# Patient Record
Sex: FEMALE | Race: WHITE | NOT HISPANIC OR LATINO | Employment: OTHER | URBAN - METROPOLITAN AREA
[De-identification: names, ages, dates, MRNs, and addresses within clinical notes are randomized per-mention and may not be internally consistent; named-entity substitution may affect disease eponyms.]

---

## 2017-01-11 DIAGNOSIS — I10 HYPERTENSION GOAL BP (BLOOD PRESSURE) < 150/90: Primary | ICD-10-CM

## 2017-01-11 NOTE — TELEPHONE ENCOUNTER
METOPROLOL 50MG      Last Written Prescription Date: 1/15/2016  Last Fill Quantity: 90, # refills: 3    Last Office Visit with G, P or [facility] prescribing provider:  8/9/2016   Future Office Visit:        BP Readings from Last 3 Encounters:   10/26/16 130/80   08/09/16 128/60   07/31/16 122/62

## 2017-01-12 RX ORDER — METOPROLOL SUCCINATE 50 MG/1
50 TABLET, EXTENDED RELEASE ORAL DAILY
Qty: 90 TABLET | Refills: 1 | Status: SHIPPED | OUTPATIENT
Start: 2017-01-12 | End: 2017-06-14

## 2017-01-12 NOTE — TELEPHONE ENCOUNTER
Prescription approved per [facility] Refill Protocol.    Sunitha Enrique, RN, BSN, PHN

## 2017-01-18 ENCOUNTER — ANTICOAGULATION THERAPY VISIT (OUTPATIENT)
Dept: NURSING | Facility: CLINIC | Age: 82
End: 2017-01-18
Payer: COMMERCIAL

## 2017-01-18 DIAGNOSIS — I48.0 PAROXYSMAL ATRIAL FIBRILLATION (H): Primary | ICD-10-CM

## 2017-01-18 DIAGNOSIS — Z79.01 LONG-TERM (CURRENT) USE OF ANTICOAGULANTS: ICD-10-CM

## 2017-01-18 LAB — INR POINT OF CARE: 3.4 (ref 0.86–1.14)

## 2017-01-18 PROCEDURE — 99207 ZZC NO CHARGE NURSE ONLY: CPT

## 2017-01-18 PROCEDURE — 85610 PROTHROMBIN TIME: CPT | Mod: QW

## 2017-01-18 PROCEDURE — 36416 COLLJ CAPILLARY BLOOD SPEC: CPT

## 2017-01-18 RX ORDER — WARFARIN SODIUM 2.5 MG/1
TABLET ORAL
Qty: 100 TABLET | Refills: 3 | COMMUNITY
Start: 2017-01-18 | End: 2017-04-04

## 2017-01-18 NOTE — PROGRESS NOTES
ANTICOAGULATION FOLLOW-UP CLINIC VISIT    Patient Name:  Maia Miranda  Date:  1/18/2017  Contact Type:  Face to Face    SUBJECTIVE:     Patient Findings     Positives No Problem Findings           OBJECTIVE    INR PROTIME   Date Value Ref Range Status   01/18/2017 3.4* 0.86 - 1.14 Final       ASSESSMENT / PLAN  INR assessment SUPRA    Recheck INR In: 3 WEEKS    INR Location Clinic      Anticoagulation Summary as of 1/18/2017     INR goal 2.0-3.0   Selected INR 3.4! 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    Meme Riggs, RN

## 2017-01-18 NOTE — MR AVS SNAPSHOT
Maia Miranda   1/18/2017 11:30 AM   Anticoagulation Therapy Visit    Description:  84 year old female   Provider:   ANTICOAGULATION CLINIC   Department:   Nurse           INR as of 1/18/2017     Selected INR 3.4! (1/18/2017)      Anticoagulation Summary as of 1/18/2017     INR goal 2.0-3.0   Selected INR 3.4! (1/18/2017)   Full instructions 2.5 mg every day   Next INR check 2/8/2017    Indications   Long-term (current) use of anticoagulants [Z79.01]  Atrial fibrillation (H) [I48.91]         Your next Anticoagulation Clinic appointment(s)     Feb 08, 2017 11:00 AM   Anticoagulation Visit with  ANTICOAGULATION CLINIC   [facility] ([facility]    [address] [address] [phone]              Contact Numbers     [facility]  Please call to cancel and/or reschedule your appointment, or with any problems or questions regarding your therapy.  Anticoagulation Nurse: [phone]  Main Clinic: [phone]             January 2017 Details    Sun Mon Tue Wed Thu Fri Sat     1               2               3               4               5               6               7                 8               9               10               11               12               13               14                 15               16               17               18      2.5 mg   See details      19      2.5 mg         20      2.5 mg         21      2.5 mg           22      2.5 mg         23      2.5 mg         24      2.5 mg         25      2.5 mg         26      2.5 mg         27      2.5 mg         28      2.5 mg           29      2.5 mg         30      2.5 mg         31      2.5 mg              Date Details   01/18 This INR check               How to take your warfarin dose     To take:  2.5 mg Take 1 of the 2.5 mg tablets.           February 2017 Details    Sun Mon Tue Wed Thu Fri Sat        1      2.5 mg         2      2.5 mg         3      2.5 mg          4      2.5 mg           5      2.5 mg         6      2.5 mg         7      2.5 mg         8            9               10               11                 12               13               14               15               16               17               18                 19               20               21               22               23               24               25                 26               27               28                    Date Details   No additional details    Date of next INR:  2/8/2017         How to take your warfarin dose     To take:  2.5 mg Take 1 of the 2.5 mg tablets.

## 2017-02-03 ENCOUNTER — OFFICE VISIT (OUTPATIENT)
Dept: URGENT CARE | Facility: URGENT CARE | Age: 82
End: 2017-02-03
Payer: COMMERCIAL

## 2017-02-03 ENCOUNTER — TRANSFERRED RECORDS (OUTPATIENT)
Dept: HEALTH INFORMATION MANAGEMENT | Facility: CLINIC | Age: 82
End: 2017-02-03

## 2017-02-03 VITALS
HEART RATE: 58 BPM | OXYGEN SATURATION: 99 % | DIASTOLIC BLOOD PRESSURE: 58 MMHG | WEIGHT: 159 LBS | TEMPERATURE: 95.6 F | RESPIRATION RATE: 16 BRPM | BODY MASS INDEX: 27.28 KG/M2 | SYSTOLIC BLOOD PRESSURE: 130 MMHG

## 2017-02-03 DIAGNOSIS — R09.81 CONGESTION OF PARANASAL SINUS: Primary | ICD-10-CM

## 2017-02-03 DIAGNOSIS — R05.9 COUGH: ICD-10-CM

## 2017-02-03 DIAGNOSIS — R09.82 POST-NASAL DRIP: ICD-10-CM

## 2017-02-03 PROCEDURE — 99214 OFFICE O/P EST MOD 30 MIN: CPT | Performed by: FAMILY MEDICINE

## 2017-02-03 NOTE — NURSING NOTE
"Chief Complaint   Patient presents with     Urgent Care     URI     x 3-4 weeks congestion     Cough     x 3-4 weeks, wheezing, non productive     Sinus Problem     Sinus pain and pressure.        Initial /78 mmHg  Pulse 58  Temp(Src) 95.6  F (35.3  C) (Tympanic)  Resp 16  Wt 159 lb (72.122 kg)  SpO2 99% Estimated body mass index is 27.28 kg/(m^2) as calculated from the following:    Height as of 8/9/16: 5' 4\" (1.626 m).    Weight as of this encounter: 159 lb (72.122 kg).  BP completed using cuff size: VARGHESE Uribe    "

## 2017-02-08 ENCOUNTER — ANTICOAGULATION THERAPY VISIT (OUTPATIENT)
Dept: NURSING | Facility: CLINIC | Age: 82
End: 2017-02-08
Payer: COMMERCIAL

## 2017-02-08 DIAGNOSIS — I48.0 PAROXYSMAL ATRIAL FIBRILLATION (H): Primary | ICD-10-CM

## 2017-02-08 DIAGNOSIS — Z79.01 LONG-TERM (CURRENT) USE OF ANTICOAGULANTS: ICD-10-CM

## 2017-02-08 LAB — INR POINT OF CARE: 2.5 (ref 0.86–1.14)

## 2017-02-08 PROCEDURE — 36416 COLLJ CAPILLARY BLOOD SPEC: CPT

## 2017-02-08 PROCEDURE — 85610 PROTHROMBIN TIME: CPT | Mod: QW

## 2017-02-08 PROCEDURE — 99207 ZZC NO CHARGE NURSE ONLY: CPT

## 2017-02-08 NOTE — PROGRESS NOTES
ANTICOAGULATION FOLLOW-UP CLINIC VISIT    Patient Name:  Maai Miranda  Date:  2/8/2017  Contact Type:  Face to Face    SUBJECTIVE:     Patient Findings     Positives Diet Changes (eating more broccoli), Meds (started Claritin for allergies)           OBJECTIVE    INR PROTIME   Date Value Ref Range Status   02/08/2017 2.5* 0.86 - 1.14 Final       ASSESSMENT / PLAN  INR assessment THER    Recheck INR In: 4 WEEKS    INR Location Clinic      Anticoagulation Summary as of 2/8/2017     INR goal 2.0-3.0   Selected INR 2.5 (2/8/2017)   Maintenance plan 2.5 mg (2.5 mg x 1) every day   Full instructions 2.5 mg every day   Weekly total 17.5 mg   No change documented Meme Riggs RN   Plan last modified Meme Riggs RN (1/18/2017)   Next INR check 3/8/2017   Target end date Indefinite    Indications   Long-term (current) use of anticoagulants [Z79.01]  Atrial fibrillation (H) [I48.91]         Anticoagulation Episode Summary     INR check location [facility]    Preferred lab     Send INR reminders to  ANTICOAG CLINIC    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

## 2017-02-08 NOTE — MR AVS SNAPSHOT
Maia Miranda   2/8/2017 11:00 AM   Anticoagulation Therapy Visit    Description:  84 year old female   Provider:   ANTICOAGULATION CLINIC   Department:   Nurse           INR as of 2/8/2017     Selected INR 2.5 (2/8/2017)      Anticoagulation Summary as of 2/8/2017     INR goal 2.0-3.0   Selected INR 2.5 (2/8/2017)   Full instructions 2.5 mg every day   Next INR check 3/8/2017    Indications   Long-term (current) use of anticoagulants [Z79.01]  Atrial fibrillation (H) [I48.91]         Your next Anticoagulation Clinic appointment(s)     Mar 08, 2017 11:00 AM   Anticoagulation Visit with  ANTICOAGULATION CLINIC   [facility]    [address] [address] [phone]              Contact Numbers     [facility]  Please call to cancel and/or reschedule your appointment, or with any problems or questions regarding your therapy.  Anticoagulation Nurse: [phone]  Main Clinic: [phone]             February 2017 Details    Sun Mon Tue Wed Thu Fri Sat        1               2               3               4                 5               6               7               8      2.5 mg   See details      9      2.5 mg         10      2.5 mg         11      2.5 mg           12      2.5 mg         13      2.5 mg         14      2.5 mg         15      2.5 mg         16      2.5 mg         17      2.5 mg         18      2.5 mg           19      2.5 mg         20      2.5 mg         21      2.5 mg         22      2.5 mg         23      2.5 mg         24      2.5 mg         25      2.5 mg           26      2.5 mg         27      2.5 mg         28      2.5 mg              Date Details   02/08 This INR check               How to take your warfarin dose     To take:  2.5 mg Take 1 of the 2.5 mg tablets.           March 2017 Details    Sun Mon Tue Wed Thu Fri Sat        1      2.5 mg         2      2.5 mg         3      2.5 mg         4      2.5  mg           5      2.5 mg         6      2.5 mg         7      2.5 mg         8            9               10               11                 12               13               14               15               16               17               18                 19               20               21               22               23               24               25                 26               27               28               29               30               31                 Date Details   No additional details    Date of next INR:  3/8/2017         How to take your warfarin dose     To take:  2.5 mg Take 1 of the 2.5 mg tablets.

## 2017-02-10 ENCOUNTER — PRE VISIT (OUTPATIENT)
Dept: UROLOGY | Facility: CLINIC | Age: 82
End: 2017-02-10

## 2017-02-10 NOTE — TELEPHONE ENCOUNTER
Patient coming in for follow up, Dr. Maria patient. Patient chart reviewed, no need for call, all records available and ready for appointment.

## 2017-02-22 ENCOUNTER — OFFICE VISIT (OUTPATIENT)
Dept: UROLOGY | Facility: CLINIC | Age: 82
End: 2017-02-22

## 2017-02-22 VITALS
HEIGHT: 65 IN | WEIGHT: 158.8 LBS | BODY MASS INDEX: 26.46 KG/M2 | DIASTOLIC BLOOD PRESSURE: 58 MMHG | HEART RATE: 56 BPM | SYSTOLIC BLOOD PRESSURE: 134 MMHG

## 2017-02-22 DIAGNOSIS — R33.9 URINARY RETENTION: Primary | ICD-10-CM

## 2017-02-22 RX ORDER — CEFAZOLIN SODIUM 1 G/3ML
1 INJECTION, POWDER, FOR SOLUTION INTRAMUSCULAR; INTRAVENOUS SEE ADMIN INSTRUCTIONS
Status: CANCELLED | OUTPATIENT
Start: 2017-02-22

## 2017-02-22 ASSESSMENT — PAIN SCALES - GENERAL: PAINLEVEL: NO PAIN (0)

## 2017-02-22 NOTE — NURSING NOTE
"Chief Complaint   Patient presents with     Consult     Would like to try Interstim       Blood pressure 134/58, pulse 56, height 1.638 m (5' 4.5\"), weight 72 kg (158 lb 12.8 oz), not currently breastfeeding. Body mass index is 26.84 kg/(m^2).    Patient Active Problem List   Diagnosis     Hyperlipidemia LDL goal <130     Osteopenia     Primary osteoarthritis involving multiple joints     Essential and other specified forms of tremor     Esophageal reflux     Varicose veins of lower extremities with complications     Allergic rhinitis     Degeneration of lumbar or lumbosacral intervertebral disc     Internal bleeding hemorrhoids     Osteoarthritis of thumb     Diverticulosis     CKD (chronic kidney disease) stage 3, GFR 30-59 ml/min     Chronic constipation     Urinary retention     Health Care Home     Advanced directives, counseling/discussion     Long-term (current) use of anticoagulants     Atrial fibrillation (H)     Hypertension goal BP (blood pressure) < 150/90     Post-menopausal bleeding     History of recurrent UTIs     Mild cognitive impairment     Fibroid uterus       Allergies   Allergen Reactions     Codeine      vomiting     Codeine Nausea and Vomiting     Epinephrine      tachycardia     Meperidine      demerol, vomiting     Meperidine Nausea and Vomiting     Morphine Nausea and Vomiting     vomiting     Penicillins Hives     hives     Sulfa Drugs      Vomiting       Epinephrine Palpitations       Current Outpatient Prescriptions   Medication Sig Dispense Refill     warfarin (COUMADIN) 2.5 MG tablet Take 2.5mg (1 tablet)  MTWTFSS or as directed by INR Clinic. 100 tablet 3     metoprolol (TOPROL-XL) 50 MG 24 hr tablet Take 1 tablet (50 mg) by mouth daily 90 tablet 1     methenamine hippurate (HIPREX) 1 G TABS Take 1 tablet (1 g) by mouth daily 90 tablet 3     lisinopril (PRINIVIL,ZESTRIL) 10 MG tablet Take 1 tablet (10 mg) by mouth daily (Patient taking differently: Take 10 mg by mouth daily AM) 90 " tablet 3     polyethylene glycol (MIRALAX/GLYCOLAX) packet Take 1 packet by mouth daily       ketoprofen 10% in PLO 10% Place onto the skin every 4 hours as needed for moderate pain       conjugated estrogens (PREMARIN) vaginal cream Place vaginally twice a week       fluocinonide (LIDEX) 0.05 % cream Apply topically 2 times daily as needed 30 g 2     acetaminophen (TYLENOL) 500 MG tablet Take 500-1,000 mg by mouth every 6 hours as needed       Menthol, Topical Analgesic, 10 % GEL Use topical cream on hands twice a day, prn for pain 60 g 0     Catheters (GENTLECATH URINARY CATHETER) MISC 1 catheter 4 times daily 120 each 12     Lubricants (SM LUBRICATING JELLY) GEL Externally apply 1 packet topically 4 times daily 144 Tube 12     ascorbic acid (VITAMIN C) 1000 MG TABS Take 1 tablet (1,000 mg) by mouth 2 times daily (Patient taking differently: Take 1,000 mg by mouth daily With HYprex) 180 tablet 3     Omega-3 Fatty Acids (OMEGA-3 FISH OIL PO) Take 1 g by mouth 2 times daily (with meals)       sodium chloride (PETER 128) 5 % ophthalmic ointment Place 1 Application into both eyes At Bedtime       carboxymethylcellulose (CELLUVISC/REFRESH LIQUIGEL) 1 % ophthalmic solution Place 1 drop into both eyes every morning        MAGNESIUM 250 MG OR TABS Take  by mouth. 1 tablet daily at HS  0       Social History   Substance Use Topics     Smoking status: Never Smoker     Smokeless tobacco: Never Used     Alcohol use No       Lorri Griffin LPN  2/22/2017  4:11 PM

## 2017-02-22 NOTE — LETTER
2/22/2017       RE: Maia Miranda  [address] [address]     Dear Colleague,    Thank you for referring your patient, Maia Miranda, to the [facility] UROLOGY AND INST FOR PROSTATE AND UROLOGIC CANCERS at [facility]. Please see a copy of my visit note below.    Reason for Visit:  Discuss interstim    Clinical Data:  Ms. Miranda is a pleasant 85 y/o female with idiopathic urinary retention who has been performing SIC X 3 years.  Her volumes are around 40 to 50 oz when she caths and she does this about 4 x per day.  She is interested in trying an interstim implant.  She denies any neurological problems.  She denies DM or hx of smoking.  She has Afib but it is well controlled.    A/P:  85 y/o female with idiopathic urinary retention who performs SIC. She is interested in interstim implant.  We discussed options of observation and continued SIC as well as interstim both a PNE and a staged procedure.  She is interested in interstim and would like to try a staged procedure.  We discussed risks of infection, bleeding, breakage of device or malfunction as well as risk of migration and need for further surgery.  Pt. Verbalized understanding with her son and wishes to proceed.  -schedule stage I interstim    Thank you for allowing me to participate in the care of  Ms. Maia Miranda and I will keep you updated on her progress.    Kb Tracy MD  25 min spent with patient,  >50% in discussion and coordination of care.

## 2017-02-22 NOTE — MR AVS SNAPSHOT
After Visit Summary   2/22/2017    Maia Miranda    MRN: [id number]           Patient Information     Date Of Birth          10/26/1932        Visit Information        Provider Department      2/22/2017 3:45 PM Kb Tracy MD [facility] Urology and [facility] for Prostate and Urologic Cancers        Today's Diagnoses     Urinary retention    -  1       Follow-ups after your visit        Additional Services     PAC Visit Referral (For [facility] Only)       Does this visit require an Anesthesia consult?  Yes - Evaluate for medical necessity related to one of the following conditions:  Hx of controlled Afib    H&P done by:  N/A and Other (Specify): PAC      Please be aware that coverage of these services is subject to the terms and limitations of your health insurance plan.  Call member services at your health plan with any benefit or coverage questions.      Please bring the following to your appointment:  >>   Any x-rays, CTs or MRIs which have been performed.  Contact the facility where they were done to arrange for  prior to your scheduled appointment.  Any new CT, MRI or other procedures ordered by your specialist must be performed at a [facility] facility or coordinated by your clinic's referral office.    >>   List of current medications  >>   This referral request   >>   Any documents/labs given to you for this referral                  Follow-up notes from your care team     Return for stage I interstim.      Your next 10 appointments already scheduled     Mar 08, 2017 11:00 AM CST   Anticoagulation Visit with  ANTICOAGULATION CLINIC   [facility]    [address] [address] [phone]              Who to contact     Please call your clinic at [phone] to:    Ask questions about your health    Make or cancel appointments    Discuss your medicines    Learn about your test results    Speak to your doctor   If you have  "compliments or concerns about an experience at your clinic, or if you wish to file a complaint, please contact [facility] Patient Relations at [phone] or email us at [email or web address]         Additional Information About Your Visit        MyChart Information     [employer] gives you secure access to your electronic health record. If you see a primary care provider, you can also send messages to your care team and make appointments. If you have questions, please call your primary care clinic.  If you do not have a primary care provider, please call [phone] and they will assist you.      [employer] is an electronic gateway that provides easy, online access to your medical records. With [employer], you can request a clinic appointment, read your test results, renew a prescription or communicate with your care team.     To access your existing account, please contact your [facility] or call [phone] for assistance.        Care EveryWhere ID     This is your Care EveryWhere ID. This could be used by other organizations to access your [facility] medical records  [id number]        Your Vitals Were     Pulse Height BMI (Body Mass Index)             56 1.638 m (5' 4.5\") 26.84 kg/m2          Blood Pressure from Last 3 Encounters:   02/22/17 134/58   02/03/17 130/58   10/26/16 130/80    Weight from Last 3 Encounters:   02/22/17 72 kg (158 lb 12.8 oz)   02/03/17 72.1 kg (159 lb)   10/26/16 68 kg (150 lb)              We Performed the Following     PAC Visit Referral (For [facility] Only)     Franci-Operative Worksheet  (Urology General)          Today's Medication Changes          These changes are accurate as of: 2/22/17  4:46 PM.  If you have any questions, ask your nurse or doctor.               These medicines have changed or have updated prescriptions.        Dose/Directions    ascorbic acid 1000 MG Tabs   Commonly known as:  vitamin C   This may have " changed:    - when to take this  - additional instructions   Used for:  Recurrent UTI, Neurogenic bladder        Dose:  1000 mg   Take 1 tablet (1,000 mg) by mouth 2 times daily   Quantity:  180 tablet   Refills:  3       lisinopril 10 MG tablet   Commonly known as:  PRINIVIL/ZESTRIL   This may have changed:  additional instructions   Used for:  Hypertension goal BP (blood pressure) < 150/90        Dose:  10 mg   Take 1 tablet (10 mg) by mouth daily   Quantity:  90 tablet   Refills:  3                Primary Care Provider Office Phone # Fax #    Dian Frias [phone]       [facility] [address] [place] [address]        Thank you!     Thank you for choosing [facility] UROLOGY AND [facility] FOR PROSTATE AND UROLOGIC CANCERS  for your care. Our goal is always to provide you with excellent care. Hearing back from our patients is one way we can continue to improve our services. Please take a few minutes to complete the written survey that you may receive in the mail after your visit with us. Thank you!             Your Updated Medication List - Protect others around you: Learn how to safely use, store and throw away your medicines at www.disposemymeds.org.          This list is accurate as of: 2/22/17  4:46 PM.  Always use your most recent med list.                   Brand Name Dispense Instructions for use    ascorbic acid 1000 MG Tabs    vitamin C    180 tablet    Take 1 tablet (1,000 mg) by mouth 2 times daily       carboxymethylcellulose 1 % ophthalmic solution    CELLUVISC/REFRESH LIQUIGEL     Place 1 drop into both eyes every morning       conjugated estrogens cream    PREMARIN     Place vaginally twice a week       fluocinonide 0.05 % cream    LIDEX    30 g    Apply topically 2 times daily as needed       GENTLECATH URINARY CATHETER Misc     120 each    1 catheter 4 times daily       ketoprofen 10% in PLO 10% topical gel      Place onto the skin every 4 hours as  needed for moderate pain       lisinopril 10 MG tablet    PRINIVIL/ZESTRIL    90 tablet    Take 1 tablet (10 mg) by mouth daily       magnesium 250 MG tablet      Take  by mouth. 1 tablet daily at HS       Menthol (Topical Analgesic) 10 % Gel     60 g    Use topical cream on hands twice a day, prn for pain       methenamine hippurate 1 G Tabs tablet    HIPREX    90 tablet    Take 1 tablet (1 g) by mouth daily       metoprolol 50 MG 24 hr tablet    TOPROL-XL    90 tablet    Take 1 tablet (50 mg) by mouth daily       OMEGA-3 FISH OIL PO      Take 1 g by mouth 2 times daily (with meals)       polyethylene glycol Packet    MIRALAX/GLYCOLAX     Take 1 packet by mouth daily       SM LUBRICATING JELLY Gel     144 Tube    Externally apply 1 packet topically 4 times daily       sodium chloride 5 % ophthalmic ointment    PETER 128     Place 1 Application into both eyes At Bedtime       TYLENOL 500 MG tablet   Generic drug:  acetaminophen      Take 500-1,000 mg by mouth every 6 hours as needed       warfarin 2.5 MG tablet    COUMADIN    100 tablet    Take 2.5mg (1 tablet)  MTWTFSS or as directed by INR Clinic.

## 2017-02-22 NOTE — PROGRESS NOTES
Reason for Visit:  Discuss interstim    Clinical Data:  Ms. Miranda is a pleasant 83 y/o female with idiopathic urinary retention who has been performing SIC X 3 years.  Her volumes are around 40 to 50 oz when she caths and she does this about 4 x per day.  She is interested in trying an interstim implant.  She denies any neurological problems.  She denies DM or hx of smoking.  She has Afib but it is well controlled.    A/P:  83 y/o female with idiopathic urinary retention who performs SIC. She is interested in interstim implant.  We discussed options of observation and continued SIC as well as interstim both a PNE and a staged procedure.  She is interested in interstim and would like to try a staged procedure.  We discussed risks of infection, bleeding, breakage of device or malfunction as well as risk of migration and need for further surgery.  Pt. Verbalized understanding with her son and wishes to proceed.  -schedule stage I interstim    Thank you for allowing me to participate in the care of  Ms. Maia Miranda and I will keep you updated on her progress.    Kb Tracy MD  25 min spent with patient,  >50% in discussion and coordination of care.

## 2017-02-27 DIAGNOSIS — N95.2 ATROPHIC VAGINITIS: Primary | ICD-10-CM

## 2017-02-27 RX ORDER — ESTRADIOL 0.1 MG/G
2 CREAM VAGINAL
Qty: 42.5 G | Refills: 3 | Status: SHIPPED | OUTPATIENT
Start: 2017-02-27 | End: 2017-03-22

## 2017-03-08 ENCOUNTER — ANTICOAGULATION THERAPY VISIT (OUTPATIENT)
Dept: NURSING | Facility: CLINIC | Age: 82
End: 2017-03-08
Payer: COMMERCIAL

## 2017-03-08 DIAGNOSIS — I48.0 PAROXYSMAL ATRIAL FIBRILLATION (H): ICD-10-CM

## 2017-03-08 DIAGNOSIS — Z79.01 LONG-TERM (CURRENT) USE OF ANTICOAGULANTS: ICD-10-CM

## 2017-03-08 LAB — INR POINT OF CARE: 2.3 (ref 0.86–1.14)

## 2017-03-08 PROCEDURE — 85610 PROTHROMBIN TIME: CPT | Mod: QW

## 2017-03-08 PROCEDURE — 36416 COLLJ CAPILLARY BLOOD SPEC: CPT

## 2017-03-08 PROCEDURE — 99207 ZZC NO CHARGE NURSE ONLY: CPT

## 2017-03-08 NOTE — PROGRESS NOTES
ANTICOAGULATION FOLLOW-UP CLINIC VISIT    Patient Name:  Maia Miranda  Date:  3/8/2017  Contact Type:  Face to Face    SUBJECTIVE:        OBJECTIVE    INR Protime   Date Value Ref Range Status   03/08/2017 2.3 (A) 0.86 - 1.14 Final       ASSESSMENT / PLAN  INR assessment THER    Recheck INR In: 3 WEEKS    INR Location Clinic      Anticoagulation Summary as of 3/8/2017     INR goal 2.0-3.0   Today's INR 2.3   Maintenance plan 2.5 mg (2.5 mg x 1) every day   Full instructions 2.5 mg every day   Weekly total 17.5 mg   No change documented Meme Riggs, RN   Plan last modified Meme Riggs RN (1/18/2017)   Next INR check 3/29/2017   Target end date Indefinite    Indications   Long-term (current) use of anticoagulants [Z79.01]  Atrial fibrillation (H) [I48.91]         Anticoagulation Episode Summary     INR check location [facility]    Preferred lab     Send INR reminders to  ANTICOAG CLINIC    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

## 2017-03-08 NOTE — MR AVS SNAPSHOT
Maia Miranda   3/8/2017 11:00 AM   Anticoagulation Therapy Visit    Description:  84 year old female   Provider:   ANTICOAGULATION CLINIC   Department:   Nurse           INR as of 3/8/2017     Today's INR 2.3      Anticoagulation Summary as of 3/8/2017     INR goal 2.0-3.0   Today's INR 2.3   Full instructions 2.5 mg every day   Next INR check 3/29/2017    Indications   Long-term (current) use of anticoagulants [Z79.01]  Atrial fibrillation (H) [I48.91]         Your next Anticoagulation Clinic appointment(s)     Mar 29, 2017 11:00 AM CDT   Anticoagulation Visit with  ANTICOAGULATION CLINIC   [facility] ([facility])    [address] [facility] [address] [address] [phone]              Contact Numbers     [facility]  Please call to cancel and/or reschedule your appointment, or with any problems or questions regarding your therapy.  Anticoagulation Nurse: [phone]  Main Clinic: [phone]             March 2017 Details    Sun Mon Tue Wed Thu Fri Sat        1               2               3               4                 5               6               7               8      2.5 mg   See details      9      2.5 mg         10      2.5 mg         11      2.5 mg           12      2.5 mg         13      2.5 mg         14      2.5 mg         15      2.5 mg         16      2.5 mg         17      2.5 mg         18      2.5 mg           19      2.5 mg         20      2.5 mg         21      2.5 mg         22      2.5 mg         23      2.5 mg         24      2.5 mg         25      2.5 mg           26      2.5 mg         27      2.5 mg         28      2.5 mg         29            30               31                 Date Details   03/08 This INR check       Date of next INR:  3/29/2017         How to take your warfarin dose     To take:  2.5 mg Take 1 of the 2.5 mg tablets.

## 2017-03-20 ENCOUNTER — CARE COORDINATION (OUTPATIENT)
Dept: CASE MANAGEMENT | Facility: CLINIC | Age: 82
End: 2017-03-20

## 2017-03-20 DIAGNOSIS — Z76.89 HEALTH CARE HOME: ICD-10-CM

## 2017-03-20 NOTE — PROGRESS NOTES
Clinic Care Coordination Contact  Initial    Clinic SW sent message to this SW FREDDIE stating patient left her a voice message asking about transportation to an upcoming appointment.    CC spoke with patient. She reports she is supposed to go to [facility] on Wednesday (2 days) for a pre-op appointment and now her ride is unavailable to drive her. She does generally drive but is frightened to drive in [place]. She is waiting to hear back from another child to see if they may be able to take the day off work to drive her. CC gave her the phone number for [employer]. CC explained that she may not qualify but should call to find out. CC pulled up the distance between patient's house and the transportation [employer] address. One route is 19.9 miles and another route is 22.6. She has never applied for [employer] so that is not an option. She is aware that her insurance does not cover the cost of transportation to medical appointments.    Plan: Patient will call [employer] to find out if she is eligible for service. CC gave patient CC's phone number and advised to call if concerns arise. Patient denies other needs.    TRISTIAN Cody  A   [phone]  March 20, 2017

## 2017-03-21 ENCOUNTER — ANESTHESIA EVENT (OUTPATIENT)
Dept: SURGERY | Facility: AMBULATORY SURGERY CENTER | Age: 82
End: 2017-03-21

## 2017-03-22 ENCOUNTER — ALLIED HEALTH/NURSE VISIT (OUTPATIENT)
Dept: SURGERY | Facility: CLINIC | Age: 82
End: 2017-03-22

## 2017-03-22 ENCOUNTER — OFFICE VISIT (OUTPATIENT)
Dept: SURGERY | Facility: CLINIC | Age: 82
End: 2017-03-22

## 2017-03-22 VITALS
BODY MASS INDEX: 26.71 KG/M2 | OXYGEN SATURATION: 97 % | TEMPERATURE: 97.8 F | WEIGHT: 160.3 LBS | HEIGHT: 65 IN | HEART RATE: 72 BPM | DIASTOLIC BLOOD PRESSURE: 76 MMHG | SYSTOLIC BLOOD PRESSURE: 152 MMHG | RESPIRATION RATE: 16 BRPM

## 2017-03-22 DIAGNOSIS — Z01.818 PREOP EXAMINATION: Primary | ICD-10-CM

## 2017-03-22 DIAGNOSIS — Z01.818 PREOP EXAMINATION: ICD-10-CM

## 2017-03-22 LAB
ANION GAP SERPL CALCULATED.3IONS-SCNC: 10 MMOL/L (ref 3–14)
BUN SERPL-MCNC: 24 MG/DL (ref 7–30)
CALCIUM SERPL-MCNC: 9 MG/DL (ref 8.5–10.1)
CHLORIDE SERPL-SCNC: 107 MMOL/L (ref 94–109)
CO2 SERPL-SCNC: 25 MMOL/L (ref 20–32)
CREAT SERPL-MCNC: 1.13 MG/DL (ref 0.52–1.04)
ERYTHROCYTE [DISTWIDTH] IN BLOOD BY AUTOMATED COUNT: 12.8 % (ref 10–15)
GFR SERPL CREATININE-BSD FRML MDRD: 46 ML/MIN/1.7M2
GLUCOSE SERPL-MCNC: 94 MG/DL (ref 70–99)
HCT VFR BLD AUTO: 39.4 % (ref 35–47)
HGB BLD-MCNC: 12.7 G/DL (ref 11.7–15.7)
INR PPP: 1.8 (ref 0.86–1.14)
MCH RBC QN AUTO: 31.8 PG (ref 26.5–33)
MCHC RBC AUTO-ENTMCNC: 32.2 G/DL (ref 31.5–36.5)
MCV RBC AUTO: 99 FL (ref 78–100)
PLATELET # BLD AUTO: 148 10E9/L (ref 150–450)
POTASSIUM SERPL-SCNC: 4.3 MMOL/L (ref 3.4–5.3)
RBC # BLD AUTO: 3.99 10E12/L (ref 3.8–5.2)
SODIUM SERPL-SCNC: 142 MMOL/L (ref 133–144)
WBC # BLD AUTO: 5.7 10E9/L (ref 4–11)

## 2017-03-22 ASSESSMENT — ENCOUNTER SYMPTOMS: DYSRHYTHMIAS: 1

## 2017-03-22 NOTE — PATIENT INSTRUCTIONS
Preparing for Your Surgery      Name:  Maia Miranda   MRN:  [id number]   :  10/26/1932   Today's Date:  3/22/2017     Arriving for surgery:  Surgery date:  17  Surgery time:  11:20 am  Arrival time:  9:50 am    Please come to:   [facility]  [address] [place] [id number]    -  Proceed to the 5th floor to check into the Ambulatory Surgery Center.              >> There will be patient concierges on the 1st and 5th floor, for assistance or an escort, if you would like.              >> Please call [phone] with any questions.  -  Bring your ID and insurance card.    What can I eat or drink?  -  You may have solid food or milk products until 8 hours prior to your surgery. (3:20 am)  -  You may have water, apple juice, BLACK coffee (NO creamer or nondairy creamer), or 7up/Sprite until 2 hours prior to your surgery. (9:20 am)     Which medicines can I take?  -Do not bring your own medications to the hospital, except for eye drops         -  Follow Primary Care Providers recommendation for Warfarin.        -  Stop Omega 3 and vitamins 1 week prior to surgery.    -  Do NOT take these medications in the morning, the day of surgery:  Lisinopril,  No creams or lotions    -  Please take these medications the morning of surgery:  Carboxymethylcellulose eye drops  Acetaminophen (Tylenol) if needed    How do I prepare myself?  -  Take two showers: one the night before surgery; and one the morning of surgery.         Use Scrubcare or Hibiclens to wash from neck down.  You may use your own shampoo and conditioner. No other hair products.   -  Do NOT use lotion, powder, deodorant, or antiperspirant the day of your surgery.  -  Do NOT wear any makeup, fingernail polish or jewelry.    Questions or Concerns:  If you have questions or concerns, please call the  Preoperative Assessment Center, Monday-Friday 7AM-7PM:  [phone]      AFTER YOUR SURGERY  Breathing exercises   Breathing  exercises help you recover faster. Take deep breaths and let the air out slowly. This will:     Help you wake up after surgery.    Help prevent complications like pneumonia.  Preventing complications will help you go home sooner.   Nausea and vomiting   You may feel sick to your stomach after surgery; if so, let your nurse know.    Pain control:  After surgery, you may have pain. Our goal is to help you manage your pain. Pain medicine will help you feel comfortable enough to do activities that will help you heal.  These activities may include breathing exercises, walking and physical therapy.   To help your health care team treat your pain we will ask: 1) If you have pain  2) where it is located 3) describe your pain in your words  Methods of pain control include medications given by mouth, vein or by nerve block for some surgeries.  Sequential Compression Device (SCD) or Pneumo Boots:  You may need to wear SCD S on your legs or feet. These are wraps connected to a machine that pumps in air and releases it. The repeated pumping helps prevent blood clots from forming.

## 2017-03-22 NOTE — MR AVS SNAPSHOT
After Visit Summary   3/22/2017    Maia Miranda    MRN: [id number]           Patient Information     Date Of Birth          10/26/1932        Visit Information        Provider Department      3/22/2017 12:30 PM Pharmacist, Miri Caballero [facility]        Today's Diagnoses     Preop examination    -  1       Follow-ups after your visit        Your next 10 appointments already scheduled     Mar 22, 2017  2:30 PM CDT   (Arrive by 2:15 PM)   PAC Anesthesia Consult with Miri Pac Anesthesiologist   [facility] ([facility])    [address] [place] [address]  [address] [facility] [id number] [phone]            Mar 29, 2017 11:00 AM CDT   Anticoagulation Visit with  ANTICOAGULATION CLINIC   [facility] ([facility])    [address] [place] [id number] [phone]            Apr 04, 2017   Procedure with Kb Tracy MD   [facility] ([facility])    [address] [place] [address]  [address] [facility] [id number] [phone]           Located in the Clinics and Surgery Center at [address] [place] [address].   parking is very convenient and highly recommended.  is a $6 flat rate fee.  Both  and self parkers should enter the main arrival plaza from [facility]; parking attendants will direct you based on your parking preference.              Future tests that were ordered for you today     Open Future Orders        Priority Expected Expires Ordered    CBC with platelets Routine 3/22/2017 4/21/2017 3/22/2017    Basic metabolic panel Routine 3/22/2017 4/21/2017 3/22/2017            Who to contact     Please call your clinic at [phone] to:    Ask questions about your health    Make or cancel appointments    Discuss your medicines    Learn about your test results    Speak to your doctor   If you have  compliments or concerns about an experience at your clinic, or if you wish to file a complaint, please contact [facility] Patient Relations at [phone] or email us at [email or web address]         Additional Information About Your Visit        MyChart Information     [employer] gives you secure access to your electronic health record. If you see a primary care provider, you can also send messages to your care team and make appointments. If you have questions, please call your primary care clinic.  If you do not have a primary care provider, please call [phone] and they will assist you.      [employer] is an electronic gateway that provides easy, online access to your medical records. With [employer], you can request a clinic appointment, read your test results, renew a prescription or communicate with your care team.     To access your existing account, please contact your [facility] Clinic or call [phone] for assistance.        Care EveryWhere ID     This is your Care EveryWhere ID. This could be used by other organizations to access your [facility] medical records  [id number]         Blood Pressure from Last 3 Encounters:   03/22/17 152/76   02/22/17 134/58   02/03/17 130/58    Weight from Last 3 Encounters:   03/22/17 72.7 kg (160 lb 4.8 oz)   02/22/17 72 kg (158 lb 12.8 oz)   02/03/17 72.1 kg (159 lb)              Today, you had the following     No orders found for display         Today's Medication Changes          These changes are accurate as of: 3/22/17  2:18 PM.  If you have any questions, ask your nurse or doctor.               These medicines have changed or have updated prescriptions.        Dose/Directions    ascorbic acid 1000 MG Tabs   Commonly known as:  vitamin C   This may have changed:    - when to take this  - additional instructions   Used for:  Recurrent UTI, Neurogenic bladder        Dose:  1000 mg   Take 1 tablet (1,000 mg) by  mouth 2 times daily   Quantity:  180 tablet   Refills:  3       lisinopril 10 MG tablet   Commonly known as:  PRINIVIL/ZESTRIL   This may have changed:  when to take this   Used for:  Hypertension goal BP (blood pressure) < 150/90        Dose:  10 mg   Take 1 tablet (10 mg) by mouth daily   Quantity:  90 tablet   Refills:  3       metoprolol 50 MG 24 hr tablet   Commonly known as:  TOPROL-XL   This may have changed:  when to take this   Used for:  Hypertension goal BP (blood pressure) < 150/90        Dose:  50 mg   Take 1 tablet (50 mg) by mouth daily   Quantity:  90 tablet   Refills:  1         Stop taking these medicines if you haven't already. Please contact your care team if you have questions.     estradiol 0.1 MG/GM cream   Commonly known as:  ESTRACE VAGINAL   Stopped by:  Pharmacist,  Pac           Menthol (Topical Analgesic) 10 % Gel   Stopped by:  Pharmacist, Miri Pac                    Primary Care Provider Office Phone # Fax #    Dian Frias [phone]       [facility] [address] [place] [address]        Thank you!     Thank you for choosing [facility] [place]  for your care. Our goal is always to provide you with excellent care. Hearing back from our patients is one way we can continue to improve our services. Please take a few minutes to complete the written survey that you may receive in the mail after your visit with us. Thank you!             Your Updated Medication List - Protect others around you: Learn how to safely use, store and throw away your medicines at www.disposemymeds.org.          This list is accurate as of: 3/22/17  2:18 PM.  Always use your most recent med list.                   Brand Name Dispense Instructions for use    ascorbic acid 1000 MG Tabs    vitamin C    180 tablet    Take 1 tablet (1,000 mg) by mouth 2 times daily       carboxymethylcellulose 1 % ophthalmic solution    CELLUVISC/REFRESH LIQUIGEL      Place 1 drop into both eyes every morning       conjugated estrogens cream    PREMARIN     Place vaginally twice a week On Tuesday and Friday. Uses a pea sized amount       fluocinonide 0.05 % cream    LIDEX    30 g    Apply topically 2 times daily as needed       GENTLECATH URINARY CATHETER Misc     120 each    1 catheter 4 times daily       ketoprofen 10% in PLO 10% topical gel      Place onto the skin every 4 hours as needed for moderate pain       lisinopril 10 MG tablet    PRINIVIL/ZESTRIL    90 tablet    Take 1 tablet (10 mg) by mouth daily       magnesium 250 MG tablet      Take 1 tablet by mouth at bedtime       methenamine hippurate 1 G Tabs tablet    HIPREX    90 tablet    Take 1 tablet (1 g) by mouth daily       metoprolol 50 MG 24 hr tablet    TOPROL-XL    90 tablet    Take 1 tablet (50 mg) by mouth daily       OMEGA-3 FISH OIL PO      Take 1 g by mouth 2 times daily (with meals)       polyethylene glycol Packet    MIRALAX/GLYCOLAX     Take 1 packet by mouth daily as needed       SM LUBRICATING JELLY Gel     144 Tube    Externally apply 1 packet topically 4 times daily       sodium chloride 5 % ophthalmic ointment    PETER 128     Place 1 Application into both eyes At Bedtime       TYLENOL 500 MG tablet   Generic drug:  acetaminophen      Take 500-1,000 mg by mouth every 6 hours as needed       warfarin 2.5 MG tablet    COUMADIN    100 tablet    Take 2.5mg (1 tablet)  MTWTFSS or as directed by INR Clinic.

## 2017-03-22 NOTE — H&P
Pre-Operative H & P     Date of Encounter: 3/22/2017  Primary Care Physician:  Dian Frias    CC: Idiopathic urinary retention.    HPI:  Maia Miranda is a 84 year old female who presents for pre-operative H & P in preparation for Stage One Interstim Implant (Implant Permanent Lead for Interstim Device on 4/4/17 with Dr. Kb Tracy at [facility].     The patient was evaluated by Dr. Tracy on 2/22/17 in regards to idiopathic urinary retention.  The patient has been performing straight intermittent catheterization for approximately 3 years and she is interested in trying an Interstim implant.      History is obtained from the patient and the medical record.    Past Medical History:  Past Medical History:   Diagnosis Date     Amnestic MCI (mild cognitive impairment with memory loss) 2014     Chronic duodenal ulcer without mention of hemorrhage, perforation, or obstruction 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 Warner, [facility] yearly     Esophageal reflux 2001    Abstracted 06/10/02     Essential and other specified forms of tremor 2004    resting tremor     Generalized osteoarthrosis, unspecified site     Hands     Hiatal hernia     diagnosed late 1990s [place], [place]     History of pulmonary embolism 1971    no source found     LACTOSE INTOLERANCE      Lumbago     sees Kodi Guidry     Mitral valve regurgitation      Occlusion and stenosis of carotid artery without mention of cerebral infarction 2003    CAROTID US NORMAL, bruit in neck     Osteoporosis, unspecified 2003    T = -2.66     Paroxysmal atrial fibrillation (H) 11/15/2013     Unspecified essential hypertension      Unspecified tinnitus 2005    mild hearing loss, saw ENT     Past Surgical History:  Past Surgical History:   Procedure Laterality Date     C NONSPECIFIC PROCEDURE      D&C x 2 in 1957 & 1962 -- Abstracted 06/10/02     C  NONSPECIFIC PROCEDURE      Left breast biopsy x 2 in 1988 & 1989 -- Abstracted 06/10/02     C NONSPECIFIC PROCEDURE  1958    Influenza resulting in hospitalization -- Abstracted 06/10/02     C NONSPECIFIC PROCEDURE  1971    10 day hospitalization from bilateral pulmonary enboli -- Abstracted 06/10/02     C NONSPECIFIC PROCEDURE  1/03    colonoscopy  negative     CATARACT IOL, RT/LT       corneal scraping       CYSTOSCOPY, BIOPSY BLADDER, COMBINED N/A 7/25/2016    Procedure: COMBINED CYSTOSCOPY, BIOPSY BLADDER;  Surgeon: Bernadine Maria MD;  Location: [facility] OR     ESOPHAGOSCOPY, GASTROSCOPY, DUODENOSCOPY (EGD), COMBINED  7/8/2013    Procedure: COMBINED ESOPHAGOSCOPY, GASTROSCOPY, DUODENOSCOPY (EGD);   ESOPHAGOSCOPY, GASTROSCOPY, DUODENOSCOPY (EGD)   ;  Surgeon: Shawn Soto MD;  Location: [facility] GI     Hx of Blood transfusions/reactions: Denies.     Hx of abnormal bleeding or anti-platelet use: Patient will be instructed to hold Coumadin by her Primary Care Provider, Dr. Dian Frias.    Menstrual history: No LMP recorded. Patient is postmenopausal.    Steroid use in the last year: Denies.    Personal or FH of difficulty with anesthesia: Denies.    Prior to admission medications  Current Outpatient Prescriptions   Medication Sig Dispense Refill     warfarin (COUMADIN) 2.5 MG tablet Take 2.5mg (1 tablet)  MTWTFSS or as directed by INR Clinic. 100 tablet 3     metoprolol (TOPROL-XL) 50 MG 24 hr tablet Take 1 tablet (50 mg) by mouth daily (Patient taking differently: Take 50 mg by mouth every evening ) 90 tablet 1     methenamine hippurate (HIPREX) 1 G TABS Take 1 tablet (1 g) by mouth daily 90 tablet 3     lisinopril (PRINIVIL,ZESTRIL) 10 MG tablet Take 1 tablet (10 mg) by mouth daily (Patient taking differently: Take 10 mg by mouth every morning ) 90 tablet 3     polyethylene glycol (MIRALAX/GLYCOLAX) packet Take 1 packet by mouth daily as needed        ketoprofen 10% in PLO 10% Place onto the skin every 4  hours as needed for moderate pain       conjugated estrogens (PREMARIN) vaginal cream Place vaginally twice a week On Tuesday and Friday. Uses a pea sized amount       fluocinonide (LIDEX) 0.05 % cream Apply topically 2 times daily as needed 30 g 2     acetaminophen (TYLENOL) 500 MG tablet Take 500-1,000 mg by mouth every 6 hours as needed       Catheters (GENTLECATH URINARY CATHETER) MISC 1 catheter 4 times daily 120 each 12     Lubricants (SM LUBRICATING JELLY) GEL Externally apply 1 packet topically 4 times daily (Patient not taking: Reported on 3/22/2017) 144 Tube 12     ascorbic acid (VITAMIN C) 1000 MG TABS Take 1 tablet (1,000 mg) by mouth 2 times daily (Patient taking differently: Take 1,000 mg by mouth daily With HYprex) 180 tablet 3     Omega-3 Fatty Acids (OMEGA-3 FISH OIL PO) Take 1 g by mouth 2 times daily (with meals)       sodium chloride (PETER 128) 5 % ophthalmic ointment Place 1 Application into both eyes At Bedtime       carboxymethylcellulose (CELLUVISC/REFRESH LIQUIGEL) 1 % ophthalmic solution Place 1 drop into both eyes every morning        MAGNESIUM 250 MG OR TABS Take 1 tablet by mouth at bedtime  0     Allergies  Allergies   Allergen Reactions     Codeine Nausea and Vomiting     Meperidine Nausea and Vomiting     Morphine Nausea and Vomiting     vomiting     Penicillins Hives     hives     Sulfa Drugs      Vomiting       Epinephrine Palpitations     Social History  Social History     Social History     Marital status:      Spouse name: N/A     Number of children: 4     Years of education: N/A     Occupational History      Retired      at [employer]     Social History Main Topics     Smoking status: Never Smoker     Smokeless tobacco: Never Used     Alcohol use No     Drug use: No     Sexual activity: No     Other Topics Concern     Not on file     Social History Narrative    .Living situation (location, living with others?): Lives alone in a condo. Lives 2 miles from daughter  and son.     Activities of daily living (e.g., dressing, eating, walking): Independent        Instrumental activities of daily living (e.g., finance management, housekeeping, meal planning/ prep): Independent        Advance Care plan: has a living will and POLST        Driving: Yes    Medication: manages by self    Meaningful Activities (e.g., hobbies, work): Reads, has coffee out with girlfriends every week, has a humHuayue Digitalbird feeder, likes to watch baseball        Support: Help with cleaning every few weeks    Community Resources Used/ Interested in: None at this time     Family History  Family History   Problem Relation Age of Onset     CEREBROVASCULAR DISEASE Father       at 92     Psychotic Disorder Mother      Suicide 62, depression     Alzheimer Disease Maternal Grandmother      Cardiovascular Sister      pacemaker     Glaucoma No family hx of      Review of Systems  Functional status: Independent in ADL's.  4 METS.     The complete review of systems is negative other than noted in the HPI or here.   Constitutional: Denies recent changes in sleeping patterns, or fevers/chills.  Reports an approximately 15 pound weight gain over the winter.  Eyes: Glasses for vision correction.  No recent vision changes.  EENT: Denies recent changes in hearing, mouth pain, or difficulty swallowing.  Cardiovascular: Denies chest pain, SEGOVIA or orthopnea, or palpitations.  States that she is able to tell when she is in atrial fibrillation.    Respiratory: Denies shortness of breath or significant cough.    GI: Denies nausea/vomiting or diarrhea.  Reports chronic issues with constipation.    : Denies dysuria.    Musculoskeletal: Denies joint pain or swelling.    Skin: Denies rashes or wounds.    Hematologic: Denies easy bruising or bleeding.    Neurologic: Denies migraines, seizures, dizziness, numbness/tingling.  Psychiatric: Denies changes in mood or affect.      /76  Pulse 72  Temp 97.8  F (36.6  C) (Oral)  Resp 16  " Ht 1.638 m (5' 4.5\")  Wt 72.7 kg (160 lb 4.8 oz)  SpO2 97%  BMI 27.09 kg/m2    160 lbs 4.8 oz  5' 4.5\"   Body mass index is 27.09 kg/(m^2).    Physical Exam  Constitutional: Patient awake, seated upright in a chair, in no apparent distress.  Appears stated age.  Eyes: Pupils equal, round and reactive to light.  Extra ocular muscles intact. Sclera clear.  Conjunctiva normal.  HENT: Head normocephalic.  Oral pharynx intact with moist mucous membranes.  Dentition intact.  No thyromegaly appreciated.   Respiratory: Lung sounds clear to auscultation bilaterally.  No rales, rhonchi, or wheezing noted.    Cardiovascular: S1, S2, regular rate and rhythm.  No murmurs, rubs, or gallops noted. Carotid bruit auscultated on the right.  Radial and pedal pulses palpable, bilaterally.  Trace pedal edema noted, bilaterally.  GI: Bowel sounds present.  Abdomen rounded, soft, non-tender to light palpation.  No hepatosplenomegaly or masses palpated.   Genitourinary: Exam deferred.  Lymph/Hematologic: No cervical or supraclavicular lymphadenopathy noted.  No excessive bruising noted.    Skin: Color appropriate for race, warm, dry.    Musculoskeletal: Full extension of the neck.  No redness, warmth, or swelling of the joints noted. Gross motor strength is normal.    Neurologic: Alert, oriented to name, place and time. Cranial nerves II-XII are grossly intact. Gait is normal.      Neuropsychiatric: Calm, cooperative. Normal affect.     Labs:  3/22/17: WBC 5.7; Hgb 12.7; Hct 39.4; Plt 148; INR 1.8  3/22/17: Na 142; K 4.3; Cl 107; Glu 94; BUN 24; Cr 1.13; Ca 9    Imagin14 Lexiscan NM Stress:  Impression  1. Myocardial perfusion imaging using single isotope technique demonstrated normal myocardial perfusion.   2. Gated images demonstrated normal wall motion. The left ventricular systolic function is normal with a calculated ejection fraction of 69%.  3. Compared to the prior study from there are no previous studies for " comparison.    7/19/16 EKG: Sinus rhythm with 1st degree block and right bundle branch block    Lab results, EKG were personally reviewed by this provider.      Assessment and Plan  Maia Miranda is a 84 year old female scheduled to undergo Stage One Interstim Implant (Implant Permanent Lead for Interstim Device on 4/4/17 with Dr. Kb Tracy.    She has the following specific operative considerations:   1.  GERD with hiatal hernia: Consider use of RSI techniques with advanced airway maneuvers.  2.  Paroxysmal atrial fibrillation, history of PE, chronically anticoagulated with Coumadin: Pharmacy staff contacted the patient's primary care provider, Dr. Dian Frias, requesting that they manage holding the patient's Coumadin prior to surgery.    3.  HTN: Patient instructed to take Toprol, but hold Lisinopril for renal protection.    4.  CKD: Patient instructed to hold Lisinopril for renal protection.  Recommend close monitoring of fluid balance and electrolytes throughout the perioperative period.    5.  CBC, INR, BMP today.    Revised Cardiac Risk Index: 0.4% risk of major adverse cardiac event.  Anesthesia considerations: Refer to PAC assessment in the anesthesia records.  VTE risk: 0.5%  JAC risk: Low  PONV risk score= 2.  (If > 2, anti-emetic intervention is recommended.)    Patient was discussed with Dr. Gonzales.    Alyssa Patino NP  Preoperative Assessment Center  [facility]  Phone: [phone]  Fax: [phone]

## 2017-03-22 NOTE — MR AVS SNAPSHOT
After Visit Summary   3/22/2017    Maia Miranda    MRN: [id number]           Patient Information     Date Of Birth          10/26/1932        Visit Information        Provider Department      3/22/2017 1:00 PM Rn, [facility] Preoperative Assessment Center        Care Instructions    Preparing for Your Surgery      Name:  Maia Miranda   MRN:  [id number]   :  10/26/1932   Today's Date:  3/22/2017     Arriving for surgery:  Surgery date:  17  Surgery time:  11:20 am  Arrival time:  9:50 am    Please come to:   [facility]  [address] [place] [id number]    -  Proceed to the 5th floor to check into the Ambulatory Surgery Center.              >> There will be patient concierges on the 1st and 5th floor, for assistance or an escort, if you would like.              >> Please call [phone] with any questions.  -  Bring your ID and insurance card.    What can I eat or drink?  -  You may have solid food or milk products until 8 hours prior to your surgery. (3:20 am)  -  You may have water, apple juice, BLACK coffee (NO creamer or nondairy creamer), or 7up/Sprite until 2 hours prior to your surgery. (9:20 am)     Which medicines can I take?  -Do not bring your own medications to the hospital, except for eye drops         -  Follow Primary Care Providers recommendation for Warfarin.        -  Stop Omega 3 and vitamins 1 week prior to surgery.    -  Do NOT take these medications in the morning, the day of surgery:  Lisinopril,  No creams or lotions    -  Please take these medications the morning of surgery:  Carboxymethylcellulose eye drops  Acetaminophen (Tylenol) if needed    How do I prepare myself?  -  Take two showers: one the night before surgery; and one the morning of surgery.         Use Scrubcare or Hibiclens to wash from neck down.  You may use your own shampoo and conditioner. No other hair products.   -  Do NOT use lotion, powder, deodorant,  or antiperspirant the day of your surgery.  -  Do NOT wear any makeup, fingernail polish or jewelry.    Questions or Concerns:  If you have questions or concerns, please call the  Preoperative Assessment Center, Monday-Friday 7AM-7PM:  [phone]      AFTER YOUR SURGERY  Breathing exercises   Breathing exercises help you recover faster. Take deep breaths and let the air out slowly. This will:     Help you wake up after surgery.    Help prevent complications like pneumonia.  Preventing complications will help you go home sooner.   Nausea and vomiting   You may feel sick to your stomach after surgery; if so, let your nurse know.    Pain control:  After surgery, you may have pain. Our goal is to help you manage your pain. Pain medicine will help you feel comfortable enough to do activities that will help you heal.  These activities may include breathing exercises, walking and physical therapy.   To help your health care team treat your pain we will ask: 1) If you have pain  2) where it is located 3) describe your pain in your words  Methods of pain control include medications given by mouth, vein or by nerve block for some surgeries.  Sequential Compression Device (SCD) or Pneumo Boots:  You may need to wear SCD S on your legs or feet. These are wraps connected to a machine that pumps in air and releases it. The repeated pumping helps prevent blood clots from forming.                 Follow-ups after your visit        Your next 10 appointments already scheduled     Mar 29, 2017 11:00 AM CDT   Anticoagulation [facility]  [facility]    [address] [facility] [address] [phone]            Apr 04, 2017   Procedure with Kb Tracy MD   [facility] ([facility])    [address]  5th Floor  [facility] [address] [phone]           Located in the Clinics and  Surgery Center at [address] [place] [address].   parking is very convenient and highly recommended.  is a $6 flat rate fee.  Both  and self parkers should enter the main arrival plaza from [facility]; parking attendants will direct you based on your parking preference.              Future tests that were ordered for you today     Open Future Orders        Priority Expected Expires Ordered    INR Routine 3/22/2017 4/21/2017 3/22/2017    UA reflex to Microscopic and Culture Routine 3/22/2017 4/21/2017 3/22/2017    CBC with platelets Routine 3/22/2017 4/21/2017 3/22/2017    Basic metabolic panel Routine 3/22/2017 4/21/2017 3/22/2017            Who to contact     Please call your clinic at [phone] to:    Ask questions about your health    Make or cancel appointments    Discuss your medicines    Learn about your test results    Speak to your doctor   If you have compliments or concerns about an experience at your clinic, or if you wish to file a complaint, please contact [facility] Patient Relations at [phone] or email us at [email or web address]         Additional Information About Your Visit        [employer] Information     [employer] gives you secure access to your electronic health record. If you see a primary care provider, you can also send messages to your care team and make appointments. If you have questions, please call your primary care clinic.  If you do not have a primary care provider, please call [phone] and they will assist you.      [employer] is an electronic gateway that provides easy, online access to your medical records. With [employer], you can request a clinic appointment, read your test results, renew a prescription or communicate with your care team.     To access your existing account, please contact your [facility] Clinic or call [phone] for assistance.        Care EveryWhere ID     This is your  Care EveryWhere ID. This could be used by other organizations to access your [place] medical records  [id number]         Blood Pressure from Last 3 Encounters:   03/22/17 152/76   02/22/17 134/58   02/03/17 130/58    Weight from Last 3 Encounters:   03/22/17 72.7 kg (160 lb 4.8 oz)   02/22/17 72 kg (158 lb 12.8 oz)   02/03/17 72.1 kg (159 lb)              Today, you had the following     No orders found for display         Today's Medication Changes          These changes are accurate as of: 3/22/17  2:59 PM.  If you have any questions, ask your nurse or doctor.               These medicines have changed or have updated prescriptions.        Dose/Directions    ascorbic acid 1000 MG Tabs   Commonly known as:  vitamin C   This may have changed:    - when to take this  - additional instructions   Used for:  Recurrent UTI, Neurogenic bladder        Dose:  1000 mg   Take 1 tablet (1,000 mg) by mouth 2 times daily   Quantity:  180 tablet   Refills:  3       lisinopril 10 MG tablet   Commonly known as:  PRINIVIL/ZESTRIL   This may have changed:  when to take this   Used for:  Hypertension goal BP (blood pressure) < 150/90        Dose:  10 mg   Take 1 tablet (10 mg) by mouth daily   Quantity:  90 tablet   Refills:  3       metoprolol 50 MG 24 hr tablet   Commonly known as:  TOPROL-XL   This may have changed:  when to take this   Used for:  Hypertension goal BP (blood pressure) < 150/90        Dose:  50 mg   Take 1 tablet (50 mg) by mouth daily   Quantity:  90 tablet   Refills:  1         Stop taking these medicines if you haven't already. Please contact your care team if you have questions.     estradiol 0.1 MG/GM cream   Commonly known as:  ESTRACE VAGINAL   Stopped by:  Pharmacist,  Pac           Menthol (Topical Analgesic) 10 % Gel   Stopped by:  Pharmacist,  Pac                    Primary Care Provider Office Phone # Fax #    Dian Frias [phone]       [facility] [address] [place] [address]        Thank you!     Thank you for choosing [facility] PREOPERATIVE ASSESSMENT CENTER  for your care. Our goal is always to provide you with excellent care. Hearing back from our patients is one way we can continue to improve our services. Please take a few minutes to complete the written survey that you may receive in the mail after your visit with us. Thank you!             Your Updated Medication List - Protect others around you: Learn how to safely use, store and throw away your medicines at www.disposemymeds.org.          This list is accurate as of: 3/22/17  2:59 PM.  Always use your most recent med list.                   Brand Name Dispense Instructions for use    ascorbic acid 1000 MG Tabs    vitamin C    180 tablet    Take 1 tablet (1,000 mg) by mouth 2 times daily       carboxymethylcellulose 1 % ophthalmic solution    CELLUVISC/REFRESH LIQUIGEL     Place 1 drop into both eyes every morning       conjugated estrogens cream    PREMARIN     Place vaginally twice a week On Tuesday and Friday. Uses a pea sized amount       fluocinonide 0.05 % cream    LIDEX    30 g    Apply topically 2 times daily as needed       GENTLECATH URINARY CATHETER Misc     120 each    1 catheter 4 times daily       ketoprofen 10% in PLO 10% topical gel      Place onto the skin every 4 hours as needed for moderate pain       lisinopril 10 MG tablet    PRINIVIL/ZESTRIL    90 tablet    Take 1 tablet (10 mg) by mouth daily       magnesium 250 MG tablet      Take 1 tablet by mouth at bedtime       methenamine hippurate 1 G Tabs tablet    HIPREX    90 tablet    Take 1 tablet (1 g) by mouth daily       metoprolol 50 MG 24 hr tablet    TOPROL-XL    90 tablet    Take 1 tablet (50 mg) by mouth daily       OMEGA-3 FISH OIL PO      Take 1 g by mouth 2 times daily (with meals)       polyethylene glycol Packet    MIRALAX/GLYCOLAX     Take 1 packet by mouth daily as needed       SM LUBRICATING JELLY Gel      144 Tube    Externally apply 1 packet topically 4 times daily       sodium chloride 5 % ophthalmic ointment    PETER 128     Place 1 Application into both eyes At Bedtime       TYLENOL 500 MG tablet   Generic drug:  acetaminophen      Take 500-1,000 mg by mouth every 6 hours as needed       warfarin 2.5 MG tablet    COUMADIN    100 tablet    Take 2.5mg (1 tablet)  MTWTFSS or as directed by INR Clinic.

## 2017-03-22 NOTE — ANESTHESIA PREPROCEDURE EVALUATION
Anesthesia Evaluation     . Pt has had prior anesthetic. Type: General and MAC    No history of anesthetic complications          ROS/MED HX    ENT/Pulmonary:     (+)allergic rhinitis, , . .    Neurologic:  - neg neurologic ROS     Cardiovascular:     (+) Dyslipidemia, hypertension-Peripheral Vascular Disease-- Carotid Stenosis, --. Taking blood thinners Pt has received instructions: Instructions Given to patient: PCP to instruct patient regarding holding Coumadin prior to surgery. . . :. dysrhythmias a-fib, valvular problems/murmurs Mild mitral valve regurgitation:.       METS/Exercise Tolerance:  4 - Raking leaves, gardening   Hematologic:     (+) History of blood clots pt is anticoagulated, -      Musculoskeletal:   (+) , , other musculoskeletal- Osteoporosis      GI/Hepatic: Comment: Occasional GERD symptoms, takes TUMS as needed    (+) GERD Symptomatic, hiatal hernia,       Renal/Genitourinary:     (+) chronic renal disease, type: CRI, Pt does not require dialysis, Pt has no history of transplant, Other Renal/ Genitourinary, Chronic idiopathic urinary retention requiring straight intermittent catheterization, recurrent UTIs      Endo:  - neg endo ROS       Psychiatric:     (+) psychiatric history depression      Infectious Disease:  - neg infectious disease ROS       Malignancy:      - no malignancy   Other:    - neg other ROS                 Physical Exam  Normal systems: cardiovascular, pulmonary and dental    Airway   Mallampati: II  TM distance: >3 FB  Neck ROM: full    Dental   (+) caps    Cardiovascular   Rhythm and rate: regular and normal  (+) carotid bruit is present     PE comment: Carotid bruit noted on the right, patient states that this has been present for some time    Pulmonary    breath sounds clear to auscultation    Other findings: 3/22/17: WBC 5.7; Hgb 12.7; Hct 39.4; Plt 148; INR 1.8  3/22/17: Na 142; K 4.3; Cl 107; Glu 94; BUN 24; Cr 1.13; Ca 9    [facility]  Stress:  Impression  1. Myocardial perfusion imaging using single isotope technique demonstrated normal myocardial perfusion.   2. Gated images demonstrated normal wall motion. The left ventricular systolic function is normal with a calculated ejection fraction of 69%.  3. Compared to the prior study from there are no previous studies for comparison.    7/19/16 EKG: Sinus rhythm with 1st degree block and right bundle branch block           PAC Discussion and Assessment    ASA Classification: 3  Case is suitable for:   Anesthetic techniques and relevant risks discussed: MAC with GA as backup  Invasive monitoring and risk discussed: No  Types:   Possibility and Risk of blood transfusion discussed: No  NPO instructions given:   Additional anesthetic preparation and risks discussed:   Needs early admission to pre-op area:   Other:     PAC Resident/NP Anesthesia Assessment:  Maia Miranda is an 85 yo female who will undergo Stage One Interstim Implant (Implant Permanent Lead for Interstim Device) with Dr. Tracy.  Discussed MAC vs GA with patient and likely would be a good candidate for MAC. She has had anesthesia in the past with no difficulties. Her most recent was 6 month prior. Suitable for ASC.    Cardiac: Paraxymal atrial fibrillation. Last few EKGs has been SR. Last EKG 7/19/2016 SR with 1st degree AVB. Denies edema, PND or orthopnea.  Pulmonary: Never smoked. No current pulmonary diagnosis or symptoms.  GI: GERD, symptoms controlled with diet and occasional TUMS  : self cath about 4 times daily. Bladder lesion.  Able to function independently, own teeth in good repair. Good mouth opening and neck extension.    Reviewed and Signed by PAC Mid-Level Provider/Resident  Mid-Level Provider/Resident: Alyssa Patino CNP  Date: 3/22/16  Time: 1600    Attending Anesthesiologist Anesthesia Assessment:  I have discussed this patient with the OLIVER and concur with her assessment and plan.    Reviewed and Signed by PAC  Anesthesiologist  Anesthesiologist: Neftali Gonzales  Date: 03/22/2017  Time: 1706  Pass/Fail:   Disposition:     PAC Pharmacist Assessment:        Pharmacist:   Date:   Time:      Anesthesia Plan      History & Physical Review  History and physical reviewed and following examination; no interval change.    ASA Status:  3 .    NPO Status:  > 8 hours    Plan for MAC with Intravenous induction. Maintenance will be TIVA.    PONV prophylaxis:  Ondansetron (or other 5HT-3) and Dexamethasone or Solumedrol       Postoperative Care      Consents  Anesthetic plan, risks, benefits and alternatives discussed with:  Patient..        I have personally seen and examined this patient. The above assessment and plan is the result of our shared decision making.    Shawn Henderson MD    4/4/2017 11:11 AM

## 2017-03-22 NOTE — PHARMACY - PREOPERATIVE ASSESSMENT CENTER
ANTICOAGULATION DOCUMENTATION - Preoperative Assessment Center (PAC) Pharmacist   Patient seen and interviewed during time of PAC Clinic appointment March 22, 2017.     Based on profile review and patient interview Maia Miranda has been on warfarin for treatment of A fib for the past 2 years. Current dose is 2.5 mg.      It is prescribed by Dr Frias.  The expected duration of therapy is undetermined.  There has not been a recent change in oral intake/nutrition.      Maia Miranda is scheduled for surgery on 4/4/17 with Dr. Tracy for interstim device implantation and the perioperative anticoagulation plan outlined by Dr. Frias is hold warfarin 5 days before surgery (last dose on 3/29).  This plan was communicated via phone call to Maia on 3/23/17 @13:50 and she demonstrated understanding of the plan to hold warfarin.      Dr. Frias recommended resuming warfarin as soon as deemed safe by the surgeon, presumably the same day of surgery. Final decision on resumption per Dr. Tracy.     This plan may require re-assessment and modification by her primary team in the perioperative setting depending on patients clinical situation.        Pk Murillo RP  March 22, 2017  2:18 PM

## 2017-03-22 NOTE — PROGRESS NOTES
Preoperative Assessment Center medication history for March 22, 2017 is complete.  See Epic admission navigator for allergy information, pharmacy, prior to admission medications and immunization status.    Operating room staff will still need to confirm medications and last dose information on day of surgery.     Medication history interview sources:  patient. Patient's own med list    Changes made to PTA medication list (reason)  Added: none  Deleted: menthol analgesic - not using  Duplicate estrogen cream   Changed: updated sig on metoprolol, miralax, lisinopril, magnesium, premarin    Additional medication history information (including reliability of information, actions taken by pharmacist):None    Prior to Admission medications    Medication Sig Last Dose Taking? Auth Provider   warfarin (COUMADIN) 2.5 MG tablet Take 2.5mg (1 tablet)  MTWTFSS or as directed by INR Clinic. Taking Yes Dian Frias MD   metoprolol (TOPROL-XL) 50 MG 24 hr tablet Take 1 tablet (50 mg) by mouth daily  Patient taking differently: Take 50 mg by mouth every evening  Taking Yes Dian Frias MD   methenamine hippurate (HIPREX) 1 G TABS Take 1 tablet (1 g) by mouth daily Taking Yes Bernadine Maria MD   lisinopril (PRINIVIL,ZESTRIL) 10 MG tablet Take 1 tablet (10 mg) by mouth daily  Patient taking differently: Take 10 mg by mouth every morning  Taking Yes Dian Frias MD   polyethylene glycol (MIRALAX/GLYCOLAX) packet Take 1 packet by mouth daily as needed  Taking Yes Reported, Patient   ketoprofen 10% in PLO 10% Place onto the skin every 4 hours as needed for moderate pain Taking Yes Reported, Patient   conjugated estrogens (PREMARIN) vaginal cream Place vaginally twice a week On Tuesday and Friday. Uses a pea sized amount Taking Yes Reported, Patient   fluocinonide (LIDEX) 0.05 % cream Apply topically 2 times daily as needed Taking Yes Dian Frias MD   acetaminophen (TYLENOL) 500 MG  tablet Take 500-1,000 mg by mouth every 6 hours as needed Taking Yes Reported, Patient   Catheters (GENTLECATH URINARY CATHETER) MISC 1 catheter 4 times daily Taking Yes Linda Stearns MD   ascorbic acid (VITAMIN C) 1000 MG TABS Take 1 tablet (1,000 mg) by mouth 2 times daily  Patient taking differently: Take 1,000 mg by mouth daily With HYprex Taking Yes Katja Brunson PA-C   Omega-3 Fatty Acids (OMEGA-3 FISH OIL PO) Take 1 g by mouth 2 times daily (with meals) Taking Yes Unknown, Entered By History   sodium chloride (PETER 128) 5 % ophthalmic ointment Place 1 Application into both eyes At Bedtime Taking Yes Unknown, Entered By History   carboxymethylcellulose (CELLUVISC/REFRESH LIQUIGEL) 1 % ophthalmic solution Place 1 drop into both eyes every morning  Taking Yes Unknown, Entered By History   MAGNESIUM 250 MG OR TABS Take 1 tablet by mouth at bedtime Taking Yes Linda Stearns MD   Lubricants (SM LUBRICATING JELLY) GEL Externally apply 1 packet topically 4 times daily  Patient not taking: Reported on 3/22/2017 Not Taking  Linda Stearns MD         Medication history completed by: Pk [facility]

## 2017-03-23 DIAGNOSIS — Z01.818 PREOP EXAMINATION: ICD-10-CM

## 2017-03-23 LAB
ALBUMIN UR-MCNC: NEGATIVE MG/DL
APPEARANCE UR: CLEAR
BILIRUB UR QL STRIP: NEGATIVE
COLOR UR AUTO: YELLOW
GLUCOSE UR STRIP-MCNC: NEGATIVE MG/DL
HGB UR QL STRIP: NEGATIVE
KETONES UR STRIP-MCNC: NEGATIVE MG/DL
LEUKOCYTE ESTERASE UR QL STRIP: NEGATIVE
NITRATE UR QL: NEGATIVE
PH UR STRIP: 5.5 PH (ref 5–7)
SP GR UR STRIP: <=1.005 (ref 1–1.03)
URN SPEC COLLECT METH UR: NORMAL
UROBILINOGEN UR STRIP-ACNC: 0.2 EU/DL (ref 0.2–1)

## 2017-03-23 PROCEDURE — 81003 URINALYSIS AUTO W/O SCOPE: CPT | Performed by: INTERNAL MEDICINE

## 2017-03-24 ENCOUNTER — TELEPHONE (OUTPATIENT)
Dept: NURSING | Facility: CLINIC | Age: 82
End: 2017-03-24

## 2017-03-24 NOTE — TELEPHONE ENCOUNTER
----- Message from Pk Murillo [facility] sent at 3/23/2017  1:40 PM CDT -----  Regarding: RE: Perioperative anticoagulation plan  Contact: [phone]  Rigo Frias,   Thanks for your prompt follow up.  I am happy to pass these recommendations along to Maia by calling her this afternoon.    Given her OR date of 4/4 with Dr Tracy I will have her hold her warfarin starting on the evening of 3/30 (last dose on evening of 3/29). With resumption per her surgeon (likely same day).  I am cc'ing Rosa Cruz (Care coordinator for Dr. Tracy) on this message as well so she knows what the plan is.    Thanks,   Pk     ----- Message -----     From: Dian Frias MD     Sent: 3/22/2017   8:09 PM       [facility]: Meme Riggs RN, [facility]  Subject: RE: Perioperative anticoagulation plan           Rad Whittington,    I do not recommend bridging for Maia. She has a history of PE, but it is quite remote. I typically recommend stopping the coumadin 5 days prior to the procedure and restarting as soon afterwards as deemed safe by the surgeon. In this case, I would assume the same day would be ok.    Please let me know if you have questions.    Sincerely,  Dian Frias MD  Internal Medicine/Pediatrics    ----- Message -----     From: Pk Murillo [facility]     Sent: 3/22/2017   4:52 PM       To: Meme Riggs RN, #  Subject: Perioperative anticoagulation plan               Rigo Frias,   I am one of the Pharmacists in the Preop assessment center at [facility].  Ms Miranda is scheduled for surgery with Dr. Tracy on 4/4 for a interstim implantation.  She was seen in our PAC clinic today by myself and our NP Alyssa Patino and after discussion with her surgery team she will need to come off of anticoagulation for this procedure.  I wanted to discuss the plan for perioperative anticoagulation with you and if she would need to be bridged.  Back in 7/2016 she went off coumadin for a bladder biopsy  without any bridging, but she does have some significant risk factors for having a clot.  I spoke with her coag clinic RN Henny Riggs and she suggested I reach out to you directly.   I have cc'd Vadim on this message and will await your reply.   Thanks,    Pk Murillo, Pharm.D., [facility]   Pre-operative Assessment Center Pharmacist   Phone: [phone]  Pager: [phone]

## 2017-03-27 ENCOUNTER — TELEPHONE (OUTPATIENT)
Dept: PEDIATRICS | Facility: CLINIC | Age: 82
End: 2017-03-27

## 2017-03-27 ENCOUNTER — OFFICE VISIT (OUTPATIENT)
Dept: PEDIATRICS | Facility: CLINIC | Age: 82
End: 2017-03-27
Payer: COMMERCIAL

## 2017-03-27 VITALS
TEMPERATURE: 97.4 F | BODY MASS INDEX: 26.42 KG/M2 | HEART RATE: 66 BPM | WEIGHT: 158.6 LBS | OXYGEN SATURATION: 98 % | HEIGHT: 65 IN | DIASTOLIC BLOOD PRESSURE: 62 MMHG | SYSTOLIC BLOOD PRESSURE: 126 MMHG

## 2017-03-27 DIAGNOSIS — I48.0 PAROXYSMAL ATRIAL FIBRILLATION (H): ICD-10-CM

## 2017-03-27 DIAGNOSIS — R42 DIZZINESS: Primary | ICD-10-CM

## 2017-03-27 DIAGNOSIS — L60.8 TOENAIL DEFORMITY: ICD-10-CM

## 2017-03-27 DIAGNOSIS — I10 HYPERTENSION GOAL BP (BLOOD PRESSURE) < 150/90: ICD-10-CM

## 2017-03-27 DIAGNOSIS — B35.1 ONYCHOMYCOSIS: ICD-10-CM

## 2017-03-27 PROCEDURE — 99214 OFFICE O/P EST MOD 30 MIN: CPT | Performed by: INTERNAL MEDICINE

## 2017-03-27 NOTE — TELEPHONE ENCOUNTER
"Patient fell 1.5 weeks ago, fell forward in the grass and hit her nose. She did not lose consciousness. She notes feeling dizzy & nauseous when she bends over since then. Also has had intermittent mild headaches that go away with Tylenol, \"like normal headaches.     We scheduled an appointment today at 3:40 pm.   "

## 2017-03-27 NOTE — PROGRESS NOTES
SUBJECTIVE:                                                    Maia Miranda is a 84 year old female who presents to clinic today for the following health issues:    Dizziness      Duration: 10 days    Description   Feeling faint:  no   Feeling like the surroundings are moving: no   Loss of consciousness or falls: YES- fall    Intensity:  moderate    Accompanying signs and symptoms:   Nausea/vomitting: no   Palpitations: YES  Weakness in arms or legs: no   Vision or speech changes: no   Ringing in ears (Tinnitus): YES  Hearing loss related to dizziness: YES  Other (fevers/chills/sweating/dyspnea): no     History (similar episodes/head trauma/previous evaluation/recent bleeding): slipped on ice and fell face foreward    Precipitating or alleviating factors (new meds/chemicals): ice  Worse with activity/head movement: YES- when she bends over    Therapies tried and outcome: None    83 y/o F with h/o a-fib, HTN, HLP, CKD and recurrent UTI's who presents for evaluation after fall 10 days ago and subsequent dizziness. Fell on a piece of ice on the grass - fell forwards and hit nose and lip. Was swollen. Had some numbness and soreness in left arm for a few days. Did not have dizziness before the fall. Now when bends over, gets dizziness. No dizziness when lying to standing. Has been bending over more frequently because has black ants in her kitchen and bends over to kill them.  Also getting headaches - on left side that comes and goes. Feels hot when has headache. Has had this for a long time and not any different. Has numb feeling in center of chest since fall - comes and goes intermittently. Had palpitations last week after went to the [facility] for pre-op exam. Had difficulty getting ride downtown and had to weight for an extended period of time. Took 3-4 days for stress to resolve. Has been monitoring blood pressure at home - 115 this am up to 130. Overall, symptoms are gradually improving. Scheduled for bladder  "stimulator on 4/4.    Toenail - left great toenail with fungus and now base of nail broken and lifting up.     Reviewed and updated as needed this visit by clinical staff  Tobacco  Allergies  Meds  Problems  Med Hx  Surg Hx  Fam Hx  Soc Hx        Reviewed and updated as needed this visit by Provider  Allergies  Meds  Problems       -------------------------------------    Problem list and histories reviewed & adjusted, as indicated.  Additional history: as documented    ROS:  Constitutional, HEENT, cardiovascular, pulmonary, gi and gu systems are negative, except as otherwise noted.    Problem list, Medication list, Allergies, and Medical/Social/Surgical histories reviewed in EPIC and updated as appropriate.    OBJECTIVE:                                                    /62 (BP Location: Right arm, Patient Position: Chair, Cuff Size: Adult Regular)  Pulse 66  Temp 97.4  F (36.3  C) (Tympanic)  Ht 5' 4.5\" (1.638 m)  Wt 158 lb 9.6 oz (71.9 kg)  SpO2 98%  BMI 26.8 kg/m2   Body mass index is 26.8 kg/(m^2).  General Appearance: elderly female2, alert and no distress  Eyes:   no discharge, erythema.  Normal pupils.  Both Ears: normal: no effusions, no erythema, normal landmarks  Neck: Supple.  No adenopathy, no asymmetry, right carotid bruit  Respiratory: lungs clear to auscultation - no rales, rhonchi or wheezes.  Cardiovascular: regular rate and rhythm, normal S1 S2, no S3 or S4 and no murmur, click or rub.  No peripheral edema.  NEURO: alert and oriented x3, CN II-XII intact. Normal tone and strength in all four extremities. Normal rapid alternating movements for age (slow). Normal gait. Romberg negative.   Skin: left great toenail with white discoloration. Base of nail lifting up with part of nail missing. no rashes or lesions.  Well perfused and normal turgor.    Diagnostic Test Results:  none      ASSESSMENT/PLAN:                                                      (R42) Dizziness  (primary " encounter diagnosis)  Comment: sounds more like a post-concussive dizziness after the fall exacerbated by frequent bending over from the ants. Overall, is gradually improving  Plan:   - encouraged to avoid bending over or any other movement that exacerbates  - rest with gradual increase in activity  - f/u if worsens or does not resolve  - ok for surgery next week given mild symptoms    (L60.8) Toenail deformity  (B35.1) Onychomycosis  Comments: looks like nail coming up from onychomycosis. Pt very concerned that toenail might need to be removed so does not catch on things  Plan: PODIATRY/FOOT & ANKLE SURGERY REFERRAL  - will have see podiatry to discuss options    (I10) Hypertension goal BP (blood pressure) < 150/90  Comment: controlled  Plan:   - controlled - continue metoprolol and lisinopril    (I48.0) Paroxysmal atrial fibrillation (H)  Comment: currently sounds to be in NSR  Plan:   - continue metoprolol and warfarin  - will stop warfarin prior to surgery without bridging    Follow up with Provider - 6 months and as needed     [facility]

## 2017-03-27 NOTE — PATIENT INSTRUCTIONS
1. Schedule appointment with podiatry for toenail  2. Dizziness most likely due to concussion  - try to limit activity, avoid bending over if possible  - drink plenty of water  3. Follow-up if not continuing to improve over next few weeks

## 2017-03-27 NOTE — MR AVS SNAPSHOT
After Visit Summary   3/27/2017    Maia Miranda    MRN: [id number]           Patient Information     Date Of Birth          10/26/1932        Visit Information        Provider Department      3/27/2017 3:40 PM Dian Frias MD [facility]        Today's Diagnoses     Dizziness    -  1    Toenail deformity          Care Instructions    1. Schedule appointment with podiatry for toenail  2. Dizziness most likely due to concussion  - try to limit activity, avoid bending over if possible  - drink plenty of water  3. Follow-up if not continuing to improve over next few weeks        Follow-ups after your visit        Additional Services     PODIATRY/FOOT & ANKLE SURGERY REFERRAL       Your provider [place] referred you to: [facility] [facility] [place] [phone]   [email or web address]  [facility]: [place] [facility] [place] [phone]   [email or web address]    Please be aware that coverage of these services is subject to the terms and limitations of your health insurance plan.  Call member services at your health plan with any benefit or coverage questions.      Please bring the following to your appointment:  >>   Any x-rays, CTs or MRIs which have been performed.  Contact the facility where they were done to arrange for  prior to your scheduled appointment.    >>   List of current medications   >>   This referral request   >>   Any documents/labs given to you for this referral                  Your next 10 appointments already scheduled     Mar 29, 2017 11:00 AM CDT   Anticoagulation Visit with  ANTICOAGULATION CLINIC   [facility]    [address] [facility] [address] [phone]            Apr 04, 2017   Procedure with Kb Tracy MD   [facility] ([facility])    [address] [place] [address]  "Floor  [facility] [address] [phone]           Located in the Clinics and Surgery Center at [address] [id number].   parking is very convenient and highly recommended.  is a $6 flat rate fee.  Both  and self parkers should enter the main arrival plaza from [facility]; parking attendants will direct you based on your parking preference.              Who to contact     If you have questions or need follow up information about today's clinic visit or your schedule please contact [facility] directly at [phone].  Normal or non-critical lab and imaging results will be communicated to you by linkedÃ¼hart, letter or phone within 4 business days after the clinic has received the results. If you do not hear from us within 7 days, please contact the clinic through [facility] or phone. If you have a critical or abnormal lab result, we will notify you by phone as soon as possible.  Submit refill requests through "[employer]" or call your pharmacy and they will forward the refill request to us. Please allow 3 business days for your refill to be completed.          Additional Information About Your Visit        linkedÃ¼harNatanael Ulien Information     "[employer]" gives you secure access to your electronic health record. If you see a primary care provider, you can also send messages to your care team and make appointments. If you have questions, please call your primary care clinic.  If you do not have a primary care provider, please call [phone] and they will assist you.        Care EveryWhere ID     This is your Care EveryWhere ID. This could be used by other organizations to access your [place] medical records  [id number]        Your Vitals Were     Pulse Temperature Height Pulse Oximetry BMI (Body Mass Index)       66 97.4  F (36.3  C) (Tympanic) 5' 4.5\" (1.638 m) 98% 26.8 kg/m2        Blood Pressure from Last 3 Encounters:   03/27/17 126/62   03/22/17 152/76   02/22/17 134/58    Weight " from Last 3 Encounters:   03/27/17 158 lb 9.6 oz (71.9 kg)   03/22/17 160 lb 4.8 oz (72.7 kg)   02/22/17 158 lb 12.8 oz (72 kg)              We Performed the Following     PODIATRY/FOOT & ANKLE SURGERY REFERRAL          Today's Medication Changes          These changes are accurate as of: 3/27/17  4:25 PM.  If you have any questions, ask your nurse or doctor.               These medicines have changed or have updated prescriptions.        Dose/Directions    ascorbic acid 1000 MG Tabs   Commonly known as:  vitamin C   This may have changed:    - when to take this  - additional instructions   Used for:  Recurrent UTI, Neurogenic bladder        Dose:  1000 mg   Take 1 tablet (1,000 mg) by mouth 2 times daily   Quantity:  180 tablet   Refills:  3       lisinopril 10 MG tablet   Commonly known as:  PRINIVIL/ZESTRIL   This may have changed:  when to take this   Used for:  Hypertension goal BP (blood pressure) < 150/90        Dose:  10 mg   Take 1 tablet (10 mg) by mouth daily   Quantity:  90 tablet   Refills:  3       metoprolol 50 MG 24 hr tablet   Commonly known as:  TOPROL-XL   This may have changed:  when to take this   Used for:  Hypertension goal BP (blood pressure) < 150/90        Dose:  50 mg   Take 1 tablet (50 mg) by mouth daily   Quantity:  90 tablet   Refills:  1                Primary Care Provider Office Phone # Fax #    Dian Frias [phone]       [address] [place] [address]        Thank you!     Thank you for choosing [facility]  for your care. Our goal is always to provide you with excellent care. Hearing back from our patients is one way we can continue to improve our services. Please take a few minutes to complete the written survey that you may receive in the mail after your visit with us. Thank you!             Your Updated Medication List - Protect others around you: Learn how to safely use, store and throw away your  medicines at www.disposemymeds.org.          This list is accurate as of: 3/27/17  4:25 PM.  Always use your most recent med list.                   Brand Name Dispense Instructions for use    ascorbic acid 1000 MG Tabs    vitamin C    180 tablet    Take 1 tablet (1,000 mg) by mouth 2 times daily       carboxymethylcellulose 1 % ophthalmic solution    CELLUVISC/REFRESH LIQUIGEL     Place 1 drop into both eyes every morning       conjugated estrogens cream    PREMARIN     Place vaginally twice a week On Tuesday and Friday. Uses a pea sized amount       fluocinonide 0.05 % cream    LIDEX    30 g    Apply topically 2 times daily as needed       GENTLECATH URINARY CATHETER Misc     120 each    1 catheter 4 times daily       ketoprofen 10% in PLO 10% topical gel      Place onto the skin every 4 hours as needed for moderate pain       lisinopril 10 MG tablet    PRINIVIL/ZESTRIL    90 tablet    Take 1 tablet (10 mg) by mouth daily       magnesium 250 MG tablet      Take 1 tablet by mouth at bedtime       methenamine hippurate 1 G Tabs tablet    HIPREX    90 tablet    Take 1 tablet (1 g) by mouth daily       metoprolol 50 MG 24 hr tablet    TOPROL-XL    90 tablet    Take 1 tablet (50 mg) by mouth daily       OMEGA-3 FISH OIL PO      Take 1 g by mouth 2 times daily (with meals)       polyethylene glycol Packet    MIRALAX/GLYCOLAX     Take 1 packet by mouth daily as needed       SM LUBRICATING JELLY Gel     144 Tube    Externally apply 1 packet topically 4 times daily       sodium chloride 5 % ophthalmic ointment    PETER 128     Place 1 Application into both eyes At Bedtime       TYLENOL 500 MG tablet   Generic drug:  acetaminophen      Take 500-1,000 mg by mouth every 6 hours as needed       warfarin 2.5 MG tablet    COUMADIN    100 tablet    Take 2.5mg (1 tablet)  MTWTFSS or as directed by INR Clinic.

## 2017-03-27 NOTE — NURSING NOTE
"Chief Complaint   Patient presents with     Fall       Initial /62 (BP Location: Right arm, Patient Position: Chair, Cuff Size: Adult Regular)  Pulse 66  Temp 97.4  F (36.3  C) (Tympanic)  Ht 5' 4.5\" (1.638 m)  Wt 158 lb 9.6 oz (71.9 kg)  SpO2 98%  BMI 26.8 kg/m2 Estimated body mass index is 26.8 kg/(m^2) as calculated from the following:    Height as of this encounter: 5' 4.5\" (1.638 m).    Weight as of this encounter: 158 lb 9.6 oz (71.9 kg).  Medication Reconciliation: complete   Fatmata Kendrick LPN      "

## 2017-03-29 ENCOUNTER — ANTICOAGULATION THERAPY VISIT (OUTPATIENT)
Dept: NURSING | Facility: CLINIC | Age: 82
End: 2017-03-29
Payer: COMMERCIAL

## 2017-03-29 ENCOUNTER — OFFICE VISIT (OUTPATIENT)
Dept: PODIATRY | Facility: CLINIC | Age: 82
End: 2017-03-29
Payer: COMMERCIAL

## 2017-03-29 VITALS
SYSTOLIC BLOOD PRESSURE: 124 MMHG | DIASTOLIC BLOOD PRESSURE: 78 MMHG | BODY MASS INDEX: 26.33 KG/M2 | HEIGHT: 65 IN | WEIGHT: 158 LBS

## 2017-03-29 DIAGNOSIS — L60.1 ONYCHOLYSIS OF TOENAIL: ICD-10-CM

## 2017-03-29 DIAGNOSIS — Z79.01 LONG-TERM (CURRENT) USE OF ANTICOAGULANTS: ICD-10-CM

## 2017-03-29 DIAGNOSIS — I48.0 PAROXYSMAL ATRIAL FIBRILLATION (H): ICD-10-CM

## 2017-03-29 DIAGNOSIS — B35.1 ONYCHOMYCOSIS OF LEFT GREAT TOE: Primary | ICD-10-CM

## 2017-03-29 LAB — INR POINT OF CARE: 2.3 (ref 0.86–1.14)

## 2017-03-29 PROCEDURE — 85610 PROTHROMBIN TIME: CPT | Mod: QW

## 2017-03-29 PROCEDURE — 99213 OFFICE O/P EST LOW 20 MIN: CPT | Performed by: PODIATRIST

## 2017-03-29 PROCEDURE — 36416 COLLJ CAPILLARY BLOOD SPEC: CPT

## 2017-03-29 PROCEDURE — 99207 ZZC NO CHARGE NURSE ONLY: CPT

## 2017-03-29 NOTE — MR AVS SNAPSHOT
Maia Miranda   3/29/2017 11:00 AM   Anticoagulation Therapy Visit    Description:  84 year old female   Provider:   ANTICOAGULATION CLINIC   Department:   Nurse           INR as of 3/29/2017     Today's INR 2.3      Anticoagulation Summary as of 3/29/2017     INR goal 2.0-3.0   Today's INR 2.3   Full instructions 3/30: Hold; 3/31: Hold; 4/1: Hold; 4/2: Hold; 4/3: Hold; Otherwise 2.5 mg every day   Next INR check 4/12/2017    Indications   Long-term (current) use of anticoagulants [Z79.01]  Atrial fibrillation (H) [I48.91]         Your next Anticoagulation Clinic appointment(s)     Apr 12, 2017 11:15 AM CDT   Anticoagulation Visit with  [facility]ULATION CLINIC   [facility]    [id number] [place] [address] [phone]              Contact Numbers     [facility]  Please call to cancel and/or reschedule your appointment, or with any problems or questions regarding your therapy.  Anticoagulation Nurse: [phone]  Main Clinic: [phone]             March 2017 Details    Sun Mon Tue Wed Thu Fri Sat        1               2               3               4                 5               6               7               8               9               10               11                 12               13               14               15               16               17               18                 19               20               21               22               23               24               25                 26               27               28               29      2.5 mg   See details      30      Hold         31      Hold           Date Details   03/29 This INR check               How to take your warfarin dose     To take:  2.5 mg Take 1 of the 2.5 mg tablets.    Hold Do not take your warfarin dose. See the Details table to the right for additional instructions.                April 2017 Details    Sun Mon Tue Wed  Thu Fri Sat           1      Hold           2      Hold         3      Hold         4      2.5 mg         5      2.5 mg         6      2.5 mg         7      2.5 mg         8      2.5 mg           9      2.5 mg         10      2.5 mg         11      2.5 mg         12            13               14               15                 16               17               18               19               20               21               22                 23               24               25               26               27               28               29                 30                      Date Details   No additional details    Date of next INR:  4/12/2017         How to take your warfarin dose     To take:  2.5 mg Take 1 of the 2.5 mg tablets.    Hold Do not take your warfarin dose. See the Details table to the right for additional instructions.

## 2017-03-29 NOTE — PATIENT INSTRUCTIONS
[facility] SCHEDULE     [facility]  [address] [place] [address]  P: [phone]  F: [phone] [facility]  [address] [place] [address]  P: [phone]  F: [phone] [facility]  [address] [place] [address]  P: [phone]  F: [phone]   FRIDAY AM FRIDAY PM SURGERY   [facility]     [facility]  [address]  [place] [address]  P: [phone]   [address]  [place] [address]  P: [phone]  F: [phone] Surgery [phone]   Appointment [phone] General After Hours:  [phone] Patient [phone]             Body Mass Index (BMI)  Many things can cause foot and ankle problems. Foot structure, activity level, foot mechanics and injuries are common causes of pain.    One very important issue that often goes unmentioned, is body weight.  Extra weight can cause increased stress on muscles, ligaments, bones and tendons.  Sometimes just a few extra pounds is all it takes to put one over her/his threshold.   Without reducing that stress, it can be difficult to alleviate pain.      Some people are uncomfortable addressing this issue, but we feel it is important for you to think about it.  As Foot &  Ankle specialists, our job is addressing the lower extremity problem and possible causes.     Regarding extra body weight, we encourage patients to discuss diet and weight management plans with their primary care doctors.  It is this team approach that gives you the best opportunity for pain relief and getting you back on your feet.        NAIL FUNGUS / ONYCHOMYCOSIS   Nail fungus is not a hygiene problem and will not likely lead to significant medical   problems. The nails may get thick causing pain and possibly local skin infection.   Treatments include debridement (trimming), oral  antifungals, topical antifungals and complete removal of the nail. Most fungal nails are not treated.   Topicals such as tea tree oil can be helpful for surface fungus and may, at best, limit   progression. Over the counter creams (such as Lamisil) can also be used however, their effectiveness is also quite low.  Topical treatment with Pen lac is expensive and often not covered by insurance. Pen lac has an approximate 8% success rate. Topical therapy recommendations is to apply twice a day for at least 3-4 months as it takes 9 months for new nail to grow out.    Experts suggest soaking your feet for 15 to 20 minutes in a mixture of 1 cup vinegar to 4 cups warm water. Be sure to rinse well and pat your feet dry when you're done. You can soak your feet like this daily. But if your skin becomes irritated, try soaking only two to three times a week. Vicks VapoRub, as with vinegar, there have been no controlled clinical trials to assess the effectiveness of Vicks VapoRub on nail fungus, but there have been numerous anecdotal reports that it works. There's no consensus on how often to apply this product, so check with your doctor before using it on your nails.     Oral therapies include Sporanox and Lamisil. Oral therapies are also expensive and not very effective. Side effects such as liver disease are the main concern. Return of fungus is common even if the treatment worked.     Other Tips:  - Penlac nail medication apply daily x 4 months; remove old polish first day of each week  - Antifungal cream/powder (Zeasorb)   apply daily to feet and shoes x 2 months  - Clean shoes with Lysol or in washing machine every few weeks  - Rotate shoe gear; give them 24 hours to dry out between days wearing them  - Clean pair of socks in morning, clean pair in afternoon if your feet sweat  - Shower shoes used in public showers/pools

## 2017-03-29 NOTE — PROGRESS NOTES
ANTICOAGULATION FOLLOW-UP CLINIC VISIT    Patient Name:  Maia Miranda  Date:  3/29/2017  Contact Type:  Face to Face    SUBJECTIVE:     Patient Findings     Positives Other complaints (fell on 3/17/17 - concussion - symptoms improved; nail problem on great left toe )    Comments 4/4/17 Interstim bladder therapy - external implant.   No Lovenox bridging needed per PCP.            OBJECTIVE    INR Protime   Date Value Ref Range Status   03/29/2017 2.3 (A) 0.86 - 1.14 Final       ASSESSMENT / PLAN  INR assessment THER    Recheck INR In: 2 WEEKS    INR Location Clinic      Anticoagulation Summary as of 3/29/2017     INR goal 2.0-3.0   Today's INR 2.3   Maintenance plan 2.5 mg (2.5 mg x 1) every day   Full instructions 3/30: Hold; 3/31: Hold; 4/1: Hold; 4/2: Hold; 4/3: Hold; Otherwise 2.5 mg every day   Weekly total 17.5 mg   Plan last modified Meme Riggs RN (1/18/2017)   Next INR check 4/12/2017   Target end date Indefinite    Indications   Long-term (current) use of anticoagulants [Z79.01]  Atrial fibrillation (H) [I48.91]         Anticoagulation Episode Summary     INR check location [facility]    Preferred lab     Send INR reminders to  ANTICOAG CLINIC    Comments 2.5mg tablets // 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    Meme Riggs, RN

## 2017-03-29 NOTE — NURSING NOTE
"Chief Complaint   Patient presents with     Toenail     bed of nail is loose x2weeks nail fungus        Initial /78  Ht 5' 4.5\" (1.638 m)  Wt 158 lb (71.7 kg)  BMI 26.7 kg/m2 Estimated body mass index is 26.7 kg/(m^2) as calculated from the following:    Height as of this encounter: 5' 4.5\" (1.638 m).    Weight as of this encounter: 158 lb (71.7 kg).  Medication Reconciliation: complete   Kraig Clemente MA      "

## 2017-03-29 NOTE — MR AVS SNAPSHOT
After Visit Summary   3/29/2017    Maia Miranda    MRN: [id number]           Patient Information     Date Of Birth          10/26/1932        Visit Information        Provider Department      3/29/2017 1:30 PM Emeli Bowling DPM, Podiatry/Foot and Ankle Surgery [facility]        Care Instructions    DR. BOWLING'S CLINIC SCHEDULE     [facility]  [address] [place] [address]  P: [phone]  F: [phone] [facility]  [address] [place] [address]  P: [phone]  F: [phone] [facility]  [address] [place] [address]  P: [phone]  F: [phone]   FRIDAY AM FRIDAY PM SURGERY   [facility]     [facility]  [address]  [place] [address]  P: [phone] [facility]  [address]  [place] [address]  P: [phone]  F: [phone] Surgery [phone]   Appointment [phone] General After Hours:  [phone] Patient [phone]             Body Mass Index (BMI)  Many things can cause foot and ankle problems. Foot structure, activity level, foot mechanics and injuries are common causes of pain.    One very important issue that often goes unmentioned, is body weight.  Extra weight can cause increased stress on muscles, ligaments, bones and tendons.  Sometimes just a few extra pounds is all it takes to put one over her/his threshold.   Without reducing that stress, it can be difficult to alleviate pain.      Some people are uncomfortable addressing this issue, but we feel it is important for you to think about it.  As Foot &  Ankle specialists, our job is addressing the lower extremity problem and possible causes.     Regarding extra body weight, we encourage patients to discuss diet and weight management plans with their primary care doctors.  It is this team approach that gives  you the best opportunity for pain relief and getting you back on your feet.        NAIL FUNGUS / ONYCHOMYCOSIS   Nail fungus is not a hygiene problem and will not likely lead to significant medical   problems. The nails may get thick causing pain and possibly local skin infection.   Treatments include debridement (trimming), oral antifungals, topical antifungals and complete removal of the nail. Most fungal nails are not treated.   Topicals such as tea tree oil can be helpful for surface fungus and may, at best, limit   progression. Over the counter creams (such as Lamisil) can also be used however, their effectiveness is also quite low.  Topical treatment with Pen lac is expensive and often not covered by insurance. Pen lac has an approximate 8% success rate. Topical therapy recommendations is to apply twice a day for at least 3-4 months as it takes 9 months for new nail to grow out.    Experts suggest soaking your feet for 15 to 20 minutes in a mixture of 1 cup vinegar to 4 cups warm water. Be sure to rinse well and pat your feet dry when you're done. You can soak your feet like this daily. But if your skin becomes irritated, try soaking only two to three times a week. Vicks VapoRub, as with vinegar, there have been no controlled clinical trials to assess the effectiveness of Vicks VapoRub on nail fungus, but there have been numerous anecdotal reports that it works. There's no consensus on how often to apply this product, so check with your doctor before using it on your nails.     Oral therapies include Sporanox and Lamisil. Oral therapies are also expensive and not very effective. Side effects such as liver disease are the main concern. Return of fungus is common even if the treatment worked.     Other Tips:  - Penlac nail medication apply daily x 4 months; remove old polish first day of each week  - Antifungal cream/powder (Zeasorb) - apply daily to feet and shoes x 2 months  - Clean shoes with Lysol or in  washing machine every few weeks  - Rotate shoe gear; give them 24 hours to dry out between days wearing them  - Clean pair of socks in morning, clean pair in afternoon if your feet sweat  - Shower shoes used in public showers/pools          Follow-ups after your visit        Your next 10 appointments already scheduled     Apr 04, 2017   Procedure with Kb Tracy MD   [facility] ([facility])    [address] [place] [address]  [address] [facility] [address] [phone]           Located in the Clinics and Surgery Center at [address] [place] [address].   parking is very convenient and highly recommended.  is a $6 flat rate fee.  Both  and self parkers should enter the main arrival plaza from [facility]; parking attendants will direct you based on your parking preference.            Apr 12, 2017 11:15 AM CDT   Anticoagulation Visit with  ANTICOAGULATION CLINIC   [facility] ([facility])    [address] [facility] [address] [phone]              Who to contact     If you have questions or need follow up information about today's clinic visit or your schedule please contact [facility] directly at [phone].  Normal or non-critical lab and imaging results will be communicated to you by Localohart, letter or phone within 4 business days after the clinic has received the results. If you do not hear from us within 7 days, please contact the clinic through Localohart or phone. If you have a critical or abnormal lab result, we will notify you by phone as soon as possible.  Submit refill requests through [employer] in[employer] or call your pharmacy and they will forward the refill request to us. Please allow 3 business days for your refill to be completed.          Additional Information About Your Visit        [employer] in[employer] Information     [employer] gives you secure access to your electronic health  "record. If you see a primary care provider, you can also send messages to your care team and make appointments. If you have questions, please call your primary care clinic.  If you do not have a primary care provider, please call [phone] and they will assist you.        Care EveryWhere ID     This is your Care EveryWhere ID. This could be used by other organizations to access your Margie medical records  [id number]        Your Vitals Were     Height BMI (Body Mass Index)                5' 4.5\" (1.638 m) 26.7 kg/m2           Blood Pressure from Last 3 Encounters:   03/29/17 124/78   03/27/17 126/62   03/22/17 152/76    Weight from Last 3 Encounters:   03/29/17 158 lb (71.7 kg)   03/27/17 158 lb 9.6 oz (71.9 kg)   03/22/17 160 lb 4.8 oz (72.7 kg)              Today, you had the following     No orders found for display         Today's Medication Changes          These changes are accurate as of: 3/29/17  1:42 PM.  If you have any questions, ask your nurse or doctor.               These medicines have changed or have updated prescriptions.        Dose/Directions    ascorbic acid 1000 MG Tabs   Commonly known as:  vitamin C   This may have changed:    - when to take this  - additional instructions   Used for:  Recurrent UTI, Neurogenic bladder        Dose:  1000 mg   Take 1 tablet (1,000 mg) by mouth 2 times daily   Quantity:  180 tablet   Refills:  3       lisinopril 10 MG tablet   Commonly known as:  PRINIVIL/ZESTRIL   This may have changed:  when to take this   Used for:  Hypertension goal BP (blood pressure) < 150/90        Dose:  10 mg   Take 1 tablet (10 mg) by mouth daily   Quantity:  90 tablet   Refills:  3       metoprolol 50 MG 24 hr tablet   Commonly known as:  TOPROL-XL   This may have changed:  when to take this   Used for:  Hypertension goal BP (blood pressure) < 150/90        Dose:  50 mg   Take 1 tablet (50 mg) by mouth daily   Quantity:  90 tablet   Refills:  1                Primary Care " Provider Office Phone # Fax #    Dian Frias [phone]       [facility] [address] [place] [address]        Thank you!     Thank you for choosing [facility]  for your care. Our goal is always to provide you with excellent care. Hearing back from our patients is one way we can continue to improve our services. Please take a few minutes to complete the written survey that you may receive in the mail after your visit with us. Thank you!             Your Updated Medication List - Protect others around you: Learn how to safely use, store and throw away your medicines at www.disposemymeds.org.          This list is accurate as of: 3/29/17  1:42 PM.  Always use your most recent med list.                   Brand Name Dispense Instructions for use    ascorbic acid 1000 MG Tabs    vitamin C    180 tablet    Take 1 tablet (1,000 mg) by mouth 2 times daily       carboxymethylcellulose 1 % ophthalmic solution    CELLUVISC/REFRESH LIQUIGEL     Place 1 drop into both eyes every morning       conjugated estrogens cream    PREMARIN     Place vaginally twice a week On Tuesday and Friday. Uses a pea sized amount       fluocinonide 0.05 % cream    LIDEX    30 g    Apply topically 2 times daily as needed       GENTLECATH URINARY CATHETER Misc     120 each    1 catheter 4 times daily       ketoprofen 10% in PLO 10% topical gel      Place onto the skin every 4 hours as needed for moderate pain       lisinopril 10 MG tablet    PRINIVIL/ZESTRIL    90 tablet    Take 1 tablet (10 mg) by mouth daily       magnesium 250 MG tablet      Take 1 tablet by mouth at bedtime       methenamine hippurate 1 G Tabs tablet    HIPREX    90 tablet    Take 1 tablet (1 g) by mouth daily       metoprolol 50 MG 24 hr tablet    TOPROL-XL    90 tablet    Take 1 tablet (50 mg) by mouth daily       OMEGA-3 FISH OIL PO      Take 1 g by mouth 2 times daily (with meals)       polyethylene glycol  Packet    MIRALAX/GLYCOLAX     Take 1 packet by mouth daily as needed       SM LUBRICATING JELLY Gel     144 Tube    Externally apply 1 packet topically 4 times daily       sodium chloride 5 % ophthalmic ointment    PETER 128     Place 1 Application into both eyes At Bedtime       TYLENOL 500 MG tablet   Generic drug:  acetaminophen      Take 500-1,000 mg by mouth every 6 hours as needed       warfarin 2.5 MG tablet    COUMADIN    100 tablet    Take 2.5mg (1 tablet)  MTWTFSS or as directed by INR Clinic.

## 2017-03-29 NOTE — LETTER
"  3/29/2017       RE: Maia Miranda  [address] [facility] [address]           Dear Colleague,    Thank you for referring your patient, Maia Miranda, to the [facility]. Please see a copy of my visit note below.    Podiatry / Foot and Ankle Surgery Progress Note    March 29, 2017    Subject: Patient was seen for follow up on for left great toenail \"coming off\".     Objective:  Vitals: Ht 1.638 m (5' 4.5\")  Wt 71.7 kg (158 lb)  BMI 26.7 kg/m2  BMI= Body mass index is 26.7 kg/(m^2).    General:  Patient is alert and orientated.  NAD  Vascular: DP and PT pulses are palpable. No varicosities noted CFT's < 3secs. Skin temp is normal  Neuro: Light and gross touch sensation intact to digits, dorsum, and plantar aspects of the feet.  Derm: left great toenail is 50% onycholysed. Subungual debri noted under nail. No redness or open lesions.   Musculoskeletal: No foot deformity noted.      Assessment:    Onychomycosis of left great toe  Onycholysis of toenail     Plan: Discussed causes and treatments of nail fungus.  Explained that even if a culture comes back negative, a patient could still have nail fungus.  Discussed treatment options with patient and explained that there isn't one treatment that is 100% effective.  Discussed oral lamisil which is the most effective at about 70% but which can have liver effects.  Explained that if she wanted to try this that she would need serial blood draws to test her liver function.  Discussed over the counter antifungal creams.  Explained that these are about 50% effective and need to be applied twice a day for about 3-4 months.  Also talked about prescription penlac which is a nail laquer.  Again this is also only 50% effective.  Also discussed that if there was damage to the nail and the nail is now dystrophic that non of the above is going to change the nail.  If there was damage, there is note anything that can be done for the nail to correct it. "  Discussed that if it becomes painful, we can remove the nail in clinic.        Nail was clipped back. She declined anti fungal cream.    Emeli Bowling DPM, Podiatry/Foot and Ankle Surgery    Weight management plan: Patient was referred to their PCP to discuss a diet and exercise plan.        Again, thank you for allowing me to participate in the care of your patient.        Sincerely,              Emeli Bowling DPM, Podiatry/Foot and Ankle Surgery

## 2017-03-29 NOTE — PROGRESS NOTES
"Podiatry / Foot and Ankle Surgery Progress Note    March 29, 2017    Subject: Patient was seen for follow up on for left great toenail \"coming off\".     Objective:  Vitals: Ht 1.638 m (5' 4.5\")  Wt 71.7 kg (158 lb)  BMI 26.7 kg/m2  BMI= Body mass index is 26.7 kg/(m^2).    General:  Patient is alert and orientated.  NAD  Vascular: DP and PT pulses are palpable. No varicosities noted CFT's < 3secs. Skin temp is normal  Neuro: Light and gross touch sensation intact to digits, dorsum, and plantar aspects of the feet.  Derm: left great toenail is 50% onycholysed. Subungual debri noted under nail. No redness or open lesions.   Musculoskeletal: No foot deformity noted.      Assessment:    Onychomycosis of left great toe  Onycholysis of toenail     Plan: Discussed causes and treatments of nail fungus.  Explained that even if a culture comes back negative, a patient could still have nail fungus.  Discussed treatment options with patient and explained that there isn't one treatment that is 100% effective.  Discussed oral lamisil which is the most effective at about 70% but which can have liver effects.  Explained that if she wanted to try this that she would need serial blood draws to test her liver function.  Discussed over the counter antifungal creams.  Explained that these are about 50% effective and need to be applied twice a day for about 3-4 months.  Also talked about prescription penlac which is a nail laquer.  Again this is also only 50% effective.  Also discussed that if there was damage to the nail and the nail is now dystrophic that non of the above is going to change the nail.  If there was damage, there is note anything that can be done for the nail to correct it.  Discussed that if it becomes painful, we can remove the nail in clinic.        Nail was clipped back. She declined anti fungal cream.    Emeli Bowling DPM, Podiatry/Foot and Ankle Surgery    Weight management plan: Patient was referred to their " PCP to discuss a diet and exercise plan.

## 2017-04-04 ENCOUNTER — HOSPITAL ENCOUNTER (OUTPATIENT)
Facility: AMBULATORY SURGERY CENTER | Age: 82
End: 2017-04-04
Attending: UROLOGY

## 2017-04-04 ENCOUNTER — ANESTHESIA (OUTPATIENT)
Dept: SURGERY | Facility: AMBULATORY SURGERY CENTER | Age: 82
End: 2017-04-04

## 2017-04-04 ENCOUNTER — SURGERY (OUTPATIENT)
Age: 82
End: 2017-04-04

## 2017-04-04 VITALS
RESPIRATION RATE: 16 BRPM | OXYGEN SATURATION: 98 % | BODY MASS INDEX: 26.33 KG/M2 | WEIGHT: 158 LBS | DIASTOLIC BLOOD PRESSURE: 72 MMHG | TEMPERATURE: 98.2 F | HEIGHT: 65 IN | SYSTOLIC BLOOD PRESSURE: 148 MMHG

## 2017-04-04 DIAGNOSIS — Z79.01 LONG-TERM (CURRENT) USE OF ANTICOAGULANTS: ICD-10-CM

## 2017-04-04 DIAGNOSIS — R33.9 URINARY RETENTION: Primary | ICD-10-CM

## 2017-04-04 LAB — INR PPP: 1.17 (ref 0.86–1.14)

## 2017-04-04 DEVICE — IMPLANTABLE DEVICE: Type: IMPLANTABLE DEVICE | Site: SACRUM | Status: FUNCTIONAL

## 2017-04-04 RX ORDER — ONDANSETRON 2 MG/ML
4 INJECTION INTRAMUSCULAR; INTRAVENOUS EVERY 30 MIN PRN
Status: DISCONTINUED | OUTPATIENT
Start: 2017-04-04 | End: 2017-04-05 | Stop reason: HOSPADM

## 2017-04-04 RX ORDER — FENTANYL CITRATE 50 UG/ML
INJECTION, SOLUTION INTRAMUSCULAR; INTRAVENOUS PRN
Status: DISCONTINUED | OUTPATIENT
Start: 2017-04-04 | End: 2017-04-04

## 2017-04-04 RX ORDER — CEFAZOLIN SODIUM 1 G/3ML
1 INJECTION, POWDER, FOR SOLUTION INTRAMUSCULAR; INTRAVENOUS SEE ADMIN INSTRUCTIONS
Status: DISCONTINUED | OUTPATIENT
Start: 2017-04-04 | End: 2017-04-04 | Stop reason: HOSPADM

## 2017-04-04 RX ORDER — AMOXICILLIN 250 MG
1-2 CAPSULE ORAL 2 TIMES DAILY
Qty: 30 TABLET | Refills: 0 | Status: SHIPPED | OUTPATIENT
Start: 2017-04-04 | End: 2017-10-05

## 2017-04-04 RX ORDER — PROPOFOL 10 MG/ML
INJECTION, EMULSION INTRAVENOUS PRN
Status: DISCONTINUED | OUTPATIENT
Start: 2017-04-04 | End: 2017-04-04

## 2017-04-04 RX ORDER — ACETAMINOPHEN 325 MG/1
975 TABLET ORAL ONCE
Status: COMPLETED | OUTPATIENT
Start: 2017-04-04 | End: 2017-04-04

## 2017-04-04 RX ORDER — SODIUM CHLORIDE, SODIUM LACTATE, POTASSIUM CHLORIDE, CALCIUM CHLORIDE 600; 310; 30; 20 MG/100ML; MG/100ML; MG/100ML; MG/100ML
INJECTION, SOLUTION INTRAVENOUS CONTINUOUS
Status: DISCONTINUED | OUTPATIENT
Start: 2017-04-04 | End: 2017-04-04 | Stop reason: HOSPADM

## 2017-04-04 RX ORDER — BUPIVACAINE HYDROCHLORIDE 2.5 MG/ML
INJECTION, SOLUTION INFILTRATION; PERINEURAL PRN
Status: DISCONTINUED | OUTPATIENT
Start: 2017-04-04 | End: 2017-04-04 | Stop reason: HOSPADM

## 2017-04-04 RX ORDER — LIDOCAINE 40 MG/G
CREAM TOPICAL
Status: DISCONTINUED | OUTPATIENT
Start: 2017-04-04 | End: 2017-04-04 | Stop reason: HOSPADM

## 2017-04-04 RX ORDER — ONDANSETRON 4 MG/1
4 TABLET, ORALLY DISINTEGRATING ORAL EVERY 30 MIN PRN
Status: DISCONTINUED | OUTPATIENT
Start: 2017-04-04 | End: 2017-04-05 | Stop reason: HOSPADM

## 2017-04-04 RX ORDER — ONDANSETRON 2 MG/ML
INJECTION INTRAMUSCULAR; INTRAVENOUS PRN
Status: DISCONTINUED | OUTPATIENT
Start: 2017-04-04 | End: 2017-04-04

## 2017-04-04 RX ORDER — SODIUM CHLORIDE, SODIUM LACTATE, POTASSIUM CHLORIDE, CALCIUM CHLORIDE 600; 310; 30; 20 MG/100ML; MG/100ML; MG/100ML; MG/100ML
INJECTION, SOLUTION INTRAVENOUS CONTINUOUS
Status: DISCONTINUED | OUTPATIENT
Start: 2017-04-04 | End: 2017-04-05 | Stop reason: HOSPADM

## 2017-04-04 RX ORDER — NALOXONE HYDROCHLORIDE 0.4 MG/ML
.1-.4 INJECTION, SOLUTION INTRAMUSCULAR; INTRAVENOUS; SUBCUTANEOUS
Status: DISCONTINUED | OUTPATIENT
Start: 2017-04-04 | End: 2017-04-05 | Stop reason: HOSPADM

## 2017-04-04 RX ORDER — OXYCODONE HYDROCHLORIDE 5 MG/1
5 TABLET ORAL EVERY 4 HOURS PRN
Qty: 20 TABLET | Refills: 0 | Status: SHIPPED | OUTPATIENT
Start: 2017-04-04 | End: 2017-04-14

## 2017-04-04 RX ORDER — OXYCODONE HYDROCHLORIDE 5 MG/1
5-10 TABLET ORAL
Status: DISCONTINUED | OUTPATIENT
Start: 2017-04-04 | End: 2017-04-05 | Stop reason: HOSPADM

## 2017-04-04 RX ORDER — CEPHALEXIN 500 MG/1
500 CAPSULE ORAL 3 TIMES DAILY
Qty: 15 CAPSULE | Refills: 0 | Status: SHIPPED | OUTPATIENT
Start: 2017-04-04 | End: 2017-04-09

## 2017-04-04 RX ORDER — GABAPENTIN 300 MG/1
300 CAPSULE ORAL ONCE
Status: DISCONTINUED | OUTPATIENT
Start: 2017-04-04 | End: 2017-04-04

## 2017-04-04 RX ORDER — MEPERIDINE HYDROCHLORIDE 25 MG/ML
12.5 INJECTION INTRAMUSCULAR; INTRAVENOUS; SUBCUTANEOUS
Status: DISCONTINUED | OUTPATIENT
Start: 2017-04-04 | End: 2017-04-05 | Stop reason: HOSPADM

## 2017-04-04 RX ADMIN — PROPOFOL 20 MG: 10 INJECTION, EMULSION INTRAVENOUS at 12:24

## 2017-04-04 RX ADMIN — PROPOFOL 50 MG: 10 INJECTION, EMULSION INTRAVENOUS at 11:43

## 2017-04-04 RX ADMIN — PROPOFOL 20 MG: 10 INJECTION, EMULSION INTRAVENOUS at 11:49

## 2017-04-04 RX ADMIN — FENTANYL CITRATE 25 MCG: 50 INJECTION, SOLUTION INTRAMUSCULAR; INTRAVENOUS at 11:49

## 2017-04-04 RX ADMIN — BUPIVACAINE HYDROCHLORIDE 5 ML: 2.5 INJECTION, SOLUTION INFILTRATION; PERINEURAL at 12:40

## 2017-04-04 RX ADMIN — PROPOFOL 20 MG: 10 INJECTION, EMULSION INTRAVENOUS at 12:16

## 2017-04-04 RX ADMIN — ACETAMINOPHEN 975 MG: 325 TABLET ORAL at 10:19

## 2017-04-04 RX ADMIN — PROPOFOL 20 MG: 10 INJECTION, EMULSION INTRAVENOUS at 12:10

## 2017-04-04 RX ADMIN — SODIUM CHLORIDE, SODIUM LACTATE, POTASSIUM CHLORIDE, CALCIUM CHLORIDE: 600; 310; 30; 20 INJECTION, SOLUTION INTRAVENOUS at 11:05

## 2017-04-04 RX ADMIN — FENTANYL CITRATE 25 MCG: 50 INJECTION, SOLUTION INTRAMUSCULAR; INTRAVENOUS at 11:58

## 2017-04-04 RX ADMIN — BUPIVACAINE HYDROCHLORIDE 6 ML: 2.5 INJECTION, SOLUTION INFILTRATION; PERINEURAL at 11:56

## 2017-04-04 RX ADMIN — ONDANSETRON 4 MG: 2 INJECTION INTRAMUSCULAR; INTRAVENOUS at 11:57

## 2017-04-04 RX ADMIN — SODIUM CHLORIDE, SODIUM LACTATE, POTASSIUM CHLORIDE, CALCIUM CHLORIDE: 600; 310; 30; 20 INJECTION, SOLUTION INTRAVENOUS at 10:20

## 2017-04-04 NOTE — IP AVS SNAPSHOT
MRN:[id number]                      After Visit Summary   4/4/2017    Maia Miranda    MRN: [id number]           Thank you!     Thank you for choosing [facility] for your care. Our goal is always to provide you with excellent care. Hearing back from our patients is one way we can continue to improve our services. Please take a few minutes to complete the written survey that you may receive in the mail after you visit with us. Thank you!        Patient Information     Date Of Birth          10/26/1932        About your hospital stay     You were admitted on:  April 4, 2017 [facility]    You were discharged on:  April 4, 2017       Who to Call     For medical emergencies, please call 911.  For non-urgent questions about your medical care, please call your primary care provider or clinic, [phone]  For questions related to your surgery, please call your surgery clinic        Attending Provider     Provider Specialty    Kb Tracy MD Urology       Primary Care Provider Office Phone # Fax #    Dian Frias [phone]       [facility] [address] [place] [address]        After Care Instructions     Discharge Instructions       What to expect:   - There will likely be pain over the incision site. Use narcotic pain medications as needed for moderate to severe pain. Wean yourself from narcotics as able once pain is more manageable. If pain is mild simply take Tylenol. Take stool softeners (Senna) to prevent constipation associated with narcotic medications. Your incision will be typically be painful for the first 2 weeks. After your procedure you will be given instructions on how to use your [employer] .Your bowels may be sluggish after surgery and pain medication can increase constipation. Continue taking Senna to keep stools loose and prevent straining.     Diet:  You  may return to your normal diet that you were taking before surgery.      Activity Restrictions: No lifting, pushing, or pulling more than 10 pounds for 3 to 4 weeks while your incision is healing. No strenuous exercising for 3 to 4 weeks. You are encouraged to walk as much as possible to prevent DVT. No sexual intercourse for 1 to 2 weeks. Do not drive while taking narcotics.  Incision Care: You have an incision over the buttock. This is closed with stitches that will dissolve on their own and do not need to be removed. You can remove the bandage 48 hours from surgery date.  You will not need any specific bandage on this area, but you may find wearing a small pad over the site can help protect from blood staining your underwear.  You can shower and let the water run over the incision.  You should not scrub it with soap. No soaking incision in water, such as bathing or swimming for 4 weeks. Leave the bandage over the generator site intact.    Medications:  1.  Oxycodone  this is a narcotic pain medication which you should only need for two to three days after surgery.    2.  Senna-S   this is a stool softener.  The surgery and pain medicationscan lead to constipation.  You can stop this or reduce it if you are having frequent loose stools.  Other over the counter solutions such as prune juice, miralax, fiber products, senna, and dulcolax can also be used.  3. Keflex- this is an antibiotic to prevent infection which should be taken for 5 days  4. Resume all other home medications    Monitor: Call sooner or go the emergency department if you develop fevers, chills, uncontrolled pain, nausea or vomiting, or increased redness or drainage from the incision site.     Follow-up:  Call Urology clinic to set up an appointment with Dr. Tracy in 2 weeks for wound check.     If you have any questions, please call.  1.  Nursing phone helpline at the Urology clinic (8A-5P M-F): [phone]  2.  If after hours, call [phone]  and ask to speak with the Urology resident on call.                  Your next 10 appointments already scheduled     Apr 12, 2017 11:15 AM CDT   Anticoagulation Visit with  ANTICOAGULATION CLINIC   [facility]    [address] [address] [phone]            Apr 18, 2017   Procedure with Kb Tracy MD   [facility] ([facility])    [address] [place] [address]  5th Floor  [facility] [address] [phone]           Located in the Clinics and Surgery Center at [address] [place] [address].   parking is very convenient and highly recommended.  is a $6 flat rate fee.  Both  and self parkers should enter the main arrival plaza from [facility]; parking attendants will direct you based on your parking preference.              Further instructions from your care team       [facility]  Home Care Following Anesthesia  For 24 hours after surgery:  1. Get plenty of rest.  A responsible adult must stay with you for at least 24 hours after you leave the surgery center.  2. Do not drive or use heavy equipment.  If you have weakness or tingling, don't drive or use heavy equipment until this feeling goes away.   3. Do not drink alcohol.   4. Avoid strenuous or risky activities.  Ask for help when climbing stairs.  5. You may feel lightheaded.  IF so, sit for a few minutes before standing.  Have someone help you get up.   6. If you have nausea (feel sick to your stomach): Drink only clear liquids such as apple juice, ginger ale, broth or 7-Up.  Rest may also help.  Be sure to drink enough fluids.  Move to a regular diet as you feel able.   7. You may have a slight fever.  Call the doctor if your fever is over 100 F (37.7 C) (taken under the tongue) or lasts longer than 24 hours.  8. You may have a dry mouth, a sore throat, muscle aches or  "trouble sleeping. These should go away after 24 hours.  9. Do not make important or legal decisions.               Tips for taking pain medications  To get the best pain relief possible, remember these points:    Take pain medications as directed, before pain becomes severe.    Pain medication can upset your stomach: taking it with food may help.    Constipation is a common side effect of pain medication. Drink plenty of  fluids.    Eat foods high in fiber. Take a stool softener if recommended by your doctor or pharmacist.    Do not drink alcohol, drive or operate machinery while taking pain medications.    Ask about other ways to control pain, such as with heat, ice or relaxation.    Call a doctor for any of the followin. Signs of infection (fever, growing tenderness at the surgery site, a large amount of drainage or bleeding, severe pain, foul-smelling drainage, redness, swelling).  2. It has been over 8 to 10 hours since surgery and you are still not able to urinate (pass water).  3. Headache for over 24 hours.  4. Numbness, tingling or weakness the day after surgery (if you had spinal anesthesia).  Your doctor is:    Dr. Kb Tracy,  Urology: [phone]               Or dial [phone] and ask for the resident on call for:  Prostate Urology  For emergency care, call the:  [facility]:  [phone] (TTY for hearing impaired: [phone])                                Pending Results     No orders found from 2017 to 2017.            Admission Information     Date & Time Provider Department Dept. Phone    2017 Kb Tracy MD [facility] [phone]      Your Vitals Were     Blood Pressure Temperature Respirations Height Weight Pulse Oximetry    135/73 98.2  F (36.8  C) (Temporal) 16 1.638 m (5' 4.5\") 71.7 kg (158 lb) 100%    BMI (Body Mass Index)                   26.7 kg/m2           MyChart Information     [employer] gives you secure " access to your electronic health record. If you see a primary care provider, you can also send messages to your care team and make appointments. If you have questions, please call your primary care clinic.  If you do not have a primary care provider, please call [phone] and they will assist you.      [employer] is an electronic gateway that provides easy, online access to your medical records. With [employer], you can request a clinic appointment, read your test results, renew a prescription or communicate with your care team.     To access your existing account, please contact your [facility] or call [phone] for assistance.        Care EveryWhere ID     This is your Care EveryWhere ID. This could be used by other organizations to access your [place] medical records  [id number]           Review of your medicines      START taking        Dose / Directions    cephALEXin 500 MG capsule   Commonly known as:  KEFLEX   Used for:  Urinary retention        Dose:  500 mg   Take 1 capsule (500 mg) by mouth 3 times daily for 5 days   Quantity:  15 capsule   Refills:  0       oxyCODONE 5 MG IR tablet   Commonly known as:  ROXICODONE   Used for:  Urinary retention        Dose:  5 mg   Take 1 tablet (5 mg) by mouth every 4 hours as needed for moderate to severe pain   Quantity:  20 tablet   Refills:  0       senna-docusate 8.6-50 MG per tablet   Commonly known as:  SENOKOT-S;PERICOLACE   Used for:  Urinary retention        Dose:  1-2 tablet   Take 1-2 tablets by mouth 2 times daily Take while on oral narcotics to prevent or treat constipation.   Quantity:  30 tablet   Refills:  0         CONTINUE these medicines which may have CHANGED, or have new prescriptions. If we are uncertain of the size of tablets/capsules you have at home, strength may be listed as something that might have changed.        Dose / Directions    ascorbic acid 1000 MG Tabs   Commonly known as:  vitamin C   This may have  changed:    - when to take this  - additional instructions   Used for:  Recurrent UTI, Neurogenic bladder        Dose:  1000 mg   Take 1 tablet (1,000 mg) by mouth 2 times daily   Quantity:  180 tablet   Refills:  3       lisinopril 10 MG tablet   Commonly known as:  PRINIVIL/ZESTRIL   This may have changed:  when to take this   Used for:  Hypertension goal BP (blood pressure) < 150/90        Dose:  10 mg   Take 1 tablet (10 mg) by mouth daily   Quantity:  90 tablet   Refills:  3       metoprolol 50 MG 24 hr tablet   Commonly known as:  TOPROL-XL   This may have changed:  when to take this   Used for:  Hypertension goal BP (blood pressure) < 150/90        Dose:  50 mg   Take 1 tablet (50 mg) by mouth daily   Quantity:  90 tablet   Refills:  1         CONTINUE these medicines which have NOT CHANGED        Dose / Directions    carboxymethylcellulose 1 % ophthalmic solution   Commonly known as:  CELLUVISC/REFRESH LIQUIGEL        Dose:  1 drop   Place 1 drop into both eyes every morning   Refills:  0       conjugated estrogens cream   Commonly known as:  PREMARIN        Place vaginally twice a week On Tuesday and Friday. Uses a pea sized amount   Refills:  0       fluocinonide 0.05 % cream   Commonly known as:  LIDEX   Used for:  Dermatitis        Apply topically 2 times daily as needed   Quantity:  30 g   Refills:  2       GENTLECATH URINARY CATHETER Misc   Used for:  Urinary retention        Dose:  1 catheter   1 catheter 4 times daily   Quantity:  120 each   Refills:  12       ketoprofen 10% in PLO 10% topical gel        Place onto the skin every 4 hours as needed for moderate pain   Refills:  0       magnesium 250 MG tablet        Take 1 tablet by mouth at bedtime   Refills:  0       methenamine hippurate 1 G Tabs tablet   Commonly known as:  HIPREX   Used for:  Recurrent UTI        Dose:  1 g   Take 1 tablet (1 g) by mouth daily   Quantity:  90 tablet   Refills:  3       OMEGA-3 FISH OIL PO        Dose:  1 g   Take  1 g by mouth 2 times daily (with meals)   Refills:  0       polyethylene glycol Packet   Commonly known as:  MIRALAX/GLYCOLAX        Dose:  1 packet   Take 1 packet by mouth daily as needed   Refills:  0       SM LUBRICATING JELLY Gel   Used for:  Urinary retention        Dose:  1 packet   Externally apply 1 packet topically 4 times daily   Quantity:  144 Tube   Refills:  12       sodium chloride 5 % ophthalmic ointment   Commonly known as:  PETER 128        Dose:  1 Application   Place 1 Application into both eyes At Bedtime   Refills:  0       TYLENOL 500 MG tablet   Generic drug:  acetaminophen        Dose:  500-1000 mg   Take 500-1,000 mg by mouth every 6 hours as needed   Refills:  0       warfarin 2.5 MG tablet   Commonly known as:  COUMADIN   Used for:  Long-term (current) use of anticoagulants        Take 2.5mg (1 tablet)  MTWTFSS or as directed by INR Clinic.   Quantity:  100 tablet   Refills:  3            Where to get your medicines      These medications were sent to [address] [address]    Hours:  TRANSPLANT PHONE NUMBER [phone] Phone:  [phone]     cephALEXin 500 MG capsule    senna-docusate 8.6-50 MG per tablet         Some of these will need a paper prescription and others can be bought over the counter. Ask your nurse if you have questions.     Bring a paper prescription for each of these medications     oxyCODONE 5 MG IR tablet                Protect others around you: Learn how to safely use, store and throw away your medicines at www.disposemymeds.org.             Medication List: This is a list of all your medications and when to take them. Check marks below indicate your daily home schedule. Keep this list as a reference.      Medications           Morning Afternoon Evening Bedtime As Needed    ascorbic acid 1000 MG Tabs   Commonly known as:  vitamin C   Take 1 tablet (1,000  mg) by mouth 2 times daily                                carboxymethylcellulose 1 % ophthalmic solution   Commonly known as:  CELLUVISC/REFRESH LIQUIGEL   Place 1 drop into both eyes every morning                                cephALEXin 500 MG capsule   Commonly known as:  KEFLEX   Take 1 capsule (500 mg) by mouth 3 times daily for 5 days                                conjugated estrogens cream   Commonly known as:  PREMARIN   Place vaginally twice a week On Tuesday and Friday. Uses a pea sized amount                                fluocinonide 0.05 % cream   Commonly known as:  LIDEX   Apply topically 2 times daily as needed                                GENTLECATH URINARY CATHETER Misc   1 catheter 4 times daily                                ketoprofen 10% in PLO 10% topical gel   Place onto the skin every 4 hours as needed for moderate pain                                lisinopril 10 MG tablet   Commonly known as:  PRINIVIL/ZESTRIL   Take 1 tablet (10 mg) by mouth daily                                magnesium 250 MG tablet   Take 1 tablet by mouth at bedtime                                methenamine hippurate 1 G Tabs tablet   Commonly known as:  HIPREX   Take 1 tablet (1 g) by mouth daily                                metoprolol 50 MG 24 hr tablet   Commonly known as:  TOPROL-XL   Take 1 tablet (50 mg) by mouth daily                                OMEGA-3 FISH OIL PO   Take 1 g by mouth 2 times daily (with meals)                                oxyCODONE 5 MG IR tablet   Commonly known as:  ROXICODONE   Take 1 tablet (5 mg) by mouth every 4 hours as needed for moderate to severe pain                                polyethylene glycol Packet   Commonly known as:  MIRALAX/GLYCOLAX   Take 1 packet by mouth daily as needed                                senna-docusate 8.6-50 MG per tablet   Commonly known as:  SENOKOT-S;PERICOLACE   Take 1-2 tablets by mouth 2 times daily Take while on oral narcotics to  prevent or treat constipation.                                SM LUBRICATING JELLY Gel   Externally apply 1 packet topically 4 times daily                                sodium chloride 5 % ophthalmic ointment   Commonly known as:  PETER 128   Place 1 Application into both eyes At Bedtime                                TYLENOL 500 MG tablet   Take 500-1,000 mg by mouth every 6 hours as needed   Last time this was given:  975 mg on 4/4/2017 10:19 AM   Generic drug:  acetaminophen                                warfarin 2.5 MG tablet   Commonly known as:  COUMADIN   Take 2.5mg (1 tablet)  MTWTFSS or as directed by INR Clinic.

## 2017-04-04 NOTE — IP AVS SNAPSHOT
[facility]    [address] [place] [address]    Phone:  [phone]    Fax:  [phone]                                       After Visit Summary   4/4/2017    Maia Miranda    MRN: [id number]           After Visit Summary Signature Page     I have received my discharge instructions, and my questions have been answered. I have discussed any challenges I see with this plan with the nurse or doctor.    ..........................................................................................................................................  Patient/Patient Representative Signature      ..........................................................................................................................................  Patient Representative Print Name and Relationship to Patient    ..................................................               ................................................  Date                                            Time    ..........................................................................................................................................  Reviewed by Signature/Title    ...................................................              ..............................................  Date                                                            Time

## 2017-04-04 NOTE — ANESTHESIA POSTPROCEDURE EVALUATION
Patient: Maia Miranda    Procedure(s):  Stage One Interstim Implant (Implant Permanent Lead for Interstim Device) - Wound Class: I-Clean    Diagnosis:Idiopathic Urinary Retention  Diagnosis Additional Information: No value filed.    Anesthesia Type:  MAC    Note:  Anesthesia Post Evaluation    Patient location during evaluation: PACU  Patient participation: Able to fully participate in evaluation  Level of consciousness: awake and alert  Pain management: adequate  Airway patency: patent  Cardiovascular status: hemodynamically stable  Respiratory status: nonlabored ventilation, spontaneous ventilation and room air  Hydration status: euvolemic  PONV: none     Anesthetic complications: None          Last vitals:  Vitals:    04/04/17 0929 04/04/17 1300   BP: 135/73    Resp: 16 16   Temp: 35.9  C (96.7  F) 36.8  C (98.2  F)   SpO2: 99% 100%         Electronically Signed By: Shawn Henderson MD  April 4, 2017  1:07 PM

## 2017-04-04 NOTE — TELEPHONE ENCOUNTER
Patient calling that she only has 3 tabs left and has to go back on tomorrow. Patient states she was last told 2.5 every day.  Daphne Aguilar RN

## 2017-04-04 NOTE — ANESTHESIA CARE TRANSFER NOTE
Patient: Maia Miranda    Procedure(s):  Stage One Interstim Implant (Implant Permanent Lead for Interstim Device) - Wound Class: I-Clean    Diagnosis: Idiopathic Urinary Retention  Diagnosis Additional Information: No value filed.    Anesthesia Type:   MAC     Note:  Airway :Room Air  Patient transferred to:Phase II  Comments: VSS/WNL. Responds well.      Vitals: (Last set prior to Anesthesia Care Transfer)    CRNA VITALS  4/4/2017 1226 - 4/4/2017 1302      4/4/2017             Resp Rate (set): 10                Electronically Signed By: RAY Ruvalcaba CRNA  April 4, 2017  1:02 PM

## 2017-04-04 NOTE — DISCHARGE INSTRUCTIONS
[facility]  Home Care Following Anesthesia  For 24 hours after surgery:  1. Get plenty of rest.  A responsible adult must stay with you for at least 24 hours after you leave the surgery center.  2. Do not drive or use heavy equipment.  If you have weakness or tingling, don't drive or use heavy equipment until this feeling goes away.   3. Do not drink alcohol.   4. Avoid strenuous or risky activities.  Ask for help when climbing stairs.  5. You may feel lightheaded.  IF so, sit for a few minutes before standing.  Have someone help you get up.   6. If you have nausea (feel sick to your stomach): Drink only clear liquids such as apple juice, ginger ale, broth or 7-Up.  Rest may also help.  Be sure to drink enough fluids.  Move to a regular diet as you feel able.   7. You may have a slight fever.  Call the doctor if your fever is over 100 F (37.7 C) (taken under the tongue) or lasts longer than 24 hours.  8. You may have a dry mouth, a sore throat, muscle aches or trouble sleeping. These should go away after 24 hours.  9. Do not make important or legal decisions.               Tips for taking pain medications  To get the best pain relief possible, remember these points:    Take pain medications as directed, before pain becomes severe.    Pain medication can upset your stomach: taking it with food may help.    Constipation is a common side effect of pain medication. Drink plenty of  fluids.    Eat foods high in fiber. Take a stool softener if recommended by your doctor or pharmacist.    Do not drink alcohol, drive or operate machinery while taking pain medications.    Ask about other ways to control pain, such as with heat, ice or relaxation.    Call a doctor for any of the followin. Signs of infection (fever, growing tenderness at the surgery site, a large amount of drainage or bleeding, severe pain, foul-smelling drainage, redness, swelling).  2. It has been over 8 to 10 hours  since surgery and you are still not able to urinate (pass water).  3. Headache for over 24 hours.  4. Numbness, tingling or weakness the day after surgery (if you had spinal anesthesia).  Your doctor is:    Dr. Kb Tracy,  Urology: [phone]               Or dial [phone] and ask for the resident on call for:  Prostate Urology  For emergency care, call the:  [facility]:  [phone] (TTY for hearing impaired: [phone])

## 2017-04-04 NOTE — BRIEF OP NOTE
[facility]    Brief Operative Note    Pre-operative diagnosis: Idiopathic Urinary Retention  Post-operative diagnosis Idiopathic Urinary Retention  Procedure: Procedure(s):  Stage One Interstim Implant (Implant Permanent Lead for Interstim Device) - Wound Class: I-Clean  Surgeon: Surgeon(s) and Role:     * Kb Tracy MD - Primary  Anesthesia: Monitor Anesthesia Care   Estimated blood loss: Minimal  Drains: None  Specimens: * No specimens in log *  Findings:   Stage I interstim placed.  Complications: None.

## 2017-04-05 RX ORDER — WARFARIN SODIUM 2.5 MG/1
TABLET ORAL
Qty: 100 TABLET | Refills: 3 | Status: ON HOLD | OUTPATIENT
Start: 2017-04-05 | End: 2018-03-26

## 2017-04-05 NOTE — TELEPHONE ENCOUNTER
Script approved per [facility] Refill Protocol.     Last INR 2.3 on 3/29/17; takes 2.5mg daily.      Meme Riggs RN  [facility]

## 2017-04-06 ENCOUNTER — CARE COORDINATION (OUTPATIENT)
Dept: UROLOGY | Facility: CLINIC | Age: 82
End: 2017-04-06

## 2017-04-06 NOTE — PROGRESS NOTES
Urology Postop Phone Note:    Ms. Maia Miranda is a 84 year old female who underwent interstim stage one  on 4/4/17 with Dr. Tracy for a history of urinary .  Her postoperative course was unremarkable and the patient was d/c to home on 4/4/17.    Fevers/chills: Patient denies any fever or chills.  Eating/drinking: Patient is able to eat and drink without any complaints.  Bowel habits: Patient reports having a normal bowel movement.  Urine output voiding a few times a day-    Incisions: Patient denies any signs and symptoms of infection.  Pain: Patient reports pain as a 0 out of 10.  Denies pain  Narcotics: The patient has been taking tylenol for pain medication and is able to control the pain.    Follow up appointment scheduled on 4/18/17 at 1040 with Dr. Tracy.  Surgery scheduled at this time  Informed the patient of the clinic triage nursing # ([phone] option 2) and urology resident on call # for after hours concerns.    Thanks,   Rosa Cruz   Department of Urologic Surgery

## 2017-04-12 ENCOUNTER — TELEPHONE (OUTPATIENT)
Dept: PEDIATRICS | Facility: CLINIC | Age: 82
End: 2017-04-12

## 2017-04-12 ENCOUNTER — ANTICOAGULATION THERAPY VISIT (OUTPATIENT)
Dept: NURSING | Facility: CLINIC | Age: 82
End: 2017-04-12
Payer: COMMERCIAL

## 2017-04-12 ENCOUNTER — TELEPHONE (OUTPATIENT)
Dept: UROLOGY | Facility: CLINIC | Age: 82
End: 2017-04-12

## 2017-04-12 DIAGNOSIS — Z79.01 LONG-TERM (CURRENT) USE OF ANTICOAGULANTS: ICD-10-CM

## 2017-04-12 DIAGNOSIS — I48.0 PAROXYSMAL ATRIAL FIBRILLATION (H): ICD-10-CM

## 2017-04-12 LAB — INR POINT OF CARE: 1.8 (ref 0.86–1.14)

## 2017-04-12 PROCEDURE — 99207 ZZC NO CHARGE NURSE ONLY: CPT

## 2017-04-12 PROCEDURE — 36416 COLLJ CAPILLARY BLOOD SPEC: CPT

## 2017-04-12 PROCEDURE — 85610 PROTHROMBIN TIME: CPT | Mod: QW

## 2017-04-12 NOTE — TELEPHONE ENCOUNTER
Called to inform patient of surgery time change to 11:05 am with a check in of 9:35 am.  Patient is aware.

## 2017-04-12 NOTE — TELEPHONE ENCOUNTER
As per note on 3/24. I recommend stopping the warfarin 5 days prior (last dose tonight). Does not need bridging. Should resume usual dose as soon as surgeon feels safe to resume. Please let know.    Dian Frias MD  Internal Medicine/Pediatrics

## 2017-04-12 NOTE — TELEPHONE ENCOUNTER
Patient notified and voices understanding of instructions.  No further questions.  Will call back if any other questions or concerns.  GAIL Carlisle RN

## 2017-04-12 NOTE — TELEPHONE ENCOUNTER
Patient is having surgery on 04/18/17  to insert the implant stimulator, and Dr. Tracy is recommending she stop the Coumadin for this surgery.  Please make recommendations.     Please call Maia at [phone].  OK TO ANA Carlisle RN

## 2017-04-12 NOTE — MR AVS SNAPSHOT
Maia Miranda   4/12/2017 11:15 AM   Anticoagulation Therapy Visit    Description:  84 year old female   Provider:   ANTICOAGULATION CLINIC   Department:   Nurse           INR as of 4/12/2017     Today's INR 1.8!      Anticoagulation Summary as of 4/12/2017     INR goal 2.0-3.0   Today's INR 1.8!   Full instructions 4/13: Hold; 4/14: Hold; 4/15: Hold; 4/16: Hold; 4/17: Hold; Otherwise 2.5 mg every day   Next INR check 4/28/2017    Indications   Long-term (current) use of anticoagulants [Z79.01]  Atrial fibrillation (H) [I48.91]         Your next Anticoagulation Clinic appointment(s)     Apr 28, 2017 11:15 AM CDT   Anticoagulation Visit with  ANTICOAGULATION CLINIC   [facility] ([facility]    [address] [address] [phone]              Contact Numbers     [facility]  Please call to cancel and/or reschedule your appointment, or with any problems or questions regarding your therapy.  Anticoagulation Nurse: [phone]  Main Clinic: [phone]             April 2017 Details    Sun Mon Tue Wed Thu Fri Sat           1                 2               3               4               5               6               7               8                 9               10               11               12      2.5 mg   See details      13      Hold         14      Hold         15      Hold           16      Hold         17      Hold         18      2.5 mg         19      2.5 mg         20      2.5 mg         21      2.5 mg         22      2.5 mg           23      2.5 mg         24      2.5 mg         25      2.5 mg         26      2.5 mg         27      2.5 mg         28            29                 30                      Date Details   04/12 This INR check       Date of next INR:  4/28/2017         How to take your warfarin dose     To take:  2.5 mg Take 1 of the 2.5 mg tablets.    Hold Do not take your warfarin dose. See the Details table  to the right for additional instructions.

## 2017-04-12 NOTE — PROGRESS NOTES
ANTICOAGULATION FOLLOW-UP CLINIC VISIT    Patient Name:  Maia Miranda  Date:  4/12/2017  Contact Type:  Face to Face    SUBJECTIVE:     Patient Findings     Positives Intentional hold of therapy (5 day hold prior to procedure on 4/4/17)    Comments Interstim bladder therapy - external implant placed 4/4/17; 2nd stage of Interstim will be done 4/18/17 with internal implant.     No Lovenox bridging needed per PCP.            OBJECTIVE    INR Protime   Date Value Ref Range Status   04/12/2017 1.8 (A) 0.86 - 1.14 Final       ASSESSMENT / PLAN  INR assessment SUB    Recheck INR In: 2 WEEKS    INR Location Clinic      Anticoagulation Summary as of 4/12/2017     INR goal 2.0-3.0   Today's INR 1.8!   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    Meme Riggs, RN

## 2017-04-17 ENCOUNTER — ANESTHESIA EVENT (OUTPATIENT)
Dept: SURGERY | Facility: AMBULATORY SURGERY CENTER | Age: 82
End: 2017-04-17

## 2017-04-18 ENCOUNTER — ANESTHESIA (OUTPATIENT)
Dept: SURGERY | Facility: AMBULATORY SURGERY CENTER | Age: 82
End: 2017-04-18

## 2017-04-18 ENCOUNTER — HOSPITAL ENCOUNTER (OUTPATIENT)
Facility: AMBULATORY SURGERY CENTER | Age: 82
End: 2017-04-18
Attending: UROLOGY

## 2017-04-18 ENCOUNTER — SURGERY (OUTPATIENT)
Age: 82
End: 2017-04-18

## 2017-04-18 VITALS
DIASTOLIC BLOOD PRESSURE: 78 MMHG | TEMPERATURE: 98 F | RESPIRATION RATE: 16 BRPM | SYSTOLIC BLOOD PRESSURE: 155 MMHG | WEIGHT: 158.07 LBS | HEIGHT: 65 IN | BODY MASS INDEX: 26.34 KG/M2 | OXYGEN SATURATION: 98 %

## 2017-04-18 DIAGNOSIS — R33.9 URINARY RETENTION: Primary | ICD-10-CM

## 2017-04-18 LAB — INR PPP: 1.11 (ref 0.86–1.14)

## 2017-04-18 DEVICE — STIMULATOR NEURO INTER-STIM II 3058: Type: IMPLANTABLE DEVICE | Site: BUTTOCKS | Status: FUNCTIONAL

## 2017-04-18 RX ORDER — ACETAMINOPHEN 325 MG/1
975 TABLET ORAL ONCE
Status: COMPLETED | OUTPATIENT
Start: 2017-04-18 | End: 2017-04-18

## 2017-04-18 RX ORDER — ONDANSETRON 2 MG/ML
INJECTION INTRAMUSCULAR; INTRAVENOUS PRN
Status: DISCONTINUED | OUTPATIENT
Start: 2017-04-18 | End: 2017-04-18

## 2017-04-18 RX ORDER — OXYCODONE HYDROCHLORIDE 5 MG/1
5-10 TABLET ORAL
Qty: 40 TABLET | Refills: 0 | Status: SHIPPED | OUTPATIENT
Start: 2017-04-18 | End: 2017-05-03

## 2017-04-18 RX ORDER — SODIUM CHLORIDE, SODIUM LACTATE, POTASSIUM CHLORIDE, CALCIUM CHLORIDE 600; 310; 30; 20 MG/100ML; MG/100ML; MG/100ML; MG/100ML
INJECTION, SOLUTION INTRAVENOUS CONTINUOUS
Status: DISCONTINUED | OUTPATIENT
Start: 2017-04-18 | End: 2017-04-18 | Stop reason: HOSPADM

## 2017-04-18 RX ORDER — BUPIVACAINE HYDROCHLORIDE 2.5 MG/ML
INJECTION, SOLUTION INFILTRATION; PERINEURAL PRN
Status: DISCONTINUED | OUTPATIENT
Start: 2017-04-18 | End: 2017-04-18 | Stop reason: HOSPADM

## 2017-04-18 RX ORDER — LIDOCAINE HYDROCHLORIDE 20 MG/ML
INJECTION, SOLUTION INFILTRATION; PERINEURAL PRN
Status: DISCONTINUED | OUTPATIENT
Start: 2017-04-18 | End: 2017-04-18

## 2017-04-18 RX ORDER — LIDOCAINE 40 MG/G
CREAM TOPICAL
Status: DISCONTINUED | OUTPATIENT
Start: 2017-04-18 | End: 2017-04-18 | Stop reason: HOSPADM

## 2017-04-18 RX ORDER — FENTANYL CITRATE 50 UG/ML
25-50 INJECTION, SOLUTION INTRAMUSCULAR; INTRAVENOUS
Status: DISCONTINUED | OUTPATIENT
Start: 2017-04-18 | End: 2017-04-18 | Stop reason: HOSPADM

## 2017-04-18 RX ORDER — OXYCODONE HYDROCHLORIDE 5 MG/1
5-10 TABLET ORAL
Status: DISCONTINUED | OUTPATIENT
Start: 2017-04-18 | End: 2017-04-19 | Stop reason: HOSPADM

## 2017-04-18 RX ORDER — SODIUM CHLORIDE, SODIUM LACTATE, POTASSIUM CHLORIDE, CALCIUM CHLORIDE 600; 310; 30; 20 MG/100ML; MG/100ML; MG/100ML; MG/100ML
INJECTION, SOLUTION INTRAVENOUS CONTINUOUS
Status: DISCONTINUED | OUTPATIENT
Start: 2017-04-18 | End: 2017-04-19 | Stop reason: HOSPADM

## 2017-04-18 RX ORDER — CEPHALEXIN 500 MG/1
500 CAPSULE ORAL 2 TIMES DAILY
Qty: 10 CAPSULE | Refills: 0 | Status: SHIPPED | OUTPATIENT
Start: 2017-04-18 | End: 2017-04-23

## 2017-04-18 RX ORDER — ONDANSETRON 4 MG/1
4 TABLET, ORALLY DISINTEGRATING ORAL EVERY 30 MIN PRN
Status: DISCONTINUED | OUTPATIENT
Start: 2017-04-18 | End: 2017-04-19 | Stop reason: HOSPADM

## 2017-04-18 RX ORDER — PROPOFOL 10 MG/ML
INJECTION, EMULSION INTRAVENOUS CONTINUOUS PRN
Status: DISCONTINUED | OUTPATIENT
Start: 2017-04-18 | End: 2017-04-18

## 2017-04-18 RX ORDER — CEFAZOLIN SODIUM 1 G/3ML
1 INJECTION, POWDER, FOR SOLUTION INTRAMUSCULAR; INTRAVENOUS SEE ADMIN INSTRUCTIONS
Status: CANCELLED | OUTPATIENT
Start: 2017-04-18

## 2017-04-18 RX ORDER — MEPERIDINE HYDROCHLORIDE 25 MG/ML
12.5 INJECTION INTRAMUSCULAR; INTRAVENOUS; SUBCUTANEOUS
Status: DISCONTINUED | OUTPATIENT
Start: 2017-04-18 | End: 2017-04-19 | Stop reason: HOSPADM

## 2017-04-18 RX ORDER — ONDANSETRON 2 MG/ML
4 INJECTION INTRAMUSCULAR; INTRAVENOUS EVERY 30 MIN PRN
Status: DISCONTINUED | OUTPATIENT
Start: 2017-04-18 | End: 2017-04-19 | Stop reason: HOSPADM

## 2017-04-18 RX ORDER — CEFAZOLIN SODIUM 1 G/3ML
1 INJECTION, POWDER, FOR SOLUTION INTRAMUSCULAR; INTRAVENOUS SEE ADMIN INSTRUCTIONS
Status: DISCONTINUED | OUTPATIENT
Start: 2017-04-18 | End: 2017-04-18 | Stop reason: HOSPADM

## 2017-04-18 RX ORDER — NALOXONE HYDROCHLORIDE 0.4 MG/ML
.1-.4 INJECTION, SOLUTION INTRAMUSCULAR; INTRAVENOUS; SUBCUTANEOUS
Status: DISCONTINUED | OUTPATIENT
Start: 2017-04-18 | End: 2017-04-19 | Stop reason: HOSPADM

## 2017-04-18 RX ADMIN — PROPOFOL 100 MCG/KG/MIN: 10 INJECTION, EMULSION INTRAVENOUS at 10:22

## 2017-04-18 RX ADMIN — SODIUM CHLORIDE, SODIUM LACTATE, POTASSIUM CHLORIDE, CALCIUM CHLORIDE: 600; 310; 30; 20 INJECTION, SOLUTION INTRAVENOUS at 09:51

## 2017-04-18 RX ADMIN — BUPIVACAINE HYDROCHLORIDE 12 ML: 2.5 INJECTION, SOLUTION INFILTRATION; PERINEURAL at 10:49

## 2017-04-18 RX ADMIN — LIDOCAINE HYDROCHLORIDE 40 MG: 20 INJECTION, SOLUTION INFILTRATION; PERINEURAL at 10:20

## 2017-04-18 RX ADMIN — ONDANSETRON 4 MG: 2 INJECTION INTRAMUSCULAR; INTRAVENOUS at 10:25

## 2017-04-18 RX ADMIN — ACETAMINOPHEN 975 MG: 325 TABLET ORAL at 09:47

## 2017-04-18 ASSESSMENT — ENCOUNTER SYMPTOMS: DYSRHYTHMIAS: 1

## 2017-04-18 NOTE — DISCHARGE INSTRUCTIONS
"[facility]  Home Care Following Anesthesia  For 24 hours after surgery:  1. Get plenty of rest.  A responsible adult must stay with you for at least 24 hours after you leave the surgery center.  2. Do not drive or use heavy equipment.  If you have weakness or tingling, don't drive or use heavy equipment until this feeling goes away.   3. Do not drink alcohol.   4. Avoid strenuous or risky activities.  Ask for help when climbing stairs.  5. You may feel lightheaded.  IF so, sit for a few minutes before standing.  Have someone help you get up.   6. If you have nausea (feel sick to your stomach): Drink only clear liquids such as apple juice, ginger ale, broth or 7-Up.  Rest may also help.  Be sure to drink enough fluids.  Move to a regular diet as you feel able.   7. You may have a slight fever.  Call the doctor if your fever is over 100 F (37.7 C) (taken under the tongue) or lasts longer than 24 hours.  8. You may have a dry mouth, a sore throat, muscle aches or trouble sleeping. These should go away after 24 hours.  9. Do not make important or legal decisions.        Today you received a Marcaine or bupivacaine block to numb the nerves near your surgery site.  This is a block using local anesthetic or \"numbing\" medication injected around the nerves to anesthetize or \"numb\" the area supplied by those nerves.  This block is injected into the muscle layer near your surgical site.  The medication may numb the location where you had surgery for 6-18 hours, but may last up to 24 hours.  If your surgical site is an arm or leg you should be careful with your affected limb, since it is possible to injure your limb without being aware of it due to the numbing.  Until full feeling returns, you should guard against bumping or hitting your limb, and avoid extreme hot or cold temperatures on the skin.  As the block wears off, the feeling will return as a tingling or prickly sensation near your " surgical site.  You will experience more discomfort from your incision as the feeling returns.  You may want to take a pain pill (a narcotic or Tylenol if this was prescribed by your surgeon) when you start to experience mild pain before the pain beccomes more severe.  If your pain medications do not control your pain you should notifiy your surgeon.    Tips for taking pain medications  To get the best pain relief possible, remember these points:    Take pain medications as directed, before pain becomes severe.    Pain medication can upset your stomach: taking it with food may help.    Constipation is a common side effect of pain medication. Drink plenty of  fluids.    Eat foods high in fiber. Take a stool softener if recommended by your doctor or pharmacist.    Do not drink alcohol, drive or operate machinery while taking pain medications.    Ask about other ways to control pain, such as with heat, ice or relaxation.    Call a doctor for any of the followin. Signs of infection (fever, growing tenderness at the surgery site, a large amount of drainage or bleeding, severe pain, foul-smelling drainage, redness, swelling).  2. It has been over 8 to 10 hours since surgery and you are still not able to urinate (pass water).  3. Headache for over 24 hours.  Your doctor is: Dr. Kb Tracy, Prostate and Urology: [phone]               Or dial [phone] and ask for the resident on call for:  Prostate Urology  For emergency care, call the:  [facility]:  [phone] (TTY for hearing impaired: [phone])

## 2017-04-18 NOTE — BRIEF OP NOTE
[facility]    Brief Operative Note    Pre-operative diagnosis: Idiopathic Urinary Retention  Post-operative diagnosis Idiopathic Urinary Retention  Procedure: Procedure(s):  Stage Two Interstim   - Wound Class: I-Clean  Surgeon: Surgeon(s) and Role:     * Kb Tracy MD - Primary  Anesthesia: Monitor Anesthesia Care   Estimated blood loss: Minimal  Drains: None  Specimens: * No specimens in log *  Findings:   None.  Complications: None.  Implants: Interstim II generator placed on right side in standard fashion, temporary lead removed

## 2017-04-18 NOTE — ANESTHESIA PREPROCEDURE EVALUATION
Anesthesia Evaluation     . Pt has had prior anesthetic. Type: General and MAC    No history of anesthetic complications          ROS/MED HX    ENT/Pulmonary:     (+)allergic rhinitis, , . .    Neurologic:  - neg neurologic ROS     Cardiovascular:     (+) Dyslipidemia, hypertension-Peripheral Vascular Disease-- Carotid Stenosis, --. Taking blood thinners Pt has received instructions: Instructions Given to patient: PCP to instruct patient regarding holding Coumadin prior to surgery. . . :. dysrhythmias a-fib, valvular problems/murmurs Mild mitral valve regurgitation:.       METS/Exercise Tolerance:  4 - Raking leaves, gardening   Hematologic:     (+) History of blood clots pt is anticoagulated, -      Musculoskeletal:   (+) , , other musculoskeletal- Osteoporosis      GI/Hepatic: Comment: Occasional GERD symptoms with over eating, takes TUMS as needed    (+) GERD hiatal hernia,       Renal/Genitourinary:     (+) chronic renal disease, type: CRI, Pt does not require dialysis, Pt has no history of transplant, Other Renal/ Genitourinary, Chronic idiopathic urinary retention requiring straight intermittent catheterization, recurrent UTIs      Endo:  - neg endo ROS       Psychiatric:     (+) psychiatric history depression      Infectious Disease:  - neg infectious disease ROS       Malignancy:      - no malignancy   Other:    - neg other ROS                 Physical Exam  Normal systems: cardiovascular, pulmonary and dental    Airway   Mallampati: II  TM distance: >3 FB  Neck ROM: full    Dental   (+) caps    Cardiovascular   Rhythm and rate: regular and normal  (+) carotid bruit is present     PE comment: Carotid bruit noted on the right, patient states that this has been present for some time    Pulmonary    breath sounds clear to auscultation    Other findings: 3/22/17: WBC 5.7; Hgb 12.7; Hct 39.4; Plt 148; INR 1.8  3/22/17: Na 142; K 4.3; Cl 107; Glu 94; BUN 24; Cr 1.13; Ca 9    [facility]  Stress:  Impression  1. Myocardial perfusion imaging using single isotope technique demonstrated normal myocardial perfusion.   2. Gated images demonstrated normal wall motion. The left ventricular systolic function is normal with a calculated ejection fraction of 69%.  3. Compared to the prior study from there are no previous studies for comparison.    7/19/16 EKG: Sinus rhythm with 1st degree block and right bundle branch block             PAC Discussion and Assessment    ASA Classification: 3  Case is suitable for:   Anesthetic techniques and relevant risks discussed: MAC with GA as backup  Invasive monitoring and risk discussed: No  Types:   Possibility and Risk of blood transfusion discussed: No  NPO instructions given:   Additional anesthetic preparation and risks discussed:   Needs early admission to pre-op area:   Other:     PAC Resident/NP Anesthesia Assessment:  Maia Miranda is an 83 yo female who will undergo Stage One Interstim Implant (Implant Permanent Lead for Interstim Device) with Dr. Tracy.  Discussed MAC vs GA with patient and likely would be a good candidate for MAC. She has had anesthesia in the past with no difficulties. Her most recent was 6 month prior. Suitable for ASC.    Cardiac: Paraxymal atrial fibrillation. Last few EKGs has been SR. Last EKG 7/19/2016 SR with 1st degree AVB. Denies edema, PND or orthopnea.  Pulmonary: Never smoked. No current pulmonary diagnosis or symptoms.  GI: GERD, symptoms controlled with diet and occasional TUMS  : self cath about 4 times daily. Bladder lesion.  Able to function independently, own teeth in good repair. Good mouth opening and neck extension.    Reviewed and Signed by PAC Mid-Level Provider/Resident  Mid-Level Provider/Resident: Alyssa Patino CNP  Date: 3/22/16  Time: 1600    Attending Anesthesiologist Anesthesia Assessment:  I have discussed this patient with the OLIVER and concur with her assessment and plan.    Reviewed and Signed by PAC  Anesthesiologist  Anesthesiologist: Neftali Gonzales  Date: 03/22/2017  Time: 1706  Pass/Fail:   Disposition:     PAC Pharmacist Assessment:        Pharmacist:   Date:   Time:      Anesthesia Plan      History & Physical Review  History and physical reviewed and following examination; no interval change.    ASA Status:  3 .    NPO Status:  > 8 hours    Plan for MAC with Intravenous induction. Maintenance will be TIVA.    PONV prophylaxis:  Ondansetron (or other 5HT-3) and Dexamethasone or Solumedrol       Postoperative Care  Postoperative pain management:  IV analgesics and Oral pain medications.      Consents  Anesthetic plan, risks, benefits and alternatives discussed with:  Patient..        History and physical reviewed; no interval change.     Patient seen, examined, reviewed. See above for details from PAC. Note updated.      Cristino Camarillo  Staff Anesthesiologist  9:34 AM April 18, 2017

## 2017-04-18 NOTE — ANESTHESIA CARE TRANSFER NOTE
Patient: Maia Miranda    Procedure(s):  Stage Two Interstim   - Wound Class: I-Clean    Diagnosis: Idiopathic Urinary Retention  Diagnosis Additional Information: No value filed.    Anesthesia Type:   MAC     Note:  Airway :Room Air  Patient transferred to:Phase II  Comments: Awake, spont resp, vss, iv patent. Temp 97.1  136/55  62  93%  Report to RN      Vitals: (Last set prior to Anesthesia Care Transfer)    CRNA VITALS  4/18/2017 1031 - 4/18/2017 1107      4/18/2017             Resp Rate (set): 10                Electronically Signed By: RAY Perez CRNA  April 18, 2017  11:07 AM

## 2017-04-18 NOTE — ANESTHESIA POSTPROCEDURE EVALUATION
Patient: Maia Miranda    Procedure(s):  Stage Two Interstim   - Wound Class: I-Clean    Diagnosis:Idiopathic Urinary Retention  Diagnosis Additional Information: No value filed.    Anesthesia Type:  MAC    Note:  Anesthesia Post Evaluation    Patient location during evaluation: Phase 2  Patient participation: Able to fully participate in evaluation  Level of consciousness: awake and alert  Pain management: adequate  Airway patency: patent  Cardiovascular status: acceptable  Respiratory status: acceptable  Hydration status: acceptable  PONV: none     Anesthetic complications: None          Last vitals:  Vitals:    04/18/17 1102 04/18/17 1117 04/18/17 1123   BP: 136/55 142/68 152/79   Resp: 16 16 16   Temp: 36.2  C (97.2  F)  36.4  C (97.5  F)   SpO2: 98% 96% 97%         Electronically Signed By: Cristino Camarillo MD  April 18, 2017  11:36 AM

## 2017-04-18 NOTE — IP AVS SNAPSHOT
MRN:[id number]                      After Visit Summary   4/18/2017    Maia Miranda    MRN: [id number]           Thank you!     Thank you for choosing [facility] for your care. Our goal is always to provide you with excellent care. Hearing back from our patients is one way we can continue to improve our services. Please take a few minutes to complete the written survey that you may receive in the mail after you visit with us. Thank you!        Patient Information     Date Of Birth          10/26/1932        About your hospital stay     You were admitted on:  April 18, 2017 [facility]    You were discharged on:  April 18, 2017       Who to Call     For medical emergencies, please call 911.  For non-urgent questions about your medical care, please call your primary care provider or clinic, [phone]  For questions related to your surgery, please call your surgery clinic        Attending Provider     Provider Specialty    Kb Tracy MD Urology       Primary Care Provider Office Phone # Fax #    Dian Frias [phone]       [facility] [address] [place] [address]        After Care Instructions     Discharge Instructions       Activity  - No strenuous exercise for 6 weeks.  - No lifting, pushing, pulling more than 10 pounds for 6 weeks.   - Do not strain with bowel movements.  - Do not drive until you can press the brake pedal quickly and fully without pain.   - Do not operate a motor vehicle while taking narcotic pain medications.     Incisions  - You may shower and get incisions wet starting 48 hrs after surgery.  - Do not scrub incisions or submerge wounds for 2 weeks or until seen in follow-up.   - Remove wound dressing 48 hours after surgery.   - If purple dermabond glue was used, avoid applying any lotions or ointments.   - Leave incision open to air. Cover with gauze  "only if needed for comfort or to protect clothing from drainage.   - The stitches do not need to be removed, they will dissolve on their own.    Medications  - Transition from narcotic pain medications to tylenol (acetaminophen) as you are able.  Wean yourself off all pain medications as you are able.  - Some pain medications contain both tylenol (acetaminophen) and a narcotic (Norco, vicodin, percocet), do not take more than 4,000mg of Tylenol (acetaminophen) from all sources in any 24 hour period.  - Narcotics can make you constipated.  Take over the counter fiber (metamucil or benefiber) and stool softeners (miralax, docusate or senna) while taking narcotic pain medications, but stop if you develop diarrhea.  - No driving or operating machinery while taking narcotic pain medications     Follow-Up:  - With Dr. Tracy in 2 weeks for a wound check  - Call your primary care provider to touch base regarding your recent admission.    - Call or return sooner than your regularly scheduled visit if you develop any of the following: fever (greater than 101.5), uncontrolled pain, uncontrolled nausea or vomiting, as well as increased redness, swelling, or drainage from your wound.     Phone numbers:   - Monday through Friday 8am to 4:30pm: Call [phone] with questions or to schedule or confirm appointment.    - Nights or weekends: call the after hours emergency pager - [phone] and tell the  \"I would like to page the Urology Resident on call.\"  - For emergencies, call [phone]                  Your next 10 appointments already scheduled     Apr 28, 2017 11:15 AM CDT   Anticoagulation [facility]  [facility] CLINIC   [facility]    [address] [address] [phone]              Further instructions from your care team       [facility]  Home Care Following Anesthesia  For 24 hours after surgery:  1. Get " "plenty of rest.  A responsible adult must stay with you for at least 24 hours after you leave the surgery center.  2. Do not drive or use heavy equipment.  If you have weakness or tingling, don't drive or use heavy equipment until this feeling goes away.   3. Do not drink alcohol.   4. Avoid strenuous or risky activities.  Ask for help when climbing stairs.  5. You may feel lightheaded.  IF so, sit for a few minutes before standing.  Have someone help you get up.   6. If you have nausea (feel sick to your stomach): Drink only clear liquids such as apple juice, ginger ale, broth or 7-Up.  Rest may also help.  Be sure to drink enough fluids.  Move to a regular diet as you feel able.   7. You may have a slight fever.  Call the doctor if your fever is over 100 F (37.7 C) (taken under the tongue) or lasts longer than 24 hours.  8. You may have a dry mouth, a sore throat, muscle aches or trouble sleeping. These should go away after 24 hours.  9. Do not make important or legal decisions.        Today you received a Marcaine or bupivacaine block to numb the nerves near your surgery site.  This is a block using local anesthetic or \"numbing\" medication injected around the nerves to anesthetize or \"numb\" the area supplied by those nerves.  This block is injected into the muscle layer near your surgical site.  The medication may numb the location where you had surgery for 6-18 hours, but may last up to 24 hours.  If your surgical site is an arm or leg you should be careful with your affected limb, since it is possible to injure your limb without being aware of it due to the numbing.  Until full feeling returns, you should guard against bumping or hitting your limb, and avoid extreme hot or cold temperatures on the skin.  As the block wears off, the feeling will return as a tingling or prickly sensation near your surgical site.  You will experience more discomfort from your incision as the feeling returns.  You may want to take " "a pain pill (a narcotic or Tylenol if this was prescribed by your surgeon) when you start to experience mild pain before the pain beccomes more severe.  If your pain medications do not control your pain you should notifiy your surgeon.    Tips for taking pain medications  To get the best pain relief possible, remember these points:    Take pain medications as directed, before pain becomes severe.    Pain medication can upset your stomach: taking it with food may help.    Constipation is a common side effect of pain medication. Drink plenty of  fluids.    Eat foods high in fiber. Take a stool softener if recommended by your doctor or pharmacist.    Do not drink alcohol, drive or operate machinery while taking pain medications.    Ask about other ways to control pain, such as with heat, ice or relaxation.    Call a doctor for any of the followin. Signs of infection (fever, growing tenderness at the surgery site, a large amount of drainage or bleeding, severe pain, foul-smelling drainage, redness, swelling).  2. It has been over 8 to 10 hours since surgery and you are still not able to urinate (pass water).  3. Headache for over 24 hours.  Your doctor is: Dr. Kb Tracy, Prostate and Urology: [phone]               Or dial [phone] and ask for the resident on call for:  Prostate Urology  For emergency care, call the:  [facility]:  [phone] (TTY for hearing impaired: [phone])                  Pending Results     No orders found from 2017 to 2017.            Admission Information     Date & Time Provider Department Dept. Phone    2017 Kb Tracy MD [facility] [phone]      Your Vitals Were     Blood Pressure Temperature Respirations Height Weight Pulse Oximetry    142/68 97.2  F (36.2  C) (Temporal) 16 1.638 m (5' 4.5\") 71.7 kg (158 lb 1.1 oz) 96%    BMI (Body Mass Index)                   26.71 kg/m2           MyChart " Information     [employer] gives you secure access to your electronic health record. If you see a primary care provider, you can also send messages to your care team and make appointments. If you have questions, please call your primary care clinic.  If you do not have a primary care provider, please call [phone] and they will assist you.      [employer] is an electronic gateway that provides easy, online access to your medical records. With [employer], you can request a clinic appointment, read your test results, renew a prescription or communicate with your care team.     To access your existing account, please contact your [facility] or call [phone] for assistance.        Care EveryWhere ID     This is your Care EveryWhere ID. This could be used by other organizations to access your [facility] medical records  [id number]           Review of your medicines      START taking        Dose / Directions    cephALEXin 500 MG capsule   Commonly known as:  KEFLEX   Used for:  Urinary retention        Dose:  500 mg   Take 1 capsule (500 mg) by mouth 2 times daily for 5 days   Quantity:  10 capsule   Refills:  0       oxyCODONE 5 MG IR tablet   Commonly known as:  ROXICODONE   Used for:  Urinary retention        Dose:  5-10 mg   Take 1-2 tablets (5-10 mg) by mouth every 3 hours as needed for pain or other (Moderate to Severe)   Quantity:  40 tablet   Refills:  0         CONTINUE these medicines which may have CHANGED, or have new prescriptions. If we are uncertain of the size of tablets/capsules you have at home, strength may be listed as something that might have changed.        Dose / Directions    ascorbic acid 1000 MG Tabs   Commonly known as:  vitamin C   This may have changed:    - when to take this  - additional instructions   Used for:  Recurrent UTI, Neurogenic bladder        Dose:  1000 mg   Take 1 tablet (1,000 mg) by mouth 2 times daily   Quantity:  180 tablet   Refills:  3        lisinopril 10 MG tablet   Commonly known as:  PRINIVIL/ZESTRIL   This may have changed:  when to take this   Used for:  Hypertension goal BP (blood pressure) < 150/90        Dose:  10 mg   Take 1 tablet (10 mg) by mouth daily   Quantity:  90 tablet   Refills:  3       metoprolol 50 MG 24 hr tablet   Commonly known as:  TOPROL-XL   This may have changed:  when to take this   Used for:  Hypertension goal BP (blood pressure) < 150/90        Dose:  50 mg   Take 1 tablet (50 mg) by mouth daily   Quantity:  90 tablet   Refills:  1         CONTINUE these medicines which have NOT CHANGED        Dose / Directions    carboxymethylcellulose 1 % ophthalmic solution   Commonly known as:  CELLUVISC/REFRESH LIQUIGEL        Dose:  1 drop   Place 1 drop into both eyes every morning   Refills:  0       conjugated estrogens cream   Commonly known as:  PREMARIN        Place vaginally twice a week On Tuesday and Friday. Uses a pea sized amount   Refills:  0       fluocinonide 0.05 % cream   Commonly known as:  LIDEX   Used for:  Dermatitis        Apply topically 2 times daily as needed   Quantity:  30 g   Refills:  2       GENTLECATH URINARY CATHETER Misc   Used for:  Urinary retention        Dose:  1 catheter   1 catheter 4 times daily   Quantity:  120 each   Refills:  12       ketoprofen 10% in PLO 10% topical gel        Place onto the skin every 4 hours as needed for moderate pain   Refills:  0       magnesium 250 MG tablet        Take 1 tablet by mouth at bedtime   Refills:  0       methenamine hippurate 1 G Tabs tablet   Commonly known as:  HIPREX   Used for:  Recurrent UTI        Dose:  1 g   Take 1 tablet (1 g) by mouth daily   Quantity:  90 tablet   Refills:  3       OMEGA-3 FISH OIL PO        Dose:  1 g   Take 1 g by mouth 2 times daily (with meals)   Refills:  0       polyethylene glycol Packet   Commonly known as:  MIRALAX/GLYCOLAX        Dose:  1 packet   Take 1 packet by mouth daily as needed   Refills:  0        senna-docusate 8.6-50 MG per tablet   Commonly known as:  SENOKOT-S;PERICOLACE   Used for:  Urinary retention        Dose:  1-2 tablet   Take 1-2 tablets by mouth 2 times daily Take while on oral narcotics to prevent or treat constipation.   Quantity:  30 tablet   Refills:  0       SM LUBRICATING JELLY Gel   Used for:  Urinary retention        Dose:  1 packet   Externally apply 1 packet topically 4 times daily   Quantity:  144 Tube   Refills:  12       sodium chloride 5 % ophthalmic ointment   Commonly known as:  PETER 128        Dose:  1 Application   Place 1 Application into both eyes At Bedtime   Refills:  0       TYLENOL 500 MG tablet   Generic drug:  acetaminophen        Dose:  500-1000 mg   Take 500-1,000 mg by mouth every 6 hours as needed   Refills:  0       warfarin 2.5 MG tablet   Commonly known as:  COUMADIN   Used for:  Long-term (current) use of anticoagulants        Take 2.5mg (1 tablet)  MTWTFSS or as directed by INR Clinic.   Quantity:  100 tablet   Refills:  3            Where to get your medicines      These medications were sent to [address] [address]    Hours:  TRANSPLANT PHONE NUMBER [phone] Phone:  [phone]     cephALEXin 500 MG capsule         Some of these will need a paper prescription and others can be bought over the counter. Ask your nurse if you have questions.     Bring a paper prescription for each of these medications     oxyCODONE 5 MG IR tablet                Protect others around you: Learn how to safely use, store and throw away your medicines at www.disposemymeds.org.             Medication List: This is a list of all your medications and when to take them. Check marks below indicate your daily home schedule. Keep this list as a reference.      Medications           Morning Afternoon Evening Bedtime As Needed    ascorbic acid 1000 MG Tabs   Commonly known as:   vitamin C   Take 1 tablet (1,000 mg) by mouth 2 times daily                                carboxymethylcellulose 1 % ophthalmic solution   Commonly known as:  CELLUVISC/REFRESH LIQUIGEL   Place 1 drop into both eyes every morning                                cephALEXin 500 MG capsule   Commonly known as:  KEFLEX   Take 1 capsule (500 mg) by mouth 2 times daily for 5 days                                conjugated estrogens cream   Commonly known as:  PREMARIN   Place vaginally twice a week On Tuesday and Friday. Uses a pea sized amount                                fluocinonide 0.05 % cream   Commonly known as:  LIDEX   Apply topically 2 times daily as needed                                GENTLECATH URINARY CATHETER Misc   1 catheter 4 times daily                                ketoprofen 10% in PLO 10% topical gel   Place onto the skin every 4 hours as needed for moderate pain                                lisinopril 10 MG tablet   Commonly known as:  PRINIVIL/ZESTRIL   Take 1 tablet (10 mg) by mouth daily                                magnesium 250 MG tablet   Take 1 tablet by mouth at bedtime                                methenamine hippurate 1 G Tabs tablet   Commonly known as:  HIPREX   Take 1 tablet (1 g) by mouth daily                                metoprolol 50 MG 24 hr tablet   Commonly known as:  TOPROL-XL   Take 1 tablet (50 mg) by mouth daily                                OMEGA-3 FISH OIL PO   Take 1 g by mouth 2 times daily (with meals)                                oxyCODONE 5 MG IR tablet   Commonly known as:  ROXICODONE   Take 1-2 tablets (5-10 mg) by mouth every 3 hours as needed for pain or other (Moderate to Severe)                                polyethylene glycol Packet   Commonly known as:  MIRALAX/GLYCOLAX   Take 1 packet by mouth daily as needed                                senna-docusate 8.6-50 MG per tablet   Commonly known as:  SENOKOT-S;PERICOLACE   Take 1-2 tablets by mouth  2 times daily Take while on oral narcotics to prevent or treat constipation.                                SM LUBRICATING JELLY Gel   Externally apply 1 packet topically 4 times daily                                sodium chloride 5 % ophthalmic ointment   Commonly known as:  PETER 128   Place 1 Application into both eyes At Bedtime                                TYLENOL 500 MG tablet   Take 500-1,000 mg by mouth every 6 hours as needed   Last time this was given:  975 mg on 4/18/2017  9:47 AM   Generic drug:  acetaminophen                                warfarin 2.5 MG tablet   Commonly known as:  COUMADIN   Take 2.5mg (1 tablet)  MTWTFSS or as directed by INR Clinic.

## 2017-04-18 NOTE — IP AVS SNAPSHOT
[facility]    [address] [place] [address]    Phone:  [phone]    Fax:  [phone]                                       After Visit Summary   4/18/2017    Maia Miranda    MRN: [id number]           After Visit Summary Signature Page     I have received my discharge instructions, and my questions have been answered. I have discussed any challenges I see with this plan with the nurse or doctor.    ..........................................................................................................................................  Patient/Patient Representative Signature      ..........................................................................................................................................  Patient Representative Print Name and Relationship to Patient    ..................................................               ................................................  Date                                            Time    ..........................................................................................................................................  Reviewed by Signature/Title    ...................................................              ..............................................  Date                                                            Time

## 2017-04-19 ENCOUNTER — TELEPHONE (OUTPATIENT)
Dept: PEDIATRICS | Facility: CLINIC | Age: 82
End: 2017-04-19

## 2017-04-19 NOTE — OP NOTE
Urology Operative Summary    Pre-operative diagnosis: Urinary retention   Post-operative diagnosis: Urinary retention   Procedure: Stage II Interstim placement     Surgeon: Kb Tracy MD   Assistant(s): Gunner Reyes MD      Anesthesia: Local anesthesia with MAC sedation     Estimated blood loss: Less than 10 ml   Complications: None   Condition: Patient taken to recovery in stable condition.     Findings: The Interstim generator was placed in the right buttocks.     Specimens:                              None    Indications: Maia Miranda is a 84 year old lady with urinary retention. She underwent a Stage I Interstim trial with the understanding of the risks for infection, pain, bleeding, and the need for future procedures and risks of anesthesia. She noted that she has been able to urinate some since placement. She elected to proceed with Stage II generator placement. She received IV antibiotics prior to the procedure initiation.  Procedure: The patient was identified correctly, consented and placed in the prone position. The patient's sacral area was prepped and draped in the usual sterile fashion. The temporary electrode was disconnected from the external device and prepped out of the field. Marcaine was injected at the insertion site over the right buttocks to anesthetize the skin. An incision approximately 4 cm in length was created. Electrocautery was used to dissect down to the gluteal fascia. The previously placed temporary electrode was removed. We developed a small pouch that would accommodate our Interstim generator. We then tunneled and connected the permanent electrode to the generator. We then closed Tiago's with interrupted vicryl stitches, closed the deep dermis with a running vicryl stitch and then reapproximated the skin with Dermabond. The temporary lead on the contralateral side was removed and passed off the field. A small island dressing was then placed over these sites.   The  patient was awakened from anesthesia and returned to the supine position. All instrument and counts were correct at the end of the procedure.    Gunner Reyes MD  April 19, 2017  Patient was seen, evaluated and plan was formulated in conjunction with me and I agree with the above.   I was present for the entire procedure.  Kb Tracy MD

## 2017-04-24 ENCOUNTER — PRE VISIT (OUTPATIENT)
Dept: UROLOGY | Facility: CLINIC | Age: 82
End: 2017-04-24

## 2017-04-24 NOTE — TELEPHONE ENCOUNTER
Patient coming in for post operative check up S/P stage II Interstim placement. Patient chart reviewed, no need for call, all records available and ready for appointment.

## 2017-04-28 ENCOUNTER — ANTICOAGULATION THERAPY VISIT (OUTPATIENT)
Dept: NURSING | Facility: CLINIC | Age: 82
End: 2017-04-28
Payer: COMMERCIAL

## 2017-04-28 DIAGNOSIS — I48.91 ATRIAL FIBRILLATION (H): ICD-10-CM

## 2017-04-28 DIAGNOSIS — Z79.01 LONG-TERM (CURRENT) USE OF ANTICOAGULANTS: ICD-10-CM

## 2017-04-28 LAB — INR POINT OF CARE: 2 (ref 0.9–1.1)

## 2017-04-28 PROCEDURE — 36416 COLLJ CAPILLARY BLOOD SPEC: CPT

## 2017-04-28 PROCEDURE — 85610 PROTHROMBIN TIME: CPT | Mod: QW

## 2017-04-28 PROCEDURE — 99207 ZZC NO CHARGE NURSE ONLY: CPT

## 2017-04-28 NOTE — MR AVS SNAPSHOT
Maia Miranda   4/28/2017 11:15 AM   Anticoagulation Therapy Visit    Description:  84 year old female   Provider:   ANTICOAGULATION CLINIC   Department:   Nurse           INR as of 4/28/2017     Today's INR 2.0      Anticoagulation Summary as of 4/28/2017     INR goal 2.0-3.0   Today's INR 2.0   Full instructions 2.5 mg every day   Next INR check 5/26/2017    Indications   Long-term (current) use of anticoagulants [Z79.01]  Atrial fibrillation (H) [I48.91]         Your next Anticoagulation Clinic appointment(s)     May 26, 2017 11:15 AM CDT   Anticoagulation Visit with  [facility]AGULATION CLINIC   [facility]    [address] [address] [phone]              Contact Numbers     [facility]  Please call to cancel and/or reschedule your appointment, or with any problems or questions regarding your therapy.  Anticoagulation Nurse: [phone]  Main Clinic: [phone]             April 2017 Details    Sun Mon Tue Wed Thu Fri Sat           1                 2               3               4               5               6               7               8                 9               10               11               12               13               14               15                 16               17               18               19               20               21               22                 23               24               25               26               27               28      2.5 mg   See details      29      2.5 mg           30      2.5 mg                Date Details   04/28 This INR check               How to take your warfarin dose     To take:  2.5 mg Take 1 of the 2.5 mg tablets.           May 2017 Details    Sun Mon Tue Wed Thu Fri Sat      1      2.5 mg         2      2.5 mg         3      2.5 mg         4      2.5 mg         5      2.5 mg         6      2.5 mg           7      2.5 mg         8      2.5  mg         9      2.5 mg         10      2.5 mg         11      2.5 mg         12      2.5 mg         13      2.5 mg           14      2.5 mg         15      2.5 mg         16      2.5 mg         17      2.5 mg         18      2.5 mg         19      2.5 mg         20      2.5 mg           21      2.5 mg         22      2.5 mg         23      2.5 mg         24      2.5 mg         25      2.5 mg         26            27                 28               29               30               31                   Date Details   No additional details    Date of next INR:  5/26/2017         How to take your warfarin dose     To take:  2.5 mg Take 1 of the 2.5 mg tablets.

## 2017-04-28 NOTE — PROGRESS NOTES
ANTICOAGULATION FOLLOW-UP CLINIC VISIT    Patient Name:  Maia Miranda  Date:  4/28/2017  Contact Type:  Face to Face    SUBJECTIVE:     Patient Findings     Positives No Problem Findings           OBJECTIVE    INR Protime   Date Value Ref Range Status   04/28/2017 2.0  Final       ASSESSMENT / PLAN  INR assessment THER    Recheck INR In: 4 WEEKS    INR Location Clinic      Anticoagulation Summary as of 4/28/2017     INR goal 2.0-3.0   Today's INR 2.0   Maintenance plan 2.5 mg (2.5 mg x 1) every day   Full instructions 2.5 mg every day   Weekly total 17.5 mg   No change documented Valentina Quiles   Plan last modified Meme Riggs RN (1/18/2017)   Next INR check 5/26/2017   Target end date Indefinite    Indications   Long-term (current) use of anticoagulants [Z79.01]  Atrial fibrillation (H) [I48.91]         Anticoagulation Episode Summary     INR check location [facility]    Preferred lab     Send INR reminders to  ANTICOAG CLINIC    Comments 2.5mg tablets // 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

## 2017-05-03 ENCOUNTER — OFFICE VISIT (OUTPATIENT)
Dept: UROLOGY | Facility: CLINIC | Age: 82
End: 2017-05-03

## 2017-05-03 VITALS
WEIGHT: 157 LBS | BODY MASS INDEX: 26.16 KG/M2 | SYSTOLIC BLOOD PRESSURE: 143 MMHG | HEIGHT: 65 IN | HEART RATE: 53 BPM | DIASTOLIC BLOOD PRESSURE: 71 MMHG

## 2017-05-03 DIAGNOSIS — R33.9 URINARY RETENTION: Primary | ICD-10-CM

## 2017-05-03 ASSESSMENT — PAIN SCALES - GENERAL: PAINLEVEL: NO PAIN (0)

## 2017-05-03 NOTE — PROGRESS NOTES
Reason for Visit:  F/u on interstim implant 4/18/17    Clinical Data:  Ms. Miranda is a 83 y/o female with hx of urinary retention who performed CIC 4 x per day.  She notes that the interstim has given her the feeling of needing to void.  She is able to void volitionally at times but continues to need to perform CIC.      Her settings are on program 1 and at level 1.  I increased to 1.3 and she is able to feel the stimulation and the feeling of needing to void.    A/P:  83 y/o female with urinary retention on CIC with improved feeling of voiding.  She will try this setting and was taught on how to change or increase it.  -f/u in a month with medtronic rep.    Thank you for allowing me to participate in the care of  Ms. Maia Miranda and I will keep you updated on her progress.    Kb Tracy MD

## 2017-05-03 NOTE — LETTER
5/3/2017       RE: Maia Miranda  [address] [address] [address]     Dear Colleague,    Thank you for referring your patient, Maia Miranda, to the [facility] UROLOGY AND INST FOR PROSTATE AND UROLOGIC CANCERS at [facility]. Please see a copy of my visit note below.    Reason for Visit:  F/u on interstim implant 4/18/17    Clinical Data:  Ms. Miranda is a 85 y/o female with hx of urinary retention who performed CIC 4 x per day.  She notes that the interstim has given her the feeling of needing to void.  She is able to void volitionally at times but continues to need to perform CIC.      Her settings are on program 1 and at level 1.  I increased to 1.3 and she is able to feel the stimulation and the feeling of needing to void.    A/P:  85 y/o female with urinary retention on CIC with improved feeling of voiding.  She will try this setting and was taught on how to change or increase it.  -f/u in a month with medtronic rep.    Thank you for allowing me to participate in the care of  Ms. Maia Miranda and I will keep you updated on her progress.    Kb Tracy MD

## 2017-05-03 NOTE — MR AVS SNAPSHOT
After Visit Summary   5/3/2017    Maia Miranda    MRN: [id number]           Patient Information     Date Of Birth          10/26/1932        Visit Information        Provider Department      5/3/2017 9:30 AM Kb Tracy MD [facility] Urology and [facility] for Prostate and Urologic Cancers        Today's Diagnoses     Urinary retention    -  1       Follow-ups after your visit        Follow-up notes from your care team     Return in about 4 weeks (around 5/31/2017).      Your next 10 appointments already scheduled     May 17, 2017  2:00 PM CDT   (Arrive by 1:45 PM)   Return Visit with Kb Tracy MD   [facility] Urology and [facility] for Prostate and Urologic Cancers ([facility] [place])    [address] [facility] [address] [phone]            May 26, 2017 11:15 AM CDT   Anticoagulation Visit with  ANTICOAGULATION CLINIC   [facility]    [address] [address] [phone]              Who to contact     Please call your clinic at [phone] to:    Ask questions about your health    Make or cancel appointments    Discuss your medicines    Learn about your test results    Speak to your doctor   If you have compliments or concerns about an experience at your clinic, or if you wish to file a complaint, please contact [facility] Patient Relations at [phone] or email us at [email or web address]         Additional Information About Your Visit        Aristotle Circle[employer] Information     [employer] gives you secure access to your electronic health record. If you see a primary care provider, you can also send messages to your care team and make appointments. If you have questions, please call your primary care clinic.  If you do not have a primary care provider, please call [phone] and they will assist you.      [employer] is an electronic gateway that provides  "easy, online access to your medical records. With [employer], you can request a clinic appointment, read your test results, renew a prescription or communicate with your care team.     To access your existing account, please contact your [facility] or call [phone] for assistance.        Care EveryWhere ID     This is your Care EveryWhere ID. This could be used by other organizations to access your [facility] medical records  [id number]        Your Vitals Were     Pulse Height BMI (Body Mass Index)             53 1.651 m (5' 5\") 26.13 kg/m2          Blood Pressure from Last 3 Encounters:   05/03/17 143/71   04/18/17 155/78   04/04/17 148/72    Weight from Last 3 Encounters:   05/03/17 71.2 kg (157 lb)   04/18/17 71.7 kg (158 lb 1.1 oz)   04/04/17 71.7 kg (158 lb)              Today, you had the following     No orders found for display         Today's Medication Changes          These changes are accurate as of: 5/3/17 10:09 AM.  If you have any questions, ask your nurse or doctor.               These medicines have changed or have updated prescriptions.        Dose/Directions    ascorbic acid 1000 MG Tabs   Commonly known as:  vitamin C   This may have changed:    - when to take this  - additional instructions   Used for:  Recurrent UTI, Neurogenic bladder        Dose:  1000 mg   Take 1 tablet (1,000 mg) by mouth 2 times daily   Quantity:  180 tablet   Refills:  3       lisinopril 10 MG tablet   Commonly known as:  PRINIVIL/ZESTRIL   This may have changed:  when to take this   Used for:  Hypertension goal BP (blood pressure) < 150/90        Dose:  10 mg   Take 1 tablet (10 mg) by mouth daily   Quantity:  90 tablet   Refills:  3       metoprolol 50 MG 24 hr tablet   Commonly known as:  TOPROL-XL   This may have changed:  when to take this   Used for:  Hypertension goal BP (blood pressure) < 150/90        Dose:  50 mg   Take 1 tablet (50 mg) by mouth daily   Quantity:  90 tablet "   Refills:  1                Primary Care Provider Office Phone # Fax #    Dian Frias [phone]       [facility] [address] [place] [address]        Thank you!     Thank you for choosing [facility] UROLOGY AND [facility] FOR PROSTATE AND UROLOGIC CANCERS  for your care. Our goal is always to provide you with excellent care. Hearing back from our patients is one way we can continue to improve our services. Please take a few minutes to complete the written survey that you may receive in the mail after your visit with us. Thank you!             Your Updated Medication List - Protect others around you: Learn how to safely use, store and throw away your medicines at www.disposemymeds.org.          This list is accurate as of: 5/3/17 10:09 AM.  Always use your most recent med list.                   Brand Name Dispense Instructions for use    ascorbic acid 1000 MG Tabs    vitamin C    180 tablet    Take 1 tablet (1,000 mg) by mouth 2 times daily       carboxymethylcellulose 1 % ophthalmic solution    CELLUVISC/REFRESH LIQUIGEL     Place 1 drop into both eyes every morning       conjugated estrogens cream    PREMARIN     Place vaginally twice a week On Tuesday and Friday. Uses a pea sized amount       fluocinonide 0.05 % cream    LIDEX    30 g    Apply topically 2 times daily as needed       GENTLECATH URINARY CATHETER Misc     120 each    1 catheter 4 times daily       ketoprofen 10% in PLO 10% topical gel      Place onto the skin every 4 hours as needed for moderate pain       lisinopril 10 MG tablet    PRINIVIL/ZESTRIL    90 tablet    Take 1 tablet (10 mg) by mouth daily       magnesium 250 MG tablet      Take 1 tablet by mouth at bedtime       methenamine hippurate 1 G Tabs tablet    HIPREX    90 tablet    Take 1 tablet (1 g) by mouth daily       metoprolol 50 MG 24 hr tablet    TOPROL-XL    90 tablet    Take 1 tablet (50 mg) by mouth daily       OMEGA-3 FISH OIL PO       Take 1 g by mouth 2 times daily (with meals)       polyethylene glycol Packet    MIRALAX/GLYCOLAX     Take 1 packet by mouth daily as needed       senna-docusate 8.6-50 MG per tablet    SENOKOT-S;PERICOLACE    30 tablet    Take 1-2 tablets by mouth 2 times daily Take while on oral narcotics to prevent or treat constipation.       SM LUBRICATING JELLY Gel     144 Tube    Externally apply 1 packet topically 4 times daily       sodium chloride 5 % ophthalmic ointment    PETER 128     Place 1 Application into both eyes At Bedtime       TYLENOL 500 MG tablet   Generic drug:  acetaminophen      Take 500-1,000 mg by mouth every 6 hours as needed       warfarin 2.5 MG tablet    COUMADIN    100 tablet    Take 2.5mg (1 tablet)  MTWTFSS or as directed by INR Clinic.

## 2017-05-03 NOTE — NURSING NOTE
Chief Complaint   Patient presents with     Surgical Followup     post op 4/18/17     Magali Power RN

## 2017-05-04 ENCOUNTER — PRE VISIT (OUTPATIENT)
Dept: UROLOGY | Facility: CLINIC | Age: 82
End: 2017-05-04

## 2017-05-04 NOTE — TELEPHONE ENCOUNTER
Patient coming in for [email or web address] rep appt. Patient chart reviewed, no need for call, all records available and ready for appointment.

## 2017-05-17 ENCOUNTER — OFFICE VISIT (OUTPATIENT)
Dept: UROLOGY | Facility: CLINIC | Age: 82
End: 2017-05-17

## 2017-05-17 VITALS
SYSTOLIC BLOOD PRESSURE: 122 MMHG | DIASTOLIC BLOOD PRESSURE: 64 MMHG | BODY MASS INDEX: 26.16 KG/M2 | HEART RATE: 54 BPM | HEIGHT: 65 IN | WEIGHT: 157 LBS

## 2017-05-17 DIAGNOSIS — R33.9 URINARY RETENTION: Primary | ICD-10-CM

## 2017-05-17 ASSESSMENT — PAIN SCALES - GENERAL: PAINLEVEL: NO PAIN (0)

## 2017-05-17 NOTE — NURSING NOTE
Chief Complaint   Patient presents with     RECHECK     medtronic rep appt       Tracey Hoffman MA

## 2017-05-17 NOTE — MR AVS SNAPSHOT
After Visit Summary   5/17/2017    Maia Miranda    MRN: [id number]           Patient Information     Date Of Birth          10/26/1932        Visit Information        Provider Department      5/17/2017 2:00 PM Kb Tracy MD [facility] Urology and [facility] for Prostate and Urologic Cancers        Today's Diagnoses     Urinary retention    -  1      Care Instructions    Follow up with Dr Tracy on 7/19/17 at 215 pm and to see Med[employer] rep     It was a pleasure meeting with you today.  Thank you for allowing me and my team the privilege of caring for you today.  YOU are the reason we are here, and I truly hope we provided you with the excellent service you deserve.  Please let us know if there is anything else we can do for you so that we can be sure you are leaving completely satisfied with your care experience.                Follow-ups after your visit        Follow-up notes from your care team     Return in about 4 weeks (around 6/14/2017).      Your next 10 appointments already scheduled     May 26, 2017 11:15 AM CDT   Anticoagulation Visit with  ANTICOAGULATION CLINIC   [facility]    [address] [place] [address] [phone]            Jul 19, 2017  2:15 PM CDT   (Arrive by 2:00 PM)   Return Visit with Kb Tracy MD   [facility] Urology and [facility] for Prostate and Urologic Cancers ([facility])    [address] [place] [address] [phone]              Who to contact     Please call your clinic at [phone] to:    Ask questions about your health    Make or cancel appointments    Discuss your medicines    Learn about your test results    Speak to your doctor   If you have compliments or concerns about an experience at your clinic, or if you wish to file a complaint, please contact [facility] Patient Relations at [phone] or email us at  "[email or web address]         Additional Information About Your Visit        [employer] Information     [employer] gives you secure access to your electronic health record. If you see a primary care provider, you can also send messages to your care team and make appointments. If you have questions, please call your primary care clinic.  If you do not have a primary care provider, please call [phone] and they will assist you.      [employer] is an electronic gateway that provides easy, online access to your medical records. With [employer], you can request a clinic appointment, read your test results, renew a prescription or communicate with your care team.     To access your existing account, please contact your [facility] Clinic or call [phone] for assistance.        Care EveryWhere ID     This is your Care EveryWhere ID. This could be used by other organizations to access your [place] medical records  [id number]        Your Vitals Were     Pulse Height BMI (Body Mass Index)             54 1.651 m (5' 5\") 26.13 kg/m2          Blood Pressure from Last 3 Encounters:   05/17/17 122/64   05/03/17 143/71   04/18/17 155/78    Weight from Last 3 Encounters:   05/17/17 71.2 kg (157 lb)   05/03/17 71.2 kg (157 lb)   04/18/17 71.7 kg (158 lb 1.1 oz)              Today, you had the following     No orders found for display         Today's Medication Changes          These changes are accurate as of: 5/17/17  4:03 PM.  If you have any questions, ask your nurse or doctor.               These medicines have changed or have updated prescriptions.        Dose/Directions    ascorbic acid 1000 MG Tabs   Commonly known as:  vitamin C   This may have changed:    - when to take this  - additional instructions   Used for:  Recurrent UTI, Neurogenic bladder        Dose:  1000 mg   Take 1 tablet (1,000 mg) by mouth 2 times daily   Quantity:  180 tablet   Refills:  3       lisinopril 10 MG tablet "   Commonly known as:  PRINIVIL/ZESTRIL   This may have changed:  when to take this   Used for:  Hypertension goal BP (blood pressure) < 150/90        Dose:  10 mg   Take 1 tablet (10 mg) by mouth daily   Quantity:  90 tablet   Refills:  3       metoprolol 50 MG 24 hr tablet   Commonly known as:  TOPROL-XL   This may have changed:  when to take this   Used for:  Hypertension goal BP (blood pressure) < 150/90        Dose:  50 mg   Take 1 tablet (50 mg) by mouth daily   Quantity:  90 tablet   Refills:  1                Primary Care Provider Office Phone # Fax #    Dian Frias [phone]       [facility] [place] [facility] [address] [place] [address]        Thank you!     Thank you for choosing [facility] UROLOGY AND [facility] FOR PROSTATE AND UROLOGIC CANCERS  for your care. Our goal is always to provide you with excellent care. Hearing back from our patients is one way we can continue to improve our services. Please take a few minutes to complete the written survey that you may receive in the mail after your visit with us. Thank you!             Your Updated Medication List - Protect others around you: Learn how to safely use, store and throw away your medicines at www.disposemymeds.org.          This list is accurate as of: 5/17/17  4:03 PM.  Always use your most recent med list.                   Brand Name Dispense Instructions for use    ascorbic acid 1000 MG Tabs    vitamin C    180 tablet    Take 1 tablet (1,000 mg) by mouth 2 times daily       carboxymethylcellulose 1 % ophthalmic solution    CELLUVISC/REFRESH LIQUIGEL     Place 1 drop into both eyes every morning       conjugated estrogens cream    PREMARIN     Place vaginally twice a week On Tuesday and Friday. Uses a pea sized amount       fluocinonide 0.05 % cream    LIDEX    30 g    Apply topically 2 times daily as needed       GENTLECATH URINARY CATHETER Misc     120 each    1 catheter 4 times daily       ketoprofen 10% in  PLO 10% topical gel      Place onto the skin every 4 hours as needed for moderate pain       lisinopril 10 MG tablet    PRINIVIL/ZESTRIL    90 tablet    Take 1 tablet (10 mg) by mouth daily       magnesium 250 MG tablet      Take 1 tablet by mouth at bedtime       methenamine hippurate 1 G Tabs tablet    HIPREX    90 tablet    Take 1 tablet (1 g) by mouth daily       metoprolol 50 MG 24 hr tablet    TOPROL-XL    90 tablet    Take 1 tablet (50 mg) by mouth daily       OMEGA-3 FISH OIL PO      Take 1 g by mouth 2 times daily (with meals)       polyethylene glycol Packet    MIRALAX/GLYCOLAX     Take 1 packet by mouth daily as needed       senna-docusate 8.6-50 MG per tablet    SENOKOT-S;PERICOLACE    30 tablet    Take 1-2 tablets by mouth 2 times daily Take while on oral narcotics to prevent or treat constipation.       SM LUBRICATING JELLY Gel     144 Tube    Externally apply 1 packet topically 4 times daily       sodium chloride 5 % ophthalmic ointment    PETER 128     Place 1 Application into both eyes At Bedtime       TYLENOL 500 MG tablet   Generic drug:  acetaminophen      Take 500-1,000 mg by mouth every 6 hours as needed       warfarin 2.5 MG tablet    COUMADIN    100 tablet    Take 2.5mg (1 tablet)  MTWTFSS or as directed by INR Clinic.

## 2017-05-17 NOTE — LETTER
5/17/2017       RE: Maia Miranda  [address] [address]     Dear Colleague,    Thank you for referring your patient, Maia Miranda, to the [facility] UROLOGY AND INST FOR PROSTATE AND UROLOGIC CANCERS at [facility]. Please see a copy of my visit note below.    Reason for Visit:  F/u on interstim implant 4/18/17    Clinical Data:  Ms. Miranda is a 83 y/o female with hx of urinary retention who performed CIC 4 x per day.  She notes that the interstim has given her the feeling of needing to void.  She is able to void volitionally at times but continues to need to perform CIC.      Her settings are on program 1 and at level 1.  I increased to 1.3 and she is able to feel the stimulation and the feeling of needing to void.  This works better for her but the medtronic rep made some changes so she will see how this works for her.    A/P:  83 y/o female with urinary retention on CIC with improved feeling of voiding.  She will try the new settings    -f/u in a month to see if it improved things    Thank you for allowing me to participate in the care of  Ms. Maia Miranda and I will keep you updated on her progress.    Kb Tracy MD

## 2017-05-17 NOTE — PATIENT INSTRUCTIONS
Follow up with Dr Tracy on 7/19/17 at 215 pm and to see [employer] rep     It was a pleasure meeting with you today.  Thank you for allowing me and my team the privilege of caring for you today.  YOU are the reason we are here, and I truly hope we provided you with the excellent service you deserve.  Please let us know if there is anything else we can do for you so that we can be sure you are leaving completely satisfied with your care experience.

## 2017-05-17 NOTE — PROGRESS NOTES
Reason for Visit:  F/u on interstim implant 4/18/17    Clinical Data:  Ms. Miranda is a 83 y/o female with hx of urinary retention who performed CIC 4 x per day.  She notes that the interstim has given her the feeling of needing to void.  She is able to void volitionally at times but continues to need to perform CIC.      Her settings are on program 1 and at level 1.  I increased to 1.3 and she is able to feel the stimulation and the feeling of needing to void.  This works better for her but the medtronic rep made some changes so she will see how this works for her.    A/P:  83 y/o female with urinary retention on CIC with improved feeling of voiding.  She will try the new settings    -f/u in a month to see if it improved things    Thank you for allowing me to participate in the care of  Ms. Maia Miranda and I will keep you updated on her progress.    Kb Tracy MD

## 2017-05-26 ENCOUNTER — ANTICOAGULATION THERAPY VISIT (OUTPATIENT)
Dept: NURSING | Facility: CLINIC | Age: 82
End: 2017-05-26
Payer: COMMERCIAL

## 2017-05-26 DIAGNOSIS — I48.91 ATRIAL FIBRILLATION (H): ICD-10-CM

## 2017-05-26 DIAGNOSIS — Z79.01 LONG-TERM (CURRENT) USE OF ANTICOAGULANTS: ICD-10-CM

## 2017-05-26 LAB — INR POINT OF CARE: 2.5 (ref 0.86–1.14)

## 2017-05-26 PROCEDURE — 85610 PROTHROMBIN TIME: CPT | Mod: QW

## 2017-05-26 PROCEDURE — 36416 COLLJ CAPILLARY BLOOD SPEC: CPT

## 2017-05-26 PROCEDURE — 99207 ZZC NO CHARGE NURSE ONLY: CPT

## 2017-05-26 NOTE — MR AVS SNAPSHOT
Maia Miranda   5/26/2017 11:15 AM   Anticoagulation Therapy Visit    Description:  84 year old female   Provider:   ANTICOAGULATION CLINIC   Department:   Nurse           INR as of 5/26/2017     Today's INR 2.5      Anticoagulation Summary as of 5/26/2017     INR goal 2.0-3.0   Today's INR 2.5   Full instructions 2.5 mg every day   Next INR check 6/23/2017    Indications   Long-term (current) use of anticoagulants [Z79.01]  Atrial fibrillation (H) [I48.91]         Your next Anticoagulation Clinic appointment(s)     Jun 23, 2017 11:00 AM CDT   Anticoagulation Visit with  ANTICOAGULATION CLINIC   [facility] ([facility]    [address] [address] [phone]              Contact Numbers     [facility]  Please call to cancel and/or reschedule your appointment, or with any problems or questions regarding your therapy.  Anticoagulation Nurse: [phone]  Main Clinic: [phone]             May 2017 Details    Sun Mon Tue Wed Thu Fri Sat      1               2               3               4               5               6                 7               8               9               10               11               12               13                 14               15               16               17               18               19               20                 21               22               23               24               25               26      2.5 mg   See details      27      2.5 mg           28      2.5 mg         29      2.5 mg         30      2.5 mg         31      2.5 mg             Date Details   05/26 This INR check               How to take your warfarin dose     To take:  2.5 mg Take 1 of the 2.5 mg tablets.           June 2017 Details    Sun Mon Tue Wed Thu Fri Sat         1      2.5 mg         2      2.5 mg         3      2.5 mg           4      2.5 mg         5      2.5 mg         6      2.5 mg         7       2.5 mg         8      2.5 mg         9      2.5 mg         10      2.5 mg           11      2.5 mg         12      2.5 mg         13      2.5 mg         14      2.5 mg         15      2.5 mg         16      2.5 mg         17      2.5 mg           18      2.5 mg         19      2.5 mg         20      2.5 mg         21      2.5 mg         22      2.5 mg         23            24                 25               26               27               28               29               30                 Date Details   No additional details    Date of next INR:  6/23/2017         How to take your warfarin dose     To take:  2.5 mg Take 1 of the 2.5 mg tablets.

## 2017-05-26 NOTE — PROGRESS NOTES
ANTICOAGULATION FOLLOW-UP CLINIC VISIT    Patient Name:  Maia Miranda  Date:  5/26/2017  Contact Type:  Face to Face    SUBJECTIVE:     Patient Findings     Positives No Problem Findings           OBJECTIVE    INR Protime   Date Value Ref Range Status   05/26/2017 2.5 (A) 0.86 - 1.14 Final       ASSESSMENT / PLAN  INR assessment THER    Recheck INR In: 4 WEEKS    INR Location Clinic      Anticoagulation Summary as of 5/26/2017     INR goal 2.0-3.0   Today's INR 2.5   Maintenance plan 2.5 mg (2.5 mg x 1) every day   Full instructions 2.5 mg every day   Weekly total 17.5 mg   No change documented Meme Riggs RN   Plan last modified Meme Riggs RN (1/18/2017)   Next INR check 6/23/2017   Target end date Indefinite    Indications   Long-term (current) use of anticoagulants [Z79.01]  Atrial fibrillation (H) [I48.91]         Anticoagulation Episode Summary     INR check location [facility]    Preferred lab     Send INR reminders to  ANTICOAG CLINIC    Comments 2.5mg tablets // salads qod // retired med tech // self cath - Hiprex urinary antiseptic // Interstim bladder therapy      Anticoagulation Care Providers     Provider Role Specialty Phone number    Dian Frias MD Referring Internal Medicine [phone]            See the Encounter Report to view Anticoagulation Flowsheet and Dosing Calendar (Go to Encounters tab in chart review, and find the Anticoagulation Therapy Visit)        Meme Riggs, RN

## 2017-06-14 ENCOUNTER — OFFICE VISIT (OUTPATIENT)
Dept: PEDIATRICS | Facility: CLINIC | Age: 82
End: 2017-06-14
Payer: COMMERCIAL

## 2017-06-14 VITALS
HEART RATE: 52 BPM | DIASTOLIC BLOOD PRESSURE: 62 MMHG | BODY MASS INDEX: 25.83 KG/M2 | HEIGHT: 65 IN | OXYGEN SATURATION: 99 % | SYSTOLIC BLOOD PRESSURE: 128 MMHG | TEMPERATURE: 97 F | WEIGHT: 155 LBS

## 2017-06-14 DIAGNOSIS — Z12.31 VISIT FOR SCREENING MAMMOGRAM: ICD-10-CM

## 2017-06-14 DIAGNOSIS — J30.1 SEASONAL ALLERGIC RHINITIS DUE TO POLLEN: ICD-10-CM

## 2017-06-14 DIAGNOSIS — Z00.00 MEDICARE ANNUAL WELLNESS VISIT, SUBSEQUENT: Primary | ICD-10-CM

## 2017-06-14 DIAGNOSIS — I48.91 ATRIAL FIBRILLATION, UNSPECIFIED TYPE (H): ICD-10-CM

## 2017-06-14 DIAGNOSIS — I10 HYPERTENSION GOAL BP (BLOOD PRESSURE) < 150/90: ICD-10-CM

## 2017-06-14 DIAGNOSIS — Z78.0 ASYMPTOMATIC POSTMENOPAUSAL STATUS: ICD-10-CM

## 2017-06-14 DIAGNOSIS — M85.80 OSTEOPENIA: ICD-10-CM

## 2017-06-14 DIAGNOSIS — G44.329 CHRONIC POST-TRAUMATIC HEADACHE, NOT INTRACTABLE: ICD-10-CM

## 2017-06-14 DIAGNOSIS — E78.5 HYPERLIPIDEMIA LDL GOAL <130: ICD-10-CM

## 2017-06-14 DIAGNOSIS — N18.30 CKD (CHRONIC KIDNEY DISEASE) STAGE 3, GFR 30-59 ML/MIN (H): ICD-10-CM

## 2017-06-14 DIAGNOSIS — N64.4 PAIN OF RIGHT BREAST: ICD-10-CM

## 2017-06-14 DIAGNOSIS — G31.84 MILD COGNITIVE IMPAIRMENT: ICD-10-CM

## 2017-06-14 PROCEDURE — 80061 LIPID PANEL: CPT | Performed by: INTERNAL MEDICINE

## 2017-06-14 PROCEDURE — 99213 OFFICE O/P EST LOW 20 MIN: CPT | Mod: 25 | Performed by: INTERNAL MEDICINE

## 2017-06-14 PROCEDURE — 99397 PER PM REEVAL EST PAT 65+ YR: CPT | Performed by: INTERNAL MEDICINE

## 2017-06-14 PROCEDURE — 80053 COMPREHEN METABOLIC PANEL: CPT | Performed by: INTERNAL MEDICINE

## 2017-06-14 PROCEDURE — 36415 COLL VENOUS BLD VENIPUNCTURE: CPT | Performed by: INTERNAL MEDICINE

## 2017-06-14 RX ORDER — LISINOPRIL 10 MG/1
10 TABLET ORAL EVERY MORNING
Qty: 90 TABLET | Refills: 3 | Status: SHIPPED | OUTPATIENT
Start: 2017-06-14 | End: 2018-07-06

## 2017-06-14 RX ORDER — AZELASTINE 1 MG/ML
1-2 SPRAY, METERED NASAL 2 TIMES DAILY
Qty: 1 BOTTLE | Refills: 1 | Status: SHIPPED | OUTPATIENT
Start: 2017-06-14 | End: 2017-07-25

## 2017-06-14 RX ORDER — METOPROLOL SUCCINATE 50 MG/1
50 TABLET, EXTENDED RELEASE ORAL EVERY EVENING
Qty: 90 TABLET | Refills: 3 | Status: SHIPPED | OUTPATIENT
Start: 2017-06-14 | End: 2018-02-26

## 2017-06-14 NOTE — MR AVS SNAPSHOT
After Visit Summary   6/14/2017    Maia Miranda    MRN: [id number]           Patient Information     Date Of Birth          10/26/1932        Visit Information        Provider Department      6/14/2017 8:20 AM Dian Frias MD [facility]        Today's Diagnoses     Medicare annual wellness visit, subsequent    -  1    Hypertension goal BP (blood pressure) < 150/90        Hyperlipidemia LDL goal <130        CKD (chronic kidney disease) stage 3, GFR 30-59 ml/min        Mild cognitive impairment        Atrial fibrillation, unspecified type (H)        Osteopenia        Asymptomatic postmenopausal status        Visit for screening mammogram        Pain of right breast        Seasonal allergic rhinitis due to pollen        Chronic post-traumatic headache, not intractable          Care Instructions      Preventive Health Recommendations    Female Ages 65 +    Yearly exam:     See your health care provider every year in order to  o Review health changes.   o Discuss preventive care.    o Review your medicines if your doctor has prescribed any.      You no longer need a yearly Pap test unless you've had an abnormal Pap test in the past 10 years. If you have vaginal symptoms, such as bleeding or discharge, be sure to talk with your provider about a Pap test.      Every 1 to 2 years, have a mammogram.  If you are over 69, talk with your health care provider about whether or not you want to continue having screening mammograms.      Every 10 years, have a colonoscopy. Or, have a yearly FIT test (stool test). These exams will check for colon cancer.       Have a cholesterol test every 5 years, or more often if your doctor advises it.       Have a diabetes test (fasting glucose) every three years. If you are at risk for diabetes, you should have this test more often.       At age 65, have a bone density scan (DEXA) to check for osteoporosis (brittle bone disease).    Shots:    Get a flu  shot each year.    Get a tetanus shot every 10 years.    Talk to your doctor about your pneumonia vaccines. There are now two you should receive - Pneumovax (PPSV 23) and Prevnar (PCV 13).    Talk to your doctor about the shingles vaccine.    Talk to your doctor about the hepatitis B vaccine.    Nutrition:     Eat at least 5 servings of fruits and vegetables each day.      Eat whole-grain bread, whole-wheat pasta and brown rice instead of white grains and rice.      Talk to your provider about Calcium and Vitamin D.     Lifestyle    Exercise at least 150 minutes a week (30 minutes a day, 5 days a week). This will help you control your weight and prevent disease.      Limit alcohol to one drink per day.      No smoking.       Wear sunscreen to prevent skin cancer.       See your dentist twice a year for an exam and cleaning.      See your eye doctor every 1 to 2 years to screen for conditions such as glaucoma, macular degeneration and cataracts.  -------------------  1. Labs today: cholesterol, diabetes screen, liver function, kidney function  2. You will be contacted to set up the mammogram in [place] - can schedule bone density scan the same day if you like  3. Start azelastine nasal spray twice a day for allergies  4. Try to rest as much as possible for headaches - exercise and eye strain and make worse  5. Refilled metoprolol and lisinopril          Follow-ups after your visit        Your next 10 appointments already scheduled     Jun 23, 2017 11:00 AM CDT   Anticoagulation Visit with  ANTICOAGULATION CLINIC   [facility]    [address] [facility] [address] [phone]            Jul 19, 2017  2:15 PM CDT   (Arrive by 2:00 PM)   Return Visit with Kb Tracy MD   [facility] ([facility])    [address] [address] [phone]             Oct 05, 2017 10:30 AM CDT   Return Visit with RAY Elmore CNP   [facility] ([facility])    [address]  [address]  [facility] [id number] [phone]            Oct 05, 2017 10:30 AM CDT   Return Visit with JEAN Stubbs   [facility] ([facility])    [address]  [address]  [facility] [id number] [phone]              Future tests that were ordered for you today     Open Future Orders        Priority Expected Expires Ordered    MA Diagnostic Digital Bilateral Routine  6/14/2018 6/14/2017    DX Hip/Pelvis/Spine Routine  6/14/2018 6/14/2017            Who to contact     If you have questions or need follow up information about today's clinic visit or your schedule please contact [facility] directly at [phone].  Normal or non-critical lab and imaging results will be communicated to you by BuildOuthart, letter or phone within 4 business days after the clinic has received the results. If you do not hear from us within 7 days, please contact the clinic through [employer] or phone. If you have a critical or abnormal lab result, we will notify you by phone as soon as possible.  Submit refill requests through [employer] Reporter or call your pharmacy and they will forward the refill request to us. Please allow 3 business days for your refill to be completed.          Additional Information About Your Visit        BuildOutharDocstoc Information     [employer] Reporter gives you secure access to your electronic health record. If you see a primary care provider, you can also send messages to your care team and make appointments. If you have questions, please call your primary care clinic.  If you do not have a primary care provider, please call [phone] and they will assist you.        Care EveryWhere ID     This is your Care EveryWhere ID. This could be used by other  "organizations to access your [facility] medical records  [id number]        Your Vitals Were     Pulse Temperature Height Pulse Oximetry BMI (Body Mass Index)       52 97  F (36.1  C) (Tympanic) 5' 5\" (1.651 m) 99% 25.79 kg/m2        Blood Pressure from Last 3 Encounters:   06/14/17 128/62   05/17/17 122/64   05/03/17 143/71    Weight from Last 3 Encounters:   06/14/17 155 lb (70.3 kg)   05/17/17 157 lb (71.2 kg)   05/03/17 157 lb (71.2 kg)              We Performed the Following     Comprehensive metabolic panel     Lipid panel reflex to direct LDL          Today's Medication Changes          These changes are accurate as of: 6/14/17  9:23 AM.  If you have any questions, ask your nurse or doctor.               Start taking these medicines.        Dose/Directions    azelastine 0.1 % spray   Commonly known as:  ASTELIN   Used for:  Seasonal allergic rhinitis due to pollen   Started by:  Dian Frias MD        Dose:  1-2 spray   Spray 1-2 sprays into both nostrils 2 times daily   Quantity:  1 Bottle   Refills:  1         These medicines have changed or have updated prescriptions.        Dose/Directions    ascorbic acid 1000 MG Tabs   Commonly known as:  vitamin C   This may have changed:    - when to take this  - additional instructions   Used for:  Recurrent UTI, Neurogenic bladder        Dose:  1000 mg   Take 1 tablet (1,000 mg) by mouth 2 times daily   Quantity:  180 tablet   Refills:  3            Where to get your medicines      These medications were sent to [facility] [address] [place] [id number]     Phone:  [phone]     azelastine 0.1 % spray    lisinopril 10 MG tablet    metoprolol 50 MG 24 hr tablet                Primary Care Provider Office Phone # Fax #    Dian Frias [phone]       [facility] [address] [place] [address] DR [facility] PETERSEN [id number]        Thank you!     Thank you for choosing " [facility]  for your care. Our goal is always to provide you with excellent care. Hearing back from our patients is one way we can continue to improve our services. Please take a few minutes to complete the written survey that you may receive in the mail after your visit with us. Thank you!             Your Updated Medication List - Protect others around you: Learn how to safely use, store and throw away your medicines at www.disposemymeds.org.          This list is accurate as of: 6/14/17  9:23 AM.  Always use your most recent med list.                   Brand Name Dispense Instructions for use    ascorbic acid 1000 MG Tabs    vitamin C    180 tablet    Take 1 tablet (1,000 mg) by mouth 2 times daily       azelastine 0.1 % spray    ASTELIN    1 Bottle    Spray 1-2 sprays into both nostrils 2 times daily       carboxymethylcellulose 1 % ophthalmic solution    CELLUVISC/REFRESH LIQUIGEL     Place 1 drop into both eyes every morning       conjugated estrogens cream    PREMARIN     Place vaginally twice a week On Tuesday and Friday. Uses a pea sized amount       fluocinonide 0.05 % cream    LIDEX    30 g    Apply topically 2 times daily as needed       GENTLECATH URINARY CATHETER Misc     120 each    1 catheter 4 times daily       ketoprofen 10% in PLO 10% topical gel      Place onto the skin every 4 hours as needed for moderate pain       lisinopril 10 MG tablet    PRINIVIL/ZESTRIL    90 tablet    Take 1 tablet (10 mg) by mouth every morning       magnesium 250 MG tablet      Take 1 tablet by mouth at bedtime       methenamine hippurate 1 G Tabs tablet    HIPREX    90 tablet    Take 1 tablet (1 g) by mouth daily       metoprolol 50 MG 24 hr tablet    TOPROL-XL    90 tablet    Take 1 tablet (50 mg) by mouth every evening       OMEGA-3 FISH OIL PO      Take 1 g by mouth 2 times daily (with meals)       polyethylene glycol Packet    MIRALAX/GLYCOLAX     Take 1 packet by mouth daily as needed        senna-docusate 8.6-50 MG per tablet    SENOKOT-S;PERICOLACE    30 tablet    Take 1-2 tablets by mouth 2 times daily Take while on oral narcotics to prevent or treat constipation.       SM LUBRICATING JELLY Gel     144 Tube    Externally apply 1 packet topically 4 times daily       sodium chloride 5 % ophthalmic ointment    PETER 128     Place 1 Application into both eyes At Bedtime       TYLENOL 500 MG tablet   Generic drug:  acetaminophen      Take 500-1,000 mg by mouth every 6 hours as needed       warfarin 2.5 MG tablet    COUMADIN    100 tablet    Take 2.5mg (1 tablet)  MTWTFSS or as directed by INR Clinic.

## 2017-06-14 NOTE — PROGRESS NOTES
"  SUBJECTIVE:                                                            Maia Miranda is a 84 year old female who presents for Preventive Visit.  {PVP to remind patient that this is not necessarily a physical exam; physical exam may or may not be done:936080::\"click delete button to remove this line now\"}  {PVP to inform patient that additional E&M charge may apply, if additional problems addressed:335596::\"click delete button to remove this line now\"}  Are you in the first 12 months of your Medicare Part B coverage?  {No Yes:008371::\"No\"}    Healthy Habits:    Do you get at least three servings of calcium containing foods daily (dairy, green leafy vegetables, etc.)? {YES/NO, DAIRY INTAKE:421264::\"yes\"}    Amount of exercise or daily activities, outside of work: {AMOUNT EXERCISE:922681}    Problems taking medications regularly {Yes /No default:931567::\"No\"}    Medication side effects: {Yes /No default.:535903::\"No\"}    Have you had an eye exam in the past two years? {YESNOBLANK:425893}    Do you see a dentist twice per year? {YESNOBLANK:316773}    Do you have sleep apnea, excessive snoring or daytime drowsiness?{YESNOBLANK:910503}    {AWV Cognitive Screenin}    {Outside tests to abstract? :972471}    {additional problems to add:126666}    Reviewed and updated as needed this visit by clinical staff         Reviewed and updated as needed this visit by Provider        Social History   Substance Use Topics     Smoking status: Never Smoker     Smokeless tobacco: Never Used     Alcohol use No       {ETOH AUDIT:543298}    Today's PHQ-2 Score:   PHQ-2 (  [employer]) 2016   Q1: Little interest or pleasure in doing things 0 -   Q2: Feeling down, depressed or hopeless 0 -   PHQ-2 Score 0 -   Q1: Little interest or pleasure in doing things - Not at all   Q2: Feeling down, depressed or hopeless - Not at all   PHQ-2 Score - 0     {PHQ-2 LOOK IN ASSESSMENTS :413795}  Do you feel safe in your environment " "- {YES/NO/NA:975792}    Do you have a Health Care Directive?: {HEALTHCARE DIRECTIVE STATUS:078345}    Current providers sharing in care for this patient include:   Patient Care Team:  Dian Frias MD as PCP - General (Internal Medicine)  Bernadine Maria MD as MD (Urology)  Rosa Cruz, RN as Registered Nurse (Urology)  Dian Frias MD as Referring Physician (Internal Medicine)  Kb Tracy MD as MD (Urology)      Hearing impairment: {NO/YES:035542}    Ability to successfully perform activities of daily living: {YES/NO (MEDICARE):299909::\"Yes, no assistance needed\"}     Fall risk:  {Document Fall Risk in the Assessments Section of the Navigator:129549}    Home safety:  {IPPE SAFETY CONCERNS:866631::\"none identified\"}  {If any of the above assessments are answered yes, consider ordering appropriate referrals:291182::\"click delete button to remove this line now\"}    The following health maintenance items are reviewed in Epic and correct as of today:  Health Maintenance   Topic Date Due     DEXA Q2 YR  04/30/2017     LIPID MONITORING Q1 YEAR  05/16/2017     MICROALBUMIN Q1 YEAR  05/16/2017     MAMMO Q1 YR  05/06/2017     FALL RISK ASSESSMENT  05/16/2017     INFLUENZA VACCINE (SYSTEM ASSIGNED)  09/01/2017     OP ANNUAL INR REFERRAL  09/21/2017     ADVANCE DIRECTIVE PLANNING Q5 YRS  01/15/2018     BMP Q1 YR  03/22/2018     HEMOGLOBIN Q1 YR  03/22/2018     COLONOSCOPY Q10 YR  06/14/2021     TETANUS Q10 YR  11/05/2023     PNEUMOCOCCAL  Completed         {Decision Support:406376}     ROS:  {ROS COMP:715967}    {CHRONICPROBDATA:040902}  OBJECTIVE:                                                            There were no vitals taken for this visit. Estimated body mass index is 26.13 kg/(m^2) as calculated from the following:    Height as of 5/17/17: 5' 5\" (1.651 m).    Weight as of 5/17/17: 157 lb (71.2 kg).  EXAM:   {Exam :928905}    ASSESSMENT / PLAN:                                    " "                        {Diag Picklist:768314}    End of Life Planning:  Patient currently has an advanced directive: { :169835}    COUNSELING:  {Medicare Counselin}    {Blood Pressure - Adult Preventive:911556}    Estimated body mass index is 26.13 kg/(m^2) as calculated from the following:    Height as of 17: 5' 5\" (1.651 m).    Weight as of 17: 157 lb (71.2 kg).  {Weight Management Plan -- Delete if patient has a normal BMI:400877}   reports that she has never smoked. She has never used smokeless tobacco.  {Tobacco Cessation -- Delete if patient is a non-smoker:307548}    Appropriate preventive services were discussed with this patient, including applicable screening as appropriate for cardiovascular disease, diabetes, osteopenia/osteoporosis, and glaucoma.  As appropriate for age/gender, discussed screening for colorectal cancer, prostate cancer, breast cancer, and cervical cancer. Checklist reviewing preventive services available has been given to the patient.    Reviewed patients plan of care and provided an AVS. The {CarePlan:184832} for Maia meets the Care Plan requirement. This Care Plan has been established and reviewed with the {PATIENT, FAMILY MEMBER, CAREGIVER:886779}.    Counseling Resources:  ATP IV Guidelines  Pooled Cohorts Equation Calculator  Breast Cancer Risk Calculator  FRAX Risk Assessment  ICSI Preventive Guidelines  Dietary Guidelines for Americans,   USDA's MyPlate  ASA Prophylaxis  Lung CA Screening    Dian Frias MD  [facility] [place] [facility]  "

## 2017-06-14 NOTE — NURSING NOTE
"Chief Complaint   Patient presents with     Medicare Visit       Initial /62 (BP Location: Right arm, Patient Position: Chair, Cuff Size: Adult Regular)  Pulse 52  Temp 97  F (36.1  C) (Tympanic)  Ht 5' 5\" (1.651 m)  Wt 155 lb (70.3 kg)  SpO2 99%  BMI 25.79 kg/m2 Estimated body mass index is 25.79 kg/(m^2) as calculated from the following:    Height as of this encounter: 5' 5\" (1.651 m).    Weight as of this encounter: 155 lb (70.3 kg).  Medication Reconciliation: complete   Fatmata Kendrick LPN      "

## 2017-06-14 NOTE — PROGRESS NOTES
SUBJECTIVE:                                                            Maia Miranda is a 84 year old female who presents for Preventive Visit.  Are you in the first 12 months of your Medicare coverage?  No    Physical   Annual:     Getting at least 3 servings of Calcium per day::  Yes    Bi-annual eye exam::  Yes    Dental care twice a year::  Yes    Sleep apnea or symptoms of sleep apnea::  None    Diet::  Low salt    Frequency of exercise::  4-5 days/week    Duration of exercise::  15-30 minutes    Taking medications regularly::  No    Barriers to taking medications::  None    Medication side effects::  None    Additional concerns today::  No    Right breast - sore for last 2-3 weeks. Can't find any lumps. Hurts most of the time. Nothing makes better or worse. No redness. No discharge. Due for mammogram.    Headaches: left side of head and across forehead. Has had daily since fall in March. Hit left side of head when fell. Pain comes and goes. Has had rhinorrhea. Occasional dizziness. Takes nap in afternoon, which helps. Uses tylenol for headaches, which helps.     Allergies/rhinorrhea: continues to have issues with this. Steroid nasal spray not effective. Feels gets sinus pressure on left > right and concerned this might be contributing to her headaches some.    COGNITIVE SCREEN  1) Repeat 3 items (Banana, Sunrise, Chair)    2) Clock draw: ABNORMAL [place] clock but not able to put in hands  3) 3 item recall: Recalls 2 objects   Results: ABNORMAL clock, 1-2 items recalled: PROBABLE COGNITIVE IMPAIRMENT, **INFORM     Mini-CogTM Copyright ARLEEN Fierro. Licensed by the author for use in [facility]; reprinted with permission ([email or web address]). All rights reserved.      Reviewed and updated as needed this visit by clinical staff  Tobacco  Allergies  Meds  Problems  Med Hx  Surg Hx  Fam Hx  Soc Hx        Reviewed and updated as needed this visit by Provider  Allergies  Meds  Problems        Social  History   Substance Use Topics     Smoking status: Never Smoker     Smokeless tobacco: Never Used     Alcohol use No     Hyperlipidemia Follow-Up      Rate your low fat/cholesterol diet?: good    Taking statin?  No    Other lipid medications/supplements?:  Fish oil/Omega 3, dose 1000mg without side effects     Hypertension Follow-up      Outpatient blood pressures are being checked at store.  Results are 120/60.    Low Salt Diet: no added salt     Today's PHQ-2 Score:   PHQ-2 ( 1999 Pfizer) 6/14/2017   Q1: Little interest or pleasure in doing things 0   Q2: Feeling down, depressed or hopeless 0   PHQ-2 Score 0   Q1: Little interest or pleasure in doing things -   Q2: Feeling down, depressed or hopeless -   PHQ-2 Score Incomplete     Do you feel safe in your environment - Yes    Do you have a Health Care Directive?: Yes: Advance Directive has been received and scanned.    Current providers sharing in care for this patient include:   Patient Care Team:  Dian Frias MD as PCP - General (Internal Medicine)  Bernadine Maria MD as MD (Urology)  Rosa Cruz RN as Registered Nurse (Urology)  Dian Frias MD as Referring Physician (Internal Medicine)  Kb Tracy MD as MD (Urology)      Hearing impairment: Yes, wears hearing aids    Ability to successfully perform activities of daily living: Yes, no assistance needed     Fall risk:  Fallen 2 or more times in the past year?: No  Any fall with injury in the past year?: Yes    Home safety:  none identified    The following health maintenance items are reviewed in Epic and correct as of today:  Health Maintenance   Topic Date Due     DEXA Q2 YR  04/30/2017     MICROALBUMIN Q1 YEAR  05/16/2017     INFLUENZA VACCINE (SYSTEM ASSIGNED)  09/01/2017     OP ANNUAL INR REFERRAL  09/21/2017     ADVANCE DIRECTIVE PLANNING Q5 YRS  01/15/2018     HEMOGLOBIN Q1 YR  03/22/2018     BMP Q1 YR  06/14/2018     FALL RISK ASSESSMENT  06/14/2018     LIPID  "MONITORING Q1 YEAR  06/14/2018     MAMMO Q1 YR  06/16/2018     COLONOSCOPY Q10 YR  06/14/2021     TETANUS Q10 YR  11/05/2023     PNEUMOCOCCAL  Completed     ROS:  Constitutional, HEENT, cardiovascular, pulmonary, GI, , musculoskeletal, neuro, skin, endocrine and psych systems are negative, except as otherwise noted.    Problem list, Medication list, Allergies, and Medical/Social/Surgical histories reviewed in [facility] and updated as appropriate.  OBJECTIVE:                                                            /62 (BP Location: Right arm, Patient Position: Chair, Cuff Size: Adult Regular)  Pulse 52  Temp 97  F (36.1  C) (Tympanic)  Ht 5' 5\" (1.651 m)  Wt 155 lb (70.3 kg)  SpO2 99%  BMI 25.79 kg/m2 Estimated body mass index is 25.79 kg/(m^2) as calculated from the following:    Height as of this encounter: 5' 5\" (1.651 m).    Weight as of this encounter: 155 lb (70.3 kg).  EXAM:   GENERAL APPEARANCE: elderly female, alert and no distress  EYES: Eyes grossly normal to inspection, PERRL and conjunctivae and sclerae normal, does have some mild horizontal nystagmus with checking extraocular motion. All directions intact.  HENT: ear canals and TM's normal, nose and mouth without ulcers or lesions, oropharynx clear and oral mucous membranes moist  NECK: no adenopathy, no asymmetry, masses, or scars and thyroid normal to palpation  RESP: lungs clear to auscultation - no rales, rhonchi or wheezes  BREAST: normal without masses,or nipple discharge and no palpable axillary masses or adenopathy, mild tenderness to palpation in right breast just above and lateral to nipple  CV: regular rate and rhythm, normal S1 S2, no S3 or S4, no murmur, click or rub, no peripheral edema and peripheral pulses strong  ABDOMEN: soft, nontender, no hepatosplenomegaly, no masses and bowel sounds normal  MS: no musculoskeletal defects are noted and gait is age appropriate without ataxia  SKIN: no suspicious lesions or rashes  NEURO: " Normal strength and tone, sensory exam grossly normal, mentation intact and speech normal  PSYCH: mentation appears normal and affect normal/bright    Results for orders placed or performed in visit on 06/14/17   Lipid panel reflex to direct LDL   Result Value Ref Range    Cholesterol 228 (H) <200 mg/dL    Triglycerides 146 <150 mg/dL    HDL Cholesterol 52 >49 mg/dL    LDL Cholesterol Calculated 147 (H) <100 mg/dL    Non HDL Cholesterol 176 (H) <130 mg/dL   Comprehensive metabolic panel   Result Value Ref Range    Sodium 143 133 - 144 mmol/L    Potassium 4.9 3.4 - 5.3 mmol/L    Chloride 110 (H) 94 - 109 mmol/L    Carbon Dioxide 28 20 - 32 mmol/L    Anion Gap 5 3 - 14 mmol/L    Glucose 103 (H) 70 - 99 mg/dL    Urea Nitrogen 29 7 - 30 mg/dL    Creatinine 1.29 (H) 0.52 - 1.04 mg/dL    GFR Estimate 39 (L) >60 mL/min/1.7m2    GFR Estimate If Black 48 (L) >60 mL/min/1.7m2    Calcium 8.8 8.5 - 10.1 mg/dL    Bilirubin Total 0.4 0.2 - 1.3 mg/dL    Albumin 3.9 3.4 - 5.0 g/dL    Protein Total 7.4 6.8 - 8.8 g/dL    Alkaline Phosphatase 98 40 - 150 U/L    ALT 24 0 - 50 U/L    AST 21 0 - 45 U/L         ASSESSMENT / PLAN:                                                            1. Medicare annual wellness visit, subsequent  Diagnostic mammo due to pain  Colonoscopy, tdap, pneumovax, prevnar UTD  Will schedule bone density scan    2. Pain of right breast  Will obtain diagnostic mammo. No palpable findings on exam.  - OFFICE/OUTPT VISIT,RACIELLEVYASMINE III    3. Chronic post-traumatic headache, not intractable  Suspect this is due to her fall earlier this year as having exertional headaches and also worse with eye strain. Discussed resting eyes and limiting exercise if causing increased symptoms. Suggested stopping walking prior to timing that headaches typically start to try to prevent and then gradually increase time. Discussed concussion clinic and possibly PT/OT. Pt not interested in this right now.  - OFFICE/OUTPT VISIT,ESTLEVL  "III    4. Atrial fibrillation, unspecified type (H)  Sounds in NSR today. Continue metoprolol and warfarin    5. Hypertension goal BP (blood pressure) < 150/90  Controlled. Continue metoprolol and lisinopril  - Comprehensive metabolic panel  - metoprolol (TOPROL-XL) 50 MG 24 hr tablet; Take 1 tablet (50 mg) by mouth every evening  Dispense: 90 tablet; Refill: 3  - lisinopril (PRINIVIL/ZESTRIL) 10 MG tablet; Take 1 tablet (10 mg) by mouth every morning  Dispense: 90 tablet; Refill: 3    6. Hyperlipidemia LDL goal <130  A little elevated; however, given age statin not likely to add much benefit at this point.   - Lipid panel reflex to direct LDL    7. CKD (chronic kidney disease) stage 3, GFR 30-59 ml/min  Slightly worse. Encouraged increased hydration. Recheck in 6 months. Unable to leave microalbumin today because did not bring catheter.  - Comprehensive metabolic panel    8. Mild cognitive impairment  Continue to follow with memory clinic.    9. Seasonal allergic rhinitis due to pollen  Will try astelin to see if able to improve symptoms  - azelastine (ASTELIN) 0.1 % spray; Spray 1-2 sprays into both nostrils 2 times daily  Dispense: 1 Bottle; Refill: 1    10. Osteopenia  Getting adequate calcium/D. Will recheck DEXA    11. Asymptomatic postmenopausal status  - DX Hip/Pelvis/Spine; Future      End of Life Planning:  Patient currently has an advanced directive: Yes.  Practitioner is supportive of decision.    COUNSELING:  Reviewed preventive health counseling, as reflected in patient instructions  Special attention given to:       Regular exercise       Healthy diet/nutrition       Osteoporosis Prevention/Bone Health       Advanced Planning     Estimated body mass index is 25.79 kg/(m^2) as calculated from the following:    Height as of this encounter: 5' 5\" (1.651 m).    Weight as of this encounter: 155 lb (70.3 kg).     reports that she has never smoked. She has never used smokeless tobacco.      Appropriate " preventive services were discussed with this patient, including applicable screening as appropriate for cardiovascular disease, diabetes, osteopenia/osteoporosis, and glaucoma.  As appropriate for age/gender, discussed screening for colorectal cancer, prostate cancer, breast cancer, and cervical cancer. Checklist reviewing preventive services available has been given to the patient.    Reviewed patients plan of care and provided an AVS. The Basic Care Plan (routine screening as documented in Health Maintenance) for Maia meets the Care Plan requirement. This Care Plan has been established and reviewed with the Patient.    Counseling Resources:  ATP IV Guidelines  Pooled Cohorts Equation Calculator  Breast Cancer Risk Calculator  FRAX Risk Assessment  ICSI Preventive Guidelines  Dietary Guidelines for Americans, 2010  USDA's MyPlate  ASA Prophylaxis  Lung CA Screening    Dian Frias MD  [facility]

## 2017-06-14 NOTE — PATIENT INSTRUCTIONS
Preventive Health Recommendations    Female Ages 65 +    Yearly exam:     See your health care provider every year in order to  o Review health changes.   o Discuss preventive care.    o Review your medicines if your doctor has prescribed any.      You no longer need a yearly Pap test unless you've had an abnormal Pap test in the past 10 years. If you have vaginal symptoms, such as bleeding or discharge, be sure to talk with your provider about a Pap test.      Every 1 to 2 years, have a mammogram.  If you are over 69, talk with your health care provider about whether or not you want to continue having screening mammograms.      Every 10 years, have a colonoscopy. Or, have a yearly FIT test (stool test). These exams will check for colon cancer.       Have a cholesterol test every 5 years, or more often if your doctor advises it.       Have a diabetes test (fasting glucose) every three years. If you are at risk for diabetes, you should have this test more often.       At age 65, have a bone density scan (DEXA) to check for osteoporosis (brittle bone disease).    Shots:    Get a flu shot each year.    Get a tetanus shot every 10 years.    Talk to your doctor about your pneumonia vaccines. There are now two you should receive - Pneumovax (PPSV 23) and Prevnar (PCV 13).    Talk to your doctor about the shingles vaccine.    Talk to your doctor about the hepatitis B vaccine.    Nutrition:     Eat at least 5 servings of fruits and vegetables each day.      Eat whole-grain bread, whole-wheat pasta and brown rice instead of white grains and rice.      Talk to your provider about Calcium and Vitamin D.     Lifestyle    Exercise at least 150 minutes a week (30 minutes a day, 5 days a week). This will help you control your weight and prevent disease.      Limit alcohol to one drink per day.      No smoking.       Wear sunscreen to prevent skin cancer.       See your dentist twice a year for an exam and cleaning.      See your  eye doctor every 1 to 2 years to screen for conditions such as glaucoma, macular degeneration and cataracts.  -------------------  1. Labs today: cholesterol, diabetes screen, liver function, kidney function  2. You will be contacted to set up the mammogram in [place] - can schedule bone density scan the same day if you like  3. Start azelastine nasal spray twice a day for allergies  4. Try to rest as much as possible for headaches - exercise and eye strain and make worse  5. Refilled metoprolol and lisinopril

## 2017-06-15 LAB
ALBUMIN SERPL-MCNC: 3.9 G/DL (ref 3.4–5)
ALP SERPL-CCNC: 98 U/L (ref 40–150)
ALT SERPL W P-5'-P-CCNC: 24 U/L (ref 0–50)
ANION GAP SERPL CALCULATED.3IONS-SCNC: 5 MMOL/L (ref 3–14)
AST SERPL W P-5'-P-CCNC: 21 U/L (ref 0–45)
BILIRUB SERPL-MCNC: 0.4 MG/DL (ref 0.2–1.3)
BUN SERPL-MCNC: 29 MG/DL (ref 7–30)
CALCIUM SERPL-MCNC: 8.8 MG/DL (ref 8.5–10.1)
CHLORIDE SERPL-SCNC: 110 MMOL/L (ref 94–109)
CHOLEST SERPL-MCNC: 228 MG/DL
CO2 SERPL-SCNC: 28 MMOL/L (ref 20–32)
CREAT SERPL-MCNC: 1.29 MG/DL (ref 0.52–1.04)
GFR SERPL CREATININE-BSD FRML MDRD: 39 ML/MIN/1.7M2
GLUCOSE SERPL-MCNC: 103 MG/DL (ref 70–99)
HDLC SERPL-MCNC: 52 MG/DL
LDLC SERPL CALC-MCNC: 147 MG/DL
NONHDLC SERPL-MCNC: 176 MG/DL
POTASSIUM SERPL-SCNC: 4.9 MMOL/L (ref 3.4–5.3)
PROT SERPL-MCNC: 7.4 G/DL (ref 6.8–8.8)
SODIUM SERPL-SCNC: 143 MMOL/L (ref 133–144)
TRIGL SERPL-MCNC: 146 MG/DL

## 2017-06-16 ENCOUNTER — HOSPITAL ENCOUNTER (OUTPATIENT)
Dept: MAMMOGRAPHY | Facility: CLINIC | Age: 82
Discharge: HOME | End: 2017-06-16
Attending: INTERNAL MEDICINE | Admitting: INTERNAL MEDICINE
Payer: COMMERCIAL

## 2017-06-16 ENCOUNTER — HOSPITAL ENCOUNTER (OUTPATIENT)
Dept: ULTRASOUND IMAGING | Facility: CLINIC | Age: 82
End: 2017-06-16
Attending: INTERNAL MEDICINE
Payer: COMMERCIAL

## 2017-06-16 DIAGNOSIS — N64.4 PAIN OF RIGHT BREAST: ICD-10-CM

## 2017-06-16 PROCEDURE — G0204 DX MAMMO INCL CAD BI: HCPCS

## 2017-06-16 PROCEDURE — 76642 ULTRASOUND BREAST LIMITED: CPT | Mod: RT

## 2017-06-23 ENCOUNTER — ANTICOAGULATION THERAPY VISIT (OUTPATIENT)
Dept: NURSING | Facility: CLINIC | Age: 82
End: 2017-06-23
Payer: COMMERCIAL

## 2017-06-23 DIAGNOSIS — Z79.01 LONG-TERM (CURRENT) USE OF ANTICOAGULANTS: ICD-10-CM

## 2017-06-23 DIAGNOSIS — I48.91 ATRIAL FIBRILLATION, UNSPECIFIED TYPE (H): ICD-10-CM

## 2017-06-23 LAB — INR POINT OF CARE: 1.9 (ref 0.86–1.14)

## 2017-06-23 PROCEDURE — 99207 ZZC NO CHARGE NURSE ONLY: CPT

## 2017-06-23 PROCEDURE — 36416 COLLJ CAPILLARY BLOOD SPEC: CPT

## 2017-06-23 PROCEDURE — 85610 PROTHROMBIN TIME: CPT | Mod: QW

## 2017-06-23 NOTE — MR AVS SNAPSHOT
Maia Miranda   6/23/2017 11:00 AM   Anticoagulation Therapy Visit    Description:  84 year old female   Provider:   ANTICOAGULATION CLINIC   Department:   Nurse           INR as of 6/23/2017     Today's INR 1.9!      Anticoagulation Summary as of 6/23/2017     INR goal 2.0-3.0   Today's INR 1.9!   Full instructions 2.5 mg every day   Next INR check 7/21/2017    Indications   Long-term (current) use of anticoagulants [Z79.01]  Atrial fibrillation (H) [I48.91]         Your next Anticoagulation Clinic appointment(s)     Jul 21, 2017 11:00 AM CDT   Anticoagulation Visit [facility]  [facility] CLIN[facility]    [address] [address] [phone]              Contact Numbers     [facility]  Please call to cancel and/or reschedule your appointment, or with any problems or questions regarding your therapy.  Anticoagulation Nurse: [phone]  Main Clinic: [phone]             June 2017 Details    Sun Mon Tue Wed Thu Fri Sat         1               2               3                 4               5               6               7               8               9               10                 11               12               13               14               15               16               17                 18               19               20               21               22               23      2.5 mg   See details      24      2.5 mg           25      2.5 mg         26      2.5 mg         27      2.5 mg         28      2.5 mg         29      2.5 mg         30      2.5 mg           Date Details   06/23 This INR check               How to take your warfarin dose     To take:  2.5 mg Take 1 of the 2.5 mg tablets.           July 2017 Details    Sun Mon Tue Wed Thu Fri Sat           1      2.5 mg           2      2.5 mg         3      2.5 mg         4      2.5 mg         5      2.5 mg         6      2.5 mg         7       2.5 mg         8      2.5 mg           9      2.5 mg         10      2.5 mg         11      2.5 mg         12      2.5 mg         13      2.5 mg         14      2.5 mg         15      2.5 mg           16      2.5 mg         17      2.5 mg         18      2.5 mg         19      2.5 mg         20      2.5 mg         21            22                 23               24               25               26               27               28               29                 30               31                     Date Details   No additional details    Date of next INR:  7/21/2017         How to take your warfarin dose     To take:  2.5 mg Take 1 of the 2.5 mg tablets.

## 2017-06-30 DIAGNOSIS — N39.0 URINARY TRACT INFECTION: ICD-10-CM

## 2017-06-30 DIAGNOSIS — N39.0 URINARY TRACT INFECTION: Primary | ICD-10-CM

## 2017-06-30 LAB
ALBUMIN UR-MCNC: 100 MG/DL
APPEARANCE UR: ABNORMAL
BACTERIA #/AREA URNS HPF: ABNORMAL /HPF
BILIRUB UR QL STRIP: NEGATIVE
COLOR UR AUTO: YELLOW
GLUCOSE UR STRIP-MCNC: NEGATIVE MG/DL
HGB UR QL STRIP: ABNORMAL
KETONES UR STRIP-MCNC: NEGATIVE MG/DL
LEUKOCYTE ESTERASE UR QL STRIP: ABNORMAL
NITRATE UR QL: POSITIVE
NON-SQ EPI CELLS #/AREA URNS LPF: ABNORMAL /LPF
PH UR STRIP: 5.5 PH (ref 5–7)
RBC #/AREA URNS AUTO: ABNORMAL /HPF (ref 0–2)
SP GR UR STRIP: 1.02 (ref 1–1.03)
URN SPEC COLLECT METH UR: ABNORMAL
UROBILINOGEN UR STRIP-ACNC: 0.2 EU/DL (ref 0.2–1)
WBC #/AREA URNS AUTO: >100 /HPF (ref 0–2)

## 2017-06-30 PROCEDURE — 87088 URINE BACTERIA CULTURE: CPT | Performed by: INTERNAL MEDICINE

## 2017-06-30 PROCEDURE — 87186 SC STD MICRODIL/AGAR DIL: CPT | Performed by: INTERNAL MEDICINE

## 2017-06-30 PROCEDURE — 81001 URINALYSIS AUTO W/SCOPE: CPT | Performed by: INTERNAL MEDICINE

## 2017-06-30 PROCEDURE — 87086 URINE CULTURE/COLONY COUNT: CPT | Performed by: INTERNAL MEDICINE

## 2017-07-01 ENCOUNTER — TELEPHONE (OUTPATIENT)
Dept: UROLOGY | Facility: CLINIC | Age: 82
End: 2017-07-01

## 2017-07-01 NOTE — TELEPHONE ENCOUNTER
Called by patient to follow up on UA. UA results reviewed with patient on Mychart. Patient reports dark cloudy urine, dysuria and chills. She denies fever, abdominal pain, nausea or vomiting. Will plan for empiric treatment with Keflex 500 mg BID x 5 days based on previous urine cultures called to her pharmacy in [place].  Patient is to call to follow up with clinic for finalized urine culture.     Abeba Gay MD    Patient aware of message below, he verbalized understanding

## 2017-07-02 LAB
BACTERIA SPEC CULT: ABNORMAL
MICRO REPORT STATUS: ABNORMAL
MICROORGANISM SPEC CULT: ABNORMAL
SPECIMEN SOURCE: ABNORMAL

## 2017-07-03 DIAGNOSIS — N39.0 URINARY TRACT INFECTION: Primary | ICD-10-CM

## 2017-07-03 RX ORDER — CIPROFLOXACIN 500 MG/1
500 TABLET, FILM COATED ORAL 2 TIMES DAILY
Qty: 10 TABLET | Refills: 0 | Status: SHIPPED | OUTPATIENT
Start: 2017-07-03 | End: 2017-07-25

## 2017-07-05 ENCOUNTER — RADIANT APPOINTMENT (OUTPATIENT)
Dept: BONE DENSITY | Facility: CLINIC | Age: 82
End: 2017-07-05
Attending: INTERNAL MEDICINE
Payer: COMMERCIAL

## 2017-07-05 DIAGNOSIS — Z78.0 ASYMPTOMATIC POSTMENOPAUSAL STATUS: ICD-10-CM

## 2017-07-05 PROCEDURE — 77080 DXA BONE DENSITY AXIAL: CPT | Performed by: INTERNAL MEDICINE

## 2017-07-11 ENCOUNTER — PRE VISIT (OUTPATIENT)
Dept: UROLOGY | Facility: CLINIC | Age: 82
End: 2017-07-11

## 2017-07-11 NOTE — TELEPHONE ENCOUNTER
Patient with history of urinary retention coming in for medtronic check. Patient chart reviewed, no need for call, all records available and ready for appointment.

## 2017-07-19 ENCOUNTER — OFFICE VISIT (OUTPATIENT)
Dept: UROLOGY | Facility: CLINIC | Age: 82
End: 2017-07-19

## 2017-07-19 VITALS
HEART RATE: 52 BPM | WEIGHT: 155 LBS | BODY MASS INDEX: 26.46 KG/M2 | DIASTOLIC BLOOD PRESSURE: 44 MMHG | SYSTOLIC BLOOD PRESSURE: 114 MMHG | HEIGHT: 64 IN

## 2017-07-19 DIAGNOSIS — R33.9 URINARY RETENTION WITH INCOMPLETE BLADDER EMPTYING: Primary | ICD-10-CM

## 2017-07-19 ASSESSMENT — PAIN SCALES - GENERAL: PAINLEVEL: NO PAIN (0)

## 2017-07-19 NOTE — NURSING NOTE
Chief Complaint   Patient presents with     RECHECK     urinary retention coming in for medtronic check       Tracey Hoffman MA

## 2017-07-19 NOTE — MR AVS SNAPSHOT
After Visit Summary   7/19/2017    Maia Miranda    MRN: [id number]           Patient Information     Date Of Birth          10/26/1932        Visit Information        Provider Department      7/19/2017 2:15 PM Kb Tracy MD [facility]        Today's Diagnoses     Urinary retention with incomplete bladder emptying    -  1       Follow-ups after your visit        Follow-up notes from your care team     Return if symptoms worsen or fail to improve.      Your next 10 appointments already scheduled     Jul 21, 2017 11:00 AM CDT   Anticoagulation Visit with  ANTICOAGULATION CLINIC   [facility]    [address] [address] [address] [phone]            Oct 05, 2017 10:30 AM CDT   [facility]    [address]  [address]  [facility] [address] [phone]            Oct 05, 2017 10:30 AM CDT   [facility] with JEAN Stubbs   [facility]    [address]  [address]  [facility] [address] [phone]              Who to contact     Please call your clinic at [phone] to:    Ask questions about your health    Make or cancel appointments    Discuss your medicines    Learn about your test results    Speak to your doctor   If you have compliments or concerns about an experience at your clinic, or if you wish to file a complaint, please contact [facility] Patient Relations at [phone] or email us at [email or web address]         Additional Information About Your Visit        MyChart Information     [employer] gives you secure access to your electronic health record. If you see a primary care provider, you can also send  "messages to your care team and make appointments. If you have questions, please call your primary care clinic.  If you do not have a primary care provider, please call [phone] and they will assist you.      [employer] is an electronic gateway that provides easy, online access to your medical records. With [employer], you can request a clinic appointment, read your test results, renew a prescription or communicate with your care team.     To access your existing account, please contact your [facility] or call [phone] for assistance.        Care EveryWhere ID     This is your Care EveryWhere ID. This could be used by other organizations to access your [place] medical records  [id number]        Your Vitals Were     Pulse Height BMI (Body Mass Index)             52 1.626 m (5' 4\") 26.61 kg/m2          Blood Pressure from Last 3 Encounters:   07/19/17 114/44   06/14/17 128/62   05/17/17 122/64    Weight from Last 3 Encounters:   07/19/17 70.3 kg (155 lb)   06/14/17 70.3 kg (155 lb)   05/17/17 71.2 kg (157 lb)              Today, you had the following     No orders found for display         Today's Medication Changes          These changes are accurate as of: 7/19/17  2:49 PM.  If you have any questions, ask your nurse or doctor.               These medicines have changed or have updated prescriptions.        Dose/Directions    ascorbic acid 1000 MG Tabs   Commonly known as:  vitamin C   This may have changed:    - when to take this  - additional instructions   Used for:  Recurrent UTI, Neurogenic bladder        Dose:  1000 mg   Take 1 tablet (1,000 mg) by mouth 2 times daily   Quantity:  180 tablet   Refills:  3                Primary Care Provider Office Phone # Fax #    Dian Frias [phone]       [facility] [address] [place] [address]        Equal Access to Services     KAYLA ALEX AH: ren Antoine " deepaayden coley waxay idiin hayaan adeeg kharash la'aan ah. So [facility] [phone].    ATENCIÓN: Si kandace guerra, tiene a conn disposición servicios gratuitos de asistencia lingüística. Ivet al [phone].    We comply with applicable federal civil rights laws and [place] laws. We do not discriminate on the basis of race, color, national origin, age, disability sex, sexual orientation or gender identity.            Thank you!     Thank you for choosing [facility] UROLOGY AND [facility] FOR PROSTATE AND UROLOGIC CANCERS  for your care. Our goal is always to provide you with excellent care. Hearing back from our patients is one way we can continue to improve our services. Please take a few minutes to complete the written survey that you may receive in the mail after your visit with us. Thank you!             Your Updated Medication List - Protect others around you: Learn how to safely use, store and throw away your medicines at www.disposemymeds.org.          This list is accurate as of: 7/19/17  2:49 PM.  Always use your most recent med list.                   Brand Name Dispense Instructions for use Diagnosis    ascorbic acid 1000 MG Tabs    vitamin C    180 tablet    Take 1 tablet (1,000 mg) by mouth 2 times daily    Recurrent UTI, Neurogenic bladder       azelastine 0.1 % spray    ASTELIN    1 Bottle    Spray 1-2 sprays into both nostrils 2 times daily    Seasonal allergic rhinitis due to pollen       carboxymethylcellulose 1 % ophthalmic solution    CELLUVISC/REFRESH LIQUIGEL     Place 1 drop into both eyes every morning        ciprofloxacin 500 MG tablet    CIPRO    10 tablet    Take 1 tablet (500 mg) by mouth 2 times daily    Urinary tract infection       conjugated estrogens cream    PREMARIN     Place vaginally twice a week On Tuesday and Friday. Uses a pea sized amount        fluocinonide 0.05 % cream    LIDEX    30 g    Apply topically 2 times daily as needed    Dermatitis       JENNIFERH  URINARY CATHETER Misc     120 each    1 catheter 4 times daily    Urinary retention       ketoprofen 10% in PLO 10% topical gel      Place onto the skin every 4 hours as needed for moderate pain        lisinopril 10 MG tablet    PRINIVIL/ZESTRIL    90 tablet    Take 1 tablet (10 mg) by mouth every morning    Hypertension goal BP (blood pressure) < 150/90       magnesium 250 MG tablet      Take 1 tablet by mouth at bedtime        methenamine hippurate 1 G Tabs tablet    HIPREX    90 tablet    Take 1 tablet (1 g) by mouth daily    Recurrent UTI       metoprolol 50 MG 24 hr tablet    TOPROL-XL    90 tablet    Take 1 tablet (50 mg) by mouth every evening    Hypertension goal BP (blood pressure) < 150/90       OMEGA-3 FISH OIL PO      Take 1 g by mouth 2 times daily (with meals)        polyethylene glycol Packet    MIRALAX/GLYCOLAX     Take 1 packet by mouth daily as needed        senna-docusate 8.6-50 MG per tablet    SENOKOT-S;PERICOLACE    30 tablet    Take 1-2 tablets by mouth 2 times daily Take while on oral narcotics to prevent or treat constipation.    Urinary retention       SM LUBRICATING JELLY Gel     144 Tube    Externally apply 1 packet topically 4 times daily    Urinary retention       sodium chloride 5 % ophthalmic ointment    PETER 128     Place 1 Application into both eyes At Bedtime        TYLENOL 500 MG tablet   Generic drug:  acetaminophen      Take 500-1,000 mg by mouth every 6 hours as needed        warfarin 2.5 MG tablet    COUMADIN    100 tablet    Take 2.5mg (1 tablet)  MTWTFSS or as directed by INR Clinic.    Long-term (current) use of anticoagulants

## 2017-07-19 NOTE — PROGRESS NOTES
"Reason for Visit:  F/u on interstim implant 4/18/17    Clinical Data:  Ms. Miranda is a 83 y/o female with hx of chronic urinary retention of unknown etiology who performed CIC 4-5 x per day.  Last seen in May 2017, at that time reporting she had some increased sensation of needing to void with the interstim but she hasn't been able to initiate much of a void. Occasionally in the AM she will void ~20-50 ml but then cath for up to 800 ml.  Then the rest of the day she is cathing 4-6 times per day usually volumes of ~200 ml. She says she has occasional leakage of urine if bladder is very full.     Every other week has tried changing the program and also changing the intensity in each program.  Even with intensities up to 2.75. Higher than that has caused discomfort. She has not noticed much difference in voiding with the different programs or higher intensities.     /44  Pulse 52  Ht 1.626 m (5' 4\")  Wt 70.3 kg (155 lb)  BMI 26.61 kg/m2    A/P:  83 y/o female with chronic urinary retention of unknown etiology s/p interstim placement in April 2017. Interstim has helped a small amount with her sensation of needing to void but she still is unable to empty bladder volitionally.  Continues to cath 4-6 x/day.  Discussed with patient that the interstim probably won't be able to provide her with the ability to void but at best could help with her sensation of needing to void and when to catheterize and avoid leakage.     - Agree with patient continuing to adjust program and level of device to find best settings for sensation.   - Encourage fluid intake  - Return to clinic as needed. If she desires device interrogation, will arrange next visit with Kamcordmalaika Walker  Urology Resident    Thank you for allowing me to participate in the care of  Ms. Maia Miranda and I will keep you updated on her progress.    Patient was seen, evaluated and plan was formulated in conjunction with me and I agree with " the above.  Kb Tracy MD

## 2017-07-19 NOTE — LETTER
"7/19/2017       RE: Maia Miranda  [address] [address] [address]     Dear Colleague,    Thank you for referring your patient, Maia Miranda, to the [facility] UROLOGY AND INST FOR PROSTATE AND UROLOGIC CANCERS at [facility]. Please see a copy of my visit note below.    Reason for Visit:  F/u on interstim implant 4/18/17    Clinical Data:  Ms. Miranda is a 83 y/o female with hx of chronic urinary retention of unknown etiology who performed CIC 4-5 x per day.  Last seen in May 2017, at that time reporting she had some increased sensation of needing to void with the interstim but she hasn't been able to initiate much of a void. Occasionally in the AM she will void ~20-50 ml but then cath for up to 800 ml.  Then the rest of the day she is cathing 4-6 times per day usually volumes of ~200 ml. She says she has occasional leakage of urine if bladder is very full.     Every other week has tried changing the program and also changing the intensity in each program.  Even with intensities up to 2.75. Higher than that has caused discomfort. She has not noticed much difference in voiding with the different programs or higher intensities.     /44  Pulse 52  Ht 1.626 m (5' 4\")  Wt 70.3 kg (155 lb)  BMI 26.61 kg/m2    A/P:  83 y/o female with chronic urinary retention of unknown etiology s/p interstim placement in April 2017. Interstim has helped a small amount with her sensation of needing to void but she still is unable to empty bladder volitionally.  Continues to cath 4-6 x/day.  Discussed with patient that the interstim probably won't be able to provide her with the ability to void but at best could help with her sensation of needing to void and when to catheterize and avoid leakage.     - Agree with patient continuing to adjust program and level of device to find best settings for sensation.   - Encourage fluid intake  - Return to clinic as needed. If she " desires device interrogation, will arrange next visit with Medmalaika Walker  Urology Resident    Thank you for allowing me to participate in the care of  Ms. Maia Miranda and I will keep you updated on her progress.    Patient was seen, evaluated and plan was formulated in conjunction with me and I agree with the above.  Kb Tracy MD

## 2017-07-21 ENCOUNTER — ANTICOAGULATION THERAPY VISIT (OUTPATIENT)
Dept: NURSING | Facility: CLINIC | Age: 82
End: 2017-07-21
Payer: COMMERCIAL

## 2017-07-21 DIAGNOSIS — I48.91 ATRIAL FIBRILLATION, UNSPECIFIED TYPE (H): ICD-10-CM

## 2017-07-21 DIAGNOSIS — Z79.01 LONG-TERM (CURRENT) USE OF ANTICOAGULANTS: ICD-10-CM

## 2017-07-21 LAB — INR POINT OF CARE: 3 (ref 0.86–1.14)

## 2017-07-21 PROCEDURE — 85610 PROTHROMBIN TIME: CPT | Mod: QW

## 2017-07-21 PROCEDURE — 99207 ZZC NO CHARGE NURSE ONLY: CPT

## 2017-07-21 PROCEDURE — 36416 COLLJ CAPILLARY BLOOD SPEC: CPT

## 2017-07-21 NOTE — MR AVS SNAPSHOT
Maia Miranda   7/21/2017 11:00 AM   Anticoagulation Therapy Visit    Description:  84 year old female   Provider:   ANTICOAGULATION CLINIC   Department:   Nurse           INR as of 7/21/2017     Today's INR 3.0      Anticoagulation Summary as of 7/21/2017     INR goal 2.0-3.0   Today's INR 3.0   Full instructions 2.5 mg every day   Next INR check 8/18/2017    Indications   Long-term (current) use of anticoagulants [Z79.01]  Atrial fibrillation (H) [I48.91]         Your next Anticoagulation Clinic appointment(s)     Aug 18, 2017 11:00 AM CDT   Anticoagulation Visit with  ANTICOAGULATION CLINIC   [facility]    [address] [address] [phone]              Contact Numbers     [facility]  Please call to cancel and/or reschedule your appointment, or with any problems or questions regarding your therapy.  Anticoagulation Nurse: [phone]  Main Clinic: [phone]             July 2017 Details    Sun Mon Tue Wed Thu Fri Sat           1                 2               3               4               5               6               7               8                 9               10               11               12               13               14               15                 16               17               18               19               20               21      2.5 mg   See details      22      2.5 mg           23      2.5 mg         24      2.5 mg         25      2.5 mg         26      2.5 mg         27      2.5 mg         28      2.5 mg         29      2.5 mg           30      2.5 mg         31      2.5 mg               Date Details   07/21 This INR check               How to take your warfarin dose     To take:  2.5 mg Take 1 of the 2.5 mg tablets.           August 2017 Details    Sun Mon Tue Wed Thu Fri Sat       1      2.5 mg         2      2.5 mg         3      2.5 mg         4      2.5 mg         5      2.5  mg           6      2.5 mg         7      2.5 mg         8      2.5 mg         9      2.5 mg         10      2.5 mg         11      2.5 mg         12      2.5 mg           13      2.5 mg         14      2.5 mg         15      2.5 mg         16      2.5 mg         17      2.5 mg         18            19                 20               21               22               23               24               25               26                 27               28               29               30               31                  Date Details   No additional details    Date of next INR:  8/18/2017         How to take your warfarin dose     To take:  2.5 mg Take 1 of the 2.5 mg tablets.

## 2017-07-21 NOTE — PROGRESS NOTES
ANTICOAGULATION FOLLOW-UP CLINIC VISIT    Patient Name:  Maia Miranda  Date:  7/21/2017  Contact Type:  Face to Face    SUBJECTIVE:     Patient Findings     Positives No Problem Findings           OBJECTIVE    INR Protime   Date Value Ref Range Status   07/21/2017 3.0 (A) 0.86 - 1.14 Final       ASSESSMENT / PLAN  INR assessment THER    Recheck INR In: 4 WEEKS    INR Location Clinic      Anticoagulation Summary as of 7/21/2017     INR goal 2.0-3.0   Today's INR 3.0   Maintenance plan 2.5 mg (2.5 mg x 1) every day   Full instructions 2.5 mg every day   Weekly total 17.5 mg   No change documented Meme Riggs RN   Plan last modified Meme Riggs RN (1/18/2017)   Next INR check 8/18/2017   Target end date Indefinite    Indications   Long-term (current) use of anticoagulants [Z79.01]  Atrial fibrillation (H) [I48.91]         Anticoagulation Episode Summary     INR check location [facility]    Preferred lab     Send INR reminders to  ANTICOAG CLINIC    Comments 2.5mg tablets // salads 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

## 2017-07-25 ENCOUNTER — OFFICE VISIT (OUTPATIENT)
Dept: URGENT CARE | Facility: URGENT CARE | Age: 82
End: 2017-07-25
Payer: COMMERCIAL

## 2017-07-25 VITALS
SYSTOLIC BLOOD PRESSURE: 120 MMHG | OXYGEN SATURATION: 97 % | DIASTOLIC BLOOD PRESSURE: 56 MMHG | HEART RATE: 54 BPM | BODY MASS INDEX: 26.71 KG/M2 | TEMPERATURE: 98 F | WEIGHT: 155.6 LBS

## 2017-07-25 DIAGNOSIS — M79.672 PAIN OF BOTH HEELS: Primary | ICD-10-CM

## 2017-07-25 DIAGNOSIS — M79.671 PAIN OF BOTH HEELS: Primary | ICD-10-CM

## 2017-07-25 PROCEDURE — 99213 OFFICE O/P EST LOW 20 MIN: CPT | Performed by: PHYSICIAN ASSISTANT

## 2017-07-25 NOTE — MR AVS SNAPSHOT
After Visit Summary   7/25/2017    Maia Miranda    MRN: [id number]           Patient Information     Date Of Birth          10/26/1932        Visit Information        Provider Department      7/25/2017 11:35 AM LUDA  PROVIDER [facility]        Care Instructions    Trial Superfeet--then follow up with Primary if not improving        Treating Plantar Fasciitis  First, your healthcare provider tries to determine the cause of your problem in order to suggest ways to relieve pain. If your pain is due to poor foot mechanics, custom-made shoe inserts (orthoses) may help.    Reduce symptoms    To relieve mild symptoms, try aspirin, ibuprofen, or other medicines as directed. Rubbing ice on the affected area may also help.    To reduce severe pain and swelling, your healthcare provider may prescribe pills or injections or a walking cast in some instances. Physical therapy, such as ultrasound or a daily stretching program, may also be recommended. Surgery is rarely required.    To reduce symptoms caused by poor foot mechanics, your foot may be taped. This supports the arch and temporarily controls movement. Night splints may also help by stretching the fascia.  Control movement  If taping helps, your healthcare provider may prescribe orthoses. Built from plaster casts of your feet, these inserts control the way your foot moves. As a result, your symptoms should go away.  Reduce overuse  Every time your foot strikes the ground, the plantar fascia is stretched. You can reduce the strain on the plantar fascia and the possibility of overuse by following these suggestions:    Lose any excess weight.    Avoid running on hard or uneven ground.    Use orthoses at all times in your shoes and house slippers.  If surgery is needed  Your healthcare provider may consider surgery if other types of treatment don't control your pain. During surgery, the plantar fascia is partially cut to release tension.  As you heal, fibrous tissue fills the space between the heel bone and the plantar fascia.   Date Last Reviewed: 10/14/2015    1031-7276 [employer]. [address] [place] [address], [place] [address]. All rights reserved. This information is not intended as a substitute for professional medical care. Always follow your healthcare professional's instructions.                Follow-ups after your visit        Your next 10 appointments already scheduled     Aug 18, 2017 11:00 AM CDT   Anticoagulation Visit with  ANT[facility]UL[facility] CLINIC   [facility]    [address] [facility] [address]   [phone]            Oct 05, 2017 10:30 AM CDT   [facility] with RAY Elmore CNP   [facility]    [address]  [address] [place] [id number] [phone]            Oct 05, 2017 10:30 AM CDT   Return Visit with EMERSON Concepcion   [facility]    [address]  [address] [place] [id number] [phone]              Who to contact     If you have questions or need follow up information about today's clinic visit or your schedule please contact [facility] directly at [phone].  Normal or non-critical lab and imaging results will be communicated to you by MyChart, letter or phone within 4 business days after the clinic has received the results. If you do not hear from us within 7 days, please contact the clinic through MyChart or phone. If you have a critical or abnormal lab result, we will notify you by phone as soon as possible.  Submit refill requests through [employer] or call your pharmacy and they will forward the refill request to us. Please allow 3 business days for your refill to be completed.          Additional Information About Your  Visit        [facility] Information     [employer] gives you secure access to your electronic health record. If you see a primary care provider, you can also send messages to your care team and make appointments. If you have questions, please call your primary care clinic.  If you do not have a primary care provider, please call [phone] and they will assist you.        Care EveryWhere ID     This is your Care EveryWhere ID. This could be used by other organizations to access your [facility] medical records  [id number]        Your Vitals Were     Pulse Temperature Pulse Oximetry BMI (Body Mass Index)          54 98  F (36.7  C) (Oral) 97% 26.71 kg/m2         Blood Pressure from Last 3 Encounters:   07/25/17 120/56   07/19/17 114/44   06/14/17 128/62    Weight from Last 3 Encounters:   07/25/17 155 lb 9.6 oz (70.6 kg)   07/19/17 155 lb (70.3 kg)   06/14/17 155 lb (70.3 kg)              Today, you had the following     No orders found for display         Today's Medication Changes          These changes are accurate as of: 7/25/17 12:04 PM.  If you have any questions, ask your nurse or doctor.               These medicines have changed or have updated prescriptions.        Dose/Directions    ascorbic acid 1000 MG Tabs   Commonly known as:  vitamin C   This may have changed:    - when to take this  - additional instructions   Used for:  Recurrent UTI, Neurogenic bladder        Dose:  1000 mg   Take 1 tablet (1,000 mg) by mouth 2 times daily   Quantity:  180 tablet   Refills:  3                Primary Care Provider Office Phone # Fax #    Dian Frias [phone]       [place] [facility] [address] [place] [address]        Equal Access to Services     [facility]: ren Antoine, simin wayne. So [facility] [phone].    ATENCIÓN: Si habla español, tiene a conn disposición servicios gratuitos  de asistencia lingüística. Ivet preston [phone].    We comply with applicable federal civil rights laws and [place] laws. We do not discriminate on the basis of race, color, national origin, age, disability sex, sexual orientation or gender identity.            Thank you!     Thank you for choosing [facility]  for your care. Our goal is always to provide you with excellent care. Hearing back from our patients is one way we can continue to improve our services. Please take a few minutes to complete the written survey that you may receive in the mail after your visit with us. Thank you!             Your Updated Medication List - Protect others around you: Learn how to safely use, store and throw away your medicines at www.disposemymeds.org.          This list is accurate as of: 7/25/17 12:04 PM.  Always use your most recent med list.                   Brand Name Dispense Instructions for use Diagnosis    ascorbic acid 1000 MG Tabs    vitamin C    180 tablet    Take 1 tablet (1,000 mg) by mouth 2 times daily    Recurrent UTI, Neurogenic bladder       carboxymethylcellulose 1 % ophthalmic solution    CELLUVISC/REFRESH LIQUIGEL     Place 1 drop into both eyes every morning        conjugated estrogens cream    PREMARIN     Place vaginally twice a week On Tuesday and Friday. Uses a pea sized amount        fluocinonide 0.05 % cream    LIDEX    30 g    Apply topically 2 times daily as needed    Dermatitis       GENTLECATH URINARY CATHETER Misc     120 each    1 catheter 4 times daily    Urinary retention       ketoprofen 10% in PLO 10% topical gel      Place onto the skin every 4 hours as needed for moderate pain        lisinopril 10 MG tablet    PRINIVIL/ZESTRIL    90 tablet    Take 1 tablet (10 mg) by mouth every morning    Hypertension goal BP (blood pressure) < 150/90       magnesium 250 MG tablet      Take 1 tablet by mouth at bedtime        methenamine hippurate 1 G Tabs tablet    HIPREX    90 tablet     Take 1 tablet (1 g) by mouth daily    Recurrent UTI       metoprolol 50 MG 24 hr tablet    TOPROL-XL    90 tablet    Take 1 tablet (50 mg) by mouth every evening    Hypertension goal BP (blood pressure) < 150/90       OMEGA-3 FISH OIL PO      Take 1 g by mouth 2 times daily (with meals)        polyethylene glycol Packet    MIRALAX/GLYCOLAX     Take 1 packet by mouth daily as needed        senna-docusate 8.6-50 MG per tablet    SENOKOT-S;PERICOLACE    30 tablet    Take 1-2 tablets by mouth 2 times daily Take while on oral narcotics to prevent or treat constipation.    Urinary retention       SM LUBRICATING JELLY Gel     144 Tube    Externally apply 1 packet topically 4 times daily    Urinary retention       sodium chloride 5 % ophthalmic ointment    PETER 128     Place 1 Application into both eyes At Bedtime        TYLENOL 500 MG tablet   Generic drug:  acetaminophen      Take 500-1,000 mg by mouth every 6 hours as needed        warfarin 2.5 MG tablet    COUMADIN    100 tablet    Take 2.5mg (1 tablet)  MTWTFSS or as directed by INR Clinic.    Long-term (current) use of anticoagulants

## 2017-07-25 NOTE — NURSING NOTE
"Chief Complaint   Patient presents with     Urgent Care     left heel pain       Initial /56  Pulse 54  Temp 98  F (36.7  C) (Oral)  Wt 155 lb 9.6 oz (70.6 kg)  SpO2 97%  BMI 26.71 kg/m2 Estimated body mass index is 26.71 kg/(m^2) as calculated from the following:    Height as of 7/19/17: 5' 4\" (1.626 m).    Weight as of this encounter: 155 lb 9.6 oz (70.6 kg).  Medication Reconciliation: complete   Abilio Gallagher CMA        "

## 2017-07-25 NOTE — PROGRESS NOTES
SUBJECTIVE:  Chief Complaint   Patient presents with     Urgent Care     left heel pain     Maia Miranda is a 84 year old female who presents with a chief complaint of bilateral heel pain.  Symptoms began 1 week(s) ago, are moderate and worsening  Context:  Injury:No.  Has history of plantar fascitis  Pain exacerbated by walking and weight-bearing Relieved by rest, and activity.  She treated it initially with no therapy. This is not the first time this type of injury has occurred to this patient.     Past Medical History:   Diagnosis Date     Amnestic MCI (mild cognitive impairment with memory loss) 2014     Chronic duodenal ulcer without mention of hemorrhage, perforation, or obstruction 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 Warner, [facility] yearly     Esophageal reflux 2001    Abstracted 06/10/02     Essential and other specified forms of tremor 2004    resting tremor     Generalized osteoarthrosis, unspecified site     Hands     Hiatal hernia     diagnosed late 1990s [place], [place]     History of pulmonary embolism 1971    no source found     LACTOSE INTOLERANCE      Lumbago     sees Kodi Guidry     Mitral valve regurgitation      Occlusion and stenosis of carotid artery without mention of cerebral infarction 2003    CAROTID US NORMAL, bruit in neck     Osteoporosis, unspecified 2003    T = -2.66     Paroxysmal atrial fibrillation (H) 11/15/2013     Unspecified essential hypertension      Unspecified tinnitus 2005    mild hearing loss, saw ENT     Current Outpatient Prescriptions   Medication Sig Dispense Refill     metoprolol (TOPROL-XL) 50 MG 24 hr tablet Take 1 tablet (50 mg) by mouth every evening 90 tablet 3     lisinopril (PRINIVIL/ZESTRIL) 10 MG tablet Take 1 tablet (10 mg) by mouth every morning 90 tablet 3     warfarin (COUMADIN) 2.5 MG tablet Take 2.5mg (1 tablet)  MTWTFSS or as directed by INR Clinic. 100 tablet 3      senna-docusate (SENOKOT-S;PERICOLACE) 8.6-50 MG per tablet Take 1-2 tablets by mouth 2 times daily Take while on oral narcotics to prevent or treat constipation. 30 tablet 0     methenamine hippurate (HIPREX) 1 G TABS Take 1 tablet (1 g) by mouth daily 90 tablet 3     polyethylene glycol (MIRALAX/GLYCOLAX) packet Take 1 packet by mouth daily as needed        ketoprofen 10% in PLO 10% Place onto the skin every 4 hours as needed for moderate pain       conjugated estrogens (PREMARIN) vaginal cream Place vaginally twice a week On Tuesday and Friday. Uses a pea sized amount       fluocinonide (LIDEX) 0.05 % cream Apply topically 2 times daily as needed 30 g 2     acetaminophen (TYLENOL) 500 MG tablet Take 500-1,000 mg by mouth every 6 hours as needed       Catheters (GENTLECATH URINARY CATHETER) MISC 1 catheter 4 times daily 120 each 12     Lubricants (SM LUBRICATING JELLY) GEL Externally apply 1 packet topically 4 times daily 144 Tube 12     ascorbic acid (VITAMIN C) 1000 MG TABS Take 1 tablet (1,000 mg) by mouth 2 times daily (Patient taking differently: Take 1,000 mg by mouth daily With HYprex) 180 tablet 3     Omega-3 Fatty Acids (OMEGA-3 FISH OIL PO) Take 1 g by mouth 2 times daily (with meals)       sodium chloride (PETER 128) 5 % ophthalmic ointment Place 1 Application into both eyes At Bedtime       carboxymethylcellulose (CELLUVISC/REFRESH LIQUIGEL) 1 % ophthalmic solution Place 1 drop into both eyes every morning        MAGNESIUM 250 MG OR TABS Take 1 tablet by mouth at bedtime  0     Social History   Substance Use Topics     Smoking status: Never Smoker     Smokeless tobacco: Never Used     Alcohol use No       ROS:  CONSTITUTIONAL:NEGATIVE for fever, chills, change in weight  INTEGUMENTARY/SKIN: NEGATIVE for worrisome rashes, moles or lesions  RESP:NEGATIVE for significant cough or SOB  CV: NEGATIVE for chest pain, palpitations or peripheral edema  NEURO: NEGATIVE for weakness, dizziness or  paresthesias    EXAM:   /56  Pulse 54  Temp 98  F (36.7  C) (Oral)  Wt 155 lb 9.6 oz (70.6 kg)  SpO2 97%  BMI 26.71 kg/m2  M/S Exam:  Tender plantar fascia, and insertion at heel, no discoloration or lesions.  ROM intact, sensation intact, cap refill is brisk   GENERAL APPEARANCE: healthy, alert and no distress  CHEST: clear to auscultation  CV: regular rate and rhythm  SKIN: no suspicious lesions or rashes  NEURO: Normal strength and tone, sensory exam grossly normal, mentation intact and speech normal    X-RAY was not done.    ASSESSMENT:     (M79.671,  M79.672) Pain of both heels  (primary encounter diagnosis)      Patient Instructions     Trial Superfeet--then follow up with Primary if not improving        Treating Plantar Fasciitis  First, your healthcare provider tries to determine the cause of your problem in order to suggest ways to relieve pain. If your pain is due to poor foot mechanics, custom-made shoe inserts (orthoses) may help.    Reduce symptoms    To relieve mild symptoms, try aspirin, ibuprofen, or other medicines as directed. Rubbing ice on the affected area may also help.    To reduce severe pain and swelling, your healthcare provider may prescribe pills or injections or a walking cast in some instances. Physical therapy, such as ultrasound or a daily stretching program, may also be recommended. Surgery is rarely required.    To reduce symptoms caused by poor foot mechanics, your foot may be taped. This supports the arch and temporarily controls movement. Night splints may also help by stretching the fascia.  Control movement  If taping helps, your healthcare provider may prescribe orthoses. Built from plaster casts of your feet, these inserts control the way your foot moves. As a result, your symptoms should go away.  Reduce overuse  Every time your foot strikes the ground, the plantar fascia is stretched. You can reduce the strain on the plantar fascia and the possibility of overuse  by following these suggestions:    Lose any excess weight.    Avoid running on hard or uneven ground.    Use orthoses at all times in your shoes and house slippers.  If surgery is needed  Your healthcare provider may consider surgery if other types of treatment don't control your pain. During surgery, the plantar fascia is partially cut to release tension. As you heal, fibrous tissue fills the space between the heel bone and the plantar fascia.   Date Last Reviewed: 10/14/2015    3888-2730 [employer]. [address] [place] [address]. All rights reserved. This information is not intended as a substitute for professional medical care. Always follow your healthcare professional's instructions.

## 2017-07-25 NOTE — PATIENT INSTRUCTIONS
Trial Superfeet--then follow up with Primary if not improving        Treating Plantar Fasciitis  First, your healthcare provider tries to determine the cause of your problem in order to suggest ways to relieve pain. If your pain is due to poor foot mechanics, custom-made shoe inserts (orthoses) may help.    Reduce symptoms    To relieve mild symptoms, try aspirin, ibuprofen, or other medicines as directed. Rubbing ice on the affected area may also help.    To reduce severe pain and swelling, your healthcare provider may prescribe pills or injections or a walking cast in some instances. Physical therapy, such as ultrasound or a daily stretching program, may also be recommended. Surgery is rarely required.    To reduce symptoms caused by poor foot mechanics, your foot may be taped. This supports the arch and temporarily controls movement. Night splints may also help by stretching the fascia.  Control movement  If taping helps, your healthcare provider may prescribe orthoses. Built from plaster casts of your feet, these inserts control the way your foot moves. As a result, your symptoms should go away.  Reduce overuse  Every time your foot strikes the ground, the plantar fascia is stretched. You can reduce the strain on the plantar fascia and the possibility of overuse by following these suggestions:    Lose any excess weight.    Avoid running on hard or uneven ground.    Use orthoses at all times in your shoes and house slippers.  If surgery is needed  Your healthcare provider may consider surgery if other types of treatment don't control your pain. During surgery, the plantar fascia is partially cut to release tension. As you heal, fibrous tissue fills the space between the heel bone and the plantar fascia.   Date Last Reviewed: 10/14/2015    2011-7471 [employer]. [address] [place] [address], [place] [address]. All rights reserved. This information is not intended as a substitute for professional medical  care. Always follow your healthcare professional's instructions.

## 2017-08-23 ENCOUNTER — OFFICE VISIT (OUTPATIENT)
Dept: PODIATRY | Facility: CLINIC | Age: 82
End: 2017-08-23
Payer: COMMERCIAL

## 2017-08-23 VITALS
BODY MASS INDEX: 26.46 KG/M2 | DIASTOLIC BLOOD PRESSURE: 62 MMHG | HEIGHT: 64 IN | WEIGHT: 155 LBS | SYSTOLIC BLOOD PRESSURE: 116 MMHG

## 2017-08-23 DIAGNOSIS — B35.1 ONYCHOMYCOSIS: ICD-10-CM

## 2017-08-23 DIAGNOSIS — L60.8 NAIL DEFORMITY: Primary | ICD-10-CM

## 2017-08-23 DIAGNOSIS — M79.674 PAIN OF TOE OF RIGHT FOOT: ICD-10-CM

## 2017-08-23 PROCEDURE — 99203 OFFICE O/P NEW LOW 30 MIN: CPT | Performed by: PODIATRIST

## 2017-08-23 NOTE — MR AVS SNAPSHOT
After Visit Summary   8/23/2017    Maia Miranda    MRN: [id number]           Patient Information     Date Of Birth          10/26/1932        Visit Information        Provider Department      8/23/2017 10:45 AM Tim Baldwin DPM [facility]        Today's Diagnoses     Nail deformity    -  1    Onychomycosis        Pain of toe of right foot           Follow-ups after your visit        Your next 10 appointments already scheduled     Aug 25, 2017 11:00 AM CDT   Anticoagulation Visit with  ANTICOAGULATION CLINIC   [facility]    [address] [facility] [address] [address]   [phone]            Oct 05, 2017 10:30 AM CDT   Return Visit with RAY Emlore CNP   [facility]    [address]  [address]  [facility] [id number] [phone]            Oct 05, 2017 10:30 AM CDT   Return Visit with EMERSON Concepcion   [facility]    [address]  [address]  [facility] [address] [phone]              Who to contact     If you have questions or need follow up information about today's clinic visit or your schedule please contact [facility] directly at [phone].  Normal or non-critical lab and imaging results will be communicated to you by MyChart, letter or phone within 4 business days after the clinic has received the results. If you do not hear from us within 7 days, please contact the clinic through MyChart or phone. If you have a critical or abnormal lab result, we will notify you by phone as soon as possible.  Submit refill requests through [employer] or call your pharmacy and they will forward the refill request to us. Please allow 3 business days for your refill to be completed.          Additional Information About Your  "Visit        MyChart Information     [employer] gives you secure access to your electronic health record. If you see a primary care provider, you can also send messages to your care team and make appointments. If you have questions, please call your primary care clinic.  If you do not have a primary care provider, please call [phone] and they will assist you.        Care EveryWhere ID     This is your Care EveryWhere ID. This could be used by other organizations to access your [place] medical records  [id number]        Your Vitals Were     Height BMI (Body Mass Index)                5' 4\" (1.626 m) 26.61 kg/m2           Blood Pressure from Last 3 Encounters:   08/23/17 116/62   07/25/17 120/56   07/19/17 114/44    Weight from Last 3 Encounters:   08/23/17 155 lb (70.3 kg)   07/25/17 155 lb 9.6 oz (70.6 kg)   07/19/17 155 lb (70.3 kg)              Today, you had the following     No orders found for display         Today's Medication Changes          These changes are accurate as of: 8/23/17 11:19 AM.  If you have any questions, ask your nurse or doctor.               These medicines have changed or have updated prescriptions.        Dose/Directions    ascorbic acid 1000 MG Tabs   Commonly known as:  vitamin C   This may have changed:    - when to take this  - additional instructions   Used for:  Recurrent UTI, Neurogenic bladder        Dose:  1000 mg   Take 1 tablet (1,000 mg) by mouth 2 times daily   Quantity:  180 tablet   Refills:  3                Primary Care Provider Office Phone # Fax #    Dian Frias [phone]       [facility] [place] [address]        Equal Access to Services     [facility] AH: Hadii luiz bueno Sokeily, waaxda luqadaha, qaybta kaalmasimin gore. So [facility] [phone].    ATENCIÓN: Si habla español, tiene a conn disposición servicios gratuitos de asistencia lingüística. Llame al " [phone].    We comply with applicable federal civil rights laws and [place] laws. We do not discriminate on the basis of race, color, national origin, age, disability sex, sexual orientation or gender identity.            Thank you!     Thank you for choosing [facility]  for your care. Our goal is always to provide you with excellent care. Hearing back from our patients is one way we can continue to improve our services. Please take a few minutes to complete the written survey that you may receive in the mail after your visit with us. Thank you!             Your Updated Medication List - Protect others around you: Learn how to safely use, store and throw away your medicines at www.disposemymeds.org.          This list is accurate as of: 8/23/17 11:19 AM.  Always use your most recent med list.                   Brand Name Dispense Instructions for use Diagnosis    ascorbic acid 1000 MG Tabs    vitamin C    180 tablet    Take 1 tablet (1,000 mg) by mouth 2 times daily    Recurrent UTI, Neurogenic bladder       carboxymethylcellulose 1 % ophthalmic solution    CELLUVISC/REFRESH LIQUIGEL     Place 1 drop into both eyes every morning        conjugated estrogens cream    PREMARIN     Place vaginally twice a week On Tuesday and Friday. Uses a pea sized amount        fluocinonide 0.05 % cream    LIDEX    30 g    Apply topically 2 times daily as needed    Dermatitis       GENTLECATH URINARY CATHETER Misc     120 each    1 catheter 4 times daily    Urinary retention       ketoprofen 10% in PLO 10% topical gel      Place onto the skin every 4 hours as needed for moderate pain        lisinopril 10 MG tablet    PRINIVIL/ZESTRIL    90 tablet    Take 1 tablet (10 mg) by mouth every morning    Hypertension goal BP (blood pressure) < 150/90       magnesium 250 MG tablet      Take 1 tablet by mouth at bedtime        methenamine hippurate 1 G Tabs tablet    HIPREX    90 tablet    Take 1 tablet (1 g) by mouth daily     Recurrent UTI       metoprolol 50 MG 24 hr tablet    TOPROL-XL    90 tablet    Take 1 tablet (50 mg) by mouth every evening    Hypertension goal BP (blood pressure) < 150/90       OMEGA-3 FISH OIL PO      Take 1 g by mouth 2 times daily (with meals)        polyethylene glycol Packet    MIRALAX/GLYCOLAX     Take 1 packet by mouth daily as needed        senna-docusate 8.6-50 MG per tablet    SENOKOT-S;PERICOLACE    30 tablet    Take 1-2 tablets by mouth 2 times daily Take while on oral narcotics to prevent or treat constipation.    Urinary retention       SM LUBRICATING JELLY Gel     144 Tube    Externally apply 1 packet topically 4 times daily    Urinary retention       sodium chloride 5 % ophthalmic ointment    PETER 128     Place 1 Application into both eyes At Bedtime        TYLENOL 500 MG tablet   Generic drug:  acetaminophen      Take 500-1,000 mg by mouth every 6 hours as needed        warfarin 2.5 MG tablet    COUMADIN    100 tablet    Take 2.5mg (1 tablet)  MTWTFSS or as directed by INR Clinic.    Long-term (current) use of anticoagulants

## 2017-08-23 NOTE — NURSING NOTE
"Chief Complaint   Patient presents with     Foot Problems     BL 2nd toenails causing pain, has Twinkle Toes come out once in awhile but hasn't lately        Initial /62  Ht 5' 4\" (1.626 m)  Wt 155 lb (70.3 kg)  BMI 26.61 kg/m2 Estimated body mass index is 26.61 kg/(m^2) as calculated from the following:    Height as of this encounter: 5' 4\" (1.626 m).    Weight as of this encounter: 155 lb (70.3 kg).  Medication Reconciliation: complete  "

## 2017-08-23 NOTE — LETTER
"    2017         RE: Maia Miranda  [address] [address] [address]        Dear Colleague,    Thank you for referring your patient, Maia Miranda, to the [facility]. Please see a copy of my visit note below.      ASSESSMENT/PLAN:    Encounter Diagnoses   Name Primary?     Nail deformity Yes     Onychomycosis      Pain of toe of right foot      I think her toe pain is from the nail edges being \"ingrown,\" yet not infected.  She told me that the pain is typically relieved after her foot care nurse trims the nail.  I discussed the nail avulsion procedure in detail with her.  This is certainly an option.  I also explained how a foam toe spacer might help take pressure off of the skin folds.      She is applying Vicks Vapor Rub to treat the nail fungus.  There are reports of nail improvement and it should not be harmful      For now she will continue with her nail care company and consider a future nail procedure.    Body mass index is 26.61 kg/(m^2).        Tim Baldwin DPM, FACFAS, MS    [place] Department of Podiatry/Foot & Ankle Surgery      ____________________________________________________________________    HPI:         Chief Complaint: bilateral 2nd toe nail concerns; pain on the right  Onset of problem: 1 month  Pain/ discomfort is described as:  tingling  Ratin/10   Frequency:  Intermittent, \"comes and goes\"    The pain is made worse with pressure  Previous treatment: tylenol  *  Past Medical History:   Diagnosis Date     Amnestic MCI (mild cognitive impairment with memory loss) 2014     Chronic duodenal ulcer without mention of hemorrhage, perforation, or obstruction 1974    Ulcer, Duod     Depressive disorder, not elsewhere classified 2003     EROSIVE EYE DISORDER     Dr. Warner, [facility] yearly     Esophageal reflux 2001    Abstracted 06/10/02     Essential and other specified forms of tremor 2004    resting tremor     Generalized osteoarthrosis, " unspecified site     Hands     Hiatal hernia     diagnosed late 1990s [place], [place]     History of pulmonary embolism 1971    no source found     LACTOSE INTOLERANCE      Lumbago     sees Kodi Guidry     Mitral valve regurgitation      Occlusion and stenosis of carotid artery without mention of cerebral infarction 2003    CAROTID US NORMAL, bruit in neck     Osteoporosis, unspecified 2003    T = -2.66     Paroxysmal atrial fibrillation (H) 11/15/2013     Unspecified essential hypertension      Unspecified tinnitus 2005    mild hearing loss, saw ENT   *  *  Past Surgical History:   Procedure Laterality Date     C NONSPECIFIC PROCEDURE      D&C x 2 in 1957 & 1962 -- Abstracted 06/10/02     C NONSPECIFIC PROCEDURE      Left breast biopsy x 2 in 1988 & 1989 -- Abstracted 06/10/02     C NONSPECIFIC PROCEDURE  1958    Influenza resulting in hospitalization -- Abstracted 06/10/02     C NONSPECIFIC PROCEDURE  1971    10 day hospitalization from bilateral pulmonary enboli -- Abstracted 06/10/02     C NONSPECIFIC PROCEDURE  1/03    colonoscopy  negative     CATARACT IOL, RT/LT       corneal scraping       CYSTOSCOPY, BIOPSY BLADDER, COMBINED N/A 7/25/2016    Procedure: COMBINED CYSTOSCOPY, BIOPSY BLADDER;  Surgeon: Bernadine Maria MD;  Location: [facility] OR     ESOPHAGOSCOPY, GASTROSCOPY, DUODENOSCOPY (EGD), COMBINED  7/8/2013    Procedure: COMBINED ESOPHAGOSCOPY, GASTROSCOPY, DUODENOSCOPY (EGD);   ESOPHAGOSCOPY, GASTROSCOPY, DUODENOSCOPY (EGD)   ;  Surgeon: Shawn Soto MD;  Location: [facility] GI     IMPLANT STIMULATOR SACRAL NERVE STAGE ONE N/A 4/4/2017    Procedure: IMPLANT STIMULATOR SACRAL NERVE STAGE ONE;  Surgeon: Kb Tracy MD;  Location: [facility] OR     IMPLANT STIMULATOR SACRAL NERVE STAGE TWO N/A 4/18/2017    Procedure: IMPLANT STIMULATOR SACRAL NERVE STAGE TWO;  Stage Two Interstim  ;  Surgeon: Kb Tracy MD;  Location: [facility] OR     interstim placement     *  *  Current Outpatient Prescriptions    Medication Sig Dispense Refill     metoprolol (TOPROL-XL) 50 MG 24 hr tablet Take 1 tablet (50 mg) by mouth every evening 90 tablet 3     lisinopril (PRINIVIL/ZESTRIL) 10 MG tablet Take 1 tablet (10 mg) by mouth every morning 90 tablet 3     warfarin (COUMADIN) 2.5 MG tablet Take 2.5mg (1 tablet)  MTWTFSS or as directed by INR Clinic. 100 tablet 3     senna-docusate (SENOKOT-S;PERICOLACE) 8.6-50 MG per tablet Take 1-2 tablets by mouth 2 times daily Take while on oral narcotics to prevent or treat constipation. 30 tablet 0     methenamine hippurate (HIPREX) 1 G TABS Take 1 tablet (1 g) by mouth daily 90 tablet 3     polyethylene glycol (MIRALAX/GLYCOLAX) packet Take 1 packet by mouth daily as needed        ketoprofen 10% in PLO 10% Place onto the skin every 4 hours as needed for moderate pain       conjugated estrogens (PREMARIN) vaginal cream Place vaginally twice a week On Tuesday and Friday. Uses a pea sized amount       fluocinonide (LIDEX) 0.05 % cream Apply topically 2 times daily as needed 30 g 2     acetaminophen (TYLENOL) 500 MG tablet Take 500-1,000 mg by mouth every 6 hours as needed       Catheters (GENTLECATH URINARY CATHETER) MISC 1 catheter 4 times daily 120 each 12     Lubricants (SM LUBRICATING JELLY) GEL Externally apply 1 packet topically 4 times daily 144 Tube 12     ascorbic acid (VITAMIN C) 1000 MG TABS Take 1 tablet (1,000 mg) by mouth 2 times daily (Patient taking differently: Take 1,000 mg by mouth daily With HYprex) 180 tablet 3     Omega-3 Fatty Acids (OMEGA-3 FISH OIL PO) Take 1 g by mouth 2 times daily (with meals)       sodium chloride (PETER 128) 5 % ophthalmic ointment Place 1 Application into both eyes At Bedtime       carboxymethylcellulose (CELLUVISC/REFRESH LIQUIGEL) 1 % ophthalmic solution Place 1 drop into both eyes every morning        MAGNESIUM 250 MG OR TABS Take 1 tablet by mouth at bedtime  0       ROS:     A 10-point review of systems was performed and is positive for that  "noted in the HPI and as seen below.  All other areas are negative.     Numbness in feet?  no   Calf pain with walking? no  Recent foot/ankle injury? no  Weight change over past 12 months? no  Self perception as overweight? no  Recent flu-like symptoms? no  Joint pain other than feet ? no    Social History: Employment:  no;  Exercise/Physical activity:  no;  Tobacco use:  no  Social History     Social History     Marital status:      Spouse name: N/A     Number of children: 4     Years of education: N/A     Occupational History      Retired      at [employer]     Social History Main Topics     Smoking status: Never Smoker     Smokeless tobacco: Never Used     Alcohol use No     Drug use: No     Sexual activity: No     Other Topics Concern     Not on file     Social History Narrative    .Living situation (location, living with others?): Lives alone in a condo. Lives 2 miles from daughter and son.     Activities of daily living (e.g., dressing, eating, walking): Independent        Instrumental activities of daily living (e.g., finance management, housekeeping, meal planning/ prep): Independent        Advance Care plan: has a living will and POLST        Driving: Yes    Medication: manages by self    Meaningful Activities (e.g., hobbies, work): Reads, has coffee out with girlfriends every week, has a [employer] feeder, likes to watch baseball        Support: Help with cleaning every few weeks    Community Resources Used/ Interested in: None at this time       Family history:  Family History   Problem Relation Age of Onset     CEREBROVASCULAR DISEASE Father       at 92     Psychotic Disorder Mother      Suicide 62, depression     Alzheimer Disease Maternal Grandmother      Cardiovascular Sister      pacemaker     Glaucoma No family hx of        Rheumatoid arthritis:  no  Foot Problems: no  Diabetes: no      EXAM:    Vitals: /62  Ht 5' 4\" (1.626 m)  Wt 155 lb (70.3 kg)  BMI 26.61 kg/m2  BMI: " "Body mass index is 26.61 kg/(m^2).  Height: 5' 4\"    Constitutional/ general:  Pt is in no apparent distress, appears well-nourished.  Cooperative with history and physical exam.     Vascular:  Pedal pulses are palpable bilaterally for both the DP and PT arteries.  CFT < 3 sec.  No edema.  Pedal hair growth noted.     Neuro:  Alert and oriented x 3. Coordinated gait.  Light touch sensation is intact to the L4, L5, S1 distributions. No obvious deficits.  No evidence of neurological-based weakness, spasticity, or contracture in the lower extremities.     Derm: Normal texture and turgor.  No erythema, ecchymosis, or cyanosis.  No open lesions.   Nails are mildly discolored. Some are thickened and incurvated.  There is some pain along the skin folds of the right 2nd toenail and redness.  The skin is cool to the touch. No drainage.      Musculoskeletal:    Lower extremity muscle strength is normal.  Patient is ambulatory without an assistive device or brace .  No gross deformities.        Tim Baldwin DPM, FACFAS, MS    [place] Department of Podiatry/Foot & Ankle Surgery                Again, thank you for allowing me to participate in the care of your patient.        Sincerely,        Tim Baldwin DPM    "

## 2017-08-23 NOTE — PROGRESS NOTES
"  ASSESSMENT/PLAN:    Encounter Diagnoses   Name Primary?     Nail deformity Yes     Onychomycosis      Pain of toe of right foot      I think her toe pain is from the nail edges being \"ingrown,\" yet not infected.  She told me that the pain is typically relieved after her foot care nurse trims the nail.  I discussed the nail avulsion procedure in detail with her.  This is certainly an option.  I also explained how a foam toe spacer might help take pressure off of the skin folds.      She is applying Vicks Vapor Rub to treat the nail fungus.  There are reports of nail improvement and it should not be harmful      For now she will continue with her nail care company and consider a future nail procedure.    Body mass index is 26.61 kg/(m^2).        Tim Baldwin DPM, FACFAS, MS    [place] [facility] Ankle [facility]      ____________________________________________________________________    HPI:         Chief Complaint: bilateral 2nd toe nail concerns; pain on the right  Onset of problem: 1 month  Pain/ discomfort is described as:  tingling  Ratin/10   Frequency:  Intermittent, \"comes and goes\"    The pain is made worse with pressure  Previous treatment: tylenol  *  Past Medical History:   Diagnosis Date     Amnestic MCI (mild cognitive impairment with memory loss)      Chronic duodenal ulcer without mention of hemorrhage, perforation, or obstruction 1974    Ulcer, Duod     Depressive disorder, not elsewhere classified      EROSIVE EYE DISORDER     Dr. Warner, [facility] yearly     Esophageal reflux 2001    Abstracted 06/10/02     Essential and other specified forms of tremor 2004    resting tremor     Generalized osteoarthrosis, unspecified site     Hands     Hiatal hernia     diagnosed late  [place]     History of pulmonary embolism 1971    no source found     LACTOSE INTOLERANCE      Lumbago     sees Kodi Guidry     Mitral valve regurgitation      Occlusion and " stenosis of carotid artery without mention of cerebral infarction 2003    CAROTID US NORMAL, bruit in neck     Osteoporosis, unspecified 2003    T = -2.66     Paroxysmal atrial fibrillation (H) 11/15/2013     Unspecified essential hypertension      Unspecified tinnitus 2005    mild hearing loss, saw ENT   *  *  Past Surgical History:   Procedure Laterality Date     C NONSPECIFIC PROCEDURE      D&C x 2 in 1957 & 1962 -- Abstracted 06/10/02     C NONSPECIFIC PROCEDURE      Left breast biopsy x 2 in 1988 & 1989 -- Abstracted 06/10/02     C NONSPECIFIC PROCEDURE  1958    Influenza resulting in hospitalization -- Abstracted 06/10/02     C NONSPECIFIC PROCEDURE  1971    10 day hospitalization from bilateral pulmonary enboli -- Abstracted 06/10/02     C NONSPECIFIC PROCEDURE  1/03    colonoscopy  negative     CATARACT IOL, RT/LT       corneal scraping       CYSTOSCOPY, BIOPSY BLADDER, COMBINED N/A 7/25/2016    Procedure: COMBINED CYSTOSCOPY, BIOPSY BLADDER;  Surgeon: Bernadine Maria MD;  Location: [facility]     ESOPHAGOSCOPY, GASTROSCOPY, DUODENOSCOPY (EGD), COMBINED  7/8/2013    Procedure: COMBINED ESOPHAGOSCOPY, GASTROSCOPY, DUODENOSCOPY (EGD);   ESOPHAGOSCOPY, GASTROSCOPY, DUODENOSCOPY (EGD)   ;  Surgeon: Shawn Soto MD;  Location: [facility]     IMPLANT STIMULATOR SACRAL NERVE STAGE ONE N/A 4/4/2017    Procedure: IMPLANT STIMULATOR SACRAL NERVE STAGE ONE;  Surgeon: Kb Tracy MD;  Location: [facility]     IMPLANT STIMULATOR SACRAL NERVE STAGE TWO N/A 4/18/2017    Procedure: IMPLANT STIMULATOR SACRAL NERVE STAGE TWO;  Stage Two Interstim  ;  Surgeon: Kb Tracy MD;  Location: [facility] OR     interstim placement     *  *  Current Outpatient Prescriptions   Medication Sig Dispense Refill     metoprolol (TOPROL-XL) 50 MG 24 hr tablet Take 1 tablet (50 mg) by mouth every evening 90 tablet 3     lisinopril (PRINIVIL/ZESTRIL) 10 MG tablet Take 1 tablet (10 mg) by mouth every morning 90 tablet 3     warfarin  (COUMADIN) 2.5 MG tablet Take 2.5mg (1 tablet)  MTWTFSS or as directed by INR Clinic. 100 tablet 3     senna-docusate (SENOKOT-S;PERICOLACE) 8.6-50 MG per tablet Take 1-2 tablets by mouth 2 times daily Take while on oral narcotics to prevent or treat constipation. 30 tablet 0     methenamine hippurate (HIPREX) 1 G TABS Take 1 tablet (1 g) by mouth daily 90 tablet 3     polyethylene glycol (MIRALAX/GLYCOLAX) packet Take 1 packet by mouth daily as needed        ketoprofen 10% in PLO 10% Place onto the skin every 4 hours as needed for moderate pain       conjugated estrogens (PREMARIN) vaginal cream Place vaginally twice a week On Tuesday and Friday. Uses a pea sized amount       fluocinonide (LIDEX) 0.05 % cream Apply topically 2 times daily as needed 30 g 2     acetaminophen (TYLENOL) 500 MG tablet Take 500-1,000 mg by mouth every 6 hours as needed       Catheters (GENTLECATH URINARY CATHETER) MISC 1 catheter 4 times daily 120 each 12     Lubricants (SM LUBRICATING JELLY) GEL Externally apply 1 packet topically 4 times daily 144 Tube 12     ascorbic acid (VITAMIN C) 1000 MG TABS Take 1 tablet (1,000 mg) by mouth 2 times daily (Patient taking differently: Take 1,000 mg by mouth daily With HYprex) 180 tablet 3     Omega-3 Fatty Acids (OMEGA-3 FISH OIL PO) Take 1 g by mouth 2 times daily (with meals)       sodium chloride (PETER 128) 5 % ophthalmic ointment Place 1 Application into both eyes At Bedtime       carboxymethylcellulose (CELLUVISC/REFRESH LIQUIGEL) 1 % ophthalmic solution Place 1 drop into both eyes every morning        MAGNESIUM 250 MG OR TABS Take 1 tablet by mouth at bedtime  0       ROS:     A 10-point review of systems was performed and is positive for that noted in the HPI and as seen below.  All other areas are negative.     Numbness in feet?  no   Calf pain with walking? no  Recent foot/ankle injury? no  Weight change over past 12 months? no  Self perception as overweight? no  Recent flu-like symptoms?  "no  Joint pain other than feet ? no    Social History: Employment:  no;  Exercise/Physical activity:  no;  Tobacco use:  no  Social History     Social History     Marital status:      Spouse name: N/A     Number of children: 4     Years of education: N/A     Occupational History      Retired      at [employer]     Social History Main Topics     Smoking status: Never Smoker     Smokeless tobacco: Never Used     Alcohol use No     Drug use: No     Sexual activity: No     Other Topics Concern     Not on file     Social History Narrative    .Living situation (location, living with others?): Lives alone in a condo. Lives 2 miles from daughter and son.     Activities of daily living (e.g., dressing, eating, walking): Independent        Instrumental activities of daily living (e.g., finance management, housekeeping, meal planning/ prep): Independent        Advance Care plan: has a living will and POLST        Driving: Yes    Medication: manages by self    Meaningful Activities (e.g., hobbies, work): Reads, has coffee out with girlfriends every week, has a Chatterousd feeder, likes to watch baseball        Support: Help with cleaning every few weeks    Community Resources Used/ Interested in: None at this time       Family history:  Family History   Problem Relation Age of Onset     CEREBROVASCULAR DISEASE Father       at 92     Psychotic Disorder Mother      Suicide 62, depression     Alzheimer Disease Maternal Grandmother      Cardiovascular Sister      pacemaker     Glaucoma No family hx of        Rheumatoid arthritis:  no  Foot Problems: no  Diabetes: no      EXAM:    Vitals: /62  Ht 5' 4\" (1.626 m)  Wt 155 lb (70.3 kg)  BMI 26.61 kg/m2  BMI: Body mass index is 26.61 kg/(m^2).  Height: 5' 4\"    Constitutional/ general:  Pt is in no apparent distress, appears well-nourished.  Cooperative with history and physical exam.     Vascular:  Pedal pulses are palpable bilaterally for both the DP and PT " arteries.  CFT < 3 sec.  No edema.  Pedal hair growth noted.     Neuro:  Alert and oriented x 3. Coordinated gait.  Light touch sensation is intact to the L4, L5, S1 distributions. No obvious deficits.  No evidence of neurological-based weakness, spasticity, or contracture in the lower extremities.     Derm: Normal texture and turgor.  No erythema, ecchymosis, or cyanosis.  No open lesions.   Nails are mildly discolored. Some are thickened and incurvated.  There is some pain along the skin folds of the right 2nd toenail and redness.  The skin is cool to the touch. No drainage.      Musculoskeletal:    Lower extremity muscle strength is normal.  Patient is ambulatory without an assistive device or brace .  No gross deformities.        Tim Baldwin DPM, FACFAS, MS    [facility]

## 2017-08-25 ENCOUNTER — ANTICOAGULATION THERAPY VISIT (OUTPATIENT)
Dept: NURSING | Facility: CLINIC | Age: 82
End: 2017-08-25
Payer: COMMERCIAL

## 2017-08-25 DIAGNOSIS — I48.91 ATRIAL FIBRILLATION, UNSPECIFIED TYPE (H): ICD-10-CM

## 2017-08-25 DIAGNOSIS — Z79.01 LONG-TERM (CURRENT) USE OF ANTICOAGULANTS: ICD-10-CM

## 2017-08-25 LAB — INR POINT OF CARE: 1.7 (ref 0.86–1.14)

## 2017-08-25 PROCEDURE — 99207 ZZC NO CHARGE NURSE ONLY: CPT

## 2017-08-25 PROCEDURE — 85610 PROTHROMBIN TIME: CPT | Mod: QW

## 2017-08-25 PROCEDURE — 36416 COLLJ CAPILLARY BLOOD SPEC: CPT

## 2017-08-25 NOTE — MR AVS SNAPSHOT
Maia Miranda   8/25/2017 11:00 AM   Anticoagulation Therapy Visit    Description:  84 year old female   Provider:  [facility]   Department:   Nurse           INR as of 8/25/2017     Today's INR 1.7!      Anticoagulation Summary as of 8/25/2017     INR goal 2.0-3.0   Today's INR 1.7!   Full instructions 8/25: 5 mg; Otherwise 2.5 mg every day   Next INR check 9/8/2017    Indications   Long-term (current) use of anticoagulants [Z79.01]  Atrial fibrillation (H) [I48.91]         Contact Numbers     [facility]  Please call to cancel and/or reschedule your appointment, or with any problems or questions regarding your therapy.  Anticoagulation Nurse: [phone]  Main Clinic: [phone]             August 2017 Details    Sun Mon Tue Wed Thu Fri Sat       1               2               3               4               5                 6               7               8               9               10               11               12                 13               14               15               16               17               18               19                 20               21               22               23               24               25      5 mg   See details      26      2.5 mg           27      2.5 mg         28      2.5 mg         29      2.5 mg         30      2.5 mg         31      2.5 mg            Date Details   08/25 This INR check               How to take your warfarin dose     To take:  2.5 mg Take 1 of the 2.5 mg tablets.    To take:  5 mg Take 2 of the 2.5 mg tablets.           September 2017 Details    Sun Mon Tue Wed Thu Fri Sat          1      2.5 mg         2      2.5 mg           3      2.5 mg         4      2.5 mg         5      2.5 mg         6      2.5 mg         7      2.5 mg         8            9                 10               11               12               13               14               15               16                 17                18               19               20               21               22               23                 24               25               26               27               28               29               30                Date Details   No additional details    Date of next INR:  9/8/2017         How to take your warfarin dose     To take:  2.5 mg Take 1 of the 2.5 mg tablets.

## 2017-08-25 NOTE — PROGRESS NOTES
ANTICOAGULATION FOLLOW-UP CLINIC VISIT    Patient Name:  Maia Miranda  Date:  8/25/2017  Contact Type:  Face to Face    SUBJECTIVE:     Patient Findings     Positives Missed doses (does not think she missed a dose), Unexplained INR or factor level change           OBJECTIVE    INR Protime   Date Value Ref Range Status   08/25/2017 1.7 (A) 0.86 - 1.14 Final       ASSESSMENT / PLAN  INR assessment SUB    Recheck INR In: 2 WEEKS    INR Location Clinic      Anticoagulation Summary as of 8/25/2017     INR goal 2.0-3.0   Today's INR 1.7!   Maintenance plan 2.5 mg (2.5 mg x 1) every day   Full instructions 8/25: 5 mg; Otherwise 2.5 mg every day   Weekly total 17.5 mg   Plan last modified Meme Riggs RN (1/18/2017)   Next INR check 9/8/2017   Target end date Indefinite    Indications   Long-term (current) use of anticoagulants [Z79.01]  Atrial fibrillation (H) [I48.91]         Anticoagulation Episode Summary     INR check location [facility]    Preferred lab     Send INR reminders to  ANTICOAG CLINIC    Comments 2.5mg tablets // salads qod // retired med tech // self cath - Hiprex urinary antiseptic // Interstim bladder therapy      Anticoagulation Care Providers     Provider Role Specialty Phone number    Dian Frias MD Referring Internal Medicine [phone]            See the Encounter Report to view Anticoagulation Flowsheet and Dosing Calendar (Go to Encounters tab in chart review, and find the Anticoagulation Therapy Visit)    Dosage adjustment made based on physician directed care plan.    Meme Riggs, RN

## 2017-09-06 ENCOUNTER — OFFICE VISIT (OUTPATIENT)
Dept: URGENT CARE | Facility: URGENT CARE | Age: 82
End: 2017-09-06
Payer: COMMERCIAL

## 2017-09-06 ENCOUNTER — RADIANT APPOINTMENT (OUTPATIENT)
Dept: GENERAL RADIOLOGY | Facility: CLINIC | Age: 82
End: 2017-09-06
Attending: FAMILY MEDICINE
Payer: COMMERCIAL

## 2017-09-06 VITALS
WEIGHT: 155.6 LBS | DIASTOLIC BLOOD PRESSURE: 62 MMHG | OXYGEN SATURATION: 97 % | TEMPERATURE: 97.5 F | SYSTOLIC BLOOD PRESSURE: 130 MMHG | HEART RATE: 58 BPM | BODY MASS INDEX: 26.71 KG/M2

## 2017-09-06 DIAGNOSIS — R06.02 SOB (SHORTNESS OF BREATH): ICD-10-CM

## 2017-09-06 DIAGNOSIS — R06.02 SOB (SHORTNESS OF BREATH): Primary | ICD-10-CM

## 2017-09-06 DIAGNOSIS — J20.9 ACUTE BRONCHITIS WITH COEXISTING CONDITION REQUIRING PROPHYLACTIC TREATMENT: ICD-10-CM

## 2017-09-06 PROCEDURE — 99214 OFFICE O/P EST MOD 30 MIN: CPT | Mod: 25 | Performed by: FAMILY MEDICINE

## 2017-09-06 PROCEDURE — 94640 AIRWAY INHALATION TREATMENT: CPT | Performed by: FAMILY MEDICINE

## 2017-09-06 PROCEDURE — 71020 XR CHEST 2 VW: CPT

## 2017-09-06 RX ORDER — CEFDINIR 300 MG/1
600 CAPSULE ORAL DAILY
Qty: 20 CAPSULE | Refills: 0 | Status: SHIPPED | OUTPATIENT
Start: 2017-09-06 | End: 2017-09-16

## 2017-09-06 RX ORDER — ALBUTEROL SULFATE 0.83 MG/ML
1 SOLUTION RESPIRATORY (INHALATION) ONCE
Qty: 3 ML | Refills: 0
Start: 2017-09-06 | End: 2017-09-06

## 2017-09-06 RX ORDER — ALBUTEROL SULFATE 90 UG/1
2 AEROSOL, METERED RESPIRATORY (INHALATION) EVERY 6 HOURS PRN
Qty: 1 INHALER | Refills: 0 | Status: SHIPPED | OUTPATIENT
Start: 2017-09-06 | End: 2018-01-15

## 2017-09-06 NOTE — MR AVS SNAPSHOT
After Visit Summary   9/6/2017    Maia Miranda    MRN: [id number]           Patient Information     Date Of Birth          10/26/1932        Visit Information        Provider Department      9/6/2017 11:35 AM Alvin Gupta MD [facility]        Today's Diagnoses     SOB (shortness of breath)    -  1    Acute bronchitis with coexisting condition requiring prophylactic treatment          Care Instructions    Take full course of antibiotic for bronchitis.  Use albuterol inhaler to help with cough and shortness of breath.    Follow up with INR nurse regarding antibiotic use and coumadin dosing.      Bronchitis, Antibiotic Treatment (Adult)    Bronchitis is an infection of the air passages (bronchial tubes) in your lungs. It often occurs when you have a cold. This illness is contagious during the first few days and is spread through the air by coughing and sneezing, or by direct contact (touching the sick person and then touching your own eyes, nose, or mouth).  Symptoms of bronchitis include cough with mucus (phlegm) and low-grade fever. Bronchitis usually lasts 7 to 14 days. Mild cases can be treated with simple home remedies. More severe infection is treated with an antibiotic.  Home care  Follow these guidelines when caring for yourself at home:    If your symptoms are severe, rest at home for the first 2 to 3 days. When you go back to your usual activities, don't let yourself get too tired.    Do not smoke. Also avoid being exposed to secondhand smoke.    You may use over-the-counter medicines to control fever or pain, unless another medicine was prescribed. (Note: If you have chronic liver or kidney disease or have ever had a stomach ulcer or gastrointestinal bleeding, talk with your healthcare provider before using these medicines. Also talk to your provider if you are taking medicine to prevent blood clots.) Aspirin should never be given to anyone younger than 18 years of age who  is ill with a viral infection or fever. It may cause severe liver or brain damage.    Your appetite may be poor, so a light diet is fine. Avoid dehydration by drinking 6 to 8 glasses of fluids per day (such as water, soft drinks, sports drinks, juices, tea, or soup). Extra fluids will help loosen secretions in the nose and lungs.    Over-the-counter cough, cold, and sore-throat medicines will not shorten the length of the illness, but they may be helpful to reduce symptoms. (Note: Do not use decongestants if you have high blood pressure.)    Finish all antibiotic medicine. Do this even if you are feeling better after only a few days.  Follow-up care  Follow up with your healthcare provider, or as advised. If you had an X-ray or ECG (electrocardiogram), a specialist will review it. You will be notified of any new findings that may affect your care.  Note: If you are age 65 or older, or if you have a chronic lung disease or condition that affects your immune system, or you smoke, talk to your healthcare provider about having pneumococcal vaccinations and a yearly influenza vaccination (flu shot).  When to seek medical advice  Call your healthcare provider right away if any of these occur:    Fever of 100.4 F (38 C) or higher    Coughing up increased amounts of colored sputum    Weakness, drowsiness, headache, facial pain, ear pain, or a stiff neck  Call 911, or get immediate medical care  Contact emergency services right away if any of these occur.    Coughing up blood    Worsening weakness, drowsiness, headache, or stiff neck    Trouble breathing, wheezing, or pain with breathing  Date Last Reviewed: 9/13/2015 2000-2017 [employer]. [address] [place] [address]. All rights reserved. This information is not intended as a substitute for professional medical care. Always follow your healthcare professional's instructions.        Shortness of Breath (Dyspnea)  Shortness of breath is the  feeling that you can't catch your breath or get enough air. It is also known as dyspnea.  Dyspnea can be caused by many different conditions. They include:    Acute asthma attack.    Worsening of chronic lung diseases such as chronic bronchitis and emphysema.    Heart failure. This is when weak heart muscle allows extra fluid to collect in the lungs.    Panic attacks or anxiety. Fear can cause rapid breathing (hyperventilation).    Pneumonia, or an infection in the lung tissue.    Exposure to toxic substances, fumes, smoke, or certain medicines.    Blood clot in the lung (pulmonary embolism). This is often from a piece of blood clot in a deep vein of the leg (deep vein thrombosis) that breaks off and travels to the lungs.    Heart attack or heart-related chest pain (angina).    Anemia.    Collapsed lung (pneumothorax).    Dehydration.    Pregnancy.  Based on your visit today, the exact cause of your shortness of breath is not certain. Your tests don t show any of the serious causes of dyspnea. You may need other tests to find out if you have a serious problem. It s important to watch for any new symptoms or symptoms that get worse. Follow up with your healthcare provider as directed.  Home care  Follow these tips to take care of yourself at home:    When your symptoms are better, go back to your usual activities.    If you smoke, you should stop. Join a quit-smoking program or ask your healthcare provider for help.    Eat a healthy diet and get plenty of sleep.    Get regular exercise. Talk with your healthcare provider before starting to exercise, especially if you have other medical problems.    Cut down on the amount of caffeine and stimulants you consume.  Follow-up care  Follow up with your healthcare provider, or as advised.  If tests were done, you will be told if your treatment needs to be changed. You can call as directed for the results.  (Note: If an X-ray was taken, a specialist will review it. You will  be notified of any new findings that may affect your care.)  Call 911 or get immediate medical care  Shortness of breath may be a sign of a serious medical problem. For example, it may be a problem with your heart or lungs. Call 911 if you have worsening shortness of breath or trouble breathing, especially with any of the symptoms below:    You are confused or it s difficult to wake you.    You faint or lose consciousness.    You have a fast heartbeat, or your heartbeat is irregular.    You are coughing up blood.    You have pain in your chest, arm, shoulder, neck, or upper back.    You break out in a sweat.  When to seek medical advice  Call your healthcare provider right away if any of these occur:    Slight shortness of breath or wheezing    Redness, pain or swelling in your leg, arm, or other body area    Swelling in both legs or ankles    Fast weight gain    Dizziness or weakness    Fever of 100.4 F (38 C) or higher, or as directed by your healthcare provider  Date Last Reviewed: 9/13/2015 2000-2017 [employer]. [address] [place] [address]. All rights reserved. This information is not intended as a substitute for professional medical care. Always follow your healthcare professional's instructions.                Follow-ups after your visit        Your next 10 appointments already scheduled     Sep 08, 2017 10:15 AM CDT   Anticoagulation Visit with  [facility] CLINIC   [facility]    [address] [facility] [address]   [phone]            Oct 05, 2017 10:30 AM CDT   [facility]    [address]  [address]  [facility] [address]   [phone]            Oct 05, 2017 10:30 AM CDT   Return Visit with EMERSON Concepcion   [facility] ([facility]    [address]  [address]  [facility] [address] [phone]              Who to contact     If you have questions or need follow up information about today's clinic visit or your schedule please contact [facility] directly at [phone].  Normal or non-critical lab and imaging results will be communicated to you by MyChart, letter or phone within 4 business days after the clinic has received the results. If you do not hear from us within 7 days, please contact the clinic through [employer] or phone. If you have a critical or abnormal lab result, we will notify you by phone as soon as possible.  Submit refill requests through shenzhoufu or call your pharmacy and they will forward the refill request to us. Please allow 3 business days for your refill to be completed.          Additional Information About Your Visit        [employer] Information     shenzhoufu gives you secure access to your electronic health record. If you see a primary care provider, you can also send messages to your care team and make appointments. If you have questions, please call your primary care clinic.  If you do not have a primary care provider, please call [phone] and they will assist you.        Care EveryWhere ID     This is your Care EveryWhere ID. This could be used by other organizations to access your [facility] medical records  [id number]        Your Vitals Were     Pulse Temperature Pulse Oximetry BMI (Body Mass Index)          58 97.5  F (36.4  C) (Tympanic) 97% 26.71 kg/m2         Blood Pressure from Last 3 Encounters:   09/06/17 130/62   08/23/17 116/62   07/25/17 120/56    Weight from Last 3 Encounters:   09/06/17 155 lb 9.6 oz (70.6 kg)   08/23/17 155 lb (70.3 kg)   07/25/17 155 lb 9.6 oz (70.6 kg)              We Performed the Following     INHALATION/NEBULIZER TREATMENT, INITIAL          Today's Medication Changes          These changes are accurate as of: 9/6/17   1:09 PM.  If you have any questions, ask your nurse or doctor.               Start taking these medicines.        Dose/Directions    * albuterol (2.5 MG/3ML) 0.083% neb solution   Used for:  SOB (shortness of breath)   Started by:  Alvin Gupta MD        Dose:  1 vial   Take 1 vial (2.5 mg) by nebulization once for 1 dose   Quantity:  3 mL   Refills:  0       * albuterol 108 (90 BASE) MCG/ACT Inhaler   Commonly known as:  PROAIR HFA/PROVENTIL HFA/VENTOLIN HFA   Used for:  SOB (shortness of breath)   Started by:  Alvin Gupta MD        Dose:  2 puff   Inhale 2 puffs into the lungs every 6 hours as needed for shortness of breath / dyspnea or wheezing   Quantity:  1 Inhaler   Refills:  0       cefdinir 300 MG capsule   Commonly known as:  OMNICEF   Used for:  Acute bronchitis with coexisting condition requiring prophylactic treatment   Started by:  Alvin Gupta MD        Dose:  600 mg   Take 2 capsules (600 mg) by mouth daily for 10 days   Quantity:  20 capsule   Refills:  0       * Notice:  This list has 2 medication(s) that are the same as other medications prescribed for you. Read the directions carefully, and ask your doctor or other care provider to review them with you.      These medicines have changed or have updated prescriptions.        Dose/Directions    ascorbic acid 1000 MG Tabs   Commonly known as:  vitamin C   This may have changed:    - when to take this  - additional instructions   Used for:  Recurrent UTI, Neurogenic bladder        Dose:  1000 mg   Take 1 tablet (1,000 mg) by mouth 2 times daily   Quantity:  180 tablet   Refills:  3            Where to get your medicines      These medications were sent to [facility] [address] [place] [address]     Phone:  [phone]     albuterol 108 (90 BASE) MCG/ACT Inhaler    cefdinir 300 MG capsule         Some of these will need a paper prescription and others can be bought over the counter.   Ask your nurse if you have questions.     You don't need a prescription for these medications     albuterol (2.5 MG/3ML) 0.083% neb solution                Primary Care Provider Office Phone # Fax #    Dian Frias [phone] [address] [place] [address]        Equal Access to Services     [facility]: Hadii aad ku hadasho Soomaali, waaxda luqadaha, qaybta kaalmada adeegyada, waxay bushrain hayaan adealexandria lockhartyanethkathy leahy . So [facility] [phone].    ATENCIÓN: Si habla español, tiene a conn disposición servicios gratuitos de asistencia lingüística. [facility] [phone].    We comply with applicable federal civil rights laws and [place] laws. We do not discriminate on the basis of race, color, national origin, age, disability sex, sexual orientation or gender identity.            Thank you!     Thank you for choosing [facility]  for your care. Our goal is always to provide you with excellent care. Hearing back from our patients is one way we can continue to improve our services. Please take a few minutes to complete the written survey that you may receive in the mail after your visit with us. Thank you!             Your Updated Medication List - Protect others around you: Learn how to safely use, store and throw away your medicines at www.disposemymeds.org.          This list is accurate as of: 9/6/17  1:09 PM.  Always use your most recent med list.                   Brand Name Dispense Instructions for use Diagnosis    * albuterol (2.5 MG/3ML) 0.083% neb solution     3 mL    Take 1 vial (2.5 mg) by nebulization once for 1 dose    SOB (shortness of breath)       * albuterol 108 (90 BASE) MCG/ACT Inhaler    PROAIR HFA/PROVENTIL HFA/VENTOLIN HFA    1 Inhaler    Inhale 2 puffs into the lungs every 6 hours as needed for shortness of breath / dyspnea or wheezing    SOB (shortness of breath)       ascorbic acid 1000 MG Tabs    vitamin C    180 tablet    Take 1  tablet (1,000 mg) by mouth 2 times daily    Recurrent UTI, Neurogenic bladder       carboxymethylcellulose 1 % ophthalmic solution    CELLUVISC/REFRESH LIQUIGEL     Place 1 drop into both eyes every morning        cefdinir 300 MG capsule    OMNICEF    20 capsule    Take 2 capsules (600 mg) by mouth daily for 10 days    Acute bronchitis with coexisting condition requiring prophylactic treatment       conjugated estrogens cream    PREMARIN     Place vaginally twice a week On Tuesday and Friday. Uses a pea sized amount        fluocinonide 0.05 % cream    LIDEX    30 g    Apply topically 2 times daily as needed    Dermatitis       GENTLECATH URINARY CATHETER Misc     120 each    1 catheter 4 times daily    Urinary retention       ketoprofen 10% in PLO 10% topical gel      Place onto the skin every 4 hours as needed for moderate pain        lisinopril 10 MG tablet    PRINIVIL/ZESTRIL    90 tablet    Take 1 tablet (10 mg) by mouth every morning    Hypertension goal BP (blood pressure) < 150/90       magnesium 250 MG tablet      Take 1 tablet by mouth at bedtime        methenamine hippurate 1 G Tabs tablet    HIPREX    90 tablet    Take 1 tablet (1 g) by mouth daily    Recurrent UTI       metoprolol 50 MG 24 hr tablet    TOPROL-XL    90 tablet    Take 1 tablet (50 mg) by mouth every evening    Hypertension goal BP (blood pressure) < 150/90       OMEGA-3 FISH OIL PO      Take 1 g by mouth 2 times daily (with meals)        polyethylene glycol Packet    MIRALAX/GLYCOLAX     Take 1 packet by mouth daily as needed        senna-docusate 8.6-50 MG per tablet    SENOKOT-S;PERICOLACE    30 tablet    Take 1-2 tablets by mouth 2 times daily Take while on oral narcotics to prevent or treat constipation.    Urinary retention       SM LUBRICATING JELLY Gel     144 Tube    Externally apply 1 packet topically 4 times daily    Urinary retention       sodium chloride 5 % ophthalmic ointment    PETER 128     Place 1 Application into both eyes  At Bedtime        TYLENOL 500 MG tablet   Generic drug:  acetaminophen      Take 500-1,000 mg by mouth every 6 hours as needed        warfarin 2.5 MG tablet    COUMADIN    100 tablet    Take 2.5mg (1 tablet)  MTWTFSS or as directed by INR Clinic.    Long-term (current) use of anticoagulants       * Notice:  This list has 2 medication(s) that are the same as other medications prescribed for you. Read the directions carefully, and ask your doctor or other care provider to review them with you.

## 2017-09-06 NOTE — NURSING NOTE
The following nebulizer treatment was given:     MEDICATION: Albuterol Sulfate 2.5 mg  : [employer]  LOT #: [id number]  EXPIRATION DATE:  02/2019  NDC # 4617-1310-87  Aisha Baum CMA

## 2017-09-06 NOTE — NURSING NOTE
"Chief Complaint   Patient presents with     Urgent Care     Cough     PT states has had dry cough x 1 week. Pt has not taken any otc medications.        Initial /62 (BP Location: Right arm, Patient Position: Chair, Cuff Size: Adult Regular)  Pulse 58  Temp 97.5  F (36.4  C) (Tympanic)  Wt 155 lb 9.6 oz (70.6 kg)  SpO2 97%  BMI 26.71 kg/m2 Estimated body mass index is 26.71 kg/(m^2) as calculated from the following:    Height as of 8/23/17: 5' 4\" (1.626 m).    Weight as of this encounter: 155 lb 9.6 oz (70.6 kg).  Medication Reconciliation: unable or not appropriate to perform   Jacki Taylor CMA (AAMA) 9/6/2017 12:12 PM    "

## 2017-09-06 NOTE — PROGRESS NOTES
SUBJECTIVE:   Maia Miranda is a 84 year old female presenting with a chief complaint of cough .  Has more post nasal drainage and feels hard to breath today.  Endorsed feeling cold.  Denies any fever.  No close ill contacts.    Onset of symptoms was 1 week(s) ago.  Course of illness is worsening.    Severity moderate  Current and Associated symptoms: cough, hard to breath today  Treatment measures tried include Fluids, OTC meds and Rest.  Predisposing factors include: allergic rhinitis.    Past Medical History:   Diagnosis Date     Amnestic MCI (mild cognitive impairment with memory loss) 2014     Chronic duodenal ulcer without mention of hemorrhage, perforation, or obstruction 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 Warner, [facility] yearly     Esophageal reflux 2001    Abstracted 06/10/02     Essential and other specified forms of tremor 2004    resting tremor     Generalized osteoarthrosis, unspecified site     Hands     Hiatal hernia     diagnosed late 1990s [place], [place]     History of pulmonary embolism 1971    no source found     LACTOSE INTOLERANCE      Lumbago     sees Kodi Guidry     Mitral valve regurgitation      Occlusion and stenosis of carotid artery without mention of cerebral infarction 2003    CAROTID US NORMAL, bruit in neck     Osteoporosis, unspecified 2003    T = -2.66     Paroxysmal atrial fibrillation (H) 11/15/2013     Unspecified essential hypertension      Unspecified tinnitus 2005    mild hearing loss, saw ENT     Current Outpatient Prescriptions   Medication Sig Dispense Refill     metoprolol (TOPROL-XL) 50 MG 24 hr tablet Take 1 tablet (50 mg) by mouth every evening 90 tablet 3     lisinopril (PRINIVIL/ZESTRIL) 10 MG tablet Take 1 tablet (10 mg) by mouth every morning 90 tablet 3     warfarin (COUMADIN) 2.5 MG tablet Take 2.5mg (1 tablet)  MTWTFSS or as directed by INR Clinic. 100 tablet 3      senna-docusate (SENOKOT-S;PERICOLACE) 8.6-50 MG per tablet Take 1-2 tablets by mouth 2 times daily Take while on oral narcotics to prevent or treat constipation. 30 tablet 0     methenamine hippurate (HIPREX) 1 G TABS Take 1 tablet (1 g) by mouth daily 90 tablet 3     polyethylene glycol (MIRALAX/GLYCOLAX) packet Take 1 packet by mouth daily as needed        ketoprofen 10% in PLO 10% Place onto the skin every 4 hours as needed for moderate pain       conjugated estrogens (PREMARIN) vaginal cream Place vaginally twice a week On Tuesday and Friday. Uses a pea sized amount       fluocinonide (LIDEX) 0.05 % cream Apply topically 2 times daily as needed 30 g 2     acetaminophen (TYLENOL) 500 MG tablet Take 500-1,000 mg by mouth every 6 hours as needed       Catheters (GENTLECATH URINARY CATHETER) MISC 1 catheter 4 times daily 120 each 12     Lubricants (SM LUBRICATING JELLY) GEL Externally apply 1 packet topically 4 times daily 144 Tube 12     ascorbic acid (VITAMIN C) 1000 MG TABS Take 1 tablet (1,000 mg) by mouth 2 times daily (Patient taking differently: Take 1,000 mg by mouth daily With HYprex) 180 tablet 3     Omega-3 Fatty Acids (OMEGA-3 FISH OIL PO) Take 1 g by mouth 2 times daily (with meals)       sodium chloride (PETER 128) 5 % ophthalmic ointment Place 1 Application into both eyes At Bedtime       carboxymethylcellulose (CELLUVISC/REFRESH LIQUIGEL) 1 % ophthalmic solution Place 1 drop into both eyes every morning        MAGNESIUM 250 MG OR TABS Take 1 tablet by mouth at bedtime  0     Social History   Substance Use Topics     Smoking status: Never Smoker     Smokeless tobacco: Never Used     Alcohol use No       ROS:  CONSTITUTIONAL:POSITIVE  for chills, fatigue and malaise  INTEGUMENTARY/SKIN: NEGATIVE for worrisome rashes, moles or lesions  ENT/MOUTH: POSITIVE for nasal congestion and postnasal drainage  RESP:POSITIVE for cough-non productive, cough-productive and SOB/dyspnea  CV: NEGATIVE for chest pain,  palpitations or peripheral edema and POSITIVE for a.fib  GI: NEGATIVE for nausea, abdominal pain, heartburn, or change in bowel habits    OBJECTIVE  :/62 (BP Location: Right arm, Patient Position: Chair, Cuff Size: Adult Regular)  Pulse 58  Temp 97.5  F (36.4  C) (Tympanic)  Wt 155 lb 9.6 oz (70.6 kg)  SpO2 97%  BMI 26.71 kg/m2  GENERAL APPEARANCE: healthy, alert and no distress  EYES: EOMI,  PERRL, conjunctiva clear  HENT: ear canals and TM's normal.  Nose and mouth without ulcers, erythema or lesions  NECK: supple, nontender, no lymphadenopathy  RESP: lungs with coarse rhonchi, no crackles or wheezes  CV: regular rates and rhythm, normal S1 S2, no murmur noted  NEURO: Normal strength and tone, sensory exam grossly normal,  normal speech and mentation  SKIN: no suspicious lesions or rashes    CXR - no acute infiltrate, no pleural effusion, no pneumothorax    ASSESSMENT/PLAN:  (R06.02) SOB (shortness of breath)  (primary encounter diagnosis)  Plan: albuterol (2.5 MG/3ML) 0.083% neb solution,         INHALATION/NEBULIZER TREATMENT, INITIAL, XR         Chest 2 Views, albuterol (PROAIR HFA/PROVENTIL         HFA/VENTOLIN HFA) 108 (90 BASE) MCG/ACT Inhaler            (J20.9) Acute bronchitis with coexisting condition requiring prophylactic treatment  Plan: cefdinir (OMNICEF) 300 MG capsule            Patient reported improvement in symptoms after albuterol nebulizer treatment.  Will follow up on formal Xray report and notify if any abnormalities. Reviewed symptomatic treatment, plenty of fluids and rest.  RX for albuterol inhaler given to use for symptomatic treatment.  RX omnicef given for bronchitis, encourage to take full course.  Patient has follow up with INR clinic on Friday and encourage to review with INR nurse if any warfarin adjustment is needed due to antibiotic use.    Follow up with primary provider if no resolution of symptoms, to ER if develops any acute worsening of symptoms.    Alvin Gupta,  MD  September 6, 2017 1:18 PM

## 2017-09-06 NOTE — PATIENT INSTRUCTIONS
Take full course of antibiotic for bronchitis.  Use albuterol inhaler to help with cough and shortness of breath.    Follow up with INR nurse regarding antibiotic use and coumadin dosing.      Bronchitis, Antibiotic Treatment (Adult)    Bronchitis is an infection of the air passages (bronchial tubes) in your lungs. It often occurs when you have a cold. This illness is contagious during the first few days and is spread through the air by coughing and sneezing, or by direct contact (touching the sick person and then touching your own eyes, nose, or mouth).  Symptoms of bronchitis include cough with mucus (phlegm) and low-grade fever. Bronchitis usually lasts 7 to 14 days. Mild cases can be treated with simple home remedies. More severe infection is treated with an antibiotic.  Home care  Follow these guidelines when caring for yourself at home:    If your symptoms are severe, rest at home for the first 2 to 3 days. When you go back to your usual activities, don't let yourself get too tired.    Do not smoke. Also avoid being exposed to secondhand smoke.    You may use over-the-counter medicines to control fever or pain, unless another medicine was prescribed. (Note: If you have chronic liver or kidney disease or have ever had a stomach ulcer or gastrointestinal bleeding, talk with your healthcare provider before using these medicines. Also talk to your provider if you are taking medicine to prevent blood clots.) Aspirin should never be given to anyone younger than 18 years of age who is ill with a viral infection or fever. It may cause severe liver or brain damage.    Your appetite may be poor, so a light diet is fine. Avoid dehydration by drinking 6 to 8 glasses of fluids per day (such as water, soft drinks, sports drinks, juices, tea, or soup). Extra fluids will help loosen secretions in the nose and lungs.    Over-the-counter cough, cold, and sore-throat medicines will not shorten the length of the illness, but they  may be helpful to reduce symptoms. (Note: Do not use decongestants if you have high blood pressure.)    Finish all antibiotic medicine. Do this even if you are feeling better after only a few days.  Follow-up care  Follow up with your healthcare provider, or as advised. If you had an X-ray or ECG (electrocardiogram), a specialist will review it. You will be notified of any new findings that may affect your care.  Note: If you are age 65 or older, or if you have a chronic lung disease or condition that affects your immune system, or you smoke, talk to your healthcare provider about having pneumococcal vaccinations and a yearly influenza vaccination (flu shot).  When to seek medical advice  Call your healthcare provider right away if any of these occur:    Fever of 100.4 F (38 C) or higher    Coughing up increased amounts of colored sputum    Weakness, drowsiness, headache, facial pain, ear pain, or a stiff neck  Call 911, or get immediate medical care  Contact emergency services right away if any of these occur.    Coughing up blood    Worsening weakness, drowsiness, headache, or stiff neck    Trouble breathing, wheezing, or pain with breathing  Date Last Reviewed: 9/13/2015 2000-2017 [employer]. [address] [place] [address]. All rights reserved. This information is not intended as a substitute for professional medical care. Always follow your healthcare professional's instructions.        Shortness of Breath (Dyspnea)  Shortness of breath is the feeling that you can't catch your breath or get enough air. It is also known as dyspnea.  Dyspnea can be caused by many different conditions. They include:    Acute asthma attack.    Worsening of chronic lung diseases such as chronic bronchitis and emphysema.    Heart failure. This is when weak heart muscle allows extra fluid to collect in the lungs.    Panic attacks or anxiety. Fear can cause rapid breathing (hyperventilation).    Pneumonia,  or an infection in the lung tissue.    Exposure to toxic substances, fumes, smoke, or certain medicines.    Blood clot in the lung (pulmonary embolism). This is often from a piece of blood clot in a deep vein of the leg (deep vein thrombosis) that breaks off and travels to the lungs.    Heart attack or heart-related chest pain (angina).    Anemia.    Collapsed lung (pneumothorax).    Dehydration.    Pregnancy.  Based on your visit today, the exact cause of your shortness of breath is not certain. Your tests don t show any of the serious causes of dyspnea. You may need other tests to find out if you have a serious problem. It s important to watch for any new symptoms or symptoms that get worse. Follow up with your healthcare provider as directed.  Home care  Follow these tips to take care of yourself at home:    When your symptoms are better, go back to your usual activities.    If you smoke, you should stop. Join a quit-smoking program or ask your healthcare provider for help.    Eat a healthy diet and get plenty of sleep.    Get regular exercise. Talk with your healthcare provider before starting to exercise, especially if you have other medical problems.    Cut down on the amount of caffeine and stimulants you consume.  Follow-up care  Follow up with your healthcare provider, or as advised.  If tests were done, you will be told if your treatment needs to be changed. You can call as directed for the results.  (Note: If an X-ray was taken, a specialist will review it. You will be notified of any new findings that may affect your care.)  Call 911 or get immediate medical care  Shortness of breath may be a sign of a serious medical problem. For example, it may be a problem with your heart or lungs. Call 911 if you have worsening shortness of breath or trouble breathing, especially with any of the symptoms below:    You are confused or it s difficult to wake you.    You faint or lose consciousness.    You have a fast  heartbeat, or your heartbeat is irregular.    You are coughing up blood.    You have pain in your chest, arm, shoulder, neck, or upper back.    You break out in a sweat.  When to seek medical advice  Call your healthcare provider right away if any of these occur:    Slight shortness of breath or wheezing    Redness, pain or swelling in your leg, arm, or other body area    Swelling in both legs or ankles    Fast weight gain    Dizziness or weakness    Fever of 100.4 F (38 C) or higher, or as directed by your healthcare provider  Date Last Reviewed: 9/13/2015 2000-2017 [employer]. [address] [place] [address], [place] [address]. All rights reserved. This information is not intended as a substitute for professional medical care. Always follow your healthcare professional's instructions.

## 2017-09-07 ENCOUNTER — OFFICE VISIT (OUTPATIENT)
Dept: PEDIATRICS | Facility: CLINIC | Age: 82
End: 2017-09-07
Payer: COMMERCIAL

## 2017-09-07 ENCOUNTER — TELEPHONE (OUTPATIENT)
Dept: URGENT CARE | Facility: URGENT CARE | Age: 82
End: 2017-09-07

## 2017-09-07 VITALS
OXYGEN SATURATION: 99 % | TEMPERATURE: 96.2 F | BODY MASS INDEX: 26.12 KG/M2 | HEIGHT: 64 IN | SYSTOLIC BLOOD PRESSURE: 122 MMHG | DIASTOLIC BLOOD PRESSURE: 64 MMHG | WEIGHT: 153 LBS | HEART RATE: 63 BPM

## 2017-09-07 DIAGNOSIS — R91.1 PULMONARY NODULE: Primary | ICD-10-CM

## 2017-09-07 PROCEDURE — 99213 OFFICE O/P EST LOW 20 MIN: CPT | Performed by: INTERNAL MEDICINE

## 2017-09-07 NOTE — PROGRESS NOTES
"  SUBJECTIVE:   Maia Miranda is a 84 year old female who presents to clinic today for the following health issues:      ED/UC Followup:    Facility:  [facility]  Date of visit: 09/06/2017  Reason for visit: SOB  Current Status: Somewhat better, but still bothering her    Patient seen in UC yesterday for SOB and cough. Had CXR that did not show infiltrate. Was starte don cefdinir for bronchitis. Had nebulizer done in clinic that was very helpful. Also given an inhaler. Is somewhat better since yesterday. No fevers. Was called today to say that CXR was ready by radiology as having a small pumonary nodule. Recommended follow-up with PCP to discuss. Previous CXR in 2014 without nodule. Exposed to 2nd hand smoke for about 10 years from  who sounds to have been a chain smoker and had multiple cigarettes lit at one time. Patient is a lifelong non-smoker. No night sweats or weight loss. No history of malignancy.     Reviewed and updated as needed this visit by clinical staff  Tobacco  Allergies  Meds  Problems  Med Hx  Surg Hx  Fam Hx  Soc Hx        Reviewed and updated as needed this visit by Provider  Allergies  Meds  Problems         -------------------------------------    Problem list and histories reviewed & adjusted, as indicated.  Additional history: as documented    ROS:  Constitutional, HEENT, cardiovascular, pulmonary, gi and gu systems are negative, except as otherwise noted.      Problem list, Medication list, Allergies, and Medical/Social/Surgical histories reviewed in [facility] and updated as appropriate.    OBJECTIVE:                                                    /64 (BP Location: Right arm, Patient Position: Sitting, Cuff Size: Adult Regular)  Pulse 63  Temp 96.2  F (35.7  C) (Tympanic)  Ht 5' 4\" (1.626 m)  Wt 153 lb (69.4 kg)  SpO2 99%  BMI 26.26 kg/m2   Body mass index is 26.26 kg/(m^2).  General Appearance: elderly female, tired appearing, alert and no distress  Eyes:   " "no discharge, erythema.  Normal pupils.  Respiratory: lungs clear to auscultation - no rales, rhonchi or wheezes.  Cardiovascular: regular rate and rhythm, normal S1 S2, no S3 or S4 and no murmur, click or rub.  No peripheral edema.  Skin: no rashes or lesions.  Well perfused and normal turgor.    Diagnostic Test Results:  CXR 9/6/17:   \"COMPARISON: 6/6/2014.     FINDINGS: Heart is mildly enlarged without pulmonary edema. The  thoracic aorta is calcified and slightly tortuous. There is a possible  0.8 cm nodule in the right midlung laterally, not seen on the previous  exam. The lungs are otherwise clear. No pneumothorax or pleural  effusion. Degenerative disease in the thoracic spine.         IMPRESSION: Possible new right lung nodule. Consider CT in further  Evaluation\"     ASSESSMENT/PLAN:                                                      (R91.1) Pulmonary nodule  (primary encounter diagnosis)  Comment: reviewed images with patient - small 8 mm nodule. Had exposure to 2nd hand smoke many years ago. No other risk factors  Plan: CT Chest w/o Contrast  - recommend CT for better characterization. Will wait a month until feeling better to get done  - further f/u will depend on CT results      Follow up with Provider - annual visit and as needed     [facility] [place]          "

## 2017-09-07 NOTE — NURSING NOTE
"Chief Complaint   Patient presents with     ER F/U       Initial /64 (BP Location: Right arm, Patient Position: Sitting, Cuff Size: Adult Regular)  Pulse 63  Temp 96.2  F (35.7  C) (Tympanic)  Ht 5' 4\" (1.626 m)  Wt 153 lb (69.4 kg)  SpO2 99%  BMI 26.26 kg/m2 Estimated body mass index is 26.26 kg/(m^2) as calculated from the following:    Height as of this encounter: 5' 4\" (1.626 m).    Weight as of this encounter: 153 lb (69.4 kg).  Medication Reconciliation: complete     Mary Munoz      "

## 2017-09-07 NOTE — TELEPHONE ENCOUNTER
Called patient and notified her on x-ray test results of possible right lung nodule. Advised patient to make an appointment with PCP.   Cheng Carlson, Medical Assistant

## 2017-09-07 NOTE — MR AVS SNAPSHOT
After Visit Summary   9/7/2017    Maia Miranda    MRN: [id number]           Patient Information     Date Of Birth          10/26/1932        Visit Information        Provider Department      9/7/2017 2:00 PM Dian Frias MD [facility]        Today's Diagnoses     Pulmonary nodule    -  1      Care Instructions    You will be contacted to set up the chest CT - schedule in October            Follow-ups after your visit        Your next 10 appointments already scheduled     Sep 08, 2017 10:15 AM CDT   Anticoagulation Visit with  ANT[facility]ULATION CLINIC   [facility]    [address] [address] [address]   [phone]            Oct 05, 2017 10:30 AM CDT   Return Visit with RAY Elmore CNP   [facility] ([facility]    [address]  [address]  [facility] [id number] [phone]            Oct 05, 2017 10:30 AM CDT   Return Visit with EMERSON Concepcion   [facility] ([facility])    [address]  [address]  [facility] [address]   [phone]              Future tests that were ordered for you today     Open Future Orders        Priority Expected Expires Ordered    CT Chest w/o Contrast Routine 10/6/2017 9/7/2018 9/7/2017            Who to contact     If you have questions or need follow up information about today's clinic visit or your schedule please contact [facility] directly at [phone].  Normal or non-critical lab and imaging results will be communicated to you by MyChart, letter or phone within 4 business days after the clinic has received the results. If you do not hear from us within 7 days, please contact the clinic through MyChart or phone. If you have a critical or abnormal lab result, we will notify you by phone as soon as  "possible.  Submit refill requests through [employer] or call your pharmacy and they will forward the refill request to us. Please allow 3 business days for your refill to be completed.          Additional Information About Your Visit        [employer] Information     [employer] gives you secure access to your electronic health record. If you see a primary care provider, you can also send messages to your care team and make appointments. If you have questions, please call your primary care clinic.  If you do not have a primary care provider, please call [phone] and they will assist you.        Care EveryWhere ID     This is your Care EveryWhere ID. This could be used by other organizations to access your [place] medical records  [id number]        Your Vitals Were     Pulse Temperature Height Pulse Oximetry BMI (Body Mass Index)       63 96.2  F (35.7  C) (Tympanic) 5' 4\" (1.626 m) 99% 26.26 kg/m2        Blood Pressure from Last 3 Encounters:   09/07/17 122/64   09/06/17 130/62   08/23/17 116/62    Weight from Last 3 Encounters:   09/07/17 153 lb (69.4 kg)   09/06/17 155 lb 9.6 oz (70.6 kg)   08/23/17 155 lb (70.3 kg)                 Today's Medication Changes          These changes are accurate as of: 9/7/17  2:34 PM.  If you have any questions, ask your nurse or doctor.               These medicines have changed or have updated prescriptions.        Dose/Directions    ascorbic acid 1000 MG Tabs   Commonly known as:  vitamin C   This may have changed:    - when to take this  - additional instructions   Used for:  Recurrent UTI, Neurogenic bladder        Dose:  1000 mg   Take 1 tablet (1,000 mg) by mouth 2 times daily   Quantity:  180 tablet   Refills:  3                Primary Care Provider Office Phone # Fax #    Dian Frias [phone]       [facility] [place] [address]        Equal Access to Services     [facility] ABI AH: ren Antoine, " ayden zhou vanessasimin osorio ah. So [facility] [phone].    ATENCIÓN: Si kandace guerra, tiene a conn disposición servicios gratuitos de asistencia lingüística. Ivet al [phone].    We comply with applicable federal civil rights laws and [place] laws. We do not discriminate on the basis of race, color, national origin, age, disability sex, sexual orientation or gender identity.            Thank you!     Thank you for choosing [facility] [place]  for your care. Our goal is always to provide you with excellent care. Hearing back from our patients is one way we can continue to improve our services. Please take a few minutes to complete the written survey that you may receive in the mail after your visit with us. Thank you!             Your Updated Medication List - Protect others around you: Learn how to safely use, store and throw away your medicines at www.disposemymeds.org.          This list is accurate as of: 9/7/17  2:34 PM.  Always use your most recent med list.                   Brand Name Dispense Instructions for use Diagnosis    * albuterol (2.5 MG/3ML) 0.083% neb solution     3 mL    Take 1 vial (2.5 mg) by nebulization once for 1 dose    SOB (shortness of breath)       * albuterol 108 (90 BASE) MCG/ACT Inhaler    PROAIR HFA/PROVENTIL HFA/VENTOLIN HFA    1 Inhaler    Inhale 2 puffs into the lungs every 6 hours as needed for shortness of breath / dyspnea or wheezing    SOB (shortness of breath)       ascorbic acid 1000 MG Tabs    vitamin C    180 tablet    Take 1 tablet (1,000 mg) by mouth 2 times daily    Recurrent UTI, Neurogenic bladder       carboxymethylcellulose 1 % ophthalmic solution    CELLUVISC/REFRESH LIQUIGEL     Place 1 drop into both eyes every morning        cefdinir 300 MG capsule    OMNICEF    20 capsule    Take 2 capsules (600 mg) by mouth daily for 10 days    Acute bronchitis with coexisting condition requiring prophylactic treatment        conjugated estrogens cream    PREMARIN     Place vaginally twice a week On Tuesday and Friday. Uses a pea sized amount        fluocinonide 0.05 % cream    LIDEX    30 g    Apply topically 2 times daily as needed    Dermatitis       GENTLECATH URINARY CATHETER Misc     120 each    1 catheter 4 times daily    Urinary retention       ketoprofen 10% in PLO 10% topical gel      Place onto the skin every 4 hours as needed for moderate pain        lisinopril 10 MG tablet    PRINIVIL/ZESTRIL    90 tablet    Take 1 tablet (10 mg) by mouth every morning    Hypertension goal BP (blood pressure) < 150/90       magnesium 250 MG tablet      Take 1 tablet by mouth at bedtime        methenamine hippurate 1 G Tabs tablet    HIPREX    90 tablet    Take 1 tablet (1 g) by mouth daily    Recurrent UTI       metoprolol 50 MG 24 hr tablet    TOPROL-XL    90 tablet    Take 1 tablet (50 mg) by mouth every evening    Hypertension goal BP (blood pressure) < 150/90       OMEGA-3 FISH OIL PO      Take 1 g by mouth 2 times daily (with meals)        polyethylene glycol Packet    MIRALAX/GLYCOLAX     Take 1 packet by mouth daily as needed        senna-docusate 8.6-50 MG per tablet    SENOKOT-S;PERICOLACE    30 tablet    Take 1-2 tablets by mouth 2 times daily Take while on oral narcotics to prevent or treat constipation.    Urinary retention       SM LUBRICATING JELLY Gel     144 Tube    Externally apply 1 packet topically 4 times daily    Urinary retention       sodium chloride 5 % ophthalmic ointment    PETER 128     Place 1 Application into both eyes At Bedtime        TYLENOL 500 MG tablet   Generic drug:  acetaminophen      Take 500-1,000 mg by mouth every 6 hours as needed        warfarin 2.5 MG tablet    COUMADIN    100 tablet    Take 2.5mg (1 tablet)  MTWTFSS or as directed by INR Clinic.    Long-term (current) use of anticoagulants       * Notice:  This list has 2 medication(s) that are the same as other medications prescribed for you. Read  the directions carefully, and ask your doctor or other care provider to review them with you.

## 2017-09-08 ENCOUNTER — ANTICOAGULATION THERAPY VISIT (OUTPATIENT)
Dept: NURSING | Facility: CLINIC | Age: 82
End: 2017-09-08
Payer: COMMERCIAL

## 2017-09-08 DIAGNOSIS — Z79.01 LONG-TERM (CURRENT) USE OF ANTICOAGULANTS: ICD-10-CM

## 2017-09-08 DIAGNOSIS — I48.91 ATRIAL FIBRILLATION, UNSPECIFIED TYPE (H): ICD-10-CM

## 2017-09-08 LAB — INR POINT OF CARE: 2.5 (ref 0.86–1.14)

## 2017-09-08 PROCEDURE — 85610 PROTHROMBIN TIME: CPT | Mod: QW

## 2017-09-08 PROCEDURE — 36416 COLLJ CAPILLARY BLOOD SPEC: CPT

## 2017-09-08 PROCEDURE — 99207 ZZC NO CHARGE NURSE ONLY: CPT

## 2017-09-08 NOTE — PROGRESS NOTES
ANTICOAGULATION FOLLOW-UP CLINIC VISIT    Patient Name:  Maia Miranda  Date:  9/8/2017  Contact Type:  Face to Face    SUBJECTIVE:     Patient Findings     Positives Change in medications (Proair started - respiratory problems), Hospital admission ([facility] 9/6/17 for SOB/wheezing), Antibiotic use or infection (Cefdiner x 10 days for bronchitis)           OBJECTIVE    INR Protime   Date Value Ref Range Status   09/08/2017 2.5 (A) 0.86 - 1.14 Final       ASSESSMENT / PLAN  INR assessment THER    Recheck INR In: 4 WEEKS    INR Location Clinic      Anticoagulation Summary as of 9/8/2017     INR goal 2.0-3.0   Today's INR 2.5   Maintenance plan 2.5 mg (2.5 mg x 1) every day   Full instructions 2.5 mg every day   Weekly total 17.5 mg   No change documented Meme Riggs RN   Plan last modified Meme Riggs RN (1/18/2017)   Next INR check 10/11/2017   Target end date Indefinite    Indications   Long-term (current) use of anticoagulants [Z79.01]  Atrial fibrillation (H) [I48.91]         Anticoagulation Episode Summary     INR check location [facility]    Preferred lab     Send INR reminders to  ANTICOAG CLINIC    Comments 2.5mg tablets // salads qod // retired med tech // self cath - Hiprex urinary antiseptic // Interstim bladder therapy      Anticoagulation Care Providers     Provider Role Specialty Phone number    Dian Frias MD Referring Internal Medicine [phone]            See the Encounter Report to view Anticoagulation Flowsheet and Dosing Calendar (Go to Encounters tab in chart review, and find the Anticoagulation Therapy Visit)        Meme Riggs RN

## 2017-09-08 NOTE — MR AVS SNAPSHOT
Maia Miranda   9/8/2017 10:15 AM   Anticoagulation Therapy Visit    Description:  84 year old female   Provider:   ANTICOAGULATION CLINIC   Department:   Nurse           INR as of 9/8/2017     Today's INR 2.5      Anticoagulation Summary as of 9/8/2017     INR goal 2.0-3.0   Today's INR 2.5   Full instructions 2.5 mg every day   Next INR check 10/11/2017    Indications   Long-term (current) use of anticoagulants [Z79.01]  Atrial fibrillation (H) [I48.91]         Your next Anticoagulation Clinic appointment(s)     Oct 11, 2017 11:00 AM CDT   An[facility]  [facility]IC   [facility]    [address] [facility] [address] [phone]              Contact Numbers     [facility]  Please call to cancel and/or reschedule your appointment, or with any problems or questions regarding your therapy.  Anticoagulation Nurse: [phone]  Main Clinic: [phone]             September 2017 Details    Sun Mon Tue Wed Thu Fri Sat          1               2                 3               4               5               6               7               8      2.5 mg   See details      9      2.5 mg           10      2.5 mg         11      2.5 mg         12      2.5 mg         13      2.5 mg         14      2.5 mg         15      2.5 mg         16      2.5 mg           17      2.5 mg         18      2.5 mg         19      2.5 mg         20      2.5 mg         21      2.5 mg         22      2.5 mg         23      2.5 mg           24      2.5 mg         25      2.5 mg         26      2.5 mg         27      2.5 mg         28      2.5 mg         29      2.5 mg         30      2.5 mg          Date Details   09/08 This INR check               How to take your warfarin dose     To take:  2.5 mg Take 1 of the 2.5 mg tablets.           October 2017 Details    Sun Mon Tue Wed Thu Fri Sat     1      2.5 mg         2      2.5 mg         3       2.5 mg         4      2.5 mg         5      2.5 mg         6      2.5 mg         7      2.5 mg           8      2.5 mg         9      2.5 mg         10      2.5 mg         11            12               13               14                 15               16               17               18               19               20               21                 22               23               24               25               26               27               28                 29               30               31                    Date Details   No additional details    Date of next INR:  10/11/2017         How to take your warfarin dose     To take:  2.5 mg Take 1 of the 2.5 mg tablets.

## 2017-10-03 ENCOUNTER — HOSPITAL ENCOUNTER (EMERGENCY)
Facility: CLINIC | Age: 82
Discharge: HOME | End: 2017-10-03
Attending: EMERGENCY MEDICINE | Admitting: EMERGENCY MEDICINE
Payer: COMMERCIAL

## 2017-10-03 ENCOUNTER — APPOINTMENT (OUTPATIENT)
Dept: CT IMAGING | Facility: CLINIC | Age: 82
End: 2017-10-03
Attending: EMERGENCY MEDICINE
Payer: COMMERCIAL

## 2017-10-03 ENCOUNTER — APPOINTMENT (OUTPATIENT)
Dept: ULTRASOUND IMAGING | Facility: CLINIC | Age: 82
End: 2017-10-03
Attending: EMERGENCY MEDICINE
Payer: COMMERCIAL

## 2017-10-03 ENCOUNTER — OFFICE VISIT (OUTPATIENT)
Dept: PEDIATRICS | Facility: CLINIC | Age: 82
End: 2017-10-03
Payer: COMMERCIAL

## 2017-10-03 VITALS
HEIGHT: 64 IN | RESPIRATION RATE: 14 BRPM | TEMPERATURE: 98.1 F | WEIGHT: 154.2 LBS | OXYGEN SATURATION: 98 % | BODY MASS INDEX: 26.32 KG/M2 | SYSTOLIC BLOOD PRESSURE: 126 MMHG | DIASTOLIC BLOOD PRESSURE: 64 MMHG | HEART RATE: 55 BPM

## 2017-10-03 VITALS
TEMPERATURE: 98.1 F | RESPIRATION RATE: 18 BRPM | HEART RATE: 67 BPM | DIASTOLIC BLOOD PRESSURE: 71 MMHG | OXYGEN SATURATION: 96 % | SYSTOLIC BLOOD PRESSURE: 166 MMHG

## 2017-10-03 DIAGNOSIS — R10.31 ABDOMINAL PAIN, RIGHT LOWER QUADRANT: ICD-10-CM

## 2017-10-03 DIAGNOSIS — R10.31 RLQ ABDOMINAL PAIN: Primary | ICD-10-CM

## 2017-10-03 LAB
ALBUMIN SERPL-MCNC: 4.1 G/DL (ref 3.4–5)
ALBUMIN UR-MCNC: NEGATIVE MG/DL
ALP SERPL-CCNC: 77 U/L (ref 40–150)
ALT SERPL W P-5'-P-CCNC: 21 U/L (ref 0–50)
ANION GAP SERPL CALCULATED.3IONS-SCNC: 6 MMOL/L (ref 3–14)
APPEARANCE UR: CLEAR
AST SERPL W P-5'-P-CCNC: 25 U/L (ref 0–45)
BASOPHILS # BLD AUTO: 0 10E9/L (ref 0–0.2)
BASOPHILS NFR BLD AUTO: 0.6 %
BILIRUB DIRECT SERPL-MCNC: 0.1 MG/DL (ref 0–0.2)
BILIRUB SERPL-MCNC: 0.4 MG/DL (ref 0.2–1.3)
BILIRUB UR QL STRIP: NEGATIVE
BUN SERPL-MCNC: 27 MG/DL (ref 7–30)
CALCIUM SERPL-MCNC: 9.7 MG/DL (ref 8.5–10.1)
CHLORIDE SERPL-SCNC: 104 MMOL/L (ref 94–109)
CO2 SERPL-SCNC: 29 MMOL/L (ref 20–32)
COLOR UR AUTO: ABNORMAL
CREAT SERPL-MCNC: 1.29 MG/DL (ref 0.52–1.04)
DIFFERENTIAL METHOD BLD: ABNORMAL
EOSINOPHIL # BLD AUTO: 0 10E9/L (ref 0–0.7)
EOSINOPHIL NFR BLD AUTO: 0.8 %
ERYTHROCYTE [DISTWIDTH] IN BLOOD BY AUTOMATED COUNT: 13.1 % (ref 10–15)
GFR SERPL CREATININE-BSD FRML MDRD: 39 ML/MIN/1.7M2
GLUCOSE SERPL-MCNC: 90 MG/DL (ref 70–99)
GLUCOSE UR STRIP-MCNC: NEGATIVE MG/DL
HCT VFR BLD AUTO: 36.1 % (ref 35–47)
HGB BLD-MCNC: 11.8 G/DL (ref 11.7–15.7)
HGB UR QL STRIP: NEGATIVE
IMM GRANULOCYTES # BLD: 0 10E9/L (ref 0–0.4)
IMM GRANULOCYTES NFR BLD: 0.2 %
INR PPP: 2.11 (ref 0.86–1.14)
KETONES UR STRIP-MCNC: NEGATIVE MG/DL
LACTATE BLD-SCNC: 0.8 MMOL/L (ref 0.7–2)
LEUKOCYTE ESTERASE UR QL STRIP: NEGATIVE
LIPASE SERPL-CCNC: 238 U/L (ref 73–393)
LYMPHOCYTES # BLD AUTO: 1.1 10E9/L (ref 0.8–5.3)
LYMPHOCYTES NFR BLD AUTO: 21.6 %
MCH RBC QN AUTO: 32 PG (ref 26.5–33)
MCHC RBC AUTO-ENTMCNC: 32.7 G/DL (ref 31.5–36.5)
MCV RBC AUTO: 98 FL (ref 78–100)
MONOCYTES # BLD AUTO: 0.5 10E9/L (ref 0–1.3)
MONOCYTES NFR BLD AUTO: 9.1 %
MUCOUS THREADS #/AREA URNS LPF: PRESENT /LPF
NEUTROPHILS # BLD AUTO: 3.5 10E9/L (ref 1.6–8.3)
NEUTROPHILS NFR BLD AUTO: 67.7 %
NITRATE UR QL: NEGATIVE
NRBC # BLD AUTO: 0 10*3/UL
NRBC BLD AUTO-RTO: 0 /100
PH UR STRIP: 5 PH (ref 5–7)
PLATELET # BLD AUTO: 155 10E9/L (ref 150–450)
POTASSIUM SERPL-SCNC: 4.1 MMOL/L (ref 3.4–5.3)
PROT SERPL-MCNC: 7.8 G/DL (ref 6.8–8.8)
RBC # BLD AUTO: 3.69 10E12/L (ref 3.8–5.2)
RBC #/AREA URNS AUTO: <1 /HPF (ref 0–2)
SODIUM SERPL-SCNC: 139 MMOL/L (ref 133–144)
SOURCE: ABNORMAL
SP GR UR STRIP: 1.01 (ref 1–1.03)
SQUAMOUS #/AREA URNS AUTO: <1 /HPF (ref 0–1)
UROBILINOGEN UR STRIP-MCNC: 0 MG/DL (ref 0–2)
WBC # BLD AUTO: 5.2 10E9/L (ref 4–11)
WBC #/AREA URNS AUTO: 1 /HPF (ref 0–2)

## 2017-10-03 PROCEDURE — 25000132 ZZH RX MED GY IP 250 OP 250 PS 637: Performed by: EMERGENCY MEDICINE

## 2017-10-03 PROCEDURE — 99214 OFFICE O/P EST MOD 30 MIN: CPT | Performed by: PHYSICIAN ASSISTANT

## 2017-10-03 PROCEDURE — 74176 CT ABD & PELVIS W/O CONTRAST: CPT

## 2017-10-03 PROCEDURE — 83690 ASSAY OF LIPASE: CPT | Performed by: EMERGENCY MEDICINE

## 2017-10-03 PROCEDURE — 99285 EMERGENCY DEPT VISIT HI MDM: CPT | Mod: 25

## 2017-10-03 PROCEDURE — 81001 URINALYSIS AUTO W/SCOPE: CPT | Performed by: EMERGENCY MEDICINE

## 2017-10-03 PROCEDURE — 87086 URINE CULTURE/COLONY COUNT: CPT | Performed by: EMERGENCY MEDICINE

## 2017-10-03 PROCEDURE — 76705 ECHO EXAM OF ABDOMEN: CPT | Mod: XS

## 2017-10-03 PROCEDURE — 80076 HEPATIC FUNCTION PANEL: CPT | Performed by: EMERGENCY MEDICINE

## 2017-10-03 PROCEDURE — 93976 VASCULAR STUDY: CPT | Mod: XS

## 2017-10-03 PROCEDURE — 85610 PROTHROMBIN TIME: CPT | Performed by: EMERGENCY MEDICINE

## 2017-10-03 PROCEDURE — 80048 BASIC METABOLIC PNL TOTAL CA: CPT | Performed by: EMERGENCY MEDICINE

## 2017-10-03 PROCEDURE — 85025 COMPLETE CBC W/AUTO DIFF WBC: CPT | Performed by: EMERGENCY MEDICINE

## 2017-10-03 PROCEDURE — 83605 ASSAY OF LACTIC ACID: CPT | Performed by: EMERGENCY MEDICINE

## 2017-10-03 RX ORDER — ACETAMINOPHEN 325 MG/1
975 TABLET ORAL ONCE
Status: COMPLETED | OUTPATIENT
Start: 2017-10-03 | End: 2017-10-03

## 2017-10-03 RX ADMIN — ACETAMINOPHEN 975 MG: 325 TABLET, FILM COATED ORAL at 16:02

## 2017-10-03 NOTE — PROGRESS NOTES
"  SUBJECTIVE:   Maia Miranda is a 84 year old female who presents to clinic today for the following health issues:    ABDOMINAL and FLANK PAIN     Onset: x 4days    Description:   Character: Sharp  Location: right lower quadrant; right flank  Radiation: Back and Pelvic region    Intensity: moderate; 8/10; constant    Progression of Symptoms:  same    Accompanying Signs & Symptoms:  Fever/Chills?: no   Gas/Bloating: YES- Gas  Nausea: YES- little  Vomitting: no   Diarrhea?: no   Constipation:YES- she always has constipation, this feels different  Dysuria or Hematuria: no    History:   Trauma: no   Previous similar pain: no    Previous tests done: none    Precipitating factors:   Does the pain change with:     Food: no      BM: no     Urination: no     Alleviating factors:  none    Therapies Tried and outcome: 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 No history of appendicitis, diverticulitis, bowel obstructions, gallstones, ulcers.   No history of ovarian cysts, urinary symptoms.   No history of abdominal surgeries.     ROS:  C: NEGATIVE for fever, chills, recent illnesses  E/M: NEGATIVE for ear, mouth and throat problems  R: NEGATIVE for significant cough or SOB  CV: NEGATIVE for chest pain, palpitations or peripheral edema  GI: POSITIVE abd pain--RLQ and right LBP, chronic constipation; NEGATIVE for nausea, heartburn, or change in bowel habits  MUSCULOSKELETAL: NEGATIVE for significant arthralgias or myalgia  NEURO: NEGATIVE for weakness, dizziness or paresthesias    OBJECTIVE:                                                    /64 (BP Location: Right arm, Patient Position: Chair, Cuff Size: Adult Regular)  Pulse 55  Temp 98.1  F (36.7  C) (Oral)  Resp 14  Ht 5' 4\" (1.626 m)  Wt 154 lb 3.2 oz (69.9 kg)  SpO2 98%  BMI 26.47 kg/m2  Body mass index is 26.47 kg/(m^2).   GENERAL: alert, no distress  HENT: Mouth- no ulcers, no " lesions  NECK: no tenderness, no adenopathy  RESP: lungs clear to auscultation - no rales, no rhonchi, no wheezes  CV: regular rates and rhythm, normal S1 S2, no S3 or S4 and no murmur, no click or rub  ABDOMEN: soft, RLQ tenderness, normal bowel sounds  Back: mild tenderness -right paralumbar    Diagnostic test results:  No results found for this or any previous visit (from the past 24 hour(s)).       ASSESSMENT/PLAN:                                                    (R10.31) RLQ abdominal pain  (primary encounter diagnosis)  Comment: discussed differential with patient. I would like to begin with abdominal xray and UA. Patient refuses these studies.   Patient sent to ED for further evaluation.   Plan:     See Patient Instructions    Juan Haney PA-C  [facility] ([facility]3]AN

## 2017-10-03 NOTE — ED AVS SNAPSHOT
[facility] Emergency Department    [address]    [facility] [address]    Phone:  [phone]    Fax:  [phone]                                       Maia Miranda   MRN: [id number]    Department:  [facility] Emergency Department   Date of Visit:  10/3/2017           After Visit Summary Signature Page     I have received my discharge instructions, and my questions have been answered. I have discussed any challenges I see with this plan with the nurse or doctor.    ..........................................................................................................................................  Patient/Patient Representative Signature      ..........................................................................................................................................  Patient Representative Print Name and Relationship to Patient    ..................................................               ................................................  Date                                            Time    ..........................................................................................................................................  Reviewed by Signature/Title    ...................................................              ..............................................  Date                                                            Time

## 2017-10-03 NOTE — ED NOTES
Pt and pt dtr requesting d/c and inquiring if ultrasound can be out patient tomorrow with PCP. MD Bobo advised, MD reports pt can leave at any time, but need to sign AMA form. Pt educated on AMA and MD recommendations. Pt agreed to stay for ultrasound and understands MD reasoning and AMA form.

## 2017-10-03 NOTE — ED NOTES
Right sided abdominal pain radiating to the flank for the past 2-3 days.  Seen at clinic and was sent here for a CT.  Patient alert and oriented x3.  Airway, breathing and circulation intact.

## 2017-10-03 NOTE — ED AVS SNAPSHOT
[facility] Emergency Department    [address] [place] [address]    Phone:  [phone]    Fax:  [phone]                                       Maia Miranda   MRN: [id number]    Department:  [facility] Emergency Department   Date of Visit:  10/3/2017           Patient Information     Date Of Birth          10/26/1932        Your diagnoses for this visit were:     Abdominal pain, right lower quadrant        You were seen by Laura Bobo MD.      Follow-up Information     Follow up with Dian Frias MD. Schedule an appointment as soon as possible for a visit in 1 day.    Specialties:  Internal Medicine, Pediatrics    Contact information:    [facility] [place] [id number] [phone]          Discharge Instructions       Return to the ED if you are unable to tolerate fluids, intractable nausea or vomiting, severe abdominal pain, fevers >101 or other acute changes.  Please follow up with your PCP in 2-3 days.      Please have your BP rechecked tomorrow as well.      Discharge Instructions  Abdominal Pain    Abdominal pain (belly pain) can be caused by many things. Your evaluation today does not show the exact cause for your pain. Your provider today has decided that it is unlikely your pain is due to a life threatening problem, or a problem requiring surgery or hospital admission. Sometimes those problems cannot be found right away, so it is very important that you follow up as directed.  Sometimes only the changes which occur over time allow the cause of your pain to be found.    Generally, every Emergency Department visit should have a follow-up clinic visit with either a primary or a specialty clinic/provider. Please follow-up as instructed by your emergency provider today. With abdominal pain, we often recommend very close follow-up, such as the following day.    ADULTS:  Return to the Emergency Department right away  if:      You get an oral temperature above 102oF or as directed by your provider.    You have blood in your stools. This may be bright red or appear as black, tarry stools.      You keep vomiting (throwing up) or cannot drink liquids.    You see blood when you vomit.     You cannot have a bowel movement or you cannot pass gas.    Your stomach gets bloated or bigger.    Your skin or the whites of your eyes look yellow.    You faint.    You have bloody, frequent or painful urination (peeing).    You have new symptoms or anything that worries you.    CHILDREN:  Return to the Emergency Department right away if your child has any of the above-listed symptoms or the following:      Pushes your hand away or screams/cries when his/her belly is touched.    You notice your child is very fussy or weak.    Your child is very tired and is too tired to eat or drink.    Your child is dehydrated.  Signs of dehydration can be:  o Significant change in the amount of wet diapers/urine.  o Your infant or child starts to have dry mouth and lips, or no saliva (spit) or tears.    PREGNANT WOMEN:  Return to the Emergency Department right away if you have any of the above-listed symptoms or the following:      You have bleeding, leaking fluid or passing tissue from the vagina.    You have worse pain or cramping, or pain in your shoulder or back.    You have vomiting that will not stop.    You have a temperature of 100oF or more.    Your baby is not moving as much as usual.    You faint.    You get a bad headache with or without eye problems and abdominal pain.    You have a seizure.    You have unusual discharge from your vagina and abdominal pain.    Abdominal pain is pretty common during pregnancy.  Your pain may or may not be related to your pregnancy. You should follow-up closely with your OB provider so they can evaluate you and your baby.  Until you follow-up with your regular provider, do the following:       Avoid sex and do not put  "anything in your vagina.    Drink clear fluids.    Only take medications approved by your provider.    MORE INFORMATION:    Appendicitis:  A possible cause of abdominal pain in any person who still has their appendix is acute appendicitis. Appendicitis is often hard to diagnose.  Testing does not always rule out early appendicitis or other causes of abdominal pain. Close follow-up with your provider and re-evaluations may be needed to figure out the reason for your abdominal pain.    Follow-up:  It is very important that you make an appointment with your clinic and go to the appointment.  If you do not follow-up with your primary provider, it may result in missing an important development which could result in permanent injury or disability and/or lasting pain.  If there is any problem keeping your appointment, call your provider or return to the Emergency Department.    Medications:  Take your medications as directed by your provider today.  Before using over-the-counter medications, ask your provider and make sure to take the medications as directed.  If you have any questions about medications, ask your provider.    Diet:  Resume your normal diet as much as possible, but do not eat fried, fatty or spicy foods while you have pain.  Do not drink alcohol or have caffeine.  Do not smoke tobacco.    Probiotics: If you have been given an antibiotic, you may want to also take a probiotic pill or eat yogurt with live cultures. Probiotics have \"good bacteria\" to help your intestines stay healthy. Studies have shown that probiotics help prevent diarrhea (loose stools) and other intestine problems (including C. diff infection) when you take antibiotics. You can buy these without a prescription in the pharmacy section of the store.     If you were given a prescription for medicine here today, be sure to read all of the information (including the package insert) that comes with your prescription.  This will include important " information about the medicine, its side effects, and any warnings that you need to know about.  The pharmacist who fills the prescription can provide more information and answer questions you may have about the medicine.  If you have questions or concerns that the pharmacist cannot address, please call or return to the Emergency Department.       Remember that you can always come back to the Emergency Department if you are not able to see your regular provider in the amount of time listed above, if you get any new symptoms, or if there is anything that worries you.      Future Appointments        Provider Department Dept Phone Center    10/4/2017 8:45 AM Tim Baldwin DPM [facility] [phone] [facility]    10/5/2017 10:30 AM RAY Casas CNP [facility] [phone] [facility]    10/5/2017 10:30 AM EMERSON Valenzuela [facility] [phone] [facility]    10/11/2017 11:00 AM [facility] [phone] [facility]      24 Hour Appointment Hotline       To make an appointment at any [facility], call [phone] ([phone]). If you don't have a family doctor or clinic, we will help you find one. [facility] are conveniently located to serve the needs of you and your family.             Review of your medicines      Our records show that you are taking the medicines listed below. If these are incorrect, please call your family doctor or clinic.        Dose / Directions Last dose taken    albuterol 108 (90 BASE) MCG/ACT Inhaler   Commonly known as:  PROAIR HFA/PROVENTIL HFA/VENTOLIN HFA   Dose:  2 puff   Quantity:  1 Inhaler        Inhale 2 puffs into the lungs every 6 hours as needed for shortness of breath / dyspnea or wheezing   Refills:  0        ascorbic acid 1000 MG Tabs   Commonly known as:  vitamin C   Dose:  1000 mg   Quantity:  180 tablet        Take 1 tablet (1,000 mg) by  mouth 2 times daily   Refills:  3        carboxymethylcellulose 1 % ophthalmic solution   Commonly known as:  CELLUVISC/REFRESH LIQUIGEL   Dose:  1 drop        Place 1 drop into both eyes every morning   Refills:  0        conjugated estrogens cream   Commonly known as:  PREMARIN        Place vaginally twice a week On Tuesday and Friday. Uses a pea sized amount   Refills:  0        fluocinonide 0.05 % cream   Commonly known as:  LIDEX   Quantity:  30 g        Apply topically 2 times daily as needed   Refills:  2        GENTLECATH URINARY CATHETER Misc   Dose:  1 catheter   Quantity:  120 each        1 catheter 4 times daily   Refills:  12        ketoprofen 10% in PLO 10% topical gel        Place onto the skin every 4 hours as needed for moderate pain   Refills:  0        lisinopril 10 MG tablet   Commonly known as:  PRINIVIL/ZESTRIL   Dose:  10 mg   Quantity:  90 tablet        Take 1 tablet (10 mg) by mouth every morning   Refills:  3        magnesium 250 MG tablet        Take 1 tablet by mouth at bedtime   Refills:  0        methenamine hippurate 1 G Tabs tablet   Commonly known as:  HIPREX   Dose:  1 g   Quantity:  90 tablet        Take 1 tablet (1 g) by mouth daily   Refills:  3        metoprolol 50 MG 24 hr tablet   Commonly known as:  TOPROL-XL   Dose:  50 mg   Quantity:  90 tablet        Take 1 tablet (50 mg) by mouth every evening   Refills:  3        OMEGA-3 FISH OIL PO   Dose:  1 g        Take 1 g by mouth 2 times daily (with meals)   Refills:  0        polyethylene glycol Packet   Commonly known as:  MIRALAX/GLYCOLAX   Dose:  1 packet        Take 1 packet by mouth daily as needed   Refills:  0        senna-docusate 8.6-50 MG per tablet   Commonly known as:  SENOKOT-S;PERICOLACE   Dose:  1-2 tablet   Quantity:  30 tablet        Take 1-2 tablets by mouth 2 times daily Take while on oral narcotics to prevent or treat constipation.   Refills:  0        SM LUBRICATING JELLY Gel   Dose:  1 packet   Quantity:   144 Tube        Externally apply 1 packet topically 4 times daily   Refills:  12        sodium chloride 5 % ophthalmic ointment   Commonly known as:  PETER 128   Dose:  1 Application        Place 1 Application into both eyes At Bedtime   Refills:  0        TYLENOL 500 MG tablet   Dose:  500-1000 mg   Generic drug:  acetaminophen        Take 500-1,000 mg by mouth every 6 hours as needed   Refills:  0        warfarin 2.5 MG tablet   Commonly known as:  COUMADIN   Quantity:  100 tablet        Take 2.5mg (1 tablet)  MTWTFSS or as directed by INR Clinic.   Refills:  3                Procedures and tests performed during your visit     Basic metabolic panel    CBC with platelets differential    CT Abdomen Pelvis w/o Contrast    Hepatic panel    INR    Lactic acid whole blood    Lipase    UA with Microscopic    US Abdomen Limited    US Pelvic Complete w Transvaginal & Abd/Pel Duplex Limited    Urine Culture      Orders Needing Specimen Collection     None      Pending Results     Date and Time Order Name Status Description    10/3/2017 1752 US Pelvic Complete w Transvaginal & Abd/Pel Duplex Limited Preliminary     10/3/2017 1531 Urine Culture In process             Pending Culture Results     Date and Time Order Name Status Description    10/3/2017 1531 Urine Culture In process             Pending Results Instructions     If you had any lab results that were not finalized at the time of your Discharge, you can call the ED Lab Result RN at [phone]. You will be contacted by this team for any positive Lab results or changes in treatment. The nurses are available 7 days a week from 10A to 6:30P.  You can leave a message 24 hours per day and they will return your call.        Test Results From Your Hospital Stay        10/3/2017  3:37 PM      Component Results     Component Value Ref Range & Units Status    Color Urine Straw  Final    Appearance Urine Clear  Final    Glucose Urine Negative NEG^Negative mg/dL Final     Bilirubin Urine Negative NEG^Negative Final    Ketones Urine Negative NEG^Negative mg/dL Final    Specific Gravity Urine 1.006 1.003 - 1.035 Final    Blood Urine Negative NEG^Negative Final    pH Urine 5.0 5.0 - 7.0 pH Final    Protein Albumin Urine Negative NEG^Negative mg/dL Final    Urobilinogen mg/dL 0.0 0.0 - 2.0 mg/dL Final    Nitrite Urine Negative NEG^Negative Final    Leukocyte Esterase Urine Negative NEG^Negative Final    Source Catheterized Urine  Final    WBC Urine 1 0 - 2 /HPF Final    RBC Urine <1 0 - 2 /HPF Final    Squamous Epithelial /HPF Urine <1 0 - 1 /HPF Final    Mucous Urine Present (A) NEG^Negative /LPF Final         10/3/2017  3:55 PM      Component Results     Component Value Ref Range & Units Status    WBC 5.2 4.0 - 11.0 10e9/L Final    RBC Count 3.69 (L) 3.8 - 5.2 10e12/L Final    Hemoglobin 11.8 11.7 - 15.7 g/dL Final    Hematocrit 36.1 35.0 - 47.0 % Final    MCV 98 78 - 100 fl Final    MCH 32.0 26.5 - 33.0 pg Final    MCHC 32.7 31.5 - 36.5 g/dL Final    RDW 13.1 10.0 - 15.0 % Final    Platelet Count 155 150 - 450 10e9/L Final    Diff Method Automated Method  Final    % Neutrophils 67.7 % Final    % Lymphocytes 21.6 % Final    % Monocytes 9.1 % Final    % Eosinophils 0.8 % Final    % Basophils 0.6 % Final    % Immature Granulocytes 0.2 % Final    Nucleated RBCs 0 0 /100 Final    Absolute Neutrophil 3.5 1.6 - 8.3 10e9/L Final    Absolute Lymphocytes 1.1 0.8 - 5.3 10e9/L Final    Absolute Monocytes 0.5 0.0 - 1.3 10e9/L Final    Absolute Eosinophils 0.0 0.0 - 0.7 10e9/L Final    Absolute Basophils 0.0 0.0 - 0.2 10e9/L Final    Abs Immature Granulocytes 0.0 0 - 0.4 10e9/L Final    Absolute Nucleated RBC 0.0  Final         10/3/2017  4:05 PM      Component Results     Component Value Ref Range & Units Status    INR 2.11 (H) 0.86 - 1.14 Final         10/3/2017  4:08 PM      Component Results     Component Value Ref Range & Units Status    Sodium 139 133 - 144 mmol/L Final    Potassium 4.1 3.4 -  5.3 mmol/L Final    Chloride 104 94 - 109 mmol/L Final    Carbon Dioxide 29 20 - 32 mmol/L Final    Anion Gap 6 3 - 14 mmol/L Final    Glucose 90 70 - 99 mg/dL Final    Urea Nitrogen 27 7 - 30 mg/dL Final    Creatinine 1.29 (H) 0.52 - 1.04 mg/dL Final    GFR Estimate 39 (L) >60 mL/min/1.7m2 Final    Non  GFR Calc    GFR Estimate If Black 48 (L) >60 mL/min/1.7m2 Final    African American GFR Calc    Calcium 9.7 8.5 - 10.1 mg/dL Final         10/3/2017  3:45 PM      Component Results     Component Value Ref Range & Units Status    Lactic Acid 0.8 0.7 - 2.0 mmol/L Final         10/3/2017  3:39 PM         10/3/2017  6:41 PM      Narrative     CT ABDOMEN AND PELVIS WITHOUT CONTRAST  10/3/2017 4:52 PM     HISTORY: Right lower quadrant pain, poor renal function so will do  oral prep instead of IV contrast.    TECHNIQUE: Axial images with reconstructions. No IV contrast.  Radiation dose for this scan was reduced using automated exposure  control, adjustment of the mA and/or kV according to patient size, or  iterative reconstruction technique.    COMPARISON: None    FINDINGS: Absent contrast compromises evaluation.  No urinary tract  stone or hydronephrosis. Right gluteal region subcutaneous stimulation  device with leads extending to the lower pelvis. No evidence of  diverticulitis or small-bowel obstruction. Unremarkable appendix.  There are a few subcentimeter liver lesions, which are too small to  characterize.        Impression     IMPRESSION: No acute pathology appreciated, allowing for noncontrast.    ROSENDO DUFF MD         10/3/2017  7:33 PM      Narrative     US PELVIS COMPLETE WITH TRANSVAGINAL AND DOPPLER LIMITED  10/3/2017  7:25 PM     HISTORY: Pelvic pain.    COMPARISON: None.    TECHNIQUE: Transabdominal and endovaginal scan for better evaluation  of uterus, ovaries, and endometrium.    FINDINGS: The endometrium was not visualized due to the presence of a  large fibroid in the mid uterus. The  fibroid measures 4.6 x 2.9 x 3.3  cm and obscures the endometrial canal. The overall uterine size is 6.5  x 5.0 x 5.2 cm.    Neither ovary was identified. No adnexal mass identified. No free  fluid in the cul-de-sac.        Impression     IMPRESSION:   1. Prominent uterine fibroid which obscures the endometrial cavity.  2. Ovaries are not visualized but no adnexal mass identified.         10/3/2017  7:33 PM      Narrative     US ABDOMEN LIMITED  10/3/2017 7:25 PM     HISTORY: Right upper quadrant pain.     COMPARISON: None.    FINDINGS: Gallbladder is borderline enlarged but there are no  gallstones and no wall thickening. There is no sonographic Patel  sign. No bile duct dilatation.    Liver is normal in size and echotexture.    Pancreas not well seen due to bowel gas.    The right kidney contains two cysts, the largest of which is 1.6 x 1.6  x 1.8 cm. No mass or obstruction.    The proximal abdominal aorta and IVC appear normal.        Impression     IMPRESSION:   1. Gallbladder is somewhat prominent in size but otherwise normal and  there are no dilated bile ducts or sonographic Patel sign. No  definite pathology.  2. Right renal benign simple cysts.    CARMEN TOMPKINS MD         10/3/2017  6:15 PM      Component Results     Component Value Ref Range & Units Status    Bilirubin Direct 0.1 0.0 - 0.2 mg/dL Final    Bilirubin Total 0.4 0.2 - 1.3 mg/dL Final    Albumin 4.1 3.4 - 5.0 g/dL Final    Protein Total 7.8 6.8 - 8.8 g/dL Final    Alkaline Phosphatase 77 40 - 150 U/L Final    ALT 21 0 - 50 U/L Final    AST 25 0 - 45 U/L Final         10/3/2017  6:12 PM      Component Results     Component Value Ref Range & Units Status    Lipase 238 73 - 393 U/L Final                Clinical Quality Measure: Blood Pressure Screening     Your blood pressure was checked while you were in the emergency department today. The last reading we obtained was  BP: 166/71 . Please read the guidelines below about what these numbers  mean and what you should do about them.  If your systolic blood pressure (the top number) is less than 120 and your diastolic blood pressure (the bottom number) is less than 80, then your blood pressure is normal. There is nothing more that you need to do about it.  If your systolic blood pressure (the top number) is 120-139 or your diastolic blood pressure (the bottom number) is 80-89, your blood pressure may be higher than it should be. You should have your blood pressure rechecked within a year by a primary care provider.  If your systolic blood pressure (the top number) is 140 or greater or your diastolic blood pressure (the bottom number) is 90 or greater, you may have high blood pressure. High blood pressure is treatable, but if left untreated over time it can put you at risk for heart attack, stroke, or kidney failure. You should have your blood pressure rechecked by a primary care provider within the next 4 weeks.  If your provider in the emergency department today gave you specific instructions to follow-up with your doctor or provider even sooner than that, you should follow that instruction and not wait for up to 4 weeks for your follow-up visit.        Thank you for choosing [facility]       Thank you for choosing [facility] for your care. Our goal is always to provide you with excellent care. Hearing back from our patients is one way we can continue to improve our services. Please take a few minutes to complete the written survey that you may receive in the mail after you visit with us. Thank you!        [employer] Information     [employer] gives you secure access to your electronic health record. If you see a primary care provider, you can also send messages to your care team and make appointments. If you have questions, please call your primary care clinic.  If you do not have a primary care provider, please call [phone] and they will assist you.        Care EveryWhere ID     This is your Care EveryWhere  ID. This could be used by other organizations to access your [place] medical records  [id number]        Equal Access to Services     KAYLA ALEX : Faith Prather, ren barahona, simin wayne. So [facility] [phone].    ATENCIÓN: Si habla español, tiene a conn disposición servicios gratuitos de asistencia lingüística. Llame al [phone].    We comply with applicable federal civil rights laws and [place] laws. We do not discriminate on the basis of race, color, national origin, age, disability, sex, sexual orientation, or gender identity.            After Visit Summary       This is your record. Keep this with you and show to your community pharmacist(s) and doctor(s) at your next visit.

## 2017-10-03 NOTE — MR AVS SNAPSHOT
After Visit Summary   10/3/2017    Maia Miranda    MRN: [id number]           Patient Information     Date Of Birth          10/26/1932        Visit Information        Provider Department      10/3/2017 1:10 PM Juan Haney PA-C [facility]        Today's Diagnoses     RLQ abdominal pain    -  1      Care Instructions    Proceed to [facility] ED          Follow-ups after your visit        Your next 10 appointments already scheduled     Oct 04, 2017  8:45 AM CDT   New Visit with Tim Baldwin DPM   [facility]    [address]  [address]  [facility] [address]   [phone]            Oct 05, 2017 10:30 AM CDT   [facility] RAY Elmore CNP   [facility]    [address]  [address]  [facility] [id number] [phone]            Oct 05, 2017 10:30 AM CDT   [facility] Visit with EMERSON Concepcion   [facility]    [address]  [address]  [facility] [id number] [phone]            Oct 11, 2017 11:00 AM CDT   Anticoagulation Visit with  ANTICOAGULATION CLINIC   [facility]    [address] [address] [phone]              Who to contact     If you have questions or need follow up information about today's clinic visit or your schedule please contact [facility] directly at [phone].  Normal or non-critical lab and imaging results will be communicated to you by MyChart, letter or phone within 4 business days after the clinic has received the results. If you do not hear from us within 7 days, please contact the clinic through MyChart or phone. If you have a critical or abnormal lab result, we will notify you by phone as soon as  "possible.  Submit refill requests through [employer] or call your pharmacy and they will forward the refill request to us. Please allow 3 business days for your refill to be completed.          Additional Information About Your Visit        [employer] Information     [employer] gives you secure access to your electronic health record. If you see a primary care provider, you can also send messages to your care team and make appointments. If you have questions, please call your primary care clinic.  If you do not have a primary care provider, please call [phone] and they will assist you.        Care EveryWhere ID     This is your Care EveryWhere ID. This could be used by other organizations to access your [facility] medical records  [id number]        Your Vitals Were     Pulse Temperature Respirations Height Pulse Oximetry BMI (Body Mass Index)    55 98.1  F (36.7  C) (Oral) 14 5' 4\" (1.626 m) 98% 26.47 kg/m2       Blood Pressure from Last 3 Encounters:   10/03/17 126/64   09/07/17 122/64   09/06/17 130/62    Weight from Last 3 Encounters:   10/03/17 154 lb 3.2 oz (69.9 kg)   09/07/17 153 lb (69.4 kg)   09/06/17 155 lb 9.6 oz (70.6 kg)              Today, you had the following     No orders found for display         Today's Medication Changes          These changes are accurate as of: 10/3/17  1:57 PM.  If you have any questions, ask your nurse or doctor.               These medicines have changed or have updated prescriptions.        Dose/Directions    ascorbic acid 1000 MG Tabs   Commonly known as:  vitamin C   This may have changed:    - when to take this  - additional instructions   Used for:  Recurrent UTI, Neurogenic bladder        Dose:  1000 mg   Take 1 tablet (1,000 mg) by mouth 2 times daily   Quantity:  180 tablet   Refills:  3                Primary Care Provider Office Phone # Fax #    Dian Frias [phone]       [facility] [place] [address]        Equal Access to " Services     [facility]: Hadii aad ku hadbryannoah Robbali, waaxda luqadaha, qaybta kaalmada mana, simin leahy . So [facility] [phone].    ATENCIÓN: Si dileepla mari, tiene a conn disposición servicios gratuitos de asistencia lingüística. Llame al [phone].    We comply with applicable federal civil rights laws and [place] laws. We do not discriminate on the basis of race, color, national origin, age, disability, sex, sexual orientation, or gender identity.            Thank you!     Thank you for choosing [facility] [place]  for your care. Our goal is always to provide you with excellent care. Hearing back from our patients is one way we can continue to improve our services. Please take a few minutes to complete the written survey that you may receive in the mail after your visit with us. Thank you!             Your Updated Medication List - Protect others around you: Learn how to safely use, store and throw away your medicines at www.disposemymeds.org.          This list is accurate as of: 10/3/17  1:57 PM.  Always use your most recent med list.                   Brand Name Dispense Instructions for use Diagnosis    albuterol 108 (90 BASE) MCG/ACT Inhaler    PROAIR HFA/PROVENTIL HFA/VENTOLIN HFA    1 Inhaler    Inhale 2 puffs into the lungs every 6 hours as needed for shortness of breath / dyspnea or wheezing    SOB (shortness of breath)       ascorbic acid 1000 MG Tabs    vitamin C    180 tablet    Take 1 tablet (1,000 mg) by mouth 2 times daily    Recurrent UTI, Neurogenic bladder       carboxymethylcellulose 1 % ophthalmic solution    CELLUVISC/REFRESH LIQUIGEL     Place 1 drop into both eyes every morning        conjugated estrogens cream    PREMARIN     Place vaginally twice a week On Tuesday and Friday. Uses a pea sized amount        fluocinonide 0.05 % cream    LIDEX    30 g    Apply topically 2 times daily as needed    Dermatitis       GENTLECATH URINARY CATHETER Misc      120 each    1 catheter 4 times daily    Urinary retention       ketoprofen 10% in PLO 10% topical gel      Place onto the skin every 4 hours as needed for moderate pain        lisinopril 10 MG tablet    PRINIVIL/ZESTRIL    90 tablet    Take 1 tablet (10 mg) by mouth every morning    Hypertension goal BP (blood pressure) < 150/90       magnesium 250 MG tablet      Take 1 tablet by mouth at bedtime        methenamine hippurate 1 G Tabs tablet    HIPREX    90 tablet    Take 1 tablet (1 g) by mouth daily    Recurrent UTI       metoprolol 50 MG 24 hr tablet    TOPROL-XL    90 tablet    Take 1 tablet (50 mg) by mouth every evening    Hypertension goal BP (blood pressure) < 150/90       OMEGA-3 FISH OIL PO      Take 1 g by mouth 2 times daily (with meals)        polyethylene glycol Packet    MIRALAX/GLYCOLAX     Take 1 packet by mouth daily as needed        senna-docusate 8.6-50 MG per tablet    SENOKOT-S;PERICOLACE    30 tablet    Take 1-2 tablets by mouth 2 times daily Take while on oral narcotics to prevent or treat constipation.    Urinary retention       SM LUBRICATING JELLY Gel     144 Tube    Externally apply 1 packet topically 4 times daily    Urinary retention       sodium chloride 5 % ophthalmic ointment    PETER 128     Place 1 Application into both eyes At Bedtime        TYLENOL 500 MG tablet   Generic drug:  acetaminophen      Take 500-1,000 mg by mouth every 6 hours as needed        warfarin 2.5 MG tablet    COUMADIN    100 tablet    Take 2.5mg (1 tablet)  MTWTFSS or as directed by INR Clinic.    Long-term (current) use of anticoagulants

## 2017-10-03 NOTE — NURSING NOTE
"Chief Complaint   Patient presents with     Musculoskeletal Problem       Initial /64 (BP Location: Right arm, Patient Position: Chair, Cuff Size: Adult Regular)  Pulse 55  Temp 98.1  F (36.7  C) (Oral)  Resp 14  Ht 5' 4\" (1.626 m)  Wt 154 lb 3.2 oz (69.9 kg)  SpO2 98%  BMI 26.47 kg/m2 Estimated body mass index is 26.47 kg/(m^2) as calculated from the following:    Height as of this encounter: 5' 4\" (1.626 m).    Weight as of this encounter: 154 lb 3.2 oz (69.9 kg).  Medication Reconciliation: complete   Katja Ledesma CMA (AAMA)    "

## 2017-10-04 ENCOUNTER — OFFICE VISIT (OUTPATIENT)
Dept: PEDIATRICS | Facility: CLINIC | Age: 82
End: 2017-10-04
Payer: COMMERCIAL

## 2017-10-04 VITALS
DIASTOLIC BLOOD PRESSURE: 64 MMHG | OXYGEN SATURATION: 98 % | SYSTOLIC BLOOD PRESSURE: 130 MMHG | HEART RATE: 62 BPM | BODY MASS INDEX: 26.32 KG/M2 | HEIGHT: 64 IN | WEIGHT: 154.2 LBS | TEMPERATURE: 97.6 F

## 2017-10-04 DIAGNOSIS — R10.31 ABDOMINAL PAIN, RIGHT LOWER QUADRANT: Primary | ICD-10-CM

## 2017-10-04 DIAGNOSIS — R91.1 PULMONARY NODULE SEEN ON IMAGING STUDY: ICD-10-CM

## 2017-10-04 DIAGNOSIS — Z09 FOLLOW-UP EXAM: ICD-10-CM

## 2017-10-04 LAB
BACTERIA SPEC CULT: NO GROWTH
Lab: NORMAL
SPECIMEN SOURCE: NORMAL

## 2017-10-04 PROCEDURE — 99214 OFFICE O/P EST MOD 30 MIN: CPT | Mod: GC | Performed by: INTERNAL MEDICINE

## 2017-10-04 NOTE — ED PROVIDER NOTES
CHIEF COMPLAINT:  Abdominal pain.       HISTORY OF PRESENT ILLNESS:  Maia Mondragon is an 84-year-old female with a history of a neurostimulator for neurogenic bladder and atrial fibrillation on Coumadin, presenting with right lower quadrant pain.  The patient reports onset of pain was 5 days ago and has continued to be present.  It has not worsened.  She is taking intermittent Tylenol for pain.  She has had no nausea or vomiting, no fevers, no dysuria or change in urination.  She does self cath, but has not noticed any changes in urination.  She continues to pass gas.  She has had no prior abdominal surgeries.  She has had no vaginal bleeding or discharge.  She has been able to eat and drink normally.        MEDICATIONS:  Reviewed in Epic.       ALLERGIES:  Extensive drug allergies noted in Epic.        PAST MEDICAL HISTORY, FAMILY HISTORY, SOCIAL HISTORY:  Patient has a history of neurogenic bladder requiring a neurostimulator.  She has atrial fibrillation on warfarin.  She denies any alcohol, tobacco or street drug use.  Remainder of history is not pertinent.       REVIEW OF SYSTEMS:  Denies any vomiting or diarrhea.  Denies any fevers.  Denies any change in appetite.  Denies any change in urination.  Does endorse abdominal pain.  All other systems negative.        PHYSICAL EXAMINATION:     VITAL SIGNS:  Blood pressure 193/98, pulse 67, respirations 16, satting 98% on room air, temperature 98.1.    EYES:  Pupils equal, round and reactive to light.     EARS, NOSE, THROAT:  Moist mucous membranes.    CARDIOVASCULAR:  Regular rate and rhythm.  No murmurs.    RESPIRATORY:  Clear to auscultation bilaterally.  No crackles or wheezing.    GASTROINTESTINAL:  Right lower quadrant tenderness to palpation.  No rebound or guarding.  Nondistended.     MUSCULOSKELETAL:  Right flank discomfort to palpation.  No CVA tenderness.    SKIN:  Warm, well perfused.    NEUROLOGIC:  Alert and oriented.        LABORATORY AND DIAGNOSTICS:      CBC:  White count 5.2, hemoglobin 11.8, remainder within normal limits.  INR 2.11.     BMP:  Creatinine 1.29, GFR 39, BUN 27.    Lactate 0.8.     LFT:  Within normal limits.     Lipase:  Within normal limits.    Urinalysis:  Negative.     CT abdomen without contrast:  No acute pathology appreciated.    Ultrasound of pelvis complete with transvaginal:  Prominent uterine fibroid obscures endometrial cavity.  Unable to visualize ovaries.    Ultrasound of abdomen:  Limited.  Gallbladder somewhat prominent, but otherwise normal.        INTERVENTIONS:  IV fluids, Tylenol.       MEDICAL DECISION MAKING:  An 84-year-old female on warfarin for atrial fibrillation, neurogenic bladder with neurostimulator, presenting with right lower quadrant pain.  Vital signs reviewed, hypertensive, remainder unremarkable.  Hypertension did improve the course of her ER stay.  Broad differential was pursued, including but not limited to ischemic colitis or mesentery ischemia, diverticulitis, colitis, nephrolithiasis, pyelonephritis, urinary tract infection, ovarian cyst, uterine fibroid, cholecystitis, cholangitis, etc.  Overall, the patient has a relatively benign examination.  She does have some mild right lower quadrant tenderness and mild right flank tenderness to palpation.  She is eating and drinking normally and passing gas.  There is no evidence of obstruction.        CT is reassuring without any acute pathology appreciated.  They were able to visualized the appendix, and it appeared unremarkable.  No evidence of diverticulitis or small bowel obstruction.  No evidence of nephrolithiasis or hydronephrosis.  Urinalysis bland, not concerning for urinary tract infection.  CBC without any significant leukocytosis or anemia.  Lactate reassuring.  Does not appear concerning for ischemic colitis or mesenteric ischemia.  There is no black or bloody stools as well.  Her INR is therapeutic, which is reassuring with her atrial fibrillation.   LFTs unremarkable, lipase unremarkable, in addition to reassuring right upper quadrant ultrasound, does not concern for obstructive biliary process, cholecystitis, cholangitis or pancreatitis.  Ultrasound of the pelvis did show a uterine fibroid, and she did have some tenderness with manipulation of the endocavitary ultrasound probe.  Her uterine fibroid may be causing her some discomfort.  She denies any bleeding.  She denies any bleeding.  She has known about this fibroid in the past.       She does not appear to be consistent with ovarian torsion despite ovaries not being seen.  There is no free fluid in the cul-de-sac to suggest a ruptured cyst.  Her pain is not uncontrollable to suggest torsion at this time.  She is much improved after Tylenol alone.        Overall, discussed that, given that we are unable to clearly identify her source of pain, she should follow up with her primary care doctor tomorrow.  Unfortunately, we were unable to get an IV contrast CT given her creatinine; her creatinine is at baseline.  Given she has chronic kidney disease, we did not want to further worsen her kidney function.  This was discussed with the patient, who agrees with plan.  We did offer observation for pain control versus close follow up.  She would prefer to follow up with her primary care doctor.  She is feeling much improved after Tylenol and ambulating without difficulty.  She is able to eat and drink normally.  At this time, it appears that she is safe for discharge home with close followup.  Discussed warning signs and symptoms at length, and she voiced understanding.        DIAGNOSIS:  Right lower quadrant abdominal pain.        PLAN AND DISPOSITION:  Close followup tomorrow of her repeat abdominal examination.  Return precautions discussed at length.  Home.         PRAVEENA MARCUS MD             D: 10/03/2017 20:11   T: 10/03/2017 23:00   MT: [id number]      Name:     JOSE DANIEL RAWLS   MRN:      [id number]         Account:      [id number]   :      10/26/1932           Visit Date:   10/03/2017      Document: [id number]

## 2017-10-04 NOTE — DISCHARGE INSTRUCTIONS
Return to the ED if you are unable to tolerate fluids, intractable nausea or vomiting, severe abdominal pain, fevers >101 or other acute changes.  Please follow up with your PCP in 2-3 days.      Please have your BP rechecked tomorrow as well.      Discharge Instructions  Abdominal Pain    Abdominal pain (belly pain) can be caused by many things. Your evaluation today does not show the exact cause for your pain. Your provider today has decided that it is unlikely your pain is due to a life threatening problem, or a problem requiring surgery or hospital admission. Sometimes those problems cannot be found right away, so it is very important that you follow up as directed.  Sometimes only the changes which occur over time allow the cause of your pain to be found.    Generally, every Emergency Department visit should have a follow-up clinic visit with either a primary or a specialty clinic/provider. Please follow-up as instructed by your emergency provider today. With abdominal pain, we often recommend very close follow-up, such as the following day.    ADULTS:  Return to the Emergency Department right away if:      You get an oral temperature above 102oF or as directed by your provider.    You have blood in your stools. This may be bright red or appear as black, tarry stools.      You keep vomiting (throwing up) or cannot drink liquids.    You see blood when you vomit.     You cannot have a bowel movement or you cannot pass gas.    Your stomach gets bloated or bigger.    Your skin or the whites of your eyes look yellow.    You faint.    You have bloody, frequent or painful urination (peeing).    You have new symptoms or anything that worries you.    CHILDREN:  Return to the Emergency Department right away if your child has any of the above-listed symptoms or the following:      Pushes your hand away or screams/cries when his/her belly is touched.    You notice your child is very fussy or weak.    Your child is very  tired and is too tired to eat or drink.    Your child is dehydrated.  Signs of dehydration can be:  o Significant change in the amount of wet diapers/urine.  o Your infant or child starts to have dry mouth and lips, or no saliva (spit) or tears.    PREGNANT WOMEN:  Return to the Emergency Department right away if you have any of the above-listed symptoms or the following:      You have bleeding, leaking fluid or passing tissue from the vagina.    You have worse pain or cramping, or pain in your shoulder or back.    You have vomiting that will not stop.    You have a temperature of 100oF or more.    Your baby is not moving as much as usual.    You faint.    You get a bad headache with or without eye problems and abdominal pain.    You have a seizure.    You have unusual discharge from your vagina and abdominal pain.    Abdominal pain is pretty common during pregnancy.  Your pain may or may not be related to your pregnancy. You should follow-up closely with your OB provider so they can evaluate you and your baby.  Until you follow-up with your regular provider, do the following:       Avoid sex and do not put anything in your vagina.    Drink clear fluids.    Only take medications approved by your provider.    MORE INFORMATION:    Appendicitis:  A possible cause of abdominal pain in any person who still has their appendix is acute appendicitis. Appendicitis is often hard to diagnose.  Testing does not always rule out early appendicitis or other causes of abdominal pain. Close follow-up with your provider and re-evaluations may be needed to figure out the reason for your abdominal pain.    Follow-up:  It is very important that you make an appointment with your clinic and go to the appointment.  If you do not follow-up with your primary provider, it may result in missing an important development which could result in permanent injury or disability and/or lasting pain.  If there is any problem keeping your appointment,  "call your provider or return to the Emergency Department.    Medications:  Take your medications as directed by your provider today.  Before using over-the-counter medications, ask your provider and make sure to take the medications as directed.  If you have any questions about medications, ask your provider.    Diet:  Resume your normal diet as much as possible, but do not eat fried, fatty or spicy foods while you have pain.  Do not drink alcohol or have caffeine.  Do not smoke tobacco.    Probiotics: If you have been given an antibiotic, you may want to also take a probiotic pill or eat yogurt with live cultures. Probiotics have \"good bacteria\" to help your intestines stay healthy. Studies have shown that probiotics help prevent diarrhea (loose stools) and other intestine problems (including C. diff infection) when you take antibiotics. You can buy these without a prescription in the pharmacy section of the store.     If you were given a prescription for medicine here today, be sure to read all of the information (including the package insert) that comes with your prescription.  This will include important information about the medicine, its side effects, and any warnings that you need to know about.  The pharmacist who fills the prescription can provide more information and answer questions you may have about the medicine.  If you have questions or concerns that the pharmacist cannot address, please call or return to the Emergency Department.       Remember that you can always come back to the Emergency Department if you are not able to see your regular provider in the amount of time listed above, if you get any new symptoms, or if there is anything that worries you.    "

## 2017-10-04 NOTE — PATIENT INSTRUCTIONS
- recommend tylenol as needed for pain (no more than 3,000 mg per day)  - recommend applying ice and heat to the affected area as needed  - return to clinic if your symptoms persist, come back sooner if your symptoms worsen  - see below for reasons to call your doctor or 911    *Abdominal Pain, Unknown Cause (Female)    The exact cause of your abdominal (stomach) pain is not certain. This does not mean that this is something to worry about, or the right tests were not done. Everyone likes to know the exact cause of the problem, but sometimes with abdominal pain, there is no clear-cut cause, and this could be a good thing. The good news is that your symptoms can be treated, and you will feel better.   Your condition does not seem serious now; however, sometimes the signs of a serious problem may take more time to appear. For this reason, it is important for you to watch for any new symptoms, problems, or worsening of your condition.  Over the next few days, the abdominal pain may come and go, or be continuous. Other common symptoms can include nausea and vomiting. Sometimes it can be difficult to tell if you feel nauseous, you may just feel bad and not associate that feeling with nausea. Constipation, diarrhea, and a fever may go along with the pain.  The pain may continue even if treated correctly over the following days. Depending on how things go, sometimes the cause can become clear and may require further or different treatment. Additional evaluations, medications, or tests may be needed.  Home care  Your health care provider may prescribe medications for pain, symptoms, or an infection.  Follow the health care provider's instructions for taking these medications.  General care    Rest until your next exam. No strenuous activities.    Try to find positions that ease discomfort. A small pillow placed on the abdomen may help relieve pain.    Something warm on your abdomen (such as a heating pad) may help, but be  careful not to burn yourself.  Diet    Do not force yourself to eat, especially if having cramps, vomiting, or diarrhea.    Water is important so you do not get dehydrated. Soup may also be good. Sports drinks may also help, especially if they are not too acidic. Make sure you don't drink sugary drinks as this can make things worse. Take liquids in small amounts. Do not guzzle them.    Caffeine sometimes makes the pain and cramping worse.    Avoid dairy products if you have vomiting or diarrhea.    Don't eat large amounts at a time. Wait a few minutes between bites.    Eat a diet low in fiber (called a low-residue diet). Foods allowed include refined breads, white rice, fruit and vegetable juices without pulp, tender meats. These foods will pass more easily through the intestine.    Avoid fried or fatty foods, dairy, alcohol and spicy foods until your symptoms go away.  Follow-up care  Follow up with your health care provider as instructed, or if your pain does not begin to improve in the next 24 hours.  When to seek medical care  Seek prompt medical care if any of the following occur:    Pain gets worse or moves to the right lower abdomen    New or worsening vomiting or diarrhea    Swelling of the abdomen    Unable to pass stool for more than three days    New fever over 101  F (38.3 C), or rising fever    Blood in vomit or bowel movements (dark red or black color)    Jaundice (yellow color of eyes and skin)    Weakness, dizziness    Chest, arm, back, neck or jaw pain    Unexpected vaginal bleeding or missed period  Call 911  Call emergency services if any of the following occur:    Trouble breathing    Confusion    Fainting or loss of consciousness    Rapid heart rate    Seizure    [id number] [facility], [address] [place] [address], [place] [address]. All rights reserved. This information is not intended as a substitute for professional medical care. Always follow your healthcare professional's  instructions.

## 2017-10-04 NOTE — PROGRESS NOTES
"  SUBJECTIVE:   Maia Miranda is a 84 year old female who presents to clinic today for the following health issues:    ED/UC Followup:    Facility:  [facility]  Date of visit: 10/3/17  Reason for visit: Right lower quadrant abdominal pain, no pathologic findings. Given Tylenol and pn improved in ER. Follow up with pcp recommended.    Current Status: still having abdominal pain but not as bad     Patient continues to have vague RLQ pain, but \"it's better than yesterday.\" Pain is difficult to describe and consistent. Denies anything that makes it worse. Tylenol makes it better. She is taking up to 1,000 mg as needed. She cannot take NSAIDs 2/2 to chronic coumadin and CKD. She slept well last night and her pain did not wake her from sleep. She denies recent heavy lifting, falling or trauma to the right torso. She denies rashes to the affected area. She denies fevers, chills, dyspnea, other abd pain, N/V/D, dysuria, vaginal bleeding, hematuria, hematochezia. She urinated less than usual this AM, but self cathed for 600 cc of dark urine. She presents today for recommended close PCP follow up after her ER visit as per above.    Patient is wondering about nodule seen on previous CXR. When should she get her \"cat scan?\"    Problem list and histories reviewed & adjusted, as indicated.  Additional history: as documented    Patient Active Problem List   Diagnosis     Hyperlipidemia LDL goal <130     Osteopenia     Primary osteoarthritis involving multiple joints     Essential and other specified forms of tremor     Esophageal reflux     Varicose veins of lower extremities with complications     Allergic rhinitis     Degeneration of lumbar or lumbosacral intervertebral disc     Internal bleeding hemorrhoids     Osteoarthritis of thumb     Diverticulosis     CKD (chronic kidney disease) stage 3, GFR 30-59 ml/min     Chronic constipation     Urinary retention     Health Care Home     Advanced directives, " counseling/discussion     Long-term (current) use of anticoagulants     Atrial fibrillation (H)     Hypertension goal BP (blood pressure) < 150/90     Post-menopausal bleeding     History of recurrent UTIs     Mild cognitive impairment     Fibroid uterus     Past Surgical History:   Procedure Laterality Date     C NONSPECIFIC PROCEDURE      D&C x 2 in 7 &  -- Abstracted 06/10/02     C NONSPECIFIC PROCEDURE      Left breast biopsy x 2 in  &  -- Abstracted 06/10/02     C NONSPECIFIC PROCEDURE      Influenza resulting in hospitalization -- Abstracted 06/10/02     C NONSPECIFIC PROCEDURE  1971    10 day hospitalization from bilateral pulmonary enboli -- Abstracted 06/10/02     C NONSPECIFIC PROCEDURE      colonoscopy  negative     CATARACT IOL, RT/LT       corneal scraping       CYSTOSCOPY, BIOPSY BLADDER, COMBINED N/A 2016    Procedure: COMBINED CYSTOSCOPY, BIOPSY BLADDER;  Surgeon: Bernadine Maria MD;  Location: [facility]     ESOPHAGOSCOPY, GASTROSCOPY, DUODENOSCOPY (EGD), COMBINED  2013    Procedure: COMBINED ESOPHAGOSCOPY, GASTROSCOPY, DUODENOSCOPY (EGD);   ESOPHAGOSCOPY, GASTROSCOPY, DUODENOSCOPY (EGD)   ;  Surgeon: Shawn Soto MD;  Location: [facility]     IMPLANT STIMULATOR SACRAL NERVE STAGE ONE N/A 2017    Procedure: IMPLANT STIMULATOR SACRAL NERVE STAGE ONE;  Surgeon: Kb Tracy MD;  [facility]     IMPLANT STIMULATOR SACRAL NERVE STAGE TWO N/A 2017    Procedure: IMPLANT STIMULATOR SACRAL NERVE STAGE TWO;  Stage Two Interstim  ;  Surgeon: Kb Tracy MD;  Location: [facility]     interstim placement         Social History   Substance Use Topics     Smoking status: Never Smoker     Smokeless tobacco: Never Used     Alcohol use No     Family History   Problem Relation Age of Onset     CEREBROVASCULAR DISEASE Father       at 92     Psychotic Disorder Mother      Suicide 62, depression     Alzheimer Disease Maternal Grandmother      Cardiovascular  Sister      pacemaker     Glaucoma No family hx of          Current Outpatient Prescriptions   Medication Sig Dispense Refill     albuterol (PROAIR HFA/PROVENTIL HFA/VENTOLIN HFA) 108 (90 BASE) MCG/ACT Inhaler Inhale 2 puffs into the lungs every 6 hours as needed for shortness of breath / dyspnea or wheezing 1 Inhaler 0     metoprolol (TOPROL-XL) 50 MG 24 hr tablet Take 1 tablet (50 mg) by mouth every evening 90 tablet 3     lisinopril (PRINIVIL/ZESTRIL) 10 MG tablet Take 1 tablet (10 mg) by mouth every morning 90 tablet 3     warfarin (COUMADIN) 2.5 MG tablet Take 2.5mg (1 tablet)  MTWTFSS or as directed by INR Clinic. 100 tablet 3     senna-docusate (SENOKOT-S;PERICOLACE) 8.6-50 MG per tablet Take 1-2 tablets by mouth 2 times daily Take while on oral narcotics to prevent or treat constipation. 30 tablet 0     methenamine hippurate (HIPREX) 1 G TABS Take 1 tablet (1 g) by mouth daily 90 tablet 3     polyethylene glycol (MIRALAX/GLYCOLAX) packet Take 1 packet by mouth daily as needed        ketoprofen 10% in PLO 10% Place onto the skin every 4 hours as needed for moderate pain       conjugated estrogens (PREMARIN) vaginal cream Place vaginally twice a week On Tuesday and Friday. Uses a pea sized amount       fluocinonide (LIDEX) 0.05 % cream Apply topically 2 times daily as needed 30 g 2     acetaminophen (TYLENOL) 500 MG tablet Take 500-1,000 mg by mouth every 6 hours as needed       Catheters (GENTLECATH URINARY CATHETER) MISC 1 catheter 4 times daily 120 each 12     Lubricants (SM LUBRICATING JELLY) GEL Externally apply 1 packet topically 4 times daily 144 Tube 12     ascorbic acid (VITAMIN C) 1000 MG TABS Take 1 tablet (1,000 mg) by mouth 2 times daily (Patient taking differently: Take 1,000 mg by mouth daily With HYprex) 180 tablet 3     Omega-3 Fatty Acids (OMEGA-3 FISH OIL PO) Take 1 g by mouth 2 times daily (with meals)       sodium chloride (PETER 128) 5 % ophthalmic ointment Place 1 Application into both  "eyes At Bedtime       carboxymethylcellulose (CELLUVISC/REFRESH LIQUIGEL) 1 % ophthalmic solution Place 1 drop into both eyes every morning        MAGNESIUM 250 MG OR TABS Take 1 tablet by mouth at bedtime  0     Allergies   Allergen Reactions     Codeine Nausea and Vomiting     Meperidine Nausea and Vomiting     Morphine Nausea and Vomiting     vomiting     Penicillins Hives     hives     Sulfa Drugs      Vomiting       Epinephrine Palpitations     Recent Labs   Lab Test  10/03/17   1520  06/14/17   0927   05/16/16   0945  07/03/15   0959   06/06/14   1415  04/03/14   1347   LDL   --   147*   --   133*  134*   < >   --    --    HDL   --   52   --   51  55   < >   --    --    TRIG   --   146   --   139  96   < >   --    --    ALT  21  24   --   27  30   --   32  30   CR  1.29*  1.29*   < >  1.11*  1.12*   < >  1.07*  1.04   GFRESTIMATED  39*  39*   < >  47*  47*   < >  49*  51*   GFRESTBLACK  48*  48*   < >  57*  56*   < >  60*  62   POTASSIUM  4.1  4.9   < >  4.7  4.5   < >  4.3  4.2   TSH   --    --    --    --    --    --   2.44  1.92    < > = values in this interval not displayed.      BP Readings from Last 3 Encounters:   10/04/17 130/64   10/03/17 166/71   10/03/17 126/64    Wt Readings from Last 3 Encounters:   10/04/17 154 lb 3.2 oz (69.9 kg)   10/03/17 154 lb 3.2 oz (69.9 kg)   09/07/17 153 lb (69.4 kg)         Labs reviewed in EPIC    ROS:  7 point ROS completed and were negative aside from the pertinent findings noted in the HPI.    OBJECTIVE:   /64 (BP Location: Right arm, Patient Position: Chair, Cuff Size: Adult Regular)  Pulse 62  Temp 97.6  F (36.4  C) (Tympanic)  Ht 5' 4\" (1.626 m)  Wt 154 lb 3.2 oz (69.9 kg)  SpO2 98%  BMI 26.47 kg/m2  Body mass index is 26.47 kg/(m^2).  GENERAL: healthy, alert and no distress  EYES: Eyes grossly normal to inspection  RESP: lungs clear to auscultation - no rales, rhonchi or wheezes  CV: regular rate and rhythm, normal S1S2  ABDOMEN: soft, mildly tender " to deep palpation of the RLQ mostly lateral to the umbilicus and near the suprapubic region, no rigidity or guarding, bowel sounds active, + right CVA tenderness  SKIN: small area of ecchymosis on the right lower back, no obvious rashes anywhere on the right lower back or RLQ  NEURO: alert with no focal deficits    ASSESSMENT/PLAN:     (R10.31) Abdominal pain, right lower quadrant  (primary encounter diagnosis)  (Z09) Follow-up exam  ER follow up for RLQ pain, unspecified. Though etiology is unclear reassured by essentially normal labs and imaging. Fibroid was found on US, but this is known and not significantly changed in size. CT scan negative for appendicitis, pancreatitis, nephrolithiasis. UA/UC not c/w infection. Remaining differential would include MSK related vs fibroid vs suprapubic pain 2/2 to incomplete bladder emptying vs infectious/shingles prodrome? Non toxic appearing today, afebrile with stable vitals and pain is improving. Continue to monitor.   Plan:   - tylenol as needed  - avoid NSAIDs 2/2 chronic OAC and CKD  - ice and heat to the affected area as needed    (R91.1) Pulmonary nodule seen on imaging study  0.8 cm nodule noted incidentally on previous CXR. CT recommended. This is ordered.   Plan:   - pt counseled to get chest CT at her convenience and follow up with PCP    Symptoms to seek immediate medical care reviewed with patient  For additional instructions, see AVS   RTC if symptoms persist longer than 1 week, sooner if symptoms acutely worsen    Pt seen and d/w attending physician, Dr. Frias, who agrees with plan     Ap Chen MD  PGY3 [facility]    I have seen this patient and examined him in the presence of Dr. Chen.  I was present during the key components of the presenting complaints, physical exam, diagnosis, and plan, and fully concur with the plan as listed below above in the resident's note.    Dian Frias MD  Internal  Medicine/Pediatrics

## 2017-10-04 NOTE — NURSING NOTE
"Chief Complaint   Patient presents with     ER F/U     abdominal pain       Initial /64 (BP Location: Right arm, Patient Position: Chair, Cuff Size: Adult Regular)  Pulse 62  Temp 97.6  F (36.4  C) (Tympanic)  Ht 5' 4\" (1.626 m)  Wt 154 lb 3.2 oz (69.9 kg)  SpO2 98%  BMI 26.47 kg/m2 Estimated body mass index is 26.47 kg/(m^2) as calculated from the following:    Height as of this encounter: 5' 4\" (1.626 m).    Weight as of this encounter: 154 lb 3.2 oz (69.9 kg).  Medication Reconciliation: complete   Fatmata Kendrick LPN      "

## 2017-10-04 NOTE — MR AVS SNAPSHOT
After Visit Summary   10/4/2017    Maia Miranda    MRN: [id number]           Patient Information     Date Of Birth          10/26/1932        Visit Information        Provider Department      10/4/2017 11:15 AM Braulio Chen MD [facility]        Today's Diagnoses     Abdominal pain, right lower quadrant    -  1    Follow-up exam          Care Instructions    - recommend tylenol as needed for pain (no more than 3,000 mg per day)  - recommend applying ice and heat to the affected area as needed  - return to clinic if your symptoms persist, come back sooner if your symptoms worsen  - see below for reasons to call your doctor or 911    *Abdominal Pain, Unknown Cause (Female)    The exact cause of your abdominal (stomach) pain is not certain. This does not mean that this is something to worry about, or the right tests were not done. Everyone likes to know the exact cause of the problem, but sometimes with abdominal pain, there is no clear-cut cause, and this could be a good thing. The good news is that your symptoms can be treated, and you will feel better.   Your condition does not seem serious now; however, sometimes the signs of a serious problem may take more time to appear. For this reason, it is important for you to watch for any new symptoms, problems, or worsening of your condition.  Over the next few days, the abdominal pain may come and go, or be continuous. Other common symptoms can include nausea and vomiting. Sometimes it can be difficult to tell if you feel nauseous, you may just feel bad and not associate that feeling with nausea. Constipation, diarrhea, and a fever may go along with the pain.  The pain may continue even if treated correctly over the following days. Depending on how things go, sometimes the cause can become clear and may require further or different treatment. Additional evaluations, medications, or tests may be needed.  Home care  Your health care  provider may prescribe medications for pain, symptoms, or an infection.  Follow the health care provider's instructions for taking these medications.  General care    Rest until your next exam. No strenuous activities.    Try to find positions that ease discomfort. A small pillow placed on the abdomen may help relieve pain.    Something warm on your abdomen (such as a heating pad) may help, but be careful not to burn yourself.  Diet    Do not force yourself to eat, especially if having cramps, vomiting, or diarrhea.    Water is important so you do not get dehydrated. Soup may also be good. Sports drinks may also help, especially if they are not too acidic. Make sure you don't drink sugary drinks as this can make things worse. Take liquids in small amounts. Do not guzzle them.    Caffeine sometimes makes the pain and cramping worse.    Avoid dairy products if you have vomiting or diarrhea.    Don't eat large amounts at a time. Wait a few minutes between bites.    Eat a diet low in fiber (called a low-residue diet). Foods allowed include refined breads, white rice, fruit and vegetable juices without pulp, tender meats. These foods will pass more easily through the intestine.    Avoid fried or fatty foods, dairy, alcohol and spicy foods until your symptoms go away.  Follow-up care  Follow up with your health care provider as instructed, or if your pain does not begin to improve in the next 24 hours.  When to seek medical care  Seek prompt medical care if any of the following occur:    Pain gets worse or moves to the right lower abdomen    New or worsening vomiting or diarrhea    Swelling of the abdomen    Unable to pass stool for more than three days    New fever over 101  F (38.3 C), or rising fever    Blood in vomit or bowel movements (dark red or black color)    Jaundice (yellow color of eyes and skin)    Weakness, dizziness    Chest, arm, back, neck or jaw pain    Unexpected vaginal bleeding or missed period  Call  [phone]  Call emergency services if any of the following occur:    Trouble breathing    Confusion    Fainting or loss of consciousness    Rapid heart rate    Seizure    [id number] [employer], [address] [place] [address], [place] [address]. All rights reserved. This information is not intended as a substitute for professional medical care. Always follow your healthcare professional's instructions.                Follow-ups after your visit        Your next 10 appointments already scheduled     Oct 05, 2017 10:30 AM CDT   [facility]    [address]  [address]  [facility] [address]   [phone]            Oct 05, 2017 10:30 AM CDT   [facility] EMERSON Concepcion   [facility]    [address] [place]  [address]  [facility] [address]   [phone]            Oct 11, 2017 11:00 AM CDT   Anticoagulation Visit with  ANT[facility]ULATION CLINIC   [facility]    [address] [facility] [address] [phone]              Who to contact     If you have questions or need follow up information about today's clinic visit or your schedule please contact [facility] directly at [phone].  Normal or non-critical lab and imaging results will be communicated to you by MyChart, letter or phone within 4 business days after the clinic has received the results. If you do not hear from us within 7 days, please contact the clinic through MyChart or phone. If you have a critical or abnormal lab result, we will notify you by phone as soon as possible.  Submit refill requests through [employer] or call your pharmacy and they will forward the refill request to us. Please allow 3 business days for your refill to be completed.          Additional  "Information About Your Visit        MyChart Information     [employer] gives you secure access to your electronic health record. If you see a primary care provider, you can also send messages to your care team and make appointments. If you have questions, please call your primary care clinic.  If you do not have a primary care provider, please call [phone] and they will assist you.        Care EveryWhere ID     This is your Care EveryWhere ID. This could be used by other organizations to access your [place] medical records  [id number]        Your Vitals Were     Pulse Temperature Height Pulse Oximetry BMI (Body Mass Index)       62 97.6  F (36.4  C) (Tympanic) 5' 4\" (1.626 m) 98% 26.47 kg/m2        Blood Pressure from Last 3 Encounters:   10/04/17 130/64   10/03/17 166/71   10/03/17 126/64    Weight from Last 3 Encounters:   10/04/17 154 lb 3.2 oz (69.9 kg)   10/03/17 154 lb 3.2 oz (69.9 kg)   09/07/17 153 lb (69.4 kg)              Today, you had the following     No orders found for display         Today's Medication Changes          These changes are accurate as of: 10/4/17 11:46 AM.  If you have any questions, ask your nurse or doctor.               These medicines have changed or have updated prescriptions.        Dose/Directions    ascorbic acid 1000 MG Tabs   Commonly known as:  vitamin C   This may have changed:    - when to take this  - additional instructions   Used for:  Recurrent UTI, Neurogenic bladder        Dose:  1000 mg   Take 1 tablet (1,000 mg) by mouth 2 times daily   Quantity:  180 tablet   Refills:  3                Primary Care Provider Office Phone # Fax #    Dian Frias [phone] [address] [place] [address]        Equal Access to Services     [facility]: Faith Prather, ren barahona, simin wayne. So [facility] [phone].    ATENCIÓN: Si kandace espisabella poole a conn " disposición servicios gratuitos de asistencia lingüística. Ivet preston [phone].    We comply with applicable federal civil rights laws and [place] laws. We do not discriminate on the basis of race, color, national origin, age, disability, sex, sexual orientation, or gender identity.            Thank you!     Thank you for choosing [facility]  for your care. Our goal is always to provide you with excellent care. Hearing back from our patients is one way we can continue to improve our services. Please take a few minutes to complete the written survey that you may receive in the mail after your visit with us. Thank you!             Your Updated Medication List - Protect others around you: Learn how to safely use, store and throw away your medicines at www.disposemymeds.org.          This list is accurate as of: 10/4/17 11:46 AM.  Always use your most recent med list.                   Brand Name Dispense Instructions for use Diagnosis    albuterol 108 (90 BASE) MCG/ACT Inhaler    PROAIR HFA/PROVENTIL HFA/VENTOLIN HFA    1 Inhaler    Inhale 2 puffs into the lungs every 6 hours as needed for shortness of breath / dyspnea or wheezing    SOB (shortness of breath)       ascorbic acid 1000 MG Tabs    vitamin C    180 tablet    Take 1 tablet (1,000 mg) by mouth 2 times daily    Recurrent UTI, Neurogenic bladder       carboxymethylcellulose 1 % ophthalmic solution    CELLUVISC/REFRESH LIQUIGEL     Place 1 drop into both eyes every morning        conjugated estrogens cream    PREMARIN     Place vaginally twice a week On Tuesday and Friday. Uses a pea sized amount        fluocinonide 0.05 % cream    LIDEX    30 g    Apply topically 2 times daily as needed    Dermatitis       GENTLECATH URINARY CATHETER Misc     120 each    1 catheter 4 times daily    Urinary retention       ketoprofen 10% in PLO 10% topical gel      Place onto the skin every 4 hours as needed for moderate pain        lisinopril 10 MG tablet     PRINIVIL/ZESTRIL    90 tablet    Take 1 tablet (10 mg) by mouth every morning    Hypertension goal BP (blood pressure) < 150/90       magnesium 250 MG tablet      Take 1 tablet by mouth at bedtime        methenamine hippurate 1 G Tabs tablet    HIPREX    90 tablet    Take 1 tablet (1 g) by mouth daily    Recurrent UTI       metoprolol 50 MG 24 hr tablet    TOPROL-XL    90 tablet    Take 1 tablet (50 mg) by mouth every evening    Hypertension goal BP (blood pressure) < 150/90       OMEGA-3 FISH OIL PO      Take 1 g by mouth 2 times daily (with meals)        polyethylene glycol Packet    MIRALAX/GLYCOLAX     Take 1 packet by mouth daily as needed        senna-docusate 8.6-50 MG per tablet    SENOKOT-S;PERICOLACE    30 tablet    Take 1-2 tablets by mouth 2 times daily Take while on oral narcotics to prevent or treat constipation.    Urinary retention       SM LUBRICATING JELLY Gel     144 Tube    Externally apply 1 packet topically 4 times daily    Urinary retention       sodium chloride 5 % ophthalmic ointment    PETER 128     Place 1 Application into both eyes At Bedtime        TYLENOL 500 MG tablet   Generic drug:  acetaminophen      Take 500-1,000 mg by mouth every 6 hours as needed        warfarin 2.5 MG tablet    COUMADIN    100 tablet    Take 2.5mg (1 tablet)  MTWTFSS or as directed by INR Clinic.    Long-term (current) use of anticoagulants

## 2017-10-05 ENCOUNTER — OFFICE VISIT (OUTPATIENT)
Dept: GERIATRIC MEDICINE | Facility: CLINIC | Age: 82
End: 2017-10-05
Payer: COMMERCIAL

## 2017-10-05 ENCOUNTER — OFFICE VISIT (OUTPATIENT)
Dept: PHARMACY | Facility: CLINIC | Age: 82
End: 2017-10-05
Payer: COMMERCIAL

## 2017-10-05 VITALS
WEIGHT: 152 LBS | BODY MASS INDEX: 26.09 KG/M2 | TEMPERATURE: 97.6 F | HEART RATE: 61 BPM | OXYGEN SATURATION: 99 % | SYSTOLIC BLOOD PRESSURE: 162 MMHG | DIASTOLIC BLOOD PRESSURE: 60 MMHG

## 2017-10-05 VITALS
OXYGEN SATURATION: 99 % | SYSTOLIC BLOOD PRESSURE: 162 MMHG | WEIGHT: 152 LBS | DIASTOLIC BLOOD PRESSURE: 60 MMHG | BODY MASS INDEX: 26.09 KG/M2 | TEMPERATURE: 97.6 F | HEART RATE: 61 BPM

## 2017-10-05 DIAGNOSIS — K59.09 CHRONIC CONSTIPATION: ICD-10-CM

## 2017-10-05 DIAGNOSIS — I10 HYPERTENSION GOAL BP (BLOOD PRESSURE) < 150/90: ICD-10-CM

## 2017-10-05 DIAGNOSIS — I48.91 ATRIAL FIBRILLATION, UNSPECIFIED TYPE (H): ICD-10-CM

## 2017-10-05 DIAGNOSIS — G31.84 MILD COGNITIVE IMPAIRMENT: ICD-10-CM

## 2017-10-05 DIAGNOSIS — E78.5 HYPERLIPIDEMIA LDL GOAL <130: ICD-10-CM

## 2017-10-05 DIAGNOSIS — Z87.440 HISTORY OF RECURRENT UTIS: ICD-10-CM

## 2017-10-05 DIAGNOSIS — G31.84 MILD COGNITIVE IMPAIRMENT: Primary | ICD-10-CM

## 2017-10-05 DIAGNOSIS — F43.22 ADJUSTMENT DISORDER WITH ANXIOUS MOOD: ICD-10-CM

## 2017-10-05 DIAGNOSIS — M15.0 PRIMARY OSTEOARTHRITIS INVOLVING MULTIPLE JOINTS: ICD-10-CM

## 2017-10-05 DIAGNOSIS — E63.9 NUTRITIONAL DEFICIENCY: ICD-10-CM

## 2017-10-05 LAB — VIT B12 SERPL-MCNC: 482 PG/ML (ref 193–986)

## 2017-10-05 PROCEDURE — 90834 PSYTX W PT 45 MINUTES: CPT | Performed by: SOCIAL WORKER

## 2017-10-05 PROCEDURE — 36415 COLL VENOUS BLD VENIPUNCTURE: CPT | Performed by: NURSE PRACTITIONER

## 2017-10-05 PROCEDURE — 82607 VITAMIN B-12: CPT | Performed by: NURSE PRACTITIONER

## 2017-10-05 PROCEDURE — 84443 ASSAY THYROID STIM HORMONE: CPT | Performed by: NURSE PRACTITIONER

## 2017-10-05 PROCEDURE — 83735 ASSAY OF MAGNESIUM: CPT | Performed by: NURSE PRACTITIONER

## 2017-10-05 PROCEDURE — 99215 OFFICE O/P EST HI 40 MIN: CPT | Performed by: NURSE PRACTITIONER

## 2017-10-05 PROCEDURE — 99605 MTMS BY PHARM NP 15 MIN: CPT | Performed by: PHARMACIST

## 2017-10-05 PROCEDURE — 99607 MTMS BY PHARM ADDL 15 MIN: CPT | Performed by: PHARMACIST

## 2017-10-05 ASSESSMENT — MONTREAL COGNITIVE ASSESSMENT (MOCA)
8. SERIAL SUBTRACTION OF 7S: 1
11. FOR EACH PAIR OF WORDS, WHAT CATEGORY DO THEY BELONG TO (OUT OF 2): 2
7. [VIGILENCE] TAP WHEN HEARING DESIGNATED LETTER: 1
9. REPEAT EACH SENTENCE: 2
4. NAME EACH OF THE THREE ANIMALS SHOWN: 3
6. READ LIST OF DIGITS [FORWARD/BACKWARD]: 2
10. [FLUENCY] NAME WORDS STARTING WITH DESIGNATED LETTER: 0
WHAT IS THE TOTAL SCORE (OUT OF 30): 23
12. MEMORY INDEX SCORE: 3
VISUOSPATIAL/EXECUTIVE SUBSCORE: 3
13. ORIENTATION SUBSCORE: 6
WHAT LEVEL OF EDUCATION WAS ATTAINED: 0

## 2017-10-05 NOTE — NURSING NOTE
"Chief Complaint   Patient presents with     RECHECK     pt here with daughter       Initial /60 (BP Location: Left arm, Patient Position: Chair, Cuff Size: Adult Regular)  Pulse 61  Temp 97.6  F (36.4  C) (Oral)  Wt 152 lb (68.9 kg)  SpO2 99%  BMI 26.09 kg/m2 Estimated body mass index is 26.09 kg/(m^2) as calculated from the following:    Height as of 10/4/17: 5' 4\" (1.626 m).    Weight as of this encounter: 152 lb (68.9 kg).  Medication Reconciliation: complete    "

## 2017-10-05 NOTE — PROGRESS NOTES
[facility] [place]  October 5, 2017      Behavioral Health Clinician Progress Note    Patient Name: Maia Miranda           Service Type:  Individual      Service Location:   Face to Face in Clinic     Session Start Time: 9:15 a.m.  Session End Time: 10:05 a.m.      Session Length: 38 - 52      Attendees: Patient and daughter, ABIGAIL Sears CNP, Ariela Mireles, Alfie    Visit Activities (Refresh list every visit): [facility] and [facility]    Diagnostic Assessment Date: to be completed at next visit  Treatment Plan Review Date: to be completed at next visit  See Flowsheets for today's PHQ-9 and AMPARO-7 results  Previous PHQ-9:   PHQ-9 SCORE 12/31/2007 1/8/2009 10/5/2009   Total Score 0 2 4     Previous AMPARO-7: No flowsheet data found.    ABDIEL LEVEL:  ABDIEL Score (Last Two) 7/14/2009   ABDIEL Raw Score 35   Activation Score 45.2   ABDIEL Level 1       DATA  Extended Session (60+ minutes): No  Interactive Complexity: No  Crisis: No  [facility] Patient: No    Treatment Objective(s) Addressed in This Session:  Target Behavior(s): disease management/lifestyle changes , memory changes    Adjustment Difficulties: will develop coping/problem-solving skills to facilitate more adaptive adjustment    Current Stressors / Issues:  [facility] co-visit with patient, her daughter, ABIGAIL Sears CNP, and Ariela Mireles PharmD. Patient presents to the clinic for a follow up visit to assess memory changes. Patient does not believe much has changed since the last visit, and states her primary concern is whether she may have to move into assisted living. Met with patient's daughter separately, who reports no significant concerns and that her mother is generally content. She wonders if recent anxiety about having to move was triggered by her approaching 85th birthday, which is considered a milestone. Patient's daughter states she and her three siblings will support and care for their mother as long as needed to help her stay in her  apartment.   Discussed this worry with patient and provided validation and reassurance that her family will continue to care for her.   Patient enjoys walking for exercise and caring for her cat. Patient's  passed away and the cat has become a close .  Patient reports her mood is stable and that she has no history of mental health treatment or diagnosis.    Progress on Treatment Objective(s) / Homework:  New Objective established this session - CONTEMPLATION (Considering change and yet undecided); Intervened by assessing the negative and positive thinking (ambivalence) about behavior change    Motivational Interviewing    MI Intervention: Expressed Empathy/Understanding, Supported Autonomy, Collaboration, Evocation, Permission to raise concern or advise, Open-ended questions and Reflections: simple and complex     Change Talk Expressed by the Patient: Reasons to change Need to change    Provider Response to Change Talk: E - Evoked more info from patient about behavior change, A - Affirmed patient's thoughts, decisions, or attempts at behavior change, R - Reflected patient's change talk and S - Summarized patient's change talk statements        Care Plan review completed: No    Medication Review:  No current psychiatric medications prescribed    Medication Compliance:  NA    Changes in Health Issues:  Please see medical and MTM notes by ABIGAIL Sears, DONNELL, Ariela Mireles, Alfie    Chemical Use Review:   Substance Use: Chemical use reviewed, no active concerns identified      Tobacco Use: No current tobacco use.      Assessment: Current Emotional / Mental Status (status of significant symptoms):  Risk status (Self / Other harm or suicidal ideation)  Patient denies a history of suicidal ideation, suicide attempts, self-injurious behavior, homicidal ideation, homicidal behavior and and other safety concerns  Patient denies current fears or concerns for personal safety.  Patient denies current or  recent suicidal ideation or behaviors.  Patient denies current or recent homicidal ideation or behaviors.  Patient denies current or recent self injurious behavior or ideation.  Patient denies other safety concerns.  A safety and risk management plan has not been developed at this time, however patient was encouraged to call [facility] should there be a change in any of these risk factors.    Appearance:   Appropriate   Eye Contact:   Fair   Psychomotor Behavior: Agitated   Attitude:   Cooperative   Orientation:   All  Speech   Rate / Production: Normal    Volume:  Normal   Mood:    Anxious   Affect:    Appropriate   Thought Content:  Clear   Thought Form:  Coherent   Insight:    Fair     Provisional Diagnoses:  1. Mild cognitive impairment    2. Adjustment disorder with anxious mood        Collateral Reports Completed:  Not Applicable    Plan: (Homework, other):  Patient was given information about behavioral services and encouraged to schedule a follow up appointment with the clinic [facility] [place] as needed.  She was also given information about mental health symptoms and treatment options .  CD Recommendations: No indications of CD issues.     Cedrick Patterson, [facility], [facility] [place]

## 2017-10-05 NOTE — PROGRESS NOTES
SUBJECTIVE/OBJECTIVE:                           Maia Miranda is an 84 year old female coming in for an initial visit for Medication Therapy Management.  She was referred to me from [facility]. Her daughter accompanies her to the visit today as well.      Chief Complaint: initial med review with memory clinic.    Allergies/ADRs: Reviewed in Epic  Tobacco: No tobacco use  Alcohol: not currently using  Caffeine: no caffeine  Activity: ambulates independently  PMH: Reviewed in Epic    Medication Adherence: no issues reported    Memory Loss: Current medications include none.  MOCA on 7/7/16 = 19, indicating moderate cognitive impairment.  MOCA today in clinic improved at 23, indicating mild cognitive impairment.  She notes today that she is frustrated when can't think of what she wants to say.  Word finding can be difficult.  Please see Emiliana Joiner's note for more detail from memory clinic today.  Sleeping ok and appetite ok.    OA: Current medication includes tylenol prn, often will take at hs which is effective.  See in ER recently due to abdominal pain, and notes this has improved.    Hypertension/Afib: Current medications include Metoprolol ER 50mg nightly, Lisinopriil 10mg qam, Warfarin per INR clinic.  Patient does self-monitor BP. Home BP monitoring in range of 120's systolic over 50-60's diastolic per patient report.  Patient reports no current medication side effects.  Had a fall on the ice, otherwise no falls.  Denies palpitations, chest pain, dizziness/lightheadedness.  Bruises easily.  Warfarin adjusted per INR clinic.    Constipation: Current medication includes prn Miralax.  Denies constipation currently.    UTI prevention: Current medication includes Premarin vaginal cream biw, Vitamin C 1000mg bid, Hiprex 1 gm daily.  States this regimen is effective.  Tried lower dose of Hiprex in past (tiw), but UTIs recurred.  No side effects noted with current regimen.  Pt does have a  catheter.    Supplements: Current medications include Calcium citrate/vit D daily (she is unsure of strength), Fish oil 1gm bid, Magnesium 250mg daily.  She denies side effects.  Isn't sure why she is taking the magnesium; no noted history of deficiency.    Hyperlipidemia: Current therapy includes Fish Oil 1000mg twice daily.  Pt reports no significant myalgias or other side effects. Was on statins in past, but did not tolerate due to myalgias, so continue with Fish Oil alone.      Current labs include:  BP Readings from Last 3 Encounters:   10/05/17 162/60   10/05/17 162/60   10/04/17 130/64     Today's Vitals: There were no vitals taken for this visit.  No results found for: A1C.  Lab Results   Component Value Date    CHOL 228 06/14/2017     Lab Results   Component Value Date    TRIG 146 06/14/2017     Lab Results   Component Value Date    HDL 52 06/14/2017     Lab Results   Component Value Date     06/14/2017       Liver Function Studies -   Recent Labs   Lab Test  10/03/17   1520   PROTTOTAL  7.8   ALBUMIN  4.1   BILITOTAL  0.4   ALKPHOS  77   AST  25   ALT  21       Lab Results   Component Value Date    UCRR 38 05/16/2016    MICROL <5 05/16/2016    UMALCR Unable to calculate due to low value 05/16/2016       Last Basic Metabolic Panel:  Lab Results   Component Value Date     10/03/2017      Lab Results   Component Value Date    POTASSIUM 4.1 10/03/2017     Lab Results   Component Value Date    CHLORIDE 104 10/03/2017     Lab Results   Component Value Date    BUN 27 10/03/2017     Lab Results   Component Value Date    CR 1.29 10/03/2017     GFR Estimate   Date Value Ref Range Status   10/03/2017 39 (L) >60 mL/min/1.7m2 Final     Comment:     Non  GFR Calc   06/14/2017 39 (L) >60 mL/min/1.7m2 Final     Comment:     Non  GFR Calc   03/22/2017 46 (L) >60 mL/min/1.7m2 Final     Comment:     Non  GFR Calc     GFR Estimate If Black   Date Value Ref Range  Status   10/03/2017 48 (L) >60 mL/min/1.7m2 Final     Comment:      GFR Calc   06/14/2017 48 (L) >60 mL/min/1.7m2 Final     Comment:      GFR Calc   03/22/2017 55 (L) >60 mL/min/1.7m2 Final     Comment:      GFR Calc     TSH   Date Value Ref Range Status   06/06/2014 2.44 0.4 - 5.0 mU/L Final   ]    Most Recent Immunizations   Administered Date(s) Administered     Influenza (H1N1) 02/05/2010     Influenza (High Dose) 3 valent vaccine 10/14/2015     Influenza (IIV3) 11/01/2012     Influenza Vaccine IM 3yrs+ 4 Valent IIV4 10/27/2014     Pneumococcal (PCV 13) 05/15/2015     Pneumococcal 23 valent 02/11/1999     TD (ADULT, 7+) 01/14/2008     TDAP Vaccine (Adacel) 11/05/2013     Tetanus 01/01/1992     Zoster vaccine, live 02/07/2012       ASSESSMENT:                             Current medications were reviewed today.       Medication Adherence: no issues identified    Memory Loss: MOCA improved a few points from >1yr ago.  Pt is not currently on any meds thought to contribute to confusion/memory loss.  May benefit from B12 and TSH check as these have not been checked since 2014.    OA: Stable.    Hypertension/Afib: BP goal <140-150/90mmHg.  Most recent BPs elevated, related to ER visit and pain, and today was anxious about memory clinic visit.  Noted they are below goal when checked at home.  Continue to monitor.     Constipation: Stable.    UTI prevention: Stable.  Hiprex should be avoided in renal dysfunction, however, pt has tried previous dose reduction with recurrence of UTIs & is not experiencing hematuria, albuminuria, or other potential side effects.  Continue to monitor; pt prefers to continue as benefit likely exceeds risk.    Supplements: Discussed recommended amount of vitamin D3 is 800-1000u daily.  Likely not getting through current ca/vit D supplement, and may benefit from addition of plain vitamin D.  May also be of benefit to check magnesium level; no  noted history of deficiency and magnesium can accumulate in renal dysfunction.    Hyperlipidemia: Stable.  Did not tolerate statins previously.    PLAN:                            1. See lab following visit for thyroid labs, magnesium, and vitamin B12 check.    2. Take vitamin D3 1000u daily.  May need to supplement your calcium/vit D combo with plain vitamin D.    I spent 45 minutes with this patient today. All changes were made via verbal approval with Emiliana Joiner NP. A copy of the visit note was provided to the patient's primary care provider.    Will follow up in one year with memory clinic visit, sooner if necessary.    The patient was given a summary of these recommendations as an after visit summary.     Ariela Mireles, Pharm.D.,[facility]  Board Certified Geriatric Pharmacist  Medication Therapy Management Pharmacist  [phone]      Pt's Mg is only slightly high, but she can't remember a reason for starting it, and likely does not require supplementation.  Will d'c and should follow-up Mg level in 3-4 months.

## 2017-10-05 NOTE — MR AVS SNAPSHOT
"              After Visit Summary   10/5/2017    Maia Miranda    MRN: [id number]           Patient Information     Date Of Birth          10/26/1932        Visit Information        Provider Department      10/5/2017 10:30 AM Emiliana Joiner APRN [facility] [place]        Today's Diagnoses     Mild cognitive impairment          Care Instructions    .Dear Maia Miranda:    Thank you for your visit to the memory clinic today. Following is a summary of our assessment and the \"possibilities\" we discussed:     Assessment    You described memory changes, forgetting some events that have happened recently and forgetting words.  You talked about planning ahead should you need or want to move to a location with more assistance        One of the assessments we conducted today was called a Ammon Cognitive Assessment, a snapshot of your thinking today. Your score on this assessment was 22 out of 30. The last time you did this in 2016 your score was 19/26.     Following are average scores and associated neurocognitive function (per [place]/ [place] neuropsychologist summary of existing evidence);    Normal > 26    Early/ Mild neurocognitive impairment: 21-25     Moderate neurocognitive impairment 15-20    Severe neurocognitive impairment 0-14    Given the current situation we talked we suggested the following plan:  We support your idea of planning ahead--getting xtra help if/ when you need it. As this happens, please talk to your family. They can be sure to make changes, and step in to help!.       Stop at the lab today. We would like to check your magnesium, B12 and thyroid to be sure these are in normal ranges. When they are not in normal ranges they can contribute to memory changes.       Stay active. In general, we always advise people to stay   active because it helps their brain health.     Keep your body active. Your walking is good for your body and your memory. Keep " up the good work!!    Exercise your brain. You can play cards, do a puzzle or a word game. The brain exercises can help keep your memory sharp.    Learn something new. Try art or writing or a new hobby.    Keep doing your hobbies. Do activities that have meaning to you.    Socialize. Visit with your friends and family.    Eat healthy. Let us or your doctor know if you have any more weight loss!!      Stay away from over-the-counter medicines that may   get in the way of your memory and thinking. Stay away from: Tylenol PM, cold tablets and cough medicines.    Here are some general tips for   Learning new things    Take your time when you learning new things. Have someone show you how to do something that is new to you. Practice one step at a time after the person shows you. Ask the other person to watch you. Have them tell you if you are doing it right.     Manage your medicines   You have a good system for your medications. If this becomes difficult /tricky for you, let your family know. You can always try the pill box again. Or, write a note or use a white board to remind you to take your medicine. Find what works for you to have a daily routine.     Safety   Make a safety plan. What will you do in case of an emergency?     Organize  Keep up the calendar. Write scheduled events in your calendar. Add a few notes to the event. Keep the calendar in the same, central location. Use it daily. Invite others to write in it as well.     Short term illnesses   Visit your doctor if you have a sudden change in your memory or having difficulty doing things you are usually able to do. New confusion may be a sign of a bladder infection or another short term illness.       Your health care directive:   Review it on a regular basis to make sure it says what you want.     Driving safety   Have someone check your driving skills often. Make a plan for how you will get around if you cannot drive. Look into other options to get around  ahead of time.           We talked about a lot of topics today. Please do not hesitate to call us with questions. Please plan to visit us again in a year or sooner if needed.     Best regards,    Emiliana Joiner RN, CNP  Ariela Mireles, Pharm D    Phone: [phone]              Follow-ups after your visit        Your next 10 appointments already scheduled     Oct 11, 2017 11:00 AM CDT   Anticoagulation Visit with  ANT[facility]AGUL[facility] CLINIC   [facility]    [address] [address] [phone]            Oct 12, 2017 10:45 AM CDT   CT CHEST W/O CONTRAST with [address] ([facility])    [address] [address] [address] [phone]           Please bring any scans or X-rays taken at other hospitals, if similar tests were done. Also bring a list of your medicines, including vitamins, minerals and over-the-counter drugs. It is safest to leave personal items at home.  Be sure to tell your doctor:   If you have any allergies.   If there s any chance you are pregnant.   If you are breastfeeding.   If you have any special needs.  You do not need to do anything special to prepare.  Please wear loose clothing, such as a sweat suit or jogging clothes. Avoid snaps, zippers and other metal. We may ask you to undress and put on a hospital gown.              Who to contact     If you have questions or need follow up information about today's clinic visit or your schedule please contact [facility] directly at [phone].  Normal or non-critical lab and imaging results will be communicated to you by MyChart, letter or phone within 4 business days after the clinic has received the results. If you do not hear from us within 7 days, please contact the clinic through MyChart or phone. If you have a critical or abnormal lab result, we will notify you by phone  as soon as possible.  Submit refill requests through [employer] or call your pharmacy and they will forward the refill request to us. Please allow 3 business days for your refill to be completed.          Additional Information About Your Visit        [employer] Information Information     [employer] gives you secure access to your electronic health record. If you see a primary care provider, you can also send messages to your care team and make appointments. If you have questions, please call your primary care clinic.  If you do not have a primary care provider, please call [phone] and they will assist you.        Care EveryWhere ID     This is your Care EveryWhere ID. This could be used by other organizations to access your [place] medical records  [id number]        Your Vitals Were     Pulse Temperature Pulse Oximetry BMI (Body Mass Index)          61 97.6  F (36.4  C) (Oral) 99% 26.09 kg/m2         Blood Pressure from Last 3 Encounters:   10/05/17 162/60   10/05/17 162/60   10/04/17 130/64    Weight from Last 3 Encounters:   10/05/17 152 lb (68.9 kg)   10/05/17 152 lb (68.9 kg)   10/04/17 154 lb 3.2 oz (69.9 kg)              Today, you had the following     No orders found for display         Today's Medication Changes          These changes are accurate as of: 10/5/17  4:16 PM.  If you have any questions, ask your nurse or doctor.               These medicines have changed or have updated prescriptions.        Dose/Directions    ascorbic acid 1000 MG Tabs   Commonly known as:  vitamin C   This may have changed:    - when to take this  - additional instructions   Used for:  Recurrent UTI, Neurogenic bladder        Dose:  1000 mg   Take 1 tablet (1,000 mg) by mouth 2 times daily   Quantity:  180 tablet   Refills:  3                Primary Care Provider Office Phone # Fax #    Dian Frias [phone] [address] [place] [address]        Equal Access to Services     JENNIFER ALEX  AH: Hadii luiz amezquitabryannoah Robbali, waaxda luqadaha, qaybta kabeth adair, simin brennen hellenmontrell lockhartyanethkathy santana. So [facility] [phone].    ATENCIÓN: Si habla mari, tiene a conn disposición servicios gratuitos de asistencia lingüística. Llame al [phone].    We comply with applicable federal civil rights laws and [place] laws. We do not discriminate on the basis of race, color, national origin, age, disability, sex, sexual orientation, or gender identity.            Thank you!     Thank you for choosing [facility]  for your care. Our goal is always to provide you with excellent care. Hearing back from our patients is one way we can continue to improve our services. Please take a few minutes to complete the written survey that you may receive in the mail after your visit with us. Thank you!             Your Updated Medication List - Protect others around you: Learn how to safely use, store and throw away your medicines at www.disposemymeds.org.          This list is accurate as of: 10/5/17  4:16 PM.  Always use your most recent med list.                   Brand Name Dispense Instructions for use Diagnosis    albuterol 108 (90 BASE) MCG/ACT Inhaler    PROAIR HFA/PROVENTIL HFA/VENTOLIN HFA    1 Inhaler    Inhale 2 puffs into the lungs every 6 hours as needed for shortness of breath / dyspnea or wheezing    SOB (shortness of breath)       ascorbic acid 1000 MG Tabs    vitamin C    180 tablet    Take 1 tablet (1,000 mg) by mouth 2 times daily    Recurrent UTI, Neurogenic bladder       carboxymethylcellulose 1 % ophthalmic solution    CELLUVISC/REFRESH LIQUIGEL     Place 1 drop into both eyes every morning        conjugated estrogens cream    PREMARIN     Place vaginally twice a week On Tuesday and Friday. Uses a pea sized amount        fluocinonide 0.05 % cream    LIDEX    30 g    Apply topically 2 times daily as needed    Dermatitis       GENTLECATH URINARY CATHETER Misc      120 each    1 catheter 4 times daily    Urinary retention       lisinopril 10 MG tablet    PRINIVIL/ZESTRIL    90 tablet    Take 1 tablet (10 mg) by mouth every morning    Hypertension goal BP (blood pressure) < 150/90       magnesium 250 MG tablet      Take 1 tablet by mouth at bedtime        methenamine hippurate 1 G Tabs tablet    HIPREX    90 tablet    Take 1 tablet (1 g) by mouth daily    Recurrent UTI       metoprolol 50 MG 24 hr tablet    TOPROL-XL    90 tablet    Take 1 tablet (50 mg) by mouth every evening    Hypertension goal BP (blood pressure) < 150/90       OMEGA-3 FISH OIL PO      Take 1 g by mouth 2 times daily (with meals)        polyethylene glycol Packet    MIRALAX/GLYCOLAX     Take 1 packet by mouth daily as needed        sodium chloride 5 % ophthalmic ointment    PETER 128     Place 1 Application into both eyes At Bedtime        TYLENOL 500 MG tablet   Generic drug:  acetaminophen      Take 500-1,000 mg by mouth every 6 hours as needed        warfarin 2.5 MG tablet    COUMADIN    100 tablet    Take 2.5mg (1 tablet)  MTWTFSS or as directed by INR Clinic.    Long-term (current) use of anticoagulants

## 2017-10-05 NOTE — MR AVS SNAPSHOT
After Visit Summary   10/5/2017    Maia Miranda    MRN: [id number]           Patient Information     Date Of Birth          10/26/1932        Visit Information        Provider Department      10/5/2017 10:30 AM Cedrick Patterson LICSW [facility]        Today's Diagnoses     Mild cognitive impairment    -  1    Adjustment disorder with anxious mood           Follow-ups after your visit        Your next 10 appointments already scheduled     Oct 11, 2017 11:00 AM CDT   Anticoagulation Visit with  ANTICOAGULATION CLINIC   [facility]    [address] [address] [phone]            Oct 12, 2017 10:45 AM CDT   CT CHEST W/O CONTRAST with [id number] ([facility])    [address]  [facility] [address] [phone]           Please bring any scans or X-rays taken at other hospitals, if similar tests were done. Also bring a list of your medicines, including vitamins, minerals and over-the-counter drugs. It is safest to leave personal items at home.  Be sure to tell your doctor:   If you have any allergies.   If there s any chance you are pregnant.   If you are breastfeeding.   If you have any special needs.  You do not need to do anything special to prepare.  Please wear loose clothing, such as a sweat suit or jogging clothes. Avoid snaps, zippers and other metal. We may ask you to undress and put on a hospital gown.              Who to contact     If you have questions or need follow up information about today's clinic visit or your schedule please contact [facility] directly at [phone].  Normal or non-critical lab and imaging results will be communicated to you by MyChart, letter or phone within 4 business days after the clinic has received the results. If you do not hear from us within 7  days, please contact the clinic through Chicfy or phone. If you have a critical or abnormal lab result, we will notify you by phone as soon as possible.  Submit refill requests through Chicfy or call your pharmacy and they will forward the refill request to us. Please allow 3 business days for your refill to be completed.          Additional Information About Your Visit        [employer]ential Viewing Information     Chicfy gives you secure access to your electronic health record. If you see a primary care provider, you can also send messages to your care team and make appointments. If you have questions, please call your primary care clinic.  If you do not have a primary care provider, please call [phone] and they will assist you.        Care EveryWhere ID     This is your Care EveryWhere ID. This could be used by other organizations to access your [place] medical records  [id number]        Your Vitals Were     Pulse Temperature Pulse Oximetry BMI (Body Mass Index)          61 97.6  F (36.4  C) (Oral) 99% 26.09 kg/m2         Blood Pressure from Last 3 Encounters:   10/05/17 162/60   10/05/17 162/60   10/04/17 130/64    Weight from Last 3 Encounters:   10/05/17 152 lb (68.9 kg)   10/05/17 152 lb (68.9 kg)   10/04/17 154 lb 3.2 oz (69.9 kg)              Today, you had the following     No orders found for display         Today's Medication Changes          These changes are accurate as of: 10/5/17  4:19 PM.  If you have any questions, ask your nurse or doctor.               These medicines have changed or have updated prescriptions.        Dose/Directions    ascorbic acid 1000 MG Tabs   Commonly known as:  vitamin C   This may have changed:    - when to take this  - additional instructions   Used for:  Recurrent UTI, Neurogenic bladder        Dose:  1000 mg   Take 1 tablet (1,000 mg) by mouth 2 times daily   Quantity:  180 tablet   Refills:  3                Primary Care Provider Office Phone # Fax #    Dian Johnson  MD Al[place] [phone] [address] [place] [address]        Equal Access to Services     JENNIFER ALEX : Hadii luiz cooper ximena Prather, wavinodda arvindtila, larryta kabeth adair, simin bushrain hayaan vanessaalexandria jarquin latrentmontrell esther. So [facility] [phone].    ATENCIÓN: Si habla español, tiene a conn disposición servicios gratuitos de asistencia lingüística. Llame al [phone].    We comply with applicable federal civil rights laws and [place] laws. We do not discriminate on the basis of race, color, national origin, age, disability, sex, sexual orientation, or gender identity.            Thank you!     Thank you for choosing [facility]  for your care. Our goal is always to provide you with excellent care. Hearing back from our patients is one way we can continue to improve our services. Please take a few minutes to complete the written survey that you may receive in the mail after your visit with us. Thank you!             Your Updated Medication List - Protect others around you: Learn how to safely use, store and throw away your medicines at www.disposemymeds.org.          This list is accurate as of: 10/5/17  4:19 PM.  Always use your most recent med list.                   Brand Name Dispense Instructions for use Diagnosis    albuterol 108 (90 BASE) MCG/ACT Inhaler    PROAIR HFA/PROVENTIL HFA/VENTOLIN HFA    1 Inhaler    Inhale 2 puffs into the lungs every 6 hours as needed for shortness of breath / dyspnea or wheezing    SOB (shortness of breath)       ascorbic acid 1000 MG Tabs    vitamin C    180 tablet    Take 1 tablet (1,000 mg) by mouth 2 times daily    Recurrent UTI, Neurogenic bladder       carboxymethylcellulose 1 % ophthalmic solution    CELLUVISC/REFRESH LIQUIGEL     Place 1 drop into both eyes every morning        conjugated estrogens cream    PREMARIN     Place vaginally twice a week On Tuesday and Friday. Uses a pea sized amount         fluocinonide 0.05 % cream    LIDEX    30 g    Apply topically 2 times daily as needed    Dermatitis       GENTLECATH URINARY CATHETER Misc     120 each    1 catheter 4 times daily    Urinary retention       lisinopril 10 MG tablet    PRINIVIL/ZESTRIL    90 tablet    Take 1 tablet (10 mg) by mouth every morning    Hypertension goal BP (blood pressure) < 150/90       magnesium 250 MG tablet      Take 1 tablet by mouth at bedtime        methenamine hippurate 1 G Tabs tablet    HIPREX    90 tablet    Take 1 tablet (1 g) by mouth daily    Recurrent UTI       metoprolol 50 MG 24 hr tablet    TOPROL-XL    90 tablet    Take 1 tablet (50 mg) by mouth every evening    Hypertension goal BP (blood pressure) < 150/90       OMEGA-3 FISH OIL PO      Take 1 g by mouth 2 times daily (with meals)        polyethylene glycol Packet    MIRALAX/GLYCOLAX     Take 1 packet by mouth daily as needed        sodium chloride 5 % ophthalmic ointment    PETER 128     Place 1 Application into both eyes At Bedtime        TYLENOL 500 MG tablet   Generic drug:  acetaminophen      Take 500-1,000 mg by mouth every 6 hours as needed        warfarin 2.5 MG tablet    COUMADIN    100 tablet    Take 2.5mg (1 tablet)  MTWTFSS or as directed by INR Clinic.    Long-term (current) use of anticoagulants

## 2017-10-05 NOTE — PATIENT INSTRUCTIONS
".Dear Maia Miranda:    Thank you for your visit to the memory clinic today. Following is a summary of our assessment and the \"possibilities\" we discussed:     Assessment    You described memory changes, forgetting some events that have happened recently and forgetting words.  You talked about planning ahead should you need or want to move to a location with more assistance        One of the assessments we conducted today was called a Ammon Cognitive Assessment, a snapshot of your thinking today. Your score on this assessment was 22 out of 30. The last time you did this in 2016 your score was 19/26.     Following are average scores and associated neurocognitive function (per [place]/ [place] neuropsychologist summary of existing evidence);    Normal > 26    Early/ Mild neurocognitive impairment: 21-25     Moderate neurocognitive impairment 15-20    Severe neurocognitive impairment 0-14    Given the current situation we talked we suggested the following plan:  We support your idea of planning ahead--getting xtra help if/ when you need it. As this happens, please talk to your family. They can be sure to make changes, and step in to help!.       Stop at the lab today. We would like to check your magnesium, B12 and thyroid to be sure these are in normal ranges. When they are not in normal ranges they can contribute to memory changes.       Stay active. In general, we always advise people to stay   active because it helps their brain health.     Keep your body active. Your walking is good for your body and your memory. Keep up the good work!!    Exercise your brain. You can play cards, do a puzzle or a word game. The brain exercises can help keep your memory sharp.    Learn something new. Try art or writing or a new hobby.    Keep doing your hobbies. Do activities that have meaning to you.    Socialize. Visit with your friends and family.    Eat healthy. Let us or your doctor know if you have any more weight " loss!!      Stay away from over-the-counter medicines that may   get in the way of your memory and thinking. Stay away from: Tylenol PM, cold tablets and cough medicines.    Here are some general tips for   Learning new things    Take your time when you learning new things. Have someone show you how to do something that is new to you. Practice one step at a time after the person shows you. Ask the other person to watch you. Have them tell you if you are doing it right.     Manage your medicines   You have a good system for your medications. If this becomes difficult /tricky for you, let your family know. You can always try the pill box again. Or, write a note or use a white board to remind you to take your medicine. Find what works for you to have a daily routine.     Safety   Make a safety plan. What will you do in case of an emergency?     Organize  Keep up the calendar. Write scheduled events in your calendar. Add a few notes to the event. Keep the calendar in the same, central location. Use it daily. Invite others to write in it as well.     Short term illnesses   Visit your doctor if you have a sudden change in your memory or having difficulty doing things you are usually able to do. New confusion may be a sign of a bladder infection or another short term illness.       Your health care directive:   Review it on a regular basis to make sure it says what you want.     Driving safety   Have someone check your driving skills often. Make a plan for how you will get around if you cannot drive. Look into other options to get around ahead of time.           We talked about a lot of topics today. Please do not hesitate to call us with questions. Please plan to visit us again in a year or sooner if needed.     Best regards,    Emiliana Joiner RN, CNP  Ariela Mireles, Pharm D    Phone: [phone]

## 2017-10-05 NOTE — MR AVS SNAPSHOT
After Visit Summary   10/5/2017    Maia Miranda    MRN: [id number]           Patient Information     Date Of Birth          10/26/1932        Visit Information        Provider Department      10/5/2017 10:30 AM Ariela Mireles, [facility]        Today's Diagnoses     Atrial fibrillation, unspecified type (H)    -  1    Mild cognitive impairment          Care Instructions    Recommendations from today's MTM visit:                                                        1. See lab following visit for thyroid labs, magnesium, and vitamin B12 check.        Next MTM visit: one year, sooner if necessary    To schedule another MTM appointment, please call me directly or you may call the MTM scheduling line at [phone] or toll-free at [phone].     My Clinical Pharmacist's contact information:                                                      It was a pleasure seeing you today!  Please feel free to contact me with any questions or concerns you have.      Ariela Mireles, Pharm.D.,[facility]  Board Certified Geriatric Pharmacist  Medication Therapy Management Pharmacist  [phone]      You may receive a survey about the MTM services you received.  I would appreciate your feedback to help me serve you better in the future. Please fill it out and return it when you can. Your comments will be anonymous.                      Follow-ups after your visit        Your next 10 appointments already scheduled     Oct 11, 2017 11:00 AM CDT   Anticoagulation Visit with  ANTICOAGULATION CLINIC   [facility]    [address] [address] [phone]              Future tests that were ordered for you today     Open Future Orders        Priority Expected Expires Ordered    Vitamin B12 Routine 10/5/2017 10/5/2018 10/5/2017    TSH with free T4 reflex Routine 10/5/2017 10/5/2018 10/5/2017    Magnesium Routine 10/5/2017 10/5/2018 10/5/2017             Who to contact     If you have questions or need follow up information about today's clinic visit or your schedule please contact [facility] directly at [phone].  Normal or non-critical lab and imaging results will be communicated to you by Cytoguidehart, letter or phone within 4 business days after the clinic has received the results. If you do not hear from us within 7 days, please contact the clinic through Cytoguidehart or phone. If you have a critical or abnormal lab result, we will notify you by phone as soon as possible.  Submit refill requests through StackBlaze or call your pharmacy and they will forward the refill request to us. Please allow 3 business days for your refill to be completed.          Additional Information About Your Visit        StackBlaze Information     StackBlaze gives you secure access to your electronic health record. If you see a primary care provider, you can also send messages to your care team and make appointments. If you have questions, please call your primary care clinic.  If you do not have a primary care provider, please call [phone] and they will assist you.        Care EveryWhere ID     This is your Care EveryWhere ID. This could be used by other organizations to access your [place] medical records  [id number]         Blood Pressure from Last 3 Encounters:   10/05/17 162/60   10/05/17 162/60   10/04/17 130/64    Weight from Last 3 Encounters:   10/05/17 152 lb (68.9 kg)   10/05/17 152 lb (68.9 kg)   10/04/17 154 lb 3.2 oz (69.9 kg)                 Today's Medication Changes          These changes are accurate as of: 10/5/17 11:10 AM.  If you have any questions, ask your nurse or doctor.               These medicines have changed or have updated prescriptions.        Dose/Directions    ascorbic acid 1000 MG Tabs   Commonly known as:  vitamin C   This may have changed:    - when to take this  - additional instructions   Used for:  Recurrent UTI, Neurogenic bladder         Dose:  1000 mg   Take 1 tablet (1,000 mg) by mouth 2 times daily   Quantity:  180 tablet   Refills:  3                Primary Care Provider Office Phone # Fax #    Dian Frias [phone] [address] [place] [address]        Equal Access to Services     [facility]: Hadii luiz ku hadasho Soomaali, waaxda luqadaha, qaybta kaalmada adeegyada, waxay bushrain hellenmontrell moore yvettekathy santana. So [facility] [phone].    ATENCIÓN: Si habla español, tiene a conn disposición servicios gratuitos de asistencia lingüística. Llame al [phone].    We comply with applicable federal civil rights laws and [place] laws. We do not discriminate on the basis of race, color, national origin, age, disability, sex, sexual orientation, or gender identity.            Thank you!     Thank you for choosing [facility]  for your care. Our goal is always to provide you with excellent care. Hearing back from our patients is one way we can continue to improve our services. Please take a few minutes to complete the written survey that you may receive in the mail after your visit with us. Thank you!             Your Updated Medication List - Protect others around you: Learn how to safely use, store and throw away your medicines at www.disposemymeds.org.          This list is accurate as of: 10/5/17 11:10 AM.  Always use your most recent med list.                   Brand Name Dispense Instructions for use Diagnosis    albuterol 108 (90 BASE) MCG/ACT Inhaler    PROAIR HFA/PROVENTIL HFA/VENTOLIN HFA    1 Inhaler    Inhale 2 puffs into the lungs every 6 hours as needed for shortness of breath / dyspnea or wheezing    SOB (shortness of breath)       ascorbic acid 1000 MG Tabs    vitamin C    180 tablet    Take 1 tablet (1,000 mg) by mouth 2 times daily    Recurrent UTI, Neurogenic bladder       carboxymethylcellulose 1 % ophthalmic solution    CELLUVISC/REFRESH LIQUIGEL     Place 1 drop into both  eyes every morning        conjugated estrogens cream    PREMARIN     Place vaginally twice a week On Tuesday and Friday. Uses a pea sized amount        fluocinonide 0.05 % cream    LIDEX    30 g    Apply topically 2 times daily as needed    Dermatitis       GENTLECATH URINARY CATHETER Misc     120 each    1 catheter 4 times daily    Urinary retention       lisinopril 10 MG tablet    PRINIVIL/ZESTRIL    90 tablet    Take 1 tablet (10 mg) by mouth every morning    Hypertension goal BP (blood pressure) < 150/90       magnesium 250 MG tablet      Take 1 tablet by mouth at bedtime        methenamine hippurate 1 G Tabs tablet    HIPREX    90 tablet    Take 1 tablet (1 g) by mouth daily    Recurrent UTI       metoprolol 50 MG 24 hr tablet    TOPROL-XL    90 tablet    Take 1 tablet (50 mg) by mouth every evening    Hypertension goal BP (blood pressure) < 150/90       OMEGA-3 FISH OIL PO      Take 1 g by mouth 2 times daily (with meals)        polyethylene glycol Packet    MIRALAX/GLYCOLAX     Take 1 packet by mouth daily as needed        sodium chloride 5 % ophthalmic ointment    PETER 128     Place 1 Application into both eyes At Bedtime        TYLENOL 500 MG tablet   Generic drug:  acetaminophen      Take 500-1,000 mg by mouth every 6 hours as needed        warfarin 2.5 MG tablet    COUMADIN    100 tablet    Take 2.5mg (1 tablet)  MTWTFSS or as directed by INR Clinic.    Long-term (current) use of anticoagulants

## 2017-10-05 NOTE — NURSING NOTE
"Chief Complaint   Patient presents with     RECHECK       Initial /60 (BP Location: Left arm, Patient Position: Chair, Cuff Size: Adult Regular)  Pulse 61  Temp 97.6  F (36.4  C) (Oral)  Wt 152 lb (68.9 kg)  SpO2 99%  BMI 26.09 kg/m2 Estimated body mass index is 26.09 kg/(m^2) as calculated from the following:    Height as of 10/4/17: 5' 4\" (1.626 m).    Weight as of this encounter: 152 lb (68.9 kg).  Medication Reconciliation: complete    "

## 2017-10-05 NOTE — PROGRESS NOTES
".PCP: Dian Frias      CC: memory loss    HPI: Patient has memory changes characterized as difficulty finding the right word and when she does find it she then has difficulty saying it. She says it is helpful when someone \"fills in the blank\" for her.     Duration and characteristics of memory changes 3 years--no changes      Previous Imaging: None at [facility]    Social History  Social History Narrative    Social History     Social History     Marital status:      Spouse name: N/A     Number of children: 4     Years of education: N/A     Occupational History      Retired      at [employer]     Social History Main Topics     Smoking status: Never Smoker     Smokeless tobacco: Never Used     Alcohol use No     Drug use: No     Sexual activity: No     Other Topics Concern     Not on file     Social History Narrative    .Living situation (location, living with others?): Lives alone in a condo. Lives 2 miles from daughter and son.     Activities of daily living (e.g., dressing, eating, walking): Independent        Instrumental activities of daily living (e.g., finance management, housekeeping, meal planning/ prep): Independent        Advance Care plan: has a living will and POLST        Driving: Yes    Medication: manages by self    Meaningful Activities (e.g., hobbies, work): Reads, has coffee out with girlfriends every week, has a [employer] feeder, likes to watch baseball        Support: Help with cleaning every few weeks    Community Resources Used/ Interested in: None at this time         Current Outpatient Prescriptions:      albuterol (PROAIR HFA/PROVENTIL HFA/VENTOLIN HFA) 108 (90 BASE) MCG/ACT Inhaler, Inhale 2 puffs into the lungs every 6 hours as needed for shortness of breath / dyspnea or wheezing, Disp: 1 Inhaler, Rfl: 0     metoprolol (TOPROL-XL) 50 MG 24 hr tablet, Take 1 tablet (50 mg) by mouth every evening, Disp: 90 tablet, Rfl: 3     lisinopril (PRINIVIL/ZESTRIL) 10 MG " tablet, Take 1 tablet (10 mg) by mouth every morning, Disp: 90 tablet, Rfl: 3     warfarin (COUMADIN) 2.5 MG tablet, Take 2.5mg (1 tablet)  MTWTFSS or as directed by INR Clinic., Disp: 100 tablet, Rfl: 3     methenamine hippurate (HIPREX) 1 G TABS, Take 1 tablet (1 g) by mouth daily, Disp: 90 tablet, Rfl: 3     polyethylene glycol (MIRALAX/GLYCOLAX) packet, Take 1 packet by mouth daily as needed , Disp: , Rfl:      conjugated estrogens (PREMARIN) vaginal cream, Place vaginally twice a week On Tuesday and Friday. Uses a pea sized amount, Disp: , Rfl:      fluocinonide (LIDEX) 0.05 % cream, Apply topically 2 times daily as needed, Disp: 30 g, Rfl: 2     acetaminophen (TYLENOL) 500 MG tablet, Take 500-1,000 mg by mouth every 6 hours as needed, Disp: , Rfl:      Catheters (GENTLECATH URINARY CATHETER) MISC, 1 catheter 4 times daily, Disp: 120 each, Rfl: 12     ascorbic acid (VITAMIN C) 1000 MG TABS, Take 1 tablet (1,000 mg) by mouth 2 times daily (Patient taking differently: Take 1,000 mg by mouth daily With HYprex), Disp: 180 tablet, Rfl: 3     Omega-3 Fatty Acids (OMEGA-3 FISH OIL PO), Take 1 g by mouth 2 times daily (with meals), Disp: , Rfl:      sodium chloride (PETER 128) 5 % ophthalmic ointment, Place 1 Application into both eyes At Bedtime, Disp: , Rfl:      carboxymethylcellulose (CELLUVISC/REFRESH LIQUIGEL) 1 % ophthalmic solution, Place 1 drop into both eyes every morning , Disp: , Rfl:      MAGNESIUM 250 MG OR TABS, Take 1 tablet by mouth at bedtime, Disp: , Rfl: 0  No current facility-administered medications for this visit.     Facility-Administered Medications Ordered in Other Visits:      sodium chloride (PF) 0.9% PF flush 10 mL, 10 mL, Intravenous, Q10 Min PRN, Ip, David Roman MD, 10 mL at 06/30/14 4962     sodium chloride bacteriostatic 0.9 % flush 0-1 mL, 0-1 mL, Intradermal, Once PRN, Ip, David Roman MD     sodium chloride (PF) 0.9% PF flush 5-10 mL, 5-10 mL, Intravenous, q1 min prn, Ip, David  Sarika Rmoan MD     albuterol (PROAIR HFA, PROVENTIL HFA, VENTOLIN HFA) inhaler 2 puff, 2 puff, Inhalation, Q5 Min PRN, Ip, David Roman MD     aminophylline injection  mg,  mg, Intravenous, Once PRN, Ip, David Roman MD    Patient Active Problem List   Diagnosis     Hyperlipidemia LDL goal <130     Osteopenia     Primary osteoarthritis involving multiple joints     Essential and other specified forms of tremor     Esophageal reflux     Varicose veins of lower extremities with complications     Allergic rhinitis     Degeneration of lumbar or lumbosacral intervertebral disc     Internal bleeding hemorrhoids     Osteoarthritis of thumb     Diverticulosis     CKD (chronic kidney disease) stage 3, GFR 30-59 ml/min     Chronic constipation     Urinary retention     Health Care Home     Advanced directives, counseling/discussion     Long-term (current) use of anticoagulants     Atrial fibrillation (H)     Hypertension goal BP (blood pressure) < 150/90     Post-menopausal bleeding     History of recurrent UTIs     Mild cognitive impairment     Fibroid uterus         Cognitive screening.    .[place] Cognitive Assessment:    Visuospatial/Executive : 3  Naming: 3  Attention - Digits: 2  Attention - Letters: 1  Attention - Subtraction: 1  Language - Repeat: 2  Language - Fluency : 0  Abstraction: 2  Delayed Recall: 3  Orientation: 6  Education: 0  MOCA Score: 23  Administered by: : anitra     [place] Cognitive Assessment Score:  23     [place] Cognitive Assessment Norms:     Normal: 26 - 30  Mild Cognitive Impairment 21-25  Moderate 15 - 20  Severe 0 -14   (Score in 2016 was 19/30)--but did feel fatigued that day    ROS:  Feels okay  Plantars fasciitis pain that has been improving--it does interfere with her walking.   Denies change in appetite, but doesn't like to eat: prefers eating out (e.g. [employer])   Sleeps well: goes to sleep at 1030 and gets up at 0700 feeling rested   Denies having nightmares,  sleep walking or acting out dreams.   No falls in last year.  Denies feeling imbalanced   Occasional postural hypotension  Denies chest pain   Denies palpitations  Denies shortness of breath Denies cough  Had ER visit 3 days ago for right sided abdominal pain--she was concerned about appendicitis, but that has been ruled out. Her pain is better she says  Denies dysuria or incontinence  Denies changes in mood: no sadness, anger, frustration  Denies hallucinations    Screening for Geriatric Syndromes:   Delirium: No. Attention span okay. LOC not fluctuating. Cognitive function not acutely different  Polypharmacy: No. Patient on less than 9 medications. No medications in regimen are considered avoidable in the older adult population.   Depression: No per history and Geriatric Depression Scale 2/15 (5 or greater suggestive of probable depression)  Falls: Minimal risk. 0-1 fall in last year without injury, taking less than 4 medications (none centrally acting or known to cause fall risk), balance is okay (see exam), gait steady.   Sensory Deficits: no noted difficulty or changes with vision or hearing  Malnutrition: no changes intake or weight noted, no swallowing difficulty noted--did lose 3 pounds recently and she agrees to let us/ Dr. Jackson know  Functional Decline: no changes in function over the past year    Physical exam:  ./60 (BP Location: Left arm, Patient Position: Chair, Cuff Size: Adult Regular)  Pulse 61  Temp 97.6  F (36.4  C) (Oral)  Wt 152 lb (68.9 kg)  SpO2 99%  BMI 26.09 kg/m2  APPEARANCE:  Adult female  no acute distress   HEENT:  wearing glasses;  reads magazine print ok., EOMs intact. PERRL. Mucous membranes pink and moist.  Conjunctiva clear and white sclera. Lids without redness or swelling.   NECK: Supple. No bruit.   SKIN; pale  warm dry and intact; no lesions or rashes.   CV: Regular rhythm,No leg edema   RESP: non labored. no wheezes.   MUSCULOSKELETAL: functional range of motion. No  "joint /spine redness, tenderness, or swelling. Balance is ok unsteady after 3 seconds in the semi tandem positions   MENTAL HEALTH: Mood is calm. Memory mildly impaired. Insight and judgment are ok. No hallucinations or delusions. No perseverations. Attention span is good. Maintains eye contact.   NEURO: Awake, Alert, CNII-XII grossly intact. Muscle strength 5/5 x 4 extremities, no rigidity, no obvious bradykinesia, intentional tremor, no dyskinesia. Speech is fluent  without paraphrasia, neologisms--trouble word finding at times and slow processing. No apraxia or agnosia.      Partner / Family goals: One year fu. Patient concerned about needing to move    Assessment and Plan:   No diagnosis found.  .Mild cognitive impairment  Assessment:   Patient diagnosed with amnesic MCI three years ago. Symptoms include forgetfulness, difficulty finding the right word, and difficulty expressing the word once she knows it. No behavioral or psychological symptoms. Vascular changes may also contribute (A fib). Her MOCA is 23/30 today (was 19 in 2016, 2015). Lives close to family, who help her and are willing to help her more as needed   Plan:   1) We reinforced her strategies for compensating for cognitive changes: using a central calendar, asking others to \"fill in the blank\" for her. She also keeps up with her calendar, which helps  2) We reinforced her active lifestyle. She stays physically socially and mentally active: all contribute to her brain health. She agrees this works well for her and plans to continue these habits.   3) We discussed factors that can aggravate her thinking (e.g.,certain medications). She agrees to continue monitoring for UTI symptoms, which she knows well now--and notifies her physician with symptoms. She also maintains a view that \"less is more\" with medications and will inquire about potential side effects before starting supplements or other medications.  5) Avoid anticholinergics.   6) Will check " TSH, B12, Magnesium today. We also discussed vitamin D--getting 1000 IU for that  4) Return to clinic in one year and prn     We also discussed her goal of determining if she needs to move to an AL type living situation. Her daughter said the family lives next to her and are willing to help her if/ when she needs additional help: they have that capacity. We discussed this and patient agrees that would be ideal. Thus we agreed--and reframed--that letting her family know if/ when she needs additional help --will be helpful to them--it will let them know how to adjust what they are doing and the environment in ways that are useful to her.   Start of visit:1100  End of visit:1200  Total time of visit: 60 minutes: 50%  of which were spent discussing brain health and compensating for memory changes, and anticipatory planning

## 2017-10-05 NOTE — ASSESSMENT & PLAN NOTE
"Assessment:   Patient diagnosed with amnesic MCI three years ago. Symptoms include forgetfulness, difficulty finding the right word, and difficulty expressing the word once she knows it. No behavioral or psychological symptoms. Vascular changes may also contribute (A fib). Her MOCA is 23/30 today (was 19 in 2016, 2015). Lives close to family, who help her and are willing to help her more as needed   Plan:   1) We reinforced her strategies for compensating for cognitive changes: using a central calendar, asking others to \"fill in the blank\" for her. She also keeps up with her calendar, which helps  2) We reinforced her active lifestyle. She stays physically socially and mentally active: all contribute to her brain health. She agrees this works well for her and plans to continue these habits.   3) We discussed factors that can aggravate her thinking (e.g.,certain medications). She agrees to continue monitoring for UTI symptoms, which she knows well now--and notifies her physician with symptoms. She also maintains a view that \"less is more\" with medications and will inquire about potential side effects before starting supplements or other medications.  5) Avoid anticholinergics.   6) Will check TSH, B12, Magnesium today. We also discussed vitamin D--getting 1000 IU for that  4) Return to clinic in one year and prn  "

## 2017-10-06 LAB
MAGNESIUM SERPL-MCNC: 2.4 MG/DL (ref 1.6–2.3)
TSH SERPL DL<=0.005 MIU/L-ACNC: 2.44 MU/L (ref 0.4–4)

## 2017-10-06 RX ORDER — CHOLECALCIFEROL (VITAMIN D3) 25 MCG
1 CAPSULE ORAL DAILY
Status: ON HOLD | COMMUNITY
End: 2021-12-29

## 2017-10-11 ENCOUNTER — ANTICOAGULATION THERAPY VISIT (OUTPATIENT)
Dept: NURSING | Facility: CLINIC | Age: 82
End: 2017-10-11
Payer: COMMERCIAL

## 2017-10-11 DIAGNOSIS — Z79.01 LONG-TERM (CURRENT) USE OF ANTICOAGULANTS: ICD-10-CM

## 2017-10-11 DIAGNOSIS — I48.91 ATRIAL FIBRILLATION, UNSPECIFIED TYPE (H): ICD-10-CM

## 2017-10-11 LAB — INR POINT OF CARE: 2.6 (ref 0.86–1.14)

## 2017-10-11 PROCEDURE — 36416 COLLJ CAPILLARY BLOOD SPEC: CPT

## 2017-10-11 PROCEDURE — 85610 PROTHROMBIN TIME: CPT | Mod: QW

## 2017-10-11 PROCEDURE — 99207 ZZC NO CHARGE NURSE ONLY: CPT

## 2017-10-11 NOTE — MR AVS SNAPSHOT
Maia Miranda   10/11/2017 11:00 AM   Anticoagulation Therapy Visit    Description:  84 year old female   Provider:   ANTICOAGULATION CLINIC   Department:   Nurse           INR as of 10/11/2017     Today's INR 2.6      Anticoagulation Summary as of 10/11/2017     INR goal 2.0-3.0   Today's INR 2.6   Full instructions 2.5 mg every day   Next INR check 11/8/2017    Indications   Long-term (current) use of anticoagulants [Z79.01]  Atrial fibrillation (H) [I48.91]         Your next Anticoagulation Clinic appointment(s)     Nov 08, 2017 11:00 AM CST   Anticoagulation [facility]  [facility]    [address] [address] [phone]              Contact Numbers     [facility]  Please call to cancel and/or reschedule your appointment, or with any problems or questions regarding your therapy.  Anticoagulation Nurse: [phone]  Main Clinic: [phone]             October 2017 Details    Sun Mon Tue Wed Thu Fri Sat     1               2               3               4               5               6               7                 8               9               10               11      2.5 mg   See details      12      2.5 mg         13      2.5 mg         14      2.5 mg           15      2.5 mg         16      2.5 mg         17      2.5 mg         18      2.5 mg         19      2.5 mg         20      2.5 mg         21      2.5 mg           22      2.5 mg         23      2.5 mg         24      2.5 mg         25      2.5 mg         26      2.5 mg         27      2.5 mg         28      2.5 mg           29      2.5 mg         30      2.5 mg         31      2.5 mg              Date Details   10/11 This INR check               How to take your warfarin dose     To take:  2.5 mg Take 1 of the 2.5 mg tablets.           November 2017 Details    Sun Mon Tue Wed Thu Fri Sat        1      2.5 mg         2      2.5 mg          3      2.5 mg         4      2.5 mg           5      2.5 mg         6      2.5 mg         7      2.5 mg         8            9               10               11                 12               13               14               15               16               17               18                 19               20               21               22               23               24               25                 26               27               28               29               30                  Date Details   No additional details    Date of next INR:  11/8/2017         How to take your warfarin dose     To take:  2.5 mg Take 1 of the 2.5 mg tablets.

## 2017-10-11 NOTE — PROGRESS NOTES
ANTICOAGULATION FOLLOW-UP CLINIC VISIT    Patient Name:  Maia Miranda  Date:  10/11/2017  Contact Type:  Face to Face    SUBJECTIVE:     Patient Findings     Positives Change in medications (d/c-ed Magnesium), Dental/Other procedures (CT scan of lung - 10/12/17)           OBJECTIVE    INR Protime   Date Value Ref Range Status   10/11/2017 2.6 (A) 0.86 - 1.14 Final       ASSESSMENT / PLAN  INR assessment THER    Recheck INR In: 4 WEEKS    INR Location Clinic      Anticoagulation Summary as of 10/11/2017     INR goal 2.0-3.0   Today's INR 2.6   Maintenance plan 2.5 mg (2.5 mg x 1) every day   Full instructions 2.5 mg every day   Weekly total 17.5 mg   No change documented Meme Riggs RN   Plan last modified Meme Riggs RN (1/18/2017)   Next INR check 11/8/2017   Target end date Indefinite    Indications   Long-term (current) use of anticoagulants [Z79.01]  Atrial fibrillation (H) [I48.91]         Anticoagulation Episode Summary     INR check location [facility]    Preferred lab     Send INR reminders to  ANTICOAG CLINIC    Comments 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

## 2017-10-12 ENCOUNTER — HOSPITAL ENCOUNTER (OUTPATIENT)
Dept: CT IMAGING | Facility: CLINIC | Age: 82
Discharge: HOME | End: 2017-10-12
Attending: INTERNAL MEDICINE | Admitting: INTERNAL MEDICINE
Payer: COMMERCIAL

## 2017-10-12 DIAGNOSIS — R91.1 PULMONARY NODULE: ICD-10-CM

## 2017-10-12 DIAGNOSIS — N32.81 OAB (OVERACTIVE BLADDER): Primary | ICD-10-CM

## 2017-10-12 PROCEDURE — 71250 CT THORAX DX C-: CPT

## 2017-10-16 ENCOUNTER — TELEPHONE (OUTPATIENT)
Dept: UROLOGY | Facility: CLINIC | Age: 82
End: 2017-10-16

## 2017-10-16 DIAGNOSIS — N39.0 URINARY TRACT INFECTION: ICD-10-CM

## 2017-10-16 DIAGNOSIS — N39.0 URINARY TRACT INFECTION: Primary | ICD-10-CM

## 2017-10-16 DIAGNOSIS — N39.0 RECURRENT UTI: ICD-10-CM

## 2017-10-16 LAB
ALBUMIN UR-MCNC: NEGATIVE MG/DL
APPEARANCE UR: CLEAR
BACTERIA #/AREA URNS HPF: ABNORMAL /HPF
BILIRUB UR QL STRIP: NEGATIVE
COLOR UR AUTO: YELLOW
GLUCOSE UR STRIP-MCNC: NEGATIVE MG/DL
HGB UR QL STRIP: ABNORMAL
KETONES UR STRIP-MCNC: NEGATIVE MG/DL
LEUKOCYTE ESTERASE UR QL STRIP: ABNORMAL
NITRATE UR QL: POSITIVE
PH UR STRIP: 6 PH (ref 5–7)
RBC #/AREA URNS AUTO: ABNORMAL /HPF
SOURCE: ABNORMAL
SP GR UR STRIP: 1.01 (ref 1–1.03)
UROBILINOGEN UR STRIP-ACNC: 0.2 EU/DL (ref 0.2–1)
WBC #/AREA URNS AUTO: ABNORMAL /HPF

## 2017-10-16 PROCEDURE — 87186 SC STD MICRODIL/AGAR DIL: CPT | Performed by: UROLOGY

## 2017-10-16 PROCEDURE — 81001 URINALYSIS AUTO W/SCOPE: CPT | Performed by: UROLOGY

## 2017-10-16 PROCEDURE — 87088 URINE BACTERIA CULTURE: CPT | Performed by: UROLOGY

## 2017-10-16 PROCEDURE — 87086 URINE CULTURE/COLONY COUNT: CPT | Performed by: UROLOGY

## 2017-10-16 RX ORDER — CIPROFLOXACIN 500 MG/1
500 TABLET, FILM COATED ORAL 2 TIMES DAILY
Qty: 10 TABLET | Refills: 0 | Status: SHIPPED | OUTPATIENT
Start: 2017-10-16 | End: 2017-12-04

## 2017-10-16 RX ORDER — METHENAMINE HIPPURATE 1000 MG/1
1 TABLET ORAL DAILY
Qty: 90 TABLET | Refills: 3 | Status: ON HOLD | OUTPATIENT
Start: 2017-10-16 | End: 2018-03-23

## 2017-10-18 ENCOUNTER — OFFICE VISIT (OUTPATIENT)
Dept: PODIATRY | Facility: CLINIC | Age: 82
End: 2017-10-18
Payer: COMMERCIAL

## 2017-10-18 ENCOUNTER — TELEPHONE (OUTPATIENT)
Dept: UROLOGY | Facility: CLINIC | Age: 82
End: 2017-10-18

## 2017-10-18 VITALS — HEIGHT: 64 IN | RESPIRATION RATE: 16 BRPM | WEIGHT: 152 LBS | BODY MASS INDEX: 25.95 KG/M2

## 2017-10-18 DIAGNOSIS — M72.2 PLANTAR FASCIAL FIBROMATOSIS: Primary | ICD-10-CM

## 2017-10-18 DIAGNOSIS — M79.672 PAIN OF LEFT HEEL: ICD-10-CM

## 2017-10-18 LAB
BACTERIA SPEC CULT: ABNORMAL
SPECIMEN SOURCE: ABNORMAL

## 2017-10-18 PROCEDURE — 99213 OFFICE O/P EST LOW 20 MIN: CPT | Performed by: PODIATRIST

## 2017-10-18 NOTE — PROGRESS NOTES
"ASSESSMENT/PLAN:    Encounter Diagnoses   Name Primary?     Pain of left heel      Plantar fasciosis, left Yes     Pt educated about the cause and nature of heel pain.  Treatment plan discussed includes icing, calf and plantar fascial stretching, avoidance of barefoot walking, wearing sturdy, supportive athletic-type shoes, and activity modification.  Educational handouts provided.    Continue the quality over the counter inserts    Trilok brace with foot strap medial    Follow up in 1 month;  PT will be our next option.        Tim Baldwin, PRADEEP, FACFAS, MS    [place] Department of Podiatry/Foot & Ankle Surgery      ____________________________________________________________________    HPI:         Chief Complaint: left heel pain, \"plantar fasciitis\"  Onset of problem: months  Pain/ discomfort is described as:  Deep ache. She pointed to the plantar medial left heel  Ratin/10   Frequency:  daily    The pain is made worse after walking  Previous treatment: ice, Super Feet inserts, Tylenol    *  Past Medical History:   Diagnosis Date     Amnestic MCI (mild cognitive impairment with memory loss) 2014     Chronic duodenal ulcer without mention of hemorrhage, perforation, or obstruction 1974    Ulcer, Duod     Depressive disorder, not elsewhere classified      EROSIVE EYE DISORDER     Dr. Warner, [facility] yearly     Esophageal reflux 2001    Abstracted 06/10/02     Essential and other specified forms of tremor 2004    resting tremor     Generalized osteoarthrosis, unspecified site     Hands     Hiatal hernia     diagnosed late  [place], [place]     History of pulmonary embolism 1971    no source found     LACTOSE INTOLERANCE      Lumbago     sees Kodi Guidry     Mitral valve regurgitation      Occlusion and stenosis of carotid artery without mention of cerebral infarction 2003    CAROTID US NORMAL, bruit in neck     Osteoporosis, unspecified     T = -2.66     Paroxysmal atrial fibrillation (H) " 11/15/2013     Unspecified essential hypertension      Unspecified tinnitus 2005    mild hearing loss, saw ENT   *  *  Past Surgical History:   Procedure Laterality Date     C NONSPECIFIC PROCEDURE      D&C x 2 in 1957 & 1962 -- Abstracted 06/10/02     C NONSPECIFIC PROCEDURE      Left breast biopsy x 2 in 1988 & 1989 -- Abstracted 06/10/02     C NONSPECIFIC PROCEDURE  1958    Influenza resulting in hospitalization -- Abstracted 06/10/02     C NONSPECIFIC PROCEDURE  1971    10 day hospitalization from bilateral pulmonary enboli -- Abstracted 06/10/02     C NONSPECIFIC PROCEDURE  1/03    colonoscopy  negative     CATARACT IOL, RT/LT       corneal scraping       CYSTOSCOPY, BIOPSY BLADDER, COMBINED N/A 7/25/2016    Procedure: COMBINED CYSTOSCOPY, BIOPSY BLADDER;  Surgeon: Bernadine Maria MD;  Location: [facility] OR     ESOPHAGOSCOPY, GASTROSCOPY, DUODENOSCOPY (EGD), COMBINED  7/8/2013    Procedure: COMBINED ESOPHAGOSCOPY, GASTROSCOPY, DUODENOSCOPY (EGD);   ESOPHAGOSCOPY, GASTROSCOPY, DUODENOSCOPY (EGD)   ;  Surgeon: Shawn Soto MD;  Location: [facility] GI     IMPLANT STIMULATOR SACRAL NERVE STAGE ONE N/A 4/4/2017    Procedure: IMPLANT STIMULATOR SACRAL NERVE STAGE ONE;  Surgeon: Kb Tracy MD;  Location: [facility] OR     IMPLANT STIMULATOR SACRAL NERVE STAGE TWO N/A 4/18/2017    Procedure: IMPLANT STIMULATOR SACRAL NERVE STAGE TWO;  Stage Two Interstim  ;  Surgeon: Kb Tracy MD;  Location: [facility] OR     interstim placement     *  *  Current Outpatient Prescriptions   Medication Sig Dispense Refill     ciprofloxacin (CIPRO) 500 MG tablet Take 1 tablet (500 mg) by mouth 2 times daily 10 tablet 0     methenamine hippurate (HIPREX) 1 G TABS tablet Take 1 tablet (1 g) by mouth daily 90 tablet 3     calcium citrate-vitamin D (CITRACAL) 315-250 MG-UNIT TABS per tablet Take 1 tablet by mouth daily       Cholecalciferol (VITAMIN D-3) 1000 UNITS CAPS Take 1 capsule by mouth daily       albuterol (PROAIR HFA/PROVENTIL  "HFA/VENTOLIN HFA) 108 (90 BASE) MCG/ACT Inhaler Inhale 2 puffs into the lungs every 6 hours as needed for shortness of breath / dyspnea or wheezing 1 Inhaler 0     metoprolol (TOPROL-XL) 50 MG 24 hr tablet Take 1 tablet (50 mg) by mouth every evening 90 tablet 3     lisinopril (PRINIVIL/ZESTRIL) 10 MG tablet Take 1 tablet (10 mg) by mouth every morning 90 tablet 3     warfarin (COUMADIN) 2.5 MG tablet Take 2.5mg (1 tablet)  MTWTFSS or as directed by INR Clinic. 100 tablet 3     polyethylene glycol (MIRALAX/GLYCOLAX) packet Take 1 packet by mouth daily as needed        conjugated estrogens (PREMARIN) vaginal cream Place vaginally twice a week On Tuesday and Friday. Uses a pea sized amount       fluocinonide (LIDEX) 0.05 % cream Apply topically 2 times daily as needed 30 g 2     acetaminophen (TYLENOL) 500 MG tablet Take 500-1,000 mg by mouth every 6 hours as needed       Catheters (GENTLECATH URINARY CATHETER) MISC 1 catheter 4 times daily 120 each 12     ascorbic acid (VITAMIN C) 1000 MG TABS Take 1 tablet (1,000 mg) by mouth 2 times daily (Patient taking differently: Take 1,000 mg by mouth daily With HYprex) 180 tablet 3     Omega-3 Fatty Acids (OMEGA-3 FISH OIL PO) Take 1 g by mouth 2 times daily (with meals)       sodium chloride (PETER 128) 5 % ophthalmic ointment Place 1 Application into both eyes At Bedtime       carboxymethylcellulose (CELLUVISC/REFRESH LIQUIGEL) 1 % ophthalmic solution Place 1 drop into both eyes every morning          EXAM:    Vitals: Resp 16  Ht 5' 4\" (1.626 m)  Wt 152 lb (68.9 kg)  BMI 26.09 kg/m2  BMI: Body mass index is 26.09 kg/(m^2).  Height: 5' 4\"    Constitutional/ general:  Pt is in no apparent distress, appears well-nourished.  Cooperative with history and physical exam.     Vascular:  Pedal pulses are palpable bilaterally for both the DP and PT arteries.  CFT < 3 sec.  No edema.  Pedal hair growth noted.     Neuro:  Alert and oriented x 3. Coordinated gait.  Light touch " sensation is intact to the L4, L5, S1 distributions. No obvious deficits.  No evidence of neurological-based weakness, spasticity, or contracture in the lower extremities.     Derm: Normal texture and turgor.  No erythema, ecchymosis, or cyanosis.  No open lesions.     Musculoskeletal:    Lower extremity muscle strength is normal.  Patient is ambulatory without an assistive device or brace .  No gross deformities.  Pain on palpation to the plantar medial aspect of the left heel.  No pain with side-to-side compression of the heel.        Tim Baldwin DPM, FACFAS, MS    [place] Department of Podiatry/Foot & Ankle Surgery

## 2017-10-18 NOTE — PATIENT INSTRUCTIONS
DR. [facility] LOCATIONS     [facility]   [address] [place] [address] [address] [address]   [phone] / [phone] / [phone]       THURSDAY - [place] SCHEDULE SURGERY: [phone]   [address] APPOINTMENTS: [phone]   [place] [address] BILLING QUESTIONS: [phone]   [phone] / [phone]         HEEL PAIN TIPS    1)  A rigid-soled, athletic shoe like a hiking shoe is recommended.    2)  Avoid barefoot walking at home. It is a good idea to wear a clean athletic shoe inside.    3)  Stretch your calf muscules and plantar fascia frequently. It is a good idea to do this before getting out of bed.    4)  Ice and ibuprofen can help if pain is related to inflammation.    5)  Some good over the counter inserts might help, see the handout in this packet on our recommendations    * If your pain persists, please return to clinic. Future options include a walking boot, physical therapy, night splints, and injections.  \  PLANTAR FASCIITIS  Plantar fasciitis is often referred to as heel spurs or heel pain. Plantar fasciitis is a very common problem that affects people of all foot shapes, age, weight and activity level. Pain may be in the arch or on the weight-bearing surface of the heel. The pain may come on without injury or identifiable cause. Pain is generally present when first getting out of bed in the morning or up from a seated break.     CAUSES  The plantar fascia is a dense fibrous band of tissue that stretches across the bottom surface of the foot. The fascia helps support the foot muscles and arch. Plantar fasciitis is thought to be caused by mechanical strain or overload. Frequent walking without shoes or wearing unsupportive shoes is thought to cause structural overload and ultimately inflammation of the plantar fascia. Some people have heel spurs that can be seen on x-ray. The heel  spur is actually a minor component of plantar fascitis and is largely ignored.       SELF TREATMENT   The easiest solution is to stop walking around your home without shoes. Plantar fasciitis is largely a shoe problem. Shoes are either not being worn often enough or your current shoes are inadequate for your weight, foot structure or activity level. The majority of shoes on the market today are not sufficient to resist development of plantar fasciitis or to promote healing. Assume that your current shoes are inadequate and will need to be replaced. Even high quality shoes wear out with 6 months to one year of frequent use. Weight loss is another option. Losing ten pounds in the next two months may be enough to resolve the problem. Ice applied to the area of pain two to three times per day for ten minutes each session can be very helpful. This should continue until the problem resolves. Achilles tendon stretching is essential. Stretch multiple times daily to promote healing and to prevent recurrence in the future.     MEDICAL TREATMENT  Medical treatments often include custom arch supports, cortisone injections, physical therapy, splints to be worn in bed, prescription medications and surgery. The home treatments listed above will be necessary regardless of these advanced medical treatments. Surgery is rarely needed but is very helpful in selected cases.     PROGNOSIS  Plantar fasciitis can last from one day to a lifetime. Some people get intermittent fascitis that is very short-lived. Others suffer daily for years. Excessive body weight, frequent bare foot walking, long hours on the feet, inadequate shoes, predisposing foot structures and excessive activity such as running are all potential issues that lead to chronic and/or recurring plantar fascitis. Having plantar fasciitis means that you are forever prone to this problem and will require modification of some of the above factors. Most people seek treatment  within one to four months. Healing usually requires a similar one to four month time frame. Healing time is relative to the amount of effort spent treating the problem.   Plantar fasciitis is highly recurrent. Risk factors often continue, including return to bare foot walking, inadequate shoes, excessive body weight, excessive activities, etc. Your life style and foot structure may predispose you to recurrent plantar fasciitis. A daily prevention regimen can be very helpful. Ongoing use of shoe inserts, careful attention to appropriate shoes, daily Achilles stretching, etc. may prevent recurrence. Prompt attention at the earliest warning signs of heel pain can resolve the problem in as short as a few days.     EXERCISES    Stair Exercise: Step on the stairs with the ball of your foot and hold your position for at least 15 seconds, then slowly step down with the heels of your foot. You can do this daily and as often as you want.   Picking the Towel: Sit comfortably and then pick the towel up with your toes. You can use any object other than a towel as long as the material can be soft and you can pick it up with your toes.  Rolling the Bottle: Use a small ball or frozen water bottle and then roll it around with your foot.   Flex the Toes: Sit comfortably and then flex your toes by pointing it towards the floor or towards your body. This will relax and flex your foot and exercise your plantar fascia, the calf, and the Achilles tendon. The inability of the foot to stretch often causes the bunching up of the plantar fascia area leading to the pain.  Calf/Achilles Stretching: Lay on you back and raise one foot, then point your toes towards the floor. See photo below:               Hold each stretch for 10 seconds. Stretch 10 times per set, three sets per day. Morning, afternoon and evening. If your heel pain is very severe in the morning, consider doing the first set of stretches before you get out of bed.    THERAPIES  COVERED:  1.  Supportive Shoes: minimizing barefoot ambulation helps to provide cushion, padding and support to the ligament that is inflamed. Socks, flip flops, flats and some slippers are not typically sufficient to provide support. Shoes should be worn even indoors  2.  Insert/Orthotics: ones with an arch support built in to them provide further stress relief for the ligament. See the information below on recommended inserts.  3. Icing: using a frozen water bottle or orange, and rolling it along the bottom of the arch/heel can help to alleviate discomfort, and can act as a tissue massage to the painful, inflamed ligament.  There is evidence that shows icing at least three times daily can be beneficial  4.  Antiinflammatory (NSAID): Ibuprofen, Aleve, as well as Tylenol can be used to help decrease symptoms and improve pain levels. If you have high blood pressure, heart disease, stomach or kidney problems, use antiinflammatories sparingly. Tylenol should not be used if you have liver problems.   5. Activity Modifications: if there are certain things that you do, whether it's going barefoot or certain shoes/activities, you should try to minimize those activities as much as possible until your symptoms are sufficiently resolved. Certainly, some activities, such as running on the treadmill, are easier to take a break from versus others, such as work or chores at home. If there are certain activities that hurt your heel, and you keep doing those activities that hurt your heel, your heel will keep hurting.  **If these initial therapies are insufficient, we have our tier 2 therapies that can more aggressively work to improve your symptoms and get you back to the activities that you enjoy!    OVER THE COUNTER INSERTS    SuperFeet   Sofsole Fit Spenco   Power Step   Walk-Fit Arch Cradles     Most of these can be found at your local [employer], sporting [employer], or online.  **A good high quality over the counter  insert should cost around $40-$50      [facility] SHOES LOCATIONS    [place]  [address] [facility]  [phone]   [address]  [phone] [place]  [address] [facility]  [phone]   [place]  [address]  [phone]   [place]  [address]  [phone] [place] [address]  [phone]   [place]  [address]  [phone]   [place]  [address] [place] [address]  [phone] [place]  [address] [facility] [phone]           Body Mass Index (BMI)  Many things can cause foot and ankle problems. Foot structure, activity level, foot mechanics and injuries are common causes of pain.  One very important issue that often goes unmentioned, is body weight.  Extra weight can cause increased stress on muscles, ligaments, bones and tendons.  Sometimes just a few extra pounds is all it takes to put one over her/his threshold. Without reducing that stress, it can be difficult to alleviate pain. Some people are uncomfortable addressing this issue, but we feel it is important for you to think about it. As Foot &  Ankle specialists, our job is addressing the lower extremity problem and possible causes. Regarding extra body weight, we encourage patients to discuss diet and weight management plans with their primary care doctors. It is this team approach that gives you the best opportunity for pain relief and getting you back on your feet.

## 2017-10-18 NOTE — LETTER
"    10/18/2017         RE: Maia Miranda  [address] [facility] [address]        Dear Colleague,    Thank you for referring your patient, Maia Miranda, to the [facility]. Please see a copy of my visit note below.    ASSESSMENT/PLAN:    Encounter Diagnoses   Name Primary?     Pain of left heel      Plantar fasciosis, left Yes     Pt educated about the cause and nature of heel pain.  Treatment plan discussed includes icing, calf and plantar fascial stretching, avoidance of barefoot walking, wearing sturdy, supportive athletic-type shoes, and activity modification.  Educational handouts provided.    Continue the quality over the counter inserts    Trilok brace with foot strap medial    Follow up in 1 month;  PT will be our next option.        Tim Baldwin DPM, FACFAS, MS    [place] Department of Podiatry/Foot & Ankle Surgery      ____________________________________________________________________    HPI:         Chief Complaint: left heel pain, \"plantar fasciitis\"  Onset of problem: months  Pain/ discomfort is described as:  Deep ache. She pointed to the plantar medial left heel  Ratin/10   Frequency:  daily    The pain is made worse after walking  Previous treatment: ice, Super Feet inserts, Tylenol    *  Past Medical History:   Diagnosis Date     Amnestic MCI (mild cognitive impairment with memory loss) 2014     Chronic duodenal ulcer without mention of hemorrhage, perforation, or obstruction 1974    Ulcer, Duod     Depressive disorder, not elsewhere classified      EROSIVE EYE DISORDER     Dr. Warner, [facility] yearly     Esophageal reflux 2001    Abstracted 06/10/02     Essential and other specified forms of tremor 2004    resting tremor     Generalized osteoarthrosis, unspecified site     Hands     Hiatal hernia     diagnosed late  [place], [place]     History of pulmonary embolism     no source found     LACTOSE INTOLERANCE      Lumbago     sees Kodi " Chao     Mitral valve regurgitation      Occlusion and stenosis of carotid artery without mention of cerebral infarction 2003    CAROTID US NORMAL, bruit in neck     Osteoporosis, unspecified 2003    T = -2.66     Paroxysmal atrial fibrillation (H) 11/15/2013     Unspecified essential hypertension      Unspecified tinnitus 2005    mild hearing loss, saw ENT   *  *  Past Surgical History:   Procedure Laterality Date     C NONSPECIFIC PROCEDURE      D&C x 2 in 1957 & 1962 -- Abstracted 06/10/02     C NONSPECIFIC PROCEDURE      Left breast biopsy x 2 in 1988 & 1989 -- Abstracted 06/10/02     C NONSPECIFIC PROCEDURE  1958    Influenza resulting in hospitalization -- Abstracted 06/10/02     C NONSPECIFIC PROCEDURE  1971    10 day hospitalization from bilateral pulmonary enboli -- Abstracted 06/10/02     C NONSPECIFIC PROCEDURE  1/03    colonoscopy  negative     CATARACT IOL, RT/LT       corneal scraping       CYSTOSCOPY, BIOPSY BLADDER, COMBINED N/A 7/25/2016    Procedure: COMBINED CYSTOSCOPY, BIOPSY BLADDER;  Surgeon: Bernadine Maria MD;  Location: [facility] OR     ESOPHAGOSCOPY, GASTROSCOPY, DUODENOSCOPY (EGD), COMBINED  7/8/2013    Procedure: COMBINED ESOPHAGOSCOPY, GASTROSCOPY, DUODENOSCOPY (EGD);   ESOPHAGOSCOPY, GASTROSCOPY, DUODENOSCOPY (EGD)   ;  Surgeon: Shawn Soto MD;  Location: [facility] GI     IMPLANT STIMULATOR SACRAL NERVE STAGE ONE N/A 4/4/2017    Procedure: IMPLANT STIMULATOR SACRAL NERVE STAGE ONE;  Surgeon: Kb Tracy MD;  Location: [facility] OR     IMPLANT STIMULATOR SACRAL NERVE STAGE TWO N/A 4/18/2017    Procedure: IMPLANT STIMULATOR SACRAL NERVE STAGE TWO;  Stage Two Interstim  ;  Surgeon: Kb Tracy MD;  Location: [facility] OR     interstim placement     *  *  Current Outpatient Prescriptions   Medication Sig Dispense Refill     ciprofloxacin (CIPRO) 500 MG tablet Take 1 tablet (500 mg) by mouth 2 times daily 10 tablet 0     methenamine hippurate (HIPREX) 1 G TABS tablet Take 1 tablet (1 g)  "by mouth daily 90 tablet 3     calcium citrate-vitamin D (CITRACAL) 315-250 MG-UNIT TABS per tablet Take 1 tablet by mouth daily       Cholecalciferol (VITAMIN D-3) 1000 UNITS CAPS Take 1 capsule by mouth daily       albuterol (PROAIR HFA/PROVENTIL HFA/VENTOLIN HFA) 108 (90 BASE) MCG/ACT Inhaler Inhale 2 puffs into the lungs every 6 hours as needed for shortness of breath / dyspnea or wheezing 1 Inhaler 0     metoprolol (TOPROL-XL) 50 MG 24 hr tablet Take 1 tablet (50 mg) by mouth every evening 90 tablet 3     lisinopril (PRINIVIL/ZESTRIL) 10 MG tablet Take 1 tablet (10 mg) by mouth every morning 90 tablet 3     warfarin (COUMADIN) 2.5 MG tablet Take 2.5mg (1 tablet)  MTWTFSS or as directed by INR Clinic. 100 tablet 3     polyethylene glycol (MIRALAX/GLYCOLAX) packet Take 1 packet by mouth daily as needed        conjugated estrogens (PREMARIN) vaginal cream Place vaginally twice a week On Tuesday and Friday. Uses a pea sized amount       fluocinonide (LIDEX) 0.05 % cream Apply topically 2 times daily as needed 30 g 2     acetaminophen (TYLENOL) 500 MG tablet Take 500-1,000 mg by mouth every 6 hours as needed       Catheters (GENTLECATH URINARY CATHETER) MISC 1 catheter 4 times daily 120 each 12     ascorbic acid (VITAMIN C) 1000 MG TABS Take 1 tablet (1,000 mg) by mouth 2 times daily (Patient taking differently: Take 1,000 mg by mouth daily With HYprex) 180 tablet 3     Omega-3 Fatty Acids (OMEGA-3 FISH OIL PO) Take 1 g by mouth 2 times daily (with meals)       sodium chloride (PETER 128) 5 % ophthalmic ointment Place 1 Application into both eyes At Bedtime       carboxymethylcellulose (CELLUVISC/REFRESH LIQUIGEL) 1 % ophthalmic solution Place 1 drop into both eyes every morning          EXAM:    Vitals: Resp 16  Ht 5' 4\" (1.626 m)  Wt 152 lb (68.9 kg)  BMI 26.09 kg/m2  BMI: Body mass index is 26.09 kg/(m^2).  Height: 5' 4\"    Constitutional/ general:  Pt is in no apparent distress, appears well-nourished.  " Cooperative with history and physical exam.     Vascular:  Pedal pulses are palpable bilaterally for both the DP and PT arteries.  CFT < 3 sec.  No edema.  Pedal hair growth noted.     Neuro:  Alert and oriented x 3. Coordinated gait.  Light touch sensation is intact to the L4, L5, S1 distributions. No obvious deficits.  No evidence of neurological-based weakness, spasticity, or contracture in the lower extremities.     Derm: Normal texture and turgor.  No erythema, ecchymosis, or cyanosis.  No open lesions.     Musculoskeletal:    Lower extremity muscle strength is normal.  Patient is ambulatory without an assistive device or brace .  No gross deformities.  Pain on palpation to the plantar medial aspect of the left heel.  No pain with side-to-side compression of the heel.        Tim Baldwin DPM, FACFAS, MS    [place] Department of Podiatry/Foot & Ankle Surgery              Again, thank you for allowing me to participate in the care of your patient.        Sincerely,        Tim Baldwin DPM

## 2017-10-18 NOTE — MR AVS SNAPSHOT
After Visit Summary   10/18/2017    Maia Miranda    MRN: [id number]           Patient Information     Date Of Birth          10/26/1932        Visit Information        Provider Department      10/18/2017 11:15 AM Tim Hensley DPM [facility]        Care Instructions    DR. HENSLEY'S CLINIC LOCATIONS     MONDAY [facility] W[facility] - [facility]   [address] [place] [address] [place] [address]   [phone] / [phone] / [phone]       THURSDAY - [place] SCHEDULE SURGERY: [phone]   [address] APPOINTMENTS: [phone]   [place] [address] BILLING QUESTIONS: [phone] / [phone]         HEEL PAIN TIPS    1)  A rigid-soled, athletic shoe like a hiking shoe is recommended.    2)  Avoid barefoot walking at home. It is a good idea to wear a clean athletic shoe inside.    3)  Stretch your calf muscules and plantar fascia frequently. It is a good idea to do this before getting out of bed.    4)  Ice and ibuprofen can help if pain is related to inflammation.    5)  Some good over the counter inserts might help, see the handout in this packet on our recommendations    * If your pain persists, please return to clinic. Future options include a walking boot, physical therapy, night splints, and injections.  \  PLANTAR FASCIITIS  Plantar fasciitis is often referred to as heel spurs or heel pain. Plantar fasciitis is a very common problem that affects people of all foot shapes, age, weight and activity level. Pain may be in the arch or on the weight-bearing surface of the heel. The pain may come on without injury or identifiable cause. Pain is generally present when first getting out of bed in the morning or up from a seated break.     CAUSES  The plantar fascia is a dense fibrous band of tissue that stretches across the bottom surface of the foot. The fascia helps support the foot muscles  and arch. Plantar fasciitis is thought to be caused by mechanical strain or overload. Frequent walking without shoes or wearing unsupportive shoes is thought to cause structural overload and ultimately inflammation of the plantar fascia. Some people have heel spurs that can be seen on x-ray. The heel spur is actually a minor component of plantar fascitis and is largely ignored.       SELF TREATMENT   The easiest solution is to stop walking around your home without shoes. Plantar fasciitis is largely a shoe problem. Shoes are either not being worn often enough or your current shoes are inadequate for your weight, foot structure or activity level. The majority of shoes on the market today are not sufficient to resist development of plantar fasciitis or to promote healing. Assume that your current shoes are inadequate and will need to be replaced. Even high quality shoes wear out with 6 months to one year of frequent use. Weight loss is another option. Losing ten pounds in the next two months may be enough to resolve the problem. Ice applied to the area of pain two to three times per day for ten minutes each session can be very helpful. This should continue until the problem resolves. Achilles tendon stretching is essential. Stretch multiple times daily to promote healing and to prevent recurrence in the future.     MEDICAL TREATMENT  Medical treatments often include custom arch supports, cortisone injections, physical therapy, splints to be worn in bed, prescription medications and surgery. The home treatments listed above will be necessary regardless of these advanced medical treatments. Surgery is rarely needed but is very helpful in selected cases.     PROGNOSIS  Plantar fasciitis can last from one day to a lifetime. Some people get intermittent fascitis that is very short-lived. Others suffer daily for years. Excessive body weight, frequent bare foot walking, long hours on the feet, inadequate shoes, predisposing  foot structures and excessive activity such as running are all potential issues that lead to chronic and/or recurring plantar fascitis. Having plantar fasciitis means that you are forever prone to this problem and will require modification of some of the above factors. Most people seek treatment within one to four months. Healing usually requires a similar one to four month time frame. Healing time is relative to the amount of effort spent treating the problem.   Plantar fasciitis is highly recurrent. Risk factors often continue, including return to bare foot walking, inadequate shoes, excessive body weight, excessive activities, etc. Your life style and foot structure may predispose you to recurrent plantar fasciitis. A daily prevention regimen can be very helpful. Ongoing use of shoe inserts, careful attention to appropriate shoes, daily Achilles stretching, etc. may prevent recurrence. Prompt attention at the earliest warning signs of heel pain can resolve the problem in as short as a few days.     EXERCISES    Stair Exercise: Step on the stairs with the ball of your foot and hold your position for at least 15 seconds, then slowly step down with the heels of your foot. You can do this daily and as often as you want.   Picking the Towel: Sit comfortably and then pick the towel up with your toes. You can use any object other than a towel as long as the material can be soft and you can pick it up with your toes.  Rolling the Bottle: Use a small ball or frozen water bottle and then roll it around with your foot.   Flex the Toes: Sit comfortably and then flex your toes by pointing it towards the floor or towards your body. This will relax and flex your foot and exercise your plantar fascia, the calf, and the Achilles tendon. The inability of the foot to stretch often causes the bunching up of the plantar fascia area leading to the pain.  Calf/Achilles Stretching: Lay on you back and raise one foot, then point your  toes towards the floor. See photo below:               Hold each stretch for 10 seconds. Stretch 10 times per set, three sets per day. Morning, afternoon and evening. If your heel pain is very severe in the morning, consider doing the first set of stretches before you get out of bed.    THERAPIES COVERED:  1.  Supportive Shoes: minimizing barefoot ambulation helps to provide cushion, padding and support to the ligament that is inflamed. Socks, flip flops, flats and some slippers are not typically sufficient to provide support. Shoes should be worn even indoors  2.  Insert/Orthotics: ones with an arch support built in to them provide further stress relief for the ligament. See the information below on recommended inserts.  3. Icing: using a frozen water bottle or orange, and rolling it along the bottom of the arch/heel can help to alleviate discomfort, and can act as a tissue massage to the painful, inflamed ligament.  There is evidence that shows icing at least three times daily can be beneficial  4.  Antiinflammatory (NSAID): Ibuprofen, Aleve, as well as Tylenol can be used to help decrease symptoms and improve pain levels. If you have high blood pressure, heart disease, stomach or kidney problems, use antiinflammatories sparingly. Tylenol should not be used if you have liver problems.   5. Activity Modifications: if there are certain things that you do, whether it's going barefoot or certain shoes/activities, you should try to minimize those activities as much as possible until your symptoms are sufficiently resolved. Certainly, some activities, such as running on the treadmill, are easier to take a break from versus others, such as work or chores at home. If there are certain activities that hurt your heel, and you keep doing those activities that hurt your heel, your heel will keep hurting.  **If these initial therapies are insufficient, we have our tier 2 therapies that can more aggressively work to improve  your symptoms and get you back to the activities that you enjoy!    OVER THE COUNTER INSERTS    SuperFeet   Sofsole Fit Spenco   Power Step   Walk-Fit Arch Cradles     Most of these can be found at your local [employer], sporting [employer] stores, or online.  **A good high quality over the counter insert should cost around $40-$50      [employer] LOCATIONS    [place]  [address] [facility]  [phone]   [place]  [address] [place] [address] [address]  [phone] [place]  [address] [facility]  [phone]   [place]  [address]  [phone]   [place]  [address]  [phone] [place]  [address] [place] [address] [place].  [phone]   [place]  [address]  [phone]   [place]  [address] [place] [facility]  [phone] [place]  [address] [facility] [phone]           Body Mass Index (BMI)  Many things can cause foot and ankle problems. Foot structure, activity level, foot mechanics and injuries are common causes of pain.  One very important issue that often goes unmentioned, is body weight.  Extra weight can cause increased stress on muscles, ligaments, bones and tendons.  Sometimes just a few extra pounds is all it takes to put one over her/his threshold. Without reducing that stress, it can be difficult to alleviate pain. Some people are uncomfortable addressing this issue, but we feel it is important for you to think about it. As Foot &  Ankle specialists, our job is addressing the lower extremity problem and possible causes. Regarding extra body weight, we encourage patients to discuss diet and weight management plans with their primary care doctors. It is this team approach that gives you the best opportunity for pain relief and getting you back on your feet.                Follow-ups after your visit        Your next 10 appointments already scheduled     Nov 08, 2017 11:00 AM CST   Anticoagulation Visit with  ANTICOAGULATION CLINIC   [facility]  "[facility]    [address] [facility] [address] [phone]              Who to contact     If you have questions or need follow up information about today's clinic visit or your schedule please contact [facility] directly at [phone].  Normal or non-critical lab and imaging results will be communicated to you by MyChart, letter or phone within 4 business days after the clinic has received the results. If you do not hear from us within 7 days, please contact the clinic through [employer] or phone. If you have a critical or abnormal lab result, we will notify you by phone as soon as possible.  Submit refill requests through [employer] or call your pharmacy and they will forward the refill request to us. Please allow 3 business days for your refill to be completed.          Additional Information About Your Visit        MyChart Information     [employer] gives you secure access to your electronic health record. If you see a primary care provider, you can also send messages to your care team and make appointments. If you have questions, please call your primary care clinic.  If you do not have a primary care provider, please call [phone] and they will assist you.        Care EveryWhere ID     This is your Care EveryWhere ID. This could be used by other organizations to access your [place] medical records  [id number]        Your Vitals Were     Respirations Height BMI (Body Mass Index)             16 5' 4\" (1.626 m) 26.09 kg/m2          Blood Pressure from Last 3 Encounters:   10/05/17 162/60   10/05/17 162/60   10/04/17 130/64    Weight from Last 3 Encounters:   10/18/17 152 lb (68.9 kg)   10/05/17 152 lb (68.9 kg)   10/05/17 152 lb (68.9 kg)              Today, you had the following     No orders found for display         Today's Medication Changes          These changes are accurate as of: 10/18/17 11:22 AM.  If you have any questions, ask your nurse or doctor.             "   These medicines have changed or have updated prescriptions.        Dose/Directions    ascorbic acid 1000 MG Tabs   Commonly known as:  vitamin C   This may have changed:    - when to take this  - additional instructions   Used for:  Recurrent UTI, Neurogenic bladder        Dose:  1000 mg   Take 1 tablet (1,000 mg) by mouth 2 times daily   Quantity:  180 tablet   Refills:  3                Primary Care Provider Office Phone # Fax #    Dian Frias [phone] [address] [place] [address]        Equal Access to Services     [facility]: Hadii luiz ku hadasho Soomaali, waaxda luqadaha, qaybta kaalmada adeegyada, waxay bushrain haycortney leahy . So [facility] [phone].    ATENCIÓN: Si habla español, tiene a conn disposición servicios gratuitos de asistencia lingüística. [facility] [phone].    We comply with applicable federal civil rights laws and [place] laws. We do not discriminate on the basis of race, color, national origin, age, disability, sex, sexual orientation, or gender identity.            Thank you!     Thank you for choosing [facility]  for your care. Our goal is always to provide you with excellent care. Hearing back from our patients is one way we can continue to improve our services. Please take a few minutes to complete the written survey that you may receive in the mail after your visit with us. Thank you!             Your Updated Medication List - Protect others around you: Learn how to safely use, store and throw away your medicines at www.disposemymeds.org.          This list is accurate as of: 10/18/17 11:22 AM.  Always use your most recent med list.                   Brand Name Dispense Instructions for use Diagnosis    albuterol 108 (90 BASE) MCG/ACT Inhaler    PROAIR HFA/PROVENTIL HFA/VENTOLIN HFA    1 Inhaler    Inhale 2 puffs into the lungs every 6 hours as needed for shortness of breath / dyspnea or wheezing    SOB  (shortness of breath)       ascorbic acid 1000 MG Tabs    vitamin C    180 tablet    Take 1 tablet (1,000 mg) by mouth 2 times daily    Recurrent UTI, Neurogenic bladder       calcium citrate-vitamin D 315-250 MG-UNIT Tabs per tablet    CITRACAL     Take 1 tablet by mouth daily        carboxymethylcellulose 1 % ophthalmic solution    CELLUVISC/REFRESH LIQUIGEL     Place 1 drop into both eyes every morning        ciprofloxacin 500 MG tablet    CIPRO    10 tablet    Take 1 tablet (500 mg) by mouth 2 times daily    Urinary tract infection       conjugated estrogens cream    PREMARIN     Place vaginally twice a week On Tuesday and Friday. Uses a pea sized amount        fluocinonide 0.05 % cream    LIDEX    30 g    Apply topically 2 times daily as needed    Dermatitis       GENTLECATH URINARY CATHETER Misc     120 each    1 catheter 4 times daily    Urinary retention       lisinopril 10 MG tablet    PRINIVIL/ZESTRIL    90 tablet    Take 1 tablet (10 mg) by mouth every morning    Hypertension goal BP (blood pressure) < 150/90       methenamine hippurate 1 G Tabs tablet    HIPREX    90 tablet    Take 1 tablet (1 g) by mouth daily    Recurrent UTI       metoprolol 50 MG 24 hr tablet    TOPROL-XL    90 tablet    Take 1 tablet (50 mg) by mouth every evening    Hypertension goal BP (blood pressure) < 150/90       OMEGA-3 FISH OIL PO      Take 1 g by mouth 2 times daily (with meals)        polyethylene glycol Packet    MIRALAX/GLYCOLAX     Take 1 packet by mouth daily as needed        sodium chloride 5 % ophthalmic ointment    PETER 128     Place 1 Application into both eyes At Bedtime        TYLENOL 500 MG tablet   Generic drug:  acetaminophen      Take 500-1,000 mg by mouth every 6 hours as needed        Vitamin D-3 1000 UNITS Caps      Take 1 capsule by mouth daily        warfarin 2.5 MG tablet    COUMADIN    100 tablet    Take 2.5mg (1 tablet)  MTWTFSS or as directed by INR Clinic.    Long-term (current) use of  anticoagulants

## 2017-10-18 NOTE — NURSING NOTE
"Chief Complaint   Patient presents with     RECHECK     Plantar fasciitis x months, seen by PCP for it, been icing, superfeet, tylenol       Initial Resp 16  Ht 5' 4\" (1.626 m)  Wt 152 lb (68.9 kg)  BMI 26.09 kg/m2 Estimated body mass index is 26.09 kg/(m^2) as calculated from the following:    Height as of this encounter: 5' 4\" (1.626 m).    Weight as of this encounter: 152 lb (68.9 kg).  Medication Reconciliation: complete  "

## 2017-10-19 ENCOUNTER — TELEPHONE (OUTPATIENT)
Dept: NURSING | Facility: CLINIC | Age: 82
End: 2017-10-19

## 2017-10-19 NOTE — TELEPHONE ENCOUNTER
Patient called to report that she started on Cipro on Monday 10/16/17.  She wanted to know if she should come in sooner for an INR check.    Reviewed chart. She was on Cipro most recently in July 2017 and Oct 2016 and INR did not become elevated either time.  Instructed her to keep her already scheduled INR appointment and as always, what for signs of bleeding.     Claire Gaitan RN

## 2017-11-06 ENCOUNTER — MYC MEDICAL ADVICE (OUTPATIENT)
Dept: PEDIATRICS | Facility: CLINIC | Age: 82
End: 2017-11-06

## 2017-11-08 ENCOUNTER — ANTICOAGULATION THERAPY VISIT (OUTPATIENT)
Dept: NURSING | Facility: CLINIC | Age: 82
End: 2017-11-08
Payer: COMMERCIAL

## 2017-11-08 DIAGNOSIS — I48.91 ATRIAL FIBRILLATION, UNSPECIFIED TYPE (H): ICD-10-CM

## 2017-11-08 DIAGNOSIS — Z79.01 LONG-TERM (CURRENT) USE OF ANTICOAGULANTS: ICD-10-CM

## 2017-11-08 LAB — INR POINT OF CARE: 2.5 (ref 0.86–1.14)

## 2017-11-08 PROCEDURE — 36416 COLLJ CAPILLARY BLOOD SPEC: CPT

## 2017-11-08 PROCEDURE — 99207 ZZC NO CHARGE NURSE ONLY: CPT

## 2017-11-08 PROCEDURE — 85610 PROTHROMBIN TIME: CPT | Mod: QW

## 2017-11-08 NOTE — PROGRESS NOTES
ANTICOAGULATION FOLLOW-UP CLINIC VISIT    Patient Name:  Maia Miranda  Date:  11/8/2017  Contact Type:  Face to Face    SUBJECTIVE:     Patient Findings     Positives No Problem Findings           OBJECTIVE    INR Protime   Date Value Ref Range Status   11/08/2017 2.5 (A) 0.86 - 1.14 Final       ASSESSMENT / PLAN  INR assessment THER    Recheck INR In: 5 WEEKS    INR Location Clinic      Anticoagulation Summary as of 11/8/2017     INR goal 2.0-3.0   Today's INR 2.5   Maintenance plan 2.5 mg (2.5 mg x 1) every day   Full instructions 2.5 mg every day   Weekly total 17.5 mg   No change documented Meme Riggs RN   Plan last modified Meme Riggs RN (1/18/2017)   Next INR check 12/13/2017   Target end date Indefinite    Indications   Long-term (current) use of anticoagulants [Z79.01]  Atrial fibrillation (H) [I48.91]         Anticoagulation Episode Summary     INR check location [facility]    Preferred lab     Send INR reminders to  ANTICOAG CLINIC    Comments 2.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

## 2017-11-08 NOTE — MR AVS SNAPSHOT
Maia Miranda   11/8/2017 11:00 AM   Anticoagulation Therapy Visit    Description:  85 year old female   Provider:   ANTICOAGULATION CLINIC   Department:   Nurse           INR as of 11/8/2017     Today's INR 2.5      Anticoagulation Summary as of 11/8/2017     INR goal 2.0-3.0   Today's INR 2.5   Full instructions 2.5 mg every day   Next INR check 12/13/2017    Indications   Long-term (current) use of anticoagulants [Z79.01]  Atrial fibrillation (H) [I48.91]         Your next Anticoagulation Clinic appointment(s)     Dec 13, 2017 11:00 AM CST   Anticoagulation Visit with  ANTICOAGULATION CLINIC   [facility]    [address] [facility] [address] [phone]              Contact Numbers     [facility]  Please call to cancel and/or reschedule your appointment, or with any problems or questions regarding your therapy.  Anticoagulation Nurse: [phone]  Main Clinic: [phone]             November 2017 Details    Sun Mon Tue Wed Thu Fri Sat        1               2               3               4                 5               6               7               8      2.5 mg   See details      9      2.5 mg         10      2.5 mg         11      2.5 mg           12      2.5 mg         13      2.5 mg         14      2.5 mg         15      2.5 mg         16      2.5 mg         17      2.5 mg         18      2.5 mg           19      2.5 mg         20      2.5 mg         21      2.5 mg         22      2.5 mg         23      2.5 mg         24      2.5 mg         25      2.5 mg           26      2.5 mg         27      2.5 mg         28      2.5 mg         29      2.5 mg         30      2.5 mg            Date Details   11/08 This INR check               How to take your warfarin dose     To take:  2.5 mg Take 1 of the 2.5 mg tablets.           December 2017 Details    Sun Mon Tue Wed Thu Fri Sat          1      2.5 mg         2      2.5 mg            3      2.5 mg         4      2.5 mg         5      2.5 mg         6      2.5 mg         7      2.5 mg         8      2.5 mg         9      2.5 mg           10      2.5 mg         11      2.5 mg         12      2.5 mg         13            14               15               16                 17               18               19               20               21               22               23                 24               25               26               27               28               29               30                 31                      Date Details   No additional details    Date of next INR:  12/13/2017         How to take your warfarin dose     To take:  2.5 mg Take 1 of the 2.5 mg tablets.

## 2017-12-04 ENCOUNTER — OFFICE VISIT (OUTPATIENT)
Dept: PEDIATRICS | Facility: CLINIC | Age: 82
End: 2017-12-04
Payer: COMMERCIAL

## 2017-12-04 VITALS
DIASTOLIC BLOOD PRESSURE: 70 MMHG | SYSTOLIC BLOOD PRESSURE: 110 MMHG | HEART RATE: 55 BPM | TEMPERATURE: 97.2 F | BODY MASS INDEX: 26.29 KG/M2 | OXYGEN SATURATION: 99 % | HEIGHT: 64 IN | WEIGHT: 154 LBS

## 2017-12-04 DIAGNOSIS — J06.9 VIRAL URI: ICD-10-CM

## 2017-12-04 DIAGNOSIS — H61.23 BILATERAL IMPACTED CERUMEN: Primary | ICD-10-CM

## 2017-12-04 PROCEDURE — 99213 OFFICE O/P EST LOW 20 MIN: CPT | Performed by: PHYSICIAN ASSISTANT

## 2017-12-04 NOTE — PROGRESS NOTES
"  SUBJECTIVE:   Maia Miranda is a 85 year old female who presents to clinic today for the following health issues:      Acute Illness   Acute illness concerns: Ear Pain   Onset: started about 3-4 weeks ago     Fever: no    Chills/Sweats: no    Headache (location?): YES- frontal lobe - left side     Sinus Pressure:no    Conjunctivitis:  no    Ear Pain: YES- Left ear , sharp pain     Rhinorrhea: YES    Congestion: YES    Sore Throat: no     Cough: YES - productive cough     Wheeze: no    Decreased Appetite: no    Nausea: no    Vomiting: no    Diarrhea:  YES- some     Dysuria/Freq.: no    Fatigue/Achiness: YES    Sick/Strep Exposure: no     Therapies Tried and outcome: none     ROS:  ROS otherwise negative    OBJECTIVE:                                                    /70 (BP Location: Right arm, Patient Position: Right side, Cuff Size: Adult Regular)  Pulse 55  Temp 97.2  F (36.2  C) (Tympanic)  Ht 5' 4\" (1.626 m)  Wt 154 lb (69.9 kg)  SpO2 99%  BMI 26.43 kg/m2  Body mass index is 26.43 kg/(m^2).   GENERAL: alert, no distress  HENT: ear canals- cerumen impactions bilaterally; TMs- normal; Nose- rhinorrhea; Mouth- no ulcers, no lesions  NECK: no tenderness, no adenopathy  RESP: lungs clear to auscultation - no rales, no rhonchi, no wheezes  CV: regular rates and rhythm, normal S1 S2, no S3 or S4 and no murmur, no click or rub    Diagnostic test results:  No results found for this or any previous visit (from the past 24 hour(s)).       ASSESSMENT/PLAN:                                                    (H61.23) Bilateral impacted cerumen  (primary encounter diagnosis)  Comment: resolved with ear irrigation  Plan:     (J06.9,  B97.89) Viral URI  Comment: continue with symptomatic treatment.   Plan:     See Patient Instructions    Juan Haney PA-C  [facility]  "

## 2017-12-04 NOTE — MR AVS SNAPSHOT
After Visit Summary   12/4/2017    Maia Miranda    MRN: [id number]           Patient Information     Date Of Birth          10/26/1932        Visit Information        Provider Department      12/4/2017 10:50 AM Juan Haney PA-C [facility]        Today's Diagnoses     Bilateral impacted cerumen    -  1    Viral URI           Follow-ups after your visit        Your next 10 appointments already scheduled     Dec 13, 2017 11:00 AM CST   Anticoagulation Visit with  ANTICOAGULATION CLINIC   [facility]    [address] [address] [phone]              Who to contact     If you have questions or need follow up information about today's clinic visit or your schedule please contact [facility] directly at [phone].  Normal or non-critical lab and imaging results will be communicated to you by MyChart, letter or phone within 4 business days after the clinic has received the results. If you do not hear from us within 7 days, please contact the clinic through MyChart or phone. If you have a critical or abnormal lab result, we will notify you by phone as soon as possible.  Submit refill requests through [facility] or call your pharmacy and they will forward the refill request to us. Please allow 3 business days for your refill to be completed.          Additional Information About Your Visit        MyChart Information     [facility] [facility] gives you secure access to your electronic health record. If you see a primary care provider, you can also send messages to your care team and make appointments. If you have questions, please call your primary care clinic.  If you do not have a primary care provider, please call [phone] and they will assist you.        Care EveryWhere ID     This is your Care EveryWhere ID. This could be used by other organizations to access your [facility]  "records  [id number]        Your Vitals Were     Pulse Temperature Height Pulse Oximetry BMI (Body Mass Index)       55 97.2  F (36.2  C) (Tympanic) 5' 4\" (1.626 m) 99% 26.43 kg/m2        Blood Pressure from Last 3 Encounters:   12/04/17 110/70   10/05/17 162/60   10/05/17 162/60    Weight from Last 3 Encounters:   12/04/17 154 lb (69.9 kg)   10/18/17 152 lb (68.9 kg)   10/05/17 152 lb (68.9 kg)              Today, you had the following     No orders found for display         Today's Medication Changes          These changes are accurate as of: 12/4/17 11:45 AM.  If you have any questions, ask your nurse or doctor.               These medicines have changed or have updated prescriptions.        Dose/Directions    ascorbic acid 1000 MG Tabs   Commonly known as:  vitamin C   This may have changed:    - when to take this  - additional instructions   Used for:  Recurrent UTI, Neurogenic bladder        Dose:  1000 mg   Take 1 tablet (1,000 mg) by mouth 2 times daily   Quantity:  180 tablet   Refills:  3                Primary Care Provider Office Phone # Fax #    Dian Frias [phone]       [address] [place] [address]        Equal Access to Services     [facility]: Hadii luiz fernándezo Soomaali, waaxda luqadaha, qaybta kaalmada adeegyada, simin santana. So [facility] [phone].    ATENCIÓN: Si habla español, tiene a conn disposición servicios gratuitos de asistencia lingüística. Llame al [phone].    We comply with applicable federal civil rights laws and [place] laws. We do not discriminate on the basis of race, color, national origin, age, disability, sex, sexual orientation, or gender identity.            Thank you!     Thank you for choosing [facility]  for your care. Our goal is always to provide you with excellent care. Hearing back from our patients is one way we can continue to improve our services. Please take a few " minutes to complete the written survey that you may receive in the mail after your visit with us. Thank you!             Your Updated Medication List - Protect others around you: Learn how to safely use, store and throw away your medicines at www.disposemymeds.org.          This list is accurate as of: 12/4/17 11:45 AM.  Always use your most recent med list.                   Brand Name Dispense Instructions for use Diagnosis    albuterol 108 (90 BASE) MCG/ACT Inhaler    PROAIR HFA/PROVENTIL HFA/VENTOLIN HFA    1 Inhaler    Inhale 2 puffs into the lungs every 6 hours as needed for shortness of breath / dyspnea or wheezing    SOB (shortness of breath)       ascorbic acid 1000 MG Tabs    vitamin C    180 tablet    Take 1 tablet (1,000 mg) by mouth 2 times daily    Recurrent UTI, Neurogenic bladder       calcium citrate-vitamin D 315-250 MG-UNIT Tabs per tablet    CITRACAL     Take 1 tablet by mouth daily        carboxymethylcellulose 1 % ophthalmic solution    CELLUVISC/REFRESH LIQUIGEL     Place 1 drop into both eyes every morning        conjugated estrogens cream    PREMARIN     Place vaginally twice a week On Tuesday and Friday. Uses a pea sized amount        fluocinonide 0.05 % cream    LIDEX    30 g    Apply topically 2 times daily as needed    Dermatitis       GENTLECATH URINARY CATHETER Misc     120 each    1 catheter 4 times daily    Urinary retention       lisinopril 10 MG tablet    PRINIVIL/ZESTRIL    90 tablet    Take 1 tablet (10 mg) by mouth every morning    Hypertension goal BP (blood pressure) < 150/90       methenamine hippurate 1 G Tabs tablet    HIPREX    90 tablet    Take 1 tablet (1 g) by mouth daily    Recurrent UTI       metoprolol 50 MG 24 hr tablet    TOPROL-XL    90 tablet    Take 1 tablet (50 mg) by mouth every evening    Hypertension goal BP (blood pressure) < 150/90       OMEGA-3 FISH OIL PO      Take 1 g by mouth 2 times daily (with meals)        order for DME     1 Device    Trilok ankle  brace    Plantar fascial fibromatosis, Pain of left heel       polyethylene glycol Packet    MIRALAX/GLYCOLAX     Take 1 packet by mouth daily as needed        sodium chloride 5 % ophthalmic ointment    PETER 128     Place 1 Application into both eyes At Bedtime        TYLENOL 500 MG tablet   Generic drug:  acetaminophen      Take 500-1,000 mg by mouth every 6 hours as needed        Vitamin D-3 1000 UNITS Caps      Take 1 capsule by mouth daily        warfarin 2.5 MG tablet    COUMADIN    100 tablet    Take 2.5mg (1 tablet)  MTWTFSS or as directed by INR Clinic.    Long-term (current) use of anticoagulants

## 2017-12-04 NOTE — NURSING NOTE
"Chief Complaint   Patient presents with     Ear Problem       Initial /70 (BP Location: Right arm, Patient Position: Right side, Cuff Size: Adult Regular)  Pulse 55  Temp 97.2  F (36.2  C) (Tympanic)  Ht 5' 4\" (1.626 m)  Wt 154 lb (69.9 kg)  SpO2 99%  BMI 26.43 kg/m2 Estimated body mass index is 26.43 kg/(m^2) as calculated from the following:    Height as of this encounter: 5' 4\" (1.626 m).    Weight as of this encounter: 154 lb (69.9 kg).  Medication Reconciliation: complete  "

## 2017-12-13 ENCOUNTER — ANTICOAGULATION THERAPY VISIT (OUTPATIENT)
Dept: NURSING | Facility: CLINIC | Age: 82
End: 2017-12-13
Payer: COMMERCIAL

## 2017-12-13 DIAGNOSIS — Z79.01 LONG-TERM (CURRENT) USE OF ANTICOAGULANTS: ICD-10-CM

## 2017-12-13 DIAGNOSIS — I48.91 ATRIAL FIBRILLATION, UNSPECIFIED TYPE (H): ICD-10-CM

## 2017-12-13 LAB — INR POINT OF CARE: 3.1 (ref 0.86–1.14)

## 2017-12-13 PROCEDURE — 85610 PROTHROMBIN TIME: CPT | Mod: QW

## 2017-12-13 PROCEDURE — 36416 COLLJ CAPILLARY BLOOD SPEC: CPT

## 2017-12-13 PROCEDURE — 99207 ZZC NO CHARGE NURSE ONLY: CPT

## 2017-12-13 NOTE — PROGRESS NOTES
ANTICOAGULATION FOLLOW-UP CLINIC VISIT    Patient Name:  Maia Miranda  Date:  12/13/2017  Contact Type:  Face to Face    SUBJECTIVE:     Patient Findings     Positives No Problem Findings           OBJECTIVE    INR Protime   Date Value Ref Range Status   12/13/2017 3.1 (A) 0.86 - 1.14 Final       ASSESSMENT / PLAN  INR assessment THER    Recheck INR In: 5 WEEKS    INR Location Clinic      Anticoagulation Summary as of 12/13/2017     INR goal 2.0-3.0   Today's INR 3.1!   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

## 2017-12-13 NOTE — MR AVS SNAPSHOT
Maia Miranda   12/13/2017 11:00 AM   Anticoagulation Therapy Visit    Description:  85 year old female   Provider:   ANTICOAGULATION CLINIC   Department:   Nurse           INR as of 12/13/2017     Today's INR 3.1!      Anticoagulation Summary as of 12/13/2017     INR goal 2.0-3.0   Today's INR 3.1!   Full instructions 2.5 mg every day   Next INR check 1/17/2018    Indications   Long-term (current) use of anticoagulants [Z79.01]  Atrial fibrillation (H) [I48.91]         Your next Anticoagulation Clinic appointment(s)     Jan 17, 2018 11:00 AM CST   Anticoagulation Visit with  [facility]AGULATION CLINIC   [facility]    [address] [address] [phone]              Contact Numbers     [facility]  Please call to cancel and/or reschedule your appointment, or with any problems or questions regarding your therapy.  Anticoagulation Nurse: [phone]  Main Clinic: [phone]             December 2017 Details    Sun Mon Tue Wed Thu Fri Sat          1               2                 3               4               5               6               7               8               9                 10               11               12               13      2.5 mg   See details      14      2.5 mg         15      2.5 mg         16      2.5 mg           17      2.5 mg         18      2.5 mg         19      2.5 mg         20      2.5 mg         21      2.5 mg         22      2.5 mg         23      2.5 mg           24      2.5 mg         25      2.5 mg         26      2.5 mg         27      2.5 mg         28      2.5 mg         29      2.5 mg         30      2.5 mg           31      2.5 mg                Date Details   12/13 This INR check               How to take your warfarin dose     To take:  2.5 mg Take 1 of the 2.5 mg tablets.           January 2018 Details    Sun Mon Tue Wed Thu Fri Sat      1      2.5 mg         2      2.5 mg          3      2.5 mg         4      2.5 mg         5      2.5 mg         6      2.5 mg           7      2.5 mg         8      2.5 mg         9      2.5 mg         10      2.5 mg         11      2.5 mg         12      2.5 mg         13      2.5 mg           14      2.5 mg         15      2.5 mg         16      2.5 mg         17            18               19               20                 21               22               23               24               25               26               27                 28               29               30               31                   Date Details   No additional details    Date of next INR:  1/17/2018         How to take your warfarin dose     To take:  2.5 mg Take 1 of the 2.5 mg tablets.

## 2017-12-20 DIAGNOSIS — L30.9 DERMATITIS: ICD-10-CM

## 2017-12-20 RX ORDER — FLUOCINONIDE 0.5 MG/G
CREAM TOPICAL 2 TIMES DAILY PRN
Qty: 30 G | Refills: 2 | Status: SHIPPED | OUTPATIENT
Start: 2017-12-20 | End: 2019-08-19

## 2017-12-20 NOTE — TELEPHONE ENCOUNTER
Ok to fill. rx sent. Please let patient know.    Thanks,  Dian Frias MD  Internal Medicine/Pediatrics

## 2017-12-20 NOTE — TELEPHONE ENCOUNTER
Lidex       Last Written Prescription Date: 07/31/15  Last Fill Quantity: 30g,  # refills: 2   Last Office Visit with G, P or [facility] prescribing provider: 09/07/17    Patient uses this for rash that occurs periodically on her arms.  States she gets small raised bumps that have the appearance of a heat rash.  Routing refill request to provider for review/approval because:  A break in medication  GAIL Carlisle RN

## 2018-01-15 ENCOUNTER — NURSE TRIAGE (OUTPATIENT)
Dept: NURSING | Facility: CLINIC | Age: 83
End: 2018-01-15

## 2018-01-15 ENCOUNTER — OFFICE VISIT (OUTPATIENT)
Dept: PEDIATRICS | Facility: CLINIC | Age: 83
End: 2018-01-15
Payer: COMMERCIAL

## 2018-01-15 ENCOUNTER — TELEPHONE (OUTPATIENT)
Dept: PEDIATRICS | Facility: CLINIC | Age: 83
End: 2018-01-15

## 2018-01-15 VITALS
HEIGHT: 65 IN | OXYGEN SATURATION: 99 % | TEMPERATURE: 97.5 F | HEART RATE: 68 BPM | SYSTOLIC BLOOD PRESSURE: 108 MMHG | DIASTOLIC BLOOD PRESSURE: 72 MMHG | WEIGHT: 158.5 LBS | BODY MASS INDEX: 26.41 KG/M2

## 2018-01-15 DIAGNOSIS — R42 DIZZINESS: ICD-10-CM

## 2018-01-15 DIAGNOSIS — Z79.01 LONG-TERM (CURRENT) USE OF ANTICOAGULANTS: ICD-10-CM
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
## 2018-01-15 DIAGNOSIS — J20.9 ACUTE BRONCHITIS WITH SYMPTOMS > 10 DAYS: Primary | ICD-10-CM

## 2018-01-15 PROCEDURE — 99214 OFFICE O/P EST MOD 30 MIN: CPT | Performed by: INTERNAL MEDICINE

## 2018-01-15 RX ORDER — AZITHROMYCIN 250 MG/1
TABLET, FILM COATED ORAL
Qty: 6 TABLET | Refills: 0 | Status: SHIPPED | OUTPATIENT
Start: 2018-01-15 | End: 2018-02-26

## 2018-01-15 NOTE — PROGRESS NOTES
SUBJECTIVE:   Maia Miranda is a 85 year old female who presents to clinic today for the following health issues:    Dizziness      Duration: 2 weeks    Description   Feeling faint:  no   Feeling like the surroundings are moving: no   Loss of consciousness or falls: YES- fall    Intensity:  mild    Accompanying signs and symptoms:   Nausea/vomitting: no   Palpitations: no   Weakness in arms or legs: YES- left arm  Vision or speech changes: no   Ringing in ears (Tinnitus): YES- both ears  Hearing loss related to dizziness: no   Other (fevers/chills/sweating/dyspnea): YES chills, and shortness of breath    History (similar episodes/head trauma/previous evaluation/recent bleeding): Spring, fell    Precipitating or alleviating factors (new meds/chemicals): None  Worse with activity/head movement: no     Therapies tried and outcome: None    84 y/o F with h/o mild cognitive impairement, HTN, HLP and atrial fibrillation on long term anticoagulation who presents with dizziness, cough and a fall. Fall was yesterday - slipped and fell on the floor. Hit back of head on the floor. No LOC. Hit left hand on table. Was sore last night, but better today. No headaches after hitting head. No vision changes. Does not feel like thinking is clouded. Wanted to come in and get checked out because family was upset with her when she did not come right in after previous fall.     Has been having more dizziness over past couple of weeks. Doesn't recall she was dizzy before her fall. Not worse with standing up. Feels wobbly and a little weak. Has not felt well and feels dizziness related to this. Has not had fevers, but having chills. Some cough over last couple of weeks. Has had some wheezing.  Occasional shortness of breath.    Reviewed and updated as needed this visit by clinical staffTobacco  Allergies  Meds  Problems  Med Hx  Surg Hx  Fam Hx  Soc Hx        Reviewed and updated as needed this visit by Provider  Allergies  Meds  " Problems         -------------------------------------    ROS:  Constitutional, HEENT, cardiovascular, pulmonary, gi and gu systems are negative, except as otherwise noted.      OBJECTIVE:                                                    /72  Pulse 68  Temp 97.5  F (36.4  C) (Tympanic)  Ht 5' 5\" (1.651 m)  Wt 158 lb 8 oz (71.9 kg)  SpO2 99%  BMI 26.38 kg/m2   Body mass index is 26.38 kg/(m^2).  General Appearance: elderly female, tired appearing, alert and no distress  Eyes:   no discharge, erythema.  Normal pupils. EOMI.  Both Ears: normal: no effusions, no erythema, normal landmarks  Nose: no discharge and normal mucosa  Sinuses:  not tender  Oropharynx: mild erythema with clear PND  Neck: Supple.  No adenopathy, no asymmetry, masses  Respiratory: good air movement, but with scattered end expiratory wheezes  Cardiovascular: regular rate and rhythm, normal S1 S2, no S3 or S4 and no murmur, click or rub.  No peripheral edema.  Skin: no rashes or lesions.  Well perfused and normal turgor. No bruising appreciated over posterior scalp. Does have bruise over left lateral hip.   NEURO: alert and oriented x3, CN II-XII intact. Normal tone and strength in all four extremities. Normal rapid alternating movements. Normal gait.    Diagnostic Test Results:  none      ASSESSMENT/PLAN:                                                      (J20.9) Acute bronchitis with symptoms > 10 days  (primary encounter diagnosis)  (R42) Dizziness  Comment: 2 weeks of dizziness, cough, chills and generalized weakness. Wheezing on exam, but good air movement. Given duration of symptoms with limited improvement and underlying medical issues, will treat for bacterial bronchitis. Likely started out viral  Plan: azithromycin (ZITHROMAX) 250 MG tablet  - azithro for 5 days - should have little impact on INR. Has f/u scheduled    (W19.XXXA) Fall, initial encounter  (Z79.01) Long-term (current) use of anticoagulants  Comment: fall " likely due to weakness with not feeling well. Exam normal other than bruising to lateral left leg.   Plan:   - continue to monitor for further symptoms - discussed symptoms of delayed head bleed  - discussed falls prevention    Follow up with Provider - 6 months - sooner if needed     [facility]

## 2018-01-15 NOTE — MR AVS SNAPSHOT
After Visit Summary   1/15/2018    Maia Miranda    MRN: [id number]           Patient Information     Date Of Birth          10/26/1932        Visit Information        Provider Department      1/15/2018 6:20 PM [facility]        Today's Diagnoses     Acute bronchitis with symptoms > 10 days    -  1    Fall, initial encounter          Care Instructions    1. Azithromycin 2 pills on the first day, then 1 pill once a day for 4 days  2. If dizziness does not improve as lungs are feeling better, should recheck blood pressure either as nurse visit or can walk in to pharmacy  3. Follow-up in 6 months if improves          Follow-ups after your visit        Follow-up notes from your care team     Return in about 6 months (around 7/15/2018).      Your next 10 appointments already scheduled     Jan 17, 2018 11:00 AM CST   Anticoagulation Visit with  ANTICOAGULATION CLINIC   [facility]    [address] [address] [phone]              Who to contact     If you have questions or need follow up information about today's clinic visit or your schedule please contact [facility] directly at [phone].  Normal or non-critical lab and imaging results will be communicated to you by MyChart, letter or phone within 4 business days after the clinic has received the results. If you do not hear from us within 7 days, please contact the clinic through MyChart or phone. If you have a critical or abnormal lab result, we will notify you by phone as soon as possible.  Submit refill requests through [employer] or call your pharmacy and they will forward the refill request to us. Please allow 3 business days for your refill to be completed.          Additional Information About Your Visit        MyChart Information     [employer] gives you secure access to your electronic health record. If you see a primary  "care provider, you can also send messages to your care team and make appointments. If you have questions, please call your primary care clinic.  If you do not have a primary care provider, please call [phone] and they will assist you.        Care EveryWhere ID     This is your Care EveryWhere ID. This could be used by other organizations to access your [facility] records  [id number]        Your Vitals Were     Pulse Temperature Height Pulse Oximetry BMI (Body Mass Index)       68 97.5  F (36.4  C) (Tympanic) 5' 5\" (1.651 m) 99% 26.38 kg/m2        Blood Pressure from Last 3 Encounters:   01/15/18 108/72   12/04/17 110/70   10/05/17 162/60    Weight from Last 3 Encounters:   01/15/18 158 lb 8 oz (71.9 kg)   12/04/17 154 lb (69.9 kg)   10/18/17 152 lb (68.9 kg)              Today, you had the following     No orders found for display         Today's Medication Changes          These changes are accurate as of: 1/15/18  6:55 PM.  If you have any questions, ask your nurse or doctor.               Start taking these medicines.        Dose/Directions    azithromycin 250 MG tablet   Commonly known as:  ZITHROMAX   Used for:  Acute bronchitis with symptoms > 10 days   Started by:  Dian Frias MD        Two tablets first day, then one tablet daily for four days.   Quantity:  6 tablet   Refills:  0         These medicines have changed or have updated prescriptions.        Dose/Directions    ascorbic acid 1000 MG Tabs   Commonly known as:  vitamin C   This may have changed:    - when to take this  - additional instructions   Used for:  Recurrent UTI, Neurogenic bladder        Dose:  1000 mg   Take 1 tablet (1,000 mg) by mouth 2 times daily   Quantity:  180 tablet   Refills:  3            Where to get your medicines      These medications were sent to [facility] [address] [place] [address]     Phone:  [phone]     azithromycin 250 MG " tablet                Primary Care Provider Office Phone # Fax #    Dian Frias [phone] [address] [place] [address]        Equal Access to Services     JENNIFER CRAMERMUNIRA : Faith luiz cooper ximena Prather, waaxda luqadaha, qaybta kaalmada mana, simin jarquin latrentmontrell esther. So [facility] [phone].    ATENCIÓN: Si habla español, tiene a conn disposición servicios gratuitos de asistencia lingüística. Llame al [phone].    We comply with applicable federal civil rights laws and [place] laws. We do not discriminate on the basis of race, color, national origin, age, disability, sex, sexual orientation, or gender identity.            Thank you!     [facility]. Our goal is always to provide you with excellent care. Hearing back from our patients is one way we can continue to improve our services. Please take a few minutes to complete the written survey that you may receive in the mail after your visit with us. Thank you!             Your Updated Medication List - Protect others around you: Learn how to safely use, store and throw away your medicines at www.disposemymeds.org.          This list is accurate as of: 1/15/18  6:55 PM.  Always use your most recent med list.                   Brand Name Dispense Instructions for use Diagnosis    ascorbic acid 1000 MG Tabs    vitamin C    180 tablet    Take 1 tablet (1,000 mg) by mouth 2 times daily    Recurrent UTI, Neurogenic bladder       azithromycin 250 MG tablet    ZITHROMAX    6 tablet    Two tablets first day, then one tablet daily for four days.    Acute bronchitis with symptoms > 10 days       calcium citrate-vitamin D 315-250 MG-UNIT Tabs per tablet    CITRACAL     Take 1 tablet by mouth daily        carboxymethylcellulose 1 % ophthalmic solution    CELLUVISC/REFRESH LIQUIGEL     Place 1 drop into both eyes every morning        conjugated estrogens cream     PREMARIN     Place vaginally twice a week On Tuesday and Friday. Uses a pea sized amount        fluocinonide 0.05 % cream    LIDEX    30 g    Apply topically 2 times daily as needed    Dermatitis       GENTLECATH URINARY CATHETER Misc     120 each    1 catheter 4 times daily    Urinary retention       lisinopril 10 MG tablet    PRINIVIL/ZESTRIL    90 tablet    Take 1 tablet (10 mg) by mouth every morning    Hypertension goal BP (blood pressure) < 150/90       methenamine hippurate 1 G Tabs tablet    HIPREX    90 tablet    Take 1 tablet (1 g) by mouth daily    Recurrent UTI       metoprolol succinate 50 MG 24 hr tablet    TOPROL-XL    90 tablet    Take 1 tablet (50 mg) by mouth every evening    Hypertension goal BP (blood pressure) < 150/90       OMEGA-3 FISH OIL PO      Take 1 g by mouth 2 times daily (with meals)        order for DME     1 Device    Trilok ankle brace    Plantar fascial fibromatosis, Pain of left heel       polyethylene glycol Packet    MIRALAX/GLYCOLAX     Take 1 packet by mouth daily as needed        sodium chloride 5 % ophthalmic ointment    PETER 128     Place 1 Application into both eyes At Bedtime        TYLENOL 500 MG tablet   Generic drug:  acetaminophen      Take 500-1,000 mg by mouth every 6 hours as needed        Vitamin D-3 1000 UNITS Caps      Take 1 capsule by mouth daily        warfarin 2.5 MG tablet    COUMADIN    100 tablet    Take 2.5mg (1 tablet)  MTWTFSS or as directed by INR Clinic.    Long-term (current) use of anticoagulants

## 2018-01-15 NOTE — TELEPHONE ENCOUNTER
Patient fell and hit her head yesterday. No LOC. No vomiting. Has a bruise on her hip. No headache. Has been loosing her balance lately. Not dizzy when she lays flat. Takes coumadin. Wants to be checked out. Appointment scheduled.   Daphne Aguilar RN

## 2018-01-16 NOTE — PATIENT INSTRUCTIONS
1. Azithromycin 2 pills on the first day, then 1 pill once a day for 4 days  2. If dizziness does not improve as lungs are feeling better, should recheck blood pressure either as nurse visit or can walk in to pharmacy  3. Follow-up in 6 months if improves

## 2018-01-16 NOTE — TELEPHONE ENCOUNTER
"Pharmacist questioning interaction of Z thi and warfarin; Review of EMR reveals;  \"Warnings Override History for azithromycin (ZITHROMAX) 250 MG tablet [[id number]]   Overridden by Dian Frias MD on 01/15/18 7489   Drug-Drug   1. MACROLIDE AND KETOLIDES / ANTICOAGULANTS [Level: Major]   Other Orders: warfarin (COUMADIN) 2.5 MG tablet        Information given danny pharmacist  Additional Information    Pharmacy calling with prescription question and triager answers question    Protocols used: MEDICATION QUESTION CALL-ADULT-  Marcela Tovar RN  FNA    "

## 2018-01-16 NOTE — NURSING NOTE
"Chief Complaint   Patient presents with     Dizziness     fall       Initial /72  Pulse 68  Temp 97.5  F (36.4  C) (Tympanic)  Ht 5' 5\" (1.651 m)  Wt 158 lb 8 oz (71.9 kg)  SpO2 99%  BMI 26.38 kg/m2 Estimated body mass index is 26.38 kg/(m^2) as calculated from the following:    Height as of this encounter: 5' 5\" (1.651 m).    Weight as of this encounter: 158 lb 8 oz (71.9 kg).  Medication Reconciliation: complete   Laura ROSENBERG      "

## 2018-01-17 ENCOUNTER — ANTICOAGULATION THERAPY VISIT (OUTPATIENT)
Dept: NURSING | Facility: CLINIC | Age: 83
End: 2018-01-17
Payer: COMMERCIAL

## 2018-01-17 DIAGNOSIS — Z79.01 LONG-TERM (CURRENT) USE OF ANTICOAGULANTS: ICD-10-CM

## 2018-01-17 DIAGNOSIS — I48.91 ATRIAL FIBRILLATION, UNSPECIFIED TYPE (H): ICD-10-CM

## 2018-01-17 LAB — INR POINT OF CARE: 2.2 (ref 0.86–1.14)

## 2018-01-17 PROCEDURE — 85610 PROTHROMBIN TIME: CPT | Mod: QW

## 2018-01-17 PROCEDURE — 36416 COLLJ CAPILLARY BLOOD SPEC: CPT

## 2018-01-17 PROCEDURE — 99207 ZZC NO CHARGE NURSE ONLY: CPT

## 2018-01-17 NOTE — MR AVS SNAPSHOT
Maia Miranda   1/17/2018 11:00 AM   Anticoagulation Therapy Visit    Description:  85 year old female   Provider:   ANTICOAGULATION CLINIC   Department:   Nurse           INR as of 1/17/2018     Today's INR 2.2      Anticoagulation Summary as of 1/17/2018     INR goal 2.0-3.0   Today's INR 2.2   Full instructions 2.5 mg every day   Next INR check 2/21/2018    Indications   Long-term (current) use of anticoagulants [Z79.01]  Atrial fibrillation (H) [I48.91]         Your next Anticoagulation Clinic appointment(s)     Feb 21, 2018 11:00 AM CST   Anticoagulation Visit with  ANTICOAGULATION CLINIC   [facility]    [address] [address] [phone]              Contact Numbers     [facility]  Please call to cancel and/or reschedule your appointment, or with any problems or questions regarding your therapy.  Anticoagulation Nurse: [phone]  Main Clinic: [phone]             January 2018 Details    Sun Mon Tue Wed Thu Fri Sat      1               2               3               4               5               6                 7               8               9               10               11               12               13                 14               15               16               17      2.5 mg   See details      18      2.5 mg         19      2.5 mg         20      2.5 mg           21      2.5 mg         22      2.5 mg         23      2.5 mg         24      2.5 mg         25      2.5 mg         26      2.5 mg         27      2.5 mg           28      2.5 mg         29      2.5 mg         30      2.5 mg         31      2.5 mg             Date Details   01/17 This INR check               How to take your warfarin dose     To take:  2.5 mg Take 1 of the 2.5 mg tablets.           February 2018 Details    Sun Mon Tue Wed Thu Fri Sat         1      2.5 mg         2      2.5 mg         3      2.5 mg           4       2.5 mg         5      2.5 mg         6      2.5 mg         7      2.5 mg         8      2.5 mg         9      2.5 mg         10      2.5 mg           11      2.5 mg         12      2.5 mg         13      2.5 mg         14      2.5 mg         15      2.5 mg         16      2.5 mg         17      2.5 mg           18      2.5 mg         19      2.5 mg         20      2.5 mg         21            22               23               24                 25               26               27               28                   Date Details   No additional details    Date of next INR:  2/21/2018         How to take your warfarin dose     To take:  2.5 mg Take 1 of the 2.5 mg tablets.

## 2018-01-17 NOTE — PROGRESS NOTES
ANTICOAGULATION FOLLOW-UP CLINIC VISIT    Patient Name:  Maia Miranda  Date:  1/17/2018  Contact Type:  Face to Face    SUBJECTIVE:     Patient Findings     Positives Other complaints (fell on 1/14/18 - appt with PCP 1/15/18), Antibiotic use or infection (Zithromax started 1/15/18 for bronchitis)           OBJECTIVE    INR Protime   Date Value Ref Range Status   01/17/2018 2.2 (A) 0.86 - 1.14 Final       ASSESSMENT / PLAN  INR assessment THER    Recheck INR In: 5 WEEKS    INR Location Clinic      Anticoagulation Summary as of 1/17/2018     INR goal 2.0-3.0   Today's INR 2.2   Maintenance plan 2.5 mg (2.5 mg x 1) every day   Full instructions 2.5 mg every day   Weekly total 17.5 mg   No change documented Meme Riggs RN   Plan last modified Meme Riggs RN (1/18/2017)   Next INR check 2/21/2018   Target end date Indefinite    Indications   Long-term (current) use of anticoagulants [Z79.01]  Atrial fibrillation (H) [I48.91]         Anticoagulation Episode Summary     INR check location [facility]    Preferred lab     Send INR reminders to  ANTICOAG CLINIC    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

## 2018-02-21 ENCOUNTER — ALLIED HEALTH/NURSE VISIT (OUTPATIENT)
Dept: PEDIATRICS | Facility: CLINIC | Age: 83
End: 2018-02-21

## 2018-02-21 ENCOUNTER — ANTICOAGULATION THERAPY VISIT (OUTPATIENT)
Dept: NURSING | Facility: CLINIC | Age: 83
End: 2018-02-21
Payer: COMMERCIAL

## 2018-02-21 VITALS — SYSTOLIC BLOOD PRESSURE: 132 MMHG | DIASTOLIC BLOOD PRESSURE: 62 MMHG

## 2018-02-21 DIAGNOSIS — Z01.30 BP CHECK: Primary | ICD-10-CM

## 2018-02-21 LAB — INR POINT OF CARE: 2.4 (ref 0.86–1.14)

## 2018-02-21 PROCEDURE — 36416 COLLJ CAPILLARY BLOOD SPEC: CPT

## 2018-02-21 PROCEDURE — 85610 PROTHROMBIN TIME: CPT | Mod: QW

## 2018-02-21 PROCEDURE — 99207 ZZC NO CHARGE NURSE ONLY: CPT

## 2018-02-21 PROCEDURE — 99207 ZZC NO CHARGE NURSE ONLY: CPT | Performed by: INTERNAL MEDICINE

## 2018-02-21 NOTE — PROGRESS NOTES
Maia Miranda is enrolled/participating in the retail pharmacy Blood Pressure Goals Achievement Program (BPGAP).  Maia Miranda was evaluated at [facility] on February 21, 2018 at which time her blood pressure was:    BP Readings from Last 3 Encounters:   02/21/18 132/62   01/15/18 108/72   12/04/17 110/70     Reviewed lifestyle modifications for blood pressure control and reduction: including making healthy food choices, managing weight, getting regular exercise, smoking cessation, reducing alcohol consumption, monitoring blood pressure regularly.     Maia Miranda is not experiencing symptoms.    Follow-Up: BP is at goal of < 150/90 mmHg (patient 60+ years of age without diabetes).  Recommended follow-up at pharmacy in 6 months.     Recommendation to Provider: none    Maia Miranda was evaluated for enrollment into the PGEN study today.    Patient eligible for enrollment:  No  Patient interested in enrollment:  No    Completed by: Daniele Coles    Thank You   Daniele Coles RPh [facility]

## 2018-02-21 NOTE — MR AVS SNAPSHOT
Maia Miranda   2/21/2018 11:00 AM   Anticoagulation Therapy Visit    Description:  85 year old female   Provider:   ANTICOAGULATION CLINIC   Department:   Nurse           INR as of 2/21/2018     Today's INR 2.4      Anticoagulation Summary as of 2/21/2018     INR goal 2.0-3.0   Today's INR 2.4   Full instructions 2.5 mg every day   Next INR check 3/28/2018    Indications   Long-term (current) use of anticoagulants [Z79.01]  Atrial fibrillation (H) [I48.91]         Your next Anticoagulation Clinic appointment(s)     Mar 28, 2018 11:00 AM CDT   Anticoagulation Visit with  [facility]ATION CLINIC   [facility]    [address] [facility] [address] [address] [phone]              Contact Numbers     [facility]  Please call to cancel and/or reschedule your appointment, or with any problems or questions regarding your therapy.  Anticoagulation Nurse: [phone]  Main Clinic: [phone]             February 2018 Details    Sun Mon Tue Wed Thu Fri Sat         1               2               3                 4               5               6               7               8               9               10                 11               12               13               14               15               16               17                 18               19               20               21      2.5 mg   See details      22      2.5 mg         23      2.5 mg         24      2.5 mg           25      2.5 mg         26      2.5 mg         27      2.5 mg         28      2.5 mg             Date Details   02/21 This INR check               How to take your warfarin dose     To take:  2.5 mg Take 1 of the 2.5 mg tablets.           March 2018 Details    Sun Mon Tue Wed Thu Fri Sat         1      2.5 mg         2      2.5 mg         3      2.5 mg           4      2.5 mg         5      2.5 mg         6      2.5 mg         7      2.5 mg         8      2.5 mg          9      2.5 mg         10      2.5 mg           11      2.5 mg         12      2.5 mg         13      2.5 mg         14      2.5 mg         15      2.5 mg         16      2.5 mg         17      2.5 mg           18      2.5 mg         19      2.5 mg         20      2.5 mg         21      2.5 mg         22      2.5 mg         23      2.5 mg         24      2.5 mg           25      2.5 mg         26      2.5 mg         27      2.5 mg         28            29               30               31                Date Details   No additional details    Date of next INR:  3/28/2018         How to take your warfarin dose     To take:  2.5 mg Take 1 of the 2.5 mg tablets.

## 2018-02-21 NOTE — PROGRESS NOTES
ANTICOAGULATION FOLLOW-UP CLINIC VISIT    Patient Name:  Maia Miranda  Date:  2/21/2018  Contact Type:  Face to Face    SUBJECTIVE:     Patient Findings     Positives No Problem Findings           OBJECTIVE    INR Protime   Date Value Ref Range Status   02/21/2018 2.4 (A) 0.86 - 1.14 Final       ASSESSMENT / PLAN  INR assessment THER    Recheck INR In: 5 WEEKS    INR Location Clinic      Anticoagulation Summary as of 2/21/2018     INR goal 2.0-3.0   Today's INR 2.4   Maintenance plan 2.5 mg (2.5 mg x 1) every day   Full instructions 2.5 mg every day   Weekly total 17.5 mg   No change documented Meme Riggs RN   Plan last modified Meme Riggs RN (1/18/2017)   Next INR check 3/28/2018   Target end date Indefinite    Indications   Long-term (current) use of anticoagulants [Z79.01]  Atrial fibrillation (H) [I48.91]         Anticoagulation Episode Summary     INR check location [facility]    Preferred lab     Send INR reminders to  ANTICOAG CLINIC    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

## 2018-02-26 ENCOUNTER — OFFICE VISIT (OUTPATIENT)
Dept: PEDIATRICS | Facility: CLINIC | Age: 83
End: 2018-02-26
Payer: COMMERCIAL

## 2018-02-26 VITALS
WEIGHT: 160.8 LBS | HEART RATE: 66 BPM | TEMPERATURE: 97.7 F | HEIGHT: 65 IN | BODY MASS INDEX: 26.79 KG/M2 | OXYGEN SATURATION: 98 % | SYSTOLIC BLOOD PRESSURE: 152 MMHG | DIASTOLIC BLOOD PRESSURE: 60 MMHG

## 2018-02-26 DIAGNOSIS — R00.2 PALPITATIONS: Primary | ICD-10-CM

## 2018-02-26 DIAGNOSIS — I48.91 ATRIAL FIBRILLATION, UNSPECIFIED TYPE (H): ICD-10-CM

## 2018-02-26 DIAGNOSIS — I10 HYPERTENSION GOAL BP (BLOOD PRESSURE) < 150/90: ICD-10-CM

## 2018-02-26 PROCEDURE — 80048 BASIC METABOLIC PNL TOTAL CA: CPT | Performed by: INTERNAL MEDICINE

## 2018-02-26 PROCEDURE — 83735 ASSAY OF MAGNESIUM: CPT | Performed by: INTERNAL MEDICINE

## 2018-02-26 PROCEDURE — 84443 ASSAY THYROID STIM HORMONE: CPT | Performed by: INTERNAL MEDICINE

## 2018-02-26 PROCEDURE — 93000 ELECTROCARDIOGRAM COMPLETE: CPT | Performed by: INTERNAL MEDICINE

## 2018-02-26 PROCEDURE — 99214 OFFICE O/P EST MOD 30 MIN: CPT | Performed by: INTERNAL MEDICINE

## 2018-02-26 PROCEDURE — 36415 COLL VENOUS BLD VENIPUNCTURE: CPT | Performed by: INTERNAL MEDICINE

## 2018-02-26 RX ORDER — METOPROLOL SUCCINATE 100 MG/1
100 TABLET, EXTENDED RELEASE ORAL DAILY
Qty: 90 TABLET | Refills: 3 | Status: SHIPPED | OUTPATIENT
Start: 2018-02-26 | End: 2019-03-25

## 2018-02-26 NOTE — PATIENT INSTRUCTIONS
1. Increase metoprolol to 100 mg once a day (can take 2 of what you have until you run out)  2. If palpitations not improving over never few weeks, let me know and I will order the event monitor  3. Get blood pressure checked with next INR visit  4. Labs today: electrolytes, kidney function and thyroid  5. Follow-up in the end of May with fasting labs if symptoms improve for annual visit

## 2018-02-26 NOTE — PROGRESS NOTES
"  SUBJECTIVE:   Maia Miranda is a 85 year old female who presents to clinic today for the following health issues:    Heart palpitations      Duration: 2 weeks    Description (location/character/radiation): skipped heart beats    Intensity:  moderate    Accompanying signs and symptoms: none    History (similar episodes/previous evaluation): h/o Afib    Precipitating or alleviating factors: None    Therapies tried and outcome: None    84 y/o F with known a-fib and hypertension with 2 weeks of increased palpitations. Feels like skipping beats. Not any faster than usual. Lasted for a long time the other night - about 30 minutes or so. No chest pain or SOB. Has had some pain over back of left head recently but not necessarily with the palpitations. Blood pressures have been running higher than usual. Had holter monitor in 2013 that showed multiple premature atrial beats and some premature ventricular beats as well.     Reviewed and updated as needed this visit by clinical staff  Tobacco  Allergies  Meds  Problems  Med Hx  Surg Hx  Fam Hx  Soc Hx        Reviewed and updated as needed this visit by Provider  Allergies  Meds  Problems         -------------------------------------    ROS:  Constitutional, HEENT, cardiovascular, pulmonary, gi and gu systems are negative, except as otherwise noted.    OBJECTIVE:                                                    /60  Pulse 66  Temp 97.7  F (36.5  C) (Tympanic)  Ht 5' 5\" (1.651 m)  Wt 160 lb 12.8 oz (72.9 kg)  SpO2 98%  BMI 26.76 kg/m2   Body mass index is 26.76 kg/(m^2).  General Appearance: elderly female, alert and no distress  Eyes:   no discharge, erythema.  Normal pupils.  Respiratory: lungs clear to auscultation - no rales, rhonchi or wheezes.  Cardiovascular: regular rate and rhythm, normal S1 S2, no S3 or S4 and no murmur, click or rub.  No peripheral edema.  Skin: no rashes or lesions.  Well perfused and normal turgor.    Diagnostic Test " Results:  Results for orders placed or performed in visit on 02/26/18   Basic metabolic panel   Result Value Ref Range    Sodium 140 133 - 144 mmol/L    Potassium 4.0 3.4 - 5.3 mmol/L    Chloride 106 94 - 109 mmol/L    Carbon Dioxide 30 20 - 32 mmol/L    Anion Gap 4 3 - 14 mmol/L    Glucose 94 70 - 99 mg/dL    Urea Nitrogen 27 7 - 30 mg/dL    Creatinine 1.09 (H) 0.52 - 1.04 mg/dL    GFR Estimate 48 (L) >60 mL/min/1.7m2    GFR Estimate If Black 58 (L) >60 mL/min/1.7m2    Calcium 9.0 8.5 - 10.1 mg/dL   Magnesium   Result Value Ref Range    Magnesium 2.0 1.6 - 2.3 mg/dL   TSH with free T4 reflex   Result Value Ref Range    TSH 1.82 0.40 - 4.00 mU/L     EKG: right bundle branch block - unchanged from previous.     ASSESSMENT/PLAN:                                                      (R00.2) Palpitations  (primary encounter diagnosis)  (I48.91) Atrial fibrillation, unspecified type (H)  Comment: patient with known history of a-fib and on anticoagulation. Irregular HR could be a-fib vs PVCs or pacs. Sounds rate controlled and not having other symptoms.  Plan: EKG 12-lead complete w/read - Clinics,         metoprolol succinate (TOPROL-XL) 100 MG 24 hr         tablet, Basic metabolic panel, Magnesium, TSH         with free T4 reflex  - will increase metoprolol to 100 mg to try to suppress irregular rhythm  - if not improving, will recheck event monitor to see what the rhythm is - she will let me know over the next few weeks  - will get BP checked in pharmacy when gets INR checked in next few weeks.    (I10) Hypertension goal BP (blood pressure) < 150/90  Comment: not controlled  Plan:   - increased metoprolol to 100 mg daily  - continue lisinopril    Follow up with Provider - the end of May for annual visit, sooner if needed     [facility]

## 2018-02-26 NOTE — MR AVS SNAPSHOT
After Visit Summary   2/26/2018    Maia Miranda    MRN: [id number]           Patient Information     Date Of Birth          10/26/1932        Visit Information        Provider Department      2/26/2018 2:20 PM [facility]as [facility]        Today's Diagnoses     Palpitations    -  1    Atrial fibrillation, unspecified type (H)          Care Instructions    1. Increase metoprolol to 100 mg once a day (can take 2 of what you have until you run out)  2. If palpitations not improving over never few weeks, let me know and I will order the event monitor  3. Get blood pressure checked with next INR visit  4. Labs today: electrolytes, kidney function and thyroid  5. Follow-up in the end of May with fasting labs if symptoms improve for annual visit          Follow-ups after your visit        Your next 10 appointments already scheduled     Mar 28, 2018 11:00 AM CDT   Anticoagulation Visit with  ANTICOAGULATION CLINIC   [facility]    [address] [facility] [address] [phone]              Who to contact     If you have questions or need follow up information about today's clinic visit or your schedule please contact [facility] directly at [phone].  Normal or non-critical lab and imaging results will be communicated to you by MyChart, letter or phone within 4 business days after the clinic has received the results. If you do not hear from us within 7 days, please contact the clinic through [employer] or phone. If you have a critical or abnormal lab result, we will notify you by phone as soon as possible.  Submit refill requests through [employer] or call your pharmacy and they will forward the refill request to us. Please allow 3 business days for your refill to be completed.          Additional Information About Your Visit        [employer] Information     [employer] gives you secure access to your electronic  "health record. If you see a primary care provider, you can also send messages to your care team and make appointments. If you have questions, please call your primary care clinic.  If you do not have a primary care provider, please call [phone] and they will assist you.        Care EveryWhere ID     This is your Care EveryWhere ID. This could be used by other organizations to access your [place] medical records  [id number]        Your Vitals Were     Pulse Temperature Height Pulse Oximetry BMI (Body Mass Index)       66 97.7  F (36.5  C) (Tympanic) 5' 5\" (1.651 m) 98% 26.76 kg/m2        Blood Pressure from Last 3 Encounters:   02/26/18 152/60   02/21/18 132/62   01/15/18 108/72    Weight from Last 3 Encounters:   02/26/18 160 lb 12.8 oz (72.9 kg)   01/15/18 158 lb 8 oz (71.9 kg)   12/04/17 154 lb (69.9 kg)              We Performed the Following     Basic metabolic panel     EKG 12-lead complete w/read - Clinics     Magnesium     TSH with free T4 reflex          Today's Medication Changes          These changes are accurate as of 2/26/18  3:00 PM.  If you have any questions, ask your nurse or doctor.               These medicines have changed or have updated prescriptions.        Dose/Directions    ascorbic acid 1000 MG Tabs   Commonly known as:  vitamin C   This may have changed:    - when to take this  - additional instructions   Used for:  Recurrent UTI, Neurogenic bladder        Dose:  1000 mg   Take 1 tablet (1,000 mg) by mouth 2 times daily   Quantity:  180 tablet   Refills:  3       metoprolol succinate 100 MG 24 hr tablet   Commonly known as:  TOPROL-XL   This may have changed:    - medication strength  - how much to take  - when to take this   Used for:  Palpitations, Atrial fibrillation, unspecified type (H)   Changed by:  Dian Frias MD        Dose:  100 mg   Take 1 tablet (100 mg) by mouth daily   Quantity:  90 tablet   Refills:  3            Where to get your medicines      These " medications were sent to [facility] [address] [facility]  [address] [facility] [address]     Phone:  [phone]     metoprolol succinate 100 MG 24 hr tablet                Primary Care Provider Office Phone # Fax #    Dian Frias [phone]       [address] [place] [address]        Equal Access to Services     [facility] Center[facility]UN[facility] : Hadii aad ku hadasho Soomaali, waaxda luqadaha, qaybta kaalmada adeegyada, waxay idiin hayaan adeeg khmartin lareyna . So [facility] [phone].    ATENCIÓN: Si kandace espvirgilio, tiene a conn disposición servicios gratuitos de asistencia lingüística. [facility] [phone].    We comply with applicable federal civil rights laws and [place] laws. We do not discriminate on the basis of race, color, national origin, age, disability, sex, sexual orientation, or gender identity.            Thank you!     Thank you for choosing [facility]  for your care. Our goal is always to provide you with excellent care. Hearing back from our patients is one way we can continue to improve our services. Please take a few minutes to complete the written survey that you may receive in the mail after your visit with us. Thank you!             Your Updated Medication List - Protect others around you: Learn how to safely use, store and throw away your medicines at www.disposemymeds.org.          This list is accurate as of 2/26/18  3:00 PM.  Always use your most recent med list.                   Brand Name Dispense Instructions for use Diagnosis    ascorbic acid 1000 MG Tabs    vitamin C    180 tablet    Take 1 tablet (1,000 mg) by mouth 2 times daily    Recurrent UTI, Neurogenic bladder       calcium citrate-vitamin D 315-250 MG-UNIT Tabs per tablet    CITRACAL     Take 1 tablet by mouth daily        carboxymethylcellulose 1 % ophthalmic solution    CELLUVISC/REFRESH LIQUIGEL     Place 1 drop into both eyes every morning         conjugated estrogens cream    PREMARIN     Place vaginally twice a week On Tuesday and Friday. Uses a pea sized amount        fluocinonide 0.05 % cream    LIDEX    30 g    Apply topically 2 times daily as needed    Dermatitis       GENTLECATH URINARY CATHETER Misc     120 each    1 catheter 4 times daily    Urinary retention       lisinopril 10 MG tablet    PRINIVIL/ZESTRIL    90 tablet    Take 1 tablet (10 mg) by mouth every morning    Hypertension goal BP (blood pressure) < 150/90       methenamine hippurate 1 G Tabs tablet    HIPREX    90 tablet    Take 1 tablet (1 g) by mouth daily    Recurrent UTI       metoprolol succinate 100 MG 24 hr tablet    TOPROL-XL    90 tablet    Take 1 tablet (100 mg) by mouth daily    Palpitations, Atrial fibrillation, unspecified type (H)       OMEGA-3 FISH OIL PO      Take 1 g by mouth 2 times daily (with meals)        order for DME     1 Device    Trilok ankle brace    Plantar fascial fibromatosis, Pain of left heel       polyethylene glycol Packet    MIRALAX/GLYCOLAX     Take 1 packet by mouth daily as needed        sodium chloride 5 % ophthalmic ointment    PETER 128     Place 1 Application into both eyes At Bedtime        TYLENOL 500 MG tablet   Generic drug:  acetaminophen      Take 500-1,000 mg by mouth every 6 hours as needed        Vitamin D-3 1000 UNITS Caps      Take 1 capsule by mouth daily        warfarin 2.5 MG tablet    COUMADIN    100 tablet    Take 2.5mg (1 tablet)  MTWTFSS or as directed by INR Clinic.    Long-term (current) use of anticoagulants

## 2018-02-27 LAB
ANION GAP SERPL CALCULATED.3IONS-SCNC: 4 MMOL/L (ref 3–14)
BUN SERPL-MCNC: 27 MG/DL (ref 7–30)
CALCIUM SERPL-MCNC: 9 MG/DL (ref 8.5–10.1)
CHLORIDE SERPL-SCNC: 106 MMOL/L (ref 94–109)
CO2 SERPL-SCNC: 30 MMOL/L (ref 20–32)
CREAT SERPL-MCNC: 1.09 MG/DL (ref 0.52–1.04)
GFR SERPL CREATININE-BSD FRML MDRD: 48 ML/MIN/1.7M2
GLUCOSE SERPL-MCNC: 94 MG/DL (ref 70–99)
MAGNESIUM SERPL-MCNC: 2 MG/DL (ref 1.6–2.3)
POTASSIUM SERPL-SCNC: 4 MMOL/L (ref 3.4–5.3)
SODIUM SERPL-SCNC: 140 MMOL/L (ref 133–144)
TSH SERPL DL<=0.005 MIU/L-ACNC: 1.82 MU/L (ref 0.4–4)

## 2018-03-23 ENCOUNTER — APPOINTMENT (OUTPATIENT)
Dept: CT IMAGING | Facility: CLINIC | Age: 83
End: 2018-03-23
Attending: EMERGENCY MEDICINE
Payer: COMMERCIAL

## 2018-03-23 ENCOUNTER — TELEPHONE (OUTPATIENT)
Dept: PEDIATRICS | Facility: CLINIC | Age: 83
End: 2018-03-23

## 2018-03-23 ENCOUNTER — HOSPITAL ENCOUNTER (OUTPATIENT)
Facility: CLINIC | Age: 83
Setting detail: OBSERVATION
Discharge: HOME | End: 2018-03-26
Attending: EMERGENCY MEDICINE | Admitting: INTERNAL MEDICINE
Payer: COMMERCIAL

## 2018-03-23 DIAGNOSIS — K52.9 COLITIS: ICD-10-CM

## 2018-03-23 DIAGNOSIS — K62.5 RECTAL BLEEDING: ICD-10-CM

## 2018-03-23 DIAGNOSIS — Z79.01 LONG-TERM (CURRENT) USE OF ANTICOAGULANTS: ICD-10-CM

## 2018-03-23 LAB
ABO + RH BLD: NORMAL
ABO + RH BLD: NORMAL
ALBUMIN UR-MCNC: NEGATIVE MG/DL
ANION GAP SERPL CALCULATED.3IONS-SCNC: 7 MMOL/L (ref 3–14)
APPEARANCE UR: CLEAR
BASOPHILS # BLD AUTO: 0 10E9/L (ref 0–0.2)
BASOPHILS NFR BLD AUTO: 0.3 %
BILIRUB UR QL STRIP: NEGATIVE
BLD GP AB SCN SERPL QL: NORMAL
BLOOD BANK CMNT PATIENT-IMP: NORMAL
BUN SERPL-MCNC: 34 MG/DL (ref 7–30)
CALCIUM SERPL-MCNC: 9.2 MG/DL (ref 8.5–10.1)
CHLORIDE SERPL-SCNC: 108 MMOL/L (ref 94–109)
CO2 SERPL-SCNC: 24 MMOL/L (ref 20–32)
COLOR UR AUTO: ABNORMAL
CREAT SERPL-MCNC: 1.33 MG/DL (ref 0.52–1.04)
DIFFERENTIAL METHOD BLD: ABNORMAL
EOSINOPHIL # BLD AUTO: 0 10E9/L (ref 0–0.7)
EOSINOPHIL NFR BLD AUTO: 0.3 %
ERYTHROCYTE [DISTWIDTH] IN BLOOD BY AUTOMATED COUNT: 13 % (ref 10–15)
GFR SERPL CREATININE-BSD FRML MDRD: 38 ML/MIN/1.7M2
GLUCOSE SERPL-MCNC: 117 MG/DL (ref 70–99)
GLUCOSE UR STRIP-MCNC: NEGATIVE MG/DL
HCT VFR BLD AUTO: 38.3 % (ref 35–47)
HGB BLD-MCNC: 11.1 G/DL (ref 11.7–15.7)
HGB BLD-MCNC: 11.2 G/DL (ref 11.7–15.7)
HGB BLD-MCNC: 12.5 G/DL (ref 11.7–15.7)
HGB UR QL STRIP: NEGATIVE
IMM GRANULOCYTES # BLD: 0 10E9/L (ref 0–0.4)
IMM GRANULOCYTES NFR BLD: 0.4 %
INR PPP: 2.44 (ref 0.86–1.14)
INTERPRETATION ECG - MUSE: NORMAL
KETONES UR STRIP-MCNC: NEGATIVE MG/DL
LACTATE SERPL-SCNC: 0.9 MMOL/L (ref 0.4–2)
LEUKOCYTE ESTERASE UR QL STRIP: NEGATIVE
LYMPHOCYTES # BLD AUTO: 0.4 10E9/L (ref 0.8–5.3)
LYMPHOCYTES NFR BLD AUTO: 3.8 %
MCH RBC QN AUTO: 32.1 PG (ref 26.5–33)
MCHC RBC AUTO-ENTMCNC: 32.6 G/DL (ref 31.5–36.5)
MCV RBC AUTO: 99 FL (ref 78–100)
MONOCYTES # BLD AUTO: 0.7 10E9/L (ref 0–1.3)
MONOCYTES NFR BLD AUTO: 6.2 %
MUCOUS THREADS #/AREA URNS LPF: PRESENT /LPF
NEUTROPHILS # BLD AUTO: 10.1 10E9/L (ref 1.6–8.3)
NEUTROPHILS NFR BLD AUTO: 89 %
NITRATE UR QL: NEGATIVE
NRBC # BLD AUTO: 0 10*3/UL
NRBC BLD AUTO-RTO: 0 /100
PH UR STRIP: 5 PH (ref 5–7)
PLATELET # BLD AUTO: 163 10E9/L (ref 150–450)
POTASSIUM SERPL-SCNC: 4 MMOL/L (ref 3.4–5.3)
RBC # BLD AUTO: 3.89 10E12/L (ref 3.8–5.2)
RBC #/AREA URNS AUTO: 1 /HPF (ref 0–2)
SODIUM SERPL-SCNC: 139 MMOL/L (ref 133–144)
SOURCE: ABNORMAL
SP GR UR STRIP: 1.01 (ref 1–1.03)
SPECIMEN EXP DATE BLD: NORMAL
UROBILINOGEN UR STRIP-MCNC: 0 MG/DL (ref 0–2)
WBC # BLD AUTO: 11.4 10E9/L (ref 4–11)
WBC #/AREA URNS AUTO: 0 /HPF (ref 0–5)

## 2018-03-23 PROCEDURE — 80048 BASIC METABOLIC PNL TOTAL CA: CPT | Performed by: EMERGENCY MEDICINE

## 2018-03-23 PROCEDURE — 25000128 H RX IP 250 OP 636: Performed by: PHYSICIAN ASSISTANT

## 2018-03-23 PROCEDURE — 83605 ASSAY OF LACTIC ACID: CPT | Performed by: EMERGENCY MEDICINE

## 2018-03-23 PROCEDURE — 74176 CT ABD & PELVIS W/O CONTRAST: CPT

## 2018-03-23 PROCEDURE — 36415 COLL VENOUS BLD VENIPUNCTURE: CPT | Performed by: PHYSICIAN ASSISTANT

## 2018-03-23 PROCEDURE — 85610 PROTHROMBIN TIME: CPT | Performed by: EMERGENCY MEDICINE

## 2018-03-23 PROCEDURE — 25000128 H RX IP 250 OP 636: Performed by: EMERGENCY MEDICINE

## 2018-03-23 PROCEDURE — 87086 URINE CULTURE/COLONY COUNT: CPT | Performed by: EMERGENCY MEDICINE

## 2018-03-23 PROCEDURE — G0378 HOSPITAL OBSERVATION PER HR: HCPCS

## 2018-03-23 PROCEDURE — 86901 BLOOD TYPING SEROLOGIC RH(D): CPT | Performed by: EMERGENCY MEDICINE

## 2018-03-23 PROCEDURE — 81001 URINALYSIS AUTO W/SCOPE: CPT | Performed by: EMERGENCY MEDICINE

## 2018-03-23 PROCEDURE — 93005 ELECTROCARDIOGRAM TRACING: CPT

## 2018-03-23 PROCEDURE — 99220 ZZC INITIAL OBSERVATION CARE,LEVL III: CPT | Performed by: PHYSICIAN ASSISTANT

## 2018-03-23 PROCEDURE — 85025 COMPLETE CBC W/AUTO DIFF WBC: CPT | Performed by: EMERGENCY MEDICINE

## 2018-03-23 PROCEDURE — 86900 BLOOD TYPING SEROLOGIC ABO: CPT | Performed by: EMERGENCY MEDICINE

## 2018-03-23 PROCEDURE — 25000132 ZZH RX MED GY IP 250 OP 250 PS 637: Performed by: PHYSICIAN ASSISTANT

## 2018-03-23 PROCEDURE — 96360 HYDRATION IV INFUSION INIT: CPT | Mod: 59

## 2018-03-23 PROCEDURE — 96361 HYDRATE IV INFUSION ADD-ON: CPT

## 2018-03-23 PROCEDURE — 86850 RBC ANTIBODY SCREEN: CPT | Performed by: EMERGENCY MEDICINE

## 2018-03-23 PROCEDURE — 99285 EMERGENCY DEPT VISIT HI MDM: CPT | Mod: 25

## 2018-03-23 PROCEDURE — 85018 HEMOGLOBIN: CPT | Mod: 91 | Performed by: PHYSICIAN ASSISTANT

## 2018-03-23 PROCEDURE — 25000132 ZZH RX MED GY IP 250 OP 250 PS 637: Performed by: EMERGENCY MEDICINE

## 2018-03-23 RX ORDER — ACETAMINOPHEN 325 MG/1
650 TABLET ORAL EVERY 4 HOURS PRN
Status: DISCONTINUED | OUTPATIENT
Start: 2018-03-23 | End: 2018-03-26 | Stop reason: HOSPADM

## 2018-03-23 RX ORDER — LISINOPRIL 10 MG/1
10 TABLET ORAL EVERY MORNING
Status: DISCONTINUED | OUTPATIENT
Start: 2018-03-24 | End: 2018-03-26 | Stop reason: HOSPADM

## 2018-03-23 RX ORDER — METOPROLOL SUCCINATE 100 MG/1
100 TABLET, EXTENDED RELEASE ORAL DAILY
Status: DISCONTINUED | OUTPATIENT
Start: 2018-03-23 | End: 2018-03-26 | Stop reason: HOSPADM

## 2018-03-23 RX ORDER — METHENAMINE HIPPURATE 1000 MG/1
1 TABLET ORAL DAILY
Status: DISCONTINUED | OUTPATIENT
Start: 2018-03-23 | End: 2018-03-26 | Stop reason: HOSPADM

## 2018-03-23 RX ORDER — POLYETHYLENE GLYCOL 3350 17 G/17G
17 POWDER, FOR SOLUTION ORAL DAILY PRN
Status: DISCONTINUED | OUTPATIENT
Start: 2018-03-23 | End: 2018-03-23

## 2018-03-23 RX ORDER — PROCHLORPERAZINE MALEATE 5 MG
5 TABLET ORAL EVERY 6 HOURS PRN
Status: DISCONTINUED | OUTPATIENT
Start: 2018-03-23 | End: 2018-03-26 | Stop reason: HOSPADM

## 2018-03-23 RX ORDER — AMOXICILLIN 250 MG
2 CAPSULE ORAL 2 TIMES DAILY PRN
Status: DISCONTINUED | OUTPATIENT
Start: 2018-03-23 | End: 2018-03-26 | Stop reason: HOSPADM

## 2018-03-23 RX ORDER — ACETAMINOPHEN 500 MG
500 TABLET ORAL EVERY 4 HOURS PRN
Status: DISCONTINUED | OUTPATIENT
Start: 2018-03-23 | End: 2018-03-23

## 2018-03-23 RX ORDER — ONDANSETRON 4 MG/1
4 TABLET, ORALLY DISINTEGRATING ORAL EVERY 6 HOURS PRN
Status: DISCONTINUED | OUTPATIENT
Start: 2018-03-23 | End: 2018-03-26 | Stop reason: HOSPADM

## 2018-03-23 RX ORDER — PROCHLORPERAZINE 25 MG
12.5 SUPPOSITORY, RECTAL RECTAL EVERY 12 HOURS PRN
Status: DISCONTINUED | OUTPATIENT
Start: 2018-03-23 | End: 2018-03-26 | Stop reason: HOSPADM

## 2018-03-23 RX ORDER — SODIUM CHLORIDE 9 MG/ML
INJECTION, SOLUTION INTRAVENOUS CONTINUOUS
Status: DISCONTINUED | OUTPATIENT
Start: 2018-03-23 | End: 2018-03-23

## 2018-03-23 RX ORDER — AMOXICILLIN 250 MG
1 CAPSULE ORAL 2 TIMES DAILY PRN
Status: DISCONTINUED | OUTPATIENT
Start: 2018-03-23 | End: 2018-03-26 | Stop reason: HOSPADM

## 2018-03-23 RX ORDER — METHENAMINE HIPPURATE 1000 MG/1
1 TABLET ORAL 2 TIMES DAILY
COMMUNITY
End: 2019-05-17 | Stop reason: ALTCHOICE

## 2018-03-23 RX ORDER — ONDANSETRON 2 MG/ML
4 INJECTION INTRAMUSCULAR; INTRAVENOUS EVERY 6 HOURS PRN
Status: DISCONTINUED | OUTPATIENT
Start: 2018-03-23 | End: 2018-03-26 | Stop reason: HOSPADM

## 2018-03-23 RX ORDER — SODIUM CHLORIDE 5 %
1 OINTMENT (GRAM) OPHTHALMIC (EYE) AT BEDTIME
Status: DISCONTINUED | OUTPATIENT
Start: 2018-03-23 | End: 2018-03-26 | Stop reason: HOSPADM

## 2018-03-23 RX ORDER — NALOXONE HYDROCHLORIDE 0.4 MG/ML
.1-.4 INJECTION, SOLUTION INTRAMUSCULAR; INTRAVENOUS; SUBCUTANEOUS
Status: DISCONTINUED | OUTPATIENT
Start: 2018-03-23 | End: 2018-03-26 | Stop reason: HOSPADM

## 2018-03-23 RX ORDER — ACETAMINOPHEN 650 MG/1
650 SUPPOSITORY RECTAL EVERY 4 HOURS PRN
Status: DISCONTINUED | OUTPATIENT
Start: 2018-03-23 | End: 2018-03-26 | Stop reason: HOSPADM

## 2018-03-23 RX ORDER — SODIUM CHLORIDE 9 MG/ML
INJECTION, SOLUTION INTRAVENOUS CONTINUOUS
Status: ACTIVE | OUTPATIENT
Start: 2018-03-23 | End: 2018-03-24

## 2018-03-23 RX ADMIN — SODIUM CHLORIDE: 9 INJECTION, SOLUTION INTRAVENOUS at 13:02

## 2018-03-23 RX ADMIN — ACETAMINOPHEN 500 MG: 500 TABLET, FILM COATED ORAL at 11:41

## 2018-03-23 RX ADMIN — Medication 1 G: at 22:16

## 2018-03-23 RX ADMIN — METOPROLOL SUCCINATE 100 MG: 100 TABLET, EXTENDED RELEASE ORAL at 18:03

## 2018-03-23 RX ADMIN — SODIUM CHLORIDE: 9 INJECTION, SOLUTION INTRAVENOUS at 22:14

## 2018-03-23 RX ADMIN — SODIUM CHLORIDE 1000 ML: 9 INJECTION, SOLUTION INTRAVENOUS at 09:23

## 2018-03-23 RX ADMIN — METHENAMINE HIPPURATE 1 G: 1 TABLET ORAL at 18:03

## 2018-03-23 ASSESSMENT — ENCOUNTER SYMPTOMS
LIGHT-HEADEDNESS: 1
ABDOMINAL PAIN: 1
NAUSEA: 0
DYSURIA: 0
FREQUENCY: 0
VOMITING: 0

## 2018-03-23 NOTE — TELEPHONE ENCOUNTER
Patient's daughter calls.  States that patient called her stating that she had a stream of rectal bleeding last night and still bleeding this am.  She is with patient now.  Advised to take patient to the ER and daughter agreed.  Brionna Pretty RN  Message handled by Nurse Triage.

## 2018-03-23 NOTE — ED NOTES
[facility]  ED Nurse Handoff Report    Maia Miranda is a 85 year old female   ED Chief complaint: Rectal Bleeding  . ED Diagnosis:   Final diagnoses:   Colitis   Rectal bleeding     Allergies:   Allergies   Allergen Reactions     Codeine Nausea and Vomiting     Meperidine Nausea and Vomiting     Morphine Nausea and Vomiting     vomiting     Penicillins Hives     hives     Sulfa Drugs      Vomiting       Epinephrine Palpitations       Code Status: Full Code  Activity level - Baseline/Home:  Independent. Activity Level - Current:   Stand with Assist. Lift room needed: No. Bariatric: No   Needed: No   Isolation: No. Infection: Not Applicable.     Vital Signs:   Vitals:    03/23/18 0857 03/23/18 0900   BP: 137/58    Resp: 16    Temp: 97.8  F (36.6  C)    TempSrc: Oral    SpO2:  98%       Cardiac Rhythm:  ,      Pain level: 0-10 Pain Scale: 10  Patient confused: No. Patient Falls Risk: Yes.   Elimination Status: Has voided - pt self straight caths at home. Straight cath done in ED.   Patient Report - Initial Complaint: rectal bleeding. Focused Assessment:  Gastrointestinal - GI WDL: GI symptoms GI Signs/Symptoms: abdominal pain (Pt reports lower abdominal cramping that comes and goes since last night. She states that when she experiences pain, she also experiences rectal bleeding. ) Gastrointestinal Comment: Pt reports seeing bright red blood in toilet last night and into today. Denies diarrhea.   Moderate amount of bright red blood and mucous on pad when pt was changed in ED.   Tests Performed: labs, CT . Abnormal Results:   Labs Ordered and Resulted from Time of ED Arrival Up to the Time of Departure from the ED   CBC WITH PLATELETS DIFFERENTIAL - Abnormal; Notable for the following:        Result Value    WBC 11.4 (*)     Absolute Neutrophil 10.1 (*)     Absolute Lymphocytes 0.4 (*)     All other components within normal limits   INR - Abnormal; Notable for the following:     INR 2.44 (*)      All other components within normal limits   BASIC METABOLIC PANEL - Abnormal; Notable for the following:     Glucose 117 (*)     Urea Nitrogen 34 (*)     Creatinine 1.33 (*)     GFR Estimate 38 (*)     GFR Estimate If Black 46 (*)     All other components within normal limits   LACTIC ACID   ROUTINE UA WITH MICROSCOPIC   IV ACCESS   ABO/RH TYPE AND SCREEN   URINE CULTURE AEROBIC BACTERIAL     CT Abdomen Pelvis w/o Contrast   Preliminary Result   IMPRESSION: Although not well seen due to lack of IV and enteric   contrast, there is probable diffuse left colonic wall thickening   suggesting nonspecific colitis.      .   Treatments provided: fluids  Family Comments:daughter at bedside  OBS brochure/video discussed/provided to patient:  Yes  ED Medications:   Medications   0.9% sodium chloride BOLUS (0 mLs Intravenous Stopped 3/23/18 1052)     Drips infusing:  No  For the majority of the shift, the patient's behavior Green. Interventions performed were frequent rounding.     Severe Sepsis OR Septic Shock Diagnosis Present: No      ED Nurse Name/Phone Number: Fawn Pelayo,   11:04 AM    RECEIVING UNIT ED HANDOFF REVIEW    Above ED Nurse Handoff Report was reviewed: Yes  Reviewed by: Laura Rader on March 23, 2018 at 11:51 AM

## 2018-03-23 NOTE — ED PROVIDER NOTES
History     Chief Complaint:    Rectal Bleeding      HPI   Maia Miranda is a 85 year old female who takes Coumadin due to her history of atrial fibrillation who presents to the ED today with rectal bleeding. The patient states that last night while she was on the toilet, she noticed some blood in the toilet. She notes that she kept bleeding throughout the night, but had slightly less blood this morning. The patient also states that she has had some abdominal pain and discomfort that started last night as well. She reports that she has had some lightheadedness, but denies any nausea or vomiting. The patient denies taking any recent antibiotics, having similar episodes in the past, or any history of diverticulitis, colon problems, or bowel problems. The patient also denies any black stools, chest pain, or any urinary symptoms, however, she states that she does self-cath.     Allergies:  Codeine - nausea or vomiting   Meperidine - nausea or vomiting   Morphine - vomiting  Penicillins - hives   Sulfa drugs - vomiting  Epinephrine - palpitations      Medications:    Metoprolol succinate   Lidex   Hiprex   Citracal   Lisinopril   Coumadin   Premarin   Miralax   Catheters      Past Medical History:    Amnestic MCI   Chronic duodenal ulcer   Depressive disorder   Erosive eye disorder   Esophageal reflux   Essential tremor   Generalized osteoarthrosis   Hiatal hernia   Pulmonary embolism   Lactose intolerance   Lumbago   Mitral valve regurgitation   Occlusion and stenosis of carotid artery   Osteoporosis   Atrial fibrillation  Hypertension   Tinnitus     Past Surgical History:    D&C  Left breast biopsy   Colonoscopy   Cataract removal   Implant stimulator sacral nerve   Interstim placement     Family History:    Cerebrovascular disease - father   Psychotic disorder - mother   Cardiovascular - sister     Social History:  Marital Status:    Presents to the ED with daughter   Tobacco Use: never smoker   Alcohol  Use: no   PCP: Dian Johnson Serum     Review of Systems   Cardiovascular: Negative for chest pain.   Gastrointestinal: Positive for abdominal pain. Negative for nausea and vomiting.        Positive for rectal bleeding.   Negative for black stools.    Genitourinary: Negative for dysuria and frequency.   Neurological: Positive for light-headedness.   All other systems reviewed and are negative.      Physical Exam   First Vitals:  BP: 137/58  Heart Rate: 61  Temp: 97.8  F (36.6  C)  Resp: 16  SpO2: 98 %      Physical Exam  Vital signs and nursing notes reviewed.     Constitutional: laying on gurney appears uncomfortable  HENT: Oropharynx is clear and moist  Eyes: Conjunctivae are normal bilaterally. Pupils equal  Neck: normal range of motion  Cardiovascular: Normal rate, regular rhythm, normal heart sounds.   Pulmonary/Chest: Effort normal and breath sounds normal. No respiratory distress.   Abdominal: Soft. Bowel sounds are normal. Discomfort in the abdomen, more pronounced in the left mid abdomen. No rebound or guarding. No distension or peritoneal signs.   Rectal: No signs of external bleeding in perianal area. No masses noted. Serosanguinous colored stool. No bright red blood or tarry stool noted.  Musculoskeletal: No joint swelling or edema.   Neurological: Alert and oriented. No focal weakness  Skin: Skin is warm and dry. No rash noted.   Psych: normal affect   Emergency Department Course   ECG:  @ 0906  Indication: rectal bleeding   Vent. Rate 61 bpm. ID interval 270 ms. QRS duration 130 ms. QT/QTc 478/481 ms. P-R-T axis 66 3 -2.   Sinus rhythm with 1st degree AV block. Right bundle branch block. Abnormal ECG.   Read @ 0910 by Dr. Irwin.     Imaging:  CT abdomen/pelvis w/o contrast   Although not well seen due to lack of IV and enteric  contrast, there is probable diffuse left colonic wall thickening  suggesting nonspecific colitis.  Preliminary radiology read.     Radiographic findings were communicated  with the patient and family who voiced understanding of the findings.    Laboratory:  CBC:  WBC 11.4 (H), HGB 12.5,   ABO/Rh Type and Screen: O, RH positive, antibody screen negative   INR: 2.44 (H)  BMP: glucose 117 (H), BUN 34 (H), creatinine 1.33 (H), GFR estimate 38 (L), otherwise WNL  Lactic event: 0.9  UA: Clear straw colored urine, mucous present otherwise WNL   Urine culture: pending     Interventions:  (0923) Normal Saline, 1 liter, IV bolus       Emergency Department Course:  Nursing notes and vitals reviewed.  (0900) I performed an exam of the patient as documented above.    EKG was done, interpretation as above.   A peripheral IV was established. Blood was drawn from the patient. This was sent for laboratory testing, findings above.    The patient was sent for an abdominal/pelvis CT while in the emergency department, findings above.    (1017) I rechecked on the patient and updated them on results.    Findings and plan explained to the patient who consents to admission.   (1041) I discussed the patient with Lorri Weaver for Dr. Lazaro of the hospitalist service, who will admit the patient to an observation bed for further monitoring, evaluation, and treatment.   Impression & Plan    Medical Decision Making:  Maia Miranda is a 85 year old female who presents with abdominal discomfort and noted blood in the stools. On exam, she had vague discomfort in the left, mid abdomen. Rectal exam did not reveal any bright red or large amounts of GI bleeding. There was a serosanguinous type color to the stool. Her labs show no specific abnormality and CT scan shows a non -specific colitis, which is the area of her pain. There is no indication of obvious diverticulitis or ischemic/infectious colitis at this time. Based on her comorbidities, as well as being on Coumadin, I felt that she should be admitted for further treatment and evaluation. I discussed the case with the hospitalist who agrees to admit  the patient. I dicussed with the patient the findings and reasons to admission and shew understands and agrees with this plan.     Diagnosis:    ICD-10-CM    1. Colitis K52.9    2. Rectal bleeding K62.5        Disposition:  Admitted to hospitalist.     I, Rita Hodge, am serving as a scribe on 3/23/2018 at 9:00 AM to personally document services performed by Dr. Irwin based on my observations and the provider's statements to me.    3/23/2018   [facility] EMERGENCY DEPARTMENT       Amor Irwin MD  03/23/18 1152

## 2018-03-23 NOTE — PROGRESS NOTES
ROOM # 213-2    Living Situation (if not independent, order SW consult): Own home/residence  Facility name:NA  :    [place] K[place] Daughter [phone] none [phone]         Activity level at baseline: Independent  Activity level on admit: SBA      Patient registered to observation; given Patient Bill of Rights; given the opportunity to ask questions about observation status and their plan of care.  Patient has been oriented to the observation room, bathroom and call light is in place.    Discussed discharge goals and expectations with patient/family.

## 2018-03-23 NOTE — IP AVS SNAPSHOT
MRN:[id number]                      After Visit Summary   3/23/2018    Maia Miranda    MRN: [id number]           Thank you!     Thank you for choosing [facility] for your care. Our goal is always to provide you with excellent care. Hearing back from our patients is one way we can continue to improve our services. Please take a few minutes to complete the written survey that you may receive in the mail after you visit. If you would like to speak to someone directly about your visit please contact Patient Relations at [phone]. Thank you!          Patient Information     Date Of Birth          10/26/1932        About your hospital stay     You were admitted on:  March 23, 2018 You last received care in the:  [facility] Observation Department    You were discharged on:  March 26, 2018        Reason for your hospital stay       You were admitted for concerns of stomach pain and blood in your stool related to having ischemic colitis which was likely caused by decreased blood flow to the left side of your colon. This has self resolved with no permanent damage. We held your warfarin and your bleeding slowly stopped. Your hemoglobin did not drop much so you did not need a blood transfusion and we do not believe you need to be on an iron supplement. You were eating and drinking well so we were able to discharge you home.    You should restart your Warfarin tomorrow and have an INR check on Thursday or Friday.                  Who to Call     For medical emergencies, please call 911.  For non-urgent questions about your medical care, please call your primary care provider or clinic, [phone]          Attending Provider     Provider Specialty    Amor Irwin MD Emergency Medicine    Karla Lazaro DO Internal Medicine       Primary Care Provider Office Phone # Fax #    Dian Frias [phone]      After Care Instructions      Activity       Your activity upon discharge: activity as tolerated            Diet       Follow this diet upon discharge: Regular                  Follow-up Appointments     Follow-up and recommended labs and tests        Follow up with primary care provider, Dian Frias, within 7 days for hospital follow- up.  No follow up labs or test are needed.    INR check on Thursday or Friday                  Your next 10 appointments already scheduled     Mar 28, 2018 11:00 AM CDT   Anticoagulation Visit with  [facility]UL[facility] CLINIC   [facility]    [address] [address] [phone]                         Warfarin Instruction     You have started taking a medicine called warfarin. This is a blood-thinning medicine (anticoagulant). It helps prevent and treat blood clots.      Before leaving the hospital, make sure you know how much to take and how long to take it.      You will need regular blood tests to make sure your blood is clotting safely. It is very important to see your doctor for regular blood tests.    Talk to your doctor before taking any new medicine (this includes over-the-counter drugs and herbal products). Many medicines can interact with warfarin. This may cause more bleeding or too much clotting.     Eating a lot of vitamin K--found in green, leafy vegetables--can change the way warfarin works in your body. Do NOT avoid these foods. Instead, try to eat the same amount each day.     Bleeding is the most common side-effect of warfarin. You may notice bleeding gums, a bloody nose, bruises and bleeding longer when you cut yourself. See a doctor at once if:   o You cough up blood  o You find blood in your stool (poop)  o You have a deep cut, or a cut that bleeds longer than 10 minutes   o You have a bad cut, hard fall, accident or hit your head (go to urgent care or the emergency room).    For women who can get pregnant: This  "medicine can harm an unborn baby. Be very careful not to get pregnant while taking this medicine. If you think you might be pregnant, call your doctor right away.    For more information, read \"Guide to Warfarin Therapy,  the booklet you received in the hospital.        Pending Results     No orders found from 3/21/2018 to 3/24/2018.            Statement of Approval     Ordered          03/26/18 1057  I have reviewed and agree with all the recommendations and orders detailed in this document.  EFFECTIVE NOW     Approved and electronically signed by:  Sherron Johnson PA-C             Admission Information     Date & Time Provider Department Dept. Phone    3/23/2018 Karla Lazaro, DO [facility] Observation Department [phone]      Your Vitals Were     Blood Pressure Pulse Temperature Respirations Height Weight    158/56 (BP Location: Right arm) 74 97.5  F (36.4  C) (Oral) 20 1.651 m (5' 5\") 70.8 kg (156 lb)    Pulse Oximetry BMI (Body Mass Index)                98% 25.96 kg/m2          [facility] Information     [employer] gives you secure access to your electronic health record. If you see a primary care provider, you can also send messages to your care team and make appointments. If you have questions, please call your primary care clinic.  If you do not have a primary care provider, please call [phone] and they will assist you.        Care EveryWhere ID     This is your Care EveryWhere ID. This could be used by other organizations to access your [facility] medical records  [id number]        Equal Access to Services     KAYLA ALEX : Hadii luiz fernándezo Sokeily, waaxda luqadaha, qaybta kaalmada adeegyasimin tan . So [facility] [phone].    ATENCIÓN: Si habla español, tiene a conn disposición servicios gratuitos de asistencia lingüística. Llame al [phone].    We comply with applicable federal civil rights laws and [place] laws. We do not " discriminate on the basis of race, color, national origin, age, disability, sex, sexual orientation, or gender identity.               Review of your medicines      CONTINUE these medicines which have NOT CHANGED        Dose / Directions    ascorbic acid 1000 MG Tabs   Commonly known as:  vitamin C   Used for:  Recurrent UTI, Neurogenic bladder        Dose:  1000 mg   Take 1 tablet (1,000 mg) by mouth 2 times daily   Quantity:  180 tablet   Refills:  3       calcium citrate-vitamin D 315-250 MG-UNIT Tabs per tablet   Commonly known as:  CITRACAL        Dose:  1 tablet   Take 1 tablet by mouth daily   Refills:  0       carboxymethylcellulose 1 % ophthalmic solution   Commonly known as:  CELLUVISC/REFRESH LIQUIGEL        Dose:  1 drop   Place 1 drop into both eyes every morning As needed   Refills:  0       conjugated estrogens cream   Commonly known as:  PREMARIN        Place vaginally twice a week On Tuesday and Friday. Uses a pea sized amount   Refills:  0       fluocinonide 0.05 % cream   Commonly known as:  LIDEX   Used for:  Dermatitis        Apply topically 2 times daily as needed   Quantity:  30 g   Refills:  2       GENTLECATH URINARY CATHETER Misc   Used for:  Urinary retention        Dose:  1 catheter   1 catheter 4 times daily   Quantity:  120 each   Refills:  12       lisinopril 10 MG tablet   Commonly known as:  PRINIVIL/ZESTRIL   Used for:  Hypertension goal BP (blood pressure) < 150/90        Dose:  10 mg   Take 1 tablet (10 mg) by mouth every morning   Quantity:  90 tablet   Refills:  3       methenamine hippurate 1 G Tabs tablet   Commonly known as:  HIPREX        Dose:  1 g   Take 1 g by mouth 2 times daily   Refills:  0       metoprolol succinate 100 MG 24 hr tablet   Commonly known as:  TOPROL-XL   Used for:  Palpitations, Atrial fibrillation, unspecified type (H)        Dose:  100 mg   Take 1 tablet (100 mg) by mouth daily   Quantity:  90 tablet   Refills:  3       OMEGA-3 FISH OIL PO         Dose:  1 g   Take 1 g by mouth 2 times daily (with meals)   Refills:  0       order for DME   Used for:  Plantar fascial fibromatosis, Pain of left heel        Trilok ankle brace   Quantity:  1 Device   Refills:  0       polyethylene glycol Packet   Commonly known as:  MIRALAX/GLYCOLAX        Dose:  1 packet   Take 1 packet by mouth daily as needed   Refills:  0       sodium chloride 5 % ophthalmic ointment   Commonly known as:  PETER 128        Dose:  1 Application   Place 1 Application into both eyes At Bedtime   Refills:  0       TYLENOL 500 MG tablet   Generic drug:  acetaminophen        Dose:  500-1000 mg   Take 500-1,000 mg by mouth every 6 hours as needed   Refills:  0       Vitamin D-3 1000 UNITS Caps        Dose:  1 capsule   Take 1 capsule by mouth daily   Refills:  0       warfarin 2.5 MG tablet   Commonly known as:  COUMADIN   Used for:  Long-term (current) use of anticoagulants        Start taking on:  3/27/2018   Take 2.5mg (1 tablet)  MTWTFSS or as directed by INR Clinic.   Quantity:  100 tablet   Refills:  3            Where to get your medicines      Some of these will need a paper prescription and others can be bought over the counter. Ask your nurse if you have questions.     You don't need a prescription for these medications     warfarin 2.5 MG tablet                Protect others around you: Learn how to safely use, store and throw away your medicines at www.disposemymeds.org.             Medication List: This is a list of all your medications and when to take them. Check marks below indicate your daily home schedule. Keep this list as a reference.      Medications           Morning Afternoon Evening Bedtime As Needed    ascorbic acid 1000 MG Tabs   Commonly known as:  vitamin C   Take 1 tablet (1,000 mg) by mouth 2 times daily                                      calcium citrate-vitamin D 315-250 MG-UNIT Tabs per tablet   Commonly known as:  CITRACAL   Take 1 tablet by mouth daily                                    carboxymethylcellulose 1 % ophthalmic solution   Commonly known as:  CELLUVISC/REFRESH LIQUIGEL   Place 1 drop into both eyes every morning As needed   Last time this was given:  1 drop on 3/26/2018  8:50 AM                                   conjugated estrogens cream   Commonly known as:  PREMARIN   Place vaginally twice a week On Tuesday and Friday. Uses a pea sized amount            Tuesday and friday                       fluocinonide 0.05 % cream   Commonly known as:  LIDEX   Apply topically 2 times daily as needed                                   GENTLECATH URINARY CATHETER Misc   1 catheter 4 times daily                                            lisinopril 10 MG tablet   Commonly known as:  PRINIVIL/ZESTRIL   Take 1 tablet (10 mg) by mouth every morning   Last time this was given:  10 mg on 3/26/2018  8:49 AM                                   methenamine hippurate 1 G Tabs tablet   Commonly known as:  HIPREX   Take 1 g by mouth 2 times daily   Last time this was given:  1 g on 3/26/2018  8:49 AM                                      metoprolol succinate 100 MG 24 hr tablet   Commonly known as:  TOPROL-XL   Take 1 tablet (100 mg) by mouth daily   Last time this was given:  100 mg on 3/25/2018  9:33 PM                                   OMEGA-3 FISH OIL PO   Take 1 g by mouth 2 times daily (with meals)                                order for DME   Trilok ankle brace                                   polyethylene glycol Packet   Commonly known as:  MIRALAX/GLYCOLAX   Take 1 packet by mouth daily as needed                                   sodium chloride 5 % ophthalmic ointment   Commonly known as:  PETER 128   Place 1 Application into both eyes At Bedtime   Last time this was given:  1 g on 3/25/2018  9:34 PM                                   TYLENOL 500 MG tablet   Take 500-1,000 mg by mouth every 6 hours as needed   Last time this was given:  500 mg on 3/23/2018 11:41 AM   Generic drug:   acetaminophen                                Vitamin D-3 1000 UNITS Caps   Take 1 capsule by mouth daily                                warfarin 2.5 MG tablet   Commonly known as:  COUMADIN   Take 2.5mg (1 tablet)  MTWTFSS or as directed by INR Clinic.   Start taking on:  3/27/2018

## 2018-03-23 NOTE — H&P
[facility]    Hospitalist History and Physical    Name: Maia Miranda    MRN: [id number]  YOB: 1932    Age: 85 year old  Date of Admission:  3/23/2018  Date of Service (when I saw the patient): 03/23/18    Assessment & Plan   Maia Miranda is a 85 year old female with a PMH significant for cognitive impairment, hypertension, hyperlipidemia, atrial fibrillation anticoagulated on Coumadin, chronic urinary retention of unknown etiology on medical UTI prevention managed medically who presents for evaluation of bright red blood per rectum.     Workup in the ED reveals: Temp 97.8, heart rate 61, blood pressure 137/58, respiratory rate 16, SPO2 98% on room air.  BMP notable for BUN of 34 and creatinine 1.33 (creatinine baseline is 1.1-1.2), GFR 38, otherwise unremarkable.  CBC with differential demonstrates leukocytosis of 11.4 with absolute neutrophilia of 10.1, hemoglobin 12.5, platelet count 163, otherwise unremarkable.  UA unremarkable for infection.  Lactic acid 0.9. CT of the abdomen and pelvis without contrast showed probable diffuse left colonic wall thickening suggestive of nonspecific colitis, though it was noted to be a difficult imaging read due to lack of IV and enteric contrast.     1. Bright red blood per rectum: At this point, suspect diverticular bleed with intermittent episodes of localized left lower quadrant cramping followed by passage of bright red blood in the setting of an elderly lady on anticoagulation.  BUN and creatinine mildly elevated, but suspect is related to mild dehydration.  She has a history of sigmoid diverticulosis noted on colonoscopy in 2013.  CT imaging of the abdomen and pelvis without contrast demonstrates prominent fecal debris in the cecum, raising concern that this may be the precipitant tearing in the sigmoid diverticulum.  Constipation and lower GI bleeding likely causing nonspecific colitis suggested on CT imaging.  It was suggested by GI  following her most recent colonoscopy that she take Metamucil once daily indefinitely, but it does not appear that she is on this medication anymore.  -Hold PTA Coumadin  -Obtain orthostatics  -Supportive IV fluids at 100 cc/hr overnight  -Follow serial hemoglobins every 6 hours ×2 and then tomorrow morning with CBC  -Strict I's and O's  -Clear liquids tonight, n.p.o. at midnight in the event that she would have ongoing bleeding requiring GI consult  -Consider GI consult in a.m. if bleeding does not stop or significantly slow  -Consider restarting daily Metamucil +/- additional stool softener on discharge    2.  Acute renal insufficiency with azotemia: Creatinine 1.33 with BUN 34, suspect related to dehydration and GI volume loss overnight.  The patient is rather adamant that she drinks a large amount of water every day, but she appears mildly dry on clinical exam.  -Supportive IV fluids overnight at 100 cc/h  -Recheck BMP tomorrow morning    3. Paroxysmal atrial fibrillation: CHADSVASC score of 7 (age, gender, h/o hypertension, h/o PE, h/o carotid artery disease).  Currently has INR of 2.44 and has been taking Coumadin 2.5 mg daily with frequent INR checks.  On rate controlling metoprolol.  Had been experiencing palpitations last month so metoprolol dose was increased, and since that time she has not been having palpitations.  -Hold coumadin per #1 and allow INR to drift down.  -Monitor on telemetry  -Continue metoprolol succinate 100 mg daily with parameters    4. History of hiatal hernia, GERD, reported history of chronic duodenal ulcer, nonbleeding in 1974: Not on PPI therapy currently. Denies heartburn, epigastric pain, nausea, vomiting, hemoptysis, or melena. Most recent EGD in 2007. demonstrated mildly erythematous mucosa with no bleeding in the prepyloric stomach, o/w normal esophagus and examined duodenum.    5. Hypertension: Continue lisinopril 10 mg daily and metoprolol succinate 100 mg daily with  parameters.    6. Mild cognitive impairment with memory loss: Documented in family practice notes, and the patient is aware of her deficits as well.  She has trouble with word finding and mild memory impairment.  She still lives independently in a condo and can ambulate without an assistive device.  She reports she has had 1-2 falls in the past year and follows closely with her primary care clinician, has good support from her adult children that live nearby.      7. Chronic urinary retention of unknown etiology: Status post InterStim placement in 4/18/2017.  She straight caths 4 times daily.  She is on a medical regimen per her urologist for UTI prevention.  -Continue PTA regimen of calcium supplementation and methenamine hippurate  -Can resume Premarin cream twice weekly when she goes home  -Continue straight cathing 4 times daily    Remote history of pulmonary embolism in 1971: Therapeutic on warfarin on presentation. She denies any chest pain, shortness of breath, or pleuritic discomfort. She is hemodynamically stable currently.  -Holding PTA coumadin per #1    Pain Assessment:  Current Pain Score 3/23/2018   Patient currently in pain? -   Pain score (0-10) 10   Pain location -   - Maia is experiencing pain due to LLQ cramping from likely lower GI bleed. Pain management was discussed and the plan was created in a collaborative fashion.  Maia's response to the current recommendations: engaged  compliant  - Plan: Acetaminophen available PRN, will avoid narcotics to prevent further constipation, avoid NSAIDs due to renal insufficiency    Social/Living Situation: Lives independently in a condo with her cat.  Ambulates independently at baseline.  Has multiple adult children that live nearby and are supportive.  DVT Prophylaxis: Mechanical w/ PCDs while in bed  Code Status: DNR / DNI  Disposition: Expected discharge in 1-2 days once bleeding has stopped or significantly slowed, hemoglobin stabilized.    Primary  Care Physician   Dian Frias    Chief Complaint   Rectal bleeding    History is obtained from the patient.  She is a fairly accurate historian, though she does have some mild cognitive impairment with word finding and mild memory deficits.    History of Present Illness   Maia Miranda is a 85 year old female with a PMH significant for mild cognitive impairment, hypertension, hyperlipidemia, atrial fibrillation anticoagulated on Coumadin, history of urinary retention who presents for evaluation of right red blood per rectum.    Patient reports that last evening around 8 or 9 PM she developed acute onset of left lower quadrant cramping-like pain.  She felt like she needed to defecate, so she went to the bathroom and proceeded to pass bright red blood with no stool.  The pain was relieved after she passed the bright red blood.  She notes that the blood filled the toilet bowl.  Similar episodes to this this continued to intermittently occur throughout the evening, though with less blood.  He denies any stool mixed with the blood.  Denies hemoptysis, hematemesis, epigastric abdominal pain, or black or tarry stools.  Her last noted stool was yesterday morning it was formed and brown.  She denied any recent nausea, vomiting, diarrhea.  Denies fever or chills.  She denies recent wheezing or shortness of breath.  She reports having a cold in the past couple of days with sore throat, nasal congestion, and a nonproductive cough. Her beta-blocker was recently increased for increased palpitations, and since that medication change she denies palpitations in the past couple of days.  She denies any recent chest pain.  She took all of her morning medications today.  Her last dose of Coumadin was last evening.  The patient reports she notified her daughter, who lives close by, of her symptoms, who accompanied her to come to the emergency department today for further evaluation.    Of note, the patient had an EGD in  7/2013 demonstrating a normal esophagus, patchy mildly erythematous mucosa with no bleeding in the prepyloric stomach, and normal visualized duodenum.  Most recent colonoscopy in 6/2011 demonstrated diverticulosis of the sigmoid colon and nonbleeding internal hemorrhoids.  She was instructed to use 1 tablet of daily Metamucil indefinitely, but it is no longer on her PTA medication list.  Her listed medical history also states that she had a chronic nonbleeding duodenal ulcer in the 1970s, though there is no recent report of this in notes on chart review.    Workup in the ED reveals: Temp 97.8, heart rate 61, blood pressure 137/58, respiratory rate 16, SPO2 98% on room air.  B MP notable for BUN of 34 and creatinine 1.33 (creatinine baseline is 1.1-1.2), GFR 38, otherwise unremarkable.  CBC with differential demonstrates leukocytosis of 11.4 with absolute neutrophilia of 10.1, hemoglobin 12.5, platelet count 163, otherwise unremarkable.  Lactic acid 0.9.  UA unremarkable for infection.  CT of the abdomen and pelvis without contrast showed probable diffuse left colonic wall thickening suggestive of nonspecific colitis, though it was noted to be a difficult imaging read due to lack of IV and enteric contrast.  Patient received 1 L IV normal saline bolus in the ED and observation stay was requested by Dr. Irwin.    Past Medical History    Past Medical History:   Diagnosis Date     Amnestic MCI (mild cognitive impairment with memory loss) 2014     Chronic duodenal ulcer without mention of hemorrhage, perforation, or obstruction 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 Warner, [facility] yearly     Esophageal reflux 2001    Abstracted 06/10/02     Essential and other specified forms of tremor 2004    resting tremor     Generalized osteoarthrosis, unspecified site     Hands     Hiatal hernia     diagnosed late 1990s [place], [place]     History of  pulmonary embolism 1971    no source found     LACTOSE INTOLERANCE      Lumbago     sees Kodi Guidry     Mitral valve regurgitation      Occlusion and stenosis of carotid artery without mention of cerebral infarction 2003    CAROTID US NORMAL, bruit in neck     Osteoporosis, unspecified 2003    T = -2.66     Paroxysmal atrial fibrillation (H) 11/15/2013     Unspecified essential hypertension      Unspecified tinnitus 2005    mild hearing loss, saw ENT     Past Surgical History   Past Surgical History:   Procedure Laterality Date     C NONSPECIFIC PROCEDURE      D&C x 2 in 1957 & 1962 -- Abstracted 06/10/02     C NONSPECIFIC PROCEDURE      Left breast biopsy x 2 in 1988 & 1989 -- Abstracted 06/10/02     C NONSPECIFIC PROCEDURE  1958    Influenza resulting in hospitalization -- Abstracted 06/10/02     C NONSPECIFIC PROCEDURE  1971    10 day hospitalization from bilateral pulmonary enboli -- Abstracted 06/10/02     C NONSPECIFIC PROCEDURE  1/03    colonoscopy  negative     CATARACT IOL, RT/LT       corneal scraping       CYSTOSCOPY, BIOPSY BLADDER, COMBINED N/A 7/25/2016    Procedure: COMBINED CYSTOSCOPY, BIOPSY BLADDER;  Surgeon: Bernadine Maria MD;  Location: [facility] OR     ESOPHAGOSCOPY, GASTROSCOPY, DUODENOSCOPY (EGD), COMBINED  7/8/2013    Procedure: COMBINED ESOPHAGOSCOPY, GASTROSCOPY, DUODENOSCOPY (EGD);   ESOPHAGOSCOPY, GASTROSCOPY, DUODENOSCOPY (EGD)   ;  Surgeon: Shawn Soto MD;  Location: [facility] GI     IMPLANT STIMULATOR SACRAL NERVE STAGE ONE N/A 4/4/2017    Procedure: IMPLANT STIMULATOR SACRAL NERVE STAGE ONE;  Surgeon: Kb Tracy MD;  Location: [facility] OR     IMPLANT STIMULATOR SACRAL NERVE STAGE TWO N/A 4/18/2017    Procedure: IMPLANT STIMULATOR SACRAL NERVE STAGE TWO;  Stage Two Interstim  ;  Surgeon: Kb Tracy MD;  Location: [facility] OR     interstim placement         Prior to Admission Medications   Prior to Admission Medications   Prescriptions Last Dose Informant Patient Reported?  Taking?   Catheters (GENTLECATH URINARY CATHETER) MISC   No No   Si catheter 4 times daily   Cholecalciferol (VITAMIN D-3) 1000 UNITS CAPS 3/22/2018  Yes Yes   Sig: Take 1 capsule by mouth daily   Omega-3 Fatty Acids (OMEGA-3 FISH OIL PO) 3/22/2018  Yes Yes   Sig: Take 1 g by mouth 2 times daily (with meals)   acetaminophen (TYLENOL) 500 MG tablet   Yes Yes   Sig: Take 500-1,000 mg by mouth every 6 hours as needed   ascorbic acid (VITAMIN C) 1000 MG TABS 3/22/2018  No Yes   Sig: Take 1 tablet (1,000 mg) by mouth 2 times daily   calcium citrate-vitamin D (CITRACAL) 315-250 MG-UNIT TABS per tablet 3/22/2018  Yes Yes   Sig: Take 1 tablet by mouth daily   carboxymethylcellulose (CELLUVISC/REFRESH LIQUIGEL) 1 % ophthalmic solution 3/22/2018  Yes Yes   Sig: Place 1 drop into both eyes every morning As needed   conjugated estrogens (PREMARIN) vaginal cream 3/20/2018  Yes Yes   Sig: Place vaginally twice a week On Tuesday and Friday. Uses a pea sized amount   fluocinonide (LIDEX) 0.05 % cream   No Yes   Sig: Apply topically 2 times daily as needed   lisinopril (PRINIVIL/ZESTRIL) 10 MG tablet 3/22/2018  No Yes   Sig: Take 1 tablet (10 mg) by mouth every morning   methenamine hippurate (HIPREX) 1 G TABS tablet 3/22/2018  Yes Yes   Sig: Take 1 g by mouth 2 times daily   metoprolol succinate (TOPROL-XL) 100 MG 24 hr tablet 3/22/2018  No Yes   Sig: Take 1 tablet (100 mg) by mouth daily   order for DME   No No   Sig: Trilok ankle brace   polyethylene glycol (MIRALAX/GLYCOLAX) packet   Yes Yes   Sig: Take 1 packet by mouth daily as needed    sodium chloride (PETER 128) 5 % ophthalmic ointment 3/22/2018  Yes Yes   Sig: Place 1 Application into both eyes At Bedtime   warfarin (COUMADIN) 2.5 MG tablet 3/22/2018  No Yes   Sig: Take 2.5mg (1 tablet)  MTWTFSS or as directed by INR Clinic.      Facility-Administered Medications: None     Allergies   Allergies   Allergen Reactions     Codeine Nausea and Vomiting     Meperidine Nausea  and Vomiting     Morphine Nausea and Vomiting     vomiting     Penicillins Hives     hives     Sulfa Drugs      Vomiting       Epinephrine Palpitations       Social History   Social History   Substance Use Topics     Smoking status: Never Smoker     Smokeless tobacco: Never Used     Alcohol use No     Social History     Social History Narrative    .Living situation (location, living with others?): Lives alone in a condo. Lives 2 miles from daughter and son.     Activities of daily living (e.g., dressing, eating, walking): Independent        Instrumental activities of daily living (e.g., finance management, housekeeping, meal planning/ prep): Independent        Advance Care plan: has a living will and POLST        Driving: Yes    Medication: manages by self    Meaningful Activities (e.g., hobbies, work): Reads, has coffee out with girlfriends every week, has a humInviBoxbird feeder, likes to watch baseball        Support: Help with cleaning every few weeks    Community Resources Used/ Interested in: None at this time   She is a retired medical lab technician.  She lives alone in a condo with her cat.  She has family that lives nearby and helps her when she needs it.  She is independent with mobility without assistive device at baseline.  She is a lifelong non-smoker and does not drink alcohol.    Family History   Family history reviewed with patient and is noncontributory.    Review of Systems   A Comprehensive greater than 10 system review of systems was carried out.  Pertinent positives and negatives are noted above.  Otherwise negative for contributory information.    Physical Exam   Temp: 96.9  F (36.1  C) Temp src: Oral BP: 135/50   Heart Rate: 68 Resp: 18 SpO2: 98 % O2 Device: None (Room air)    Vital Signs with Ranges  Temp:  [96.9  F (36.1  C)-97.8  F (36.6  C)] 96.9  F (36.1  C)  Heart Rate:  [60-74] 68  Resp:  [12-18] 18  BP: (106-137)/() 135/50  SpO2:  [96 %-98 %] 98 %  156 lbs 0 oz    GEN:  Alert,  oriented x 3, appears comfortable, no overt distress  HEENT:  Normocephalic/atraumatic, no scleral icterus, no nasal discharge, mouth moist.  CV:  Regular rate and rhythm, no murmur or JVD.  S1 + S2 noted, no S3 or S4.  LUNGS:  Clear to auscultation bilaterally without rales/rhonchi/wheezing/retractions.  Symmetric chest rise on inhalation noted.  ABD:  Active bowel sounds, soft, mild to moderately tender over LLQ, o/w non-tender and nondistended.  No rebound/guarding/rigidity.  EXT:  No edema.  No cyanosis.  No acute joint synovitis noted.  SKIN:  Dry to touch, no exanthems noted in the visualized areas.  NEURO:  Symmetric muscle strength, sensation to touch grossly intact.  Coordination symmetric on general exam.  No new focal deficits appreciated.    Data   Data reviewed today:  I personally reviewed the abdominal CT image(s) showing suggestion of colitis involving left colon from splenic flexture to sigmoid, prominent amount of stool at cecum.    Results for orders placed or performed during the hospital encounter of 03/23/18 (from the past 48 hour(s))   CBC + differential   Result Value Ref Range    WBC 11.4 (H) 4.0 - 11.0 10e9/L    RBC Count 3.89 3.8 - 5.2 10e12/L    Hemoglobin 12.5 11.7 - 15.7 g/dL    Hematocrit 38.3 35.0 - 47.0 %    MCV 99 78 - 100 fl    MCH 32.1 26.5 - 33.0 pg    MCHC 32.6 31.5 - 36.5 g/dL    RDW 13.0 10.0 - 15.0 %    Platelet Count 163 150 - 450 10e9/L    Diff Method Automated Method     % Neutrophils 89.0 %    % Lymphocytes 3.8 %    % Monocytes 6.2 %    % Eosinophils 0.3 %    % Basophils 0.3 %    % Immature Granulocytes 0.4 %    Nucleated RBCs 0 0 /100    Absolute Neutrophil 10.1 (H) 1.6 - 8.3 10e9/L    Absolute Lymphocytes 0.4 (L) 0.8 - 5.3 10e9/L    Absolute Monocytes 0.7 0.0 - 1.3 10e9/L    Absolute Eosinophils 0.0 0.0 - 0.7 10e9/L    Absolute Basophils 0.0 0.0 - 0.2 10e9/L    Abs Immature Granulocytes 0.0 0 - 0.4 10e9/L    Absolute Nucleated RBC 0.0    ABO/Rh type and screen   Result  Value Ref Range    ABO O     RH(D) Pos     Antibody Screen Neg     Test Valid Only At [facility]        Specimen Expires 03/26/2018    INR   Result Value Ref Range    INR 2.44 (H) 0.86 - 1.14   Basic metabolic panel   Result Value Ref Range    Sodium 139 133 - 144 mmol/L    Potassium 4.0 3.4 - 5.3 mmol/L    Chloride 108 94 - 109 mmol/L    Carbon Dioxide 24 20 - 32 mmol/L    Anion Gap 7 3 - 14 mmol/L    Glucose 117 (H) 70 - 99 mg/dL    Urea Nitrogen 34 (H) 7 - 30 mg/dL    Creatinine 1.33 (H) 0.52 - 1.04 mg/dL    GFR Estimate 38 (L) >60 mL/min/1.7m2    GFR Estimate If Black 46 (L) >60 mL/min/1.7m2    Calcium 9.2 8.5 - 10.1 mg/dL   Lactic acid   Result Value Ref Range    Lactic Acid 0.9 0.4 - 2.0 mmol/L   EKG 12 lead   Result Value Ref Range    Interpretation ECG Click View Image link to view waveform and result    CT Abdomen Pelvis w/o Contrast    Narrative    CT ABDOMEN AND PELVIS WITHOUT CONTRAST  3/23/2018 9:54 AM     HISTORY: Lower abdomen pain, rectal bleeding.     Radiation dose for this scan was reduced using automated exposure  control, adjustment of the mA and/or kV according to patient size, or  iterative reconstruction technique.    FINDINGS:  There are small right renal probable cysts, the largest  measuring 1.5 cm. No renal stone or hydronephrosis is demonstrated. No  ureteral stone is demonstrated. Scattered aortic and iliac artery  calcifications are present. Although there is no contrast within the  bowel, diffuse left colonic wall thickening is suggested from the  splenic flexure to the sigmoid colon. Prominent fecal debris is  present within the cecum. There is a normal gas-filled appendix. No  free peritoneal fluid or air is demonstrated. The liver and spleen are  grossly unremarkable for the unenhanced technique.      Impression    IMPRESSION: Although not well seen due to lack of IV and enteric  contrast, there is probable diffuse left colonic wall thickening  suggesting nonspecific  colitis.   UA with Microscopic   Result Value Ref Range    Color Urine Straw     Appearance Urine Clear     Glucose Urine Negative NEG^Negative mg/dL    Bilirubin Urine Negative NEG^Negative    Ketones Urine Negative NEG^Negative mg/dL    Specific Gravity Urine 1.009 1.003 - 1.035    Blood Urine Negative NEG^Negative    pH Urine 5.0 5.0 - 7.0 pH    Protein Albumin Urine Negative NEG^Negative mg/dL    Urobilinogen mg/dL 0.0 0.0 - 2.0 mg/dL    Nitrite Urine Negative NEG^Negative    Leukocyte Esterase Urine Negative NEG^Negative    Source Catheterized Urine     WBC Urine 0 0 - 5 /HPF    RBC Urine 1 0 - 2 /HPF    Mucous Urine Present (A) NEG^Negative /LPF   Urine Culture Aerobic Bacterial   Result Value Ref Range    Specimen Description Catheterized Urine     Special Requests Specimen received in preservative     Culture Micro PENDING        Lorri Weaver PA-C

## 2018-03-23 NOTE — PHARMACY-ADMISSION MEDICATION HISTORY
Admission medication history interview status for this patient is complete. See [facility] admission navigator for allergy information, prior to admission medications and immunization status.     Medication history interview source(s):Patient  Medication history resources (including written lists, pill bottles, clinic record): med list    Changes made to PTA medication list:  Added: none  Deleted: none  Changed: vitamin C to bid, methenamine from daily to bid  For patients on insulin therapy: n/a     Actions taken by pharmacist (provider contacted, etc):None     Additional medication history information:None    Medication reconciliation/reorder completed by provider prior to medication history? No    Prior to Admission medications    Medication Sig Last Dose Taking? Auth Provider   methenamine hippurate (HIPREX) 1 G TABS tablet Take 1 g by mouth 2 times daily 3/22/2018 Yes Reported, Patient   metoprolol succinate (TOPROL-XL) 100 MG 24 hr tablet Take 1 tablet (100 mg) by mouth daily 3/22/2018 Yes Dian Frias MD   fluocinonide (LIDEX) 0.05 % cream Apply topically 2 times daily as needed  Yes Dian Frias MD   calcium citrate-vitamin D (CITRACAL) 315-250 MG-UNIT TABS per tablet Take 1 tablet by mouth daily 3/22/2018 Yes Reported, Patient   Cholecalciferol (VITAMIN D-3) 1000 UNITS CAPS Take 1 capsule by mouth daily 3/22/2018 Yes Reported, Patient   lisinopril (PRINIVIL/ZESTRIL) 10 MG tablet Take 1 tablet (10 mg) by mouth every morning 3/22/2018 Yes Dian Frias MD   warfarin (COUMADIN) 2.5 MG tablet Take 2.5mg (1 tablet)  MTWTFSS or as directed by INR Clinic. 3/22/2018 Yes Dian Frias MD   polyethylene glycol (MIRALAX/GLYCOLAX) packet Take 1 packet by mouth daily as needed   Yes Reported, Patient   conjugated estrogens (PREMARIN) vaginal cream Place vaginally twice a week On Tuesday and Friday. Uses a pea sized amount 3/20/2018 Yes Reported, Patient   acetaminophen (TYLENOL) 500  MG tablet Take 500-1,000 mg by mouth every 6 hours as needed  Yes Reported, Patient   ascorbic acid (VITAMIN C) 1000 MG TABS Take 1 tablet (1,000 mg) by mouth 2 times daily 3/22/2018 Yes Katja Brunson PA-C   Omega-3 Fatty Acids (OMEGA-3 FISH OIL PO) Take 1 g by mouth 2 times daily (with meals) 3/22/2018 Yes Unknown, Entered By History   sodium chloride (PETER 128) 5 % ophthalmic ointment Place 1 Application into both eyes At Bedtime 3/22/2018 Yes Unknown, Entered By History   carboxymethylcellulose (CELLUVISC/REFRESH LIQUIGEL) 1 % ophthalmic solution Place 1 drop into both eyes every morning as needed 3/22/2018 Yes Unknown, Entered By History   order for DME Krissy Baldwin, Tim Caballero DPM   Catheters (GENTLECATH URINARY CATHETER) MISC 1 catheter 4 times daily   Linda Stearns MD

## 2018-03-23 NOTE — ED NOTES
Pt reports that she has been experiencing rectal bleeding since last night. Pt also c/o lower abdominal, pelvic pain. Pt states that she thinks she is bleeding less today than yesterday. Pt on coumadin for afib. Pt does not think this has happened before. ABC intact. A/O x4.

## 2018-03-23 NOTE — PLAN OF CARE
Problem: Patient Care Overview  Goal: Plan of Care/Patient Progress Review  Outcome: No Change  PRIMARY DIAGNOSIS: GI BLEED    OUTPATIENT/OBSERVATION GOALS TO BE MET BEFORE DISCHARGE  Orthostatic performed: Yes:          Lying Orthostatic BP: 115/45         Sitting Orthostatic BP: 119/41   1.       Standing Orthostatic BP: 104/42     2. Stable Hgb Yes. Next Hgb is at 1500.  Recent Labs   Lab Test  03/23/18   0859  10/03/17   1520  03/22/17   1547   HGB  12.5  11.8  12.7       3. Resolved or declined bleeding episodes: No,  with a small amount of bleeding Last episode: 1313    4. Appropriate testing complete: No    5. Cleared for discharge by consultants (if involved): N/A    6. Safe discharge environment identified: Yes    Pt was up with steady gait, SBA to bathroom.  She had a small amount of blood per rectum, there was no stool present.  Only the appearance of bright red blood and a thin clear/serous output, about 75cc.  Pt states that the pain to LUQ is intermittent that comes and goes.  Tolerates beef broth so far.    Discharge Planner Nurse   Safe discharge environment identified: Yes  Barriers to discharge: No       Entered by: Laura Rader 03/23/2018 2:17 PM     Please review provider order for any additional goals.   Nurse to notify provider when observation goals have been met and patient is ready for discharge.

## 2018-03-23 NOTE — IP AVS SNAPSHOT
[facility] Observation Department    [address]    [facility] [address]    Phone:  [phone]                                       After Visit Summary   3/23/2018    Maia Miranda    MRN: [id number]           After Visit Summary Signature Page     I have received my discharge instructions, and my questions have been answered. I have discussed any challenges I see with this plan with the nurse or doctor.    ..........................................................................................................................................  Patient/Patient Representative Signature      ..........................................................................................................................................  Patient Representative Print Name and Relationship to Patient    ..................................................               ................................................  Date                                            Time    ..........................................................................................................................................  Reviewed by Signature/Title    ...................................................              ..............................................  Date                                                            Time

## 2018-03-24 LAB
ANION GAP SERPL CALCULATED.3IONS-SCNC: 7 MMOL/L (ref 3–14)
BACTERIA SPEC CULT: NO GROWTH
BASOPHILS # BLD AUTO: 0 10E9/L (ref 0–0.2)
BASOPHILS NFR BLD AUTO: 0.5 %
BUN SERPL-MCNC: 18 MG/DL (ref 7–30)
C COLI+JEJUNI+LARI FUSA STL QL NAA+PROBE: NOT DETECTED
C DIFF TOX B STL QL: NEGATIVE
CALCIUM SERPL-MCNC: 7.8 MG/DL (ref 8.5–10.1)
CHLORIDE SERPL-SCNC: 114 MMOL/L (ref 94–109)
CO2 SERPL-SCNC: 21 MMOL/L (ref 20–32)
CREAT SERPL-MCNC: 1.03 MG/DL (ref 0.52–1.04)
DIFFERENTIAL METHOD BLD: ABNORMAL
EC STX1 GENE STL QL NAA+PROBE: NOT DETECTED
EC STX2 GENE STL QL NAA+PROBE: NOT DETECTED
ENTERIC PATHOGEN COMMENT: NORMAL
EOSINOPHIL # BLD AUTO: 0.1 10E9/L (ref 0–0.7)
EOSINOPHIL NFR BLD AUTO: 0.8 %
ERYTHROCYTE [DISTWIDTH] IN BLOOD BY AUTOMATED COUNT: 13.2 % (ref 10–15)
GFR SERPL CREATININE-BSD FRML MDRD: 51 ML/MIN/1.7M2
GLUCOSE SERPL-MCNC: 103 MG/DL (ref 70–99)
HCT VFR BLD AUTO: 32.9 % (ref 35–47)
HGB BLD-MCNC: 10.7 G/DL (ref 11.7–15.7)
HGB BLD-MCNC: 11.6 G/DL (ref 11.7–15.7)
HGB BLD-MCNC: 11.7 G/DL (ref 11.7–15.7)
IMM GRANULOCYTES # BLD: 0 10E9/L (ref 0–0.4)
IMM GRANULOCYTES NFR BLD: 0.3 %
INR PPP: 2.77 (ref 0.86–1.14)
LYMPHOCYTES # BLD AUTO: 0.9 10E9/L (ref 0.8–5.3)
LYMPHOCYTES NFR BLD AUTO: 9.9 %
Lab: NORMAL
MCH RBC QN AUTO: 32.3 PG (ref 26.5–33)
MCHC RBC AUTO-ENTMCNC: 32.5 G/DL (ref 31.5–36.5)
MCV RBC AUTO: 99 FL (ref 78–100)
MONOCYTES # BLD AUTO: 0.8 10E9/L (ref 0–1.3)
MONOCYTES NFR BLD AUTO: 9.4 %
NEUTROPHILS # BLD AUTO: 6.9 10E9/L (ref 1.6–8.3)
NEUTROPHILS NFR BLD AUTO: 79.1 %
NOROV GI+II ORF1-ORF2 JNC STL QL NAA+PR: NOT DETECTED
NRBC # BLD AUTO: 0 10*3/UL
NRBC BLD AUTO-RTO: 0 /100
PLATELET # BLD AUTO: 107 10E9/L (ref 150–450)
POTASSIUM SERPL-SCNC: 3.7 MMOL/L (ref 3.4–5.3)
RBC # BLD AUTO: 3.31 10E12/L (ref 3.8–5.2)
RVA NSP5 STL QL NAA+PROBE: NOT DETECTED
SALMONELLA SP RPOD STL QL NAA+PROBE: NOT DETECTED
SHIGELLA SP+EIEC IPAH STL QL NAA+PROBE: NOT DETECTED
SODIUM SERPL-SCNC: 142 MMOL/L (ref 133–144)
SPECIMEN SOURCE: NORMAL
SPECIMEN SOURCE: NORMAL
V CHOL+PARA RFBL+TRKH+TNAA STL QL NAA+PR: NOT DETECTED
WBC # BLD AUTO: 8.7 10E9/L (ref 4–11)
Y ENTERO RECN STL QL NAA+PROBE: NOT DETECTED

## 2018-03-24 PROCEDURE — 36415 COLL VENOUS BLD VENIPUNCTURE: CPT | Performed by: PHYSICIAN ASSISTANT

## 2018-03-24 PROCEDURE — 96361 HYDRATE IV INFUSION ADD-ON: CPT

## 2018-03-24 PROCEDURE — 85018 HEMOGLOBIN: CPT | Performed by: INTERNAL MEDICINE

## 2018-03-24 PROCEDURE — 25000132 ZZH RX MED GY IP 250 OP 250 PS 637: Performed by: PHYSICIAN ASSISTANT

## 2018-03-24 PROCEDURE — 99225 ZZC SUBSEQUENT OBSERVATION CARE,LEVEL II: CPT | Performed by: PHYSICIAN ASSISTANT

## 2018-03-24 PROCEDURE — 80048 BASIC METABOLIC PNL TOTAL CA: CPT | Performed by: PHYSICIAN ASSISTANT

## 2018-03-24 PROCEDURE — 36415 COLL VENOUS BLD VENIPUNCTURE: CPT | Performed by: INTERNAL MEDICINE

## 2018-03-24 PROCEDURE — 99207 ZZC CDG-MDM COMPONENT: MEETS LOW - DOWN CODED: CPT | Performed by: PHYSICIAN ASSISTANT

## 2018-03-24 PROCEDURE — 85018 HEMOGLOBIN: CPT | Mod: 91 | Performed by: PHYSICIAN ASSISTANT

## 2018-03-24 PROCEDURE — 25800025 ZZH RX 258: Performed by: PHYSICIAN ASSISTANT

## 2018-03-24 PROCEDURE — 85025 COMPLETE CBC W/AUTO DIFF WBC: CPT | Performed by: PHYSICIAN ASSISTANT

## 2018-03-24 PROCEDURE — G0378 HOSPITAL OBSERVATION PER HR: HCPCS

## 2018-03-24 PROCEDURE — 87493 C DIFF AMPLIFIED PROBE: CPT | Performed by: PHYSICIAN ASSISTANT

## 2018-03-24 PROCEDURE — 85610 PROTHROMBIN TIME: CPT | Performed by: PHYSICIAN ASSISTANT

## 2018-03-24 PROCEDURE — 27210995 ZZH RX 272: Performed by: PHYSICIAN ASSISTANT

## 2018-03-24 PROCEDURE — 25000132 ZZH RX MED GY IP 250 OP 250 PS 637: Performed by: INTERNAL MEDICINE

## 2018-03-24 PROCEDURE — 87506 IADNA-DNA/RNA PROBE TQ 6-11: CPT | Performed by: PHYSICIAN ASSISTANT

## 2018-03-24 RX ORDER — SODIUM CHLORIDE 450 MG/100ML
INJECTION, SOLUTION INTRAVENOUS CONTINUOUS
Status: DISCONTINUED | OUTPATIENT
Start: 2018-03-24 | End: 2018-03-25

## 2018-03-24 RX ADMIN — METHENAMINE HIPPURATE 1 G: 1 TABLET ORAL at 10:02

## 2018-03-24 RX ADMIN — OYSTER SHELL CALCIUM WITH VITAMIN D 1 TABLET: 500; 200 TABLET, FILM COATED ORAL at 08:58

## 2018-03-24 RX ADMIN — SODIUM CHLORIDE: 4.5 INJECTION, SOLUTION INTRAVENOUS at 20:48

## 2018-03-24 RX ADMIN — Medication 1 G: at 22:48

## 2018-03-24 RX ADMIN — SODIUM CHLORIDE: 4.5 INJECTION, SOLUTION INTRAVENOUS at 11:14

## 2018-03-24 RX ADMIN — SODIUM CHLORIDE 1000 ML: 4.5 INJECTION, SOLUTION INTRAVENOUS at 10:03

## 2018-03-24 RX ADMIN — METOPROLOL SUCCINATE 100 MG: 100 TABLET, EXTENDED RELEASE ORAL at 22:54

## 2018-03-24 NOTE — PLAN OF CARE
Problem: Patient Care Overview  Goal: Plan of Care/Patient Progress Review  PRIMARY DIAGNOSIS: GI BLEED     OUTPATIENT/OBSERVATION GOALS TO BE MET BEFORE DISCHARGE  1. Orthostatic performed: Yes:    2.       Lying Orthostatic BP: 115/45   3.       Sitting Orthostatic BP: 119/41   1.       Standing Orthostatic BP: 104/42      2. Stable Hgb Yes.         Recent Labs   Lab Test  03/23/18   2205  03/23/18   1513  03/23/18   0859   HGB  11.2*  11.1*  12.5         3. Resolved or declined bleeding episodes: Yes Last episode: Smear 2247 3/24     4. Appropriate testing complete: Yes     5. Cleared for discharge by consultants (if involved): N/A     6. Safe discharge environment identified: Yes     Discharge Planner Nurse   Safe discharge environment identified: Yes  Barriers to discharge: No       Entered by: Aneta Madden 03/24/2018 4:22 AM  Please review provider order for any additional goals.   Nurse to notify provider when observation goals have been met and patient is ready for discharge.     Patient last smear at 2245- was a smear. Hgb stable 12.5 ->11.1->11.2. WBC 11.4. Creat 1.33. NS running at 100. NPO except ice chips. Moving SBA. Holding coumadin. Strict I and O. Plan to recheck hgb with 0600 labs, tele, IVF. On tele- SB/SR with 1st degree block with long QC. Patient straight cath's at home and we have been assisting her. Patient does not normally cath at night-398 check overnight for bladder scan. Orthos done on day shift. Diastolic low at 38-39.

## 2018-03-24 NOTE — PLAN OF CARE
Problem: Patient Care Overview  Goal: Plan of Care/Patient Progress Review  Outcome: No Change  Problem: Patient Care Overview  Goal: Plan of Care/Patient Progress Review  Outcome: No Change  PRIMARY DIAGNOSIS: GI BLEED     OUTPATIENT/OBSERVATION GOALS TO BE MET BEFORE DISCHARGE  1. Orthostatic performed: Yes:    2.       Lying Orthostatic BP: 115/45   3.       Sitting Orthostatic BP: 119/41   1.       Standing Orthostatic BP: 104/42      2. Stable Hgb Yes. Next Hgb is at 1500.        Recent Labs   Lab Test  03/23/18   0859  10/03/17   1520  03/22/17   1547   HGB  12.5  11.8  12.7         3. Resolved or declined bleeding episodes: No,  with a small amount of bleeding Last episode: 1313     4. Appropriate testing complete: No     5. Cleared for discharge by consultants (if involved): N/A     6. Safe discharge environment identified: Yes     Pt was up with steady gait, SBA to bathroom.  She had a small amount of blood per rectum, there was no stool present.  Only the appearance of bright red blood and a thin clear/serous output, about 75cc.  Pt states that the pain to LUQ is intermittent that comes and goes.  Tolerates beef broth so far.  Feels hungry.  NPO at [facility] for possible procedure.  Pt st caths at baseline, cath done at 1830 for 900cc, clear yellow urine.     Discharge Planner Nurse   Safe discharge environment identified: Yes  Barriers to discharge: No       Entered by: Laura Rader 03/23/2018 2:17 PM  Please review provider order for any additional goals.   Nurse to notify provider when observation goals have been met and patient is ready for discharge.

## 2018-03-24 NOTE — PLAN OF CARE
Problem: Patient Care Overview  Goal: Plan of Care/Patient Progress Review  Outcome: Improving  PRIMARY DIAGNOSIS: GI BLEED    OUTPATIENT/OBSERVATION GOALS TO BE MET BEFORE DISCHARGE  Orthostatic performed: Yes:          Lying Orthostatic BP: 154/53         Sitting Orthostatic BP: 150/50   1.       Standing Orthostatic BP: 124/50     2. Stable Hgb Yes.   Recent Labs   Lab Test  03/24/18   1706  03/24/18   0937  03/24/18   0600   HGB  11.6*  11.7  10.7*       3. Resolved or declined bleeding episodes: Yes Last episode: small amount of bloody mucous this morning    4. Appropriate testing complete: Yes    5. Cleared for discharge by consultants (if involved): N/A - no GI consult    6. Safe discharge environment identified: Yes    Discharge Planner Nurse   Safe discharge environment identified: Yes  Barriers to discharge: Not once medically cleared          Please review provider order for any additional goals.   Nurse to notify provider when observation goals have been met and patient is ready for discharge.    Pt up w/ sba.  Vitals stable.  Complains of intermittent abdominal pain but declines intervention.  Hgb stable.  Last bm was small bloody mucous and it was earlier this morning, no bm's since.  Pt bladder scanned for >999ml, will straight cath now then again at 2200 per pt home routine.  Likely dc tomorrow if hgb stays stable and stools subside.

## 2018-03-24 NOTE — PLAN OF CARE
Problem: Patient Care Overview  Goal: Discharge Needs Assessment  Outcome: No Change  PRIMARY DIAGNOSIS: GI BLEED    OUTPATIENT/OBSERVATION GOALS TO BE MET BEFORE DISCHARGE  Orthostatic performed: Yes:          Lying Orthostatic BP: 115/45         Sitting Orthostatic BP: 119/41   1.       Standing Orthostatic BP: 104/42     2. Stable Hgb Yes.   Recent Labs   Lab Test  03/23/18   2205  03/23/18   1513  03/23/18   0859   HGB  11.2*  11.1*  12.5       3. Resolved or declined bleeding episodes: No,  with a small amount of bleeding Last episode: 2245    4. Appropriate testing complete: N/A    5. Cleared for discharge by consultants (if involved): N/A, GI consult not yet placed    6. Safe discharge environment identified: Yes    Discharge Planner Nurse   Safe discharge environment identified: Yes  Barriers to discharge: Yes, awaiting lab collection       Entered by: Vani Osborne 03/23/2018 11:02 PM     Please review provider order for any additional goals.   Nurse to notify provider when observation goals have been met and patient is ready for discharge.    VSS on RA. Pt resting in bed between cares, up SBA to bathroom. Denies pain, CP, SOB/SEGOVIA, n/v endorses intermittent abdominal cramping. PIV infusing IVF at 100 ml/hr, tolerating clear liquids and NPO at midnight. Continues to have loose, mucous bloody stools. 2200 hgb 11.2 with 0600 recheck. Uses straight cath at home independently, requires staff assist on unit. Straight cath performed at HS per pt request, 500 ml UO. Tele in place, SR w/1sst AV and BBB. Bed alarms in use, pt not attempting to exit bed. Alert and oriented, able to make needs known. Continue to monitor.

## 2018-03-24 NOTE — PLAN OF CARE
Problem: Patient Care Overview  Goal: Plan of Care/Patient Progress Review  PRIMARY DIAGNOSIS: GI BLEED    OUTPATIENT/OBSERVATION GOALS TO BE MET BEFORE DISCHARGE  Orthostatic performed: Yes:          Lying Orthostatic BP: 115/45         Sitting Orthostatic BP: 119/41   1.       Standing Orthostatic BP: 104/42     2. Stable Hgb Yes.   Recent Labs   Lab Test  03/23/18   2205  03/23/18   1513  03/23/18   0859   HGB  11.2*  11.1*  12.5       3. Resolved or declined bleeding episodes: Yes Last episode: Smear 2247 3/24    4. Appropriate testing complete: Yes    5. Cleared for discharge by consultants (if involved): N/A    6. Safe discharge environment identified: Yes    Discharge Planner Nurse   Safe discharge environment identified: Yes  Barriers to discharge: No       Entered by: Aneta Madden 03/24/2018 4:22 AM     Please review provider order for any additional goals.   Nurse to notify provider when observation goals have been met and patient is ready for discharge.    Patient last smear at 2245- was a smear. Hgb stable 12.5 ->11.1->11.2. WBC 11.4. Creat 1.33. NS running at 100. NPO except ice chips. Moving SBA. Holding coumadin. Strict I and O. Plan to recheck hgb with 0600 labs, tele, IVF. On tele- SB/SR with 1st degree block with long QC. Patient straight cath's at home and we have been assisting her. Orthos done on day shift.

## 2018-03-24 NOTE — PROGRESS NOTES
Problem: Patient Care Overview  Goal: Plan of Care/Patient Progress Review  PRIMARY DIAGNOSIS: GI BLEED      OUTPATIENT/OBSERVATION GOALS TO BE MET BEFORE DISCHARGE  1. Orthostatic performed: Yes:    2.       Lying Orthostatic BP: 127/45  3.       Sitting Orthostatic BP: 119/45                    Standing Orthostatic BP: 97/36       2. Stable Hgb Yes. Morning result Hgb 11.7          3. Resolved or declined bleeding episodes: No, Last episode: Medium amount with morning BM; bright red blood      4. Appropriate testing complete: No      5. Cleared for discharge by consultants (if involved): N/A      6. Safe discharge environment identified: Yes      Discharge Planner Nurse   Safe discharge environment identified: Yes  Barriers to discharge: Yes      Please review provider order for any additional goals.   Nurse to notify provider when observation goals have been met and patient is ready for discharge.      Patient A&Ox4. Bradycardic, positive orthos. Ambulated in [facility], complained of dizziness upon standing. Metoprolol and Lisinopril held due to low BP and HR. Switched to 1/2 NS, bolus of 1,000 mL. Straight cath result 700 mL clear yellow urine. On enteric precautions due to C. Diff pending. Diet changed to clear liquid, tolerating well.  Tele: bradycardia, first degree AV block, tall T waves, HR 50's.

## 2018-03-24 NOTE — PROGRESS NOTES
Patient ambulated in hallway with SBA - c/o dizziness when she first stood up from bed then denied dizziness throughout the walk but did state she felt unsteady.

## 2018-03-24 NOTE — PROGRESS NOTES
Problem: Patient Care Overview  Goal: Plan of Care/Patient Progress Review  PRIMARY DIAGNOSIS: GI BLEED      OUTPATIENT/OBSERVATION GOALS TO BE MET BEFORE DISCHARGE     1. Orthostatic performed: Yes:    Lying Orthostatic BP: 154/53  Sitting Orthostatic BP: 150/50  Standing Orthostatic BP: 124/50    2.   Stable Hgb Yes. Morning result Hgb 11.7  3. Resolved or declined bleeding episodes: No Last episode: Medium amount with morning BM; bright red blood; small amount this afternoon      4. Appropriate testing complete: No      5. Cleared for discharge by consultants (if involved): N/A      6. Safe discharge environment identified: Yes      Discharge Planner Nurse   Safe discharge environment identified: Yes  Barriers to discharge: Yes    VSS, A&Ox4. Patient stable. Stool sample contains small amount of mucus and bright red blood, sent sample to lab. Hgb 11.7. Patient on 1/2 NS, tolerating clear liquid diet well. Educated patient on isolation need. Resting. Tele: bradycardia, first degree AV block, tall T waves, HR 50's.

## 2018-03-25 LAB
ANION GAP SERPL CALCULATED.3IONS-SCNC: 8 MMOL/L (ref 3–14)
BASOPHILS # BLD AUTO: 0 10E9/L (ref 0–0.2)
BASOPHILS NFR BLD AUTO: 0.2 %
BUN SERPL-MCNC: 14 MG/DL (ref 7–30)
CALCIUM SERPL-MCNC: 8.4 MG/DL (ref 8.5–10.1)
CHLORIDE SERPL-SCNC: 112 MMOL/L (ref 94–109)
CO2 SERPL-SCNC: 22 MMOL/L (ref 20–32)
CREAT SERPL-MCNC: 1.06 MG/DL (ref 0.52–1.04)
DIFFERENTIAL METHOD BLD: ABNORMAL
EOSINOPHIL # BLD AUTO: 0.1 10E9/L (ref 0–0.7)
EOSINOPHIL NFR BLD AUTO: 1.3 %
ERYTHROCYTE [DISTWIDTH] IN BLOOD BY AUTOMATED COUNT: 13 % (ref 10–15)
GFR SERPL CREATININE-BSD FRML MDRD: 49 ML/MIN/1.7M2
GLUCOSE SERPL-MCNC: 77 MG/DL (ref 70–99)
HCT VFR BLD AUTO: 34.1 % (ref 35–47)
HGB BLD-MCNC: 10.8 G/DL (ref 11.7–15.7)
IMM GRANULOCYTES # BLD: 0 10E9/L (ref 0–0.4)
IMM GRANULOCYTES NFR BLD: 0.5 %
INR PPP: 2.42 (ref 0.86–1.14)
LYMPHOCYTES # BLD AUTO: 1 10E9/L (ref 0.8–5.3)
LYMPHOCYTES NFR BLD AUTO: 11.5 %
MCH RBC QN AUTO: 31.7 PG (ref 26.5–33)
MCHC RBC AUTO-ENTMCNC: 31.7 G/DL (ref 31.5–36.5)
MCV RBC AUTO: 100 FL (ref 78–100)
MONOCYTES # BLD AUTO: 0.7 10E9/L (ref 0–1.3)
MONOCYTES NFR BLD AUTO: 8.2 %
NEUTROPHILS # BLD AUTO: 6.6 10E9/L (ref 1.6–8.3)
NEUTROPHILS NFR BLD AUTO: 78.3 %
NRBC # BLD AUTO: 0 10*3/UL
NRBC BLD AUTO-RTO: 0 /100
PLATELET # BLD AUTO: 111 10E9/L (ref 150–450)
POTASSIUM SERPL-SCNC: 3.8 MMOL/L (ref 3.4–5.3)
RBC # BLD AUTO: 3.41 10E12/L (ref 3.8–5.2)
SODIUM SERPL-SCNC: 142 MMOL/L (ref 133–144)
WBC # BLD AUTO: 8.4 10E9/L (ref 4–11)

## 2018-03-25 PROCEDURE — 25000132 ZZH RX MED GY IP 250 OP 250 PS 637: Performed by: PHYSICIAN ASSISTANT

## 2018-03-25 PROCEDURE — 96361 HYDRATE IV INFUSION ADD-ON: CPT

## 2018-03-25 PROCEDURE — G0378 HOSPITAL OBSERVATION PER HR: HCPCS

## 2018-03-25 PROCEDURE — 85025 COMPLETE CBC W/AUTO DIFF WBC: CPT | Performed by: PHYSICIAN ASSISTANT

## 2018-03-25 PROCEDURE — 80048 BASIC METABOLIC PNL TOTAL CA: CPT | Performed by: PHYSICIAN ASSISTANT

## 2018-03-25 PROCEDURE — 85610 PROTHROMBIN TIME: CPT | Performed by: PHYSICIAN ASSISTANT

## 2018-03-25 PROCEDURE — 25000132 ZZH RX MED GY IP 250 OP 250 PS 637: Performed by: INTERNAL MEDICINE

## 2018-03-25 PROCEDURE — 99207 ZZC CDG-MDM COMPONENT: MEETS LOW - DOWN CODED: CPT | Performed by: PHYSICIAN ASSISTANT

## 2018-03-25 PROCEDURE — 99225 ZZC SUBSEQUENT OBSERVATION CARE,LEVEL II: CPT | Performed by: PHYSICIAN ASSISTANT

## 2018-03-25 PROCEDURE — 36415 COLL VENOUS BLD VENIPUNCTURE: CPT | Performed by: PHYSICIAN ASSISTANT

## 2018-03-25 RX ADMIN — OYSTER SHELL CALCIUM WITH VITAMIN D 1 TABLET: 500; 200 TABLET, FILM COATED ORAL at 08:06

## 2018-03-25 RX ADMIN — LISINOPRIL 10 MG: 10 TABLET ORAL at 08:08

## 2018-03-25 RX ADMIN — METHENAMINE HIPPURATE 1 G: 1 TABLET ORAL at 08:06

## 2018-03-25 RX ADMIN — Medication 1 G: at 21:34

## 2018-03-25 RX ADMIN — METOPROLOL SUCCINATE 100 MG: 100 TABLET, EXTENDED RELEASE ORAL at 21:33

## 2018-03-25 ASSESSMENT — PAIN DESCRIPTION - DESCRIPTORS: DESCRIPTORS: CRAMPING

## 2018-03-25 NOTE — PLAN OF CARE
Problem: Patient Care Overview  Goal: Plan of Care/Patient Progress Review  Outcome: No Change  PRIMARY DIAGNOSIS: GI BLEED      OUTPATIENT/OBSERVATION GOALS TO BE MET BEFORE DISCHARGE  1. Orthostatic performed: Yes:    2.       Lying Orthostatic BP: 156/61   3.       Sitting Orthostatic BP: 156/61  1.       Standing Orthostatic BP: 146/65       2. Stable Hgb Yes 10.8      3. Resolved or declined bleeding episodes: No,  with a small amount of bleeding Last episode: this AM      4. Appropriate testing complete: Yes      5. Cleared for discharge by consultants (if involved): N/A      6. Safe discharge environment identified: Yes      Discharge Planner Nurse   Safe discharge environment identified: Yes  Barriers to discharge: No     VSS. Orthostatics negative. Reports abdominal cramping relieved with heating pad. Small bloody mucous stool this AM, incontinent. Bladder scan for 655 mL, able to void 125 mL, straight cath for 800 mL clear yellow urine at 1030. Tolerating full liquid diet, trying regular diet now. May d/c later if tolerates diet and no more bleeding.     Patient's son let this writer know patient has [employer] and insurance only covers observation status for 72 hours. This info passed along to provider.      Please review provider order for any additional goals.   Nurse to notify provider when observation goals have been met and patient is ready for discharge.

## 2018-03-25 NOTE — PROGRESS NOTES
Bladder scan 445. Pt was unable to void. Straight cath using sterile technique. 600 cc clear, light yellow urine. Pt tolerated well.

## 2018-03-25 NOTE — PLAN OF CARE
Problem: Patient Care Overview  Goal: Plan of Care/Patient Progress Review  Outcome: No Change  PRIMARY DIAGNOSIS: Colitis w/ GIB    OUTPATIENT/OBSERVATION GOALS TO BE MET BEFORE DISCHARGE  Orthostatic performed: Yes:          Lying Orthostatic BP: 137/51         Sitting Orthostatic BP: 155/55   1.       Standing Orthostatic BP: 136/47     2. Stable Hgb Yes.   Recent Labs   Lab Test  03/24/18   1706  03/24/18   0937  03/24/18   0600   HGB  11.6*  11.7  10.7*       3. Resolved or declined bleeding episodes: No,  small stools w/ mucous and bright red blood Last episode: tonight - 5 small stools in 8 hours, 2 incontinent    4. Appropriate testing complete: N/A    5. Cleared for discharge by consultants (if involved): N/A - no consults ordered    6. Safe discharge environment identified: Yes    Discharge Planner Nurse   Safe discharge environment identified: Yes  Barriers to discharge: Not once medically cleared          Please review provider order for any additional goals.   Nurse to notify provider when observation goals have been met and patient is ready for discharge.    Pt continues w/ abdominal cramping and bloody, mucous stools.  She was incontinent twice.  Vitals stable.  Bowel sounds hyperactive.  Heating pad to abdomen for comfort.  Patient declines pain meds.  She c/o intermittent nausea and very poor appetite but declines antiemetic.  Her straight caths have been very large tonight, iv fluid rate decreased.

## 2018-03-25 NOTE — PLAN OF CARE
Problem: Patient Care Overview  Goal: Plan of Care/Patient Progress Review  Outcome: No Change  PRIMARY DIAGNOSIS: GI BLEED     OUTPATIENT/OBSERVATION GOALS TO BE MET BEFORE DISCHARGE  1. Orthostatic performed: Yes:    2.       Lying Orthostatic BP: 156/61   3.       Sitting Orthostatic BP: 156/61  1.       Standing Orthostatic BP: 146/65      2. Stable Hgb Yes 10.8     3. Resolved or declined bleeding episodes: No,  with a small amount of bleeding Last episode: this AM      4. Appropriate testing complete: Yes      5. Cleared for discharge by consultants (if involved): N/A      6. Safe discharge environment identified: Yes     Discharge Planner Nurse   Safe discharge environment identified: Yes  Barriers to discharge: Yes, continued bleeding      VSS. Orthostatics negative. Reports abdominal cramping relieved with heating pad. Small bloody mucous stool this AM, incontinent. Bladder scan for 655 mL, able to void 125 mL, straight cath for 800 mL clear yellow urine at 1030. Tolerating full liquid diet, will advance as appropriate.      Please review provider order for any additional goals.   Nurse to notify provider when observation goals have been met and patient is ready for discharge.

## 2018-03-25 NOTE — PLAN OF CARE
Problem: Patient Care Overview  Goal: Plan of Care/Patient Progress Review  Outcome: No Change  PRIMARY DIAGNOSIS: Colitis w/ rectal bleeding    OUTPATIENT/OBSERVATION GOALS TO BE MET BEFORE DISCHARGE  Orthostatic performed: Yes:          Lying Orthostatic BP: 156/61         Sitting Orthostatic BP: 156/61   1.       Standing Orthostatic BP: 146/65     2. Stable Hgb Yes.   Recent Labs   Lab Test  03/25/18   0558  03/24/18   1706  03/24/18   0937   HGB  10.8*  11.6*  11.7       3. Resolved or declined bleeding episodes: No,  with a small amount of bleeding Last episode: earlier today, day shift - small amount of mucous/blood    4. Appropriate testing complete: Yes    5. Cleared for discharge by consultants (if involved): N/A    6. Safe discharge environment identified: Yes    Discharge Planner Nurse   Safe discharge environment identified: Yes  Barriers to discharge: Not once medically cleared         Please review provider order for any additional goals.   Nurse to notify provider when observation goals have been met and patient is ready for discharge.    Pt up w/ sba, iv fluids dc'd, straight cath q4-5 hours, tolerating regular diet today, heating pad to abdomen for cramping.

## 2018-03-25 NOTE — PROGRESS NOTES
Patient having large amounts of urine with each straight cath.  She was orthostatic positive this morning.  IV fluid rate will be decreased from 100 to 75cc/hr per PA order.  She will need to be straight cathed in the middle of the night, which is not her normal routine.

## 2018-03-25 NOTE — PLAN OF CARE
Problem: Patient Care Overview  Goal: Plan of Care/Patient Progress Review  Outcome: No Change  PRIMARY DIAGNOSIS: GI BLEED    OUTPATIENT/OBSERVATION GOALS TO BE MET BEFORE DISCHARGE  Orthostatic performed: Yes:          Lying Orthostatic BP: 137/51         Sitting Orthostatic BP: 155/55   1.       Standing Orthostatic BP: 136/47     2. Stable Hgb Yes.   Recent Labs   Lab Test  03/24/18   1706  03/24/18   0937  03/24/18   0600   HGB  11.6*  11.7  10.7*       3. Resolved or declined bleeding episodes: No,  with a moderate amount of bleeding Last episode: during evening shift    4. Appropriate testing complete: No    5. Cleared for discharge by consultants (if involved): N/A, none ordered    6. Safe discharge environment identified: Yes    Discharge Planner Nurse   Safe discharge environment identified: Yes  Barriers to discharge: Yes, continued diarrhea       Entered by: Paulina Goldberg 03/25/2018 2:29 AM      Vitals: B/P: 117/35, T: 98.3, P: 74, R: 18  Neuro: A&Ox4  Cardiac: Denies chest pain, tele SB/SR with 1st degree AVB/BBB (50-71) per tele tech  Lungs: Denies SOB, lungs clear  GI: Small stool last evening, denies nausea and abd pain  : hx of urinary retention - self caths at home, will bladder scan and straight cath if needed this morning  Pain: denies pain  IV: NS at 75 infusing  Meds: holding coumadin  Labs/tests: stool culture pending, urine culture negative, hgb 11.6, INR 2.77  Diet: tolerating clear liquids  Activity: SBA  Plan: Strict I&O, PCDs, t-pump, enteric isolation pending stool culture    Please review provider order for any additional goals.   Nurse to notify provider when observation goals have been met and patient is ready for discharge.

## 2018-03-25 NOTE — PROGRESS NOTES
[facility]  Hospitalist Progress Note  Sherron Johnson PA-C 03/25/2018    Reason for Stay (Diagnosis): Hematochezia         Assessment and Plan:        Summary of Stay: Maia Miranda is a 85 year old female admitted on 3/23/2018 with abdominal cramping and hematochezia. Initial work up in the ED revealed she was vitally stable with a hemoglobin of 12.5 and INR of 2.44. CT of her abdomen revealed left sided colonic wall thickening suggestive of colitis. Her lactic acid was normal. She was brought to the OBS unit for fluid support, monitoring of her stool output and Hgb and holding of her Warfarin which she takes for A fib. Overnight she continued to have some mild abdominal cramping with no fever or diarrhea. She had 2 bowel movements with a little bit of bright red blood and had some bright red tinged mucous in her depends. Her hemoglobin had dropped to 10.7. She continues to have cramping in the AM along with some blood in her most recent bowel movement so we are keeping her for continued monitoring.      Problem List:   1. Likely ischemic colitis- She has had hematochezia with left sided abdominal cramping which is consistent with evidence of colitis on her CT scan. She has not had diarrhea or vomiting. Her lactic acid is normal. Her WBC was slightly elevated on admission and has since normalized. She was orthostatic on 3/24 but not symptomatic with lightheadedness or dizziness. This has since resolved. Her Hgb has not dropped further but she continues to have some bloody mucous output. Conferred with hospitalist who agreed we should allow her INR to naturally drift down and not reverse her.  -Recheck Hgb in AM  -monitor stool output  -holding warfarin  -Transfurse for Hgb <8  -Advanced to regular diet  2. HTN- Blood pressure medications are being help unless parameters are met.  3. Hx of Atrial fibrillation on warfarin- Continue Metoprolol on parameters (she has been bradycardic) and  "holding Warfarin given #1.  4. Acute renal insufficiency- Admission creatinine of 1.33 and improved to 1 with normal BUN   5. Hx of Hiatal hernia, GERD, reported hx of duodenal ulcer (non bleeding) in 1974: Not on PPI. Denies any epigastric symptoms and no black stool. Most recent EGD in 2007 showed mildly erythematous mucosa with no bleeding in the prepyloric stomach, o/w normal esophagus and duodenum.  6. Mild cognitive impairment with memory loss- Documented in family care notes.  7.   Chronic urinary retention-Status post InterStim placement in 4/18/2017.  She straight caths 4 times daily.  She is on a medical regimen per her urologist for UTI prevention.  -Continue PTA regimen of calcium supplementation and methenamine hippurate  -Can resume Premarin cream twice weekly when she goes home  -Continue straight cathing 4 times daily  8. Remote history of PE in 1971- Was not on Coumadin for this.     DVT Prophylaxis: Ambulate every shift  Code Status: DNR / DNI  Discharge Dispo: Sent case for UR review as she is staying the night due to continued bloody stool        DVT Prophylaxis: Warfarin  Code Status: DNR / DNI  Discharge Dispo: Will discharge once bleeding stops          Interval History (Subjective):      Cramping has improved, no dizziness when walking, continues to have some bloody mucous in stool but much improved. Advanced to full diet today with no problems.                   Physical Exam:      Last Vital Signs:  /44 (BP Location: Right arm)  Pulse 74  Temp 96.7  F (35.9  C) (Oral)  Resp 18  Ht 1.651 m (5' 5\")  Wt 70.8 kg (156 lb)  SpO2 98%  BMI 25.96 kg/m2      Intake/Output Summary (Last 24 hours) at 03/25/18 1426  Last data filed at 03/25/18 1300   Gross per 24 hour   Intake             3456 ml   Output             3875 ml   Net             -419 ml       Constitutional: Awake, alert, cooperative, no apparent distress   Respiratory: Clear to auscultation bilaterally, no crackles or " wheezing   Cardiovascular: Regular rate and rhythm, normal S1 and S2, and no murmur noted   Abdomen: Normal bowel sounds, soft, non-distended, non-tender   Skin: No rashes, no cyanosis, dry to touch   Neuro: Alert and oriented x3, no weakness, numbness, some memory issues   Extremities: No edema, normal range of motion   Other(s):        All other systems: Negative          Medications:      All current medications were reviewed with changes reflected in problem list.         Data:      All new lab and imaging data was reviewed.   Labs:       Lab Results   Component Value Date     03/25/2018     03/24/2018     03/23/2018    Lab Results   Component Value Date    CHLORIDE 112 03/25/2018    CHLORIDE 114 03/24/2018    CHLORIDE 108 03/23/2018    Lab Results   Component Value Date    BUN 14 03/25/2018    BUN 18 03/24/2018    BUN 34 03/23/2018      Lab Results   Component Value Date    POTASSIUM 3.8 03/25/2018    POTASSIUM 3.7 03/24/2018    POTASSIUM 4.0 03/23/2018    Lab Results   Component Value Date    CO2 22 03/25/2018    CO2 21 03/24/2018    CO2 24 03/23/2018    Lab Results   Component Value Date    CR 1.06 03/25/2018    CR 1.03 03/24/2018    CR 1.33 03/23/2018          Recent Labs  Lab 03/25/18  0558   WBC 8.4   HGB 10.8*   HCT 34.1*      *      Imaging:   No results found for this or any previous visit (from the past 24 hour(s)).

## 2018-03-26 VITALS
HEIGHT: 65 IN | TEMPERATURE: 97.5 F | SYSTOLIC BLOOD PRESSURE: 158 MMHG | BODY MASS INDEX: 25.99 KG/M2 | OXYGEN SATURATION: 98 % | RESPIRATION RATE: 20 BRPM | HEART RATE: 74 BPM | WEIGHT: 156 LBS | DIASTOLIC BLOOD PRESSURE: 56 MMHG

## 2018-03-26 LAB
ANION GAP SERPL CALCULATED.3IONS-SCNC: 6 MMOL/L (ref 3–14)
BASOPHILS # BLD AUTO: 0 10E9/L (ref 0–0.2)
BASOPHILS NFR BLD AUTO: 0.3 %
BUN SERPL-MCNC: 13 MG/DL (ref 7–30)
CALCIUM SERPL-MCNC: 8.6 MG/DL (ref 8.5–10.1)
CHLORIDE SERPL-SCNC: 112 MMOL/L (ref 94–109)
CO2 SERPL-SCNC: 25 MMOL/L (ref 20–32)
CREAT SERPL-MCNC: 1.04 MG/DL (ref 0.52–1.04)
DIFFERENTIAL METHOD BLD: ABNORMAL
EOSINOPHIL # BLD AUTO: 0.1 10E9/L (ref 0–0.7)
EOSINOPHIL NFR BLD AUTO: 2.3 %
ERYTHROCYTE [DISTWIDTH] IN BLOOD BY AUTOMATED COUNT: 12.7 % (ref 10–15)
GFR SERPL CREATININE-BSD FRML MDRD: 50 ML/MIN/1.7M2
GLUCOSE SERPL-MCNC: 110 MG/DL (ref 70–99)
HCT VFR BLD AUTO: 34.9 % (ref 35–47)
HGB BLD-MCNC: 11.4 G/DL (ref 11.7–15.7)
IMM GRANULOCYTES # BLD: 0 10E9/L (ref 0–0.4)
IMM GRANULOCYTES NFR BLD: 0.3 %
INR PPP: 1.73 (ref 0.86–1.14)
LYMPHOCYTES # BLD AUTO: 0.8 10E9/L (ref 0.8–5.3)
LYMPHOCYTES NFR BLD AUTO: 14.6 %
MCH RBC QN AUTO: 31.8 PG (ref 26.5–33)
MCHC RBC AUTO-ENTMCNC: 32.7 G/DL (ref 31.5–36.5)
MCV RBC AUTO: 98 FL (ref 78–100)
MONOCYTES # BLD AUTO: 0.5 10E9/L (ref 0–1.3)
MONOCYTES NFR BLD AUTO: 8.9 %
NEUTROPHILS # BLD AUTO: 4.2 10E9/L (ref 1.6–8.3)
NEUTROPHILS NFR BLD AUTO: 73.6 %
NRBC # BLD AUTO: 0 10*3/UL
NRBC BLD AUTO-RTO: 0 /100
PLATELET # BLD AUTO: 133 10E9/L (ref 150–450)
POTASSIUM SERPL-SCNC: 3.9 MMOL/L (ref 3.4–5.3)
RBC # BLD AUTO: 3.58 10E12/L (ref 3.8–5.2)
SODIUM SERPL-SCNC: 143 MMOL/L (ref 133–144)
WBC # BLD AUTO: 5.8 10E9/L (ref 4–11)

## 2018-03-26 PROCEDURE — 80048 BASIC METABOLIC PNL TOTAL CA: CPT | Performed by: PHYSICIAN ASSISTANT

## 2018-03-26 PROCEDURE — 99217 ZZC OBSERVATION CARE DISCHARGE: CPT | Performed by: PHYSICIAN ASSISTANT

## 2018-03-26 PROCEDURE — 25000132 ZZH RX MED GY IP 250 OP 250 PS 637: Performed by: INTERNAL MEDICINE

## 2018-03-26 PROCEDURE — 85025 COMPLETE CBC W/AUTO DIFF WBC: CPT | Performed by: PHYSICIAN ASSISTANT

## 2018-03-26 PROCEDURE — G0378 HOSPITAL OBSERVATION PER HR: HCPCS

## 2018-03-26 PROCEDURE — 85610 PROTHROMBIN TIME: CPT | Performed by: PHYSICIAN ASSISTANT

## 2018-03-26 PROCEDURE — 25000132 ZZH RX MED GY IP 250 OP 250 PS 637: Performed by: PHYSICIAN ASSISTANT

## 2018-03-26 PROCEDURE — 36415 COLL VENOUS BLD VENIPUNCTURE: CPT | Performed by: PHYSICIAN ASSISTANT

## 2018-03-26 RX ORDER — WARFARIN SODIUM 2.5 MG/1
TABLET ORAL
Qty: 100 TABLET | Refills: 3
Start: 2018-03-27 | End: 2018-04-18

## 2018-03-26 RX ADMIN — METHENAMINE HIPPURATE 1 G: 1 TABLET ORAL at 08:49

## 2018-03-26 RX ADMIN — OYSTER SHELL CALCIUM WITH VITAMIN D 1 TABLET: 500; 200 TABLET, FILM COATED ORAL at 08:49

## 2018-03-26 RX ADMIN — LISINOPRIL 10 MG: 10 TABLET ORAL at 08:49

## 2018-03-26 NOTE — PLAN OF CARE
Problem: Patient Care Overview  Goal: Plan of Care/Patient Progress Review  PRIMARY DIAGNOSIS: Colitis      OUTPATIENT/OBSERVATION GOALS TO BE MET BEFORE DISCHARGE  1. Orthostatic performed: Yes:    2.       Lying Orthostatic BP: 156/61   3.       Sitting Orthostatic BP: 156/61   1.       Standing Orthostatic BP: 146/65       2. Stable Hgb Yes.             Recent Labs   Lab Test  03/25/18   0558  03/24/18   1706  03/24/18   0937   HGB  10.8*  11.6*  11.7           Resolved or declined bleeding episodes: Yes Last episode: day shift today, 3/25.  Nothing this evening.  No BM night shift    4. Appropriate testing complete: N/A      5. Cleared for discharge by consultants (if involved): No consults ordered.      6. Safe discharge environment identified: Yes      Discharge Planner Nurse   Safe discharge environment identified: Yes  Barriers to discharge: Not once medically cleared.       Entered by: Ariela Multani 03/25/2018 11:00 PM  Please review provider order for any additional goals.   Nurse to notify provider when observation goals have been met and patient is ready for discharge.

## 2018-03-26 NOTE — PROGRESS NOTES
Discharge instructions reviewed with patient. Patient verbalizes understanding, all questions were answered. Patient discharged with all personal belongings and discharge medications. IV removed.      OBSERVATION patient END time: 12:15

## 2018-03-26 NOTE — PLAN OF CARE
Problem: Patient Care Overview  Goal: Plan of Care/Patient Progress Review  PRIMARY DIAGNOSIS: Colitis     OUTPATIENT/OBSERVATION GOALS TO BE MET BEFORE DISCHARGE  1. Orthostatic performed: Yes:    2.       Lying Orthostatic BP: 156/61   3.       Sitting Orthostatic BP: 156/61   1.       Standing Orthostatic BP: 146/65      2. Stable Hgb Yes.         Recent Labs   Lab Test  03/25/18   0558  03/24/18   1706  03/24/18   0937   HGB  10.8*  11.6*  11.7         3. Resolved or declined bleeding episodes: Yes Last episode: day shift today, 3/25.  Nothing this evening.     4. Appropriate testing complete: N/A     5. Cleared for discharge by consultants (if involved): No consults ordered.     6. Safe discharge environment identified: Yes     Discharge Planner Nurse   Safe discharge environment identified: Yes  Barriers to discharge: Not once medically cleared.       Entered by: Ariela Multani 03/25/2018 11:00 PM  Please review provider order for any additional goals.   Nurse to notify provider when observation goals have been met and patient is ready for discharge.

## 2018-03-26 NOTE — PLAN OF CARE
"Problem: Patient Care Overview  Goal: Plan of Care/Patient Progress Review  PRIMARY DIAGNOSIS: Rectal BLEED    OUTPATIENT/OBSERVATION GOALS TO BE MET BEFORE DISCHARGE  Orthostatic performed: Yes:          Lying Orthostatic BP: 156/61         Sitting Orthostatic BP: 156/61   1.       Standing Orthostatic BP: 146/65     2. Stable Hgb Yes.   Recent Labs   Lab Test  03/26/18   0643  03/25/18   0558  03/24/18   1706   HGB  11.4*  10.8*  11.6*       3. Resolved or declined bleeding episodes: Yes Last episode: 3/25/18    4. Appropriate testing complete: Yes    5. Cleared for discharge by consultants (if involved): N/A    6. Safe discharge environment identified: Yes    Discharge Planner Nurse   Safe discharge environment identified: Yes  Barriers to discharge: No       Entered by: Magali Byrne 03/26/2018 10:35 AM     Please review provider order for any additional goals.   Nurse to notify provider when observation goals have been met and patient is ready for discharge.    /54 (BP Location: Right arm)  Pulse 74  Temp 97.3  F (36.3  C) (Oral)  Resp 20  Ht 1.651 m (5' 5\")  Wt 70.8 kg (156 lb)  SpO2 96%  BMI 25.96 kg/m2  A&O, up SBA. VSS, occ. Fam. Denies pain, dizziness, or SOb. No stools overnight or this morning. Hgb 11.4, INR 1.73. Tolerating reg diet. Will continue to monitor.      "

## 2018-03-26 NOTE — PLAN OF CARE
Problem: Patient Care Overview  Goal: Plan of Care/Patient Progress Review  Outcome: Improving  PRIMARY DIAGNOSIS: Colitis    OUTPATIENT/OBSERVATION GOALS TO BE MET BEFORE DISCHARGE  Orthostatic performed: Yes:          Lying Orthostatic BP: 156/61         Sitting Orthostatic BP: 156/61   1.       Standing Orthostatic BP: 146/65     2. Stable Hgb Yes.   Recent Labs   Lab Test  03/25/18   0558  03/24/18   1706  03/24/18   0937   HGB  10.8*  11.6*  11.7       3. Resolved or declined bleeding episodes: Yes Last episode: day shift today, 3/25.  Nothing this evening.    4. Appropriate testing complete: N/A    5. Cleared for discharge by consultants (if involved): No consults ordered.    6. Safe discharge environment identified: Yes    Discharge Planner Nurse   Safe discharge environment identified: Yes  Barriers to discharge: Not once medically cleared.       Entered by: Ariela Multani 03/25/2018 11:00 PM     Please review provider order for any additional goals.   Nurse to notify provider when observation goals have been met and patient is ready for discharge.

## 2018-03-26 NOTE — DISCHARGE SUMMARY
Discharge Summary  Hospitalist Service    Maia Miranda MRN# [id number]   YOB: 1932 Age: 85 year old     Date of Admission:  3/23/2018  Date of Discharge:  3/26/2018  Admitting Physician: Karla Lazaro DO  Discharge Physician: Sherron oJhnson PA-C  Discharging Service: Hospitalist Service     Primary Provider: Dain Frias  Primary Care Physician Phone Number: [phone]         Discharge Diagnoses/Problem Oriented Hospital Course (Providers):    Maia Miranda was admitted on 3/23/2018 by Karla Lazaro DO and I would refer you to their history and physical. Briefly, patient was admitted with complaints of hematochezia and lower left abdominal cramping. CT of abdomen was significant for a likely left sided colitis. Lactic acid was normal and WBC was only slightly elevated at 11.4. Stool studies were negative. Likely ischemic colitis with increased hematochezia due to warfarin use for A fib. Warfarin was held, hemoglobins were trended with no significant drop and fluids were given. Bleeding stopped without Vitamin K and patient discharged home.    1. Hematochezia likely due to ischemic colitis: CT scan confirmed left sided bowel wall thickening which is where her abdominal cramping was. Stool studies were negative for infection. Lactic acid was normal and WBC was only mildly high on admission. No antibiotics were given. Hematochezia was complicated by her being on Warfarin that was held. No vitamin K was given and she stopped bleeding on her own and was able to tolerate a regular diet. Hgbs remained stable so no blood products were given.     2. A fib on warfarin: Rate controlled and Warfarin was held due to # 1. Warfarin resumed the day after discharge.          Code Status:      Full Code        Brief Hospital Stay Summary Sent Home With Patient in AVS:        Reason for your hospital stay       You were admitted for concerns of stomach pain and blood  in your stool   related to having ischemic colitis which was likely caused by decreased   blood flow to the left side of your colon. This has self resolved with no   permanent damage. We held your warfarin and your bleeding slowly stopped.   Your hemoglobin did not drop much so you did not need a blood transfusion   and we do not believe you need to be on an iron supplement. You were   eating and drinking well so we were able to discharge you home.    You should restart your Warfarin tomorrow and have an INR check on   Thursday or Friday.                                    Pending Results:        Unresulted Labs Ordered in the Past 30 Days of this Admission     No orders found from 1/22/2018 to 3/24/2018.            Discharge Instructions and Follow-Up:      Follow-up Appointments     Follow-up and recommended labs and tests        Follow up with primary care provider, Dian Frias, within 7   days for hospital follow- up.  No follow up labs or test are needed.    INR check on Thursday or Friday                      Discharge Disposition:      Discharged to home         Discharge Medications:        Current Discharge Medication List      CONTINUE these medications which have CHANGED    Details   warfarin (COUMADIN) 2.5 MG tablet Take 2.5mg (1 tablet)  MTWTFSS or as directed by INR Clinic.  Qty: 100 tablet, Refills: 3    Associated Diagnoses: Long-term (current) use of anticoagulants         CONTINUE these medications which have NOT CHANGED    Details   methenamine hippurate (HIPREX) 1 G TABS tablet Take 1 g by mouth 2 times daily      metoprolol succinate (TOPROL-XL) 100 MG 24 hr tablet Take 1 tablet (100 mg) by mouth daily  Qty: 90 tablet, Refills: 3    Comments: Please place on file, do not fill  Associated Diagnoses: Palpitations; Atrial fibrillation, unspecified type (H)      fluocinonide (LIDEX) 0.05 % cream Apply topically 2 times daily as needed  Qty: 30 g, Refills: 2    Associated Diagnoses:  Dermatitis      calcium citrate-vitamin D (CITRACAL) 315-250 MG-UNIT TABS per tablet Take 1 tablet by mouth daily      Cholecalciferol (VITAMIN D-3) 1000 UNITS CAPS Take 1 capsule by mouth daily      lisinopril (PRINIVIL/ZESTRIL) 10 MG tablet Take 1 tablet (10 mg) by mouth every morning  Qty: 90 tablet, Refills: 3    Comments: Please place on file, do not fill  Associated Diagnoses: Hypertension goal BP (blood pressure) < 150/90      polyethylene glycol (MIRALAX/GLYCOLAX) packet Take 1 packet by mouth daily as needed       conjugated estrogens (PREMARIN) vaginal cream Place vaginally twice a week On Tuesday and Friday. Uses a pea sized amount      acetaminophen (TYLENOL) 500 MG tablet Take 500-1,000 mg by mouth every 6 hours as needed      ascorbic acid (VITAMIN C) 1000 MG TABS Take 1 tablet (1,000 mg) by mouth 2 times daily  Qty: 180 tablet, Refills: 3    Associated Diagnoses: Recurrent UTI; Neurogenic bladder      Omega-3 Fatty Acids (OMEGA-3 FISH OIL PO) Take 1 g by mouth 2 times daily (with meals)      sodium chloride (PETER 128) 5 % ophthalmic ointment Place 1 Application into both eyes At Bedtime      carboxymethylcellulose (CELLUVISC/REFRESH LIQUIGEL) 1 % ophthalmic solution Place 1 drop into both eyes every morning As needed      order for DME [employer] ankle brace  Qty: 1 Device, Refills: 0    Associated Diagnoses: Plantar fascial fibromatosis; Pain of left heel      Catheters (GENTLECATH URINARY CATHETER) MISC 1 catheter 4 times daily  Qty: 120 each, Refills: 12    Associated Diagnoses: Urinary retention               Allergies:         Allergies   Allergen Reactions     Codeine Nausea and Vomiting     Meperidine Nausea and Vomiting     Morphine Nausea and Vomiting     vomiting     Penicillins Hives     hives     Sulfa Drugs      Vomiting       Epinephrine Palpitations           Consultations This Hospital Stay:      No consultations were requested during this admission         Condition and Physical on  "Discharge:      Discharge condition: Stable   Vitals: Blood pressure 156/54, pulse 74, temperature 97.3  F (36.3  C), temperature source Oral, resp. rate 20, height 1.651 m (5' 5\"), weight 70.8 kg (156 lb), SpO2 96 %, not currently breastfeeding.     Constitutional: Alert and orientated.   Lungs: CTAB   Cardiovascular: RRR with no murmur   Abdomen: Bowel sounds are present with no tenderness   Skin: No rash or open sores   Other:          Discharge Time:      Less than 30 minutes.        Image Results From This Hospital Stay (For Non-EPIC Providers):        Results for orders placed or performed during the hospital encounter of 03/23/18   CT Abdomen Pelvis w/o Contrast    Narrative    CT ABDOMEN AND PELVIS WITHOUT CONTRAST  3/23/2018 9:54 AM     HISTORY: Lower abdomen pain, rectal bleeding.     Radiation dose for this scan was reduced using automated exposure  control, adjustment of the mA and/or kV according to patient size, or  iterative reconstruction technique.    FINDINGS:  There are small right renal probable cysts, the largest  measuring 1.5 cm. No renal stone or hydronephrosis is demonstrated. No  ureteral stone is demonstrated. Scattered aortic and iliac artery  calcifications are present. Although there is no contrast within the  bowel, diffuse left colonic wall thickening is suggested from the  splenic flexure to the sigmoid colon. Prominent fecal debris is  present within the cecum. There is a normal gas-filled appendix. No  free peritoneal fluid or air is demonstrated. The liver and spleen are  grossly unremarkable for the unenhanced technique.      Impression    IMPRESSION: Although not well seen due to lack of IV and enteric  contrast, there is probable diffuse left colonic wall thickening  suggesting nonspecific colitis.    ARI TAMAYO MD           Most Recent Lab Results In EPIC (For Non-EPIC Providers):    Most Recent 3 CBC's:  Recent Labs   Lab Test  03/26/18   0643  03/25/18   0558  03/24/18   " 1706   03/24/18   0600   WBC  5.8  8.4   --    --   8.7   HGB  11.4*  10.8*  11.6*   < >  10.7*   MCV  98  100   --    --   99   PLT  133*  111*   --    --   107*    < > = values in this interval not displayed.      Most Recent 3 BMP's:  Recent Labs   Lab Test  03/26/18   0643  03/25/18   0558  03/24/18   0600   NA  143  142  142   POTASSIUM  3.9  3.8  3.7   CHLORIDE  112*  112*  114*   CO2  25  22  21   BUN  13  14  18   CR  1.04  1.06*  1.03   ANIONGAP  6  8  7   CLARISSE  8.6  8.4*  7.8*   GLC  110*  77  103*     Most Recent 3 Troponin's:No lab results found.  Most Recent 3 INR's:  Recent Labs   Lab Test  03/26/18   0643  03/25/18   0558  03/24/18   0928   INR  1.73*  2.42*  2.77*     Most Recent 2 LFT's:  Recent Labs   Lab Test  10/03/17   1520  06/14/17   0927   AST  25  21   ALT  21  24   ALKPHOS  77  98   BILITOTAL  0.4  0.4     Most Recent Cholesterol Panel:  Recent Labs   Lab Test  06/14/17   0927   CHOL  228*   LDL  147*   HDL  52   TRIG  146     Most Recent 6 Bacteria Isolates From Any Culture (See EPIC Reports for Culture Details):  Recent Labs   Lab Test  03/23/18   1049  10/16/17   0915  10/03/17   1400  06/30/17   1220  10/27/16   1635  09/09/16   0941   CULT  No growth  >100,000 colonies/mL  Escherichia coli  *  No growth  >100,000 colonies/mL Escherichia coli*  50,000 to 100,000 colonies/mL Escherichia coli*  >100,000 colonies/mL Escherichia coli*     Most Recent TSH, T4 and HgbA1c:   Recent Labs   Lab Test  02/26/18   1504   TSH  1.82

## 2018-03-26 NOTE — PHARMACY-ANTICOAGULATION SERVICE
Clinical Pharmacy- Warfarin Discharge Note  This patient is currently on warfarin for the treatment of Atrial fibrillation.  INR Goal= 2-3  Expected length of therapy lifetime.           Anticoagulation Dose History     Recent Dosing and Labs Latest Ref Rng & Units 12/13/2017 1/17/2018 2/21/2018 3/23/2018 3/24/2018 3/25/2018 3/26/2018    INR 0.86 - 1.14 - - - 2.44(H) 2.77(H) 2.42(H) 1.73(H)    INR 0.86 - 1.14 3.1(A) 2.2(A) 2.4(A) - - - -          Vitamin K doses administered during the last 7 days: 0 mg  FFP administered during the last 7 days: none  Agree with provider note of resuming home dose of coumadin on 3/27/18     The patient should have an INR checked March / 29 / 2018.    Thank you    Grace Mosher, PharmD

## 2018-03-27 ENCOUNTER — TELEPHONE (OUTPATIENT)
Dept: PEDIATRICS | Facility: CLINIC | Age: 83
End: 2018-03-27

## 2018-03-27 NOTE — TELEPHONE ENCOUNTER
Please contact patient for In-patient follow up.  [phone] (home) NONE (work)    Visit date: 3/26/18  Diagnosis listed:Colitis  Number of visits in past 12 months:0

## 2018-03-27 NOTE — TELEPHONE ENCOUNTER
"You were admitted for concerns of stomach pain and blood in your stool related to having ischemic colitis which was likely caused by decreased blood flow to the left side of your colon. This has self resolved with no permanent damage. We held your warfarin and your bleeding slowly stopped. Your hemoglobin did not drop much so you did not need a blood transfusion and we do not believe you need to be on an iron supplement. You were eating and drinking well so we were able to discharge you home.     You should restart your Warfarin tomorrow and have an INR check on Thursday or Friday.      Hospital/TCU/ED for chronic condition Discharge Protocol    \"Hi, my name is Leora Carlisle, a registered nurse, and I am calling from [facility].  I am calling to follow up and see how things are going for you after your recent emergency visit/hospital/TCU stay.\"    Tell me how you are doing now that you are home?\" Patient states she had one episode of diarrhea this am.  No blood in the stool.  Had two mucousy stools yesterday.  Has resumed a normal diet.  No abdominal pain today.  Did have abdominal pain yesterday.  No dizziness or lightheadedness.  Urine seemed a little cloudy yesterday.  No hematuria.  No fever.  Is drinking water and cranberry juice.  States she has to catheterize herself.  Will monitor her urine and if any concerns, or fever, will contact the clinic.        Discharge Instructions    \"Let's review your discharge instructions.  What is/are the follow-up recommendations?  Pt. Response: INR check this week    \"Has an appointment with your primary care provider been scheduled?\"   No (schedule appointment)    \"When you see the provider, I would recommend that you bring your medications with you.\"    Medications    \"Tell me what changed about your medicines when you discharged?\"    Changes to chronic meds?    0-1    \"What questions do you have about your medications?\"    None     New diagnoses of heart failure, " "COPD, diabetes, or MI?    No          On warfarin: \"Were you given any recommendations for follow-up with the anticoagulation clinic?\" Yes - Anticoagulation clinic appointment is already scheduled at appropriate interval    Medication reconciliation completed? Yes  Was MTM referral placed (*Make sure to put transitions as reason for referral)?   No    Call Summary    \"What questions or concerns do you have about your recent visit and your follow-up care?\"     none    \"If you have questions or things don't continue to improve, we encourage you contact us through the main clinic number (give number).  Even if the clinic is not open, triage nurses are available 24/7 to help you.     We would like you to know that our clinic has extended hours (provide information).  We also have urgent care (provide details on closest location and hours/contact info)\"      \"Thank you for your time and take care!\"    GAIL Carlisle RN       "

## 2018-03-28 ENCOUNTER — TELEPHONE (OUTPATIENT)
Dept: PEDIATRICS | Facility: CLINIC | Age: 83
End: 2018-03-28

## 2018-03-28 ENCOUNTER — OFFICE VISIT (OUTPATIENT)
Dept: URGENT CARE | Facility: URGENT CARE | Age: 83
End: 2018-03-28
Payer: COMMERCIAL

## 2018-03-28 ENCOUNTER — ALLIED HEALTH/NURSE VISIT (OUTPATIENT)
Dept: PEDIATRICS | Facility: CLINIC | Age: 83
End: 2018-03-28

## 2018-03-28 ENCOUNTER — ANTICOAGULATION THERAPY VISIT (OUTPATIENT)
Dept: NURSING | Facility: CLINIC | Age: 83
End: 2018-03-28
Payer: COMMERCIAL

## 2018-03-28 VITALS
TEMPERATURE: 97 F | DIASTOLIC BLOOD PRESSURE: 83 MMHG | OXYGEN SATURATION: 100 % | HEART RATE: 58 BPM | BODY MASS INDEX: 25.29 KG/M2 | SYSTOLIC BLOOD PRESSURE: 159 MMHG | WEIGHT: 152 LBS

## 2018-03-28 VITALS — SYSTOLIC BLOOD PRESSURE: 156 MMHG | DIASTOLIC BLOOD PRESSURE: 66 MMHG

## 2018-03-28 DIAGNOSIS — N39.0 URINARY TRACT INFECTION WITHOUT HEMATURIA, SITE UNSPECIFIED: ICD-10-CM

## 2018-03-28 DIAGNOSIS — I48.91 ATRIAL FIBRILLATION, UNSPECIFIED TYPE (H): ICD-10-CM

## 2018-03-28 DIAGNOSIS — I10 HYPERTENSION GOAL BP (BLOOD PRESSURE) < 150/90: Primary | ICD-10-CM

## 2018-03-28 DIAGNOSIS — R30.0 DYSURIA: Primary | ICD-10-CM

## 2018-03-28 DIAGNOSIS — R82.90 NONSPECIFIC FINDING ON EXAMINATION OF URINE: ICD-10-CM

## 2018-03-28 DIAGNOSIS — Z79.01 LONG-TERM (CURRENT) USE OF ANTICOAGULANTS: ICD-10-CM

## 2018-03-28 LAB
ALBUMIN UR-MCNC: 30 MG/DL
APPEARANCE UR: ABNORMAL
BACTERIA #/AREA URNS HPF: ABNORMAL /HPF
BILIRUB UR QL STRIP: NEGATIVE
COLOR UR AUTO: YELLOW
GLUCOSE UR STRIP-MCNC: NEGATIVE MG/DL
HGB UR QL STRIP: ABNORMAL
INR POINT OF CARE: 1.3 (ref 0.86–1.14)
KETONES UR STRIP-MCNC: NEGATIVE MG/DL
LEUKOCYTE ESTERASE UR QL STRIP: ABNORMAL
MUCOUS THREADS #/AREA URNS LPF: PRESENT /LPF
NITRATE UR QL: NEGATIVE
NON-SQ EPI CELLS #/AREA URNS LPF: ABNORMAL /LPF
PH UR STRIP: 5.5 PH (ref 5–7)
RBC #/AREA URNS AUTO: ABNORMAL /HPF
SOURCE: ABNORMAL
SP GR UR STRIP: <=1.005 (ref 1–1.03)
UROBILINOGEN UR STRIP-ACNC: 0.2 EU/DL (ref 0.2–1)
WBC #/AREA URNS AUTO: >100 /HPF

## 2018-03-28 PROCEDURE — 99207 ZZC NO CHARGE NURSE ONLY: CPT | Performed by: INTERNAL MEDICINE

## 2018-03-28 PROCEDURE — 99207 ZZC NO CHARGE NURSE ONLY: CPT

## 2018-03-28 PROCEDURE — 36416 COLLJ CAPILLARY BLOOD SPEC: CPT

## 2018-03-28 PROCEDURE — 87086 URINE CULTURE/COLONY COUNT: CPT | Performed by: FAMILY MEDICINE

## 2018-03-28 PROCEDURE — 85610 PROTHROMBIN TIME: CPT | Mod: QW

## 2018-03-28 PROCEDURE — 87088 URINE BACTERIA CULTURE: CPT | Performed by: FAMILY MEDICINE

## 2018-03-28 PROCEDURE — 81001 URINALYSIS AUTO W/SCOPE: CPT | Performed by: FAMILY MEDICINE

## 2018-03-28 PROCEDURE — 99213 OFFICE O/P EST LOW 20 MIN: CPT | Performed by: FAMILY MEDICINE

## 2018-03-28 PROCEDURE — 87186 SC STD MICRODIL/AGAR DIL: CPT | Performed by: FAMILY MEDICINE

## 2018-03-28 RX ORDER — CEFDINIR 300 MG/1
600 CAPSULE ORAL DAILY
Qty: 14 CAPSULE | Refills: 0 | Status: SHIPPED | OUTPATIENT
Start: 2018-03-28 | End: 2019-01-04

## 2018-03-28 NOTE — PATIENT INSTRUCTIONS
Take full course of antibiotic for probable bladder infection.  We will contact you if any medication changes are needed after the urine culture is finalize.      Urinary Tract Infections in Women    Urinary tract infections (UTIs) are most often caused by bacteria (germs). These bacteria enter the urinary tract. The bacteria may come from outside the body. Or they may travel from the skin outside the rectum or vagina into the urethra. Female anatomy makes it easy for bacteria from the bowel to enter a woman s urinary tract, which is the most common source of UTI. This means women develop UTIs more often than men. Pain in or around the urinary tract is a common UTI symptom. But the only way to know for sure if you have a UTI for the healthcare provider to test your urine. The two tests that may be done are the urinalysis and urine culture.  Types of UTIs    Cystitis: A bladder infection (cystitis) is the most common UTI in women. You may have urgent or frequent urination. You may also have pain, burning when you urinate, and bloody urine.    Urethritis: This is an inflamed urethra, which is the tube that carries urine from the bladder to outside the body. You may have lower stomach or back pain. You may also have urgent or frequent urination.    Pyelonephritis: This is a kidney infection. If not treated, it can be serious and damage your kidneys. In severe cases, you may be hospitalized. You may have a fever and lower back pain.  Medicines to treat a UTI  Most UTIs are treated with antibiotics. These kill the bacteria. The length of time you need to take them depends on the type of infection. It may be as short as 3 days. If you have repeated UTIs, a low-dose antibiotic may be needed for several months. Take antibiotics exactly as directed. Don t stop taking them until all of the medicine is gone. If you stop taking the antibiotic too soon, the infection may not go away, and you may develop a resistance to the  antibiotic. This can make it much harder to treat.  Lifestyle changes to treat and prevent UTIs  The lifestyle changes below will help get rid of your UTI. They may also help prevent future UTIs.    Drink plenty of fluids. This includes water, juice, or other caffeine-free drinks. Fluids help flush bacteria out of your body.    Empty your bladder. Always empty your bladder when you feel the urge to urinate. And always urinate before going to sleep. Urine that stays in your bladder can lead to infection. Try to urinate before and after sex as well.    Practice good personal hygiene. Wipe yourself from front to back after using the toilet. This helps keep bacteria from getting into the urethra.    Use condoms during sex. These help prevent UTIs caused by sexually transmitted bacteria. Also, avoid using spermicides during sex. These can increase the risk of UTIs. Choose other forms of birth control instead. For women who tend to get UTIs after sex, a low-dose of a preventive antibiotic may be used. Be sure to discuss this option with your healthcare provider.    Follow up with your healthcare provider as directed. He or she may test to make sure the infection has cleared. If needed, more treatment may be started.  Date Last Reviewed: 1/1/2017 2000-2017 [employer]. [address] [place] [address], [place] [address]. All rights reserved. This information is not intended as a substitute for professional medical care. Always follow your healthcare professional's instructions.

## 2018-03-28 NOTE — PROGRESS NOTES
Maia Miranda is enrolled/participating in the retail pharmacy Blood Pressure Goals Achievement Program (BPGAP).  Maia Miranda was evaluated at [facility] on March 28, 2018 at which time her blood pressure was:    BP Readings from Last 3 Encounters:   03/28/18 156/66   03/26/18 158/56   02/26/18 152/60     Reviewed lifestyle modifications for blood pressure control and reduction: including making healthy food choices, managing weight, getting regular exercise, smoking cessation, reducing alcohol consumption, monitoring blood pressure regularly.     Maia Miranda is not experiencing symptoms.    Follow-Up: BP is not at goal of < 150/90 mmHg (patient 60+ years of age without diabetes), Recommended follow-up in the next few days at the pharmacy. Routing to PCP as an FYI.      Completed by:     [facility] Pharmacist on behalf of: [facility]

## 2018-03-28 NOTE — TELEPHONE ENCOUNTER
Patient calling that she believes she is having another UTI and would just like to drop off a cath specimen. Advised that her provider was no longer in the clinic this afternoon and she would need to come in to the clinic to  to have an order. She will bring the sample with her.   Daphne Aguilar RN

## 2018-03-28 NOTE — Clinical Note
Routing message to PCP for review -BP checked at pharmacy and noted to be above goal. Recommended patient follow-up with repeat bp check at pharmacy in the next few days.

## 2018-03-28 NOTE — MR AVS SNAPSHOT
After Visit Summary   3/28/2018    Maia Miranda    MRN: [id number]           Patient Information     Date Of Birth          10/26/1932        Visit Information        Provider Department      3/28/2018 5:50 PM Alvin Gupta MD [facility]        Today's Diagnoses     Dysuria    -  1    Nonspecific finding on examination of urine        Urinary tract infection without hematuria, site unspecified          Care Instructions    Take full course of antibiotic for probable bladder infection.  We will contact you if any medication changes are needed after the urine culture is finalize.      Urinary Tract Infections in Women    Urinary tract infections (UTIs) are most often caused by bacteria (germs). These bacteria enter the urinary tract. The bacteria may come from outside the body. Or they may travel from the skin outside the rectum or vagina into the urethra. Female anatomy makes it easy for bacteria from the bowel to enter a woman s urinary tract, which is the most common source of UTI. This means women develop UTIs more often than men. Pain in or around the urinary tract is a common UTI symptom. But the only way to know for sure if you have a UTI for the healthcare provider to test your urine. The two tests that may be done are the urinalysis and urine culture.  Types of UTIs    Cystitis: A bladder infection (cystitis) is the most common UTI in women. You may have urgent or frequent urination. You may also have pain, burning when you urinate, and bloody urine.    Urethritis: This is an inflamed urethra, which is the tube that carries urine from the bladder to outside the body. You may have lower stomach or back pain. You may also have urgent or frequent urination.    Pyelonephritis: This is a kidney infection. If not treated, it can be serious and damage your kidneys. In severe cases, you may be hospitalized. You may have a fever and lower back pain.  Medicines to treat a UTI  Most UTIs  are treated with antibiotics. These kill the bacteria. The length of time you need to take them depends on the type of infection. It may be as short as 3 days. If you have repeated UTIs, a low-dose antibiotic may be needed for several months. Take antibiotics exactly as directed. Don t stop taking them until all of the medicine is gone. If you stop taking the antibiotic too soon, the infection may not go away, and you may develop a resistance to the antibiotic. This can make it much harder to treat.  Lifestyle changes to treat and prevent UTIs  The lifestyle changes below will help get rid of your UTI. They may also help prevent future UTIs.    Drink plenty of fluids. This includes water, juice, or other caffeine-free drinks. Fluids help flush bacteria out of your body.    Empty your bladder. Always empty your bladder when you feel the urge to urinate. And always urinate before going to sleep. Urine that stays in your bladder can lead to infection. Try to urinate before and after sex as well.    Practice good personal hygiene. Wipe yourself from front to back after using the toilet. This helps keep bacteria from getting into the urethra.    Use condoms during sex. These help prevent UTIs caused by sexually transmitted bacteria. Also, avoid using spermicides during sex. These can increase the risk of UTIs. Choose other forms of birth control instead. For women who tend to get UTIs after sex, a low-dose of a preventive antibiotic may be used. Be sure to discuss this option with your healthcare provider.    Follow up with your healthcare provider as directed. He or she may test to make sure the infection has cleared. If needed, more treatment may be started.  Date Last Reviewed: 1/1/2017 2000-2017 [employer]. [address] [place] [address], [place] [address]. All rights reserved. This information is not intended as a substitute for professional medical care. Always follow your healthcare professional's  instructions.                Follow-ups after your visit        Your next 10 appointments already scheduled     Apr 02, 2018 11:20 AM CDT   SHORT with Dian Frias MD   [facility] ([facility])    [address]  [address]  [place] [address] [phone]            Apr 04, 2018 11:00 AM CDT   Anticoagulation Visit with  ANTICOAGULATION CLINIC   [facility] ([facility])    [address] [address] [phone]              Who to contact     If you have questions or need follow up information about today's clinic visit or your schedule please contact [facility] directly at [phone].  Normal or non-critical lab and imaging results will be communicated to you by [employer], letter or phone within 4 business days after the clinic has received the results. If you do not hear from us within 7 days, please contact the clinic through [employer] or phone. If you have a critical or abnormal lab result, we will notify you by phone as soon as possible.  Submit refill requests through [employer] or call your pharmacy and they will forward the refill request to us. Please allow 3 business days for your refill to be completed.          Additional Information About Your Visit        [employer] Information     [employer] gives you secure access to your electronic health record. If you see a primary care provider, you can also send messages to your care team and make appointments. If you have questions, please call your primary care clinic.  If you do not have a primary care provider, please call [phone] and they will assist you.        Care EveryWhere ID     This is your Care EveryWhere ID. This could be used by other organizations to access your [facility] medical records  [id number]        Your Vitals Were     Pulse Temperature Pulse Oximetry BMI (Body Mass Index)          58 97  F (36.1  C) (Tympanic) 100%  25.29 kg/m2         Blood Pressure from Last 3 Encounters:   03/28/18 159/83   03/28/18 156/66   03/26/18 158/56    Weight from Last 3 Encounters:   03/28/18 152 lb (68.9 kg)   03/23/18 156 lb (70.8 kg)   02/26/18 160 lb 12.8 oz (72.9 kg)              We Performed the Following     UA reflex to Microscopic and Culture     Urine Culture Aerobic Bacterial     Urine Microscopic          Today's Medication Changes          These changes are accurate as of 3/28/18  6:32 PM.  If you have any questions, ask your nurse or doctor.               Start taking these medicines.        Dose/Directions    cefdinir 300 MG capsule   Commonly known as:  OMNICEF   Used for:  Urinary tract infection without hematuria, site unspecified   Started by:  Alvin Gupta MD        Dose:  600 mg   Take 2 capsules (600 mg) by mouth daily for 7 days   Quantity:  14 capsule   Refills:  0            Where to get your medicines      These medications were sent to [facility] [address] [place] [address]     Phone:  [phone]     cefdinir 300 MG capsule                Primary Care Provider Office Phone # Fax #    Dian Frias [phone]       [facility] [address] [place] [address]        Equal Access to Services     JENNIFER ALEX AH: Hadii luiz cooper hadasho Soomaali, waaxda luqadaha, qaybta kaalmada adeegyada, simin santana. So [facility] [phone].    ATENCIÓN: Si habla español, tiene a conn disposición servicios gratuitos de asistencia lingüística. Llame al [phone].    We comply with applicable federal civil rights laws and [place] laws. We do not discriminate on the basis of race, color, national origin, age, disability, sex, sexual orientation, or gender identity.            Thank you!     Thank you for choosing [facility]  for your care. Our goal is always to provide you with excellent care. Hearing back  from our patients is one way we can continue to improve our services. Please take a few minutes to complete the written survey that you may receive in the mail after your visit with us. Thank you!             Your Updated Medication List - Protect others around you: Learn how to safely use, store and throw away your medicines at www.disposemymeds.org.          This list is accurate as of 3/28/18  6:32 PM.  Always use your most recent med list.                   Brand Name Dispense Instructions for use Diagnosis    ascorbic acid 1000 MG Tabs    vitamin C    180 tablet    Take 1 tablet (1,000 mg) by mouth 2 times daily    Recurrent UTI, Neurogenic bladder       calcium citrate-vitamin D 315-250 MG-UNIT Tabs per tablet    CITRACAL     Take 1 tablet by mouth daily        carboxymethylcellulose 1 % ophthalmic solution    CELLUVISC/REFRESH LIQUIGEL     Place 1 drop into both eyes every morning As needed        cefdinir 300 MG capsule    OMNICEF    14 capsule    Take 2 capsules (600 mg) by mouth daily for 7 days    Urinary tract infection without hematuria, site unspecified       conjugated estrogens cream    PREMARIN     Place vaginally twice a week On Tuesday and Friday. Uses a pea sized amount        fluocinonide 0.05 % cream    LIDEX    30 g    Apply topically 2 times daily as needed    Dermatitis       GENTLECATH URINARY CATHETER Misc     120 each    1 catheter 4 times daily    Urinary retention       lisinopril 10 MG tablet    PRINIVIL/ZESTRIL    90 tablet    Take 1 tablet (10 mg) by mouth every morning    Hypertension goal BP (blood pressure) < 150/90       methenamine hippurate 1 G Tabs tablet    HIPREX     Take 1 g by mouth 2 times daily        metoprolol succinate 100 MG 24 hr tablet    TOPROL-XL    90 tablet    Take 1 tablet (100 mg) by mouth daily    Palpitations, Atrial fibrillation, unspecified type (H)       OMEGA-3 FISH OIL PO      Take 1 g by mouth 2 times daily (with meals)        order for DME     1  Device    Trilok ankle brace    Plantar fascial fibromatosis, Pain of left heel       polyethylene glycol Packet    MIRALAX/GLYCOLAX     Take 1 packet by mouth daily as needed        sodium chloride 5 % ophthalmic ointment    PETER 128     Place 1 Application into both eyes At Bedtime        TYLENOL 500 MG tablet   Generic drug:  acetaminophen      Take 500-1,000 mg by mouth every 6 hours as needed        Vitamin D-3 1000 UNITS Caps      Take 1 capsule by mouth daily        warfarin 2.5 MG tablet    COUMADIN    100 tablet    Take 2.5mg (1 tablet)  MTWTFSS or as directed by INR Clinic.    Long-term (current) use of anticoagulants

## 2018-03-28 NOTE — PROGRESS NOTES
"  ANTICOAGULATION FOLLOW-UP CLINIC VISIT    Patient Name:  Maia Miranda   Date:  3/28/2018  Contact Type:  Face to Face    SUBJECTIVE:     Patient Findings     Positives Hospital admission (Admit hospital 5/23/18 - likely ischemic colitis and abd cramping; d/c 5/26/18)    Comments Discharge instructions from [facility] state:  \"Agree with provider note of resuming home dose of coumadin on 3/27/18   The patient should have an INR checked March / 29 / 2018.\"     \"Thank you   Grace Mosher, PharmD\"            OBJECTIVE    INR Protime   Date Value Ref Range Status   03/28/2018 1.3 (A) 0.86 - 1.14 Final       ASSESSMENT / PLAN  INR assessment SUB    Recheck INR In: 1 WEEK    INR Location Clinic      Anticoagulation Summary as of 3/28/2018     INR goal 2.0-3.0   Today's INR 1.3!   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

## 2018-03-28 NOTE — PROGRESS NOTES
SUBJECTIVE:   Maia Miranda is a 85 year old female who  presents today for a possible UTI. Symptoms of urgency and cloudy urine have been going since this morning.  Patient is able to urinate but in general has to self cath 4 times a day as unable to empty bladder completely.   Hematuria no.  sudden onsetand mild and moderate.  There is no history of fever, chills, nausea or vomiting.  This patient does have a history of urinary tract infections.  Patient recently in hospital due to rectal bleeding.  Patient is on coumadin.  Just got home on Monday, had INR check earlier today and not up to goal yet.  Patient had BM and was okay.     Past Medical History:   Diagnosis Date     Amnestic MCI (mild cognitive impairment with memory loss) 2014     Chronic duodenal ulcer without mention of hemorrhage, perforation, or obstruction 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 [facility], [facility] yearly     Esophageal reflux 2001    Abstracted 06/10/02     Essential and other specified forms of tremor 2004    resting tremor     Generalized osteoarthrosis, unspecified site     Hands     Hiatal hernia     diagnosed late 1990s [place], [place]     History of pulmonary embolism 1971    no source found     LACTOSE INTOLERANCE      Lumbago     sees Kodi Guidry     Mitral valve regurgitation      Occlusion and stenosis of carotid artery without mention of cerebral infarction 2003    CAROTID US NORMAL, bruit in neck     Osteoporosis, unspecified 2003    T = -2.66     Paroxysmal atrial fibrillation (H) 11/15/2013     Unspecified essential hypertension      Unspecified tinnitus 2005    mild hearing loss, saw ENT     Current Outpatient Prescriptions   Medication Sig Dispense Refill     warfarin (COUMADIN) 2.5 MG tablet Take 2.5mg (1 tablet)  MTWTFSS or as directed by INR Clinic. 100 tablet 3     methenamine hippurate (HIPREX) 1 G TABS tablet Take 1 g by mouth 2 times  daily       metoprolol succinate (TOPROL-XL) 100 MG 24 hr tablet Take 1 tablet (100 mg) by mouth daily 90 tablet 3     fluocinonide (LIDEX) 0.05 % cream Apply topically 2 times daily as needed 30 g 2     order for DME Trilok ankle brace 1 Device 0     calcium citrate-vitamin D (CITRACAL) 315-250 MG-UNIT TABS per tablet Take 1 tablet by mouth daily       Cholecalciferol (VITAMIN D-3) 1000 UNITS CAPS Take 1 capsule by mouth daily       lisinopril (PRINIVIL/ZESTRIL) 10 MG tablet Take 1 tablet (10 mg) by mouth every morning 90 tablet 3     polyethylene glycol (MIRALAX/GLYCOLAX) packet Take 1 packet by mouth daily as needed        conjugated estrogens (PREMARIN) vaginal cream Place vaginally twice a week On Tuesday and Friday. Uses a pea sized amount       acetaminophen (TYLENOL) 500 MG tablet Take 500-1,000 mg by mouth every 6 hours as needed       ascorbic acid (VITAMIN C) 1000 MG TABS Take 1 tablet (1,000 mg) by mouth 2 times daily 180 tablet 3     Omega-3 Fatty Acids (OMEGA-3 FISH OIL PO) Take 1 g by mouth 2 times daily (with meals)       sodium chloride (PETER 128) 5 % ophthalmic ointment Place 1 Application into both eyes At Bedtime       carboxymethylcellulose (CELLUVISC/REFRESH LIQUIGEL) 1 % ophthalmic solution Place 1 drop into both eyes every morning As needed       Catheters (GENTLECATH URINARY CATHETER) MISC 1 catheter 4 times daily 120 each 12     Social History   Substance Use Topics     Smoking status: Never Smoker     Smokeless tobacco: Never Used     Alcohol use No       ROS:   Review of systems negative except as stated above.    OBJECTIVE:  /83 (BP Location: Right arm, Patient Position: Chair, Cuff Size: Adult Regular)  Pulse 58  Temp 97  F (36.1  C) (Tympanic)  Wt 152 lb (68.9 kg)  SpO2 100%  BMI 25.29 kg/m2  GENERAL APPEARANCE: alert and no distress, tired  RESP: lungs clear to auscultation - no rales, rhonchi or wheezes  CV: regular rates and rhythm, normal S1 S2, no murmur noted  BACK: No  CVA tenderness  PSYCH: mentation appears normal and affect normal/bright    Results for orders placed or performed in visit on 03/28/18   UA reflex to Microscopic and Culture   Result Value Ref Range    Color Urine Yellow     Appearance Urine Slightly Cloudy     Glucose Urine Negative NEG^Negative mg/dL    Bilirubin Urine Negative NEG^Negative    Ketones Urine Negative NEG^Negative mg/dL    Specific Gravity Urine <=1.005 1.003 - 1.035    Blood Urine Large (A) NEG^Negative    pH Urine 5.5 5.0 - 7.0 pH    Protein Albumin Urine 30 (A) NEG^Negative mg/dL    Urobilinogen Urine 0.2 0.2 - 1.0 EU/dL    Nitrite Urine Negative NEG^Negative    Leukocyte Esterase Urine Large (A) NEG^Negative    Source Midstream Urine    Urine Microscopic   Result Value Ref Range    WBC Urine >100 (A) OTO5^0 - 5 /HPF    RBC Urine O - 2 OTO2^O - 2 /HPF    Squamous Epithelial /LPF Urine Few FEW^Few /LPF    Bacteria Urine Many (A) NEG^Negative /HPF    Mucous Urine Present (A) NEG^Negative /LPF       ASSESSMENT/PLAN:   (R30.0) Dysuria  (primary encounter diagnosis)  Comment: UTI  Plan: UA reflex to Microscopic and Culture, Urine         Microscopic            (R82.90) Nonspecific finding on examination of urine  Comment: UTI  Plan: Urine Culture Aerobic Bacterial            (N39.0) Urinary tract infection without hematuria, site unspecified  Comment: UTI  Plan: cefdinir (OMNICEF) 300 MG capsule            Empiric treatment for presumptive UTI with Omnicef, patient with risk factors due to poor emptying and self-cath.  Will follow up on urine culture and adjust medication if needed.  Drink plenty of fluids.  Prevention and treatment of UTI's discussed.Signs and symptoms of pyelonephritis mentioned.    Follow up with primary for urine recheck in 1-2 weeks.    Alvin Gupta MD  March 28, 2018 8:54 PM

## 2018-03-28 NOTE — MR AVS SNAPSHOT
Maia Miranda   3/28/2018 11:00 AM   Anticoagulation Therapy Visit    Description:  85 year old female   Provider:   ANTICOAGULATION CLINIC   Department:   Nurse           INR as of 3/28/2018     Today's INR 1.3!      Anticoagulation Summary as of 3/28/2018     INR goal 2.0-3.0   Today's INR 1.3!   Full instructions 2.5 mg every day   Next INR check 4/4/2018    Indications   Long-term (current) use of anticoagulants [Z79.01]  Atrial fibrillation (H) [I48.91]         Your next Anticoagulation Clinic appointment(s)     Apr 04, 2018 11:00 AM CDT   Anticoagulation Visit with  ANTICOAGULATION CLINIC   [facility] ([facility]    [address] [address] [phone]              Contact Numbers     [facility]  Please call to cancel and/or reschedule your appointment, or with any problems or questions regarding your therapy.  Anticoagulation Nurse: [phone]  Main Clinic: [phone]             March 2018 Details    Sun Mon Tue Wed Thu Fri Sat         1               2               3                 4               5               6               7               8               9               10                 11               12               13               14               15               16               17                 18               19               20               21               22               23               24                 25               26               27               28      2.5 mg   See details      29      2.5 mg         30      2.5 mg         31      2.5 mg          Date Details   03/28 This INR check               How to take your warfarin dose     To take:  2.5 mg Take 1 of the 2.5 mg tablets.           April 2018 Details    Sun Mon Tue Wed Thu Fri Sat     1      2.5 mg         2      2.5 mg         3      2.5 mg         4            5               6               7                 8               9                10               11               12               13               14                 15               16               17               18               19               20               21                 22               23               24               25               26               27               28                 29               30                     Date Details   No additional details    Date of next INR:  4/4/2018         How to take your warfarin dose     To take:  2.5 mg Take 1 of the 2.5 mg tablets.

## 2018-03-30 LAB
BACTERIA SPEC CULT: ABNORMAL
BACTERIA SPEC CULT: ABNORMAL
SPECIMEN SOURCE: ABNORMAL

## 2018-04-02 ENCOUNTER — OFFICE VISIT (OUTPATIENT)
Dept: PEDIATRICS | Facility: CLINIC | Age: 83
End: 2018-04-02
Payer: COMMERCIAL

## 2018-04-02 VITALS
HEIGHT: 65 IN | SYSTOLIC BLOOD PRESSURE: 150 MMHG | DIASTOLIC BLOOD PRESSURE: 70 MMHG | WEIGHT: 155 LBS | TEMPERATURE: 97.3 F | OXYGEN SATURATION: 98 % | BODY MASS INDEX: 25.83 KG/M2 | HEART RATE: 54 BPM

## 2018-04-02 DIAGNOSIS — H61.22 IMPACTED CERUMEN OF LEFT EAR: ICD-10-CM

## 2018-04-02 DIAGNOSIS — I48.91 ATRIAL FIBRILLATION, UNSPECIFIED TYPE (H): ICD-10-CM

## 2018-04-02 DIAGNOSIS — N30.01 ACUTE CYSTITIS WITH HEMATURIA: ICD-10-CM

## 2018-04-02 DIAGNOSIS — K55.9 ISCHEMIC COLITIS (H): Primary | ICD-10-CM

## 2018-04-02 PROCEDURE — 69209 REMOVE IMPACTED EAR WAX UNI: CPT | Performed by: INTERNAL MEDICINE

## 2018-04-02 PROCEDURE — 99495 TRANSJ CARE MGMT MOD F2F 14D: CPT | Mod: 25 | Performed by: INTERNAL MEDICINE

## 2018-04-02 NOTE — PATIENT INSTRUCTIONS
1. Wash out ear today in clinic  2. Follow-up for INR as scheduled  3. Follow-up the end of May or early June for annual visit

## 2018-04-02 NOTE — MR AVS SNAPSHOT
After Visit Summary   4/2/2018    Maia Miranda    MRN: [id number]           Patient Information     Date Of Birth          10/26/1932        Visit Information        Provider Department      4/2/2018 11:20 AM Dian Frias MD [facility]        Care Instructions    1. Wash out ear today in clinic  2. Follow-up for INR as scheduled  3. Follow-up the end of May or early June for annual visit          Follow-ups after your visit        Follow-up notes from your care team     Return in about 2 months (around 6/2/2018).      Your next 10 appointments already scheduled     Apr 04, 2018 11:00 AM CDT   Anticoagulation Visit with  ANTICOAGULATION CLINIC   [facility]    [address] [address] [phone]              Who to contact     If you have questions or need follow up information about today's clinic visit or your schedule please [facility] directly at [phone].  Normal or non-critical lab and imaging results will be communicated to you by Variablehart, letter or phone within 4 business days after the clinic has received the results. If you do not hear from us within 7 days, please contact the clinic through Angelfish or phone. If you have a critical or abnormal lab result, we will notify you by phone as soon as possible.  Submit refill requests through Angelfish or call your pharmacy and they will forward the refill request to us. Please allow 3 business days for your refill to be completed.          Additional Information About Your Visit        [email or web address] Information     Angelfish gives you secure access to your electronic health record. If you see a primary care provider, you can also send messages to your care team and make appointments. If you have questions, please call your primary care clinic.  If you do not have a primary care provider, please call [phone] and they will assist you.    "     Care EveryWhere ID     This is your Care EveryWhere ID. This could be used by other organizations to access your [facility] medical records  [id number]        Your Vitals Were     Pulse Temperature Height Pulse Oximetry BMI (Body Mass Index)       54 97.3  F (36.3  C) (Tympanic) 5' 5\" (1.651 m) 98% 25.79 kg/m2        Blood Pressure from Last 3 Encounters:   04/02/18 150/70   03/28/18 159/83   03/28/18 156/66    Weight from Last 3 Encounters:   04/02/18 155 lb (70.3 kg)   03/28/18 152 lb (68.9 kg)   03/23/18 156 lb (70.8 kg)              Today, you had the following     No orders found for display       Primary Care Provider Office Phone # Fax #    Dian Frias [phone] [address] [place] [address]        Equal Access to Services     [facility]: Hadii luiz ku hadasho Sokeily, waaxda luqadaha, qaybta kaalmada adeegyada, simin tomlinsonin armando leahy . So [facility] [phone].    ATENCIÓN: Si habla español, tiene a conn disposición servicios gratuitos de asistencia lingüística. Llame al [phone].    We comply with applicable federal civil rights laws and [place] laws. We do not discriminate on the basis of race, color, national origin, age, disability, sex, sexual orientation, or gender identity.            Thank you!     [facility] your [facility]. Our goal is always to provide you with excellent care. Hearing back from our patients is one way we can continue to improve our services. Please take a few minutes to complete the written survey that you may receive in the mail after your visit with us. Thank you!             Your Updated Medication List - Protect others around you: Learn how to safely use, store and throw away your medicines at www.disposemymeds.org.          This list is accurate as of 4/2/18 12:16 PM.  Always use your most recent med list.                   Brand Name Dispense Instructions for use " Diagnosis    ascorbic acid 1000 MG Tabs    vitamin C    180 tablet    Take 1 tablet (1,000 mg) by mouth 2 times daily    Recurrent UTI, Neurogenic bladder       calcium citrate-vitamin D 315-250 MG-UNIT Tabs per tablet    CITRACAL     Take 1 tablet by mouth daily        carboxymethylcellulose 1 % ophthalmic solution    CELLUVISC/REFRESH LIQUIGEL     Place 1 drop into both eyes every morning As needed        cefdinir 300 MG capsule    OMNICEF    14 capsule    Take 2 capsules (600 mg) by mouth daily for 7 days    Urinary tract infection without hematuria, site unspecified       conjugated estrogens cream    PREMARIN     Place vaginally twice a week On Tuesday and Friday. Uses a pea sized amount        fluocinonide 0.05 % cream    LIDEX    30 g    Apply topically 2 times daily as needed    Dermatitis       GENTLECATH URINARY CATHETER Misc     120 each    1 catheter 4 times daily    Urinary retention       lisinopril 10 MG tablet    PRINIVIL/ZESTRIL    90 tablet    Take 1 tablet (10 mg) by mouth every morning    Hypertension goal BP (blood pressure) < 150/90       methenamine hippurate 1 G Tabs tablet    HIPREX     Take 1 g by mouth 2 times daily        metoprolol succinate 100 MG 24 hr tablet    TOPROL-XL    90 tablet    Take 1 tablet (100 mg) by mouth daily    Palpitations, Atrial fibrillation, unspecified type (H)       OMEGA-3 FISH OIL PO      Take 1 g by mouth 2 times daily (with meals)        order for DME     1 Device    Trilok ankle brace    Plantar fascial fibromatosis, Pain of left heel       polyethylene glycol Packet    MIRALAX/GLYCOLAX     Take 1 packet by mouth daily as needed        sodium chloride 5 % ophthalmic ointment    PETER 128     Place 1 Application into both eyes At Bedtime        TYLENOL 500 MG tablet   Generic drug:  acetaminophen      Take 500-1,000 mg by mouth every 6 hours as needed        Vitamin D-3 1000 UNITS Caps      Take 1 capsule by mouth daily        warfarin 2.5 MG tablet     COUMADIN    100 tablet    Take 2.5mg (1 tablet)  MTWTFSS or as directed by INR Clinic.    Long-term (current) use of anticoagulants

## 2018-04-02 NOTE — PROGRESS NOTES
SUBJECTIVE:   Maia Miranda is a 85 year old female who presents to clinic today for the following health issues:    Hospital Follow-up Visit:    Hospital/Nursing Home/IP Rehab Facility: [facility]  Date of Admission: 3/23/18  Date of Discharge: 3/26/18  Reason(s) for Admission: colitis, rectal bleed            Problems taking medications regularly:  none       Medication changes since discharge: None       Problems adhering to non-medication therapy:  None    Summary of hospitalization:  [facility] discharge summary reviewed  Diagnostic Tests/Treatments reviewed.  Follow up needed: INR  Other Healthcare Providers Involved in Patient s Care:         INR Clinic  Update since discharge: improved.     Post Discharge Medication Reconciliation: discharge medications reconciled, continue medications without change.  Plan of care communicated with patient    84 y/o F with history of a-fib on warfarin admitted to the hospital with hematochezia and lower abdominal cramping. CT showed left sided colitis thought to be due to ischemic colitis. Stool studies were negative. Hemoglobin has been stable. Warfarin was held and bleeding stopped. She was resumed on her warfarin. Saw INR clinic on 3/28 and has follow-up visit scheduled on Wednesday. Has not had any further bleeding or abdominal pain since discharge.    Also seen in UC on 3/28 for UTI symptoms. Urine was cloudy and having difficulty emptying bladder. UA positive and she was started on cefdinir. Symptoms have improved. UCx positive for Klebsiella that is sensitive to the cefdinir.    Left ear pain - has been bothering for awhile. Pain comes and goes, but there most of the time is there. Hearing is decreased in that ear.         Reviewed and updated as needed this visit by clinical staff  Tobacco  Allergies  Meds  Problems  Med Hx  Surg Hx  Fam Hx  Soc Hx        Reviewed and updated as needed this visit by Provider  Allergies  Meds   "Problems         -------------------------------------    ROS:  Constitutional, HEENT, cardiovascular, pulmonary, GI, , musculoskeletal, neuro, skin, endocrine and psych systems are negative, except as otherwise noted.    OBJECTIVE:                                                    /70 (BP Location: Right arm, Patient Position: Sitting, Cuff Size: Adult Regular)  Pulse 54  Temp 97.3  F (36.3  C) (Tympanic)  Ht 5' 5\" (1.651 m)  Wt 155 lb (70.3 kg)  SpO2 98%  BMI 25.79 kg/m2   Body mass index is 25.79 kg/(m^2).  General Appearance: healthy, alert and no distress  Eyes:   no discharge, erythema.  Normal pupils.  Right Ear: normal: no effusions, no erythema, normal landmarks  Left Ear: mostly occluded with wax, portion of visualized TM is normal.  Neck: Supple.  No adenopathy, no asymmetry, masses  Respiratory: lungs clear to auscultation - no rales, rhonchi or wheezes.  Cardiovascular: regular rate and rhythm, normal S1 S2, no S3 or S4 and no murmur, click or rub.  No peripheral edema.  Abd: soft, NTD/ND  Skin: no rashes or lesions.  Well perfused and normal turgor.    Diagnostic Test Results:  Reviewed labs from hospital and UC:  UCx: klebsiella sensitive to cefdinir     ASSESSMENT/PLAN:                                                      (K55.9) Ischemic colitis (H)  (primary encounter diagnosis)  Comment: symptoms have resolved  Plan:   - restarted warfarin. Currently subtherapeutic  - continue to monitor for symptoms    (H61.22) Impacted cerumen of left ear  Comment: partially occluding canal  Plan: washed out in clinic today    (N30.01) Acute cystitis with hematuria  Comment: symptoms improving, on adequate treatment  Plan: continue cefdinir    (I48.91) Atrial fibrillation, unspecified type (H)  Comment: restarted warfarin  Plan: f/u with INR Clinic as scheduled    Follow up with Provider - May for annual visit     [facility]          "

## 2018-04-04 ENCOUNTER — ANTICOAGULATION THERAPY VISIT (OUTPATIENT)
Dept: NURSING | Facility: CLINIC | Age: 83
End: 2018-04-04
Payer: COMMERCIAL

## 2018-04-04 DIAGNOSIS — Z79.01 LONG-TERM (CURRENT) USE OF ANTICOAGULANTS: ICD-10-CM

## 2018-04-04 DIAGNOSIS — I48.91 ATRIAL FIBRILLATION, UNSPECIFIED TYPE (H): ICD-10-CM

## 2018-04-04 LAB — INR POINT OF CARE: 2.2 (ref 0.86–1.14)

## 2018-04-04 PROCEDURE — 36416 COLLJ CAPILLARY BLOOD SPEC: CPT

## 2018-04-04 PROCEDURE — 85610 PROTHROMBIN TIME: CPT | Mod: QW

## 2018-04-04 PROCEDURE — 99207 ZZC NO CHARGE NURSE ONLY: CPT

## 2018-04-04 NOTE — PROGRESS NOTES
ANTICOAGULATION FOLLOW-UP CLINIC VISIT    Patient Name:  Maia Miranda  Date:  4/4/2018  Contact Type:  Face to Face    SUBJECTIVE:     Patient Findings     Positives Change in diet/appetite (eating the same amt of veggies daily), Antibiotic use or infection (Cefdinir - has a few more doses)           OBJECTIVE    INR Protime   Date Value Ref Range Status   04/04/2018 2.2 (A) 0.86 - 1.14 Final       ASSESSMENT / PLAN  INR assessment THER    Recheck INR In: 3 WEEKS    INR Location Clinic      Anticoagulation Summary as of 4/4/2018     INR goal 2.0-3.0   Today's INR 2.2   Maintenance plan 2.5 mg (2.5 mg x 1) every day   Full instructions 2.5 mg every day   Weekly total 17.5 mg   No change documented Meme Riggs RN   Plan last modified Meme Riggs RN (1/18/2017)   Next INR check 4/25/2018   Target end date Indefinite    Indications   Long-term (current) use of anticoagulants [Z79.01]  Atrial fibrillation (H) [I48.91]         Anticoagulation Episode Summary     INR check location [facility]    Preferred lab     Send INR reminders to  ANTICOAG CLINIC    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    Meme Riggs, RN

## 2018-04-04 NOTE — MR AVS SNAPSHOT
Maia Miranda   4/4/2018 11:00 AM   Anticoagulation Therapy Visit    Description:  85 year old female   Provider:   ANTICOAGULATION CLINIC   Department:   Nurse           INR as of 4/4/2018     Today's INR 2.2      Anticoagulation Summary as of 4/4/2018     INR goal 2.0-3.0   Today's INR 2.2   Full instructions 2.5 mg every day   Next INR check 4/25/2018    Indications   Long-term (current) use of anticoagulants [Z79.01]  Atrial fibrillation (H) [I48.91]         Your next Anticoagulation Clinic appointment(s)     Apr 25, 2018 11:00 AM CDT   Anticoagulation Visit with  ANTICOAGULATION CLINIC   [facility]    [address] [address] [phone]              Contact Numbers     [facility]  Please call to cancel and/or reschedule your appointment, or with any problems or questions regarding your therapy.  Anticoagulation Nurse: [phone]  Main Clinic: [phone]             April 2018 Details    Sun Mon Tue Wed Thu Fri Sat     1               2               3               4      2.5 mg   See details      5      2.5 mg         6      2.5 mg         7      2.5 mg           8      2.5 mg         9      2.5 mg         10      2.5 mg         11      2.5 mg         12      2.5 mg         13      2.5 mg         14      2.5 mg           15      2.5 mg         16      2.5 mg         17      2.5 mg         18      2.5 mg         19      2.5 mg         20      2.5 mg         21      2.5 mg           22      2.5 mg         23      2.5 mg         24      2.5 mg         25            26               27               28                 29               30                     Date Details   04/04 This INR check       Date of next INR:  4/25/2018         How to take your warfarin dose     To take:  2.5 mg Take 1 of the 2.5 mg tablets.

## 2018-04-05 NOTE — NURSING NOTE
Maia Miranda is a 85 year old female who presents today for Ear Wash. with the complaint of fullness, hearing loss, blockage and wax.    Ear exam showing wax occlusion in the left ear.  no drops were placed in left ear(s) and let soak for 0 minutes.  The patients ear(s) were irrigated using a syringe with mild amount of wax extracted.  Patient tolerated procedure well.    Patient instructed to irrigate ears with warm water daily.    Outcome:hearing much better, blockage cleared.  Fatmata Kendrick LPN

## 2018-04-14 DIAGNOSIS — Z79.01 LONG-TERM (CURRENT) USE OF ANTICOAGULANTS: ICD-10-CM

## 2018-04-14 NOTE — TELEPHONE ENCOUNTER
"Requested Prescriptions   Pending Prescriptions Disp Refills     warfarin (COUMADIN) 2.5 MG tablet [Pharmacy Med Name: WARFARIN 2.5MG      TAB]  Last Written Prescription Date:  3/27/18  Last Fill Quantity: 100 TABLET,  # refills: 3   Last office visit: 4/2/2018 with prescribing provider:  SERUM   Future Office Visit:     40 tablet 9     Sig: TAKE 1 TABLET (2.5MG) ON MON, TUES, WED ,THUR, FRI ,SAT AND SUN OR AS DIRECTED BY INR CLINIC    Vitamin K Antagonists Failed    4/14/2018 10:52 AM       Failed - INR is within goal in the past 6 weeks    Confirm INR is within goal in the past 6 weeks.     Recent Labs   Lab Test 04/04/18   INR  2.2*                      Passed - Recent (12 mo) or future (30 days) visit within the authorizing provider's specialty    Patient had office visit in the last 12 months or has a visit in the next 30 days with authorizing provider or within the authorizing provider's specialty.  See \"Patient Info\" tab in inbasket, or \"Choose Columns\" in Meds & Orders section of the refill encounter.           Passed - Patient is 18 years of age or older       Passed - Patient is not pregnant       Passed - No positive pregnancy on file in past 12 months          "

## 2018-04-18 RX ORDER — WARFARIN SODIUM 2.5 MG/1
TABLET ORAL
Qty: 92 TABLET | Refills: 3 | Status: SHIPPED | OUTPATIENT
Start: 2018-04-18 | End: 2019-04-28

## 2018-04-18 NOTE — TELEPHONE ENCOUNTER
Warfarin refill request from pharmacy.  Last office visit with PCP: 4/2/18  Last pertinent lab:  INR 2.2 on 4/4/18    Refilled per nurse protocol standing orders.    Meme Riggs RN  [facility]

## 2018-04-25 ENCOUNTER — ANTICOAGULATION THERAPY VISIT (OUTPATIENT)
Dept: NURSING | Facility: CLINIC | Age: 83
End: 2018-04-25
Payer: COMMERCIAL

## 2018-04-25 DIAGNOSIS — Z79.01 LONG-TERM (CURRENT) USE OF ANTICOAGULANTS: ICD-10-CM

## 2018-04-25 DIAGNOSIS — I48.91 ATRIAL FIBRILLATION, UNSPECIFIED TYPE (H): ICD-10-CM

## 2018-04-25 LAB
INR POINT OF CARE: 2.4 (ref 0.86–1.14)
INR PPP: NORMAL

## 2018-04-25 PROCEDURE — 85610 PROTHROMBIN TIME: CPT | Mod: QW

## 2018-04-25 PROCEDURE — 36416 COLLJ CAPILLARY BLOOD SPEC: CPT

## 2018-04-25 PROCEDURE — 99207 ZZC NO CHARGE NURSE ONLY: CPT

## 2018-04-25 NOTE — PROGRESS NOTES
ANTICOAGULATION FOLLOW-UP CLINIC VISIT    Patient Name:  Maia Miranda  Date:  4/25/2018  Contact Type:  Face to Face    SUBJECTIVE:     Patient Findings     Positives Other complaints (seasonal allergies - unchanged)           OBJECTIVE    INR   Date Value Ref Range Status   04/25/2018 2.4  Final       ASSESSMENT / PLAN  INR assessment THER    Recheck INR In: 4 WEEKS    INR Location Clinic      Anticoagulation Summary as of 4/25/2018     INR goal 2.0-3.0   Today's INR 2.4   Maintenance plan 2.5 mg (2.5 mg x 1) every day   Full instructions 2.5 mg every day   Weekly total 17.5 mg   No change documented Meme Riggs, RN   Plan last modified Meme Riggs RN (1/18/2017)   Next INR check 5/23/2018   Target end date Indefinite    Indications   Long-term (current) use of anticoagulants [Z79.01]  Atrial fibrillation (H) [I48.91]         Anticoagulation Episode Summary     INR check location [facility]    Preferred lab     Send INR reminders to  ANTICOAG CLINIC    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

## 2018-04-25 NOTE — MR AVS SNAPSHOT
Maia Miranda   4/25/2018 11:00 AM   Anticoagulation Therapy Visit    Description:  85 year old female   Provider:   ANTICOAGULATION CLINIC   Department:   Nurse           INR as of 4/25/2018     Today's INR 2.4      Anticoagulation Summary as of 4/25/2018     INR goal 2.0-3.0   Today's INR 2.4   Full instructions 2.5 mg every day   Next INR check 5/23/2018    Indications   Long-term (current) use of anticoagulants [Z79.01]  Atrial fibrillation (H) [I48.91]         Your next Anticoagulation Clinic appointment(s)     May 23, 2018 11:00 AM CDT   Anticoagulation Visit with  ANTICOAGULATION CLINIC   [facility]    [address] [address] [phone]              Contact Numbers     [facility]  Please call to cancel and/or reschedule your appointment, or with any problems or questions regarding your therapy.  Anticoagulation Nurse: [phone]  Main Clinic: [phone]             April 2018 Details    Sun Mon Tue Wed Thu Fri Sat     1               2               3               4               5               6               7                 8               9               10               11               12               13               14                 15               16               17               18               19               20               21                 22               23               24               25      2.5 mg   See details      26      2.5 mg         27      2.5 mg         28      2.5 mg           29      2.5 mg         30      2.5 mg               Date Details   04/25 This INR check               How to take your warfarin dose     To take:  2.5 mg Take 1 of the 2.5 mg tablets.           May 2018 Details    Sun Mon Tue Wed Thu Fri Sat       1      2.5 mg         2      2.5 mg         3      2.5 mg         4      2.5 mg         5      2.5 mg           6      2.5 mg         7      2.5 mg          8      2.5 mg         9      2.5 mg         10      2.5 mg         11      2.5 mg         12      2.5 mg           13      2.5 mg         14      2.5 mg         15      2.5 mg         16      2.5 mg         17      2.5 mg         18      2.5 mg         19      2.5 mg           20      2.5 mg         21      2.5 mg         22      2.5 mg         23            24               25               26                 27               28               29               30               31                  Date Details   No additional details    Date of next INR:  5/23/2018         How to take your warfarin dose     To take:  2.5 mg Take 1 of the 2.5 mg tablets.

## 2018-05-23 ENCOUNTER — OFFICE VISIT (OUTPATIENT)
Dept: UROLOGY | Facility: CLINIC | Age: 83
End: 2018-05-23
Payer: COMMERCIAL

## 2018-05-23 VITALS
WEIGHT: 155 LBS | SYSTOLIC BLOOD PRESSURE: 132 MMHG | DIASTOLIC BLOOD PRESSURE: 62 MMHG | HEIGHT: 65 IN | BODY MASS INDEX: 25.83 KG/M2

## 2018-05-23 DIAGNOSIS — R33.9 URINARY RETENTION: Primary | ICD-10-CM

## 2018-05-23 PROCEDURE — 99213 OFFICE O/P EST LOW 20 MIN: CPT | Performed by: UROLOGY

## 2018-05-23 ASSESSMENT — PAIN SCALES - GENERAL: PAINLEVEL: NO PAIN (0)

## 2018-05-23 NOTE — LETTER
"5/23/2018     RE: Maia Miranda  [address] [address] [address] [facility] [address]     Dear Colleague,    Thank you for referring your patient, Maia Miranda, to the [facility] UROLOGY CLINIC [place] at [facility]. Please see a copy of my visit note below.    May 23, 2018    Return visit    Patient returns today for follow up.  She is here today with her daughter.  They want to have the interstim interrogated.  Notes that she has been able to urinate with the device not as much as she would like as she still has to catheterize some.  She is disappointed that she still has no urge to go but she is able to urinate some spontaneously which she was not able to do before.  She also notes some zapping down her leg.  Also notes that she can beel the battery when she sits.  She denies any changes in her health since last visit.    Ht 1.651 m (5' 5\")  She is comfortable, in no distress, non-labored breathing. Her interstim is palpable as patient does not have much subcutaneous tissue but there is no pain with palpation, edema, erythema or any other concerns for infection    A/P: 85 year old F with urinary retention managed with interstim    Needs to return to have the rep interrogate the device    Have contacted the rep and will arrange for next week    15 minutes were spent with the patient today, > 50% in counseling and coordination of care    Bernadine Maria MD MPH   of Urology    CC  Patient Care Team:  Dian Frias MD as PCP - General (Internal Medicine)  Bernadine Maria MD as MD (Urology)  Rosa Cruz RN as Registered Nurse (Urology)  Dian Firas MD as Referring Physician (Internal Medicine)  Kb Tracy MD as MD (Urology)    "

## 2018-05-23 NOTE — MR AVS SNAPSHOT
After Visit Summary   5/23/2018    Maia Miranda    MRN: [id number]           Patient Information     Date Of Birth          10/26/1932        Visit Information        Provider Department      5/23/2018 12:45 PM Bernadine Maria MD [facility] Urology Clinic [place]        Today's Diagnoses     Urinary retention    -  1       Follow-ups after your visit        Your next 10 appointments already scheduled     Jun 01, 2018  9:45 AM CDT   Anticoagulation Visit with  ANTICOAGULATION CLINIC   [facility] ([facility]    [address] [facility] [address] [phone]              Who to contact     If you have questions or need follow up information about today's clinic visit or your schedule please contact [facility] UROLOGY CLINIC [place] directly at [phone].  Normal or non-critical lab and imaging results will be communicated to you by MyChart, letter or phone within 4 business days after the clinic has received the results. If you do not hear from us within 7 days, please contact the clinic through MyChart or phone. If you have a critical or abnormal lab result, we will notify you by phone as soon as possible.  Submit refill requests through [employer] or call your pharmacy and they will forward the refill request to us. Please allow 3 business days for your refill to be completed.          Additional Information About Your Visit        MyChart Information     [employer] gives you secure access to your electronic health record. If you see a primary care provider, you can also send messages to your care team and make appointments. If you have questions, please call your primary care clinic.  If you do not have a primary care provider, please call [phone] and they will assist you.        Care EveryWhere ID     This is your Care EveryWhere ID. This could be used by other organizations to access your [place]  "medical records  [id number]        Your Vitals Were     Height BMI (Body Mass Index)                1.651 m (5' 5\") 25.79 kg/m2           Blood Pressure from Last 3 Encounters:   05/23/18 132/62   04/02/18 150/70   03/28/18 159/83    Weight from Last 3 Encounters:   05/23/18 70.3 kg (155 lb)   04/02/18 70.3 kg (155 lb)   03/28/18 68.9 kg (152 lb)              Today, you had the following     No orders found for display       Primary Care Provider Office Phone # Fax #    Dian Frias [phone] [address] [place] [address]        Equal Access to Services     [facility] : Hadii luiz cooper hadasho Soadelitaali, waaxda luqadaha, qaybta kaalmada adeegyada, simin leahy . So [facility] [phone].    ATENCIÓN: Si habla español, tiene a conn disposición servicios gratuitos de asistencia lingüística. [facility] [phone].    We comply with applicable federal civil rights laws and [place] laws. We do not discriminate on the basis of race, color, national origin, age, disability, sex, sexual orientation, or gender identity.            Thank you!     Thank you for choosing [facility] UROLOGY CLINIC [place]  for your care. Our goal is always to provide you with excellent care. Hearing back from our patients is one way we can continue to improve our services. Please take a few minutes to complete the written survey that you may receive in the mail after your visit with us. Thank you!             Your Updated Medication List - Protect others around you: Learn how to safely use, store and throw away your medicines at www.disposemymeds.org.          This list is accurate as of 5/23/18 11:59 PM.  Always use your most recent med list.                   Brand Name Dispense Instructions for use Diagnosis    ascorbic acid 1000 MG Tabs    vitamin C    180 tablet    Take 1 tablet (1,000 mg) by mouth 2 times daily    Recurrent UTI, Neurogenic bladder "       calcium citrate-vitamin D 315-250 MG-UNIT Tabs per tablet    CITRACAL     Take 1 tablet by mouth daily        carboxymethylcellulose 1 % ophthalmic solution    CELLUVISC/REFRESH LIQUIGEL     Place 1 drop into both eyes every morning As needed        conjugated estrogens cream    PREMARIN     Place vaginally twice a week On Tuesday and Friday. Uses a pea sized amount        fluocinonide 0.05 % cream    LIDEX    30 g    Apply topically 2 times daily as needed    Dermatitis       GENTLECATH URINARY CATHETER Misc     120 each    1 catheter 4 times daily    Urinary retention       lisinopril 10 MG tablet    PRINIVIL/ZESTRIL    90 tablet    Take 1 tablet (10 mg) by mouth every morning    Hypertension goal BP (blood pressure) < 150/90       methenamine hippurate 1 g Tabs tablet    HIPREX     Take 1 g by mouth 2 times daily        metoprolol succinate 100 MG 24 hr tablet    TOPROL-XL    90 tablet    Take 1 tablet (100 mg) by mouth daily    Palpitations, Atrial fibrillation, unspecified type (H)       OMEGA-3 FISH OIL PO      Take 1 g by mouth 2 times daily (with meals)        order for DME     1 Device    Trilok ankle brace    Plantar fascial fibromatosis, Pain of left heel       polyethylene glycol Packet    MIRALAX/GLYCOLAX     Take 1 packet by mouth daily as needed        sodium chloride 5 % ophthalmic ointment    PETER 128     Place 1 Application into both eyes At Bedtime        TYLENOL 500 MG tablet   Generic drug:  acetaminophen      Take 500-1,000 mg by mouth every 6 hours as needed        Vitamin D-3 1000 units Caps      Take 1 capsule by mouth daily        warfarin 2.5 MG tablet    COUMADIN    92 tablet    Take 2.5mg (1 tablet) daily or as instructed by INR Clinic.    Long-term (current) use of anticoagulants

## 2018-05-23 NOTE — PROGRESS NOTES
"May 23, 2018    Return visit    Patient returns today for follow up.  She is here today with her daughter.  They want to have the interstim interrogated.  Notes that she has been able to urinate with the device not as much as she would like as she still has to catheterize some.  She is disappointed that she still has no urge to go but she is able to urinate some spontaneously which she was not able to do before.  She also notes some zapping down her leg.  Also notes that she can beel the battery when she sits.  She denies any changes in her health since last visit.    Ht 1.651 m (5' 5\")  She is comfortable, in no distress, non-labored breathing. Her interstim is palpable as patient does not have much subcutaneous tissue but there is no pain with palpation, edema, erythema or any other concerns for infection    A/P: 85 year old F with urinary retention managed with interstim    Needs to return to have the rep interrogate the device    Have contacted the rep and will arrange for next week    15 minutes were spent with the patient today, > 50% in counseling and coordination of care    Bernadine Maria MD MPH   of Urology    CC  Patient Care Team:  Dian Frias MD as PCP - General (Internal Medicine)  Bernadine Maria MD as MD (Urology)  Rosa Cruz, RN as Registered Nurse (Urology)  Dian Frias MD as Referring Physician (Internal Medicine)  Kb Tracy MD as MD (Urology)                "

## 2018-05-30 ENCOUNTER — OFFICE VISIT (OUTPATIENT)
Dept: UROLOGY | Facility: CLINIC | Age: 83
End: 2018-05-30
Payer: COMMERCIAL

## 2018-05-30 DIAGNOSIS — R33.9 URINARY RETENTION: Primary | ICD-10-CM

## 2018-05-30 PROCEDURE — 99212 OFFICE O/P EST SF 10 MIN: CPT | Performed by: UROLOGY

## 2018-05-30 NOTE — PROGRESS NOTES
May 30, 2018    Return visit    Patient returns today for follow up from last week for Interstim interrogation.  She denies any changes in her health since last visit.    Patient is here today to work with our [employer] rep     A/P: 85 year old F with urinary retention improved after Interstim placement    At this time patient is given some new settings to try and instructed to not turn up the intensity of her device too high as this may cause pain    RTC 4 months, sooner if needed    5 minutes were spent with the patient today, > 50% in counseling and coordination of care    Bernadine Maria MD MPH   of Urology    CC  Patient Care Team:  Dian Frias MD as PCP - General (Internal Medicine)  Brenadine Maria MD as MD (Urology)  Rosa Cruz, RN as Registered Nurse (Urology)  Dian Frias MD as Referring Physician (Internal Medicine)  Kb Tracy MD as MD (Urology)

## 2018-05-30 NOTE — LETTER
RE: Maia Miranda  [address] [address] [address]     Dear Colleague,    Thank you for referring your patient, Maia Miranda, to the [facility] UROLOGY CLINIC [place] at [facility]. Please see a copy of my visit note below.    May 30, 2018    Return visit    Patient returns today for follow up from last week for Interstim interrogation.  She denies any changes in her health since last visit.    Patient is here today to work with our [employer] rep     A/P: 85 year old F with urinary retention improved after Interstim placement    At this time patient is given some new settings to try and instructed to not turn up the intensity of her device too high as this may cause pain    RTC 4 months, sooner if needed    5 minutes were spent with the patient today, > 50% in counseling and coordination of care    Bernadine Maria MD MPH   of Urology  CC  Patient Care Team:  Dian Frias MD as PCP - General (Internal Medicine)  Bernadine Maria MD as MD (Urology)  Rosa Cruz, RN as Registered Nurse (Urology)  Dian Frias MD as Referring Physician (Internal Medicine)  Kb Tracy MD as MD (Urology)

## 2018-05-30 NOTE — MR AVS SNAPSHOT
After Visit Summary   5/30/2018    Maia Miranda    MRN: [id number]           Patient Information     Date Of Birth          10/26/1932        Visit Information        Provider Department      5/30/2018 9:30 AM Bernadine Maria MD [facility] Urology Clinic [place]        Today's Diagnoses     Urinary retention    -  1      Care Instructions    Do not turn up the intensity too high as this may cause pain    Try the different settings each for one month    Let us know how you are doing    Return to see us in 4 months, sooner if needed    It was a pleasure meeting with you today.  Thank you for allowing me and my team the privilege of caring for you today.  YOU are the reason we are here, and I truly hope we provided you with the excellent service you deserve.  Please let us know if there is anything else we can do for you so that we can be sure you are leaving completely satisfied with your care experience.            Follow-ups after your visit        Follow-up notes from your care team     Return in about 4 months (around 9/30/2018).      Your next 10 appointments already scheduled     Jun 01, 2018  9:45 AM CDT   Anticoagulation Visit with  ANTICOAGULATION CLINIC   [facility]    [address] [address]   [phone]            Sep 19, 2018 12:15 PM CDT   Return Visit with Bernadine Maria MD   [facility] [place] ([facility] [place])    [address]  [address]  [place] [address]   [phone]              Who to contact     If you have questions or need follow up information about today's clinic visit or your schedule please contact [facility] UROLOGY CLINIC [place] directly at [phone].  Normal or non-critical lab and imaging results will be communicated to you by MyChart, letter or phone within 4 business days after the clinic has  received the results. If you do not hear from us within 7 days, please contact the clinic through [employer] or phone. If you have a critical or abnormal lab result, we will notify you by phone as soon as possible.  Submit refill requests through [employer] or call your pharmacy and they will forward the refill request to us. Please allow 3 business days for your refill to be completed.          Additional Information About Your Visit        [email or web address] Information     [employer] gives you secure access to your electronic health record. If you see a primary care provider, you can also send messages to your care team and make appointments. If you have questions, please call your primary care clinic.  If you do not have a primary care provider, please call [phone] and they will assist you.        Care EveryWhere ID     This is your Care EveryWhere ID. This could be used by other organizations to access your [place] medical records  [id number]         Blood Pressure from Last 3 Encounters:   05/23/18 132/62   04/02/18 150/70   03/28/18 159/83    Weight from Last 3 Encounters:   05/23/18 70.3 kg (155 lb)   04/02/18 70.3 kg (155 lb)   03/28/18 68.9 kg (152 lb)              Today, you had the following     No orders found for display       Primary Care Provider Office Phone # Fax #    Dian Frias [phone] [address] [place] [address]        Equal Access to Services     [facility]: Hadii luiz cooper hadasho Soomaali, waaxda luqadaha, qaybta kaalmada adechongda, simin leahy . So [facility] [place] [phone].    ATENCIÓN: Si habla español, tiene a conn disposición servicios gratuitos de asistencia lingüística. Llame al [phone].    We comply with applicable federal civil rights laws and [place] laws. We do not discriminate on the basis of race, color, national origin, age, disability, sex, sexual orientation, or gender identity.            Thank you!      Thank you for choosing [facility] UROLOGY CLINIC [place]  for your care. Our goal is always to provide you with excellent care. Hearing back from our patients is one way we can continue to improve our services. Please take a few minutes to complete the written survey that you may receive in the mail after your visit with us. Thank you!             Your Updated Medication List - Protect others around you: Learn how to safely use, store and throw away your medicines at www.disposemymeds.org.          This list is accurate as of 5/30/18 10:22 AM.  Always use your most recent med list.                   Brand Name Dispense Instructions for use Diagnosis    ascorbic acid 1000 MG Tabs    vitamin C    180 tablet    Take 1 tablet (1,000 mg) by mouth 2 times daily    Recurrent UTI, Neurogenic bladder       calcium citrate-vitamin D 315-250 MG-UNIT Tabs per tablet    CITRACAL     Take 1 tablet by mouth daily        carboxymethylcellulose 1 % ophthalmic solution    CELLUVISC/REFRESH LIQUIGEL     Place 1 drop into both eyes every morning As needed        conjugated estrogens cream    PREMARIN     Place vaginally twice a week On Tuesday and Friday. Uses a pea sized amount        fluocinonide 0.05 % cream    LIDEX    30 g    Apply topically 2 times daily as needed    Dermatitis       GENTLECATH URINARY CATHETER Misc     120 each    1 catheter 4 times daily    Urinary retention       lisinopril 10 MG tablet    PRINIVIL/ZESTRIL    90 tablet    Take 1 tablet (10 mg) by mouth every morning    Hypertension goal BP (blood pressure) < 150/90       methenamine hippurate 1 g Tabs tablet    HIPREX     Take 1 g by mouth 2 times daily        metoprolol succinate 100 MG 24 hr tablet    TOPROL-XL    90 tablet    Take 1 tablet (100 mg) by mouth daily    Palpitations, Atrial fibrillation, unspecified type (H)       OMEGA-3 FISH OIL PO      Take 1 g by mouth 2 times daily (with meals)        order for DME     1 Device    Trilok  ankle brace    Plantar fascial fibromatosis, Pain of left heel       polyethylene glycol Packet    MIRALAX/GLYCOLAX     Take 1 packet by mouth daily as needed        sodium chloride 5 % ophthalmic ointment    PETER 128     Place 1 Application into both eyes At Bedtime        TYLENOL 500 MG tablet   Generic drug:  acetaminophen      Take 500-1,000 mg by mouth every 6 hours as needed        Vitamin D-3 1000 units Caps      Take 1 capsule by mouth daily        warfarin 2.5 MG tablet    COUMADIN    92 tablet    Take 2.5mg (1 tablet) daily or as instructed by INR Clinic.    Long-term (current) use of anticoagulants

## 2018-05-30 NOTE — PATIENT INSTRUCTIONS
Do not turn up the intensity too high as this may cause pain    Try the different settings each for one month    Let us know how you are doing    Return to see us in 4 months, sooner if needed    It was a pleasure meeting with you today.  Thank you for allowing me and my team the privilege of caring for you today.  YOU are the reason we are here, and I truly hope we provided you with the excellent service you deserve.  Please let us know if there is anything else we can do for you so that we can be sure you are leaving completely satisfied with your care experience.

## 2018-06-01 ENCOUNTER — TELEPHONE (OUTPATIENT)
Dept: PEDIATRICS | Facility: CLINIC | Age: 83
End: 2018-06-01

## 2018-06-01 ENCOUNTER — ALLIED HEALTH/NURSE VISIT (OUTPATIENT)
Dept: PEDIATRICS | Facility: CLINIC | Age: 83
End: 2018-06-01

## 2018-06-01 ENCOUNTER — ANTICOAGULATION THERAPY VISIT (OUTPATIENT)
Dept: NURSING | Facility: CLINIC | Age: 83
End: 2018-06-01
Payer: COMMERCIAL

## 2018-06-01 VITALS — SYSTOLIC BLOOD PRESSURE: 128 MMHG | DIASTOLIC BLOOD PRESSURE: 60 MMHG

## 2018-06-01 DIAGNOSIS — Z01.30 BLOOD PRESSURE CHECK: Primary | ICD-10-CM

## 2018-06-01 DIAGNOSIS — I48.91 ATRIAL FIBRILLATION, UNSPECIFIED TYPE (H): ICD-10-CM

## 2018-06-01 DIAGNOSIS — Z79.01 LONG-TERM (CURRENT) USE OF ANTICOAGULANTS: ICD-10-CM

## 2018-06-01 LAB — INR POINT OF CARE: 2 (ref 0.86–1.14)

## 2018-06-01 PROCEDURE — 99207 ZZC NO CHARGE NURSE ONLY: CPT

## 2018-06-01 PROCEDURE — 36416 COLLJ CAPILLARY BLOOD SPEC: CPT

## 2018-06-01 PROCEDURE — 85610 PROTHROMBIN TIME: CPT | Mod: QW

## 2018-06-01 PROCEDURE — 99207 ZZC NO CHARGE NURSE ONLY: CPT | Performed by: INTERNAL MEDICINE

## 2018-06-01 NOTE — PROGRESS NOTES
ANTICOAGULATION FOLLOW-UP CLINIC VISIT    Patient Name:  Maia Miranda  Date:  6/1/2018  Contact Type:  Face to Face    SUBJECTIVE:     Patient Findings     Positives No Problem Findings           OBJECTIVE    INR Protime   Date Value Ref Range Status   06/01/2018 2.0 (A) 0.86 - 1.14 Final       ASSESSMENT / PLAN  INR assessment THER    Recheck INR In: 6 WEEKS    INR Location Clinic      Anticoagulation Summary as of 6/1/2018     INR goal 2.0-3.0   Today's INR 2.0   Warfarin maintenance plan 2.5 mg (2.5 mg x 1) every day   Full warfarin instructions 2.5 mg every day   Weekly warfarin total 17.5 mg   No change documented Daphne Aguilar RN   Plan last modified Meme Riggs RN (1/18/2017)   Next INR check 7/11/2018   Target end date Indefinite    Indications   Long-term (current) use of anticoagulants [Z79.01]  Atrial fibrillation (H) [I48.91]         Anticoagulation Episode Summary     INR check location [facility]    Preferred lab     Send INR reminders to  ANTICO CLINIC    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

## 2018-06-01 NOTE — PROGRESS NOTES
Maia Miranda is enrolled/participating in the retail pharmacy Blood Pressure Goals Achievement Program (BPGAP).  Maia Miranda was evaluated at [facility] [place] on June 1, 2018 at which time her blood pressure was:    BP Readings from Last 3 Encounters:   06/01/18 128/60   05/23/18 132/62   04/02/18 150/70     Reviewed lifestyle modifications for blood pressure control and reduction: including making healthy food choices, managing weight, getting regular exercise, smoking cessation, reducing alcohol consumption, monitoring blood pressure regularly.     Maia Miranda is not experiencing symptoms.    Follow-Up: BP is at goal of < 140/90mmHg (patient 18+ years of age with or without diabetes).  Recommended follow-up at pharmacy in 6 months.     Recommendation to Provider: n/a    Maia Miranda was evaluated for enrollment into the PGEN study today.    Patient eligible for enrollment:  No  Patient interested in enrollment:  No    Completed by: Dara Portillo, PanD  [facility] Pharmacist

## 2018-06-01 NOTE — LETTER
[address] [address]  [phone]      June 18, 2018    Maia Miranda                                                                                                                                                       [address] [facility] [address]              Dear Maia,    According to our records you are due for an annual wellness visit and for a mammogram screening. Please contact our office at your earliest convenience to schedule these appointments.    Thank you  Sincerely,  Dian Frias MD

## 2018-06-01 NOTE — MR AVS SNAPSHOT
Maia Miranda   6/1/2018 9:45 AM   Anticoagulation Therapy Visit    Description:  85 year old female   Provider:   ANTICOAGULATION CLINIC   Department:   Nurse           INR as of 6/1/2018     Today's INR 2.0      Anticoagulation Summary as of 6/1/2018     INR goal 2.0-3.0   Today's INR 2.0   Full warfarin instructions 2.5 mg every day   Next INR check 7/11/2018    Indications   Long-term (current) use of anticoagulants [Z79.01]  Atrial fibrillation (H) [I48.91]         Your next Anticoagulation Clinic appointment(s)     Jul 11, 2018 10:00 AM CDT   Anticoagulation Visit with  ANTICOAGULATION CLINIC   [facility]    [address] [address] [phone]              Contact Numbers     [facility]  Please call to cancel and/or reschedule your appointment, or with any problems or questions regarding your therapy.  Anticoagulation Nurse: [phone]  Main Clinic: [phone]             June 2018 Details    Sun Mon Tue Wed Thu Fri Sat          1      2.5 mg   See details      2      2.5 mg           3      2.5 mg         4      2.5 mg         5      2.5 mg         6      2.5 mg         7      2.5 mg         8      2.5 mg         9      2.5 mg           10      2.5 mg         11      2.5 mg         12      2.5 mg         13      2.5 mg         14      2.5 mg         15      2.5 mg         16      2.5 mg           17      2.5 mg         18      2.5 mg         19      2.5 mg         20      2.5 mg         21      2.5 mg         22      2.5 mg         23      2.5 mg           24      2.5 mg         25      2.5 mg         26      2.5 mg         27      2.5 mg         28      2.5 mg         29      2.5 mg         30      2.5 mg          Date Details   06/01 This INR check               How to take your warfarin dose     To take:  2.5 mg Take 1 of the 2.5 mg tablets.           July 2018 Details    Sun Mon Tue Wed Thu Fri Sat     1       2.5 mg         2      2.5 mg         3      2.5 mg         4      2.5 mg         5      2.5 mg         6      2.5 mg         7      2.5 mg           8      2.5 mg         9      2.5 mg         10      2.5 mg         11            12               13               14                 15               16               17               18               19               20               21                 22               23               24               25               26               27               28                 29               30               31                    Date Details   No additional details    Date of next INR:  7/11/2018         How to take your warfarin dose     To take:  2.5 mg Take 1 of the 2.5 mg tablets.

## 2018-06-01 NOTE — TELEPHONE ENCOUNTER
Panel Management Review      Patient has the following on her problem list:     Hypertension   Last three blood pressure readings:  BP Readings from Last 3 Encounters:   06/01/18 128/60   05/23/18 132/62   04/02/18 150/70     Blood pressure: Passed    HTN Guidelines:  Age 18-59 BP range:  Less than 140/90  Age 60-85 with Diabetes:  Less than 140/90  Age 60-85 without Diabetes:  less than 150/90      Composite cancer screening  Chart review shows that this patient is due/due soon for the following Mammogram  Summary:    Patient is due/failing the following:   MAMMOGRAM and PHYSICAL    Action needed:   Patient needs office visit for AWV and mammogram.    Type of outreach:    Sent [employer] message.    Questions for provider review:    None                                                                                                                                    Fatmata Kendrick LPN       Chart routed to Care Team .

## 2018-06-08 ENCOUNTER — OFFICE VISIT (OUTPATIENT)
Dept: URGENT CARE | Facility: URGENT CARE | Age: 83
End: 2018-06-08
Payer: COMMERCIAL

## 2018-06-08 VITALS
OXYGEN SATURATION: 96 % | TEMPERATURE: 97.1 F | HEART RATE: 62 BPM | BODY MASS INDEX: 24.79 KG/M2 | DIASTOLIC BLOOD PRESSURE: 76 MMHG | SYSTOLIC BLOOD PRESSURE: 156 MMHG | WEIGHT: 149 LBS

## 2018-06-08 DIAGNOSIS — R30.0 DYSURIA: Primary | ICD-10-CM

## 2018-06-08 LAB
ALBUMIN UR-MCNC: NEGATIVE MG/DL
APPEARANCE UR: CLEAR
BACTERIA #/AREA URNS HPF: ABNORMAL /HPF
BILIRUB UR QL STRIP: NEGATIVE
COLOR UR AUTO: YELLOW
GLUCOSE UR STRIP-MCNC: NEGATIVE MG/DL
HGB UR QL STRIP: NEGATIVE
KETONES UR STRIP-MCNC: NEGATIVE MG/DL
LEUKOCYTE ESTERASE UR QL STRIP: ABNORMAL
NITRATE UR QL: NEGATIVE
NON-SQ EPI CELLS #/AREA URNS LPF: ABNORMAL /LPF
PH UR STRIP: 5.5 PH (ref 5–7)
RBC #/AREA URNS AUTO: ABNORMAL /HPF
SOURCE: ABNORMAL
SP GR UR STRIP: <=1.005 (ref 1–1.03)
UROBILINOGEN UR STRIP-ACNC: 0.2 EU/DL (ref 0.2–1)
WBC #/AREA URNS AUTO: ABNORMAL /HPF

## 2018-06-08 PROCEDURE — 87088 URINE BACTERIA CULTURE: CPT | Performed by: FAMILY MEDICINE

## 2018-06-08 PROCEDURE — 99213 OFFICE O/P EST LOW 20 MIN: CPT | Performed by: FAMILY MEDICINE

## 2018-06-08 PROCEDURE — 87186 SC STD MICRODIL/AGAR DIL: CPT | Performed by: FAMILY MEDICINE

## 2018-06-08 PROCEDURE — 87086 URINE CULTURE/COLONY COUNT: CPT | Performed by: FAMILY MEDICINE

## 2018-06-08 PROCEDURE — 81001 URINALYSIS AUTO W/SCOPE: CPT | Performed by: PHYSICIAN ASSISTANT

## 2018-06-08 RX ORDER — CIPROFLOXACIN 500 MG/1
500 TABLET, FILM COATED ORAL 2 TIMES DAILY
Qty: 6 TABLET | Refills: 0 | Status: SHIPPED | OUTPATIENT
Start: 2018-06-08 | End: 2018-06-11

## 2018-06-08 NOTE — MR AVS SNAPSHOT
After Visit Summary   6/8/2018    Maia Miranda    MRN: [id number]           Patient Information     Date Of Birth          10/26/1932        Visit Information        Provider Department      6/8/2018 10:05 AM Juan Diego Miles MD [facility]        Today's Diagnoses     Dysuria    -  1       Follow-ups after your visit        Your next 10 appointments already scheduled     Jul 11, 2018 10:00 AM CDT   Anticoagulation Visit with  ANTICOAGULATION CLINIC   [facility]    [address] [address] [phone]            Sep 19, 2018 12:15 PM CDT   Return Visit with Bernadine Maria MD   [facility] ([facility])    [address]  [address]  [facility] [address] [phone]              Who to contact     If you have questions or need follow up information about today's clinic visit or your schedule please contact [facility] directly at [phone].  Normal or non-critical lab and imaging results will be communicated to you by [employer], letter or phone within 4 business days after the clinic has received the results. If you do not hear from us within 7 days, please contact the clinic through [employer] or phone. If you have a critical or abnormal lab result, we will notify you by phone as soon as possible.  Submit refill requests through [employer] or call your pharmacy and they will forward the refill request to us. Please allow 3 business days for your refill to be completed.          Additional Information About Your Visit        MyChart Information     [employer] gives you secure access to your electronic health record. If you see a primary care provider, you can also send messages to your care team and make appointments. If you have questions, please call your primary care clinic.  If you do not have a primary care provider, please call  [phone] and they will assist you.        Care EveryWhere ID     This is your Care EveryWhere ID. This could be used by other organizations to access your [place] medical records  [id number]        Your Vitals Were     Pulse Temperature Pulse Oximetry BMI (Body Mass Index)          62 97.1  F (36.2  C) (Tympanic) 96% 24.79 kg/m2         Blood Pressure from Last 3 Encounters:   06/08/18 156/76   06/01/18 128/60   05/23/18 132/62    Weight from Last 3 Encounters:   06/08/18 149 lb (67.6 kg)   05/23/18 155 lb (70.3 kg)   04/02/18 155 lb (70.3 kg)              We Performed the Following     UA reflex to Microscopic and Culture     Urine Culture Aerobic Bacterial     Urine Microscopic          Today's Medication Changes          These changes are accurate as of 6/8/18  3:43 PM.  If you have any questions, ask your nurse or doctor.               Start taking these medicines.        Dose/Directions    ciprofloxacin 500 MG tablet   Commonly known as:  CIPRO   Used for:  Dysuria   Started by:  Juan Diego iMles MD        Dose:  500 mg   Take 1 tablet (500 mg) by mouth 2 times daily   Quantity:  6 tablet   Refills:  0            Where to get your medicines      Some of these will need a paper prescription and others can be bought over the counter.  Ask your nurse if you have questions.     Bring a paper prescription for each of these medications     ciprofloxacin 500 MG tablet                Primary Care Provider Office Phone # Fax #    Dian Frias [phone] [address] [place] [address]        Equal Access to Services     [facility] AH: Hadwhitney fernándezo Sokeily, waaxda luqadaha, qaybta kaalmada simin adair. So [facility] [phone].    ATENCIÓN: Si habla español, tiene a conn disposición servicios gratuitos de asistencia lingüística. Llame al [phone].    We comply with applicable federal civil rights laws and [place]  laws. We do not discriminate on the basis of race, color, national origin, age, disability, sex, sexual orientation, or gender identity.            Thank you!     Thank you for choosing [facility] [facility]  for your care. Our goal is always to provide you with excellent care. Hearing back from our patients is one way we can continue to improve our services. Please take a few minutes to complete the written survey that you may receive in the mail after your visit with us. Thank you!             Your Updated Medication List - Protect others around you: Learn how to safely use, store and throw away your medicines at www.disposemymeds.org.          This list is accurate as of 6/8/18  3:43 PM.  Always use your most recent med list.                   Brand Name Dispense Instructions for use Diagnosis    ascorbic acid 1000 MG Tabs    vitamin C    180 tablet    Take 1 tablet (1,000 mg) by mouth 2 times daily    Recurrent UTI, Neurogenic bladder       calcium citrate-vitamin D 315-250 MG-UNIT Tabs per tablet    CITRACAL     Take 1 tablet by mouth daily        carboxymethylcellulose 1 % ophthalmic solution    CELLUVISC/REFRESH LIQUIGEL     Place 1 drop into both eyes every morning As needed        ciprofloxacin 500 MG tablet    CIPRO    6 tablet    Take 1 tablet (500 mg) by mouth 2 times daily    Dysuria       conjugated estrogens cream    PREMARIN     Place vaginally twice a week On Tuesday and Friday. Uses a pea sized amount        fluocinonide 0.05 % cream    LIDEX    30 g    Apply topically 2 times daily as needed    Dermatitis       GENTLECATH URINARY CATHETER Misc     120 each    1 catheter 4 times daily    Urinary retention       lisinopril 10 MG tablet    PRINIVIL/ZESTRIL    90 tablet    Take 1 tablet (10 mg) by mouth every morning    Hypertension goal BP (blood pressure) < 150/90       methenamine hippurate 1 g Tabs tablet    HIPREX     Take 1 g by mouth 2 times daily        metoprolol succinate 100 MG 24 hr  tablet    TOPROL-XL    90 tablet    Take 1 tablet (100 mg) by mouth daily    Palpitations, Atrial fibrillation, unspecified type (H)       OMEGA-3 FISH OIL PO      Take 1 g by mouth 2 times daily (with meals)        order for DME     1 Device    Trilok ankle brace    Plantar fascial fibromatosis, Pain of left heel       polyethylene glycol Packet    MIRALAX/GLYCOLAX     Take 1 packet by mouth daily as needed        sodium chloride 5 % ophthalmic ointment    PETER 128     Place 1 Application into both eyes At Bedtime        TYLENOL 500 MG tablet   Generic drug:  acetaminophen      Take 500-1,000 mg by mouth every 6 hours as needed        Vitamin D-3 1000 units Caps      Take 1 capsule by mouth daily        warfarin 2.5 MG tablet    COUMADIN    92 tablet    Take 2.5mg (1 tablet) daily or as instructed by INR Clinic.    Long-term (current) use of anticoagulants

## 2018-06-08 NOTE — PROGRESS NOTES
SUBJECTIVE:   Maia Miranda is a 85 year old female who  presents today for a possible UTI. Symptoms of dysuria and suprapubic pain and pressure have been going on for 1day(s).  Hematuria no.  gradual onset and still presentand moderate.  There is no history of fever, chills, nausea or vomiting.  No history of vaginal or penile discharge. This patient does have a history of urinary tract infections. Patient denies flank pain or vaginal discharge    Past hx is significant for atonic bladder, self cath and s/p stimulator implant.  She sees Dr Maria in urology routinely.    Past Medical History:   Diagnosis Date     Amnestic MCI (mild cognitive impairment with memory loss) 2014     Chronic duodenal ulcer without mention of hemorrhage, perforation, or obstruction 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 Warner, [facility] yearly     Esophageal reflux 2001    Abstracted 06/10/02     Essential and other specified forms of tremor 2004    resting tremor     Generalized osteoarthrosis, unspecified site     Hands     Hiatal hernia     diagnosed late 1990s [place], [place]     History of pulmonary embolism 1971    no source found     LACTOSE INTOLERANCE      Lumbago     sees Kodi Guidry     Mitral valve regurgitation      Occlusion and stenosis of carotid artery without mention of cerebral infarction 2003    CAROTID US NORMAL, bruit in neck     Osteoporosis, unspecified 2003    T = -2.66     Paroxysmal atrial fibrillation (H) 11/15/2013     Unspecified essential hypertension      Unspecified tinnitus 2005    mild hearing loss, saw ENT     Current Outpatient Prescriptions   Medication Sig Dispense Refill     ascorbic acid (VITAMIN C) 1000 MG TABS Take 1 tablet (1,000 mg) by mouth 2 times daily 180 tablet 3     calcium citrate-vitamin D (CITRACAL) 315-250 MG-UNIT TABS per tablet Take 1 tablet by mouth daily       carboxymethylcellulose (CELLUVISC/REFRESH  LIQUIGEL) 1 % ophthalmic solution Place 1 drop into both eyes every morning As needed       Catheters (GENTLECATH URINARY CATHETER) MISC 1 catheter 4 times daily 120 each 12     Cholecalciferol (VITAMIN D-3) 1000 UNITS CAPS Take 1 capsule by mouth daily       conjugated estrogens (PREMARIN) vaginal cream Place vaginally twice a week On Tuesday and Friday. Uses a pea sized amount       lisinopril (PRINIVIL/ZESTRIL) 10 MG tablet Take 1 tablet (10 mg) by mouth every morning 90 tablet 3     methenamine hippurate (HIPREX) 1 G TABS tablet Take 1 g by mouth 2 times daily       metoprolol succinate (TOPROL-XL) 100 MG 24 hr tablet Take 1 tablet (100 mg) by mouth daily 90 tablet 3     Omega-3 Fatty Acids (OMEGA-3 FISH OIL PO) Take 1 g by mouth 2 times daily (with meals)       order for DME Trilok ankle brace 1 Device 0     polyethylene glycol (MIRALAX/GLYCOLAX) packet Take 1 packet by mouth daily as needed        sodium chloride (PETER 128) 5 % ophthalmic ointment Place 1 Application into both eyes At Bedtime       warfarin (COUMADIN) 2.5 MG tablet Take 2.5mg (1 tablet) daily or as instructed by INR Clinic. 92 tablet 3     acetaminophen (TYLENOL) 500 MG tablet Take 500-1,000 mg by mouth every 6 hours as needed       fluocinonide (LIDEX) 0.05 % cream Apply topically 2 times daily as needed 30 g 2     Social History   Substance Use Topics     Smoking status: Never Smoker     Smokeless tobacco: Never Used     Alcohol use No     ROS:   CONSTITUTIONAL:NEGATIVE for fever, chills, change in weight  INTEGUMENTARY/SKIN: NEGATIVE for worrisome rashes, moles or lesions    OBJECTIVE:  /76 (BP Location: Right arm, Patient Position: Chair, Cuff Size: Adult Regular)  Pulse 62  Temp 97.1  F (36.2  C) (Tympanic)  Wt 149 lb (67.6 kg)  SpO2 96%  BMI 24.79 kg/m2     GENERAL APPEARANCE: healthy, alert and no distress  RESP: lungs clear to auscultation - no rales, rhonchi or wheezes  CV: regular rates and rhythm, normal S1 S2, no murmur  noted  ABDOMEN:  soft, nontender, no HSM or masses and bowel sounds normal  BACK: No CVA tenderness  SKIN: no suspicious lesions or rashes    ASSESSMENT:   1. Dysuria  Will await culture and contact patient.   Given rx for cipro but not to take it until we confirm abx need given recurrent nature of her infections, etc   Symptomatic cares were discussed in detail.   - UA reflex to Microscopic and Culture  - Urine Culture Aerobic Bacterial  - Urine Microscopic  - ciprofloxacin (CIPRO) 500 MG tablet; Take 1 tablet (500 mg) by mouth 2 times daily  Dispense: 6 tablet; Refill: 0

## 2018-06-09 ENCOUNTER — NURSE TRIAGE (OUTPATIENT)
Dept: NURSING | Facility: CLINIC | Age: 83
End: 2018-06-09

## 2018-06-09 NOTE — TELEPHONE ENCOUNTER
Paged Dr Frias at 10:24 am  Maia Miranda, 10/26/1932, [id number]  seen yesterday in UC for dysuria/suprapubic pain and pressure. Self caths. Waiting for culture result before starting abx. Pain worse and flecks of blood.  Henny GRADY RN [phone]    Per Dr Frias, Maia should start her antibiotic.  If Maia gets worse she should call FNA.  Call Dr Frias on Monday and let her know how things are going. This call will prompt her to check the UC result.  I instructed Maia all the above. Maia stated understanding and agreement.  Henny GRADY RN [facility]

## 2018-06-10 ENCOUNTER — TELEPHONE (OUTPATIENT)
Dept: URGENT CARE | Facility: URGENT CARE | Age: 83
End: 2018-06-10

## 2018-06-10 LAB
BACTERIA SPEC CULT: ABNORMAL
SPECIMEN SOURCE: ABNORMAL

## 2018-06-10 NOTE — TELEPHONE ENCOUNTER
Called and talked to pt to inform her re-Dr. ALEJANDRO Cartagena's message. Asked pt how is she doing and she stated that she is feeling better now.    Anna Marie Ball CMA ([facility])

## 2018-06-10 NOTE — TELEPHONE ENCOUNTER
SUBJECTIVE:  The patient's June 8, 2018, Urine culture grew out Klebsiella pneumoniae.      PLAN:  Please notify patient of this result.  She should continue the Ciprofloxacin.      Balaji Cartagena MD

## 2018-06-11 ENCOUNTER — TELEPHONE (OUTPATIENT)
Dept: PEDIATRICS | Facility: CLINIC | Age: 83
End: 2018-06-11

## 2018-06-11 DIAGNOSIS — N30.01 ACUTE CYSTITIS WITH HEMATURIA: Primary | ICD-10-CM

## 2018-06-11 DIAGNOSIS — R30.0 DYSURIA: ICD-10-CM

## 2018-06-11 RX ORDER — CIPROFLOXACIN 500 MG/1
500 TABLET, FILM COATED ORAL 2 TIMES DAILY
Qty: 8 TABLET | Refills: 0 | Status: SHIPPED | OUTPATIENT
Start: 2018-06-11 | End: 2018-07-06

## 2018-06-11 NOTE — TELEPHONE ENCOUNTER
Patient notified of message below and is willing to continue the antibiotic.  Order sent to pharmacy.  Patient reports that she is feeling better-symptoms have improved.  GAIL Carlisle RN

## 2018-06-11 NOTE — TELEPHONE ENCOUNTER
Pt. left message on triage voicemail    Would like to know next steps based on UA/UC results.     She received prescription for Cipro but would like to know if she needs to extend the dose?    PCP please advise:    Results show: >100,000 colonies/mL   Klebsiella pneumoniae     Sunitha UNDERWOOD RN, BSN, PHN  [facility] RN

## 2018-06-11 NOTE — TELEPHONE ENCOUNTER
UCx growing Klebsiella that is sensitive to the Cipro. Yes, I would like to extend to a total course of 7 days. rx pended for another 4 days.    Please let know and find out what pharmacy she would like to use.    Thanks!  Dian Frias MD  Internal Medicine/Pediatrics

## 2018-06-21 ENCOUNTER — HOSPITAL ENCOUNTER (OUTPATIENT)
Dept: MAMMOGRAPHY | Facility: CLINIC | Age: 83
Discharge: HOME | End: 2018-06-21
Attending: INTERNAL MEDICINE | Admitting: INTERNAL MEDICINE
Payer: COMMERCIAL

## 2018-06-21 DIAGNOSIS — Z12.31 VISIT FOR SCREENING MAMMOGRAM: ICD-10-CM

## 2018-06-21 PROCEDURE — 77063 BREAST TOMOSYNTHESIS BI: CPT

## 2018-07-02 NOTE — PATIENT INSTRUCTIONS
Preventive Health Recommendations    Female Ages 65 +    Yearly exam:     See your health care provider every year in order to  o Review health changes.   o Discuss preventive care.    o Review your medicines if your doctor has prescribed any.      You no longer need a yearly Pap test unless you've had an abnormal Pap test in the past 10 years. If you have vaginal symptoms, such as bleeding or discharge, be sure to talk with your provider about a Pap test.      Every 1 to 2 years, have a mammogram.  If you are over 69, talk with your health care provider about whether or not you want to continue having screening mammograms.      Every 10 years, have a colonoscopy. Or, have a yearly FIT test (stool test). These exams will check for colon cancer.       Have a cholesterol test every 5 years, or more often if your doctor advises it.       Have a diabetes test (fasting glucose) every three years. If you are at risk for diabetes, you should have this test more often.       At age 65, have a bone density scan (DEXA) to check for osteoporosis (brittle bone disease).    Shots:    Get a flu shot each year.    Get a tetanus shot every 10 years.    Talk to your doctor about your pneumonia vaccines. There are now two you should receive - Pneumovax (PPSV 23) and Prevnar (PCV 13).    Talk to your pharmacist about the shingles vaccine.    Talk to your doctor about the hepatitis B vaccine.    Nutrition:     Eat at least 5 servings of fruits and vegetables each day.      Eat whole-grain bread, whole-wheat pasta and brown rice instead of white grains and rice.      Get adequate Calcium and Vitamin D.     Lifestyle    Exercise at least 150 minutes a week (30 minutes a day, 5 days a week). This will help you control your weight and prevent disease.      Limit alcohol to one drink per day.      No smoking.       Wear sunscreen to prevent skin cancer.       See your dentist twice a year for an exam and cleaning.      See your eye doctor  every 1 to 2 years to screen for conditions such as glaucoma, macular degeneration and cataracts.    -------------  1. Labs today: cholesterol, diabetes screen, liver function, kidney function, blood counts  2. Consider Shingrix shingles vaccine at pharmacy  - 2nd dose 2-6 months after the first  3. Recheck urine tests in future   4. Increase lisinopril to 20 mg - can take 2 of what you have until you run out, then 1 pill of the new prescription   5. Monitor blood pressure at home and when come in for INR

## 2018-07-06 ENCOUNTER — OFFICE VISIT (OUTPATIENT)
Dept: PEDIATRICS | Facility: CLINIC | Age: 83
End: 2018-07-06
Payer: COMMERCIAL

## 2018-07-06 VITALS
HEART RATE: 51 BPM | WEIGHT: 152.2 LBS | DIASTOLIC BLOOD PRESSURE: 70 MMHG | TEMPERATURE: 97.2 F | SYSTOLIC BLOOD PRESSURE: 162 MMHG | HEIGHT: 65 IN | OXYGEN SATURATION: 99 % | BODY MASS INDEX: 25.36 KG/M2

## 2018-07-06 DIAGNOSIS — D64.9 ANEMIA, UNSPECIFIED TYPE: ICD-10-CM

## 2018-07-06 DIAGNOSIS — R51.9 CHRONIC NONINTRACTABLE HEADACHE, UNSPECIFIED HEADACHE TYPE: ICD-10-CM

## 2018-07-06 DIAGNOSIS — I10 HYPERTENSION GOAL BP (BLOOD PRESSURE) < 150/90: ICD-10-CM

## 2018-07-06 DIAGNOSIS — K21.9 GASTROESOPHAGEAL REFLUX DISEASE WITHOUT ESOPHAGITIS: ICD-10-CM

## 2018-07-06 DIAGNOSIS — E78.5 HYPERLIPIDEMIA LDL GOAL <130: ICD-10-CM

## 2018-07-06 DIAGNOSIS — Z00.00 MEDICARE ANNUAL WELLNESS VISIT, SUBSEQUENT: Primary | ICD-10-CM

## 2018-07-06 DIAGNOSIS — N18.30 CKD (CHRONIC KIDNEY DISEASE) STAGE 3, GFR 30-59 ML/MIN (H): ICD-10-CM

## 2018-07-06 DIAGNOSIS — G89.29 CHRONIC NONINTRACTABLE HEADACHE, UNSPECIFIED HEADACHE TYPE: ICD-10-CM

## 2018-07-06 DIAGNOSIS — G31.84 MILD COGNITIVE IMPAIRMENT: ICD-10-CM

## 2018-07-06 LAB
ERYTHROCYTE [DISTWIDTH] IN BLOOD BY AUTOMATED COUNT: 12.6 % (ref 10–15)
HCT VFR BLD AUTO: 38.7 % (ref 35–47)
HGB BLD-MCNC: 12.5 G/DL (ref 11.7–15.7)
MCH RBC QN AUTO: 32.3 PG (ref 26.5–33)
MCHC RBC AUTO-ENTMCNC: 32.3 G/DL (ref 31.5–36.5)
MCV RBC AUTO: 100 FL (ref 78–100)
PLATELET # BLD AUTO: 146 10E9/L (ref 150–450)
RBC # BLD AUTO: 3.87 10E12/L (ref 3.8–5.2)
WBC # BLD AUTO: 5.5 10E9/L (ref 4–11)

## 2018-07-06 PROCEDURE — 85027 COMPLETE CBC AUTOMATED: CPT | Performed by: INTERNAL MEDICINE

## 2018-07-06 PROCEDURE — 99397 PER PM REEVAL EST PAT 65+ YR: CPT | Performed by: INTERNAL MEDICINE

## 2018-07-06 PROCEDURE — 80053 COMPREHEN METABOLIC PANEL: CPT | Performed by: INTERNAL MEDICINE

## 2018-07-06 PROCEDURE — 99207 C PAF COMPLETED  NO CHARGE: CPT | Performed by: INTERNAL MEDICINE

## 2018-07-06 PROCEDURE — 99214 OFFICE O/P EST MOD 30 MIN: CPT | Mod: 25 | Performed by: INTERNAL MEDICINE

## 2018-07-06 PROCEDURE — 36415 COLL VENOUS BLD VENIPUNCTURE: CPT | Performed by: INTERNAL MEDICINE

## 2018-07-06 PROCEDURE — 80061 LIPID PANEL: CPT | Performed by: INTERNAL MEDICINE

## 2018-07-06 RX ORDER — LISINOPRIL 20 MG/1
20 TABLET ORAL DAILY
Qty: 90 TABLET | Refills: 1 | Status: SHIPPED | OUTPATIENT
Start: 2018-07-06 | End: 2019-01-14

## 2018-07-06 ASSESSMENT — ENCOUNTER SYMPTOMS
BREAST MASS: 0
FEVER: 0
HEADACHES: 0
COUGH: 0
MYALGIAS: 0
HEMATURIA: 0
NAUSEA: 0
PALPITATIONS: 0
FREQUENCY: 0
CHILLS: 0
EYE PAIN: 0
ARTHRALGIAS: 0
DIARRHEA: 0
HEMATOCHEZIA: 0
PARESTHESIAS: 0
JOINT SWELLING: 0
SORE THROAT: 1
CONSTIPATION: 0
HEARTBURN: 1
ABDOMINAL PAIN: 0
DYSURIA: 0
DIZZINESS: 1
WEAKNESS: 0
NERVOUS/ANXIOUS: 0
SHORTNESS OF BREATH: 0

## 2018-07-06 ASSESSMENT — ACTIVITIES OF DAILY LIVING (ADL)
I_NEED_ASSISTANCE_FOR_THE_FOLLOWING_DAILY_ACTIVITIES:: NO ASSISTANCE IS NEEDED
CURRENT_FUNCTION: NO ASSISTANCE NEEDED

## 2018-07-06 NOTE — MR AVS SNAPSHOT
After Visit Summary   7/6/2018    Maia Miranda    MRN: [id number]           Patient Information     Date Of Birth          10/26/1932        Visit Information        Provider Department      7/6/2018 10:40 AM Dian Frias MD [facility]        Today's Diagnoses     Medicare annual wellness visit, subsequent    -  1    Chronic nonintractable headache, unspecified headache type        Hypertension goal BP (blood pressure) < 150/90        Hyperlipidemia LDL goal <130        Mild cognitive impairment        CKD (chronic kidney disease) stage 3, GFR 30-59 ml/min        Anemia, unspecified type        Gastroesophageal reflux disease without esophagitis          Care Instructions      Preventive Health Recommendations    Female Ages 65 +    Yearly exam:     See your health care provider every year in order to  o Review health changes.   o Discuss preventive care.    o Review your medicines if your doctor has prescribed any.      You no longer need a yearly Pap test unless you've had an abnormal Pap test in the past 10 years. If you have vaginal symptoms, such as bleeding or discharge, be sure to talk with your provider about a Pap test.      Every 1 to 2 years, have a mammogram.  If you are over 69, talk with your health care provider about whether or not you want to continue having screening mammograms.      Every 10 years, have a colonoscopy. Or, have a yearly FIT test (stool test). These exams will check for colon cancer.       Have a cholesterol test every 5 years, or more often if your doctor advises it.       Have a diabetes test (fasting glucose) every three years. If you are at risk for diabetes, you should have this test more often.       At age 65, have a bone density scan (DEXA) to check for osteoporosis (brittle bone disease).    Shots:    Get a flu shot each year.    Get a tetanus shot every 10 years.    Talk to your doctor about your pneumonia vaccines. There are now  two you should receive - Pneumovax (PPSV 23) and Prevnar (PCV 13).    Talk to your pharmacist about the shingles vaccine.    Talk to your doctor about the hepatitis B vaccine.    Nutrition:     Eat at least 5 servings of fruits and vegetables each day.      Eat whole-grain bread, whole-wheat pasta and brown rice instead of white grains and rice.      Get adequate Calcium and Vitamin D.     Lifestyle    Exercise at least 150 minutes a week (30 minutes a day, 5 days a week). This will help you control your weight and prevent disease.      Limit alcohol to one drink per day.      No smoking.       Wear sunscreen to prevent skin cancer.       See your dentist twice a year for an exam and cleaning.      See your eye doctor every 1 to 2 years to screen for conditions such as glaucoma, macular degeneration and cataracts.    -------------  1. Labs today: cholesterol, diabetes screen, liver function, kidney function, blood counts  2. Consider Shingrix shingles vaccine at pharmacy  - 2nd dose 2-6 months after the first  3. Recheck urine tests in future   4. Increase lisinopril to 20 mg - can take 2 of what you have until you run out, then 1 pill of the new prescription   5. Monitor blood pressure at home and when come in for INR          Follow-ups after your visit        Follow-up notes from your care team     Return in about 6 months (around 1/6/2019).      Your next 10 appointments already scheduled     Jul 11, 2018 10:00 AM CDT   Anticoagulation [facility] with  [facility]IN[facility]    [address] [address] [phone]            Sep 19, 2018 12:15 PM CDT   Return Visit with Bernadine Maria MD   [facility] ([facility])    [address]  [address]  [facility] [address] [phone]              Who to contact     If you have questions or need follow up information about  "today's clinic visit or your schedule please contact [facility] directly at [phone].  Normal or non-critical lab and imaging results will be communicated to you by MyChart, letter or phone within 4 business days after the clinic has received the results. If you do not hear from us within 7 days, please contact the clinic through [employer] or phone. If you have a critical or abnormal lab result, we will notify you by phone as soon as possible.  Submit refill requests through [facility] or call your pharmacy and they will forward the refill request to us. Please allow 3 business days for your refill to be completed.          Additional Information About Your Visit        [email or web address] Pets Information     [facility] gives you secure access to your electronic health record. If you see a primary care provider, you can also send messages to your care team and make appointments. If you have questions, please call your primary care clinic.  If you do not have a primary care provider, please call [phone] and they will assist you.        Care EveryWhere ID     This is your Care EveryWhere ID. This could be used by other organizations to access your [place] medical records  [id number]        Your Vitals Were     Pulse Temperature Height Pulse Oximetry BMI (Body Mass Index)       51 97.2  F (36.2  C) (Tympanic) 5' 5\" (1.651 m) 99% 25.33 kg/m2        Blood Pressure from Last 3 Encounters:   07/06/18 162/70   06/08/18 156/76   06/01/18 128/60    Weight from Last 3 Encounters:   07/06/18 152 lb 3.2 oz (69 kg)   06/08/18 149 lb (67.6 kg)   05/23/18 155 lb (70.3 kg)              We Performed the Following     CBC with platelets     Comprehensive metabolic panel     Lipid panel reflex to direct LDL Fasting          Today's Medication Changes          These changes are accurate as of 7/6/18 11:37 AM.  If you have any questions, ask your nurse or doctor.               These medicines have changed or have updated prescriptions.  "       Dose/Directions    lisinopril 20 MG tablet   Commonly known as:  PRINIVIL/ZESTRIL   This may have changed:    - medication strength  - how much to take  - when to take this   Used for:  Hypertension goal BP (blood pressure) < 150/90   Changed by:  Dian Frias MD        Dose:  20 mg   Take 1 tablet (20 mg) by mouth daily   Quantity:  90 tablet   Refills:  1            Where to get your medicines      These medications were sent to [facility] - [address]  [address] [address]     Phone:  [phone]     lisinopril 20 MG tablet                Primary Care Provider Office Phone # Fax #    Dian Frias [phone]       [address] [place] [address]        Equal Access to Services     [facility]: Hadii luiz cooper hadasho Soomaali, waaxda luqadaha, qaybta kaalmada adeegyada, waxdarrick hutton haycortney leahy . So [facility] [phone].    ATENCIÓN: Si habla español, tiene a conn disposición servicios gratuitos de asistencia lingüística. [facility] [phone].    We comply with applicable federal civil rights laws and [place] laws. We do not discriminate on the basis of race, color, national origin, age, disability, sex, sexual orientation, or gender identity.            Thank you!     Thank you for choosing [facility]  for your care. Our goal is always to provide you with excellent care. Hearing back from our patients is one way we can continue to improve our services. Please take a few minutes to complete the written survey that you may receive in the mail after your visit with us. Thank you!             Your Updated Medication List - Protect others around you: Learn how to safely use, store and throw away your medicines at www.disposemymeds.org.          This list is accurate as of 7/6/18 11:37 AM.  Always use your most recent med list.                   Brand Name Dispense  Instructions for use Diagnosis    ascorbic acid 1000 MG Tabs    vitamin C    180 tablet    Take 1 tablet (1,000 mg) by mouth 2 times daily    Recurrent UTI, Neurogenic bladder       calcium citrate-vitamin D 315-250 MG-UNIT Tabs per tablet    CITRACAL     Take 1 tablet by mouth daily        carboxymethylcellulose 1 % ophthalmic solution    CELLUVISC/REFRESH LIQUIGEL     Place 1 drop into both eyes every morning As needed        conjugated estrogens cream    PREMARIN     Place vaginally twice a week On Tuesday and Friday. Uses a pea sized amount        fluocinonide 0.05 % cream    LIDEX    30 g    Apply topically 2 times daily as needed    Dermatitis       GENTLECATH URINARY CATHETER Misc     120 each    1 catheter 4 times daily    Urinary retention       lisinopril 20 MG tablet    PRINIVIL/ZESTRIL    90 tablet    Take 1 tablet (20 mg) by mouth daily    Hypertension goal BP (blood pressure) < 150/90       methenamine hippurate 1 g Tabs tablet    HIPREX     Take 1 g by mouth 2 times daily        metoprolol succinate 100 MG 24 hr tablet    TOPROL-XL    90 tablet    Take 1 tablet (100 mg) by mouth daily    Palpitations, Atrial fibrillation, unspecified type (H)       OMEGA-3 FISH OIL PO      Take 1 g by mouth 2 times daily (with meals)        order for DME     1 Device    Trilok ankle brace    Plantar fascial fibromatosis, Pain of left heel       polyethylene glycol Packet    MIRALAX/GLYCOLAX     Take 1 packet by mouth daily as needed        sodium chloride 5 % ophthalmic ointment    PETER 128     Place 1 Application into both eyes At Bedtime        TYLENOL 500 MG tablet   Generic drug:  acetaminophen      Take 500-1,000 mg by mouth every 6 hours as needed        Vitamin D-3 1000 units Caps      Take 1 capsule by mouth daily        warfarin 2.5 MG tablet    COUMADIN    92 tablet    Take 2.5mg (1 tablet) daily or as instructed by INR Clinic.    Long-term (current) use of anticoagulants

## 2018-07-06 NOTE — PROGRESS NOTES
SUBJECTIVE:   Maia Miranda is a 85 year old female who presents for Preventive Visit.  Are you in the first 12 months of your Medicare coverage?  No    Physical   Annual:     Getting at least 3 servings of Calcium per day:  Yes    Bi-annual eye exam:  Yes    Dental care twice a year:  Yes    Sleep apnea or symptoms of sleep apnea:  None    Diet:  Low salt and Other    Frequency of exercise:  2-3 days/week    Duration of exercise:  30-45 minutes    Taking medications regularly:  Yes    Medication side effects:  None    Additional concerns today:  YES    Ability to successfully perform activities of daily living: no assistance needed    Home Safety:  No safety concerns identified    Hearing Impairment: difficulty following a conversation in a noisy restaurant or crowded room, need to ask people to speak up or repeat themselves, difficulty understanding soft or whispered speech and difficulty understanding speech on the telephone    Pain back of head: present most of the time, but sometimes not there. Not sure when it started. On left side behind ear. Some pain in the ear at times. Not sure that anything makes it worse. Nothing makes the pain better. No fevers. Has tried taking tylenol and helps. Uses 2-3 times a day for past 2-3 weeks. Just had eye exam with laser treatment. Has to go back in a few weeks. Blood pressures have been running higher lately - mostly over 150 systolic.    GERD: controlled with tums    Mild cognitive impairment: pt feels like getting worse, but family thinks she is stable. Ok living independently at this point.    Fall risk:  Fallen 2 or more times in the past year?: No  Any fall with injury in the past year?: No    COGNITIVE SCREEN  1) Repeat 3 items (Leader, Season, Table)    2) Clock draw: NORMAL  3) 3 item recall: Recalls 1 object   Results: NORMAL clock, 1-2 items recalled: COGNITIVE IMPAIRMENT LESS LIKELY    Mini-CogTM Copyright ARLEEN Fierro. Licensed by the author for use in [facility]; reprinted with permission (soob@[facility]). All rights reserved.      Reviewed and updated as needed this visit by clinical staff  Tobacco  Allergies  Meds  Problems  Med Hx  Surg Hx  Fam Hx  Soc Hx        Reviewed and updated as needed this visit by Provider  Allergies  Meds  Problems        Social History   Substance Use Topics     Smoking status: Never Smoker     Smokeless tobacco: Never Used     Alcohol use No     Alcohol Use 7/6/2018   If you drink alcohol do you typically have greater than 3 drinks per day OR greater than 7 drinks per week? Not Applicable   No flowsheet data found.    Hyperlipidemia Follow-Up      Rate your low fat/cholesterol diet?: good    Taking statin?  No    Other lipid medications/supplements?:  Fish oil/Omega 3, dose 2g without side effects    Hypertension Follow-up      Outpatient blood pressures are not being checked.    Low Salt Diet: no added salt    Blood pressure has been higher at home usually 150's or higher. Was 156/76 with June BP check at pharmacy and 162/70 today. Taking 10 mg of lisinopril in the am and 100 mg of metoprolol at night.     Today's PHQ-2 Score:   PHQ-2 ( 1999 [employer]) 7/6/2018   Q1: Little interest or pleasure in doing things 0   Q2: Feeling down, depressed or hopeless 0   PHQ-2 Score 0   Q1: Little interest or pleasure in doing things Not at all   Q2: Feeling down, depressed or hopeless Not at all   PHQ-2 Score 0     Do you feel safe in your environment - Yes    Do you have a Health Care Directive?: Yes: Advance Directive has been received and scanned.    Current providers sharing in care for this patient include:   Patient Care Team:  Dian Frias MD as PCP - General (Internal Medicine)  Bernadine Maria MD as MD (Urology)  Rosa Cruz RN as Registered Nurse (Urology)  Dian Frias MD as Referring Physician (Internal Medicine)  Kb Tracy MD as MD (Urology)    The following health maintenance  items are reviewed in Epic and correct as of today:  Health Maintenance   Topic Date Due     MICROALBUMIN Q1 YEAR  05/16/2017     ADVANCE DIRECTIVE PLANNING Q5 YRS  01/15/2018     LIPID MONITORING Q1 YEAR  06/14/2018     PHQ-2 Q1 YR  06/14/2018     FALL RISK ASSESSMENT  06/14/2018     INFLUENZA VACCINE (1) 09/01/2018     BMP Q1 YR  03/26/2019     HEMOGLOBIN Q1 YR  03/26/2019     MAMMO Q1 YR  06/21/2019     DEXA Q2 YR  07/05/2019     COLONOSCOPY Q10 YR  06/14/2021     TETANUS Q10 YR  11/05/2023     PNEUMOCOCCAL  Completed     Patient Active Problem List   Diagnosis     Hyperlipidemia LDL goal <130     Osteopenia     Primary osteoarthritis involving multiple joints     Essential and other specified forms of tremor     Esophageal reflux     Varicose veins of lower extremities with complications     Allergic rhinitis     Degeneration of lumbar or lumbosacral intervertebral disc     Internal bleeding hemorrhoids     Osteoarthritis of thumb     Diverticulosis     CKD (chronic kidney disease) stage 3, GFR 30-59 ml/min     Chronic constipation     Urinary retention     Health Care Home     Advanced directives, counseling/discussion     Long-term (current) use of anticoagulants     Atrial fibrillation (H)     Hypertension goal BP (blood pressure) < 150/90     Post-menopausal bleeding     History of recurrent UTIs     Mild cognitive impairment     Fibroid uterus     Rectal bleeding     Past Surgical History:   Procedure Laterality Date     C NONSPECIFIC PROCEDURE      D&C x 2 in 1957 & 1962 -- Abstracted 06/10/02     C NONSPECIFIC PROCEDURE      Left breast biopsy x 2 in 1988 & 1989 -- Abstracted 06/10/02     C NONSPECIFIC PROCEDURE  1958    Influenza resulting in hospitalization -- Abstracted 06/10/02     C NONSPECIFIC PROCEDURE  1971    10 day hospitalization from bilateral pulmonary enboli -- Abstracted 06/10/02     C NONSPECIFIC PROCEDURE  1/03    colonoscopy  negative     CATARACT IOL, RT/LT       corneal scraping        CYSTOSCOPY, BIOPSY BLADDER, COMBINED N/A 2016    Procedure: COMBINED CYSTOSCOPY, BIOPSY BLADDER;  Surgeon: Bernadine Maria MD;  Location: [facility] OR     ESOPHAGOSCOPY, GASTROSCOPY, DUODENOSCOPY (EGD), COMBINED  2013    Procedure: COMBINED ESOPHAGOSCOPY, GASTROSCOPY, DUODENOSCOPY (EGD);   ESOPHAGOSCOPY, GASTROSCOPY, DUODENOSCOPY (EGD)   ;  Surgeon: Shawn Soto MD;  Location: [facility] GI     IMPLANT STIMULATOR SACRAL NERVE STAGE ONE N/A 2017    Procedure: IMPLANT STIMULATOR SACRAL NERVE STAGE ONE;  Surgeon: Kb Tracy MD;  Location: [facility]     IMPLANT STIMULATOR SACRAL NERVE STAGE TWO N/A 2017    Procedure: IMPLANT STIMULATOR SACRAL NERVE STAGE TWO;  Stage Two Interstim  ;  Surgeon: Kb Tracy MD;  Location: [facility] OR     interstim placement         Social History   Substance Use Topics     Smoking status: Never Smoker     Smokeless tobacco: Never Used     Alcohol use No     Family History   Problem Relation Age of Onset     Cerebrovascular Disease Father       at 92     Psychotic Disorder Mother      Suicide 62, depression     Alzheimer Disease Maternal Grandmother      Cardiovascular Sister      pacemaker     Glaucoma No family hx of          Review of Systems   Constitutional: Negative for chills and fever.   HENT: Positive for ear pain, hearing loss and sore throat.    Eyes: Negative for pain and visual disturbance.   Respiratory: Negative for cough and shortness of breath.    Cardiovascular: Negative for palpitations and peripheral edema.   Gastrointestinal: Positive for heartburn. Negative for abdominal pain, constipation, diarrhea, hematochezia and nausea.   Breasts:  Negative for breast mass and discharge.   Genitourinary: Negative for dysuria, frequency, genital sores, hematuria, pelvic pain, urgency, vaginal bleeding and vaginal discharge.   Musculoskeletal: Negative for arthralgias, joint swelling and myalgias.   Skin: Negative for rash.   Neurological: Positive for  "dizziness. Negative for weakness, headaches and paresthesias.   Psychiatric/Behavioral: Negative for mood changes. The patient is not nervous/anxious.    All other systems reviewed and are negative.    OBJECTIVE:   /70 (BP Location: Right arm, Patient Position: Sitting, Cuff Size: Adult Regular)  Pulse 51  Temp 97.2  F (36.2  C) (Tympanic)  Ht 5' 5\" (1.651 m)  Wt 152 lb 3.2 oz (69 kg)  SpO2 99%  BMI 25.33 kg/m2 Estimated body mass index is 25.33 kg/(m^2) as calculated from the following:    Height as of this encounter: 5' 5\" (1.651 m).    Weight as of this encounter: 152 lb 3.2 oz (69 kg).  Physical Exam  GENERAL: pleasant elderly female, alert and no distress  EYES: Eyes grossly normal to inspection, PERRL and conjunctivae and sclerae normal  HENT: ear canals and TM's normal, nose and mouth without ulcers or lesions. Small amount of wax on left side.  NECK: no adenopathy, no asymmetry, masses, or scars and thyroid normal to palpation. Right carotid bruit  RESP: lungs clear to auscultation - no rales, rhonchi or wheezes  CV: regular rate and rhythm, normal S1 S2, no S3 or S4, no murmur, click or rub, no peripheral edema and peripheral pulses strong  ABDOMEN: soft, nontender, no hepatosplenomegaly, no masses and bowel sounds normal  MS: no gross musculoskeletal defects noted, no edema  SKIN: no suspicious lesions or rashes  NEURO: Normal strength and tone, mentation intact and speech normal. CN II-XI intact  PSYCH: mentation appears normal, affect normal/bright    Diagnostic Test Results:  Results for orders placed or performed in visit on 07/06/18   Lipid panel reflex to direct LDL Fasting   Result Value Ref Range    Cholesterol 209 (H) <200 mg/dL    Triglycerides 110 <150 mg/dL    HDL Cholesterol 52 >49 mg/dL    LDL Cholesterol Calculated 135 (H) <100 mg/dL    Non HDL Cholesterol 157 (H) <130 mg/dL   CBC with platelets   Result Value Ref Range    WBC 5.5 4.0 - 11.0 10e9/L    RBC Count 3.87 3.8 - 5.2 " 10e12/L    Hemoglobin 12.5 11.7 - 15.7 g/dL    Hematocrit 38.7 35.0 - 47.0 %     78 - 100 fl    MCH 32.3 26.5 - 33.0 pg    MCHC 32.3 31.5 - 36.5 g/dL    RDW 12.6 10.0 - 15.0 %    Platelet Count 146 (L) 150 - 450 10e9/L   Comprehensive metabolic panel   Result Value Ref Range    Sodium 141 133 - 144 mmol/L    Potassium 4.2 3.4 - 5.3 mmol/L    Chloride 106 94 - 109 mmol/L    Carbon Dioxide 26 20 - 32 mmol/L    Anion Gap 9 3 - 14 mmol/L    Glucose 101 (H) 70 - 99 mg/dL    Urea Nitrogen 25 7 - 30 mg/dL    Creatinine 1.18 (H) 0.52 - 1.04 mg/dL    GFR Estimate 43 (L) >60 mL/min/1.7m2    GFR Estimate If Black 53 (L) >60 mL/min/1.7m2    Calcium 9.0 8.5 - 10.1 mg/dL    Bilirubin Total 0.4 0.2 - 1.3 mg/dL    Albumin 4.0 3.4 - 5.0 g/dL    Protein Total 7.6 6.8 - 8.8 g/dL    Alkaline Phosphatase 91 40 - 150 U/L    ALT 25 0 - 50 U/L    AST 23 0 - 45 U/L       ASSESSMENT / PLAN:   1. Medicare annual wellness visit, subsequent  Mammo, colonoscopy, dexa, tdap UTD  Discussed Shingrix vaccine    2. Chronic nonintractable headache, unspecified headache type  Suspect headache are due to elevated blood pressures. Will treat blood pressures and have f/u if not improving.    3. Hypertension goal BP (blood pressure) < 150/90  Continue metoprolol. Increase lisinopril to 20 mg daily. Will monitor at home and also check in pharmacy when comes in for INR visit.  - Comprehensive metabolic panel  - lisinopril (PRINIVIL/ZESTRIL) 20 MG tablet; Take 1 tablet (20 mg) by mouth daily  Dispense: 90 tablet; Refill: 1    4. Hyperlipidemia LDL goal <130  Controlled.  - Lipid panel reflex to direct LDL Fasting    5. Mild cognitive impairment  Stable.    6. CKD (chronic kidney disease) stage 3, GFR 30-59 ml/min  Relatively stable. GFR had been consistent > 45 so PTH/phosphate levels were not checked; however, now slightly under. Consider with next labs. Also needs microalbumin - will bring cath with next time.    7. Anemia, unspecified  "type  resolved  - CBC with platelets    8. Gastroesophageal reflux disease without esophagitis  Controlled. Continue tums prn    End of Life Planning:  Patient currently has an advanced directive: DNR/DNI.  Practitioner is supportive of decision.    COUNSELING:  Reviewed preventive health counseling, as reflected in patient instructions  Special attention given to:       Hearing screening       Osteoporosis Prevention/Bone Health            Advanced Planning     BP Readings from Last 1 Encounters:   07/06/18 162/70     Estimated body mass index is 25.33 kg/(m^2) as calculated from the following:    Height as of this encounter: 5' 5\" (1.651 m).    Weight as of this encounter: 152 lb 3.2 oz (69 kg).           reports that she has never smoked. She has never used smokeless tobacco.      Appropriate preventive services were discussed with this patient, including applicable screening as appropriate for cardiovascular disease, diabetes, osteopenia/osteoporosis, and glaucoma.  As appropriate for age/gender, discussed screening for colorectal cancer, prostate cancer, breast cancer, and cervical cancer. Checklist reviewing preventive services available has been given to the patient.    Reviewed patients plan of care and provided an AVS. The Basic Care Plan (routine screening as documented in Health Maintenance) for Maia meets the Care Plan requirement. This Care Plan has been established and reviewed with the Patient.    Counseling Resources:  ATP IV Guidelines  Pooled Cohorts Equation Calculator  Breast Cancer Risk Calculator  FRAX Risk Assessment  ICSI Preventive Guidelines  Dietary Guidelines for Americans, 2010  USDA's MyPlate  ASA Prophylaxis  Lung CA Screening    An addition 25 minutes was spent in face-to-face contact with Maia in clinic today outside of the annual wellness visit, of which >50% was spent counseling or in coordination of care regarding headaches and uncontrolled hypertension.    Dian Johnson" MD [facility]      Answers for HPI/ROS submitted by the patient on 7/6/2018   PHQ-2 Score: 0

## 2018-07-07 LAB
ALBUMIN SERPL-MCNC: 4 G/DL (ref 3.4–5)
ALP SERPL-CCNC: 91 U/L (ref 40–150)
ALT SERPL W P-5'-P-CCNC: 25 U/L (ref 0–50)
ANION GAP SERPL CALCULATED.3IONS-SCNC: 9 MMOL/L (ref 3–14)
AST SERPL W P-5'-P-CCNC: 23 U/L (ref 0–45)
BILIRUB SERPL-MCNC: 0.4 MG/DL (ref 0.2–1.3)
BUN SERPL-MCNC: 25 MG/DL (ref 7–30)
CALCIUM SERPL-MCNC: 9 MG/DL (ref 8.5–10.1)
CHLORIDE SERPL-SCNC: 106 MMOL/L (ref 94–109)
CHOLEST SERPL-MCNC: 209 MG/DL
CO2 SERPL-SCNC: 26 MMOL/L (ref 20–32)
CREAT SERPL-MCNC: 1.18 MG/DL (ref 0.52–1.04)
GFR SERPL CREATININE-BSD FRML MDRD: 43 ML/MIN/1.7M2
GLUCOSE SERPL-MCNC: 101 MG/DL (ref 70–99)
HDLC SERPL-MCNC: 52 MG/DL
LDLC SERPL CALC-MCNC: 135 MG/DL
NONHDLC SERPL-MCNC: 157 MG/DL
POTASSIUM SERPL-SCNC: 4.2 MMOL/L (ref 3.4–5.3)
PROT SERPL-MCNC: 7.6 G/DL (ref 6.8–8.8)
SODIUM SERPL-SCNC: 141 MMOL/L (ref 133–144)
TRIGL SERPL-MCNC: 110 MG/DL

## 2018-07-09 ENCOUNTER — OFFICE VISIT (OUTPATIENT)
Dept: PEDIATRICS | Facility: CLINIC | Age: 83
End: 2018-07-09
Payer: COMMERCIAL

## 2018-07-09 VITALS
BODY MASS INDEX: 25.01 KG/M2 | TEMPERATURE: 98.4 F | WEIGHT: 150.13 LBS | SYSTOLIC BLOOD PRESSURE: 140 MMHG | HEIGHT: 65 IN | HEART RATE: 64 BPM | DIASTOLIC BLOOD PRESSURE: 60 MMHG

## 2018-07-09 DIAGNOSIS — N30.01 ACUTE CYSTITIS WITH HEMATURIA: Primary | ICD-10-CM

## 2018-07-09 DIAGNOSIS — R30.0 DYSURIA: ICD-10-CM

## 2018-07-09 DIAGNOSIS — R82.90 NONSPECIFIC FINDING ON EXAMINATION OF URINE: ICD-10-CM

## 2018-07-09 LAB
ALBUMIN UR-MCNC: 30 MG/DL
APPEARANCE UR: ABNORMAL
BACTERIA #/AREA URNS HPF: ABNORMAL /HPF
BILIRUB UR QL STRIP: NEGATIVE
COLOR UR AUTO: YELLOW
GLUCOSE UR STRIP-MCNC: NEGATIVE MG/DL
HGB UR QL STRIP: ABNORMAL
KETONES UR STRIP-MCNC: NEGATIVE MG/DL
LEUKOCYTE ESTERASE UR QL STRIP: ABNORMAL
NITRATE UR QL: POSITIVE
PH UR STRIP: 6 PH (ref 5–7)
RBC #/AREA URNS AUTO: ABNORMAL /HPF
SOURCE: ABNORMAL
SP GR UR STRIP: 1.01 (ref 1–1.03)
UROBILINOGEN UR STRIP-ACNC: 0.2 EU/DL (ref 0.2–1)
WBC #/AREA URNS AUTO: >100 /HPF

## 2018-07-09 PROCEDURE — 81001 URINALYSIS AUTO W/SCOPE: CPT | Performed by: INTERNAL MEDICINE

## 2018-07-09 PROCEDURE — 99213 OFFICE O/P EST LOW 20 MIN: CPT | Performed by: INTERNAL MEDICINE

## 2018-07-09 PROCEDURE — 87086 URINE CULTURE/COLONY COUNT: CPT | Performed by: INTERNAL MEDICINE

## 2018-07-09 PROCEDURE — 87088 URINE BACTERIA CULTURE: CPT | Performed by: INTERNAL MEDICINE

## 2018-07-09 PROCEDURE — 87186 SC STD MICRODIL/AGAR DIL: CPT | Performed by: INTERNAL MEDICINE

## 2018-07-09 RX ORDER — CIPROFLOXACIN 500 MG/1
500 TABLET, FILM COATED ORAL 2 TIMES DAILY
Qty: 14 TABLET | Refills: 0 | Status: SHIPPED | OUTPATIENT
Start: 2018-07-09 | End: 2018-10-15

## 2018-07-09 NOTE — PATIENT INSTRUCTIONS
It looks like you have a UTI. We will send off a culture to be sure.     In the meantime, I would like for you to start antibiotics. Start ciprofloxacin (the antibiotic you were on last time), and take twice daily for 7 days.     Please follow-up with INR clinic this week. You will need to see them a little more frequently while you are on this antibiotic.    Please call us if you have any medication side effects (muscle, tendon pains) or if symptoms do not improve in the next 2 days.

## 2018-07-09 NOTE — MR AVS SNAPSHOT
After Visit Summary   7/9/2018    Maia Miranda    MRN: [id number]           Patient Information     Date Of Birth          10/26/1932        Visit Information        Provider Department      7/9/2018 11:30 AM Mojgan Key MD [facility]        Today's Diagnoses     Acute cystitis with hematuria    -  1    Dysuria        Nonspecific finding on examination of urine          Care Instructions    It looks like you have a UTI. We will send off a culture to be sure.     In the meantime, I would like for you to start antibiotics. Start ciprofloxacin (the antibiotic you were on last time), and take twice daily for 7 days.     Please follow-up with INR clinic this week. You will need to see them a little more frequently while you are on this antibiotic.    Please call us if you have any medication side effects (muscle, tendon pains) or if symptoms do not improve in the next 2 days.           Follow-ups after your visit        Your next 10 appointments already scheduled     Jul 11, 2018 10:00 AM CDT   Anticoagulation Visit with  ANTICOAGULATION CLINIC   [facility]    [address] [facility] [address] [address] [phone]            Sep 19, 2018 12:15 PM CDT   Return Visit with Bernadine Maria MD   [facility] ([facility])    [address]  [address] [place] [address] [phone]              Who to contact     If you have questions or need follow up information about today's clinic visit or your schedule please contact [facility] directly at [phone].  Normal or non-critical lab and imaging results will be communicated to you by MyChart, letter or phone within 4 business days after the clinic has received the results. If you do not hear from us within 7 days, please contact the clinic through MyChart or phone. If you have a critical or abnormal lab  "result, we will notify you by phone as soon as possible.  Submit refill requests through [employer] or call your pharmacy and they will forward the refill request to us. Please allow 3 business days for your refill to be completed.          Additional Information About Your Visit        [employer] Information     [employer] gives you secure access to your electronic health record. If you see a primary care provider, you can also send messages to your care team and make appointments. If you have questions, please call your primary care clinic.  If you do not have a primary care provider, please call [phone] and they will assist you.        Care EveryWhere ID     This is your Care EveryWhere ID. This could be used by other organizations to access your [place] medical records  [id number]        Your Vitals Were     Pulse Temperature Height Breastfeeding? BMI (Body Mass Index)       64 98.4  F (36.9  C) (Oral) 5' 5\" (1.651 m) No 24.98 kg/m2        Blood Pressure from Last 3 Encounters:   07/09/18 140/60   07/06/18 162/70   06/08/18 156/76    Weight from Last 3 Encounters:   07/09/18 150 lb 2 oz (68.1 kg)   07/06/18 152 lb 3.2 oz (69 kg)   06/08/18 149 lb (67.6 kg)              We Performed the Following     *UA reflex to Microscopic and Culture ([facility] (except [place] and [place])     Urine Culture Aerobic Bacterial     Urine Microscopic          Today's Medication Changes          These changes are accurate as of 7/9/18 12:05 PM.  If you have any questions, ask your nurse or doctor.               Start taking these medicines.        Dose/Directions    ciprofloxacin 500 MG tablet   Commonly known as:  CIPRO   Used for:  Acute cystitis with hematuria   Started by:  Mojgan Key MD        Dose:  500 mg   Take 1 tablet (500 mg) by mouth 2 times daily   Quantity:  14 tablet   Refills:  0            Where to get your medicines      These medications were sent to [facility] " [place], [place] - [address] [place] [address]  [address] [facility] [address]     Phone:  [phone]     ciprofloxacin 500 MG tablet                Primary Care Provider Office Phone # Fax #    Dian Frias [phone]       [facility] [address] [place] [address]        Equal Access to Services     [facility]: Hadii aad ku hadasho Soomaali, waaxda luqadaha, qaybta kaalmada adeegyada, waxdarrick tomlinsonin hayaan teresa lockhartyanethkathy leahy . So [facility] [phone].    ATENCIÓN: Si habla español, tiene a conn disposición servicios gratuitos de asistencia lingüística. [facility] [phone].    We comply with applicable federal civil rights laws and [place] laws. We do not discriminate on the basis of race, color, national origin, age, disability, sex, sexual orientation, or gender identity.            Thank you!     Thank you for choosing [facility]  for your care. Our goal is always to provide you with excellent care. Hearing back from our patients is one way we can continue to improve our services. Please take a few minutes to complete the written survey that you may receive in the mail after your visit with us. Thank you!             Your Updated Medication List - Protect others around you: Learn how to safely use, store and throw away your medicines at www.disposemymeds.org.          This list is accurate as of 7/9/18 12:05 PM.  Always use your most recent med list.                   Brand Name Dispense Instructions for use Diagnosis    ascorbic acid 1000 MG Tabs    vitamin C    180 tablet    Take 1 tablet (1,000 mg) by mouth 2 times daily    Recurrent UTI, Neurogenic bladder       calcium citrate-vitamin D 315-250 MG-UNIT Tabs per tablet    CITRACAL     Take 1 tablet by mouth daily        carboxymethylcellulose 1 % ophthalmic solution    CELLUVISC/REFRESH LIQUIGEL     Place 1 drop into both eyes every morning As needed        ciprofloxacin 500 MG tablet    CIPRO    14 tablet    Take  1 tablet (500 mg) by mouth 2 times daily    Acute cystitis with hematuria       conjugated estrogens cream    PREMARIN     Place vaginally twice a week On Tuesday and Friday. Uses a pea sized amount        fluocinonide 0.05 % cream    LIDEX    30 g    Apply topically 2 times daily as needed    Dermatitis       GENTLECATH URINARY CATHETER Misc     120 each    1 catheter 4 times daily    Urinary retention       lisinopril 20 MG tablet    PRINIVIL/ZESTRIL    90 tablet    Take 1 tablet (20 mg) by mouth daily    Hypertension goal BP (blood pressure) < 150/90       methenamine hippurate 1 g Tabs tablet    HIPREX     Take 1 g by mouth 2 times daily        metoprolol succinate 100 MG 24 hr tablet    TOPROL-XL    90 tablet    Take 1 tablet (100 mg) by mouth daily    Palpitations, Atrial fibrillation, unspecified type (H)       OMEGA-3 FISH OIL PO      Take 1 g by mouth 2 times daily (with meals)        order for DME     1 Device    Trilok ankle brace    Plantar fascial fibromatosis, Pain of left heel       polyethylene glycol Packet    MIRALAX/GLYCOLAX     Take 1 packet by mouth daily as needed        sodium chloride 5 % ophthalmic ointment    PETER 128     Place 1 Application into both eyes At Bedtime        TYLENOL 500 MG tablet   Generic drug:  acetaminophen      Take 500-1,000 mg by mouth every 6 hours as needed        Vitamin D-3 1000 units Caps      Take 1 capsule by mouth daily        warfarin 2.5 MG tablet    COUMADIN    92 tablet    Take 2.5mg (1 tablet) daily or as instructed by INR Clinic.    Long-term (current) use of anticoagulants

## 2018-07-09 NOTE — PROGRESS NOTES
SUBJECTIVE:   Maia Miranda is a 85 year old female who presents to clinic today for the following health issues:      URINARY TRACT SYMPTOMS      Duration: x1 days     Description  dysuria, frequency, urgency and back pain    Intensity:  severe    Accompanying signs and symptoms:  Fever/chills: no   Flank pain YES  Nausea and vomiting: YES- nausea   Vaginal symptoms: none  Abdominal/Pelvic Pain: YES    History  History of frequent UTI's: YES  History of kidney stones: no   Sexually Active: no   Possibility of pregnancy: No    Precipitating or alleviating factors: None    Therapies tried and outcome: Tylenol       Maia comes in for evaluation of urinary symptoms. She reports increased urinary frequency and pain with urination. Has had some L lower abdominal pain, no back pain. No blood in urine. Some chills, no fevers. No vomiting, some nausea. A little more confused than usual. Symptoms started yesterday, and seemed to get worse overnight.     Patient has to self catheterize due to history of urinary retention.     Problem list and histories reviewed & adjusted, as indicated.  Additional history: as documented    Patient Active Problem List   Diagnosis     Hyperlipidemia LDL goal <130     Osteopenia     Primary osteoarthritis involving multiple joints     Essential and other specified forms of tremor     Esophageal reflux     Varicose veins of lower extremities with complications     Allergic rhinitis     Degeneration of lumbar or lumbosacral intervertebral disc     Internal bleeding hemorrhoids     Osteoarthritis of thumb     Diverticulosis     CKD (chronic kidney disease) stage 3, GFR 30-59 ml/min     Chronic constipation     Urinary retention     Health Care Home     Advanced directives, counseling/discussion     Long-term (current) use of anticoagulants     Atrial fibrillation (H)     Hypertension goal BP (blood pressure) < 150/90     Post-menopausal bleeding     History of recurrent UTIs     Mild  cognitive impairment     Fibroid uterus     Rectal bleeding     Past Surgical History:   Procedure Laterality Date     C NONSPECIFIC PROCEDURE      D&C x 2 in  &  -- Abstracted 06/10/02     C NONSPECIFIC PROCEDURE      Left breast biopsy x 2 in  &  -- Abstracted 06/10/02     C NONSPECIFIC PROCEDURE      Influenza resulting in hospitalization -- Abstracted 06/10/02     C NONSPECIFIC PROCEDURE  1971    10 day hospitalization from bilateral pulmonary enboli -- Abstracted 06/10/02     C NONSPECIFIC PROCEDURE      colonoscopy  negative     CATARACT IOL, RT/LT       corneal scraping       CYSTOSCOPY, BIOPSY BLADDER, COMBINED N/A 2016    Procedure: COMBINED CYSTOSCOPY, BIOPSY BLADDER;  Surgeon: Bernadine Maria MD;  Location: [facility]     ESOPHAGOSCOPY, GASTROSCOPY, DUODENOSCOPY (EGD), COMBINED  2013    Procedure: COMBINED ESOPHAGOSCOPY, GASTROSCOPY, DUODENOSCOPY (EGD);   ESOPHAGOSCOPY, GASTROSCOPY, DUODENOSCOPY (EGD)   ;  Surgeon: Shawn Soto MD;  Location: [facility] GI     IMPLANT STIMULATOR SACRAL NERVE STAGE ONE N/A 2017    Procedure: IMPLANT STIMULATOR SACRAL NERVE STAGE ONE;  Surgeon: Kb Tracy MD;  Location: [facility]     IMPLANT STIMULATOR SACRAL NERVE STAGE TWO N/A 2017    Procedure: IMPLANT STIMULATOR SACRAL NERVE STAGE TWO;  Stage Two Interstim  ;  Surgeon: Kb Tracy MD;  Location: [facility] OR     interstim placement         Social History   Substance Use Topics     Smoking status: Never Smoker     Smokeless tobacco: Never Used     Alcohol use No     Family History   Problem Relation Age of Onset     Cerebrovascular Disease Father       at 92     Psychotic Disorder Mother      Suicide 62, depression     Alzheimer Disease Maternal Grandmother      Cardiovascular Sister      pacemaker     Glaucoma No family hx of            Reviewed and updated as needed this visit by clinical staff  Tobacco  Allergies  Meds  Med Hx  Fam Hx  Soc Hx     "    ROS:  Constitutional, gi and gu systems are negative, except as otherwise noted.    OBJECTIVE:     /60 (BP Location: Right arm, Patient Position: Chair, Cuff Size: Adult Regular)  Pulse 64  Temp 98.4  F (36.9  C) (Oral)  Ht 5' 5\" (1.651 m)  Wt 150 lb 2 oz (68.1 kg)  Breastfeeding? No  BMI 24.98 kg/m2  Body mass index is 24.98 kg/(m^2).  GENERAL: healthy, alert and no distress  CV: regular rate and rhythm, normal S1 S2, no S3 or S4, no murmur, click or rub, no peripheral edema and peripheral pulses strong  ABDOMEN: mild LLQ tenderness, otherwise normoactive and non-tender without masses or distention  BACK: no CVA tenderness, no paralumbar tenderness    Diagnostic Test Results:  Results for orders placed or performed in visit on 07/09/18 (from the past 24 hour(s))   *UA reflex to Microscopic and Culture ([facility] (except [place] and [place])   Result Value Ref Range    Color Urine Yellow     Appearance Urine Cloudy     Glucose Urine Negative NEG^Negative mg/dL    Bilirubin Urine Negative NEG^Negative    Ketones Urine Negative NEG^Negative mg/dL    Specific Gravity Urine 1.010 1.003 - 1.035    Blood Urine Large (A) NEG^Negative    pH Urine 6.0 5.0 - 7.0 pH    Protein Albumin Urine 30 (A) NEG^Negative mg/dL    Urobilinogen Urine 0.2 0.2 - 1.0 EU/dL    Nitrite Urine Positive (A) NEG^Negative    Leukocyte Esterase Urine Large (A) NEG^Negative    Source Midstream Urine    Urine Microscopic   Result Value Ref Range    WBC Urine >100 (A) OTO5^0 - 5 /HPF    RBC Urine 5-10 (A) OTO2^O - 2 /HPF    Bacteria Urine Many (A) NEG^Negative /HPF       ASSESSMENT/PLAN:     1. Acute cystitis with hematuria  Patient's UA consistent with UTI. Based on prior culture, will prescribe ciprofloxacin. This abx has previously worked well in the past for patient without significant side effect, but did review potential side effects. Given recurrent nature and that her hx is complicated by urinary retention " requiring self catheterization, will plan to treat for 7 days as opposed to 3 days at higher dose.   - ciprofloxacin (CIPRO) 500 MG tablet; Take 1 tablet (500 mg) by mouth 2 times daily  Dispense: 14 tablet; Refill: 0    2. Dysuria  - *UA reflex to Microscopic and Culture ([place] and [facility] (except [place] and [place])  - Urine Culture Aerobic Bacterial  - Urine Microscopic    3. Nonspecific finding on examination of urine  - Urine Culture Aerobic Bacterial    Patient Instructions   It looks like you have a UTI. We will send off a culture to be sure.     In the meantime, I would like for you to start antibiotics. Start ciprofloxacin (the antibiotic you were on last time), and take twice daily for 7 days.     Please follow-up with INR clinic this week. You will need to see them a little more frequently while you are on this antibiotic.    Please call us if you have any medication side effects (muscle, tendon pains) or if symptoms do not improve in the next 2 days.       Mojgan Clemente MD  [facility]

## 2018-07-11 ENCOUNTER — ANTICOAGULATION THERAPY VISIT (OUTPATIENT)
Dept: NURSING | Facility: CLINIC | Age: 83
End: 2018-07-11
Payer: COMMERCIAL

## 2018-07-11 ENCOUNTER — ALLIED HEALTH/NURSE VISIT (OUTPATIENT)
Dept: FAMILY MEDICINE | Facility: CLINIC | Age: 83
End: 2018-07-11

## 2018-07-11 VITALS — DIASTOLIC BLOOD PRESSURE: 70 MMHG | SYSTOLIC BLOOD PRESSURE: 154 MMHG

## 2018-07-11 DIAGNOSIS — Z01.30 BP CHECK: Primary | ICD-10-CM

## 2018-07-11 DIAGNOSIS — I48.91 ATRIAL FIBRILLATION, UNSPECIFIED TYPE (H): ICD-10-CM

## 2018-07-11 DIAGNOSIS — Z79.01 LONG-TERM (CURRENT) USE OF ANTICOAGULANTS: ICD-10-CM

## 2018-07-11 LAB
BACTERIA SPEC CULT: ABNORMAL
INR POINT OF CARE: 3.3 (ref 0.86–1.14)
SPECIMEN SOURCE: ABNORMAL

## 2018-07-11 PROCEDURE — 36416 COLLJ CAPILLARY BLOOD SPEC: CPT

## 2018-07-11 PROCEDURE — 85610 PROTHROMBIN TIME: CPT | Mod: QW

## 2018-07-11 PROCEDURE — 99207 ZZC NO CHARGE NURSE ONLY: CPT

## 2018-07-11 PROCEDURE — 99207 ZZC NO CHARGE NURSE ONLY: CPT | Performed by: INTERNAL MEDICINE

## 2018-07-11 NOTE — MR AVS SNAPSHOT
Maia Miranda   7/11/2018 10:00 AM   Anticoagulation Therapy Visit    Description:  85 year old female   Provider:   ANTICOAGULATION CLINIC   Department:   Nurse           INR as of 7/11/2018     Today's INR 3.3!      Anticoagulation Summary as of 7/11/2018     INR goal 2.0-3.0   Today's INR 3.3!   Full warfarin instructions 7/11: 1.25 mg; Otherwise 2.5 mg every day   Next INR check 8/1/2018    Indications   Long-term (current) use of anticoagulants [Z79.01]  Atrial fibrillation (H) [I48.91]         Your next Anticoagulation Clinic appointment(s)     Aug 01, 2018 11:15 AM CDT   Anticoagulation Visit with  ANTICOAGULATION CLINIC   [facility]    [address] [facility] [address] [address] [phone]              Contact Numbers     [facility]  Please call to cancel and/or reschedule your appointment, or with any problems or questions regarding your therapy.  Anticoagulation Nurse: [phone]  Main Clinic: [phone]             July 2018 Details    Sun Mon Tue Wed Thu Fri Sat     1               2               3               4               5               6               7                 8               9               10               11      1.25 mg   See details      12      2.5 mg         13      2.5 mg         14      2.5 mg           15      2.5 mg         16      2.5 mg         17      2.5 mg         18      2.5 mg         19      2.5 mg         20      2.5 mg         21      2.5 mg           22      2.5 mg         23      2.5 mg         24      2.5 mg         25      2.5 mg         26      2.5 mg         27      2.5 mg         28      2.5 mg           29      2.5 mg         30      2.5 mg         31      2.5 mg              Date Details   07/11 This INR check               How to take your warfarin dose     To take:  1.25 mg Take 0.5 of a 2.5 mg tablet.    To take:  2.5 mg Take 1 of the 2.5 mg tablets.           August 2018 Details     Sun Mon Tue Wed Thu Fri Sat        1            2               3               4                 5               6               7               8               9               10               11                 12               13               14               15               16               17               18                 19               20               21               22               23               24               25                 26               27               28               29               30               31                 Date Details   No additional details    Date of next INR:  8/1/2018         How to take your warfarin dose     To take:  2.5 mg Take 1 of the 2.5 mg tablets.

## 2018-07-11 NOTE — PROGRESS NOTES
ANTICOAGULATION FOLLOW-UP CLINIC VISIT    Patient Name:  Maia Miranda  Date:  7/11/2018  Contact Type:  Face to Face    SUBJECTIVE:     Patient Findings     Positives Antibiotic use or infection (UTI - Cipro started 7/9/18)           OBJECTIVE    INR Protime   Date Value Ref Range Status   07/11/2018 3.3 (A) 0.86 - 1.14 Final       ASSESSMENT / PLAN  No question data found.  Anticoagulation Summary as of 7/11/2018     INR goal 2.0-3.0   Today's INR 3.3!   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    Meme Riggs, RN

## 2018-07-11 NOTE — PROGRESS NOTES
Maia Miranda is enrolled/participating in the retail pharmacy Blood Pressure Goals Achievement Program (BPGAP).  Maia Miranda was evaluated at [facility] on July 11, 2018 at which time her blood pressure was:    BP Readings from Last 3 Encounters:   07/11/18 154/70   07/09/18 140/60   07/06/18 162/70     Reviewed lifestyle modifications for blood pressure control and reduction: including making healthy food choices, managing weight, getting regular exercise, smoking cessation, reducing alcohol consumption, monitoring blood pressure regularly.     Maia Miranda is not experiencing symptoms.    Follow-Up: BP is not at goal of < 150/90 mmHg (patient 60+ years of age without diabetes), Recommended follow-up with PCP.  Routing to PCP for further review.    Recommendation to Provider: Follow-up blood pressure check within 30 days    Maia Miranda was evaluated for enrollment into the PGEN study today.    Patient eligible for enrollment:  No  Patient interested in enrollment:  No    Completed by: Daniele Coles    Thank You   Sloan Emanuel  [facility]

## 2018-07-11 NOTE — MR AVS SNAPSHOT
After Visit Summary   7/11/2018    Maia Miranda    MRN: [id number]           Patient Information     Date Of Birth          10/26/1932        Visit Information        Provider Department      7/11/2018 10:27 AM Dian Frias MD [facility]        Today's Diagnoses     BP check    -  1       Follow-ups after your visit        Your next 10 appointments already scheduled     Aug 01, 2018 11:15 AM CDT   Anticoagulation Visit with  ANTICOAGULATION CLINIC   [facility] ([facility]    [address] [address] [phone]            Sep 19, 2018 12:15 PM CDT   Return Visit with Bernadine Maria MD   [facility] ([facility])    [address]  [address]  [place] [address] [phone]              Who to contact     If you have questions or need follow up information about today's clinic visit or your schedule please contact [facility] directly at [phone].  Normal or non-critical lab and imaging results will be communicated to you by Mingglhart, letter or phone within 4 business days after the clinic has received the results. If you do not hear from us within 7 days, please contact the clinic through Mingglhart or phone. If you have a critical or abnormal lab result, we will notify you by phone as soon as possible.  Submit refill requests through DreamsCloud or call your pharmacy and they will forward the refill request to us. Please allow 3 business days for your refill to be completed.          Additional Information About Your Visit        MyChart Information     DreamsCloud gives you secure access to your electronic health record. If you see a primary care provider, you can also send messages to your care team and make appointments. If you have questions, please call your primary care clinic.  If you do not have a primary care provider, please call  [phone] and they will assist you.        Care EveryWhere ID     This is your Care EveryWhere ID. This could be used by other organizations to access your [facility] medical records  [id number]         Blood Pressure from Last 3 Encounters:   07/11/18 154/70   07/09/18 140/60   07/06/18 162/70    Weight from Last 3 Encounters:   07/09/18 150 lb 2 oz (68.1 kg)   07/06/18 152 lb 3.2 oz (69 kg)   06/08/18 149 lb (67.6 kg)              Today, you had the following     No orders found for display       Primary Care Provider Office Phone # Fax #    Dian Frias [phone] [address] [place] [address]        Equal Access to Services     KAYLA ALEX : Hadii luiz cooper hadasho Soomaali, waaxda luqadaha, qaybta kaalmada adeegyada, waxdarrick hutton haycortney leahy . So [facility] [phone].    ATENCIÓN: Si habla español, tiene a ocnn disposición servicios gratuitos de asistencia lingüística. Llame al [phone].    We comply with applicable federal civil rights laws and [place] laws. We do not discriminate on the basis of race, color, national origin, age, disability, sex, sexual orientation, or gender identity.            Thank you!     Thank you for choosing [facility]  for your care. Our goal is always to provide you with excellent care. Hearing back from our patients is one way we can continue to improve our services. Please take a few minutes to complete the written survey that you may receive in the mail after your visit with us. Thank you!             Your Updated Medication List - Protect others around you: Learn how to safely use, store and throw away your medicines at www.disposemymeds.org.          This list is accurate as of 7/11/18 12:31 PM.  Always use your most recent med list.                   Brand Name Dispense Instructions for use Diagnosis    ascorbic acid 1000 MG Tabs    vitamin C    180 tablet    Take 1 tablet (1,000 mg) by mouth 2  times daily    Recurrent UTI, Neurogenic bladder       calcium citrate-vitamin D 315-250 MG-UNIT Tabs per tablet    CITRACAL     Take 1 tablet by mouth daily        carboxymethylcellulose 1 % ophthalmic solution    CELLUVISC/REFRESH LIQUIGEL     Place 1 drop into both eyes every morning As needed        ciprofloxacin 500 MG tablet    CIPRO    14 tablet    Take 1 tablet (500 mg) by mouth 2 times daily    Acute cystitis with hematuria       conjugated estrogens cream    PREMARIN     Place vaginally twice a week On Tuesday and Friday. Uses a pea sized amount        fluocinonide 0.05 % cream    LIDEX    30 g    Apply topically 2 times daily as needed    Dermatitis       GENTLECATH URINARY CATHETER Misc     120 each    1 catheter 4 times daily    Urinary retention       lisinopril 20 MG tablet    PRINIVIL/ZESTRIL    90 tablet    Take 1 tablet (20 mg) by mouth daily    Hypertension goal BP (blood pressure) < 150/90       methenamine hippurate 1 g Tabs tablet    HIPREX     Take 1 g by mouth 2 times daily        metoprolol succinate 100 MG 24 hr tablet    TOPROL-XL    90 tablet    Take 1 tablet (100 mg) by mouth daily    Palpitations, Atrial fibrillation, unspecified type (H)       OMEGA-3 FISH OIL PO      Take 1 g by mouth 2 times daily (with meals)        order for DME     1 Device    Trilok ankle brace    Plantar fascial fibromatosis, Pain of left heel       polyethylene glycol Packet    MIRALAX/GLYCOLAX     Take 1 packet by mouth daily as needed        sodium chloride 5 % ophthalmic ointment    PETER 128     Place 1 Application into both eyes At Bedtime        TYLENOL 500 MG tablet   Generic drug:  acetaminophen      Take 500-1,000 mg by mouth every 6 hours as needed        Vitamin D-3 1000 units Caps      Take 1 capsule by mouth daily        warfarin 2.5 MG tablet    COUMADIN    92 tablet    Take 2.5mg (1 tablet) daily or as instructed by INR Clinic.    Long-term (current) use of anticoagulants

## 2018-08-01 ENCOUNTER — ANTICOAGULATION THERAPY VISIT (OUTPATIENT)
Dept: NURSING | Facility: CLINIC | Age: 83
End: 2018-08-01
Payer: COMMERCIAL

## 2018-08-01 DIAGNOSIS — Z79.01 LONG-TERM (CURRENT) USE OF ANTICOAGULANTS: ICD-10-CM

## 2018-08-01 DIAGNOSIS — I48.91 ATRIAL FIBRILLATION, UNSPECIFIED TYPE (H): ICD-10-CM

## 2018-08-01 LAB — INR POINT OF CARE: 1.9 (ref 0.86–1.14)

## 2018-08-01 PROCEDURE — 99207 ZZC NO CHARGE NURSE ONLY: CPT

## 2018-08-01 PROCEDURE — 85610 PROTHROMBIN TIME: CPT | Mod: QW

## 2018-08-01 PROCEDURE — 36416 COLLJ CAPILLARY BLOOD SPEC: CPT

## 2018-08-01 NOTE — PROGRESS NOTES
ANTICOAGULATION FOLLOW-UP CLINIC VISIT    Patient Name:  Maia Miranda  Date:  8/1/2018  Contact Type:  Face to Face    SUBJECTIVE:     Patient Findings     Positives Change in diet/appetite (eating more veggies - some green), Other complaints (BP elevated - no med changes)           OBJECTIVE    INR Protime   Date Value Ref Range Status   08/01/2018 1.9 (A) 0.86 - 1.14 Final       ASSESSMENT / PLAN  INR assessment THER    Recheck INR In: 3 WEEKS    INR Location Clinic      Anticoagulation Summary as of 8/1/2018     INR goal 2.0-3.0   Today's INR 1.9!   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

## 2018-08-01 NOTE — MR AVS SNAPSHOT
Maia Miranda   8/1/2018 11:15 AM   Anticoagulation Therapy Visit    Description:  85 year old female   Provider:   ANTICOAGULATION CLINIC   Department:   Nurse           INR as of 8/1/2018     Today's INR 1.9!      Anticoagulation Summary as of 8/1/2018     INR goal 2.0-3.0   Today's INR 1.9!   Full warfarin instructions 2.5 mg every day   Next INR check 8/22/2018    Indications   Long-term (current) use of anticoagulants [Z79.01]  Atrial fibrillation (H) [I48.91]         Your next Anticoagulation Clinic appointment(s)     Aug 22, 2018 11:30 AM CDT   Anticoagulation Visit with  ANTICOAGULATION CLINIC   [facility]    [address] [address] [phone]              Contact Numbers     [facility]  Please call to cancel and/or reschedule your appointment, or with any problems or questions regarding your therapy.  Anticoagulation Nurse: [phone]  Main Clinic: [phone]             August 2018 Details    Sun Mon Tue Wed Thu Fri Sat        1      2.5 mg   See details      2      2.5 mg         3      2.5 mg         4      2.5 mg           5      2.5 mg         6      2.5 mg         7      2.5 mg         8      2.5 mg         9      2.5 mg         10      2.5 mg         11      2.5 mg           12      2.5 mg         13      2.5 mg         14      2.5 mg         15      2.5 mg         16      2.5 mg         17      2.5 mg         18      2.5 mg           19      2.5 mg         20      2.5 mg         21      2.5 mg         22            23               24               25                 26               27               28               29               30               31                 Date Details   08/01 This INR check       Date of next INR:  8/22/2018         How to take your warfarin dose     To take:  2.5 mg Take 1 of the 2.5 mg tablets.

## 2018-08-22 ENCOUNTER — ANTICOAGULATION THERAPY VISIT (OUTPATIENT)
Dept: NURSING | Facility: CLINIC | Age: 83
End: 2018-08-22
Payer: COMMERCIAL

## 2018-08-22 ENCOUNTER — ALLIED HEALTH/NURSE VISIT (OUTPATIENT)
Dept: PEDIATRICS | Facility: CLINIC | Age: 83
End: 2018-08-22

## 2018-08-22 VITALS — SYSTOLIC BLOOD PRESSURE: 148 MMHG | DIASTOLIC BLOOD PRESSURE: 60 MMHG

## 2018-08-22 DIAGNOSIS — I48.91 ATRIAL FIBRILLATION, UNSPECIFIED TYPE (H): ICD-10-CM

## 2018-08-22 DIAGNOSIS — Z79.01 LONG-TERM (CURRENT) USE OF ANTICOAGULANTS: ICD-10-CM

## 2018-08-22 DIAGNOSIS — Z01.30 BP CHECK: Primary | ICD-10-CM

## 2018-08-22 LAB — INR POINT OF CARE: 2.5 (ref 0.86–1.14)

## 2018-08-22 PROCEDURE — 99207 ZZC NO CHARGE NURSE ONLY: CPT | Performed by: INTERNAL MEDICINE

## 2018-08-22 PROCEDURE — 99207 ZZC NO CHARGE NURSE ONLY: CPT

## 2018-08-22 PROCEDURE — 36416 COLLJ CAPILLARY BLOOD SPEC: CPT

## 2018-08-22 PROCEDURE — 85610 PROTHROMBIN TIME: CPT | Mod: QW

## 2018-08-22 NOTE — MR AVS SNAPSHOT
Maia Miranda   8/22/2018 11:30 AM   Anticoagulation Therapy Visit    Description:  85 year old female   Provider:   ANTICOAGULATION CLINIC   Department:   Nurse           INR as of 8/22/2018     Today's INR 2.5      Anticoagulation Summary as of 8/22/2018     INR goal 2.0-3.0   Today's INR 2.5   Full warfarin instructions 2.5 mg every day   Next INR check 9/19/2018    Indications   Long-term (current) use of anticoagulants [Z79.01]  Atrial fibrillation (H) [I48.91]         Your next Anticoagulation Clinic appointment(s)     Sep 19, 2018  9:30 AM CDT   Anticoagulation Visit with  ANTICOAGULATION CLINIC   [facility] ([facility])    [address] [address] [phone]              Contact Numbers     [facility]  Please call to cancel and/or reschedule your appointment, or with any problems or questions regarding your therapy.  Anticoagulation Nurse: [phone]  Main Clinic: [phone]             August 2018 Details    Sun Mon Tue Wed Thu Fri Sat        1               2               3               4                 5               6               7               8               9               10               11                 12               13               14               15               16               17               18                 19               20               21               22      2.5 mg   See details      23      2.5 mg         24      2.5 mg         25      2.5 mg           26      2.5 mg         27      2.5 mg         28      2.5 mg         29      2.5 mg         30      2.5 mg         31      2.5 mg           Date Details   08/22 This INR check               How to take your warfarin dose     To take:  2.5 mg Take 1 of the 2.5 mg tablets.           September 2018 Details    Sun Mon Tue Wed Thu Fri Sat           1      2.5 mg           2      2.5 mg         3      2.5 mg         4      2.5 mg         5       2.5 mg         6      2.5 mg         7      2.5 mg         8      2.5 mg           9      2.5 mg         10      2.5 mg         11      2.5 mg         12      2.5 mg         13      2.5 mg         14      2.5 mg         15      2.5 mg           16      2.5 mg         17      2.5 mg         18      2.5 mg         19            20               21               22                 23               24               25               26               27               28               29                 30                      Date Details   No additional details    Date of next INR:  9/19/2018         How to take your warfarin dose     To take:  2.5 mg Take 1 of the 2.5 mg tablets.

## 2018-08-22 NOTE — PROGRESS NOTES
Maia Miranda is enrolled/participating in the retail pharmacy Blood Pressure Goals Achievement Program (BPGAP).  Maia Miranda was evaluated at [facility] on August 22, 2018 at which time her blood pressure was:    BP Readings from Last 3 Encounters:   08/22/18 148/60   07/11/18 154/70   07/09/18 140/60     Reviewed lifestyle modifications for blood pressure control and reduction: including making healthy food choices, managing weight, getting regular exercise, smoking cessation, reducing alcohol consumption, monitoring blood pressure regularly.     Maia Mirnada is not experiencing symptoms.    Follow-Up: BP is at goal of < 150/90 mmHg (patient 60+ years of age without diabetes).  Recommended follow-up at pharmacy in 6 months.     Recommendation to Provider: none    Maia Miranda was evaluated for enrollment into the PGEN study today.    Patient eligible for enrollment:  No  Patient interested in enrollment:  No    Completed by: Daniele Coles    Thank You   Daniele Coles RPh [facility]

## 2018-08-22 NOTE — PROGRESS NOTES
ANTICOAGULATION FOLLOW-UP CLINIC VISIT    Patient Name:  Maia Miranda  Date:  8/22/2018  Contact Type:  Face to Face    SUBJECTIVE:     Patient Findings     Positives No Problem Findings           OBJECTIVE    INR Protime   Date Value Ref Range Status   08/22/2018 2.5 (A) 0.86 - 1.14 Final       ASSESSMENT / PLAN  INR assessment THER    Recheck INR In: 4 WEEKS    INR Location Clinic      Anticoagulation Summary as of 8/22/2018     INR goal 2.0-3.0   Today's INR 2.5   Warfarin maintenance plan 2.5 mg (2.5 mg x 1) every day   Full warfarin instructions 2.5 mg every day   Weekly warfarin total 17.5 mg   No change documented Dian Astudillo RN   Plan last modified Meme Riggs RN (1/18/2017)   Next INR check 9/19/2018   Target end date Indefinite    Indications   Long-term (current) use of anticoagulants [Z79.01]  Atrial fibrillation (H) [I48.91]         Anticoagulation Episode Summary     INR check location [facility]    Preferred lab     Send INR reminders to  ANTICO CLINIC    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

## 2018-08-26 ENCOUNTER — OFFICE VISIT (OUTPATIENT)
Dept: URGENT CARE | Facility: URGENT CARE | Age: 83
End: 2018-08-26
Payer: COMMERCIAL

## 2018-08-26 VITALS
BODY MASS INDEX: 24.84 KG/M2 | DIASTOLIC BLOOD PRESSURE: 78 MMHG | TEMPERATURE: 98.2 F | SYSTOLIC BLOOD PRESSURE: 148 MMHG | WEIGHT: 149.3 LBS

## 2018-08-26 DIAGNOSIS — Z79.01 LONG-TERM (CURRENT) USE OF ANTICOAGULANTS: ICD-10-CM

## 2018-08-26 DIAGNOSIS — R33.9 URINARY RETENTION: ICD-10-CM

## 2018-08-26 DIAGNOSIS — N39.0 URINARY TRACT INFECTION WITHOUT HEMATURIA, SITE UNSPECIFIED: Primary | ICD-10-CM

## 2018-08-26 DIAGNOSIS — R30.0 DYSURIA: ICD-10-CM

## 2018-08-26 LAB
ALBUMIN UR-MCNC: 100 MG/DL
APPEARANCE UR: ABNORMAL
BACTERIA #/AREA URNS HPF: ABNORMAL /HPF
BILIRUB UR QL STRIP: NEGATIVE
COLOR UR AUTO: YELLOW
GLUCOSE UR STRIP-MCNC: NEGATIVE MG/DL
HGB UR QL STRIP: ABNORMAL
KETONES UR STRIP-MCNC: NEGATIVE MG/DL
LEUKOCYTE ESTERASE UR QL STRIP: ABNORMAL
NITRATE UR QL: NEGATIVE
NON-SQ EPI CELLS #/AREA URNS LPF: ABNORMAL /LPF
PH UR STRIP: 5.5 PH (ref 5–7)
RBC #/AREA URNS AUTO: ABNORMAL /HPF
SOURCE: ABNORMAL
SP GR UR STRIP: <=1.005 (ref 1–1.03)
UROBILINOGEN UR STRIP-ACNC: 0.2 EU/DL (ref 0.2–1)
WBC #/AREA URNS AUTO: ABNORMAL /HPF
WBC CLUMPS #/AREA URNS HPF: PRESENT /HPF

## 2018-08-26 PROCEDURE — 99214 OFFICE O/P EST MOD 30 MIN: CPT | Performed by: PHYSICIAN ASSISTANT

## 2018-08-26 PROCEDURE — 87186 SC STD MICRODIL/AGAR DIL: CPT | Performed by: FAMILY MEDICINE

## 2018-08-26 PROCEDURE — 87088 URINE BACTERIA CULTURE: CPT | Performed by: FAMILY MEDICINE

## 2018-08-26 PROCEDURE — 87086 URINE CULTURE/COLONY COUNT: CPT | Performed by: FAMILY MEDICINE

## 2018-08-26 PROCEDURE — 81001 URINALYSIS AUTO W/SCOPE: CPT | Performed by: PHYSICIAN ASSISTANT

## 2018-08-26 RX ORDER — CIPROFLOXACIN 250 MG/1
250 TABLET, FILM COATED ORAL 2 TIMES DAILY
Qty: 14 TABLET | Refills: 0 | Status: SHIPPED | OUTPATIENT
Start: 2018-08-26 | End: 2018-09-02

## 2018-08-26 NOTE — MR AVS SNAPSHOT
After Visit Summary   8/26/2018    Maia Miranda    MRN: [id number]           Patient Information     Date Of Birth          10/26/1932        Visit Information        Provider Department      8/26/2018 5:25 PM Laura Jackson PA-C [facility]        Today's Diagnoses     Urinary tract infection without hematuria, site unspecified    -  1    Dysuria        Long-term (current) use of anticoagulants        Urinary retention           Follow-ups after your visit        Your next 10 appointments already scheduled     Sep 19, 2018  9:30 AM CDT   Anticoagulation Visit with  ANTICOAGULATION CLINIC   [facility]    [address] [address] [phone]            Sep 19, 2018 12:15 PM CDT   Return Visit with Bernadine Maria MD   [facility] ([facility])    [address]  [address]  [place] [address] [phone]              Who to contact     If you have questions or need follow up information about today's clinic visit or your schedule please contact [facility] directly at [phone].  Normal or non-critical lab and imaging results will be communicated to you by MyChart, letter or phone within 4 business days after the clinic has received the results. If you do not hear from us within 7 days, please contact the clinic through MyChart or phone. If you have a critical or abnormal lab result, we will notify you by phone as soon as possible.  Submit refill requests through Berggi or call your pharmacy and they will forward the refill request to us. Please allow 3 business days for your refill to be completed.          Additional Information About Your Visit        MyChart Information     Berggi gives you secure access to your electronic health record. If you see a primary care provider, you can also send messages to your care team  and make appointments. If you have questions, please call your primary care clinic.  If you do not have a primary care provider, please call [phone] and they will assist you.        Care EveryWhere ID     This is your Care EveryWhere ID. This could be used by other organizations to access your [facility] medical records  [id number]        Your Vitals Were     Temperature BMI (Body Mass Index)                98.2  F (36.8  C) (Oral) 24.84 kg/m2           Blood Pressure from Last 3 Encounters:   08/26/18 148/78   08/22/18 148/60   07/11/18 154/70    Weight from Last 3 Encounters:   08/26/18 149 lb 4.8 oz (67.7 kg)   07/09/18 150 lb 2 oz (68.1 kg)   07/06/18 152 lb 3.2 oz (69 kg)              We Performed the Following     UA with Microscopic reflex to Culture     Urine Culture Aerobic Bacterial          Today's Medication Changes          These changes are accurate as of 8/26/18  8:21 PM.  If you have any questions, ask your nurse or doctor.               These medicines have changed or have updated prescriptions.        Dose/Directions    * ciprofloxacin 500 MG tablet   Commonly known as:  CIPRO   This may have changed:  Another medication with the same name was added. Make sure you understand how and when to take each.   Used for:  Acute cystitis with hematuria        Dose:  500 mg   Take 1 tablet (500 mg) by mouth 2 times daily   Quantity:  14 tablet   Refills:  0       * ciprofloxacin 250 MG tablet   Commonly known as:  CIPRO   This may have changed:  You were already taking a medication with the same name, and this prescription was added. Make sure you understand how and when to take each.   Used for:  Urinary tract infection without hematuria, site unspecified        Dose:  250 mg   Take 1 tablet (250 mg) by mouth 2 times daily for 7 days   Quantity:  14 tablet   Refills:  0       * Notice:  This list has 2 medication(s) that are the same as other medications prescribed for you. Read the directions  carefully, and ask your doctor or other care provider to review them with you.         Where to get your medicines      Some of these will need a paper prescription and others can be bought over the counter.  Ask your nurse if you have questions.     Bring a paper prescription for each of these medications     ciprofloxacin 250 MG tablet                Primary Care Provider Office Phone # Fax #    Dian Frias [phone] [address] [place] [address]        Equal Access to Services     [facility]: Hadii aad ku hadasho Soomaali, waaxda luqadaha, qaybta kaalmada adeegyada, waxay idiin hayaan adeeg britton leahy . So [facility] [phone].    ATENCIÓN: Si kandace guerra, tiene a conn disposición servicios gratuitos de asistencia lingüística. [facility] [phone].    We comply with applicable federal civil rights laws and [place] laws. We do not discriminate on the basis of race, color, national origin, age, disability, sex, sexual orientation, or gender identity.            Thank you!     Thank you for choosing [facility] [facility]  for your care. Our goal is always to provide you with excellent care. Hearing back from our patients is one way we can continue to improve our services. Please take a few minutes to complete the written survey that you may receive in the mail after your visit with us. Thank you!             Your Updated Medication List - Protect others around you: Learn how to safely use, store and throw away your medicines at www.disposemymeds.org.          This list is accurate as of 8/26/18  8:21 PM.  Always use your most recent med list.                   Brand Name Dispense Instructions for use Diagnosis    ascorbic acid 1000 MG Tabs    vitamin C    180 tablet    Take 1 tablet (1,000 mg) by mouth 2 times daily    Recurrent UTI, Neurogenic bladder       calcium citrate-vitamin D 315-250 MG-UNIT Tabs per tablet    CITRACAL     Take 1 tablet by mouth  daily        carboxymethylcellulose 1 % ophthalmic solution    CELLUVISC/REFRESH LIQUIGEL     Place 1 drop into both eyes every morning As needed        * ciprofloxacin 500 MG tablet    CIPRO    14 tablet    Take 1 tablet (500 mg) by mouth 2 times daily    Acute cystitis with hematuria       * ciprofloxacin 250 MG tablet    CIPRO    14 tablet    Take 1 tablet (250 mg) by mouth 2 times daily for 7 days    Urinary tract infection without hematuria, site unspecified       conjugated estrogens cream    PREMARIN     Place vaginally twice a week On Tuesday and Friday. Uses a pea sized amount        fluocinonide 0.05 % cream    LIDEX    30 g    Apply topically 2 times daily as needed    Dermatitis       GENTLECATH URINARY CATHETER Misc     120 each    1 catheter 4 times daily    Urinary retention       lisinopril 20 MG tablet    PRINIVIL/ZESTRIL    90 tablet    Take 1 tablet (20 mg) by mouth daily    Hypertension goal BP (blood pressure) < 150/90       methenamine hippurate 1 g Tabs tablet    HIPREX     Take 1 g by mouth 2 times daily        metoprolol succinate 100 MG 24 hr tablet    TOPROL-XL    90 tablet    Take 1 tablet (100 mg) by mouth daily    Palpitations, Atrial fibrillation, unspecified type (H)       OMEGA-3 FISH OIL PO      Take 1 g by mouth 2 times daily (with meals)        order for DME     1 Device    Trilok ankle brace    Plantar fascial fibromatosis, Pain of left heel       polyethylene glycol Packet    MIRALAX/GLYCOLAX     Take 1 packet by mouth daily as needed        sodium chloride 5 % ophthalmic ointment    PETER 128     Place 1 Application into both eyes At Bedtime        TYLENOL 500 MG tablet   Generic drug:  acetaminophen      Take 500-1,000 mg by mouth every 6 hours as needed        Vitamin D-3 1000 units Caps      Take 1 capsule by mouth daily        warfarin 2.5 MG tablet    COUMADIN    92 tablet    Take 2.5mg (1 tablet) daily or as instructed by INR Clinic.    Long-term (current) use of  anticoagulants       * Notice:  This list has 2 medication(s) that are the same as other medications prescribed for you. Read the directions carefully, and ask your doctor or other care provider to review them with you.

## 2018-08-26 NOTE — PROGRESS NOTES
SUBJECTIVE:   Maia Miranda is a 85 year old female who  presents today for a possible UTI. Symptoms of dysuria, burning and suprapubic pain and pressure have been going on for 1day(s).  Hematuria yes .  gradual onsetand moderate.  There is no history of fever, chills, nausea or vomiting.  No history of vaginal  discharge. This patient does have a history of urinary tract infections. Patient denies long duration, rigors, flank pain, temperature > 101 degrees F., Vomiting, significant nausea or diarrhea and GFR less than 30 within the last year or vaginal discharge, vaginal odor and vaginal itching    Patient self cath's due to urinary retention and has recurrent UTI issues. Typically takes Cipro.    She is also on coumadin and last INR 2.5   Does not ever dose adjust with antibiotic and never has issues.       Past Medical History:   Diagnosis Date     Amnestic MCI (mild cognitive impairment with memory loss) 2014     Chronic duodenal ulcer without mention of hemorrhage, perforation, or obstruction 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 Warner, [facility] yearly     Esophageal reflux 2001    Abstracted 06/10/02     Essential and other specified forms of tremor 2004    resting tremor     Generalized osteoarthrosis, unspecified site     Hands     Hiatal hernia     diagnosed late 1990s [place], [place]     History of pulmonary embolism 1971    no source found     LACTOSE INTOLERANCE      Lumbago     sees Kodi Guidry     Mitral valve regurgitation      Occlusion and stenosis of carotid artery without mention of cerebral infarction 2003    CAROTID US NORMAL, bruit in neck     Osteoporosis, unspecified 2003    T = -2.66     Paroxysmal atrial fibrillation (H) 11/15/2013     Unspecified essential hypertension      Unspecified tinnitus 2005    mild hearing loss, saw ENT     Current Outpatient Prescriptions   Medication Sig Dispense Refill      acetaminophen (TYLENOL) 500 MG tablet Take 500-1,000 mg by mouth every 6 hours as needed       ascorbic acid (VITAMIN C) 1000 MG TABS Take 1 tablet (1,000 mg) by mouth 2 times daily 180 tablet 3     calcium citrate-vitamin D (CITRACAL) 315-250 MG-UNIT TABS per tablet Take 1 tablet by mouth daily       carboxymethylcellulose (CELLUVISC/REFRESH LIQUIGEL) 1 % ophthalmic solution Place 1 drop into both eyes every morning As needed       Catheters (GENTLECATH URINARY CATHETER) MISC 1 catheter 4 times daily 120 each 12     Cholecalciferol (VITAMIN D-3) 1000 UNITS CAPS Take 1 capsule by mouth daily       fluocinonide (LIDEX) 0.05 % cream Apply topically 2 times daily as needed 30 g 2     lisinopril (PRINIVIL/ZESTRIL) 20 MG tablet Take 1 tablet (20 mg) by mouth daily 90 tablet 1     methenamine hippurate (HIPREX) 1 G TABS tablet Take 1 g by mouth 2 times daily       metoprolol succinate (TOPROL-XL) 100 MG 24 hr tablet Take 1 tablet (100 mg) by mouth daily 90 tablet 3     Omega-3 Fatty Acids (OMEGA-3 FISH OIL PO) Take 1 g by mouth 2 times daily (with meals)       order for DME Trilok ankle brace 1 Device 0     polyethylene glycol (MIRALAX/GLYCOLAX) packet Take 1 packet by mouth daily as needed        sodium chloride (PETER 128) 5 % ophthalmic ointment Place 1 Application into both eyes At Bedtime       warfarin (COUMADIN) 2.5 MG tablet Take 2.5mg (1 tablet) daily or as instructed by INR Clinic. 92 tablet 3     ciprofloxacin (CIPRO) 500 MG tablet Take 1 tablet (500 mg) by mouth 2 times daily (Patient not taking: Reported on 8/26/2018) 14 tablet 0     conjugated estrogens (PREMARIN) vaginal cream Place vaginally twice a week On Tuesday and Friday. Uses a pea sized amount       Social History   Substance Use Topics     Smoking status: Never Smoker     Smokeless tobacco: Never Used     Alcohol use No       ROS:   Review of systems negative except as stated above.    OBJECTIVE:  /78  Temp 98.2  F (36.8  C) (Oral)  Wt 149  lb 4.8 oz (67.7 kg)  BMI 24.84 kg/m2  GENERAL APPEARANCE: healthy, alert and no distress  RESP: lungs clear to auscultation - no rales, rhonchi or wheezes  CV: regular rates and rhythm, normal S1 S2, no murmur noted  ABDOMEN:  soft, nontender, no HSM or masses and bowel sounds normal  BACK: No CVA tenderness  SKIN: no suspicious lesions or rashes    Results for orders placed or performed in visit on 08/26/18   UA with Microscopic reflex to Culture   Result Value Ref Range    Color Urine Yellow     Appearance Urine Cloudy     Glucose Urine Negative NEG^Negative mg/dL    Bilirubin Urine Negative NEG^Negative    Ketones Urine Negative NEG^Negative mg/dL    Specific Gravity Urine <=1.005 1.003 - 1.035    pH Urine 5.5 5.0 - 7.0 pH    Protein Albumin Urine 100 (A) NEG^Negative mg/dL    Urobilinogen Urine 0.2 0.2 - 1.0 EU/dL    Nitrite Urine Negative NEG^Negative    Blood Urine Large (A) NEG^Negative    Leukocyte Esterase Urine Large (A) NEG^Negative    Source Catheterized Urine     WBC Urine 25-50 (A) OTO5^0 - 5 /HPF    RBC Urine 10-25 (A) OTO2^O - 2 /HPF    WBC Clumps Present (A) NEG^Negative /HPF    Squamous Epithelial /LPF Urine Many (A) FEW^Few /LPF    Bacteria Urine Many (A) NEG^Negative /HPF         ASSESSMENT:   Lower, uncomplicated urinary tract infection.    PLAN:  Culture pending.  Cipro as directed per past treatments.  Call INR nurse to verify no dose adjustment needed.  Good self cath hygiene reviewed.    Drink plenty of fluids.  Prevention and treatment of UTI's discussed.Signs and symptoms of pyelonephritis mentioned.  Follow up with primary care physician if not improving

## 2018-08-28 LAB
BACTERIA SPEC CULT: ABNORMAL
SPECIMEN SOURCE: ABNORMAL

## 2018-09-13 ENCOUNTER — MEDICAL CORRESPONDENCE (OUTPATIENT)
Dept: HEALTH INFORMATION MANAGEMENT | Facility: CLINIC | Age: 83
End: 2018-09-13

## 2018-09-19 ENCOUNTER — ANTICOAGULATION THERAPY VISIT (OUTPATIENT)
Dept: NURSING | Facility: CLINIC | Age: 83
End: 2018-09-19
Payer: COMMERCIAL

## 2018-09-19 ENCOUNTER — OFFICE VISIT (OUTPATIENT)
Dept: UROLOGY | Facility: CLINIC | Age: 83
End: 2018-09-19
Payer: COMMERCIAL

## 2018-09-19 VITALS — DIASTOLIC BLOOD PRESSURE: 78 MMHG | BODY MASS INDEX: 24.79 KG/M2 | WEIGHT: 149 LBS | SYSTOLIC BLOOD PRESSURE: 124 MMHG

## 2018-09-19 DIAGNOSIS — R33.9 URINARY RETENTION: Primary | ICD-10-CM

## 2018-09-19 DIAGNOSIS — I48.91 ATRIAL FIBRILLATION, UNSPECIFIED TYPE (H): ICD-10-CM

## 2018-09-19 DIAGNOSIS — R39.15 URINARY URGENCY: ICD-10-CM

## 2018-09-19 DIAGNOSIS — Z79.01 LONG-TERM (CURRENT) USE OF ANTICOAGULANTS: ICD-10-CM

## 2018-09-19 LAB — INR POINT OF CARE: 2.4 (ref 0.86–1.14)

## 2018-09-19 PROCEDURE — 99213 OFFICE O/P EST LOW 20 MIN: CPT | Performed by: UROLOGY

## 2018-09-19 PROCEDURE — 99207 ZZC NO CHARGE NURSE ONLY: CPT

## 2018-09-19 PROCEDURE — 36416 COLLJ CAPILLARY BLOOD SPEC: CPT

## 2018-09-19 PROCEDURE — 85610 PROTHROMBIN TIME: CPT | Mod: QW

## 2018-09-19 PROCEDURE — 88112 CYTOPATH CELL ENHANCE TECH: CPT | Performed by: UROLOGY

## 2018-09-19 ASSESSMENT — PAIN SCALES - GENERAL: PAINLEVEL: NO PAIN (0)

## 2018-09-19 NOTE — PROGRESS NOTES
ANTICOAGULATION FOLLOW-UP CLINIC VISIT    Patient Name:  Maia Miranda  Date:  9/19/2018  Contact Type:  Face to Face    SUBJECTIVE:     Patient Findings     Positives No Problem Findings           OBJECTIVE    INR Protime   Date Value Ref Range Status   09/19/2018 2.4 (A) 0.86 - 1.14 Final       ASSESSMENT / PLAN  INR assessment THER    Recheck INR In: 5 WEEKS    INR Location Clinic      Anticoagulation Summary as of 9/19/2018     INR goal 2.0-3.0   Today's INR 2.4   Warfarin maintenance plan 2.5 mg (2.5 mg x 1) every day   Full warfarin instructions 2.5 mg every day   Weekly warfarin total 17.5 mg   No change documented Meme Riggs RN   Plan last modified Meme Riggs RN (1/18/2017)   Next INR check 10/24/2018   Target end date Indefinite    Indications   Long-term (current) use of anticoagulants [Z79.01]  Atrial fibrillation (H) [I48.91]         Anticoagulation Episode Summary     INR check location [facility]    Preferred lab     Send INR reminders to  [facility]IC    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

## 2018-09-19 NOTE — LETTER
RE: Maia Miranda  [address] [facility] [address]     Dear Colleague,    Thank you for referring your patient, Maia Miranda, to the [facility] [place] at [facility]. Please see a copy of my visit note below.    September 19, 2018    Return visit    Patient returns today for follow up.  She is here today with her daughter who is an     Pain over battery site and zapping gone.     Feel the sensation to go but still needing to catheterize because unable.  She notes recently more urgency even after she catheterizes.      She denies any changes in her health since last visit.     /78  Wt 67.6 kg (149 lb)  BMI 24.79 kg/m2  She is comfortable, in no distress, non-labored breathing.      A/P: 85 year old F with urinary retention s/p interstim, increased urinary urgency    Interstim reprogrammed with rep today    Given the increased urgency will plan on cytology today and office cystoscopy in the near future.  If cytology abnormal will then consider imaging prior    15 minutes were spent with the patient today, > 50% in counseling and coordination of care    Bernadine Maria MD MPH   of Urology    CC  Patient Care Team:  Dian Frias MD as PCP - General (Internal Medicine)  Bernadine Maria MD as MD (Urology)  Rosa Cruz, RN as Registered Nurse (Urology)  Dian Frias MD as Referring Physician (Internal Medicine)  Kb Tracy MD as MD (Urology)

## 2018-09-19 NOTE — PROGRESS NOTES
September 19, 2018    Return visit    Patient returns today for follow up.  She is here today with her daughter who is an     Pain over battery site and zapping gone.     Feel the sensation to go but still needing to catheterize because unable.  She notes recently more urgency even after she catheterizes.      She denies any changes in her health since last visit.     /78  Wt 67.6 kg (149 lb)  BMI 24.79 kg/m2  She is comfortable, in no distress, non-labored breathing.      A/P: 85 year old F with urinary retention s/p interstim, increased urinary urgency    Interstim reprogrammed with rep today    Given the increased urgency will plan on cytology today and office cystoscopy in the near future.  If cytology abnormal will then consider imaging prior    15 minutes were spent with the patient today, > 50% in counseling and coordination of care    Bernadine Maria MD MPH   of Urology    CC  Patient Care Team:  Dian Frias MD as PCP - General (Internal Medicine)  Bernadine Maria MD as MD (Urology)  Rosa Cruz, RN as Registered Nurse (Urology)  Dian Frias MD as Referring Physician (Internal Medicine)  Kb Tracy MD as MD (Urology)

## 2018-09-19 NOTE — PATIENT INSTRUCTIONS
Continue to work on the interstim settings    We will let you know if more testing needs to be done based on the urine results today    Please return for a cystoscopy (procedure to look in the bladder) and pelvic exam.  You will need a catheterized urine 7-10 days prior    It was a pleasure meeting with you today.  Thank you for allowing me and my team the privilege of caring for you today.  YOU are the reason we are here, and I truly hope we provided you with the excellent service you deserve.  Please let us know if there is anything else we can do for you so that we can be sure you are leaving completely satisfied with your care experience.    Cystoscopy    Cystoscopy is a procedure that lets your doctor look directly inside your urethra and bladder. It can be used to:    Help diagnose a problem with your urethra, bladder, or kidneys.    Take a sample (biopsy) of bladder or urethral tissue.    Treat certain problems (such as removing kidney stones).    Place a stent to bypass an obstruction.    Take special X-rays of the kidneys.  Based on the findings, your doctor may recommend other tests or treatments.  What is a cystoscope?  A cystoscope is a telescope-like instrument that contains lenses and fiberoptics (small glass wires that make bright light). The cystoscope may be straight and rigid, or flexible to bend around curves in the urethra. The doctor may look directly into the cystoscope, or project the image onto a monitor.  Getting ready    Ask your doctor if you should stop taking any medicines before the procedure.    Follow any other instructions your doctor gives you.  Tell your doctor before the exam if you:    Take any medicines, such as aspirin or blood thinners    Have allergies to any medicines    Are pregnant   The procedure  Cystoscopy is done in the doctor s office, surgery center, or hospital. The doctor and a nurse are present during the procedure. It takes only a few minutes, longer if a biopsy,  X-ray, or treatment needs to be done.  During the procedure:    You lie on an exam table on your back, knees bent and legs apart. You are covered with a drape.    Your urethra and the area around it are washed. Anesthetic jelly may be applied to numb the urethra.    The cystoscope is inserted. A sterile fluid is put into the bladder to expand it. You may feel pressure from this fluid.    When the procedure is done, the cystoscope is removed.  After the procedure   Once you re home:    Drink plenty of fluids.    You may have burning or light bleeding when you urinate--this is normal.    Medicines may be prescribed to ease any discomfort or prevent infection. Take these as directed.    Call your doctor if you have heavy bleeding or blood clots, burning that lasts more than a day, a fever over 100 F  (38  C), or trouble urinating.  Date Last Reviewed: 1/1/2017 2000-2017 [employer]. [address] [place] [address], [place] [address]. All rights reserved. This information is not intended as a substitute for professional medical care. Always follow your healthcare professional's instructions.

## 2018-09-19 NOTE — MR AVS SNAPSHOT
Maia Miranda   9/19/2018 9:30 AM   Anticoagulation Therapy Visit    Description:  85 year old female   Provider:   ANTICOAGULATION CLINIC   Department:   Nurse           INR as of 9/19/2018     Today's INR 2.4      Anticoagulation Summary as of 9/19/2018     INR goal 2.0-3.0   Today's INR 2.4   Full warfarin instructions 2.5 mg every day   Next INR check 10/24/2018    Indications   Long-term (current) use of anticoagulants [Z79.01]  Atrial fibrillation (H) [I48.91]         Your next Anticoagulation Clinic appointment(s)     Oct 24, 2018  9:45 AM CDT   Anticoagulation Visit with  ANTICOAGULATION CLINIC   [facility]    [address] [facility] [address] [phone]              Contact Numbers     [facility]  Please call to cancel and/or reschedule your appointment, or with any problems or questions regarding your therapy.  Anticoagulation Nurse: [phone]  Main Clinic: [phone]             September 2018 Details    Sun Mon Tue Wed Thu Fri Sat           1                 2               3               4               5               6               7               8                 9               10               11               12               13               14               15                 16               17               18               19      2.5 mg   See details      20      2.5 mg         21      2.5 mg         22      2.5 mg           23      2.5 mg         24      2.5 mg         25      2.5 mg         26      2.5 mg         27      2.5 mg         28      2.5 mg         29      2.5 mg           30      2.5 mg                Date Details   09/19 This INR check               How to take your warfarin dose     To take:  2.5 mg Take 1 of the 2.5 mg tablets.           October 2018 Details    Sun Mon Tue Wed Thu Fri Sat      1      2.5 mg         2      2.5 mg         3      2.5 mg         4      2.5 mg         5       2.5 mg         6      2.5 mg           7      2.5 mg         8      2.5 mg         9      2.5 mg         10      2.5 mg         11      2.5 mg         12      2.5 mg         13      2.5 mg           14      2.5 mg         15      2.5 mg         16      2.5 mg         17      2.5 mg         18      2.5 mg         19      2.5 mg         20      2.5 mg           21      2.5 mg         22      2.5 mg         23      2.5 mg         24            25               26               27                 28               29               30               31                   Date Details   No additional details    Date of next INR:  10/24/2018         How to take your warfarin dose     To take:  2.5 mg Take 1 of the 2.5 mg tablets.

## 2018-09-19 NOTE — MR AVS SNAPSHOT
After Visit Summary   9/19/2018    Maia Miranda    MRN: [id number]           Patient Information     Date Of Birth          10/26/1932        Visit Information        Provider Department      9/19/2018 12:15 PM Bernadine Maria MD [facility] [place]        Today's Diagnoses     Urinary retention    -  1    Urinary urgency          Care Instructions    Continue to work on the interstim settings    We will let you know if more testing needs to be done based on the urine results today    Please return for a cystoscopy (procedure to look in the bladder) and pelvic exam.  You will need a catheterized urine 7-10 days prior    It was a pleasure meeting with you today.  Thank you for allowing me and my team the privilege of caring for you today.  YOU are the reason we are here, and I truly hope we provided you with the excellent service you deserve.  Please let us know if there is anything else we can do for you so that we can be sure you are leaving completely satisfied with your care experience.    Cystoscopy    Cystoscopy is a procedure that lets your doctor look directly inside your urethra and bladder. It can be used to:    Help diagnose a problem with your urethra, bladder, or kidneys.    Take a sample (biopsy) of bladder or urethral tissue.    Treat certain problems (such as removing kidney stones).    Place a stent to bypass an obstruction.    Take special X-rays of the kidneys.  Based on the findings, your doctor may recommend other tests or treatments.  What is a cystoscope?  A cystoscope is a telescope-like instrument that contains lenses and fiberoptics (small glass wires that make bright light). The cystoscope may be straight and rigid, or flexible to bend around curves in the urethra. The doctor may look directly into the cystoscope, or project the image onto a monitor.  Getting ready    Ask your doctor if you should stop taking any medicines before the  procedure.    Follow any other instructions your doctor gives you.  Tell your doctor before the exam if you:    Take any medicines, such as aspirin or blood thinners    Have allergies to any medicines    Are pregnant   The procedure  Cystoscopy is done in the doctor s office, surgery center, or hospital. The doctor and a nurse are present during the procedure. It takes only a few minutes, longer if a biopsy, X-ray, or treatment needs to be done.  During the procedure:    You lie on an exam table on your back, knees bent and legs apart. You are covered with a drape.    Your urethra and the area around it are washed. Anesthetic jelly may be applied to numb the urethra.    The cystoscope is inserted. A sterile fluid is put into the bladder to expand it. You may feel pressure from this fluid.    When the procedure is done, the cystoscope is removed.  After the procedure   Once you re home:    Drink plenty of fluids.    You may have burning or light bleeding when you urinate--this is normal.    Medicines may be prescribed to ease any discomfort or prevent infection. Take these as directed.    Call your doctor if you have heavy bleeding or blood clots, burning that lasts more than a day, a fever over 100 F  (38  C), or trouble urinating.  Date Last Reviewed: 1/1/2017 2000-2017 [employer]. [address] [place] [address]. All rights reserved. This information is not intended as a substitute for professional medical care. Always follow your healthcare professional's instructions.            Follow-ups after your visit        Your next 10 appointments already scheduled     Oct 22, 2018  1:30 PM CDT   Nurse Visit with  NURSE   [facility] [place] ([facility])    [address]  [address]  [facility] [address]   [phone]            Oct 24, 2018  9:45 AM CDT   Anticoagulation Visit with  ANTICOAGULATION CLINIC   [facility]  ([facility])    [address] [facility] [address]   [phone]            Oct 31, 2018 10:00 AM CDT   Cystoscopy with Bernadine Maria MD,  CYF   [facility] [place] ([facility])    [address]  [address]  [facility] [id number] [phone]              Who to contact     If you have questions or need follow up information about today's clinic visit or your schedule please contact [facility] [place] directly at [phone].  Normal or non-critical lab and imaging results will be communicated to you by [employer], letter or phone within 4 business days after the clinic has received the results. If you do not hear from us within 7 days, please contact the clinic through [employer] or phone. If you have a critical or abnormal lab result, we will notify you by phone as soon as possible.  Submit refill requests through [employer] or call your pharmacy and they will forward the refill request to us. Please allow 3 business days for your refill to be completed.          Additional Information About Your Visit        [employer] Information     [employer] gives you secure access to your electronic health record. If you see a primary care provider, you can also send messages to your care team and make appointments. If you have questions, please call your primary care clinic.  If you do not have a primary care provider, please call [phone] and they will assist you.        Care EveryWhere ID     This is your Care EveryWhere ID. This could be used by other organizations to access your [place] medical records  [id number]        Your Vitals Were     BMI (Body Mass Index)                   24.79 kg/m2            Blood Pressure from Last 3 Encounters:   09/19/18 124/78   08/26/18 148/78   08/22/18 148/60    Weight from Last 3 Encounters:   09/19/18 67.6 kg (149 lb)   08/26/18 67.7 kg (149 lb 4.8 oz)   07/09/18 68.1  kg (150 lb 2 oz)              We Performed the Following     Cytology non gyn [[id number]]        Primary Care Provider Office Phone # Fax #    Dian Frias [phone] [address] [place] [address]        Equal Access to Services     [facility] ABI : Hadii aad ku hadasho Soomaali, waaxda luqadaha, qaybta kaalmada adeegyada, waxay idiin hayaan adealexandria jarquin latrentmontrell santana. So [facility] [phone].    ATENCIÓN: Si habla español, tiene a conn disposición servicios gratuitos de asistencia lingüística. Llame al [phone].    We comply with applicable federal civil rights laws and [place] laws. We do not discriminate on the basis of race, color, national origin, age, disability, sex, sexual orientation, or gender identity.            Thank you!     Thank you for choosing [facility] [place]  for your care. Our goal is always to provide you with excellent care. Hearing back from our patients is one way we can continue to improve our services. Please take a few minutes to complete the written survey that you may receive in the mail after your visit with us. Thank you!             Your Updated Medication List - Protect others around you: Learn how to safely use, store and throw away your medicines at www.disposemymeds.org.          This list is accurate as of 9/19/18  1:25 PM.  Always use your most recent med list.                   Brand Name Dispense Instructions for use Diagnosis    ascorbic acid 1000 MG Tabs    vitamin C    180 tablet    Take 1 tablet (1,000 mg) by mouth 2 times daily    Recurrent UTI, Neurogenic bladder       calcium citrate-vitamin D 315-250 MG-UNIT Tabs per tablet    CITRACAL     Take 1 tablet by mouth daily        carboxymethylcellulose 1 % ophthalmic solution    CELLUVISC/REFRESH LIQUIGEL     Place 1 drop into both eyes every morning As needed        ciprofloxacin 500 MG tablet    CIPRO    14 tablet    Take 1 tablet (500 mg) by  mouth 2 times daily    Acute cystitis with hematuria       conjugated estrogens cream    PREMARIN     Place vaginally twice a week On Tuesday and Friday. Uses a pea sized amount        fluocinonide 0.05 % cream    LIDEX    30 g    Apply topically 2 times daily as needed    Dermatitis       GENTLECATH URINARY CATHETER Misc     120 each    1 catheter 4 times daily    Urinary retention       lisinopril 20 MG tablet    PRINIVIL/ZESTRIL    90 tablet    Take 1 tablet (20 mg) by mouth daily    Hypertension goal BP (blood pressure) < 150/90       methenamine hippurate 1 g Tabs tablet    HIPREX     Take 1 g by mouth 2 times daily        metoprolol succinate 100 MG 24 hr tablet    TOPROL-XL    90 tablet    Take 1 tablet (100 mg) by mouth daily    Palpitations, Atrial fibrillation, unspecified type (H)       OMEGA-3 FISH OIL PO      Take 1 g by mouth 2 times daily (with meals)        order for DME     1 Device    Trilok ankle brace    Plantar fascial fibromatosis, Pain of left heel       polyethylene glycol Packet    MIRALAX/GLYCOLAX     Take 1 packet by mouth daily as needed        sodium chloride 5 % ophthalmic ointment    PETER 128     Place 1 Application into both eyes At Bedtime        TYLENOL 500 MG tablet   Generic drug:  acetaminophen      Take 500-1,000 mg by mouth every 6 hours as needed        Vitamin D-3 1000 units Caps      Take 1 capsule by mouth daily        warfarin 2.5 MG tablet    COUMADIN    92 tablet    Take 2.5mg (1 tablet) daily or as instructed by INR Clinic.    Long-term (current) use of anticoagulants

## 2018-09-21 LAB — COPATH REPORT: NORMAL

## 2018-09-23 ENCOUNTER — OFFICE VISIT (OUTPATIENT)
Dept: URGENT CARE | Facility: URGENT CARE | Age: 83
End: 2018-09-23
Payer: COMMERCIAL

## 2018-09-23 VITALS
OXYGEN SATURATION: 100 % | SYSTOLIC BLOOD PRESSURE: 132 MMHG | HEART RATE: 55 BPM | DIASTOLIC BLOOD PRESSURE: 82 MMHG | TEMPERATURE: 97.6 F

## 2018-09-23 DIAGNOSIS — R30.0 DYSURIA: Primary | ICD-10-CM

## 2018-09-23 DIAGNOSIS — R82.90 NONSPECIFIC FINDING ON EXAMINATION OF URINE: ICD-10-CM

## 2018-09-23 LAB
ALBUMIN UR-MCNC: >=300 MG/DL
APPEARANCE UR: ABNORMAL
BACTERIA #/AREA URNS HPF: ABNORMAL /HPF
BILIRUB UR QL STRIP: NEGATIVE
COLOR UR AUTO: YELLOW
GLUCOSE UR STRIP-MCNC: NEGATIVE MG/DL
HGB UR QL STRIP: ABNORMAL
KETONES UR STRIP-MCNC: NEGATIVE MG/DL
LEUKOCYTE ESTERASE UR QL STRIP: ABNORMAL
NITRATE UR QL: POSITIVE
PH UR STRIP: 6 PH (ref 5–7)
RBC #/AREA URNS AUTO: ABNORMAL /HPF
SOURCE: ABNORMAL
SP GR UR STRIP: 1.02 (ref 1–1.03)
UROBILINOGEN UR STRIP-ACNC: 0.2 EU/DL (ref 0.2–1)
WBC #/AREA URNS AUTO: >100 /HPF

## 2018-09-23 PROCEDURE — 87186 SC STD MICRODIL/AGAR DIL: CPT | Performed by: FAMILY MEDICINE

## 2018-09-23 PROCEDURE — 99213 OFFICE O/P EST LOW 20 MIN: CPT | Performed by: FAMILY MEDICINE

## 2018-09-23 PROCEDURE — 87086 URINE CULTURE/COLONY COUNT: CPT | Performed by: FAMILY MEDICINE

## 2018-09-23 PROCEDURE — 87088 URINE BACTERIA CULTURE: CPT | Performed by: FAMILY MEDICINE

## 2018-09-23 PROCEDURE — 81001 URINALYSIS AUTO W/SCOPE: CPT | Performed by: FAMILY MEDICINE

## 2018-09-23 RX ORDER — CEFUROXIME AXETIL 250 MG/1
250 TABLET ORAL 2 TIMES DAILY
Qty: 20 TABLET | Refills: 0 | Status: SHIPPED | OUTPATIENT
Start: 2018-09-23 | End: 2018-10-15

## 2018-09-23 NOTE — MR AVS SNAPSHOT
After Visit Summary   9/23/2018    Maia Miranda    MRN: [id number]           Patient Information     Date Of Birth          10/26/1932        Visit Information        Provider Department      9/23/2018 9:25 AM Ricardo Wright MD [facility]        Today's Diagnoses     Dysuria    -  1       Follow-ups after your visit        Your next 10 appointments already scheduled     Oct 22, 2018  1:30 PM CDT   Nurse Visit with  NURSE   [facility] [place] ([facility])    [address]  [address]  [facility] [id number] [phone]            Oct 24, 2018  9:45 AM CDT   Anticoagulation Visit with  ANTICOAGULATION CLINIC   [facility] ([facility]    [address] [facility] [address] [phone]            Oct 31, 2018 10:00 AM CDT   Cystoscopy with Bernadine Maria MD,  CYF   [facility] [place] ([facility])    [address]  [address]  [facility] [id number] [phone]              Who to contact     If you have questions or need follow up information about today's clinic visit or your schedule please contact [facility] directly at [phone].  Normal or non-critical lab and imaging results will be communicated to you by MyChart, letter or phone within 4 business days after the clinic has received the results. If you do not hear from us within 7 days, please contact the clinic through MyChart or phone. If you have a critical or abnormal lab result, we will notify you by phone as soon as possible.  Submit refill requests through [employer] or call your pharmacy and they will forward the refill request to us. Please allow 3 business days for your refill to be completed.          Additional Information About Your Visit        [employer] Information     [employer] gives you secure access to your electronic health record.  If you see a primary care provider, you can also send messages to your care team and make appointments. If you have questions, please call your primary care clinic.  If you do not have a primary care provider, please call [phone] and they will assist you.        Care EveryWhere ID     This is your Care EveryWhere ID. This could be used by other organizations to access your [place] medical records  [id number]        Your Vitals Were     Pulse Temperature Pulse Oximetry             55 97.6  F (36.4  C) (Tympanic) 100%          Blood Pressure from Last 3 Encounters:   09/23/18 132/82   09/19/18 124/78   08/26/18 148/78    Weight from Last 3 Encounters:   09/19/18 149 lb (67.6 kg)   08/26/18 149 lb 4.8 oz (67.7 kg)   07/09/18 150 lb 2 oz (68.1 kg)              We Performed the Following     UA reflex to Microscopic and Culture     Urine Microscopic          Today's Medication Changes          These changes are accurate as of 9/23/18 10:30 AM.  If you have any questions, ask your nurse or doctor.               Start taking these medicines.        Dose/Directions    cefuroxime 250 MG tablet   Commonly known as:  CEFTIN   Used for:  Dysuria   Started by:  Ricardo Wright MD        Dose:  250 mg   Take 1 tablet (250 mg) by mouth 2 times daily   Quantity:  20 tablet   Refills:  0            Where to get your medicines      These medications were sent to [facility] [address] [place] [address]     Phone:  [phone]     cefuroxime 250 MG tablet                Primary Care Provider Office Phone # Fax #    Dian Frias [phone] [address] [place] [address]        Equal Access to Services     [facility] AH: Faith Prather, ren barahona, simin wayne. So [facility] [phone].    ATENCIÓN: Si habla español, tiene a conn disposición  servicios gratuitos de asistencia lingüística. Ivet preston [phone].    We comply with applicable federal civil rights laws and [place] laws. We do not discriminate on the basis of race, color, national origin, age, disability, sex, sexual orientation, or gender identity.            Thank you!     Thank you for choosing [facility]  for your care. Our goal is always to provide you with excellent care. Hearing back from our patients is one way we can continue to improve our services. Please take a few minutes to complete the written survey that you may receive in the mail after your visit with us. Thank you!             Your Updated Medication List - Protect others around you: Learn how to safely use, store and throw away your medicines at www.disposemymeds.org.          This list is accurate as of 9/23/18 10:30 AM.  Always use your most recent med list.                   Brand Name Dispense Instructions for use Diagnosis    ascorbic acid 1000 MG Tabs    vitamin C    180 tablet    Take 1 tablet (1,000 mg) by mouth 2 times daily    Recurrent UTI, Neurogenic bladder       calcium citrate-vitamin D 315-250 MG-UNIT Tabs per tablet    CITRACAL     Take 1 tablet by mouth daily        carboxymethylcellulose 1 % ophthalmic solution    CELLUVISC/REFRESH LIQUIGEL     Place 1 drop into both eyes every morning As needed        cefuroxime 250 MG tablet    CEFTIN    20 tablet    Take 1 tablet (250 mg) by mouth 2 times daily    Dysuria       ciprofloxacin 500 MG tablet    CIPRO    14 tablet    Take 1 tablet (500 mg) by mouth 2 times daily    Acute cystitis with hematuria       conjugated estrogens cream    PREMARIN     Place vaginally twice a week On Tuesday and Friday. Uses a pea sized amount        fluocinonide 0.05 % cream    LIDEX    30 g    Apply topically 2 times daily as needed    Dermatitis       GENTLECATH URINARY CATHETER Misc     120 each    1 catheter 4 times daily    Urinary retention        lisinopril 20 MG tablet    PRINIVIL/ZESTRIL    90 tablet    Take 1 tablet (20 mg) by mouth daily    Hypertension goal BP (blood pressure) < 150/90       methenamine hippurate 1 g Tabs tablet    HIPREX     Take 1 g by mouth 2 times daily        metoprolol succinate 100 MG 24 hr tablet    TOPROL-XL    90 tablet    Take 1 tablet (100 mg) by mouth daily    Palpitations, Atrial fibrillation, unspecified type (H)       OMEGA-3 FISH OIL PO      Take 1 g by mouth 2 times daily (with meals)        order for DME     1 Device    Trilok ankle brace    Plantar fascial fibromatosis, Pain of left heel       polyethylene glycol Packet    MIRALAX/GLYCOLAX     Take 1 packet by mouth daily as needed        sodium chloride 5 % ophthalmic ointment    PETER 128     Place 1 Application into both eyes At Bedtime        TYLENOL 500 MG tablet   Generic drug:  acetaminophen      Take 500-1,000 mg by mouth every 6 hours as needed        Vitamin D-3 1000 units Caps      Take 1 capsule by mouth daily        warfarin 2.5 MG tablet    COUMADIN    92 tablet    Take 2.5mg (1 tablet) daily or as instructed by INR Clinic.    Long-term (current) use of anticoagulants

## 2018-09-23 NOTE — PROGRESS NOTES
SUBJECTIVE: Maia Miranda is a 85 year old female who complains of urinary frequency, urgency and dysuria x several days, without flank pain, fever, chills, or abnormal vaginal discharge or bleeding.     OBJECTIVE: Appears well, in no apparent distress.  Vital signs are normal. The abdomen is soft without tenderness, guarding, mass, rebound or organomegaly. No CVA tenderness or inguinal adenopathy noted.  Results for orders placed or performed in visit on 09/23/18   UA reflex to Microscopic and Culture   Result Value Ref Range    Color Urine Yellow     Appearance Urine Turbid     Glucose Urine Negative NEG^Negative mg/dL    Bilirubin Urine Negative NEG^Negative    Ketones Urine Negative NEG^Negative mg/dL    Specific Gravity Urine 1.020 1.003 - 1.035    Blood Urine Large (A) NEG^Negative    pH Urine 6.0 5.0 - 7.0 pH    Protein Albumin Urine >=300 (A) NEG^Negative mg/dL    Urobilinogen Urine 0.2 0.2 - 1.0 EU/dL    Nitrite Urine Positive (A) NEG^Negative    Leukocyte Esterase Urine Large (A) NEG^Negative    Source Catheterized Urine    Urine Microscopic   Result Value Ref Range    WBC Urine >100 (A) OTO5^0 - 5 /HPF    RBC Urine 25-50 (A) OTO2^O - 2 /HPF    Bacteria Urine Many (A) NEG^Negative /HPF         ASSESSMENT: UTI uncomplicated without evidence of pyelonephritis.    PLAN: Treatment per orders - also push fluids, may use Pyridium OTC prn. Call or return to clinic prn if these symptoms worsen or fail to improve as anticipated.    Medication of Ceftin .  We were unable to send priority into the pharmacy because of her elevated creatinine clearance.    Due to have a cystoscopy in approximately 2-3 weeks with her urologist

## 2018-09-24 LAB
BACTERIA SPEC CULT: ABNORMAL
SPECIMEN SOURCE: ABNORMAL

## 2018-10-15 ENCOUNTER — OFFICE VISIT (OUTPATIENT)
Dept: PEDIATRICS | Facility: CLINIC | Age: 83
End: 2018-10-15
Payer: COMMERCIAL

## 2018-10-15 ENCOUNTER — TELEPHONE (OUTPATIENT)
Dept: PEDIATRICS | Facility: CLINIC | Age: 83
End: 2018-10-15

## 2018-10-15 VITALS
OXYGEN SATURATION: 99 % | TEMPERATURE: 96.5 F | DIASTOLIC BLOOD PRESSURE: 60 MMHG | WEIGHT: 149.3 LBS | BODY MASS INDEX: 24.87 KG/M2 | SYSTOLIC BLOOD PRESSURE: 140 MMHG | HEART RATE: 55 BPM | HEIGHT: 65 IN

## 2018-10-15 DIAGNOSIS — L82.1 SEBORRHEIC KERATOSIS: ICD-10-CM

## 2018-10-15 DIAGNOSIS — Z23 NEED FOR PROPHYLACTIC VACCINATION AND INOCULATION AGAINST INFLUENZA: ICD-10-CM

## 2018-10-15 DIAGNOSIS — R41.3 MEMORY LOSS: ICD-10-CM

## 2018-10-15 DIAGNOSIS — I48.91 ATRIAL FIBRILLATION, UNSPECIFIED TYPE (H): ICD-10-CM

## 2018-10-15 DIAGNOSIS — R00.2 PALPITATIONS: Primary | ICD-10-CM

## 2018-10-15 DIAGNOSIS — I10 HYPERTENSION GOAL BP (BLOOD PRESSURE) < 150/90: ICD-10-CM

## 2018-10-15 PROCEDURE — 90662 IIV NO PRSV INCREASED AG IM: CPT | Performed by: INTERNAL MEDICINE

## 2018-10-15 PROCEDURE — 93000 ELECTROCARDIOGRAM COMPLETE: CPT | Performed by: INTERNAL MEDICINE

## 2018-10-15 PROCEDURE — 99214 OFFICE O/P EST MOD 30 MIN: CPT | Mod: 25 | Performed by: INTERNAL MEDICINE

## 2018-10-15 PROCEDURE — G0008 ADMIN INFLUENZA VIRUS VAC: HCPCS | Performed by: INTERNAL MEDICINE

## 2018-10-15 NOTE — MR AVS SNAPSHOT
After Visit Summary   10/15/2018    Maia Miranda    MRN: [id number]           Patient Information     Date Of Birth          10/26/1932        Visit Information        Provider Department      10/15/2018 11:00 AM Dian Frias MD [facility]        Today's Diagnoses     Palpitations    -  1    Atrial fibrillation, unspecified type (H)        Seborrheic keratosis          Care Instructions    1. You will be contacted to set up a heart monitor  2. Runny nose related to aging  3. Blood pressure better when rechecking - will continue to monitor  4. Spot on right breast looks ok - age spot called seborrheic dermatosis  5. Flu shot today  6. Follow-up in Spring - can recheck memory then          Follow-ups after your visit        Follow-up notes from your care team     Return in about 6 months (around 4/15/2019).      Your next 10 appointments already scheduled     Oct 22, 2018  1:30 PM CDT   Nurse Visit with  NURSE   [facility] ([facility]    [address]  [address]  [facility] [address] [phone]            Oct 24, 2018  9:45 AM CDT   Anticoagulation Visit with  ANTICOAGULATION CLINIC   [facility] [facility]    [address] [facility] [address] [phone]            Oct 31, 2018 10:00 AM CDT   Cystoscopy with Bernadine Maria MD, [facility] ([facility])    [address]  [address]  [facility] [address] [phone]              Future tests that were ordered for you today     Open Future Orders        Priority Expected Expires Ordered    Holter Monitor 48 hour - Adult Routine  10/15/2019 10/15/2018            Who to contact     If you have questions or need follow up information about today's clinic visit or your schedule please contact [facility] directly at  "[phone].  Normal or non-critical lab and imaging results will be communicated to you by MyChart, letter or phone within 4 business days after the clinic has received the results. If you do not hear from us within 7 days, please contact the clinic through [employer] or phone. If you have a critical or abnormal lab result, we will notify you by phone as soon as possible.  Submit refill requests through [employer] or call your pharmacy and they will forward the refill request to us. Please allow 3 business days for your refill to be completed.          Additional Information About Your Visit        [employer] Information     [facility] [employer] gives you secure access to your electronic health record. If you see a primary care provider, you can also send messages to your care team and make appointments. If you have questions, please call your primary care clinic.  If you do not have a primary care provider, please call [phone] and they will assist you.        Care EveryWhere ID     This is your Care EveryWhere ID. This could be used by other organizations to access your [place] medical records  [id number]        Your Vitals Were     Pulse Temperature Height Pulse Oximetry BMI (Body Mass Index)       55 96.5  F (35.8  C) (Tympanic) 5' 5\" (1.651 m) 99% 24.84 kg/m2        Blood Pressure from Last 3 Encounters:   10/15/18 140/60   09/23/18 132/82   09/19/18 124/78    Weight from Last 3 Encounters:   10/15/18 149 lb 4.8 oz (67.7 kg)   09/19/18 149 lb (67.6 kg)   08/26/18 149 lb 4.8 oz (67.7 kg)              We Performed the Following     EKG 12-lead complete w/read - Clinics        Primary Care Provider Office Phone # Fax #    Dian Frias [phone] [address] [place] [address]        Equal Access to Services     [facility] ABI SANTANA: Faith bueno Sokeily, waaxda luqadaha, qaybta kaalmada adedevon, simin santana. So [facility] [phone].    ATENCIÓN: Si " kandace guerra, tiene a conn disposición servicios gratuitos de asistencia lingüística. Ivet preston [phone].    We comply with applicable federal civil rights laws and [place] laws. We do not discriminate on the basis of race, color, national origin, age, disability, sex, sexual orientation, or gender identity.            Thank you!     Thank you for choosing [facility]  for your care. Our goal is always to provide you with excellent care. Hearing back from our patients is one way we can continue to improve our services. Please take a few minutes to complete the written survey that you may receive in the mail after your visit with us. Thank you!             Your Updated Medication List - Protect others around you: Learn how to safely use, store and throw away your medicines at www.disposemymeds.org.          This list is accurate as of 10/15/18 11:50 AM.  Always use your most recent med list.                   Brand Name Dispense Instructions for use Diagnosis    ascorbic acid 1000 MG Tabs    vitamin C    180 tablet    Take 1 tablet (1,000 mg) by mouth 2 times daily    Recurrent UTI, Neurogenic bladder       calcium citrate-vitamin D 315-250 MG-UNIT Tabs per tablet    CITRACAL     Take 1 tablet by mouth daily        carboxymethylcellulose 1 % ophthalmic solution    CELLUVISC/REFRESH LIQUIGEL     Place 1 drop into both eyes every morning As needed        conjugated estrogens cream    PREMARIN     Place vaginally twice a week On Tuesday and Friday. Uses a pea sized amount        fluocinonide 0.05 % cream    LIDEX    30 g    Apply topically 2 times daily as needed    Dermatitis       GENTLECATH URINARY CATHETER Misc     120 each    1 catheter 4 times daily    Urinary retention       lisinopril 20 MG tablet    PRINIVIL/ZESTRIL    90 tablet    Take 1 tablet (20 mg) by mouth daily    Hypertension goal BP (blood pressure) < 150/90       methenamine hippurate 1 g Tabs tablet    HIPREX     Take 1 g by mouth 2  times daily        metoprolol succinate 100 MG 24 hr tablet    TOPROL-XL    90 tablet    Take 1 tablet (100 mg) by mouth daily    Palpitations, Atrial fibrillation, unspecified type (H)       OMEGA-3 FISH OIL PO      Take 1 g by mouth 2 times daily (with meals)        order for DME     1 Device    Trilok ankle brace    Plantar fascial fibromatosis, Pain of left heel       polyethylene glycol Packet    MIRALAX/GLYCOLAX     Take 1 packet by mouth daily as needed        sodium chloride 5 % ophthalmic ointment    PETER 128     Place 1 Application into both eyes At Bedtime        TYLENOL 500 MG tablet   Generic drug:  acetaminophen      Take 500-1,000 mg by mouth every 6 hours as needed        Vitamin D-3 1000 units Caps      Take 1 capsule by mouth daily        warfarin 2.5 MG tablet    COUMADIN    92 tablet    Take 2.5mg (1 tablet) daily or as instructed by INR Clinic.    Long-term (current) use of anticoagulants

## 2018-10-15 NOTE — TELEPHONE ENCOUNTER
Patient states that starting yesterday she noticed that her heart was skipping a beat for a while yesterday. Also beating fast at times. Can't say how long it lasted. It is not happening today. Denies chest pain or SOB. She is on coumadin and also metoprolol. Appointment scheduled.  Daphne Aguilar RN

## 2018-10-15 NOTE — PATIENT INSTRUCTIONS
1. You will be contacted to set up a heart monitor  2. Runny nose related to aging  3. Blood pressure better when rechecking - will continue to monitor  4. Spot on right breast looks ok - age spot called seborrheic dermatosis  5. Flu shot today  6. Follow-up in Spring - can recheck memory then

## 2018-10-15 NOTE — PROGRESS NOTES
"  SUBJECTIVE:   Maia Miranda is a 85 year old female who presents to clinic today for the following health issues:    Palpitations      Duration: long time off and on    Description (location/character/radiation): heart skipping beats    Intensity:  mild, moderate    Accompanying signs and symptoms: none    History (similar episodes/previous evaluation): None    Precipitating or alleviating factors: None    Therapies tried and outcome: None    History of atrial fibrillation. Notes missing beats off and on. More in the evenings. Feels a little short of breath when it happens. Lasted for 30-40 minutes last night. Usually it is not that long. Hasn't had for awhile. Had increased symptoms in Feb and metoprolol increased at that time. Feels a little different this time. No dizziness.     Spot on right breast - present for several months. Getting bigger.     Has ringing in ears. Both ears. Some hearing loss. Has had for a long time.    Rhinorrhea all of the time. Doesn't feel sick otherwise. Has tried flonase and astelin without any improvement.    Memory - more word finding issues.     HTN: high today. Checking at home and notes has been running high there as well. Does not recall values.     Reviewed and updated as needed this visit by clinical staff  Tobacco  Allergies  Meds  Problems  Med Hx  Surg Hx  Fam Hx  Soc Hx        Reviewed and updated as needed this visit by Provider  Allergies  Meds  Problems       -------------------------------------    ROS:  Constitutional, HEENT, cardiovascular, pulmonary, gi and gu systems are negative, except as otherwise noted.    OBJECTIVE:                                                    /60  Pulse 55  Temp 96.5  F (35.8  C) (Tympanic)  Ht 5' 5\" (1.651 m)  Wt 149 lb 4.8 oz (67.7 kg)  SpO2 99%  BMI 24.84 kg/m2   Body mass index is 24.84 kg/(m^2).  General Appearance: elderly female, alert and no distress  Eyes:   no discharge, erythema.  Normal " pupils.  Neck: Supple.  No adenopathy, no asymmetry, masses, or scars and thyroid normal to palpation  Respiratory: lungs clear to auscultation - no rales, rhonchi or wheezes.  Cardiovascular: regular rate and rhythm, normal S1 S2, no S3 or S4 and no murmur, click or rub.  No peripheral edema.  Skin: about 3 x 7 mm seborrheic keratosis over lateral right breast.  Well perfused and normal turgor.    Diagnostic Test Results:  EKG: sinus bradycardia with RBBB (old)     ASSESSMENT/PLAN:                                                      (R00.2) Palpitations  (primary encounter diagnosis)  (I48.91) Atrial fibrillation, unspecified type (H)  Comment: most likely due to atrial fibrillation. Currently in sinus rhythm  Plan: EKG 12-lead complete w/read - Clinics, Holter         Monitor 48 hour - Adult  - will obtain 48 hour holter monitor to make sure we are not missing anything  - continue metoprolol  - anti-coagulated with warfarin  - given pulse in mid-50's would hesitate to increase metoprolol further.     (L82.1) Seborrheic keratosis  Plan: reassurance given    (I10) Hypertension goal BP (blood pressure) < 150/90  Comment: improved with recheck, still a little borderline  Plan:   - will continue current meds for now and monitor BP  - I don't want to increase medications and increased risk of dizziness unless consistently elevated.    (R41.3) Memory loss  Comment: patient feels like worsening  Plan: recheck in spring    (Z23) Need for prophylactic vaccination and inoculation against influenza  Plan: FLU VACCINE, INCREASED ANTIGEN, PRESV FREE, AGE        65+ [24593], ADMIN INFLUENZA (For MEDICARE         Patients ONLY) []    FUTURE APPOINTMENTS:       - Follow-up visit in 6 months, sooner if needed    [facility]

## 2018-10-15 NOTE — PROGRESS NOTES

## 2018-10-17 ENCOUNTER — HOSPITAL ENCOUNTER (OUTPATIENT)
Dept: CARDIOLOGY | Facility: CLINIC | Age: 83
Discharge: HOME | End: 2018-10-17
Attending: INTERNAL MEDICINE | Admitting: INTERNAL MEDICINE
Payer: COMMERCIAL

## 2018-10-17 DIAGNOSIS — I48.91 ATRIAL FIBRILLATION, UNSPECIFIED TYPE (H): ICD-10-CM

## 2018-10-17 DIAGNOSIS — R00.2 PALPITATIONS: ICD-10-CM

## 2018-10-17 PROCEDURE — 93227 XTRNL ECG REC<48 HR R&I: CPT | Performed by: INTERNAL MEDICINE

## 2018-10-17 PROCEDURE — 93226 XTRNL ECG REC<48 HR SCAN A/R: CPT

## 2018-10-20 ENCOUNTER — OFFICE VISIT (OUTPATIENT)
Dept: URGENT CARE | Facility: URGENT CARE | Age: 83
End: 2018-10-20
Payer: COMMERCIAL

## 2018-10-20 VITALS
OXYGEN SATURATION: 98 % | TEMPERATURE: 97.2 F | SYSTOLIC BLOOD PRESSURE: 168 MMHG | BODY MASS INDEX: 24.79 KG/M2 | DIASTOLIC BLOOD PRESSURE: 66 MMHG | HEART RATE: 53 BPM | WEIGHT: 149 LBS

## 2018-10-20 DIAGNOSIS — R42 DIZZINESS: Primary | ICD-10-CM

## 2018-10-20 LAB
ALBUMIN SERPL-MCNC: 3.8 G/DL (ref 3.4–5)
ALP SERPL-CCNC: 97 U/L (ref 40–150)
ALT SERPL W P-5'-P-CCNC: 27 U/L (ref 0–50)
ANION GAP SERPL CALCULATED.3IONS-SCNC: 7 MMOL/L (ref 3–14)
AST SERPL W P-5'-P-CCNC: 25 U/L (ref 0–45)
BASOPHILS # BLD AUTO: 0 10E9/L (ref 0–0.2)
BASOPHILS NFR BLD AUTO: 0.2 %
BILIRUB SERPL-MCNC: 0.3 MG/DL (ref 0.2–1.3)
BUN SERPL-MCNC: 25 MG/DL (ref 7–30)
CALCIUM SERPL-MCNC: 9.5 MG/DL (ref 8.5–10.1)
CHLORIDE SERPL-SCNC: 106 MMOL/L (ref 94–109)
CO2 SERPL-SCNC: 28 MMOL/L (ref 20–32)
CREAT SERPL-MCNC: 1.13 MG/DL (ref 0.52–1.04)
DIFFERENTIAL METHOD BLD: ABNORMAL
EOSINOPHIL # BLD AUTO: 0.1 10E9/L (ref 0–0.7)
EOSINOPHIL NFR BLD AUTO: 1.1 %
ERYTHROCYTE [DISTWIDTH] IN BLOOD BY AUTOMATED COUNT: 13.2 % (ref 10–15)
GFR SERPL CREATININE-BSD FRML MDRD: 46 ML/MIN/1.7M2
GLUCOSE SERPL-MCNC: 100 MG/DL (ref 70–99)
HCT VFR BLD AUTO: 38.2 % (ref 35–47)
HGB BLD-MCNC: 12.7 G/DL (ref 11.7–15.7)
LYMPHOCYTES # BLD AUTO: 0.9 10E9/L (ref 0.8–5.3)
LYMPHOCYTES NFR BLD AUTO: 15.8 %
MCH RBC QN AUTO: 33.1 PG (ref 26.5–33)
MCHC RBC AUTO-ENTMCNC: 33.2 G/DL (ref 31.5–36.5)
MCV RBC AUTO: 100 FL (ref 78–100)
MONOCYTES # BLD AUTO: 0.4 10E9/L (ref 0–1.3)
MONOCYTES NFR BLD AUTO: 8.1 %
NEUTROPHILS # BLD AUTO: 4.1 10E9/L (ref 1.6–8.3)
NEUTROPHILS NFR BLD AUTO: 74.8 %
PLATELET # BLD AUTO: 154 10E9/L (ref 150–450)
POTASSIUM SERPL-SCNC: 4.5 MMOL/L (ref 3.4–5.3)
PROT SERPL-MCNC: 7.8 G/DL (ref 6.8–8.8)
RBC # BLD AUTO: 3.84 10E12/L (ref 3.8–5.2)
SODIUM SERPL-SCNC: 141 MMOL/L (ref 133–144)
WBC # BLD AUTO: 5.5 10E9/L (ref 4–11)

## 2018-10-20 PROCEDURE — 93000 ELECTROCARDIOGRAM COMPLETE: CPT | Performed by: FAMILY MEDICINE

## 2018-10-20 PROCEDURE — 85025 COMPLETE CBC W/AUTO DIFF WBC: CPT | Performed by: FAMILY MEDICINE

## 2018-10-20 PROCEDURE — 80053 COMPREHEN METABOLIC PANEL: CPT | Performed by: FAMILY MEDICINE

## 2018-10-20 PROCEDURE — 36415 COLL VENOUS BLD VENIPUNCTURE: CPT | Performed by: FAMILY MEDICINE

## 2018-10-20 PROCEDURE — 99214 OFFICE O/P EST MOD 30 MIN: CPT | Performed by: FAMILY MEDICINE

## 2018-10-20 RX ORDER — MECLIZINE HYDROCHLORIDE 25 MG/1
25 TABLET ORAL EVERY 6 HOURS PRN
Qty: 30 TABLET | Refills: 0 | Status: SHIPPED | OUTPATIENT
Start: 2018-10-20 | End: 2019-12-20

## 2018-10-20 NOTE — PROGRESS NOTES
SUBJECTIVE:   Maia Miranda is a 85 year old female presenting with a chief complaint of dizziness.    Patient woke up this morning and felt dizzy/off balance.  Patient denies any recent URI symptoms, fever, nausea, vomiting or diarrhea.  Denies vertigo or spinning.  Patient recently seen by primary due to palpitations, had holter monitor done and just turned it in.  Patient denies any SOB or chest pain.  Denies any fast heart rate but has noticed some skip or extra beats sometimes.  Patient has mild hearing loss/tinnitus also, denies any worsening of symptoms.    Past Medical History:   Diagnosis Date     Amnestic MCI (mild cognitive impairment with memory loss) 2014     Chronic duodenal ulcer without mention of hemorrhage, perforation, or obstruction 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 Warner, [facility] yearly     Esophageal reflux 2001    Abstracted 06/10/02     Essential and other specified forms of tremor 2004    resting tremor     Generalized osteoarthrosis, unspecified site     Hands     Hiatal hernia     diagnosed late 1990s [place], [place]     History of pulmonary embolism 1971    no source found     LACTOSE INTOLERANCE      Lumbago     sees Kodi Guidry     Mitral valve regurgitation      Occlusion and stenosis of carotid artery without mention of cerebral infarction 2003    CAROTID US NORMAL, bruit in neck     Osteoporosis, unspecified 2003    T = -2.66     Paroxysmal atrial fibrillation (H) 11/15/2013     Unspecified essential hypertension      Unspecified tinnitus 2005    mild hearing loss, saw ENT     Current Outpatient Prescriptions   Medication Sig Dispense Refill     acetaminophen (TYLENOL) 500 MG tablet Take 500-1,000 mg by mouth every 6 hours as needed       ascorbic acid (VITAMIN C) 1000 MG TABS Take 1 tablet (1,000 mg) by mouth 2 times daily 180 tablet 3     calcium citrate-vitamin D (CITRACAL) 315-250 MG-UNIT TABS  per tablet Take 1 tablet by mouth daily       carboxymethylcellulose (CELLUVISC/REFRESH LIQUIGEL) 1 % ophthalmic solution Place 1 drop into both eyes every morning As needed       Catheters (GENTLECATH URINARY CATHETER) MISC 1 catheter 4 times daily 120 each 12     Cholecalciferol (VITAMIN D-3) 1000 UNITS CAPS Take 1 capsule by mouth daily       conjugated estrogens (PREMARIN) vaginal cream Place vaginally twice a week On Tuesday and Friday. Uses a pea sized amount       fluocinonide (LIDEX) 0.05 % cream Apply topically 2 times daily as needed 30 g 2     lisinopril (PRINIVIL/ZESTRIL) 20 MG tablet Take 1 tablet (20 mg) by mouth daily 90 tablet 1     meclizine (ANTIVERT) 25 MG tablet Take 1 tablet (25 mg) by mouth every 6 hours as needed for dizziness 30 tablet 0     methenamine hippurate (HIPREX) 1 G TABS tablet Take 1 g by mouth 2 times daily       metoprolol succinate (TOPROL-XL) 100 MG 24 hr tablet Take 1 tablet (100 mg) by mouth daily 90 tablet 3     Omega-3 Fatty Acids (OMEGA-3 FISH OIL PO) Take 1 g by mouth 2 times daily (with meals)       order for DME Trilok ankle brace 1 Device 0     polyethylene glycol (MIRALAX/GLYCOLAX) packet Take 1 packet by mouth daily as needed        sodium chloride (PETER 128) 5 % ophthalmic ointment Place 1 Application into both eyes At Bedtime       warfarin (COUMADIN) 2.5 MG tablet Take 2.5mg (1 tablet) daily or as instructed by INR Clinic. 92 tablet 3     Social History   Substance Use Topics     Smoking status: Never Smoker     Smokeless tobacco: Never Used     Alcohol use No       ROS:  Review of systems negative except as stated above.    OBJECTIVE:  /66 (BP Location: Left arm, Patient Position: Sitting, Cuff Size: Adult Regular)  Pulse 53  Temp 97.2  F (36.2  C) (Tympanic)  Wt 149 lb (67.6 kg)  SpO2 98%  BMI 24.79 kg/m2  GENERAL APPEARANCE: healthy, alert and no distress  EYES: EOMI,  PERRL, conjunctiva clear  HENT: ear canals and TM's normal.  Nose and mouth  without ulcers, erythema or lesions  NECK: supple, nontender, no lymphadenopathy  RESP: lungs clear to auscultation - no rales, rhonchi or wheezes  CV: regular rates and rhythm, normal S1 S2, no murmur noted  SKIN: no suspicious lesions or rashes  PSYCH: mentation appears normal and affect normal/bright    EKG - sinus sheree with rate 59, right bundle branch block, 1st degree AV block similar to prior    Results for orders placed or performed in visit on 10/20/18   CBC with platelets and differential   Result Value Ref Range    WBC 5.5 4.0 - 11.0 10e9/L    RBC Count 3.84 3.8 - 5.2 10e12/L    Hemoglobin 12.7 11.7 - 15.7 g/dL    Hematocrit 38.2 35.0 - 47.0 %     78 - 100 fl    MCH 33.1 (H) 26.5 - 33.0 pg    MCHC 33.2 31.5 - 36.5 g/dL    RDW 13.2 10.0 - 15.0 %    Platelet Count 154 150 - 450 10e9/L    % Neutrophils 74.8 %    % Lymphocytes 15.8 %    % Monocytes 8.1 %    % Eosinophils 1.1 %    % Basophils 0.2 %    Absolute Neutrophil 4.1 1.6 - 8.3 10e9/L    Absolute Lymphocytes 0.9 0.8 - 5.3 10e9/L    Absolute Monocytes 0.4 0.0 - 1.3 10e9/L    Absolute Eosinophils 0.1 0.0 - 0.7 10e9/L    Absolute Basophils 0.0 0.0 - 0.2 10e9/L    Diff Method Automated Method    Comprehensive metabolic panel   Result Value Ref Range    Sodium 141 133 - 144 mmol/L    Potassium 4.5 3.4 - 5.3 mmol/L    Chloride 106 94 - 109 mmol/L    Carbon Dioxide 28 20 - 32 mmol/L    Anion Gap 7 3 - 14 mmol/L    Glucose 100 (H) 70 - 99 mg/dL    Urea Nitrogen 25 7 - 30 mg/dL    Creatinine 1.13 (H) 0.52 - 1.04 mg/dL    GFR Estimate 46 (L) >60 mL/min/1.7m2    GFR Estimate If Black 55 (L) >60 mL/min/1.7m2    Calcium 9.5 8.5 - 10.1 mg/dL    Bilirubin Total 0.3 0.2 - 1.3 mg/dL    Albumin 3.8 3.4 - 5.0 g/dL    Protein Total 7.8 6.8 - 8.8 g/dL    Alkaline Phosphatase 97 40 - 150 U/L    ALT 27 0 - 50 U/L    AST 25 0 - 45 U/L       ASSESSMENT/PLAN:  (R42) Dizziness  (primary encounter diagnosis)  Plan: EKG 12-lead complete w/read - Clinics, CBC with         platelets and differential, Comprehensive         metabolic panel, meclizine (ANTIVERT) 25 MG         tablet            Patient with h/o a.fib, HTN here today with concerns of dizziness.  Patient denies vertigo but with off balance feeling that may still be inner ear etiology.  Will give trial of meclizine to see if this will help.  Reassurance given in regards to labs and EKG.  Discussed that if symptoms due to bradycardic rhythm that may need to hold metoprolol.    Patient to follow up with primary for further evaluation.    Alvin Gupta MD  October 20, 2018 4:42 PM

## 2018-10-20 NOTE — MR AVS SNAPSHOT
After Visit Summary   10/20/2018    Maia Miranda    MRN: [id number]           Patient Information     Date Of Birth          10/26/1932        Visit Information        Provider Department      10/20/2018 10:35 AM Alvin Gupta MD [facility]        Today's Diagnoses     Dizziness    -  1      Care Instructions    Okay to try meclizine to help with dizziness from inner ear reason - this usually causes dysequilibrium or vertigo.    Check your pulse before taking metoprolol this evening.  If pulse is 60's - then okay to take medication.  If pulse is below 50 - DO NOT TAKE  If pulse above 55 - then okay to take medication      Understanding Dizziness, Balance Problems, and Fainting     The eyes, inner ear, joints, and muscles send signals to the brain to achieve balance.     Balance is a group effort of the eyes, inner ear, joints, and muscles. They each send signals to the brain about body position and head movement. Then the brain uses this information to achieve balance. When the brain receives conflicting signals, or when there is a problem with blood flow, dizziness or fainting can happen.  Vertigo  Vertigo is the feeling of spinning. It may happen if the brain receives conflicting balance signals. Vertigo is often caused by a problem in the inner ear. Problems include changes in inner ear structures, infection, swelling, or excess fluid. Sometimes vertigo is due to a brain problem, such as migraine.   Dysequilibrium  Dysequilibrium is the feeling of imbalance without a sense of spinning. It may happen if the signal path between the body and brain is disrupted. There are many causes of dysequilibrium, including diabetes, anemia, head injury, and aging.  Syncope  Syncope is losing consciousness or fainting. The brain needs oxygen-rich blood to function. The heart pumps that blood to the brain. If there is a problem with the heart, blood flow (such as low blood pressure), or blood  vessels, faintness may happen.  Date Last Reviewed: 10/6/2015    6480-7817 [employer]. [address] [place] [address], [place] [address]. All rights reserved. This information is not intended as a substitute for professional medical care. Always follow your healthcare professional's instructions.        Bradycardia    When your heart rate is slow, less than 60 beats per minute, it is called bradycardia. Bradycardia can be normal, caused by medicines, or a sign of a disease. The slow heart rate may not be constant; it can come and go. It is a concern when it is very low, or you have symptoms.  Signs and symptoms  The following are signs and symptoms of bradycardia:    Heart rate less than 60 per minute    Dizziness or feeling lightheaded    Weakness    Trouble breathing    Fainting    Sleepiness    More trouble exercising than usual because of fatigue    Confusion or trouble concentrating  Causes  There are many causes of bradycardia. Some can be related to your heart, but some may be related to other factors.  Non-heart-related causes:    Advanced age    Side effect of certain medicines (such as beta-blockers, calcium channel blockers, digitalis, antiarrhythmic medicines like amiodarone, clonidine, lithium)    Medical conditions such as hypoglycemia (low blood sugar), hypothyroidism (low thyroid), electrolyte disorder,  hypothermia, sleep apnea    Athletes, especially long-distance runners, may have a slow heart rate. This can be normal.    Sleep apnea    Brain injury such as stroke or bleeding inside the brain  Heart-related causes:    Coronary artery disease (angina or prior heart attack, also known as acute myocardial infarction, or AMI)    Heart valve disease    Heart muscle disease (cardiomyopathy)    Congestive heart failure    Sick sinus syndrome, which is when your heart's natural pacemaker is no longer working properly    Diseases that infiltrate the heart such as sarcoid    Heart  infections  Sometimes the cause for the arrhythmia cannot be found.  Bradycardia that causes symptoms is sometimes reversible, and can be treated with medicines. When more severe bradycardia persists, a pacemaker is generally recommended. When the bradycardia does not cause symptoms, your doctor may decide to evaluate it in his or her office.  Home care  The following will help you care for yourself at home:    Resume your usual activities when you are feeling back to normal.    If you develop any of the symptoms below during exertion, then you should not exert yourself until evaluated further by your doctor.    Work with your doctor on any needed lifestyle changes, such as changing your diet, stopping smoking if you are a smoker, and a planned exercise program.  Follow-up care  Follow up with your doctor, or as advised.  Call 911  Call 911 if any of the following occur:    Chest pain    Trouble breathing    Slow heart rate with dizziness or lightheadedness    Fainting or loss of consciousness    Chest, shoulder, arm, neck, or back pain    Slow heart rate (under 50 beats per minute) if associated with symptoms  When to seek medical advice  Call your healthcare provider right away if any of the following occur:    Occasional weakness, dizziness, or lightheadedness  Date Last Reviewed: 4/25/2016 2000-2017 [employer]. [address] [place] [address]. All rights reserved. This information is not intended as a substitute for professional medical care. Always follow your healthcare professional's instructions.                Follow-ups after your visit        Your next 10 appointments already scheduled     Oct 22, 2018  1:30 PM CDT   Nurse Visit with  NURSE   [facility] ([facility])    [address]  [address]  [facility] [address]   [phone]            Oct 24, 2018  9:45 AM CDT   Anticoagulation Visit with  ANTICOAGULATION  CLINIC   [facility]    [address] [facility] [id number] [phone]            Oct 31, 2018 10:00 AM CDT   Cystoscopy with Bernadine Maria MD,  CYF   [facility] ([facility])    [address]  [address]  [facility] [address] [phone]              Who to contact     If you have questions or need follow up information about today's clinic visit or your schedule please contact [facility] directly at [phone].  Normal or non-critical lab and imaging results will be communicated to you by MyChart, letter or phone within 4 business days after the clinic has received the results. If you do not hear from us within 7 days, please contact the clinic through [employer] or phone. If you have a critical or abnormal lab result, we will notify you by phone as soon as possible.  Submit refill requests through [employer] or call your pharmacy and they will forward the refill request to us. Please allow 3 business days for your refill to be completed.          Additional Information About Your Visit        MyChart Information     [employer] gives you secure access to your electronic health record. If you see a primary care provider, you can also send messages to your care team and make appointments. If you have questions, please call your primary care clinic.  If you do not have a primary care provider, please call [phone] and they will assist you.        Care EveryWhere ID     This is your Care EveryWhere ID. This could be used by other organizations to access your [facility] medical records  [id number]        Your Vitals Were     Pulse Temperature Pulse Oximetry BMI (Body Mass Index)          53 97.2  F (36.2  C) (Tympanic) 98% 24.79 kg/m2         Blood Pressure from Last 3 Encounters:   10/20/18 190/80   10/15/18 140/60   09/23/18 132/82    Weight from Last 3 Encounters:   10/20/18  149 lb (67.6 kg)   10/15/18 149 lb 4.8 oz (67.7 kg)   09/19/18 149 lb (67.6 kg)              We Performed the Following     CBC with platelets and differential     Comprehensive metabolic panel     EKG 12-lead complete w/read - Clinics          Today's Medication Changes          These changes are accurate as of 10/20/18 11:45 AM.  If you have any questions, ask your nurse or doctor.               Start taking these medicines.        Dose/Directions    meclizine 25 MG tablet   Commonly known as:  ANTIVERT   Used for:  Dizziness   Started by:  Alvin Gupta MD        Dose:  25 mg   Take 1 tablet (25 mg) by mouth every 6 hours as needed for dizziness   Quantity:  30 tablet   Refills:  0            Where to get your medicines      These medications were sent to [facility] - [address] [facility]   [address], [place] [address]     Phone:  [phone]     meclizine 25 MG tablet                Primary Care Provider Office Phone # Fax #    Dian Frias [phone]       [address] [place] [address] [address] [place] [address]        Equal Access to Services     [facility]: Hadii luiz ku hadasho Soomaali, waaxda luqadaha, qaybta kaalmada mana, simin leahy . So [facility] [phone].    ATENCIÓN: Si habla español, tiene a conn disposición servicios gratuitos de asistencia lingüística. Ll[facility] [phone].    We comply with applicable federal civil rights laws and [place] laws. We do not discriminate on the basis of race, color, national origin, age, disability, sex, sexual orientation, or gender identity.            Thank you!     Thank you for choosing [facility]  for your care. Our goal is always to provide you with excellent care. Hearing back from our patients is one way we can continue to improve our services. Please take a few minutes to complete the written survey that you may receive in the mail  after your visit with us. Thank you!             Your Updated Medication List - Protect others around you: Learn how to safely use, store and throw away your medicines at www.disposemymeds.org.          This list is accurate as of 10/20/18 11:45 AM.  Always use your most recent med list.                   Brand Name Dispense Instructions for use Diagnosis    ascorbic acid 1000 MG Tabs    vitamin C    180 tablet    Take 1 tablet (1,000 mg) by mouth 2 times daily    Recurrent UTI, Neurogenic bladder       calcium citrate-vitamin D 315-250 MG-UNIT Tabs per tablet    CITRACAL     Take 1 tablet by mouth daily        carboxymethylcellulose 1 % ophthalmic solution    CELLUVISC/REFRESH LIQUIGEL     Place 1 drop into both eyes every morning As needed        conjugated estrogens cream    PREMARIN     Place vaginally twice a week On Tuesday and Friday. Uses a pea sized amount        fluocinonide 0.05 % cream    LIDEX    30 g    Apply topically 2 times daily as needed    Dermatitis       GENTLECATH URINARY CATHETER Misc     120 each    1 catheter 4 times daily    Urinary retention       lisinopril 20 MG tablet    PRINIVIL/ZESTRIL    90 tablet    Take 1 tablet (20 mg) by mouth daily    Hypertension goal BP (blood pressure) < 150/90       meclizine 25 MG tablet    ANTIVERT    30 tablet    Take 1 tablet (25 mg) by mouth every 6 hours as needed for dizziness    Dizziness       methenamine hippurate 1 g Tabs tablet    HIPREX     Take 1 g by mouth 2 times daily        metoprolol succinate 100 MG 24 hr tablet    TOPROL-XL    90 tablet    Take 1 tablet (100 mg) by mouth daily    Palpitations, Atrial fibrillation, unspecified type (H)       OMEGA-3 FISH OIL PO      Take 1 g by mouth 2 times daily (with meals)        order for DME     1 Device    Trilok ankle brace    Plantar fascial fibromatosis, Pain of left heel       polyethylene glycol Packet    MIRALAX/GLYCOLAX     Take 1 packet by mouth daily as needed        sodium chloride 5 %  ophthalmic ointment    PETER 128     Place 1 Application into both eyes At Bedtime        TYLENOL 500 MG tablet   Generic drug:  acetaminophen      Take 500-1,000 mg by mouth every 6 hours as needed        Vitamin D-3 1000 units Caps      Take 1 capsule by mouth daily        warfarin 2.5 MG tablet    COUMADIN    92 tablet    Take 2.5mg (1 tablet) daily or as instructed by INR Clinic.    Long-term (current) use of anticoagulants

## 2018-10-20 NOTE — PATIENT INSTRUCTIONS
Okay to try meclizine to help with dizziness from inner ear reason - this usually causes dysequilibrium or vertigo.    Check your pulse before taking metoprolol this evening.  If pulse is 60's - then okay to take medication.  If pulse is below 50 - DO NOT TAKE  If pulse above 55 - then okay to take medication      Understanding Dizziness, Balance Problems, and Fainting     The eyes, inner ear, joints, and muscles send signals to the brain to achieve balance.     Balance is a group effort of the eyes, inner ear, joints, and muscles. They each send signals to the brain about body position and head movement. Then the brain uses this information to achieve balance. When the brain receives conflicting signals, or when there is a problem with blood flow, dizziness or fainting can happen.  Vertigo  Vertigo is the feeling of spinning. It may happen if the brain receives conflicting balance signals. Vertigo is often caused by a problem in the inner ear. Problems include changes in inner ear structures, infection, swelling, or excess fluid. Sometimes vertigo is due to a brain problem, such as migraine.   Dysequilibrium  Dysequilibrium is the feeling of imbalance without a sense of spinning. It may happen if the signal path between the body and brain is disrupted. There are many causes of dysequilibrium, including diabetes, anemia, head injury, and aging.  Syncope  Syncope is losing consciousness or fainting. The brain needs oxygen-rich blood to function. The heart pumps that blood to the brain. If there is a problem with the heart, blood flow (such as low blood pressure), or blood vessels, faintness may happen.  Date Last Reviewed: 10/6/2015    4225-2679 [employer]. [address] [place] [address], [place] [address]. All rights reserved. This information is not intended as a substitute for professional medical care. Always follow your healthcare professional's instructions.        Bradycardia    When your heart rate is  slow, less than 60 beats per minute, it is called bradycardia. Bradycardia can be normal, caused by medicines, or a sign of a disease. The slow heart rate may not be constant; it can come and go. It is a concern when it is very low, or you have symptoms.  Signs and symptoms  The following are signs and symptoms of bradycardia:    Heart rate less than 60 per minute    Dizziness or feeling lightheaded    Weakness    Trouble breathing    Fainting    Sleepiness    More trouble exercising than usual because of fatigue    Confusion or trouble concentrating  Causes  There are many causes of bradycardia. Some can be related to your heart, but some may be related to other factors.  Non-heart-related causes:    Advanced age    Side effect of certain medicines (such as beta-blockers, calcium channel blockers, digitalis, antiarrhythmic medicines like amiodarone, clonidine, lithium)    Medical conditions such as hypoglycemia (low blood sugar), hypothyroidism (low thyroid), electrolyte disorder,  hypothermia, sleep apnea    Athletes, especially long-distance runners, may have a slow heart rate. This can be normal.    Sleep apnea    Brain injury such as stroke or bleeding inside the brain  Heart-related causes:    Coronary artery disease (angina or prior heart attack, also known as acute myocardial infarction, or AMI)    Heart valve disease    Heart muscle disease (cardiomyopathy)    Congestive heart failure    Sick sinus syndrome, which is when your heart's natural pacemaker is no longer working properly    Diseases that infiltrate the heart such as sarcoid    Heart infections  Sometimes the cause for the arrhythmia cannot be found.  Bradycardia that causes symptoms is sometimes reversible, and can be treated with medicines. When more severe bradycardia persists, a pacemaker is generally recommended. When the bradycardia does not cause symptoms, your doctor may decide to evaluate it in his or her office.  Home care  The following  will help you care for yourself at home:    Resume your usual activities when you are feeling back to normal.    If you develop any of the symptoms below during exertion, then you should not exert yourself until evaluated further by your doctor.    Work with your doctor on any needed lifestyle changes, such as changing your diet, stopping smoking if you are a smoker, and a planned exercise program.  Follow-up care  Follow up with your doctor, or as advised.  Call 911  Call 911 if any of the following occur:    Chest pain    Trouble breathing    Slow heart rate with dizziness or lightheadedness    Fainting or loss of consciousness    Chest, shoulder, arm, neck, or back pain    Slow heart rate (under 50 beats per minute) if associated with symptoms  When to seek medical advice  Call your healthcare provider right away if any of the following occur:    Occasional weakness, dizziness, or lightheadedness  Date Last Reviewed: 4/25/2016 2000-2017 [employer]. [address] [place] [address], [place] [address]. All rights reserved. This information is not intended as a substitute for professional medical care. Always follow your healthcare professional's instructions.

## 2018-10-22 DIAGNOSIS — Z87.440 H/O RECURRENT URINARY TRACT INFECTION: ICD-10-CM

## 2018-10-22 DIAGNOSIS — Z87.440 H/O RECURRENT URINARY TRACT INFECTION: Primary | ICD-10-CM

## 2018-10-22 LAB
ALBUMIN UR-MCNC: NEGATIVE MG/DL
APPEARANCE UR: CLEAR
BILIRUB UR QL STRIP: NEGATIVE
COLOR UR AUTO: YELLOW
GLUCOSE UR STRIP-MCNC: NEGATIVE MG/DL
HGB UR QL STRIP: NEGATIVE
INTERPRETATION MONITOR -MUSE: NORMAL
KETONES UR STRIP-MCNC: NEGATIVE MG/DL
LEUKOCYTE ESTERASE UR QL STRIP: NEGATIVE
NITRATE UR QL: NEGATIVE
PH UR STRIP: 5.5 PH (ref 5–7)
SOURCE: NORMAL
SP GR UR STRIP: 1.01 (ref 1–1.03)
UROBILINOGEN UR STRIP-ACNC: 0.2 EU/DL (ref 0.2–1)

## 2018-10-22 PROCEDURE — 81003 URINALYSIS AUTO W/O SCOPE: CPT | Performed by: UROLOGY

## 2018-10-22 PROCEDURE — 87086 URINE CULTURE/COLONY COUNT: CPT | Performed by: UROLOGY

## 2018-10-23 LAB
BACTERIA SPEC CULT: NO GROWTH
Lab: NORMAL
SPECIMEN SOURCE: NORMAL

## 2018-10-24 ENCOUNTER — ANTICOAGULATION THERAPY VISIT (OUTPATIENT)
Dept: NURSING | Facility: CLINIC | Age: 83
End: 2018-10-24
Payer: COMMERCIAL

## 2018-10-24 ENCOUNTER — DOCUMENTATION ONLY (OUTPATIENT)
Dept: NURSING | Facility: CLINIC | Age: 83
End: 2018-10-24

## 2018-10-24 DIAGNOSIS — Z79.01 LONG TERM CURRENT USE OF ANTICOAGULANT THERAPY: ICD-10-CM

## 2018-10-24 DIAGNOSIS — I48.91 ATRIAL FIBRILLATION, UNSPECIFIED TYPE (H): Primary | ICD-10-CM

## 2018-10-24 DIAGNOSIS — I48.91 ATRIAL FIBRILLATION, UNSPECIFIED TYPE (H): ICD-10-CM

## 2018-10-24 LAB — INR POINT OF CARE: 2.5 (ref 0.86–1.14)

## 2018-10-24 PROCEDURE — 36416 COLLJ CAPILLARY BLOOD SPEC: CPT

## 2018-10-24 PROCEDURE — 85610 PROTHROMBIN TIME: CPT | Mod: QW

## 2018-10-24 NOTE — PROGRESS NOTES
Reimbursement rules require all INR Clinic patients to have a yearly renewal of an INR Clinic Referral order.  Referral must be signed by the PCP for each patient.       Referral is pended. Please complete and sign.    Meme Riggs RN  [facility]

## 2018-10-24 NOTE — PROGRESS NOTES
ANTICOAGULATION FOLLOW-UP CLINIC VISIT    Patient Name:  Maia Miranda  Date:  10/24/2018  Contact Type:  Face to Face    SUBJECTIVE:     Patient Findings     Positives Other complaints (Holter monitor 10/17/18 - routine recheck, recently very dizzy -  improved today)    Comments Cystoscopy on 10/31/18           OBJECTIVE    INR Protime   Date Value Ref Range Status   10/24/2018 2.5 (A) 0.86 - 1.14 Final       ASSESSMENT / PLAN  INR assessment THER    Recheck INR In: 5 WEEKS    INR Location Clinic      Anticoagulation Summary as of 10/24/2018     INR goal 2.0-3.0   Today's INR 2.5   Warfarin maintenance plan 2.5 mg (2.5 mg x 1) every day   Full warfarin instructions 2.5 mg every day   Weekly warfarin total 17.5 mg   No change documented Meme Riggs RN   Plan last modified Meme Riggs RN (1/18/2017)   Next INR check 11/28/2018   Target end date Indefinite    Indications   Long-term (current) use of anticoagulants [Z79.01]  Atrial fibrillation (H) [I48.91]         Anticoagulation Episode Summary     INR check location [facility]    Preferred lab     Send INR reminders to  ANTICOAG CLINIC    Comments 2.5mg tablets // 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

## 2018-10-31 ENCOUNTER — OFFICE VISIT (OUTPATIENT)
Dept: UROLOGY | Facility: CLINIC | Age: 83
End: 2018-10-31
Payer: COMMERCIAL

## 2018-10-31 VITALS
DIASTOLIC BLOOD PRESSURE: 64 MMHG | BODY MASS INDEX: 24.83 KG/M2 | SYSTOLIC BLOOD PRESSURE: 150 MMHG | HEIGHT: 65 IN | WEIGHT: 149 LBS

## 2018-10-31 DIAGNOSIS — N39.0 RECURRENT UTI: ICD-10-CM

## 2018-10-31 DIAGNOSIS — R33.9 URINARY RETENTION: Primary | ICD-10-CM

## 2018-10-31 DIAGNOSIS — R39.15 URINARY URGENCY: ICD-10-CM

## 2018-10-31 PROCEDURE — 52000 CYSTOURETHROSCOPY: CPT | Performed by: UROLOGY

## 2018-10-31 ASSESSMENT — PAIN SCALES - GENERAL: PAINLEVEL: NO PAIN (0)

## 2018-10-31 NOTE — MR AVS SNAPSHOT
"              After Visit Summary   10/31/2018    Maia Miarnda    MRN: [id number]           Patient Information     Date Of Birth          10/26/1932        Visit Information        Provider Department      10/31/2018 10:30 AM Bernadine Maria MD; UA CYF [facility] [place]        Today's Diagnoses     Urinary retention    -  1    Urinary urgency          Care Instructions    Contact the [facility] after you are done going through the settings    Return to see me in 6 months, sooner if needed    It was a pleasure meeting with you today.  Thank you for allowing me and my team the privilege of caring for you today.  YOU are the reason we are here, and I truly hope we provided you with the excellent service you deserve.  Please let us know if there is anything else we can do for you so that we can be sure you are leaving completely satisfied with your care experience.         AFTER YOUR CYSTOSCOPY         You have just completed a cystoscopy, or \"cysto\", which allowed your physician to learn more about your bladder (or to remove a stent placed after surgery). We suggest that you continue to avoid caffeine, fruit juice, and alcohol for the next 24 hours, however, you are encouraged to return to your normal activities.       A few things that are considered normal after your cystoscopy:    * small amount of bleeding (or spotting) that clears within the next 24 hours    * slight burning sensation with urination    * sensation to of needing to avoid more frequently    * the feeling of \"air\" in your urine    * mild discomfort that is relieved with Tylonol        Please contact our office promptly if you:    * develop a fever above 101 degrees    * are unable to urinate    * develop bright red blood that does not stop    * severe pain or swelling        And of course, please contact our office with any concerns or questions [phone]        AFTER YOUR CYSTOSCOPY        You have " "just completed a cystoscopy, or \"cysto\", which allowed your physician to learn more about your bladder (or to remove a stent placed after surgery). We suggest that you continue to avoid caffeine, fruit juice, and alcohol for the next 24 hours, however, you are encouraged to return to your normal activities.         A few things that are considered normal after your cystoscopy:     * Small amount of bleeding (or spotting) that clears within the next 24 hours     * Slight burning sensation with urination     * Sensation to of needing to avoid more frequently     * The feeling of \"air\" in your urine     * Mild discomfort that is relieved with Tylenol        Please contact our office promptly if you:     * Develop a fever above 101 degrees     * Are unable to urinate     * Develop bright red blood that does not stop     * Severe pain or swelling         Please contact our office with any concerns or questions @[phone]          Follow-ups after your visit        Follow-up notes from your care team     Return in 6 months (on 4/30/2019).      Your next 10 appointments already scheduled     Nov 28, 2018 10:00 AM CST   Anticoagulation Visit with  [facility]ULATION CLINIC   [facility]    [address] [address]   [phone]            Apr 24, 2019 12:00 PM CDT   Return Visit with Bernadine Maria MD   [facility] [place] ([facility])    [address]  [address]  [place] [address] [phone]              Who to contact     If you have questions or need follow up information about today's clinic visit or your schedule please contact [facility] [place] directly at [phone].  Normal or non-critical lab and imaging results will be communicated to you by MyChart, letter or phone within 4 business days after the clinic has received the results. If you do not " "hear from us within 7 days, please contact the clinic through [employer] or phone. If you have a critical or abnormal lab result, we will notify you by phone as soon as possible.  Submit refill requests through [employer] or call your pharmacy and they will forward the refill request to us. Please allow 3 business days for your refill to be completed.          Additional Information About Your Visit        [employer] Information     [employer] gives you secure access to your electronic health record. If you see a primary care provider, you can also send messages to your care team and make appointments. If you have questions, please call your primary care clinic.  If you do not have a primary care provider, please call [phone] and they will assist you.        Care EveryWhere ID     This is your Care EveryWhere ID. This could be used by other organizations to access your [place] medical records  [id number]        Your Vitals Were     Height BMI (Body Mass Index)                1.651 m (5' 5\") 24.79 kg/m2           Blood Pressure from Last 3 Encounters:   10/31/18 150/64   10/20/18 168/66   10/15/18 140/60    Weight from Last 3 Encounters:   10/31/18 67.6 kg (149 lb)   10/20/18 67.6 kg (149 lb)   10/15/18 67.7 kg (149 lb 4.8 oz)              We Performed the Following     CYSTOURETHROSCOPY        Primary Care Provider Office Phone # Fax #    Dian Frias [phone] [address] [place] [address]        Equal Access to Services     [facility]: Hadii aad ku hadasho Soomaali, waaxda luqadaha, qaybta kaalmada adeegyada, waxay brennen leahy . So [facility] [phone].    ATENCIÓN: Si habla español, tiene a conn disposición servicios gratuitos de asistencia lingüística. Llame al [phone].    We comply with applicable federal civil rights laws and [place] laws. We do not discriminate on the basis of race, color, national origin, age, disability, sex, sexual " orientation, or gender identity.            Thank you!     Thank you for choosing [facility] [place]  for your care. Our goal is always to provide you with excellent care. Hearing back from our patients is one way we can continue to improve our services. Please take a few minutes to complete the written survey that you may receive in the mail after your visit with us. Thank you!             Your Updated Medication List - Protect others around you: Learn how to safely use, store and throw away your medicines at www.disposemymeds.org.          This list is accurate as of 10/31/18 11:22 AM.  Always use your most recent med list.                   Brand Name Dispense Instructions for use Diagnosis    ascorbic acid 1000 MG Tabs    vitamin C    180 tablet    Take 1 tablet (1,000 mg) by mouth 2 times daily    Recurrent UTI, Neurogenic bladder       calcium citrate-vitamin D 315-250 MG-UNIT Tabs per tablet    CITRACAL     Take 1 tablet by mouth daily        carboxymethylcellulose 1 % ophthalmic solution    CELLUVISC/REFRESH LIQUIGEL     Place 1 drop into both eyes every morning As needed        conjugated estrogens cream    PREMARIN     Place vaginally twice a week On Tuesday and Friday. Uses a pea sized amount        fluocinonide 0.05 % cream    LIDEX    30 g    Apply topically 2 times daily as needed    Dermatitis       GENTLECATH URINARY CATHETER Misc     120 each    1 catheter 4 times daily    Urinary retention       lisinopril 20 MG tablet    PRINIVIL/ZESTRIL    90 tablet    Take 1 tablet (20 mg) by mouth daily    Hypertension goal BP (blood pressure) < 150/90       meclizine 25 MG tablet    ANTIVERT    30 tablet    Take 1 tablet (25 mg) by mouth every 6 hours as needed for dizziness    Dizziness       methenamine hippurate 1 g Tabs tablet    HIPREX     Take 1 g by mouth 2 times daily        metoprolol succinate 100 MG 24 hr tablet    TOPROL-XL    90 tablet    Take 1 tablet (100 mg) by  mouth daily    Palpitations, Atrial fibrillation, unspecified type (H)       OMEGA-3 FISH OIL PO      Take 1 g by mouth 2 times daily (with meals)        order for DME     1 Device    Trilok ankle brace    Plantar fascial fibromatosis, Pain of left heel       polyethylene glycol Packet    MIRALAX/GLYCOLAX     Take 1 packet by mouth daily as needed        sodium chloride 5 % ophthalmic ointment    PETER 128     Place 1 Application into both eyes At Bedtime        TYLENOL 500 MG tablet   Generic drug:  acetaminophen      Take 500-1,000 mg by mouth every 6 hours as needed        Vitamin D-3 1000 units Caps      Take 1 capsule by mouth daily        warfarin 2.5 MG tablet    COUMADIN    92 tablet    Take 2.5mg (1 tablet) daily or as instructed by INR Clinic.    Long-term (current) use of anticoagulants

## 2018-10-31 NOTE — LETTER
"10/31/2018       RE: Maia Miranda  [address] [address]     Dear Colleague,    Thank you for referring your patient, Maia Miranda, to the [facility] [place] [facility]. Please see a copy of my visit note below.    October 31, 2018    Return visit    Patient returns today for follow up for cystoscopy given recent changes in her urinary symptoms reported at last visit. She denies any other changes in her health since last visit.    /64 (BP Location: Right arm, Patient Position: Sitting, Cuff Size: Adult Regular)  Ht 1.651 m (5' 5\")  Wt 67.6 kg (149 lb)  BMI 24.79 kg/m2  She is comfortable, in no distress, non-labored breathing.  Abdomen is soft, non-tender, non-distended.  Normal external female genitalia.  Negative CST.      Cystoscopy Note: After informed consent was obtained patient was prepped and draped in the standard fashion.  The flexible cystoscope was inserted into a normal appearing urethral meatus.  The urothelium was carefully examined and there is 1+ trabeculation but there were no tumors, masses, stones, foreign bodies, or other urothelial abnormalities noted.  Bilateral ureteral orifices were noted in the normal orthotopic position and both effluxed clear urine.  The cystoscope was retroflexed and the bladder neck was unremarkable.  The urethra was carefully examined upon removing the cystoscope and was unremarkable.  Patient tolerated the procedure without complications noted.      Cytology negative    A/P: 86 year old F with urinary retention s/p interstim, increased urinary urgency    Thinks that the urinary urgency is now better after the more recent setting change thus we discussed that the urgency may have been related to the settings and to monitor this    At this time patient and her son state that overall she is happy with things, even if she does wish she could urinate more.  At " this time she will continue with her interstim and catheterization schedule    RTC 6 months, sooner if needed    Bernadine Maria MD MPH   of Urology    CC  Patient Care Team:  Dian Frias MD as PCP - General (Internal Medicine)  Bernadine Maria MD as MD (Urology)  Rosa Cruz, RN as Registered Nurse (Urology)  Dian Frias MD as Referring Physician (Internal Medicine)  Kb Tracy MD as MD (Urology)

## 2018-10-31 NOTE — NURSING NOTE
Chief Complaint   Patient presents with     Cystoscopy     Pt is here for cystoscopy due to urinary retention and urinary urgency.      Prior to the start of the procedure and with procedural staff participation, I verbally confirmed the patient s identity using two indicators, relevant allergies, that the procedure was appropriate and matched the consent or emergent situation, and that the correct equipment/implants were available. Immediately prior to starting the procedure I conducted the Time Out with the procedural staff and re-confirmed the patient s name, procedure, and site/side. I have wiped the patient off with the povidone-Iodine solution, draped them,  used Lidocaine hydrochloride jelly, and instilled sterile water into the bladder. (The Joint Commission universal protocol was followed.)  Yes    Sedation (Moderate or Deep): None  Elsy Teresa, MIGEL

## 2018-10-31 NOTE — PATIENT INSTRUCTIONS
"Contact the [facility] after you are done going through the settings    Return to see me in 6 months, sooner if needed    It was a pleasure meeting with you today.  Thank you for allowing me and my team the privilege of caring for you today.  YOU are the reason we are here, and I truly hope we provided you with the excellent service you deserve.  Please let us know if there is anything else we can do for you so that we can be sure you are leaving completely satisfied with your care experience.         AFTER YOUR CYSTOSCOPY         You have just completed a cystoscopy, or \"cysto\", which allowed your physician to learn more about your bladder (or to remove a stent placed after surgery). We suggest that you continue to avoid caffeine, fruit juice, and alcohol for the next 24 hours, however, you are encouraged to return to your normal activities.       A few things that are considered normal after your cystoscopy:    * small amount of bleeding (or spotting) that clears within the next 24 hours    * slight burning sensation with urination    * sensation to of needing to avoid more frequently    * the feeling of \"air\" in your urine    * mild discomfort that is relieved with Tylonol        Please contact our office promptly if you:    * develop a fever above 101 degrees    * are unable to urinate    * develop bright red blood that does not stop    * severe pain or swelling        And of course, please contact our office with any concerns or questions [phone]        AFTER YOUR CYSTOSCOPY        You have just completed a cystoscopy, or \"cysto\", which allowed your physician to learn more about your bladder (or to remove a stent placed after surgery). We suggest that you continue to avoid caffeine, fruit juice, and alcohol for the next 24 hours, however, you are encouraged to return to your normal activities.         A few things that are considered normal after your cystoscopy:     * Small amount of bleeding (or " "spotting) that clears within the next 24 hours     * Slight burning sensation with urination     * Sensation to of needing to avoid more frequently     * The feeling of \"air\" in your urine     * Mild discomfort that is relieved with Tylenol        Please contact our office promptly if you:     * Develop a fever above 101 degrees     * Are unable to urinate     * Develop bright red blood that does not stop     * Severe pain or swelling         Please contact our office with any concerns or questions @[phone]  "

## 2018-10-31 NOTE — PROGRESS NOTES
"October 31, 2018    Return visit    Patient returns today for follow up for cystoscopy given recent changes in her urinary symptoms reported at last visit. She denies any other changes in her health since last visit.    /64 (BP Location: Right arm, Patient Position: Sitting, Cuff Size: Adult Regular)  Ht 1.651 m (5' 5\")  Wt 67.6 kg (149 lb)  BMI 24.79 kg/m2  She is comfortable, in no distress, non-labored breathing.  Abdomen is soft, non-tender, non-distended.  Normal external female genitalia.  Negative CST.      Cystoscopy Note: After informed consent was obtained patient was prepped and draped in the standard fashion.  The flexible cystoscope was inserted into a normal appearing urethral meatus.  The urothelium was carefully examined and there is 1+ trabeculation but there were no tumors, masses, stones, foreign bodies, or other urothelial abnormalities noted.  Bilateral ureteral orifices were noted in the normal orthotopic position and both effluxed clear urine.  The cystoscope was retroflexed and the bladder neck was unremarkable.  The urethra was carefully examined upon removing the cystoscope and was unremarkable.  Patient tolerated the procedure without complications noted.      Cytology negative    A/P: 86 year old F with urinary retention s/p interstim, increased urinary urgency    Thinks that the urinary urgency is now better after the more recent setting change thus we discussed that the urgency may have been related to the settings and to monitor this    At this time patient and her son state that overall she is happy with things, even if she does wish she could urinate more.  At this time she will continue with her interstim and catheterization schedule    RTC 6 months, sooner if needed    Bernadine Maria MD MPH   of Urology    CC  Patient Care Team:  Dian Frias MD as PCP - General (Internal Medicine)  Bernadine Maria MD as MD (Urology)  Rosa Cruz, SHIEAL " as Registered Nurse (Urology)  Dian Frias MD as Referring Physician (Internal Medicine)  Kb Tracy MD as MD (Urology)

## 2018-11-01 NOTE — TELEPHONE ENCOUNTER
methenamine hippurate (HIPREX) 1 G   Last Written Prescription Date:  10/16/17  Last Fill Quantity: 90,   # refills: 3  Last Office Visit : 10/31/18  Future Office visit:  4/24/18     Routing refill request to provider for review/approval because:  Drug not active on patient's medication list  / HISTORICAL   DC'D @ PT DISCHARGE   * 10/31/18 UNSIGNED NOTE

## 2018-11-02 RX ORDER — METHENAMINE HIPPURATE 1000 MG/1
1 TABLET ORAL DAILY
Qty: 90 TABLET | Refills: 1 | Status: SHIPPED | OUTPATIENT
Start: 2018-11-02 | End: 2019-04-15

## 2018-11-28 ENCOUNTER — ALLIED HEALTH/NURSE VISIT (OUTPATIENT)
Dept: PEDIATRICS | Facility: CLINIC | Age: 83
End: 2018-11-28
Payer: COMMERCIAL

## 2018-11-28 ENCOUNTER — ANTICOAGULATION THERAPY VISIT (OUTPATIENT)
Dept: NURSING | Facility: CLINIC | Age: 83
End: 2018-11-28
Payer: COMMERCIAL

## 2018-11-28 VITALS — DIASTOLIC BLOOD PRESSURE: 56 MMHG | SYSTOLIC BLOOD PRESSURE: 132 MMHG

## 2018-11-28 DIAGNOSIS — Z79.01 LONG TERM CURRENT USE OF ANTICOAGULANT THERAPY: ICD-10-CM

## 2018-11-28 DIAGNOSIS — I48.91 ATRIAL FIBRILLATION, UNSPECIFIED TYPE (H): ICD-10-CM

## 2018-11-28 DIAGNOSIS — Z01.30 BP CHECK: Primary | ICD-10-CM

## 2018-11-28 LAB — INR POINT OF CARE: 2.8 (ref 0.86–1.14)

## 2018-11-28 PROCEDURE — 99207 ZZC NO CHARGE NURSE ONLY: CPT | Performed by: INTERNAL MEDICINE

## 2018-11-28 PROCEDURE — 36416 COLLJ CAPILLARY BLOOD SPEC: CPT

## 2018-11-28 PROCEDURE — 85610 PROTHROMBIN TIME: CPT | Mod: QW

## 2018-11-28 PROCEDURE — 99207 ZZC NO CHARGE NURSE ONLY: CPT

## 2018-11-28 NOTE — PROGRESS NOTES
ANTICOAGULATION FOLLOW-UP CLINIC VISIT    Patient Name:  Maia Miranda  Date:  11/28/2018  Contact Type:  Face to Face    SUBJECTIVE:     Patient Findings     Positives No Problem Findings           OBJECTIVE    INR Protime   Date Value Ref Range Status   11/28/2018 2.8 (A) 0.86 - 1.14 Final       ASSESSMENT / PLAN  INR assessment THER    Recheck INR In: 5 WEEKS    INR Location Clinic      Anticoagulation Summary as of 11/28/2018     INR goal 2.0-3.0   Today's INR 2.8   Warfarin maintenance plan 2.5 mg (2.5 mg x 1) every day   Full warfarin instructions 2.5 mg every day   Weekly warfarin total 17.5 mg   No change documented Meme iRggs, RN   Plan last modified Meme Riggs RN (1/18/2017)   Next INR check 1/2/2019   Target end date Indefinite    Indications   Long term current use of anticoagulant therapy [Z79.01]  Atrial fibrillation (H) [I48.91]         Anticoagulation Episode Summary     INR check location [facility]    Preferred lab     Send INR reminders to  ANTICO CLINIC    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

## 2018-11-28 NOTE — MR AVS SNAPSHOT
Maia Miranda   11/28/2018 10:00 AM   Anticoagulation Therapy Visit    Description:  86 year old female   Provider:   ANTICOAGULATION CLINIC   Department:   Nurse           INR as of 11/28/2018     Today's INR 2.8      Anticoagulation Summary as of 11/28/2018     INR goal 2.0-3.0   Today's INR 2.8   Full warfarin instructions 2.5 mg every day   Next INR check 1/2/2019    Indications   Long term current use of anticoagulant therapy [Z79.01]  Atrial fibrillation (H) [I48.91]         Your next Anticoagulation Clinic appointment(s)     Jan 02, 2019 11:00 AM CST   Anticoagulation Visit with  ANTICOAGULATION CLINIC   [facility]    [address] [address] [phone]              Contact Numbers     [facility]  Please call to cancel and/or reschedule your appointment, or with any problems or questions regarding your therapy.  Anticoagulation Nurse: [phone]  Main Clinic: [phone]             November 2018 Details    Sun Mon Tue Wed Thu Fri Sat         1               2               3                 4               5               6               7               8               9               10                 11               12               13               14               15               16               17                 18               19               20               21               22               23               24                 25               26               27               28      2.5 mg   See details      29      2.5 mg         30      2.5 mg           Date Details   11/28 This INR check               How to take your warfarin dose     To take:  2.5 mg Take 1 of the 2.5 mg tablets.           December 2018 Details    Sun Mon Tue Wed Thu Fri Sat           1      2.5 mg           2      2.5 mg         3      2.5 mg         4      2.5 mg         5      2.5 mg         6      2.5 mg         7      2.5  mg         8      2.5 mg           9      2.5 mg         10      2.5 mg         11      2.5 mg         12      2.5 mg         13      2.5 mg         14      2.5 mg         15      2.5 mg           16      2.5 mg         17      2.5 mg         18      2.5 mg         19      2.5 mg         20      2.5 mg         21      2.5 mg         22      2.5 mg           23      2.5 mg         24      2.5 mg         25      2.5 mg         26      2.5 mg         27      2.5 mg         28      2.5 mg         29      2.5 mg           30      2.5 mg         31      2.5 mg               Date Details   No additional details            How to take your warfarin dose     To take:  2.5 mg Take 1 of the 2.5 mg tablets.           January 2019 Details    Sun Mon Tue Wed Thu Fri Sat       1      2.5 mg         2            3               4               5                 6               7               8               9               10               11               12                 13               14               15               16               17               18               19                 20               21               22               23               24               25               26                 27               28               29               30               31                  Date Details   No additional details    Date of next INR:  1/2/2019         How to take your warfarin dose     To take:  2.5 mg Take 1 of the 2.5 mg tablets.

## 2018-11-28 NOTE — PROGRESS NOTES
Maia Miranda is enrolled/participating in the retail pharmacy Blood Pressure Goals Achievement Program (BPGAP).  Maia Miranda was evaluated at [facility] on November 28, 2018 at which time her blood pressure was:    BP Readings from Last 3 Encounters:   11/28/18 132/56   10/31/18 150/64   10/20/18 168/66     Reviewed lifestyle modifications for blood pressure control and reduction: including making healthy food choices, managing weight, getting regular exercise, smoking cessation, reducing alcohol consumption, monitoring blood pressure regularly.     Maia Miranda is not experiencing symptoms.    Follow-Up: BP is at goal of < 150/90 mmHg (patient 60+ years of age without diabetes).  Recommended follow-up at pharmacy in 6 months.     Recommendation to Provider: none    Maia Miranda was evaluated for enrollment into the PGEN study today.    PLEASE INITIATE ENROLLMENT DISCUSSION WITH HTN PTS  1) Between 30-80 years old                                                                                                               2) BMI between 19-50                                                                                                        3) BP ?140/90 AND ?170/110 patients aged 30-59         BP ?150/90 AND ?170/110 non-diabetic patients aged 60-80       BP ?140/90 AND ?170/110 diabetic patients aged 60-80  4) Additional requirements for uncontrolled HTN patients:        Pt on only 1 class of medication  5) EXCLUDE patient if confirmation of:                  ? Cardiac disease                  ? Chronic Kidney Disease                  ? Pregnancy/Breastfeeding                  ? Secondary Hypertension/Pre-eclampsia                                 ? Vascular disease    Patient eligible for enrollment:  No  Patient interested in enrollment:  No    This note completed by: Daniele Coles    Thank You   Daniele Coles RPh [facility]

## 2018-12-24 ENCOUNTER — OFFICE VISIT (OUTPATIENT)
Dept: URGENT CARE | Facility: URGENT CARE | Age: 83
End: 2018-12-24
Payer: COMMERCIAL

## 2018-12-24 VITALS
OXYGEN SATURATION: 97 % | TEMPERATURE: 96 F | DIASTOLIC BLOOD PRESSURE: 90 MMHG | SYSTOLIC BLOOD PRESSURE: 142 MMHG | HEART RATE: 55 BPM

## 2018-12-24 DIAGNOSIS — R82.90 NONSPECIFIC FINDING ON EXAMINATION OF URINE: ICD-10-CM

## 2018-12-24 DIAGNOSIS — R31.9 URINARY TRACT INFECTION WITH HEMATURIA, SITE UNSPECIFIED: Primary | ICD-10-CM

## 2018-12-24 DIAGNOSIS — R30.0 DYSURIA: ICD-10-CM

## 2018-12-24 DIAGNOSIS — N39.0 URINARY TRACT INFECTION WITH HEMATURIA, SITE UNSPECIFIED: Primary | ICD-10-CM

## 2018-12-24 LAB
ALBUMIN UR-MCNC: >=300 MG/DL
APPEARANCE UR: ABNORMAL
BACTERIA #/AREA URNS HPF: ABNORMAL /HPF
BILIRUB UR QL STRIP: NEGATIVE
COLOR UR AUTO: YELLOW
GLUCOSE UR STRIP-MCNC: NEGATIVE MG/DL
HGB UR QL STRIP: ABNORMAL
KETONES UR STRIP-MCNC: NEGATIVE MG/DL
LEUKOCYTE ESTERASE UR QL STRIP: ABNORMAL
NITRATE UR QL: POSITIVE
NON-SQ EPI CELLS #/AREA URNS LPF: ABNORMAL /LPF
PH UR STRIP: 5.5 PH (ref 5–7)
RBC #/AREA URNS AUTO: ABNORMAL /HPF
SOURCE: ABNORMAL
SP GR UR STRIP: >1.03 (ref 1–1.03)
UROBILINOGEN UR STRIP-ACNC: 0.2 EU/DL (ref 0.2–1)
WBC #/AREA URNS AUTO: >100 /HPF

## 2018-12-24 PROCEDURE — 87086 URINE CULTURE/COLONY COUNT: CPT | Performed by: FAMILY MEDICINE

## 2018-12-24 PROCEDURE — 81001 URINALYSIS AUTO W/SCOPE: CPT | Performed by: FAMILY MEDICINE

## 2018-12-24 PROCEDURE — 87088 URINE BACTERIA CULTURE: CPT | Performed by: FAMILY MEDICINE

## 2018-12-24 PROCEDURE — 87186 SC STD MICRODIL/AGAR DIL: CPT | Performed by: FAMILY MEDICINE

## 2018-12-24 PROCEDURE — 99213 OFFICE O/P EST LOW 20 MIN: CPT | Performed by: FAMILY MEDICINE

## 2018-12-24 RX ORDER — CEFDINIR 300 MG/1
300 CAPSULE ORAL 2 TIMES DAILY
Qty: 10 CAPSULE | Refills: 0 | Status: SHIPPED | OUTPATIENT
Start: 2018-12-24 | End: 2019-03-23

## 2018-12-24 NOTE — PATIENT INSTRUCTIONS
Patient Education     Urinary Tract Infections in Women    Urinary tract infections (UTIs) are most often caused by bacteria. These bacteria enter the urinary tract. The bacteria may come from outside the body. Or they may travel from the skin outside the rectum or vagina into the urethra. Female anatomy makes it easy for bacteria from the bowel to enter a woman s urinary tract, which is the most common source of UTI. This means women develop UTIs more often than men. Pain in or around the urinary tract is a common UTI symptom. But the only way to know for sure if you have a UTI for the healthcare provider to test your urine. The two tests that may be done are the urinalysis and urine culture.  Types of UTIs    Cystitis. A bladder infection (cystitis) is the most common UTI in women. You may have urgent or frequent urination. You may also have pain, burning when you urinate, and bloody urine.    Urethritis. This is an inflamed urethra, which is the tube that carries urine from the bladder to outside the body. You may have lower stomach or back pain. You may also have urgent or frequent urination.    Pyelonephritis. This is a kidney infection. If not treated, it can be serious and damage your kidneys. In severe cases, you may need to stay in the hospital. You may have a fever and lower back pain.  Medicines to treat a UTI  Most UTIs are treated with antibiotics. These kill the bacteria. The length of time you need to take them depends on the type of infection. It may be as short as 3 days. If you have repeated UTIs, you may need a low-dose antibiotic for several months. Take antibiotics exactly as directed. Don t stop taking them until all of the medicine is gone. If you stop taking the antibiotic too soon, the infection may not go away. You may also develop a resistance to the antibiotic. This can make it much harder to treat.  Lifestyle changes to treat and prevent UTIs  The lifestyle changes below will help get  rid of your UTI. They may also help prevent future UTIs.    Drink plenty of fluids. This includes water, juice, or other caffeine-free drinks. Fluids help flush bacteria out of your body.    Empty your bladder. Always empty your bladder when you feel the urge to urinate. And always urinate before going to sleep. Urine that stays in your bladder can lead to infection. Try to urinate before and after sex as well.    Practice good personal hygiene. Wipe yourself from front to back after using the toilet. This helps keep bacteria from getting into the urethra.    Use condoms during sex. These help prevent UTIs caused by sexually transmitted bacteria. Also don't use spermicides during sex. These can increase the risk for UTIs. Choose other forms of birth control instead. For women who tend to get UTIs after sex, a low-dose of a preventive antibiotic may be used. Be sure to discuss this option with your healthcare provider.    Follow up with your healthcare provider as directed. He or she may test to make sure the infection has cleared. If needed, more treatment may be started.  Date Last Reviewed: 1/1/2017 2000-2018 [employer]. [address] [place] [address], [place] [address]. All rights reserved. This information is not intended as a substitute for professional medical care. Always follow your healthcare professional's instructions.

## 2018-12-24 NOTE — PROGRESS NOTES
SUBJECTIVE: Maia Miranda is a 86 year old female who  presents today for a possible UTI.   Symptoms of dysuria and frequency have been going on forday(s).    Hematuria no.  still presentand moderate.    There is no history of fever, chills, nausea or vomiting.   This patient does have a history of urinary tract infections.   Patient denies long duration and flank pain    Past Medical History:   Diagnosis Date     Amnestic MCI (mild cognitive impairment with memory loss) 2014     Chronic duodenal ulcer without mention of hemorrhage, perforation, or obstruction 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 Warner, [facility] yearly     Esophageal reflux 2001    Abstracted 06/10/02     Essential and other specified forms of tremor 2004    resting tremor     Generalized osteoarthrosis, unspecified site     Hands     Hiatal hernia     diagnosed late 1990s [place], [place]     History of pulmonary embolism 1971    no source found     LACTOSE INTOLERANCE      Lumbago     sees Kodi Guidry     Mitral valve regurgitation      Occlusion and stenosis of carotid artery without mention of cerebral infarction 2003    CAROTID US NORMAL, bruit in neck     Osteoporosis, unspecified 2003    T = -2.66     Paroxysmal atrial fibrillation (H) 11/15/2013     Unspecified essential hypertension      Unspecified tinnitus 2005    mild hearing loss, saw ENT     Allergies   Allergen Reactions     Codeine Nausea and Vomiting     Other reaction(s): GI Intolerance     Meperidine Nausea and Vomiting     Other reaction(s): GI Intolerance     Morphine Nausea and Vomiting     Other reaction(s): GI Intolerance  vomiting     Penicillins Hives     hives     Sulfa Drugs      Other reaction(s): GI Intolerance  Vomiting       Epinephrine Palpitations     Other reaction(s): Other (See Comments)  Makes heart race     Social History     Tobacco Use     Smoking status: Never Smoker      Smokeless tobacco: Never Used   Substance Use Topics     Alcohol use: No       ROS: CONSTITUTIONAL:NEGATIVE for fever, chills, change in weight    OBJECTIVE:  /90 (BP Location: Right arm, Patient Position: Chair, Cuff Size: Adult Regular)   Pulse 55   Temp 96  F (35.6  C) (Tympanic)   SpO2 97%   HAD  No Flank/abd pain      ICD-10-CM    1. Urinary tract infection with hematuria, site unspecified N39.0 cefdinir (OMNICEF) 300 MG capsule    R31.9    2. Dysuria R30.0 UA reflex to Microscopic and Culture     Urine Microscopic   3. Nonspecific finding on examination of urine R82.90 Urine Culture Aerobic Bacterial       Drink plenty of fluids.  Prevention and treatment of UTI's discussed.Signs and symptoms of pyelonephritis mentioned.  Follow up with primary care physician if not improving

## 2018-12-26 LAB
BACTERIA SPEC CULT: ABNORMAL
SPECIMEN SOURCE: ABNORMAL

## 2019-01-02 ENCOUNTER — ANTICOAGULATION THERAPY VISIT (OUTPATIENT)
Dept: NURSING | Facility: CLINIC | Age: 84
End: 2019-01-02
Payer: COMMERCIAL

## 2019-01-02 DIAGNOSIS — Z79.01 LONG TERM CURRENT USE OF ANTICOAGULANT THERAPY: ICD-10-CM

## 2019-01-02 DIAGNOSIS — I48.91 ATRIAL FIBRILLATION, UNSPECIFIED TYPE (H): ICD-10-CM

## 2019-01-02 LAB — INR POINT OF CARE: 3.1 (ref 0.86–1.14)

## 2019-01-02 PROCEDURE — 85610 PROTHROMBIN TIME: CPT | Mod: QW

## 2019-01-02 PROCEDURE — 36416 COLLJ CAPILLARY BLOOD SPEC: CPT

## 2019-01-02 NOTE — PROGRESS NOTES
ANTICOAGULATION FOLLOW-UP CLINIC VISIT    Patient Name:  Maia Miranda  Date:  1/2/2019  Contact Type:  Face to Face    SUBJECTIVE:     Patient Findings     Positives:   Antibiotic use or infection (UTI - took Omnicef 12/24 - 12/29)           OBJECTIVE    INR Protime   Date Value Ref Range Status   01/02/2019 3.1 (A) 0.86 - 1.14 Final       ASSESSMENT / PLAN  INR assessment THER    Recheck INR In: 5 WEEKS    INR Location Clinic      Anticoagulation Summary  As of 1/2/2019    INR goal:   2.0-3.0   TTR:   80.6 % (2.9 y)   INR used for dosing:   3.1! 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

## 2019-01-04 ENCOUNTER — TELEPHONE (OUTPATIENT)
Dept: PEDIATRICS | Facility: CLINIC | Age: 84
End: 2019-01-04

## 2019-01-04 DIAGNOSIS — N39.0 URINARY TRACT INFECTION WITHOUT HEMATURIA, SITE UNSPECIFIED: ICD-10-CM

## 2019-01-04 RX ORDER — CEFDINIR 300 MG/1
600 CAPSULE ORAL DAILY
Qty: 14 CAPSULE | Refills: 0 | Status: SHIPPED | OUTPATIENT
Start: 2019-01-04 | End: 2019-03-23

## 2019-01-04 NOTE — TELEPHONE ENCOUNTER
Patient was seen in Urgent Care and treated for UTI.  Notes that she had complete the 5 day course of Cefdinir and symptoms had resolved however, after 2 days have returned again.      She reports her symptoms are the same as they were previously reporting, urgency, frequency, and having dark red urine.    Patient is wondering if another script can be sent into pharmacy,  Please advise.    Dian REYNOLDS RN - Triage  [facility]      Urgent Care notes: 12/24/2018   Urinary tract infection with hematuria, site unspecified N39.0 cefdinir (OMNICEF) 300 MG capsule     R31.9     2. Dysuria R30.0 UA reflex to Microscopic and Culture       Urine Microscopic   3. Nonspecific finding on examination of urine R82.90 Urine Culture Aerobic Bacterial         Drink plenty of fluids.  Prevention and treatment of UTI's discussed.Signs and symptoms of pyelonephritis mentioned.  Follow up with primary care physician if not improving

## 2019-01-04 NOTE — TELEPHONE ENCOUNTER
rx sent for 7 day course. If not improving after antibiotics, should be seen in clinic. Please let know    Dian Frias MD  Internal Medicine/Pediatrics

## 2019-01-04 NOTE — TELEPHONE ENCOUNTER
Patient notified with information noted below from provider and agrees with plan.  Dian REYNOLDS RN - Triage  [facility]

## 2019-01-14 DIAGNOSIS — I10 HYPERTENSION GOAL BP (BLOOD PRESSURE) < 150/90: ICD-10-CM

## 2019-01-14 NOTE — TELEPHONE ENCOUNTER
"Requested Prescriptions   Pending Prescriptions Disp Refills     lisinopril (PRINIVIL/ZESTRIL) 20 MG tablet [Pharmacy Med Name: LISINOPRIL 20MG     TAB]    Last Written Prescription Date:  7/6/2018  Last Fill Quantity: 90,  # refills: 1   Last office visit: 10/15/2018 with prescribing provider:  Dian Frias     Future Office Visit:     90 tablet 1     Sig: TAKE 1 TABLET (20 MG) BY MOUTH ONCE DAILY    ACE Inhibitors (Including Combos) Protocol Failed - 1/14/2019 10:06 AM       Failed - Blood pressure under 140/90 in past 12 months    BP Readings from Last 3 Encounters:   12/24/18 142/90   11/28/18 132/56   10/31/18 150/64                Failed - Normal serum creatinine on file in past 12 months    Recent Labs   Lab Test 10/20/18  1114   CR 1.13*            Passed - Recent (12 mo) or future (30 days) visit within the authorizing provider's specialty    Patient had office visit in the last 12 months or has a visit in the next 30 days with authorizing provider or within the authorizing provider's specialty.  See \"Patient Info\" tab in inbasket, or \"Choose Columns\" in Meds & Orders section of the refill encounter.             Passed - Medication is active on med list       Passed - Patient is age 18 or older       Passed - No active pregnancy on record       Passed - Normal serum potassium on file in past 12 months    Recent Labs   Lab Test 10/20/18  1114   POTASSIUM 4.5            Passed - No positive pregnancy test within past 12 months        \  "

## 2019-01-16 RX ORDER — LISINOPRIL 20 MG/1
TABLET ORAL
Qty: 90 TABLET | Refills: 0 | Status: SHIPPED | OUTPATIENT
Start: 2019-01-16 | End: 2019-04-17

## 2019-01-26 ENCOUNTER — OFFICE VISIT (OUTPATIENT)
Dept: URGENT CARE | Facility: URGENT CARE | Age: 84
End: 2019-01-26
Payer: COMMERCIAL

## 2019-01-26 VITALS
SYSTOLIC BLOOD PRESSURE: 164 MMHG | DIASTOLIC BLOOD PRESSURE: 68 MMHG | TEMPERATURE: 98.3 F | OXYGEN SATURATION: 98 % | BODY MASS INDEX: 24.96 KG/M2 | RESPIRATION RATE: 12 BRPM | HEART RATE: 57 BPM | WEIGHT: 150 LBS

## 2019-01-26 DIAGNOSIS — N39.0 URINARY TRACT INFECTION WITH HEMATURIA, SITE UNSPECIFIED: Primary | ICD-10-CM

## 2019-01-26 DIAGNOSIS — R30.0 DYSURIA: ICD-10-CM

## 2019-01-26 DIAGNOSIS — R31.9 URINARY TRACT INFECTION WITH HEMATURIA, SITE UNSPECIFIED: Primary | ICD-10-CM

## 2019-01-26 LAB
ALBUMIN UR-MCNC: >=300 MG/DL
APPEARANCE UR: ABNORMAL
BACTERIA #/AREA URNS HPF: ABNORMAL /HPF
BILIRUB UR QL STRIP: NEGATIVE
COLOR UR AUTO: YELLOW
GLUCOSE UR STRIP-MCNC: NEGATIVE MG/DL
HGB UR QL STRIP: ABNORMAL
KETONES UR STRIP-MCNC: ABNORMAL MG/DL
LEUKOCYTE ESTERASE UR QL STRIP: ABNORMAL
NITRATE UR QL: NEGATIVE
NON-SQ EPI CELLS #/AREA URNS LPF: ABNORMAL /LPF
PH UR STRIP: 5.5 PH (ref 5–7)
RBC #/AREA URNS AUTO: ABNORMAL /HPF
SOURCE: ABNORMAL
SP GR UR STRIP: >1.03 (ref 1–1.03)
UROBILINOGEN UR STRIP-ACNC: 0.2 EU/DL (ref 0.2–1)
WBC #/AREA URNS AUTO: >100 /HPF

## 2019-01-26 PROCEDURE — 87086 URINE CULTURE/COLONY COUNT: CPT | Performed by: PHYSICIAN ASSISTANT

## 2019-01-26 PROCEDURE — 87088 URINE BACTERIA CULTURE: CPT | Performed by: PHYSICIAN ASSISTANT

## 2019-01-26 PROCEDURE — 87186 SC STD MICRODIL/AGAR DIL: CPT | Performed by: PHYSICIAN ASSISTANT

## 2019-01-26 PROCEDURE — 99213 OFFICE O/P EST LOW 20 MIN: CPT | Performed by: PHYSICIAN ASSISTANT

## 2019-01-26 PROCEDURE — 81001 URINALYSIS AUTO W/SCOPE: CPT | Performed by: PHYSICIAN ASSISTANT

## 2019-01-26 RX ORDER — CIPROFLOXACIN 250 MG/1
250 TABLET, FILM COATED ORAL 2 TIMES DAILY
Qty: 14 TABLET | Refills: 0 | Status: SHIPPED | OUTPATIENT
Start: 2019-01-26 | End: 2019-03-23

## 2019-01-26 NOTE — PATIENT INSTRUCTIONS
"  Patient Education     Bladder Infection, Female (Adult)    Urine is normally doesn't have any bacteria in it. But bacteria can get into the urinary tract from the skin around the rectum. Or they can travel in the blood from elsewhere in the body. Once they are in your urinary tract, they can cause infection in the urethra (urethritis), the bladder (cystitis), or the kidneys (pyelonephritis).  The most common place for an infection is in the bladder. This is called a bladder infection. This is one of the most common infections in women. Most bladder infections are easily treated. They are not serious unless the infection spreads to the kidney.  The phrases \"bladder infection,\" \"UTI,\" and \"cystitis\" are often used to describe the same thing. But they are not always the same. Cystitis is an inflammation of the bladder. The most common cause of cystitis is an infection.  Symptoms  The infection causes inflammation in the urethra and bladder. This causes many of the symptoms. The most common symptoms of a bladder infection are:    Pain or burning when urinating    Having to urinate more often than usual    Urgent need to urinate    Only a small amount of urine comes out    Blood in urine    Abdominal discomfort. This is usually in the lower abdomen above the pubic bone.    Cloudy urine    Strong- or bad-smelling urine    Unable to urinate (urinary retention)    Unable to hold urine in (urinary incontinence)    Fever    Loss of appetite    Confusion (in older adults)  Causes  Bladder infections are not contagious. You can't get one from someone else, from a toilet seat, or from sharing a bath.  The most common cause of bladder infections is bacteria from the bowels. The bacteria get onto the skin around the opening of the urethra. From there, they can get into the urine and travel up to the bladder, causing inflammation and infection. This usually happens because of:    Wiping improperly after urinating. Always wipe " from front to back.    Bowel incontinence    Pregnancy    Procedures such as having a catheter inserted    Older age    Not emptying your bladder. This can allow bacteria a chance to grow in your urine.    Dehydration    Constipation    Sex    Use of a diaphragm for birth control   Treatment  Bladder infections are diagnosed by a urine test. They are treated with antibiotics and usually clear up quickly without complications. Treatment helps prevent a more serious kidney infection.  Medicines  Medicines can help in the treatment of a bladder infection:    Take antibiotics until they are used up, even if you feel better. It is important to finish them to make sure the infection has cleared.    You can use acetaminophen or ibuprofen for pain, fever, or discomfort, unless another medicine was prescribed. If you have chronic liver or kidney disease, talk with your healthcare provider before using these medicines. Also talk with your provider if you've ever had a stomach ulcer or gastrointestinal bleeding, or are taking blood-thinner medicines.    If you are given phenazopydridine to reduce burning with urination, it will cause your urine to become a bright orange color. This can stain clothing.  Care and prevention  These self-care steps can help prevent future infections:    Drink plenty of fluids to prevent dehydration and flush out your bladder. Do this unless you must restrict fluids for other health reasons, or your doctor told you not to.    Proper cleaning after going to the bathroom is important. Wipe from front to back after using the toilet to prevent the spread of bacteria.    Urinate more often. Don't try to hold urine in for a long time.    Wear loose-fitting clothes and cotton underwear. Avoid tight-fitting pants.    Improve your diet and prevent constipation. Eat more fresh fruit and vegetables, and fiber, and less junk and fatty foods.    Avoid sex until your symptoms are gone.    Avoid caffeine,  alcohol, and spicy foods. These can irritate your bladder.    Urinate right after intercourse to flush out your bladder.    If you use birth control pills and have frequent bladder infections, discuss it with your doctor.  Follow-up care  Call your healthcare provider if all symptoms are not gone after 3 days of treatment. This is especially important if you have repeat infections.  If a culture was done, you will be told if your treatment needs to be changed. If directed, you can call to find out the results.  If X-rays were done, you will be told if the results will affect your treatment.  Call 911  Call 911 if any of the following occur:    Trouble breathing    Hard to wake up or confusion    Fainting or loss of consciousness    Rapid heart rate  When to seek medical advice  Call your healthcare provider right away if any of these occur:    Fever of 100.4 F (38.0 C) or higher, or as directed by your healthcare provider    Symptoms are not better by the third day of treatment    Back or belly (abdominal) pain that gets worse    Repeated vomiting, or unable to keep medicine down    Weakness or dizziness    Vaginal discharge    Pain, redness, or swelling in the outer vaginal area (labia)  Date Last Reviewed: 10/1/2016    1666-2132 [employer]. [address] [place] [address]. All rights reserved. This information is not intended as a substitute for professional medical care. Always follow your healthcare professional's instructions.           Patient Education     Blood in the Urine    Blood in the urine (hematuria) has many possible causes. If it occurs after an injury (such as a car accident or fall), it is most often a sign of bruising to the kidney or bladder. Common causes of blood in the urine include urinary tract infections, kidney stones, inflammation, tumors, or certain other diseases of the kidney or bladder. Menstruation can cause blood to appear in the urine sample, although it  is not coming from the urinary tract.  If only a trace amount of blood is present, it will show up on the urine test, even though the urine may be yellow and not pink or red. This may occur with any of the above conditions, as well as heavy exercise or high fever. In this case, your doctor may want to repeat the urine test on another day. This will show if the blood is still present. If it is, then other tests can be done to find out the cause.  Home care  Follow these home care guidelines:    If your urine does not appear bloody (pink, brown or red) then you do not need to restrict your activity in any way.    If you can see blood in your urine, rest and avoid heavy exertion until your next exam. Do not use aspirin, blood thinners, or anti-platelet or anti-inflammatory medicines. These include ibuprofen and naproxen. These thin the blood and may increase bleeding.  Follow-up care  Follow up with your healthcare provider, or as advised. If you were injured and had blood in your urine, you should have a repeat urine test in 1 to 2 days. Contact your doctor for this test.  A radiologist will review any X-rays that were taken. You will be told of any new findings that may affect your care.  When to seek medical advice  Call your healthcare provider right away if any of these occur:    Bright red blood or blood clots in the urine (if you did not have this before)    Weakness, dizziness or fainting    New groin, abdominal, or back pain    Fever of 100.4 F (38 C) or higher, or as directed by your healthcare provider    Repeated vomiting    Bleeding from the nose or gums or easy bruising  Date Last Reviewed: 9/1/2016 2000-2018 [employer]. [address] [place] [address]. All rights reserved. This information is not intended as a substitute for professional medical care. Always follow your healthcare professional's instructions.

## 2019-01-26 NOTE — NURSING NOTE
"Chief Complaint   Patient presents with     Urgent Care     UTI     Pt c/o of cloudy urine, some lower abdominal pain.  This all just started t his morning.  She thinks her urine appeared to be bloody this morning.  Pt self caths 4 x day.  She brought in a specimen from noon.       Headache     she states that she slipped in the shower the other day and wanted to report that.       Initial /68   Pulse 57   Temp 98.3  F (36.8  C) (Oral)   Resp 12   Wt 68 kg (150 lb)   SpO2 98%   BMI 24.96 kg/m   Estimated body mass index is 24.96 kg/m  as calculated from the following:    Height as of 10/31/18: 1.651 m (5' 5\").    Weight as of this encounter: 68 kg (150 lb)..  BP completed using cuff size: margaret Tesfaye R.N.    "

## 2019-01-26 NOTE — PROGRESS NOTES
SUBJECTIVE:   Maia Mrianda is a 86 year old female who  presents today for a possible UTI. Symptoms of cloudy, once with hematuria have been going on for 1day(s).  Hematuria yes once.  sudden onset and still presentand moderate.  There is no history of fever, chills, nausea or vomiting.  No history of vaginal or penile discharge. This patient does have a history of urinary tract infections. Patient denies long duration, rigors, flank pain, temperature > 101 degrees F. and Vomiting, significant nausea or diarrhea or vaginal discharge, vaginal odor, vaginal itching and dyspareunia (pain in labia/pelvis)     Past Medical History:   Diagnosis Date     Amnestic MCI (mild cognitive impairment with memory loss) 2014     Chronic duodenal ulcer without mention of hemorrhage, perforation, or obstruction 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 Warner, [facility] yearly     Esophageal reflux 2001    Abstracted 06/10/02     Essential and other specified forms of tremor 2004    resting tremor     Generalized osteoarthrosis, unspecified site     Hands     Hiatal hernia     diagnosed late 1990s [place], [place]     History of pulmonary embolism 1971    no source found     LACTOSE INTOLERANCE      Lumbago     sees Kodi Guidry     Mitral valve regurgitation      Occlusion and stenosis of carotid artery without mention of cerebral infarction 2003    CAROTID US NORMAL, bruit in neck     Osteoporosis, unspecified 2003    T = -2.66     Paroxysmal atrial fibrillation (H) 11/15/2013     Unspecified essential hypertension      Unspecified tinnitus 2005    mild hearing loss, saw ENT     Current Outpatient Medications   Medication Sig Dispense Refill     ciprofloxacin (CIPRO) 250 MG tablet Take 1 tablet (250 mg) by mouth 2 times daily for 7 days 14 tablet 0     acetaminophen (TYLENOL) 500 MG tablet Take 500-1,000 mg by mouth every 6 hours as needed       ascorbic  acid (VITAMIN C) 1000 MG TABS Take 1 tablet (1,000 mg) by mouth 2 times daily 180 tablet 3     calcium citrate-vitamin D (CITRACAL) 315-250 MG-UNIT TABS per tablet Take 1 tablet by mouth daily       carboxymethylcellulose (CELLUVISC/REFRESH LIQUIGEL) 1 % ophthalmic solution Place 1 drop into both eyes every morning As needed       Catheters (GENTLECATH URINARY CATHETER) MISC 1 catheter 4 times daily 120 each 12     cefdinir (OMNICEF) 300 MG capsule Take 2 capsules (600 mg) by mouth daily 14 capsule 0     Cholecalciferol (VITAMIN D-3) 1000 UNITS CAPS Take 1 capsule by mouth daily       conjugated estrogens (PREMARIN) vaginal cream Place vaginally twice a week On Tuesday and Friday. Uses a pea sized amount       fluocinonide (LIDEX) 0.05 % cream Apply topically 2 times daily as needed 30 g 2     lisinopril (PRINIVIL/ZESTRIL) 20 MG tablet TAKE 1 TABLET (20 MG) BY MOUTH ONCE DAILY 90 tablet 0     meclizine (ANTIVERT) 25 MG tablet Take 1 tablet (25 mg) by mouth every 6 hours as needed for dizziness 30 tablet 0     methenamine hippurate (HIPREX) 1 g TABS tablet Take 1 tablet (1 g) by mouth daily 90 tablet 1     methenamine hippurate (HIPREX) 1 G TABS tablet Take 1 g by mouth 2 times daily       metoprolol succinate (TOPROL-XL) 100 MG 24 hr tablet Take 1 tablet (100 mg) by mouth daily 90 tablet 3     Omega-3 Fatty Acids (OMEGA-3 FISH OIL PO) Take 1 g by mouth 2 times daily (with meals)       order for DME Trilok ankle brace 1 Device 0     polyethylene glycol (MIRALAX/GLYCOLAX) packet Take 1 packet by mouth daily as needed        sodium chloride (PETER 128) 5 % ophthalmic ointment Place 1 Application into both eyes At Bedtime       warfarin (COUMADIN) 2.5 MG tablet Take 2.5mg (1 tablet) daily or as instructed by INR Clinic. 92 tablet 3     Social History     Tobacco Use     Smoking status: Never Smoker     Smokeless tobacco: Never Used   Substance Use Topics     Alcohol use: No       ROS:   Review of systems negative except  as stated above.    OBJECTIVE:  /68   Pulse 57   Temp 98.3  F (36.8  C) (Oral)   Resp 12   Wt 68 kg (150 lb)   SpO2 98%   BMI 24.96 kg/m    GENERAL APPEARANCE: healthy, alert and no distress  RESP: lungs clear to auscultation - no rales, rhonchi or wheezes  CV: regular rates and rhythm, normal S1 S2, no murmur noted  ABDOMEN:  soft, nontender, no HSM or masses and bowel sounds normal  BACK: No CVA tenderness  SKIN: no suspicious lesions or rashes    Results for orders placed or performed in visit on 01/26/19   UA reflex to Microscopic and Culture   Result Value Ref Range    Color Urine Yellow     Appearance Urine Slightly Cloudy     Glucose Urine Negative NEG^Negative mg/dL    Bilirubin Urine Negative NEG^Negative    Ketones Urine Trace (A) NEG^Negative mg/dL    Specific Gravity Urine >1.030 1.003 - 1.035    Blood Urine Large (A) NEG^Negative    pH Urine 5.5 5.0 - 7.0 pH    Protein Albumin Urine >=300 (A) NEG^Negative mg/dL    Urobilinogen Urine 0.2 0.2 - 1.0 EU/dL    Nitrite Urine Negative NEG^Negative    Leukocyte Esterase Urine Large (A) NEG^Negative    Source Midstream Urine    Urine Microscopic   Result Value Ref Range    WBC Urine >100 (A) OTO5^0 - 5 /HPF    RBC Urine 10-25 (A) OTO2^O - 2 /HPF    Squamous Epithelial /LPF Urine Few FEW^Few /LPF    Bacteria Urine Moderate (A) NEG^Negative /HPF   Urine Culture Aerobic Bacterial   Result Value Ref Range    Specimen Description Midstream Urine     Culture Micro       Referred to [facility], await final report.         ASSESSMENT:   (N39.0,  R31.9) Urinary tract infection with hematuria, site unspecified  (primary encounter diagnosis)  Plan: ciprofloxacin (CIPRO) 250 MG tablet      (R30.0) Dysuria  Plan: UA reflex to Microscopic and Culture, Urine         Culture Aerobic Bacterial, Urine Microscopic      Red flags and emergent follow up discussed, and understood by patient  Follow up with PCP if symptoms worsen or fail  "to improve      Patient Instructions     Patient Education     Bladder Infection, Female (Adult)    Urine is normally doesn't have any bacteria in it. But bacteria can get into the urinary tract from the skin around the rectum. Or they can travel in the blood from elsewhere in the body. Once they are in your urinary tract, they can cause infection in the urethra (urethritis), the bladder (cystitis), or the kidneys (pyelonephritis).  The most common place for an infection is in the bladder. This is called a bladder infection. This is one of the most common infections in women. Most bladder infections are easily treated. They are not serious unless the infection spreads to the kidney.  The phrases \"bladder infection,\" \"UTI,\" and \"cystitis\" are often used to describe the same thing. But they are not always the same. Cystitis is an inflammation of the bladder. The most common cause of cystitis is an infection.  Symptoms  The infection causes inflammation in the urethra and bladder. This causes many of the symptoms. The most common symptoms of a bladder infection are:    Pain or burning when urinating    Having to urinate more often than usual    Urgent need to urinate    Only a small amount of urine comes out    Blood in urine    Abdominal discomfort. This is usually in the lower abdomen above the pubic bone.    Cloudy urine    Strong- or bad-smelling urine    Unable to urinate (urinary retention)    Unable to hold urine in (urinary incontinence)    Fever    Loss of appetite    Confusion (in older adults)  Causes  Bladder infections are not contagious. You can't get one from someone else, from a toilet seat, or from sharing a bath.  The most common cause of bladder infections is bacteria from the bowels. The bacteria get onto the skin around the opening of the urethra. From there, they can get into the urine and travel up to the bladder, causing inflammation and infection. This usually happens because of:    Wiping " improperly after urinating. Always wipe from front to back.    Bowel incontinence    Pregnancy    Procedures such as having a catheter inserted    Older age    Not emptying your bladder. This can allow bacteria a chance to grow in your urine.    Dehydration    Constipation    Sex    Use of a diaphragm for birth control   Treatment  Bladder infections are diagnosed by a urine test. They are treated with antibiotics and usually clear up quickly without complications. Treatment helps prevent a more serious kidney infection.  Medicines  Medicines can help in the treatment of a bladder infection:    Take antibiotics until they are used up, even if you feel better. It is important to finish them to make sure the infection has cleared.    You can use acetaminophen or ibuprofen for pain, fever, or discomfort, unless another medicine was prescribed. If you have chronic liver or kidney disease, talk with your healthcare provider before using these medicines. Also talk with your provider if you've ever had a stomach ulcer or gastrointestinal bleeding, or are taking blood-thinner medicines.    If you are given phenazopydridine to reduce burning with urination, it will cause your urine to become a bright orange color. This can stain clothing.  Care and prevention  These self-care steps can help prevent future infections:    Drink plenty of fluids to prevent dehydration and flush out your bladder. Do this unless you must restrict fluids for other health reasons, or your doctor told you not to.    Proper cleaning after going to the bathroom is important. Wipe from front to back after using the toilet to prevent the spread of bacteria.    Urinate more often. Don't try to hold urine in for a long time.    Wear loose-fitting clothes and cotton underwear. Avoid tight-fitting pants.    Improve your diet and prevent constipation. Eat more fresh fruit and vegetables, and fiber, and less junk and fatty foods.    Avoid sex until your  symptoms are gone.    Avoid caffeine, alcohol, and spicy foods. These can irritate your bladder.    Urinate right after intercourse to flush out your bladder.    If you use birth control pills and have frequent bladder infections, discuss it with your doctor.  Follow-up care  Call your healthcare provider if all symptoms are not gone after 3 days of treatment. This is especially important if you have repeat infections.  If a culture was done, you will be told if your treatment needs to be changed. If directed, you can call to find out the results.  If X-rays were done, you will be told if the results will affect your treatment.  Call 911  Call 911 if any of the following occur:    Trouble breathing    Hard to wake up or confusion    Fainting or loss of consciousness    Rapid heart rate  When to seek medical advice  Call your healthcare provider right away if any of these occur:    Fever of 100.4 F (38.0 C) or higher, or as directed by your healthcare provider    Symptoms are not better by the third day of treatment    Back or belly (abdominal) pain that gets worse    Repeated vomiting, or unable to keep medicine down    Weakness or dizziness    Vaginal discharge    Pain, redness, or swelling in the outer vaginal area (labia)  Date Last Reviewed: 10/1/2016    3527-0290 [employer]. [address] [place] [address], [place] [address]. All rights reserved. This information is not intended as a substitute for professional medical care. Always follow your healthcare professional's instructions.           Patient Education     Blood in the Urine    Blood in the urine (hematuria) has many possible causes. If it occurs after an injury (such as a car accident or fall), it is most often a sign of bruising to the kidney or bladder. Common causes of blood in the urine include urinary tract infections, kidney stones, inflammation, tumors, or certain other diseases of the kidney or bladder. Menstruation can cause blood to  appear in the urine sample, although it is not coming from the urinary tract.  If only a trace amount of blood is present, it will show up on the urine test, even though the urine may be yellow and not pink or red. This may occur with any of the above conditions, as well as heavy exercise or high fever. In this case, your doctor may want to repeat the urine test on another day. This will show if the blood is still present. If it is, then other tests can be done to find out the cause.  Home care  Follow these home care guidelines:    If your urine does not appear bloody (pink, brown or red) then you do not need to restrict your activity in any way.    If you can see blood in your urine, rest and avoid heavy exertion until your next exam. Do not use aspirin, blood thinners, or anti-platelet or anti-inflammatory medicines. These include ibuprofen and naproxen. These thin the blood and may increase bleeding.  Follow-up care  Follow up with your healthcare provider, or as advised. If you were injured and had blood in your urine, you should have a repeat urine test in 1 to 2 days. Contact your doctor for this test.  A radiologist will review any X-rays that were taken. You will be told of any new findings that may affect your care.  When to seek medical advice  Call your healthcare provider right away if any of these occur:    Bright red blood or blood clots in the urine (if you did not have this before)    Weakness, dizziness or fainting    New groin, abdominal, or back pain    Fever of 100.4 F (38 C) or higher, or as directed by your healthcare provider    Repeated vomiting    Bleeding from the nose or gums or easy bruising  Date Last Reviewed: 9/1/2016 2000-2018 [employer]. [address] [place] [address], [place] [address]. All rights reserved. This information is not intended as a substitute for professional medical care. Always follow your healthcare professional's instructions.

## 2019-01-28 LAB
BACTERIA SPEC CULT: ABNORMAL
SPECIMEN SOURCE: ABNORMAL

## 2019-02-13 ENCOUNTER — ANTICOAGULATION THERAPY VISIT (OUTPATIENT)
Dept: NURSING | Facility: CLINIC | Age: 84
End: 2019-02-13
Payer: COMMERCIAL

## 2019-02-13 DIAGNOSIS — I48.91 ATRIAL FIBRILLATION, UNSPECIFIED TYPE (H): ICD-10-CM

## 2019-02-13 DIAGNOSIS — Z79.01 LONG TERM CURRENT USE OF ANTICOAGULANT THERAPY: ICD-10-CM

## 2019-02-13 LAB — INR POINT OF CARE: 3.5 (ref 0.86–1.14)

## 2019-02-13 PROCEDURE — 36416 COLLJ CAPILLARY BLOOD SPEC: CPT

## 2019-02-13 PROCEDURE — 85610 PROTHROMBIN TIME: CPT | Mod: QW

## 2019-02-13 PROCEDURE — 99207 ZZC NO CHARGE NURSE ONLY: CPT

## 2019-02-13 NOTE — PROGRESS NOTES
ANTICOAGULATION FOLLOW-UP CLINIC VISIT    Patient Name:  Maia Miranda  Date:  2/13/2019  Contact Type:  Face to Face  Unable to find the cause for the abnormal INR result. Denies change in diet, new med/supplement, illness, inflammation, bleeding/bruising or any alcohol intake.     Decreased one time dose today, continue rest of the maintenance dosing & recheck in 1 week. Pt agrees.     SUBJECTIVE:     Patient Findings     Positives:   Unexplained INR or factor level change    Comments:   Denies bleeding/bruising or missed dose.             OBJECTIVE    INR Protime   Date Value Ref Range Status   02/13/2019 3.5 (A) 0.86 - 1.14 Final       ASSESSMENT / PLAN  INR assessment SUPRA    Recheck INR In: 1 WEEK    INR Location Clinic      Anticoagulation Summary  As of 2/13/2019    INR goal:   2.0-3.0   TTR:   77.5 % (3 y)   INR used for dosing:   3.5! 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

## 2019-02-27 ENCOUNTER — ANTICOAGULATION THERAPY VISIT (OUTPATIENT)
Dept: NURSING | Facility: CLINIC | Age: 84
End: 2019-02-27
Payer: COMMERCIAL

## 2019-02-27 ENCOUNTER — ALLIED HEALTH/NURSE VISIT (OUTPATIENT)
Dept: PEDIATRICS | Facility: CLINIC | Age: 84
End: 2019-02-27

## 2019-02-27 VITALS — DIASTOLIC BLOOD PRESSURE: 64 MMHG | SYSTOLIC BLOOD PRESSURE: 146 MMHG

## 2019-02-27 DIAGNOSIS — I48.91 ATRIAL FIBRILLATION (H): ICD-10-CM

## 2019-02-27 DIAGNOSIS — Z79.01 LONG TERM CURRENT USE OF ANTICOAGULANT THERAPY: ICD-10-CM

## 2019-02-27 DIAGNOSIS — Z01.30 BP CHECK: Primary | ICD-10-CM

## 2019-02-27 LAB — INR POINT OF CARE: 3.2 (ref 0.86–1.14)

## 2019-02-27 PROCEDURE — 85610 PROTHROMBIN TIME: CPT | Mod: QW

## 2019-02-27 PROCEDURE — 36416 COLLJ CAPILLARY BLOOD SPEC: CPT

## 2019-02-27 PROCEDURE — 99207 ZZC NO CHARGE NURSE ONLY: CPT

## 2019-02-27 PROCEDURE — 99207 ZZC NO CHARGE NURSE ONLY: CPT | Performed by: INTERNAL MEDICINE

## 2019-02-27 NOTE — PROGRESS NOTES
Maia Miranda is enrolled/participating in the retail pharmacy Blood Pressure Goals Achievement Program (BPGAP).  Maia Miranda was evaluated at [facility] on February 27, 2019 at which time her blood pressure was:    BP Readings from Last 3 Encounters:   02/27/19 146/64   01/26/19 164/68   12/24/18 142/90     Reviewed lifestyle modifications for blood pressure control and reduction: including making healthy food choices, managing weight, getting regular exercise, smoking cessation, reducing alcohol consumption, monitoring blood pressure regularly.     Maia Miranda is not experiencing symptoms.    Follow-Up: BP is at goal of < 150/90 mmHg (patient 60+ years of age without diabetes).  Recommended follow-up at pharmacy in 6 months.     Recommendation to Provider: none    Maia Miranda was evaluated for enrollment into the PGEN study today.    PLEASE INITIATE ENROLLMENT DISCUSSION WITH HTN PTS  1) Between 30-80 years old                                                                                                               2) BMI between 19-50                                                                                                        3) BP ?140/90 AND ?170/110 patients aged 30-59         BP ?150/90 AND ?170/110 non-diabetic patients aged 60-80       BP ?140/90 AND ?170/110 diabetic patients aged 60-80  4) Additional requirements for uncontrolled HTN patients:        Pt on only 1 class of medication  5) EXCLUDE patient if confirmation of:                  ? Cardiac disease                  ? Chronic Kidney Disease                  ? Pregnancy/Breastfeeding                  ? Secondary Hypertension/Pre-eclampsia                                 ? Vascular disease    Patient eligible for enrollment:  No  Patient interested in enrollment:  No    This note completed by: Daniele Velásquez    Thank You   Daniele Coles RPh [facility]

## 2019-02-27 NOTE — PROGRESS NOTES
ANTICOAGULATION FOLLOW-UP CLINIC VISIT    Patient Name:  Maia Miranda  Date:  2/27/2019  Contact Type:  Face to Face    SUBJECTIVE:     Patient Findings     Positives:   Unexplained INR or factor level change    Comments:   Patient will add in an extra serving of green veggies over the next 2-3 days rather than adjust her warfarin. Will recheck INR in 2 weeks. If she remains high, will look at adjusting her maintenance dose. No new bruising or bleeding noted with high INR.   Dian ALLEN RN  Anticoagulation Clinic  [place]             OBJECTIVE    INR Protime   Date Value Ref Range Status   02/27/2019 3.2 (A) 0.86 - 1.14 Final       ASSESSMENT / PLAN  INR assessment SUPRA    Recheck INR In: 2 WEEKS    INR Location Clinic      Anticoagulation Summary  As of 2/27/2019    INR goal:   2.0-3.0   TTR:   76.6 % (3.1 y)   INR used for dosing:   3.2! 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

## 2019-03-08 ENCOUNTER — OFFICE VISIT (OUTPATIENT)
Dept: URGENT CARE | Facility: URGENT CARE | Age: 84
End: 2019-03-08
Payer: COMMERCIAL

## 2019-03-08 VITALS
HEART RATE: 55 BPM | TEMPERATURE: 99.5 F | BODY MASS INDEX: 24.96 KG/M2 | WEIGHT: 150 LBS | OXYGEN SATURATION: 100 % | SYSTOLIC BLOOD PRESSURE: 142 MMHG | DIASTOLIC BLOOD PRESSURE: 72 MMHG

## 2019-03-08 DIAGNOSIS — N30.00 ACUTE CYSTITIS WITHOUT HEMATURIA: Primary | ICD-10-CM

## 2019-03-08 DIAGNOSIS — R30.0 DYSURIA: ICD-10-CM

## 2019-03-08 DIAGNOSIS — R82.90 NONSPECIFIC FINDING ON EXAMINATION OF URINE: ICD-10-CM

## 2019-03-08 LAB
ALBUMIN UR-MCNC: >=300 MG/DL
APPEARANCE UR: ABNORMAL
BACTERIA #/AREA URNS HPF: ABNORMAL /HPF
BILIRUB UR QL STRIP: NEGATIVE
COLOR UR AUTO: YELLOW
GLUCOSE UR STRIP-MCNC: NEGATIVE MG/DL
HGB UR QL STRIP: ABNORMAL
KETONES UR STRIP-MCNC: NEGATIVE MG/DL
LEUKOCYTE ESTERASE UR QL STRIP: ABNORMAL
NITRATE UR QL: NEGATIVE
NON-SQ EPI CELLS #/AREA URNS LPF: ABNORMAL /LPF
PH UR STRIP: 5.5 PH (ref 5–7)
RBC #/AREA URNS AUTO: ABNORMAL /HPF
SOURCE: ABNORMAL
SP GR UR STRIP: 1.02 (ref 1–1.03)
URNS CMNT MICRO: ABNORMAL
UROBILINOGEN UR STRIP-ACNC: 0.2 EU/DL (ref 0.2–1)
WBC #/AREA URNS AUTO: >100 /HPF

## 2019-03-08 PROCEDURE — 99213 OFFICE O/P EST LOW 20 MIN: CPT | Performed by: FAMILY MEDICINE

## 2019-03-08 PROCEDURE — 87186 SC STD MICRODIL/AGAR DIL: CPT | Performed by: FAMILY MEDICINE

## 2019-03-08 PROCEDURE — 87088 URINE BACTERIA CULTURE: CPT | Performed by: FAMILY MEDICINE

## 2019-03-08 PROCEDURE — 87086 URINE CULTURE/COLONY COUNT: CPT | Performed by: FAMILY MEDICINE

## 2019-03-08 PROCEDURE — 81001 URINALYSIS AUTO W/SCOPE: CPT | Performed by: FAMILY MEDICINE

## 2019-03-08 RX ORDER — CEFDINIR 300 MG/1
300 CAPSULE ORAL 2 TIMES DAILY
Qty: 10 CAPSULE | Refills: 0 | Status: SHIPPED | OUTPATIENT
Start: 2019-03-08 | End: 2019-03-23

## 2019-03-08 NOTE — PROGRESS NOTES
"Subjective:   Maia Miranda is a 86 year old female who presents for   Chief Complaint   Patient presents with     Urgent Care     UTI     frequency, urgency, cloudy urine - started yesterday afternoon     Does have to self-cath herself. Had increased frequency last night.   Last UTI was within the last few months. She denies confusion.   Denies fevers. She is drinking about 6-8 glasses of water a day. She self caths herself 4 times a day using a straight disposable (Green)  cath.       Patient Active Problem List    Diagnosis Date Noted     Hyperlipidemia LDL goal <130 11/30/2003     Priority: High     Rectal bleeding 03/23/2018     Priority: Medium     Fibroid uterus 05/15/2015     Priority: Medium     Mild cognitive impairment 01/07/2015     Priority: Medium     Patient diagnosed with amnesic MCI two years ago. Symptoms include forgetfulness, difficulty finding the right word, and difficulty expressing the word once she knows it.        Post-menopausal bleeding 11/12/2014     Priority: Medium     History of recurrent UTIs 11/12/2014     Priority: Medium     Hypertension goal BP (blood pressure) < 150/90 07/11/2014     Priority: Medium     Atrial fibrillation (H) 11/15/2013     Priority: Medium     Long term current use of anticoagulant therapy 01/10/2013     Priority: Medium     Advanced directives, counseling/discussion 01/04/2013     Priority: Medium     Received outside advance directive.  Previously signed by patient and witnessed with two signatures (cannot be the HCA).    scanned into EMR as Advance Directive/Living Will document, along with validation form.  View document and details in Code Status History Report.   Please see advance directive for specifics.   I called patient to verify that she understands the ramifications of choosing No CPR if her heart and breathing stops.  Patient verbalizes that she wants to be allowed to die naturally.   She has seen many of her friends \"hooked up to " "machines\", and they they're \"vegetables\" after this, and she would not want to live like this.  Advised patient that we recommend completion of a POLST form also as she has chosen to be DNR/DNI.  Patient will ask her provider about this at her next office appointment.  No further questions at this time.  GAIL Carlisle RN       Health Care Home 10/23/2012     Priority: Medium     DX V65.8 REPLACED WITH [facility] (04/08/2013)         Urinary retention 10/02/2012     Priority: Medium     Chronic constipation 05/11/2012     Priority: Medium     CKD (chronic kidney disease) stage 3, GFR 30-59 ml/min (H) 08/31/2011     Priority: Medium     Diverticulosis 06/20/2011     Priority: Medium     cscope 6/11       Internal bleeding hemorrhoids 01/08/2009     Priority: Medium     Osteoarthritis of thumb 01/08/2009     Priority: Medium     Degeneration of lumbar or lumbosacral intervertebral disc 12/13/2006     Priority: Medium     Primary osteoarthritis involving multiple joints 11/22/2005     Priority: Medium     Hands       Essential and other specified forms of tremor 11/22/2005     Priority: Medium     resting tremor       Esophageal reflux 11/22/2005     Priority: Medium     Hiatal Hernia also present       Varicose veins of lower extremities with complications 11/22/2005     Priority: Medium     Problem list name updated by automated process. Provider to review       Allergic rhinitis 11/22/2005     Priority: Medium     [place]  Problem list name updated by automated process. Provider to review       Osteopenia 11/30/2003     Priority: Medium     Treated with Alendronate from [id number]         Current Outpatient Medications   Medication     ascorbic acid (VITAMIN C) 1000 MG TABS     calcium citrate-vitamin D (CITRACAL) 315-250 MG-UNIT TABS per tablet     carboxymethylcellulose (CELLUVISC/REFRESH LIQUIGEL) 1 % ophthalmic solution     Catheters (GENTLECATH URINARY CATHETER) MISC     Cholecalciferol (VITAMIN " D-3) 1000 UNITS CAPS     conjugated estrogens (PREMARIN) vaginal cream     fluocinonide (LIDEX) 0.05 % cream     lisinopril (PRINIVIL/ZESTRIL) 20 MG tablet     meclizine (ANTIVERT) 25 MG tablet     methenamine hippurate (HIPREX) 1 g TABS tablet     methenamine hippurate (HIPREX) 1 G TABS tablet     metoprolol succinate (TOPROL-XL) 100 MG 24 hr tablet     Omega-3 Fatty Acids (OMEGA-3 FISH OIL PO)     order for DME     polyethylene glycol (MIRALAX/GLYCOLAX) packet     sodium chloride (PETER 128) 5 % ophthalmic ointment     warfarin (COUMADIN) 2.5 MG tablet     acetaminophen (TYLENOL) 500 MG tablet     cefdinir (OMNICEF) 300 MG capsule     No current facility-administered medications for this visit.      Facility-Administered Medications Ordered in Other Visits   Medication     albuterol (PROAIR HFA, PROVENTIL HFA, VENTOLIN HFA) inhaler 2 puff     aminophylline injection  mg     sodium chloride (PF) 0.9% PF flush 10 mL     sodium chloride (PF) 0.9% PF flush 5-10 mL     sodium chloride bacteriostatic 0.9 % flush 0-1 mL       ROS:  As above per HPI    Objective:   /72   Pulse 55   Temp 99.5  F (37.5  C) (Tympanic)   Wt 68 kg (150 lb)   SpO2 100%   BMI 24.96 kg/m  , Body mass index is 24.96 kg/m .  Gen:  NAD, well-nourished, sitting in chair comfortably  HEENT: EOMI, sclera anicteric, Head normocephalic, ; nares patent; moist mucous membranes  Neck: trachea midline, no thyromegaly  CV:  Hemodynamically stable  Pulm:  no increased work of breathing   Extrem: no cyanosis, edema or clubbing  Skin: no obvious rashes or abnormalities  Psych: Euthymic, linear thoughts, normal rate of speech    Results for orders placed or performed in visit on 03/08/19   UA reflex to Microscopic and Culture   Result Value Ref Range    Color Urine Yellow     Appearance Urine Slightly Cloudy     Glucose Urine Negative NEG^Negative mg/dL    Bilirubin Urine Negative NEG^Negative    Ketones Urine Negative NEG^Negative mg/dL     Specific Gravity Urine 1.025 1.003 - 1.035    Blood Urine Large (A) NEG^Negative    pH Urine 5.5 5.0 - 7.0 pH    Protein Albumin Urine >=300 (A) NEG^Negative mg/dL    Urobilinogen Urine 0.2 0.2 - 1.0 EU/dL    Nitrite Urine Negative NEG^Negative    Leukocyte Esterase Urine Large (A) NEG^Negative    Source Midstream Urine    Urine Microscopic   Result Value Ref Range    WBC Urine >100 (A) OTO5^0 - 5 /HPF    RBC Urine 5-10 (A) OTO2^O - 2 /HPF    Squamous Epithelial /LPF Urine Few FEW^Few /LPF    Bacteria Urine Moderate (A) NEG^Negative /HPF    Comment Urine       Urine was tested unconcentrated because <10 ml was received.       Assessment & Plan:   Maia Miranda, 86 year old female who presents with:  Acute cystitis without hematuria  Previous urine cultures were reviewed no resistant organisms on recent testing we will go ahead and do cefdinir ear twice a day for 5 days as she has tolerated this medication in the past. No signs of pyelonephritis. Will f/u on results of urine culture    - UA reflex to Microscopic and Culture  - Urine Culture Aerobic Bacterial  - Urine Microscopic  Updates please call patient on [phone]    Gary Carter MD   [facility]    The use of [email or web address] dictation services may have been used to construct the content in this note; any grammatical or spelling errors are non-intentional. Please contact the author of this note directly if you are in need of any clarification.

## 2019-03-08 NOTE — PATIENT INSTRUCTIONS
Take medication Cefdinir twice a day for 5 days    Symptoms should improve within the next 2-3 days. If no improvement, call or return for recommendation    Drink at least 4-6 glasses of water a day    If you develop flank pain, fevers, nausea/vomiting call immediately for assistance

## 2019-03-10 LAB
BACTERIA SPEC CULT: ABNORMAL
SPECIMEN SOURCE: ABNORMAL

## 2019-03-12 ENCOUNTER — OFFICE VISIT (OUTPATIENT)
Dept: PEDIATRICS | Facility: CLINIC | Age: 84
End: 2019-03-12
Payer: COMMERCIAL

## 2019-03-12 VITALS
DIASTOLIC BLOOD PRESSURE: 80 MMHG | TEMPERATURE: 97.8 F | BODY MASS INDEX: 25.13 KG/M2 | OXYGEN SATURATION: 96 % | SYSTOLIC BLOOD PRESSURE: 130 MMHG | WEIGHT: 151 LBS | HEART RATE: 52 BPM

## 2019-03-12 DIAGNOSIS — J31.0 CHRONIC RHINITIS: Primary | ICD-10-CM

## 2019-03-12 PROCEDURE — 99213 OFFICE O/P EST LOW 20 MIN: CPT | Performed by: PHYSICIAN ASSISTANT

## 2019-03-12 RX ORDER — MOMETASONE FUROATE MONOHYDRATE 50 UG/1
2 SPRAY, METERED NASAL DAILY
Qty: 17 G | Refills: 3 | Status: SHIPPED | OUTPATIENT
Start: 2019-03-12 | End: 2019-05-17

## 2019-03-12 ASSESSMENT — ENCOUNTER SYMPTOMS
COUGH: 1
FOCAL WEAKNESS: 0
FEVER: 0
HEADACHES: 0
CHILLS: 0
SHORTNESS OF BREATH: 0
ABDOMINAL PAIN: 0
VOMITING: 0
NAUSEA: 0
DIARRHEA: 0

## 2019-03-12 NOTE — PROGRESS NOTES
HPI  SUBJECTIVE:   Maia Miranda is a 86 year old female who presents to clinic today for the following health issues:      Acute Illness   Acute illness concerns: runny nose   Onset: ongoing 4-5 months    Fever: no    Chills/Sweats: no    Headache (location?): no    Sinus Pressure:no    Conjunctivitis:  no    Ear Pain: pressure     Rhinorrhea: YES- clear drainage     Congestion: no    Sore Throat: no     Cough: no    Wheeze: no    Decreased Appetite: no    Nausea: no    Vomiting: no    Diarrhea:  no    Dysuria/Freq.: no    Fatigue/Achiness: yes    Sick/Strep Exposure: no     Therapies Tried and outcome: none     Also some postnasal drip which occasionally makes her cough.  Hx allergic rhinitis  Has tried Flonase & Astelin without improvement    Chart Review:  PHQ-9 SCORE 12/31/2007 1/8/2009 10/5/2009   PHQ-9 Total Score 0 2 4     No flowsheet data found.    Patient Active Problem List   Diagnosis     Hyperlipidemia LDL goal <130     Osteopenia     Primary osteoarthritis involving multiple joints     Essential and other specified forms of tremor     Esophageal reflux     Varicose veins of lower extremities with complications     Allergic rhinitis     Degeneration of lumbar or lumbosacral intervertebral disc     Internal bleeding hemorrhoids     Osteoarthritis of thumb     Diverticulosis     CKD (chronic kidney disease) stage 3, GFR 30-59 ml/min (H)     Chronic constipation     Urinary retention     Health Care Home     Advanced directives, counseling/discussion     Long term current use of anticoagulant therapy     Atrial fibrillation (H)     Hypertension goal BP (blood pressure) < 150/90     Post-menopausal bleeding     History of recurrent UTIs     Mild cognitive impairment     Fibroid uterus     Rectal bleeding     Past Surgical History:   Procedure Laterality Date     C NONSPECIFIC PROCEDURE      D&C x 2 in 1957 & 1962 -- Abstracted 06/10/02     C NONSPECIFIC PROCEDURE      Left breast biopsy x 2 in 1988 &   -- Abstracted 06/10/02     C NONSPECIFIC PROCEDURE      Influenza resulting in hospitalization -- Abstracted 06/10/02     C NONSPECIFIC PROCEDURE  1971    10 day hospitalization from bilateral pulmonary enboli -- Abstracted 06/10/02     C NONSPECIFIC PROCEDURE      colonoscopy  negative     CATARACT IOL, RT/LT       corneal scraping       CYSTOSCOPY       CYSTOSCOPY, BIOPSY BLADDER, COMBINED N/A 2016    Procedure: COMBINED CYSTOSCOPY, BIOPSY BLADDER;  Surgeon: Bernadine Maria MD;  Location: [facility]     ESOPHAGOSCOPY, GASTROSCOPY, DUODENOSCOPY (EGD), COMBINED  2013    Procedure: COMBINED ESOPHAGOSCOPY, GASTROSCOPY, DUODENOSCOPY (EGD);   ESOPHAGOSCOPY, GASTROSCOPY, DUODENOSCOPY (EGD)   ;  Surgeon: Shawn Soto MD;  Location:  GI     IMPLANT STIMULATOR SACRAL NERVE STAGE ONE N/A 2017    Procedure: IMPLANT STIMULATOR SACRAL NERVE STAGE ONE;  Surgeon: Kb Tracy MD;  Location: [facility]     IMPLANT STIMULATOR SACRAL NERVE STAGE TWO N/A 2017    Procedure: IMPLANT STIMULATOR SACRAL NERVE STAGE TWO;  Stage Two Interstim  ;  Surgeon: Kb Tracy MD;  Location: [facility] OR     interstim placement       Family History   Problem Relation Age of Onset     Cerebrovascular Disease Father          at 92     Psychotic Disorder Mother         Suicide 62, depression     Alzheimer Disease Maternal Grandmother      Cardiovascular Sister         pacemaker     Glaucoma No family hx of       Social History     Tobacco Use     Smoking status: Never Smoker     Smokeless tobacco: Never Used   Substance Use Topics     Alcohol use: No        Problem list, Medication list, Allergies, Medical/Social/Surg hx reviewed in Psychiatric, updated as appropriate.      Review of Systems   Constitutional: Negative for chills and fever.   HENT:        Rhinorrhea, postnasal drip   Respiratory: Positive for cough (occasional). Negative for shortness of breath.    Cardiovascular: Negative for chest pain.    Gastrointestinal: Negative for abdominal pain, diarrhea, nausea and vomiting.   Skin: Negative for rash.   Neurological: Negative for focal weakness and headaches.   All other systems reviewed and are negative.        Physical Exam   Constitutional: She is oriented to person, place, and time.   HENT:   Head: Normocephalic and atraumatic.   Right Ear: Tympanic membrane and external ear normal.   Left Ear: Tympanic membrane and external ear normal.   Nose: No rhinorrhea.   Mouth/Throat: Uvula is midline, oropharynx is clear and moist and mucous membranes are normal.   Postnasal drainage   Cardiovascular: Normal rate, regular rhythm and normal heart sounds.   Pulmonary/Chest: Effort normal and breath sounds normal.   Musculoskeletal: Normal range of motion.   Neurological: She is alert and oriented to person, place, and time.   Skin: Skin is warm and dry.   Nursing note and vitals reviewed.    Vital Signs  /80   Pulse 52   Temp 97.8  F (36.6  C) (Oral)   Wt 68.5 kg (151 lb)   SpO2 96%   BMI 25.13 kg/m     Body mass index is 25.13 kg/m .    Diagnostic Test Results:  none     ASSESSMENT/PLAN:                                                        ICD-10-CM    1. Chronic rhinitis J31.0 mometasone (NASONEX) 50 MCG/ACT nasal spray   Suspect allergic rhinitis- will try Nasonex.     I have discussed any lab or imaging results, the patient's diagnosis, and my plan of treatment with the patient and/or family. Patient is aware to come back in if with worsening symptoms or if no relief despite treatment plan.  Patient voiced understanding and had no further questions.       Follow Up: Data Unavailable    JOCELIN Rangel, PAAldoC  [facility]

## 2019-03-12 NOTE — PROGRESS NOTES
"  SUBJECTIVE:   Maia Miranda is a 86 year old female who presents to clinic today for the following health issues:      Acute Illness   Acute illness concerns: runny nose   Onset: on going 1-2 months     Fever: no    Chills/Sweats: no    Headache (location?): YES- occipital     Sinus Pressure:no    Conjunctivitis:  no    Ear Pain: pressure     Rhinorrhea: YES- clear drainage     Congestion: no    Sore Throat: no     Cough: no    Wheeze: no    Decreased Appetite: no    Nausea: no    Vomiting: no    Diarrhea:  no    Dysuria/Freq.: yes, tx:cefdinir     Fatigue/Achiness: yes    Sick/Strep Exposure: no     Therapies Tried and outcome: none     Has tried Flonase & Astelin without improvement      {additional problems for provider to add:831495}    Problem list and histories reviewed & adjusted, as indicated.  Additional history: {NONE - AS DOCUMENTED:752158::\"as documented\"}    {HIST REVIEW/ LINKS 2:944128}    Reviewed and updated as needed this visit by clinical staff       Reviewed and updated as needed this visit by Provider         {PROVIDER CHARTING PREFERENCE:879645}  "

## 2019-03-15 ENCOUNTER — ANTICOAGULATION THERAPY VISIT (OUTPATIENT)
Dept: NURSING | Facility: CLINIC | Age: 84
End: 2019-03-15
Payer: COMMERCIAL

## 2019-03-15 DIAGNOSIS — Z79.01 LONG TERM CURRENT USE OF ANTICOAGULANT THERAPY: ICD-10-CM

## 2019-03-15 DIAGNOSIS — I48.91 ATRIAL FIBRILLATION (H): ICD-10-CM

## 2019-03-15 LAB — INR POINT OF CARE: 2.5 (ref 0.86–1.14)

## 2019-03-15 PROCEDURE — 99207 ZZC NO CHARGE NURSE ONLY: CPT

## 2019-03-15 PROCEDURE — 36416 COLLJ CAPILLARY BLOOD SPEC: CPT

## 2019-03-15 PROCEDURE — 85610 PROTHROMBIN TIME: CPT | Mod: QW

## 2019-03-15 NOTE — PROGRESS NOTES
ANTICOAGULATION FOLLOW-UP CLINIC VISIT    Patient Name:  Maia Miranda  Date:  3/15/2019  Contact Type:  Face to Face    SUBJECTIVE:     Patient Findings     Comments:   Patient has increased her amount of green veggies (once a day) now, but she had a UTI and was given cefdinir which would normally increase the INR. Will continue with same dosing as she was therapeutic today, but have her recheck in 2 weeks to make sure she does not go too low with this diet change and once the UTI/abx are out of her system. Otherwise, patient is stable with no new bruising or bleeding noted.   Patient stated understanding and is in agreement with plan.  Dian ALLEN RN  Anticoagulation Clinic  [place]             OBJECTIVE    INR Protime   Date Value Ref Range Status   03/15/2019 2.5 (A) 0.86 - 1.14 Final       ASSESSMENT / PLAN  INR assessment THER    Recheck INR In: 2 WEEKS    INR Location Clinic      Anticoagulation Summary  As of 3/15/2019    INR goal:   2.0-3.0   TTR:   76.5 % (3.1 y)   INR used for dosin.5 (3/15/2019)   Warfarin maintenance plan:   2.5 mg (2.5 mg x 1) every day   Full warfarin instructions:   2.5 mg every day   Weekly warfarin total:   17.5 mg   No change documented:   Dian Astudillo RN   Plan last modified:   Mmee Riggs RN (2017)   Next INR check:   3/29/2019   Target end date:   Indefinite    Indications    Long term current use of anticoagulant therapy [Z79.01]  Atrial fibrillation (H) [I48.91]             Anticoagulation Episode Summary     INR check location:   [facility]    Preferred lab:       Send INR reminders to:    ANTICOAG CLINIC    Comments:   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

## 2019-03-23 ENCOUNTER — OFFICE VISIT (OUTPATIENT)
Dept: URGENT CARE | Facility: URGENT CARE | Age: 84
End: 2019-03-23
Payer: COMMERCIAL

## 2019-03-23 VITALS
TEMPERATURE: 98.2 F | WEIGHT: 152.7 LBS | SYSTOLIC BLOOD PRESSURE: 178 MMHG | OXYGEN SATURATION: 98 % | HEART RATE: 72 BPM | DIASTOLIC BLOOD PRESSURE: 74 MMHG | BODY MASS INDEX: 25.41 KG/M2

## 2019-03-23 DIAGNOSIS — I10 HYPERTENSION GOAL BP (BLOOD PRESSURE) < 150/90: ICD-10-CM

## 2019-03-23 DIAGNOSIS — N39.0 URINARY TRACT INFECTION WITHOUT HEMATURIA, SITE UNSPECIFIED: Primary | ICD-10-CM

## 2019-03-23 DIAGNOSIS — R30.0 DYSURIA: ICD-10-CM

## 2019-03-23 DIAGNOSIS — Z79.01 LONG TERM CURRENT USE OF ANTICOAGULANT THERAPY: ICD-10-CM

## 2019-03-23 LAB
ALBUMIN UR-MCNC: NEGATIVE MG/DL
APPEARANCE UR: ABNORMAL
BACTERIA #/AREA URNS HPF: ABNORMAL /HPF
BILIRUB UR QL STRIP: NEGATIVE
COLOR UR AUTO: YELLOW
GLUCOSE UR STRIP-MCNC: NEGATIVE MG/DL
HGB UR QL STRIP: ABNORMAL
KETONES UR STRIP-MCNC: NEGATIVE MG/DL
LEUKOCYTE ESTERASE UR QL STRIP: ABNORMAL
NITRATE UR QL: NEGATIVE
NON-SQ EPI CELLS #/AREA URNS LPF: ABNORMAL /LPF
PH UR STRIP: 5 PH (ref 5–7)
RBC #/AREA URNS AUTO: ABNORMAL /HPF
SOURCE: ABNORMAL
SP GR UR STRIP: <=1.005 (ref 1–1.03)
UROBILINOGEN UR STRIP-ACNC: 0.2 EU/DL (ref 0.2–1)
WBC #/AREA URNS AUTO: ABNORMAL /HPF

## 2019-03-23 PROCEDURE — 99214 OFFICE O/P EST MOD 30 MIN: CPT | Performed by: PHYSICIAN ASSISTANT

## 2019-03-23 PROCEDURE — 87088 URINE BACTERIA CULTURE: CPT | Performed by: PHYSICIAN ASSISTANT

## 2019-03-23 PROCEDURE — 87086 URINE CULTURE/COLONY COUNT: CPT | Performed by: PHYSICIAN ASSISTANT

## 2019-03-23 PROCEDURE — 81001 URINALYSIS AUTO W/SCOPE: CPT | Performed by: PHYSICIAN ASSISTANT

## 2019-03-23 PROCEDURE — 87186 SC STD MICRODIL/AGAR DIL: CPT | Performed by: PHYSICIAN ASSISTANT

## 2019-03-23 RX ORDER — CEFDINIR 300 MG/1
300 CAPSULE ORAL 2 TIMES DAILY
Qty: 14 CAPSULE | Refills: 0 | Status: SHIPPED | OUTPATIENT
Start: 2019-03-23 | End: 2019-05-17

## 2019-03-24 NOTE — PATIENT INSTRUCTIONS
Follow-up if new fevers, back pain, nausea or vomiting.     Prescribed 7-day course of Cefdinir; call INR nurse to notify antibiotic through 3/30 and when you should get next INR level (last 2.5 on 3/15/19).     Maia to follow up with Primary Care provider regarding elevated blood pressure.

## 2019-03-24 NOTE — PROGRESS NOTES
SUBJECTIVE:  Maia Miranda is a 86 year old female who  presents today for a possible UTI with her daughter. Symptoms of chills and cloudy urine have been going on since this morning. Denies any visible hematuria. Symptoms are still present, mild intensity. Patient does endorse chills, but no known fevers, nausea or vomiting. No history of vaginal discharge. This patient does have a history of urinary tract infections (most recent 3/8/19) likely related to urinary retention and self-catheterization; on prophy Hiprex. Patient denies long duration, rigors, flank pain, temperature > 101 degrees F., vomiting, significant nausea or diarrhea and no GFR less than 30 within the last year. Also denies vaginal discharge, vaginal odor and vaginal itching.     Patient does have a bladder stimulator that they recently replaced.     Patient also has hypertension and states she took her blood pressure medications (Metoprolol & Lisinopril) this morning.     She is also on Warfarin for her a-fib.   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 Warner, [facility] yearly     Esophageal reflux 2001    Abstracted 06/10/02     Essential and other specified forms of tremor 2004    resting tremor     Generalized osteoarthrosis, unspecified site     Hands     Hiatal hernia     diagnosed late 1990s [place], [place]     History of pulmonary embolism 1971    no source found     LACTOSE INTOLERANCE      Lumbago     sees Kodi Guidry     Mitral valve regurgitation      Occlusion and stenosis of carotid artery without mention of cerebral infarction 2003    CAROTID US NORMAL, bruit in neck     Osteoporosis, unspecified 2003    T = -2.66     Paroxysmal atrial fibrillation (H)  11/15/2013     Unspecified essential hypertension      Unspecified tinnitus 2005    mild hearing loss, saw ENT     Current Outpatient Medications   Medication Sig Dispense Refill     acetaminophen (TYLENOL) 500 MG tablet Take 500-1,000 mg by mouth every 6 hours as needed       ascorbic acid (VITAMIN C) 1000 MG TABS Take 1 tablet (1,000 mg) by mouth 2 times daily 180 tablet 3     calcium citrate-vitamin D (CITRACAL) 315-250 MG-UNIT TABS per tablet Take 1 tablet by mouth daily       carboxymethylcellulose (CELLUVISC/REFRESH LIQUIGEL) 1 % ophthalmic solution Place 1 drop into both eyes every morning As needed       Catheters ([employer] URINARY CATHETER) MISC 1 catheter 4 times daily 120 each 12     cefdinir (OMNICEF) 300 MG capsule Take 2 capsules (600 mg) by mouth daily 14 capsule 0     Cholecalciferol (VITAMIN D-3) 1000 UNITS CAPS Take 1 capsule by mouth daily       conjugated estrogens (PREMARIN) vaginal cream Place vaginally twice a week On Tuesday and Friday. Uses a pea sized amount       fluocinonide (LIDEX) 0.05 % cream Apply topically 2 times daily as needed 30 g 2     lisinopril (PRINIVIL/ZESTRIL) 20 MG tablet TAKE 1 TABLET (20 MG) BY MOUTH ONCE DAILY 90 tablet 0     meclizine (ANTIVERT) 25 MG tablet Take 1 tablet (25 mg) by mouth every 6 hours as needed for dizziness 30 tablet 0     methenamine hippurate (HIPREX) 1 g TABS tablet Take 1 tablet (1 g) by mouth daily 90 tablet 1     methenamine hippurate (HIPREX) 1 G TABS tablet Take 1 g by mouth 2 times daily       metoprolol succinate (TOPROL-XL) 100 MG 24 hr tablet Take 1 tablet (100 mg) by mouth daily 90 tablet 3     mometasone (NASONEX) 50 MCG/ACT nasal spray Spray 2 sprays into both nostrils daily 17 g 3     Omega-3 Fatty Acids (OMEGA-3 FISH OIL PO) Take 1 g by mouth 2 times daily (with meals)       order for [facility]il[employer] ankle brace 1 Device 0     polyethylene glycol (MIRALAX/GLYCOLAX) packet Take 1 packet by mouth daily as needed        sodium chloride  (PETER 128) 5 % ophthalmic ointment Place 1 Application into both eyes At Bedtime       warfarin (COUMADIN) 2.5 MG tablet Take 2.5mg (1 tablet) daily or as instructed by INR Clinic. 92 tablet 3     Social History     Tobacco Use     Smoking status: Never Smoker     Smokeless tobacco: Never Used   Substance Use Topics     Alcohol use: No       ROS:   Review of systems negative except as stated above.    OBJECTIVE:  /74   Pulse 72   Temp 98.2  F (36.8  C) (Oral)   Wt 69.3 kg (152 lb 11.2 oz)   SpO2 98%   BMI 25.41 kg/m    GENERAL APPEARANCE: alert and mild distress  RESP: breathing regular & unlabored  CV: regular rate & rhythm currently  ABDOMEN: soft, non-distended  BACK: No CVA tenderness  SKIN: no suspicious lesions or rashes  NEURO: Normal speech and mentation, normal gait, strength grossly intact compared to recent baseline.   PSYCH: mentation appears normal, slightly anxious anxious    Results for orders placed or performed in visit on 03/23/19   UA reflex to Microscopic and Culture   Result Value Ref Range    Color Urine Yellow     Appearance Urine Cloudy     Glucose Urine Negative NEG^Negative mg/dL    Bilirubin Urine Negative NEG^Negative    Ketones Urine Negative NEG^Negative mg/dL    Specific Gravity Urine <=1.005 1.003 - 1.035    Blood Urine Small (A) NEG^Negative    pH Urine 5.0 5.0 - 7.0 pH    Protein Albumin Urine Negative NEG^Negative mg/dL    Urobilinogen Urine 0.2 0.2 - 1.0 EU/dL    Nitrite Urine Negative NEG^Negative    Leukocyte Esterase Urine Small (A) NEG^Negative    Source Catheter    Urine Microscopic   Result Value Ref Range    WBC Urine  (A) OTO5^0 - 5 /HPF    RBC Urine 2-5 (A) OTO2^O - 2 /HPF    Squamous Epithelial /LPF Urine Few FEW^Few /LPF    Bacteria Urine Moderate (A) NEG^Negative /HPF     ASSESSMENT:   Lower, uncomplicated urinary tract infection ( WBCs, few squamous cells)    PLAN:  Will do 7-day course of Cefdinir vs. 5-day course earlier this month as last  culture showed UTI was sensitive to Cefdinir. GFR 10/20/18 was 46 ml/min/1.7 m2; no dose adjustment of Cefdinir required unless GFR <30. Encouraged to drink plenty of fluids and practice good aseptic technique when self-catheterizing. Signs and symptoms of pyelonephritis warranting immediate follow-up mentioned and provided in AVS. Culture ordered on urine from today; will notify patient if not sensitive to Cefdinir.     Educated patient that antibiotic can increase INR; requested she call her INR nurse to ask when she should get INR re-checked considering antibiotic course (last level 2.5 on 3/15/19).    BP elevated in clinic today (178/74); patient states she is compliant with blood pressure medications and that anxiety is likely contributing to elevated BP today. Advised to follow-up with PCP.     The above evaluation was completed by Laura Jackson PA-C and written by MADELAINE Mancia student.      I have reviewed the ROS and PSFH documented by the student.  I performed the pertinent history, exam and assessment and plan components as documented above.

## 2019-03-25 ENCOUNTER — TELEPHONE (OUTPATIENT)
Dept: PEDIATRICS | Facility: CLINIC | Age: 84
End: 2019-03-25

## 2019-03-25 DIAGNOSIS — I48.91 ATRIAL FIBRILLATION, UNSPECIFIED TYPE (H): ICD-10-CM

## 2019-03-25 DIAGNOSIS — R00.2 PALPITATIONS: ICD-10-CM

## 2019-03-25 LAB
BACTERIA SPEC CULT: ABNORMAL
SPECIMEN SOURCE: ABNORMAL

## 2019-03-25 NOTE — TELEPHONE ENCOUNTER
Patient called earlier today to report that she started on Omnicef 3/23/19 for 7 days.  Per micromedex: Concurrent use of CEFDINIR and WARFARIN may result in an increased risk of bleeding.    Be observant for and report signs of bleeding: nose bleed lasting 10 minutes or more, bleeding from gums, vomiting or coughing up blood, large or unexplained bruises, blood in stool, blood in urine, black stools, severe headache.    She has an INR appointment already scheduled for Friday 3/29/19.    Claire Gaitan RN  [facility]

## 2019-03-26 RX ORDER — METOPROLOL SUCCINATE 100 MG/1
TABLET, EXTENDED RELEASE ORAL
Qty: 90 TABLET | Refills: 0 | Status: SHIPPED | OUTPATIENT
Start: 2019-03-26 | End: 2019-05-17

## 2019-03-26 NOTE — TELEPHONE ENCOUNTER
Routing refill request to provider for review/approval because:  Labs out of range:  BP Elevated at  visit for UTI,   Dian REYNOLDS RN - Triage  [facility]

## 2019-03-26 NOTE — TELEPHONE ENCOUNTER
"Requested Prescriptions   Pending Prescriptions Disp Refills     metoprolol succinate ER (TOPROL-XL) 100 MG 24 hr tablet [Pharmacy Med Name: METOPROLOL ER 100MG TAB]  Last Written Prescription Date:  02/06/2018  Last Fill Quantity: 90 tablet,  # refills: 3   Last office visit: 3/12/2019 with prescribing provider:  Nat Corea PA-C    Future Office Visit:     90 tablet 3     Sig: TAKE 1 TABLET BY MOUTH ONCE DAILY    Beta-Blockers Protocol Failed - 3/25/2019 11:20 AM       Failed - Blood pressure under 140/90 in past 12 months    BP Readings from Last 3 Encounters:   03/23/19 178/74   03/12/19 130/80   03/08/19 142/72                Passed - Patient is age 6 or older       Passed - Recent (12 mo) or future (30 days) visit within the authorizing provider's specialty    Patient had office visit in the last 12 months or has a visit in the next 30 days with authorizing provider or within the authorizing provider's specialty.  See \"Patient Info\" tab in inbasket, or \"Choose Columns\" in Meds & Orders section of the refill encounter.             Passed - Medication is active on med list          "

## 2019-03-27 NOTE — TELEPHONE ENCOUNTER
rx sent. Please call patient ask her to stop in the pharmacy to recheck her BP when she comes in for her next INR check.    Thanks,  Dian Frias MD  Internal Medicine/Pediatrics

## 2019-03-28 NOTE — TELEPHONE ENCOUNTER
Called patient and she is coming in on Friday for her INR and she will stop by the pharmacy for a bp check.

## 2019-03-29 ENCOUNTER — ANTICOAGULATION THERAPY VISIT (OUTPATIENT)
Dept: NURSING | Facility: CLINIC | Age: 84
End: 2019-03-29
Payer: COMMERCIAL

## 2019-03-29 ENCOUNTER — ALLIED HEALTH/NURSE VISIT (OUTPATIENT)
Dept: PEDIATRICS | Facility: CLINIC | Age: 84
End: 2019-03-29

## 2019-03-29 VITALS — DIASTOLIC BLOOD PRESSURE: 60 MMHG | SYSTOLIC BLOOD PRESSURE: 142 MMHG

## 2019-03-29 DIAGNOSIS — I48.91 ATRIAL FIBRILLATION (H): ICD-10-CM

## 2019-03-29 DIAGNOSIS — Z79.01 LONG TERM CURRENT USE OF ANTICOAGULANT THERAPY: ICD-10-CM

## 2019-03-29 DIAGNOSIS — I10 HYPERTENSION GOAL BP (BLOOD PRESSURE) < 150/90: Primary | ICD-10-CM

## 2019-03-29 LAB — INR POINT OF CARE: 2.7 (ref 0.86–1.14)

## 2019-03-29 PROCEDURE — 36416 COLLJ CAPILLARY BLOOD SPEC: CPT

## 2019-03-29 PROCEDURE — 99207 ZZC NO CHARGE NURSE ONLY: CPT

## 2019-03-29 PROCEDURE — 99207 ZZC NO CHARGE NURSE ONLY: CPT | Performed by: INTERNAL MEDICINE

## 2019-03-29 PROCEDURE — 85610 PROTHROMBIN TIME: CPT | Mod: QW

## 2019-03-29 NOTE — PROGRESS NOTES
ANTICOAGULATION FOLLOW-UP CLINIC VISIT    Patient Name:  Maia Miranda  Date:  3/29/2019  Contact Type:  Face to Face    SUBJECTIVE:     Patient Findings     Positives:   Change in medications    Comments:   Taking Cefdinir since 3/23/2019 tomorrow is last dose.               OBJECTIVE    INR Protime   Date Value Ref Range Status   2019 2.7 (A) 0.86 - 1.14 Final       ASSESSMENT / PLAN  INR assessment THER    Recheck INR In: 3 WEEKS    INR Location Clinic      Anticoagulation Summary  As of 3/29/2019    INR goal:   2.0-3.0   TTR:   76.8 % (3.1 y)   INR used for dosin.7 (3/29/2019)   Warfarin maintenance plan:   2.5 mg (2.5 mg x 1) every day   Full warfarin instructions:   2.5 mg every day   Weekly warfarin total:   17.5 mg   No change documented:   Dian Zavala RN   Plan last modified:   Meme Riggs RN (2017)   Next INR check:   2019   Target end date:   Indefinite    Indications    Long term current use of anticoagulant therapy [Z79.01]  Atrial fibrillation (H) [I48.91]             Anticoagulation Episode Summary     INR check location:   [facility]    Preferred lab:       Send INR reminders to:    [facility]AG CLINIC    Comments:   2.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  Patient will continue to take 17.5 mg weekly dosing and follow up in 3 weeks or sooner for any problems or concerns.        Dian Mancera RN

## 2019-03-29 NOTE — PROGRESS NOTES
Maia Miranda is enrolled/participating in the retail pharmacy Blood Pressure Goals Achievement Program (BPGAP).  Maia Miranda was evaluated at [facility] on March 29, 2019 at which time her blood pressure was:    BP Readings from Last 3 Encounters:   03/29/19 142/60   03/23/19 178/74   03/12/19 130/80     Reviewed lifestyle modifications for blood pressure control and reduction: including making healthy food choices, managing weight, getting regular exercise, smoking cessation, reducing alcohol consumption, monitoring blood pressure regularly.     Maia Miranda is not experiencing symptoms.    Follow-Up: BP is at goal of < 150/90mmHg    Recommendation to Provider: continue to monitor     This note completed by: Steph Lim, PharmD  [facility]

## 2019-04-06 ENCOUNTER — OFFICE VISIT (OUTPATIENT)
Dept: URGENT CARE | Facility: URGENT CARE | Age: 84
End: 2019-04-06
Payer: COMMERCIAL

## 2019-04-06 VITALS
TEMPERATURE: 98.1 F | DIASTOLIC BLOOD PRESSURE: 72 MMHG | RESPIRATION RATE: 12 BRPM | OXYGEN SATURATION: 98 % | HEART RATE: 56 BPM | SYSTOLIC BLOOD PRESSURE: 128 MMHG | WEIGHT: 152 LBS | BODY MASS INDEX: 25.29 KG/M2

## 2019-04-06 DIAGNOSIS — R30.0 DYSURIA: ICD-10-CM

## 2019-04-06 DIAGNOSIS — T83.511D URINARY TRACT INFECTION ASSOCIATED WITH CATHETERIZATION OF URINARY TRACT, UNSPECIFIED INDWELLING URINARY CATHETER TYPE, SUBSEQUENT ENCOUNTER: Primary | ICD-10-CM

## 2019-04-06 DIAGNOSIS — Z87.440 HISTORY OF RECURRENT UTIS: ICD-10-CM

## 2019-04-06 DIAGNOSIS — R33.9 URINARY RETENTION: ICD-10-CM

## 2019-04-06 DIAGNOSIS — N18.30 CKD (CHRONIC KIDNEY DISEASE) STAGE 3, GFR 30-59 ML/MIN (H): ICD-10-CM

## 2019-04-06 DIAGNOSIS — N39.0 URINARY TRACT INFECTION ASSOCIATED WITH CATHETERIZATION OF URINARY TRACT, UNSPECIFIED INDWELLING URINARY CATHETER TYPE, SUBSEQUENT ENCOUNTER: Primary | ICD-10-CM

## 2019-04-06 DIAGNOSIS — I10 HYPERTENSION GOAL BP (BLOOD PRESSURE) < 150/90: ICD-10-CM

## 2019-04-06 DIAGNOSIS — I48.91 ATRIAL FIBRILLATION, UNSPECIFIED TYPE (H): ICD-10-CM

## 2019-04-06 LAB
ALBUMIN UR-MCNC: NEGATIVE MG/DL
APPEARANCE UR: ABNORMAL
BACTERIA #/AREA URNS HPF: ABNORMAL /HPF
BILIRUB UR QL STRIP: NEGATIVE
COLOR UR AUTO: YELLOW
GLUCOSE UR STRIP-MCNC: NEGATIVE MG/DL
HGB UR QL STRIP: ABNORMAL
KETONES UR STRIP-MCNC: NEGATIVE MG/DL
LEUKOCYTE ESTERASE UR QL STRIP: ABNORMAL
NITRATE UR QL: POSITIVE
PH UR STRIP: 5.5 PH (ref 5–7)
RBC #/AREA URNS AUTO: ABNORMAL /HPF
SOURCE: ABNORMAL
SP GR UR STRIP: 1.01 (ref 1–1.03)
UROBILINOGEN UR STRIP-ACNC: 0.2 EU/DL (ref 0.2–1)
WBC #/AREA URNS AUTO: >100 /HPF

## 2019-04-06 PROCEDURE — 87086 URINE CULTURE/COLONY COUNT: CPT | Performed by: PHYSICIAN ASSISTANT

## 2019-04-06 PROCEDURE — 87088 URINE BACTERIA CULTURE: CPT | Performed by: PHYSICIAN ASSISTANT

## 2019-04-06 PROCEDURE — 81001 URINALYSIS AUTO W/SCOPE: CPT | Performed by: PHYSICIAN ASSISTANT

## 2019-04-06 PROCEDURE — 99214 OFFICE O/P EST MOD 30 MIN: CPT | Performed by: PHYSICIAN ASSISTANT

## 2019-04-06 PROCEDURE — 87186 SC STD MICRODIL/AGAR DIL: CPT | Performed by: PHYSICIAN ASSISTANT

## 2019-04-06 RX ORDER — CEFDINIR 300 MG/1
300 CAPSULE ORAL 2 TIMES DAILY
Qty: 20 CAPSULE | Refills: 0 | Status: SHIPPED | OUTPATIENT
Start: 2019-04-06 | End: 2019-05-17

## 2019-04-06 NOTE — PROGRESS NOTES
SUBJECTIVE:  Maia Miranda is a 86 year old female who presents today for a possible UTI. Symptoms of dysuria, frequency & cloudy urine have been going on for 1 day. Denies hematuria. Symptoms are of sudden onset, worsening and mild-moderate severity. There is no history of fever, chills, nausea or vomiting.  No history of vaginal discharge. This patient does have a history of urinary tract infections as she frequently self-catheterizes for urinary retention; patient is on UTI prophylaxis (Hiprex). Patient denies long duration, rigors, flank pain, temperature > 101 degrees F., vomiting, significant nausea or diarrhea or GFR less than 30 within the last year. She also denies vaginal discharge, vaginal odor, vaginal itching and dyspareunia (pain in labia/pelvis)     Patient also has hypertension for which she takes Lisinopril, A-Fib for which she takes Metoprolol & Warfarin & stage III CKD.    Past Medical History:   Diagnosis Date     Amnestic MCI (mild cognitive impairment with memory loss) 2014     Chronic duodenal ulcer without mention of hemorrhage, perforation, or obstruction 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 Warner, [facility] yearly     Esophageal reflux 2001    Abstracted 06/10/02     Essential and other specified forms of tremor 2004    resting tremor     Generalized osteoarthrosis, unspecified site     Hands     Hiatal hernia     diagnosed late 1990s [place], [place]     History of pulmonary embolism 1971    no source found     LACTOSE INTOLERANCE      Lumbago     sees Kodi Guidry     Mitral valve regurgitation      Occlusion and stenosis of carotid artery without mention of cerebral infarction 2003    CAROTID US NORMAL, bruit in neck     Osteoporosis, unspecified 2003    T = -2.66     Paroxysmal atrial fibrillation (H) 11/15/2013     Unspecified essential hypertension      Unspecified tinnitus 2005    mild hearing loss, saw  ENT     Current Outpatient Medications   Medication Sig Dispense Refill     acetaminophen (TYLENOL) 500 MG tablet Take 500-1,000 mg by mouth every 6 hours as needed       ascorbic acid (VITAMIN C) 1000 MG TABS Take 1 tablet (1,000 mg) by mouth 2 times daily 180 tablet 3     calcium citrate-vitamin D (CITRACAL) 315-250 MG-UNIT TABS per tablet Take 1 tablet by mouth daily       carboxymethylcellulose (CELLUVISC/REFRESH LIQUIGEL) 1 % ophthalmic solution Place 1 drop into both eyes every morning As needed       Catheters (GENTLECATH URINARY CATHETER) MISC 1 catheter 4 times daily 120 each 12     Cholecalciferol (VITAMIN D-3) 1000 UNITS CAPS Take 1 capsule by mouth daily       conjugated estrogens (PREMARIN) vaginal cream Place vaginally twice a week On Tuesday and Friday. Uses a pea sized amount       fluocinonide (LIDEX) 0.05 % cream Apply topically 2 times daily as needed 30 g 2     lisinopril (PRINIVIL/ZESTRIL) 20 MG tablet TAKE 1 TABLET (20 MG) BY MOUTH ONCE DAILY 90 tablet 0     meclizine (ANTIVERT) 25 MG tablet Take 1 tablet (25 mg) by mouth every 6 hours as needed for dizziness 30 tablet 0     methenamine hippurate (HIPREX) 1 g TABS tablet Take 1 tablet (1 g) by mouth daily 90 tablet 1     methenamine hippurate (HIPREX) 1 G TABS tablet Take 1 g by mouth 2 times daily       metoprolol succinate ER (TOPROL-XL) 100 MG 24 hr tablet TAKE 1 TABLET BY MOUTH ONCE DAILY 90 tablet 0     mometasone (NASONEX) 50 MCG/ACT nasal spray Spray 2 sprays into both nostrils daily 17 g 3     Omega-3 Fatty Acids (OMEGA-3 FISH OIL PO) Take 1 g by mouth 2 times daily (with meals)       order for DME Trilok ankle brace 1 Device 0     polyethylene glycol (MIRALAX/GLYCOLAX) packet Take 1 packet by mouth daily as needed        sodium chloride (PETER 128) 5 % ophthalmic ointment Place 1 Application into both eyes At Bedtime       warfarin (COUMADIN) 2.5 MG tablet Take 2.5mg (1 tablet) daily or as instructed by INR Clinic. 92 tablet 3      Social History     Tobacco Use     Smoking status: Never Smoker     Smokeless tobacco: Never Used   Substance Use Topics     Alcohol use: No       ROS:   10-point review of systems negative except as stated above.    OBJECTIVE:  /72   Pulse 56   Temp 98.1  F (36.7  C) (Oral)   Resp 12   Wt 68.9 kg (152 lb)   SpO2 98%   BMI 25.29 kg/m    GENERAL APPEARANCE: healthy, alert and no distress  RESP: breathing regular & unlabored  CV: mildly bradycardic, regular rhythm currently  ABDOMEN: soft, non-distended  BACK: No CVA tenderness  SKIN: no suspicious lesions or rashes on exposed skin  NEURO: Normal strength and tone, sensory exam grossly normal,  normal speech and mentation  PSYCH: mentation appears normal and affect normal/bright    Results for orders placed or performed in visit on 04/06/19   UA reflex to Microscopic and Culture   Result Value Ref Range    Color Urine Yellow     Appearance Urine Cloudy     Glucose Urine Negative NEG^Negative mg/dL    Bilirubin Urine Negative NEG^Negative    Ketones Urine Negative NEG^Negative mg/dL    Specific Gravity Urine 1.010 1.003 - 1.035    Blood Urine Moderate (A) NEG^Negative    pH Urine 5.5 5.0 - 7.0 pH    Protein Albumin Urine Negative NEG^Negative mg/dL    Urobilinogen Urine 0.2 0.2 - 1.0 EU/dL    Nitrite Urine Positive (A) NEG^Negative    Leukocyte Esterase Urine Moderate (A) NEG^Negative    Source Midstream Urine    Urine Microscopic   Result Value Ref Range    WBC Urine >100 (A) OTO5^0 - 5 /HPF    RBC Urine 5-10 (A) OTO2^O - 2 /HPF    Bacteria Urine Many (A) NEG^Negative /HPF         ASSESSMENT:   Lower, uncomplicated urinary tract infection (positive nitrates, leukocytes, and >100 WBCs)    Hypertension - chronic, stable  A-fib - chronic, stable  CKD - chronic, stable    PLAN:  Antibiotic choices limited by age, CKD and drug interactions with Warfarin. Patient has tolerated Cefdinir well in the past and cultures from 3/23 show that the E. Coli has been  sensitive to the Cefdinir. Will do extended 10-day course of Cefdinir as only 2-weeks after last UTI. Provided patient education regarding preventing self-contamination during catheterizations. Advised patient to call her INR nurse on Monday to see when they want to re-check her INR while she's on Cefdinir (last level 2.7 on 3/29/19 - goal 2.0 - 3.0). Encouraged to drink plenty of fluids. Prevention and treatment of UTI's discussed. Signs and symptoms of pyelonephritis requiring immediate follow-up discussed. Follow up with primary care physician if not improving.     Follow-up with PCP for chronic hypertension (well-controlled during visit today) and CKD monitoring.     The above evaluation was completed by Laura Jackson PA-C and written by MADELAINE Mancia student.      I have reviewed the ROS and PSFH documented by the student.  I performed the pertinent history, exam and assessment and plan components as documented above.

## 2019-04-06 NOTE — NURSING NOTE
"Chief Complaint   Patient presents with     Urgent Care     UTI     Pt had a uti just over a week ago and just finished antibiotic around a week ago.  She is having cloudy urine again and chills since the middle of the night.  She brought in a clean urine specimen from 9:00 this morning.       Initial /72   Pulse 56   Temp 98.1  F (36.7  C) (Oral)   Resp 12   Wt 68.9 kg (152 lb)   SpO2 98%   BMI 25.29 kg/m   Estimated body mass index is 25.29 kg/m  as calculated from the following:    Height as of 10/31/18: 1.651 m (5' 5\").    Weight as of this encounter: 68.9 kg (152 lb)..  BP completed using cuff size: margaret Tesfaye R.N.    "

## 2019-04-06 NOTE — PATIENT INSTRUCTIONS
Patient Education     Take Omnicef twice a day for 10-days. Call INR nurse Monday to ask when to re-check INR (last 2.7 on 3/29/19; goal 2.0 - 3.0).     Watch for high-fevers, back pain on one-side, nausea or vomiting that would require sooner follow-up.     Catheter-Associated Urinary Tract Infections     A small balloon keeps the catheter in place inside the bladder.   A catheter-associated urinary tract infection (CAUTI) is an infection of the urinary system. CAUTI is caused by bacteria that enter the urinary tract when a urinary catheter is used. This is a tube that s placed into the bladder to drain urine.  The urinary system  This system includes the kidneys, ureters, bladder, and urethra. The kidneys filter blood and make urine. The ureters carry urine from the kidneys to the bladder. The bladder stores urine. The urethra carries urine from the bladder to the outside of the body.  What is a urinary catheter?  A urinary catheter is a thin, flexible tube. It is placed in the bladder to drain urine. Urine flows through the tube into a collecting bag outside of the body. There are different types of urinary catheters. The most common type is an indwelling catheter. This is also known as a urethral catheter. This is because it s placed into the bladder through the urethra. This catheter is also called a Guillen catheter.  Why is a urinary catheter needed?  A urinary catheter is needed for any of the following:    You can t get up to use the toilet because your mobility is limited. This may be due to surgery, an injury, or illness.    You have a blockage in your urinary system.    Your healthcare provider needs to measure the amount of urine you pass.    The function of your kidneys and bladder is being tested.    You re not able to control your bladder (incontinence).  In most cases, the urinary catheter is temporary. You'll need it only until the problem that requires it is resolved.   How does a CAUTI  develop?  Bacteria can enter the urinary tract as the catheter is put into the urethra. Bacteria can also get into the urinary tract while the catheter is in place. The common bacteria that cause a CAUTI are ones that live in the intestine. These bacteria don t normally cause problems in the intestine. But when they get into the urinary tract, a CAUTI can result.  Why is a CAUTI of concern?  Left untreated, a CAUTI can lead to health problems. These problems may include bladder infection, prostate infection, and kidney infection. A CAUTI can prolong your hospital stay. If the infection is not treated in time, serious health complications may occur.  What are the symptoms of a CAUTI?    A burning feeling, pressure, or pain in your lower abdomen    Fever or chills    Urine in the collecting bag is cloudy or bloody (pink or red)    Burning feeling in the urethra or genital area    Aching in the back (kidney area)    Nausea and vomiting    Person is confused, or is not alert, or has a change in behavior (mainly affects older patients)    Note that sometimes a person won t have any symptoms but may still have a CAUTI.  Tell a healthcare provider right away if you or your loved one has any of these symptoms.  How is CAUTI diagnosed?  If you have symptoms of CAUTI your healthcare provider will order tests. These include a urine test, blood tests, and other tests as needed.  How is CAUTI treated?   Treatment may involve any of the following:    Antibiotics. Your healthcare provider will likely prescribe antibiotics if you have symptoms. Be aware that if you don t have symptoms, you may not be given antibiotics. This is to prevent an increase in bacteria that resist (can t be killed by) certain antibiotics.    Removing the catheter. The catheter will be removed when your healthcare provider decides it s no longer needed. This usually helps stop the infection.    Changing the catheter. If you still need a catheter, the old  one will be removed. A new one will be put in. This may help stop the infection.  How do hospital and long-term facility staff prevent CAUTI?  To keep patients from getting a CAUTI, the staff follow certain procedures:    Prescribe a catheter only when it s needed. It is removed as soon as it s no longer needed.    Use sterile (clean) technique when placing the catheter into the urinary tract. This means before putting the catheter in, the caregiver washes his or her hands with soap and water. He or she then puts on sterile gloves. A sterile catheter kit that has cleansers is used to cleanse the patient s genital area.    Before performing catheter care, caregivers also wash their hands or use an alcohol-based hand cleanser.    Hang the bag lower than your bladder. This prevents urine from flowing back into your bladder.    Ensure that the bag is emptied regularly.  What you can do as a patient to prevent CAUTI  You can help prevent yourself from getting a CAUTI by doing the following:    Every day ask your healthcare provider how long you need to have the catheter. The longer you have a catheter, the higher your chance of getting a CAUTI.    If a caregiver doesn t clean his or her hands and put on gloves before touching your catheter, ask them to do so.    If you ve been taught how to care for your catheter, be sure to wash your hands before and after each session.    Make sure your bag is lower than your bladder. If it s not, tell your caregiver.    Don t disconnect the catheter and drain tube. Doing so allows germs to get into the catheter.    Cleansing of the genital and perineal areas is very important to help decrease bacteria in areas surrounding the catheter. Ask your doctor what you should use and how often to clean these areas.  If you are discharged with an indwelling catheter    Before you leave the hospital, make sure you understand the instructions on how to care for your catheter at home.    Ask your  healthcare provider how long you need the catheter. Also ask if you need to make a follow-up appointment to have the catheter removed.    Always use sterile (clean) technique when caring for your catheter. Wash your hands before and after doing any catheter care.    Call your healthcare provider right away if you develop symptoms of a CAUTI (see above).   Date Last Reviewed: 1/1/2017 2000-2018 [employer]. [address] [place] [address], [place] [address]. All rights reserved. This information is not intended as a substitute for professional medical care. Always follow your healthcare professional's instructions.

## 2019-04-08 LAB
BACTERIA SPEC CULT: ABNORMAL
SPECIMEN SOURCE: ABNORMAL

## 2019-04-15 DIAGNOSIS — N39.0 RECURRENT UTI: ICD-10-CM

## 2019-04-17 DIAGNOSIS — I10 HYPERTENSION GOAL BP (BLOOD PRESSURE) < 150/90: ICD-10-CM

## 2019-04-17 RX ORDER — METHENAMINE HIPPURATE 1000 MG/1
1 TABLET ORAL DAILY
Qty: 90 TABLET | Refills: 1 | Status: SHIPPED | OUTPATIENT
Start: 2019-04-17 | End: 2019-08-20

## 2019-04-17 RX ORDER — LISINOPRIL 20 MG/1
TABLET ORAL
Qty: 90 TABLET | Refills: 0 | Status: SHIPPED | OUTPATIENT
Start: 2019-04-17 | End: 2019-05-17

## 2019-04-17 NOTE — TELEPHONE ENCOUNTER
methenamine hippurate (HIPREX) 1 g TABS tablet  Last Written Prescription Date:  11/2/18  Last Fill Quantity: 90,   # refills: 1  Last Office Visit : 10/31/18  Future Office visit: 4/24/19    Routing refill request to provider for review/approval because: not on protocol

## 2019-04-17 NOTE — TELEPHONE ENCOUNTER
rx sent. Due for 6 month f/u appointment - ok to wait until annual wellness visit in July    Dian Frias MD  Internal Medicine/Pediatrics

## 2019-04-17 NOTE — TELEPHONE ENCOUNTER
"Lisinopril  LRF 1/16/19  LOV 10/15/18    Requested Prescriptions   Pending Prescriptions Disp Refills     lisinopril (PRINIVIL/ZESTRIL) 20 MG tablet [Pharmacy Med Name: LISINOPRIL 20MG     TAB] 90 tablet 0     Sig: TAKE 1 TABLET BY MOUTH ONCE DAILY       ACE Inhibitors (Including Combos) Protocol Failed - 4/17/2019  9:45 AM        Failed - Normal serum creatinine on file in past 12 months     Recent Labs   Lab Test 10/20/18  1114   CR 1.13*             Passed - Blood pressure under 140/90 in past 12 months     BP Readings from Last 3 Encounters:   04/06/19 128/72   03/29/19 142/60   03/23/19 178/74                 Passed - Recent (12 mo) or future (30 days) visit within the authorizing provider's specialty     Patient had office visit in the last 12 months or has a visit in the next 30 days with authorizing provider or within the authorizing provider's specialty.  See \"Patient Info\" tab in inbasket, or \"Choose Columns\" in Meds & Orders section of the refill encounter.              Passed - Medication is active on med list        Passed - Patient is age 18 or older        Passed - No active pregnancy on record        Passed - Normal serum potassium on file in past 12 months     Recent Labs   Lab Test 10/20/18  1114   POTASSIUM 4.5             Passed - No positive pregnancy test within past 12 months        Routing refill request to provider for review/approval because:  Labs out of range:  Creat off, bp elevated above parameters, due for an appt    Will forward to the station, please try and help her schedule an appt - bp f/u and f/u from 10/15/18 appt        "

## 2019-04-19 ENCOUNTER — ALLIED HEALTH/NURSE VISIT (OUTPATIENT)
Dept: PEDIATRICS | Facility: CLINIC | Age: 84
End: 2019-04-19
Payer: COMMERCIAL

## 2019-04-19 ENCOUNTER — ANTICOAGULATION THERAPY VISIT (OUTPATIENT)
Dept: NURSING | Facility: CLINIC | Age: 84
End: 2019-04-19
Payer: COMMERCIAL

## 2019-04-19 VITALS — DIASTOLIC BLOOD PRESSURE: 64 MMHG | SYSTOLIC BLOOD PRESSURE: 130 MMHG

## 2019-04-19 DIAGNOSIS — Z79.01 LONG TERM CURRENT USE OF ANTICOAGULANT THERAPY: ICD-10-CM

## 2019-04-19 DIAGNOSIS — I48.91 ATRIAL FIBRILLATION (H): ICD-10-CM

## 2019-04-19 DIAGNOSIS — I10 HYPERTENSION GOAL BP (BLOOD PRESSURE) < 150/90: Primary | ICD-10-CM

## 2019-04-19 LAB — INR POINT OF CARE: 2.5 (ref 0.86–1.14)

## 2019-04-19 PROCEDURE — 85610 PROTHROMBIN TIME: CPT | Mod: QW

## 2019-04-19 PROCEDURE — 36416 COLLJ CAPILLARY BLOOD SPEC: CPT

## 2019-04-19 PROCEDURE — 99207 ZZC NO CHARGE NURSE ONLY: CPT

## 2019-04-19 PROCEDURE — 99207 ZZC NO CHARGE NURSE ONLY: CPT | Performed by: INTERNAL MEDICINE

## 2019-04-19 NOTE — PROGRESS NOTES
ANTICOAGULATION FOLLOW-UP CLINIC VISIT    Patient Name:  Maia Miranda  Date:  2019  Contact Type:  Face to Face    SUBJECTIVE:     Patient Findings     Comments:   Patient finished with cefdinir  for UTI. Symptoms relieved. Therapeutic today. The patient was assessed for diet, medication, and activity level changes, missed or extra doses, bruising or bleeding, with no problem findings.  Dian ALLEN RN  Anticoagulation Clinic  [facility]             OBJECTIVE    INR Protime   Date Value Ref Range Status   2019 2.5 (A) 0.86 - 1.14 Final       ASSESSMENT / PLAN  INR assessment THER    Recheck INR In: 4 WEEKS    INR Location Clinic      Anticoagulation Summary  As of 2019    INR goal:   2.0-3.0   TTR:   77.2 % (3.2 y)   INR used for dosin.5 (2019)   Warfarin maintenance plan:   2.5 mg (2.5 mg x 1) every day   Full warfarin instructions:   2.5 mg every day   Weekly warfarin total:   17.5 mg   No change documented:   Dian Astudillo RN   Plan last modified:   Meme Riggs RN (2017)   Next INR check:   2019   Target end date:   Indefinite    Indications    Long term current use of anticoagulant therapy [Z79.01]  Atrial fibrillation (H) [I48.91]             Anticoagulation Episode Summary     INR check location:   Anticoagulation Clinic    Preferred lab:       Send INR reminders to:   [facility]    Comments:   2.5mg tablets // 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

## 2019-04-19 NOTE — PROGRESS NOTES
Maia Miranda was evaluated at [facility] on April 19, 2019 at which time her blood pressure was:    BP Readings from Last 3 Encounters:   04/19/19 130/64   04/06/19 128/72   03/29/19 142/60     Pulse Readings from Last 3 Encounters:   04/06/19 56   03/23/19 72   03/12/19 52       Reviewed lifestyle modifications for blood pressure control and reduction: including making healthy food choices, managing weight, getting regular exercise, smoking cessation, reducing alcohol consumption, monitoring blood pressure regularly.     Symptoms: None    BP Goal:< 140/90 mmHg    BP Assessment:  BP at goal    Potential Reasons for BP too high: NA - Not applicable    BP Follow-Up Plan: Recheck BP in 6 months at pharmacy    Recommendation to Provider: continue to monitor    Note completed by: Thuy Selby Pharmacist

## 2019-04-24 ENCOUNTER — OFFICE VISIT (OUTPATIENT)
Dept: UROLOGY | Facility: CLINIC | Age: 84
End: 2019-04-24
Payer: COMMERCIAL

## 2019-04-24 VITALS — BODY MASS INDEX: 25.29 KG/M2 | DIASTOLIC BLOOD PRESSURE: 64 MMHG | SYSTOLIC BLOOD PRESSURE: 122 MMHG | WEIGHT: 152 LBS

## 2019-04-24 DIAGNOSIS — R33.9 URINARY RETENTION: Primary | ICD-10-CM

## 2019-04-24 PROCEDURE — 99213 OFFICE O/P EST LOW 20 MIN: CPT | Performed by: UROLOGY

## 2019-04-24 ASSESSMENT — PAIN SCALES - GENERAL: PAINLEVEL: NO PAIN (0)
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
## 2019-04-24 NOTE — PATIENT INSTRUCTIONS
Continue to catheterize 4x a day    If you want to continue adjust your settings please contact the [employer] representative    Return to see us in 4 months, sooner if needed    It was a pleasure meeting with you today.  Thank you for allowing me and my team the privilege of caring for you today.  YOU are the reason we are here, and I truly hope we provided you with the excellent service you deserve.  Please let us know if there is anything else we can do for you so that we can be sure you are leaving completely satisfied with your care experience.

## 2019-04-24 NOTE — Clinical Note
Can we please get her set up with everything to do gentamicin irrigations please?  Just once a night before bedThanks

## 2019-04-26 ENCOUNTER — ALLIED HEALTH/NURSE VISIT (OUTPATIENT)
Dept: UROLOGY | Facility: CLINIC | Age: 84
End: 2019-04-26
Payer: COMMERCIAL

## 2019-04-26 DIAGNOSIS — R82.90 CLOUDY URINE: Primary | ICD-10-CM

## 2019-04-26 LAB
ALBUMIN UR-MCNC: 30 MG/DL
APPEARANCE UR: CLEAR
BILIRUB UR QL STRIP: NEGATIVE
COLOR UR AUTO: YELLOW
GLUCOSE UR STRIP-MCNC: NEGATIVE MG/DL
HGB UR QL STRIP: ABNORMAL
KETONES UR STRIP-MCNC: NEGATIVE MG/DL
LEUKOCYTE ESTERASE UR QL STRIP: ABNORMAL
NITRATE UR QL: POSITIVE
PH UR STRIP: 5.5 PH (ref 5–7)
SOURCE: ABNORMAL
SP GR UR STRIP: 1.01 (ref 1–1.03)
UROBILINOGEN UR STRIP-ACNC: 0.2 EU/DL (ref 0.2–1)

## 2019-04-26 PROCEDURE — 81003 URINALYSIS AUTO W/O SCOPE: CPT | Performed by: UROLOGY

## 2019-04-26 PROCEDURE — 99211 OFF/OP EST MAY X REQ PHY/QHP: CPT

## 2019-04-26 PROCEDURE — 87088 URINE BACTERIA CULTURE: CPT | Performed by: UROLOGY

## 2019-04-26 PROCEDURE — 87186 SC STD MICRODIL/AGAR DIL: CPT | Performed by: UROLOGY

## 2019-04-26 PROCEDURE — 87086 URINE CULTURE/COLONY COUNT: CPT | Performed by: UROLOGY

## 2019-04-26 NOTE — PROGRESS NOTES
Patient presents to clinic for cathed UAUC. She reports having cloudy urine and offers no other complaints at present. Patient's urine was sent for UA today which revealed:    UA RESULTS:  Recent Labs   Lab Test 04/26/19  0910 04/06/19  1041   COLOR Yellow Yellow   APPEARANCE Clear Cloudy   URINEGLC Negative Negative   URINEBILI Negative Negative   URINEKETONE Negative Negative   SG 1.010 1.010   UBLD Small* Moderate*   URINEPH 5.5 5.5   PROTEIN 30* Negative   UROBILINOGEN 0.2 0.2   NITRITE Positive* Positive*   LEUKEST Large* Moderate*   RBCU  --  5-10*   WBCU  --  >100*     Patient's urine was sent for culture. Will wait for culture to treat. Patient is aware she should call our office, if she does not hear back.     Maia Miranda comes into clinic today at the request of Dr. Bernadine Maria Ordering Provider for UAUC.     This service provided today was under the supervising provider of the day Dr. Gary Ortega, who was available if needed.    Marianela Johnson LPN

## 2019-04-27 DIAGNOSIS — N39.0 URINARY TRACT INFECTION WITHOUT HEMATURIA, SITE UNSPECIFIED: Primary | ICD-10-CM

## 2019-04-27 LAB
BACTERIA SPEC CULT: ABNORMAL
Lab: ABNORMAL
SPECIMEN SOURCE: ABNORMAL

## 2019-04-27 RX ORDER — NITROFURANTOIN 25; 75 MG/1; MG/1
100 CAPSULE ORAL 2 TIMES DAILY
Qty: 20 CAPSULE | Refills: 1 | Status: SHIPPED | OUTPATIENT
Start: 2019-04-27 | End: 2019-05-17

## 2019-04-28 DIAGNOSIS — Z79.01 LONG TERM CURRENT USE OF ANTICOAGULANT THERAPY: ICD-10-CM

## 2019-04-29 NOTE — TELEPHONE ENCOUNTER
"Requested Prescriptions   Pending Prescriptions Disp Refills     warfarin (COUMADIN) 2.5 MG tablet [Pharmacy Med Name: WARFARIN 2.5MG      TAB]  Last Written Prescription Date:  4/18/2018  Last Fill Quantity: 92 tablet,  # refills: 3   Last office visit: 3/12/2019 with prescribing provider:  Nat Corea PA-C   Future Office Visit:     92 tablet 3     Sig: TAKE 2.5MG (1 TABLET) DAILY OR AS INSTRUCTED BY INR CLINIC       Vitamin K Antagonists Failed - 4/28/2019  4:00 PM        Failed - INR is within goal in the past 6 weeks     Confirm INR is within goal in the past 6 weeks.     Recent Labs   Lab Test 04/19/19   INR 2.5*                       Passed - Recent (12 mo) or future (30 days) visit within the authorizing provider's specialty     Patient had office visit in the last 12 months or has a visit in the next 30 days with authorizing provider or within the authorizing provider's specialty.  See \"Patient Info\" tab in inbasket, or \"Choose Columns\" in Meds & Orders section of the refill encounter.              Passed - Medication is active on med list        Passed - Patient is 18 years of age or older        Passed - Patient is not pregnant        Passed - No positive pregnancy on file in past 12 months          "

## 2019-04-30 RX ORDER — WARFARIN SODIUM 2.5 MG/1
TABLET ORAL
Qty: 92 TABLET | Refills: 1 | Status: SHIPPED | OUTPATIENT
Start: 2019-04-30 | End: 2019-10-26

## 2019-04-30 NOTE — TELEPHONE ENCOUNTER
LOV with [facility] reviewed. Therapeutic INR. Sig on prescription current.   Prescription approved per [facility] Refill Protocol.    Dian ALLEN RN  Anticoagulation Clinic  [place]

## 2019-04-30 NOTE — PROGRESS NOTES
I spoke with this patient and told her the message from Dr. Maria.  She will finish the Macrobid.  Quentin PHIPPS  [facility] Urology  April 30, 2019 12:39 PM

## 2019-05-13 ENCOUNTER — TELEPHONE (OUTPATIENT)
Dept: UROLOGY | Facility: CLINIC | Age: 84
End: 2019-05-13

## 2019-05-13 ENCOUNTER — HOSPITAL ENCOUNTER (EMERGENCY)
Facility: CLINIC | Age: 84
Discharge: HOME | End: 2019-05-13
Attending: EMERGENCY MEDICINE | Admitting: EMERGENCY MEDICINE
Payer: COMMERCIAL

## 2019-05-13 ENCOUNTER — APPOINTMENT (OUTPATIENT)
Dept: CT IMAGING | Facility: CLINIC | Age: 84
End: 2019-05-13
Attending: EMERGENCY MEDICINE
Payer: COMMERCIAL

## 2019-05-13 VITALS
TEMPERATURE: 97.7 F | HEART RATE: 63 BPM | SYSTOLIC BLOOD PRESSURE: 135 MMHG | OXYGEN SATURATION: 95 % | RESPIRATION RATE: 18 BRPM | DIASTOLIC BLOOD PRESSURE: 78 MMHG

## 2019-05-13 DIAGNOSIS — T83.511A URINARY TRACT INFECTION ASSOCIATED WITH CATHETERIZATION OF URINARY TRACT, UNSPECIFIED INDWELLING URINARY CATHETER TYPE, INITIAL ENCOUNTER (H): ICD-10-CM

## 2019-05-13 DIAGNOSIS — N39.0 URINARY TRACT INFECTION ASSOCIATED WITH CATHETERIZATION OF URINARY TRACT, UNSPECIFIED INDWELLING URINARY CATHETER TYPE, INITIAL ENCOUNTER (H): ICD-10-CM

## 2019-05-13 LAB
ALBUMIN UR-MCNC: 30 MG/DL
ANION GAP SERPL CALCULATED.3IONS-SCNC: 3 MMOL/L (ref 3–14)
APPEARANCE UR: ABNORMAL
BACTERIA #/AREA URNS HPF: ABNORMAL /HPF
BASOPHILS # BLD AUTO: 0 10E9/L (ref 0–0.2)
BASOPHILS NFR BLD AUTO: 0.5 %
BILIRUB UR QL STRIP: NEGATIVE
BUN SERPL-MCNC: 27 MG/DL (ref 7–30)
CALCIUM SERPL-MCNC: 8.9 MG/DL (ref 8.5–10.1)
CHLORIDE SERPL-SCNC: 108 MMOL/L (ref 94–109)
CO2 SERPL-SCNC: 29 MMOL/L (ref 20–32)
COLOR UR AUTO: ABNORMAL
CREAT SERPL-MCNC: 1.25 MG/DL (ref 0.52–1.04)
DIFFERENTIAL METHOD BLD: ABNORMAL
EOSINOPHIL # BLD AUTO: 0 10E9/L (ref 0–0.7)
EOSINOPHIL NFR BLD AUTO: 0.6 %
ERYTHROCYTE [DISTWIDTH] IN BLOOD BY AUTOMATED COUNT: 13.2 % (ref 10–15)
GFR SERPL CREATININE-BSD FRML MDRD: 39 ML/MIN/{1.73_M2}
GLUCOSE SERPL-MCNC: 115 MG/DL (ref 70–99)
GLUCOSE UR STRIP-MCNC: NEGATIVE MG/DL
HCT VFR BLD AUTO: 36.6 % (ref 35–47)
HGB BLD-MCNC: 11.7 G/DL (ref 11.7–15.7)
HGB UR QL STRIP: ABNORMAL
IMM GRANULOCYTES # BLD: 0 10E9/L (ref 0–0.4)
IMM GRANULOCYTES NFR BLD: 0.2 %
INR PPP: 2.11 (ref 0.86–1.14)
KETONES UR STRIP-MCNC: NEGATIVE MG/DL
LACTATE BLD-SCNC: 0.6 MMOL/L (ref 0.7–2)
LEUKOCYTE ESTERASE UR QL STRIP: ABNORMAL
LYMPHOCYTES # BLD AUTO: 0.9 10E9/L (ref 0.8–5.3)
LYMPHOCYTES NFR BLD AUTO: 13.8 %
MCH RBC QN AUTO: 32.5 PG (ref 26.5–33)
MCHC RBC AUTO-ENTMCNC: 32 G/DL (ref 31.5–36.5)
MCV RBC AUTO: 102 FL (ref 78–100)
MONOCYTES # BLD AUTO: 0.6 10E9/L (ref 0–1.3)
MONOCYTES NFR BLD AUTO: 9.1 %
NEUTROPHILS # BLD AUTO: 5 10E9/L (ref 1.6–8.3)
NEUTROPHILS NFR BLD AUTO: 75.8 %
NITRATE UR QL: POSITIVE
NRBC # BLD AUTO: 0 10*3/UL
NRBC BLD AUTO-RTO: 0 /100
PH UR STRIP: 5.5 PH (ref 5–7)
PLATELET # BLD AUTO: 112 10E9/L (ref 150–450)
POTASSIUM SERPL-SCNC: 3.7 MMOL/L (ref 3.4–5.3)
RBC # BLD AUTO: 3.6 10E12/L (ref 3.8–5.2)
RBC #/AREA URNS AUTO: 24 /HPF (ref 0–2)
SODIUM SERPL-SCNC: 140 MMOL/L (ref 133–144)
SOURCE: ABNORMAL
SP GR UR STRIP: 1.01 (ref 1–1.03)
UROBILINOGEN UR STRIP-MCNC: NORMAL MG/DL (ref 0–2)
WBC # BLD AUTO: 6.6 10E9/L (ref 4–11)
WBC #/AREA URNS AUTO: >182 /HPF (ref 0–5)
WBC CLUMPS #/AREA URNS HPF: PRESENT /HPF

## 2019-05-13 PROCEDURE — 81001 URINALYSIS AUTO W/SCOPE: CPT | Performed by: EMERGENCY MEDICINE

## 2019-05-13 PROCEDURE — 87088 URINE BACTERIA CULTURE: CPT | Performed by: EMERGENCY MEDICINE

## 2019-05-13 PROCEDURE — 80048 BASIC METABOLIC PNL TOTAL CA: CPT | Performed by: EMERGENCY MEDICINE

## 2019-05-13 PROCEDURE — 85025 COMPLETE CBC W/AUTO DIFF WBC: CPT | Performed by: EMERGENCY MEDICINE

## 2019-05-13 PROCEDURE — 87086 URINE CULTURE/COLONY COUNT: CPT | Performed by: EMERGENCY MEDICINE

## 2019-05-13 PROCEDURE — 85610 PROTHROMBIN TIME: CPT | Performed by: EMERGENCY MEDICINE

## 2019-05-13 PROCEDURE — 99284 EMERGENCY DEPT VISIT MOD MDM: CPT | Mod: 25

## 2019-05-13 PROCEDURE — 87186 SC STD MICRODIL/AGAR DIL: CPT | Performed by: EMERGENCY MEDICINE

## 2019-05-13 PROCEDURE — 74176 CT ABD & PELVIS W/O CONTRAST: CPT

## 2019-05-13 PROCEDURE — 83605 ASSAY OF LACTIC ACID: CPT | Performed by: EMERGENCY MEDICINE

## 2019-05-13 RX ORDER — CEPHALEXIN 500 MG/1
500 CAPSULE ORAL 2 TIMES DAILY
Qty: 20 CAPSULE | Refills: 0 | Status: SHIPPED | OUTPATIENT
Start: 2019-05-13 | End: 2019-07-01

## 2019-05-13 ASSESSMENT — ENCOUNTER SYMPTOMS
CHILLS: 1
FLANK PAIN: 1
NAUSEA: 0
VOMITING: 0
HEMATURIA: 1
DIARRHEA: 0
FEVER: 0

## 2019-05-13 NOTE — TELEPHONE ENCOUNTER
Urology pt of Dr. Maria last seen 4/24/19. Maia calls today reporting cloudy urine with her first cath this AM. Also c/o (L) side back pain and burning with urination. Temp denied but does have some chills. Finished 10-day course of BID Macrobid on 5/7.   Will forward this update to provider. In Basket message sent.

## 2019-05-13 NOTE — ED PROVIDER NOTES
History     Chief Complaint:  Hematuria    HPI   Maia Miranda is a 86 year old female with a history of urinary retention requiring intermittent self catheterization, recurrent E. coli UTI, and atrial fibrillation anticoagulated on Coumadin who presents with hematuria. The patient reports that she self-catheterizes herself and this morning she noticed she was producing cloudy, milky urine. She states that this afternoon she also noticed hematuria, further prompting her presentation to the ED for concerns of possible UTI. She states she has a history of recurrent UTI and notes she normally follows up with Dr. Maria of urology. She recently finished a 7-day course of Macrobid BID on 5/7. Here in the ED, she also complains of having bilateral flank pain with the left worse than the right. She notes this pain to have been present for her for weeks however and was not new this morning. She endorses having chills, but states this is very common for her for the past few months. She denies having any recent fevers, nausea, and vomiting. She has no history of kidney stones.    Allergies:  Codeine  Meperidine  Morphine  Penicillins  Sulfa Drugs  Epinephrine    Medications:    Calcium citrate-vitamin D  Cholecalciferol  Lisinopril  Hiprex  Metoprolol succinate  Miralax  Coumadin     Past Medical History:    Amnestic MCI (mild cognitive impairment with memory loss)   Chronic duodenal ulcer  Chronic kidney disease, stage 3  Degeneration of lumbar or lumbosacral intervertebral disc  Depressive disorder   Diverticulosis  Erosive eye disorder   Esophageal reflux   Essential tremor  Fibroid uterus  Hemorrhoids    Hiatal hernia   Hyperlipidemia   Hypertension  Lactose intolerance  Long-term current use of anticoagulant therapy  Lumbago   Mitral valve regurgitation   Occlusion and stenosis of carotid artery   Osteoarthrosis  Osteoporosis  Paroxysmal atrial fibrillation   Pulmonary embolism   Rectal bleeding  Recurrent urinary  tract infections  Tinnitus   Urinary retention  Varicose veins    Past Surgical History:    Dilation and curettage x2  Left breast biopsy x2  Cataract IOL, RT/LT  Cystoscopy x2  Implant stimulator sacral nerve stage one  Implant stimulator sacral nerve stage two    Family History:    Cerebrovascular disease  Depression, suicide  Cardiovascular disease s/p pacemaker     Social History:  The patient presents to the ED with her granddaughter.  Marital status:   Smoking status: Never Smoker  Alcohol use: No  Drug use: No    Review of Systems   Constitutional: Positive for chills. Negative for fever.   Gastrointestinal: Negative for diarrhea, nausea and vomiting.   Genitourinary: Positive for flank pain and hematuria.        Positive for cloudy urine.   All other systems reviewed and are negative.    Physical Exam     Patient Vitals for the past 24 hrs:   BP Temp Pulse Heart Rate Resp SpO2   05/13/19 1538 135/78 97.7  F (36.5  C) 63 63 18 95 %       Physical Exam  General:              Well-nourished              Speaking in full sentences  Eyes:              Conjunctiva without injection or scleral icterus  ENT:              Moist mucous membranes              Nares patent              Pinnae normal  Neck:              Full ROM              No stiffness appreciated  Resp:              Lungs CTAB              No crackles, wheezing or audible rubs              Good air movement  CV:                    Normal rate, regular rhythm              S1 and S2 present              No murmur, gallop or rub  GI:              BS present              Abdomen soft without distention              Tenderness to palpation to L flank              No overlying skin changes              No guarding or rebound tenderness  Skin:              Warm, dry, well perfused              No rashes or open wounds on exposed skin  MSK:              Moves all extremities              No focal deformities or swelling  Neuro:              Alert               Answers questions appropriately              Moves all extremities equally              Gait stable  Psych:              Normal affect, normal mood    Emergency Department Course     Imaging:  Radiographic findings were communicated with the patient and family who voiced understanding of the findings.    CT ABDOMEN PELVIS W/O CONTRAST:  IMPRESSION: Negative for ureter stone.  As read by Radiology.    Laboratory:  CBC:  (L), o/w WNL (WBC 6.6, HGB 11.7)  BMP: Glucose 115 (H), Creatinine 1.25 (H), GFR Estimate 39 (L), o/w WNL   INR: 2.11 (H)  Lactic Acid Whole Blood: 0.6 (L)    UA: Blood--Moderate, Protein Albumin 30, Nitrite Positive, Leukocyte Esterase--Large, WBC >182 (H), RBC 24 (H), WBC Clumps Present, Bacteria--Many, o/w Negative  Urine Culture: in process    Emergency Department Course:  Past medical records, nursing notes, and vitals reviewed.  1540: I performed an exam of the patient and obtained history, as documented above.   IV inserted and blood samples were collected and sent for laboratory testing, findings above.  A urine sample was collected and sent for laboratory testing, findings above.  The patient was sent for a CT scan of the abdomen and pelvis while in the emergency department, findings above.    1744: I rechecked the patient and explained the findings.    1750: I consulted with Dr. Melchor, the chief resident working with Dr. Bobo who is the on-call urologist.     1752: I spoke to pharmacy regarding which antibiotics to start the patient on.    1800: I rechecked the patient. Findings and plan were explained. Patient discharged home with instructions regarding supportive care and reasons to return. The importance of close follow-up was reviewed.    Impression & Plan      Medical Decision Making:  Maia Miranda is a very pleasant 86-year-old female with a history of neurogenic bladder requiring self-catheterization, recurrent UTIs, who presents to the ER accompanied by family for  evaluation of hematuria.  VS on presentation reveal mildly elevated BP though otherwise are unremarkable.  Examination notable for a well-appearing elderly female resting comfortably on the gurney, with mild tenderness to palpation along left posterior flank, inferior to the CVA region.  Her current work-up included advanced imaging and laboratory studies.  Overall presentation is concerning for recurrent urinary tract infection and possible early pyelonephritis.  UA as above, reveals positive nitrites, large leukocyte esterase, greater than 182 WBC's, 24 RBC's, and WBC clumps.  Previous urine culture reviewed, notable for E. coli, pansensitive.  Patient does acknowledge symptoms of chills, which she notes to have been present for weeks.  Here she is afebrile, with a normal WBC and normal lactate.  She is not meeting criteria for severe sepsis or septic shock.  Per urology, CT scan was obtained which is negative for evidence of ureteral stone complicating current presentation.  Case discussed with urology, acknowledging patient's recent treatment.  At this time, she will be started on Keflex to be taken twice daily for 10 days.  This should have good renal penetration for possible early pyelonephritis given her report of flank tenderness.  She is to follow-up with her primary care provider in 2 to 3 days and urology in follow-up.  Strict return precautions discussed including worsening urinary symptoms, fevers, vomiting, generalized weakness, or any other new or troubling symptoms.  She is present with her daughter here in the ED who will keep a close eye on her.    Diagnosis:    ICD-10-CM   1. Urinary tract infection associated with catheterization of urinary tract, unspecified indwelling urinary catheter type, initial encounter (H) T83.511A    N39.0       Disposition:  Discharged to home    Discharge Medications:  cephALEXin 500 MG capsule  Commonly known as:  KEFLEX  500 mg, Oral, 2 TIMES DAILY            Diamond  Viktor  5/13/2019   [facility] EMERGENCY DEPARTMENT  I, Diamond Hoang, am serving as a scribe at 3:40 PM on 5/13/2019 to document services personally performed by David Fatima MD based on my observations and the provider's statements to me.        David Fatima MD  05/14/19 0034

## 2019-05-13 NOTE — TELEPHONE ENCOUNTER
"Called pt back with this direction from provider but Maia relates her symptoms have markedly worsened. She now reports streaks of dark red blood in \"milky\" looking urine. Chills continue and pain at (L) back has worsened. Advised ED. She expressed understanding and has transportation.    ----- Message from Bernadine Maria MD sent at 5/13/2019 12:35 PM CDT -----  Regarding: RE: symptoms  Contact: [phone]  Recommend that she push fluids and catheterize more frequently.  If she is having left sided pain can we please get a CT scan non contrast to look for a stone    Thanks    ----- Message -----  From: Bernadine Gale RN  Sent: 5/13/2019  12:07 PM  To: Bernadine Maria MD  Subject: symptoms                                         Hello,  Call from Maia today with the following:  She last seen 4/24/19 and calls today reporting cloudy urine with her first cath this AM. Also c/o (L) side back pain and burning with urination. Temp denied but does have some chills. Finished 10-day course of BID Macrobid on 5/7.   Thank you,  Bernadine"

## 2019-05-13 NOTE — ED TRIAGE NOTES
"Pt presents with having urinary sx does have a hx of self cathing herself to urinate. Now complaining of having increase \"milky substances in urine along with blood. ABC's intact A&Ox4   "

## 2019-05-13 NOTE — ED AVS SNAPSHOT
[facility] Emergency Department  [address]  [facility] [address]  Phone:  [phone]  Fax:  [phone]                                    Maia Miranda   MRN: [id number]    Department:  [facility] Emergency Department   Date of Visit:  5/13/2019           After Visit Summary Signature Page    I have received my discharge instructions, and my questions have been answered. I have discussed any challenges I see with this plan with the nurse or doctor.    ..........................................................................................................................................  Patient/Patient Representative Signature      ..........................................................................................................................................  Patient Representative Print Name and Relationship to Patient    ..................................................               ................................................  Date                                   Time    ..........................................................................................................................................  Reviewed by Signature/Title    ...................................................              ..............................................  Date                                               Time          22EPIC Rev 08/18

## 2019-05-13 NOTE — DISCHARGE INSTRUCTIONS
Please begin the antibiotics as directed.    Follow-up with your urology team and primary care provider in 2 to 3 days.    Return to the ER with worsening pain, fevers, vomiting, weakness or any other concerns.

## 2019-05-14 ENCOUNTER — TELEPHONE (OUTPATIENT)
Dept: PEDIATRICS | Facility: CLINIC | Age: 84
End: 2019-05-14

## 2019-05-14 LAB
BACTERIA SPEC CULT: ABNORMAL
Lab: ABNORMAL
SPECIMEN SOURCE: ABNORMAL

## 2019-05-14 NOTE — TELEPHONE ENCOUNTER
Reason for Call:  Other appointment    Detailed comments:   ER 5/13/19  Needs follow up 5/16/19 in clinic; offered appt with open provider; she only wants to see Dr Frias.    Phone Number Patient can be reached at: Home number on file [phone] (home)    Best Time: any      Can we leave a detailed message on this number? Not Applicable     Please call pt at [phone] to make appt on 5/16/19.    Thank you.    Call taken on 5/14/2019 at 9:53 AM by Sylvie Lyn

## 2019-05-15 ENCOUNTER — TELEPHONE (OUTPATIENT)
Dept: UROLOGY | Facility: CLINIC | Age: 84
End: 2019-05-15

## 2019-05-15 NOTE — TELEPHONE ENCOUNTER
Urology pt of Dr. Maria last seen 4/24/2019. Maia was seen in the ED 5/13 for hematuria, flank pain, and chills. She was prescribed keflex and culture results back today indicate this antibiotic should be successful. Called pt to see how she's doing and she reports symptoms improved and urine clear.  Will update provider.   Per Dr. Maria, Maia should start Gentamicin instillations.

## 2019-05-17 ENCOUNTER — ANTICOAGULATION THERAPY VISIT (OUTPATIENT)
Dept: NURSING | Facility: CLINIC | Age: 84
End: 2019-05-17
Payer: COMMERCIAL

## 2019-05-17 ENCOUNTER — OFFICE VISIT (OUTPATIENT)
Dept: PEDIATRICS | Facility: CLINIC | Age: 84
End: 2019-05-17
Payer: COMMERCIAL

## 2019-05-17 ENCOUNTER — TELEPHONE (OUTPATIENT)
Dept: PEDIATRICS | Facility: CLINIC | Age: 84
End: 2019-05-17

## 2019-05-17 VITALS
SYSTOLIC BLOOD PRESSURE: 144 MMHG | BODY MASS INDEX: 25.18 KG/M2 | HEART RATE: 64 BPM | HEIGHT: 65 IN | WEIGHT: 151.1 LBS | TEMPERATURE: 97.6 F | DIASTOLIC BLOOD PRESSURE: 68 MMHG | OXYGEN SATURATION: 95 %

## 2019-05-17 DIAGNOSIS — I48.91 ATRIAL FIBRILLATION (H): ICD-10-CM

## 2019-05-17 DIAGNOSIS — I10 HYPERTENSION GOAL BP (BLOOD PRESSURE) < 150/90: ICD-10-CM

## 2019-05-17 DIAGNOSIS — N39.0 RECURRENT URINARY TRACT INFECTION: Primary | ICD-10-CM

## 2019-05-17 DIAGNOSIS — Z79.01 LONG TERM CURRENT USE OF ANTICOAGULANT THERAPY: ICD-10-CM

## 2019-05-17 DIAGNOSIS — I48.91 ATRIAL FIBRILLATION, UNSPECIFIED TYPE (H): ICD-10-CM

## 2019-05-17 DIAGNOSIS — R51.9 NONINTRACTABLE EPISODIC HEADACHE, UNSPECIFIED HEADACHE TYPE: Primary | ICD-10-CM

## 2019-05-17 DIAGNOSIS — R00.2 PALPITATIONS: ICD-10-CM

## 2019-05-17 DIAGNOSIS — N30.00 ACUTE CYSTITIS WITHOUT HEMATURIA: ICD-10-CM

## 2019-05-17 LAB — INR POINT OF CARE: 2.4 (ref 0.86–1.14)

## 2019-05-17 PROCEDURE — 99214 OFFICE O/P EST MOD 30 MIN: CPT | Performed by: INTERNAL MEDICINE

## 2019-05-17 PROCEDURE — 85610 PROTHROMBIN TIME: CPT | Mod: QW

## 2019-05-17 PROCEDURE — 99207 ZZC NO CHARGE NURSE ONLY: CPT

## 2019-05-17 PROCEDURE — 36416 COLLJ CAPILLARY BLOOD SPEC: CPT

## 2019-05-17 RX ORDER — METOPROLOL SUCCINATE 100 MG/1
100 TABLET, EXTENDED RELEASE ORAL DAILY
Qty: 90 TABLET | Refills: 3 | Status: SHIPPED | OUTPATIENT
Start: 2019-05-17 | End: 2019-12-20

## 2019-05-17 RX ORDER — LISINOPRIL 20 MG/1
20 TABLET ORAL DAILY
Qty: 90 TABLET | Refills: 3 | Status: SHIPPED | OUTPATIENT
Start: 2019-05-17 | End: 2019-07-01

## 2019-05-17 ASSESSMENT — MIFFLIN-ST. JEOR: SCORE: 1126.27

## 2019-05-17 NOTE — PROGRESS NOTES
ANTICOAGULATION FOLLOW-UP CLINIC VISIT    Patient Name:  Maia Miranda  Date:  5/17/2019  Contact Type:  Face to Face    SUBJECTIVE:  Patient Findings     Positives:   Change in health (UTI, noticing irregular heartrate off and on), Emergency department visit, Change in medications (keflex)    Comments:   Patient went to ED on Monday with hematuria and sx of UTI d/t frequent self-cathing. Started on keflex x10 days. Continues to have some left-sided occasional flank pain, notes her urine is clearing up and no bleeding noted. INR therapeutic again today. Patient does note she was having an irregular heart rate when she is real tired at night off and on for the last month. Not addressed at ED visit. Will see patient back for next INR in 2 weeks. Attempting to see if patient can get in for follow up with Dr. Frias today, or with another provider next week (although patient really wants to stick with Dr. Frias). Unable to schedule while patient in clinic. Sent patient home with directions to call [facility] scheduling back if she does not hear from them by 3 pdanielle ALLEN RN  Anticoagulation Clinic  [place]          Clinical Outcomes     Comments:   Patient went to ED on Monday with hematuria and sx of UTI d/t frequent self-cathing. Started on keflex x10 days. Continues to have some left-sided occasional flank pain, notes her urine is clearing up and no bleeding noted. INR therapeutic again today. Patient does note she was having an irregular heart rate when she is real tired at night off and on for the last month. Not addressed at ED visit. Will see patient back for next INR in 2 weeks. Attempting to see if patient can get in for follow up with Dr. Frias today, or with another provider next week (although patient really wants to stick with Dr. Frias). Unable to schedule while patient in clinic. Sent patient home with directions to call [facility] scheduling back if she does not hear from them by 3 pdanielle ALLEN  RN  Anticoagulation Clinic  [place]             OBJECTIVE    INR Protime   Date Value Ref Range Status   2019 2.4 (A) 0.86 - 1.14 Final       ASSESSMENT / PLAN  INR assessment THER    Recheck INR In: 2 WEEKS    INR Location Clinic      Anticoagulation Summary  As of 2019    INR goal:   2.0-3.0   TTR:   77.7 % (3.3 y)   INR used for dosin.4 (2019)   Warfarin maintenance plan:   2.5 mg (2.5 mg x 1) every day   Full warfarin instructions:   2.5 mg every day   Weekly warfarin total:   17.5 mg   No change documented:   Dian Astudillo RN   Plan last modified:   Meme Riggs RN (2017)   Next INR check:   2019   Target end date:   Indefinite    Indications    Long term current use of anticoagulant therapy [Z79.01]  Atrial fibrillation (H) [I48.91]             Anticoagulation Episode Summary     INR check location:   Anticoagulation Clinic    Preferred lab:       Send INR reminders to:    ANTICO CLINIC    Comments:   2.5mg tablets // 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

## 2019-05-17 NOTE — TELEPHONE ENCOUNTER
Please call patient to schedule follow-up office visit in mid-late June to follow-up on headaches. Will also come fasting for labs. Please schedule in am. No preference for day of the week.    Thanks!  Dian Frias MD  Internal Medicine/Pediatrics

## 2019-05-17 NOTE — TELEPHONE ENCOUNTER
Pt needs hosp f/u for hematuria and afib. Please advise when and where to overbook this pt if possible. Thank you.   Kiah Irving on 5/17/2019 at 11:36 AM

## 2019-05-17 NOTE — PATIENT INSTRUCTIONS
1. Heart sounds good today - most likely feeling premature beats  2. Write down when headaches happening and anything going on at that time  3. Refilled metoprolol and lisinopril on file  4. Follow-up in about 1 month - we will call you to set something up  - will recheck lab work at that time - come fasting

## 2019-05-17 NOTE — PROGRESS NOTES
"  SUBJECTIVE:   Maia Miranda is a 86 year old female who presents to clinic today for the following health issues:      ED/UC Followup:    Facility:  [facility] ED  Date of visit: 5/13/19  Reason for visit: hematuria, cloudy urine, recurrent UTI, flank pain, chills  Current Status: states she is doing better but still having chills     1. UTI: patient seen in ED on 5/13 for UTI. Treated with cephalexin. symptoms improving. UCx sensitive to the cephalexin. Has talked with her Urologist and plan to start gentamycin washes through catheter when she has to self cath to try to prevent further UTIs.    2. Headaches: Head is hurting and feels hot. Pain in the back of the head. Present most of the time. Takes tylenol sometimes at night to go to sleep. Helps with pain. Pain not there when wakes up. Happens more often in the afternoon. Started back again over last couple of weeks. Had similar after concussion/fall about a year ago and then recurred last summer, but had gone away. Does have a lot of stress. Water heater broke and flooded area around. Had to replace carpet and move out for awhile. Hasn't found any pattern to headaches. No vision changes or other symptoms.     3. Palpitations: Having more \"a-fib\" again in the evening. Notices has an irregular HR when tries to check radial pulse. Feels it more and sometimes has a hard time breathing. Went to [facility] open house in [place], got out of breath when walking around the building. Palpitations last for 10-15 minutes. Doesn't notice during the day. Had holter monitor in fall that showed multiple premature ventricular beats, but no a-fib. Had increased dose of metoprolol last summer for palpitations, which helped with symptoms.    Will be going on a trip in July.    Additional history: as documented    Reviewed  and updated as needed this visit by clinical staff  Tobacco  Allergies  Meds  Problems  Med Hx  Surg Hx  Fam Hx  Soc Hx        Reviewed and " "updated as needed this visit by Provider  Tobacco  Allergies  Meds  Problems  Med Hx  Surg Hx  Fam Hx       -------------------------------------    ROS:  Constitutional, HEENT, cardiovascular, pulmonary, gi and gu systems are negative, except as otherwise noted.    OBJECTIVE:                                                    /68 (BP Location: Right arm, Cuff Size: Adult Regular)   Pulse 64   Temp 97.6  F (36.4  C) (Tympanic)   Ht 1.651 m (5' 5\")   Wt 68.5 kg (151 lb 1.6 oz)   SpO2 95%   BMI 25.14 kg/m     Body mass index is 25.14 kg/m .  General Appearance: elderly female, alert and no distress  Eyes:   no discharge, erythema.  Normal pupils.  Neck: Supple.  No adenopathy, no asymmetry, masses, or scars  Respiratory: lungs clear to auscultation - no rales, rhonchi or wheezes.  Cardiovascular: regular rate and rhythm, normal S1 S2, no S3 or S4 and no murmur, click or rub.  No peripheral edema.  NEURO: alert and oriented x3, CN II-XII intact. Normal tone and strength in all four extremities. Normal gait.   Skin: no rashes or lesions.  Well perfused and normal turgor.    Diagnostic Test Results:  UCx: E. Coli, sensitive to cephalexin     ASSESSMENT/PLAN:                                                      (R51) Nonintractable episodic headache, unspecified headache type  (primary encounter diagnosis)  Comment: similar to previous headaches. Suspect increased frequency due to stress. No red flags. Neuro exam normal other than baseline memory issues, which make timeline for headaches a little difficult to tease out.  Plan:   - will write down when getting headaches and any possible triggers  - continue tylenol as needed  - f/u in 1 month - we will call her to set this up.    (I10) Hypertension goal BP (blood pressure) < 150/90  Comment: a little higher than usual for her today, but has been ok at other recent visits  Plan: lisinopril (PRINIVIL/ZESTRIL) 20 MG tablet  - continue lisinopril 20 mg daily " and metoprolol 100 mg daily    (R00.2) Palpitations  (I48.91) Atrial fibrillation, unspecified type (H)  Comment: in NSR today. No irregular beats appreciated. Holter last fall with a lot of PVCs and some Pacs. Suspect that is what she is feeling.  Plan: metoprolol succinate ER (TOPROL-XL) 100 MG 24         hr tablet  - continue metoprolol. I hesitate to increase her metoprolol further as HR already in low 60's and I do not want to cause dizziness  - on warfarin  - if continues to be symptomatic, will have see Cardiology    (N30.00) Acute cystitis without hematuria  Comment: improving, E. Coli sensitive to cephalexin  Plan:   - complete course of cephalexin  - will start gent washes per Urology    FUTURE APPOINTMENTS:       - Follow-up visit in about a month - will call her to schedule    [facility]DA

## 2019-05-17 NOTE — TELEPHONE ENCOUNTER
Ok to schedule at 3 pm today if patient able to come in at that time. Otherwise, will need to schedule for some time next week.    Thanks,  Dian Frias MD  Internal Medicine/Pediatrics

## 2019-05-22 ENCOUNTER — TELEPHONE (OUTPATIENT)
Dept: UROLOGY | Facility: CLINIC | Age: 84
End: 2019-05-22

## 2019-05-22 NOTE — TELEPHONE ENCOUNTER
Urology pt of Dr. Maria with urinary retention and recurrent bladder infections.   Maia is being started on gentamicin bladder instillations to try to break the cycle of UTIs. The medication will be instilled daily at bedtime via syringe and catheter. She will need 15-20 syringes per month, and 30 catheters.     The gentamicin solution has been ordered from [facility].  The syringes and catheters  Have been ordered form [facility].  Having some difficulty locating the sterile graduate needed to pour the solution into.    Called Maia with update and to expect these items to be arriving. She expressed understanding.

## 2019-05-24 ENCOUNTER — ANCILLARY PROCEDURE (OUTPATIENT)
Dept: GENERAL RADIOLOGY | Facility: CLINIC | Age: 84
End: 2019-05-24
Attending: FAMILY MEDICINE
Payer: COMMERCIAL

## 2019-05-24 ENCOUNTER — OFFICE VISIT (OUTPATIENT)
Dept: URGENT CARE | Facility: URGENT CARE | Age: 84
End: 2019-05-24
Payer: COMMERCIAL

## 2019-05-24 ENCOUNTER — HOSPITAL ENCOUNTER (EMERGENCY)
Facility: CLINIC | Age: 84
Discharge: HOME | End: 2019-05-24
Attending: EMERGENCY MEDICINE | Admitting: EMERGENCY MEDICINE
Payer: COMMERCIAL

## 2019-05-24 ENCOUNTER — TELEPHONE (OUTPATIENT)
Dept: URGENT CARE | Facility: URGENT CARE | Age: 84
End: 2019-05-24

## 2019-05-24 VITALS
RESPIRATION RATE: 16 BRPM | BODY MASS INDEX: 25.13 KG/M2 | OXYGEN SATURATION: 98 % | TEMPERATURE: 98.7 F | HEART RATE: 59 BPM | WEIGHT: 151 LBS | SYSTOLIC BLOOD PRESSURE: 153 MMHG | DIASTOLIC BLOOD PRESSURE: 72 MMHG

## 2019-05-24 VITALS
OXYGEN SATURATION: 98 % | SYSTOLIC BLOOD PRESSURE: 126 MMHG | BODY MASS INDEX: 24.96 KG/M2 | HEART RATE: 59 BPM | TEMPERATURE: 98.6 F | DIASTOLIC BLOOD PRESSURE: 66 MMHG | WEIGHT: 150 LBS

## 2019-05-24 DIAGNOSIS — M23.92 LOCKING OF LEFT KNEE: ICD-10-CM

## 2019-05-24 DIAGNOSIS — M25.562 ACUTE PAIN OF LEFT KNEE: ICD-10-CM

## 2019-05-24 DIAGNOSIS — M25.562 ACUTE PAIN OF LEFT KNEE: Primary | ICD-10-CM

## 2019-05-24 DIAGNOSIS — M25.462 EFFUSION OF LEFT KNEE: ICD-10-CM

## 2019-05-24 DIAGNOSIS — M25.362 KNEE INSTABILITY, LEFT: ICD-10-CM

## 2019-05-24 LAB
APPEARANCE FLD: NORMAL
COLOR FLD: YELLOW
CRYSTALS SNV MICRO: NORMAL
GLUCOSE FLD-MCNC: 19 MG/DL
GRAM STN SPEC: NORMAL
LYMPHOCYTES NFR FLD MANUAL: 43 %
MONOS+MACROS NFR FLD MANUAL: 43 %
NEUTS BAND NFR FLD MANUAL: 14 %
PROT FLD-MCNC: 2.9 G/DL
RADIOLOGIST FLAGS: ABNORMAL
SPECIMEN SOURCE FLD: NORMAL
SPECIMEN SOURCE SNV: NORMAL
SPECIMEN SOURCE: NORMAL
WBC # FLD AUTO: 498 /UL

## 2019-05-24 PROCEDURE — 99213 OFFICE O/P EST LOW 20 MIN: CPT | Performed by: FAMILY MEDICINE

## 2019-05-24 PROCEDURE — 82945 GLUCOSE OTHER FLUID: CPT | Performed by: EMERGENCY MEDICINE

## 2019-05-24 PROCEDURE — 87015 SPECIMEN INFECT AGNT CONCNTJ: CPT | Performed by: EMERGENCY MEDICINE

## 2019-05-24 PROCEDURE — 20610 DRAIN/INJ JOINT/BURSA W/O US: CPT | Mod: LT

## 2019-05-24 PROCEDURE — 89051 BODY FLUID CELL COUNT: CPT | Performed by: EMERGENCY MEDICINE

## 2019-05-24 PROCEDURE — 73562 X-RAY EXAM OF KNEE 3: CPT | Mod: LT

## 2019-05-24 PROCEDURE — 89060 EXAM SYNOVIAL FLUID CRYSTALS: CPT | Performed by: EMERGENCY MEDICINE

## 2019-05-24 PROCEDURE — 87205 SMEAR GRAM STAIN: CPT | Performed by: EMERGENCY MEDICINE

## 2019-05-24 PROCEDURE — 87070 CULTURE OTHR SPECIMN AEROBIC: CPT | Performed by: EMERGENCY MEDICINE

## 2019-05-24 PROCEDURE — 84157 ASSAY OF PROTEIN OTHER: CPT | Performed by: EMERGENCY MEDICINE

## 2019-05-24 PROCEDURE — 99283 EMERGENCY DEPT VISIT LOW MDM: CPT | Mod: 25

## 2019-05-24 RX ORDER — PREDNISONE 10 MG/1
TABLET ORAL
Qty: 7 TABLET | Refills: 0 | Status: SHIPPED | OUTPATIENT
Start: 2019-05-24 | End: 2019-05-30

## 2019-05-24 RX ORDER — LIDOCAINE HYDROCHLORIDE AND EPINEPHRINE 10; 10 MG/ML; UG/ML
INJECTION, SOLUTION INFILTRATION; PERINEURAL
Status: DISCONTINUED
Start: 2019-05-24 | End: 2019-05-24 | Stop reason: HOSPADM

## 2019-05-24 ASSESSMENT — ENCOUNTER SYMPTOMS
FEVER: 0
CHILLS: 0
JOINT SWELLING: 1
ARTHRALGIAS: 1

## 2019-05-24 NOTE — PATIENT INSTRUCTIONS
follow up with the orthopedic specialist at [facility] in [place], [place], for further evaluation and treatment.      Tylenol for the left knee pain    Place ice over the left knee pain

## 2019-05-24 NOTE — TELEPHONE ENCOUNTER
Scheduled pt an appointment. Called and left message that if the time doesn't work to call clinic back to schedule.

## 2019-05-24 NOTE — ED PROVIDER NOTES
History     Chief Complaint:  Knee Pain    The history is provided by the patient.      Maia Miranda is an anti-coagulated 86 year old female on Warfarin with a history of atrial fibrillation, Mitral valve regurgitation, PE, HTN, and osteoporosis who presents with left knee pain. For the past three days, the patient has had left knee pain when the knee locks up on her and notes the joint sometimes feels warm to the touch. She has been using Icy hot to manage the pain. She is able to ambulate by herself without much pain but notes the knee does lock and become painful when doing stairs. The patient went to urgent care earlier today for the continued pain, where they performed an X-ray. Urgent care sent her here to the ED for suspicion of infection. The patient denies falls or trauma to the left knee prior to when the pain started. No other joint pain and she is without fever or chills.    5/24/19  XR Knee, Left, 3 views:   Probable small knee effusion. Mild degenerative change  involving the medial compartment. No other findings.  As per radiology.     Allergies:  Codeine  Meperidine  Morphine  Penicillins  Sulfa drugs  Epinephrine     Medications:    CMPD RX  Premarin   Lisinopril  Meclizine  Methenamine  Toprol  Miralax  Warfarin     Past Medical History:    Amnestic MCI  Chronic duodenal ulcer without mention of hemorrhage, perforation, or obstruction  Depressive disorder  Erosive eye disorder  Esophageal reflux  Essential and other specified forms of tremor  Generalized osteoarthrosis  Hiatal hernia  PE  Lactose intolerance  Lumbago  Mitral valve regurgitation  Occlusion and stenosis of carotid artery without mention of cerebral infarction  Osteoporosis, unspecified  Paroxysmal atrial fibrillation  Hypertension  Tinnitus  Recurrent UTI  Varicose veins of lower extremities    Past Surgical History:    Cataract IOL, RT/LT  Implant stimulator sacral nerve stages one and two    Family History:    Heart  disease  Depression    Social History:  Smoking status: No   Alcohol use: No  Drug use: No  PCP: Dian Frias  Marital Status:      Review of Systems   Constitutional: Negative for chills and fever.   Musculoskeletal: Positive for arthralgias and joint swelling.        Positive for left knee pain.   All other systems reviewed and are negative.    Physical Exam     Patient Vitals for the past 24 hrs:   BP Temp Temp src Pulse Heart Rate Resp SpO2 Weight   05/24/19 1936 153/72 -- -- 59 -- 16 98 % --   05/24/19 1707 (!) 146/118 98.7  F (37.1  C) Temporal -- 64 16 100 % 68.5 kg (151 lb)     Physical Exam  Vital signs and nursing notes reviewed.     Constitutional: laying on gurney appears comfortable  HENT: Oropharynx is clear and moist  Eyes: Conjunctivae are normal bilaterally. Pupils equal  Neck: normal range of motion  Cardiovascular: Normal rate, regular rhythm, normal heart sounds.   Pulmonary/Chest: Effort normal and breath sounds normal. No respiratory distress.   Abdominal: Soft. Bowel sounds are normal. No tenderness to palpation. No rebound or guarding.   Musculoskeletal: Small left knee effusion. No significant pain with ROM. No overlying erythema or warmth.  Able to full extend and flex without significant pain or locking.  Neurological: Alert and oriented. No focal weakness  Skin: Skin is warm and dry. No rash noted.   Psych: normal affect  Emergency Department Course   Laboratory:  Crystal ID Synovial Fluid: No clinically significant crystals seen.  Cell count with differential fluid: All normal  Glucose Fluid: 19  Protein Fluid:2.9  Gram Stain: In process  Fluid Culture Aerobic bacterial: In process    Procedures:     Arthrocentesis   PERFORMED BY: Dr. Irwin  SITE: Left Knee  INDICATION:  Pain and effusion  CONSENT: Obtained from Patient   ANESTHESIA: Lidocaine 1% with Epinephrine     NOTE:  Using sterile technique the site was prepped.  Local anesthesia used. The joint was entered and  10 cc's of straw colored fluid was withdrawn and sent for laboratory analysis, results above.     COMPLICATIONS: Patient tolerated the procedure with out immediate complications.    POST-PROCEDURE INSTRUCTIONS: The patient is asked to continue to rest the joint for a few more days before resuming regular activities.  Watch for fever, or increased swelling or persistent pain in the joint.     Emergency Department Course:  1737: Nursing notes and vitals reviewed.  I performed an exam of the patient as documented above.     1810: I performed an arthrocentesis on the patient's left knee, see procedure note above.     1818: I rechecked the patient and discussed the results of her workup thus far.     1921: Findings and plan explained to the Patient. Patient discharged home with instructions regarding supportive care, medications, and reasons to return. The importance of close follow-up was reviewed. The patient was prescribed Prednisone.    I personally reviewed the laboratory results with the Patient and answered all related questions prior to discharge.   Impression & Plan    Medical Decision Making:  Patient Ruben is an 86 year old female presenting from an outlying clinic due to intermittent pain and swelling in her left knee. The patient had an X-ray done at an outlying facility and it showed probable small knee effusion. On examination, she did not have any overlying warmth or significant swelling of the joint and did have fairly good range of motion. It seemed like her predominant complaint was locking of the knee when she extends it fully intermittently when she walks. She is able to fully extend and flex here without significant pain. I discussed that to further evaluate her effusion I could perform an arthrocentesis, though I highly doubt the patient has septic arthritis. She elects to have this done and was performed as described above. 10 cc of yellow straw colored fluid was obtained. This was sent for lab  evaluation. The cell count analysis showed findings consistent with an inflammatory process and no clear indication of infectious etiology. I did not feel like she needed further tests at this time and I advised her that we'll contact her if any abnormalities arises when the additional joint fluid testing is finished. She is to ice and elevate the knee, and I discussed with her that she should be ambulating with a cane or walker to try to prevent any potential falls if her knee locks up. She is not able to take any NSAID's because she is on coumadin, so I gave her a short course of oral prednisone. I advised her to follow up with orthopedic specialists as an outpatient as she might need MRI imaging if she has continuing intermittent locking of her left knee. The patient understands the plan and was discharged under good condition.     Diagnosis:    ICD-10-CM   1. Effusion of left knee M25.462   2. Locking of left knee M23.92       Disposition: discharged to home    Discharge Medications:     Medication List      Started    predniSONE 10 MG tablet  Commonly known as:  DELTASONE  Two tablets daily for 2 days, then one tab for two days, the 1/2 tab daily for 2 days          Scribe Disclosure:    Thania Monson  5/24/2019   [facility] EMERGENCY DEPARTMENT    IThania, am serving as a scribe at 5:37 PM on 5/24/2019 to document services personally performed by Amor Irwin MD based on my observations and the provider's statements to me.      Amor Irwin MD  05/25/19 9711

## 2019-05-24 NOTE — TELEPHONE ENCOUNTER
Spoke with pt and informed her that she needs to go to the ER to rule out septic knee.     Aracelis Thakkar, VARGHESE May 24, 2019 4:03 PM

## 2019-05-24 NOTE — ED TRIAGE NOTES
"Let knee pain and \"gives out on me/locks up\" for the past week. Chills present.  Was seen at Urgent Care and was told to come to ED because of a possible infection in the knee.    "

## 2019-05-24 NOTE — ED AVS SNAPSHOT
[facility] [place] [facility] Emergency Department  [address]  [facility] [address]  Phone:  [phone]  Fax:  [phone]                                    Maia Miranda   MRN: [id number]    Department:  [facility] Emergency Department   Date of Visit:  5/24/2019           After Visit Summary Signature Page    I have received my discharge instructions, and my questions have been answered. I have discussed any challenges I see with this plan with the nurse or doctor.    ..........................................................................................................................................  Patient/Patient Representative Signature      ..........................................................................................................................................  Patient Representative Print Name and Relationship to Patient    ..................................................               ................................................  Date                                   Time    ..........................................................................................................................................  Reviewed by Signature/Title    ...................................................              ..............................................  Date                                               Time          22EPIC Rev 08/18

## 2019-05-24 NOTE — TELEPHONE ENCOUNTER
"SUBJECTIVE:  The radiology report on the patient's May 24, 2019, left knee X-rays showed a \"probable small knee effusion\" (a small collection of fluid in the left knee.    PLAN:  Please notify patient of this result. One of the concerns of a nontraumatic knee pain with a new effusion is possible bacterial infection of the left knee.  Patient should go to the emergency room to rule out a \"septic knee\".  Other causes could include osteroarthritis (patient has a history of this.), crystals in the knee causing effusion in the knee, for example.      Balaji Cartagena MD    "

## 2019-05-24 NOTE — PROGRESS NOTES
SUBJECTIVE:  Chief Complaint   Patient presents with     Urgent Care     Knee Pain     knee pain x 1 week- pt states it is popping in and out of joint- no known injury     Maia Miranda is a 86 year old female with a history of generalized osteoarthritis.  She presents with a chief complaint of atraumatic left knee pain for the past week and a sensation of the left knee popping in and out and locking whenever she walks.    Symptoms began one week ago, are about the same.   Context:  Injury: none.   She treated it initially with Icy-Hot applications and Tylenol.     Past Medical History:   Diagnosis Date     Amnestic MCI (mild cognitive impairment with memory loss) 2014     Chronic duodenal ulcer without mention of hemorrhage, perforation, or obstruction 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 Warner, [facility] yearly     Esophageal reflux 2001    Abstracted 06/10/02     Essential and other specified forms of tremor 2004    resting tremor     Generalized osteoarthrosis, unspecified site     Hands     Hiatal hernia     diagnosed late 1990s [place], [place]     History of pulmonary embolism 1971    no source found     LACTOSE INTOLERANCE      Lumbago     sees Kodi Guidry     Mitral valve regurgitation      Occlusion and stenosis of carotid artery without mention of cerebral infarction 2003    CAROTID US NORMAL, bruit in neck     Osteoporosis, unspecified 2003    T = -2.66     Paroxysmal atrial fibrillation (H) 11/15/2013     Unspecified essential hypertension      Unspecified tinnitus 2005    mild hearing loss, saw ENT     Current Outpatient Medications   Medication Sig Dispense Refill     acetaminophen (TYLENOL) 500 MG tablet Take 500-1,000 mg by mouth every 6 hours as needed       ascorbic acid (VITAMIN C) 1000 MG TABS Take 1 tablet (1,000 mg) by mouth 2 times daily 180 tablet 3     calcium citrate-vitamin D (CITRACAL) 315-250 MG-UNIT TABS  per tablet Take 1 tablet by mouth daily       carboxymethylcellulose (CELLUVISC/REFRESH LIQUIGEL) 1 % ophthalmic solution Place 1 drop into both eyes every morning As needed       Catheters (GENTLECATH URINARY CATHETER) MISC 1 catheter 4 times daily 120 each 12     Cholecalciferol (VITAMIN D-3) 1000 UNITS CAPS Take 1 capsule by mouth daily       COMPOUNDED NON-CONTROLLED SUBSTANCE (CMPD RX) - PHARMACY TO MIX COMPOUNDED MEDICATION Gentamicin solution: 480 mg gentamicin in 1 liter 0.9 normal saline. Instill 60 ml into bladder via catheter daily at bedtime.  Dispense 2, 1000 ml bottles. May refill x 3. 2 Bottle 3     conjugated estrogens (PREMARIN) vaginal cream Place vaginally twice a week On Tuesday and Friday. Uses a pea sized amount       lisinopril (PRINIVIL/ZESTRIL) 20 MG tablet Take 1 tablet (20 mg) by mouth daily 90 tablet 3     methenamine (HIPREX) 1 g tablet Take 1 tablet (1 g) by mouth daily 90 tablet 1     metoprolol succinate ER (TOPROL-XL) 100 MG 24 hr tablet Take 1 tablet (100 mg) by mouth daily 90 tablet 3     Omega-3 Fatty Acids (OMEGA-3 FISH OIL PO) Take 1 g by mouth 2 times daily (with meals)       polyethylene glycol (MIRALAX/GLYCOLAX) packet Take 1 packet by mouth daily as needed        sodium chloride (PETER 128) 5 % ophthalmic ointment Place 1 Application into both eyes At Bedtime       warfarin (COUMADIN) 2.5 MG tablet TAKE 2.5MG (1 TABLET) DAILY OR AS INSTRUCTED BY INR CLINIC 92 tablet 1     fluocinonide (LIDEX) 0.05 % cream Apply topically 2 times daily as needed (Patient not taking: Reported on 5/24/2019) 30 g 2     meclizine (ANTIVERT) 25 MG tablet Take 1 tablet (25 mg) by mouth every 6 hours as needed for dizziness (Patient not taking: Reported on 4/24/2019) 30 tablet 0     Social History     Tobacco Use     Smoking status: Never Smoker     Smokeless tobacco: Never Used   Substance Use Topics     Alcohol use: No       ROS:  CONSTITUTIONAL:NEGATIVE for fever  INTEGUMENTARY/SKIN: NEGATIVE for  "worrisome rashes, moles or lesions  MUSCULOSKELETAL: positive for left knee pain.     EXAM:   /66   Pulse 59   Temp 98.6  F (37  C) (Tympanic)   Wt 68 kg (150 lb)   SpO2 98%   BMI 24.96 kg/m    M/S Exam: Left knee has no edema/increased warmth/erythema/ecchymosis/effusions.  Tests for ligament stability were within normal limits. Trace test was negative for a \"click.\"  No tenderness over the knee joints and over the rest of the left knee.    FROM and no tenderness with palpation. SKIN: no suspicious lesions or rashes  NEURO: Normal strength and tone, sensory exam grossly normal, mentation intact and speech normal  GAIT:  within normal limits.     X-RAY was done.  I viewed the X-ray images during today's clinic encounter. .  X-rays of the left knee showed no acute changes.  No dislocations.  No fractures.  There are chronic degenerative changes present, however.      ASSESSMENT:  Left knee pain.  Differential includes either a meniscal tear or possible sprain at the left knee    PLAN:    follow up with [facility] for further evaluation and treatment.      Tylenol for the pain    Ice over the left knee        Balaji Cartagena MD    "

## 2019-05-28 ENCOUNTER — HOSPITAL ENCOUNTER (OUTPATIENT)
Dept: LAB | Facility: CLINIC | Age: 84
Discharge: HOME | End: 2019-05-28
Attending: UROLOGY | Admitting: UROLOGY
Payer: COMMERCIAL

## 2019-05-28 DIAGNOSIS — R82.90 CLOUDY URINE: ICD-10-CM

## 2019-05-28 DIAGNOSIS — N39.0 URINARY TRACT INFECTION: ICD-10-CM

## 2019-05-28 DIAGNOSIS — R82.90 CLOUDY URINE: Primary | ICD-10-CM

## 2019-05-28 LAB
ALBUMIN UR-MCNC: NEGATIVE MG/DL
APPEARANCE UR: ABNORMAL
BACTERIA #/AREA URNS HPF: ABNORMAL /HPF
BILIRUB UR QL STRIP: NEGATIVE
COLOR UR AUTO: ABNORMAL
GLUCOSE UR STRIP-MCNC: NEGATIVE MG/DL
HGB UR QL STRIP: ABNORMAL
KETONES UR STRIP-MCNC: NEGATIVE MG/DL
LEUKOCYTE ESTERASE UR QL STRIP: ABNORMAL
MUCOUS THREADS #/AREA URNS LPF: PRESENT /LPF
NITRATE UR QL: NEGATIVE
PH UR STRIP: 5 PH (ref 5–7)
RBC #/AREA URNS AUTO: 12 /HPF (ref 0–2)
SOURCE: ABNORMAL
SP GR UR STRIP: 1.01 (ref 1–1.03)
SQUAMOUS #/AREA URNS AUTO: <1 /HPF (ref 0–1)
UROBILINOGEN UR STRIP-MCNC: NORMAL MG/DL (ref 0–2)
WBC #/AREA URNS AUTO: >182 /HPF (ref 0–5)

## 2019-05-28 PROCEDURE — 87186 SC STD MICRODIL/AGAR DIL: CPT | Performed by: UROLOGY

## 2019-05-28 PROCEDURE — 87086 URINE CULTURE/COLONY COUNT: CPT | Performed by: UROLOGY

## 2019-05-28 PROCEDURE — 81001 URINALYSIS AUTO W/SCOPE: CPT | Performed by: UROLOGY

## 2019-05-28 PROCEDURE — 87088 URINE BACTERIA CULTURE: CPT | Performed by: UROLOGY

## 2019-05-28 NOTE — PROGRESS NOTES
Urology pt of Dr. Maria calls today reporting cloudy urine with her first cath this AM. Will place orders for UA/UC.

## 2019-05-28 NOTE — PROGRESS NOTES
Urology pt of Maia Bone calls today reporting cloudy urine with her first morning cath this AM. Orders placed for UA/UC.

## 2019-05-29 ENCOUNTER — PATIENT OUTREACH (OUTPATIENT)
Dept: CARE COORDINATION | Facility: CLINIC | Age: 84
End: 2019-05-29

## 2019-05-29 ENCOUNTER — TELEPHONE (OUTPATIENT)
Dept: UROLOGY | Facility: CLINIC | Age: 84
End: 2019-05-29

## 2019-05-29 LAB
BACTERIA SPEC CULT: NO GROWTH
SPECIMEN SOURCE: NORMAL

## 2019-05-29 ASSESSMENT — ACTIVITIES OF DAILY LIVING (ADL): DEPENDENT_IADLS:: INDEPENDENT

## 2019-05-29 NOTE — PROGRESS NOTES
"Clinic Care Coordination Contact    Clinic Care Coordination Contact  OUTREACH    Referral Information:  Referral Source: ED Follow-Up  Patient meets criteria for Clinic Care Coordination outreach with recent history of 2 or more ED visits and 55% risk of readmission    Primary Diagnosis: Orthopedic    Chief Complaint   Patient presents with     Clinic Care Coordination - Post Hospital     ED visit for effusion        Universal Utilization:   Clinic Utilization  Difficulty keeping appointments:: No  Compliance Concerns: No  No-Show Concerns: No  No PCP office visit in Past Year: No  Utilization    Last refreshed: 5/28/2019  3:09 PM:  Hospital Admissions 0           Last refreshed: 5/28/2019  3:09 PM:  ED Visits 2           Last refreshed: 5/28/2019  3:09 PM:  No Show Count (past year) 2              Current as of: 5/28/2019  3:09 PM              Clinical Concerns:  Current Medical Concerns:  The patient reports she has completed her entire course of oral prednisone, but notes that the \"pain and sort of swelling aren't any better, really\".  She denies any fever, increased pain, or increased swelling, but when asked to rate her symptoms as \"better-worse-same\"; she said \"I would say it's the same, really\".        Current Behavioral Concerns: Alert and oriented; speech clear, good historian; no behavioral concerns indicated.      Education Provided to patient:  RN CC introduced role of Clinic Care Coordination; reviewed ED discharge instructions, encouraged patient to follow up with orthopedics; offered to help facilitate appointment/referral, but patient politely declined citing that she could complete that on her own.     Pain  Pain (GOAL):: Yes  Type: Acute (<3mo)  Progression: Unchanged  Chronic pain severity:: 4  Alleviating Factors: Rest  Health Maintenance Reviewed: Not assessed      Medication Management:  Patient sets up and manages her own medications; she indicates she has completed the full, recommended " course of prednisone    Functional Status:  Dependent ADLs:: Independent  Dependent IADLs:: Independent  Bed or wheelchair confined:: No  Mobility Status: Independent w/Device    Living Situation:  Current living arrangement:: I live alone  Type of residence:: Other    Diet/Exercise/Sleep:  Inadequate nutrition (GOAL):: No  Food Insecurity: No  Tube Feeding: No  Exercise:: Unable to exercise  Inadequate activity/exercise (GOAL):: No  Significant changes in sleep pattern (GOAL): No    Transportation:     Transportation means:: Regular car     Psychosocial:  Mental health DX:: No  Mental health management concern (GOAL):: No  Informal Support system:: Children     Financial/Insurance:   Financial/Insurance concerns (GOAL):: No       Resources and Interventions:  Current Resources:    ;   Community Resources: Housekeeping/[employer] (private pay)  Supplies used at home:: None  Equipment Currently Used at Home: cane, straight, other (see comments)    Advance Care Plan/Directive  Advanced Care Plans/Directives on file:: Yes  Type Advanced Care Plans/Directives: POLST    Referrals Placed: None     Patient/Caregiver understanding: Patient verbalized understanding, engaged in AIDET communication behavior during encounter.    Outreach Frequency: monthly  Future Appointments              In 2 days  ANTICOAGULATION CLINIC [facility] CRISELDA Akhtar    In 1 month 3a, Ea Rn Pal; Dian Frias MD; JULIANE EXAM ROOM [address] [facility] DAVID Butcher    In 3 months Bernadine Maria MD [facility] [place],  PHY BURNS          Plan:   At this time, the patient denies any outstanding need for resources or assistance navigating follow up care. No further care coordination outreaches will be scheduled unless patient initiates or new referral for care coordination is placed.     Sahara Dobbs RN   Clinic Care Coordinator-[place]  PH: [phone]

## 2019-05-29 NOTE — TELEPHONE ENCOUNTER
Urology pt of Dr. Maria. Maia is getting ready to start gentamicin bladder instillations. She called today to report she's received the solution from the pharmacy and has put it in the freezer. Shelf life for solution when frozen is 45 days, and 14 days in the fridge.

## 2019-05-31 ENCOUNTER — TELEPHONE (OUTPATIENT)
Dept: UROLOGY | Facility: CLINIC | Age: 84
End: 2019-05-31

## 2019-05-31 ENCOUNTER — ANTICOAGULATION THERAPY VISIT (OUTPATIENT)
Dept: NURSING | Facility: CLINIC | Age: 84
End: 2019-05-31
Payer: COMMERCIAL

## 2019-05-31 DIAGNOSIS — Z79.01 LONG TERM CURRENT USE OF ANTICOAGULANT THERAPY: ICD-10-CM

## 2019-05-31 DIAGNOSIS — I48.91 ATRIAL FIBRILLATION (H): ICD-10-CM

## 2019-05-31 DIAGNOSIS — N39.0 URINARY TRACT INFECTION: Primary | ICD-10-CM

## 2019-05-31 LAB — INR POINT OF CARE: 3.1 (ref 0.86–1.14)

## 2019-05-31 PROCEDURE — 36416 COLLJ CAPILLARY BLOOD SPEC: CPT

## 2019-05-31 PROCEDURE — 85610 PROTHROMBIN TIME: CPT | Mod: QW

## 2019-05-31 PROCEDURE — 99207 ZZC NO CHARGE NURSE ONLY: CPT

## 2019-05-31 RX ORDER — CEPHALEXIN 500 MG/1
500 CAPSULE ORAL 2 TIMES DAILY
Qty: 10 CAPSULE | Refills: 0 | Status: SHIPPED | OUTPATIENT
Start: 2019-05-31 | End: 2019-07-01

## 2019-05-31 NOTE — TELEPHONE ENCOUNTER
----- Message from Bernadine Maria MD sent at 5/31/2019  4:15 PM CDT -----  Regarding: RE: Preliminary UC results  Make sure she is pushing fluids and catheterizing frequently    Did she start intavescial gentamicin yet?    Keflex 500mg po BID x 5 days (she has taken this before), still needs to monitor her INR    C  ----- Message -----  From: Bernadine Gale RN  Sent: 5/31/2019  12:29 PM  To: Bernadine Maria MD  Subject: Preliminary UC results                           Prelim UC shows >100,000 E. Coli.   Protocol meds contraindicated due to Sulfa allergy, coumadin interaction, and elevated creatinine, respectively.   She is symptomatic. Anything else we could start before the weekend?  Thank you,  Bernadine

## 2019-05-31 NOTE — PROGRESS NOTES
"ANTICOAGULATION FOLLOW-UP CLINIC VISIT    Patient Name:  Maia Miranda  Date:  5/31/2019  Contact Type:  Face to Face    SUBJECTIVE:  Patient Findings     Positives:   Change in health, Change in medications    Comments:   Patient taking Tylenol once daily for knee pain, states her knee is doing ok and will f/u with ortho once \"this urine thing\" is taken care of. Will be starting gentamycin wash with self-cathing to break the UTI cycle. Otherwise, diet remains stable, finished course of prednisone. INR 3.1 today, per protocol, no change in dosing at this time. Patient's inflammation could be cause of slightly supratherapeutic reading today. Patient will increase green veggies by 1 serving over the next few days and return in 2 weeks to recheck INR.   Dian ALLEN RN  Anticoagulation Clinic  [place]          Clinical Outcomes     Comments:   Patient taking Tylenol once daily for knee pain, states her knee is doing ok and will f/u with ortho once \"this urine thing\" is taken care of. Will be starting gentamycin wash with self-cathing to break the UTI cycle. Otherwise, diet remains stable, finished course of prednisone. INR 3.1 today, per protocol, no change in dosing at this time. Patient's inflammation could be cause of slightly supratherapeutic reading today. Patient will increase green veggies by 1 serving over the next few days and return in 2 weeks to recheck INR.   Dian ALLEN RN  Anticoagulation Clinic  [place]             OBJECTIVE    INR Protime   Date Value Ref Range Status   05/31/2019 3.1 (A) 0.86 - 1.14 Final       ASSESSMENT / PLAN  INR assessment THER    Recheck INR In: 2 WEEKS    INR Location Clinic      Anticoagulation Summary  As of 5/31/2019    INR goal:   2.0-3.0   TTR:   77.8 % (3.3 y)   INR used for dosing:   3.1! (5/31/2019)   Warfarin maintenance plan:   2.5 mg (2.5 mg x 1) every day   Full warfarin instructions:   2.5 mg every day   Weekly warfarin total:   17.5 mg   No change documented: "   Dian Astudillo RN   Plan last modified:   Meme Riggs RN (1/18/2017)   Next INR check:   6/14/2019   Target end date:   Indefinite    Indications    Long term current use of anticoagulant therapy [Z79.01]  Atrial fibrillation (H) [I48.91]             Anticoagulation Episode Summary     INR check location:   Anticoagulation Clinic    Preferred lab:       Send INR reminders to:    ANTICOAG CLINIC    Comments:   2.5mg tablets // 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

## 2019-05-31 NOTE — PROGRESS NOTES
Urology pt of Dr. Maria had UA/UC collected 5/28/19. Final result not in on U/C but preliminary shows >100,00 E coli. Can't use meds on UTI Protocol due to sulfa allergy, coumadin interaction, and elevated creatinine. Will notify provider. In Basket message sent.    Per Dr. Maria, Rx for keflex e-scribed to pt's pharmacy. Pt informed. She expressed understanding.

## 2019-06-01 LAB
BACTERIA SPEC CULT: ABNORMAL
Lab: ABNORMAL
SPECIMEN SOURCE: ABNORMAL

## 2019-06-03 NOTE — RESULT ENCOUNTER NOTE
Called patient and informed her of infection. She will keep taking ABT and follow-up as planned.     Marianela Johnson LPN

## 2019-06-10 ENCOUNTER — MEDICAL CORRESPONDENCE (OUTPATIENT)
Dept: HEALTH INFORMATION MANAGEMENT | Facility: CLINIC | Age: 84
End: 2019-06-10

## 2019-06-10 ENCOUNTER — TELEPHONE (OUTPATIENT)
Dept: UROLOGY | Facility: CLINIC | Age: 84
End: 2019-06-10

## 2019-06-10 DIAGNOSIS — N39.0 URINARY TRACT INFECTION: ICD-10-CM

## 2019-06-10 DIAGNOSIS — N39.0 URINARY TRACT INFECTION: Primary | ICD-10-CM

## 2019-06-10 LAB
ALBUMIN UR-MCNC: NEGATIVE MG/DL
APPEARANCE UR: ABNORMAL
BACTERIA #/AREA URNS HPF: ABNORMAL /HPF
BILIRUB UR QL STRIP: NEGATIVE
COLOR UR AUTO: YELLOW
GLUCOSE UR STRIP-MCNC: NEGATIVE MG/DL
HGB UR QL STRIP: ABNORMAL
KETONES UR STRIP-MCNC: NEGATIVE MG/DL
LEUKOCYTE ESTERASE UR QL STRIP: ABNORMAL
NITRATE UR QL: NEGATIVE
NON-SQ EPI CELLS #/AREA URNS LPF: ABNORMAL /LPF
PH UR STRIP: 5.5 PH (ref 5–7)
RBC #/AREA URNS AUTO: ABNORMAL /HPF
SOURCE: ABNORMAL
SP GR UR STRIP: <=1.005 (ref 1–1.03)
UROBILINOGEN UR STRIP-ACNC: 0.2 EU/DL (ref 0.2–1)
WBC #/AREA URNS AUTO: ABNORMAL /HPF

## 2019-06-10 PROCEDURE — 87088 URINE BACTERIA CULTURE: CPT | Performed by: UROLOGY

## 2019-06-10 PROCEDURE — 87186 SC STD MICRODIL/AGAR DIL: CPT | Performed by: UROLOGY

## 2019-06-10 PROCEDURE — 81001 URINALYSIS AUTO W/SCOPE: CPT | Performed by: UROLOGY

## 2019-06-10 PROCEDURE — 87086 URINE CULTURE/COLONY COUNT: CPT | Performed by: UROLOGY

## 2019-06-10 NOTE — TELEPHONE ENCOUNTER
Urology pt of Dr. Maria recently treated for UTI calls again this morning reporting return of cloudy urine starting yesterday. Orders placed for UA/UC. Will inform provider.    Have been trying to get Maia started with Gentamicin instillations in an attempt to prevent these recurrent infections but having difficulty getting these started.   Maia received the solution and has that ready to use, but the supplies from [employer] have been the hold-up. Called [facility] again today and left message.   When I spoke with Maia re: another UTI she stated she had received a letter from [facility] that she had been approved for the irrigation supplies so anticipating she will receive these soon.

## 2019-06-12 ENCOUNTER — TELEPHONE (OUTPATIENT)
Dept: UROLOGY | Facility: CLINIC | Age: 84
End: 2019-06-12

## 2019-06-12 DIAGNOSIS — N39.0 URINARY TRACT INFECTION: Primary | ICD-10-CM

## 2019-06-12 LAB
BACTERIA SPEC CULT: ABNORMAL
SPECIMEN SOURCE: ABNORMAL

## 2019-06-12 RX ORDER — CEPHALEXIN 500 MG/1
500 CAPSULE ORAL 2 TIMES DAILY
Qty: 10 CAPSULE | Refills: 0 | Status: SHIPPED | OUTPATIENT
Start: 2019-06-12 | End: 2019-07-01

## 2019-06-12 NOTE — TELEPHONE ENCOUNTER
Rx sent to pharmacy as below. Called Maia to inform. Requested she call clinic as soon as she receives supplies from [employer] for Gentamicin instillations.    ----- Message from Bernadine Maria MD sent at 6/12/2019  9:28 AM CDT -----  Ms Miranda    Here is the culture results.  Please contact the clinic [phone] to verify your allergies and pharmacy.  Recommend cephalexin 500mg twice a day for 5 days.  Continue to push fluids and catheterize frequently.  Hopefully we can get the gentamicin irrigations started to try to break this cycle    Best    MERYL Maria MD

## 2019-06-14 ENCOUNTER — TELEPHONE (OUTPATIENT)
Dept: UROLOGY | Facility: CLINIC | Age: 84
End: 2019-06-14

## 2019-06-14 ENCOUNTER — ANTICOAGULATION THERAPY VISIT (OUTPATIENT)
Dept: NURSING | Facility: CLINIC | Age: 84
End: 2019-06-14
Payer: COMMERCIAL

## 2019-06-14 DIAGNOSIS — I48.91 ATRIAL FIBRILLATION (H): ICD-10-CM

## 2019-06-14 DIAGNOSIS — Z79.01 LONG TERM CURRENT USE OF ANTICOAGULANT THERAPY: ICD-10-CM

## 2019-06-14 LAB — INR POINT OF CARE: 2.7 (ref 0.86–1.14)

## 2019-06-14 PROCEDURE — 99207 ZZC NO CHARGE NURSE ONLY: CPT

## 2019-06-14 PROCEDURE — 36416 COLLJ CAPILLARY BLOOD SPEC: CPT

## 2019-06-14 PROCEDURE — 85610 PROTHROMBIN TIME: CPT | Mod: QW

## 2019-06-14 NOTE — PROGRESS NOTES
ANTICOAGULATION FOLLOW-UP CLINIC VISIT    Patient Name:  Maia Miranda  Date:  2019  Contact Type:  Face to Face    SUBJECTIVE:  Patient Findings     Positives:   Change in health, Change in medications    Comments:   Patient is on Keflex until  for a recurrent UTI, states the infection seems to be clearing up now. She recently received all her supplies to do the gentamycin wash when she straight caths to stop this UTI cycle. She is awaiting a call from Urology now that she has what she needs - she needs instructions. Otherwise, The patient was assessed for diet, medication, and activity level changes, missed or extra doses, bruising or bleeding, with no problem findings.  Dian ALLEN RN  Anticoagulation Clinic  [place]          Clinical Outcomes     Comments:   Patient is on Keflex until  for a recurrent UTI, states the infection seems to be clearing up now. She recently received all her supplies to do the gentamycin wash when she straight caths to stop this UTI cycle. She is awaiting a call from Urology now that she has what she needs - she needs instructions. Otherwise, The patient was assessed for diet, medication, and activity level changes, missed or extra doses, bruising or bleeding, with no problem findings.  Dian ALLEN RN  Anticoagulation Clinic  [place]             OBJECTIVE    INR Protime   Date Value Ref Range Status   2019 2.7 (A) 0.86 - 1.14 Final       ASSESSMENT / PLAN  INR assessment THER    Recheck INR In: 4 WEEKS    INR Location Clinic      Anticoagulation Summary  As of 2019    INR goal:   2.0-3.0   TTR:   77.8 % (3.3 y)   INR used for dosin.7 (2019)   Warfarin maintenance plan:   2.5 mg (2.5 mg x 1) every day   Full warfarin instructions:   2.5 mg every day   Weekly warfarin total:   17.5 mg   No change documented:   Dian Astudillo RN   Plan last modified:   Meme Riggs RN (2017)   Next INR check:   2019   Target end date:    Indefinite    Indications    Long term current use of anticoagulant therapy [Z79.01]  Atrial fibrillation (H) [I48.91]             Anticoagulation Episode Summary     INR check location:   Anticoagulation Clinic    Preferred lab:       Send INR reminders to:    ANTICOAG CLINIC    Comments:   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

## 2019-06-14 NOTE — TELEPHONE ENCOUNTER
Urology pt of Dr. Maria. Received call from Maia that she received the supply shipment from [employer]. Will arrange appt for teaching.

## 2019-06-20 ENCOUNTER — ALLIED HEALTH/NURSE VISIT (OUTPATIENT)
Dept: UROLOGY | Facility: CLINIC | Age: 84
End: 2019-06-20
Payer: COMMERCIAL

## 2019-06-20 DIAGNOSIS — N39.0 URINARY TRACT INFECTION: ICD-10-CM

## 2019-06-20 DIAGNOSIS — N39.0 URINARY TRACT INFECTION: Primary | ICD-10-CM

## 2019-06-20 LAB
ALBUMIN UR-MCNC: NEGATIVE MG/DL
APPEARANCE UR: CLEAR
BILIRUB UR QL STRIP: NEGATIVE
COLOR UR AUTO: YELLOW
GLUCOSE UR STRIP-MCNC: NEGATIVE MG/DL
HGB UR QL STRIP: ABNORMAL
KETONES UR STRIP-MCNC: NEGATIVE MG/DL
LEUKOCYTE ESTERASE UR QL STRIP: ABNORMAL
NITRATE UR QL: POSITIVE
PH UR STRIP: 5.5 PH (ref 5–7)
SOURCE: ABNORMAL
SP GR UR STRIP: <=1.005 (ref 1–1.03)
UROBILINOGEN UR STRIP-ACNC: 0.2 EU/DL (ref 0.2–1)

## 2019-06-20 PROCEDURE — 87088 URINE BACTERIA CULTURE: CPT | Performed by: UROLOGY

## 2019-06-20 PROCEDURE — 99211 OFF/OP EST MAY X REQ PHY/QHP: CPT

## 2019-06-20 PROCEDURE — 81003 URINALYSIS AUTO W/O SCOPE: CPT | Performed by: UROLOGY

## 2019-06-20 PROCEDURE — 87186 SC STD MICRODIL/AGAR DIL: CPT | Performed by: UROLOGY

## 2019-06-20 PROCEDURE — 87086 URINE CULTURE/COLONY COUNT: CPT | Performed by: UROLOGY

## 2019-06-20 NOTE — PROGRESS NOTES
Patient presents to clinic with discomfort in abdomen. Patient here to learn how to perform SIC gentamicin instillations. She was given written instructions on how to perform. Patient was able to self-perform instillation using 60cc of gentamicin. Patient will call if she has any further questions. A UA was performed in clinic today which revealed:    UA RESULTS:  Recent Labs   Lab Test 06/20/19  1611 06/10/19  1059   COLOR Yellow Yellow   APPEARANCE Clear Slightly Cloudy   URINEGLC Negative Negative   URINEBILI Negative Negative   URINEKETONE Negative Negative   SG <=1.005 <=1.005   UBLD Small* Trace*   URINEPH 5.5 5.5   PROTEIN Negative Negative   UROBILINOGEN 0.2 0.2   NITRITE Positive* Negative   LEUKEST Moderate* Large*   RBCU  --  O - 2   WBCU  --  *     Will wait for culture to treat. Patient is aware we will call in 3 days with culture results.    Maia Miranda comes into clinic today at the request of Dr. Bernadine Maria Ordering Provider for UAUC/Gentamicin instillation.     This service provided today was under the supervising provider of the day Dr. Tashi Bobo , who was available if needed.    Marianela Johnson LPN

## 2019-06-21 LAB
BACTERIA SPEC CULT: ABNORMAL
Lab: ABNORMAL
SPECIMEN SOURCE: ABNORMAL

## 2019-06-22 ENCOUNTER — TELEPHONE (OUTPATIENT)
Dept: UROLOGY | Facility: CLINIC | Age: 84
End: 2019-06-22

## 2019-06-22 DIAGNOSIS — N39.0 URINARY TRACT INFECTION WITHOUT HEMATURIA, SITE UNSPECIFIED: Primary | ICD-10-CM

## 2019-06-22 RX ORDER — NITROFURANTOIN 25; 75 MG/1; MG/1
100 CAPSULE ORAL 2 TIMES DAILY
Qty: 14 CAPSULE | Refills: 0 | Status: SHIPPED | OUTPATIENT
Start: 2019-06-22 | End: 2019-07-01

## 2019-06-22 NOTE — TELEPHONE ENCOUNTER
Returned patient's call and reviewed UCx results from 6/20. >100k pan sensitive E coli. Based on allergies and warfarin status, script for Macrobid 100mg BID sent to pharmacy.    Kip Mueller M.D.

## 2019-07-01 ENCOUNTER — TELEPHONE (OUTPATIENT)
Dept: UROLOGY | Facility: CLINIC | Age: 84
End: 2019-07-01

## 2019-07-01 ENCOUNTER — OFFICE VISIT (OUTPATIENT)
Dept: PEDIATRICS | Facility: CLINIC | Age: 84
End: 2019-07-01
Payer: COMMERCIAL

## 2019-07-01 VITALS
SYSTOLIC BLOOD PRESSURE: 166 MMHG | HEART RATE: 54 BPM | TEMPERATURE: 96.4 F | DIASTOLIC BLOOD PRESSURE: 94 MMHG | BODY MASS INDEX: 24.3 KG/M2 | WEIGHT: 146 LBS | OXYGEN SATURATION: 95 %

## 2019-07-01 DIAGNOSIS — R51.9 NONINTRACTABLE EPISODIC HEADACHE, UNSPECIFIED HEADACHE TYPE: ICD-10-CM

## 2019-07-01 DIAGNOSIS — E78.5 HYPERLIPIDEMIA LDL GOAL <130: ICD-10-CM

## 2019-07-01 DIAGNOSIS — F43.9 STRESS: ICD-10-CM

## 2019-07-01 DIAGNOSIS — I10 HYPERTENSION GOAL BP (BLOOD PRESSURE) < 150/90: Primary | ICD-10-CM

## 2019-07-01 DIAGNOSIS — N18.30 CKD (CHRONIC KIDNEY DISEASE) STAGE 3, GFR 30-59 ML/MIN (H): ICD-10-CM

## 2019-07-01 DIAGNOSIS — N39.0 RECURRENT UTI: ICD-10-CM

## 2019-07-01 DIAGNOSIS — M25.562 ACUTE PAIN OF LEFT KNEE: ICD-10-CM

## 2019-07-01 DIAGNOSIS — H61.22 IMPACTED CERUMEN OF LEFT EAR: ICD-10-CM

## 2019-07-01 LAB
ALBUMIN SERPL-MCNC: 3.7 G/DL (ref 3.4–5)
ALP SERPL-CCNC: 86 U/L (ref 40–150)
ALT SERPL W P-5'-P-CCNC: 26 U/L (ref 0–50)
ANION GAP SERPL CALCULATED.3IONS-SCNC: 8 MMOL/L (ref 3–14)
AST SERPL W P-5'-P-CCNC: 25 U/L (ref 0–45)
BASOPHILS # BLD AUTO: 0 10E9/L (ref 0–0.2)
BASOPHILS NFR BLD AUTO: 0.2 %
BILIRUB SERPL-MCNC: 0.5 MG/DL (ref 0.2–1.3)
BUN SERPL-MCNC: 26 MG/DL (ref 7–30)
CALCIUM SERPL-MCNC: 9.3 MG/DL (ref 8.5–10.1)
CHLORIDE SERPL-SCNC: 110 MMOL/L (ref 94–109)
CHOLEST SERPL-MCNC: 187 MG/DL
CO2 SERPL-SCNC: 25 MMOL/L (ref 20–32)
CREAT SERPL-MCNC: 1.22 MG/DL (ref 0.52–1.04)
DIFFERENTIAL METHOD BLD: ABNORMAL
EOSINOPHIL # BLD AUTO: 0.1 10E9/L (ref 0–0.7)
EOSINOPHIL NFR BLD AUTO: 1.6 %
ERYTHROCYTE [DISTWIDTH] IN BLOOD BY AUTOMATED COUNT: 13 % (ref 10–15)
ERYTHROCYTE [SEDIMENTATION RATE] IN BLOOD BY WESTERGREN METHOD: 24 MM/H (ref 0–30)
GFR SERPL CREATININE-BSD FRML MDRD: 40 ML/MIN/{1.73_M2}
GLUCOSE SERPL-MCNC: 103 MG/DL (ref 70–99)
HCT VFR BLD AUTO: 39 % (ref 35–47)
HDLC SERPL-MCNC: 46 MG/DL
HGB BLD-MCNC: 12.7 G/DL (ref 11.7–15.7)
LDLC SERPL CALC-MCNC: 119 MG/DL
LYMPHOCYTES # BLD AUTO: 0.8 10E9/L (ref 0.8–5.3)
LYMPHOCYTES NFR BLD AUTO: 15.7 %
MCH RBC QN AUTO: 32.5 PG (ref 26.5–33)
MCHC RBC AUTO-ENTMCNC: 32.6 G/DL (ref 31.5–36.5)
MCV RBC AUTO: 100 FL (ref 78–100)
MONOCYTES # BLD AUTO: 0.4 10E9/L (ref 0–1.3)
MONOCYTES NFR BLD AUTO: 9.1 %
NEUTROPHILS # BLD AUTO: 3.6 10E9/L (ref 1.6–8.3)
NEUTROPHILS NFR BLD AUTO: 73.4 %
NONHDLC SERPL-MCNC: 141 MG/DL
PLATELET # BLD AUTO: 138 10E9/L (ref 150–450)
POTASSIUM SERPL-SCNC: 4.6 MMOL/L (ref 3.4–5.3)
PROT SERPL-MCNC: 7.2 G/DL (ref 6.8–8.8)
RBC # BLD AUTO: 3.91 10E12/L (ref 3.8–5.2)
SODIUM SERPL-SCNC: 143 MMOL/L (ref 133–144)
TRIGL SERPL-MCNC: 111 MG/DL
WBC # BLD AUTO: 4.9 10E9/L (ref 4–11)

## 2019-07-01 PROCEDURE — 80053 COMPREHEN METABOLIC PANEL: CPT | Performed by: INTERNAL MEDICINE

## 2019-07-01 PROCEDURE — 99215 OFFICE O/P EST HI 40 MIN: CPT | Mod: 25 | Performed by: INTERNAL MEDICINE

## 2019-07-01 PROCEDURE — 80061 LIPID PANEL: CPT | Performed by: INTERNAL MEDICINE

## 2019-07-01 PROCEDURE — 85652 RBC SED RATE AUTOMATED: CPT | Performed by: INTERNAL MEDICINE

## 2019-07-01 PROCEDURE — 36415 COLL VENOUS BLD VENIPUNCTURE: CPT | Performed by: INTERNAL MEDICINE

## 2019-07-01 PROCEDURE — 85025 COMPLETE CBC W/AUTO DIFF WBC: CPT | Performed by: INTERNAL MEDICINE

## 2019-07-01 PROCEDURE — 69209 REMOVE IMPACTED EAR WAX UNI: CPT | Mod: LT | Performed by: INTERNAL MEDICINE

## 2019-07-01 RX ORDER — LISINOPRIL 40 MG/1
40 TABLET ORAL DAILY
Qty: 90 TABLET | Refills: 1 | Status: SHIPPED | OUTPATIENT
Start: 2019-07-01 | End: 2019-12-20

## 2019-07-01 NOTE — PROGRESS NOTES
Subjective     Maia Miranda is a 86 year old female who presents to clinic today for the following health issues:    HPI   1. Blood pressure: elevated today. Did not take blood pressure pills today because wasn't sure if we needed to do lab work. Has been more elevated when at other visits as well. Has not been under 140 for awhile.     2. Headaches: still having pain on left side. Feels hot in that area. Having headaches at least every day. Sometimes more than once a day. Headaches last for an hour or so. Always on the left side. Taking tylenol - doesn't take every time. Tries to wait it out first and see if it will go away on it's own. Taking at least a couple of times a week. Doesn't take every day. Usually has the headache when wakes up. Not aware of anything that makes it worse. Has chills when has it. No fevers. Not sensitive to light or sound. Very stressed out with trying to get the bladder irrigation to work. Sees little black spots that comes and goes. No blurry vision. No weakness or numbness.      3. Recurrent UTIs: started gent irrigation at night. Having a hard time completing the procedure with the longer cather. Has a hard time getting it where it is supposed to be. Plans to call Urology today to discuss further. Has had multiple urinary tract infections recently. Finished oral antibiotics.     4. Palpitations: still having palpitations, but overall improved from where it was before. Has tried walking some. No shortness of breath. Feels more stressed.     5. Knee swelling: left knee effusion. Seen in ER on 5/24. Had fluid removed. Doesn't lock as much since fluid was removed, but still having pain. Hasn't swollen back up. Pain in superior/medial portion of pain. Hasn't had a chance to follow-up with Orthopedics yet.     6. Left ear pain: has had more pain in left ear lately. Has history of impacted cerumen.     Did not sleep well last night with the storms. Had to put ibeth down because he had  cancer about 3 weeks ago. No longer going on trip in July with all the medical issues. Daughter that was living in [place] is moving to [place] in July and she plans to stay in [place].     Reviewed and updated as needed this visit by Provider  Tobacco  Allergies  Meds  Problems  Med Hx  Surg Hx  Fam Hx       Review of Systems   ROS COMP: Constitutional, HEENT, cardiovascular, pulmonary, GI, , musculoskeletal, neuro, skin, endocrine and psych systems are negative, except as otherwise noted.      Objective    BP (!) 166/94 (BP Location: Right arm, Patient Position: Sitting, Cuff Size: Adult Regular)   Pulse 54   Temp 96.4  F (35.8  C) (Tympanic)   Wt 146 lb (66.2 kg)   SpO2 95%   BMI 24.30 kg/m    Body mass index is 24.3 kg/m .  Physical Exam   GENERAL: healthy, alert and no distress, forgetful at times.  EYES: Eyes grossly normal to inspection, PERRL and conjunctivae and sclerae normal  HENT: impacted cerumen in left ear. Washed out by MA. ear canals and TM's normal, nose and mouth without ulcers or lesions  NECK: no adenopathy, no asymmetry, masses, or scars and thyroid normal to palpation  RESP: lungs clear to auscultation - no rales, rhonchi or wheezes  CV: regular rate and rhythm, normal S1 S2, no S3 or S4, no murmur, click or rub, no peripheral edema and peripheral pulses strong  ABDOMEN: soft, nontender, no hepatosplenomegaly, no masses and bowel sounds normal  NEURO: alert and oriented x3, CN II-XII intact. Normal tone and strength in all four extremities.. Normal rapid alternating movements. Normal gait. Romberg negative.  Psychiatric: appears stressed.     Diagnostic Test Results:  Labs reviewed in Epic  Results for orders placed or performed in visit on 07/01/19   Lipid panel reflex to direct LDL Fasting   Result Value Ref Range    Cholesterol 187 <200 mg/dL    Triglycerides 111 <150 mg/dL    HDL Cholesterol 46 (L) >49 mg/dL    LDL Cholesterol Calculated 119 (H) <100 mg/dL    Non HDL Cholesterol 141 (H)  <130 mg/dL   Comprehensive metabolic panel   Result Value Ref Range    Sodium 143 133 - 144 mmol/L    Potassium 4.6 3.4 - 5.3 mmol/L    Chloride 110 (H) 94 - 109 mmol/L    Carbon Dioxide 25 20 - 32 mmol/L    Anion Gap 8 3 - 14 mmol/L    Glucose 103 (H) 70 - 99 mg/dL    Urea Nitrogen 26 7 - 30 mg/dL    Creatinine 1.22 (H) 0.52 - 1.04 mg/dL    GFR Estimate 40 (L) >60 mL/min/[1.73_m2]    GFR Estimate If Black 46 (L) >60 mL/min/[1.73_m2]    Calcium 9.3 8.5 - 10.1 mg/dL    Bilirubin Total 0.5 0.2 - 1.3 mg/dL    Albumin 3.7 3.4 - 5.0 g/dL    Protein Total 7.2 6.8 - 8.8 g/dL    Alkaline Phosphatase 86 40 - 150 U/L    ALT 26 0 - 50 U/L    AST 25 0 - 45 U/L   CBC with platelets differential   Result Value Ref Range    WBC 4.9 4.0 - 11.0 10e9/L    RBC Count 3.91 3.8 - 5.2 10e12/L    Hemoglobin 12.7 11.7 - 15.7 g/dL    Hematocrit 39.0 35.0 - 47.0 %     78 - 100 fl    MCH 32.5 26.5 - 33.0 pg    MCHC 32.6 31.5 - 36.5 g/dL    RDW 13.0 10.0 - 15.0 %    Platelet Count 138 (L) 150 - 450 10e9/L    % Neutrophils 73.4 %    % Lymphocytes 15.7 %    % Monocytes 9.1 %    % Eosinophils 1.6 %    % Basophils 0.2 %    Absolute Neutrophil 3.6 1.6 - 8.3 10e9/L    Absolute Lymphocytes 0.8 0.8 - 5.3 10e9/L    Absolute Monocytes 0.4 0.0 - 1.3 10e9/L    Absolute Eosinophils 0.1 0.0 - 0.7 10e9/L    Absolute Basophils 0.0 0.0 - 0.2 10e9/L    Diff Method Automated Method    Erythrocyte sedimentation rate auto   Result Value Ref Range    Sed Rate 24 0 - 30 mm/h           Assessment & Plan     1. Hypertension goal BP (blood pressure) < 150/90  Not controlled. Could be contributing to headaches. Likely from stress. Increase lisinopril to 40 mg daily. Continue metoprolol 100 mg once a day. Discussed possible side effects.   - Comprehensive metabolic panel  - lisinopril (PRINIVIL/ZESTRIL) 40 MG tablet; Take 1 tablet (40 mg) by mouth daily  Dispense: 90 tablet; Refill: 1    2. Nonintractable episodic headache, unspecified headache type  Frequent  headaches on left side. Head not hot to the touch externally. Wonder if related to her blood pressure. Sed rate negative making vasculitic process less likely and location atypical for this. Will reduce blood pressure if symptoms do not improving, will need to consider head imaging as next step. Could consider daily preventive medication, but I am concerned about side effects at her age.  - CBC with platelets differential  - Erythrocyte sedimentation rate auto    3. Recurrent UTI  4. Stress  Recurrent UTIs and bladder irrigation with gent has been very stressful for her. She will touch base with Urology and see if there is something she needs to do differently as having difficult with longer catheters.     5. Hyperlipidemia LDL goal <130  - Lipid panel reflex to direct LDL Fasting    6. CKD (chronic kidney disease) stage 3, GFR 30-59 ml/min (H)  - Comprehensive metabolic panel    7. Acute pain of left knee  Continues to have pain although somewhat improved. She plans to follow-up with orthopedics, but has not yet had time.    8. Impacted cerumen of left ear  Removed in clinic by ear wash. Ear looks normal after removal.    A total of 45 minutes was spent in face-to-face contact with Maia in clinic today, of which >50% was spent counseling or in coordination of care regarding hypertension, headaches, recurrent UTIs, stress, hyperlipidemia, CKD, knee pain and impacted cerumen/ear fullness..    Return in about 7 weeks (around 8/19/2019) for Physical Exam, recheck headaches.    Dian Frias MD  [facility]

## 2019-07-01 NOTE — PATIENT INSTRUCTIONS
1. Increase lisinopril to 40 mg once a day  - you can take 2 of what you have. Will take 1 pill with new prescription  2. Labs today: blood counts, electrolytes, liver function, kidney function, cholesterol, blood sugar, sed rate (measure of inflammation)  3. Wash out left ear today  4. Follow-up 8/19 for annual wellness visit and to recheck headaches

## 2019-07-01 NOTE — TELEPHONE ENCOUNTER
Called Maia back. Catheters are hydrophyllic but she's still having difficulty with insertion. Ordered packets of sterile lubricant for her to use. She expressed understanding.    [facility]    Phone Message    May a detailed message be left on voicemail: yes    Reason for Call: Other: The pt states that the catheters mailed to her were NOT coated, and she needs coated to use them as she has a hard time with them. Please call the pt to discuss. Thanks.    Action Taken: Message routed to:  [facility]

## 2019-07-01 NOTE — PROGRESS NOTES
Medical Assistant Note:  Maia Miranda presents today for labs.    Patient seen by provider today: Yes: Serum.   present during visit today: Not Applicable.    Concerns: No Concerns.    Procedure:  Lab draw site: RAC, Needle type: butterfly, Gauge: 23.    Post Assessment:  Labs drawn without difficulty: Yes.    Discharge Plan:  PT tolerated procedure well. Gauze and coban applied. Walked blood to lab.     Face to Face Time: 8min.    Nat Rosenbaum MA      Patient identified using two patient identifiers.  Ear exam showing wax occlusion completed by provider.  H202/H20 was placed in the left ear(s) via irrigation tool: elephant ear wash.  Provider to recheck ear canal.   Nat Rosenbaum MA

## 2019-07-02 ENCOUNTER — TELEPHONE (OUTPATIENT)
Dept: UROLOGY | Facility: CLINIC | Age: 84
End: 2019-07-02

## 2019-07-02 NOTE — TELEPHONE ENCOUNTER
"Called both [facility] and Maia. It seems Medicare is denying the Irrigation Trays because they are considered \"routine\". The catheters and lubricant are covered. [facility] says they will call me when they get the details and I may be able to use documentation to get coverage authorized because this is being used for a limited time (for the Gentamicin instillations).      [facility] Call Center    Phone Message    May a detailed message be left on voicemail: yes    Reason for Call: Other: Patient would like to talk with Dr. Maria's nurse or someone in the clinic regarding a letter she received from [facility] about denying her general irrigation supplies.  Please reach out to patient.t     Action Taken: Message routed to:  [facility] Urology     "

## 2019-07-05 ENCOUNTER — TELEPHONE (OUTPATIENT)
Dept: UROLOGY | Facility: CLINIC | Age: 84
End: 2019-07-05

## 2019-07-05 DIAGNOSIS — N39.0 URINARY TRACT INFECTION: ICD-10-CM

## 2019-07-05 DIAGNOSIS — R82.90 CLOUDY URINE: Primary | ICD-10-CM

## 2019-07-05 DIAGNOSIS — R82.90 CLOUDY URINE: ICD-10-CM

## 2019-07-05 LAB
ALBUMIN UR-MCNC: 100 MG/DL
APPEARANCE UR: CLEAR
BILIRUB UR QL STRIP: NEGATIVE
COLOR UR AUTO: YELLOW
GLUCOSE UR STRIP-MCNC: NEGATIVE MG/DL
HGB UR QL STRIP: ABNORMAL
KETONES UR STRIP-MCNC: NEGATIVE MG/DL
LEUKOCYTE ESTERASE UR QL STRIP: ABNORMAL
NITRATE UR QL: POSITIVE
PH UR STRIP: 5.5 PH (ref 5–7)
SOURCE: ABNORMAL
SP GR UR STRIP: 1.01 (ref 1–1.03)
UROBILINOGEN UR STRIP-ACNC: 0.2 EU/DL (ref 0.2–1)

## 2019-07-05 PROCEDURE — 87088 URINE BACTERIA CULTURE: CPT | Performed by: UROLOGY

## 2019-07-05 PROCEDURE — 87186 SC STD MICRODIL/AGAR DIL: CPT | Performed by: UROLOGY

## 2019-07-05 PROCEDURE — 87086 URINE CULTURE/COLONY COUNT: CPT | Performed by: UROLOGY

## 2019-07-05 PROCEDURE — 81003 URINALYSIS AUTO W/O SCOPE: CPT | Performed by: UROLOGY

## 2019-07-05 NOTE — TELEPHONE ENCOUNTER
Called Maia and will bring her to clinic for UA/UC today. Orders placed. She expressed understanding.      [facility] Call Center    Phone Message    May a detailed message be left on voicemail: yes    Reason for Call: Symptoms or Concerns     If patient has red-flag symptoms, warm transfer to triage line    Current symptom or concern: Cloudy Urine    Symptoms have been present for: 24 hour(s)    Has patient previously been seen for this? Yes    By: Crow    Date: N/A    Are there any new or worsening symptoms? No      Action Taken: Message routed to:  [facility] urology

## 2019-07-08 ENCOUNTER — TELEPHONE (OUTPATIENT)
Dept: UROLOGY | Facility: CLINIC | Age: 84
End: 2019-07-08

## 2019-07-08 LAB
BACTERIA SPEC CULT: ABNORMAL
SPECIMEN SOURCE: ABNORMAL

## 2019-07-08 NOTE — TELEPHONE ENCOUNTER
[facility] Call Center    Phone Message    May a detailed message be left on voicemail: yes    Reason for Call: Other: Pt said she dropped off a urine sample and has not heard the results yet, she said she dropped it off at [facility], please call to give results     Action Taken: Message routed to:  [facility] [place]

## 2019-07-08 NOTE — TELEPHONE ENCOUNTER
Informed patient that culture was positive for another UTI. Waiting on orders from Dr Maria.Laura Ingram LPN

## 2019-07-09 ENCOUNTER — TELEPHONE (OUTPATIENT)
Dept: UROLOGY | Facility: CLINIC | Age: 84
End: 2019-07-09

## 2019-07-09 DIAGNOSIS — N39.0 URINARY TRACT INFECTION: Primary | ICD-10-CM

## 2019-07-09 RX ORDER — CEPHALEXIN 500 MG/1
500 CAPSULE ORAL 2 TIMES DAILY
Qty: 10 CAPSULE | Refills: 0 | Status: SHIPPED | OUTPATIENT
Start: 2019-07-09 | End: 2019-08-19

## 2019-07-09 NOTE — TELEPHONE ENCOUNTER
Called Maia and she reports she's having symptoms of frequency and cloudy urine. She is pushing fluids. She's uncomfortable and would like antibiotic. Rx e-scribed to pt's pharmacy. She will inform her coumadin clinic as requested.    ----- Message from Bernadine Maria MD sent at 7/8/2019 11:33 AM CDT -----  Ms Ruben your urine culture does have bacteria.  If you are still having symptoms then we can treat, otherwise recommend that you push fluids and catheterize frequently.  Please contact the clinic [phone] to verify your allergies and pharmacy.  If you are having symptoms recommend cephalexin 500mg twice a day for 5 days.  Please make sure your coumadin clinic knows that your are starting this medication    Umberto Maria MD

## 2019-07-12 ENCOUNTER — TELEPHONE (OUTPATIENT)
Dept: PEDIATRICS | Facility: CLINIC | Age: 84
End: 2019-07-12

## 2019-07-12 ENCOUNTER — ANTICOAGULATION THERAPY VISIT (OUTPATIENT)
Dept: NURSING | Facility: CLINIC | Age: 84
End: 2019-07-12
Payer: COMMERCIAL

## 2019-07-12 ENCOUNTER — ALLIED HEALTH/NURSE VISIT (OUTPATIENT)
Dept: PEDIATRICS | Facility: CLINIC | Age: 84
End: 2019-07-12
Payer: COMMERCIAL

## 2019-07-12 VITALS — DIASTOLIC BLOOD PRESSURE: 62 MMHG | SYSTOLIC BLOOD PRESSURE: 152 MMHG

## 2019-07-12 DIAGNOSIS — G31.84 MILD COGNITIVE IMPAIRMENT: ICD-10-CM

## 2019-07-12 DIAGNOSIS — Z87.440 HISTORY OF RECURRENT UTIS: Primary | ICD-10-CM

## 2019-07-12 DIAGNOSIS — R33.9 URINARY RETENTION: ICD-10-CM

## 2019-07-12 DIAGNOSIS — Z01.30 BP CHECK: Primary | ICD-10-CM

## 2019-07-12 DIAGNOSIS — I48.91 ATRIAL FIBRILLATION (H): ICD-10-CM

## 2019-07-12 DIAGNOSIS — Z79.01 LONG TERM CURRENT USE OF ANTICOAGULANT THERAPY: ICD-10-CM

## 2019-07-12 LAB — INR POINT OF CARE: 2.5 (ref 0.86–1.14)

## 2019-07-12 PROCEDURE — 99207 ZZC NO CHARGE NURSE ONLY: CPT

## 2019-07-12 PROCEDURE — 85610 PROTHROMBIN TIME: CPT | Mod: QW

## 2019-07-12 PROCEDURE — 99207 ZZC NO CHARGE NURSE ONLY: CPT | Performed by: INTERNAL MEDICINE

## 2019-07-12 PROCEDURE — 36416 COLLJ CAPILLARY BLOOD SPEC: CPT

## 2019-07-12 NOTE — PROGRESS NOTES
ANTICOAGULATION FOLLOW-UP CLINIC VISIT    Patient Name:  Maia Miranda  Date:  7/12/2019  Contact Type:  Face to Face    SUBJECTIVE:  Patient Findings     Positives:   Change in medications    Comments:   Patient is on cephalexin for recurrent UTI. Is not yet doing the bladder flushes with gentamycin since she is on Keflex now. Has had this before and usually does not affect her INR. Therapeutic today. The patient was assessed for diet, medication, and activity level changes, missed or extra doses, bruising or bleeding, with no problem findings. She plans to have a nurse come in at night to help with it once she start. States her daughter-in-law is checking on who can do this. Offered to bring Care Coordination in on this to help manage everything going on with her health lately and see if they can help with a homecare nurse. Patient in agreement. Referral for CC placed.    Addendum: on 7/29/19 patient contacted INR nurse stating she started on 5-day course of ciprofloxacin for recurrent UTI on 7/29/19. Per nursing note, patient was on cipro 500 mg last year 7/9/18 and INR was 3.3 on 7/11/18 after 2 days of medication.   Patient was advised to take 1.25 mg (1/2 dose) on 7/29/19 and then go back to maintenance dose of 2.5 mg daily. Recheck INR at previously scheduled date.   Dian ALLEN RN  Anticoagulation Clinic  [place]          Clinical Outcomes     Comments:   Patient is on cephalexin for recurrent UTI. Is not yet doing the bladder flushes with gentamycin since she is on Keflex now. Has had this before and usually does not affect her INR. Therapeutic today. The patient was assessed for diet, medication, and activity level changes, missed or extra doses, bruising or bleeding, with no problem findings. She plans to have a nurse come in at night to help with it once she start. States her daughter-in-law is checking on who can do this. Offered to bring Care Coordination in on this to help manage everything going  on with her health lately and see if they can help with a homecare nurse. Patient in agreement. Referral for CC placed.    Addendum: on 19 patient contacted INR nurse stating she started on 5-day course of ciprofloxacin for recurrent UTI on 19. Per nursing note, patient was on cipro 500 mg last year 18 and INR was 3.3 on 18 after 2 days of medication.   Patient was advised to take 1.25 mg (1/2 dose) on 19 and then go back to maintenance dose of 2.5 mg daily. Recheck INR at previously scheduled date.   Dian ALLEN RN  Anticoagulation Clinic  [place]             OBJECTIVE    INR Protime   Date Value Ref Range Status   2019 2.5 (A) 0.86 - 1.14 Final       ASSESSMENT / PLAN  INR assessment THER    Recheck INR In: 4 WEEKS    INR Location Clinic      Anticoagulation Summary  As of 2019    INR goal:   2.0-3.0   TTR:   78.3 % (3.4 y)   INR used for dosin.5 (2019)   Warfarin maintenance plan:   2.5 mg (2.5 mg x 1) every day   Full warfarin instructions:   : 1.25 mg; Otherwise 2.5 mg every day   Weekly warfarin total:   17.5 mg   Plan last modified:   Meme Riggs RN (2017)   Next INR check:   2019   Target end date:   Indefinite    Indications    Long term current use of anticoagulant therapy [Z79.01]  Atrial fibrillation (H) [I48.91]             Anticoagulation Episode Summary     INR check location:   Anticoagulation Clinic    Preferred lab:       Send INR reminders to:   MICHAEL ABURTO    Comments:   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

## 2019-07-12 NOTE — PROGRESS NOTES
Maia Miranda was evaluated at [facility] on July 12, 2019 at which time her blood pressure was:    BP Readings from Last 3 Encounters:   07/12/19 152/62   07/01/19 (!) 166/94   05/24/19 153/72     Pulse Readings from Last 3 Encounters:   07/01/19 54   05/24/19 59   05/24/19 59       Reviewed lifestyle modifications for blood pressure control and reduction: including making healthy food choices, managing weight, getting regular exercise, smoking cessation, reducing alcohol consumption, monitoring blood pressure regularly.     Symptoms: None    BP Goal:< 140/90 mmHg    BP Assessment:  BP too high    Potential Reasons for BP too high: NA - Not applicable    BP Follow-Up Plan: Recheck BP in 30 days at pharmacy    Recommendation to Provider: follow up blood pressure check within 30 days    Note completed by: Daniele Coles    Thank You   [facility]

## 2019-07-12 NOTE — TELEPHONE ENCOUNTER
Agree with care coordination to look into resources. She may qualify for home care, but probably not every day.    Dian Frias MD  Internal Medicine/Pediatrics

## 2019-07-12 NOTE — TELEPHONE ENCOUNTER
Called Maia back to f/u on this. She reports problem with the solution leaking back out when she's trying to instill it, and she has trouble retaining the solution in her bladder.  Suggested she try to do instillation while she's lying down, and to push solution in very slowly. Also, they will check with her insurance to see if some home care might be available. She expressed understanding.  Wilmar BARRIENTOS RN      Patient looking for nurse to help with gentamycin irrigation of bladder. Daughter-in-law is attempting to find a nurse to help with this in the evenings. At INR, patient states she feels too overwhelmed and nervous to do this herself. Offered Care Coordination to discuss possible resources, patient in agreement.    Routing to PCP and Urologist.    Dian ALLEN, Triage RN

## 2019-07-15 ENCOUNTER — PATIENT OUTREACH (OUTPATIENT)
Dept: CARE COORDINATION | Facility: CLINIC | Age: 84
End: 2019-07-15

## 2019-07-15 ASSESSMENT — ACTIVITIES OF DAILY LIVING (ADL): DEPENDENT_IADLS:: INDEPENDENT

## 2019-07-15 NOTE — LETTER
[place] CARE COORDINATION  [address] [place] [address]  July 16, 2019    Maianimisha Miranda  [address] [address]      Dear Maia,    I am a clinic care coordinator who works with Dian Frias MD at [facility]. I wanted to thank you for spending the time to talk with me.  I wanted to introduce myself and provide you with my contact information so that you can call me with questions or concerns about your health care. Below is a description of clinic care coordination and how I can further assist you.     The clinic care coordinator is a registered nurse and/or  who understand the health care system. The goal of clinic care coordination is to help you manage your health and improve access to the [facility] system in the most efficient manner. The registered nurse can assist you in meeting your health care goals by providing education, coordinating services, and strengthening the communication among your providers. The  can assist you with financial, behavioral, psychosocial, chemical dependency, counseling, and/or psychiatric resources.    Please feel free to contact me at [phone], with any questions or concerns. We at [facility] are focused on providing you with the highest-quality healthcare experience possible and that all starts with you.     Sincerely,     Sahara Dobbs RN   Clinic Care Coordinator-[facility]  PH: [phone]    Enclosed: I have enclosed a copy of the Complex Care Plan. This has helpful information and goals that we have talked about. Please keep this in an easy to access place to use as needed. I have also included a list of area home health care agencies and a brochure for a free service, the Senior Linkage Line.

## 2019-07-15 NOTE — LETTER
[facility]  Complex Care Plan  About Me:    Patient Name:  Maia Rawls    YOB: 1932  Age:         86 year old   Tim MRN:    [id number] Telephone Information:  Home Phone [phone]   Mobile [phone]       Address:  [address] [address] Email address:  [email or web address]      Emergency Contact(s)    Name Relationship Lgl Grd Work Phone Home Phone Mobile Phone   1. ZOE RAWLS Son   [phone]   2. JIMMY RAWLS Relative  NONE [phone]   3. TIMO MEJIA Daughter    [phone]   4. OSEAS HEATON Daughter  none [phone]           Primary language:  English     needed? No   [facility]:  [phone] op. 1  Other communication barriers: None  Preferred Method of Communication:  Mail  Current living arrangement: I live alone  Mobility Status/ Medical Equipment: Independent w/Device    Health Maintenance  Health Maintenance Reviewed:      My Access Plan  Medical Emergency 911   Primary Clinic Line [facility] - [phone]   24 Hour Appointment Line [phone] or  [phone]) (toll-free)   24 Hour Nurse Line [phone] (toll-free)   Preferred Urgent Care [facility] [place], [phone]   Preferred Hospital [facility]  [phone]   Preferred Pharmacy [facility] [address] [place] [address] [address] [place] [address]     Behavioral Health Crisis Line The National Suicide Prevention Lifeline at 1-589.335.3937 or 911             My Care Team Members  Patient Care Team       Relationship Specialty Notifications Start Dian Leonard MD PCP - General Internal Medicine  1/7/15     Phone: [phone] Fax: [phone] [address] [place] [address]    Bernadine Maria MD MD Urology  7/19/16     Phone: [phone] Fax: [phone]         [address] [place] [address]  [address] [facility] [address]    Rosa Cruz, RN Registered Nurse Urology  7/19/16     Phone: [phone] Pager: [phone]        Dian Frias MD Referring Physician Internal Medicine  7/19/16     Phone: [phone] Fax: [phone] [address] [place] [address]    Dian Frias MD Assigned PCP   2/13/17     Phone: [phone] Fax: [phone]         [address] [place] [address] [address] [place] [address]    Kb Tracy MD MD Urology  2/22/17     Phone: [phone] Fax: [phone] [address]  [facility] [address]    Sahara Dobbs, RN Lead Care Coordinator Primary Care - CC  7/15/19             My Care Plans  Self Management and Treatment Plan  Goals and (Comments)  Goals        General    Medical (pt-stated)     Notes - Note edited  7/16/2019  2:37 PM by Sahara Dobbs, RN    Goal Statement: I want to hire a home care agency to help with my regular catheter cares and reduce UTI's  Measure of Success: Evidenced by ability to initiate services with home care company to help with catheter cares  Supportive Steps to Achieve:   RN CC will provide list of are Home Health Care Agencies to patient and family  RN CC will work with PCP to place referral for appropriate services once agency is selected by patient/family  Barriers: Patient does not meet Medicare criteria for paid home care, so will have to privately pay for services  Strengths: Supportive and involved family  Date to Achieve By: 6 weeks: August 31st, 2019/establish with                  Advance Care Plans/Directives Type:   Type Advanced Care Plans/Directives: POLST    My Medical and Care Information  Problem List   Patient Active Problem List   Diagnosis     Hyperlipidemia LDL goal <130     Osteopenia     Primary osteoarthritis involving multiple joints     Essential and other specified forms of tremor     Esophageal reflux     Varicose veins of lower extremities with complications      Allergic rhinitis     Degeneration of lumbar or lumbosacral intervertebral disc     Internal bleeding hemorrhoids     Osteoarthritis of thumb     Diverticulosis     CKD (chronic kidney disease) stage 3, GFR 30-59 ml/min (H)     Chronic constipation     Urinary retention     Health Care Home     Advanced directives, counseling/discussion     Long term current use of anticoagulant therapy     Atrial fibrillation (H)     Hypertension goal BP (blood pressure) < 150/90     Post-menopausal bleeding     History of recurrent UTIs     Mild cognitive impairment     Fibroid uterus     Rectal bleeding          Care Coordination Start Date: 7/15/2019   Frequency of Care Coordination: monthly   Form Last Updated: 07/16/2019

## 2019-07-15 NOTE — PROGRESS NOTES
"Clinic Care Coordination Contact  [facility]/Voicemail    Referral Source: PCP  RN CC received and reviewed referral from PCP:   \"Additional pertinent details:  Patient self-catheterizes multiple times a day and has had many recurrent UTI's. Urology plans to have patient flush bladder with gentamycin infusions when self-cathing, but patient states she gets too flustered and would like to have a nurse come in and help with this at night. Daughter-in-law is searching for a nurse, but offered Care Coordination to see what resources they could help with as well. Patient in agreement.     Clinical Staff have discussed the Care Coordination Referral with the patient and/or caregiver: yes\"    Clinical Data: Care Coordinator Outreach  Outreach attempted x 1.  Left message on voicemail with call back information and requested return call.  Plan:Care Coordinator will try to reach patient again in 1-2 business days.    Sahara Dobbs RN   Clinic Care Coordinator-[facility] [place]  PH: [phone]    "

## 2019-07-16 ENCOUNTER — TELEPHONE (OUTPATIENT)
Dept: UROLOGY | Facility: CLINIC | Age: 84
End: 2019-07-16

## 2019-07-16 NOTE — PROGRESS NOTES
Clinic Care Coordination Contact    Clinic Care Coordination Contact  OUTREACH    Referral Information:  Referral Source: PCP    Primary Diagnosis:     Chief Complaint   Patient presents with     Clinic Care Coordination - Initial     resources for home health care        Universal Utilization: No Utilization concerns; 1 recent ED visit for UTI and 1 for non- symptoms  Clinic Utilization  Difficulty keeping appointments:: No  Compliance Concerns: No  No-Show Concerns: No  No PCP office visit in Past Year: No  Utilization    Last refreshed: 7/15/2019  8:31 PM:  Hospital Admissions 0           Last refreshed: 7/15/2019  8:31 PM:  ED Visits 2           Last refreshed: 7/15/2019  8:31 PM:  No Show Count (past year) 3              Current as of: 7/15/2019  8:31 PM              Clinical Concerns:  Current Medical Concerns:  Patient currently catheterizes herself and has had many recurrent UTI's; she follows with urology, who's recommended and ordered a Gentamycin flush but the task associated with this is too complex to independently manage.   RN CC outreached to patient; introduced self and role of Clinic Care Coordination; explained how home health care works (private pay versus Medicare reimbursed); the patient identifies that she is not home bound: she can and does drive for nearby errands and is able to do so independently; therefor, she would not qualify for Medicare paid home health care.   RN CC explained that several [facility] agencies offer private pay services, but each of them vary in terms of contract length and cost of service. Patient verbalized understanding of this information; it was also noted in referral that patient's daughter in law may be working on this too; RN CC requested permission to outreach to Joana Miranda (daughter in law) to discuss private pay home care referral process. Outreached to Joana and left message with request for call back.     Current Behavioral Concerns: Alert & Oriented;  pleasant, calm. No behavioral concerns      Pain  Pain (GOAL):: Yes  Type: Acute (<3mo)  Progression: Unchanged  Chronic pain severity:: 4  Alleviating Factors: Rest     Medication Management:  Patient has support from family if needed, but typically manages medications independently    Functional Status:  Dependent ADLs:: Independent  Dependent IADLs:: Independent  Bed or wheelchair confined:: No  Mobility Status: Independent w/Device    Living Situation:  Current living arrangement:: I live alone  Type of residence:: Other    Diet/Exercise/Sleep:  Inadequate nutrition (GOAL):: No  Food Insecurity: No  Tube Feeding: No  Exercise:: Unable to exercise  Inadequate activity/exercise (GOAL):: No  Significant changes in sleep pattern (GOAL): No    Transportation:     Transportation means:: Regular car     Psychosocial:  Mental health DX:: No  Mental health management concern (GOAL):: No  Informal Support system:: Children     Financial/Insurance:   Financial/Insurance concerns (GOAL):: No concerns     Resources and Interventions:  Current Resources:    ;   Community Resources: Housekeeping/Chore Agency  Supplies used at home:: None  Equipment Currently Used at Home: cane, straight, other (see comments)    Advance Care Plan/Directive  Advanced Care Plans/Directives on file:: Yes  Type Advanced Care Plans/Directives: POLST  Advanced Care Plan/Directive Status: Not Applicable    Referrals Placed: Senior Linkage Line, private pay home care agencies     Goals:   Goals        General    Medical (pt-stated)     Notes - Note edited  7/16/2019  2:37 PM by Sahara Dobbs RN    Goal Statement: I want to hire a home care agency to help with my regular catheter cares and reduce UTI's  Measure of Success: Evidenced by ability to initiate services with home care company to help with catheter cares  Supportive Steps to Achieve:   RN CC will provide list of are Home Health Care Agencies to patient and family  RN CC will work with PCP to  place referral for appropriate services once agency is selected by patient/family  Barriers: Patient does not meet Medicare criteria for paid home care, so will have to privately pay for services  Strengths: Supportive and involved family  Date to Achieve By: 6 weeks: August 31st, 2019/establish with               As of today's date 7/16/2019 goal is met at 0 - 25%.   Goal Status:  Active    Patient/Caregiver understanding: Patient verbalized understanding, engaged in AIDET communication behavior during encounter.    Outreach Frequency: monthly  Future Appointments              In 3 weeks  ANTICOAGULATION CLINIC [facility] CRISELDA Akhtar    In 1 month Juliane sky Rn Pal; Dian Frias MD; JULIANE EXAM ROOM 08 [facility] JULIANE Butcher    In 1 month Bernadine Maria MD [facility] [place]  PHY LUIS          Plan:   RN CC will mail patient Introduction to Care Coordination letter and include Complex Care Plan  RN CC will also include brochure for Senior Linkage Line and include a list of area home health agencies in patient's geographic area.   RN CC will await call back from patient's family to discuss process in further detail if needed.     Next outreach in 3-5 business days in follow-up.     Sahara Dobbs RN   Clinic Care Coordinator-[facility]  PH: [phone]

## 2019-07-16 NOTE — TELEPHONE ENCOUNTER
Called [facility] and ordered more catheters and irrigation trays. Called Maia with this update.    [facility] Call Center    Phone Message    May a detailed message be left on voicemail: yes    Reason for Call: Other: Pt says she only has 5 containers left, 8 syringes left and should be ok on catheters (she has 14 left).  Please order supplies for her as you believe she will need them based on this inventory per Pt.     Action Taken: Message routed to:  [facility] urology Mariajose

## 2019-07-22 ENCOUNTER — TELEPHONE (OUTPATIENT)
Dept: UROLOGY | Facility: CLINIC | Age: 84
End: 2019-07-22

## 2019-07-22 DIAGNOSIS — R39.15 URINARY URGENCY: ICD-10-CM

## 2019-07-22 DIAGNOSIS — R35.0 URINARY FREQUENCY: ICD-10-CM

## 2019-07-22 DIAGNOSIS — R82.90 CLOUDY URINE: Primary | ICD-10-CM

## 2019-07-22 DIAGNOSIS — N39.0 URINARY TRACT INFECTION: ICD-10-CM

## 2019-07-22 NOTE — TELEPHONE ENCOUNTER
Called Maia back to f/u. She reports frequency, urgency and cloudy urine. No temp. She has been doing the Gentamicin irrigations and states she's doing better with these. She's cathing 4x/day plus voiding on her own in amounts ranging in general from approx. 150 to 300.   Orders placed for UA/UC. Encouraged push fluids. Continue Gentamicin irrigations. She expressed understanding.  SAMSON Gale RN       Health Call Center    Phone Message    May a detailed message be left on voicemail: yes    Reason for Call: Symptoms or Concerns     If patient has red-flag symptoms, warm transfer to triage line    Current symptom or concern: Pt calling to say that she has an UTI. Her urine lightly cloudy and she is frequently urinating. She thinks that the new medication may be an issue?    Symptoms have been present for: few minutes hour(s)    Has patient previously been seen for this? No    By NA    Date: NA    Are there any new or worsening symptoms? No      Action Taken: Message routed to:  [facility] Urology

## 2019-07-23 DIAGNOSIS — N39.0 URINARY TRACT INFECTION: ICD-10-CM

## 2019-07-23 DIAGNOSIS — R35.0 URINARY FREQUENCY: ICD-10-CM

## 2019-07-23 DIAGNOSIS — R82.90 CLOUDY URINE: ICD-10-CM

## 2019-07-23 DIAGNOSIS — R39.15 URINARY URGENCY: ICD-10-CM

## 2019-07-23 PROCEDURE — 87086 URINE CULTURE/COLONY COUNT: CPT | Performed by: UROLOGY

## 2019-07-23 PROCEDURE — 81001 URINALYSIS AUTO W/SCOPE: CPT | Performed by: UROLOGY

## 2019-07-24 ENCOUNTER — TELEPHONE (OUTPATIENT)
Dept: UROLOGY | Facility: CLINIC | Age: 84
End: 2019-07-24

## 2019-07-24 LAB
BACTERIA SPEC CULT: NORMAL
BACTERIA SPEC CULT: NORMAL
SPECIMEN SOURCE: NORMAL

## 2019-07-24 NOTE — TELEPHONE ENCOUNTER
U/C results show 10,000 to 50,000 colonies/mL mixed urogenital georgette. Urine spec was not a cathed sample. Her only symptom is cloudy urine. Encouraged water intake; continue gentamicin irrigations. May cath more often. Monitor for now.

## 2019-07-25 ENCOUNTER — TELEPHONE (OUTPATIENT)
Dept: UROLOGY | Facility: CLINIC | Age: 84
End: 2019-07-25

## 2019-07-25 NOTE — TELEPHONE ENCOUNTER
[facility] Call Center    Phone Message    May a detailed message be left on voicemail: yes    Reason for Call: Other: Patient called she said her urine is milky looking and she wondering if it is the medication she is on. The medication si GENTAMICIN SOLUTION, please call her to discuss.     Action Taken: Message routed to:  [facility]): [facility] urology

## 2019-07-25 NOTE — TELEPHONE ENCOUNTER
"Pt calling and stating she has \"milky urine\" and doesn't know why.  Pt denies fever.  Pt states she is drinking water.  She states she doesn't know how much but she says she is drinking enough.  Pt denies odor from her urine.  Pt states the gentimicin washes she does to herself to flush her bladder are not helping.   Her urine is still \"milky\".  I offered her an appt with GAIL Maria but pt didn't want an appt.  Pt states she want to talk to another nurse who is not here today.  Pt states she will call back tomorrow.  I again tried to help her today with again suggesting an appt with carlos su but pt denied and said she will talk to the other nurse tomorrow.  Pt will call back tomorrow.  GAIL Coleman CMA    "

## 2019-07-26 ENCOUNTER — TELEPHONE (OUTPATIENT)
Dept: UROLOGY | Facility: CLINIC | Age: 84
End: 2019-07-26

## 2019-07-26 DIAGNOSIS — R19.7 DIARRHEA: ICD-10-CM

## 2019-07-26 DIAGNOSIS — N39.0 URINARY TRACT INFECTION: ICD-10-CM

## 2019-07-26 DIAGNOSIS — R82.90 CLOUDY URINE: Primary | ICD-10-CM

## 2019-07-26 DIAGNOSIS — R68.83 CHILLS (WITHOUT FEVER): ICD-10-CM

## 2019-07-26 DIAGNOSIS — R82.90 CLOUDY URINE: ICD-10-CM

## 2019-07-26 LAB
ALBUMIN UR-MCNC: >=300 MG/DL
APPEARANCE UR: ABNORMAL
BACTERIA #/AREA URNS HPF: ABNORMAL /HPF
BILIRUB UR QL STRIP: NEGATIVE
COLOR UR AUTO: YELLOW
GLUCOSE UR STRIP-MCNC: NEGATIVE MG/DL
HGB UR QL STRIP: ABNORMAL
KETONES UR STRIP-MCNC: ABNORMAL MG/DL
LEUKOCYTE ESTERASE UR QL STRIP: ABNORMAL
NITRATE UR QL: POSITIVE
NON-SQ EPI CELLS #/AREA URNS LPF: ABNORMAL /LPF
PH UR STRIP: 5.5 PH (ref 5–7)
RBC #/AREA URNS AUTO: ABNORMAL /HPF
SOURCE: ABNORMAL
SP GR UR STRIP: >1.03 (ref 1–1.03)
UROBILINOGEN UR STRIP-ACNC: 0.2 EU/DL (ref 0.2–1)
WBC #/AREA URNS AUTO: >100 /HPF

## 2019-07-26 PROCEDURE — 81001 URINALYSIS AUTO W/SCOPE: CPT | Performed by: UROLOGY

## 2019-07-26 PROCEDURE — 87088 URINE BACTERIA CULTURE: CPT | Performed by: UROLOGY

## 2019-07-26 PROCEDURE — 87186 SC STD MICRODIL/AGAR DIL: CPT | Performed by: UROLOGY

## 2019-07-26 PROCEDURE — 87086 URINE CULTURE/COLONY COUNT: CPT | Performed by: UROLOGY

## 2019-07-26 NOTE — TELEPHONE ENCOUNTER
Called Maia with the information below.   She reports cloudy urine, chills, and intermittent diarrhea. Temp denied. Will get cathed spec for UA/UC. Orders placed. Pt expressed understanding.    ----- Message from Bernadine See Tae Maria MD sent at 7/26/2019  9:02 AM CDT -----  Ms Ruben the urine culture is negative.  Recommend you continue the gentamicin irrigation    Umberto Maria MD

## 2019-07-28 LAB
BACTERIA SPEC CULT: ABNORMAL
SPECIMEN SOURCE: ABNORMAL

## 2019-07-29 ENCOUNTER — TELEPHONE (OUTPATIENT)
Dept: UROLOGY | Facility: CLINIC | Age: 84
End: 2019-07-29

## 2019-07-29 ENCOUNTER — TELEPHONE (OUTPATIENT)
Dept: PEDIATRICS | Facility: CLINIC | Age: 84
End: 2019-07-29

## 2019-07-29 DIAGNOSIS — N39.0 URINARY TRACT INFECTION: Primary | ICD-10-CM

## 2019-07-29 RX ORDER — CIPROFLOXACIN 500 MG/1
500 TABLET, FILM COATED ORAL 2 TIMES DAILY
Qty: 10 TABLET | Refills: 0 | Status: SHIPPED | OUTPATIENT
Start: 2019-07-29 | End: 2019-08-19

## 2019-07-29 NOTE — TELEPHONE ENCOUNTER
Pt was started on antibiotics today.   Pt has questions for INR nurse.   Please advise.  Please call pt at [phone].  Thank you.  diana

## 2019-07-29 NOTE — TELEPHONE ENCOUNTER
Called Maia to f/u on UA/UC done 7/26. UC result shows: > 100,000 col/ml E. Coli.  She reports she's drinking lots of water and cathing 4x/day. Still having cloudy urine; chills at times. No temp, burning, frequency, and soreness on her (L) front side.   Per protocol, will e-scribe Cipro 500 mg, BID x 5 days. Pt is on coumadin. She will call INR clinic to let lalo know about Cipro Rx. She may need to get INR checked more often while on med. She expressed understanding. Reviewed this with provider in office today.   Maia also in need of supply re-order for her Gentamicin irrigations. Solution, syringes, and sterile containers re-ordered.

## 2019-07-29 NOTE — TELEPHONE ENCOUNTER
Spoke to patient by phone.  Per micromedex: Severity: Major. Concurrent use of CIPROFLOXACIN and WARFARIN may result in an increased risk of bleeding.     She was on Cipro 500mg last year 7/9/18 and her INR was 3.3 on 7/11/18 after 2 days of medication.    She will only be on Cipro for 5 days.  Recommended she take 1.25mg today instead of 2.5mg and then go back to her maintenance dose of 2.5mg daily.    She has her next INR appointment on 8/9/19.    [facility]

## 2019-07-30 ENCOUNTER — PATIENT OUTREACH (OUTPATIENT)
Dept: CARE COORDINATION | Facility: CLINIC | Age: 84
End: 2019-07-30

## 2019-07-30 NOTE — PROGRESS NOTES
"Clinic Care Coordination Contact    Follow Up Progress Note      Assessment: Patient indicated \"I'm not quite sure if Joana (daughter in law) has that all taken care of or not\"; patient currently being treated for active UTI, has oral medications and updated INR instructions provided by [facility] nurse.     Goals addressed this encounter:   Goals Addressed                 This Visit's Progress       Patient Stated      Medical (pt-stated)   On track     Goal Statement: I want to hire a home care agency to help with my regular catheter cares and reduce UTI's  Measure of Success: Evidenced by ability to initiate services with home care company to help with catheter cares  Supportive Steps to Achieve:   RN CC will provide list of are Home Health Care Agencies to patient and family  RN CC will work with PCP to place referral for appropriate services once agency is selected by patient/family  Barriers: Patient does not meet Medicare criteria for paid home care, so will have to privately pay for services  Strengths: Supportive and involved family  Date to Achieve By: 6 weeks: August 31st, 2019/establish with                  Intervention/Education provided during outreach: RN CC reinforced drinking plenty of fluids, taking medications as prescribed.   RN CC left message for patient's daughter in law re: follow up on home health care private pay information.     Outreach Frequency: monthly    Plan:   RN CC will await call back from patient's daughter in law to discuss home health care referral update.   Care Coordinator will follow up in 2 weeks.     Sahara Dobbs RN   Clinic Care Coordinator-[facility]  PH: [phone]  "

## 2019-08-09 ENCOUNTER — ALLIED HEALTH/NURSE VISIT (OUTPATIENT)
Dept: PEDIATRICS | Facility: CLINIC | Age: 84
End: 2019-08-09

## 2019-08-09 ENCOUNTER — ANTICOAGULATION THERAPY VISIT (OUTPATIENT)
Dept: NURSING | Facility: CLINIC | Age: 84
End: 2019-08-09
Payer: COMMERCIAL

## 2019-08-09 VITALS — DIASTOLIC BLOOD PRESSURE: 62 MMHG | SYSTOLIC BLOOD PRESSURE: 150 MMHG

## 2019-08-09 DIAGNOSIS — Z01.30 BP CHECK: Primary | ICD-10-CM

## 2019-08-09 DIAGNOSIS — I48.91 ATRIAL FIBRILLATION (H): ICD-10-CM

## 2019-08-09 DIAGNOSIS — Z79.01 LONG TERM CURRENT USE OF ANTICOAGULANT THERAPY: ICD-10-CM

## 2019-08-09 LAB — INR POINT OF CARE: 2.8 (ref 0.86–1.14)

## 2019-08-09 PROCEDURE — 36416 COLLJ CAPILLARY BLOOD SPEC: CPT

## 2019-08-09 PROCEDURE — 99207 ZZC NO CHARGE NURSE ONLY: CPT

## 2019-08-09 PROCEDURE — 85610 PROTHROMBIN TIME: CPT | Mod: QW

## 2019-08-09 PROCEDURE — 99207 ZZC NO CHARGE NURSE ONLY: CPT | Performed by: INTERNAL MEDICINE

## 2019-08-09 NOTE — PROGRESS NOTES
ANTICOAGULATION FOLLOW-UP CLINIC VISIT    Patient Name:  Maia Miranda  Date:  2019  Contact Type:  Face to Face    SUBJECTIVE:  Patient Findings     Comments:   Patient states she currently has no s/sx of UTI. She is continuing with gentamycin washes nightly and self catheterization x4/day. Otherwise, The patient was assessed for diet, medication, and activity level changes, missed or extra doses, bruising or bleeding, with no problem findings. Still no progress on getting a homecare nurse in to help with the gentamycin. Her DIL and a care coordinator are working on finding help for her.           Clinical Outcomes     Comments:   Patient states she currently has no s/sx of UTI. She is continuing with gentamycin washes nightly and self catheterization x4/day. Otherwise, The patient was assessed for diet, medication, and activity level changes, missed or extra doses, bruising or bleeding, with no problem findings. Still no progress on getting a homecare nurse in to help with the gentamycin. Her DIL and a care coordinator are working on finding help for her.              OBJECTIVE    INR Protime   Date Value Ref Range Status   2019 2.8 (A) 0.86 - 1.14 Final       ASSESSMENT / PLAN  INR assessment THER    Recheck INR In: 4 WEEKS    INR Location Clinic      Anticoagulation Summary  As of 2019    INR goal:   2.0-3.0   TTR:   78.8 % (3.5 y)   INR used for dosin.8 (2019)   Warfarin maintenance plan:   2.5 mg (2.5 mg x 1) every day   Full warfarin instructions:   2.5 mg every day   Weekly warfarin total:   17.5 mg   No change documented:   Dian Astudillo RN   Plan last modified:   Meme Riggs RN (2017)   Next INR check:   2019   Target end date:   Indefinite    Indications    Long term current use of anticoagulant therapy [Z79.01]  Atrial fibrillation (H) [I48.91]             Anticoagulation Episode Summary     INR check location:   Anticoagulation Clinic    Preferred lab:        Send INR reminders to:   MICHAEL ABURTO    Comments:   2.5mg tablets // 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

## 2019-08-09 NOTE — PROGRESS NOTES
Reviewed. Improved with increased lisinopril. Will recheck at visit on 8/19    Dian Frias MD  Internal Medicine/Pediatrics

## 2019-08-09 NOTE — PROGRESS NOTES
Maia Miranda was evaluated at [facility] on August 9, 2019 at which time her blood pressure was:    BP Readings from Last 3 Encounters:   08/09/19 (!) 150/62   07/12/19 152/62   07/01/19 (!) 166/94     Pulse Readings from Last 3 Encounters:   07/01/19 54   05/24/19 59   05/24/19 59       Reviewed lifestyle modifications for blood pressure control and reduction: including making healthy food choices, managing weight, getting regular exercise, smoking cessation, reducing alcohol consumption, monitoring blood pressure regularly.     Symptoms: None    BP Goal:< 140/90 mmHg    BP Assessment:  BP at goal    Potential Reasons for BP too high: NA - Not applicable    BP Follow-Up Plan: Referral to PCP    Recommendation to Provider: follow up blood pressure check within 30 days    Note completed by: Daniele Coles    Thank You   Sloan Emanuel [facility]

## 2019-08-16 ENCOUNTER — TELEPHONE (OUTPATIENT)
Dept: UROLOGY | Facility: CLINIC | Age: 84
End: 2019-08-16

## 2019-08-16 NOTE — PROGRESS NOTES
"SUBJECTIVE:   Maia Miranda is a 86 year old female who presents for Preventive Visit.  Are you in the first 12 months of your Medicare coverage?  No    Healthy Habits:     In general, how would you rate your overall health?  Fair    Frequency of exercise:  6-7 days/week    Duration of exercise:  15-30 minutes    Do you usually eat at least 4 servings of fruit and vegetables a day, include whole grains    & fiber and avoid regularly eating high fat or \"junk\" foods?  Yes    Taking medications regularly:  Yes    Barriers to taking medications:  None    Medication side effects:  None    Ability to successfully perform activities of daily living:  Housework requires assistance    Home Safety:  No safety concerns identified    Hearing Impairment:  Difficulty following a conversation in a noisy restaurant or crowded room, need to ask people to speak up or repeat themselves, find that men's voices are easier to understand than woman's and difficulty understanding soft or whispered speech    In the past 6 months, have you been bothered by leaking of urine? Yes    In general, how would you rate your overall mental or emotional health?  Fair      PHQ-2 Total Score: 0    Additional concerns today:  No    Do you feel safe in your environment? Yes    Do you have a Health Care Directive? Yes: Advance Directive has been received and scanned.    Fall risk  Fallen 2 or more times in the past year?: No  Any fall with injury in the past year?: No    Cognitive Screening   1) Repeat 3 items (Leader, Season, Table)    2) Clock draw: ABNORMAL clock and numbers correct but not sure where to put hands  3) 3 item recall: Recalls 1 object   Results: ABNORMAL clock, 1-2 items recalled: PROBABLE COGNITIVE IMPAIRMENT, **INFORM PROVIDER**    Mini-CogTM Copyright ARLEEN Fierro. Licensed by the author for use in [facility]; reprinted with permission ([email or web address]). All rights reserved.      Do you have sleep apnea, excessive snoring or " daytime drowsiness?: no     Recurrent UTI's: has been doing gent bladder irrigation. Going a little better. Had last UTI the end of July. Feels is helping. Hasn't been taking Hiprex because ran out. Has been out for 2 weeks.    Headaches: still having headaches on the left side. Happening every other day. Has been a little less frequent. Less severe. Previously multiple times a day. Uses tylenol infrequently.    Memory: patient feels memory is about the same. Family very involved and call to check in on her frequent. She found a supplement neuriva - has coffee extract and phosphatidylserine in it. Would like to try taking this. wondering if ok to take with other medications.    HTN: increased lisinopril to 40 mg at last visit. BP's improved. Mostly 150/90 or less. Headaches better.     Hearing loss: worse on the left side. Has ringing in left side. Has hearing aids, but don't help a lot.    Constipation: miralax did not help. Eating prunes. Has helped. Some times has red blood when wipes. Had hemorrhoids with last colonoscopy in 2011.    Fatigue: sleeping a lot more. Feels like sleeping well. Now falls asleep in the morning and in the afternoon.    Reviewed and updated as needed this visit by clinical staff  Tobacco  Allergies  Meds  Problems  Med Hx  Surg Hx  Fam Hx  Soc Hx        Reviewed and updated as needed this visit by Provider  Tobacco  Allergies  Meds  Problems  Med Hx  Surg Hx  Fam Hx        Social History     Tobacco Use     Smoking status: Never Smoker     Smokeless tobacco: Never Used   Substance Use Topics     Alcohol use: No     If you drink alcohol do you typically have >3 drinks per day or >7 drinks per week? No    No flowsheet data found.    Hyperlipidemia Follow-Up      Are you having any of the following symptoms? (Select all that apply)  No complaints of shortness of breath, chest pain or pressure.  No increased sweating or nausea with activity.  No left-sided neck or arm pain.  No  complaints of pain in calves when walking 1-2 blocks.    Are you regularly taking any medication or supplement to lower your cholesterol?   Yes- Fish oil    Are you having muscle aches or other side effects that you think could be caused by your cholesterol lowering medication?  No    Hypertension Follow-up      Do you check your blood pressure regularly outside of the clinic? Yes     Are you following a low salt diet? Yes    Are your blood pressures ever more than 140 on the top number (systolic) OR more   than 90 on the bottom number (diastolic), for example 140/90? Yes     Chronic Kidney Disease Follow-up      Current NSAID use?  No    Current providers sharing in care for this patient include:   Patient Care Team:  Dian Frias MD as PCP - General (Internal Medicine)  Bernadine Maria MD as MD (Urology)  Rosa Cruz RN as Registered Nurse (Urology)  Dian Frias MD as Referring Physician (Internal Medicine)  Dian Frias MD as Assigned PCP  Kb Tracy MD as MD (Urology)  Sahara Dobbs, SHIELA as Lead Care Coordinator (Primary Care - CC)    The following health maintenance items are reviewed in Epic and correct as of today:  Health Maintenance   Topic Date Due     ANNUAL REVIEW OF HM ORDERS  10/26/1932     ZOSTER IMMUNIZATION (2 of 3) 04/03/2012     MICROALBUMIN  05/16/2017     ADVANCE CARE PLANNING  01/15/2018     PHQ-2  01/01/2019     DEXA  07/05/2019     MEDICARE ANNUAL WELLNESS VISIT  07/06/2019     FALL RISK ASSESSMENT  07/06/2019     MAMMO SCREENING  06/21/2019     INFLUENZA VACCINE (1) 09/01/2019     BMP  07/01/2020     LIPID  07/01/2020     COLONOSCOPY  06/14/2021     DTAP/TDAP/TD IMMUNIZATION (3 - Td) 11/05/2023     IPV IMMUNIZATION  Aged Out     MENINGITIS IMMUNIZATION  Aged Out     Review of Systems   Constitutional: Positive for fatigue.   Gastrointestinal: Positive for anal bleeding.   Endocrine: Positive for cold intolerance.   Psychiatric/Behavioral:  "Positive for confusion.   All other systems reviewed and are negative.    OBJECTIVE:   BP (!) 142/68 (BP Location: Right arm, Patient Position: Sitting, Cuff Size: Adult Regular)   Pulse 50   Temp 97.7  F (36.5  C) (Oral)   SpO2 97%  Estimated body mass index is 24.3 kg/m  as calculated from the following:    Height as of 5/17/19: 1.651 m (5' 5\").    Weight as of 7/1/19: 66.2 kg (146 lb).  Physical Exam  GENERAL APPEARANCE: elderly female, alert and no distress  EYES: Eyes grossly normal to inspection, PERRL and conjunctivae and sclerae normal  HENT: ear canals and TM's normal, nose and mouth without ulcers or lesions, oropharynx clear and oral mucous membranes moist. Right carotid bruit unchanged.  NECK: no adenopathy, no asymmetry, masses, or scars and thyroid normal to palpation  RESP: lungs clear to auscultation - no rales, rhonchi or wheezes  CV: regular rate and rhythm, normal S1 S2, no S3 or S4, no murmur, click or rub, no peripheral edema and peripheral pulses strong  ABDOMEN: soft, nontender, no hepatosplenomegaly, no masses and bowel sounds normal  MS: no musculoskeletal defects are noted and gait is age appropriate without ataxia  SKIN: no suspicious lesions or rashes  NEURO: Normal strength and tone, sensory exam grossly normal, mentation intact and speech normal  PSYCH: mentation appears normal and affect normal/bright    Diagnostic Test Results:  Labs reviewed in Epic  Component      Latest Ref Rng & Units 7/1/2019   WBC      4.0 - 11.0 10e9/L 4.9   RBC Count      3.8 - 5.2 10e12/L 3.91   Hemoglobin      11.7 - 15.7 g/dL 12.7   Hematocrit      35.0 - 47.0 % 39.0   MCV      78 - 100 fl 100   MCH      26.5 - 33.0 pg 32.5   MCHC      31.5 - 36.5 g/dL 32.6   RDW      10.0 - 15.0 % 13.0   Platelet Count      150 - 450 10e9/L 138 (L)   % Neutrophils      % 73.4   % Lymphocytes      % 15.7   % Monocytes      % 9.1   % Eosinophils      % 1.6   % Basophils      % 0.2   Absolute Neutrophil      1.6 - 8.3 " 10e9/L 3.6   Absolute Lymphocytes      0.8 - 5.3 10e9/L 0.8   Absolute Monocytes      0.0 - 1.3 10e9/L 0.4   Absolute Eosinophils      0.0 - 0.7 10e9/L 0.1   Absolute Basophils      0.0 - 0.2 10e9/L 0.0   Diff Method       Automated Method   Sodium      133 - 144 mmol/L 143   Potassium      3.4 - 5.3 mmol/L 4.6   Chloride      94 - 109 mmol/L 110 (H)   Carbon Dioxide      20 - 32 mmol/L 25   Anion Gap      3 - 14 mmol/L 8   Glucose      70 - 99 mg/dL 103 (H)   Urea Nitrogen      7 - 30 mg/dL 26   Creatinine      0.52 - 1.04 mg/dL 1.22 (H)   GFR Estimate      >60 mL/min/1.73:m2 40 (L)   GFR Estimate If Black      >60 mL/min/1.73:m2 46 (L)   Calcium      8.5 - 10.1 mg/dL 9.3   Bilirubin Total      0.2 - 1.3 mg/dL 0.5   Albumin      3.4 - 5.0 g/dL 3.7   Protein Total      6.8 - 8.8 g/dL 7.2   Alkaline Phosphatase      40 - 150 U/L 86   ALT      0 - 50 U/L 26   AST      0 - 45 U/L 25   Cholesterol      <200 mg/dL 187   Triglycerides      <150 mg/dL 111   HDL Cholesterol      >49 mg/dL 46 (L)   LDL Cholesterol Calculated      <100 mg/dL 119 (H)   Non HDL Cholesterol      <130 mg/dL 141 (H)   Sed Rate      0 - 30 mm/h 24       ASSESSMENT / PLAN:   1. Encounter for Medicare annual wellness exam  Would like to continue mammograms - will schedule mammo and dexa  Td, pneumonia vaccines UTD  Declines Shingrix    2. Hypertension goal BP (blood pressure) < 150/90  Overall improved. Headaches improved. Will continue lisinopril 40 mg and metoprolol 100 mg daily. HR will not tolerate higher doses of metoprolol. Discussed adding third agent to lower further. Patient would prefer to monitor and only add if going up again. Discussed hydrochlorothiazide vs amlodipine.     3. Paroxysmal atrial fibrillation (H)  In NSR today. Continue metoprolol and warfarin    4. CKD (chronic kidney disease) stage 3, GFR 30-59 ml/min (H)  Stable. Continue to monitor    5. Hyperlipidemia LDL goal <130  Stable. Continue fish oil. Benefit for statin  uncertain at her age.    6. Hearing loss of left ear, unspecified hearing loss type  7. Tinnitus, left  Will have see ENT for evaluation.  - OTOLARYNGOLOGY REFERRAL    8. Mild cognitive impairment  Testing a little worse today than last year, but subjectively the same to somewhat improved when answering questions. Patient feels stable. Has a supportive family that is involved. Continue to monitor. She is interested in trying a neuro supplement. Attempted to look for interactions and none seen. Discussed supplements sometimes interact with warfarin and recommend waiting to start until 1-2 weeks before next INR if she would like to try the supplement so that we can monitor closely.    9. History of recurrent UTIs  Working with Urology. Has f/u appointment scheduled. Continue Gent bladder irritation. She will contact Dr. Maria to see if she is to continue with the Hiprex in addition to the Gent bladder irrigation.    10. Gastroesophageal reflux disease without esophagitis  Controlled. Not currently on medication.    11. Osteopenia of multiple sites  Due for DEXA. Took fosamax in past.  - DEXA HIP/PELVIS/SPINE - Future; Future    12. Nonintractable episodic headache, unspecified headache type  Overall improved. Still having some mild headaches. Suspect worsened due to higher blood pressure. Will f/u if becomes bothersome again.    13. Other fatigue  Falling asleep during the day. Suspect this is partially due to aging. Recent CBC negative. No current UTI symptoms. Discussed checking thyroid, but she prefer to hold off for now.    14. Visit for screening mammogram  - MA SCREENING DIGITAL BILAT - Future  (s+30); Future    End of Life Planning:  Patient currently has an advanced directive: Yes.  Practitioner is supportive of decision. Updated Code status as changed to prior after last hospital visit.    COUNSELING:  Reviewed preventive health counseling, as reflected in patient instructions  Special attention given to:        "Regular exercise       Healthy diet/nutrition       Hearing screening       Bladder control       Osteoporosis Prevention/Bone Health    Estimated body mass index is 24.3 kg/m  as calculated from the following:    Height as of 5/17/19: 1.651 m (5' 5\").    Weight as of 7/1/19: 66.2 kg (146 lb).         reports that she has never smoked. She has never used smokeless tobacco.      Appropriate preventive services were discussed with this patient, including applicable screening as appropriate for cardiovascular disease, diabetes, osteopenia/osteoporosis, and glaucoma.  As appropriate for age/gender, discussed screening for colorectal cancer, prostate cancer, breast cancer, and cervical cancer. Checklist reviewing preventive services available has been given to the patient.    Reviewed patients plan of care and provided an AVS. The Basic Care Plan (routine screening as documented in Health Maintenance) for Maia meets the Care Plan requirement. This Care Plan has been established and reviewed with the Patient.    Counseling Resources:  ATP IV Guidelines  Pooled Cohorts Equation Calculator  Breast Cancer Risk Calculator  FRAX Risk Assessment  ICSI Preventive Guidelines  Dietary Guidelines for Americans, 2010  USDA's MyPlate  ASA Prophylaxis  Lung CA Screening    Dian Frias MD  [facility]    Identified Health Risks:    The patient was provided with suggestions to help her develop a healthy physical lifestyle.  The patient was provided with written information regarding signs of hearing loss.  Information on urinary incontinence and treatment options given to patient.  The patient was provided with suggestions to help her develop a healthy emotional lifestyle.  "

## 2019-08-16 NOTE — TELEPHONE ENCOUNTER
Instructed patient to call supplier and reqeat a shipment. Suggested she ask them if she could be on an automatic shipment nona.Laura Ingram LPN

## 2019-08-16 NOTE — TELEPHONE ENCOUNTER
[facility] Call Center    Phone Message    May a detailed message be left on voicemail: yes    Reason for Call: Other: Pt called to report she has only 5 syringes and 6 containers remaining.  Pt requests we order more for her.     Action Taken: Message routed to:  [facility] urology

## 2019-08-19 ENCOUNTER — PATIENT OUTREACH (OUTPATIENT)
Dept: CARE COORDINATION | Facility: CLINIC | Age: 84
End: 2019-08-19

## 2019-08-19 ENCOUNTER — OFFICE VISIT (OUTPATIENT)
Dept: PEDIATRICS | Facility: CLINIC | Age: 84
End: 2019-08-19
Payer: COMMERCIAL

## 2019-08-19 VITALS
TEMPERATURE: 97.7 F | DIASTOLIC BLOOD PRESSURE: 68 MMHG | HEART RATE: 50 BPM | SYSTOLIC BLOOD PRESSURE: 142 MMHG | OXYGEN SATURATION: 97 %

## 2019-08-19 DIAGNOSIS — N18.30 CKD (CHRONIC KIDNEY DISEASE) STAGE 3, GFR 30-59 ML/MIN (H): ICD-10-CM

## 2019-08-19 DIAGNOSIS — R53.83 OTHER FATIGUE: ICD-10-CM

## 2019-08-19 DIAGNOSIS — G31.84 MILD COGNITIVE IMPAIRMENT: ICD-10-CM

## 2019-08-19 DIAGNOSIS — E78.5 HYPERLIPIDEMIA LDL GOAL <130: ICD-10-CM

## 2019-08-19 DIAGNOSIS — I48.0 PAROXYSMAL ATRIAL FIBRILLATION (H): ICD-10-CM

## 2019-08-19 DIAGNOSIS — Z00.00 ENCOUNTER FOR MEDICARE ANNUAL WELLNESS EXAM: Primary | ICD-10-CM

## 2019-08-19 DIAGNOSIS — H91.92 HEARING LOSS OF LEFT EAR, UNSPECIFIED HEARING LOSS TYPE: ICD-10-CM

## 2019-08-19 DIAGNOSIS — R51.9 NONINTRACTABLE EPISODIC HEADACHE, UNSPECIFIED HEADACHE TYPE: ICD-10-CM

## 2019-08-19 DIAGNOSIS — K21.9 GASTROESOPHAGEAL REFLUX DISEASE WITHOUT ESOPHAGITIS: ICD-10-CM

## 2019-08-19 DIAGNOSIS — I10 HYPERTENSION GOAL BP (BLOOD PRESSURE) < 150/90: ICD-10-CM

## 2019-08-19 DIAGNOSIS — Z87.440 HISTORY OF RECURRENT UTIS: ICD-10-CM

## 2019-08-19 DIAGNOSIS — H93.12 TINNITUS, LEFT: ICD-10-CM

## 2019-08-19 DIAGNOSIS — M85.89 OSTEOPENIA OF MULTIPLE SITES: ICD-10-CM

## 2019-08-19 DIAGNOSIS — Z12.31 VISIT FOR SCREENING MAMMOGRAM: ICD-10-CM

## 2019-08-19 PROCEDURE — 99207 C PAF COMPLETED  NO CHARGE: CPT | Performed by: INTERNAL MEDICINE

## 2019-08-19 PROCEDURE — G0439 PPPS, SUBSEQ VISIT: HCPCS | Performed by: INTERNAL MEDICINE

## 2019-08-19 PROCEDURE — 99214 OFFICE O/P EST MOD 30 MIN: CPT | Mod: 25 | Performed by: INTERNAL MEDICINE

## 2019-08-19 ASSESSMENT — ACTIVITIES OF DAILY LIVING (ADL): CURRENT_FUNCTION: HOUSEWORK REQUIRES ASSISTANCE

## 2019-08-19 ASSESSMENT — ENCOUNTER SYMPTOMS
ANAL BLEEDING: 1
FATIGUE: 1
CONFUSION: 1

## 2019-08-19 NOTE — PROGRESS NOTES
Clinic Care Coordination Contact    Follow Up Progress Note      Assessment: RN CC outreached to both patient and her daughter in law, Joana Miranda.  Joana indicates she was not aware and has not reviewed any information from this writer regarding home health care agencies and Clinic Care Coordination contact information. Have not yet established home care services.           Goals addressed this encounter:   Goals Addressed                 This Visit's Progress       Patient Stated      Medical (pt-stated)   Not on track     Goal Statement: I want to hire a home care agency to help with my regular catheter cares and reduce UTI's  Measure of Success: Evidenced by ability to initiate services with home care company to help with catheter cares  Supportive Steps to Achieve:   RN CC will provide list of are Home Health Care Agencies to patient and family  RN CC will work with PCP to place referral for appropriate services once agency is selected by patient/family  Barriers: Patient does not meet Medicare criteria for paid home care, so will have to privately pay for services  Strengths: Supportive and involved family  Date to Achieve By: 6 weeks: August 31st, 2019/establish with                  Intervention/Education provided during outreach: RN CC explained in detail to Joana similar conversation with Maia regarding Medicare qualifications for payment of home health care; also educated regarding option to privately pay.     Outreach Frequency: monthly    Plan:   RN CC will mail Joana information on Clinic Care Coordination, list of are home care agencies and include brochure for [facility] Line.     Care Coordinator will follow up in 2-3 weeks.     Sahara Dobbs RN   [facility]  PH: [phone]

## 2019-08-19 NOTE — PATIENT INSTRUCTIONS
1. Touch base with Dr. Maria and see if you are supposed to be still taking the Hiprex with the Gentamycin washes  2. Continue blood pressure medications - metoprolol 100 mg once a day and lisinopril 40 mg once a day  3. If blood pressure increases further or start having more headaches again, could add a third blood pressure medication to get it down further   4. If going to try memory supplement, recommend start 1-2 weeks before next INR  5. Schedule bone density scan and mammogram - [phone]  6. Call to schedule an appointment with [facility] Th[facility] to evaluate ringing in ear and hearing loss    Patient Education   Personalized Prevention Plan  You are due for the preventive services outlined below.  Your care team is available to assist you in scheduling these services.  If you have already completed any of these items, please share that information with your care team to update in your medical record.  Health Maintenance Due   Topic Date Due     ANNUAL REVIEW OF HM ORDERS  10/26/1932     Zoster (Shingles) Vaccine (2 of 3) 04/03/2012     Kidney Microalbumin Urine Test  05/16/2017     Discuss Advance Care Planning  01/15/2018     PHQ-2  01/01/2019     Osteoporosis Screening  07/05/2019     Annual Wellness Visit  07/06/2019     FALL RISK ASSESSMENT  07/06/2019     Mammogram  06/21/2019     Your Health Risk Assessment indicates you feel you are not in good health    A healthy lifestyle helps keep the body fit and the mind alert. It helps protect you from disease, helps you fight disease, and helps prevent chronic disease (disease that doesn't go away) from getting worse. This is important as you get older and begin to notice twinges in muscles and joints and a decline in the strength and stamina you once took for granted. A healthy lifestyle includes good healthcare, good nutrition, weight control, recreation, and regular exercise. Avoid harmful substances and do what you can to keep safe. Another part of a  healthy lifestyle is stay mentally active and socially involved.    Good healthcare     Have a wellness visit every year.     If you have new symptoms, let us know right away. Don't wait until the next checkup.     Take medicines exactly as prescribed and keep your medicines in a safe place. Tell us if your medicine causes problems.   Healthy diet and weight control     Eat 3 or 4 small, nutritious, low-fat, high-fiber meals a day. Include a variety of fruits, vegetables, and whole-grain foods.     Make sure you get enough calcium in your diet. Calcium, vitamin D, and exercise help prevent osteoporosis (bone thinning).     If you live alone, try eating with others when you can. That way you get a good meal and have company while you eat it.     Try to keep a healthy weight. If you eat more calories than your body uses for energy, it will be stored as fat and you will gain weight.     Recreation   Recreation is not limited to sports and team events. It includes any activity that provides relaxation, interest, enjoyment, and exercise. Recreation provides an outlet for physical, mental, and social energy. It can give a sense of worth and achievement. It can help you stay healthy.    Mental Exercise and Social Involvement  Mental and emotional health is as important as physical health. Keep in touch with friends and family. Stay as active as possible. Continue to learn and challenge yourself.   Things you can do to stay mentally active are:    Learn something new, like a foreign language or musical instrument.     Play SCRABBLE or do crossword puzzles. If you cannot find people to play these games with you at home, you can play them with others on your computer through the Internet.     Join a games club--anything from card games to chess or checkers or lawn bowling.     Start a new hobby.     Go back to school.     Volunteer.     Read.   Keep up with world events.  Activities of Daily Living    Your Health Risk  Assessment indicates you have difficulties with activities of daily living such as housework, bathing, preparing meals, taking medication, etc. Please make a follow up appointment for us to address this issue in more detail.    Signs of Hearing Loss     Hearing much better with one ear can be a sign of hearing loss.     Hearing loss is a problem shared by many people. In fact, it is one of the most common health conditions, particularly as people age. Most people over age 65 have some hearing loss, and by age 80, almost everyone does. Because hearing loss usually occurs slowly over the years, you may not realize your hearing ability has gotten worse.  Have your hearing checked  Contact your healthcare provider if you:    Have to strain to hear normal conversation    Have to watch other people s faces very carefully to follow what they re saying    Need to ask people to repeat what they ve said    Often misunderstand what people are saying    Turn the volume of the television or radio up so high that others complain    Feel that people are mumbling when they re talking to you    Find that the effort to hear leaves you feeling tired and irritated    Notice, when using the phone, that you hear better with one ear than the other  Date Last Reviewed: 12/1/2016 2000-2018 [employer]. [address] [place] [address]. All rights reserved. This information is not intended as a substitute for professional medical care. Always follow your healthcare professional's instructions.          Urinary Incontinence, Female (Adult)  Urinary incontinence means loss of control of the bladder. This problem affects many women, especially as they get older. If you have incontinence, you may be embarrassed to ask for help. But know that this problem can be treated.  Types of Incontinence  There are different types of incontinence. Two of the main types are described here. You can have more than one type.    Stress  incontinence. With this type, urine leaks when pressure (stress) is put on the bladder. This may happen when you cough, sneeze, or laugh. Stress incontinence most often occurs because the pelvic floor muscles that support the bladder and urethra are weak. This can happen after pregnancy and vaginal childbirth or a hysterectomy. It can also be due to excess body weight or hormone changes.    Urge incontinence (also called overactive bladder). With this type, a sudden urge to urinate is felt often. This may happen even though there may not be much urine in the bladder. The need to urinate often during the night is common. Urge incontinence most often occurs because of bladder spasms. This may be due to bladder irritation or infection. Damage to bladder nerves or pelvic muscles, constipation, and certain medicines can also lead to urge incontinence.  Treatment of urinary incontinence depends on the cause. Further evaluation is needed to find the type you have. This will likely include an exam and certain tests. Based on the results, you and your healthcare provider can then plan treatment. Until a diagnosis is made, the home care tips below can help relieve symptoms.  Home care    Do pelvic floor muscle exercises, if they are prescribed. The pelvic floor muscles help support the bladder and urethra. Many women find that their symptoms improve when doing special exercises that strengthen these muscles. To do the exercises contract the muscles you would use to stop your stream of urine, but do this when you re not urinating. Hold for 10 seconds, then relax. Repeat 10 to 20 times in a row, at least 3 times a day. Your provider may give you other instructions for how to do the exercises and how often.    Keep a bladder diary. This helps track how often and how much you urinate over a set period of time. Bring this diary with you to your next visit with the provider. The information can help your provider learn more about  your bladder problem.    Lose weight, if advised to by your provider. Excess weight puts pressure on the bladder. Your provider can help you create a weight-loss plan that s right for you. This may include exercising more and making certain diet changes.    Don't consume foods and drinks that may irritate the bladder. These can include alcohol and caffeinated drinks.    Quit smoking. Smoking and other tobacco use can lead to chronic cough that strains the pelvic floor muscles. Smoking may also damage the bladder and urethra. Talk with your provider about treatments or methods you can use to quit smoking.    If drinking large amounts of fluid causes you to have symptoms, you may be advised to limit your fluid intake. You may also be advised to drink most of your fluids during the day and to limit fluids at night.    If you re worried about urine leakage or accidents, you may wear absorbent pads to catch urine. Change the pads often. This helps reduce discomfort. It may also reduce the risk of skin or bladder infections.  Follow-up care  Follow up with your healthcare provider, or as directed. It may take some to find the right treatment for your problem. Your treatment plan may include special therapies or medicines. Certain procedures or surgery may also be options. Be sure to discuss any questions you have with your provider.  When to seek medical advice  Call the healthcare provider right away if any of these occur:    Fever of 100.4 F (38 C) or higher, or as directed by your provider    Bladder pain or fullness    Abdominal swelling    Nausea or vomiting    Back pain    Weakness, dizziness or fainting  Date Last Reviewed: 10/1/2017    2769-5270 [employer]. [address] [place] [address], [place] [address]. All rights reserved. This information is not intended as a substitute for professional medical care. Always follow your healthcare professional's instructions.        Your Health Risk Assessment  indicates you feel you are not in good emotional health.    Recreation   Recreation is not limited to sports and team events. It includes any activity that provides relaxation, interest, enjoyment, and exercise. Recreation provides an outlet for physical, mental, and social energy. It can give a sense of worth and achievement. It can help you stay healthy.    Mental Exercise and Social Involvement  Mental and emotional health is as important as physical health. Keep in touch with friends and family. Stay as active as possible. Continue to learn and challenge yourself.   Things you can do to stay mentally active are:    Learn something new, like a foreign language or musical instrument.     Play SCRABBLE or do crossword puzzles. If you cannot find people to play these games with you at home, you can play them with others on your computer through the Internet.     Join a games club--anything from card games to chess or checkers or lawn bowling.     Start a new hobby.     Go back to school.     Volunteer.     Read.   Keep up with world events.

## 2019-08-20 ENCOUNTER — TELEPHONE (OUTPATIENT)
Dept: UROLOGY | Facility: CLINIC | Age: 84
End: 2019-08-20

## 2019-08-20 DIAGNOSIS — N39.0 RECURRENT UTI: ICD-10-CM

## 2019-08-20 RX ORDER — METHENAMINE HIPPURATE 1000 MG/1
1 TABLET ORAL DAILY
Qty: 90 TABLET | Refills: 2 | Status: SHIPPED | OUTPATIENT
Start: 2019-08-20 | End: 2019-12-20

## 2019-08-20 NOTE — TELEPHONE ENCOUNTER
Returned phone call and refilled medication per last office visit to help reduce infections.     Marianela Johnson LPN

## 2019-08-20 NOTE — TELEPHONE ENCOUNTER
Health Call Center    Phone Message    May a detailed message be left on voicemail: yes    Reason for Call: Medication Question or concern regarding medication   Prescription Clarification  Name of Medication: Hiprex and Vaginal Cream  Prescribing Provider: Dr. Maria   Pharmacy: [facility]   What on the order needs clarification? Should Maia be taking the Hiprex and the Vaginal cream.  She states that she stopped taking both medications because her ran out.  She also states that she is currently doing gentamicin washes daily.  Please contact Maia to discuss.          Action Taken: Message routed to:  [facility] ([facility]):  Clinical Pool

## 2019-08-21 ENCOUNTER — TELEPHONE (OUTPATIENT)
Dept: UROLOGY | Facility: CLINIC | Age: 84
End: 2019-08-21

## 2019-08-21 NOTE — TELEPHONE ENCOUNTER
Informed patient to call [facility] and find out what comes in the irrigation tray. If her insurance covers the tray and all of her supplies are in it. Then she doesn't need to order supplies seperately. Patient will call [employer] [facility] and call back if needed.     Marianela Johnson LPN

## 2019-08-21 NOTE — TELEPHONE ENCOUNTER
[facility]    Phone Message    May a detailed message be left on voicemail: yes    Reason for Call: Other: pt called and said that her insurance approved irrigation trays, please call to discuss next steps     Action Taken: Message routed to:  [facility] ([facility]): Mariajose

## 2019-08-26 ENCOUNTER — ANCILLARY PROCEDURE (OUTPATIENT)
Dept: MAMMOGRAPHY | Facility: CLINIC | Age: 84
End: 2019-08-26
Attending: INTERNAL MEDICINE
Payer: COMMERCIAL

## 2019-08-26 ENCOUNTER — ANCILLARY PROCEDURE (OUTPATIENT)
Dept: BONE DENSITY | Facility: CLINIC | Age: 84
End: 2019-08-26
Attending: INTERNAL MEDICINE
Payer: COMMERCIAL

## 2019-08-26 DIAGNOSIS — M85.89 OSTEOPENIA OF MULTIPLE SITES: ICD-10-CM

## 2019-08-26 DIAGNOSIS — Z12.31 VISIT FOR SCREENING MAMMOGRAM: ICD-10-CM

## 2019-08-26 PROCEDURE — 77085 DXA BONE DENSITY AXL VRT FX: CPT | Performed by: FAMILY MEDICINE

## 2019-08-26 PROCEDURE — 77067 SCR MAMMO BI INCL CAD: CPT | Mod: TC

## 2019-08-26 PROCEDURE — 77063 BREAST TOMOSYNTHESIS BI: CPT | Mod: TC

## 2019-09-10 ENCOUNTER — OFFICE VISIT (OUTPATIENT)
Dept: UROLOGY | Facility: CLINIC | Age: 84
End: 2019-09-10
Payer: COMMERCIAL

## 2019-09-10 VITALS — HEART RATE: 76 BPM | DIASTOLIC BLOOD PRESSURE: 72 MMHG | SYSTOLIC BLOOD PRESSURE: 140 MMHG

## 2019-09-10 DIAGNOSIS — Z87.440 PERSONAL HISTORY OF URINARY TRACT INFECTION: Primary | ICD-10-CM

## 2019-09-10 DIAGNOSIS — R33.9 URINARY RETENTION WITH INCOMPLETE BLADDER EMPTYING: ICD-10-CM

## 2019-09-10 PROCEDURE — 99213 OFFICE O/P EST LOW 20 MIN: CPT | Performed by: UROLOGY

## 2019-09-10 ASSESSMENT — PAIN SCALES - GENERAL: PAINLEVEL: NO PAIN (0)

## 2019-09-10 NOTE — LETTER
9/10/2019       RE: Maia Miranda  [id number] [facility] [address]     Dear Colleague,    Thank you for referring your patient, Maia Miranda, to the [facility] [place] at [facility]. Please see a copy of my visit note below.    September 10, 2019    Return visit    Patient returns today for follow up.  She is here today with her daughter.  They met with the interstim rep and had the program changed but patient forgot to bring her home     Patient was last seen in April, finally got her materials to start the gentamicin irrigations in June and reports that she has not had a UTI since. She denies any changes in her health since last visit.    BP (!) 140/72 (BP Location: Right arm)   Pulse 76   She is comfortable, in no distress, non-labored breathing.      A/P: 86 year old F with urinary retention managed with interstim and ISC.      She will continue the gentamicin irrigations thru the end of the year.  We discussed her concern about the regimen and technique but I reassured her she is doing fine as she is infection free    She wishes to return to meet with the interstim rep in a few weeks with her home     RTC to see me in January, sooner if needed    15 minutes were spent with the patient today, > 50% in counseling and coordination of care    Bernadine Maria MD MPH   of Urology    [facility]  Patient Care Team:  Dian Frias MD as PCP - General (Internal Medicine)  Rosa Cruz, RN as Registered Nurse (Urology)  Kb Tracy MD as MD (Urology)  Sahara Dobbs, SHIELA as Lead Care Coordinator (Primary Care - [facility])

## 2019-09-10 NOTE — PROGRESS NOTES
September 10, 2019    Return visit    Patient returns today for follow up.  She is here today with her daughter.  They met with the interstim rep and had the program changed but patient forgot to bring her home     Patient was last seen in April, finally got her materials to start the gentamicin irrigations in June and reports that she has not had a UTI since. She denies any changes in her health since last visit.    BP (!) 140/72 (BP Location: Right arm)   Pulse 76   She is comfortable, in no distress, non-labored breathing.      A/P: 86 year old F with urinary retention managed with interstim and ISC.      She will continue the gentamicin irrigations thru the end of the year.  We discussed her concern about the regimen and technique but I reassured her she is doing fine as she is infection free    She wishes to return to meet with the interstim rep in a few weeks with her home     RTC to see me in January, sooner if needed    15 minutes were spent with the patient today, > 50% in counseling and coordination of care    Bernadine Maria MD MPH   of Urology    [facility]  Patient Care Team:  Dian Frias MD as PCP - General (Internal Medicine)  Bernadine Maria MD as MD (Urology)  Rosa Cruz, RN as Registered Nurse (Urology)  Dian Frias MD as Referring Physician (Internal Medicine)  Dian Frias MD as Assigned PCP  Kb Tracy MD as MD (Urology)  Sahara Dobbs, RN as Lead Care Coordinator (Primary Care - [facility])

## 2019-09-10 NOTE — NURSING NOTE
Chief Complaint   Patient presents with     Clinic Care Coordination - Follow-up     Urinary Retention      Marianela Johnson LPN

## 2019-09-27 ENCOUNTER — ANTICOAGULATION THERAPY VISIT (OUTPATIENT)
Dept: NURSING | Facility: CLINIC | Age: 84
End: 2019-09-27
Payer: COMMERCIAL

## 2019-09-27 DIAGNOSIS — I48.91 ATRIAL FIBRILLATION (H): ICD-10-CM

## 2019-09-27 DIAGNOSIS — Z79.01 LONG TERM CURRENT USE OF ANTICOAGULANT THERAPY: ICD-10-CM

## 2019-09-27 LAB — INR POINT OF CARE: 2.8 (ref 0.86–1.14)

## 2019-09-27 PROCEDURE — 99207 ZZC NO CHARGE NURSE ONLY: CPT

## 2019-09-27 PROCEDURE — 36416 COLLJ CAPILLARY BLOOD SPEC: CPT

## 2019-09-27 PROCEDURE — 85610 PROTHROMBIN TIME: CPT | Mod: QW

## 2019-09-27 NOTE — PROGRESS NOTES
ANTICOAGULATION FOLLOW-UP CLINIC VISIT    Patient Name:  Maia Miranda  Date:  2019  Contact Type:  Face to Face    SUBJECTIVE:  Patient Findings     Comments:   Patient continues with gentamycin irrigation of bladder and continues to be free of UTIs. Plans to continue through end of year. The patient was assessed for diet, medication, and activity level changes, missed or extra doses, bruising or bleeding, with no problem findings.  Dian ALLEN RN  Anticoagulation Clinic  [place]          Clinical Outcomes     Negatives:   Major bleeding event, Thromboembolic event, Anticoagulation-related hospital admission, Anticoagulation-related ED visit, Anticoagulation-related fatality    Comments:   Patient continues with gentamycin irrigation of bladder and continues to be free of UTIs. Plans to continue through end of year. The patient was assessed for diet, medication, and activity level changes, missed or extra doses, bruising or bleeding, with no problem findings.  Dian ALLEN RN  Anticoagulation Clinic  [place]             OBJECTIVE    INR Protime   Date Value Ref Range Status   2019 2.8 (A) 0.86 - 1.14 Final       ASSESSMENT / PLAN  INR assessment THER    Recheck INR In: 4 WEEKS    INR Location Clinic      Anticoagulation Summary  As of 2019    INR goal:   2.0-3.0   TTR:   79.5 % (3.6 y)   INR used for dosin.8 (2019)   Warfarin maintenance plan:   2.5 mg (2.5 mg x 1) every day   Full warfarin instructions:   2.5 mg every day   Weekly warfarin total:   17.5 mg   No change documented:   Dian Astudillo RN   Plan last modified:   Meme Riggs RN (2017)   Next INR check:   10/25/2019   Target end date:   Indefinite    Indications    Long term current use of anticoagulant therapy [Z79.01]  Atrial fibrillation (H) [I48.91]             Anticoagulation Episode Summary     INR check location:   Anticoagulation Clinic    Preferred lab:       Send INR reminders to:   MICHAEL  ROSEMOUNT    Comments:   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

## 2019-10-03 ENCOUNTER — HEALTH MAINTENANCE LETTER (OUTPATIENT)
Age: 84
End: 2019-10-03

## 2019-10-04 ENCOUNTER — APPOINTMENT (OUTPATIENT)
Dept: UROLOGY | Facility: CLINIC | Age: 84
End: 2019-10-04
Payer: COMMERCIAL

## 2019-10-09 ENCOUNTER — PATIENT OUTREACH (OUTPATIENT)
Dept: CARE COORDINATION | Facility: CLINIC | Age: 84
End: 2019-10-09

## 2019-10-09 NOTE — PROGRESS NOTES
Clinic Care Coordination Contact  [facility]/Voicemail       Clinical Data: Care Coordinator Outreach  Outreach attempted x 1.  Left message on patient's voicemail with call back information and requested return call.  Plan:  Care Coordinator will try to reach patient again in 10 business days.    Sahara Dobbs RN   Clinic Care Coordinator-Tim Butcher  PH: [phone]

## 2019-10-22 ENCOUNTER — ALLIED HEALTH/NURSE VISIT (OUTPATIENT)
Dept: PEDIATRICS | Facility: CLINIC | Age: 84
End: 2019-10-22

## 2019-10-22 ENCOUNTER — ANTICOAGULATION THERAPY VISIT (OUTPATIENT)
Dept: NURSING | Facility: CLINIC | Age: 84
End: 2019-10-22
Payer: COMMERCIAL

## 2019-10-22 VITALS — SYSTOLIC BLOOD PRESSURE: 124 MMHG | DIASTOLIC BLOOD PRESSURE: 64 MMHG

## 2019-10-22 DIAGNOSIS — Z79.01 LONG TERM CURRENT USE OF ANTICOAGULANT THERAPY: ICD-10-CM

## 2019-10-22 DIAGNOSIS — Z01.30 BP CHECK: Primary | ICD-10-CM

## 2019-10-22 DIAGNOSIS — I48.91 ATRIAL FIBRILLATION (H): ICD-10-CM

## 2019-10-22 LAB — INR POINT OF CARE: 2.6 (ref 0.86–1.14)

## 2019-10-22 PROCEDURE — 85610 PROTHROMBIN TIME: CPT | Mod: QW

## 2019-10-22 PROCEDURE — 36416 COLLJ CAPILLARY BLOOD SPEC: CPT

## 2019-10-22 PROCEDURE — 99207 ZZC NO CHARGE NURSE ONLY: CPT | Performed by: INTERNAL MEDICINE

## 2019-10-22 PROCEDURE — 99207 ZZC NO CHARGE NURSE ONLY: CPT

## 2019-10-22 NOTE — PROGRESS NOTES
Maia Miranda was evaluated at [facility] on October 22, 2019 at which time her blood pressure was:    BP Readings from Last 3 Encounters:   10/22/19 124/64   09/10/19 (!) 140/72   08/19/19 (!) 142/68     Pulse Readings from Last 3 Encounters:   09/10/19 76   08/19/19 50   07/01/19 54       Reviewed lifestyle modifications for blood pressure control and reduction: including making healthy food choices, managing weight, getting regular exercise, smoking cessation, reducing alcohol consumption, monitoring blood pressure regularly.     Symptoms: None    BP Goal:< 140/90 mmHg    BP Assessment:  BP at goal    Potential Reasons for BP too high: NA - Not applicable    BP Follow-Up Plan: Recheck BP in 6 months at pharmacy    Recommendation to Provider: none    Note completed by: Daniele oCles    Thank You   Sloan Emanuel [facility]

## 2019-10-22 NOTE — PROGRESS NOTES
ANTICOAGULATION FOLLOW-UP CLINIC VISIT    Patient Name:  Maia Miranda  Date:  10/22/2019  Contact Type:  Face to Face    SUBJECTIVE:  Patient Findings     Comments:   The patient was assessed for diet, medication, and activity level changes, missed or extra doses, bruising or bleeding, with no problem findings.  Patient is looking for a service to give her rides. She is losing her PCP and looking for a new one. Gave her suggestions here in  or to check out the [facility].  Dian ALLEN RN  Anticoagulation Clinic  [place]          Clinical Outcomes     Negatives:   Major bleeding event, Thromboembolic event, Anticoagulation-related hospital admission, Anticoagulation-related ED visit, Anticoagulation-related fatality    Comments:   The patient was assessed for diet, medication, and activity level changes, missed or extra doses, bruising or bleeding, with no problem findings.  Patient is looking for a service to give her rides. She is losing her PCP and looking for a new one. Gave her suggestions here in  or to check out the [facility].  Dian ALLEN RN  Anticoagulation Clinic  [place]             OBJECTIVE    INR Protime   Date Value Ref Range Status   10/22/2019 2.6 (A) 0.86 - 1.14 Final       ASSESSMENT / PLAN  INR assessment THER    Recheck INR In: 6 WEEKS    INR Location Clinic      Anticoagulation Summary  As of 10/22/2019    INR goal:   2.0-3.0   TTR:   79.9 % (3.7 y)   INR used for dosin.6 (10/22/2019)   Warfarin maintenance plan:   2.5 mg (2.5 mg x 1) every day   Full warfarin instructions:   2.5 mg every day   Weekly warfarin total:   17.5 mg   No change documented:   Dian Astudillo RN   Plan last modified:   Meme Riggs RN (2017)   Next INR check:   12/3/2019   Target end date:   Indefinite    Indications    Long term current use of anticoagulant therapy [Z79.01]  Atrial fibrillation (H) [I48.91]             Anticoagulation Episode Summary     INR check  location:   Anticoagulation Clinic    Preferred lab:       Send INR reminders to:   MICHAEL ABURTO    Comments:   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

## 2019-10-26 DIAGNOSIS — Z79.01 LONG TERM CURRENT USE OF ANTICOAGULANT THERAPY: ICD-10-CM

## 2019-10-26 NOTE — TELEPHONE ENCOUNTER
"Requested Prescriptions   Pending Prescriptions Disp Refills     warfarin ANTICOAGULANT (COUMADIN) 2.5 MG tablet  Last Written Prescription Date:  04/30/2019  Last Fill Quantity: 92 tablet,  # refills: 1   Last Office Visit: 8/19/2019 Dian Frias MD   Future Office Visit:    Next 5 appointments (look out 90 days)    Oct 28, 2019  9:30 AM CDT  Nurse Only with  NURSE  [facility] ([address] [address] [phone]   Dec 20, 2019  9:30 AM CST  Office Visit with Li Flores MD,  EXAM ROOM 37  [facility] ([facility]) [address] [place] [address]  [address]  [facility] [id number] [phone]          92 tablet 1       Vitamin K Antagonists Failed - 10/26/2019  3:29 PM        Failed - INR is within goal in the past 6 weeks     Confirm INR is within goal in the past 6 weeks.     Recent Labs   Lab Test 10/22/19   INR 2.6*             Failed - Medication is active on med list        Passed - Recent (12 mo) or future (30 days) visit within the authorizing provider's specialty     Patient has had an office visit with the authorizing provider or a provider within the authorizing providers department within the previous 12 mos or has a future within next 30 days. See \"Patient Info\" tab in inbasket, or \"Choose Columns\" in Meds & Orders section of the refill encounter.              Passed - Patient is 18 years of age or older        Passed - Patient is not pregnant        Passed - No positive pregnancy on file in past 12 months          "

## 2019-10-28 ENCOUNTER — ALLIED HEALTH/NURSE VISIT (OUTPATIENT)
Dept: NURSING | Facility: CLINIC | Age: 84
End: 2019-10-28
Payer: COMMERCIAL

## 2019-10-28 DIAGNOSIS — Z79.01 LONG TERM CURRENT USE OF ANTICOAGULANT THERAPY: ICD-10-CM

## 2019-10-28 DIAGNOSIS — Z23 NEED FOR PROPHYLACTIC VACCINATION AND INOCULATION AGAINST INFLUENZA: Primary | ICD-10-CM

## 2019-10-28 PROCEDURE — G0008 ADMIN INFLUENZA VIRUS VAC: HCPCS

## 2019-10-28 PROCEDURE — 90662 IIV NO PRSV INCREASED AG IM: CPT

## 2019-10-28 PROCEDURE — 99207 ZZC NO CHARGE NURSE ONLY: CPT

## 2019-10-28 RX ORDER — WARFARIN SODIUM 2.5 MG/1
TABLET ORAL
Qty: 92 TABLET | Refills: 1 | Status: CANCELLED | OUTPATIENT
Start: 2019-10-28

## 2019-10-28 RX ORDER — WARFARIN SODIUM 2.5 MG/1
2.5 TABLET ORAL DAILY
Qty: 92 TABLET | Refills: 1 | Status: SHIPPED | OUTPATIENT
Start: 2019-10-28 | End: 2020-05-06

## 2019-10-28 NOTE — PROGRESS NOTES
Clinic Care Coordination Contact  [facility]/Voicemail       Clinical Data: Care Coordinator Outreach  Outreach attempted x 2.  Left message on patient's voicemail with call back information and requested return call.  Plan:Care Coordinator will do no further outreaches at this time.  Patient is followed by urology for management of her chronic UTI and self cathing routine.     Sahara Dobbs RN   Clinic Care Coordinator-Tim Butcher  PH: [phone]

## 2019-10-28 NOTE — TELEPHONE ENCOUNTER
"Requested Prescriptions   Pending Prescriptions Disp Refills     warfarin ANTICOAGULANT (COUMADIN) 2.5 MG tablet  Last Written Prescription Date:  04/30/2019  Last Fill Quantity: 92 tablet,  # refills: 1   Last Office Visit: 8/19/2019 Dian Frias MD   Future Office Visit:    Next 5 appointments (look out 90 days)    Dec 20, 2019  9:30 AM CST  Office Visit with Li Flores MD,  EXAM ROOM 37  [facility] [address] [place] [address]  [address]  [facility] [address]  [phone]          92 tablet 1       Vitamin K Antagonists Failed - 10/28/2019  3:14 PM        Failed - INR is within goal in the past 6 weeks     Confirm INR is within goal in the past 6 weeks.     Recent Labs   Lab Test 10/22/19   INR 2.6*             Failed - Medication is active on med list        Passed - Recent (12 mo) or future (30 days) visit within the authorizing provider's specialty     Patient has had an office visit with the authorizing provider or a provider within the authorizing providers department within the previous 12 mos or has a future within next 30 days. See \"Patient Info\" tab in inbasket, or \"Choose Columns\" in Meds & Orders section of the refill encounter.              Passed - Patient is 18 years of age or older        Passed - Patient is not pregnant        Passed - No positive pregnancy on file in past 12 months          "

## 2019-10-28 NOTE — TELEPHONE ENCOUNTER
Anticoagulation Monitoring Return Date Recheck Instructions   Latest Ref Rng & Units      10/22/2019 12/3/2019  2.5 mg every day   Prescription approved per [facility] Refill Protocol.  Ena Aiken RN  Anticoagulation Nurse - [facility]

## 2019-11-04 DIAGNOSIS — N39.0 RECURRENT URINARY TRACT INFECTION: ICD-10-CM

## 2019-11-19 NOTE — NURSING NOTE
Chief Complaint   Patient presents with     RECHECK     Pt here for six month f/u visit     VARGHESE Bravo   8

## 2019-12-03 ENCOUNTER — ANTICOAGULATION THERAPY VISIT (OUTPATIENT)
Dept: NURSING | Facility: CLINIC | Age: 84
End: 2019-12-03
Payer: COMMERCIAL

## 2019-12-03 ENCOUNTER — TELEPHONE (OUTPATIENT)
Dept: PEDIATRICS | Facility: CLINIC | Age: 84
End: 2019-12-03

## 2019-12-03 DIAGNOSIS — I48.11 LONGSTANDING PERSISTENT ATRIAL FIBRILLATION (H): Primary | ICD-10-CM

## 2019-12-03 DIAGNOSIS — I48.91 ATRIAL FIBRILLATION (H): ICD-10-CM

## 2019-12-03 DIAGNOSIS — Z79.01 LONG TERM CURRENT USE OF ANTICOAGULANT THERAPY: ICD-10-CM

## 2019-12-03 LAB — INR POINT OF CARE: 2.8 (ref 0.86–1.14)

## 2019-12-03 PROCEDURE — 99207 ZZC NO CHARGE NURSE ONLY: CPT

## 2019-12-03 PROCEDURE — 36416 COLLJ CAPILLARY BLOOD SPEC: CPT

## 2019-12-03 PROCEDURE — 85610 PROTHROMBIN TIME: CPT | Mod: QW

## 2019-12-03 NOTE — PROGRESS NOTES
ANTICOAGULATION FOLLOW-UP CLINIC VISIT    Patient Name:  Maia Miranda  Date:  12/3/2019  Contact Type:  Face to Face    SUBJECTIVE:  Patient Findings     Comments:   The patient was assessed for diet, medication, and activity level changes, missed or extra doses, bruising or bleeding, with no problem findings.  Dian ALLEN RN  Anticoagulation Clinic  [place]          Clinical Outcomes     Negatives:   Major bleeding event, Thromboembolic event, Anticoagulation-related hospital admission, Anticoagulation-related ED visit, Anticoagulation-related fatality    Comments:   The patient was assessed for diet, medication, and activity level changes, missed or extra doses, bruising or bleeding, with no problem findings.  Dian ALLEN RN  Anticoagulation Clinic  [place]             OBJECTIVE    INR Protime   Date Value Ref Range Status   2019 2.8 (A) 0.86 - 1.14 Final       ASSESSMENT / PLAN  INR assessment THER    Recheck INR In: 6 WEEKS    INR Location Clinic      Anticoagulation Summary  As of 12/3/2019    INR goal:   2.0-3.0   TTR:   78.7 % (1 y)   INR used for dosin.8 (12/3/2019)   Warfarin maintenance plan:   2.5 mg (2.5 mg x 1) every day   Full warfarin instructions:   2.5 mg every day   Weekly warfarin total:   17.5 mg   No change documented:   Dian Astudillo RN   Plan last modified:   Meme Riggs RN (2017)   Next INR check:   2020   Priority:   Maintenance   Target end date:   Indefinite    Indications    Long term current use of anticoagulant therapy [Z79.01]  Atrial fibrillation (H) [I48.91]             Anticoagulation Episode Summary     INR check location:   Anticoagulation Clinic    Preferred lab:       Send INR reminders to:   MICHAEL ABURTO    Comments:   2.5mg tablets // 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

## 2019-12-05 NOTE — TELEPHONE ENCOUNTER
With recent updates, the INR referral renewals are not working. Just responding to this message is good enough for now. Thanks!    Dian ALLEN RN  Anticoagulation Clinic  [place]

## 2019-12-17 NOTE — PROGRESS NOTES
New Patient/Transfer of Care    Previous PCP or place of care: was seeing Dr. Frias  Up to date on yearly wellness exam?     Subjective     Maia Miranda is a 87 year old female who presents to clinic today for the following health issues:    HPI   Establish care    Rough feeling mole on right side back           Urinary retention sees urology         Runny, drippy nose - for months     No cough, sob, itchy eyes, itchy nose.                 Patient Active Problem List    Diagnosis Date Noted     Hyperlipidemia LDL goal <130 11/30/2003     Priority: High     Longstanding persistent atrial fibrillation 12/20/2019     Priority: Medium     Personal history of urinary tract infection 09/10/2019     Priority: Medium     Urinary retention with incomplete bladder emptying 09/10/2019     Priority: Medium     Rectal bleeding 03/23/2018     Priority: Medium     Fibroid uterus 05/15/2015     Priority: Medium     Mild cognitive impairment 01/07/2015     Priority: Medium     Patient diagnosed with amnesic MCI two years ago. Symptoms include forgetfulness, difficulty finding the right word, and difficulty expressing the word once she knows it.        Post-menopausal bleeding 11/12/2014     Priority: Medium     History of recurrent UTIs 11/12/2014     Priority: Medium     Hypertension goal BP (blood pressure) < 150/90 07/11/2014     Priority: Medium     Atrial fibrillation (H) 11/15/2013     Priority: Medium     Long term current use of anticoagulant therapy 01/10/2013     Priority: Medium     Advanced directives, counseling/discussion 01/04/2013     Priority: Medium     Received outside advance directive.  Previously signed by patient and witnessed with two signatures (cannot be the HCA).    scanned into EMR as Advance Directive/Living Will document, along with validation form.  View document and details in Code Status History Report.   Please see advance directive for specifics.   I called patient to verify that  "she understands the ramifications of choosing No CPR if her heart and breathing stops.  Patient verbalizes that she wants to be allowed to die naturally.   She has seen many of her friends \"hooked up to machines\", and they they're \"vegetables\" after this, and she would not want to live like this.  Advised patient that we recommend completion of a POLST form also as she has chosen to be DNR/DNI.  Patient will ask her provider about this at her next office appointment.  No further questions at this time.  GAIL Carlisle RN       [facility] 10/23/2012     Priority: Medium     DX V65.8 REPLACED WITH [id number] [facility] (04/08/2013)         Urinary retention 10/02/2012     Priority: Medium     Chronic constipation 05/11/2012     Priority: Medium     CKD (chronic kidney disease) stage 3, GFR 30-59 ml/min (H) 08/31/2011     Priority: Medium     Diverticulosis 06/20/2011     Priority: Medium     cscope 6/11       Internal bleeding hemorrhoids 01/08/2009     Priority: Medium     Osteoarthritis of thumb 01/08/2009     Priority: Medium     Degeneration of lumbar or lumbosacral intervertebral disc 12/13/2006     Priority: Medium     Primary osteoarthritis involving multiple joints 11/22/2005     Priority: Medium     Hands       Essential and other specified forms of tremor 11/22/2005     Priority: Medium     resting tremor       Esophageal reflux 11/22/2005     Priority: Medium     Hiatal Hernia also present       Varicose veins of lower extremities with complications 11/22/2005     Priority: Medium     Problem list name updated by automated process. Provider to review       Allergic rhinitis 11/22/2005     Priority: Medium     [place]  Problem list name updated by automated process. Provider to review       Osteopenia 11/30/2003     Priority: Medium     Treated with Alendronate from [id number]         Current Outpatient Medications   Medication Sig Dispense Refill     acetaminophen (TYLENOL) 500 MG tablet Take " 500-1,000 mg by mouth every 6 hours as needed       ascorbic acid (VITAMIN C) 1000 MG TABS Take 1 tablet (1,000 mg) by mouth 2 times daily 180 tablet 3     calcium citrate-vitamin D (CITRACAL) 315-250 MG-UNIT TABS per tablet Take 1 tablet by mouth daily       carboxymethylcellulose (CELLUVISC/REFRESH LIQUIGEL) 1 % ophthalmic solution Place 1 drop into both eyes every morning As needed       Catheters (GENTLECATH URINARY CATHETER) MISC 1 catheter 4 times daily 120 each 12     Cholecalciferol (VITAMIN D-3) 1000 UNITS CAPS Take 1 capsule by mouth daily       COMPOUNDED NON-CONTROLLED SUBSTANCE (CMPD RX) - PHARMACY TO MIX COMPOUNDED MEDICATION Gentamicin solution: 480 mg gentamicin in 1 liter 0.9 normal saline. Instill 60 ml into bladder via catheter daily at bedtime.  Dispense 2, 1000 ml bottles. May refill x 3. 2 Bottle 3     conjugated estrogens (PREMARIN) 0.625 MG/GM vaginal cream Place 0.5 g vaginally twice a week On Tuesday and Friday. Uses a pea sized amount 5 g 2     lisinopril (PRINIVIL/ZESTRIL) 40 MG tablet Take 1 tablet (40 mg) by mouth daily 90 tablet 3     meclizine (ANTIVERT) 25 MG tablet Take 1 tablet (25 mg) by mouth every 6 hours as needed for dizziness 30 tablet 3     metoprolol succinate ER (TOPROL-XL) 100 MG 24 hr tablet Take 1 tablet (100 mg) by mouth daily 90 tablet 3     Omega-3 Fatty Acids (OMEGA-3 FISH OIL PO) Take 1 g by mouth 2 times daily (with meals)       sodium chloride (PETER 128) 5 % ophthalmic ointment Place 1 Application into both eyes At Bedtime       warfarin ANTICOAGULANT (COUMADIN) 2.5 MG tablet Take 1 tablet (2.5 mg) by mouth daily Or as directed by your INR Clinic. 92 tablet 1     Allergies   Allergen Reactions     Codeine Nausea and Vomiting     Other reaction(s): GI Intolerance  Other reaction(s): GI Intolerance, Vomiting     Meperidine Nausea and Vomiting     Other reaction(s): GI Intolerance  Other reaction(s): GI Intolerance, Vomiting     Morphine Nausea and Vomiting      Other reaction(s): GI Intolerance  vomiting  Other reaction(s): GI Intolerance, Vomiting     Penicillins Hives     hives     Sulfa Drugs      Other reaction(s): GI Intolerance  Vomiting    Other reaction(s): GI Intolerance     Epinephrine Palpitations     Other reaction(s): Other (See Comments)  Makes heart race  Other reaction(s): Other (See Comments), Tachycardia  Makes heart race       Recent Labs   Lab Test 12/22/19  1120 07/01/19  1000  10/20/18  1114 07/06/18  1142  02/26/18  1504 10/05/17  1159  06/14/17  0927   LDL  --  119*  --   --  135*  --   --   --   --  147*   HDL  --  46*  --   --  52  --   --   --   --  52   TRIG  --  111  --   --  110  --   --   --   --  146   ALT  --  26  --  27 25  --   --   --    < > 24   CR 1.34* 1.22*   < > 1.13* 1.18*   < > 1.09*  --    < > 1.29*   GFRESTIMATED 35* 40*   < > 46* 43*   < > 48*  --    < > 39*   GFRESTBLACK 41* 46*   < > 55* 53*   < > 58*  --    < > 48*   POTASSIUM 4.8 4.6   < > 4.5 4.2   < > 4.0  --    < > 4.9   TSH  --   --   --   --   --   --  1.82 2.44  --   --     < > = values in this interval not displayed.      BP Readings from Last 3 Encounters:   12/22/19 (!) 162/72   12/20/19 132/64   10/22/19 124/64    Wt Readings from Last 3 Encounters:   12/20/19 66.3 kg (146 lb 3.2 oz)   07/01/19 66.2 kg (146 lb)   05/24/19 68.5 kg (151 lb)                    Reviewed and updated as needed this visit by Provider         Review of Systems   All other systems on a 10-point review are negative, unless otherwise noted in HPI        Objective    /64 (BP Location: Right arm, Patient Position: Sitting, Cuff Size: Adult Regular)   Pulse 54   Temp 97.4  F (36.3  C) (Oral)   Resp 12   Wt 66.3 kg (146 lb 3.2 oz)   SpO2 98%   Breastfeeding No   BMI 24.33 kg/m    Body mass index is 24.33 kg/m .  Physical Exam   GENERAL: healthy, alert and no distress  EYES: Eyes grossly normal to inspection  ENT: nasal and post-pharyngeal mucosa normal  NECK: no asymmetry, masses,  "or scars  MS: no gross musculoskeletal defects noted, no edema  SKIN: irregularly shaped skin colored lesion with waxy, stuck on appearance on back  NEURO: mentation intact and speech normal  PSYCH: mentation appears normal and affect normal/bright      Diagnostic Test Results:  Labs reviewed in Epic        Assessment & Plan       ICD-10-CM    1. Encounter to establish care Z76.89    2. Longstanding persistent atrial fibrillation I48.11    3. Atrial fibrillation, unspecified type (H) I48.91 metoprolol succinate ER (TOPROL-XL) 100 MG 24 hr tablet   4. Hypertension goal BP (blood pressure) < 150/90 I10 lisinopril (PRINIVIL/ZESTRIL) 40 MG tablet   5. Palpitations R00.2 metoprolol succinate ER (TOPROL-XL) 100 MG 24 hr tablet   6. Dizziness R42 meclizine (ANTIVERT) 25 MG tablet   7. Seborrheic keratosis L82.1    8. Acute rhinitis J00    9. Dry skin L85.3           Patient Instructions   It was a pleasure meeting you today.    During today's visit, we addressed the followin. Mole - this is most likely called seborrheic keratosis  2. Runny nose - start with flonase twice daily and zyrtec daily for allergies.  Otherwise, keep in mind that runny noses are normal in the winter.  3. For skin dryness, I recommend a good emollient lotion to apply daily  4. I refilled your medications - please return for your physical next year    Do not hesitate to contact the clinic via [employer] or phone [phone] with questions about your health.  If you need more personalized care, please ask to speak with someone from my \"care team.\"    In addition to clinic appointments, we offer phone and E-visit encounters when deemed appropriate.          Return in about 8 months (around 2020) for Physical Exam.    Merced Flores MD  [facility]        "

## 2019-12-20 ENCOUNTER — OFFICE VISIT (OUTPATIENT)
Dept: PEDIATRICS | Facility: CLINIC | Age: 84
End: 2019-12-20
Payer: COMMERCIAL

## 2019-12-20 VITALS
SYSTOLIC BLOOD PRESSURE: 132 MMHG | BODY MASS INDEX: 24.33 KG/M2 | TEMPERATURE: 97.4 F | HEART RATE: 54 BPM | DIASTOLIC BLOOD PRESSURE: 64 MMHG | OXYGEN SATURATION: 98 % | RESPIRATION RATE: 12 BRPM | WEIGHT: 146.2 LBS

## 2019-12-20 DIAGNOSIS — L82.1 SEBORRHEIC KERATOSIS: ICD-10-CM

## 2019-12-20 DIAGNOSIS — R42 DIZZINESS: ICD-10-CM

## 2019-12-20 DIAGNOSIS — J00 ACUTE RHINITIS: ICD-10-CM

## 2019-12-20 DIAGNOSIS — I48.91 ATRIAL FIBRILLATION, UNSPECIFIED TYPE (H): ICD-10-CM

## 2019-12-20 DIAGNOSIS — I48.11 LONGSTANDING PERSISTENT ATRIAL FIBRILLATION (H): ICD-10-CM

## 2019-12-20 DIAGNOSIS — Z76.89 ENCOUNTER TO ESTABLISH CARE: Primary | ICD-10-CM

## 2019-12-20 DIAGNOSIS — R00.2 PALPITATIONS: ICD-10-CM

## 2019-12-20 DIAGNOSIS — L85.3 DRY SKIN: ICD-10-CM

## 2019-12-20 DIAGNOSIS — I10 HYPERTENSION GOAL BP (BLOOD PRESSURE) < 150/90: ICD-10-CM

## 2019-12-20 PROCEDURE — 99213 OFFICE O/P EST LOW 20 MIN: CPT | Performed by: INTERNAL MEDICINE

## 2019-12-20 RX ORDER — METOPROLOL SUCCINATE 100 MG/1
100 TABLET, EXTENDED RELEASE ORAL DAILY
Qty: 90 TABLET | Refills: 3 | Status: SHIPPED | OUTPATIENT
Start: 2019-12-20 | End: 2020-11-19

## 2019-12-20 RX ORDER — LISINOPRIL 40 MG/1
40 TABLET ORAL DAILY
Qty: 90 TABLET | Refills: 3 | Status: SHIPPED | OUTPATIENT
Start: 2019-12-20 | End: 2020-11-19

## 2019-12-20 RX ORDER — MECLIZINE HYDROCHLORIDE 25 MG/1
25 TABLET ORAL EVERY 6 HOURS PRN
Qty: 30 TABLET | Refills: 3 | Status: SHIPPED | OUTPATIENT
Start: 2019-12-20 | End: 2021-10-19

## 2019-12-20 NOTE — PATIENT INSTRUCTIONS
"It was a pleasure meeting you today.    During today's visit, we addressed the followin. Mole - this is most likely called seborrheic keratosis  2. Runny nose - start with flonase twice daily and zyrtec daily for allergies.  Otherwise, keep in mind that runny noses are normal in the winter.  3. For skin dryness, I recommend a good emollient lotion to apply daily  4. I refilled your medications - please return for your physical next year    Do not hesitate to contact the clinic via [employer] or phone [phone] with questions about your health.  If you need more personalized care, please ask to speak with someone from my \"care team.\"    In addition to clinic appointments, we offer phone and E-visit encounters when deemed appropriate.      "

## 2019-12-22 ENCOUNTER — APPOINTMENT (OUTPATIENT)
Dept: CT IMAGING | Facility: CLINIC | Age: 84
End: 2019-12-22
Attending: EMERGENCY MEDICINE
Payer: COMMERCIAL

## 2019-12-22 ENCOUNTER — HOSPITAL ENCOUNTER (EMERGENCY)
Facility: CLINIC | Age: 84
Discharge: HOME | End: 2019-12-22
Attending: EMERGENCY MEDICINE | Admitting: EMERGENCY MEDICINE
Payer: COMMERCIAL

## 2019-12-22 VITALS
HEART RATE: 57 BPM | OXYGEN SATURATION: 100 % | DIASTOLIC BLOOD PRESSURE: 72 MMHG | SYSTOLIC BLOOD PRESSURE: 162 MMHG | RESPIRATION RATE: 18 BRPM | TEMPERATURE: 98 F

## 2019-12-22 DIAGNOSIS — Z79.01 LONG TERM CURRENT USE OF ANTICOAGULANT THERAPY: ICD-10-CM

## 2019-12-22 DIAGNOSIS — S09.90XA INJURY OF HEAD, INITIAL ENCOUNTER: ICD-10-CM

## 2019-12-22 DIAGNOSIS — S01.01XA LACERATION OF SCALP, INITIAL ENCOUNTER: ICD-10-CM

## 2019-12-22 LAB
ANION GAP SERPL CALCULATED.3IONS-SCNC: 6 MMOL/L (ref 3–14)
BASOPHILS # BLD AUTO: 0 10E9/L (ref 0–0.2)
BASOPHILS NFR BLD AUTO: 0.4 %
BUN SERPL-MCNC: 34 MG/DL (ref 7–30)
CALCIUM SERPL-MCNC: 9.3 MG/DL (ref 8.5–10.1)
CHLORIDE SERPL-SCNC: 112 MMOL/L (ref 94–109)
CO2 SERPL-SCNC: 26 MMOL/L (ref 20–32)
CREAT SERPL-MCNC: 1.34 MG/DL (ref 0.52–1.04)
DIFFERENTIAL METHOD BLD: ABNORMAL
EOSINOPHIL # BLD AUTO: 0 10E9/L (ref 0–0.7)
EOSINOPHIL NFR BLD AUTO: 0.5 %
ERYTHROCYTE [DISTWIDTH] IN BLOOD BY AUTOMATED COUNT: 12.7 % (ref 10–15)
GFR SERPL CREATININE-BSD FRML MDRD: 35 ML/MIN/{1.73_M2}
GLUCOSE SERPL-MCNC: 115 MG/DL (ref 70–99)
HCT VFR BLD AUTO: 41.5 % (ref 35–47)
HGB BLD-MCNC: 13.1 G/DL (ref 11.7–15.7)
IMM GRANULOCYTES # BLD: 0 10E9/L (ref 0–0.4)
IMM GRANULOCYTES NFR BLD: 0.3 %
INR PPP: 2.74 (ref 0.86–1.14)
LYMPHOCYTES # BLD AUTO: 0.7 10E9/L (ref 0.8–5.3)
LYMPHOCYTES NFR BLD AUTO: 9.1 %
MCH RBC QN AUTO: 32 PG (ref 26.5–33)
MCHC RBC AUTO-ENTMCNC: 31.6 G/DL (ref 31.5–36.5)
MCV RBC AUTO: 102 FL (ref 78–100)
MONOCYTES # BLD AUTO: 0.5 10E9/L (ref 0–1.3)
MONOCYTES NFR BLD AUTO: 6 %
NEUTROPHILS # BLD AUTO: 6.6 10E9/L (ref 1.6–8.3)
NEUTROPHILS NFR BLD AUTO: 83.7 %
NRBC # BLD AUTO: 0 10*3/UL
NRBC BLD AUTO-RTO: 0 /100
PLATELET # BLD AUTO: 138 10E9/L (ref 150–450)
POTASSIUM SERPL-SCNC: 4.8 MMOL/L (ref 3.4–5.3)
RBC # BLD AUTO: 4.09 10E12/L (ref 3.8–5.2)
SODIUM SERPL-SCNC: 144 MMOL/L (ref 133–144)
WBC # BLD AUTO: 7.8 10E9/L (ref 4–11)

## 2019-12-22 PROCEDURE — 25000128 H RX IP 250 OP 636: Performed by: EMERGENCY MEDICINE

## 2019-12-22 PROCEDURE — 90471 IMMUNIZATION ADMIN: CPT

## 2019-12-22 PROCEDURE — 80048 BASIC METABOLIC PNL TOTAL CA: CPT | Performed by: EMERGENCY MEDICINE

## 2019-12-22 PROCEDURE — 85610 PROTHROMBIN TIME: CPT | Performed by: EMERGENCY MEDICINE

## 2019-12-22 PROCEDURE — 12002 RPR S/N/AX/GEN/TRNK2.6-7.5CM: CPT

## 2019-12-22 PROCEDURE — 90714 TD VACC NO PRESV 7 YRS+ IM: CPT | Performed by: EMERGENCY MEDICINE

## 2019-12-22 PROCEDURE — 70450 CT HEAD/BRAIN W/O DYE: CPT

## 2019-12-22 PROCEDURE — 25000132 ZZH RX MED GY IP 250 OP 250 PS 637: Performed by: EMERGENCY MEDICINE

## 2019-12-22 PROCEDURE — 85025 COMPLETE CBC W/AUTO DIFF WBC: CPT | Performed by: EMERGENCY MEDICINE

## 2019-12-22 PROCEDURE — 99284 EMERGENCY DEPT VISIT MOD MDM: CPT | Mod: 25

## 2019-12-22 RX ORDER — ACETAMINOPHEN 500 MG
500 TABLET ORAL ONCE
Status: COMPLETED | OUTPATIENT
Start: 2019-12-22 | End: 2019-12-22

## 2019-12-22 RX ORDER — BUPIVACAINE HYDROCHLORIDE 5 MG/ML
INJECTION, SOLUTION PERINEURAL
Status: DISCONTINUED
Start: 2019-12-22 | End: 2019-12-22 | Stop reason: HOSPADM

## 2019-12-22 RX ADMIN — ACETAMINOPHEN 500 MG: 500 TABLET, FILM COATED ORAL at 11:43

## 2019-12-22 RX ADMIN — CLOSTRIDIUM TETANI TOXOID ANTIGEN (FORMALDEHYDE INACTIVATED) AND CORYNEBACTERIUM DIPHTHERIAE TOXOID ANTIGEN (FORMALDEHYDE INACTIVATED) 0.5 ML: 5; 2 INJECTION, SUSPENSION INTRAMUSCULAR at 11:44

## 2019-12-22 ASSESSMENT — ENCOUNTER SYMPTOMS
ARTHRALGIAS: 0
WOUND: 1
ABDOMINAL PAIN: 0
DIZZINESS: 1
COUGH: 0
HEADACHES: 0
CONFUSION: 0

## 2019-12-22 NOTE — ED AVS SNAPSHOT
[facility] Emergency Department  [address]  [facility] [address]  Phone:  [phone]  Fax:  [phone]                                    Maia Miranda   MRN: [id number]    Department:  [facility] Emergency Department   Date of Visit:  12/22/2019           After Visit Summary Signature Page    I have received my discharge instructions, and my questions have been answered. I have discussed any challenges I see with this plan with the nurse or doctor.    ..........................................................................................................................................  Patient/Patient Representative Signature      ..........................................................................................................................................  Patient Representative Print Name and Relationship to Patient    ..................................................               ................................................  Date                                   Time    ..........................................................................................................................................  Reviewed by Signature/Title    ...................................................              ..............................................  Date                                               Time          22EPIC Rev 08/18

## 2019-12-22 NOTE — ED TRIAGE NOTES
Patient presents to the ED following a fall. Patient states fell in the bathroom, but is unsure how she fell. Does not think had a LOC. Lac to posterior head. On coumadin.

## 2019-12-22 NOTE — DISCHARGE INSTRUCTIONS
Discharge Instructions  Head Injury    You have been seen today for a head injury. Your evaluation included a history and physical examination. You may have had a CT (CAT) scan performed, though most head injuries do not require a scan. Based on this evaluation, your provider today does not feel that your head injury is serious.    Generally, every Emergency Department visit should have a follow-up clinic visit with either a primary or a specialty clinic/provider. Please follow-up as instructed by your emergency provider today.  Return to the Emergency Department if:  You are confused or you are not acting right.  Your headache gets worse or you start to have a really bad headache even with your recommended treatment plan.  You vomit (throw up) more than once.  You have a seizure.  You have trouble walking.  You have weakness or paralysis (cannot move) in an arm or a leg.  You have blood or fluid coming from your ears or nose.  You have new symptoms or anything that worries you.    Sleeping:  It is okay for you to sleep, but someone should wake you up if instructed by your provider, and someone should check on you at your usual time to wake up.     Activity:  Do not drive for at least 24 hours.  Do not drive if you have dizzy spells or trouble concentrating, or remembering things.  Do not return to any contact sports until cleared by your regular provider.     MORE INFORMATION:    Concussion:  A concussion is a minor head injury that may cause temporary problems with the way the brain works. Although concussions are important, they are generally not an emergency or a reason that a person needs to be hospitalized. Some concussion symptoms include confusion, amnesia (forgetful), nausea (sick to your stomach) and vomiting (throwing up), dizziness, fatigue, memory or concentration problems, irritability and sleep problems. For most people, concussions are mild and temporary but some will have more severe and persistent  symptoms that require on-going care and treatment.  CT Scans: Your evaluation today may have included a CT scan (CAT scan) to look for things like bleeding or a skull fracture (broken bone).  CT scans involve radiation and too many CT scans can cause serious health problems like cancer, especially in children.  Because of this, your provider may not have ordered a CT scan today if they think you are at low risk for a serious or life threatening problem.    If you were given a prescription for medicine here today, be sure to read all of the information (including the package insert) that comes with your prescription.  This will include important information about the medicine, its side effects, and any warnings that you need to know about.  The pharmacist who fills the prescription can provide more information and answer questions you may have about the medicine.  If you have questions or concerns that the pharmacist cannot address, please call or return to the Emergency Department.     Remember that you can always come back to the Emergency Department if you are not able to see your regular provider in the amount of time listed above, if you get any new symptoms, or if there is anything that worries you.    Discharge Instructions  Laceration (Cut)    You were seen today for a laceration (cut).  Your provider examined your laceration for any problems such a buried foreign body (like glass, a splinter, or gravel), or injury to blood vessels, tendons, and nerves.  Your provider may have also rinsed and/or scrubbed your laceration to help prevent an infection. It may not be possible to find all problems with your laceration on the first visit; occasionally foreign bodies or a tendon injury can go undetected.    Your laceration may have been closed in one of several ways:  No closure: many wounds will heal just fine without closure.  Stitches: regular stitches that require removal.  Staples: skin staples are often used  in the scalp/head.  Wound adhesive (glue): skin glue can be used for certain lacerations and doesn t require removal.  Wound strips (aka Butterfly bandages or steri-strips): these are bandages that help to close a wound.  Absorbable stitches:  dissolving  stitches that go away on their own and usually don t require removal.    A small percentage of wounds will develop an infection regardless of how well the wound is cared for. Antibiotics are generally not indicated to prevent an infection so are only given for a small number of high-risk wounds. Some lacerations are too high risk to close, and are left open to heal because closure can increase the likelihood that an infection will develop.    Remember that all lacerations, no matter how expertly repaired, will cause scarring. We consider many factors, techniques, and materials, in our efforts to provide the best possible cosmetic outcome.    Generally, every Emergency Department visit should have a follow-up clinic visit with either a primary or a specialty clinic/provider. Please follow-up as instructed by your emergency provider today.     Return to the Emergency Department right away if:  You have more redness, swelling, pain, drainage (pus), a bad smell, or red streaking from your laceration as these symptoms could indicate an infection.  You have a fever of 100.4 F or more.  You have bleeding that you cannot stop at home. If your cut starts to bleed, hold pressure on the bleeding area with a clean cloth or put pressure over the bandage.  If the bleeding does not stop after using constant pressure for 30 minutes, you should return to the Emergency Department for further treatment.  An area past the laceration is cool, pale, or blue compared with the other side, or has a slower return of color when squeezed.  Your dressing seems too tight or starts to get uncomfortable or painful. For children, signs of a problem might be irritability or restlessness.  You have  loss of normal function or use of an area, such as being unable to straighten or bend a finger normally.  You have a numb area past the laceration.    Return to the Emergency Department or see your regular provider if:  The laceration starts to come open.   You have something coming out of the cut or a feeling that there is something in the laceration.  Your wound will not heal, or keeps breaking open. There can always be glass, wood, dirt or other things in any wound.  They will not always show up, even on x-rays.  If a wound does not heal, this may be why, and it is important to follow-up with your regular provider.    Home Care:  Take your dressing off in 12-24 hours, or as instructed by your provider, to check your laceration. Remove the dressing sooner if it seems too tight or painful, or if it is getting numb, tingly, or pale past the dressing.  Gently wash your laceration 1-2 times daily with clean water and mild soap. It is okay to shower or run clean water over the laceration, but do not let the laceration soak in water (no swimming).  If your laceration was closed with wound adhesive or strips: pat it dry and leave it open to the air. For all other repairs: after you wash your laceration, or at least 2 times a day, apply antibiotic ointment (such as Neosporin  or Bacitracin ) to the laceration, then cover it with a Band-Aid  or gauze.  Keep the laceration clean. Wear gloves or other protective clothing if you are around dirt.    Follow-up for removal:  If your wound was closed with staples or regular stitches, they need to be removed according to the instructions and timeline specified by your provider today.  If your wound was closed with absorbable ( dissolving ) sutures, they should fall out, dissolve, or not be visible in about one week. If they are still visible, then they should be removed according to the instructions and timeline specified by your provider today.    Scars:  To help minimize  scarring:  Wear sunscreen over the healed laceration when out in the sun.  Massage the area regularly once healed.  You may apply Vitamin E to the healed wound.  Wait. Scars improve in appearance over months and years.    If you were given a prescription for medicine here today, be sure to read all of the information (including the package insert) that comes with your prescription.  This will include important information about the medicine, its side effects, and any warnings that you need to know about.  The pharmacist who fills the prescription can provide more information and answer questions you may have about the medicine.  If you have questions or concerns that the pharmacist cannot address, please call or return to the Emergency Department.       Remember that you can always come back to the Emergency Department if you are not able to see your regular provider in the amount of time listed above, if you get any new symptoms, or if there is anything that worries you.

## 2019-12-24 ENCOUNTER — TELEPHONE (OUTPATIENT)
Dept: PEDIATRICS | Facility: CLINIC | Age: 84
End: 2019-12-24

## 2019-12-24 DIAGNOSIS — I48.91 ATRIAL FIBRILLATION (H): ICD-10-CM

## 2019-12-24 DIAGNOSIS — Z79.01 LONG TERM CURRENT USE OF ANTICOAGULANT THERAPY: ICD-10-CM

## 2019-12-24 DIAGNOSIS — I48.11 LONGSTANDING PERSISTENT ATRIAL FIBRILLATION (H): ICD-10-CM

## 2019-12-24 NOTE — TELEPHONE ENCOUNTER
ANTICOAGULATION  MANAGEMENT: Discharge Review    Maia Miranda chart reviewed for anticoagulation continuity of care    Emergency room visit on 12/22 for fall with head injury.    Discharge disposition: Home    INR Results:    Recent Labs   Lab Test 12/22/19  1120   INR 2.74*       Warfarin inpatient management: home regimen continued    Warfarin discharge instructions: home regimen continued     Medication Changes Affecting Anticoagulation: No    Additional Factors Affecting Anticoagulation: No    Plan     Recommend to check INR on 12/30 due to fall with bruising and injury    Spoke with Maia    Anticoagulation Calendar updated    Thania Johnson [facility]

## 2019-12-30 ENCOUNTER — ALLIED HEALTH/NURSE VISIT (OUTPATIENT)
Dept: NURSING | Facility: CLINIC | Age: 84
End: 2019-12-30
Payer: COMMERCIAL

## 2019-12-30 DIAGNOSIS — Z48.02 ENCOUNTER FOR STAPLE REMOVAL: Primary | ICD-10-CM

## 2019-12-30 PROCEDURE — 99207 ZZC NO CHARGE NURSE ONLY: CPT

## 2019-12-30 NOTE — PROGRESS NOTES
Maia Miranda presents to the clinic today for removal of sutures and staples.  The patient has had the sutures and staples in place for 8 days.  There has been no history of infection or drainage.  6 sutures and staples are seen located on the posterior scalp.  The wound is healing well with no signs of infection.  Tetanus status is up to date.   All sutures and staples were easily removed today.  Routine wound care discussed.  The patient will follow up as needed.

## 2020-01-07 ENCOUNTER — TELEPHONE (OUTPATIENT)
Dept: PEDIATRICS | Facility: CLINIC | Age: 85
End: 2020-01-07

## 2020-01-08 ENCOUNTER — TELEPHONE (OUTPATIENT)
Dept: PEDIATRICS | Facility: CLINIC | Age: 85
End: 2020-01-08

## 2020-01-08 ENCOUNTER — ANTICOAGULATION THERAPY VISIT (OUTPATIENT)
Dept: NURSING | Facility: CLINIC | Age: 85
End: 2020-01-08
Payer: COMMERCIAL

## 2020-01-08 DIAGNOSIS — I48.11 LONGSTANDING PERSISTENT ATRIAL FIBRILLATION (H): ICD-10-CM

## 2020-01-08 DIAGNOSIS — I48.91 ATRIAL FIBRILLATION (H): ICD-10-CM

## 2020-01-08 DIAGNOSIS — Z79.01 LONG TERM CURRENT USE OF ANTICOAGULANTS WITH INR GOAL OF 2.0-3.0: Primary | ICD-10-CM

## 2020-01-08 DIAGNOSIS — Z79.01 LONG TERM CURRENT USE OF ANTICOAGULANT THERAPY: ICD-10-CM

## 2020-01-08 LAB — INR POINT OF CARE: 3.9 (ref 0.86–1.14)

## 2020-01-08 PROCEDURE — 99207 ZZC NO CHARGE NURSE ONLY: CPT

## 2020-01-08 PROCEDURE — 85610 PROTHROMBIN TIME: CPT | Mod: QW

## 2020-01-08 PROCEDURE — 36416 COLLJ CAPILLARY BLOOD SPEC: CPT

## 2020-01-08 NOTE — TELEPHONE ENCOUNTER
Renewal of INR Referral orders now available for signature. (approved 12/6/19 when ordering system was down)    Dian ALLEN RN  Anticoagulation Clinic  [place]

## 2020-01-08 NOTE — PROGRESS NOTES
ANTICOAGULATION FOLLOW-UP CLINIC VISIT    Patient Name:  Maia Miranda  Date:  1/8/2020  Contact Type:  Face to Face    SUBJECTIVE:  Patient Findings     Positives:   Change in health    Comments:   Patient denies any identifiable changes that caused the supratherapeutic INR. On 12/22/19 patient fell in bathroom and gashed her head - 6 staples, now removed. Laceration is well healed on top of head. The patient was assessed for diet, medication, and activity level changes, missed or extra doses, bruising or bleeding, with no problem findings. She does have an appointment with Podiatry for her left great toe which is falling off. Examined toe today, it is red and colder than the rest of the toes, there is a scabbed over area on the lateral side. No s/sx of infection, but wonder if circulation is a problem. Advised to keep appt. Patient states she has eaten less green veggies than usual, she needs to get some more. She will return to her usual green veggie intake, we will reduce her warfarin 1x today, then she will return to maintenance and have INR rechecked in 2 weeks.  INR referral renewal sent to PCP for signature.  Dian ALLEN RN  Anticoagulation Clinic  [place]              Clinical Outcomes     Comments:   Patient denies any identifiable changes that caused the supratherapeutic INR. On 12/22/19 patient fell in bathroom and gashed her head - 6 staples, now removed. Laceration is well healed on top of head. The patient was assessed for diet, medication, and activity level changes, missed or extra doses, bruising or bleeding, with no problem findings. She does have an appointment with Podiatry for her left great toe which is falling off. Examined toe today, it is red and colder than the rest of the toes, there is a scabbed over area on the lateral side. No s/sx of infection, but wonder if circulation is a problem. Advised to keep appt. Patient states she has eaten less green veggies than usual, she needs to  get some more. She will return to her usual green veggie intake, we will reduce her warfarin 1x today, then she will return to maintenance and have INR rechecked in 2 weeks.  INR referral renewal sent to PCP for signature.  Dian ALLEN RN  Anticoagulation Clinic  [place]                 OBJECTIVE    INR Protime   Date Value Ref Range Status   01/08/2020 3.9 (A) 0.86 - 1.14 Final       ASSESSMENT / PLAN  INR assessment SUPRA    Recheck INR In: 2 WEEKS    INR Location Clinic      Anticoagulation Summary  As of 1/8/2020    INR goal:   2.0-3.0   TTR:   80.0 % (1 y)   INR used for dosing:   3.9! (1/8/2020)   Warfarin maintenance plan:   2.5 mg (2.5 mg x 1) every day   Full warfarin instructions:   1/8: 1.25 mg; Otherwise 2.5 mg every day   Weekly warfarin total:   17.5 mg   Plan last modified:   Dian Astudillo RN (1/8/2020)   Next INR check:   1/22/2020   Priority:   Maintenance   Target end date:   Indefinite    Indications    Atrial fibrillation (H) [I48.91]  Long term current use of anticoagulant therapy [Z79.01]  Longstanding persistent atrial fibrillation [I48.11]             Anticoagulation Episode Summary     INR check location:   Anticoagulation Clinic    Preferred lab:       Send INR reminders to:   MICHAEL ABURTO    Comments:   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    Dian Astudillo RN

## 2020-01-09 PROBLEM — Z79.01 LONG TERM CURRENT USE OF ANTICOAGULANTS WITH INR GOAL OF 2.0-3.0: Status: ACTIVE | Noted: 2020-01-09

## 2020-01-13 ENCOUNTER — OFFICE VISIT (OUTPATIENT)
Dept: PODIATRY | Facility: CLINIC | Age: 85
End: 2020-01-13
Payer: COMMERCIAL

## 2020-01-13 VITALS
WEIGHT: 146.2 LBS | SYSTOLIC BLOOD PRESSURE: 130 MMHG | HEIGHT: 65 IN | DIASTOLIC BLOOD PRESSURE: 70 MMHG | BODY MASS INDEX: 24.36 KG/M2

## 2020-01-13 DIAGNOSIS — L60.0 ONYCHOCRYPTOSIS: Primary | ICD-10-CM

## 2020-01-13 PROCEDURE — 99213 OFFICE O/P EST LOW 20 MIN: CPT | Mod: 25 | Performed by: PODIATRIST

## 2020-01-13 PROCEDURE — G0127 TRIM NAIL(S): HCPCS | Performed by: PODIATRIST

## 2020-01-13 ASSESSMENT — MIFFLIN-ST. JEOR: SCORE: 1099.04

## 2020-01-13 NOTE — PATIENT INSTRUCTIONS
Thank you for choosing [facility]!    DR. DODSON'S CLINIC LOCATIONS:   MONDAY - [place] TUESDAY AM - [place]   [address] [facility]  [address]   [place] [address] [place] [address]   [phone]       THURSDAY AM - [place] [facility]SD[facility]   [address]   [place] [address] [place] [address]   [phone]       FRIDAY AM - [place] SET UP SURGERY: [phone] [address] APPOINTMENTS: [phone]   [place] [address] BILLING QUESTIONS: [phone] FAX NUMBER: [phone]     Follow Up: 3 weeks    CAPSULITIS / METATARSALGIA  All joints in the body are surrounded by a capsule, or a covering of soft tissue and ligaments. The capsule holds bones together and secretes joint fluid to help lubricate the joint. If a joint capsule is exposed to excessive force, it can develop microscopic tears and become inflamed. This commonly occurs in the foot due to mild variation in anatomy. Hammertoes, bunions, irregular bone length, joint immobility, etc. can all lead to excessive force on the joint. Capsule injury can also occur due to repetitive stress from exercise, insufficient support from shoes, excessive bare foot walking and excessive weight.      Conservative treatments include ice, rest from the aggravating activity, weight loss, orthotic inserts, improving shoes and shoe modifications. You can purchase an over the counter felt metatarsal pad to place in your shoes at some [employer] or on [employer]. Appropriate shoes will protect the inflamed tissue improving the chances of healing. Avoidance of standing or walking barefoot, including around the house, is necessary to allow healing. Casts are sometimes used for more aggressive protection.  NSAIDs such as Advil are also used to help with pain and decreasing inflammation. If pain continues over a period of weeks with  continuous rest and icing, Corticosteroid injections can be a treatment option to try and help decrease inflammation.    Surgery is often necessary to correct the underlying structural problem. Surgery might include shortening an excessively long bone, repairing bunion or hammertoe, lengthening a tight Achilles  tendon, etc. These are same day surgeries that might be pursued if more conservative measures fail to provide relief.      The inflamed joint capsule has the potential to completely tear. This will allow the toe to drift off the ground, curving toward the other toes. The involved toe may under or overlap the adjacent toes as drift continues. The pain may improve after the joint tears or this new position will be permanent. Surgery can address the toe alignment. Your goal of treating capsulitis is to avoid this scenario.          PRICE THERAPY  Many aches and pains throughout the foot and ankle can be helped with many simple treatments. This is usually described as PRICE Therapy.      P - Protection - often times, inflammation/pain in the lower extremity is not able to improve simply because the areas involved are never allowed to rest. Every step we take can bother the problematic area. Protecting those areas is an important step in the healing process. This may involve a walking cast boot, a special insert/orthotic device, an ankle brace, or simply avoiding barefoot walking.    R - Rest - in addition to protecting the foot/ankle, resting is an important, but often times difficult, treatment option. Getting off your feet when they bother you, and specifically avoiding activities that cause pain/discomfort, are very beneficial to prevent, and treat, foot/ankle pain.      I - Ice - icing regularly can help to decrease inflammation and swelling in the foot, thus decreasing pain. Using an ice pack or a bag of frozen veggies works very well. Ice for 20 minutes multiple times per day as needed.  Do not place the  ice directly on the skin as this can cause tissue damage.    C - Compression - using a compression wrap or an ACE wrap can help to decrease swelling, which can help to decrease pain. Wearing the wraps is generally not needed at night, but they should be worn on a regular basis when you are going to be on your feet for prolonged periods as gravity tends to pull fluids down to your feet/ankles.    E - Elevation - elevating your lower extremities multiple times daily for 15-20 minutes can help to decrease swelling, which works well in decreasing pain levels.    NSAID/Tylenol - Anti-inflammatories like Aleve or ibuprofen, and/or a pain medication, such as Tylenol, can help to improve pain levels and get the issue resolved sooner rather than later. Anyone with liver issues should be careful with Tylenol, and anyone with high blood pressure or heart, stomach or kidney issues should be careful with anti-inflammatories. Please ask if you have questions about these medications, including dosage.

## 2020-01-13 NOTE — PROGRESS NOTES
"Foot & Ankle Surgery   January 13, 2020    S:  Pt is seen today for evaluation of 2 issues.  \"loose nail\" left great toe.  Painful at lateral margin.  Using a bandaid to hold it in place.  Also wants to know \"why does it hurt here?\", points under 2nd toe joint.  Previous injury to lateral L forefoot, possible injury to 2nd toe.  Wears New Balance and has Superfeet in shoes.  Minimal shoeless ambulation    Vitals:    01/13/20 1346   BP: 130/70   Weight: 66.3 kg (146 lb 3.2 oz)   Height: 1.651 m (5' 5\")   '      ROS - Pos for CC.  Patient denies current nausea, vomiting, chills, fevers, belly pain, calf pain, chest pain or SOB.  Complete remainder of ROS it otherwise neg.      PE:  Gen:   No apparent distress  Eye:    Visual scanning without deficit  Ear:    Response to auditory stimuli wnl  Lung:    Non-labored breathing on RA noted  Abd:    NTND per patient report  Lymph:    Neg for pitting/non-pitting edema BLE  Vasc:    Pulses palpable, CFT minimally delayed  Neuro:    Light touch sensation intact to all sensory nerve distributions without paresthesias  Derm:    L hallux nail is dystrophic, minimally-loose distally, and shows tender onychocryptosis lateral border without paronychia  MSK:    Left lower extremity - no 2nd toe tenderness, but tender plantar 2nd MPJ.  No pain in lateral toes noted today.  Calf:    Neg for redness, swelling or tenderness    Assessment:  87 year old female with onychocryptosis lateral L hallux in dystrophic nail; 2nd MPJ capsulitis left lower extremity       Plan:  Discussed etiologies, anatomy and options  1.  onychocryptosis lateral L hallux in dystrophic nail  -slantback performed, leading edge  and removed.  Small cut in skin, cleansed with alcohol wipe and bacitracin/bandaid applied.  Keep intact until tomorrow, no further bandaging anticipated  -otherwise, no pathology.  The nail plate is minimally loose, certainly not bad enough to warrant removal.  No paronychia, no PO " abx indicated    2.  Capsulitis 2nd MPJ left lower extremity   -capsulitis handout for patient information  -minmizie shoeless ambulation  -continue with inserts and comfortable shoe gear  -RICE/tylenol prn based on pain; no NSAIDs as she's on coumadin  -continue orthotics, boot, PT, imaging should this fail  To provide sufficient relief     Follow up:  3 weeks or sooner with acute issues      Body mass index is 24.33 kg/m .           Redd Britton DPM FACFAS FACFAOM  Podiatric Foot & Ankle Surgeon  [facility]  [phone]

## 2020-01-22 ENCOUNTER — ANTICOAGULATION THERAPY VISIT (OUTPATIENT)
Dept: NURSING | Facility: CLINIC | Age: 85
End: 2020-01-22
Payer: COMMERCIAL

## 2020-01-22 DIAGNOSIS — I48.91 ATRIAL FIBRILLATION (H): ICD-10-CM

## 2020-01-22 DIAGNOSIS — Z79.01 LONG TERM CURRENT USE OF ANTICOAGULANT THERAPY: ICD-10-CM

## 2020-01-22 DIAGNOSIS — I48.11 LONGSTANDING PERSISTENT ATRIAL FIBRILLATION (H): ICD-10-CM

## 2020-01-22 DIAGNOSIS — Z79.01 LONG TERM CURRENT USE OF ANTICOAGULANTS WITH INR GOAL OF 2.0-3.0: ICD-10-CM

## 2020-01-22 LAB — INR POINT OF CARE: 2.9 (ref 0.86–1.14)

## 2020-01-22 PROCEDURE — 36416 COLLJ CAPILLARY BLOOD SPEC: CPT

## 2020-01-22 PROCEDURE — 85610 PROTHROMBIN TIME: CPT | Mod: QW

## 2020-01-22 PROCEDURE — 99207 ZZC NO CHARGE NURSE ONLY: CPT

## 2020-01-22 NOTE — PROGRESS NOTES
"ANTICOAGULATION FOLLOW-UP CLINIC VISIT    Patient Name:  Maia Miranda  Date:  2020  Contact Type:  Face to Face    SUBJECTIVE:  Patient Findings     Comments:   Patient states \"I've been eating a lot of green stuff\". When asked if that is a diet she can maintain she stated \"yes\". Will not touch her maintenance dose today. The patient was assessed for diet, medication, and activity level changes, missed or extra doses, bruising or bleeding, with no problem findings.  Dian ALLEN RN  Anticoagulation Clinic  [place]          Clinical Outcomes     Comments:   Patient states \"I've been eating a lot of green stuff\". When asked if that is a diet she can maintain she stated \"yes\". Will not touch her maintenance dose today. The patient was assessed for diet, medication, and activity level changes, missed or extra doses, bruising or bleeding, with no problem findings.  Dian ALLEN RN  Anticoagulation Clinic  [place]             OBJECTIVE    INR Protime   Date Value Ref Range Status   2020 2.9 (A) 0.86 - 1.14 Final       ASSESSMENT / PLAN  INR assessment THER    Recheck INR In: 4 WEEKS    INR Location Clinic      Anticoagulation Summary  As of 2020    INR goal:   2.0-3.0   TTR:   80.4 % (1 y)   INR used for dosin.9 (2020)   Warfarin maintenance plan:   2.5 mg (2.5 mg x 1) every day   Full warfarin instructions:   2.5 mg every day   Weekly warfarin total:   17.5 mg   No change documented:   Dian Astudillo RN   Plan last modified:   Dian Astudillo RN (2020)   Next INR check:   2020   Priority:   Maintenance   Target end date:   Indefinite    Indications    Atrial fibrillation (H) [I48.91]  Long term current use of anticoagulant therapy [Z79.01]  Longstanding persistent atrial fibrillation [I48.11]  Long term current use of anticoagulants with INR goal of 2.0-3.0 [Z79.01]             Anticoagulation Episode Summary     INR check location:   Anticoagulation Clinic    Preferred " lab:       Send INR reminders to:   MICHAEL ABURTO    Comments:   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

## 2020-01-28 ENCOUNTER — OFFICE VISIT (OUTPATIENT)
Dept: UROLOGY | Facility: CLINIC | Age: 85
End: 2020-01-28
Payer: COMMERCIAL

## 2020-01-28 VITALS
BODY MASS INDEX: 24.32 KG/M2 | HEIGHT: 65 IN | WEIGHT: 146 LBS | SYSTOLIC BLOOD PRESSURE: 138 MMHG | DIASTOLIC BLOOD PRESSURE: 60 MMHG

## 2020-01-28 DIAGNOSIS — R33.9 URINARY RETENTION: Primary | ICD-10-CM

## 2020-01-28 DIAGNOSIS — Z87.440 PERSONAL HISTORY OF URINARY TRACT INFECTION: ICD-10-CM

## 2020-01-28 PROCEDURE — 99213 OFFICE O/P EST LOW 20 MIN: CPT | Performed by: UROLOGY

## 2020-01-28 ASSESSMENT — MIFFLIN-ST. JEOR: SCORE: 1098.13

## 2020-01-28 ASSESSMENT — PAIN SCALES - GENERAL: PAINLEVEL: NO PAIN (0)

## 2020-01-28 NOTE — PATIENT INSTRUCTIONS
Continue nightly irrigations    Return in 6 months, sooner if needed    It was a pleasure meeting with you today.  Thank you for allowing me and my team the privilege of caring for you today.  YOU are the reason we are here, and I truly hope we provided you with the excellent service you deserve.  Please let us know if there is anything else we can do for you so that we can be sure you are leaving completely satisfied with your care experience.

## 2020-01-28 NOTE — NURSING NOTE
Chief Complaint   Patient presents with     History of urinary retention       Crystal Turcios, EMT

## 2020-01-28 NOTE — LETTER
"  RE: Maia Miranda  [address] [facility] [address]     Dear Colleague,    Thank you for referring your patient, Maia Miranda, to the [facility] [place] at [facility]. Please see a copy of my visit note below.    January 28, 2020    Return visit    Patient returns today for follow up of her Tino.  She states that since starting the nightly gentamicin irrigations she has not had any UTI.  She is very happy about this.  Continues to ISC as prior without difficulty.  Does have more of a hand tremor but going to be working with her new PCP about this.  She denies any other changes in her health since last visit.    /60   Ht 1.651 m (5' 5\")   Wt 66.2 kg (146 lb)   BMI 24.30 kg/m     She is comfortable, in no distress, non-labored breathing.      A/P: 87 year old F with history of urinary retention ISC, history of UTI    Continue the irrigations until she runs out then stop and monitor    RTC 6 months, sooner if needed    15 minutes were spent with the patient today, > 50% in counseling and coordination of care    Bernadine Maria MD MPH   of Urology    CC  Patient Care Team:  Li Flores Mai, MD as PCP - General (Internal Medicine)  Crow, Bernadine Strong MD as MD (Urology)  Rosa Cruz, RN as Registered Nurse (Urology)  Altagracia, Dian Johnson MD as Assigned PCP  Kb Tracy MD as MD (Urology)  "

## 2020-01-28 NOTE — PROGRESS NOTES
"January 28, 2020    Return visit    Patient returns today for follow up of her Tino.  She states that since starting the nightly gentamicin irrigations she has not had any UTI.  She is very happy about this.  Continues to ISC as prior without difficulty.  Does have more of a hand tremor but going to be working with her new PCP about this.  She denies any other changes in her health since last visit.    /60   Ht 1.651 m (5' 5\")   Wt 66.2 kg (146 lb)   BMI 24.30 kg/m    She is comfortable, in no distress, non-labored breathing.      A/P: 87 year old F with history of urinary retention ISC, history of UTI    Continue the irrigations until she runs out then stop and monitor    RTC 6 months, sooner if needed    15 minutes were spent with the patient today, > 50% in counseling and coordination of care    Bernadine Maria MD MPH   of Urology    CC  Patient Care Team:  Li Flores Mai, MD as PCP - General (Internal Medicine)  Bernadine Maria MD as MD (Urology)  Rosa Cruz RN as Registered Nurse (Urology)  Dian Frias MD as Assigned PCP  Kb Tracy MD as MD (Urology)                "

## 2020-02-12 ENCOUNTER — TELEPHONE (OUTPATIENT)
Dept: UROLOGY | Facility: CLINIC | Age: 85
End: 2020-02-12

## 2020-02-12 NOTE — TELEPHONE ENCOUNTER
[facility] Call Center    Phone Message    May a detailed message be left on voicemail: no     Reason for Call: Order(s): Other:   Reason for requested: Catheters, 14 English, 6 boxes, 180 total  Date needed: Asap  Provider name: Dr. Maria  Comments: Pt needs new order sent to [employer] [facility],  # [phone], Pt didn't have fax #.       Action Taken: Message routed to:  [facility] UROLOGY ADULT [facility]    Travel Screening: Not Applicable

## 2020-02-12 NOTE — TELEPHONE ENCOUNTER
Called patient again  I spoke with the company the order from dr gonzalez is good  This Yu Rong needs her new insurance  Kallie Bell, [employer] Staff Nurse

## 2020-02-12 NOTE — TELEPHONE ENCOUNTER
Called several times and cannot leave message I need to know what pharmacy I can call this into for patient Kallie Bell, [employer] Staff Nurse

## 2020-02-19 ENCOUNTER — ANTICOAGULATION THERAPY VISIT (OUTPATIENT)
Dept: NURSING | Facility: CLINIC | Age: 85
End: 2020-02-19
Payer: COMMERCIAL

## 2020-02-19 DIAGNOSIS — Z79.01 LONG TERM CURRENT USE OF ANTICOAGULANTS WITH INR GOAL OF 2.0-3.0: ICD-10-CM

## 2020-02-19 DIAGNOSIS — I48.11 LONGSTANDING PERSISTENT ATRIAL FIBRILLATION (H): ICD-10-CM

## 2020-02-19 DIAGNOSIS — Z79.01 LONG TERM CURRENT USE OF ANTICOAGULANT THERAPY: ICD-10-CM

## 2020-02-19 DIAGNOSIS — I48.91 ATRIAL FIBRILLATION (H): ICD-10-CM

## 2020-02-19 DIAGNOSIS — N39.0 RECURRENT URINARY TRACT INFECTION: ICD-10-CM

## 2020-02-19 LAB — INR POINT OF CARE: 3.3 (ref 0.86–1.14)

## 2020-02-19 PROCEDURE — 36416 COLLJ CAPILLARY BLOOD SPEC: CPT

## 2020-02-19 PROCEDURE — 85610 PROTHROMBIN TIME: CPT | Mod: QW

## 2020-02-19 PROCEDURE — 99207 ZZC NO CHARGE NURSE ONLY: CPT

## 2020-02-19 NOTE — PROGRESS NOTES
ANTICOAGULATION FOLLOW-UP CLINIC VISIT    Patient Name:  Maia Miranda  Date:  2/19/2020  Contact Type:  Face to Face    SUBJECTIVE:  Patient Findings     Positives:   Change in health, Change in diet/appetite    Comments:   Patient fell back in December and hit her head. She states she still gets head pain from this at time, plans to follow up with PCP on this. There still a bump at the back of her head. Denies any dizziness or other falls. Patient denies any identifiable changes that caused the subtherapeutic INR. She states she has been eating a lot more green veggies, including spinach, since she has been running high the last couple of checks. Per protocol, will decrease maintenance dose slightly and see patient back for next INR in 2 weeks.           Clinical Outcomes     Comments:   Patient fell back in December and hit her head. She states she still gets head pain from this at time, plans to follow up with PCP on this. There still a bump at the back of her head. Denies any dizziness or other falls. Patient denies any identifiable changes that caused the subtherapeutic INR. She states she has been eating a lot more green veggies, including spinach, since she has been running high the last couple of checks. Per protocol, will decrease maintenance dose slightly and see patient back for next INR in 2 weeks.              OBJECTIVE    INR Protime   Date Value Ref Range Status   02/19/2020 3.3 (A) 0.86 - 1.14 Final       ASSESSMENT / PLAN  INR assessment SUPRA    Recheck INR In: 2 WEEKS    INR Location Clinic      Anticoagulation Summary  As of 2/19/2020    INR goal:   2.0-3.0   TTR:   82.3 % (1 y)   INR used for dosing:   3.3! (2/19/2020)   Warfarin maintenance plan:   1.25 mg (2.5 mg x 0.5) every Wed; 2.5 mg (2.5 mg x 1) all other days   Full warfarin instructions:   1.25 mg every Wed; 2.5 mg all other days   Weekly warfarin total:   16.25 mg   Plan last modified:   Dian Astudillo, RN (2/19/2020)   Next INR  check:   3/4/2020   Priority:   Maintenance   Target end date:   Indefinite    Indications    Atrial fibrillation (H) [I48.91]  Long term current use of anticoagulant therapy [Z79.01]  Longstanding persistent atrial fibrillation [I48.11]  Long term current use of anticoagulants with INR goal of 2.0-3.0 [Z79.01]             Anticoagulation Episode Summary     INR check location:   Anticoagulation Clinic    Preferred lab:       Send INR reminders to:   MICHAEL ABURTO    Comments:   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    Dian Astudillo RN

## 2020-02-20 ENCOUNTER — ALLIED HEALTH/NURSE VISIT (OUTPATIENT)
Dept: PEDIATRICS | Facility: CLINIC | Age: 85
End: 2020-02-20
Payer: COMMERCIAL

## 2020-02-20 VITALS — DIASTOLIC BLOOD PRESSURE: 58 MMHG | SYSTOLIC BLOOD PRESSURE: 134 MMHG

## 2020-02-20 DIAGNOSIS — Z01.30 BP CHECK: Primary | ICD-10-CM

## 2020-02-20 PROCEDURE — 99207 ZZC NO CHARGE NURSE ONLY: CPT | Performed by: INTERNAL MEDICINE

## 2020-02-20 NOTE — PROGRESS NOTES
Maia Miranda was evaluated at [facility] on February 20, 2020 at which time her blood pressure was:    BP Readings from Last 3 Encounters:   02/19/20 134/58   01/28/20 138/60   01/13/20 130/70     Pulse Readings from Last 3 Encounters:   12/22/19 57   12/20/19 54   09/10/19 76       Reviewed lifestyle modifications for blood pressure control and reduction: including making healthy food choices, managing weight, getting regular exercise, smoking cessation, reducing alcohol consumption, monitoring blood pressure regularly.     Symptoms: None    BP Goal:< 140/90 mmHg    BP Assessment:  BP at goal    Potential Reasons for BP too high: NA - Not applicable    BP Follow-Up Plan: Recheck BP in 6 months at pharmacy    Recommendation to Provider: none    Note completed by: Daniele Coles    Thank You   Sloan Emanuel [facility]

## 2020-02-24 ENCOUNTER — TELEPHONE (OUTPATIENT)
Dept: UROLOGY | Facility: CLINIC | Age: 85
End: 2020-02-24

## 2020-02-24 DIAGNOSIS — Z87.440 RECENT URINARY TRACT INFECTION: ICD-10-CM

## 2020-02-24 DIAGNOSIS — R82.90 CLOUDY URINE: Primary | ICD-10-CM

## 2020-02-24 DIAGNOSIS — R82.90 CLOUDY URINE: ICD-10-CM

## 2020-02-24 LAB
ALBUMIN UR-MCNC: 30 MG/DL
APPEARANCE UR: CLEAR
BILIRUB UR QL STRIP: NEGATIVE
COLOR UR AUTO: YELLOW
GLUCOSE UR STRIP-MCNC: NEGATIVE MG/DL
HGB UR QL STRIP: ABNORMAL
KETONES UR STRIP-MCNC: NEGATIVE MG/DL
LEUKOCYTE ESTERASE UR QL STRIP: ABNORMAL
NITRATE UR QL: NEGATIVE
PH UR STRIP: 5.5 PH (ref 5–7)
SOURCE: ABNORMAL
SP GR UR STRIP: 1.01 (ref 1–1.03)
UROBILINOGEN UR STRIP-ACNC: 0.2 EU/DL (ref 0.2–1)

## 2020-02-24 PROCEDURE — 87088 URINE BACTERIA CULTURE: CPT | Performed by: UROLOGY

## 2020-02-24 PROCEDURE — 87086 URINE CULTURE/COLONY COUNT: CPT | Performed by: UROLOGY

## 2020-02-24 PROCEDURE — 81003 URINALYSIS AUTO W/O SCOPE: CPT | Performed by: UROLOGY

## 2020-02-24 PROCEDURE — 87186 SC STD MICRODIL/AGAR DIL: CPT | Performed by: UROLOGY

## 2020-02-24 NOTE — TELEPHONE ENCOUNTER
Maia feels she may ave another UTI. Cloudy urine at this time . Instructed her to go to her local [facility] and leave a cath  Specimen for culture.Laura Ingram LPN

## 2020-02-25 LAB
BACTERIA SPEC CULT: ABNORMAL
SPECIMEN SOURCE: ABNORMAL

## 2020-02-26 ENCOUNTER — TELEPHONE (OUTPATIENT)
Dept: UROLOGY | Facility: CLINIC | Age: 85
End: 2020-02-26

## 2020-02-26 RX ORDER — CEPHALEXIN 500 MG/1
500 CAPSULE ORAL 2 TIMES DAILY
Qty: 10 CAPSULE | Refills: 0 | Status: CANCELLED | OUTPATIENT
Start: 2020-02-26 | End: 2020-03-02

## 2020-02-26 NOTE — TELEPHONE ENCOUNTER
[facility] Call Center    Phone Message    May a detailed message be left on voicemail: yes     Reason for Call: Medication Question or concern regarding medication   Prescription Clarification  Name of Medication: Antibiotic for UTI  Prescribing Provider: Dr. Maria   Pharmacy: [facility] [address] [place], [place] [id number] [address] [place] [address]   What on the order needs clarification? Maia calling to follow up on the Rx for an antibiotic for a UTI.  She states that the pharmacy as not received it as of yet.  Please call her as soon as the prescription has been sent so she can pick it up.          Action Taken: Message routed to:  Other: [facility]    Travel Screening: Not Applicable

## 2020-02-27 DIAGNOSIS — N39.0 URINARY TRACT INFECTION: Primary | ICD-10-CM

## 2020-02-27 RX ORDER — CEPHALEXIN 500 MG/1
500 CAPSULE ORAL 2 TIMES DAILY
Qty: 10 CAPSULE | Refills: 0 | Status: SHIPPED | OUTPATIENT
Start: 2020-02-27 | End: 2020-07-10

## 2020-02-27 NOTE — PROGRESS NOTES
ANTICOAGULATION FOLLOW-UP CLINIC VISIT    Patient Name:  Maia Miranda  Date:  6/23/2017  Contact Type:  Face to Face    SUBJECTIVE:     Patient Findings     Positives Diet changes (ate mixed green salad with kale yesterday)           OBJECTIVE    INR Protime   Date Value Ref Range Status   06/23/2017 1.9 (A) 0.86 - 1.14 Final       ASSESSMENT / PLAN  INR assessment THER    Recheck INR In: 4 WEEKS    INR Location Clinic      Anticoagulation Summary as of 6/23/2017     INR goal 2.0-3.0   Today's INR 1.9!   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

## 2020-02-27 NOTE — TELEPHONE ENCOUNTER
Spoke to Maia as we were trying to get in contact with her about the Keflex that had been ordered  With her PCN allergy. Maia says she has taken Keflex in the past without any allergic side effects . RX sent in . Instructed Maia to stop medication if any signs of anallergic reaction  Develop.Laura Ingram, RAUL

## 2020-03-04 ENCOUNTER — ALLIED HEALTH/NURSE VISIT (OUTPATIENT)
Dept: PEDIATRICS | Facility: CLINIC | Age: 85
End: 2020-03-04

## 2020-03-04 ENCOUNTER — ANTICOAGULATION THERAPY VISIT (OUTPATIENT)
Dept: NURSING | Facility: CLINIC | Age: 85
End: 2020-03-04
Payer: COMMERCIAL

## 2020-03-04 VITALS — DIASTOLIC BLOOD PRESSURE: 70 MMHG | SYSTOLIC BLOOD PRESSURE: 138 MMHG

## 2020-03-04 DIAGNOSIS — Z79.01 LONG TERM CURRENT USE OF ANTICOAGULANTS WITH INR GOAL OF 2.0-3.0: ICD-10-CM

## 2020-03-04 DIAGNOSIS — Z01.30 BP CHECK: Primary | ICD-10-CM

## 2020-03-04 DIAGNOSIS — Z79.01 LONG TERM CURRENT USE OF ANTICOAGULANT THERAPY: ICD-10-CM

## 2020-03-04 DIAGNOSIS — I48.11 LONGSTANDING PERSISTENT ATRIAL FIBRILLATION (H): ICD-10-CM

## 2020-03-04 DIAGNOSIS — I48.91 ATRIAL FIBRILLATION (H): ICD-10-CM

## 2020-03-04 LAB — INR POINT OF CARE: 3.1 (ref 0.86–1.14)

## 2020-03-04 PROCEDURE — 85610 PROTHROMBIN TIME: CPT | Mod: QW

## 2020-03-04 PROCEDURE — 36416 COLLJ CAPILLARY BLOOD SPEC: CPT

## 2020-03-04 PROCEDURE — 99207 ZZC NO CHARGE NURSE ONLY: CPT | Performed by: INTERNAL MEDICINE

## 2020-03-04 PROCEDURE — 99207 ZZC NO CHARGE NURSE ONLY: CPT

## 2020-03-04 NOTE — PROGRESS NOTES
Maia Miranda was evaluated at [facility] on March 4, 2020 at which time her blood pressure was:    BP Readings from Last 3 Encounters:   03/04/20 138/70   02/19/20 134/58   01/28/20 138/60     Pulse Readings from Last 3 Encounters:   12/22/19 57   12/20/19 54   09/10/19 76       Reviewed lifestyle modifications for blood pressure control and reduction: including making healthy food choices, managing weight, getting regular exercise, smoking cessation, reducing alcohol consumption, monitoring blood pressure regularly.     Symptoms: None    BP Goal:< 140/90 mmHg    BP Assessment:  BP at goal    Potential Reasons for BP too high: NA - Not applicable    BP Follow-Up Plan: Recheck BP in 6 months at pharmacy    Recommendation to Provider: None    Note completed by: Daniele Coles    Thank You   Sloan Emanuel [facility]

## 2020-03-04 NOTE — PROGRESS NOTES
ANTICOAGULATION FOLLOW-UP CLINIC VISIT    Patient Name:  Maia Miranda  Date:  3/4/2020  Contact Type:  Face to Face    SUBJECTIVE:  Patient Findings     Positives:   Signs/symptoms of bleeding, Change in health, Change in medications    Comments:   Patient has finished dose of Keflex for UTI and states she is feeling better. Has noted more bloody noses than usual, most recently was yesterday. Educated on holding pressure for 10 minutes without peeking and if bleeding does not stop to proceed to ED. Patient stated understanding and is in agreement with plan. No other bruising or bleeding noted. Per protocol, for continuing supratherapeutic INR, will reduce maintenance dose by about 7% and see patient back in 2 weeks.  Dian ALLEN RN  Anticoagulation Clinic  [place]          Clinical Outcomes     Comments:   Patient has finished dose of Keflex for UTI and states she is feeling better. Has noted more bloody noses than usual, most recently was yesterday. Educated on holding pressure for 10 minutes without peeking and if bleeding does not stop to proceed to ED. Patient stated understanding and is in agreement with plan. No other bruising or bleeding noted. Per protocol, for continuing supratherapeutic INR, will reduce maintenance dose by about 7% and see patient back in 2 weeks.  Dian ALLEN RN  Anticoagulation Clinic  [place]             OBJECTIVE    INR Protime   Date Value Ref Range Status   03/04/2020 3.1 (A) 0.86 - 1.14 Final       ASSESSMENT / PLAN  INR assessment SUPRA    Recheck INR In: 2 WEEKS    INR Location Clinic      Anticoagulation Summary  As of 3/4/2020    INR goal:   2.0-3.0   TTR:   81.9 % (1 y)   INR used for dosing:   3.1! (3/4/2020)   Warfarin maintenance plan:   1.25 mg (2.5 mg x 0.5) every Wed, Sat; 2.5 mg (2.5 mg x 1) all other days   Full warfarin instructions:   1.25 mg every Wed, Sat; 2.5 mg all other days   Weekly warfarin total:   15 mg   Plan last modified:   Dian Astudillo RN  (3/4/2020)   Next INR check:   3/18/2020   Priority:   High   Target end date:   Indefinite    Indications    Atrial fibrillation (H) [I48.91]  Long term current use of anticoagulant therapy [Z79.01]  Longstanding persistent atrial fibrillation [I48.11]  Long term current use of anticoagulants with INR goal of 2.0-3.0 [Z79.01]             Anticoagulation Episode Summary     INR check location:   Anticoagulation Clinic    Preferred lab:       Send INR reminders to:   MICHAEL ABURTO    Comments:   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    Dian Astudillo RN

## 2020-03-16 ENCOUNTER — TELEPHONE (OUTPATIENT)
Dept: PEDIATRICS | Facility: CLINIC | Age: 85
End: 2020-03-16

## 2020-03-16 NOTE — TELEPHONE ENCOUNTER
Per Anticoag Supervisor: should encourage patient to keep INR appointment and can offer to go to car to perform testing, then call patient to discuss and provide warfarin dosing instructions.  Patient stated understanding and is in agreement with plan.  Noted on INR scheduled.     Dian ALLEN RN  Anticoagulation Clinic  [place]

## 2020-03-16 NOTE — TELEPHONE ENCOUNTER
Reason for call:  INR   Who is calling? Patient    Are there any other concerns: Yes: Pt has appt for 3/18 in  for INR and is wondering what she can do for care, states they are unable to leave the house and are wondering what to do. Please contact to discuss and advise.  Route this INR message to [facility] # [id number]    Phone number to reach patient:  Home number on file [phone] (home)    Best Time:  Any    Can we leave a detailed message on this number?  YES    Travel screening: Not Applicable

## 2020-03-17 NOTE — TELEPHONE ENCOUNTER
Due to [facility] nurse illness, rescheduled to Friday AM, has baby blue car, instructed to pull to main doors, will call when arrives.    Thania Johnson, PharmD BCACP  Anticoagulation Clinical Pharmacist

## 2020-03-19 DIAGNOSIS — I48.91 ATRIAL FIBRILLATION (H): Primary | ICD-10-CM

## 2020-03-19 DIAGNOSIS — Z79.01 LONG TERM CURRENT USE OF ANTICOAGULANT THERAPY: ICD-10-CM

## 2020-03-20 ENCOUNTER — TELEPHONE (OUTPATIENT)
Dept: PEDIATRICS | Facility: CLINIC | Age: 85
End: 2020-03-20

## 2020-03-20 NOTE — TELEPHONE ENCOUNTER
"Called patient to help reschedule her 3/25/20 INR appointment to a lab-only appointment.     In response to the COVID-19 pandemic, the Anticoagulation Clinic ([facility]) program is making changes to keep your safety our top priority.     Warfarin monitoring is important, and we are doing the following to make your monitoring safe:  1. Lab will be doing your fingerstick, to minimize your time in clinic  2. Your result will be routed to a trained [facility] nurse or pharmacist  3. An [facility] nurse or pharmacist will call you to review your results, review the questions we normally ask you in clinic, and give you dosing and na next recheck date.    Please:  1. Have your phone number up to date in your chart information  2. Update your information with the clinic so we know if it is OK to leave a voice message, or talk to another person about your result if we cnnot reach you directly  3. Answer your phone if you see [facility] on your caller ID  4. Keep in mind our calls may also display as \"caller ID unavailable\"    We recognize that this is a change and that can sometimes be difficult. Your safety is our priority. Thank you for partnering with us as we work through these challenging times.     INR rescheduled to lab-only.    Dian ALLEN RN  Anticoagulation Clinic  [place]    "

## 2020-03-25 ENCOUNTER — ANTICOAGULATION THERAPY VISIT (OUTPATIENT)
Dept: NURSING | Facility: CLINIC | Age: 85
End: 2020-03-25
Payer: COMMERCIAL

## 2020-03-25 DIAGNOSIS — Z79.01 LONG TERM CURRENT USE OF ANTICOAGULANTS WITH INR GOAL OF 2.0-3.0: ICD-10-CM

## 2020-03-25 DIAGNOSIS — Z79.01 LONG TERM CURRENT USE OF ANTICOAGULANT THERAPY: ICD-10-CM

## 2020-03-25 DIAGNOSIS — I48.91 ATRIAL FIBRILLATION (H): ICD-10-CM

## 2020-03-25 DIAGNOSIS — I48.11 LONGSTANDING PERSISTENT ATRIAL FIBRILLATION (H): ICD-10-CM

## 2020-03-25 LAB
CAPILLARY BLOOD COLLECTION: NORMAL
INR PPP: 1.4 (ref 0.86–1.14)

## 2020-03-25 PROCEDURE — 36416 COLLJ CAPILLARY BLOOD SPEC: CPT | Performed by: FAMILY MEDICINE

## 2020-03-25 PROCEDURE — 99207 ZZC NO CHARGE NURSE ONLY: CPT

## 2020-03-25 PROCEDURE — 85610 PROTHROMBIN TIME: CPT | Performed by: FAMILY MEDICINE

## 2020-03-25 NOTE — PROGRESS NOTES
ANTICOAGULATION FOLLOW-UP CLINIC VISIT    Patient Name:  Maia Miranda  Date:  3/25/2020  Contact Type:  Telephone    SUBJECTIVE:  Patient Findings     Positives:   Change in health    Comments:   On 2/19, patient's INR was 3.3 with unclear reason as to why it was supratherapeutic, maintenance dose was slightly decreased. On 2/26 Keflex was prescribed for UTI. Concurrent use of CEPHALEXIN and WARFARIN may result in an increased risk of bleeding. On 3/4/20, patient's INR was still supra at 3.1 after finishing course of Keflex. Per protocol, maintenance dose was again reduced. Will return patient to prior warfarin dosing and recheck INR in lab in 2 weeks, patient is hesitant to come into the clinic any sooner than this.   Called patient to discuss: No other reason than what is written above, for subtherapeutic INR. Patient will return to old maintenance dosing of 2.5 mg daily and will take 1.5 tablets tonight to help bring the INR up by next lab check.   Dian ALLEN RN  Anticoagulation Clinic  [place]          Clinical Outcomes     Negatives:   Major bleeding event, Thromboembolic event, Anticoagulation-related hospital admission, Anticoagulation-related ED visit, Anticoagulation-related fatality    Comments:   On 2/19, patient's INR was 3.3 with unclear reason as to why it was supratherapeutic, maintenance dose was slightly decreased. On 2/26 Keflex was prescribed for UTI. Concurrent use of CEPHALEXIN and WARFARIN may result in an increased risk of bleeding. On 3/4/20, patient's INR was still supra at 3.1 after finishing course of Keflex. Per protocol, maintenance dose was again reduced. Will return patient to prior warfarin dosing and recheck INR in lab in 2 weeks, patient is hesitant to come into the clinic any sooner than this.   Called patient to discuss: No other reason than what is written above, for subtherapeutic INR. Patient will return to old maintenance dosing of 2.5 mg daily and will take 1.5 tablets  tonight to help bring the INR up by next lab check.   Dian ALLEN RN  Anticoagulation Clinic  [place]             OBJECTIVE    INR   Date Value Ref Range Status   2020 1.40 (H) 0.86 - 1.14 Final     Comment:     This test is intended for monitoring Coumadin therapy.  Results are not   accurate in patients with prolonged INR due to factor deficiency.         ASSESSMENT / PLAN  INR assessment SUB    Recheck INR In: 2 WEEKS    INR Location Clinic lab     Anticoagulation Summary  As of 3/25/2020    INR goal:   2.0-3.0   TTR:   79.5 % (1 y)   INR used for dosin.40! (3/25/2020)   Warfarin maintenance plan:   2.5 mg (2.5 mg x 1) every day   Full warfarin instructions:   3/25: 3.75 mg; Otherwise 2.5 mg every day   Weekly warfarin total:   17.5 mg   Plan last modified:   Dian Astudillo RN (3/25/2020)   Next INR check:   2020   Priority:   High   Target end date:   Indefinite    Indications    Atrial fibrillation (H) [I48.91]  Long term current use of anticoagulant therapy [Z79.01]  Longstanding persistent atrial fibrillation [I48.11]  Long term current use of anticoagulants with INR goal of 2.0-3.0 [Z79.01]             Anticoagulation Episode Summary     INR check location:   Anticoagulation Clinic    Preferred lab:       Send INR reminders to:   MICHAEL ABURTO    Comments:   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    Dian Astudillo RN

## 2020-04-08 ENCOUNTER — ANTICOAGULATION THERAPY VISIT (OUTPATIENT)
Dept: NURSING | Facility: CLINIC | Age: 85
End: 2020-04-08

## 2020-04-08 DIAGNOSIS — I48.91 ATRIAL FIBRILLATION (H): ICD-10-CM

## 2020-04-08 DIAGNOSIS — Z79.01 LONG TERM CURRENT USE OF ANTICOAGULANT THERAPY: ICD-10-CM

## 2020-04-08 DIAGNOSIS — Z79.01 LONG TERM CURRENT USE OF ANTICOAGULANTS WITH INR GOAL OF 2.0-3.0: ICD-10-CM

## 2020-04-08 DIAGNOSIS — I48.11 LONGSTANDING PERSISTENT ATRIAL FIBRILLATION (H): ICD-10-CM

## 2020-04-08 LAB
CAPILLARY BLOOD COLLECTION: NORMAL
INR PPP: 3.3 (ref 0.86–1.14)

## 2020-04-08 PROCEDURE — 36416 COLLJ CAPILLARY BLOOD SPEC: CPT | Performed by: INTERNAL MEDICINE

## 2020-04-08 PROCEDURE — 99207 ZZC NO CHARGE NURSE ONLY: CPT

## 2020-04-08 PROCEDURE — 85610 PROTHROMBIN TIME: CPT | Performed by: INTERNAL MEDICINE

## 2020-04-08 NOTE — PROGRESS NOTES
ANTICOAGULATION FOLLOW-UP CLINIC VISIT    Patient Name:  Maia Miranda  Date:  4/8/2020  Contact Type:  Telephone    SUBJECTIVE:  Patient Findings     Comments:   Consulted with Thania Johnson Pharm D.: Devised plan below. Offer home testing option.   Called patient to discuss lab-only INR results: No noticeable bruising/bleeding. Patient denies any identifiable changes that caused the supratherapeutic INR. The patient was assessed for diet, medication, and activity level changes, missed or extra doses, bruising or bleeding, with no problem findings. Patient has a h/o frequent UTIs but doesn't feel she has one brewing at this time. She is continuing to eat her regular green veggies. Per protocol, will reduce weekly dosing by 1.25 mg starting tonight and recheck lab-only INR in 2 weeks. Patient stated understanding and is in agreement with plan.  Informed patient of option to test INR at home. She does have a hand tremor but feels she could perform the test but does not like the fact that she would need to test weekly. For now, she would like to continue to come into the clinic for testing. However, she will think about it and let INR RN know if she changes her mind.  Dian ALLEN RN  Anticoagulation Clinic  [place]            Clinical Outcomes     Comments:   Consulted with Pan Ross D.: Devised plan below. Offer home testing option.   Called patient to discuss lab-only INR results: No noticeable bruising/bleeding. Patient denies any identifiable changes that caused the supratherapeutic INR. The patient was assessed for diet, medication, and activity level changes, missed or extra doses, bruising or bleeding, with no problem findings. Patient has a h/o frequent UTIs but doesn't feel she has one brewing at this time. She is continuing to eat her regular green veggies. Per protocol, will reduce weekly dosing by 1.25 mg starting tonight and recheck lab-only INR in 2 weeks. Patient stated  understanding and is in agreement with plan.  Informed patient of option to test INR at home. She does have a hand tremor but feels she could perform the test but does not like the fact that she would need to test weekly. For now, she would like to continue to come into the clinic for testing. However, she will think about it and let INR RN know if she changes her mind.  Dian ALLEN RN  Anticoagulation Clinic  [place]               OBJECTIVE    INR   Date Value Ref Range Status   04/08/2020 3.30 (H) 0.86 - 1.14 Final     Comment:     This test is intended for monitoring Coumadin therapy.  Results are not   accurate in patients with prolonged INR due to factor deficiency.         ASSESSMENT / PLAN  INR assessment SUPRA    Recheck INR In: 2 WEEKS    INR Location Clinic lab     Anticoagulation Summary  As of 4/8/2020    INR goal:   2.0-3.0   TTR:   77.7 % (1 y)   INR used for dosing:   3.30! (4/8/2020)   Warfarin maintenance plan:   1.25 mg (2.5 mg x 0.5) every Wed; 2.5 mg (2.5 mg x 1) all other days   Full warfarin instructions:   1.25 mg every Wed; 2.5 mg all other days   Weekly warfarin total:   16.25 mg   Plan last modified:   Dian Astudillo RN (4/8/2020)   Next INR check:   4/22/2020   Priority:   Maintenance   Target end date:   Indefinite    Indications    Atrial fibrillation (H) [I48.91]  Long term current use of anticoagulant therapy [Z79.01]  Longstanding persistent atrial fibrillation [I48.11]  Long term current use of anticoagulants with INR goal of 2.0-3.0 [Z79.01]             Anticoagulation Episode Summary     INR check location:   Anticoagulation Clinic    Preferred lab:       Send INR reminders to:   MICHAEL ABURTO    Comments:   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    Dian Astudillo RN

## 2020-04-22 ENCOUNTER — ANTICOAGULATION THERAPY VISIT (OUTPATIENT)
Dept: NURSING | Facility: CLINIC | Age: 85
End: 2020-04-22
Payer: COMMERCIAL

## 2020-04-22 DIAGNOSIS — Z79.01 LONG TERM CURRENT USE OF ANTICOAGULANT THERAPY: ICD-10-CM

## 2020-04-22 DIAGNOSIS — Z79.01 LONG TERM CURRENT USE OF ANTICOAGULANTS WITH INR GOAL OF 2.0-3.0: ICD-10-CM

## 2020-04-22 DIAGNOSIS — I48.91 ATRIAL FIBRILLATION (H): ICD-10-CM

## 2020-04-22 DIAGNOSIS — I48.11 LONGSTANDING PERSISTENT ATRIAL FIBRILLATION (H): ICD-10-CM

## 2020-04-22 LAB
CAPILLARY BLOOD COLLECTION: NORMAL
INR PPP: 3.6 (ref 0.86–1.14)

## 2020-04-22 PROCEDURE — 36416 COLLJ CAPILLARY BLOOD SPEC: CPT | Performed by: INTERNAL MEDICINE

## 2020-04-22 PROCEDURE — 85610 PROTHROMBIN TIME: CPT | Performed by: INTERNAL MEDICINE

## 2020-04-22 PROCEDURE — 99207 ZZC NO CHARGE NURSE ONLY: CPT | Performed by: FAMILY MEDICINE

## 2020-04-22 NOTE — PROGRESS NOTES
ANTICOAGULATION FOLLOW-UP CLINIC VISIT    Patient Name:  Maia Miranda  Date:  4/22/2020  Contact Type:  Telephone    SUBJECTIVE:  Patient Findings     Comments:   Consulted with Thania Calixto: May have something brewing or some other chronic issue since she didn't come down with the dose decrease, would hold today and put another 1.25mg day in, recheck in 10-14 days, check again for UTI S/S     Called patient to discuss today's INR. Patient denies any identifiable changes that caused the supratherapeutic INR, did take warfarin (at reduced dosing) as discussed last check. Denies any s/sx of UTI. The patient was assessed for diet, medication (including OTC, herbals, supplements, topicals), and activity level changes, missed or extra doses, bruising or bleeding, with no problem findings. Per protocol, patient will hold warfarin tonight then start new maintenance plan of 1/2 tab M & F, 1 tab ROW. Patient repeated back dosing. She will recheck INR in 2 weeks and does see the AVS in her My Chart.  Dian ALLEN RN  Anticoagulation Clinic  [place] ALLEN RN  Anticoagulation Clinic  [place]          Clinical Outcomes     Negatives:   Major bleeding event, Thromboembolic event, Anticoagulation-related hospital admission, Anticoagulation-related ED visit, Anticoagulation-related fatality    Comments:   Consulted with Thania Calixto: May have something brewing or some other chronic issue since she didn't come down with the dose decrease, would hold today and put another 1.25mg day in, recheck in 10-14 days, check again for UTI S/S     Called patient to discuss today's INR. Patient denies any identifiable changes that caused the supratherapeutic INR, did take warfarin (at reduced dosing) as discussed last check. Denies any s/sx of UTI. The patient was assessed for diet, medication (including OTC, herbals, supplements, topicals), and activity level changes, missed or extra doses, bruising  or bleeding, with no problem findings. Per protocol, patient will hold warfarin tonight then start new maintenance plan of 1/2 tab M & F, 1 tab ROW. Patient repeated back dosing. She will recheck INR in 2 weeks and does see the AVS in her My Chart.  Dian ALLEN RN  Anticoagulation Clinic  [place] ALLEN RN  Anticoagulation Clinic  [place]             OBJECTIVE    INR   Date Value Ref Range Status   04/22/2020 3.60 (H) 0.86 - 1.14 Final     Comment:     This test is intended for monitoring Coumadin therapy.  Results are not   accurate in patients with prolonged INR due to factor deficiency.         ASSESSMENT / PLAN  INR assessment SUPRA    Recheck INR In: 2 WEEKS    INR Location Clinic lab     Anticoagulation Summary  As of 4/22/2020    INR goal:   2.0-3.0   TTR:   73.9 % (1 y)   INR used for dosing:   3.60! (4/22/2020)   Warfarin maintenance plan:   1.25 mg (2.5 mg x 0.5) every Mon, Fri; 2.5 mg (2.5 mg x 1) all other days   Full warfarin instructions:   4/22: Hold; Otherwise 1.25 mg every Mon, Fri; 2.5 mg all other days   Weekly warfarin total:   15 mg   Plan last modified:   Dian Astudillo RN (4/22/2020)   Next INR check:   5/6/2020   Priority:   Maintenance   Target end date:   Indefinite    Indications    Atrial fibrillation (H) [I48.91]  Long term current use of anticoagulant therapy [Z79.01]  Longstanding persistent atrial fibrillation [I48.11]  Long term current use of anticoagulants with INR goal of 2.0-3.0 [Z79.01]             Anticoagulation Episode Summary     INR check location:   Anticoagulation Clinic    Preferred lab:       Send INR reminders to:   MICHAEL ABURTO    Comments:   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    Dian Astudillo RN

## 2020-04-22 NOTE — PATIENT INSTRUCTIONS
Your dosing instructions have changed. You will hold your warfarin tonight. Then you will start on the following schedule of 1/2 tablet (1.25 mg) Mondays and Fridays, 1 tablet (2.5 mg) all other days of the week. Try to keep all else (diet, other medications, activity, etc) as stable as possible.     Stay safe and healthy at home, Maia!  Dian ALLEN RN

## 2020-05-06 ENCOUNTER — ANTICOAGULATION THERAPY VISIT (OUTPATIENT)
Dept: NURSING | Facility: CLINIC | Age: 85
End: 2020-05-06
Payer: COMMERCIAL

## 2020-05-06 DIAGNOSIS — Z79.01 LONG TERM CURRENT USE OF ANTICOAGULANT THERAPY: ICD-10-CM

## 2020-05-06 DIAGNOSIS — Z79.01 LONG TERM CURRENT USE OF ANTICOAGULANTS WITH INR GOAL OF 2.0-3.0: ICD-10-CM

## 2020-05-06 DIAGNOSIS — I48.91 ATRIAL FIBRILLATION (H): ICD-10-CM

## 2020-05-06 DIAGNOSIS — I48.11 LONGSTANDING PERSISTENT ATRIAL FIBRILLATION (H): ICD-10-CM

## 2020-05-06 LAB
CAPILLARY BLOOD COLLECTION: NORMAL
INR PPP: 2.1 (ref 0.86–1.14)

## 2020-05-06 PROCEDURE — 36416 COLLJ CAPILLARY BLOOD SPEC: CPT | Performed by: INTERNAL MEDICINE

## 2020-05-06 PROCEDURE — 85610 PROTHROMBIN TIME: CPT | Performed by: INTERNAL MEDICINE

## 2020-05-06 PROCEDURE — 99207 ZZC NO CHARGE NURSE ONLY: CPT

## 2020-05-06 RX ORDER — WARFARIN SODIUM 2.5 MG/1
TABLET ORAL
Qty: 92 TABLET | Refills: 1
Start: 2020-05-06 | End: 2020-05-12

## 2020-05-06 NOTE — PROGRESS NOTES
ANTICOAGULATION FOLLOW-UP CLINIC VISIT    Patient Name:  Maia Miranda  Date:  5/6/2020  Contact Type:  Telephone    SUBJECTIVE:  Patient Findings     Comments:   Patient states the top of her right arm has been sore for weeks. No numbness, tingling, swelling or discoloration noted. Now that she thinks about it she has been going for more walks and using her cane with that arm. Advised patient if the pain continues to discuss with PCP. Otherwise, could try cold or warm packs to the arm after her walk and Tylenol PRN. Patient stated understanding and is in agreement with plan.  The patient was assessed for diet, medication, and activity level changes, missed or extra doses, bruising or bleeding, with no problem findings.  Reviewed maintenance warfarin dosing with patient. Patient will remain on the same dose until next INR check. No other questions or concerns. Scheduled next lab-only INR  Dian ALLEN RN  Anticoagulation Clinic  [employer]          Clinical Outcomes     Comments:   Patient states the top of her right arm has been sore for weeks. No numbness, tingling, swelling or discoloration noted. Now that she thinks about it she has been going for more walks and using her cane with that arm. Advised patient if the pain continues to discuss with PCP. Otherwise, could try cold or warm packs to the arm after her walk and Tylenol PRN. Patient stated understanding and is in agreement with plan.  The patient was assessed for diet, medication, and activity level changes, missed or extra doses, bruising or bleeding, with no problem findings.  Reviewed maintenance warfarin dosing with patient. Patient will remain on the same dose until next INR check. No other questions or concerns. Scheduled next lab-only INR  Dian ALLEN RN  Anticoagulation Clinic  [employer]             OBJECTIVE    INR   Date Value Ref Range Status   05/06/2020 2.10 (H) 0.86 - 1.14 Final     Comment:     This test is intended for monitoring Coumadin  therapy.  Results are not   accurate in patients with prolonged INR due to factor deficiency.         ASSESSMENT / PLAN  INR assessment THER    Recheck INR In: 3 WEEKS    INR Location Clinic      Anticoagulation Summary  As of 2020    INR goal:   2.0-3.0   TTR:   72.4 % (1 y)   INR used for dosin.10 (2020)   Warfarin maintenance plan:   1.25 mg (2.5 mg x 0.5) every Mon, Fri; 2.5 mg (2.5 mg x 1) all other days   Full warfarin instructions:   1.25 mg every Mon, Fri; 2.5 mg all other days   Weekly warfarin total:   15 mg   No change documented:   Dian Astudillo RN   Plan last modified:   Dian Astudillo RN (2020)   Next INR check:   2020   Priority:   Maintenance   Target end date:   Indefinite    Indications    Atrial fibrillation (H) [I48.91]  Long term current use of anticoagulant therapy [Z79.01]  Longstanding persistent atrial fibrillation [I48.11]  Long term current use of anticoagulants with INR goal of 2.0-3.0 [Z79.01]             Anticoagulation Episode Summary     INR check location:   Anticoagulation Clinic    Preferred lab:       Send INR reminders to:   MICHAEL ABURTO    Comments:   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

## 2020-05-19 ENCOUNTER — TELEPHONE (OUTPATIENT)
Dept: UROLOGY | Facility: CLINIC | Age: 85
End: 2020-05-19

## 2020-05-19 DIAGNOSIS — R68.83 CHILLS (WITHOUT FEVER): Primary | ICD-10-CM

## 2020-05-19 DIAGNOSIS — N39.0 URINARY TRACT INFECTION: ICD-10-CM

## 2020-05-19 DIAGNOSIS — R68.83 CHILLS (WITHOUT FEVER): ICD-10-CM

## 2020-05-19 DIAGNOSIS — R31.9 HEMATURIA: ICD-10-CM

## 2020-05-19 PROCEDURE — 87086 URINE CULTURE/COLONY COUNT: CPT | Performed by: UROLOGY

## 2020-05-19 PROCEDURE — 81001 URINALYSIS AUTO W/SCOPE: CPT | Performed by: UROLOGY

## 2020-05-19 PROCEDURE — 87186 SC STD MICRODIL/AGAR DIL: CPT | Performed by: UROLOGY

## 2020-05-19 PROCEDURE — 87088 URINE BACTERIA CULTURE: CPT | Performed by: UROLOGY

## 2020-05-19 NOTE — TELEPHONE ENCOUNTER
Called Maia to f/u. She reports the symptoms below started this morning. She's drinking water but continues to have bright red blood in urine. No pain. Chills are improved somewhat. She is still doing Gentamicin instillations.  Orders placed for UA/UC. She will call ahead to her lab to get this done.  SAMSON Gale RN      [facility] Call Center    Phone Message    May a detailed message be left on voicemail: yes     Reason for Call: Symptoms or Concerns     If patient has red-flag symptoms, warm transfer to triage line    Current symptom or concern: Chills, Red Urine    No pain with urination.  No temp.       Symptoms have been present for:  1 day(s)    Has patient previously been seen for this? No    By : NO    Date: NO    Are there any new or worsening symptoms? Yes: New symptoms.      Action Taken: Message routed to:  [facility]    Travel Screening: Not Applicable

## 2020-05-20 ENCOUNTER — TELEPHONE (OUTPATIENT)
Dept: UROLOGY | Facility: CLINIC | Age: 85
End: 2020-05-20

## 2020-05-20 DIAGNOSIS — N39.0 URINARY TRACT INFECTION: Primary | ICD-10-CM

## 2020-05-20 NOTE — TELEPHONE ENCOUNTER
[facility] Call [facility]    Phone Message    May a detailed message be left on voicemail: yes     Reason for Call: Requesting Results   Name/type of test: Urine labs  Date of test: 5/19/2020  Was test done at a location other than [facility] (Please fill in the location if not [facility])?: No      Action Taken: Message routed to:  Other: [facility]    Travel Screening: Not Applicable

## 2020-05-21 RX ORDER — CEPHALEXIN 500 MG/1
500 CAPSULE ORAL 2 TIMES DAILY
Qty: 10 CAPSULE | Refills: 0 | Status: SHIPPED | OUTPATIENT
Start: 2020-05-21 | End: 2020-07-10

## 2020-05-22 ENCOUNTER — TELEPHONE (OUTPATIENT)
Dept: UROLOGY | Facility: CLINIC | Age: 85
End: 2020-05-22

## 2020-05-22 LAB
BACTERIA SPEC CULT: ABNORMAL
SPECIMEN SOURCE: ABNORMAL

## 2020-05-22 NOTE — TELEPHONE ENCOUNTER
Called Maia to see how she was doing. She reports feeling better. Still has some cloudiness to urine but she just started the abx yesterday. Encouraged fluids. She will contact clinic, if needed.  SAMSON Gale RN      ----- Message from Bernadine Maria MD sent at 5/22/2020  9:36 AM CDT -----  Ms Miranda the urine culture grew out a couple more bacteria.  If you are feeling better on the cephalexin then I will recommend no further treatment but if you are not then please contact the clinic to let us know at [phone].  Unfortunately based on the bacteria and allergies the only antibiotic that would work is nitrofurantoin 100mg twice a day for 5 days which is not ideal in older patients and does carry the rare risk of lung scarring with it.      Umberto Maria MD

## 2020-05-27 ENCOUNTER — ANTICOAGULATION THERAPY VISIT (OUTPATIENT)
Dept: NURSING | Facility: CLINIC | Age: 85
End: 2020-05-27
Payer: COMMERCIAL

## 2020-05-27 DIAGNOSIS — Z79.01 LONG TERM CURRENT USE OF ANTICOAGULANT THERAPY: ICD-10-CM

## 2020-05-27 DIAGNOSIS — Z79.01 LONG TERM CURRENT USE OF ANTICOAGULANTS WITH INR GOAL OF 2.0-3.0: ICD-10-CM

## 2020-05-27 DIAGNOSIS — I48.91 ATRIAL FIBRILLATION (H): ICD-10-CM

## 2020-05-27 DIAGNOSIS — I48.11 LONGSTANDING PERSISTENT ATRIAL FIBRILLATION (H): ICD-10-CM

## 2020-05-27 LAB
CAPILLARY BLOOD COLLECTION: NORMAL
INR PPP: 2.3 (ref 0.86–1.14)

## 2020-05-27 PROCEDURE — 36416 COLLJ CAPILLARY BLOOD SPEC: CPT | Performed by: INTERNAL MEDICINE

## 2020-05-27 PROCEDURE — 99207 ZZC NO CHARGE NURSE ONLY: CPT

## 2020-05-27 PROCEDURE — 85610 PROTHROMBIN TIME: CPT | Performed by: INTERNAL MEDICINE

## 2020-05-27 NOTE — PROGRESS NOTES
ANTICOAGULATION FOLLOW-UP CLINIC VISIT    Patient Name:  Maia Miranda  Date:  2020  Contact Type:  Telephone    SUBJECTIVE:  Patient Findings     Positives:   Signs/symptoms of bleeding (associated with UTI - now resolved)    Comments:   The patient was assessed for diet, and activity level changes, missed or extra doses, or bruising, with no problem findings. Patient was on cephalexin for a UTI, had noticed a little blood in her urine . Finished abx on . States symptoms have resolved.  Reviewed maintenance warfarin dosing with patient. Patient will remain on the same dose until next INR check. No other questions or concerns. Scheduled next lab-only INR.  Dian ALLEN RN  Anticoagulation Clinic  [place]          Clinical Outcomes     Negatives:   Major bleeding event, Thromboembolic event, Anticoagulation-related hospital admission, Anticoagulation-related ED visit, Anticoagulation-related fatality    Comments:   The patient was assessed for diet, and activity level changes, missed or extra doses, or bruising, with no problem findings. Patient was on cephalexin for a UTI, had noticed a little blood in her urine . Finished abx on . States symptoms have resolved.  Reviewed maintenance warfarin dosing with patient. Patient will remain on the same dose until next INR check. No other questions or concerns. Scheduled next lab-only INR.  Dian ALLEN RN  Anticoagulation Clinic  [place]             OBJECTIVE    Recent labs: (last 7 days)     20  1022   INR 2.30*       ASSESSMENT / PLAN  INR assessment THER    Recheck INR In: 4 WEEKS    INR Location Clinic      Anticoagulation Summary  As of 2020    INR goal:   2.0-3.0   TTR:   72.4 % (1 y)   INR used for dosin.30 (2020)   Warfarin maintenance plan:   1.25 mg (2.5 mg x 0.5) every Mon, Fri; 2.5 mg (2.5 mg x 1) all other days   Full warfarin instructions:   1.25 mg every Mon, Fri; 2.5 mg all other days   Weekly warfarin total:    15 mg   No change documented:   Dian Astudillo RN   Plan last modified:   Dian Astudillo RN (4/22/2020)   Next INR check:   6/24/2020   Priority:   Maintenance   Target end date:   Indefinite    Indications    Atrial fibrillation (H) [I48.91]  Long term current use of anticoagulant therapy [Z79.01]  Longstanding persistent atrial fibrillation [I48.11]  Long term current use of anticoagulants with INR goal of 2.0-3.0 [Z79.01]             Anticoagulation Episode Summary     INR check location:   Anticoagulation Clinic    Preferred lab:       Send INR reminders to:   MICHAEL ABURTO    Comments:   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

## 2020-06-01 ENCOUNTER — TELEPHONE (OUTPATIENT)
Dept: UROLOGY | Facility: CLINIC | Age: 85
End: 2020-06-01

## 2020-06-01 NOTE — TELEPHONE ENCOUNTER
Called Maia back with this response. No UA/UC at this point.   She's only catheterizing once daily at HS with her bladder instillations and she reports that last night she got 500 ml out, which is more than is usual for her. Will try adding another cathing in the AM. Drink water. Monitor symptoms and call back if worsening or any other concerns. She expressed understanding and agreement with this plan.  SAMSON Gale RN  ______________________________________________    Bernadine Maria MD Hanson, Bernadine L, RN    Caller: Unspecified (Today, 11:58 AM)             No urine culture.  Would NOT recommend culture only for cloudy urine without any other symptoms    ____________________________________________    Talked with Maia. Cloudy urine started about 2 days ago so she had just 3 days since finishing her last round of abx. She has no other symptoms. No odor to urine, no burning, temp, pain. She's drinking water and still doing the gentamicin instillations. She would rather not take antibiotics, if she doesn't have to.  Will forward this update to Dr. Maria. In Basket message sent.  SAMSON Gale RN      [facility] Call Center    Phone Message    May a detailed message be left on voicemail: yes     Reason for Call: Symptoms or Concerns     If patient has red-flag symptoms, warm transfer to triage line    Current symptom or concern: Cloudy urine     Symptoms have been present for:  2/3 day(s)    Has patient previously been seen for this? Yes    By : Dr Maria     Date: 1/2020    Are there any new or worsening symptoms? Yes: see above       Action Taken: Message routed to:  [facility]     Travel Screening: Not Applicable

## 2020-06-05 ENCOUNTER — TELEPHONE (OUTPATIENT)
Dept: UROLOGY | Facility: CLINIC | Age: 85
End: 2020-06-05

## 2020-06-05 DIAGNOSIS — N39.0 URINARY TRACT INFECTION: ICD-10-CM

## 2020-06-05 DIAGNOSIS — R82.90 CLOUDY URINE: Primary | ICD-10-CM

## 2020-06-05 DIAGNOSIS — R68.83 CHILLS: ICD-10-CM

## 2020-06-05 DIAGNOSIS — M54.50 BILATERAL LOW BACK PAIN WITHOUT SCIATICA: ICD-10-CM

## 2020-06-05 DIAGNOSIS — R82.90 CLOUDY URINE: ICD-10-CM

## 2020-06-05 DIAGNOSIS — N39.0 URINARY TRACT INFECTION: Primary | ICD-10-CM

## 2020-06-05 LAB
ALBUMIN UR-MCNC: 30 MG/DL
APPEARANCE UR: ABNORMAL
BACTERIA #/AREA URNS HPF: ABNORMAL /HPF
BILIRUB UR QL STRIP: NEGATIVE
COLOR UR AUTO: YELLOW
GLUCOSE UR STRIP-MCNC: NEGATIVE MG/DL
HGB UR QL STRIP: ABNORMAL
KETONES UR STRIP-MCNC: NEGATIVE MG/DL
LEUKOCYTE ESTERASE UR QL STRIP: ABNORMAL
NITRATE UR QL: NEGATIVE
NON-SQ EPI CELLS #/AREA URNS LPF: ABNORMAL /LPF
PH UR STRIP: 5 PH (ref 5–7)
RBC #/AREA URNS AUTO: ABNORMAL /HPF
SOURCE: ABNORMAL
SP GR UR STRIP: 1.02 (ref 1–1.03)
UROBILINOGEN UR STRIP-ACNC: 0.2 EU/DL (ref 0.2–1)
WBC #/AREA URNS AUTO: >100 /HPF

## 2020-06-05 PROCEDURE — 81001 URINALYSIS AUTO W/SCOPE: CPT | Performed by: UROLOGY

## 2020-06-05 PROCEDURE — 87186 SC STD MICRODIL/AGAR DIL: CPT | Performed by: UROLOGY

## 2020-06-05 PROCEDURE — 87088 URINE BACTERIA CULTURE: CPT | Performed by: UROLOGY

## 2020-06-05 PROCEDURE — 87086 URINE CULTURE/COLONY COUNT: CPT | Performed by: UROLOGY

## 2020-06-05 RX ORDER — CEPHALEXIN 500 MG/1
500 CAPSULE ORAL EVERY 12 HOURS
Qty: 10 CAPSULE | Refills: 0 | Status: SHIPPED | OUTPATIENT
Start: 2020-06-05 | End: 2020-07-10

## 2020-06-05 NOTE — TELEPHONE ENCOUNTER
"Called Maia back to follow-up. She reports urine is \"milky\" as of this AM. Also having symptoms of chills and low back pain. Orders placed for UA/UC. Provider updated via In Yandel Gale RN       [facility]    Phone Message    May a detailed message be left on voicemail: yes     Reason for Call: Symptoms or Concerns     If patient has red-flag symptoms, warm transfer to triage line    Current symptom or concern: patient states that her urine is cloudy again this morning, despite drinking additional fluids.    Symptoms have been present for: 2 hour(s)    Has patient previously been seen for this? Yes    By Dr. Maria    Are there any new or worsening symptoms? No      Action Taken: Other: [facility] uro    Travel Screening: Not Applicable                                                                        "

## 2020-06-05 NOTE — TELEPHONE ENCOUNTER
"----- Message from Bernadine Maria MD sent at 6/5/2020 10:53 AM CDT -----  Regarding: RE: pt update  IF she is having these symptoms please ask her to increase catheterization to 6x a day in addition to pushing fluids.  If she is having chills and low back pain is there a way that she can come in for a PVR to make sure she is emptying.  If she continues to have these symptoms I may ask that she go to urgent care to be seen and have some labs drawn, maybe imaging    Thanks  ----- Message -----  From: Bernadine Gale RN  Sent: 6/5/2020   9:59 AM CDT  To: Bernadine See Tae Maria MD  Subject: pt update                                        Maia Sierra calls reporting \"milky\" urine with her morning cath. Also chills and low back pain. Orders placed for UA/UC. She'll get this done ASAP today.  (She's pushing fluids, gent instillations plus cathing 3 x/day).  Thank you,  Bernadine      "

## 2020-06-05 NOTE — TELEPHONE ENCOUNTER
Urology pt of Dr. Maria with UTI symptoms.  U/A results in. Per Dr. Maria will start tx with Keflex; watch for culture results. Increase cathing to every 4 hours. Keep up water intake.   If no improvement with abx and continues to have chills and low back pain, she should present to ED. Reviewed this direction with Maia. She expressed understanding.

## 2020-06-08 LAB
BACTERIA SPEC CULT: ABNORMAL
SPECIMEN SOURCE: ABNORMAL

## 2020-06-10 ENCOUNTER — TELEPHONE (OUTPATIENT)
Dept: UROLOGY | Facility: CLINIC | Age: 85
End: 2020-06-10

## 2020-06-10 NOTE — TELEPHONE ENCOUNTER
Maia has already been treated with Keflex, which she finished today, 6/10. Spoke with pt and she reports back pain and chills are gone; urine is clear. Will review with provider.  SAMSON Gale RN      ----- Message from Lorri Griffin LPN sent at 6/9/2020  3:16 PM CDT -----  Rigo Colindres,    Will you follow up with this [place] patient's results? See below.    Thank you,    Lorri Griffin LPN  ----- Message -----  From: Bernadine Maria MD  Sent: 6/8/2020  10:13 AM CDT  To: [facility]    Ms Miranda     If you are still having symptoms please contact the clinic at [phone] to verify your allergies and pharmacy.  Recommend doxycycline 100mg twice a day for 5 days. Pleas avoid prolonged sun exposure while on this medication    Umberto Maria MD

## 2020-06-13 ENCOUNTER — TELEPHONE (OUTPATIENT)
Dept: SURGERY | Facility: CLINIC | Age: 85
End: 2020-06-13

## 2020-06-13 NOTE — TELEPHONE ENCOUNTER
87 year old with rUTIs followed by Dr. Maria, does CIC 6 times daily as instructed, also gentamycin irrigations qnight that have helped, recently treated with Keflex now calling with ongoing gross hematuria, subjective chills, some left flank pain she stometimes has with infections, weakness.     - Given systemic signs of illness (weakness, subjective chills) recommend ER visit for full set of labs, vitals, broad workup   - Recommend collecting cath specimen when she presents      Hellen Wiseman MD  PGY 3 Urology

## 2020-06-14 ENCOUNTER — TELEPHONE (OUTPATIENT)
Dept: SURGERY | Facility: CLINIC | Age: 85
End: 2020-06-14

## 2020-06-14 DIAGNOSIS — N39.0 URINARY TRACT INFECTION WITH HEMATURIA, SITE UNSPECIFIED: Primary | ICD-10-CM

## 2020-06-14 DIAGNOSIS — N39.0 URINARY TRACT INFECTION WITH HEMATURIA, SITE UNSPECIFIED: ICD-10-CM

## 2020-06-14 DIAGNOSIS — R31.9 URINARY TRACT INFECTION WITH HEMATURIA, SITE UNSPECIFIED: Primary | ICD-10-CM

## 2020-06-14 DIAGNOSIS — R31.9 URINARY TRACT INFECTION WITH HEMATURIA, SITE UNSPECIFIED: ICD-10-CM

## 2020-06-14 LAB
ALBUMIN UR-MCNC: NEGATIVE MG/DL
APPEARANCE UR: CLEAR
BACTERIA #/AREA URNS HPF: ABNORMAL /HPF
BILIRUB UR QL STRIP: NEGATIVE
COLOR UR AUTO: YELLOW
GLUCOSE UR STRIP-MCNC: NEGATIVE MG/DL
HGB UR QL STRIP: NEGATIVE
KETONES UR STRIP-MCNC: NEGATIVE MG/DL
LEUKOCYTE ESTERASE UR QL STRIP: ABNORMAL
MUCOUS THREADS #/AREA URNS LPF: PRESENT /LPF
NITRATE UR QL: NEGATIVE
NON-SQ EPI CELLS #/AREA URNS LPF: ABNORMAL /LPF
PH UR STRIP: 5.5 PH (ref 5–7)
RBC #/AREA URNS AUTO: ABNORMAL /HPF
RENAL EPI CELLS #/AREA URNS HPF: ABNORMAL /HPF
SOURCE: ABNORMAL
SP GR UR STRIP: 1.02 (ref 1–1.03)
UROBILINOGEN UR STRIP-ACNC: 0.2 EU/DL (ref 0.2–1)
WBC #/AREA URNS AUTO: ABNORMAL /HPF

## 2020-06-14 PROCEDURE — 87088 URINE BACTERIA CULTURE: CPT | Performed by: STUDENT IN AN ORGANIZED HEALTH CARE EDUCATION/TRAINING PROGRAM

## 2020-06-14 PROCEDURE — 87086 URINE CULTURE/COLONY COUNT: CPT | Performed by: STUDENT IN AN ORGANIZED HEALTH CARE EDUCATION/TRAINING PROGRAM

## 2020-06-14 PROCEDURE — 81001 URINALYSIS AUTO W/SCOPE: CPT | Performed by: STUDENT IN AN ORGANIZED HEALTH CARE EDUCATION/TRAINING PROGRAM

## 2020-06-14 PROCEDURE — 87186 SC STD MICRODIL/AGAR DIL: CPT | Performed by: STUDENT IN AN ORGANIZED HEALTH CARE EDUCATION/TRAINING PROGRAM

## 2020-06-14 NOTE — TELEPHONE ENCOUNTER
87 year old with rUTIs followed by Dr. Maria, does CIC 6 times daily as instructed, also gentamycin irrigations qnight that have helped, recently treated with Keflex now calling with UTI symptoms. Called yesterday for UTI symptoms and concern for systemic illness so recommendation was made to go to the ED but she did not go. She is still having symptoms. Calling wondering what she should do.     - As per Dr. Wiseman's recommendations yesterday, given systemic symptoms still recommend going to ED. She states she can get a ride.   - Recommend collecting cath specimen when she presents    Phuc Solitario MD  Urology PGY2     1:43 PM     Patient calls, states she no longer feels ill and would like to drop a urine sample off. She went to the lab instead of the ED. She does NOT want to go to the ED at this time. Discussed that this is fine, I will order UA/UC, however she does need to go to the ED if she starts to have systemic symptoms. She voiced understanding.    Phuc Solitario MD  Urology PGY2

## 2020-06-15 ENCOUNTER — TELEPHONE (OUTPATIENT)
Dept: UROLOGY | Facility: CLINIC | Age: 85
End: 2020-06-15

## 2020-06-15 LAB
BACTERIA SPEC CULT: ABNORMAL
SPECIMEN SOURCE: ABNORMAL

## 2020-06-15 NOTE — TELEPHONE ENCOUNTER
Patient called and LM at 4:50 on Friday reporting blood in her urine. Returned phone call today and patient states that bleeding has resolved. Informed her to call back if there is any change.     Marianela Johnson LPN

## 2020-06-17 ENCOUNTER — TELEPHONE (OUTPATIENT)
Dept: UROLOGY | Facility: CLINIC | Age: 85
End: 2020-06-17

## 2020-06-17 DIAGNOSIS — N39.0 URINARY TRACT INFECTION: ICD-10-CM

## 2020-06-17 DIAGNOSIS — N20.0 KIDNEY STONE: ICD-10-CM

## 2020-06-17 DIAGNOSIS — N39.0 RECURRENT UTI: Primary | ICD-10-CM

## 2020-06-17 DIAGNOSIS — N95.2 ATROPHIC VAGINITIS: ICD-10-CM

## 2020-06-17 RX ORDER — CEPHALEXIN 500 MG/1
500 CAPSULE ORAL 2 TIMES DAILY
Qty: 20 CAPSULE | Refills: 0 | Status: SHIPPED | OUTPATIENT
Start: 2020-06-17 | End: 2020-07-10

## 2020-06-17 NOTE — TELEPHONE ENCOUNTER
"Spoke with Maia and explained the estring and that we will will probably need to do a prior authorization. Will wait to hear from pharmacy. Once she gets the estring, she'll need an appt with Dr. Maria for placement.  SAMSON Gale RN    Rx for estring sent to pt's pharmacy. Called pt to update but unable to reach her directly. Will try again.  SAMSON Gale RN    CT order placed. Rx for Keflex sent to pt's pharmacy. Called Maia with these directions. She is still doing the gentamicin irrigations. Provider updated via In Basket.  SAMSON Gale RN    ----- Message from Bernadine Maria MD sent at 6/17/2020 10:08 AM CDT -----  Regarding: RE: Results  CT stone protocol to make sure she doesn't have a kidney stone    Keflex 500mg PO BID x 10 days    Is she still using the gentamicin irrigations?  -If NO please restart  -If YES then I recommend either an estring (instead of the estrogen cream) or daily antibiotic prophylaxis.  I would prefer to try the estring and she would have to get it and then come in for placement of it the first time.    Thanks    ----- Message -----  From: Bernadine Gale RN  Sent: 6/17/2020   9:58 AM CDT  To: Bernadine Maria MD  Subject: RE: Results                                      Spoke with Maia and reviewed the questions below.  #1 Yes  #2 Yes  #3  No  #4  Finished most recent course of Keflex 6/10 and today reports symptoms of: cloudy urine; intermittent low back pain; chills at times; fatigue. These symptoms are not severe and today she states she's \"pretty good\".   #5  She's using the estrogen cream and she takes Vit C. She has taken Hiprex in the past. Most recent Rx was on 6/5/20, q12h x 5 days.  SAMSON Gale RN    ----- Message -----  From: Bernadine Maria MD  Sent: 6/17/2020   9:35 AM CDT  To: Bernadine Gale RN  Subject: RE: Results                                      1) Is she catheterizing every 4 hours during the day?    2) Is she using a single use " prelubricated catheter each time    3) Is she having any constipation or diarrhea?    4) If she is still symptomatic then keflex 500mg PO BID x 5 days    5) Is she using estrogen cream or any supplements to prevent UTI?    ----- Message -----  From: Bernadine Gael RN  Sent: 6/16/2020  11:20 AM CDT  To: Bernadine Maria MD, #  Subject: Results                                          Hello,  Looks like Maia was in touch with On-call over the weekend. They had directed her to the ED but she opted for UA/UC and those results are in. Please advise.  Thank you,  Bernadine

## 2020-06-17 NOTE — TELEPHONE ENCOUNTER
"Spoke with Maia and reviewed the questions below.  #1 Yes  #2 Yes  #3  No  #4  Finished most recent course of Keflex 6/10 and today reports symptoms of: cloudy urine; intermittent low back pain; chills at times; fatigue. These symptoms are not severe and today she states she's \"pretty good\".   #5  She's using the estrogen cream and she takes Vit C. She has taken Hiprex in the past. Most recent Rx was on 6/5/20, q12h x 5 days.  SAMSON Gale RN      ----- Message from Bernadine Maria MD sent at 6/17/2020  9:35 AM CDT -----  Regarding: RE: Results  1) Is she catheterizing every 4 hours during the day?    2) Is she using a single use prelubricated catheter each time    3) Is she having any constipation or diarrhea?    4) If she is still symptomatic then keflex 500mg PO BID x 5 days    5) Is she using estrogen cream or any supplements to prevent UTI?    ----- Message -----  From: Bernadine Gale RN  Sent: 6/16/2020  11:20 AM CDT  To: Bernadine Maria MD, #  Subject: Results                                          Hello,  Looks like Maia was in touch with On-call over the weekend. They had directed her to the ED but she opted for UA/UC and those results are in. Please advise.  Thank you,  Bernadine"

## 2020-06-18 ENCOUNTER — TELEPHONE (OUTPATIENT)
Dept: UROLOGY | Facility: CLINIC | Age: 85
End: 2020-06-18

## 2020-06-18 NOTE — TELEPHONE ENCOUNTER
Prior Authorization Approval    Authorization Effective Date: 5/19/2020  Authorization Expiration Date: 6/18/2021  Medication: estradiol (ESTRING) 2 MG vaginal ring-PA APPROVED   Approved Dose/Quantity:  Reference #: CASE # [id number]   Insurance Company: [employer] - Phone [phone] Fax [phone]  Expected CoPay:  $300     CoPay Card Available:      Foundation Assistance Needed:    Which Pharmacy is filling the prescription (Not needed for infusion/clinic administered): [facility] [address] [place] [address]  Pharmacy Notified: Yes- Pharmacy stated that they have paid claim on medication; however, patients co-pay is $300. Advised pharmacy to contact patient on co-pay prior to patient picking up medication.   Patient Notified: Yes

## 2020-06-18 NOTE — TELEPHONE ENCOUNTER
Central Prior Authorization Team   [phone]    PA Initiation    Medication: estradiol (ESTRING) 2 MG vaginal ring  Insurance Company: [employer] - Phone [phone] Fax [phone]  Pharmacy Filling the Rx: [facility] [address] [place] [address]  Filling Pharmacy Phone: [phone]  Filling Pharmacy Fax: [phone]  Start Date: 6/18/2020

## 2020-06-22 ENCOUNTER — HOSPITAL ENCOUNTER (OUTPATIENT)
Dept: CT IMAGING | Facility: CLINIC | Age: 85
Discharge: HOME | End: 2020-06-22
Attending: UROLOGY | Admitting: UROLOGY
Payer: COMMERCIAL

## 2020-06-22 DIAGNOSIS — N39.0 RECURRENT UTI: ICD-10-CM

## 2020-06-22 DIAGNOSIS — N20.0 KIDNEY STONE: ICD-10-CM

## 2020-06-22 PROCEDURE — 74176 CT ABD & PELVIS W/O CONTRAST: CPT

## 2020-06-23 ENCOUNTER — TELEPHONE (OUTPATIENT)
Dept: UROLOGY | Facility: CLINIC | Age: 85
End: 2020-06-23

## 2020-06-23 NOTE — TELEPHONE ENCOUNTER
"Received fax from [facility] that tier exception request had been denied. Copy of this letter sent to scan. Will check with Urology staff at the [facility] to see if they know of anything more that can be done to reduce cost.  Tried [facility] but price was more expensive than below.   SAMSON Gale RN      Urology pt of Dr. Maria. Working on coverage for a new Rx: Estring.  Received message that PA had been approved but co-pay for Maia would still be $326.35 per ring. This is prohibitively expensive for her.  Called [employer], who handles the PAs for [facility]. Spoke with Joana and started a \"clinical benefit review\" asking for a \"tier exception\". The case # is [id number]. Determination can take 72 hours. They will fax their decision. If this is denied, the next step would be to contact [facility] directly.  Called Maia with this update; she expressed understanding. She reports feeling \"pretty good\".  "

## 2020-06-24 ENCOUNTER — ANTICOAGULATION THERAPY VISIT (OUTPATIENT)
Dept: NURSING | Facility: CLINIC | Age: 85
End: 2020-06-24

## 2020-06-24 DIAGNOSIS — I48.91 ATRIAL FIBRILLATION (H): ICD-10-CM

## 2020-06-24 DIAGNOSIS — Z79.01 LONG TERM CURRENT USE OF ANTICOAGULANT THERAPY: ICD-10-CM

## 2020-06-24 DIAGNOSIS — I48.11 LONGSTANDING PERSISTENT ATRIAL FIBRILLATION (H): ICD-10-CM

## 2020-06-24 DIAGNOSIS — Z79.01 LONG TERM CURRENT USE OF ANTICOAGULANTS WITH INR GOAL OF 2.0-3.0: ICD-10-CM

## 2020-06-24 LAB
CAPILLARY BLOOD COLLECTION: NORMAL
INR PPP: 1.2 (ref 0.86–1.14)

## 2020-06-24 PROCEDURE — 85610 PROTHROMBIN TIME: CPT | Performed by: FAMILY MEDICINE

## 2020-06-24 PROCEDURE — 99207 ZZC NO CHARGE NURSE ONLY: CPT

## 2020-06-24 PROCEDURE — 36416 COLLJ CAPILLARY BLOOD SPEC: CPT | Performed by: FAMILY MEDICINE

## 2020-06-24 NOTE — PROGRESS NOTES
ANTICOAGULATION FOLLOW-UP CLINIC VISIT    Patient Name:  Maia Miranda  Date:  6/24/2020  Contact Type:  Telephone    SUBJECTIVE:  Patient Findings     Positives:   Change in health (recurrent UTIs), Missed doses (possible), Change in medications (two rounds of Keflex )    Comments:   Called patient to discuss today's INR results: Patient denies any identifiable changes that caused the subtherapeutic INR. She has been battling recurrent UTI's over the last few weeks, is on 2nd round of Keflex now. Per chart review, has had some instances of hematuria along with other symptoms of UTI such as milky urine, chills and left flank pain.Concurrent use of CEPHALEXIN and WARFARIN may result in an increased risk of bleeding.   6/22/20 CT Abdomen and pelvis essentially negative. Patient states currently no symptoms of UTI, no unusual bruising/bleeding noted. No other changes with medications or lifestyle. Her family is feeding her and states she doesn't always get many green veggies. Is not taking a multivitamin or meal supplement such as Boost which contain vit K. Otherwise feeling her baseline. Discussed watching for s/sx of clot/PE while her INR is so low.   States it is very possible she may have missed a dose of warfarin. She does not use a pill box currently so no way to check. Advised she  a pill box or have family bring her one to lay out her weekly warfarin dosing so if she is unsure that a dose is missed she will be able to look back and tell. Patient stated understanding and is in agreement with plan.  Per protocol, will double today's warfarin dosing, then she will continue with maintenance plan and recheck INR in 5 days.  Dian ALLEN RN  Anticoagulation Clinic  [place]            Clinical Outcomes     Comments:   Called patient to discuss today's INR results: Patient denies any identifiable changes that caused the subtherapeutic INR. She has been battling recurrent UTI's over the last few weeks, is on  2nd round of Keflex now. Per chart review, has had some instances of hematuria along with other symptoms of UTI such as milky urine, chills and left flank pain.Concurrent use of CEPHALEXIN and WARFARIN may result in an increased risk of bleeding.   20 CT Abdomen and pelvis essentially negative. Patient states currently no symptoms of UTI, no unusual bruising/bleeding noted. No other changes with medications or lifestyle. Her family is feeding her and states she doesn't always get many green veggies. Is not taking a multivitamin or meal supplement such as Boost which contain vit K. Otherwise feeling her baseline. Discussed watching for s/sx of clot/PE while her INR is so low.   States it is very possible she may have missed a dose of warfarin. She does not use a pill box currently so no way to check. Advised she  a pill box or have family bring her one to lay out her weekly warfarin dosing so if she is unsure that a dose is missed she will be able to look back and tell. Patient stated understanding and is in agreement with plan.  Per protocol, will double today's warfarin dosing, then she will continue with maintenance plan and recheck INR in 5 days.  Dian ALLEN RN  Anticoagulation Clinic  [place]               OBJECTIVE    Recent labs: (last 7 days)     20  1023   INR 1.20*       ASSESSMENT / PLAN  INR assessment SUB    Recheck INR In: 5 DAYS    INR Location Clinic      Anticoagulation Summary  As of 2020    INR goal:   2.0-3.0   TTR:   68.3 % (1 y)   INR used for dosin.20! (2020)   Warfarin maintenance plan:   1.25 mg (2.5 mg x 0.5) every Mon, Fri; 2.5 mg (2.5 mg x 1) all other days   Full warfarin instructions:   : 5 mg; Otherwise 1.25 mg every Mon, Fri; 2.5 mg all other days   Weekly warfarin total:   15 mg   Plan last modified:   Dian Astudillo RN (2020)   Next INR check:   2020   Priority:   High   Target end date:   Indefinite    Indications    Atrial  fibrillation (H) [I48.91]  Long term current use of anticoagulant therapy [Z79.01]  Longstanding persistent atrial fibrillation (H) [I48.11]  Long term current use of anticoagulants with INR goal of 2.0-3.0 [Z79.01]             Anticoagulation Episode Summary     INR check location:   Anticoagulation Clinic    Preferred lab:       Send INR reminders to:   MICHAEL ABURTO    Comments:   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    Dian Astudillo RN

## 2020-06-29 ENCOUNTER — TELEPHONE (OUTPATIENT)
Dept: UROLOGY | Facility: CLINIC | Age: 85
End: 2020-06-29

## 2020-06-29 ENCOUNTER — ANTICOAGULATION THERAPY VISIT (OUTPATIENT)
Dept: NURSING | Facility: CLINIC | Age: 85
End: 2020-06-29

## 2020-06-29 DIAGNOSIS — Z79.01 LONG TERM CURRENT USE OF ANTICOAGULANT THERAPY: ICD-10-CM

## 2020-06-29 DIAGNOSIS — Z79.01 LONG TERM CURRENT USE OF ANTICOAGULANTS WITH INR GOAL OF 2.0-3.0: ICD-10-CM

## 2020-06-29 DIAGNOSIS — I48.11 LONGSTANDING PERSISTENT ATRIAL FIBRILLATION (H): ICD-10-CM

## 2020-06-29 DIAGNOSIS — I48.91 ATRIAL FIBRILLATION (H): ICD-10-CM

## 2020-06-29 LAB
CAPILLARY BLOOD COLLECTION: NORMAL
INR PPP: 1.6 (ref 0.86–1.14)

## 2020-06-29 PROCEDURE — 85610 PROTHROMBIN TIME: CPT | Performed by: INTERNAL MEDICINE

## 2020-06-29 PROCEDURE — 99207 ZZC NO CHARGE NURSE ONLY: CPT

## 2020-06-29 PROCEDURE — 36416 COLLJ CAPILLARY BLOOD SPEC: CPT | Performed by: INTERNAL MEDICINE

## 2020-06-29 RX ORDER — WARFARIN SODIUM 2.5 MG/1
TABLET ORAL
Qty: 92 TABLET | Refills: 0
Start: 2020-06-29 | End: 2020-08-18

## 2020-06-29 NOTE — TELEPHONE ENCOUNTER
Called Maia back to discuss. I have call in to staff at the [facility] to see if they know of any ways to reduce out-of pocket costs for pt.  SAMSON Gale RN     Health Call Center    Phone Message    May a detailed message be left on voicemail: yes     Reason for Call: Other: Per call from PT [facility] denied Vac Ring procedure. PT requested a call back to discuss alternatives.      Action Taken: Message routed to:  Other: Urology - [place]    Travel Screening: Not Applicable

## 2020-06-29 NOTE — PROGRESS NOTES
ANTICOAGULATION FOLLOW-UP CLINIC VISIT    Patient Name:  Maia Miranda  Date:  6/29/2020  Contact Type:  Telephone    SUBJECTIVE:  Patient Findings     Comments:   At last INR (1.2), patient states it was possible she could have missed a dose of warfarin but was not sure. Advised she start using a pill box, increased her dose that day by an extra 2.5 mg. Today while higher (1.6), she is still subtherapeutic.   Called patient to discuss today's INR results: Patient denies any identifiable changes that caused the subtherapeutic INR. Per protocol, for ongoing factors, increased maintenance dose of warfarin by about 8% and gave boost today. The patient was assessed for diet, medication, and activity level changes, missed or extra doses, bruising or bleeding, with no problem findings. Patient did not get a pill box as discussed at last visit. Will have one at the  for when she comes in for next INR. Patient repeated back dosing instructions correctly.  Dian ALLEN RN  Anticoagulation Clinic  [facility]    \        Clinical Outcomes     Comments:   At last INR (1.2), patient states it was possible she could have missed a dose of warfarin but was not sure. Advised she start using a pill box, increased her dose that day by an extra 2.5 mg. Today while higher (1.6), she is still subtherapeutic.   Called patient to discuss today's INR results: Patient denies any identifiable changes that caused the subtherapeutic INR. Per protocol, for ongoing factors, increased maintenance dose of warfarin by about 8% and gave boost today. The patient was assessed for diet, medication, and activity level changes, missed or extra doses, bruising or bleeding, with no problem findings. Patient did not get a pill box as discussed at last visit. Will have one at the  for when she comes in for next INR. Patient repeated back dosing instructions correctly.  Dian ALLEN RN  Anticoagulation Clinic  [employer]    \            OBJECTIVE    Recent labs: (last 7 days)     20  1058   INR 1.60*       ASSESSMENT / PLAN  INR assessment SUB    Recheck INR In: 10 DAYS    INR Location Clinic      Anticoagulation Summary  As of 2020    INR goal:   2.0-3.0   TTR:   66.9 % (1 y)   INR used for dosin.60! (2020)   Warfarin maintenance plan:   1.25 mg (2.5 mg x 0.5) every Mon; 2.5 mg (2.5 mg x 1) all other days   Full warfarin instructions:   : 2.5 mg; Otherwise 1.25 mg every Mon; 2.5 mg all other days   Weekly warfarin total:   16.25 mg   Plan last modified:   Dian Astudillo RN (2020)   Next INR check:   2020   Priority:   High   Target end date:   Indefinite    Indications    Atrial fibrillation (H) [I48.91]  Long term current use of anticoagulant therapy [Z79.01]  Longstanding persistent atrial fibrillation (H) [I48.11]  Long term current use of anticoagulants with INR goal of 2.0-3.0 [Z79.01]             Anticoagulation Episode Summary     INR check location:   Anticoagulation Clinic    Preferred lab:       Send INR reminders to:   MICHAEL ABURTO    Comments:   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    Dian Astudillo, SHIELA

## 2020-07-01 ENCOUNTER — TELEPHONE (OUTPATIENT)
Dept: UROLOGY | Facility: CLINIC | Age: 85
End: 2020-07-01

## 2020-07-01 DIAGNOSIS — N39.0 URINARY TRACT INFECTION: ICD-10-CM

## 2020-07-01 DIAGNOSIS — R82.90 CLOUDY URINE: Primary | ICD-10-CM

## 2020-07-01 NOTE — TELEPHONE ENCOUNTER
"Called Maia back with direction as below. She expressed understanding and will increase estrogen cream to nightly.   She denies fever or chills.  Request for cysto appt forwarded to scheduling pool. Provider updated.  SAMSON Gale RN      ----- Message from Bernadine Maria MD sent at 7/1/2020 11:09 AM CDT -----  Regarding: RE: symptoms; warfarin question  Warfarin should not impact her urine    If she is having any other fevers, chills etc needs to go to the ED    Recommend that we get her on the schedule for another cystoscopy to see what else is going on.  In the mean time can she increase the estrogen cream to nightly?    Thanks  ----- Message -----  From: Bernadine Gale RN  Sent: 7/1/2020  10:39 AM CDT  To: Bernadine Maria MD  Subject: symptoms; warfarin question                      Hello,  Message from Maia today reporting cloudy urine again as of this morning. She reports that her warfarin was increased on Monday 6/29 and asks if this could be related. Also reports \"back hurts\". Orders placed for UA/UC.  Thank you,  Bernadine        "

## 2020-07-01 NOTE — TELEPHONE ENCOUNTER
Called Maia back to f/u on this message. She also reports her back hurts. She reports that her warfarin was increased on Monday 6/29 and wonders if this could be related. Will forward this update to provider. In Basket message sent. Orders placed for UA/UC.  SAMSON Gale RN       Health Call Center    Phone Message    May a detailed message be left on voicemail: yes     Reason for Call: Symptoms or Concerns     If patient has red-flag symptoms, warm transfer to triage line    Current symptom or concern: Woke up with cloudy urine and finished medication. Pt is requesting a call back from Dr. Maria's Care Team    Symptoms have been present for:  1 day(s)    Has patient previously been seen for this? Yes    By Dr. Maria    Date:     Are there any new or worsening symptoms? Yes:       Action Taken: Message routed to:  [facility] Urology    Travel Screening: Not Applicable

## 2020-07-06 ENCOUNTER — TELEPHONE (OUTPATIENT)
Dept: UROLOGY | Facility: CLINIC | Age: 85
End: 2020-07-06

## 2020-07-06 NOTE — TELEPHONE ENCOUNTER
[facility] Call Center    Phone Message    May a detailed message be left on voicemail: yes     Reason for Call: Symptoms or Concerns     If patient has red-flag symptoms, warm transfer to triage line    Current symptom or concern: Pt's urine is very cloudy, please call to discuss ASAP    Symptoms have been present for:  2 day(s)    Has patient previously been seen for this? Yes    By Bernadnie Maria MD    Date: 1/28/2020    Are there any new or worsening symptoms? No      Action Taken: Message routed to:  [facility] urology    Travel Screening: Not Applicable

## 2020-07-06 NOTE — TELEPHONE ENCOUNTER
Put in orders for uauc and she will go to [facility] to get this done Kallie Bell, [facility] Staff Nurse

## 2020-07-08 ENCOUNTER — ANTICOAGULATION THERAPY VISIT (OUTPATIENT)
Dept: ANTICOAGULATION | Facility: CLINIC | Age: 85
End: 2020-07-08
Payer: COMMERCIAL

## 2020-07-08 DIAGNOSIS — Z79.01 LONG TERM CURRENT USE OF ANTICOAGULANTS WITH INR GOAL OF 2.0-3.0: ICD-10-CM

## 2020-07-08 DIAGNOSIS — Z79.01 LONG TERM CURRENT USE OF ANTICOAGULANT THERAPY: ICD-10-CM

## 2020-07-08 DIAGNOSIS — I48.11 LONGSTANDING PERSISTENT ATRIAL FIBRILLATION (H): ICD-10-CM

## 2020-07-08 DIAGNOSIS — I48.91 ATRIAL FIBRILLATION (H): ICD-10-CM

## 2020-07-08 LAB
CAPILLARY BLOOD COLLECTION: NORMAL
INR PPP: 2.6 (ref 0.86–1.14)

## 2020-07-08 PROCEDURE — 36416 COLLJ CAPILLARY BLOOD SPEC: CPT | Performed by: INTERNAL MEDICINE

## 2020-07-08 PROCEDURE — 99207 ZZC NO CHARGE NURSE ONLY: CPT | Performed by: INTERNAL MEDICINE

## 2020-07-08 PROCEDURE — 85610 PROTHROMBIN TIME: CPT | Performed by: INTERNAL MEDICINE

## 2020-07-08 NOTE — PROGRESS NOTES
ANTICOAGULATION FOLLOW-UP CLINIC VISIT    Patient Name:  Maia Miranda  Date:  2020  Contact Type:  Telephone/ Called patient, she denies any changes or missed or extra warfarin doses. Orders discussed with patient. Lab INR appoirntment scheduled on 7/15/20.    SUBJECTIVE:  Patient Findings     Comments:   The patient was assessed for diet, medication, and activity level changes, missed or extra doses, bruising or bleeding, with no problem findings. Patient states she received her pill box today. Maintenance warfarin dosing reviewed with patient and she will remain on the same dose until next INR check. Patient repeated dosing instruction back to this writer. Patient will have INR checked in 1 week since INR increased by 1.0 in 9 days.          Clinical Outcomes     Comments:   The patient was assessed for diet, medication, and activity level changes, missed or extra doses, bruising or bleeding, with no problem findings. Patient states she received her pill box today. Maintenance warfarin dosing reviewed with patient and she will remain on the same dose until next INR check. Patient repeated dosing instruction back to this writer. Patient will have INR checked in 1 week since INR increased by 1.0 in 9 days.             OBJECTIVE    Recent labs: (last 7 days)     20  1041   INR 2.60*       ASSESSMENT / PLAN  INR assessment THER    Recheck INR In: 1 WEEK    INR Location Outside lab      Anticoagulation Summary  As of 2020    INR goal:   2.0-3.0   TTR:   65.9 % (1 y)   INR used for dosin.60 (2020)   Warfarin maintenance plan:   1.25 mg (2.5 mg x 0.5) every Mon; 2.5 mg (2.5 mg x 1) all other days   Full warfarin instructions:   1.25 mg every Mon; 2.5 mg all other days   Weekly warfarin total:   16.25 mg   No change documented:   Azeb Johnson RN   Plan last modified:   Dian Astudillo RN (2020)   Next INR check:   7/15/2020   Priority:   High   Target end date:   Indefinite     Indications    Atrial fibrillation (H) [I48.91]  Long term current use of anticoagulant therapy [Z79.01]  Longstanding persistent atrial fibrillation (H) [I48.11]  Long term current use of anticoagulants with INR goal of 2.0-3.0 [Z79.01]             Anticoagulation Episode Summary     INR check location:   Anticoagulation Clinic    Preferred lab:       Send INR reminders to:   MICHAEL ABURTO    Comments:   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    Azeb Johnson RN

## 2020-07-09 ENCOUNTER — TELEPHONE (OUTPATIENT)
Dept: UROLOGY | Facility: CLINIC | Age: 85
End: 2020-07-09

## 2020-07-09 DIAGNOSIS — N39.0 URINARY TRACT INFECTION: ICD-10-CM

## 2020-07-09 DIAGNOSIS — R33.9 URINARY RETENTION: ICD-10-CM

## 2020-07-09 DIAGNOSIS — N39.0 URINARY TRACT INFECTION: Primary | ICD-10-CM

## 2020-07-09 LAB
ALBUMIN UR-MCNC: 100 MG/DL
APPEARANCE UR: CLEAR
BILIRUB UR QL STRIP: NEGATIVE
COLOR UR AUTO: YELLOW
GLUCOSE UR STRIP-MCNC: NEGATIVE MG/DL
HGB UR QL STRIP: ABNORMAL
KETONES UR STRIP-MCNC: NEGATIVE MG/DL
LEUKOCYTE ESTERASE UR QL STRIP: ABNORMAL
NITRATE UR QL: POSITIVE
PH UR STRIP: 5.5 PH (ref 5–7)
SOURCE: ABNORMAL
SP GR UR STRIP: >1.03 (ref 1–1.03)
UROBILINOGEN UR STRIP-ACNC: 0.2 EU/DL (ref 0.2–1)

## 2020-07-09 PROCEDURE — 81003 URINALYSIS AUTO W/O SCOPE: CPT | Performed by: UROLOGY

## 2020-07-09 PROCEDURE — 87086 URINE CULTURE/COLONY COUNT: CPT | Performed by: UROLOGY

## 2020-07-09 PROCEDURE — 87088 URINE BACTERIA CULTURE: CPT | Performed by: UROLOGY

## 2020-07-09 PROCEDURE — 87186 SC STD MICRODIL/AGAR DIL: CPT | Performed by: UROLOGY

## 2020-07-09 NOTE — TELEPHONE ENCOUNTER
[facility] Call Center    Phone Message    May a detailed message be left on voicemail: yes     Reason for Call: Other: Pt called and said that she has another UTI.  Pt would like to know what Dr. Maria would like for her to do. Please call her back. Thanks     Action Taken: Message routed to:  [facility] URO    Travel Screening: Not Applicable

## 2020-07-10 ENCOUNTER — TELEPHONE (OUTPATIENT)
Dept: UROLOGY | Facility: CLINIC | Age: 85
End: 2020-07-10

## 2020-07-10 DIAGNOSIS — N39.0 URINARY TRACT INFECTION: Primary | ICD-10-CM

## 2020-07-10 RX ORDER — CEPHALEXIN 500 MG/1
500 CAPSULE ORAL 2 TIMES DAILY
Qty: 14 CAPSULE | Refills: 0 | Status: SHIPPED | OUTPATIENT
Start: 2020-07-10 | End: 2020-07-17

## 2020-07-10 NOTE — TELEPHONE ENCOUNTER
Called Maia and left message with the result information and Rx as below.  SAMSON Gale RN      ----- Message from Bernadine See Tae Maria MD sent at 7/10/2020  9:50 AM CDT -----  Ms Ruben     The urine culture is not back yet but the urine test looks positive.  As it is the weekend if you are having back symptoms please contact the clinic [phone] so we can verify your allergies and pharmacy.  Recommend we try cephalexin 500mg twice a day x 7 days    Umberto Maria MD

## 2020-07-12 LAB
BACTERIA SPEC CULT: ABNORMAL
BACTERIA SPEC CULT: ABNORMAL
SPECIMEN SOURCE: ABNORMAL

## 2020-07-14 DIAGNOSIS — N39.0 RECURRENT URINARY TRACT INFECTION: ICD-10-CM

## 2020-07-15 ENCOUNTER — ANTICOAGULATION THERAPY VISIT (OUTPATIENT)
Dept: NURSING | Facility: CLINIC | Age: 85
End: 2020-07-15

## 2020-07-15 ENCOUNTER — TELEPHONE (OUTPATIENT)
Dept: UROLOGY | Facility: CLINIC | Age: 85
End: 2020-07-15

## 2020-07-15 DIAGNOSIS — Z79.01 LONG TERM CURRENT USE OF ANTICOAGULANT THERAPY: ICD-10-CM

## 2020-07-15 DIAGNOSIS — I48.91 ATRIAL FIBRILLATION (H): ICD-10-CM

## 2020-07-15 DIAGNOSIS — I48.11 LONGSTANDING PERSISTENT ATRIAL FIBRILLATION (H): ICD-10-CM

## 2020-07-15 DIAGNOSIS — Z79.01 LONG TERM CURRENT USE OF ANTICOAGULANTS WITH INR GOAL OF 2.0-3.0: ICD-10-CM

## 2020-07-15 LAB
CAPILLARY BLOOD COLLECTION: NORMAL
INR PPP: 3.3 (ref 0.86–1.14)

## 2020-07-15 PROCEDURE — 85610 PROTHROMBIN TIME: CPT | Performed by: NURSE PRACTITIONER

## 2020-07-15 PROCEDURE — 36416 COLLJ CAPILLARY BLOOD SPEC: CPT | Performed by: NURSE PRACTITIONER

## 2020-07-15 PROCEDURE — 99207 ZZC NO CHARGE NURSE ONLY: CPT

## 2020-07-15 NOTE — TELEPHONE ENCOUNTER
At today's anticoagulation visit, the patient informed this INR nurse that she has a cystoscopy scheduled with Dr. Maria on 8/12/20. Patient was unsure if she should be holding her warfarin in preparation for this procedure? Patient plans to reach out to Dr. Maria, but this RN is also reaching out. Please advise.     Dian ALLEN RN  Anticoagulation Clinic  [place]

## 2020-07-15 NOTE — PROGRESS NOTES
Bernaidne Maria See MD Tae  You 3 minutes ago (5:03 PM)       Does not need to stop anticoagulation...   Thanks     Message text      Patient notified and stated understanding.    Dian ALLEN RN  Anticoagulation Clinic  [place]

## 2020-07-15 NOTE — TELEPHONE ENCOUNTER
Bernadine Maria See MD Tae  You 3 minutes ago (5:03 PM)       Does not need to stop anticoagulation...   Thanks     Message text      Noted on anticoag encounter, and called Maia to notify as well. Patient stated understanding and is in agreement with plan.    Dian ALLEN RN  Anticoagulation Clinic  [place]

## 2020-07-15 NOTE — PROGRESS NOTES
ANTICOAGULATION FOLLOW-UP CLINIC VISIT    Patient Name:  Maia Miranda  Date:  7/15/2020  Contact Type:  Telephone    SUBJECTIVE:  Patient Findings     Positives:   Change in health (UTI), Change in medications    Comments:   Per chart review, patient has another UTI and started cephalexin on Friday 7/10/20 for 7 days. She will be done after tomorrow night. Concurrent use of CEPHALEXIN and WARFARIN may result in an increased risk of bleeding.   She also notes she has been taking at night metoprolol which she didn't take for a long time, but she finally got that prescription refilled. Patient's daughter, Bhargavi, is helping her with her medication management and will be moving in with her on 7/24. The patient was assessed for diet and activity level changes, missed or extra doses, bruising or bleeding, with no problem findings. Per protocol, she will hold half her dose tonight then return to maintenance dosing and recheck INR in 2 weeks.  Dian ALLEN RN  Anticoagulation Clinic  [place]          Clinical Outcomes     Comments:   Per chart review, patient has another UTI and started cephalexin on Friday 7/10/20 for 7 days. She will be done after tomorrow night. Concurrent use of CEPHALEXIN and WARFARIN may result in an increased risk of bleeding.   She also notes she has been taking at night metoprolol which she didn't take for a long time, but she finally got that prescription refilled. Patient's daughter, Bhargavi, is helping her with her medication management and will be moving in with her on 7/24. The patient was assessed for diet and activity level changes, missed or extra doses, bruising or bleeding, with no problem findings. Per protocol, she will hold half her dose tonight then return to maintenance dosing and recheck INR in 2 weeks.  Dian ALLEN RN  Anticoagulation Clinic  [place]             OBJECTIVE    Recent labs: (last 7 days)     07/15/20  1107   INR 3.30*       ASSESSMENT / PLAN  INR assessment SUPRA     Recheck INR In: 2 WEEKS    INR Location Clinic      Anticoagulation Summary  As of 7/15/2020    INR goal:   2.0-3.0   TTR:   65.1 % (1 y)   INR used for dosing:   3.30! (7/15/2020)   Warfarin maintenance plan:   1.25 mg (2.5 mg x 0.5) every Mon; 2.5 mg (2.5 mg x 1) all other days   Full warfarin instructions:   7/15: 1.25 mg; Otherwise 1.25 mg every Mon; 2.5 mg all other days   Weekly warfarin total:   16.25 mg   Plan last modified:   Dian Astudillo RN (6/29/2020)   Next INR check:   7/29/2020   Priority:   Maintenance   Target end date:   Indefinite    Indications    Atrial fibrillation (H) [I48.91]  Long term current use of anticoagulant therapy [Z79.01]  Longstanding persistent atrial fibrillation (H) [I48.11]  Long term current use of anticoagulants with INR goal of 2.0-3.0 [Z79.01]             Anticoagulation Episode Summary     INR check location:   Anticoagulation Clinic    Preferred lab:       Send INR reminders to:   MICHAEL ABURTO    Comments:   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    Dian Astudillo RN

## 2020-07-17 ENCOUNTER — TELEPHONE (OUTPATIENT)
Dept: UROLOGY | Facility: CLINIC | Age: 85
End: 2020-07-17

## 2020-07-17 NOTE — TELEPHONE ENCOUNTER
Called [facility] Care Team Connect to try to find out what the problem is with getting Maia's supplies covered. These items have been covered in the past. The out-of-pocket cost would be $146.78 for private pay. Insurance is through [facility].   The  I spoke with was not able to find a reason for denial. She checked with her supervisor who also could see no reason for this. Their suggestion was for pt to start appeal.  Called Maia with this update. She reports her daughter-in law is helping with this and they will contact [facility] and let us know if they need anything from this clinic.  SAMSON Gale RN      [facility] Call Center    Phone Message    May a detailed message be left on voicemail: yes     Reason for Call: Other: pt stated her syringe order was denied by [email or web address], she would like a call back thanks     Action Taken: Message routed to:  [facility] urology    Travel Screening: Not Applicable

## 2020-07-17 NOTE — TELEPHONE ENCOUNTER
"Received phone call from Maia's daughter-in-law, Joana, re: the issue with irrigation supplies. She spoke with [facility] and they are asking for \"authorization\". Will re-fax the letter of medical necessity Dr. Maria wrote on 7/2/2020. Fax # is [phone]. Jaona will also call [employer] at [phone] because she's getting conflicting information from the supplier and the insurance company.  "

## 2020-07-20 DIAGNOSIS — R82.90 CLOUDY URINE: ICD-10-CM

## 2020-07-20 DIAGNOSIS — N39.0 URINARY TRACT INFECTION: ICD-10-CM

## 2020-07-20 LAB
ALBUMIN UR-MCNC: NEGATIVE MG/DL
APPEARANCE UR: CLEAR
BACTERIA #/AREA URNS HPF: ABNORMAL /HPF
BILIRUB UR QL STRIP: NEGATIVE
COLOR UR AUTO: YELLOW
GLUCOSE UR STRIP-MCNC: NEGATIVE MG/DL
HGB UR QL STRIP: ABNORMAL
KETONES UR STRIP-MCNC: NEGATIVE MG/DL
LEUKOCYTE ESTERASE UR QL STRIP: ABNORMAL
NITRATE UR QL: NEGATIVE
PH UR STRIP: 5.5 PH (ref 5–7)
RBC #/AREA URNS AUTO: ABNORMAL /HPF
SOURCE: ABNORMAL
SP GR UR STRIP: 1.01 (ref 1–1.03)
UROBILINOGEN UR STRIP-ACNC: 0.2 EU/DL (ref 0.2–1)
WBC #/AREA URNS AUTO: >100 /HPF

## 2020-07-20 PROCEDURE — 81001 URINALYSIS AUTO W/SCOPE: CPT | Performed by: UROLOGY

## 2020-07-20 PROCEDURE — 87086 URINE CULTURE/COLONY COUNT: CPT | Performed by: UROLOGY

## 2020-07-21 LAB
BACTERIA SPEC CULT: NORMAL
SPECIMEN SOURCE: NORMAL

## 2020-07-22 ENCOUNTER — TELEPHONE (OUTPATIENT)
Dept: UROLOGY | Facility: CLINIC | Age: 85
End: 2020-07-22

## 2020-07-22 NOTE — TELEPHONE ENCOUNTER
Urology pt of Dr. Maria has upcoming appt for cysto 8/12/20. Recent UA/UC done 7/20 was negative but Maia calls today reporting symptoms of cloudy urine, back ache and (L) side pain. Will forward this update to provider. In Basket sent.

## 2020-07-23 ENCOUNTER — TELEPHONE (OUTPATIENT)
Dept: UROLOGY | Facility: CLINIC | Age: 85
End: 2020-07-23

## 2020-07-23 NOTE — TELEPHONE ENCOUNTER
[facility] Call Center    Phone Message    May a detailed message be left on voicemail: yes     Reason for Call: Other: Pt's daughter in law Joana called and said that they received a notice of denial for medical coverage for the syringes the pt needs. The notice said for the pt to discuss with the provider to see what are the next steps. Please call them back to discuss. Thanks    Action Taken: Message routed to:  [facility] URO    Travel Screening: Not Applicable

## 2020-07-24 ENCOUNTER — DOCUMENTATION ONLY (OUTPATIENT)
Dept: UROLOGY | Facility: CLINIC | Age: 85
End: 2020-07-24

## 2020-07-24 ENCOUNTER — TELEPHONE (OUTPATIENT)
Dept: UROLOGY | Facility: CLINIC | Age: 85
End: 2020-07-24

## 2020-07-24 DIAGNOSIS — Z01.812 PRE-PROCEDURE LAB EXAM: ICD-10-CM

## 2020-07-24 DIAGNOSIS — N39.0 URINARY TRACT INFECTION: ICD-10-CM

## 2020-07-24 DIAGNOSIS — N39.0 RECURRENT URINARY TRACT INFECTION: Primary | ICD-10-CM

## 2020-07-24 NOTE — TELEPHONE ENCOUNTER
Called [employer] to see if they had heard back from [facility] re: the letter of medical necessity we faxed to [employer] on 7/17. Unfortunately, [facility] is still denying coverage. [employer] says that [facility] has recently changed it's policy re: appeals and they ([employer]) are no longer able to appeal on behalf of the pt. They tell me that [facility] will send a letter to the pt re: the process of appeal.   Called Joana with this update. Left message and requested she let us know, if there's anything we can provide to help support an appeal.    Joana called back. Will put together some documentation and mail it to her to use for appeal. She expressed understanding and agreement with this plan.  SAMSON Gale RN       [facility] Call [facility]    Phone Message    May a detailed message be left on voicemail: yes     Reason for Call: Other:      Joana is calling in again asking to speak with Bernadine- nurse.  Please call her back to discuss getting these syringes        Action Taken: Message routed to:  Other: [facility]    Travel Screening: Not Applicable

## 2020-07-24 NOTE — TELEPHONE ENCOUNTER
"Called Maia back with the response and direction as below. She expressed understanding. She reports urine is \"looking a little better this morning\". No fever or chills. Will place orders for UA/UC and Maia will get this done 8/3 or 8/4 in advance of her cysto on 8/12.  SAMSON Gale RN      ----- Message from Bernadine Maria MD sent at 7/23/2020 12:51 PM CDT -----  Regarding: RE: symptoms  Cloudy urine can be many things and not necessarily an infection.  Would recommend she push fluids and make sure she is catheterizing every 3-4 hours.    Back ache and left sided pain of unclear etiology, is she having any fevers or chills.  IF so then recommend she go somewhere to be seen.    Would make sure she has a repeat urine before her cystoscopy  _____________________________________________    ----- Message -----  From: Bernadine Gale RN  Sent: 7/22/2020   4:21 PM CDT  To: Bernadine Maria MD  Subject: symptoms                                         Hello,  Recent UA/UC done 7/20 was negative but Maia calls this afternoon reporting symptoms of cloudy urine, back ache and (L) side pain. Her cysto scheduled for 8/12.  Thank you,  Bernadine"

## 2020-07-24 NOTE — PROGRESS NOTES
Urology patient of Dr. Maria with recurrent urinary tract infections and urinary retention. As part of her treatment Maia is doing nightly bladder instillations of gentamicin solution to try to prevent urinary tract infections. She requires an irrigation syringe to accomplish this.   These instillations are an important part of her treatments to maintain her health and wellness. The out-of pocket cost of these syringes is prohibitive and we don't want the patient to resort to re-using sterile supplies. Please reconsider coverage of this needed supply for Maia.  Thank you for your time and attention.  Sincerely,  SAMSON Gale RN

## 2020-07-29 ENCOUNTER — ANTICOAGULATION THERAPY VISIT (OUTPATIENT)
Dept: NURSING | Facility: CLINIC | Age: 85
End: 2020-07-29

## 2020-07-29 DIAGNOSIS — I48.91 ATRIAL FIBRILLATION (H): ICD-10-CM

## 2020-07-29 DIAGNOSIS — Z79.01 LONG TERM CURRENT USE OF ANTICOAGULANT THERAPY: ICD-10-CM

## 2020-07-29 DIAGNOSIS — Z79.01 LONG TERM CURRENT USE OF ANTICOAGULANTS WITH INR GOAL OF 2.0-3.0: ICD-10-CM

## 2020-07-29 DIAGNOSIS — I48.11 LONGSTANDING PERSISTENT ATRIAL FIBRILLATION (H): ICD-10-CM

## 2020-07-29 LAB
CAPILLARY BLOOD COLLECTION: NORMAL
INR PPP: 2.7 (ref 0.86–1.14)

## 2020-07-29 PROCEDURE — 85610 PROTHROMBIN TIME: CPT | Performed by: NURSE PRACTITIONER

## 2020-07-29 PROCEDURE — 99207 ZZC NO CHARGE NURSE ONLY: CPT

## 2020-07-29 PROCEDURE — 36416 COLLJ CAPILLARY BLOOD SPEC: CPT | Performed by: NURSE PRACTITIONER

## 2020-07-29 NOTE — PROGRESS NOTES
ANTICOAGULATION FOLLOW-UP CLINIC VISIT    Patient Name:  Maia Miranda  Date:  2020  Contact Type:  Telephone    SUBJECTIVE:  Patient Findings     Positives:   Change in health    Comments:   Called patient to discuss today's INR results: The patient was assessed for diet, medication, and activity level changes, missed or extra doses, bruising or bleeding, with no problem findings. Patient's daughter, Bhargavi, moved in with her this past weekend. Patient has been having some symptoms of another UTI, but Urologist is not treating at this time. Patient continues to monitor and will do repeat UA on 20 in prep for cystoscopy on 20. Reviewed maintenance warfarin dosing with patient. Patient will remain on the same dose until next INR check. No other questions or concerns. Scheduled next lab-only INR in 3 weeks.  Dian ALLEN RN  Anticoagulation Clinic  [place]          Clinical Outcomes     Comments:   Called patient to discuss today's INR results: The patient was assessed for diet, medication, and activity level changes, missed or extra doses, bruising or bleeding, with no problem findings. Patient's daughter, Bhargavi, moved in with her this past weekend. Patient has been having some symptoms of another UTI, but Urologist is not treating at this time. Patient continues to monitor and will do repeat UA on 20 in prep for cystoscopy on 20. Reviewed maintenance warfarin dosing with patient. Patient will remain on the same dose until next INR check. No other questions or concerns. Scheduled next lab-only INR in 3 weeks.  Dian ALLEN RN  Anticoagulation Clinic  [place]             OBJECTIVE    Recent labs: (last 7 days)     20  1107   INR 2.70*       ASSESSMENT / PLAN  INR assessment THER    Recheck INR In: 3 WEEKS    INR Location Clinic      Anticoagulation Summary  As of 2020    INR goal:   2.0-3.0   TTR:   63.2 % (1 y)   INR used for dosin.70 (2020)   Warfarin maintenance  plan:   1.25 mg (2.5 mg x 0.5) every Mon; 2.5 mg (2.5 mg x 1) all other days   Full warfarin instructions:   1.25 mg every Mon; 2.5 mg all other days   Weekly warfarin total:   16.25 mg   No change documented:   Dian Astudillo RN   Plan last modified:   Dian Astudillo RN (6/29/2020)   Next INR check:   8/19/2020   Priority:   Maintenance   Target end date:   Indefinite    Indications    Atrial fibrillation (H) [I48.91]  Long term current use of anticoagulant therapy [Z79.01]  Longstanding persistent atrial fibrillation (H) [I48.11]  Long term current use of anticoagulants with INR goal of 2.0-3.0 [Z79.01]             Anticoagulation Episode Summary     INR check location:   Anticoagulation Clinic    Preferred lab:       Send INR reminders to:   MICHAEL ABURTO    Comments:   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

## 2020-08-04 DIAGNOSIS — Z01.812 PRE-PROCEDURE LAB EXAM: ICD-10-CM

## 2020-08-04 DIAGNOSIS — N39.0 RECURRENT URINARY TRACT INFECTION: ICD-10-CM

## 2020-08-04 DIAGNOSIS — N39.0 URINARY TRACT INFECTION: ICD-10-CM

## 2020-08-04 LAB
ALBUMIN UR-MCNC: ABNORMAL MG/DL
APPEARANCE UR: ABNORMAL
BACTERIA #/AREA URNS HPF: ABNORMAL /HPF
BILIRUB UR QL STRIP: NEGATIVE
COLOR UR AUTO: YELLOW
GLUCOSE UR STRIP-MCNC: NEGATIVE MG/DL
HGB UR QL STRIP: ABNORMAL
KETONES UR STRIP-MCNC: NEGATIVE MG/DL
LEUKOCYTE ESTERASE UR QL STRIP: ABNORMAL
NITRATE UR QL: POSITIVE
NON-SQ EPI CELLS #/AREA URNS LPF: ABNORMAL /LPF
PH UR STRIP: 5 PH (ref 5–7)
RBC #/AREA URNS AUTO: ABNORMAL /HPF
SOURCE: ABNORMAL
SP GR UR STRIP: 1.02 (ref 1–1.03)
UROBILINOGEN UR STRIP-ACNC: 0.2 EU/DL (ref 0.2–1)
WBC #/AREA URNS AUTO: >100 /HPF

## 2020-08-04 PROCEDURE — 87088 URINE BACTERIA CULTURE: CPT | Performed by: UROLOGY

## 2020-08-04 PROCEDURE — 81001 URINALYSIS AUTO W/SCOPE: CPT | Performed by: UROLOGY

## 2020-08-04 PROCEDURE — 87086 URINE CULTURE/COLONY COUNT: CPT | Performed by: UROLOGY

## 2020-08-04 PROCEDURE — 87186 SC STD MICRODIL/AGAR DIL: CPT | Performed by: UROLOGY

## 2020-08-06 ENCOUNTER — TELEPHONE (OUTPATIENT)
Dept: UROLOGY | Facility: CLINIC | Age: 85
End: 2020-08-06

## 2020-08-06 ENCOUNTER — TELEPHONE (OUTPATIENT)
Dept: NURSING | Facility: CLINIC | Age: 85
End: 2020-08-06

## 2020-08-06 DIAGNOSIS — Z79.01 LONG TERM CURRENT USE OF ANTICOAGULANTS WITH INR GOAL OF 2.0-3.0: ICD-10-CM

## 2020-08-06 DIAGNOSIS — I48.11 LONGSTANDING PERSISTENT ATRIAL FIBRILLATION (H): ICD-10-CM

## 2020-08-06 DIAGNOSIS — N39.0 URINARY TRACT INFECTION: Primary | ICD-10-CM

## 2020-08-06 DIAGNOSIS — I48.91 ATRIAL FIBRILLATION (H): ICD-10-CM

## 2020-08-06 DIAGNOSIS — Z79.01 LONG TERM CURRENT USE OF ANTICOAGULANT THERAPY: ICD-10-CM

## 2020-08-06 LAB
BACTERIA SPEC CULT: ABNORMAL
SPECIMEN SOURCE: ABNORMAL

## 2020-08-06 RX ORDER — CEPHALEXIN 500 MG/1
500 CAPSULE ORAL 2 TIMES DAILY
Qty: 14 CAPSULE | Refills: 0 | Status: SHIPPED | OUTPATIENT
Start: 2020-08-06 | End: 2020-11-19

## 2020-08-06 RX ORDER — CIPROFLOXACIN 500 MG/1
500 TABLET, FILM COATED ORAL 2 TIMES DAILY
Qty: 14 TABLET | Refills: 0 | Status: SHIPPED | OUTPATIENT
Start: 2020-08-06 | End: 2020-08-19

## 2020-08-06 NOTE — TELEPHONE ENCOUNTER
Spoke to patient Cipro has been cancelled at pharmacy and Keflex 500 mg sent in to be tale bid x 7 days . Pharmacy and patient informed. She has taken and tolerated Keflex in the past .Laura Ingram LPN

## 2020-08-06 NOTE — TELEPHONE ENCOUNTER
Joana is requesting additional information for the medical supply company  For supplies for her catheters and syringes . Maia has a long history of urinary retention  And has been doing self catherizations for years to empty her bladder. Maia has also had chronic UTI's . In June of 2019 she began a regime of self Gentamycin irrigations through her catheter into the bladder  To help reduce the number of bacterial infections (UTI's) This irrigation of the Gentamycin solution into the bladder has greatly reduced the frequency and  number of UTI's . She needs the catheters to self cath and drain her bladder she needs the syringes to instil the Gentamycin solution into her bladder. She  Needs to  Continue the self catherizations and Genatmycin  indefinitely . Please contact our office at [phone] if any additional documentation if needed .  DX code 788.20 and N39.0 .Laura Ingram LPN

## 2020-08-06 NOTE — TELEPHONE ENCOUNTER
ANTICOAGULATION  MANAGEMENT     Interacting Medication Review    Interacting medication(s): Ciprofloxacin (Cipro) with warfarin; however, patient was contacted by urology today, they informed her that Cipro has been cancelled at pharmacy and Keflex called in instead    Duration: 7 days (08/06/20 to 08/13/20)    New medication?: Yes, but no interaction with warfarin anticipated       PLAN     Continue current warfarin dose. Recommend to check INR on 08/10/20 since infection can cause rise in INR and INR was 2.7 on 07/29/20    Spoke with Maia    Anticoagulation Calendar updated    Sahara Matos RN

## 2020-08-10 ENCOUNTER — ANTICOAGULATION THERAPY VISIT (OUTPATIENT)
Dept: NURSING | Facility: CLINIC | Age: 85
End: 2020-08-10

## 2020-08-10 DIAGNOSIS — Z79.01 LONG TERM CURRENT USE OF ANTICOAGULANT THERAPY: ICD-10-CM

## 2020-08-10 DIAGNOSIS — I48.91 ATRIAL FIBRILLATION (H): ICD-10-CM

## 2020-08-10 DIAGNOSIS — I48.11 LONGSTANDING PERSISTENT ATRIAL FIBRILLATION (H): ICD-10-CM

## 2020-08-10 DIAGNOSIS — Z79.01 LONG TERM CURRENT USE OF ANTICOAGULANTS WITH INR GOAL OF 2.0-3.0: ICD-10-CM

## 2020-08-10 LAB
CAPILLARY BLOOD COLLECTION: NORMAL
INR PPP: 3.1 (ref 0.86–1.14)

## 2020-08-10 PROCEDURE — 85610 PROTHROMBIN TIME: CPT | Performed by: INTERNAL MEDICINE

## 2020-08-10 PROCEDURE — 36416 COLLJ CAPILLARY BLOOD SPEC: CPT | Performed by: INTERNAL MEDICINE

## 2020-08-10 PROCEDURE — 99207 ZZC NO CHARGE NURSE ONLY: CPT | Performed by: INTERNAL MEDICINE

## 2020-08-10 NOTE — PROGRESS NOTES
ANTICOAGULATION FOLLOW-UP CLINIC VISIT    Patient Name:  Maia Miranda  Date:  8/10/2020  Contact Type:  Telephone    SUBJECTIVE:  Patient Findings     Positives:   Change in health (UTI), Change in medications (cipro started on 8/6/2020)        Clinical Outcomes     Negatives:   Major bleeding event, Thromboembolic event, Anticoagulation-related hospital admission, Anticoagulation-related ED visit, Anticoagulation-related fatality           OBJECTIVE    Recent labs: (last 7 days)     08/10/20  1045   INR 3.10*       ASSESSMENT / PLAN  INR assessment SUPRA    Recheck INR In: 1 WEEK    INR Location Clinic      Anticoagulation Summary  As of 8/10/2020    INR goal:   2.0-3.0   TTR:   62.4 % (1 y)   INR used for dosing:   3.10! (8/10/2020)   Warfarin maintenance plan:   1.25 mg (2.5 mg x 0.5) every Mon; 2.5 mg (2.5 mg x 1) all other days   Full warfarin instructions:   1.25 mg every Mon; 2.5 mg all other days   Weekly warfarin total:   16.25 mg   No change documented:   Krystyna Padilla RN   Plan last modified:   Dian Astudillo RN (6/29/2020)   Next INR check:   8/17/2020   Priority:   Maintenance   Target end date:   Indefinite    Indications    Atrial fibrillation (H) [I48.91]  Long term current use of anticoagulant therapy [Z79.01]  Longstanding persistent atrial fibrillation (H) [I48.11]  Long term current use of anticoagulants with INR goal of 2.0-3.0 [Z79.01]             Anticoagulation Episode Summary     INR check location:   Anticoagulation Clinic    Preferred lab:       Send INR reminders to:   MICHAEL ABURTO    Comments:   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 Continued normal dosing with recheck in one week per protocol. Patient has 2 days left of antibiotics.    Patient states negative for signs and symptoms of bleeding or blood clots, changes in medication, changes in diet, anything new OTC or herbal medications, any missed or extra doses of the warfarin.    Patient informed of the symptoms to be seen for either by INR nurse or ER via [facility]    Patient ask if refill of warfarin is needed. Rx sent no      Krystyna Padilla RN

## 2020-08-12 ENCOUNTER — OFFICE VISIT (OUTPATIENT)
Dept: UROLOGY | Facility: CLINIC | Age: 85
End: 2020-08-12
Payer: COMMERCIAL

## 2020-08-12 VITALS
WEIGHT: 146 LBS | HEIGHT: 65 IN | HEART RATE: 60 BPM | OXYGEN SATURATION: 91 % | DIASTOLIC BLOOD PRESSURE: 80 MMHG | BODY MASS INDEX: 24.32 KG/M2 | SYSTOLIC BLOOD PRESSURE: 128 MMHG

## 2020-08-12 DIAGNOSIS — Z79.01 LONG TERM CURRENT USE OF ANTICOAGULANTS WITH INR GOAL OF 2.0-3.0: ICD-10-CM

## 2020-08-12 DIAGNOSIS — N39.0 RECURRENT UTI: Primary | ICD-10-CM

## 2020-08-12 DIAGNOSIS — R33.9 URINARY RETENTION WITH INCOMPLETE BLADDER EMPTYING: ICD-10-CM

## 2020-08-12 DIAGNOSIS — I48.91 ATRIAL FIBRILLATION, UNSPECIFIED TYPE (H): ICD-10-CM

## 2020-08-12 LAB
ALBUMIN UR-MCNC: NEGATIVE MG/DL
APPEARANCE UR: CLEAR
BILIRUB UR QL STRIP: NEGATIVE
COLOR UR AUTO: YELLOW
GLUCOSE UR STRIP-MCNC: NEGATIVE MG/DL
HGB UR QL STRIP: NEGATIVE
KETONES UR STRIP-MCNC: NEGATIVE MG/DL
LEUKOCYTE ESTERASE UR QL STRIP: NEGATIVE
NITRATE UR QL: NEGATIVE
PH UR STRIP: 5.5 PH (ref 5–7)
SOURCE: NORMAL
SP GR UR STRIP: 1.02 (ref 1–1.03)
UROBILINOGEN UR STRIP-ACNC: 0.2 EU/DL (ref 0.2–1)

## 2020-08-12 PROCEDURE — 52000 CYSTOURETHROSCOPY: CPT | Performed by: UROLOGY

## 2020-08-12 PROCEDURE — 81003 URINALYSIS AUTO W/O SCOPE: CPT | Performed by: UROLOGY

## 2020-08-12 ASSESSMENT — PAIN SCALES - GENERAL: PAINLEVEL: NO PAIN (0)

## 2020-08-12 ASSESSMENT — MIFFLIN-ST. JEOR: SCORE: 1098.13

## 2020-08-12 NOTE — LETTER
"8/12/2020       RE: Maia Miranda  [address] [address] [facility] [address]     Dear Colleague,    Thank you for referring your patient, Maia Miranda, to the [facility] [place] at [facility]. Please see a copy of my visit note below.    August 12, 2020    Return visit    Patient returns today for follow up of her recurrent UTI.  She is currently on antibiotics for another UTI.  She catheterizes 4 times a day without difficulty.  She has been has using the gentamicin irrigations.  Only using the estrogen cream 1-2x a week. She denies any changes in her health since last visit.  Daughter will be moving in with patient    /80 (BP Location: Right arm, Patient Position: Sitting, Cuff Size: Adult Regular)   Pulse 60   Ht 1.651 m (5' 5\")   Wt 66.2 kg (146 lb)   SpO2 91%   BMI 24.30 kg/m    She is comfortable, in no distress, non-labored breathing.  Abdomen is soft, non-tender, non-distended.  Normal external female genitalia.  Negative CST.  Pelvic exam is remarkable for some atrophic tissues    Cystoscopy Note: After informed consent was obtained patient was prepped and draped in the standard fashion.  The flexible cystoscope was inserted into a normal appearing urethral meatus.  The urine media was clear.  The urothelium was carefully examined and there was some trabeculation but there were no tumors, masses, stones, foreign bodies, or other urothelial abnormalities noted.  Bilateral ureteral orifices were noted in the normal orthotopic position. The cystoscope was retroflexed and the bladder neck was unremarkable.  The urethra was carefully examined upon removing the cystoscope and was unremarkable.  Patient tolerated the procedure without complications noted.      A/P: 87 year old F with urinary retention with incomplete bladder emptying, Tino, afib on chronic anticoagulation    At this time we discussed that she needs to be " catheterizing at least 6 x a day, continuing to use single use catheters.  Recommend that she increase the estrogen to 3x a week.  We did discuss estring but it is not covered by her insurance and is currently cost prohibitive.    Continue gentamicin irrigations    She does not want to be on daily antibiotics if not needed but if continues to have these frequent UTIs will need to consider a course of prophylactic antibiotics    She will continue to let us know if she thinks she has a UTI so she can come to submit a microscopic urinalysis and culture.     Bernadine Maria MD MPH   of Urology    CC  Patient Care Team:  Bernadine Maria MD as PCP - General (Urology)  Bernadine Maria MD as MD (Urology)  Rosa Cruz, RN as Registered Nurse (Urology)  Kb Tracy MD as MD (Urology)  Li Flores Mai, MD as Assigned PCP

## 2020-08-12 NOTE — NURSING NOTE
Chief Complaint   Patient presents with     Cystoscopy     patient is here for cysto due to UTI.        Patient did not have med list with her. All meds are the same per patient. She does have one new one,but is unsure the name of it.     Prior to the start of the procedure and with procedural staff participation, I verbally confirmed the patient s identity using two indicators, relevant allergies, that the procedure was appropriate and matched the consent or emergent situation, and that the correct equipment/implants were available. Immediately prior to starting the procedure I conducted the Time Out with the procedural staff and re-confirmed the patient s name, procedure, and site/side. I have wiped the patient off with the povidone-Iodine solution, draped them,  used Lidocaine hydrochloride jelly, and instilled sterile water into the bladder. (The Joint Commission universal protocol was followed.)  Yes    Sedation (Moderate or Deep): None    Vero Nichols, CMA

## 2020-08-12 NOTE — PATIENT INSTRUCTIONS
"Websites with free information:    [employer] patient website: www.voicesforpfd.org    Total Control Program: www.totalcontrolprogram.com    Supplements to prevent UTI to consider  -probiotics  -cranberry   Ellura: www.myellura.com   Theracran HP by Se[facility]al [facility] [id number]  -d-mannose  -Vitamin C 500-1000mg twice a day    CATHETERIZE AT LEAST 6X A DAY    USE THE ESTROGEN CREAM 3X A WEEK AT NIGHT    CONTINUE THE GENTAMICIN IRRIGATIONS    We will hold off on any daily prophylactic antibiotics for now but may need to consider if you continue to have urinary tract infections    Follow up in 3- 4months, can be a virtual visit    It was a pleasure meeting with you today.  Thank you for allowing me and my team the privilege of caring for you today.  YOU are the reason we are here, and I truly hope we provided you with the excellent service you deserve.  Please let us know if there is anything else we can do for you so that we can be sure you are leaving completely satisfied with your care experience.    AFTER YOUR CYSTOSCOPY  ?  ?  You have just completed a cystoscopy, or \"cysto\", which allowed your physician to learn more about your bladder (or to remove a stent placed after surgery). We suggest that you continue to avoid caffeine, fruit juice, and alcohol for the next 24 hours, however, you are encouraged to return to your normal activities.  ?  ?  A few things that are considered normal after your cystoscopy:  ?  * small amount of bleeding (or spotting) that clears within the next 24 hours  ?  * slight burning sensation with urination  ?  * sensation of needing to void (urinate) more frequently  ?  * the feeling of \"air\" in your urine  ?  * mild discomfort that is relieved with Tylenol    * bladder spasms  ?  ?  ?  Please contact our office promptly if you:  ?  * develop a fever above 101 degrees  ?  * are unable to urinate  ?  * develop bright red blood that does not stop  ?  * experience severe pain " or swelling  ?  ?  ?  And of course, please contact our office with any concerns or questions [phone]  ?

## 2020-08-12 NOTE — PROGRESS NOTES
"August 12, 2020    Return visit    Patient returns today for follow up of her recurrent UTI.  She is currently on antibiotics for another UTI.  She catheterizes 4 times a day without difficulty.  She has been has using the gentamicin irrigations.  Only using the estrogen cream 1-2x a week. She denies any changes in her health since last visit.  Daughter will be moving in with patient    /80 (BP Location: Right arm, Patient Position: Sitting, Cuff Size: Adult Regular)   Pulse 60   Ht 1.651 m (5' 5\")   Wt 66.2 kg (146 lb)   SpO2 91%   BMI 24.30 kg/m    She is comfortable, in no distress, non-labored breathing.  Abdomen is soft, non-tender, non-distended.  Normal external female genitalia.  Negative CST.  Pelvic exam is remarkable for some atrophic tissues    Cystoscopy Note: After informed consent was obtained patient was prepped and draped in the standard fashion.  The flexible cystoscope was inserted into a normal appearing urethral meatus.  The urine media was clear.  The urothelium was carefully examined and there was some trabeculation but there were no tumors, masses, stones, foreign bodies, or other urothelial abnormalities noted.  Bilateral ureteral orifices were noted in the normal orthotopic position. The cystoscope was retroflexed and the bladder neck was unremarkable.  The urethra was carefully examined upon removing the cystoscope and was unremarkable.  Patient tolerated the procedure without complications noted.      A/P: 87 year old F with urinary retention with incomplete bladder emptying, Tino, afib on chronic anticoagulation    At this time we discussed that she needs to be catheterizing at least 6 x a day, continuing to use single use catheters.  Recommend that she increase the estrogen to 3x a week.  We did discuss estring but it is not covered by her insurance and is currently cost prohibitive.    Continue gentamicin irrigations    She does not want to be on daily antibiotics if not " needed but if continues to have these frequent UTIs will need to consider a course of prophylactic antibiotics    She will continue to let us know if she thinks she has a UTI so she can come to submit a microscopic urinalysis and culture.     Bernadine Maria MD MPH   of Urology    CC  Patient Care Team:  Bernadine Maria MD as PCP - General (Urology)  Bernadine Maria MD as MD (Urology)  Rosa Cruz, RN as Registered Nurse (Urology)  Kb Tracy MD as MD (Urology)  Li Flores Mai, MD as Assigned PCP

## 2020-08-14 ENCOUNTER — TELEPHONE (OUTPATIENT)
Dept: UROLOGY | Facility: CLINIC | Age: 85
End: 2020-08-14

## 2020-08-14 DIAGNOSIS — R10.9 ABDOMINAL DISCOMFORT: ICD-10-CM

## 2020-08-14 DIAGNOSIS — N39.0 URINARY TRACT INFECTION: ICD-10-CM

## 2020-08-14 DIAGNOSIS — R68.83 CHILLS: ICD-10-CM

## 2020-08-14 DIAGNOSIS — R82.90 CLOUDY URINE: Primary | ICD-10-CM

## 2020-08-14 DIAGNOSIS — R82.90 CLOUDY URINE: ICD-10-CM

## 2020-08-14 NOTE — TELEPHONE ENCOUNTER
Urology pt of Dr. Maria last seen 8/12/20. Call from Maia this afternoon reporting as of this morning she's experiencing cloudy urine, bilateral abdominal discomfort, and chills. Orders placed for UA/UC. She'll get this done today at her clinic in [place]. Will update provider. In Basket message sent.

## 2020-08-15 DIAGNOSIS — R10.9 ABDOMINAL DISCOMFORT: ICD-10-CM

## 2020-08-15 DIAGNOSIS — N39.0 URINARY TRACT INFECTION: ICD-10-CM

## 2020-08-15 DIAGNOSIS — R68.83 CHILLS: ICD-10-CM

## 2020-08-15 DIAGNOSIS — R82.90 CLOUDY URINE: ICD-10-CM

## 2020-08-15 LAB
ALBUMIN UR-MCNC: NEGATIVE MG/DL
APPEARANCE UR: ABNORMAL
BACTERIA #/AREA URNS HPF: ABNORMAL /HPF
BILIRUB UR QL STRIP: NEGATIVE
COLOR UR AUTO: YELLOW
GLUCOSE UR STRIP-MCNC: NEGATIVE MG/DL
HGB UR QL STRIP: ABNORMAL
KETONES UR STRIP-MCNC: NEGATIVE MG/DL
LEUKOCYTE ESTERASE UR QL STRIP: ABNORMAL
NITRATE UR QL: POSITIVE
NON-SQ EPI CELLS #/AREA URNS LPF: ABNORMAL /LPF
PH UR STRIP: 5.5 PH (ref 5–7)
RBC #/AREA URNS AUTO: ABNORMAL /HPF
SOURCE: ABNORMAL
SP GR UR STRIP: 1.01 (ref 1–1.03)
UROBILINOGEN UR STRIP-ACNC: 0.2 EU/DL (ref 0.2–1)
WBC #/AREA URNS AUTO: >100 /HPF

## 2020-08-15 PROCEDURE — 87186 SC STD MICRODIL/AGAR DIL: CPT | Performed by: UROLOGY

## 2020-08-15 PROCEDURE — 81001 URINALYSIS AUTO W/SCOPE: CPT | Performed by: UROLOGY

## 2020-08-15 PROCEDURE — 87086 URINE CULTURE/COLONY COUNT: CPT | Performed by: UROLOGY

## 2020-08-15 PROCEDURE — 87088 URINE BACTERIA CULTURE: CPT | Performed by: UROLOGY

## 2020-08-17 ENCOUNTER — TELEPHONE (OUTPATIENT)
Dept: UROLOGY | Facility: CLINIC | Age: 85
End: 2020-08-17

## 2020-08-17 LAB
BACTERIA SPEC CULT: ABNORMAL
SPECIMEN SOURCE: ABNORMAL

## 2020-08-17 NOTE — TELEPHONE ENCOUNTER
Culture not finished yet culture is positive but sensitivities are not done Kallie Bell LPN Staff Nurse

## 2020-08-17 NOTE — TELEPHONE ENCOUNTER
[facility] Call Center    Phone Message    May a detailed message be left on voicemail: yes     Reason for Call: Requesting Results   Name/type of test: Urine Microscopic      Date of test: 8/15  Was test done at a location other than [facility] (Please fill in the location if not [facility])?: No      Action Taken: Message routed to:  [facility] ([facility]): Pt would like a call back to discuss results of lab test.    Travel Screening: Not Applicable

## 2020-08-18 ENCOUNTER — TELEPHONE (OUTPATIENT)
Dept: UROLOGY | Facility: CLINIC | Age: 85
End: 2020-08-18

## 2020-08-18 DIAGNOSIS — Z79.2 PROPHYLACTIC ANTIBIOTIC: ICD-10-CM

## 2020-08-18 DIAGNOSIS — N39.0 URINARY TRACT INFECTION: Primary | ICD-10-CM

## 2020-08-18 RX ORDER — CEPHALEXIN 500 MG/1
500 CAPSULE ORAL 2 TIMES DAILY
Qty: 10 CAPSULE | Refills: 0 | Status: SHIPPED | OUTPATIENT
Start: 2020-08-18 | End: 2020-11-19

## 2020-08-18 NOTE — TELEPHONE ENCOUNTER
Spoke to patient 2 prescriptions sent in with direction that were reviewed with her .Laura Ingram LPN

## 2020-08-18 NOTE — TELEPHONE ENCOUNTER
[facility] Call Center    Phone Message    May a detailed message be left on voicemail: yes     Reason for Call: Other: Pt and her mother calling in, during her last visit with Dr Maria she was informed a medication was to be prescribed and sent to her pharmacy, however the pharmacy has not recieved an RX. Please call the pt back to discuss further as they do know know what the medication was. Thank you!     Action Taken: Message routed to:  [facility] ([facility]): UA URO    Travel Screening: Not Applicable

## 2020-08-19 ENCOUNTER — ANTICOAGULATION THERAPY VISIT (OUTPATIENT)
Dept: NURSING | Facility: CLINIC | Age: 85
End: 2020-08-19

## 2020-08-19 DIAGNOSIS — Z79.01 LONG TERM CURRENT USE OF ANTICOAGULANTS WITH INR GOAL OF 2.0-3.0: ICD-10-CM

## 2020-08-19 DIAGNOSIS — I48.91 ATRIAL FIBRILLATION (H): ICD-10-CM

## 2020-08-19 DIAGNOSIS — I48.11 LONGSTANDING PERSISTENT ATRIAL FIBRILLATION (H): ICD-10-CM

## 2020-08-19 DIAGNOSIS — Z79.01 LONG TERM CURRENT USE OF ANTICOAGULANT THERAPY: ICD-10-CM

## 2020-08-19 LAB
CAPILLARY BLOOD COLLECTION: NORMAL
INR PPP: 3.1 (ref 0.86–1.14)

## 2020-08-19 PROCEDURE — 85610 PROTHROMBIN TIME: CPT | Performed by: INTERNAL MEDICINE

## 2020-08-19 PROCEDURE — 36416 COLLJ CAPILLARY BLOOD SPEC: CPT | Performed by: INTERNAL MEDICINE

## 2020-08-19 PROCEDURE — 99207 ZZC NO CHARGE NURSE ONLY: CPT

## 2020-08-19 RX ORDER — WARFARIN SODIUM 2.5 MG/1
TABLET ORAL
Qty: 90 TABLET | Refills: 0
Start: 2020-08-19 | End: 2020-11-19

## 2020-08-19 NOTE — PROGRESS NOTES
"ANTICOAGULATION FOLLOW-UP CLINIC VISIT    Patient Name:  Maia Miranda  Date:  8/19/2020  Contact Type:  Telephone    SUBJECTIVE:  Patient Findings     Positives:   Change in health (UTI), Change in medications (Keflex - see below)    Comments:   Per chart review: Patient had cystoscopy 8/12/20. Then another UTI 8/17/20. Patient prescribed Keflex, 500 mg BID x5 d, then she should start Keflex, 250 mg daily x3 months, should have started yesterday.  Called patient to discuss today's INR results: When asked how she is doing, the patient stated \"not good\". She is not happy that she will be on the antibiotics for the next 3 months, but hopes this takes care of things. The patient was assessed for diet, activity level changes, missed or extra doses, bruising or bleeding, with no problem findings. Per protocol, for recent INR outside goal AND ongoing factors, will decrease patient's maintenance dosing by about 7% and recheck INR in another 2 weeks. Patient repeated dosing instructions back hesitantly and incorrectly at first. Reviewed instructions again and patient stated understanding. Will send a hard copy of AVS through the mail to reinforce instructions.   Dian ALLEN RN  Anticoagulation Clinic  [place]              Clinical Outcomes     Comments:   Per chart review: Patient had cystoscopy 8/12/20. Then another UTI 8/17/20. Patient prescribed Keflex, 500 mg BID x5 d, then she should start Keflex, 250 mg daily x3 months, should have started yesterday.  Called patient to discuss today's INR results: When asked how she is doing, the patient stated \"not good\". She is not happy that she will be on the antibiotics for the next 3 months, but hopes this takes care of things. The patient was assessed for diet, activity level changes, missed or extra doses, bruising or bleeding, with no problem findings. Per protocol, for recent INR outside goal AND ongoing factors, will decrease patient's maintenance dosing by about 7% " and recheck INR in another 2 weeks. Patient repeated dosing instructions back hesitantly and incorrectly at first. Reviewed instructions again and patient stated understanding. Will send a hard copy of AVS through the mail to reinforce instructions.   Dian ALLEN RN  Anticoagulation Clinic  [place]                 OBJECTIVE    Recent labs: (last 7 days)     08/19/20  1049   INR 3.10*       ASSESSMENT / PLAN  INR assessment SUPRA    Recheck INR In: 2 WEEKS    INR Location Clinic      Anticoagulation Summary  As of 8/19/2020    INR goal:   2.0-3.0   TTR:   59.9 % (1 y)   INR used for dosing:   3.10! (8/19/2020)   Warfarin maintenance plan:   1.25 mg (2.5 mg x 0.5) every Mon, Fri; 2.5 mg (2.5 mg x 1) all other days   Full warfarin instructions:   1.25 mg every Mon, Fri; 2.5 mg all other days   Weekly warfarin total:   15 mg   Plan last modified:   Dian Astudillo RN (8/19/2020)   Next INR check:   9/1/2020   Priority:   High   Target end date:   Indefinite    Indications    Atrial fibrillation (H) [I48.91]  Long term current use of anticoagulant therapy [Z79.01]  Longstanding persistent atrial fibrillation (H) [I48.11]  Long term current use of anticoagulants with INR goal of 2.0-3.0 [Z79.01]             Anticoagulation Episode Summary     INR check location:   Anticoagulation Clinic    Preferred lab:       Send INR reminders to:   MICHAEL ABURTO    Comments:   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    Dian Astudillo,  RN

## 2020-09-01 ENCOUNTER — ANTICOAGULATION THERAPY VISIT (OUTPATIENT)
Dept: UROLOGY | Facility: CLINIC | Age: 85
End: 2020-09-01

## 2020-09-01 ENCOUNTER — ALLIED HEALTH/NURSE VISIT (OUTPATIENT)
Dept: UROLOGY | Facility: CLINIC | Age: 85
End: 2020-09-01

## 2020-09-01 VITALS — SYSTOLIC BLOOD PRESSURE: 130 MMHG | DIASTOLIC BLOOD PRESSURE: 62 MMHG

## 2020-09-01 DIAGNOSIS — Z01.30 BP CHECK: Primary | ICD-10-CM

## 2020-09-01 DIAGNOSIS — I48.11 LONGSTANDING PERSISTENT ATRIAL FIBRILLATION (H): ICD-10-CM

## 2020-09-01 DIAGNOSIS — I48.91 ATRIAL FIBRILLATION (H): ICD-10-CM

## 2020-09-01 DIAGNOSIS — Z79.01 LONG TERM CURRENT USE OF ANTICOAGULANTS WITH INR GOAL OF 2.0-3.0: ICD-10-CM

## 2020-09-01 DIAGNOSIS — Z79.01 LONG TERM CURRENT USE OF ANTICOAGULANT THERAPY: ICD-10-CM

## 2020-09-01 LAB
CAPILLARY BLOOD COLLECTION: NORMAL
INR PPP: 3 (ref 0.86–1.14)

## 2020-09-01 PROCEDURE — 36416 COLLJ CAPILLARY BLOOD SPEC: CPT | Performed by: INTERNAL MEDICINE

## 2020-09-01 PROCEDURE — 85610 PROTHROMBIN TIME: CPT | Performed by: INTERNAL MEDICINE

## 2020-09-01 NOTE — PROGRESS NOTES
ANTICOAGULATION FOLLOW-UP CLINIC VISIT    Patient Name:  Maia Miranda  Date:  9/1/2020  Contact Type:  Telephone/ Maia    SUBJECTIVE:  Patient Findings     Positives:   Change in medications    Comments:   Patient continues to be on antibiotic therapy. Notes improvement in her sxs.         Clinical Outcomes     Negatives:   Major bleeding event, Thromboembolic event, Anticoagulation-related hospital admission, Anticoagulation-related ED visit, Anticoagulation-related fatality    Comments:   Patient continues to be on antibiotic therapy. Notes improvement in her sxs.            OBJECTIVE    Recent labs: (last 7 days)     09/01/20  1034   INR 3.00*       ASSESSMENT / PLAN  INR assessment THER    Recheck INR In: 2 WEEKS    INR Location Clinic      Anticoagulation Summary  As of 9/1/2020    INR goal:   2.0-3.0   TTR:   56.4 % (1 y)   INR used for dosing:   3.00 (9/1/2020)   Warfarin maintenance plan:   1.25 mg (2.5 mg x 0.5) every Mon, Fri; 2.5 mg (2.5 mg x 1) all other days   Full warfarin instructions:   1.25 mg every Mon, Fri; 2.5 mg all other days   Weekly warfarin total:   15 mg   No change documented:   Lance Weller RN   Plan last modified:   Dian Astudillo RN (8/19/2020)   Next INR check:   9/15/2020   Priority:   High   Target end date:   Indefinite    Indications    Atrial fibrillation (H) [I48.91]  Long term current use of anticoagulant therapy [Z79.01]  Longstanding persistent atrial fibrillation (H) [I48.11]  Long term current use of anticoagulants with INR goal of 2.0-3.0 [Z79.01]             Anticoagulation Episode Summary     INR check location:   Anticoagulation Clinic    Preferred lab:       Send INR reminders to:   MICHAEL ABURTO    Comments:   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  Patient will continue to take 15 mg weekly dosing and follow up in 2 weeks or sooner for any problems or concerns.        Lance Weller RN

## 2020-09-01 NOTE — PROGRESS NOTES
Maia Miranda was evaluated at [facility] on September 1, 2020 at which time her blood pressure was:    BP Readings from Last 3 Encounters:   09/01/20 130/62   08/12/20 128/80   03/04/20 138/70     Pulse Readings from Last 3 Encounters:   08/12/20 60   12/22/19 57   12/20/19 54       Reviewed lifestyle modifications for blood pressure control and reduction: including making healthy food choices, managing weight, getting regular exercise, smoking cessation, reducing alcohol consumption, monitoring blood pressure regularly.     Symptoms: None    BP Goal:< 140/90 mmHg    BP Assessment:  BP at goal    Potential Reasons for BP too high: NA - Not applicable    BP Follow-Up Plan: Recheck BP in 6 months at pharmacy    Recommendation to Provider: none    Note completed by: Daniele Coles    Thank You   [facility]

## 2020-09-15 ENCOUNTER — ALLIED HEALTH/NURSE VISIT (OUTPATIENT)
Dept: UROLOGY | Facility: CLINIC | Age: 85
End: 2020-09-15

## 2020-09-15 ENCOUNTER — ANTICOAGULATION THERAPY VISIT (OUTPATIENT)
Dept: NURSING | Facility: CLINIC | Age: 85
End: 2020-09-15

## 2020-09-15 VITALS — DIASTOLIC BLOOD PRESSURE: 54 MMHG | SYSTOLIC BLOOD PRESSURE: 126 MMHG

## 2020-09-15 DIAGNOSIS — Z79.01 LONG TERM CURRENT USE OF ANTICOAGULANT THERAPY: ICD-10-CM

## 2020-09-15 DIAGNOSIS — I10 HYPERTENSION GOAL BP (BLOOD PRESSURE) < 150/90: Primary | ICD-10-CM

## 2020-09-15 DIAGNOSIS — Z79.01 LONG TERM CURRENT USE OF ANTICOAGULANTS WITH INR GOAL OF 2.0-3.0: ICD-10-CM

## 2020-09-15 DIAGNOSIS — I48.11 LONGSTANDING PERSISTENT ATRIAL FIBRILLATION (H): ICD-10-CM

## 2020-09-15 DIAGNOSIS — I48.91 ATRIAL FIBRILLATION (H): ICD-10-CM

## 2020-09-15 LAB
CAPILLARY BLOOD COLLECTION: NORMAL
INR PPP: 2 (ref 0.86–1.14)

## 2020-09-15 PROCEDURE — 99207 ZZC NO CHARGE NURSE ONLY: CPT

## 2020-09-15 PROCEDURE — 36416 COLLJ CAPILLARY BLOOD SPEC: CPT | Performed by: INTERNAL MEDICINE

## 2020-09-15 PROCEDURE — 85610 PROTHROMBIN TIME: CPT | Performed by: INTERNAL MEDICINE

## 2020-09-15 NOTE — PROGRESS NOTES
ANTICOAGULATION FOLLOW-UP CLINIC VISIT    Patient Name:  Maia Miranda  Date:  9/15/2020  Contact Type:  Telephone    SUBJECTIVE:  Patient Findings     Comments:   Called patient to discuss today's INR results: The patient was assessed for diet, medication, and activity level changes, missed or extra doses, bruising or bleeding, with no problem findings. Maia continues on three months of daily Keflex, prescribed 20. She notes her symptoms of UTI are improved. Reviewed maintenance warfarin dosing with patient. Patient will remain on the same dose until next INR check. No other questions or concerns. Scheduled next lab-only INR in 4 weeks.  Dian ALLEN RN  Anticoagulation Clinic  [place]          Clinical Outcomes     Negatives:   Major bleeding event, Thromboembolic event, Anticoagulation-related hospital admission, Anticoagulation-related ED visit, Anticoagulation-related fatality    Comments:   Called patient to discuss today's INR results: The patient was assessed for diet, medication, and activity level changes, missed or extra doses, bruising or bleeding, with no problem findings. Maia continues on three months of daily Keflex, prescribed 20. She notes her symptoms of UTI are improved. Reviewed maintenance warfarin dosing with patient. Patient will remain on the same dose until next INR check. No other questions or concerns. Scheduled next lab-only INR in 4 weeks.  Dian ALLEN RN  Anticoagulation Clinic  [place]             OBJECTIVE    Recent labs: (last 7 days)     09/15/20  1114   INR 2.00*       ASSESSMENT / PLAN  INR assessment THER    Recheck INR In: 4 WEEKS    INR Location Clinic      Anticoagulation Summary  As of 9/15/2020    INR goal:   2.0-3.0   TTR:   56.4 % (1 y)   INR used for dosin.00 (9/15/2020)   Warfarin maintenance plan:   1.25 mg (2.5 mg x 0.5) every Mon, Fri; 2.5 mg (2.5 mg x 1) all other days   Full warfarin instructions:   1.25 mg every Mon, Fri; 2.5 mg all other  days   Weekly warfarin total:   15 mg   No change documented:   Dian Astudillo RN   Plan last modified:   Dian Astudillo RN (8/19/2020)   Next INR check:   10/13/2020   Priority:   Maintenance   Target end date:   Indefinite    Indications    Atrial fibrillation (H) [I48.91]  Long term current use of anticoagulant therapy [Z79.01]  Longstanding persistent atrial fibrillation (H) [I48.11]  Long term current use of anticoagulants with INR goal of 2.0-3.0 [Z79.01]             Anticoagulation Episode Summary     INR check location:   Anticoagulation Clinic    Preferred lab:       Send INR reminders to:   MICHAEL ABURTO    Comments:   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

## 2020-09-15 NOTE — PROGRESS NOTES
Maia Miranda was evaluated at [facility] on September 15, 2020 at which time her blood pressure was:    BP Readings from Last 3 Encounters:   09/15/20 126/54   09/01/20 130/62   08/12/20 128/80     Pulse Readings from Last 3 Encounters:   08/12/20 60   12/22/19 57   12/20/19 54       Reviewed lifestyle modifications for blood pressure control and reduction: including making healthy food choices, managing weight, getting regular exercise, smoking cessation, reducing alcohol consumption, monitoring blood pressure regularly.     Symptoms: None    BP Goal:< 140/90 mmHg    BP Assessment:  BP at goal    Potential Reasons for BP too high: NA - Not applicable    BP Follow-Up Plan: Recheck BP in 6 months at pharmacy    Recommendation to Provider: recheck in 6 months    Note completed by: Steph Lim, PharmD  [facility]

## 2020-09-21 ENCOUNTER — TELEPHONE (OUTPATIENT)
Dept: UROLOGY | Facility: CLINIC | Age: 85
End: 2020-09-21

## 2020-09-21 DIAGNOSIS — R82.90 CLOUDY URINE: ICD-10-CM

## 2020-09-21 DIAGNOSIS — R35.0 URINARY FREQUENCY: Primary | ICD-10-CM

## 2020-09-21 NOTE — TELEPHONE ENCOUNTER
[facility] Call Center    Phone Message    May a detailed message be left on voicemail: yes     Reason for Call: Symptoms or Concerns     If patient has red-flag symptoms, warm transfer to triage line    Current symptom or concern: Cloudy urine, sides are sore, and frequent urination    Symptoms have been present for:  1 day(s)    Has patient previously been seen for this? No    Are there any new or worsening symptoms? Yes: Maia would like to get a call back from her care team to see what she should do about this recent bladder infection she has developed. Maia is anxious and would like a call today (9/21/20). Please give Maia a call back at your earliest convenience.      Action Taken: Message routed to:  Other: UA Uro    Travel Screening: Not Applicable

## 2020-09-22 DIAGNOSIS — R35.0 URINARY FREQUENCY: ICD-10-CM

## 2020-09-22 DIAGNOSIS — R82.90 CLOUDY URINE: ICD-10-CM

## 2020-09-22 LAB
ALBUMIN UR-MCNC: NEGATIVE MG/DL
APPEARANCE UR: CLEAR
BILIRUB UR QL STRIP: NEGATIVE
COLOR UR AUTO: YELLOW
GLUCOSE UR STRIP-MCNC: NEGATIVE MG/DL
HGB UR QL STRIP: ABNORMAL
KETONES UR STRIP-MCNC: NEGATIVE MG/DL
LEUKOCYTE ESTERASE UR QL STRIP: ABNORMAL
NITRATE UR QL: NEGATIVE
PH UR STRIP: 5.5 PH (ref 5–7)
SOURCE: ABNORMAL
SP GR UR STRIP: 1.01 (ref 1–1.03)
UROBILINOGEN UR STRIP-ACNC: 0.2 EU/DL (ref 0.2–1)

## 2020-09-22 PROCEDURE — 81003 URINALYSIS AUTO W/O SCOPE: CPT | Performed by: UROLOGY

## 2020-09-22 PROCEDURE — 87086 URINE CULTURE/COLONY COUNT: CPT | Performed by: UROLOGY

## 2020-09-22 NOTE — TELEPHONE ENCOUNTER
Called patient who reports these symptoms for last few days. She is concerned she may have an infection, instructed patient to leave a catheterized urine sample to check for infection. Orders placed and patient is aware we will call with positive results.     Marianela Johnson LPN

## 2020-09-23 LAB
BACTERIA SPEC CULT: NO GROWTH
SPECIMEN SOURCE: NORMAL

## 2020-10-13 ENCOUNTER — ANTICOAGULATION THERAPY VISIT (OUTPATIENT)
Dept: NURSING | Facility: CLINIC | Age: 85
End: 2020-10-13

## 2020-10-13 ENCOUNTER — ALLIED HEALTH/NURSE VISIT (OUTPATIENT)
Dept: UROLOGY | Facility: CLINIC | Age: 85
End: 2020-10-13
Payer: COMMERCIAL

## 2020-10-13 VITALS — SYSTOLIC BLOOD PRESSURE: 128 MMHG | DIASTOLIC BLOOD PRESSURE: 56 MMHG

## 2020-10-13 DIAGNOSIS — Z79.01 LONG TERM CURRENT USE OF ANTICOAGULANTS WITH INR GOAL OF 2.0-3.0: ICD-10-CM

## 2020-10-13 DIAGNOSIS — Z79.01 LONG TERM CURRENT USE OF ANTICOAGULANT THERAPY: ICD-10-CM

## 2020-10-13 DIAGNOSIS — Z01.30 BP CHECK: Primary | ICD-10-CM

## 2020-10-13 DIAGNOSIS — I48.11 LONGSTANDING PERSISTENT ATRIAL FIBRILLATION (H): ICD-10-CM

## 2020-10-13 DIAGNOSIS — I48.91 ATRIAL FIBRILLATION (H): ICD-10-CM

## 2020-10-13 LAB
CAPILLARY BLOOD COLLECTION: NORMAL
INR PPP: 2.1 (ref 0.86–1.14)

## 2020-10-13 PROCEDURE — 85610 PROTHROMBIN TIME: CPT | Performed by: INTERNAL MEDICINE

## 2020-10-13 PROCEDURE — 99207 PR NO CHARGE NURSE ONLY: CPT | Performed by: UROLOGY

## 2020-10-13 PROCEDURE — 99207 PR NO CHARGE NURSE ONLY: CPT

## 2020-10-13 PROCEDURE — 36416 COLLJ CAPILLARY BLOOD SPEC: CPT | Performed by: INTERNAL MEDICINE

## 2020-10-13 NOTE — PROGRESS NOTES
Anticoagulation Management    Unable to reach Maia today.    Today's INR result of 2.1 is therapeutic (goal INR of 2.0-3.0).  Result received from: Clinic Lab    Follow up required to confirm warfarin dose taken and assess for changes. Already took the liberty to schedule next INR on 11/19, 10:40, at  lab since she already has an appt at  for annual wellness exam at 10:00, and a mammo scheduled for 11:00. Just verify with patient when she calls back that this is acceptable for her.    Pt to call [facility] IN[facility] at [phone]; if no answer then call [facility] at [phone]. Left message to continue current dose of warfarin 2.5 mg tonight.    Anticoagulation clinic to follow up    Dian Astudillo RN    ANTICOAGULATION FOLLOW-UP CLINIC VISIT    Patient Name:  Maia Miranda  Date:  10/13/2020  Contact Type:  Telephone    SUBJECTIVE:  Patient Findings     Comments:  Patient returned call. The patient was assessed for diet, medication, and activity level changes, missed or extra doses, bruising or bleeding, with no problem findings. Reviewed maintenance warfarin dosing with patient. Patient will remain on the same dose until next INR check. No other questions or concerns. Scheduled next INR when she is in [place] in 5 weeks for her wellness visit and mammo.  Dian ALLEN RN  Anticoagulation Clinic  [place]          Clinical Outcomes     Negatives:  Major bleeding event, Thromboembolic event, Anticoagulation-related hospital admission, Anticoagulation-related ED visit, Anticoagulation-related fatality    Comments:  Patient returned call. The patient was assessed for diet, medication, and activity level changes, missed or extra doses, bruising or bleeding, with no problem findings. Reviewed maintenance warfarin dosing with patient. Patient will remain on the same dose until next INR check. No other questions or concerns. Scheduled next INR when she is in [place] in 5 weeks for her wellness visit and mammo.  Dian  SHIELA ALLEN  Anticoagulation Clinic  [place]             OBJECTIVE    Recent labs: (last 7 days)     10/13/20  1106   INR 2.10*       ASSESSMENT / PLAN  INR assessment THER    Recheck INR In: 5 WEEKS    INR Location Clinic      Anticoagulation Summary  As of 10/13/2020    INR goal:  2.0-3.0   TTR:  56.4 % (1 y)   INR used for dosin.10 (10/13/2020)   Warfarin maintenance plan:  1.25 mg (2.5 mg x 0.5) every Mon, Fri; 2.5 mg (2.5 mg x 1) all other days   Full warfarin instructions:  1.25 mg every Mon, Fri; 2.5 mg all other days   Weekly warfarin total:  15 mg   No change documented:  Dian Astudillo RN   Plan last modified:  Dian Astudillo RN (2020)   Next INR check:  2020   Priority:  Maintenance   Target end date:  Indefinite    Indications    Atrial fibrillation (H) [I48.91]  Long term current use of anticoagulant therapy [Z79.01]  Longstanding persistent atrial fibrillation (H) [I48.11]  Long term current use of anticoagulants with INR goal of 2.0-3.0 [Z79.01]             Anticoagulation Episode Summary     INR check location:  Anticoagulation Clinic    Preferred lab:      Send INR reminders to:  MICHAEL ABURTO    Comments:  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

## 2020-10-13 NOTE — PROGRESS NOTES
Maia Miranda was evaluated at [facility] on October 13, 2020 at which time her blood pressure was:    BP Readings from Last 3 Encounters:   10/13/20 128/56   09/15/20 126/54   09/01/20 130/62     Pulse Readings from Last 3 Encounters:   08/12/20 60   12/22/19 57   12/20/19 54       Reviewed lifestyle modifications for blood pressure control and reduction: including making healthy food choices, managing weight, getting regular exercise, smoking cessation, reducing alcohol consumption, monitoring blood pressure regularly.     Symptoms: None    BP Goal:< 140/90 mmHg    BP Assessment:  BP at goal    Potential Reasons for BP too high: NA - Not applicable    BP Follow-Up Plan: Recheck BP in 6 months at pharmacy    Recommendation to Provider: none    Note completed by: Daniele Coles    Thank You   [facility]

## 2020-11-07 ENCOUNTER — HEALTH MAINTENANCE LETTER (OUTPATIENT)
Age: 85
End: 2020-11-07

## 2020-11-13 NOTE — PROGRESS NOTES
Pre-Visit Planning     Recurrent UTI/Urinary Retention/CKD Stage 3    Followed by Urology    Catheterizes 6x daily (new per recent Urology visit, was 4x daily)    Gentamicin irrigations and using estrogen creams 3x week    Last Urology visit 8/12/20  o Cystoscopy: urothelium was carefully examined and there was some trabeculation but there were no tumors, masses, stones, foreign bodies, or other urothelial abnormalities noted    Atrial Fibrillation; Long term current use of anticoagulant therapy    Last INR 10/13/20 2.1 - next due 11/19/20    Followed by [place] [facility] (anticoag [facility])    Mammogram scheduled for 11/19/20    Labs due    Lipid    BMP    Microalbumin    Appointment Notes for this encounter:   Data Unavailable    Questionnaires Reviewed/Assigned  No additional questionnaires are needed        Patient preferred phone number: [phone]    Patient contact not needed.   Grace CIFUENTES RN, BSN

## 2020-11-17 ENCOUNTER — VIRTUAL VISIT (OUTPATIENT)
Dept: UROLOGY | Facility: CLINIC | Age: 85
End: 2020-11-17
Payer: COMMERCIAL

## 2020-11-17 VITALS — BODY MASS INDEX: 24.32 KG/M2 | WEIGHT: 146 LBS | HEIGHT: 65 IN

## 2020-11-17 DIAGNOSIS — N39.0 RECURRENT UTI: Primary | ICD-10-CM

## 2020-11-17 PROCEDURE — 99214 OFFICE O/P EST MOD 30 MIN: CPT | Mod: 95 | Performed by: UROLOGY

## 2020-11-17 ASSESSMENT — PAIN SCALES - GENERAL: PAINLEVEL: NO PAIN (0)

## 2020-11-17 ASSESSMENT — MIFFLIN-ST. JEOR: SCORE: 1093.13

## 2020-11-17 NOTE — LETTER
"11/17/2020       RE: Maia Miranda  [id number] [address] [id number]     Dear Colleague,    Thank you for referring your patient, Maia Miranda, to the [facility]OLOG[facility] [place] [facility]. Please see a copy of my visit note below.    November 17, 2020    Maia Miranda is a 88 year old female who is being evaluated via a billable video visit.    Pt states she is not having any trouble.  Pt has questions about a med.  --------------------------------------------------------USE PHONE # [phone]  [employer] PHONE  --------------------------------------------------------  The patient has been notified of following:     \"This video visit will be conducted via a call between you and your physician/provider. We have found that certain health care needs can be provided without the need for an in-person physical exam.  This service lets us provide the care you need with a video conversation.  If a prescription is necessary we can send it directly to your pharmacy.  If lab work is needed we can place an order for that and you can then stop by our lab to have the test done at a later time.    Video visits are billed at different rates depending on your insurance coverage.  Please reach out to your insurance provider with any questions.    If during the course of the call the physician/provider feels a video visit is not appropriate, you will not be charged for this service.\"    Patient has given verbal consent for Video visit? Yes  How would you like to obtain your AVS? MyChart  If you are dropped from the video visit, the video invite should be resent to: Text to cell phone: [phone]  Will anyone else be joining your video visit? No    Daughter will be on the video call.  Pt us using [employer] cell phone    Video-Visit Details    Type of service:  Video Visit    Video Start Time:10:24 AM  Video End Time: 10:50 AM    Originating Location (pt. " "Location): Home    Distant Location (provider location):  [facility]OLOG[facility] [place]     Platform used for Video Visit: Nithin    She has a history of UTIs.  She has retention and does an ISC.  Has not had a UTI since last visit    Current regimen in methenamine, cephalexin prophylaxis, gentamicin irrigation and estrogen cream.    Daughter moved in with her recently which is helping    Patient denies any changes to her past medical, surgical or social history.  Patient denies any changes to her medications or allergies    Ht 1.651 m (5' 5\")   Wt 66.2 kg (146 lb)   BMI 24.30 kg/m      GENERAL: healthy, alert and no distress  EYES: Eyes grossly normal to inspection, conjunctivae and sclerae normal  HENT: normal cephalic/atraumatic.  External ears, nose and mouth without ulcers or lesions.  RESP: no audible wheeze, cough, or visible cyanosis.  No visible retractions or increased work of breathing.  Able to speak fully in complete sentences.  NEURO: Cranial nerves grossly intact, mentation intact and speech normal  PSYCH: mentation appears normal, affect normal/bright, judgement and insight intact, normal speech and appearance well-groomed    A/P:  Maia Miranda is a 88 year old F with urinary retention on ISC, Tino    Stop the methenamine    Continue the cephalexin and gentamicin irrigations at this time.  Given that we are in the midst of a global health pandemic we discussed options and will continue the cephalexin prophylaxis for another 3 months and then stop at that time (total of 6 months)    Follow up in about 4 months, sooner if needed    Bernadine Maria MD MPH   of Urology    CC  Patient Care Team:  Bernadine Maria MD as PCP - General (Urology)  Bernadine Maria MD as MD (Urology)  Rosa Cruz, RN as Registered Nurse (Urology)  Kb Tracy MD as MD (Urology)  Li Flores Mai, MD as Assigned PCP  Redd Britton DPM as Assigned Musculoskeletal " Provider  Bernadine Maria MD as Assigned Surgical Provider

## 2020-11-17 NOTE — PROGRESS NOTES
"November 17, 2020    Maia Miranda is a 88 year old female who is being evaluated via a billable video visit.    Pt states she is not having any trouble.  Pt has questions about a med.  --------------------------------------------------------USE PHONE # [phone]  [employer] PHONE  --------------------------------------------------------  The patient has been notified of following:     \"This video visit will be conducted via a call between you and your physician/provider. We have found that certain health care needs can be provided without the need for an in-person physical exam.  This service lets us provide the care you need with a video conversation.  If a prescription is necessary we can send it directly to your pharmacy.  If lab work is needed we can place an order for that and you can then stop by our lab to have the test done at a later time.    Video visits are billed at different rates depending on your insurance coverage.  Please reach out to your insurance provider with any questions.    If during the course of the call the physician/provider feels a video visit is not appropriate, you will not be charged for this service.\"    Patient has given verbal consent for Video visit? Yes  How would you like to obtain your AVS? MyChart  If you are dropped from the video visit, the video invite should be resent to: Text to cell phone: [phone]  Will anyone else be joining your video visit? No    Daughter will be on the video call.  Pt us using [employer] cell phone    Video-Visit Details    Type of service:  Video Visit    Video Start Time:10:24 AM  Video End Time: 10:50 AM    Originating Location (pt. Location): Home    Distant Location (provider location):  [facility] UROLOG[facility] [place]     Platform used for Video Visit: Nithin    She has a history of UTIs.  She has retention and does an ISC.  Has not had a UTI since last visit    Current regimen in methenamine, cephalexin prophylaxis, " "gentamicin irrigation and estrogen cream.    Daughter moved in with her recently which is helping    Patient denies any changes to her past medical, surgical or social history.  Patient denies any changes to her medications or allergies    Ht 1.651 m (5' 5\")   Wt 66.2 kg (146 lb)   BMI 24.30 kg/m      GENERAL: healthy, alert and no distress  EYES: Eyes grossly normal to inspection, conjunctivae and sclerae normal  HENT: normal cephalic/atraumatic.  External ears, nose and mouth without ulcers or lesions.  RESP: no audible wheeze, cough, or visible cyanosis.  No visible retractions or increased work of breathing.  Able to speak fully in complete sentences.  NEURO: Cranial nerves grossly intact, mentation intact and speech normal  PSYCH: mentation appears normal, affect normal/bright, judgement and insight intact, normal speech and appearance well-groomed    A/P:  Maia Miranda is a 88 year old F with urinary retention on ISC, Tino    Stop the methenamine    Continue the cephalexin and gentamicin irrigations at this time.  Given that we are in the midst of a global health pandemic we discussed options and will continue the cephalexin prophylaxis for another 3 months and then stop at that time (total of 6 months)    Follow up in about 4 months, sooner if needed    Bernadine Maria MD MPH   of Urology    CC  Patient Care Team:  Bernadine Maria MD as PCP - General (Urology)  Bernadine Maria MD as MD (Urology)  Rosa Cruz, RN as Registered Nurse (Urology)  Kb Tracy MD as MD (Urology)  Li Flores Mai, MD as Assigned PCP  Redd Britton DPM as Assigned Musculoskeletal Provider  Bernadine Maria MD as Assigned Surgical Provider                  "

## 2020-11-17 NOTE — PATIENT INSTRUCTIONS
Websites with free information:    [employer] patient website: www.voicesforpfd.org    Total Control Program: www.totalcontrolprogram.com    Supplements to prevent UTI to consider  -Probiotics  -Cranberry (for these products let them know a doctor is recommending them)   Ellura: www.myellura.[employer]   Theracran HP by [employer] [facility] [address]  -d-mannose  -Vitamin C 500-1000mg twice a day    Please stop the methenamine    Please continue the cephalexin daily, gentamicin irrigation daily and the estrogen cream 3x week    It was a pleasure meeting with you today.  Thank you for allowing me and my team the privilege of caring for you today.  YOU are the reason we are here, and I truly hope we provided you with the excellent service you deserve.  Please let us know if there is anything else we can do for you so that we can be sure you are leaving completely satisfied with your care experience.

## 2020-11-19 ENCOUNTER — OFFICE VISIT (OUTPATIENT)
Dept: PEDIATRICS | Facility: CLINIC | Age: 85
End: 2020-11-19
Payer: COMMERCIAL

## 2020-11-19 ENCOUNTER — ANCILLARY PROCEDURE (OUTPATIENT)
Dept: MAMMOGRAPHY | Facility: CLINIC | Age: 85
End: 2020-11-19
Payer: COMMERCIAL

## 2020-11-19 ENCOUNTER — ANTICOAGULATION THERAPY VISIT (OUTPATIENT)
Dept: NURSING | Facility: CLINIC | Age: 85
End: 2020-11-19
Payer: COMMERCIAL

## 2020-11-19 VITALS
BODY MASS INDEX: 24.28 KG/M2 | HEART RATE: 56 BPM | WEIGHT: 142.2 LBS | DIASTOLIC BLOOD PRESSURE: 64 MMHG | SYSTOLIC BLOOD PRESSURE: 136 MMHG | HEIGHT: 64 IN | OXYGEN SATURATION: 96 % | RESPIRATION RATE: 16 BRPM | TEMPERATURE: 97.5 F

## 2020-11-19 DIAGNOSIS — Z79.01 LONG TERM CURRENT USE OF ANTICOAGULANT THERAPY: ICD-10-CM

## 2020-11-19 DIAGNOSIS — R33.9 URINARY RETENTION WITH INCOMPLETE BLADDER EMPTYING: ICD-10-CM

## 2020-11-19 DIAGNOSIS — I10 HYPERTENSION GOAL BP (BLOOD PRESSURE) < 150/90: ICD-10-CM

## 2020-11-19 DIAGNOSIS — I48.91 ATRIAL FIBRILLATION (H): ICD-10-CM

## 2020-11-19 DIAGNOSIS — R00.2 PALPITATIONS: ICD-10-CM

## 2020-11-19 DIAGNOSIS — I48.91 ATRIAL FIBRILLATION, UNSPECIFIED TYPE (H): ICD-10-CM

## 2020-11-19 DIAGNOSIS — Z00.00 ENCOUNTER FOR MEDICARE ANNUAL WELLNESS EXAM: ICD-10-CM

## 2020-11-19 DIAGNOSIS — K55.9 ISCHEMIC COLITIS (H): ICD-10-CM

## 2020-11-19 DIAGNOSIS — G31.84 MILD COGNITIVE IMPAIRMENT: ICD-10-CM

## 2020-11-19 DIAGNOSIS — Z00.00 ENCOUNTER FOR PREVENTATIVE ADULT HEALTH CARE EXAMINATION: Primary | ICD-10-CM

## 2020-11-19 DIAGNOSIS — N39.0 RECURRENT UTI: ICD-10-CM

## 2020-11-19 DIAGNOSIS — I48.11 LONGSTANDING PERSISTENT ATRIAL FIBRILLATION (H): ICD-10-CM

## 2020-11-19 DIAGNOSIS — Z12.31 VISIT FOR SCREENING MAMMOGRAM: ICD-10-CM

## 2020-11-19 DIAGNOSIS — E78.5 HYPERLIPIDEMIA LDL GOAL <130: ICD-10-CM
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
## 2020-11-19 DIAGNOSIS — Z79.01 LONG TERM CURRENT USE OF ANTICOAGULANTS WITH INR GOAL OF 2.0-3.0: ICD-10-CM

## 2020-11-19 DIAGNOSIS — N18.32 STAGE 3B CHRONIC KIDNEY DISEASE (H): ICD-10-CM

## 2020-11-19 LAB
CAPILLARY BLOOD COLLECTION: NORMAL
INR PPP: 2.2 (ref 0.86–1.14)

## 2020-11-19 PROCEDURE — 85610 PROTHROMBIN TIME: CPT | Performed by: INTERNAL MEDICINE

## 2020-11-19 PROCEDURE — 85610 PROTHROMBIN TIME: CPT | Performed by: NURSE PRACTITIONER

## 2020-11-19 PROCEDURE — 80061 LIPID PANEL: CPT | Performed by: NURSE PRACTITIONER

## 2020-11-19 PROCEDURE — 80048 BASIC METABOLIC PNL TOTAL CA: CPT | Performed by: NURSE PRACTITIONER

## 2020-11-19 PROCEDURE — 82043 UR ALBUMIN QUANTITATIVE: CPT | Performed by: NURSE PRACTITIONER

## 2020-11-19 PROCEDURE — 99213 OFFICE O/P EST LOW 20 MIN: CPT | Mod: 25 | Performed by: NURSE PRACTITIONER

## 2020-11-19 PROCEDURE — 99207 PR NO CHARGE NURSE ONLY: CPT

## 2020-11-19 PROCEDURE — 77067 SCR MAMMO BI INCL CAD: CPT | Mod: TC | Performed by: RADIOLOGY

## 2020-11-19 PROCEDURE — 99397 PER PM REEVAL EST PAT 65+ YR: CPT | Performed by: NURSE PRACTITIONER

## 2020-11-19 PROCEDURE — 36415 COLL VENOUS BLD VENIPUNCTURE: CPT | Performed by: NURSE PRACTITIONER

## 2020-11-19 RX ORDER — WARFARIN SODIUM 2.5 MG/1
TABLET ORAL
Qty: 90 TABLET | Refills: 3 | Status: SHIPPED | OUTPATIENT
Start: 2020-11-19 | End: 2021-10-11

## 2020-11-19 RX ORDER — METOPROLOL SUCCINATE 100 MG/1
100 TABLET, EXTENDED RELEASE ORAL DAILY
Qty: 90 TABLET | Refills: 3 | Status: SHIPPED | OUTPATIENT
Start: 2020-11-19 | End: 2021-10-19

## 2020-11-19 RX ORDER — LISINOPRIL 40 MG/1
40 TABLET ORAL DAILY
Qty: 90 TABLET | Refills: 3 | Status: SHIPPED | OUTPATIENT
Start: 2020-11-19 | End: 2021-10-19

## 2020-11-19 ASSESSMENT — ACTIVITIES OF DAILY LIVING (ADL): CURRENT_FUNCTION: NO ASSISTANCE NEEDED

## 2020-11-19 ASSESSMENT — MIFFLIN-ST. JEOR: SCORE: 1056.04

## 2020-11-19 NOTE — NURSING NOTE
Bilateral ear wash completed. Moderate amount of cerumen removed from both ears. Patient tolerated procedure well.  Ariela Johnson, CMA

## 2020-11-19 NOTE — PROGRESS NOTES
Anticoagulation Management    Unable to reach [place] today.    Today's INR result of 2.2 is therapeutic (goal INR of 2.0-3.0).  Result received from: Clinic Lab    Follow up required to confirm warfarin dose taken and assess for changes    Left message to continue current dose of warfarin 2.5 mg tonight.      Anticoagulation clinic to follow up    Yaquelin Parish RN

## 2020-11-19 NOTE — PATIENT INSTRUCTIONS
Patient Education   Personalized Prevention Plan  You are due for the preventive services outlined below.  Your care team is available to assist you in scheduling these services.  If you have already completed any of these items, please share that information with your care team to update in your medical record.  Health Maintenance Due   Topic Date Due     Zoster (Shingles) Vaccine (2 of 3) 04/03/2012     FALL RISK ASSESSMENT  08/19/2020       Signs of Hearing Loss      Hearing much better with one ear can be a sign of hearing loss.   Hearing loss is a problem shared by many people. In fact, it is one of the most common health problems, particularly as people age. Most people age 65 and older have some hearing loss. By age 80, almost everyone does. Hearing loss often occurs slowly over the years. So you may not realize your hearing has gotten worse.  Have your hearing checked  Call your healthcare provider if you:    Have to strain to hear normal conversation    Have to watch other people s faces very carefully to follow what they re saying    Need to ask people to repeat what they ve said    Often misunderstand what people are saying    Turn the volume of the television or radio up so high that others complain    Feel that people are mumbling when they re talking to you    Find that the effort to hear leaves you feeling tired and irritated    Notice, when using the phone, that you hear better with one ear than the other  [employer] last reviewed this educational content on 1/1/2020 2000-2020 [employer]. [address] [place] [address]. All rights reserved. This information is not intended as a substitute for professional medical care. Always follow your healthcare professional's instructions.          Preventing Falls in the Home  An adult or child can fall for many reasons. If you are an older adult, you may fall because your reaction time slows down. Your muscles and joints may get  stiff, weak, or less flexible because of illness, medicines, or a physical condition. These things can also make a child more likely to fall or be injured in a fall.  Other health problems that make falls more likely include:    Arthritis    Dizziness or lightheadedness when you get out of bed (orthostatic hypotension)    History of a stroke    Dizziness    Anemia    Certain medicines taken for mental illness or to control blood pressure.    Problems with balance or gait    Bladder or urinary problems    History of falls with or without an injury    Changes in vision (vision impairment)    Changes in thinking skills and memory (cognitive impairment)  Injuries from a fall can include broken bones, dislocated joints, internal bleeding and cuts. When these injuries are serious enough, they can make it impossible for you or a child who is injured in a fall to live on his or her ownhome.  Prevention tips  To help prevent falls and fall-related injuries, follow the tips below.   Floors  Make floors safer by doing the following:     Put nonskid pads under area rugs.    Remove throw rugs.    Replace worn floor coverings.    Tack carpets firmly to each step on carpeted stairs. Put nonskid strips on the edges of uncarpeted stairs.    Keep floors and stairs free of clutter and cords.    Arrange furniture so there are clear pathways.    Clean up any spills right away.    Wear shoes that fit.  Bathrooms    Make bathrooms safer by doing the following:     Install grab bars in the tub or shower.    Apply nonskid strips or put a nonskid rubber mat in the tub or shower.    Sit on a bath chair to bathe.    Use bathmats with nonskid backing.  Lighting and the environment  Improve lighting in your home by doing the following:     Keep a flashlight in each room. Or put a lamp next to the bed within easy reach.    Put nightlights in the bedrooms, hallways, kitchen, and bathrooms.    Make sure all stairways have good lighting.    Take  your time when going up and down stairs.    Put handrails on both sides of stairs and in walkways for more support. To prevent injury to your wrist or arm, don t use handrails to pull yourself up.    Install grab bars to pull yourself up.    Move or rearrange items that you use often. This will make them easier to find or reach.    Look at your home to find any safety hazards. Especially look at doorways, walkways, and the driveway. Remove or repair any safety problems that you find.  [employer] last reviewed this educational content on 8/1/2016 2000-2020 [employer]. [address] [place] [address], [place] [address]. All rights reserved. This information is not intended as a substitute for professional medical care. Always follow your healthcare professional's instructions.

## 2020-11-19 NOTE — PROGRESS NOTES
"SUBJECTIVE:   Maia Miranda is a 88 year old female who presents for Preventive Visit.      Patient has been advised of split billing requirements and indicates understanding: Yes   Are you in the first 12 months of your Medicare coverage?  No    Healthy Habits:    In general, how would you rate your overall health?  Good    Frequency of exercise:  6-7 days/week    Duration of exercise:  30-45 minutes    Do you usually eat at least 4 servings of fruit and vegetables a day, include whole grains    & fiber and avoid regularly eating high fat or \"junk\" foods?  Yes    Taking medications regularly:  Yes    Barriers to taking medications:  None    Medication side effects:  None    Ability to successfully perform activities of daily living:  No assistance needed    Home Safety:  Throw rugs in the hallway    Hearing Impairment:  Difficulty following a conversation in a noisy restaurant or crowded room, difficult to understand a speaker at a public meeting or [employer] service, need to ask people to speak up or repeat themselves, find that men's voices are easier to understand than woman's and difficulty understanding soft or whispered speech    In the past 6 months, have you been bothered by leaking of urine?  No    In general, how would you rate your overall mental or emotional health?  Good      PHQ-2 Total Score:    Additional concerns today:  Yes    Do you feel safe in your environment? Yes    Have you ever done Advance Care Planning? (For example, a Health Directive, POLST, or a discussion with a medical provider or your loved ones about your wishes): Yes, advance care planning is on file.    Follows with Dr. Maria for urinary retention and recurrent UTIs.  On Keflex prophylaxis and gentamicin irrigations x3 months   Last seen 11/17/20    HTN - goal for < 150/90    Fall in Dec 2019, seen in ED for laceration  She reports falling 2-3 weeks ago  Fell back, and hit head on the door  Doesn't know why she fell  Able to " stand right away, no LOC  Denies headache, lightheadedness  Did not tell anyone  Her daughter moved in with her  Last INR 10/13, 2.1  Daughter, Bhargavi does medications  They do cooking together  Has house keeper to do cleaning  Bhargavi got her a walker last week  Using at home and for longer distances  No other falls this year    PAF originally seen on Holter 2012  Wore 48 hour Holter in 2013 and 2018   Did not show any Afib          Fall risk  Fallen 2 or more times in the past year?: Yes  Any fall with injury in the past year?: Yes  Timed Up and Go Test (>13.5 is fall risk; contact physician) : 3    Cognitive Screening   1) Repeat 3 items (Leader, Season, Table)    2) Clock draw: ABNORMAL   3) 3 item recall: Recalls 1 object   Results: ABNORMAL clock, 1-2 items recalled: PROBABLE COGNITIVE IMPAIRMENT, **INFORM PROVIDER**    Mini-CogTM Copyright S Vadim. Licensed by the author for use in [facility]; reprinted with permission (soob@[facility]). All rights reserved.      Do you have sleep apnea, excessive snoring or daytime drowsiness?: no    Reviewed and updated as needed this visit by clinical staff                 Reviewed and updated as needed this visit by Provider                Social History     Tobacco Use     Smoking status: Never Smoker     Smokeless tobacco: Never Used   Substance Use Topics     Alcohol use: No     If you drink alcohol do you typically have >3 drinks per day or >7 drinks per week? No    Alcohol Use 11/19/2020   Prescreen: >3 drinks/day or >7 drinks/week? -   Prescreen: >3 drinks/day or >7 drinks/week? No           Current providers sharing in care for this patient include:   Patient Care Team:  Bernadine Maria MD as PCP - General (Urology)  Bernadine Maria MD as MD (Urology)  Rosa Cruz, RN as Registered Nurse (Urology)  Kb Tracy MD as MD (Urology)  Li Flores Mai, MD as Assigned PCP  Redd Britton DPM as Assigned Musculoskeletal  Provider  Bernadine Maria MD as Assigned Surgical Provider    The following health maintenance items are reviewed in Epic and correct as of today:  Health Maintenance   Topic Date Due     ZOSTER IMMUNIZATION (2 of 3) 04/03/2012     MICROALBUMIN  05/16/2017     ADVANCE CARE PLANNING  01/15/2018     LIPID  07/01/2020     MEDICARE ANNUAL WELLNESS VISIT  08/19/2020     FALL RISK ASSESSMENT  08/19/2020     MAMMO SCREENING  08/26/2020     INFLUENZA VACCINE (1) 09/01/2020     ANNUAL REVIEW OF HM ORDERS  12/20/2020     BMP  12/22/2020     COLORECTAL CANCER SCREENING  06/14/2021     DEXA  08/27/2022     DTAP/TDAP/TD IMMUNIZATION (4 - Td) 12/22/2029     PHQ-2  Completed     Pneumococcal Vaccine: 65+ Years  Completed     Pneumococcal Vaccine: Pediatrics (0 to 5 Years) and At-Risk Patients (6 to 64 Years)  Aged Out     IPV IMMUNIZATION  Aged Out     MENINGITIS IMMUNIZATION  Aged Out     HEPATITIS B IMMUNIZATION  Aged Out     Lab work is in process  Labs reviewed in EPIC  BP Readings from Last 3 Encounters:   11/19/20 136/64   10/13/20 128/56   09/15/20 126/54    Wt Readings from Last 3 Encounters:   11/19/20 64.5 kg (142 lb 3.2 oz)   11/17/20 66.2 kg (146 lb)   08/12/20 66.2 kg (146 lb)                  Patient Active Problem List   Diagnosis     Hyperlipidemia LDL goal <130     Osteopenia     Primary osteoarthritis involving multiple joints     Essential and other specified forms of tremor     Esophageal reflux     Varicose veins of lower extremities with complications     Allergic rhinitis     Degeneration of lumbar or lumbosacral intervertebral disc     Internal bleeding hemorrhoids     Osteoarthritis of thumb     Diverticulosis     CKD (chronic kidney disease) stage 3, GFR 30-59 ml/min     Chronic constipation     Urinary retention     Health Care Home     Advanced directives, counseling/discussion     Long term current use of anticoagulant therapy     Atrial fibrillation (H)     Hypertension goal BP (blood pressure)  < 150/90     Post-menopausal bleeding     History of recurrent UTIs     Mild cognitive impairment     Fibroid uterus     Rectal bleeding     Personal history of urinary tract infection     Urinary retention with incomplete bladder emptying     Longstanding persistent atrial fibrillation (H)     Long term current use of anticoagulants with INR goal of 2.0-3.0     Recurrent UTI     Ischemic colitis (H)     Past Surgical History:   Procedure Laterality Date     CATARACT IOL, RT/LT       corneal scraping       CYSTOSCOPY       CYSTOSCOPY, BIOPSY BLADDER, COMBINED N/A 7/25/2016    Procedure: COMBINED CYSTOSCOPY, BIOPSY BLADDER;  Surgeon: Bernadine Maria MD;  Location: [facility]     ESOPHAGOSCOPY, GASTROSCOPY, DUODENOSCOPY (EGD), COMBINED  7/8/2013    Procedure: COMBINED ESOPHAGOSCOPY, GASTROSCOPY, DUODENOSCOPY (EGD);   ESOPHAGOSCOPY, GASTROSCOPY, DUODENOSCOPY (EGD)   ;  Surgeon: Shawn Soto MD;  Location: [facility]     IMPLANT STIMULATOR SACRAL NERVE STAGE ONE N/A 4/4/2017    Procedure: IMPLANT STIMULATOR SACRAL NERVE STAGE ONE;  Surgeon: Kb Tracy MD;  Location: [facility]     IMPLANT STIMULATOR SACRAL NERVE STAGE TWO N/A 4/18/2017    Procedure: IMPLANT STIMULATOR SACRAL NERVE STAGE TWO;  Stage Two Interstim  ;  Surgeon: Kb Tracy MD;  [facility]: [facility]     interstim placement       [facility] NONSPECIFIC PROCEDURE      D&C x 2 in 1957 & 1962 -- Abstracted 06/10/02     [facility] NONSPECIFIC PROCEDURE      Left breast biopsy x 2 in 1988 & 1989 -- Abstracted 06/10/02     [facility] NONSPECIFIC PROCEDURE  1958    Influenza resulting in hospitalization -- Abstracted 06/10/02     Z NONSPECIFIC PROCEDURE  1971    10 day hospitalization from bilateral pulmonary enboli -- Abstracted 06/10/02     [facility] NONSPECIFIC PROCEDURE  1/03    colonoscopy  negative       Social History     Tobacco Use     Smoking status: Never Smoker     Smokeless tobacco: Never Used   Substance Use Topics     Alcohol use: No     Family History   Problem  Relation Age of Onset     Cerebrovascular Disease Father          at 92     Psychotic Disorder Mother         Suicide 62, depression     Alzheimer Disease Maternal Grandmother      Cardiovascular Sister         pacemaker     Glaucoma No family hx of          Current Outpatient Medications   Medication Sig Dispense Refill     lisinopril (ZESTRIL) 40 MG tablet Take 1 tablet (40 mg) by mouth daily 90 tablet 3     metoprolol succinate ER (TOPROL-XL) 100 MG 24 hr tablet Take 1 tablet (100 mg) by mouth daily 90 tablet 3     warfarin ANTICOAGULANT (COUMADIN) 2.5 MG tablet Take a half tablet (1.25 mg) on Mon & Fri; take 1 tablet (2.5 mg) all other days or as directed by INR clinic 90 tablet 3     acetaminophen (TYLENOL) 500 MG tablet Take 500-1,000 mg by mouth every 6 hours as needed       ascorbic acid (VITAMIN C) 1000 MG TABS Take 1 tablet (1,000 mg) by mouth 2 times daily 180 tablet 3     calcium citrate-vitamin D (CITRACAL) 315-250 MG-UNIT TABS per tablet Take 1 tablet by mouth daily       carboxymethylcellulose (CELLUVISC/REFRESH LIQUIGEL) 1 % ophthalmic solution Place 1 drop into both eyes every morning As needed       Catheters (GENTLECATH URINARY CATHETER) MISC 1 catheter 4 times daily 120 each 12     cephALEXin (KEFLEX) 250 MG capsule Take 1 capsule (250 mg) by mouth daily 90 capsule 0     Cholecalciferol (VITAMIN D-3) 1000 UNITS CAPS Take 1 capsule by mouth daily       COMPOUNDED NON-CONTROLLED SUBSTANCE (CMPD RX) - PHARMACY TO MIX COMPOUNDED MEDICATION Gentamicin solution: 480 mg gentamicin in 1 liter 0.9 normal saline. Instill 60 ml into bladder via catheter daily at bedtime. 2 Bottle 3     conjugated estrogens (PREMARIN) 0.625 MG/GM vaginal cream Place 0.5 g vaginally twice a week On Tuesday and Friday. Uses a pea sized amount 5 g 2     estradiol (ESTRING) 2 MG vaginal ring Place 1 each vaginally every 3 months 1 each 1     meclizine (ANTIVERT) 25 MG tablet Take 1 tablet (25 mg) by mouth every 6 hours as  "needed for dizziness 30 tablet 3     Omega-3 Fatty Acids (OMEGA-3 FISH OIL PO) Take 1 g by mouth 2 times daily (with meals)       sodium chloride (PETER 128) 5 % ophthalmic ointment Place 1 Application into both eyes At Bedtime         Review of Systems  Constitutional, HEENT, cardiovascular, pulmonary, GI, , musculoskeletal, neuro, skin, endocrine and psych systems are negative, except as otherwise noted.    OBJECTIVE:   /64   Pulse 56   Temp 97.5  F (36.4  C) (Tympanic)   Resp 16   Ht 1.619 m (5' 3.75\")   Wt 64.5 kg (142 lb 3.2 oz)   SpO2 96%   BMI 24.60 kg/m   Estimated body mass index is 24.3 kg/m  as calculated from the following:    Height as of 11/17/20: 1.651 m (5' 5\").    Weight as of 11/17/20: 66.2 kg (146 lb).  Physical Exam  GENERAL: healthy, alert and no distress  EYES: Eyes grossly normal to inspection, PERRL and conjunctivae and sclerae normal  HENT: ear canals and TM's normal  NECK: no adenopathy, no asymmetry, masses, or scars and thyroid normal to palpation  RESP: lungs clear to auscultation - no rales, rhonchi or wheezes  CV: regular rate and rhythm, normal S1 S2, no S3 or S4, no murmur, click or rub, no peripheral edema and peripheral pulses strong  ABDOMEN: soft, nontender, no hepatosplenomegaly, no masses and bowel sounds normal  MS: no gross musculoskeletal defects noted, no edema  SKIN: no suspicious lesions or rashes  NEURO: Normal strength and tone, mentation intact and speech normal  PSYCH: mentation appears normal, affect normal/bright    Diagnostic Test Results:  Labs reviewed in Epic    ASSESSMENT / PLAN:       ICD-10-CM    1. Encounter for preventative adult health care examination  Z00.00    2. Ischemic colitis (H)  K55.9     hospitalized 2018. Warfarin held during hospitalized and resumed post discharge. No further rectal bleeding     3. Stage 3b chronic kidney disease  N18.32    4. Atrial fibrillation, unspecified type (H)  I48.91 metoprolol succinate ER (TOPROL-XL) " "100 MG 24 hr tablet   5. Hypertension goal BP (blood pressure) < 150/90  I10 lisinopril (ZESTRIL) 40 MG tablet     Basic metabolic panel  (Ca, Cl, CO2, Creat, Gluc, K, Na, BUN)     Albumin Random Urine Quantitative with Creat Ratio     CANCELED: Albumin Random Urine Quantitative with Creat Ratio   6. Long term current use of anticoagulant therapy  Z79.01 warfarin ANTICOAGULANT (COUMADIN) 2.5 MG tablet     INR point of care   7. Mild cognitive impairment  G31.84    8. Urinary retention with incomplete bladder emptying  R33.9    9. Recurrent UTI  N39.0    10. Palpitations  R00.2 metoprolol succinate ER (TOPROL-XL) 100 MG 24 hr tablet   11. Hyperlipidemia LDL goal <130  E78.5 Lipid Profile (Chol, Trig, HDL, LDL calc)   12. Encounter for Medicare annual wellness exam  Z00.00    13. Fall, initial encounter  W19.XXXA        Patient has been advised of split billing requirements and indicates understanding: Yes  COUNSELING:  Reviewed preventive health counseling, as reflected in patient instructions       Regular exercise       Healthy diet/nutrition    Estimated body mass index is 24.3 kg/m  as calculated from the following:    Height as of 11/17/20: 1.651 m (5' 5\").    Weight as of 11/17/20: 66.2 kg (146 lb).    Had a length discussion with patient and daughter, Bhargavi, who lives with her regarding falls. 2 falls is past year, though 11 months apart. Recommend looking for fall risks in the home such as rugs or cluttered furniture. Hopefully risk will be reduced with recent acquisition of a walker. I do agree with Bhargavi that 2 falls 11 months apart is not a \"history of falls\". We will watch this. If another fall in next 3 months, consider physical therapy to help with strength and mobility. If ongoing falls, will need to revisit risks and benefits of ongoing warfarin use   Most recent fall was 2-3 weeks ago. No LOC. No neurological deficits noted. Appears to have been a low impact fall (hit head on door). Will hold off on " imaging at this time given time frame from fall and stable exam. Reviewed indications for being seen in clinic versus ED    She reports that she has never smoked. She has never used smokeless tobacco.      Appropriate preventive services were discussed with this patient, including applicable screening as appropriate for cardiovascular disease, diabetes, osteopenia/osteoporosis, and glaucoma.  As appropriate for age/gender, discussed screening for colorectal cancer, prostate cancer, breast cancer, and cervical cancer. Checklist reviewing preventive services available has been given to the patient.    Reviewed patients plan of care and provided an AVS. The Basic Care Plan (routine screening as documented in Health Maintenance) for Maia meets the Care Plan requirement. This Care Plan has been established and reviewed with the Patient.    Counseling Resources:  ATP IV Guidelines  Pooled Cohorts Equation Calculator  Breast Cancer Risk Calculator  Breast Cancer: Medication to Reduce Risk  FRAX Risk Assessment  ICSI Preventive Guidelines  Dietary Guidelines for Americans, 2010  USDA's MyPlate  ASA Prophylaxis  Lung CA Screening    [facility]    Identified Health Risks:    The patient was provided with written information regarding signs of hearing loss.  She is at risk for falling and has been provided with information to reduce the risk of falling at home.

## 2020-11-20 LAB
ANION GAP SERPL CALCULATED.3IONS-SCNC: 2 MMOL/L (ref 3–14)
BUN SERPL-MCNC: 27 MG/DL (ref 7–30)
CALCIUM SERPL-MCNC: 9.6 MG/DL (ref 8.5–10.1)
CHLORIDE SERPL-SCNC: 107 MMOL/L (ref 94–109)
CHOLEST SERPL-MCNC: 215 MG/DL
CO2 SERPL-SCNC: 30 MMOL/L (ref 20–32)
CREAT SERPL-MCNC: 1.13 MG/DL (ref 0.52–1.04)
CREAT UR-MCNC: 81 MG/DL
GFR SERPL CREATININE-BSD FRML MDRD: 43 ML/MIN/{1.73_M2}
GLUCOSE SERPL-MCNC: 77 MG/DL (ref 70–99)
HDLC SERPL-MCNC: 50 MG/DL
LDLC SERPL CALC-MCNC: 138 MG/DL
MICROALBUMIN UR-MCNC: 22 MG/L
MICROALBUMIN/CREAT UR: 26.54 MG/G CR (ref 0–25)
NONHDLC SERPL-MCNC: 165 MG/DL
POTASSIUM SERPL-SCNC: 4.1 MMOL/L (ref 3.4–5.3)
SODIUM SERPL-SCNC: 139 MMOL/L (ref 133–144)
TRIGL SERPL-MCNC: 135 MG/DL

## 2020-11-20 NOTE — PROGRESS NOTES
ANTICOAGULATION FOLLOW-UP CLINIC VISIT    Patient Name:  Maia Miranda  Date:  2020  Contact Type:  Telephone/ Maia    SUBJECTIVE:  Patient Findings     Comments:  The patient was assessed for diet, medication, and activity level changes, missed or extra doses, bruising or bleeding, with no problem findings.          Clinical Outcomes     Negatives:  Major bleeding event, Thromboembolic event, Anticoagulation-related hospital admission, Anticoagulation-related ED visit, Anticoagulation-related fatality    Comments:  The patient was assessed for diet, medication, and activity level changes, missed or extra doses, bruising or bleeding, with no problem findings.             OBJECTIVE    Recent labs: (last 7 days)     20  1229   INR 2.20*       ASSESSMENT / PLAN  INR assessment THER    Recheck INR In: 6 WEEKS    INR Location Clinic      Anticoagulation Summary  As of 2020    INR goal:  2.0-3.0   TTR:  56.3 % (1 y)   INR used for dosin.20 (2020)   Warfarin maintenance plan:  1.25 mg (2.5 mg x 0.5) every Mon, Fri; 2.5 mg (2.5 mg x 1) all other days   Full warfarin instructions:  1.25 mg every Mon, Fri; 2.5 mg all other days   Weekly warfarin total:  15 mg   No change documented:  Yaquelin Parish RN   Plan last modified:  Dian Astudillo RN (2020)   Next INR check:  2020   Priority:  Maintenance   Target end date:  Indefinite    Indications    Atrial fibrillation (H) [I48.91]  Long term current use of anticoagulant therapy [Z79.01]  Longstanding persistent atrial fibrillation (H) [I48.11]  Long term current use of anticoagulants with INR goal of 2.0-3.0 [Z79.01]             Anticoagulation Episode Summary     INR check location:  Anticoagulation Clinic    Preferred lab:      Send INR reminders to:  MICHAEL ABURTO    Comments:  2.5mg tablets // 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  Patient will continue to take 15 mg weekly dosing and follow up in 6 weeks or sooner for any problems or concerns.        Lance Weller RN

## 2020-11-23 NOTE — RESULT ENCOUNTER NOTE
Your urine test is just mildly elevated. We can protect your kidneys by keeping your blood pressure under control, and remaining on the lisinopril. This medication has 2 benefits (improves BP and protects kidneys).  Nancy Salgado, CNP

## 2020-12-02 ENCOUNTER — TELEPHONE (OUTPATIENT)
Dept: UROLOGY | Facility: CLINIC | Age: 85
End: 2020-12-02

## 2020-12-02 NOTE — TELEPHONE ENCOUNTER
Called Maia to see if we can move her INR appt up.     She had a fall and went to ED on 12/22/10. Hit head, 6-cm laceration with 6 staples placed. Recommended f/u INR on 12/30/19. Patient had staples removed on 12/30 but no INR.     Patient agreeable to have INR done tomorrow. Scheduled.    Dian ALLEN RN  Anticoagulation Clinic  [place]         Drysol Counseling:  I discussed with the patient the risks of drysol/aluminum chloride including but not limited to skin rash, itching, irritation, burning.

## 2020-12-02 NOTE — TELEPHONE ENCOUNTER
----- Message from Bernadine Dominique sent at 12/1/2020  3:33 PM CST -----   Return in about 4 months (around 3/17/2021) for using a video visit.  CSF

## 2020-12-10 ENCOUNTER — TELEPHONE (OUTPATIENT)
Dept: UROLOGY | Facility: CLINIC | Age: 85
End: 2020-12-10

## 2020-12-10 NOTE — TELEPHONE ENCOUNTER
[facility] Call Center    Phone Message    May a detailed message be left on voicemail: yes     Reason for Call: Medication Refill Request    Has the patient contacted the pharmacy for the refill? Yes   Name of medication being requested: Gentamicin  Provider who prescribed the medication: Dr. Maria  Pharmacy: [facility]  Date medication is needed: 12/11/2020   Pharmacy needs authorization      Action Taken: Other: [facility]    Travel Screening: Not Applicable

## 2020-12-15 NOTE — PATIENT INSTRUCTIONS
Continue the current interstim setting for at least 2-3 weeks    Continue the gentamicin irrigations for another few months    Return to see us in January, sooner if needed    It was a pleasure meeting with you today.  Thank you for allowing me and my team the privilege of caring for you today.  YOU are the reason we are here, and I truly hope we provided you with the excellent service you deserve.  Please let us know if there is anything else we can do for you so that we can be sure you are leaving completely satisfied with your care experience.     88

## 2020-12-31 ENCOUNTER — ANTICOAGULATION THERAPY VISIT (OUTPATIENT)
Dept: NURSING | Facility: CLINIC | Age: 85
End: 2020-12-31

## 2020-12-31 DIAGNOSIS — Z79.01 LONG TERM CURRENT USE OF ANTICOAGULANTS WITH INR GOAL OF 2.0-3.0: ICD-10-CM

## 2020-12-31 DIAGNOSIS — Z79.01 LONG TERM CURRENT USE OF ANTICOAGULANT THERAPY: ICD-10-CM

## 2020-12-31 DIAGNOSIS — I48.11 LONGSTANDING PERSISTENT ATRIAL FIBRILLATION (H): ICD-10-CM

## 2020-12-31 DIAGNOSIS — I48.91 ATRIAL FIBRILLATION, UNSPECIFIED TYPE (H): ICD-10-CM

## 2020-12-31 DIAGNOSIS — I48.91 ATRIAL FIBRILLATION (H): ICD-10-CM

## 2020-12-31 LAB
CAPILLARY BLOOD COLLECTION: NORMAL
INR PPP: 2.2 (ref 0.86–1.14)

## 2020-12-31 PROCEDURE — 36416 COLLJ CAPILLARY BLOOD SPEC: CPT | Performed by: INTERNAL MEDICINE

## 2020-12-31 PROCEDURE — 85610 PROTHROMBIN TIME: CPT | Performed by: INTERNAL MEDICINE

## 2020-12-31 PROCEDURE — 99207 PR NO CHARGE NURSE ONLY: CPT

## 2020-12-31 NOTE — PROGRESS NOTES
ANTICOAGULATION FOLLOW-UP CLINIC VISIT    Patient Name:  Maia Miranda  Date:  2020  Contact Type:  Telephone    SUBJECTIVE:  Patient Findings     Comments:  The patient was assessed for diet, medication, and activity level changes, missed or extra doses, bruising or bleeding, with no problem findings.          Clinical Outcomes     Negatives:  Major bleeding event, Thromboembolic event, Anticoagulation-related hospital admission, Anticoagulation-related ED visit, Anticoagulation-related fatality    Comments:  The patient was assessed for diet, medication, and activity level changes, missed or extra doses, bruising or bleeding, with no problem findings.             OBJECTIVE    Recent labs: (last 7 days)     20  1053   INR 2.20*       ASSESSMENT / PLAN  INR assessment THER    Recheck INR In: 6 WEEKS    INR Location Clinic      Anticoagulation Summary  As of 2020    INR goal:  2.0-3.0   TTR:  58.0 % (1 y)   INR used for dosin.20 (2020)   Warfarin maintenance plan:  1.25 mg (2.5 mg x 0.5) every Mon, Fri; 2.5 mg (2.5 mg x 1) all other days   Full warfarin instructions:  1.25 mg every Mon, Fri; 2.5 mg all other days   Weekly warfarin total:  15 mg   Plan last modified:  Sahara Matos RN (2020)   Next INR check:  2021   Priority:  Maintenance   Target end date:  Indefinite    Indications    Atrial fibrillation (H) [I48.91]  Long term current use of anticoagulant therapy [Z79.01]  Longstanding persistent atrial fibrillation (H) [I48.11]  Long term current use of anticoagulants with INR goal of 2.0-3.0 [Z79.01]             Anticoagulation Episode Summary     INR check location:  Anticoagulation Clinic    Preferred lab:      Send INR reminders to:  MICHAEL ABURTO    Comments:  2.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

## 2021-01-19 ENCOUNTER — TELEPHONE (OUTPATIENT)
Dept: UROLOGY | Facility: CLINIC | Age: 86
End: 2021-01-19

## 2021-01-19 NOTE — TELEPHONE ENCOUNTER
Called the number given for [facility] but it's incorrect.   Called Maia to get information and spoke with her daughter, Joana. She explained that the problem was with [facility] not able to get syringes.   Called [facility]; they say the problem is that pt's insurance won't cover the syringes and the out of pocket cost is $4.40 each. PA has been denied.  Called [facility]. They will ship syringes with Gentamicin solution; they had asked Maia about this in the past and pt had instructed them not to send. Compounding pharmacy will contact pt. If insurance doesn't cover, out of pocket cost is $1.00 per syringe.   Called Joana with this information and explained that pharmacy will be calling. Also gave her the # to pharmacy, if needed.  SAMSON Gale RN       Health Call Center    Phone Message    May a detailed message be left on voicemail: yes     Reason for Call: Other: Pt called to tell us to have Dr. Maria send syringes request over to [facility] clinical services and to call them at [phone]. Thank you.    Action Taken: Message routed to:  [facility] uro    Travel Screening: Not Applicable

## 2021-02-11 ENCOUNTER — TELEPHONE (OUTPATIENT)
Dept: UROLOGY | Facility: CLINIC | Age: 86
End: 2021-02-11

## 2021-02-11 ENCOUNTER — ANTICOAGULATION THERAPY VISIT (OUTPATIENT)
Dept: UROLOGY | Facility: CLINIC | Age: 86
End: 2021-02-11

## 2021-02-11 DIAGNOSIS — Z79.01 LONG TERM CURRENT USE OF ANTICOAGULANT THERAPY: ICD-10-CM

## 2021-02-11 DIAGNOSIS — I48.11 LONGSTANDING PERSISTENT ATRIAL FIBRILLATION (H): ICD-10-CM

## 2021-02-11 DIAGNOSIS — I48.91 ATRIAL FIBRILLATION, UNSPECIFIED TYPE (H): ICD-10-CM

## 2021-02-11 DIAGNOSIS — Z79.01 LONG TERM CURRENT USE OF ANTICOAGULANTS WITH INR GOAL OF 2.0-3.0: ICD-10-CM

## 2021-02-11 DIAGNOSIS — Z79.01 LONG TERM CURRENT USE OF ANTICOAGULANTS WITH INR GOAL OF 2.0-3.0: Primary | ICD-10-CM

## 2021-02-11 DIAGNOSIS — I48.91 ATRIAL FIBRILLATION (H): ICD-10-CM

## 2021-02-11 LAB
CAPILLARY BLOOD COLLECTION: NORMAL
INR PPP: 1.9 (ref 0.86–1.14)

## 2021-02-11 PROCEDURE — 36416 COLLJ CAPILLARY BLOOD SPEC: CPT | Performed by: INTERNAL MEDICINE

## 2021-02-11 PROCEDURE — 85610 PROTHROMBIN TIME: CPT | Performed by: INTERNAL MEDICINE

## 2021-02-11 NOTE — PROGRESS NOTES
ANTICOAGULATION MANAGEMENT     Patient Name:  Maia Miranda  Date:  2021    ASSESSMENT /SUBJECTIVE:    Today's INR result of 1.9 is therapeutic. Goal INR of 2.0-3.0      Warfarin dose taken: Warfarin taken as instructed    Diet: Increased greens/vitamin K in diet; plans to resume previous intake (more salads this last week).    Medication changes/ interactions: No new medications/supplements affecting INR    Previous INR: Therapeutic     S/S of bleeding or thromboembolism: No    New injury or illness: No    Upcoming surgery, procedure or cardioversion: No    Additional findings: None      PLAN:    Telephone call with Maia regarding INR result and instructed:     Warfarin Dosing Instructions: Per protocol, Continue your current warfarin dose 1.25 mg Mon, Fri + 2.5 mg AOD. Previous two INR levels within therapeutic range.    Instructed patient to follow up no later than: 2 weeks  Lab visit scheduled    Education provided: Please call back if any changes to your diet, medications or how you've been taking warfarin, Importance of consistent vitamin K intake, Importance of following up for INR monitoring at instructed interval, Monitoring for bleeding signs and symptoms, Monitoring for clotting signs and symptoms and Importance of notifying clinic for changes in medications; a sooner lab recheck maybe needed.      Patient verbalizes understanding and agrees to warfarin dosing plan.    Instructed to call the Anticoagulation Clinic for any changes, questions or concerns. (#[phone])        Elida Moreira RN      OBJECTIVE:  Recent labs: (last 7 days)     21  1047   INR 1.90*         No question data found.  Anticoagulation Summary  As of 2021    INR goal:  2.0-3.0   TTR:  63.3 % (1 y)   INR used for dosin.90 (2021)   Warfarin maintenance plan:  1.25 mg (2.5 mg x 0.5) every Mon, Fri; 2.5 mg (2.5 mg x 1) all other days   Full warfarin instructions:  1.25 mg every Mon, Fri; 2.5 mg all  other days   Weekly warfarin total:  15 mg   Plan last modified:  Sahara Matos RN (12/31/2020)   Next INR check:     Priority:  Maintenance   Target end date:  Indefinite    Indications    Atrial fibrillation (H) [I48.91]  Long term current use of anticoagulant therapy [Z79.01]  Longstanding persistent atrial fibrillation (H) [I48.11]  Long term current use of anticoagulants with INR goal of 2.0-3.0 [Z79.01]             Anticoagulation Episode Summary     INR check location:  Anticoagulation Clinic    Preferred lab:      Send INR reminders to:  MICHAEL ABURTO    Comments:  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

## 2021-02-11 NOTE — CONFIDENTIAL NOTE
ANTICOAGULATION MANAGEMENT    Maia Miranda due for annual renewal of referral to anticoagulation monitoring. Order pended for your review and signature.      ANTICOAGULATION SUMMARY      Warfarin indication(s)     Atrial fibrillation    Heart valve present?  NO       Current goal range   INR: 2.0-3.0     Goal appropriate for indication? Yes, INR 2-3 appropriate for hx of DVT, PE, hypercoagulable state, Afib, LVAD, or bileaflet AVR without risk factors     Current duration of therapy Indefinite/long term therapy   Time in Therapeutic Range (TTR)  (Goal > 60%) 63.3 %       Office visit with referring provider's group within last year yes on 11/17/20       Elida Moreira RN

## 2021-02-17 ENCOUNTER — IMMUNIZATION (OUTPATIENT)
Dept: NURSING | Facility: CLINIC | Age: 86
End: 2021-02-17
Payer: COMMERCIAL

## 2021-02-17 PROCEDURE — 0001A PR COVID VAC PFIZER DIL RECON 30 MCG/0.3 ML IM: CPT

## 2021-02-17 PROCEDURE — 91300 PR COVID VAC PFIZER DIL RECON 30 MCG/0.3 ML IM: CPT

## 2021-02-22 ENCOUNTER — TELEPHONE (OUTPATIENT)
Dept: UROLOGY | Facility: CLINIC | Age: 86
End: 2021-02-22

## 2021-02-22 NOTE — TELEPHONE ENCOUNTER
Returned call to Maia re: the refill of cephalexin. Per Dr. Maria's most recent visit note of 11/17/20, the plan was to take the antibiotic for 6 months and then stop. I checked with her pharmacy re: the daily cephalexin and she started this back in August 2020 so now has completed 6 mos. I called Maia with this information; she expressed understanding. She has follow-up appt scheduled for 4/6/21.  SAMSON Gale RN       Health Call Center    Phone Message    May a detailed message be left on voicemail: yes     Reason for Call: Medication Refill Request    Has the patient contacted the pharmacy for the refill? Yes   Name of medication being requested: cephALEXin (KEFLEX) 250 MG capsule  Provider who prescribed the medication: Dr. Maria  Pharmacy: [facility] [address] [place] [address]  Date medication is needed: 2/22/2021         Action Taken: Message routed to:  Other:  Urology    Travel Screening: Not Applicable

## 2021-02-26 ENCOUNTER — TELEPHONE (OUTPATIENT)
Dept: UROLOGY | Facility: CLINIC | Age: 86
End: 2021-02-26

## 2021-02-26 ENCOUNTER — ANTICOAGULATION THERAPY VISIT (OUTPATIENT)
Dept: PEDIATRICS | Facility: CLINIC | Age: 86
End: 2021-02-26

## 2021-02-26 DIAGNOSIS — Z79.01 LONG TERM CURRENT USE OF ANTICOAGULANT THERAPY: ICD-10-CM

## 2021-02-26 DIAGNOSIS — Z79.01 LONG TERM CURRENT USE OF ANTICOAGULANTS WITH INR GOAL OF 2.0-3.0: ICD-10-CM

## 2021-02-26 DIAGNOSIS — I48.91 ATRIAL FIBRILLATION, UNSPECIFIED TYPE (H): ICD-10-CM

## 2021-02-26 DIAGNOSIS — I48.11 LONGSTANDING PERSISTENT ATRIAL FIBRILLATION (H): ICD-10-CM

## 2021-02-26 DIAGNOSIS — I48.91 ATRIAL FIBRILLATION (H): ICD-10-CM

## 2021-02-26 LAB
CAPILLARY BLOOD COLLECTION: NORMAL
INR PPP: 2 (ref 0.86–1.14)

## 2021-02-26 PROCEDURE — 85610 PROTHROMBIN TIME: CPT | Performed by: INTERNAL MEDICINE

## 2021-02-26 PROCEDURE — 36416 COLLJ CAPILLARY BLOOD SPEC: CPT | Performed by: INTERNAL MEDICINE

## 2021-02-26 NOTE — TELEPHONE ENCOUNTER
Called patient not having any of the other symptoms  She will us if she does develop those symptoms Kallie Bell LPN Staff Nurse  [facility]    Phone Message    May a detailed message be left on voicemail: yes     Reason for Call: Other: Pt was told to notify Dr. Maria when her urine became cloudy.  Pt called to notify Dr. Maria that her urine is cloudy and that he has tried drinking a lot of water and that it is not helping.  Please call to discuss at      Action Taken: Message routed to:  [facility] Urology    Travel Screening: Not Applicable

## 2021-02-26 NOTE — PROGRESS NOTES
ANTICOAGULATION MANAGEMENT     Patient Name:  Maia Miranda  Date:  2021    ASSESSMENT /SUBJECTIVE:    Today's INR result of 2.0 is therapeutic. Goal INR of 2.0-3.0      Warfarin dose taken: Warfarin taken as instructed    Diet: No new diet changes affecting INR    Medication changes/ interactions: No new medications/supplements affecting INR    Previous INR: Subtherapeutic     S/S of bleeding or thromboembolism: No    New injury or illness: No    Upcoming surgery, procedure or cardioversion: No    Additional findings: Patient is getting second covid-19 vaccine soon, unsure of date      PLAN:    Telephone call with Maia regarding INR result and instructed:     Warfarin Dosing Instructions: Continue your current warfarin dose 1.25 mg Mon/Fri and 2.5 mg all other days    Instructed patient to follow up no later than: 2 weeks  Lab visit scheduled    Education provided: Target INR goal and significance of current INR result, Importance of therapeutic range, Importance of following up for INR monitoring at instructed interval and Importance of taking warfarin as instructed      Maia verbalizes understanding and agrees to warfarin dosing plan.    Instructed to call the Anticoagulation Clinic for any changes, questions or concerns. (#[phone])        Laura Preston, RN      OBJECTIVE:  Recent labs: (last 7 days)     21  1030   INR 2.00*         No question data found.  Anticoagulation Summary  As of 2021    INR goal:  2.0-3.0   TTR:  63.3 % (1 y)   INR used for dosin.00 (2021)   Warfarin maintenance plan:  1.25 mg (2.5 mg x 0.5) every Mon, Fri; 2.5 mg (2.5 mg x 1) all other days   Full warfarin instructions:  1.25 mg every Mon, Fri; 2.5 mg all other days   Weekly warfarin total:  15 mg   No change documented:  Laura Preston, RN   Plan last modified:  Sahara Matos RN (2020)   Next INR check:  3/12/2021   Priority:  Maintenance   Target end date:  Indefinite     Indications    Atrial fibrillation (H) [I48.91]  Long term current use of anticoagulant therapy [Z79.01]  Longstanding persistent atrial fibrillation (H) [I48.11]  Long term current use of anticoagulants with INR goal of 2.0-3.0 [Z79.01]  Atrial fibrillation  unspecified type (H) [I48.91]             Anticoagulation Episode Summary     INR check location:  Anticoagulation Clinic    Preferred lab:      Send INR reminders to:  MICHAEL ABURTO    Comments:  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

## 2021-03-02 ENCOUNTER — TELEPHONE (OUTPATIENT)
Dept: UROLOGY | Facility: CLINIC | Age: 86
End: 2021-03-02

## 2021-03-02 NOTE — TELEPHONE ENCOUNTER
[facility] Call [facility]    Phone Message    May a detailed message be left on voicemail: yes     Reason for Call: Symptoms or Concerns     If patient has red-flag symptoms, warm transfer to triage line    Current symptom or concern: Cloudy Urine    Symptoms have been present for:  1 week(s)    Has patient previously been seen for this? Yes    By : Dr. Maria    Date: 03/02/2021    Are there any new or worsening symptoms? Yes: Cloudy urine for period of 1 wk. Per Pt she called on 2/26 and 3/1 about this issue, did not receive callback on 3/1.      Action Taken: Message routed to:  [facility]    Travel Screening: Not Applicable

## 2021-03-02 NOTE — TELEPHONE ENCOUNTER
Bernadine Maria MD Wright, Joy, LPN 4 days ago     Cloudy urine is not a specific for UTI. IF she is having any other symptoms (hematuria, dysuria, increased urgency, fever, chills, pain or any increased confusion)  would have her come in for catheterized urine (she does self cath)     Otherwise would push fluids and catheterize more frequently.      Contacted patient and advised her of above. Patient stated understanding and agreement with plan.  Lorri Griffin, LPN  Urology Clinic Lead [facility]

## 2021-03-05 ENCOUNTER — TELEPHONE (OUTPATIENT)
Dept: UROLOGY | Facility: CLINIC | Age: 86
End: 2021-03-05

## 2021-03-05 DIAGNOSIS — Z87.440 HISTORY OF RECURRENT UTIS: Primary | ICD-10-CM

## 2021-03-05 DIAGNOSIS — R82.90 CLOUDY URINE: ICD-10-CM

## 2021-03-05 DIAGNOSIS — Z87.440 HISTORY OF RECURRENT UTIS: ICD-10-CM

## 2021-03-05 LAB
ALBUMIN UR-MCNC: ABNORMAL MG/DL
APPEARANCE UR: ABNORMAL
BILIRUB UR QL STRIP: NEGATIVE
COLOR UR AUTO: YELLOW
GLUCOSE UR STRIP-MCNC: NEGATIVE MG/DL
HGB UR QL STRIP: ABNORMAL
KETONES UR STRIP-MCNC: NEGATIVE MG/DL
LEUKOCYTE ESTERASE UR QL STRIP: ABNORMAL
NITRATE UR QL: POSITIVE
PH UR STRIP: 5 PH (ref 5–7)
SOURCE: ABNORMAL
SP GR UR STRIP: 1.02 (ref 1–1.03)
UROBILINOGEN UR STRIP-ACNC: 0.2 EU/DL (ref 0.2–1)

## 2021-03-05 PROCEDURE — 87086 URINE CULTURE/COLONY COUNT: CPT | Performed by: UROLOGY

## 2021-03-05 PROCEDURE — 87186 SC STD MICRODIL/AGAR DIL: CPT | Performed by: UROLOGY

## 2021-03-05 PROCEDURE — 81003 URINALYSIS AUTO W/O SCOPE: CPT | Performed by: UROLOGY

## 2021-03-05 PROCEDURE — 87088 URINE BACTERIA CULTURE: CPT | Performed by: UROLOGY

## 2021-03-05 NOTE — TELEPHONE ENCOUNTER
[facility]    Phone Message    May a detailed message be left on voicemail: yes     Reason for Call: Other: PT Maia called to report she is still experiencing cloudy urine after several days of continued high water intake. Per Pt requests a call back to discuss. Pls call her.     Action Taken: Other: [facility]RO    Travel Screening: Not Applicable

## 2021-03-07 LAB
BACTERIA SPEC CULT: ABNORMAL
BACTERIA SPEC CULT: ABNORMAL
Lab: ABNORMAL
SPECIMEN SOURCE: ABNORMAL

## 2021-03-08 DIAGNOSIS — Z87.440 HISTORY OF RECURRENT UTIS: Primary | ICD-10-CM

## 2021-03-08 RX ORDER — CEPHALEXIN 500 MG/1
500 CAPSULE ORAL 2 TIMES DAILY
Qty: 10 CAPSULE | Refills: 0 | Status: SHIPPED | OUTPATIENT
Start: 2021-03-08 | End: 2021-03-13

## 2021-03-10 ENCOUNTER — IMMUNIZATION (OUTPATIENT)
Dept: NURSING | Facility: CLINIC | Age: 86
End: 2021-03-10
Attending: FAMILY MEDICINE
Payer: COMMERCIAL

## 2021-03-10 PROCEDURE — 91300 PR COVID VAC PFIZER DIL RECON 30 MCG/0.3 ML IM: CPT

## 2021-03-10 PROCEDURE — 0002A PR COVID VAC PFIZER DIL RECON 30 MCG/0.3 ML IM: CPT

## 2021-03-12 ENCOUNTER — ANTICOAGULATION THERAPY VISIT (OUTPATIENT)
Dept: PEDIATRICS | Facility: CLINIC | Age: 86
End: 2021-03-12

## 2021-03-12 ENCOUNTER — MYC MEDICAL ADVICE (OUTPATIENT)
Dept: NURSING | Facility: CLINIC | Age: 86
End: 2021-03-12

## 2021-03-12 DIAGNOSIS — I48.91 ATRIAL FIBRILLATION, UNSPECIFIED TYPE (H): ICD-10-CM

## 2021-03-12 DIAGNOSIS — Z79.01 LONG TERM CURRENT USE OF ANTICOAGULANT THERAPY: ICD-10-CM

## 2021-03-12 DIAGNOSIS — I48.11 LONGSTANDING PERSISTENT ATRIAL FIBRILLATION (H): ICD-10-CM

## 2021-03-12 DIAGNOSIS — Z79.01 LONG TERM CURRENT USE OF ANTICOAGULANTS WITH INR GOAL OF 2.0-3.0: ICD-10-CM

## 2021-03-12 DIAGNOSIS — I48.91 ATRIAL FIBRILLATION (H): ICD-10-CM

## 2021-03-12 LAB
CAPILLARY BLOOD COLLECTION: NORMAL
INR PPP: 1.9 (ref 0.86–1.14)

## 2021-03-12 PROCEDURE — 85610 PROTHROMBIN TIME: CPT | Performed by: INTERNAL MEDICINE

## 2021-03-12 PROCEDURE — 36416 COLLJ CAPILLARY BLOOD SPEC: CPT | Performed by: INTERNAL MEDICINE

## 2021-03-12 NOTE — PROGRESS NOTES
"ANTICOAGULATION MANAGEMENT     Patient Name:  Maia Miranda  Date:  3/12/2021    ASSESSMENT /SUBJECTIVE:    Today's INR result of 1.9 is subtherapeutic. Goal INR of 2.0-3.0      Warfarin dose taken: Warfarin taken as instructed    Diet: No new diet changes affecting INR    Medication changes/ interactions: Interaction between cephALEXin  started on 3/8/21 and warfarin may be affecting INR. Per up-to-date, \"Cephalosporins may enhance the anticoagulant effect of Vitamin K Antagonists.\"    Previous INR: Therapeutic     S/S of bleeding or thromboembolism: No    New injury or illness: No    Upcoming surgery, procedure or cardioversion: No    Additional findings: None      PLAN:    Telephone call with Maia regarding INR result and instructed:     Warfarin Dosing Instructions: Take 2.5 mg tonight, then continue your current warfarin dose of 1.25 mg Mon, Fri + 2.5 mg AOD. Also LVM and sent mychart with dosing instructions for patient and daughter to reference.     Instructed patient to follow up no later than: 2 weeks  Lab visit scheduled    Education provided: Please call back if any changes to your diet, medications or how you've been taking warfarin, Monitoring for bleeding signs and symptoms and Monitoring for clotting signs and symptoms      Patient verbalizes understanding and agrees to warfarin dosing plan.    Instructed to call the Anticoagulation Clinic for any changes, questions or concerns. (#[phone])        Elida Moreira RN      OBJECTIVE:  Recent labs: (last 7 days)     21  1034   INR 1.90*         No question data found.  Anticoagulation Summary  As of 3/12/2021    INR goal:  2.0-3.0   TTR:  61.5 % (1 y)   INR used for dosin.90 (3/12/2021)   Warfarin maintenance plan:  1.25 mg (2.5 mg x 0.5) every Mon, Fri; 2.5 mg (2.5 mg x 1) all other days   Full warfarin instructions:  1.25 mg every Mon, Fri; 2.5 mg all other days   Weekly warfarin total:  15 mg   Plan last modified:  Sahara Matos " PEDRO RN (12/31/2020)   Next INR check:     Priority:  Maintenance   Target end date:  Indefinite    Indications    Atrial fibrillation (H) [I48.91]  Long term current use of anticoagulant therapy [Z79.01]  Longstanding persistent atrial fibrillation (H) [I48.11]  Long term current use of anticoagulants with INR goal of 2.0-3.0 [Z79.01]  Atrial fibrillation  unspecified type (H) [I48.91]             Anticoagulation Episode Summary     INR check location:  Anticoagulation Clinic    Preferred lab:      Send INR reminders to:  MICHAEL ABURTO    Comments:  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

## 2021-03-15 DIAGNOSIS — Z87.440 HISTORY OF RECURRENT UTIS: Primary | ICD-10-CM

## 2021-03-15 DIAGNOSIS — N39.0 URINARY TRACT INFECTION: ICD-10-CM

## 2021-03-15 LAB
ALBUMIN UR-MCNC: 100 MG/DL
APPEARANCE UR: ABNORMAL
BACTERIA #/AREA URNS HPF: ABNORMAL /HPF
BILIRUB UR QL STRIP: NEGATIVE
COLOR UR AUTO: YELLOW
GLUCOSE UR STRIP-MCNC: NEGATIVE MG/DL
HGB UR QL STRIP: ABNORMAL
KETONES UR STRIP-MCNC: NEGATIVE MG/DL
LEUKOCYTE ESTERASE UR QL STRIP: ABNORMAL
NITRATE UR QL: POSITIVE
NON-SQ EPI CELLS #/AREA URNS LPF: ABNORMAL /LPF
PH UR STRIP: 5.5 PH (ref 5–7)
RBC #/AREA URNS AUTO: ABNORMAL /HPF
SOURCE: ABNORMAL
SP GR UR STRIP: 1.02 (ref 1–1.03)
UROBILINOGEN UR STRIP-ACNC: 0.2 EU/DL (ref 0.2–1)
WBC #/AREA URNS AUTO: >100 /HPF

## 2021-03-15 PROCEDURE — 81001 URINALYSIS AUTO W/SCOPE: CPT | Performed by: UROLOGY

## 2021-03-15 PROCEDURE — 87088 URINE BACTERIA CULTURE: CPT | Performed by: UROLOGY

## 2021-03-15 PROCEDURE — 87086 URINE CULTURE/COLONY COUNT: CPT | Performed by: UROLOGY

## 2021-03-15 PROCEDURE — 87186 SC STD MICRODIL/AGAR DIL: CPT | Performed by: UROLOGY

## 2021-03-16 DIAGNOSIS — N39.0 URINARY TRACT INFECTION: Primary | ICD-10-CM

## 2021-03-16 LAB
BACTERIA SPEC CULT: ABNORMAL
Lab: ABNORMAL
SPECIMEN SOURCE: ABNORMAL

## 2021-03-16 RX ORDER — CEPHALEXIN 500 MG/1
500 CAPSULE ORAL 2 TIMES DAILY
Qty: 20 CAPSULE | Refills: 0 | Status: SHIPPED | OUTPATIENT
Start: 2021-03-16 | End: 2021-03-26

## 2021-03-17 ENCOUNTER — TELEPHONE (OUTPATIENT)
Dept: UROLOGY | Facility: CLINIC | Age: 86
End: 2021-03-17

## 2021-03-17 NOTE — TELEPHONE ENCOUNTER
Patient called just started medication  Not sure yet if she has improved  Told her to call us if she feels worse Kallie Bell, RAUL Staff Nurse

## 2021-03-24 ENCOUNTER — DOCUMENTATION ONLY (OUTPATIENT)
Dept: LAB | Facility: CLINIC | Age: 86
End: 2021-03-24

## 2021-03-24 DIAGNOSIS — I48.91 ATRIAL FIBRILLATION (H): Primary | ICD-10-CM

## 2021-03-24 DIAGNOSIS — Z79.01 LONG TERM CURRENT USE OF ANTICOAGULANT THERAPY: ICD-10-CM

## 2021-03-24 NOTE — PROGRESS NOTES
Done.     Sonia Weller RN   [facility] Anticoagulation Clinic  [place], [place], [place]

## 2021-03-26 ENCOUNTER — ANTICOAGULATION THERAPY VISIT (OUTPATIENT)
Dept: PEDIATRICS | Facility: CLINIC | Age: 86
End: 2021-03-26

## 2021-03-26 DIAGNOSIS — Z79.01 LONG TERM CURRENT USE OF ANTICOAGULANTS WITH INR GOAL OF 2.0-3.0: Primary | ICD-10-CM

## 2021-03-26 DIAGNOSIS — I48.11 LONGSTANDING PERSISTENT ATRIAL FIBRILLATION (H): ICD-10-CM

## 2021-03-26 DIAGNOSIS — Z79.01 LONG TERM CURRENT USE OF ANTICOAGULANT THERAPY: ICD-10-CM

## 2021-03-26 DIAGNOSIS — I48.91 ATRIAL FIBRILLATION, UNSPECIFIED TYPE (H): ICD-10-CM

## 2021-03-26 DIAGNOSIS — I48.91 ATRIAL FIBRILLATION (H): ICD-10-CM

## 2021-03-26 LAB
CAPILLARY BLOOD COLLECTION: NORMAL
INR PPP: 2.2 (ref 0.86–1.14)

## 2021-03-26 PROCEDURE — 85610 PROTHROMBIN TIME: CPT | Performed by: NURSE PRACTITIONER

## 2021-03-26 PROCEDURE — 36416 COLLJ CAPILLARY BLOOD SPEC: CPT | Performed by: NURSE PRACTITIONER

## 2021-03-26 NOTE — PROGRESS NOTES
"ANTICOAGULATION MANAGEMENT     Patient Name:  Maia Miranda  Date:  3/26/2021    ASSESSMENT /SUBJECTIVE:    Today's INR result of 2.2 is therapeutic. Goal INR of 2.0-3.0      Warfarin dose taken: Warfarin taken as instructed    Diet: No new diet changes affecting INR     Medication changes/ interactions: 10 day course of Keflex finished today. Interaction between Keflex started on 3/16 and warfarin may be affecting INR. Per up-to-date, \"Cephalosporins may enhance the anticoagulant effect of Vitamin K Antagonists.\" Reminded patient to call with any medication changes in the future.    Previous INR: Subtherapeutic     S/S of bleeding or thromboembolism: No    New injury or illness: No    Upcoming surgery, procedure or cardioversion: No    Additional findings: None      PLAN:    Telephone call with Maia regarding INR result and instructed:     Warfarin Dosing Instructions: Continue your current warfarin dose 1.25 mg every Mon, Fri; 2.5 mg all other days.    Instructed patient to follow up no later than: 3 weeks  Lab visit scheduled    Education provided: Please call back if any changes to your diet, medications or how you've been taking warfarin, Potential interaction between warfarin and Keflex, Monitoring for bleeding signs and symptoms, Monitoring for clotting signs and symptoms and Importance of notifying clinic for changes in medications; a sooner lab recheck maybe needed.      Patient verbalizes understanding and agrees to warfarin dosing plan.    Instructed to call the Anticoagulation Clinic for any changes, questions or concerns. (#[phone])        Elida Moreira RN      OBJECTIVE:  Recent labs: (last 7 days)     21  1052   INR 2.20*         INR assessment THER    Recheck INR In: 2 WEEKS    INR Location Clinic      Anticoagulation Summary  As of 3/26/2021    INR goal:  2.0-3.0   TTR:  62.4 % (1 y)   INR used for dosin.20 (3/26/2021)   Warfarin maintenance plan:  1.25 mg (2.5 mg x 0.5) " every Mon, Fri; 2.5 mg (2.5 mg x 1) all other days   Full warfarin instructions:  1.25 mg every Mon, Fri; 2.5 mg all other days   Weekly warfarin total:  15 mg   No change documented:  Elida Moreira RN   Plan last modified:  Sahara Matos RN (12/31/2020)   Next INR check:  4/9/2021   Priority:  Maintenance   Target end date:  Indefinite    Indications    Atrial fibrillation (H) [I48.91]  Long term current use of anticoagulant therapy [Z79.01]  Longstanding persistent atrial fibrillation (H) [I48.11]  Long term current use of anticoagulants with INR goal of 2.0-3.0 [Z79.01]  Atrial fibrillation  unspecified type (H) [I48.91]             Anticoagulation Episode Summary     INR check location:  Anticoagulation Clinic    Preferred lab:      Send INR reminders to:  MICHAEL ABURTO    Comments:  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

## 2021-03-29 ENCOUNTER — TELEPHONE (OUTPATIENT)
Dept: UROLOGY | Facility: CLINIC | Age: 86
End: 2021-03-29

## 2021-03-29 DIAGNOSIS — N39.0 URINARY TRACT INFECTION: Primary | ICD-10-CM

## 2021-03-29 DIAGNOSIS — R82.90 CLOUDY URINE: ICD-10-CM

## 2021-03-29 RX ORDER — CEPHALEXIN 500 MG/1
500 CAPSULE ORAL DAILY
Qty: 10 CAPSULE | Refills: 0 | Status: ON HOLD | OUTPATIENT
Start: 2021-03-29 | End: 2021-08-29

## 2021-03-29 NOTE — TELEPHONE ENCOUNTER
"Spoke with Dr. Simpson. Will prescribe a once daily dose of cephalexin 500 mg to cover until her appt with Dr. Maria on 4/6.   Called Maia with this plan; she expressed understanding and agreement.  SAMSON Gale RN  ___________________________________      Urology pt of Dr. Maria last seen 11/17/20.  Called Maia to follow-up on the message below. She has been on two courses of cephalexin recently and just finished the last one Saturday, 3/27. She reports that the following day she started having cloudy urine again, which for her is the first sign of a UTI. She describes urine as \"milky\" in appearance. No temp. She asks about taking a prophylactic abx. Dr. Maria not available. Will review with provider in clinic.  SAMSON Gale RN       Health Call Center    Phone Message    May a detailed message be left on voicemail: yes     Reason for Call: Medication Refill Request    Has the patient contacted the pharmacy for the refill? Yes   Name of medication being requested: Cephalexin 500 mg  Provider who prescribed the medication: Dr. Maria  Pharmacy: [facility] [address] [place] [address]  Date medication is needed: 3/29/2021       Action Taken: Message routed to:  [facility]  Clinical pool    Travel Screening: Not Applicable                                                                      "

## 2021-04-06 ENCOUNTER — VIRTUAL VISIT (OUTPATIENT)
Dept: UROLOGY | Facility: CLINIC | Age: 86
End: 2021-04-06
Payer: COMMERCIAL

## 2021-04-06 VITALS — BODY MASS INDEX: 24.24 KG/M2 | WEIGHT: 142 LBS | HEIGHT: 64 IN

## 2021-04-06 DIAGNOSIS — Z87.440 HISTORY OF RECURRENT UTIS: Primary | ICD-10-CM

## 2021-04-06 PROCEDURE — 99214 OFFICE O/P EST MOD 30 MIN: CPT | Mod: 95 | Performed by: UROLOGY

## 2021-04-06 ASSESSMENT — PAIN SCALES - GENERAL: PAINLEVEL: NO PAIN (0)

## 2021-04-06 ASSESSMENT — MIFFLIN-ST. JEOR: SCORE: 1055.14

## 2021-04-06 NOTE — PROGRESS NOTES
Maia is a 88 year old who is being evaluated via a billable video visit.      How would you like to obtain your AVS? MyChart  If the video visit is dropped, the invitation should be resent by: Text to cell phone: [phone] but daughter will be with her soon and may set up through my chart.  Will anyone else be joining your video visit? Yes, daughter      Video Start Time: 9:35 AM  Video-Visit Details    Type of service:  Video Visit    Video End Time:9:46 AM    Originating Location (pt. Location): Home    Distant Location (provider location):  [facility]OLOG[facility] [place]     Platform used for Video Visit: [employer]     Assessment & Plan     History of recurrent UTIs  UTIs have come back after stopping the abx prophylaxis.  Discussed options and risks of abx prophylaxis but since this is the only thing that has helped will restart the daily abx prophylaxis  - cephALEXin (KEFLEX) 250 MG capsule; Take 1 capsule (250 mg) by mouth daily    Will stop gentamicin irrigations after she uses up her current bottle    Return in about 6 months (around 10/6/2021) for using a video visit.    Bernadine Maria MD MPH  (she/her/hers)   of Urology  [facility]      Subjective   Maia is a 88 year old who presents for the following health issues  accompanied by her two daughters who help to care for her.    HPI     Follow up urinary retention with [facility] and Tino.  She was last seen in November.  She has had a couple of UTIs last month.  She states that it was after she stopped the cephalexin prophylaxis as this alone is the only treatment we have tried that seemed to help prevent UTI        Objective    Vitals - Patient Reported  Pain Score: No Pain (0)      Physical Exam   GENERAL: Healthy, alert and no distress  EYES: Eyes grossly normal to inspection.  No discharge or erythema, or obvious scleral/conjunctival abnormalities.  RESP: No audible wheeze, cough, or visible cyanosis.  No visible  retractions or increased work of breathing.    SKIN: Visible skin clear. No significant rash, abnormal pigmentation or lesions.  NEURO: Cranial nerves grossly intact.  Mentation and speech appropriate for age.  PSYCH: Mentation appears normal, affect normal/bright, judgement and insight intact, normal speech and appearance well-groomed.

## 2021-04-06 NOTE — LETTER
4/6/2021       RE: Maia Miranda  [address] [facility] [address] [facility] [address]     Dear Colleague,    Thank you for referring your patient, Maia Miranda, to the [facility] UROLOGY [facility] [place] at [facility]. Please see a copy of my visit note below.    Maai is a 88 year old who is being evaluated via a billable video visit.      How would you like to obtain your AVS? MyChart  If the video visit is dropped, the invitation should be resent by: Text to cell phone: [phone] but daughter will be with her soon and may set up through my chart.  Will anyone else be joining your video visit? Yes, daughter      Video Start Time: 9:35 AM  Video-Visit Details    Type of service:  Video Visit    Video End Time:9:46 AM    Originating Location (pt. Location): Home    Distant Location (provider location):  [facility] [place]     Platform used for Video Visit: [facility]     Assessment & Plan     History of recurrent UTIs  UTIs have come back after stopping the abx prophylaxis.  Discussed options and risks of abx prophylaxis but since this is the only thing that has helped will restart the daily abx prophylaxis  - cephALEXin (KEFLEX) 250 MG capsule; Take 1 capsule (250 mg) by mouth daily    Will stop gentamicin irrigations after she uses up her current bottle    Return in about 6 months (around 10/6/2021) for using a video visit.    Bernadine Maria MD MPH  (she/her/hers)   of Urology  [facility]      Subjective   Maia is a 88 year old who presents for the following health issues  accompanied by her two daughters who help to care for her.    HPI     Follow up urinary retention with ISC and Tino.  She was last seen in November.  She has had a couple of UTIs last month.  She states that it was after she stopped the cephalexin prophylaxis as this alone is the only treatment we have tried that seemed to help prevent UTI         Objective    Vitals - Patient Reported  Pain Score: No Pain (0)      Physical Exam   GENERAL: Healthy, alert and no distress  EYES: Eyes grossly normal to inspection.  No discharge or erythema, or obvious scleral/conjunctival abnormalities.  RESP: No audible wheeze, cough, or visible cyanosis.  No visible retractions or increased work of breathing.    SKIN: Visible skin clear. No significant rash, abnormal pigmentation or lesions.  NEURO: Cranial nerves grossly intact.  Mentation and speech appropriate for age.  PSYCH: Mentation appears normal, affect normal/bright, judgement and insight intact, normal speech and appearance well-groomed.      Bernadine Maria MD

## 2021-04-15 ENCOUNTER — ANTICOAGULATION THERAPY VISIT (OUTPATIENT)
Dept: PEDIATRICS | Facility: CLINIC | Age: 86
End: 2021-04-15

## 2021-04-15 DIAGNOSIS — I48.91 ATRIAL FIBRILLATION (H): ICD-10-CM

## 2021-04-15 DIAGNOSIS — Z79.01 LONG TERM CURRENT USE OF ANTICOAGULANT THERAPY: ICD-10-CM

## 2021-04-15 DIAGNOSIS — I48.11 LONGSTANDING PERSISTENT ATRIAL FIBRILLATION (H): ICD-10-CM

## 2021-04-15 DIAGNOSIS — I48.91 ATRIAL FIBRILLATION, UNSPECIFIED TYPE (H): ICD-10-CM

## 2021-04-15 DIAGNOSIS — Z79.01 LONG TERM CURRENT USE OF ANTICOAGULANTS WITH INR GOAL OF 2.0-3.0: ICD-10-CM

## 2021-04-15 LAB
CAPILLARY BLOOD COLLECTION: NORMAL
INR PPP: 2.2 (ref 0.86–1.14)

## 2021-04-15 PROCEDURE — 85610 PROTHROMBIN TIME: CPT | Performed by: NURSE PRACTITIONER

## 2021-04-15 PROCEDURE — 99207 PR NO CHARGE NURSE ONLY: CPT | Performed by: NURSE PRACTITIONER

## 2021-04-15 PROCEDURE — 36416 COLLJ CAPILLARY BLOOD SPEC: CPT | Performed by: NURSE PRACTITIONER

## 2021-05-13 ENCOUNTER — ANTICOAGULATION THERAPY VISIT (OUTPATIENT)
Dept: PEDIATRICS | Facility: CLINIC | Age: 86
End: 2021-05-13

## 2021-05-13 DIAGNOSIS — I48.11 LONGSTANDING PERSISTENT ATRIAL FIBRILLATION (H): ICD-10-CM

## 2021-05-13 DIAGNOSIS — Z79.01 LONG TERM CURRENT USE OF ANTICOAGULANTS WITH INR GOAL OF 2.0-3.0: ICD-10-CM

## 2021-05-13 DIAGNOSIS — I48.91 ATRIAL FIBRILLATION, UNSPECIFIED TYPE (H): ICD-10-CM

## 2021-05-13 DIAGNOSIS — Z79.01 LONG TERM CURRENT USE OF ANTICOAGULANT THERAPY: ICD-10-CM

## 2021-05-13 DIAGNOSIS — I48.91 ATRIAL FIBRILLATION (H): ICD-10-CM

## 2021-05-13 LAB
CAPILLARY BLOOD COLLECTION: NORMAL
INR PPP: 2.6 (ref 0.86–1.14)

## 2021-05-13 PROCEDURE — 85610 PROTHROMBIN TIME: CPT | Performed by: NURSE PRACTITIONER

## 2021-05-13 PROCEDURE — 36416 COLLJ CAPILLARY BLOOD SPEC: CPT | Performed by: NURSE PRACTITIONER

## 2021-05-13 NOTE — PROGRESS NOTES
"ANTICOAGULATION MANAGEMENT     Patient Name:  Maia Miranda  Date:  2021    ASSESSMENT /SUBJECTIVE:    Today's INR result of 2.6 is therapeutic. Goal INR of 2.0-3.0      Warfarin dose taken: Warfarin taken as instructed    Diet: No new diet changes affecting INR    Medication changes/ interactions: Daily prophylactic abx continues. Interaction between Keflex started on 21 and warfarin may be affecting INR. Per up-to-date, \"Cephalosporins may enhance the anticoagulant effect of Vitamin K Antagonists.\"    Previous INR: Therapeutic     S/S of bleeding or thromboembolism: No    New injury or illness: No    Upcoming surgery, procedure or cardioversion: No    Additional findings: None      PLAN:    Telephone call with Maia regarding INR result and instructed:     Warfarin Dosing Instructions: Continue your current warfarin dose 1.25 mg Mon, Fri + 2.5 mg AOD.    Instructed patient to follow up no later than: 3 weeks (d/t slight jump in level and ongoing keflex use) Lab visit scheduled    Education provided: Please call back if any changes to your diet, medications or how you've been taking warfarin, Monitoring for bleeding signs and symptoms, Monitoring for clotting signs and symptoms and Importance of notifying clinic for changes in medications; a sooner lab recheck maybe needed.      Maia verbalizes understanding and agrees to warfarin dosing plan.    Instructed to call the Anticoagulation Clinic for any changes, questions or concerns. (#[phone])        Elida Moreira RN      OBJECTIVE:  Recent labs: (last 7 days)     21  1017   INR 2.60*         No question data found.  Anticoagulation Summary  As of 2021    INR goal:  2.0-3.0   TTR:  69.4 % (1 y)   INR used for dosin.60 (2021)   Warfarin maintenance plan:  1.25 mg (2.5 mg x 0.5) every Mon, Fri; 2.5 mg (2.5 mg x 1) all other days   Full warfarin instructions:  1.25 mg every Mon, Fri; 2.5 mg all other days   Weekly warfarin " total:  15 mg   Plan last modified:  Sahara Matos, RN (12/31/2020)   Next INR check:     Priority:  Maintenance   Target end date:  Indefinite    Indications    Atrial fibrillation (H) [I48.91]  Long term current use of anticoagulant therapy [Z79.01]  Longstanding persistent atrial fibrillation (H) [I48.11]  Long term current use of anticoagulants with INR goal of 2.0-3.0 [Z79.01]  Atrial fibrillation  unspecified type (H) [I48.91]             Anticoagulation Episode Summary     INR check location:  Anticoagulation Clinic    Preferred lab:      Send INR reminders to:  MICHAEL ABURTO    Comments:  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

## 2021-06-04 ENCOUNTER — ANTICOAGULATION THERAPY VISIT (OUTPATIENT)
Dept: PEDIATRICS | Facility: CLINIC | Age: 86
End: 2021-06-04

## 2021-06-04 DIAGNOSIS — I48.91 ATRIAL FIBRILLATION, UNSPECIFIED TYPE (H): ICD-10-CM

## 2021-06-04 DIAGNOSIS — Z79.01 LONG TERM CURRENT USE OF ANTICOAGULANTS WITH INR GOAL OF 2.0-3.0: ICD-10-CM

## 2021-06-04 DIAGNOSIS — I48.11 LONGSTANDING PERSISTENT ATRIAL FIBRILLATION (H): ICD-10-CM

## 2021-06-04 DIAGNOSIS — I48.91 ATRIAL FIBRILLATION (H): ICD-10-CM

## 2021-06-04 DIAGNOSIS — Z79.01 LONG TERM CURRENT USE OF ANTICOAGULANT THERAPY: ICD-10-CM

## 2021-06-04 LAB — INR PPP: 2 (ref 0.86–1.14)

## 2021-06-04 PROCEDURE — 36416 COLLJ CAPILLARY BLOOD SPEC: CPT | Performed by: NURSE PRACTITIONER

## 2021-06-04 PROCEDURE — 85610 PROTHROMBIN TIME: CPT | Performed by: NURSE PRACTITIONER

## 2021-06-04 PROCEDURE — 99207 PR NO CHARGE NURSE ONLY: CPT | Performed by: NURSE PRACTITIONER

## 2021-06-04 NOTE — PROGRESS NOTES
ANTICOAGULATION FOLLOW-UP CLINIC VISIT    Patient Name:  Maia Miranda  Date:  2021  Contact Type:  Telephone/ Maia    SUBJECTIVE:  Patient Findings     Comments:  The patient was assessed for diet, medication, and activity level changes, missed or extra doses, bruising or bleeding, with no problem findings.          Clinical Outcomes     Negatives:  Major bleeding event, Thromboembolic event, Anticoagulation-related hospital admission, Anticoagulation-related ED visit, Anticoagulation-related fatality    Comments:  The patient was assessed for diet, medication, and activity level changes, missed or extra doses, bruising or bleeding, with no problem findings.             OBJECTIVE    Recent labs: (last 7 days)     21  1033   INR 2.00*       ASSESSMENT / PLAN  INR assessment THER    Recheck INR In: 4 WEEKS    INR Location Clinic      Anticoagulation Summary  As of 2021    INR goal:  2.0-3.0   TTR:  69.5 % (1 y)   INR used for dosin.00 (2021)   Warfarin maintenance plan:  1.25 mg (2.5 mg x 0.5) every Mon, Fri; 2.5 mg (2.5 mg x 1) all other days   Full warfarin instructions:  1.25 mg every Mon, Fri; 2.5 mg all other days   Weekly warfarin total:  15 mg   No change documented:  Lance Weller RN   Plan last modified:  Sahara Matos RN (2020)   Next INR check:  2021   Priority:  Maintenance   Target end date:  Indefinite    Indications    Atrial fibrillation (H) [I48.91]  Long term current use of anticoagulant therapy [Z79.01]  Longstanding persistent atrial fibrillation (H) [I48.11]  Long term current use of anticoagulants with INR goal of 2.0-3.0 [Z79.01]  Atrial fibrillation  unspecified type (H) [I48.91]             Anticoagulation Episode Summary     INR check location:  Anticoagulation Clinic    Preferred lab:      Send INR reminders to:  MICHAEL ABURTO    Comments:  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  Patient will continue to take 15 mg weekly dosing and follow up in 4 weeks or sooner for any problems or concerns.        Lance Weller RN

## 2021-07-02 ENCOUNTER — ANTICOAGULATION THERAPY VISIT (OUTPATIENT)
Dept: PEDIATRICS | Facility: CLINIC | Age: 86
End: 2021-07-02

## 2021-07-02 DIAGNOSIS — Z79.01 LONG TERM CURRENT USE OF ANTICOAGULANT THERAPY: ICD-10-CM

## 2021-07-02 DIAGNOSIS — Z79.01 LONG TERM CURRENT USE OF ANTICOAGULANTS WITH INR GOAL OF 2.0-3.0: ICD-10-CM

## 2021-07-02 DIAGNOSIS — I48.91 ATRIAL FIBRILLATION, UNSPECIFIED TYPE (H): ICD-10-CM

## 2021-07-02 DIAGNOSIS — I48.11 LONGSTANDING PERSISTENT ATRIAL FIBRILLATION (H): ICD-10-CM

## 2021-07-02 DIAGNOSIS — I48.91 ATRIAL FIBRILLATION (H): ICD-10-CM

## 2021-07-02 LAB
CAPILLARY BLOOD COLLECTION: NORMAL
INR PPP: 1.3 (ref 0.86–1.14)

## 2021-07-02 PROCEDURE — 36416 COLLJ CAPILLARY BLOOD SPEC: CPT | Performed by: NURSE PRACTITIONER

## 2021-07-02 PROCEDURE — 85610 PROTHROMBIN TIME: CPT | Performed by: NURSE PRACTITIONER

## 2021-07-02 NOTE — PROGRESS NOTES
I briefly spoke with Maia over the phone who is very confused today. I do not recall this being her baseline nor is it noted anywhere in her chart. She cannot focus on one question that I am asking her. She states her daughter lives with her and helps her with her meds but is at work right now. I asked her if she would mind me calling her daughter and she said that would be okay. She was not able to tell me her number but after some investigating I was able to reach Bhargavi at work. She states she does have some periodic confusion and was confused today. I told her about our conversation and given her low INR today concern for possible CVA. She works 2 blocks from home and will go check on her, we reviewed signs to look for.    ANTICOAGULATION MANAGEMENT     Maia Miranda 88 year old female is on warfarin with subtherapeutic INR result. (Goal INR 2.0-3.0)    Recent labs: (last 7 days)     07/02/21  1101   INR 1.30*       ASSESSMENT     Source(s): Patient/Caregiver Call - Bhargavi      Warfarin doses taken: Less warfarin taken than planned which may be affecting INR    Diet: No new diet changes identified    New illness, injury, or hospitalization: No    Medication/supplement changes: None noted    Signs or symptoms of bleeding or clotting: No    Previous INR: Therapeutic last 2(+) visits    Additional findings: None     PLAN     Recommended plan for temporary change(s) affecting INR     Dosing Instructions: Booster dose then continue your current warfarin dose with next INR in 1 week       Summary  As of 7/2/2021    Full warfarin instructions:  7/2: 2.5 mg; Otherwise 1.25 mg every Mon, Fri; 2.5 mg all other days   Next INR check:  7/9/2021             Telephone call with  Bhargavi whom verbalizes understanding and agrees to plan    Lab visit scheduled    Education provided: Monitoring for clotting signs and symptoms and When to seek medical attention/emergency care    Plan made per [facility] anticoagulation  protocol    Lance Weller RN  Anticoagulation Clinic  7/2/2021    _______________________________________________________________________     Anticoagulation Episode Summary     Current INR goal:  2.0-3.0   TTR:  69.4 % (1 y)   Target end date:  Indefinite   Send INR reminders to:  [facility]    Indications    Atrial fibrillation (H) [I48.91]  Long term current use of anticoagulant therapy [Z79.01]  Longstanding persistent atrial fibrillation (H) [I48.11]  Long term current use of anticoagulants with INR goal of 2.0-3.0 [Z79.01]  Atrial fibrillation  unspecified type (H) [I48.91]           Comments:  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

## 2021-07-09 ENCOUNTER — ANTICOAGULATION THERAPY VISIT (OUTPATIENT)
Dept: PEDIATRICS | Facility: CLINIC | Age: 86
End: 2021-07-09

## 2021-07-09 DIAGNOSIS — I48.91 ATRIAL FIBRILLATION, UNSPECIFIED TYPE (H): ICD-10-CM

## 2021-07-09 DIAGNOSIS — Z79.01 LONG TERM CURRENT USE OF ANTICOAGULANTS WITH INR GOAL OF 2.0-3.0: ICD-10-CM

## 2021-07-09 DIAGNOSIS — Z79.01 LONG TERM CURRENT USE OF ANTICOAGULANT THERAPY: ICD-10-CM

## 2021-07-09 DIAGNOSIS — I48.11 LONGSTANDING PERSISTENT ATRIAL FIBRILLATION (H): ICD-10-CM

## 2021-07-09 DIAGNOSIS — I48.91 ATRIAL FIBRILLATION (H): ICD-10-CM

## 2021-07-09 LAB
CAPILLARY BLOOD COLLECTION: NORMAL
INR PPP: 1.7 (ref 0.86–1.14)

## 2021-07-09 PROCEDURE — 85610 PROTHROMBIN TIME: CPT | Performed by: NURSE PRACTITIONER

## 2021-07-09 PROCEDURE — 36416 COLLJ CAPILLARY BLOOD SPEC: CPT | Performed by: NURSE PRACTITIONER

## 2021-07-09 NOTE — PROGRESS NOTES
ANTICOAGULATION MANAGEMENT     Maia Miranda 88 year old female is on warfarin with subtherapeutic INR result. (Goal INR 2.0-3.0)    Recent labs: (last 7 days)     07/09/21  1055   INR 1.70*       ASSESSMENT     Source(s): Patient/Caregiver Call       Warfarin doses taken: Warfarin taken as instructed. Previous missed dose, possibly affecting INR (slow to respond).    Diet: No new diet changes identified    New illness, injury, or hospitalization: No    Medication/supplement changes: None noted    Signs or symptoms of bleeding or clotting: No    Previous INR: Subtherapeutic    Additional findings: None     PLAN     Recommended plan for temporary change(s) affecting INR     Dosing Instructions: Booster dose then continue your current warfarin dose with next INR in 2 weeks       Summary  As of 7/9/2021    Full warfarin instructions:  7/9: 2.5 mg; Otherwise 1.25 mg every Mon, Fri; 2.5 mg all other days   Next INR check:  7/23/2021             Telephone call with Maia who verbalizes understanding and agrees to plan    Lab visit scheduled    Education provided: Please call back if any changes to your diet, medications or how you've been taking warfarin, Monitoring for bleeding signs and symptoms, Monitoring for clotting signs and symptoms and Importance of notifying clinic for changes in medications; a sooner lab recheck maybe needed.    Plan made per [facility] anticoagulation protocol    Elida Moreira RN  Anticoagulation Clinic  7/9/2021    _______________________________________________________________________     Anticoagulation Episode Summary     Current INR goal:  2.0-3.0   TTR:  67.8 % (1 y)   Target end date:  Indefinite   Send INR reminders to:  [facility]    Indications    Atrial fibrillation (H) [I48.91]  Long term current use of anticoagulant therapy [Z79.01]  Longstanding persistent atrial fibrillation (H) [I48.11]  Long term current use of anticoagulants with INR goal of 2.0-3.0  [Z79.01]  Atrial fibrillation  unspecified type (H) [I48.91]           Comments:  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

## 2021-07-09 NOTE — PROGRESS NOTES
Anticoagulation Management    Unable to reach Cunningham today.    Today's INR result of 1.7 is subtherapeutic (goal INR of 2.0-3.0).  Result received from: Clinic Lab    Follow up required to discuss out of range INR     Left message to take a booster dose of warfarin,  2.5 mg tonight. Then resume maintenance dose (1.25 mg Mon, Fri + 2.5 mg AOD) over the weekend. Encouraged callback at patient's earliest convenience.       Anticoagulation clinic to follow up    Elida Moreira RN

## 2021-07-20 ENCOUNTER — ANTICOAGULATION THERAPY VISIT (OUTPATIENT)
Dept: ANTICOAGULATION | Facility: CLINIC | Age: 86
End: 2021-07-20

## 2021-07-20 ENCOUNTER — LAB (OUTPATIENT)
Dept: LAB | Facility: CLINIC | Age: 86
End: 2021-07-20
Payer: COMMERCIAL

## 2021-07-20 DIAGNOSIS — I48.91 ATRIAL FIBRILLATION (H): Primary | ICD-10-CM

## 2021-07-20 DIAGNOSIS — I48.11 LONGSTANDING PERSISTENT ATRIAL FIBRILLATION (H): ICD-10-CM

## 2021-07-20 DIAGNOSIS — I48.91 ATRIAL FIBRILLATION, UNSPECIFIED TYPE (H): ICD-10-CM

## 2021-07-20 DIAGNOSIS — Z79.01 LONG TERM CURRENT USE OF ANTICOAGULANTS WITH INR GOAL OF 2.0-3.0: ICD-10-CM

## 2021-07-20 DIAGNOSIS — Z79.01 LONG TERM CURRENT USE OF ANTICOAGULANT THERAPY: ICD-10-CM

## 2021-07-20 LAB — INR BLD: 2.5 (ref 0.9–1.1)

## 2021-07-20 PROCEDURE — 85610 PROTHROMBIN TIME: CPT

## 2021-07-20 PROCEDURE — 36416 COLLJ CAPILLARY BLOOD SPEC: CPT

## 2021-07-20 NOTE — PROGRESS NOTES
ANTICOAGULATION MANAGEMENT     Maia Miranda 88 year old female is on warfarin with therapeutic INR result. (Goal INR 2.0-3.0)    Recent labs: (last 7 days)     07/20/21  0945   INR 2.5*       ASSESSMENT     Source(s): Chart Review and Patient/Caregiver Call       Warfarin doses taken: Warfarin taken as instructed    Diet: No new diet changes identified    New illness, injury, or hospitalization: No    Medication/supplement changes: None noted    Signs or symptoms of bleeding or clotting: No    Previous INR: Subtherapeutic    Additional findings: No confusion noted at time of this call.     PLAN     Recommended plan for no diet, medication or health factor changes affecting INR     Dosing Instructions: Continue your current warfarin dose with next INR in 3 weeks       Summary  As of 7/20/2021    Full warfarin instructions:  1.25 mg every Mon, Fri; 2.5 mg all other days   Next INR check:  8/10/2021             Telephone call with Maia who verbalizes understanding and agrees to plan    Lab visit scheduled    Education provided: Please call back if any changes to your diet, medications or how you've been taking warfarin    Plan made per [facility] anticoagulation protocol    Dian Astudillo RN  Anticoagulation Clinic  7/20/2021    _______________________________________________________________________     Anticoagulation Episode Summary     Current INR goal:  2.0-3.0   TTR:  68.7 % (1 y)   Target end date:  Indefinite   Send INR reminders to:  [facility]    Indications    Atrial fibrillation (H) [I48.91]  Long term current use of anticoagulant therapy [Z79.01]  Longstanding persistent atrial fibrillation (H) [I48.11]  Long term current use of anticoagulants with INR goal of 2.0-3.0 [Z79.01]  Atrial fibrillation  unspecified type (H) [I48.91]           Comments:  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

## 2021-07-27 ENCOUNTER — MEDICAL CORRESPONDENCE (OUTPATIENT)
Dept: HEALTH INFORMATION MANAGEMENT | Facility: CLINIC | Age: 86
End: 2021-07-27
Payer: COMMERCIAL

## 2021-08-03 ENCOUNTER — ANTICOAGULATION THERAPY VISIT (OUTPATIENT)
Dept: ANTICOAGULATION | Facility: CLINIC | Age: 86
End: 2021-08-03

## 2021-08-03 ENCOUNTER — LAB (OUTPATIENT)
Dept: LAB | Facility: CLINIC | Age: 86
End: 2021-08-03
Payer: COMMERCIAL

## 2021-08-03 DIAGNOSIS — I48.91 ATRIAL FIBRILLATION (H): Primary | ICD-10-CM

## 2021-08-03 DIAGNOSIS — I48.91 ATRIAL FIBRILLATION, UNSPECIFIED TYPE (H): ICD-10-CM

## 2021-08-03 DIAGNOSIS — Z79.01 LONG TERM CURRENT USE OF ANTICOAGULANTS WITH INR GOAL OF 2.0-3.0: ICD-10-CM

## 2021-08-03 DIAGNOSIS — Z79.01 LONG TERM CURRENT USE OF ANTICOAGULANT THERAPY: ICD-10-CM

## 2021-08-03 DIAGNOSIS — I48.11 LONGSTANDING PERSISTENT ATRIAL FIBRILLATION (H): ICD-10-CM

## 2021-08-03 LAB — INR BLD: 2.8 (ref 0.9–1.1)

## 2021-08-03 PROCEDURE — 36416 COLLJ CAPILLARY BLOOD SPEC: CPT

## 2021-08-03 PROCEDURE — 85610 PROTHROMBIN TIME: CPT

## 2021-08-03 NOTE — PROGRESS NOTES
ANTICOAGULATION MANAGEMENT     Maia Miranda 88 year old female is on warfarin with therapeutic INR result. (Goal INR 2.0-3.0)    Recent labs: (last 7 days)     08/03/21  0917   INR 2.8*       ASSESSMENT     Source(s): Chart Review and Patient/Caregiver Call       Warfarin doses taken: Warfarin taken as instructed    Diet: No new diet changes identified    New illness, injury, or hospitalization: No    Medication/supplement changes: None noted    Signs or symptoms of bleeding or clotting: No    Previous INR: Therapeutic last visit; previously outside of goal range    Additional findings: None     PLAN     Recommended plan for no diet, medication or health factor changes affecting INR     Dosing Instructions: Continue your current warfarin dose with next INR in 2-3 weeks       Summary  As of 8/3/2021    Full warfarin instructions:  1.25 mg every Mon, Fri; 2.5 mg all other days   Next INR check:  8/24/2021             Telephone call with Maia who verbalizes understanding and agrees to plan    Lab visit scheduled    Education provided: Please call back if any changes to your diet, medications or how you've been taking warfarin, Monitoring for bleeding signs and symptoms, Monitoring for clotting signs and symptoms and Importance of notifying clinic for changes in medications; a sooner lab recheck maybe needed.    Plan made per [facility] anticoagulation protocol    Elida Moreira RN  Anticoagulation Clinic  8/3/2021    _______________________________________________________________________     Anticoagulation Episode Summary     Current INR goal:  2.0-3.0   TTR:  69.4 % (1 y)   Target end date:  Indefinite   Send INR reminders to:  [facility]    Indications    Atrial fibrillation (H) [I48.91]  Long term current use of anticoagulant therapy [Z79.01]  Longstanding persistent atrial fibrillation (H) [I48.11]  Long term current use of anticoagulants with INR goal of 2.0-3.0 [Z79.01]  Atrial  fibrillation  unspecified type (H) [I48.91]           Comments:  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

## 2021-08-24 ENCOUNTER — LAB (OUTPATIENT)
Dept: LAB | Facility: CLINIC | Age: 86
End: 2021-08-24
Payer: COMMERCIAL

## 2021-08-24 ENCOUNTER — ANTICOAGULATION THERAPY VISIT (OUTPATIENT)
Dept: ANTICOAGULATION | Facility: CLINIC | Age: 86
End: 2021-08-24

## 2021-08-24 DIAGNOSIS — I48.91 ATRIAL FIBRILLATION (H): Primary | ICD-10-CM

## 2021-08-24 DIAGNOSIS — Z79.01 LONG TERM CURRENT USE OF ANTICOAGULANT THERAPY: ICD-10-CM

## 2021-08-24 DIAGNOSIS — I48.11 LONGSTANDING PERSISTENT ATRIAL FIBRILLATION (H): ICD-10-CM

## 2021-08-24 DIAGNOSIS — I48.91 ATRIAL FIBRILLATION, UNSPECIFIED TYPE (H): ICD-10-CM

## 2021-08-24 DIAGNOSIS — Z79.01 LONG TERM CURRENT USE OF ANTICOAGULANTS WITH INR GOAL OF 2.0-3.0: ICD-10-CM

## 2021-08-24 LAB — INR BLD: 3 (ref 0.9–1.1)

## 2021-08-24 PROCEDURE — 85610 PROTHROMBIN TIME: CPT

## 2021-08-24 PROCEDURE — 36416 COLLJ CAPILLARY BLOOD SPEC: CPT

## 2021-08-24 NOTE — PROGRESS NOTES
ANTICOAGULATION MANAGEMENT     Maia Miranda 88 year old female is on warfarin with therapeutic INR result. (Goal INR 2.0-3.0)    Recent labs: (last 7 days)     08/24/21  0851   INR 3.0*       ASSESSMENT     Source(s): Chart Review and Patient/Caregiver Call       Warfarin doses taken: Warfarin taken as instructed    Diet: No new diet changes identified    New illness, injury, or hospitalization: No    Medication/supplement changes: None noted    Signs or symptoms of bleeding or clotting: No    Previous INR: Therapeutic last 2(+) visits    Additional findings: None     PLAN     Recommended plan for no diet, medication or health factor changes affecting INR     Dosing Instructions: Continue your current warfarin dose with next INR in 4 weeks       Summary  As of 8/24/2021    Full warfarin instructions:  1.25 mg every Mon, Fri; 2.5 mg all other days   Next INR check:  9/21/2021             Telephone call with Maia who verbalizes understanding and agrees to plan    Lab visit scheduled    Education provided: Please call back if any changes to your diet, medications or how you've been taking warfarin, Importance of consistent vitamin K intake, Impact of vitamin K foods on INR, Monitoring for bleeding signs and symptoms, Monitoring for clotting signs and symptoms and Contact [phone]  with any changes, questions or concerns.     Plan made per [facility] anticoagulation protocol    Dian Astudillo RN  Anticoagulation Clinic  8/24/2021    _______________________________________________________________________     Anticoagulation Episode Summary     Current INR goal:  2.0-3.0   TTR:  73.9 % (1 y)   Target end date:  Indefinite   Send INR reminders to:  [facility]    Indications    Atrial fibrillation (H) [I48.91]  Long term current use of anticoagulant therapy [Z79.01]  Longstanding persistent atrial fibrillation (H) [I48.11]  Long term current use of anticoagulants with INR goal of 2.0-3.0 [Z79.01]  Atrial  fibrillation  unspecified type (H) [I48.91]           Comments:           Anticoagulation Care Providers     Provider Role Specialty Phone number    Nancy Salgado APRN CNP Referring Internal Medicine [phone]    Li Flores Mai, MD Responsible Internal Medicine - Pediatrics [phone]

## 2021-08-29 ENCOUNTER — HOSPITAL ENCOUNTER (INPATIENT)
Facility: CLINIC | Age: 86
LOS: 4 days | Discharge: HOME HEALTH | DRG: 177 | End: 2021-09-02
Attending: EMERGENCY MEDICINE | Admitting: INTERNAL MEDICINE
Payer: COMMERCIAL

## 2021-08-29 ENCOUNTER — APPOINTMENT (OUTPATIENT)
Dept: GENERAL RADIOLOGY | Facility: CLINIC | Age: 86
DRG: 177 | End: 2021-08-29
Attending: EMERGENCY MEDICINE
Payer: COMMERCIAL

## 2021-08-29 ENCOUNTER — APPOINTMENT (OUTPATIENT)
Dept: CT IMAGING | Facility: CLINIC | Age: 86
DRG: 177 | End: 2021-08-29
Attending: EMERGENCY MEDICINE
Payer: COMMERCIAL

## 2021-08-29 ENCOUNTER — OFFICE VISIT (OUTPATIENT)
Dept: URGENT CARE | Facility: URGENT CARE | Age: 86
End: 2021-08-29
Payer: COMMERCIAL

## 2021-08-29 VITALS
OXYGEN SATURATION: 97 % | RESPIRATION RATE: 20 BRPM | HEART RATE: 64 BPM | SYSTOLIC BLOOD PRESSURE: 133 MMHG | TEMPERATURE: 98.4 F | DIASTOLIC BLOOD PRESSURE: 62 MMHG

## 2021-08-29 DIAGNOSIS — I48.11 LONGSTANDING PERSISTENT ATRIAL FIBRILLATION (H): ICD-10-CM

## 2021-08-29 DIAGNOSIS — R07.9 CHEST PAIN, UNSPECIFIED TYPE: Primary | ICD-10-CM

## 2021-08-29 DIAGNOSIS — R53.81 PHYSICAL DECONDITIONING: ICD-10-CM

## 2021-08-29 DIAGNOSIS — I21.A1 TYPE 2 MI (MYOCARDIAL INFARCTION) (H): Primary | ICD-10-CM

## 2021-08-29 DIAGNOSIS — J02.9 SORE THROAT: ICD-10-CM

## 2021-08-29 DIAGNOSIS — R07.9 CHEST PAIN, UNSPECIFIED TYPE: ICD-10-CM

## 2021-08-29 DIAGNOSIS — R41.0 ACUTE CONFUSION: ICD-10-CM

## 2021-08-29 DIAGNOSIS — I48.20 CHRONIC ATRIAL FIBRILLATION (H): ICD-10-CM

## 2021-08-29 DIAGNOSIS — R79.89 ELEVATED TROPONIN: ICD-10-CM

## 2021-08-29 DIAGNOSIS — Z87.440 HISTORY OF RECURRENT UTIS: ICD-10-CM

## 2021-08-29 DIAGNOSIS — R06.00 DYSPNEA, UNSPECIFIED TYPE: ICD-10-CM

## 2021-08-29 LAB
ANION GAP SERPL CALCULATED.3IONS-SCNC: 7 MMOL/L (ref 3–14)
BASOPHILS # BLD AUTO: 0 10E3/UL (ref 0–0.2)
BASOPHILS NFR BLD AUTO: 0 %
BUN SERPL-MCNC: 30 MG/DL (ref 7–30)
CALCIUM SERPL-MCNC: 9 MG/DL (ref 8.5–10.1)
CHLORIDE BLD-SCNC: 105 MMOL/L (ref 94–109)
CO2 SERPL-SCNC: 25 MMOL/L (ref 20–32)
CREAT BLD-MCNC: 1.4 MG/DL (ref 0.5–1)
CREAT SERPL-MCNC: 1.4 MG/DL (ref 0.52–1.04)
DEPRECATED S PYO AG THROAT QL EIA: NEGATIVE
EOSINOPHIL # BLD AUTO: 0 10E3/UL (ref 0–0.7)
EOSINOPHIL NFR BLD AUTO: 0 %
ERYTHROCYTE [DISTWIDTH] IN BLOOD BY AUTOMATED COUNT: 12.9 % (ref 10–15)
GFR SERPL CREATININE-BSD FRML MDRD: 34 ML/MIN/1.73M2
GFR SERPL CREATININE-BSD FRML MDRD: 34 ML/MIN/1.73M2
GLUCOSE BLD-MCNC: 98 MG/DL (ref 70–99)
HCT VFR BLD AUTO: 40.8 % (ref 35–47)
HGB BLD-MCNC: 13.2 G/DL (ref 11.7–15.7)
IMM GRANULOCYTES # BLD: 0 10E3/UL
IMM GRANULOCYTES NFR BLD: 0 %
INR PPP: 2.01 (ref 0.85–1.15)
LYMPHOCYTES # BLD AUTO: 0.8 10E3/UL (ref 0.8–5.3)
LYMPHOCYTES NFR BLD AUTO: 24 %
MCH RBC QN AUTO: 32.6 PG (ref 26.5–33)
MCHC RBC AUTO-ENTMCNC: 32.4 G/DL (ref 31.5–36.5)
MCV RBC AUTO: 101 FL (ref 78–100)
MONOCYTES # BLD AUTO: 0.7 10E3/UL (ref 0–1.3)
MONOCYTES NFR BLD AUTO: 20 %
NEUTROPHILS # BLD AUTO: 1.9 10E3/UL (ref 1.6–8.3)
NEUTROPHILS NFR BLD AUTO: 56 %
NRBC # BLD AUTO: 0 10E3/UL
NRBC BLD AUTO-RTO: 0 /100
PLATELET # BLD AUTO: 123 10E3/UL (ref 150–450)
POTASSIUM BLD-SCNC: 4 MMOL/L (ref 3.4–5.3)
RBC # BLD AUTO: 4.05 10E6/UL (ref 3.8–5.2)
SARS-COV-2 RNA RESP QL NAA+PROBE: POSITIVE
SODIUM SERPL-SCNC: 137 MMOL/L (ref 133–144)
TROPONIN I SERPL-MCNC: 0.08 UG/L (ref 0–0.04)
TROPONIN T BLD-MCNC: 0.1 UG/L
WBC # BLD AUTO: 3.4 10E3/UL (ref 4–11)

## 2021-08-29 PROCEDURE — 71046 X-RAY EXAM CHEST 2 VIEWS: CPT

## 2021-08-29 PROCEDURE — 82565 ASSAY OF CREATININE: CPT

## 2021-08-29 PROCEDURE — 99214 OFFICE O/P EST MOD 30 MIN: CPT | Performed by: FAMILY MEDICINE

## 2021-08-29 PROCEDURE — 99285 EMERGENCY DEPT VISIT HI MDM: CPT | Mod: 25

## 2021-08-29 PROCEDURE — 93005 ELECTROCARDIOGRAM TRACING: CPT

## 2021-08-29 PROCEDURE — 36415 COLL VENOUS BLD VENIPUNCTURE: CPT | Performed by: EMERGENCY MEDICINE

## 2021-08-29 PROCEDURE — 96360 HYDRATION IV INFUSION INIT: CPT | Mod: 59

## 2021-08-29 PROCEDURE — 85610 PROTHROMBIN TIME: CPT | Performed by: EMERGENCY MEDICINE

## 2021-08-29 PROCEDURE — 250N000013 HC RX MED GY IP 250 OP 250 PS 637: Performed by: EMERGENCY MEDICINE

## 2021-08-29 PROCEDURE — 87651 STREP A DNA AMP PROBE: CPT | Performed by: EMERGENCY MEDICINE

## 2021-08-29 PROCEDURE — 250N000013 HC RX MED GY IP 250 OP 250 PS 637: Performed by: INTERNAL MEDICINE

## 2021-08-29 PROCEDURE — 85025 COMPLETE CBC W/AUTO DIFF WBC: CPT | Performed by: EMERGENCY MEDICINE

## 2021-08-29 PROCEDURE — 87635 SARS-COV-2 COVID-19 AMP PRB: CPT | Performed by: EMERGENCY MEDICINE

## 2021-08-29 PROCEDURE — 84484 ASSAY OF TROPONIN QUANT: CPT | Performed by: EMERGENCY MEDICINE

## 2021-08-29 PROCEDURE — 70491 CT SOFT TISSUE NECK W/DYE: CPT

## 2021-08-29 PROCEDURE — 80048 BASIC METABOLIC PNL TOTAL CA: CPT | Performed by: EMERGENCY MEDICINE

## 2021-08-29 PROCEDURE — 84484 ASSAY OF TROPONIN QUANT: CPT

## 2021-08-29 PROCEDURE — 93005 ELECTROCARDIOGRAM TRACING: CPT | Mod: 76

## 2021-08-29 PROCEDURE — 120N000001 HC R&B MED SURG/OB

## 2021-08-29 PROCEDURE — 99223 1ST HOSP IP/OBS HIGH 75: CPT | Mod: AI | Performed by: INTERNAL MEDICINE

## 2021-08-29 PROCEDURE — 258N000003 HC RX IP 258 OP 636: Performed by: EMERGENCY MEDICINE

## 2021-08-29 PROCEDURE — 93000 ELECTROCARDIOGRAM COMPLETE: CPT | Performed by: FAMILY MEDICINE

## 2021-08-29 PROCEDURE — 96361 HYDRATE IV INFUSION ADD-ON: CPT

## 2021-08-29 PROCEDURE — C9803 HOPD COVID-19 SPEC COLLECT: HCPCS

## 2021-08-29 PROCEDURE — 250N000011 HC RX IP 250 OP 636: Performed by: EMERGENCY MEDICINE

## 2021-08-29 RX ORDER — LIDOCAINE 40 MG/G
CREAM TOPICAL
Status: DISCONTINUED | OUTPATIENT
Start: 2021-08-29 | End: 2021-09-02 | Stop reason: HOSPADM

## 2021-08-29 RX ORDER — ASPIRIN 81 MG/1
324 TABLET, CHEWABLE ORAL ONCE
Status: COMPLETED | OUTPATIENT
Start: 2021-08-29 | End: 2021-08-29

## 2021-08-29 RX ORDER — POLYETHYLENE GLYCOL 3350 17 G/17G
17 POWDER, FOR SOLUTION ORAL DAILY PRN
Status: DISCONTINUED | OUTPATIENT
Start: 2021-08-29 | End: 2021-09-02 | Stop reason: HOSPADM

## 2021-08-29 RX ORDER — ONDANSETRON 2 MG/ML
4 INJECTION INTRAMUSCULAR; INTRAVENOUS EVERY 6 HOURS PRN
Status: DISCONTINUED | OUTPATIENT
Start: 2021-08-29 | End: 2021-09-02 | Stop reason: HOSPADM

## 2021-08-29 RX ORDER — CALCIUM CARBONATE 500 MG/1
1000 TABLET, CHEWABLE ORAL 4 TIMES DAILY PRN
Status: DISCONTINUED | OUTPATIENT
Start: 2021-08-29 | End: 2021-09-02 | Stop reason: HOSPADM

## 2021-08-29 RX ORDER — BISACODYL 10 MG
10 SUPPOSITORY, RECTAL RECTAL DAILY PRN
Status: DISCONTINUED | OUTPATIENT
Start: 2021-08-29 | End: 2021-09-02 | Stop reason: HOSPADM

## 2021-08-29 RX ORDER — LISINOPRIL 40 MG/1
40 TABLET ORAL DAILY
Status: DISCONTINUED | OUTPATIENT
Start: 2021-08-30 | End: 2021-09-02 | Stop reason: HOSPADM

## 2021-08-29 RX ORDER — BENZONATATE 100 MG/1
100 CAPSULE ORAL 3 TIMES DAILY
Status: DISCONTINUED | OUTPATIENT
Start: 2021-08-29 | End: 2021-09-02 | Stop reason: HOSPADM

## 2021-08-29 RX ORDER — NALOXONE HYDROCHLORIDE 0.4 MG/ML
0.2 INJECTION, SOLUTION INTRAMUSCULAR; INTRAVENOUS; SUBCUTANEOUS
Status: DISCONTINUED | OUTPATIENT
Start: 2021-08-29 | End: 2021-09-02 | Stop reason: HOSPADM

## 2021-08-29 RX ORDER — AMOXICILLIN 250 MG
2 CAPSULE ORAL 2 TIMES DAILY PRN
Status: DISCONTINUED | OUTPATIENT
Start: 2021-08-29 | End: 2021-09-02 | Stop reason: HOSPADM

## 2021-08-29 RX ORDER — IOPAMIDOL 755 MG/ML
500 INJECTION, SOLUTION INTRAVASCULAR ONCE
Status: COMPLETED | OUTPATIENT
Start: 2021-08-29 | End: 2021-08-29

## 2021-08-29 RX ORDER — NALOXONE HYDROCHLORIDE 0.4 MG/ML
0.4 INJECTION, SOLUTION INTRAMUSCULAR; INTRAVENOUS; SUBCUTANEOUS
Status: DISCONTINUED | OUTPATIENT
Start: 2021-08-29 | End: 2021-09-02 | Stop reason: HOSPADM

## 2021-08-29 RX ORDER — AMOXICILLIN 250 MG
1 CAPSULE ORAL 2 TIMES DAILY PRN
Status: DISCONTINUED | OUTPATIENT
Start: 2021-08-29 | End: 2021-09-02 | Stop reason: HOSPADM

## 2021-08-29 RX ORDER — METOPROLOL SUCCINATE 100 MG/1
100 TABLET, EXTENDED RELEASE ORAL DAILY
Status: DISCONTINUED | OUTPATIENT
Start: 2021-08-30 | End: 2021-09-02 | Stop reason: HOSPADM

## 2021-08-29 RX ORDER — ACETAMINOPHEN 325 MG/1
975 TABLET ORAL EVERY 8 HOURS
Status: DISCONTINUED | OUTPATIENT
Start: 2021-08-29 | End: 2021-09-02 | Stop reason: HOSPADM

## 2021-08-29 RX ORDER — PROCHLORPERAZINE 25 MG
12.5 SUPPOSITORY, RECTAL RECTAL EVERY 12 HOURS PRN
Status: DISCONTINUED | OUTPATIENT
Start: 2021-08-29 | End: 2021-09-02 | Stop reason: HOSPADM

## 2021-08-29 RX ORDER — PROCHLORPERAZINE MALEATE 5 MG
5 TABLET ORAL EVERY 6 HOURS PRN
Status: DISCONTINUED | OUTPATIENT
Start: 2021-08-29 | End: 2021-09-02 | Stop reason: HOSPADM

## 2021-08-29 RX ORDER — ONDANSETRON 4 MG/1
4 TABLET, ORALLY DISINTEGRATING ORAL EVERY 6 HOURS PRN
Status: DISCONTINUED | OUTPATIENT
Start: 2021-08-29 | End: 2021-09-02 | Stop reason: HOSPADM

## 2021-08-29 RX ORDER — WARFARIN SODIUM 2.5 MG/1
2.5 TABLET ORAL ONCE
Status: COMPLETED | OUTPATIENT
Start: 2021-08-30 | End: 2021-08-30

## 2021-08-29 RX ADMIN — SODIUM CHLORIDE 1000 ML: 9 INJECTION, SOLUTION INTRAVENOUS at 16:52

## 2021-08-29 RX ADMIN — ASPIRIN 81 MG CHEWABLE TABLET 324 MG: 81 TABLET CHEWABLE at 20:26

## 2021-08-29 RX ADMIN — ACETAMINOPHEN 975 MG: 325 TABLET, FILM COATED ORAL at 22:57

## 2021-08-29 RX ADMIN — BENZONATATE 100 MG: 100 CAPSULE ORAL at 22:58

## 2021-08-29 RX ADMIN — IOPAMIDOL 80 ML: 755 INJECTION, SOLUTION INTRAVENOUS at 17:23

## 2021-08-29 ASSESSMENT — ENCOUNTER SYMPTOMS
FEVER: 0
TROUBLE SWALLOWING: 1
SORE THROAT: 1

## 2021-08-29 ASSESSMENT — MIFFLIN-ST. JEOR: SCORE: 1078.16

## 2021-08-29 NOTE — PROGRESS NOTES
SUBJECTIVE:   Maia Miranda is a 88 year old female with a history of atrial fibrillation and who is on Warfarin and Metoprolol.  Patient is accompanied by her daughter. Patient is presenting with a chief complaint of possible midline/bilateral chest discomfort for the past 3-4 days, difficulty breathing in the chest for the past 3-4 days,  cough (mild cough that is nonproductive), sore throat (severe pain with swallowing) for the past 3-4 days, confusion for the past few days.    No close contacts with COVID-19   Her second dose of the Pfizer COVID-19 vaccine was given on March 10, 2021.    She was around a granddaughter who recently had a cold.           Past Medical History:   Diagnosis Date     Amnestic MCI (mild cognitive impairment with memory loss) 2014     Chronic duodenal ulcer without mention of hemorrhage, perforation, or obstruction 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 Warner, [facility] yearly     Esophageal reflux 2001    Abstracted 06/10/02     Essential and other specified forms of tremor 2004    resting tremor     Generalized osteoarthrosis, unspecified site     Hands     Hiatal hernia     diagnosed late 1990s [place], [place]     History of pulmonary embolism 1971    no source found     LACTOSE INTOLERANCE      Lumbago     sees Kodi Guidry     Mitral valve regurgitation      Occlusion and stenosis of carotid artery without mention of cerebral infarction 2003    CAROTID US NORMAL, bruit in neck     Osteoporosis, unspecified 2003    T = -2.66     Paroxysmal atrial fibrillation (H) 11/15/2013     Spider veins      Unspecified essential hypertension      Unspecified tinnitus 2005    mild hearing loss, saw ENT     Current Outpatient Medications   Medication Sig Dispense Refill     acetaminophen (TYLENOL) 500 MG tablet Take 500-1,000 mg by mouth every 6 hours as needed       ascorbic acid (VITAMIN C) 1000 MG TABS Take 1 tablet  (1,000 mg) by mouth 2 times daily 180 tablet 3     calcium citrate-vitamin D (CITRACAL) 315-250 MG-UNIT TABS per tablet Take 1 tablet by mouth daily       carboxymethylcellulose (CELLUVISC/REFRESH LIQUIGEL) 1 % ophthalmic solution Place 1 drop into both eyes every morning As needed       Catheters (GENTLECATH URINARY CATHETER) MISC 1 catheter 4 times daily 120 each 12     cephALEXin (KEFLEX) 250 MG capsule Take 1 capsule (250 mg) by mouth daily 90 capsule 3     cephALEXin (KEFLEX) 500 MG capsule Take 1 capsule (500 mg) by mouth daily 10 capsule 0     Cholecalciferol (VITAMIN D-3) 1000 UNITS CAPS Take 1 capsule by mouth daily       COMPOUNDED NON-CONTROLLED SUBSTANCE (CMPD RX) - PHARMACY TO MIX COMPOUNDED MEDICATION Gentamicin solution: 480 mg gentamicin in 1 liter 0.9 normal saline. Instill 60 ml into bladder via catheter daily at bedtime. 2 Bottle 3     conjugated estrogens (PREMARIN) 0.625 MG/GM vaginal cream Place 0.5 g vaginally twice a week On Tuesday and Friday. Uses a pea sized amount 5 g 2     estradiol (ESTRING) 2 MG vaginal ring Place 1 each vaginally every 3 months 1 each 1     lisinopril (ZESTRIL) 40 MG tablet Take 1 tablet (40 mg) by mouth daily 90 tablet 3     meclizine (ANTIVERT) 25 MG tablet Take 1 tablet (25 mg) by mouth every 6 hours as needed for dizziness 30 tablet 3     metoprolol succinate ER (TOPROL-XL) 100 MG 24 hr tablet Take 1 tablet (100 mg) by mouth daily 90 tablet 3     Omega-3 Fatty Acids (OMEGA-3 FISH OIL PO) Take 1 g by mouth 2 times daily (with meals)       sodium chloride (PETER 128) 5 % ophthalmic ointment Place 1 Application into both eyes At Bedtime       warfarin ANTICOAGULANT (COUMADIN) 2.5 MG tablet Take a half tablet (1.25 mg) on Mon & Fri; take 1 tablet (2.5 mg) all other days or as directed by INR clinic 90 tablet 3     Social History     Tobacco Use     Smoking status: Never Smoker     Smokeless tobacco: Never Used   Substance Use Topics     Alcohol use: No          OBJECTIVE:  /62   Pulse 64   Temp 98.4  F (36.9  C) (Tympanic)   Resp 20   SpO2 97%   GENERAL APPEARANCE: healthy, alert and no distress. Patient can speak in full sentences. Patient is mildly confused.    RESP: lungs clear to auscultation - no rales, rhonchi or wheezes  CV: rate is in the 60s.  Irregularly irregular rhythm is present, normal S1 S2, no murmur noted  SKIN: no cyanosis.      EKG:  Rate is 61 beats per minute.  Afib is present.  Normal axis.  There are inverted T waves in leads V1 and III (also seen in an EKG on 10/20/2018).      ASSESSMENT:  Dyspnea  Chest discomfort   Sore Throat.    Chronic Atrial Fibrillation  Acute Confusion.      PLAN:  Patient will go to the [facility] emergency room for further evaluation for the dyspnea chest discomfort, sore throat and also to rule out breakthrough COVID-19 infection.        Balaji Cartagena MD

## 2021-08-29 NOTE — ED PROVIDER NOTES
History   Chief Complaint:  Sore throat    The history is provided by the patient.      Maia Miranda is a 88 year old female with history of GERD, PE, CKD stage III, hypertension, hyperlipidemia, and atrial fibrillation, currently anticoagulated on Coumadin who presents with sore throat.  The patient reports evolving sore throat for 2 to 3 days causing some mild hoarseness and pain with swallowing.  Of note, she also notes intermittent chest pain, although not associated with exertion or rest consistently, difficulty breathing, or any other concerns.  She denies fevers, change in taste or smell, or any other concerns.    Review of Systems   Constitutional: Negative for fever.   HENT: Positive for sore throat and trouble swallowing.    Cardiovascular: Positive for chest pain.   All other systems reviewed and are negative.        Allergies:  Codeine  Meperidine  Morphine  Penicillins  Sulfa Drugs  Epinephrine    Medications:  Zestril  Antivert  Toprol  Coumadin    Past Medical History:    Amnestic MCI  Chronic duodenal ulcer  Depression  Erosive eye disorder  GERD  Generalized osteoarthritis  Hiatal hernia  PE  Lumbago  Mitral valve regurgitation  Osteoporosis  Atrial fibrillation  Hypertension  Hyperlipidemia  Ischemic colitis  Recurrent UTI  CKD Stage III  Diverticulosis   Osteopenia   Thrombocytopenia  Cervicalgia  Allergic rhinitis  Right bundle branch block     Past Surgical History:    Cataract IOL, bilateral  Corneal scraping  Cystoscopy  Combined cystoscopy and bladder biopsy  Combined EGD  Implant sacral nerve stimulator, stage one/two  Dilation and curettage x2  Left breast biopsy x2  Colonoscopy     Family History:    Cerebrovascular disease, ather  Depression, mother  Suicide, mother  Cardiovascular disease, sister    Social History:  PCP: Nancy Salgado  Presents to the ED alone.    Physical Exam     Patient Vitals for the past 24 hrs:   BP Temp Temp src Pulse Resp SpO2 Weight   08/29/21 1601 (!)  190/79 98  F (36.7  C) Oral 65 24 96 % 68 kg (150 lb)   08/29/21 1411 (!) 159/80 98.3  F (36.8  C) Temporal 64 18 97 % --       Physical Exam  Constitutional: Alert, attentive  HENT:    Nose normal.    Posterior pharynx shows no edema or erythema   Normal midline uvula   No peritonsillar erythema or swelling   No sublingual induration, swelling or tenderness   No trismus   Able to extend neck without discomfort   Tolerating secretions and able to breathe supine with ease  Eyes: EOM are normal. Pupils are equal, round, and reactive to light.   CV: irregularly irregular   Chest: Effort normal and breath sounds normal.   GI:  There is no tenderness. No distension. Normal bowel sounds  MSK: Normal range of motion.   Neurological: Alert, attentive  Skin: Skin is warm and dry.        Emergency Department Course     ECG #1:  ECG taken at 1453, ECG read at 1458  Sinus rhythm with 1st degree AV block  Left axis deviation  Right bundle branch block  Abnormal ECG  No significant change as compared to prior, dated 03/23/2018.   Rate 67 bpm. GA interval 292 ms. QRS duration 122 ms. QT/QTc 452/477 ms. P-R-T axes 80 -62 -15.    ECG #2:  ECG taken at 2104, ECG read at 2105  Sinus rhythm with 1st degree AV block  Left axis deviation  Right bundle branch block  Abnormal ECG  No significant change as compared to prior, dated 08/29/2021 at 1453, as noted above.  Rate 71 bpm. GA interval 298 ms. QRS duration 132 ms. QT/QTc 442/480 ms. P-R-T axes 86 -64 2.     Imaging:  CT Soft Tissue Neck w contrast:   1.  No cervical mass, lymphadenopathy or localized inflammatory process.    Reading per radiology     Chest  XR:  Large lung volumes. Lungs are grossly clear. No pleural effusion or pneumothorax. Mild cardiac silhouette enlargement. No definitive pulmonary edema. Aortic atherosclerosis. Degenerative changes spine. Bony demineralization.     Reading per radiology     Laboratory:  CBC: WBC 3.4(L), HGB 13.2, (L)   BMP: GFR 34(L), o/w  WNL (Creatinine: 1.40(H))    Troponin (Collected 1523): 0.078(H)  ISTAT Troponin POCT (Collected 1858): 0.10  Creatinine POCT: 1.4(H), GR 34(L)  Streptococcus A Rapid PCR: Negative  INR: 2.01(H)  Asymptomatic COVID-19 PCR: Pending      Emergency Department Course:  Reviewed:  I reviewed the patient's nursing notes, vitals, past medical records, Care Everywhere.     Assessments:  1551 I performed an assessment and examination of the patient as noted above.      1921 I rechecked the patient and updated with findings.    2049 Findings and plan explained to the Patient who consents to admission. Discussed the patient with Dr. Ambrocio, who will admit the patient to an inpatient bed for further monitoring, evaluation, and treatment.     Interventions:  1652 NS 1L IV Bolus   2026 Aspirin 324 mg, PO    Disposition:  The patient was admitted to the hospital under the care of Dr. Ambrocio.     Impression & Plan   Medical Decision Making:  Maia Miranda is a 88 year old female with multiple medical problems including A. fib on Coumadin and with negative nuclear stress test in 2014, who presents for evaluation of sore throat, also noting chest pain.  Her chest pain is quite atypical but is associated with mildly elevated troponin.  Of note, her course is protracted due to lab analyzer being down and her troponin resulting for the end of her stay after a long wait in the lobby and in the ED room.  Chest x-ray shows no pneumothorax, pneumonia, or wide mediastinum, her INR is therapeutic, strongly suggesting against PE.  Given advanced age and mildly elevated troponin, will admit for serial enzymes and possible cardiology consultation as this could represent early non-ST elevation MI, type II MI, among others.  She remains chest pain-free in the department.    Covid-19  Maia Miranda was evaluated during a global COVID-19 pandemic, which necessitated consideration that the patient might be at risk for infection with the  SARS-CoV-2 virus that causes COVID-19.   Applicable protocols for evaluation were followed during the patient's care.   COVID-19 was considered as part of the patient's evaluation. The plan for testing is:  a test was obtained during this visit.    Diagnosis:    ICD-10-CM    1. Elevated troponin  R77.8    2. Chest pain, unspecified type  R07.9    3. Longstanding persistent atrial fibrillation (H)  I48.11    4. Sore throat  J02.9      Scribe Disclosure:  Reanna ANN, am serving as a scribe at 3:56 PM on 8/29/2021 to document services personally performed by Mango Rey MD based on my observations and the provider's statements to me.       Mango Rey MD  08/29/21 [id number]

## 2021-08-29 NOTE — ED TRIAGE NOTES
Sore throat, chest tightness. Went to  then referred here. x2-3 days of symptoms. More difficult to swallow.

## 2021-08-30 LAB
ALBUMIN SERPL-MCNC: 3.2 G/DL (ref 3.4–5)
ALP SERPL-CCNC: 71 U/L (ref 40–150)
ALT SERPL W P-5'-P-CCNC: 28 U/L (ref 0–50)
ANION GAP SERPL CALCULATED.3IONS-SCNC: 6 MMOL/L (ref 3–14)
AST SERPL W P-5'-P-CCNC: 49 U/L (ref 0–45)
ATRIAL RATE - MUSE: 67 BPM
ATRIAL RATE - MUSE: 71 BPM
BILIRUB SERPL-MCNC: 0.4 MG/DL (ref 0.2–1.3)
BUN SERPL-MCNC: 24 MG/DL (ref 7–30)
CALCIUM SERPL-MCNC: 8.5 MG/DL (ref 8.5–10.1)
CHLORIDE BLD-SCNC: 107 MMOL/L (ref 94–109)
CO2 SERPL-SCNC: 27 MMOL/L (ref 20–32)
CREAT SERPL-MCNC: 1.17 MG/DL (ref 0.52–1.04)
CRP SERPL-MCNC: 21.6 MG/L (ref 0–8)
D DIMER PPP FEU-MCNC: <0.27 UG/ML FEU (ref 0–0.5)
DIASTOLIC BLOOD PRESSURE - MUSE: NORMAL MMHG
DIASTOLIC BLOOD PRESSURE - MUSE: NORMAL MMHG
ERYTHROCYTE [DISTWIDTH] IN BLOOD BY AUTOMATED COUNT: 12.9 % (ref 10–15)
GFR SERPL CREATININE-BSD FRML MDRD: 42 ML/MIN/1.73M2
GLUCOSE BLD-MCNC: 98 MG/DL (ref 70–99)
GROUP A STREP BY PCR: NOT DETECTED
HCT VFR BLD AUTO: 38.2 % (ref 35–47)
HGB BLD-MCNC: 12.5 G/DL (ref 11.7–15.7)
INR PPP: 2.15 (ref 0.85–1.15)
INTERPRETATION ECG - MUSE: NORMAL
INTERPRETATION ECG - MUSE: NORMAL
LDH SERPL L TO P-CCNC: 230 U/L (ref 81–234)
MCH RBC QN AUTO: 32.8 PG (ref 26.5–33)
MCHC RBC AUTO-ENTMCNC: 32.7 G/DL (ref 31.5–36.5)
MCV RBC AUTO: 100 FL (ref 78–100)
P AXIS - MUSE: 80 DEGREES
P AXIS - MUSE: 86 DEGREES
PLATELET # BLD AUTO: 108 10E3/UL (ref 150–450)
POTASSIUM BLD-SCNC: 3.7 MMOL/L (ref 3.4–5.3)
PR INTERVAL - MUSE: 292 MS
PR INTERVAL - MUSE: 298 MS
PROT SERPL-MCNC: 6.5 G/DL (ref 6.8–8.8)
QRS DURATION - MUSE: 122 MS
QRS DURATION - MUSE: 132 MS
QT - MUSE: 442 MS
QT - MUSE: 452 MS
QTC - MUSE: 477 MS
QTC - MUSE: 480 MS
R AXIS - MUSE: -62 DEGREES
R AXIS - MUSE: -64 DEGREES
RBC # BLD AUTO: 3.81 10E6/UL (ref 3.8–5.2)
SODIUM SERPL-SCNC: 140 MMOL/L (ref 133–144)
SYSTOLIC BLOOD PRESSURE - MUSE: NORMAL MMHG
SYSTOLIC BLOOD PRESSURE - MUSE: NORMAL MMHG
T AXIS - MUSE: -15 DEGREES
T AXIS - MUSE: 2 DEGREES
TROPONIN I SERPL-MCNC: 0.1 UG/L (ref 0–0.04)
TROPONIN I SERPL-MCNC: 0.12 UG/L (ref 0–0.04)
TROPONIN I SERPL-MCNC: 0.15 UG/L (ref 0–0.04)
VENTRICULAR RATE- MUSE: 67 BPM
VENTRICULAR RATE- MUSE: 71 BPM
WBC # BLD AUTO: 2.1 10E3/UL (ref 4–11)

## 2021-08-30 PROCEDURE — 250N000013 HC RX MED GY IP 250 OP 250 PS 637: Performed by: INTERNAL MEDICINE

## 2021-08-30 PROCEDURE — 84484 ASSAY OF TROPONIN QUANT: CPT | Performed by: INTERNAL MEDICINE

## 2021-08-30 PROCEDURE — 36415 COLL VENOUS BLD VENIPUNCTURE: CPT | Performed by: INTERNAL MEDICINE

## 2021-08-30 PROCEDURE — 85379 FIBRIN DEGRADATION QUANT: CPT | Performed by: INTERNAL MEDICINE

## 2021-08-30 PROCEDURE — 85027 COMPLETE CBC AUTOMATED: CPT | Performed by: INTERNAL MEDICINE

## 2021-08-30 PROCEDURE — 99233 SBSQ HOSP IP/OBS HIGH 50: CPT | Performed by: INTERNAL MEDICINE

## 2021-08-30 PROCEDURE — 86140 C-REACTIVE PROTEIN: CPT | Performed by: INTERNAL MEDICINE

## 2021-08-30 PROCEDURE — 120N000001 HC R&B MED SURG/OB

## 2021-08-30 PROCEDURE — 83615 LACTATE (LD) (LDH) ENZYME: CPT | Performed by: INTERNAL MEDICINE

## 2021-08-30 PROCEDURE — 85610 PROTHROMBIN TIME: CPT | Performed by: INTERNAL MEDICINE

## 2021-08-30 PROCEDURE — 80053 COMPREHEN METABOLIC PANEL: CPT | Performed by: INTERNAL MEDICINE

## 2021-08-30 RX ADMIN — ACETAMINOPHEN 975 MG: 325 TABLET, FILM COATED ORAL at 14:44

## 2021-08-30 RX ADMIN — BENZONATATE 100 MG: 100 CAPSULE ORAL at 16:59

## 2021-08-30 RX ADMIN — METOPROLOL SUCCINATE 100 MG: 100 TABLET, EXTENDED RELEASE ORAL at 08:43

## 2021-08-30 RX ADMIN — ACETAMINOPHEN 975 MG: 325 TABLET, FILM COATED ORAL at 05:50

## 2021-08-30 RX ADMIN — LISINOPRIL 40 MG: 40 TABLET ORAL at 08:43

## 2021-08-30 RX ADMIN — ACETAMINOPHEN 975 MG: 325 TABLET, FILM COATED ORAL at 21:43

## 2021-08-30 RX ADMIN — WARFARIN SODIUM 2.5 MG: 2.5 TABLET ORAL at 00:04

## 2021-08-30 RX ADMIN — Medication 1 LOZENGE: at 14:44

## 2021-08-30 RX ADMIN — BENZONATATE 100 MG: 100 CAPSULE ORAL at 21:43

## 2021-08-30 RX ADMIN — BENZONATATE 100 MG: 100 CAPSULE ORAL at 08:43

## 2021-08-30 RX ADMIN — Medication 1 LOZENGE: at 05:55

## 2021-08-30 RX ADMIN — Medication 1.25 MG: at 17:04

## 2021-08-30 RX ADMIN — Medication 1 LOZENGE: at 08:43

## 2021-08-30 ASSESSMENT — ACTIVITIES OF DAILY LIVING (ADL)
ADLS_ACUITY_SCORE: 14
ADLS_ACUITY_SCORE: 14
ADLS_ACUITY_SCORE: 15
ADLS_ACUITY_SCORE: 15
ADLS_ACUITY_SCORE: 14
ADLS_ACUITY_SCORE: 15

## 2021-08-30 NOTE — PROGRESS NOTES
[facility]  Hospitalist Progress Note  Bertin Reynolds MD 08/30/2021    Reason for Stay (Diagnosis): COVID-19 infection, symptomatic         Assessment and Plan:      Summary of Stay: Maia Miranda is a 88 year old female with past medical history significant for paroxysmal atrial fibrillation, on chronic anticoagulation with warfarin, HTN, HLP, CKD stage III, bilateral pulmonary emboli many years ago, mitral valve regurgitation, cognitive impairment, tremor, GERD, duodenal ulcer, depression, osteoarthritis, and ischemic colitis.  She presented to the emergency room with a complaint of sore throat, cough, and chest comfort, found to have COVID-19 infection and 8/29/2021  Problem List:   1. COVID-19 infection,, symptomatic.  -She has symptoms of cough, chest discomfort, sore throat, ill feeling and generalized weakness.  -She is now requiring oxygen, saturating 97% on room air  -No indication for steroids or remdesivir at this point.  -Chest x-ray was clear.  2. Type II MI  3. Atypical chest pain  -Troponin peaked at 0.152, and trended down.  -Elevated troponin and chest pain is related to COVID-19.  -Patient is already on full anticoagulant  -Continue to monitor, no need for any further evaluation regarding this at this time  4. Paroxysmal A. Fibrillation.  -Continue to monitor closely on telemetry.  -Continue warfarin with INR goal of 2-3  -Continue beta-blocker  5. Chronic kidney disease.  -Creatinine is at baseline.  -Noted patient is on lisinopril 40 mg p.o. daily.    6. Hypertension.  -Continue blood pressure medication metoprolol and lisinopril  -Monitor blood pressure  7. Chronic medical condition: GERD, history of duodenal ulcer, hernia, hypercholesterolemia.  Stable    DVT Prophylaxis: Warfarin  Code Status: DNR  Discharge Dispo: Home  Estimated Disch Date / # of Days until Disch: 2 days more if continues to improve.  I discussed with patient the plan of care, all his question and  "concerns addressed.        Interval History (Subjective):      Patient seen and examined, assumed care today, no new overnight issues, feels better, H&P reviewed.                  Physical Exam:      Last Vital Signs:  /67 (BP Location: Left arm)   Pulse 58   Temp 97.9  F (36.6  C) (Oral)   Resp 14   Ht 1.651 m (5' 5\")   Wt 64.7 kg (142 lb 11.2 oz)   SpO2 97%   BMI 23.75 kg/m        Current Facility-Administered Medications   Medication     acetaminophen (TYLENOL) tablet 975 mg     benzocaine-menthol (CHLORASEPTIC) 6-10 MG lozenge 1 lozenge     benzonatate (TESSALON) capsule 100 mg     bisacodyl (DULCOLAX) Suppository 10 mg     calcium carbonate (TUMS) chewable tablet 1,000 mg     guaiFENesin (ROBITUSSIN) 20 mg/mL solution 10 mL     lidocaine (LMX4) cream     lidocaine 1 % 0.1-1 mL     lisinopril (ZESTRIL) tablet 40 mg     magnesium hydroxide (MILK OF MAGNESIA) suspension 30 mL     melatonin tablet 1 mg     metoprolol succinate ER (TOPROL-XL) 24 hr tablet 100 mg     naloxone (NARCAN) injection 0.2 mg    Or     naloxone (NARCAN) injection 0.4 mg    Or     naloxone (NARCAN) injection 0.2 mg    Or     naloxone (NARCAN) injection 0.4 mg     ondansetron (ZOFRAN-ODT) ODT tab 4 mg    Or     ondansetron (ZOFRAN) injection 4 mg     oxyCODONE IR (ROXICODONE) half-tab 2.5 mg     Patient is already receiving anticoagulation with heparin, enoxaparin (LOVENOX), warfarin (COUMADIN)  or other anticoagulant medication     phenol (CHLORASEPTIC) 1.4 % spray 1 mL     polyethylene glycol (MIRALAX) Packet 17 g     prochlorperazine (COMPAZINE) injection 5 mg    Or     prochlorperazine (COMPAZINE) tablet 5 mg    Or     prochlorperazine (COMPAZINE) suppository 12.5 mg     senna-docusate (SENOKOT-S/PERICOLACE) 8.6-50 MG per tablet 1 tablet    Or     senna-docusate (SENOKOT-S/PERICOLACE) 8.6-50 MG per tablet 2 tablet     sodium chloride (PF) 0.9% PF flush 3 mL     sodium chloride (PF) 0.9% PF flush 3 mL     warfarin ANTICOAGULANT " (COUMADIN) half-tab 1.25 mg     Warfarin Therapy Reminder (Check START DATE - warfarin may be starting in the FUTURE)     Facility-Administered Medications Ordered in Other Encounters   Medication     sodium chloride (PF) 0.9% PF flush 10 mL     sodium chloride (PF) 0.9% PF flush 5-10 mL     sodium chloride bacteriostatic 0.9 % flush 0-1 mL       Constitutional: Awake, alert, cooperative, no apparent distress   Respiratory: Clear to auscultation bilaterally, no crackles or wheezing   Cardiovascular: Regular rate and rhythm, normal S1 and S2, and no murmur noted   Abdomen: Normal bowel sounds, soft, non-distended, non-tender   Skin: No rashes, no cyanosis, dry to touch   Neuro: Alert and oriented x3, no weakness, numbness, memory loss   Extremities: No edema, normal range of motion   Other(s):HEENT  Pink, nonicteric, moist oral mucosa       All other systems: Negative          Medications:      All current medications were reviewed with changes reflected in problem list.         Data:      All new lab and imaging data was reviewed.   Labs:  No results for input(s): CULT in the last 168 hours.  Recent Labs   Lab 08/30/21  0753 08/29/21  1640 08/29/21  1523     --  137   POTASSIUM 3.7  --  4.0   CHLORIDE 107  --  105   CO2 27  --  25   ANIONGAP 6  --  7   GLC 98  --  98   BUN 24  --  30   CR 1.17* 1.4* 1.40*   GFRESTIMATED 42* 34* 34*   CLARISSE 8.5  --  9.0     Recent Labs   Lab 08/30/21  0753 08/29/21  1523   WBC 2.1* 3.4*   HGB 12.5 13.2   HCT 38.2 40.8    101*   * 123*     Recent Labs   Lab 08/30/21  0753 08/29/21  1523   GLC 98 98      Imaging:   Results for orders placed or performed during the hospital encounter of 08/29/21   Soft tissue neck CT w contrast    Narrative    EXAM: CT SOFT TISSUE NECK W CONTRAST  LOCATION: [facility]  DATE/TIME: 8/29/2021 5:22 PM    INDICATION: Dysphagia, pain, voice changes.  COMPARISON: None.  CONTRAST: 80 mL Isovue-370.  TECHNIQUE: Routine CT  Soft Tissue Neck with IV contrast. Multiplanar reformats. Dose reduction techniques were used.    FINDINGS:   MUCOSAL SPACES/SOFT TISSUES: Normal mucosal spaces of the upper aerodigestive tract. No mucosal mass or inflammation identified. Normal vocal cords and infraglottic trachea. Normal parapharyngeal space and subcutaneous soft tissues. Normal oral cavity,    spaces, and floor of mouth structures.    LYMPH NODES: No pathologic lymph nodes by size or morphology criteria.     SALIVARY GLANDS: Normal parotid and submandibular glands.    THYROID: Normal.     VESSELS: Vascular structures of the neck are patent.    VISUALIZED INTRACRANIAL/ORBITS/SINUSES: No abnormality of the visualized intracranial compartment or orbits. Visualized paranasal sinuses and mastoid air cells are clear.    OTHER: No destructive osseous lesion. Biapical subpleural fibrosis. Multilevel cervical spondylosis.      Impression    IMPRESSION:   1.  No cervical mass, lymphadenopathy or localized inflammatory process.   XR Chest 2 Views    Narrative    EXAM: XR CHEST 2 VW  LOCATION: [facility]  DATE/TIME: 8/29/2021 8:03 PM    INDICATION: Chest pain.  COMPARISON: 09/06/2017.      Impression    IMPRESSION: Large lung volumes. Lungs are grossly clear. No pleural effusion or pneumothorax. Mild cardiac silhouette enlargement. No definitive pulmonary edema. Aortic atherosclerosis. Degenerative changes spine. Bony demineralization.         *Note: Due to a large number of results and/or encounters for the requested time period, some results have not been displayed. A complete set of results can be found in Results Review.

## 2021-08-30 NOTE — PROVIDER NOTIFICATION
Patient reports she self catheterizes at baseline at home. States it was done in the ER before she came up to the floor and she would typically not do it again until the AM. No orders in for straight cath. Please Advise, thank you!     ORDER: Straight catheterization per patient routine.

## 2021-08-30 NOTE — PLAN OF CARE
Pt A/O x 4, VSS, pt denies headache, dizziness, N/V & SOB. Pt reports infrequent cough and sore throat, Scheduled Tylenol and PRN throat lozenges. Pt up SBA w/gait belt. Lung sounds diminished/clear, RA. Tele: SR/SB w/1st*AVB, BBB, tall T-waves. Pt self-caths (Q4H) at home. Potassium level 3.7 - recheck in AM. Will continue with plan of care.    Pt had run of 2nd * Type1 (Robe duarte - MD notified.

## 2021-08-30 NOTE — ED NOTES
[facility]  ED Nurse Handoff Report    Maia Miranda is a 88 year old female   ED Chief complaint: Chest Pain  . ED Diagnosis:   Final diagnoses:   Elevated troponin     Allergies:   Allergies   Allergen Reactions     Codeine Nausea and Vomiting     Other reaction(s): GI Intolerance  Other reaction(s): GI Intolerance, Vomiting     Meperidine Nausea and Vomiting     Other reaction(s): GI Intolerance  Other reaction(s): GI Intolerance, Vomiting     Morphine Nausea and Vomiting     Other reaction(s): GI Intolerance  vomiting  Other reaction(s): GI Intolerance, Vomiting     Penicillins Hives     hives     Sulfa Drugs      Other reaction(s): GI Intolerance  Vomiting    Other reaction(s): GI Intolerance     Epinephrine Palpitations     Other reaction(s): Other (See Comments)  Makes heart race  Other reaction(s): Other (See Comments), Tachycardia  Makes heart race         Code Status: Full Code  Activity level - Baseline/Home:  Independent. Activity Level - Current:   Stand by Assist. Lift room needed: No. Bariatric: No   Needed: No   Isolation: No. Infection: Not Applicable.     Vital Signs:   Vitals:    08/29/21 1411 08/29/21 1601   BP: (!) 159/80 (!) 190/79   Pulse: 64 65   Resp: 18 24   Temp: 98.3  F (36.8  C) 98  F (36.7  C)   TempSrc: Temporal Oral   SpO2: 97% 96%   Weight:  68 kg (150 lb)       Cardiac Rhythm:  ,      Pain level:    Patient confused: No. Patient Falls Risk: Yes.   Elimination Status: self cath   Patient Report - Initial Complaint: Sore throat, chest tightness. Went to  then referred here. x2-3 days of symptoms. More difficult to swallow. . Focused Assessment: resting   Tests Performed:   Soft tissue neck CT w contrast   Final Result   IMPRESSION:    1.  No cervical mass, lymphadenopathy or localized inflammatory process.      XR Chest 2 Views    (Results Pending)     . Abnormal Results:   Labs Ordered and Resulted from Time of ED Arrival Up to the Time of Departure from  the ED   BASIC METABOLIC PANEL - Abnormal; Notable for the following components:       Result Value    Creatinine 1.40 (*)     GFR Estimate 34 (*)     All other components within normal limits   TROPONIN I - Abnormal; Notable for the following components:    Troponin I 0.078 (*)     All other components within normal limits   CBC WITH PLATELETS AND DIFFERENTIAL - Abnormal; Notable for the following components:    WBC Count 3.4 (*)      (*)     Platelet Count 123 (*)     All other components within normal limits   ISTAT CREATININE POCT - Abnormal; Notable for the following components:    Creatinine POCT 1.4 (*)     GFR, ESTIMATED POCT 34 (*)     All other components within normal limits   ISTAT TROPONIN POCT - Normal   STREPTOCOCCUS A RAPID SCREEN W REFELX TO PCR - Normal   CBC WITH PLATELETS & DIFFERENTIAL    Narrative:     The following orders were created for panel order CBC with platelets differential.  Procedure                               Abnormality         Status                     ---------                               -----------         ------                     CBC with platelets and d...[[id number]]  Abnormal            Final result                 Please view results for these tests on the individual orders.   INR   COVID-19 VIRUS (CORONAVIRUS) BY PCR   PULSE OXIMETRY NURSING   CARDIAC CONTINUOUS MONITORING   PERIPHERAL IV CATHETER   ISTAT CREATININE NURSING POCT   ISTAT TROPONIN NURSING POCT   PATIENT CARE ORDER   GROUP A STREPTOCOCCUS PCR THROAT SWAB     .   Treatments provided: see mar  Family Comments: present  OBS brochure/video discussed/provided to patient:  Yes  ED Medications:   Medications   aspirin (ASA) chewable tablet 324 mg (has no administration in time range)   0.9% sodium chloride BOLUS (1,000 mLs Intravenous New Bag 8/29/21 1652)   sodium chloride (PF) 0.9% PF flush 100 mL (60 mLs Intravenous Given 8/29/21 1723)   iopamidol (ISOVUE-370) solution 500 mL (80 mLs Intravenous  Given 8/29/21 1723)     Drips infusing:  No  For the majority of the shift, the patient's behavior Green. Interventions performed were see mar.    Sepsis treatment initiated: No     Patient tested for COVID 19 prior to admission: YES    ED Nurse Name/Phone Number: Jose Powell RN,   7:58 PM    RECEIVING UNIT ED HANDOFF REVIEW    Above ED Nurse Handoff Report was reviewed: Yes  Reviewed by: Kristie Isaacs RN on August 29, 2021 at 9:33 PM

## 2021-08-30 NOTE — H&P
[facility]  History and Physical   Hospitalist Service    Kraig Ambrocio MD    Maia Miranda MRN# [id number]   YOB: 1932 Age: 88 year old      Date of Admission:  8/29/2021           Assessment and Plan:   Summary:  Maia Miranda is an 88-year-old female with history of paroxysmal atrial fibrillation, chronic anticoagulation with warfarin, hypertension, hypercholesterolemia, right bundle branch block, chronic kidney disease stage III, bilateral pulmonary emboli many years ago, mitral valve regurgitation, cognitive impairment, tremor, GERD, hiatal hernia, duodenal ulcer, depression, osteoarthritis, and ischemic colitis.  She presented to the [facility] emergency department this afternoon for evaluation of 2 or 3 days of sore throat, cough, and chest pain.  She lives in a [facility] with her daughter.  About three days ago she had onset of cough and sore throat.  The cough has been persistent.  The sore throat has been with swallowing.  It has made it difficult for her to eat and drink.  She also started having chest pain.  The pain was not exertional- it was substernal.  It was nonradiating.  She had no associated nausea, vomiting, diaphoresis, or shortness of breath.  She was feeling poorly so came to the emergency department for evaluation.  Emergency department evaluation showed elevated blood pressure but otherwise unremarkable vital signs.  Laboratory evaluation showed white blood cell 3.4, hemoglobin 13.2, platelets 123, BUN 30, creatinine 1.4, otherwise unremarkable basic metabolic panel, troponin 0 0.078, INR 2.01, and negative strep test.  COVID-19 PCR is pending.  Chest x-ray showed no acute process.  CT of neck showed no acute process.  EKG showed no acute ischemic changes.  I was asked to admit Maia to the hospital with chest pain and elevated troponin.  Maia is fully anticoagulated with warfarin so heparin drip was not started.  324 mg of aspirin  was given in the emergency department.    Problem list:    NSTEMI with elevated troponin  Atypical chest pain  Cough and sore throat, suggestive of viral infection  -Admit to telemetry as inpatient  -Monitor serial troponins  -Check echocardiogram  -Consult cardiology  -Continue prior to admission warfarin with pharmacy to dose  -Symptomatic treatment of sore throat and cough with scheduled Tylenol, as needed oxycodone, scheduled Tessalon Perles, and as needed Robitussin syrup  -Strep throat screening swab was negative  -Follow-up COVID-19 testing    ADDENDUM: COVID-19 PCR came back positive  -This likely explains presentation with cough, sore throat, and troponin elevation.    -COVID precautions ordered  -Not needing oxygen; no indication for dexamethasone or remdesivir at this time  -Will discontinue cardiology consult and echocardiogram orders  -I requested CMP, LDH, d-dimer, and CRP with am labs    Paroxysmal atrial fibrillation  -Resume prior to admission metoprolol  -Resume prior to admission warfarin (pharmacy to manage)    Hypertension  Hypercholesterolemia  -Resume prior to admission lisinopril and metoprolol  -Does not appear to be on cholesterol-lowering medication    Acute kidney injury   Chronic kidney disease  -Acute kidney injury is likely due to poor oral intake and dehydration related to sore throat  -Has received a liter of normal saline  -Repeat basic metabolic panel tomorrow    GERD  Hiatal hernia  Remote history of duodenal ulcer  -Does not appear to be on systemic acid lowering medication    Essential tremor  -Resume prior to admission metoprolol    COVID-19 PCR is pending  Was vaccinated with Pfizer vaccine in 3/21      CODE STATUS: DO NOT RESUSCITATE; would be okay with trial of intubation  DVT prophylaxis: Prior to admission warfarin  Disposition: Admit as inpatient             Code Status:   DNR         Primary Care Physician:   Nancy Salgado [phone]         Chief Complaint:    Cough, sore throat, and chest pain    History is obtained from Maia, her mother, Dr. eRy, and the medical record         History of Present Illness:   Maia Miranda is an 88-year-old female with history of paroxysmal atrial fibrillation, chronic anticoagulation with warfarin, hypertension, hypercholesterolemia, right bundle branch block, chronic kidney disease stage III, bilateral pulmonary emboli many years ago, mitral valve regurgitation, cognitive impairment, tremor, GERD, hiatal hernia, duodenal ulcer, depression, osteoarthritis, and ischemic colitis.  She presented to the [facility] emergency department this afternoon for evaluation of 2 or 3 days of sore throat, cough, and chest pain.  She lives in a [facility] with her daughter.  About three days ago she had onset of cough and sore throat.  The cough has been persistent.  The sore throat has been with swallowing.  It has made it difficult for her to eat and drink.  She also started having chest pain.  The pain was not exertional- it was substernal.  It was nonradiating.  She had no associated nausea, vomiting, diaphoresis, or shortness of breath.  She was feeling poorly so came to the emergency department for evaluation.  Emergency department evaluation showed elevated blood pressure but otherwise unremarkable vital signs.  Laboratory evaluation showed white blood cell 3.4, hemoglobin 13.2, platelets 123, BUN 30, creatinine 1.4, otherwise unremarkable basic metabolic panel, troponin 0 0.078, INR 2.01, and negative strep test.  COVID-19 PCR is pending.  Chest x-ray showed no acute process.  CT of neck showed no acute process.  EKG showed no acute ischemic changes.  I was asked to admit Maia to the hospital with chest pain and elevated troponin.  Maia is fully anticoagulated with warfarin so heparin drip was not started.  324 mg of aspirin was given in the emergency department.           Past Medical History:     Patient Active  Problem List   Diagnosis     Hyperlipidemia LDL goal <130     Osteopenia     Primary osteoarthritis involving multiple joints     Essential and other specified forms of tremor     Esophageal reflux     Varicose veins of lower extremities with complications     Allergic rhinitis     Degeneration of lumbar or lumbosacral intervertebral disc     Internal bleeding hemorrhoids     Osteoarthritis of thumb     Diverticulosis     CKD (chronic kidney disease) stage 3, GFR 30-59 ml/min     Chronic constipation     Urinary retention     Health Care Home     Advanced directives, counseling/discussion     Long term current use of anticoagulant therapy     Atrial fibrillation (H)     Hypertension goal BP (blood pressure) < 150/90     Post-menopausal bleeding     History of recurrent UTIs     Mild cognitive impairment     Fibroid uterus     Rectal bleeding     Personal history of urinary tract infection     Urinary retention with incomplete bladder emptying     Longstanding persistent atrial fibrillation (H)     Long term current use of anticoagulants with INR goal of 2.0-3.0     Recurrent UTI     Ischemic colitis (H)     Atrial fibrillation, unspecified type (H)     Elevated troponin     Chest pain, unspecified type      Past Medical History:   Diagnosis Date     Amnestic MCI (mild cognitive impairment with memory loss) 2014     Chronic duodenal ulcer without mention of hemorrhage, perforation, or obstruction 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 Warner, [facility] yearly     Esophageal reflux 2001    Abstracted 06/10/02     Essential and other specified forms of tremor 2004    resting tremor     Generalized osteoarthrosis, unspecified site     Hands     Hiatal hernia     diagnosed late 1990s [place], [place]     History of pulmonary embolism 1971    no source found     LACTOSE INTOLERANCE      Lumbago     sees Kodi Guidry     Mitral valve regurgitation       Occlusion and stenosis of carotid artery without mention of cerebral infarction 2003    CAROTID US NORMAL, bruit in neck     Osteoporosis, unspecified 2003    T = -2.66     Paroxysmal atrial fibrillation (H) 11/15/2013     Spider veins      Unspecified essential hypertension      Unspecified tinnitus 2005    mild hearing loss, saw ENT             Past Surgical History:     Past Surgical History:   Procedure Laterality Date     CATARACT IOL, RT/LT       corneal scraping       CYSTOSCOPY       CYSTOSCOPY, BIOPSY BLADDER, COMBINED N/A 7/25/2016    Procedure: COMBINED CYSTOSCOPY, BIOPSY BLADDER;  Surgeon: Bernadine Maria MD;  Location: [facility] OR     ESOPHAGOSCOPY, GASTROSCOPY, DUODENOSCOPY (EGD), COMBINED  7/8/2013    Procedure: COMBINED ESOPHAGOSCOPY, GASTROSCOPY, DUODENOSCOPY (EGD);   ESOPHAGOSCOPY, GASTROSCOPY, DUODENOSCOPY (EGD)   ;  Surgeon: Shawn Soto MD;  Location: [facility] GI     IMPLANT STIMULATOR SACRAL NERVE STAGE ONE N/A 4/4/2017    Procedure: IMPLANT STIMULATOR SACRAL NERVE STAGE ONE;  Surgeon: Kb Tracy MD;  Location: [facility] OR     IMPLANT STIMULATOR SACRAL NERVE STAGE TWO N/A 4/18/2017    Procedure: IMPLANT STIMULATOR SACRAL NERVE STAGE TWO;  Stage Two Interstim  ;  Surgeon: Kb Tracy MD;  Location: [facility] OR     interstim placement       [facility] NONSPECIFIC PROCEDURE      D&C x 2 in 1957 & 1962 -- Abstracted 06/10/02     [facility] NONSPECIFIC PROCEDURE      Left breast biopsy x 2 in 1988 & 1989 -- Abstracted 06/10/02     [facility] NONSPECIFIC PROCEDURE  1958    Influenza resulting in hospitalization -- Abstracted 06/10/02     [facility] NONSPECIFIC PROCEDURE  1971    10 day hospitalization from bilateral pulmonary enboli -- Abstracted 06/10/02     [facility] NONSPECIFIC PROCEDURE  1/03    colonoscopy  negative            Home Medications:     Prior to Admission medications    Medication Sig Last Dose Taking? Auth Provider   acetaminophen (TYLENOL) 500 MG tablet Take 500-1,000 mg by mouth every 6 hours as needed    Reported, Patient   ascorbic acid (VITAMIN C) 1000 MG TABS Take 1 tablet (1,000 mg) by mouth 2 times daily   Katja Brunson PA-C   calcium citrate-vitamin D (CITRACAL) 315-250 MG-UNIT TABS per tablet Take 1 tablet by mouth daily   Reported, Patient   carboxymethylcellulose (CELLUVISC/REFRESH LIQUIGEL) 1 % ophthalmic solution Place 1 drop into both eyes every morning As needed   Unknown, Entered By History   Catheters (GENTLECATH URINARY CATHETER) MISC 1 catheter 4 times daily   Linda Stearns MD   cephALEXin (KEFLEX) 250 MG capsule Take 1 capsule (250 mg) by mouth daily   Bernadine Maria MD   cephALEXin (KEFLEX) 500 MG capsule Take 1 capsule (500 mg) by mouth daily   Sebastian Simpson MD   Cholecalciferol (VITAMIN D-3) 1000 UNITS CAPS Take 1 capsule by mouth daily   Reported, Patient   COMPOUNDED NON-CONTROLLED SUBSTANCE (CMPD RX) - PHARMACY TO MIX COMPOUNDED MEDICATION Gentamicin solution: 480 mg gentamicin in 1 liter 0.9 normal saline. Instill 60 ml into bladder via catheter daily at bedtime.   Bernadine Maria MD   conjugated estrogens (PREMARIN) 0.625 MG/GM vaginal cream Place 0.5 g vaginally twice a week On Tuesday and Friday. Uses a pea sized amount   Bernadine Maria MD   estradiol (ESTRING) 2 MG vaginal ring Place 1 each vaginally every 3 months   Bernadine Maria MD   lisinopril (ZESTRIL) 40 MG tablet Take 1 tablet (40 mg) by mouth daily   Nancy Salgado APRN CNP   meclizine (ANTIVERT) 25 MG tablet Take 1 tablet (25 mg) by mouth every 6 hours as needed for dizziness   Li Flores Mai, MD   metoprolol succinate ER (TOPROL-XL) 100 MG 24 hr tablet Take 1 tablet (100 mg) by mouth daily   Nancy Salgado APRN CNP   Omega-3 Fatty Acids (OMEGA-3 FISH OIL PO) Take 1 g by mouth 2 times daily (with meals)   Unknown, Entered By History   sodium chloride (PETER 128) 5 % ophthalmic ointment Place 1 Application into both eyes At Bedtime   Unknown,  "Entered By History   warfarin ANTICOAGULANT (COUMADIN) 2.5 MG tablet Take a half tablet (1.25 mg) on Mon & Fri; take 1 tablet (2.5 mg) all other days or as directed by INR clinic   Nancy Salgado APRN CNP            Allergies:     Allergies   Allergen Reactions     Codeine Nausea and Vomiting     Other reaction(s): GI Intolerance  Other reaction(s): GI Intolerance, Vomiting     Meperidine Nausea and Vomiting     Other reaction(s): GI Intolerance  Other reaction(s): GI Intolerance, Vomiting     Morphine Nausea and Vomiting     Other reaction(s): GI Intolerance  vomiting  Other reaction(s): GI Intolerance, Vomiting     Penicillins Hives     hives     Sulfa Drugs      Other reaction(s): GI Intolerance  Vomiting    Other reaction(s): GI Intolerance     Epinephrine Palpitations     Other reaction(s): Other (See Comments)  Makes heart race  Other reaction(s): Other (See Comments), Tachycardia  Makes heart race              Social History:     Social History     Tobacco Use     Smoking status: Never Smoker     Smokeless tobacco: Never Used   Substance Use Topics     Alcohol use: No             Family History:     Family History   Problem Relation Age of Onset     Cerebrovascular Disease Father          at 92     Psychotic Disorder Mother         Suicide 62, depression     Alzheimer Disease Maternal Grandmother      Cardiovascular Sister         pacemaker     Glaucoma No family hx of               Review of Systems:   The 10 point Review of Systems is negative other than as noted in the HPI.           Physical Exam:   Blood pressure (!) 186/77, pulse 68, temperature 97.8  F (36.6  C), temperature source Oral, resp. rate 20, height 1.651 m (5' 5\"), weight 64.7 kg (142 lb 11.2 oz), SpO2 98 %, not currently breastfeeding.  142 lbs 11.2 oz      GENERAL: Pleasant and cooperative. No acute distress.  EYES: Pupils equal and round. No scleral erythema or icterus.  ENT: External ears are normal without deformity. " Posterior oropharynx is without erythem, swelling, or exudate.  NECK: Supple. No masses or swelling. No tenderness. Thyroid is normal without mass or tenderness.  CHEST: Clear to auscultation. Normal breath sounds. No retractions.   CV: Regular rate and rhythm. No JVD. Pulses normal.  ABDOMEN: Bowel sounds present. No tenderness. No masses or hernia.  EXTREMETIES: No clubbing, cyanosis, or ischemia.  SKIN: Warm and dry to touch. No wounds or rashes.  NEUROLOGIC: Strength and sensation are normal. Deep tendon reflexes are normal. Cranial nerves are normal.             Data:   All new lab and imaging data was reviewed.     Results for orders placed or performed during the hospital encounter of 08/29/21 (from the past 24 hour(s))   EKG 12-lead, tracing only   Result Value Ref Range    Systolic Blood Pressure  mmHg    Diastolic Blood Pressure  mmHg    Ventricular Rate 67 BPM    Atrial Rate 67 BPM    MI Interval 292 ms    QRS Duration 122 ms     ms    QTc 477 ms    P Axis 80 degrees    R AXIS -62 degrees    T Axis -15 degrees    Interpretation ECG       Sinus rhythm with 1st degree A-V block  Left axis deviation  Right bundle branch block  Abnormal ECG  When compared with ECG of 23-MAR-2018 09:06,  No significant change was found     CBC with platelets differential    Narrative    The following orders were created for panel order CBC with platelets differential.  Procedure                               Abnormality         Status                     ---------                               -----------         ------                     CBC with platelets and d...[[id number]]  Abnormal            Final result                 Please view results for these tests on the individual orders.   Basic metabolic panel   Result Value Ref Range    Sodium 137 133 - 144 mmol/L    Potassium 4.0 3.4 - 5.3 mmol/L    Chloride 105 94 - 109 mmol/L    Carbon Dioxide (CO2) 25 20 - 32 mmol/L    Anion Gap 7 3 - 14 mmol/L    Urea Nitrogen 30  7 - 30 mg/dL    Creatinine 1.40 (H) 0.52 - 1.04 mg/dL    Calcium 9.0 8.5 - 10.1 mg/dL    Glucose 98 70 - 99 mg/dL    GFR Estimate 34 (L) >60 mL/min/1.73m2   Troponin I   Result Value Ref Range    Troponin I 0.078 (H) 0.000 - 0.045 ug/L   CBC with platelets and differential   Result Value Ref Range    WBC Count 3.4 (L) 4.0 - 11.0 10e3/uL    RBC Count 4.05 3.80 - 5.20 10e6/uL    Hemoglobin 13.2 11.7 - 15.7 g/dL    Hematocrit 40.8 35.0 - 47.0 %     (H) 78 - 100 fL    MCH 32.6 26.5 - 33.0 pg    MCHC 32.4 31.5 - 36.5 g/dL    RDW 12.9 10.0 - 15.0 %    Platelet Count 123 (L) 150 - 450 10e3/uL    % Neutrophils 56 %    % Lymphocytes 24 %    % Monocytes 20 %    % Eosinophils 0 %    % Basophils 0 %    % Immature Granulocytes 0 %    NRBCs per 100 WBC 0 <1 /100    Absolute Neutrophils 1.9 1.6 - 8.3 10e3/uL    Absolute Lymphocytes 0.8 0.8 - 5.3 10e3/uL    Absolute Monocytes 0.7 0.0 - 1.3 10e3/uL    Absolute Eosinophils 0.0 0.0 - 0.7 10e3/uL    Absolute Basophils 0.0 0.0 - 0.2 10e3/uL    Absolute Immature Granulocytes 0.0 <=0.0 10e3/uL    Absolute NRBCs 0.0 10e3/uL   Creatinine POCT   Result Value Ref Range    Creatinine POCT 1.4 (H) 0.5 - 1.0 mg/dL    GFR, ESTIMATED POCT 34 (L) >60 mL/min/1.73m2   Streptococcus A Rapid Scr w Reflx to PCR    Specimen: Throat; Swab   Result Value Ref Range    Group A Strep antigen Negative Negative   Soft tissue neck CT w contrast    Narrative    EXAM: CT SOFT TISSUE NECK W CONTRAST  LOCATION: [facility]  DATE/TIME: 8/29/2021 5:22 PM    INDICATION: Dysphagia, pain, voice changes.  COMPARISON: None.  CONTRAST: 80 mL Isovue-370.  TECHNIQUE: Routine CT Soft Tissue Neck with IV contrast. Multiplanar reformats. Dose reduction techniques were used.    FINDINGS:   MUCOSAL SPACES/SOFT TISSUES: Normal mucosal spaces of the upper aerodigestive tract. No mucosal mass or inflammation identified. Normal vocal cords and infraglottic trachea. Normal parapharyngeal space and  subcutaneous soft tissues. Normal oral cavity,    spaces, and floor of mouth structures.    LYMPH NODES: No pathologic lymph nodes by size or morphology criteria.     SALIVARY GLANDS: Normal parotid and submandibular glands.    THYROID: Normal.     VESSELS: Vascular structures of the neck are patent.    VISUALIZED INTRACRANIAL/ORBITS/SINUSES: No abnormality of the visualized intracranial compartment or orbits. Visualized paranasal sinuses and mastoid air cells are clear.    OTHER: No destructive osseous lesion. Biapical subpleural fibrosis. Multilevel cervical spondylosis.      Impression    IMPRESSION:   1.  No cervical mass, lymphadenopathy or localized inflammatory process.   iStat Troponin, POCT   Result Value Ref Range    TROPPC POCT 0.10 <=0.12 ug/L   XR Chest 2 Views    Narrative    EXAM: XR CHEST 2 VW  LOCATION: [facility]  DATE/TIME: 8/29/2021 8:03 PM    INDICATION: Chest pain.  COMPARISON: 09/06/2017.      Impression    IMPRESSION: Large lung volumes. Lungs are grossly clear. No pleural effusion or pneumothorax. Mild cardiac silhouette enlargement. No definitive pulmonary edema. Aortic atherosclerosis. Degenerative changes spine. Bony demineralization.       INR   Result Value Ref Range    INR 2.01 (H) 0.85 - 1.15     *Note: Due to a large number of results and/or encounters for the requested time period, some results have not been displayed. A complete set of results can be found in Results Review.

## 2021-08-30 NOTE — PLAN OF CARE
Provider: Dr. Ambrocio  Call from lab with result received on pt in room 350. Patient is COVID Positive.

## 2021-08-30 NOTE — PHARMACY-ADMISSION MEDICATION HISTORY
Admission medication history interview status for this patient is complete. See [facility] admission navigator for allergy information, prior to admission medications and immunization status.     Medication history interview done, indicate source(s): Patient  Medication history resources (including written lists, pill bottles, clinic record):None      Changes made to PTA medication list:  Added: none  Changed: none  Removed: keflex 500mg, compounded Gent solution for bladder via cathether    Actions taken by pharmacist (provider contacted, etc):None     Additional medication history information:None    Medication reconciliation/reorder completed by provider prior to medication history?  y   (Y/N)         Prior to Admission medications    Medication Sig Last Dose Taking? Auth Provider   acetaminophen (TYLENOL) 500 MG tablet Take 500-1,000 mg by mouth every 6 hours as needed  Yes Reported, Patient   ascorbic acid (VITAMIN C) 1000 MG TABS Take 1 tablet (1,000 mg) by mouth 2 times daily 8/28/2021 at Unknown time Yes Katja Brunson PA-C   calcium citrate-vitamin D (CITRACAL) 315-250 MG-UNIT TABS per tablet Take 1 tablet by mouth daily 8/28/2021 at Unknown time Yes Reported, Patient   carboxymethylcellulose (CELLUVISC/REFRESH LIQUIGEL) 1 % ophthalmic solution Place 1 drop into both eyes every morning As needed  Yes Unknown, Entered By History   cephALEXin (KEFLEX) 250 MG capsule Take 1 capsule (250 mg) by mouth daily 8/28/2021 at Unknown time Yes Bernadine Maria MD   Cholecalciferol (VITAMIN D-3) 1000 UNITS CAPS Take 1 capsule by mouth daily 8/28/2021 at Unknown time Yes Reported, Patient   conjugated estrogens (PREMARIN) 0.625 MG/GM vaginal cream Place 0.5 g vaginally twice a week On Tuesday and Friday. Uses a pea sized amount Past Week at Unknown time Yes Bernadine Maria MD   lisinopril (ZESTRIL) 40 MG tablet Take 1 tablet (40 mg) by mouth daily 8/28/2021 at Unknown time Yes Nancy Salgado  APRN CNP   meclizine (ANTIVERT) 25 MG tablet Take 1 tablet (25 mg) by mouth every 6 hours as needed for dizziness  Yes Li Flores Mai, MD   metoprolol succinate ER (TOPROL-XL) 100 MG 24 hr tablet Take 1 tablet (100 mg) by mouth daily 8/28/2021 at Unknown time Yes Nancy Salgado APRN CNP   Omega-3 Fatty Acids (OMEGA-3 FISH OIL PO) Take 1 g by mouth 2 times daily (with meals) 8/28/2021 at Unknown time Yes Unknown, Entered By History   sodium chloride (PETER 128) 5 % ophthalmic ointment Place 1 Application into both eyes At Bedtime 8/28/2021 at Unknown time Yes Unknown, Entered By History   warfarin ANTICOAGULANT (COUMADIN) 2.5 MG tablet Take a half tablet (1.25 mg) on Mon & Fri; take 1 tablet (2.5 mg) all other days or as directed by INR clinic 8/28/2021 at 2.5mg Yes Nancy Salgado APRN CNP   Catheters (GENTLECATH URINARY CATHETER) MISC 1 catheter 4 times daily   Linda Stearns MD   estradiol (ESTRING) 2 MG vaginal ring Place 1 each vaginally every 3 months More than a month at Unknown time  Bernadine Maria MD

## 2021-08-30 NOTE — PLAN OF CARE
"Vital signs:  Temp: 98  F (36.7  C) Temp src: Oral BP: 136/57 Pulse: 61   Resp: 18 SpO2: 97 % O2 Device: None (Room air)   Height: 165.1 cm (5' 5\") Weight: 64.7 kg (142 lb 11.2 oz)  Estimated body mass index is 23.75 kg/m  as calculated from the following:    Height as of this encounter: 1.651 m (5' 5\").    Weight as of this encounter: 64.7 kg (142 lb 11.2 oz).       For VS and complete assessments, please see documentation in flowsheets.     Pertinent shift assessments: VSS. A&Ox4, forgetful at time. Transfers SBA with GB. Denies pain. LS Clear, has infrequent nonproductive cough. States she has been having some trouble swallowing lately, tolerated PO meds well. Tele SR/SB with BBB and 1st degree AV block. PIV saline locked. Patient presents with tremors throughout extremities, she notes that this is her baseline. COVID swab came back positive after admission to floor, placed on precautions and continuous pulse ox in place to monitor sats. O2 sats at 97-98% on RA. Pt denies any CP/SOB. Patient straight caths herself on her own at baseline, states she \"was straight cathed in ED before admission and (per her routine) will do it again in AM\". Orders placed by provider for straight cath per routine. Bladder scanned pt for 900mL. Straight cathed pt for 1400mL at 0520AM.    Treatment Plan: Continue supportive cares.    Expected Discharge Date/Disposition: Labs in AM; TBD  "

## 2021-08-30 NOTE — PHARMACY-ANTICOAGULATION SERVICE
Clinical Pharmacy - Warfarin Dosing Consult     Pharmacy has been consulted to manage this patient s warfarin therapy.  Indication: Atrial Fibrillation  Therapy Goal: INR 2-3  Warfarin Prior to Admission: Yes  Warfarin PTA Regimen: 1.25mg=Mon and Fri, 2.5mg=ROW  Dose Comments: INR=2.01    INR   Date Value Ref Range Status   08/29/2021 2.01 (H) 0.85 - 1.15 Final     Comment:     Effective 7/11/2021, the reference range for this assay has changed.   08/24/2021 3.0 (H) 0.9 - 1.1 Final       Recommend warfarin 2.5 mg tonight.  Pharmacy will monitor Maia E Ruben daily and order warfarin doses to achieve specified goal.      Please contact pharmacy as soon as possible if the warfarin needs to be held for a procedure or if the warfarin goals change.

## 2021-08-31 ENCOUNTER — APPOINTMENT (OUTPATIENT)
Dept: PHYSICAL THERAPY | Facility: CLINIC | Age: 86
DRG: 177 | End: 2021-08-31
Attending: INTERNAL MEDICINE
Payer: COMMERCIAL

## 2021-08-31 LAB
ALBUMIN UR-MCNC: NEGATIVE MG/DL
APPEARANCE UR: CLEAR
BILIRUB UR QL STRIP: NEGATIVE
COLOR UR AUTO: ABNORMAL
GLUCOSE UR STRIP-MCNC: NEGATIVE MG/DL
HGB UR QL STRIP: ABNORMAL
HOLD SPECIMEN: NORMAL
INR PPP: 2.87 (ref 0.85–1.15)
KETONES UR STRIP-MCNC: ABNORMAL MG/DL
LEUKOCYTE ESTERASE UR QL STRIP: ABNORMAL
MUCOUS THREADS #/AREA URNS LPF: PRESENT /LPF
NITRATE UR QL: POSITIVE
PH UR STRIP: 5.5 [PH] (ref 5–7)
POTASSIUM BLD-SCNC: 3.8 MMOL/L (ref 3.4–5.3)
RBC URINE: 3 /HPF
SP GR UR STRIP: 1.01 (ref 1–1.03)
UROBILINOGEN UR STRIP-MCNC: NORMAL MG/DL
WBC URINE: 55 /HPF

## 2021-08-31 PROCEDURE — 120N000001 HC R&B MED SURG/OB

## 2021-08-31 PROCEDURE — 99232 SBSQ HOSP IP/OBS MODERATE 35: CPT | Performed by: INTERNAL MEDICINE

## 2021-08-31 PROCEDURE — 36415 COLL VENOUS BLD VENIPUNCTURE: CPT | Performed by: INTERNAL MEDICINE

## 2021-08-31 PROCEDURE — 250N000013 HC RX MED GY IP 250 OP 250 PS 637: Performed by: INTERNAL MEDICINE

## 2021-08-31 PROCEDURE — 97116 GAIT TRAINING THERAPY: CPT | Mod: GP

## 2021-08-31 PROCEDURE — 85610 PROTHROMBIN TIME: CPT | Performed by: INTERNAL MEDICINE

## 2021-08-31 PROCEDURE — 97161 PT EVAL LOW COMPLEX 20 MIN: CPT | Mod: GP

## 2021-08-31 PROCEDURE — 81001 URINALYSIS AUTO W/SCOPE: CPT | Performed by: INTERNAL MEDICINE

## 2021-08-31 PROCEDURE — 97530 THERAPEUTIC ACTIVITIES: CPT | Mod: GP

## 2021-08-31 PROCEDURE — 87086 URINE CULTURE/COLONY COUNT: CPT | Performed by: INTERNAL MEDICINE

## 2021-08-31 PROCEDURE — 84132 ASSAY OF SERUM POTASSIUM: CPT | Performed by: INTERNAL MEDICINE

## 2021-08-31 RX ORDER — MECLIZINE HCL 12.5 MG 12.5 MG/1
25 TABLET ORAL EVERY 6 HOURS PRN
Status: DISCONTINUED | OUTPATIENT
Start: 2021-08-31 | End: 2021-09-02 | Stop reason: HOSPADM

## 2021-08-31 RX ORDER — CARBOXYMETHYLCELLULOSE SODIUM 5 MG/ML
1 SOLUTION/ DROPS OPHTHALMIC EVERY MORNING
Status: DISCONTINUED | OUTPATIENT
Start: 2021-08-31 | End: 2021-09-02 | Stop reason: HOSPADM

## 2021-08-31 RX ADMIN — LISINOPRIL 40 MG: 40 TABLET ORAL at 09:53

## 2021-08-31 RX ADMIN — BENZONATATE 100 MG: 100 CAPSULE ORAL at 15:59

## 2021-08-31 RX ADMIN — ACETAMINOPHEN 975 MG: 325 TABLET, FILM COATED ORAL at 21:30

## 2021-08-31 RX ADMIN — ACETAMINOPHEN 975 MG: 325 TABLET, FILM COATED ORAL at 05:52

## 2021-08-31 RX ADMIN — BENZONATATE 100 MG: 100 CAPSULE ORAL at 21:29

## 2021-08-31 RX ADMIN — CARBOXYMETHYLCELLULOSE SODIUM 1 DROP: 0.5 SOLUTION/ DROPS OPHTHALMIC at 13:24

## 2021-08-31 RX ADMIN — Medication 1.5 MG: at 17:57

## 2021-08-31 RX ADMIN — CEPHALEXIN 250 MG: 250 CAPSULE ORAL at 13:24

## 2021-08-31 RX ADMIN — METOPROLOL SUCCINATE 100 MG: 100 TABLET, EXTENDED RELEASE ORAL at 09:53

## 2021-08-31 RX ADMIN — ACETAMINOPHEN 975 MG: 325 TABLET, FILM COATED ORAL at 13:24

## 2021-08-31 RX ADMIN — BENZONATATE 100 MG: 100 CAPSULE ORAL at 09:53

## 2021-08-31 ASSESSMENT — ACTIVITIES OF DAILY LIVING (ADL)
ADLS_ACUITY_SCORE: 14

## 2021-08-31 NOTE — PLAN OF CARE
"BP (!) 155/86 (BP Location: Left arm)   Pulse 65   Temp 97.6  F (36.4  C) (Oral)   Resp 16   Ht 1.651 m (5' 5\")   Wt 64.7 kg (142 lb 11.2 oz)   SpO2 98%   BMI 23.75 kg/m        BP elevated VS otherwise stable on RA. Straight cathed x2 this shift- see flowsheet for details. Denies pain/SOB. Scheduled tessalon pearls given. Up SBA. Tele: SR w/ BBB 1* AVB. Will continue POC.   "

## 2021-08-31 NOTE — PROGRESS NOTES
[facility]  Hospitalist Progress Note  Bertin Reynolds MD 08/31/2021    Reason for Stay (Diagnosis): COVID-19 infection, symptomatic         Assessment and Plan:      Summary of Stay: Maia Miranda is a 88 year old female with past medical history significant for paroxysmal atrial fibrillation, on chronic anticoagulation with warfarin, HTN, HLP, CKD stage III, bilateral pulmonary emboli many years ago, mitral valve regurgitation, cognitive impairment, tremor, GERD, duodenal ulcer, depression, osteoarthritis, and ischemic colitis.  She presented to the emergency room with a complaint of sore throat, cough, and chest comfort, found to have COVID-19 infection and 8/29/2021  Problem List:   1. COVID-19 infection,, symptomatic, without hypoxia.  -She has symptoms of cough, chest discomfort, sore throat, ill feeling and generalized weakness.  -She is now requiring oxygen, saturating 97% on room air  -No indication for steroids or remdesivir at this point.  -Chest x-ray was clear.  -Patient is weak, PT evaluated here recommended TCU, she declined requested to go home with home care.    2. Type II MI  3. Atypical chest pain  -Troponin peaked at 0.152, and trended down.  -Elevated troponin and chest pain is related to COVID-19.  -Patient is already on full anticoagulant  -Continue to monitor, no need for any further evaluation regarding this at this time  4. Paroxysmal A. Fibrillation.  -Continue to monitor closely on telemetry.  -Continue warfarin with INR goal of 2-3  -Continue beta-blocker  5. Chronic kidney disease.  -Creatinine is at baseline.  -Noted patient is on lisinopril 40 mg p.o. daily.    6. Hypertension.  -Continue blood pressure medication metoprolol and lisinopril  -Monitor blood pressure  7. Chronic medical condition: GERD, history of duodenal ulcer, hernia, hypercholesterolemia.  Stable    DVT Prophylaxis: Warfarin  Code Status: DNR  Discharge Dispo: Home  Estimated Disch Date / # of  "Days until Disch: 1 day, if she continues to improve and safe discharge plan is in place.  I called her son Tim and discussed with him.  He stated patient lives with daughter, and she is getting tested for COVID-19 as she is not sure if she is infected or not.  Tim stated he will call his sister and gather more information and will call back to the floor.  I discussed with patient and with her son, Tim the plan of care, all his question and concerns addressed.        Interval History (Subjective):      Patient seen and examined, no new overnight issues, feels better, more awake and alert, conversing, no shortness of breath, generalized weakness is slightly better.                  Physical Exam:      Last Vital Signs:  BP (!) 170/80 (BP Location: Left arm)   Pulse 69   Temp 98.1  F (36.7  C) (Oral)   Resp 16   Ht 1.651 m (5' 5\")   Wt 64.7 kg (142 lb 11.2 oz)   SpO2 95%   BMI 23.75 kg/m        Current Facility-Administered Medications   Medication     acetaminophen (TYLENOL) tablet 975 mg     benzocaine-menthol (CHLORASEPTIC) 6-10 MG lozenge 1 lozenge     benzonatate (TESSALON) capsule 100 mg     bisacodyl (DULCOLAX) Suppository 10 mg     calcium carbonate (TUMS) chewable tablet 1,000 mg     carboxymethylcellulose PF (REFRESH PLUS) 0.5 % ophthalmic solution 1 drop     cephALEXin (KEFLEX) capsule 250 mg     guaiFENesin (ROBITUSSIN) 20 mg/mL solution 10 mL     lidocaine (LMX4) cream     lidocaine 1 % 0.1-1 mL     lisinopril (ZESTRIL) tablet 40 mg     magnesium hydroxide (MILK OF MAGNESIA) suspension 30 mL     meclizine (ANTIVERT) tablet 25 mg     melatonin tablet 1 mg     metoprolol succinate ER (TOPROL-XL) 24 hr tablet 100 mg     naloxone (NARCAN) injection 0.2 mg    Or     naloxone (NARCAN) injection 0.4 mg    Or     naloxone (NARCAN) injection 0.2 mg    Or     naloxone (NARCAN) injection 0.4 mg     ondansetron (ZOFRAN-ODT) ODT tab 4 mg    Or     ondansetron (ZOFRAN) injection 4 mg     oxyCODONE IR " (ROXICODONE) half-tab 2.5 mg     Patient is already receiving anticoagulation with heparin, enoxaparin (LOVENOX), warfarin (COUMADIN)  or other anticoagulant medication     phenol (CHLORASEPTIC) 1.4 % spray 1 mL     polyethylene glycol (MIRALAX) Packet 17 g     prochlorperazine (COMPAZINE) injection 5 mg    Or     prochlorperazine (COMPAZINE) tablet 5 mg    Or     prochlorperazine (COMPAZINE) suppository 12.5 mg     senna-docusate (SENOKOT-S/PERICOLACE) 8.6-50 MG per tablet 1 tablet    Or     senna-docusate (SENOKOT-S/PERICOLACE) 8.6-50 MG per tablet 2 tablet     sodium chloride (PF) 0.9% PF flush 3 mL     sodium chloride (PF) 0.9% PF flush 3 mL     warfarin ANTICOAGULANT (COUMADIN) half-tab 1.5 mg     Warfarin Therapy Reminder (Check START DATE - warfarin may be starting in the FUTURE)     Facility-Administered Medications Ordered in Other Encounters   Medication     sodium chloride (PF) 0.9% PF flush 10 mL     sodium chloride (PF) 0.9% PF flush 5-10 mL     sodium chloride bacteriostatic 0.9 % flush 0-1 mL       Constitutional: Awake, alert, cooperative, no apparent distress   Respiratory: Good air entry bilaterally, no crackles or wheezing   Cardiovascular: Regular rate and rhythm, normal S1 and S2, and no murmur noted   Abdomen: Normal bowel sounds, soft, non-distended, non-tender   Skin: No rashes, no cyanosis, dry to touch   Neuro: Alert and oriented x3, no weakness, numbness, memory loss   Extremities: No edema, normal range of motion   Other(s):HEENT Pink, nonicteric, moist oral mucosa       All other systems: Negative          Medications:      All current medications were reviewed with changes reflected in problem list.         Data:      All new lab and imaging data was reviewed.   Labs:  No results for input(s): CULT in the last 168 hours.  Recent Labs   Lab 08/31/21  0655 08/30/21  0753 08/29/21  1640 08/29/21  1523   NA  --  140  --  137   POTASSIUM 3.8 3.7  --  4.0   CHLORIDE  --  107  --  105   CO2   --  27  --  25   ANIONGAP  --  6  --  7   GLC  --  98  --  98   BUN  --  24  --  30   CR  --  1.17* 1.4* 1.40*   GFRESTIMATED  --  42* 34* 34*   CLARISSE  --  8.5  --  9.0     Recent Labs   Lab 08/30/21  0753 08/29/21  1523   WBC 2.1* 3.4*   HGB 12.5 13.2   HCT 38.2 40.8    101*   * 123*     Recent Labs   Lab 08/30/21  0753 08/29/21  1523   GLC 98 98      Imaging:   Results for orders placed or performed during the hospital encounter of 08/29/21   Soft tissue neck CT w contrast    Narrative    EXAM: CT SOFT TISSUE NECK W CONTRAST  LOCATION: [facility]  DATE/TIME: 8/29/2021 5:22 PM    INDICATION: Dysphagia, pain, voice changes.  COMPARISON: None.  CONTRAST: 80 mL Isovue-370.  TECHNIQUE: Routine CT Soft Tissue Neck with IV contrast. Multiplanar reformats. Dose reduction techniques were used.    FINDINGS:   MUCOSAL SPACES/SOFT TISSUES: Normal mucosal spaces of the upper aerodigestive tract. No mucosal mass or inflammation identified. Normal vocal cords and infraglottic trachea. Normal parapharyngeal space and subcutaneous soft tissues. Normal oral cavity,    spaces, and floor of mouth structures.    LYMPH NODES: No pathologic lymph nodes by size or morphology criteria.     SALIVARY GLANDS: Normal parotid and submandibular glands.    THYROID: Normal.     VESSELS: Vascular structures of the neck are patent.    VISUALIZED INTRACRANIAL/ORBITS/SINUSES: No abnormality of the visualized intracranial compartment or orbits. Visualized paranasal sinuses and mastoid air cells are clear.    OTHER: No destructive osseous lesion. Biapical subpleural fibrosis. Multilevel cervical spondylosis.      Impression    IMPRESSION:   1.  No cervical mass, lymphadenopathy or localized inflammatory process.   XR Chest 2 Views    Narrative    EXAM: XR CHEST 2 VW  LOCATION: [facility]  DATE/TIME: 8/29/2021 8:03 PM    INDICATION: Chest pain.  COMPARISON: 09/06/2017.      Impression     IMPRESSION: Large lung volumes. Lungs are grossly clear. No pleural effusion or pneumothorax. Mild cardiac silhouette enlargement. No definitive pulmonary edema. Aortic atherosclerosis. Degenerative changes spine. Bony demineralization.         *Note: Due to a large number of results and/or encounters for the requested time period, some results have not been displayed. A complete set of results can be found in Results Review.

## 2021-08-31 NOTE — PROVIDER NOTIFICATION
MD Reynolds notified  Pt more lethargic and disoriented to time today, straight cath from urine was cloudy, do you want a urine sample to check for UTI?

## 2021-08-31 NOTE — PLAN OF CARE
A&Ox4, VSS, denies pain, PIV SL, up with SBA, bladder scan showed only 90 at 1733, no c/o SOB, will continue with POC.

## 2021-08-31 NOTE — CONSULTS
Care Management Initial Consult    General Information  Assessment completed with: PatientMaia  Type of CM/SW Visit: Initial Assessment    Primary Care Provider verified and updated as needed: Yes   Readmission within the last 30 days:        Reason for Consult: discharge planning  Advance Care Planning: Advance Care Planning Reviewed: present on chart          Communication Assessment  Patient's communication style: spoken language (English or Bilingual)    Hearing Difficulty or Deaf: yes   Wear Glasses or Blind: yes    Cognitive  Cognitive/Neuro/Behavioral: WDL                      Living Environment:   People in home: child(mackenzie), adult  Bhargavi  Current living Arrangements: condominium - no stairs  Able to return to prior arrangements: yes with HC       Family/Social Support:  Care provided by: self  Provides care for: no one  Marital Status:   Children          Description of Support System: Involved, Supportive    Support Assessment: Adequate family and caregiver support, Adequate social supports    Current Resources:   Patient receiving home care services: No     Community Resources: Home Care  Equipment currently used at home: walker, standard  Supplies currently used at home: None    Employment/Financial:  Employment Status: retired        Financial Concerns: No concerns identified   Referral to Financial Counselor: No       Lifestyle & Psychosocial Needs:  Social Determinants of Health     Tobacco Use: Low Risk      Smoking Tobacco Use: Never Smoker     Smokeless Tobacco Use: Never Used   Alcohol Use:      Frequency of Alcohol Consumption:      Average Number of Drinks:      Frequency of Binge Drinking:    Financial Resource Strain:      Difficulty of Paying Living Expenses:    Food Insecurity:      Worried About Running Out of Food in the Last Year:      Ran Out of Food in the Last Year:    Transportation Needs:      Lack of Transportation (Medical):      Lack of Transportation (Non-Medical):     Physical Activity:      Days of Exercise per Week:      Minutes of Exercise per Session:    Stress:      Feeling of Stress :    Social Connections:      Frequency of Communication with Friends and Family:      Frequency of Social Gatherings with Friends and Family:      Attends [employer] Services:      Active Member of Clubs or Organizations:      Attends Club or Organization Meetings:      Marital Status:    Intimate Partner Violence:      Fear of Current or Ex-Partner:      Emotionally Abused:      Physically Abused:      Sexually Abused:    Depression: Not at risk     PHQ-2 Score: 0   Housing Stability:      Unable to Pay for Housing in the Last Year:      Number of Places Lived in the Last Year:      Unstable Housing in the Last Year:        Functional Status:  Prior to admission patient needed assistance:    Pt's dtr assisted the pt with medications and transportation.  The pt and her dtr both do the cooking.        Mental Health Status:  Mental Health Status: No Current Concerns       Chemical Dependency Status:  Chemical Dependency Status: No Current Concerns             Values/Beliefs:  Spiritual, Cultural Beliefs, Pent[employer]alism Practices, Values that affect care:            Values/Beliefs Comment: Hugh    Additional Information:  Rashid spoke with the pt via telephone due to being COVID-19 positive.  The pt was alert and oriented.  She participated appropriately to the conversation and was in a pleasant mood.  The pt informed rahsid that her and her dtr Bhargavi live together.  Per pt, they share tasks around the home.  Bhargavi assists the pt with her medications and provides transportation for the pt.  The pt said that when she goes for longer walks, she uses a walker at baseline.  The pt's discharge recommendation by PT is TCU, but the pt is declining the recommendation.  The pt is agreeable to HC.  She said that she has not had HC in the past.  She requested to use [facility]- HC because she is already following  with a [facility] PCP.  The pt had no additional questions or concerns for sw at this time.    Sw sent the HC referral to [facility] via email.    Sw will continue with discharge planning and will be available as needed until discharge.    MERE Weinberg, [facility]  Inpatient Care Coordination  [facility]  [phone]

## 2021-08-31 NOTE — PROGRESS NOTES
[facility]  Hospitalist Progress Note  Bertin Reynolds MD 08/31/2021    Reason for Stay (Diagnosis): COVID-19 infection, symptomatic         Assessment and Plan:      Summary of Stay: Maia Miranda is a 88 year old female with past medical history significant for paroxysmal atrial fibrillation, on chronic anticoagulation with warfarin, HTN, HLP, CKD stage III, bilateral pulmonary emboli many years ago, mitral valve regurgitation, cognitive impairment, tremor, GERD, duodenal ulcer, depression, osteoarthritis, and ischemic colitis.  She presented to the emergency room with a complaint of sore throat, cough, and chest comfort, found to have COVID-19 infection and 8/29/2021  Problem List:   1. COVID-19 infection,, symptomatic, without hypoxia.  -She has symptoms of cough, chest discomfort, sore throat, ill feeling and generalized weakness.  -She is now requiring oxygen, saturating 97% on room air  -No indication for steroids or remdesivir at this point.  -Chest x-ray was clear.  -Patient is weak, PT evaluated here recommended TCU, she declined requested to go home with home care.    2. Type II MI  3. Atypical chest pain  -Troponin peaked at 0.152, and trended down.  -Elevated troponin and chest pain is related to COVID-19.  -Patient is already on full anticoagulant  -Continue to monitor, no need for any further evaluation regarding this at this time  4. Paroxysmal A. Fibrillation.  -Continue to monitor closely on telemetry.  -Continue warfarin with INR goal of 2-3  -Continue beta-blocker  5. Chronic kidney disease.  -Creatinine is at baseline.  -Noted patient is on lisinopril 40 mg p.o. daily.    6. Hypertension.  -Continue blood pressure medication metoprolol and lisinopril  -Monitor blood pressure  7. Chronic medical condition: GERD, history of duodenal ulcer, hernia, hypercholesterolemia.  Stable    Addendum.  RN stated patient's urine is cloudy during the straight cath.  Has chronic urinary  "tract infection for which she is on Keflex 250 mg p.o. daily for prophylaxis. She has no fever, no leukocytosis.  Check UA with reflex to urine culture.  At this time we will continue the prophylactic doses until the result is available.      DVT Prophylaxis: Warfarin  Code Status: DNR  Discharge Dispo: Home  Estimated Disch Date / # of Days until Disch: 1 day, if she continues to improve and safe discharge plan is in place.  I called her son Tim and discussed with him.  He stated patient lives with daughter, and she is getting tested for COVID-19 as she is not sure if she is infected or not.  Tim stated he will call his sister and gather more information and will call back to the floor.  I discussed with patient and with her son, Tim the plan of care, all his question and concerns addressed.  I also called and discussed results her daughter Minnie.      Interval History (Subjective):      Patient seen and examined, no new overnight issues, feels better, more awake and alert, conversing, no shortness of breath, generalized weakness is slightly better.                  Physical Exam:      Last Vital Signs:  BP (!) 170/80 (BP Location: Left arm)   Pulse 69   Temp 98.1  F (36.7  C) (Oral)   Resp 16   Ht 1.651 m (5' 5\")   Wt 64.7 kg (142 lb 11.2 oz)   SpO2 95%   BMI 23.75 kg/m        Current Facility-Administered Medications   Medication     acetaminophen (TYLENOL) tablet 975 mg     benzocaine-menthol (CHLORASEPTIC) 6-10 MG lozenge 1 lozenge     benzonatate (TESSALON) capsule 100 mg     bisacodyl (DULCOLAX) Suppository 10 mg     calcium carbonate (TUMS) chewable tablet 1,000 mg     carboxymethylcellulose PF (REFRESH PLUS) 0.5 % ophthalmic solution 1 drop     cephALEXin (KEFLEX) capsule 250 mg     guaiFENesin (ROBITUSSIN) 20 mg/mL solution 10 mL     lidocaine (LMX4) cream     lidocaine 1 % 0.1-1 mL     lisinopril (ZESTRIL) tablet 40 mg     magnesium hydroxide (MILK OF MAGNESIA) suspension 30 mL     " meclizine (ANTIVERT) tablet 25 mg     melatonin tablet 1 mg     metoprolol succinate ER (TOPROL-XL) 24 hr tablet 100 mg     naloxone (NARCAN) injection 0.2 mg    Or     naloxone (NARCAN) injection 0.4 mg    Or     naloxone (NARCAN) injection 0.2 mg    Or     naloxone (NARCAN) injection 0.4 mg     ondansetron (ZOFRAN-ODT) ODT tab 4 mg    Or     ondansetron (ZOFRAN) injection 4 mg     oxyCODONE IR (ROXICODONE) half-tab 2.5 mg     Patient is already receiving anticoagulation with heparin, enoxaparin (LOVENOX), warfarin (COUMADIN)  or other anticoagulant medication     phenol (CHLORASEPTIC) 1.4 % spray 1 mL     polyethylene glycol (MIRALAX) Packet 17 g     prochlorperazine (COMPAZINE) injection 5 mg    Or     prochlorperazine (COMPAZINE) tablet 5 mg    Or     prochlorperazine (COMPAZINE) suppository 12.5 mg     senna-docusate (SENOKOT-S/PERICOLACE) 8.6-50 MG per tablet 1 tablet    Or     senna-docusate (SENOKOT-S/PERICOLACE) 8.6-50 MG per tablet 2 tablet     sodium chloride (PF) 0.9% PF flush 3 mL     sodium chloride (PF) 0.9% PF flush 3 mL     warfarin ANTICOAGULANT (COUMADIN) half-tab 1.5 mg     Warfarin Therapy Reminder (Check START DATE - warfarin may be starting in the FUTURE)     Facility-Administered Medications Ordered in Other Encounters   Medication     sodium chloride (PF) 0.9% PF flush 10 mL     sodium chloride (PF) 0.9% PF flush 5-10 mL     sodium chloride bacteriostatic 0.9 % flush 0-1 mL       Constitutional: Awake, alert, cooperative, no apparent distress   Respiratory: Good air entry bilaterally, no crackles or wheezing   Cardiovascular: Regular rate and rhythm, normal S1 and S2, and no murmur noted   Abdomen: Normal bowel sounds, soft, non-distended, non-tender   Skin: No rashes, no cyanosis, dry to touch   Neuro: Alert and oriented x3, no weakness, numbness, memory loss   Extremities: No edema, normal range of motion   Other(s):HEENT Pink, nonicteric, moist oral mucosa       All other systems:  Negative          Medications:      All current medications were reviewed with changes reflected in problem list.         Data:      All new lab and imaging data was reviewed.   Labs:  No results for input(s): CULT in the last 168 hours.  Recent Labs   Lab 08/31/21  0655 08/30/21  0753 08/29/21  1640 08/29/21  1523   NA  --  140  --  137   POTASSIUM 3.8 3.7  --  4.0   CHLORIDE  --  107  --  105   CO2  --  27  --  25   ANIONGAP  --  6  --  7   GLC  --  98  --  98   BUN  --  24  --  30   CR  --  1.17* 1.4* 1.40*   GFRESTIMATED  --  42* 34* 34*   CLARISSE  --  8.5  --  9.0     Recent Labs   Lab 08/30/21  0753 08/29/21  1523   WBC 2.1* 3.4*   HGB 12.5 13.2   HCT 38.2 40.8    101*   * 123*     Recent Labs   Lab 08/30/21  0753 08/29/21  1523   GLC 98 98      Imaging:   Results for orders placed or performed during the hospital encounter of 08/29/21   Soft tissue neck CT w contrast    Narrative    EXAM: CT SOFT TISSUE NECK W CONTRAST  LOCATION: [facility]  DATE/TIME: 8/29/2021 5:22 PM    INDICATION: Dysphagia, pain, voice changes.  COMPARISON: None.  CONTRAST: 80 mL Isovue-370.  TECHNIQUE: Routine CT Soft Tissue Neck with IV contrast. Multiplanar reformats. Dose reduction techniques were used.    FINDINGS:   MUCOSAL SPACES/SOFT TISSUES: Normal mucosal spaces of the upper aerodigestive tract. No mucosal mass or inflammation identified. Normal vocal cords and infraglottic trachea. Normal parapharyngeal space and subcutaneous soft tissues. Normal oral cavity,    spaces, and floor of mouth structures.    LYMPH NODES: No pathologic lymph nodes by size or morphology criteria.     SALIVARY GLANDS: Normal parotid and submandibular glands.    THYROID: Normal.     VESSELS: Vascular structures of the neck are patent.    VISUALIZED INTRACRANIAL/ORBITS/SINUSES: No abnormality of the visualized intracranial compartment or orbits. Visualized paranasal sinuses and mastoid air cells are  clear.    OTHER: No destructive osseous lesion. Biapical subpleural fibrosis. Multilevel cervical spondylosis.      Impression    IMPRESSION:   1.  No cervical mass, lymphadenopathy or localized inflammatory process.   XR Chest 2 Views    Narrative    EXAM: XR CHEST 2 VW  LOCATION: [facility]  DATE/TIME: 8/29/2021 8:03 PM    INDICATION: Chest pain.  COMPARISON: 09/06/2017.      Impression    IMPRESSION: Large lung volumes. Lungs are grossly clear. No pleural effusion or pneumothorax. Mild cardiac silhouette enlargement. No definitive pulmonary edema. Aortic atherosclerosis. Degenerative changes spine. Bony demineralization.         *Note: Due to a large number of results and/or encounters for the requested time period, some results have not been displayed. A complete set of results can be found in Results Review.

## 2021-08-31 NOTE — PLAN OF CARE
VSS except hypertension of 170/80, on RA, pt disoriented to time, pt lethargic today, LS dim, denies pain, SR w/ BBB and 1st degree AV block on tele, straight cath patient twice today for 500 mL and 400 mL, second straight cath urine looked cloudy, MD Reynolds notified for possible UTI, discharge possible tomorrow.

## 2021-08-31 NOTE — PROGRESS NOTES
08/31/21 1300   Quick Adds   Type of Visit Initial PT Evaluation   Living Environment   People in home child(mackenzie), adult  (daughter)   Current Living Arrangements condominium   Home Accessibility no concerns   Self-Care   Current Activity Tolerance fair   Activity/Exercise/Self-Care Comment Pt reports IND in the condo with no AD; reports using a 4WW outside.    Disability/Function   Fall history within last six months no   General Information   Onset of Illness/Injury or Date of Surgery 08/29/21   Referring Physician Rodolfo WU   Patient/Family Therapy Goals Statement (PT) Return home   Pertinent History of Current Problem (include personal factors and/or comorbidities that impact the POC)  88 year old female with past medical history significant for paroxysmal atrial fibrillation, on chronic anticoagulation with warfarin, HTN, HLP, CKD stage III, bilateral pulmonary emboli many years ago, mitral valve regurgitation, cognitive impairment, tremor, GERD, duodenal ulcer, depression, osteoarthritis, and ischemic colitis.  She presented to the emergency room with a complaint of sore throat, cough, and chest comfort, found to have COVID-19 infection and 8/29/2021   Existing Precautions/Restrictions fall   General Observations R UE tremor noted   Cognition   Orientation Status (Cognition) oriented x 3   Follows Commands (Cognition) follows one-step commands   Safety Deficit (Cognition) insight into deficits/self-awareness;awareness of need for assistance   Cognitive Status Comments Slow to respond; changes her answers at times; initially reports no walker use but after reports walker use   Pain Assessment   Patient Currently in Pain No   Posture    Posture Forward head position;Protracted shoulders   Strength   Strength Comments Fatigues rapildy with short distance mobility   Bed Mobility   Comment (Bed Mobility) Supine>sit with SBA   Transfers   Transfer Safety Comments Sit>stand with CGA; LOB posteriorly on first  attempt.    Gait/Stairs (Locomotion)   Comment (Gait/Stairs) Ambulates with no AD, Jarod, reaching out for bed/rail/wall   Balance   Balance Comments Requires B UE support on FWW and cues for safe mobility this session   Clinical Impression   Criteria for Skilled Therapeutic Intervention yes, treatment indicated   PT Diagnosis (PT) Impaired gait   Influenced by the following impairments Decreased activity tolerance, decreased strength, impaired balance   Functional limitations due to impairments Decreased IND with transfers, ambulation   Clinical Presentation Stable/Uncomplicated   Clinical Presentation Rationale Medically improving per chart   Clinical Decision Making (Complexity) low complexity   Therapy Frequency (PT) Daily   Predicted Duration of Therapy Intervention (days/wks) 3 days   Planned Therapy Interventions (PT) bed mobility training;gait training;patient/family education;transfer training;progressive activity/exercise;home program guidelines;strengthening   Risk & Benefits of therapy have been explained evaluation/treatment results reviewed;care plan/treatment goals reviewed;participants included;patient   PT Discharge Planning    PT Discharge Recommendation (DC Rec) Transitional Care Facility   PT Rationale for DC Rec Pt below baseline for mobility; high fall risk; daughter who she lives with also has COVID. Recommend TCU to address strength, balance and activity tolerance deficits. Pt is declining. Recommend Ax1 for mobility, 4WW with all mobility, HHPT.

## 2021-09-01 ENCOUNTER — APPOINTMENT (OUTPATIENT)
Dept: PHYSICAL THERAPY | Facility: CLINIC | Age: 86
DRG: 177 | End: 2021-09-01
Payer: COMMERCIAL

## 2021-09-01 ENCOUNTER — APPOINTMENT (OUTPATIENT)
Dept: CARDIOLOGY | Facility: CLINIC | Age: 86
DRG: 177 | End: 2021-09-01
Attending: INTERNAL MEDICINE
Payer: COMMERCIAL

## 2021-09-01 LAB
INR PPP: 2.87 (ref 0.85–1.15)
LVEF ECHO: NORMAL
POTASSIUM BLD-SCNC: 3.8 MMOL/L (ref 3.4–5.3)

## 2021-09-01 PROCEDURE — 250N000013 HC RX MED GY IP 250 OP 250 PS 637: Performed by: INTERNAL MEDICINE

## 2021-09-01 PROCEDURE — 36415 COLL VENOUS BLD VENIPUNCTURE: CPT | Performed by: INTERNAL MEDICINE

## 2021-09-01 PROCEDURE — 99233 SBSQ HOSP IP/OBS HIGH 50: CPT | Performed by: INTERNAL MEDICINE

## 2021-09-01 PROCEDURE — 120N000001 HC R&B MED SURG/OB

## 2021-09-01 PROCEDURE — 97530 THERAPEUTIC ACTIVITIES: CPT | Mod: GP | Performed by: PHYSICAL THERAPIST

## 2021-09-01 PROCEDURE — 93306 TTE W/DOPPLER COMPLETE: CPT

## 2021-09-01 PROCEDURE — 84132 ASSAY OF SERUM POTASSIUM: CPT | Performed by: INTERNAL MEDICINE

## 2021-09-01 PROCEDURE — 85610 PROTHROMBIN TIME: CPT | Performed by: INTERNAL MEDICINE

## 2021-09-01 PROCEDURE — 93306 TTE W/DOPPLER COMPLETE: CPT | Mod: 26 | Performed by: INTERNAL MEDICINE

## 2021-09-01 RX ORDER — AMLODIPINE BESYLATE 5 MG/1
5 TABLET ORAL DAILY
Status: DISCONTINUED | OUTPATIENT
Start: 2021-09-01 | End: 2021-09-02 | Stop reason: HOSPADM

## 2021-09-01 RX ADMIN — CEPHALEXIN 250 MG: 250 CAPSULE ORAL at 09:07

## 2021-09-01 RX ADMIN — ACETAMINOPHEN 975 MG: 325 TABLET, FILM COATED ORAL at 14:49

## 2021-09-01 RX ADMIN — METOPROLOL SUCCINATE 100 MG: 100 TABLET, EXTENDED RELEASE ORAL at 09:08

## 2021-09-01 RX ADMIN — BENZONATATE 100 MG: 100 CAPSULE ORAL at 09:08

## 2021-09-01 RX ADMIN — CARBOXYMETHYLCELLULOSE SODIUM 1 DROP: 0.5 SOLUTION/ DROPS OPHTHALMIC at 09:09

## 2021-09-01 RX ADMIN — BENZONATATE 100 MG: 100 CAPSULE ORAL at 16:58

## 2021-09-01 RX ADMIN — BENZONATATE 100 MG: 100 CAPSULE ORAL at 21:15

## 2021-09-01 RX ADMIN — Medication 1.5 MG: at 17:00

## 2021-09-01 RX ADMIN — ACETAMINOPHEN 975 MG: 325 TABLET, FILM COATED ORAL at 06:50

## 2021-09-01 RX ADMIN — AMLODIPINE BESYLATE 5 MG: 5 TABLET ORAL at 16:58

## 2021-09-01 RX ADMIN — ACETAMINOPHEN 975 MG: 325 TABLET, FILM COATED ORAL at 21:16

## 2021-09-01 RX ADMIN — LISINOPRIL 40 MG: 40 TABLET ORAL at 09:08

## 2021-09-01 ASSESSMENT — ACTIVITIES OF DAILY LIVING (ADL)
ADLS_ACUITY_SCORE: 15
ADLS_ACUITY_SCORE: 14

## 2021-09-01 NOTE — PROGRESS NOTES
[facility]  Hospitalist Progress Note  Ksenia Mendoza MD 09/01/2021    Reason for Stay (Diagnosis): COVID-19 infection, symptomatic         Assessment and Plan:      Summary of Stay: Maia Miranda is a 88 year old female with past medical history significant for paroxysmal atrial fibrillation, on chronic anticoagulation with warfarin, HTN, HLP, CKD stage III, bilateral pulmonary emboli many years ago, mitral valve regurgitation, cognitive impairment, tremor, GERD, duodenal ulcer, depression, osteoarthritis, and ischemic colitis.  She presented to the emergency room with a complaint of sore throat, cough, and chest comfort, found to have COVID-19 infection and 8/29/2021        COVID-19 infection,, symptomatic, without hypoxia.  -She has symptoms of cough, chest discomfort, sore throat, ill feeling and generalized weakness.  -She is now requiring oxygen, saturating 97% on room air  -No indication for steroids or remdesivir at this point.  -Chest x-ray was clear.  -Patient is weak, PT evaluated here recommended TCU, she declined requested to go home with home care.  -Patient appears quite weak.  Unable to manage self-care.  Daughter is also sick unable to care for her at this time    Type II MI  Atypical chest pain  -Troponin peaked at 0.152, and trended down.  -Elevated troponin and chest pain is related to COVID-19.  -Patient is already on full anticoagulant  -Continue to monitor, no need for any further evaluation regarding this at this time  -We will check cardiac echo wall motion abnormalities      Paroxysmal A. Fibrillation.  -Continue to monitor closely on telemetry.  -Continue warfarin with INR goal of 2-3  -Continue beta-blocker    Chronic kidney disease.  -Creatinine is at baseline.  -Noted patient is on lisinopril 40 mg p.o. daily.    Hypertension.  -Continue blood pressure medication metoprolol and lisinopril  -Monitor blood pressure    Chronic medical condition: GERD, history of  "duodenal ulcer, hernia, hypercholesterolemia.  Stable    Persistent urinary retention  RN stated patient's urine is cloudy during the straight cath.  Has chronic urinary tract infection for which she is on Keflex 250 mg p.o. daily for prophylaxis. She has no fever, no leukocytosis.  Check UA with reflex to urine culture.  At this time we will continue the prophylactic doses until the result is available.  -Guillen catheter placed overnight      DVT Prophylaxis: Warfarin  Code Status: DNR  Discharge Dispo: Home  Estimated Disch Date / # of Days until Disch: 1 1 to 2 days.  There is concern for safe disposition and caretaking arrangement at home.        Interval History (Subjective):      Assumed care reviewed chart.  Patient appears quite weak will likely need safe disposition and home care needs.  Overnight required Guillen catheter placement as unable to manage self-catheterization which patient's daughter was doing for her.  Patient's daughter cannot care for her at this time.    She denies any chest pain pressure heaviness or tightness.  Seemed a bit confused.  That per report is her baseline.  More than 10 point review of systems was carried out was otherwise negative.                  Physical Exam:      Last Vital Signs:  BP (!) 164/64 (BP Location: Left arm)   Pulse 62   Temp 98.2  F (36.8  C) (Oral)   Resp 18   Ht 1.651 m (5' 5\")   Wt 64.7 kg (142 lb 11.2 oz)   SpO2 96%   BMI 23.75 kg/m        Current Facility-Administered Medications   Medication     acetaminophen (TYLENOL) tablet 975 mg     benzocaine-menthol (CHLORASEPTIC) 6-10 MG lozenge 1 lozenge     benzonatate (TESSALON) capsule 100 mg     bisacodyl (DULCOLAX) Suppository 10 mg     calcium carbonate (TUMS) chewable tablet 1,000 mg     carboxymethylcellulose PF (REFRESH PLUS) 0.5 % ophthalmic solution 1 drop     cephALEXin (KEFLEX) capsule 250 mg     guaiFENesin (ROBITUSSIN) 20 mg/mL solution 10 mL     lidocaine (LMX4) cream     lidocaine 1 % 0.1-1 " mL     lisinopril (ZESTRIL) tablet 40 mg     magnesium hydroxide (MILK OF MAGNESIA) suspension 30 mL     meclizine (ANTIVERT) tablet 25 mg     melatonin tablet 1 mg     metoprolol succinate ER (TOPROL-XL) 24 hr tablet 100 mg     naloxone (NARCAN) injection 0.2 mg    Or     naloxone (NARCAN) injection 0.4 mg    Or     naloxone (NARCAN) injection 0.2 mg    Or     naloxone (NARCAN) injection 0.4 mg     ondansetron (ZOFRAN-ODT) ODT tab 4 mg    Or     ondansetron (ZOFRAN) injection 4 mg     oxyCODONE IR (ROXICODONE) half-tab 2.5 mg     Patient is already receiving anticoagulation with heparin, enoxaparin (LOVENOX), warfarin (COUMADIN)  or other anticoagulant medication     phenol (CHLORASEPTIC) 1.4 % spray 1 mL     polyethylene glycol (MIRALAX) Packet 17 g     prochlorperazine (COMPAZINE) injection 5 mg    Or     prochlorperazine (COMPAZINE) tablet 5 mg    Or     prochlorperazine (COMPAZINE) suppository 12.5 mg     senna-docusate (SENOKOT-S/PERICOLACE) 8.6-50 MG per tablet 1 tablet    Or     senna-docusate (SENOKOT-S/PERICOLACE) 8.6-50 MG per tablet 2 tablet     sodium chloride (PF) 0.9% PF flush 3 mL     sodium chloride (PF) 0.9% PF flush 3 mL     Warfarin Therapy Reminder (Check START DATE - warfarin may be starting in the FUTURE)     Facility-Administered Medications Ordered in Other Encounters   Medication     sodium chloride (PF) 0.9% PF flush 10 mL     sodium chloride (PF) 0.9% PF flush 5-10 mL     sodium chloride bacteriostatic 0.9 % flush 0-1 mL       Constitutional: Awake, alert, cooperative, no apparent distress   Respiratory: Good air entry bilaterally, no crackles or wheezing   Cardiovascular: Regular rate and rhythm, normal S1 and S2, and no murmur noted   Abdomen: Normal bowel sounds, soft, non-distended, non-tender   Skin: No rashes, no cyanosis, dry to touch   Neuro: Alert and oriented x3, no weakness, numbness, memory loss   Extremities: No edema, normal range of motion   Other(s):HEENT Pink, nonicteric,  moist oral mucosa       All other systems: Negative          Medications:      All current medications were reviewed with changes reflected in problem list.         Data:      All new lab and imaging data was reviewed.   Labs:  No results for input(s): CULT in the last 168 hours.  Recent Labs   Lab 09/01/21  0805 08/31/21  0655 08/30/21  0753 08/29/21  1640 08/29/21  1523   NA  --   --  140  --  137   POTASSIUM 3.8 3.8 3.7  --  4.0   CHLORIDE  --   --  107  --  105   CO2  --   --  27  --  25   ANIONGAP  --   --  6  --  7   GLC  --   --  98  --  98   BUN  --   --  24  --  30   CR  --   --  1.17* 1.4* 1.40*   GFRESTIMATED  --   --  42* 34* 34*   CLARISSE  --   --  8.5  --  9.0     Recent Labs   Lab 08/30/21  0753 08/29/21  1523   WBC 2.1* 3.4*   HGB 12.5 13.2   HCT 38.2 40.8    101*   * 123*     Recent Labs   Lab 08/30/21  0753 08/29/21  1523   GLC 98 98      Imaging:   Results for orders placed or performed during the hospital encounter of 08/29/21   Soft tissue neck CT w contrast    Narrative    EXAM: CT SOFT TISSUE NECK W CONTRAST  LOCATION: [facility]  DATE/TIME: 8/29/2021 5:22 PM    INDICATION: Dysphagia, pain, voice changes.  COMPARISON: None.  CONTRAST: 80 mL Isovue-370.  TECHNIQUE: Routine CT Soft Tissue Neck with IV contrast. Multiplanar reformats. Dose reduction techniques were used.    FINDINGS:   MUCOSAL SPACES/SOFT TISSUES: Normal mucosal spaces of the upper aerodigestive tract. No mucosal mass or inflammation identified. Normal vocal cords and infraglottic trachea. Normal parapharyngeal space and subcutaneous soft tissues. Normal oral cavity,    spaces, and floor of mouth structures.    LYMPH NODES: No pathologic lymph nodes by size or morphology criteria.     SALIVARY GLANDS: Normal parotid and submandibular glands.    THYROID: Normal.     VESSELS: Vascular structures of the neck are patent.    VISUALIZED INTRACRANIAL/ORBITS/SINUSES: No abnormality of the  visualized intracranial compartment or orbits. Visualized paranasal sinuses and mastoid air cells are clear.    OTHER: No destructive osseous lesion. Biapical subpleural fibrosis. Multilevel cervical spondylosis.      Impression    IMPRESSION:   1.  No cervical mass, lymphadenopathy or localized inflammatory process.   XR Chest 2 Views    Narrative    EXAM: XR CHEST 2 VW  LOCATION: [facility]  DATE/TIME: 8/29/2021 8:03 PM    INDICATION: Chest pain.  COMPARISON: 09/06/2017.      Impression    IMPRESSION: Large lung volumes. Lungs are grossly clear. No pleural effusion or pneumothorax. Mild cardiac silhouette enlargement. No definitive pulmonary edema. Aortic atherosclerosis. Degenerative changes spine. Bony demineralization.         *Note: Due to a large number of results and/or encounters for the requested time period, some results have not been displayed. A complete set of results can be found in Results Review.

## 2021-09-01 NOTE — PLAN OF CARE
Pertinent Assessments: Alert and oriented x 2, VSS, on room air, lung sounds clear. Bowel sound Active x 4, A x 1 with mobility.  Straight cath at baseline.   Major Shift Events: Echocardiogram completed, see result. Guillen removed at 1149 Attempted to straight cath independently but unsuccesses with adult. Waiting for peds cath to try again.  Treatment Plan: Potential cardiac consult based on echo. Needs to straight cath to go home to son. Monitor potassium and tele. Anticipate discharge today or tomorrow.

## 2021-09-01 NOTE — PLAN OF CARE
VSS. Tele: SR 1*AVB. Frequent straight caths, although pt does straight cath at home. MD paged wondering if there should be an intervention or cont to straight cath. Guillen order placed. SBA in room. A&O but confused/forgetful. Discharge possible contingent upon where she can go, dtr  that she lives with is possibly sick with Covid and may not be able to care for her per pt. Pt makes needs known, will cont plan of care.

## 2021-09-02 ENCOUNTER — TELEPHONE (OUTPATIENT)
Dept: PEDIATRICS | Facility: CLINIC | Age: 86
End: 2021-09-02

## 2021-09-02 VITALS
OXYGEN SATURATION: 94 % | WEIGHT: 142.7 LBS | HEART RATE: 64 BPM | RESPIRATION RATE: 19 BRPM | SYSTOLIC BLOOD PRESSURE: 143 MMHG | BODY MASS INDEX: 23.78 KG/M2 | HEIGHT: 65 IN | DIASTOLIC BLOOD PRESSURE: 53 MMHG | TEMPERATURE: 98.3 F

## 2021-09-02 DIAGNOSIS — I48.91 ATRIAL FIBRILLATION (H): Primary | ICD-10-CM

## 2021-09-02 DIAGNOSIS — I48.91 ATRIAL FIBRILLATION, UNSPECIFIED TYPE (H): ICD-10-CM

## 2021-09-02 DIAGNOSIS — I48.11 LONGSTANDING PERSISTENT ATRIAL FIBRILLATION (H): ICD-10-CM

## 2021-09-02 DIAGNOSIS — Z79.01 LONG TERM CURRENT USE OF ANTICOAGULANT THERAPY: ICD-10-CM

## 2021-09-02 DIAGNOSIS — Z79.01 LONG TERM CURRENT USE OF ANTICOAGULANTS WITH INR GOAL OF 2.0-3.0: ICD-10-CM

## 2021-09-02 LAB
BACTERIA UR CULT: ABNORMAL
INR PPP: 3.06 (ref 0.85–1.15)
POTASSIUM BLD-SCNC: 4.2 MMOL/L (ref 3.4–5.3)

## 2021-09-02 PROCEDURE — 250N000013 HC RX MED GY IP 250 OP 250 PS 637: Performed by: INTERNAL MEDICINE

## 2021-09-02 PROCEDURE — 99239 HOSP IP/OBS DSCHRG MGMT >30: CPT | Performed by: INTERNAL MEDICINE

## 2021-09-02 PROCEDURE — 85610 PROTHROMBIN TIME: CPT | Performed by: INTERNAL MEDICINE

## 2021-09-02 PROCEDURE — 36415 COLL VENOUS BLD VENIPUNCTURE: CPT | Performed by: INTERNAL MEDICINE

## 2021-09-02 PROCEDURE — 99223 1ST HOSP IP/OBS HIGH 75: CPT | Performed by: INTERNAL MEDICINE

## 2021-09-02 PROCEDURE — 84132 ASSAY OF SERUM POTASSIUM: CPT | Performed by: INTERNAL MEDICINE

## 2021-09-02 RX ORDER — ROSUVASTATIN CALCIUM 5 MG/1
5 TABLET, COATED ORAL AT BEDTIME
Status: DISCONTINUED | OUTPATIENT
Start: 2021-09-02 | End: 2021-09-02 | Stop reason: HOSPADM

## 2021-09-02 RX ORDER — LEVOFLOXACIN 250 MG/1
250 TABLET, FILM COATED ORAL DAILY
Qty: 7 TABLET | Refills: 0 | Status: SHIPPED | OUTPATIENT
Start: 2021-09-02 | End: 2021-09-09

## 2021-09-02 RX ORDER — ROSUVASTATIN CALCIUM 5 MG/1
5 TABLET, COATED ORAL AT BEDTIME
Qty: 30 TABLET | Refills: 0 | Status: SHIPPED | OUTPATIENT
Start: 2021-09-02 | End: 2021-10-19

## 2021-09-02 RX ORDER — AMLODIPINE BESYLATE 5 MG/1
5 TABLET ORAL DAILY
Qty: 30 TABLET | Refills: 0 | Status: SHIPPED | OUTPATIENT
Start: 2021-09-03 | End: 2021-10-19

## 2021-09-02 RX ADMIN — LISINOPRIL 40 MG: 40 TABLET ORAL at 10:32

## 2021-09-02 RX ADMIN — CEPHALEXIN 250 MG: 250 CAPSULE ORAL at 10:33

## 2021-09-02 RX ADMIN — AMLODIPINE BESYLATE 5 MG: 5 TABLET ORAL at 10:33

## 2021-09-02 RX ADMIN — METOPROLOL SUCCINATE 100 MG: 100 TABLET, EXTENDED RELEASE ORAL at 10:32

## 2021-09-02 RX ADMIN — ACETAMINOPHEN 975 MG: 325 TABLET, FILM COATED ORAL at 06:06

## 2021-09-02 RX ADMIN — Medication 1 LOZENGE: at 11:15

## 2021-09-02 RX ADMIN — ACETAMINOPHEN 975 MG: 325 TABLET, FILM COATED ORAL at 14:56

## 2021-09-02 RX ADMIN — BENZONATATE 100 MG: 100 CAPSULE ORAL at 10:33

## 2021-09-02 RX ADMIN — CARBOXYMETHYLCELLULOSE SODIUM 1 DROP: 0.5 SOLUTION/ DROPS OPHTHALMIC at 10:33

## 2021-09-02 ASSESSMENT — ACTIVITIES OF DAILY LIVING (ADL)
ADLS_ACUITY_SCORE: 14
ADLS_ACUITY_SCORE: 15

## 2021-09-02 NOTE — PROGRESS NOTES
AVS reviewed with patient. VSS.  All questions answered and patient denies any further questions or concerns.  PIV removed with no complications.  All belongings returned and patient escorted to front door by [facility] staff.

## 2021-09-02 NOTE — PLAN OF CARE
"Per chart DC summary in place with plan to home with son assistance.     Physical Therapy Discharge Summary    Reason for therapy discharge:    Discharged to home.    Progress towards therapy goal(s). See goals on Care Plan in [facility] electronic health record for goal details.  Goals not met.  Barriers to achieving goals:   discharge from facility.    Therapy recommendation(s):    \"Pt below baseline for mobility; high fall risk; daughter who she lives with also has COVID. Recommend TCU to address strength, balance and activity tolerance deficits. Pt is declining. Recommend Ax1 for mobility, 4WW with all mobility, HHPT. \"      "

## 2021-09-02 NOTE — DISCHARGE INSTRUCTIONS
Your home care referral was sent to [facility].  If you haven't heard from them within the next 24-48 hours,  please call them at [phone].

## 2021-09-02 NOTE — PLAN OF CARE
VSS, denies pain, up with SBA, straight cath x2 this shift with total output of 925cc, Echo today, (see result), Cardiology consult, SW following, discharge plan to [facility], will continue with POC.

## 2021-09-02 NOTE — PLAN OF CARE
AOx3, disoriented to time. VSS. RA. Straight cathed at 0305 for 700mL, PVR=0mL. Bladder scanned at 0614 for 90mL. No c/o pain. SW following. Cardiology consulted.  Plan: discharge with son Rikki when stable.

## 2021-09-02 NOTE — DISCHARGE SUMMARY
[facility]  Discharge Summary  Hospitalist      Date of Admission:  8/29/2021  Date of Discharge:  9/2/2021  Provider:  Ksenia Mendoza MD  Date of Service (when I last saw the patient): 09/02/21      Primary Provider: Nancy Salgado          Discharge Diagnosis:   Discharge Diagnoses   COVID-19 infection  Elevated troponin type II demand ischemia  Urinary tract infection  Assessment atrial fibrillation on warfarin  Pretension  Hyperlipidemia  Prior  venothromboembolism      Other medical issues:  Past Medical History:   Diagnosis Date     Amnestic MCI (mild cognitive impairment with memory loss) 2014     Chronic duodenal ulcer without mention of hemorrhage, perforation, or obstruction 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 Warner, [facility] yearly     Esophageal reflux 2001    Abstracted 06/10/02     Essential and other specified forms of tremor 2004    resting tremor     Generalized osteoarthrosis, unspecified site     Hands     Hiatal hernia     diagnosed late 1990s [place], [place]     History of pulmonary embolism 1971    no source found     LACTOSE INTOLERANCE      Lumbago     sees Kodi Guidry     Mitral valve regurgitation      Occlusion and stenosis of carotid artery without mention of cerebral infarction 2003    CAROTID US NORMAL, bruit in neck     Osteoporosis, unspecified 2003    T = -2.66     Paroxysmal atrial fibrillation (H) 11/15/2013     Spider veins      Unspecified essential hypertension      Unspecified tinnitus 2005    mild hearing loss, saw ENT          History of Present Illness   Maia Miranda is an 88 year old female who presented with sore throat cough and chest discomfort.  Please see the admission history and physical for full details.    Hospital Course     Maia Miranda was admitted on 8/29/2021.  She is a 88 year old female with past medical history significant for  paroxysmal atrial fibrillation, on chronic anticoagulation with warfarin, HTN, HLP, CKD stage III, bilateral pulmonary emboli many years ago, mitral valve regurgitation, cognitive impairment, tremor, GERD, duodenal ulcer, depression, osteoarthritis, and ischemic colitis.  She presented to the emergency room with a complaint of sore throat, cough, and chest comfort, found to have COVID-19 infection and was admitted for further evaluation and treatment.       The following problems were addressed during her hospitalization:    COVID-19 infection,, symptomatic, without hypoxia.  -She had symptoms of cough, chest discomfort, sore throat, ill feeling and generalized weakness.  -Is not requiring any mental oxygen  -No indication for steroids or remdesivir at this point.  No evidence of pneumonitis  -Chest x-ray was clear.  -Patient is weak, is being discharged home with home therapies as recommended by PT  -Patient appears quite weak.  Unable to manage self-care will go home on home care.  Patient son will be with patient on discharge  -Daughter is also sick unable to care for her at this time     Type II MI  Atypical chest pain  -Troponin peaked at 0.152, and trended down.  -Elevated troponin and chest pain is related to COVID-19.  -Patient is already on full anticoagulant  -Cardiac echo was equivocal about wall motion abnormality.  Cardiology consulted  -Care plan discussed with cardiologist.  Patient will be followed up as outpatient in cardiology clinic    UTI  -Is on prophylaxis cephalexin daily  -Urine culture shows E. Coli  -We will treat with levofloxacin for 7 days and then resume prior to hospitalization regimen for prophylaxis        Paroxysmal A. Fibrillation.  -Rate was controlled during hospitalization  -Continue warfarin with INR goal of 2-3  -Continue beta-blocker  -She will need INR check specially now that she is on antibiotics     Chronic kidney disease.  -Creatinine is at baseline.  -Resume prior to  admission meds patient will follow with primary care provider     Hypertension.  -Resume prior to admission meds     Chronic medical condition: GERD, history of duodenal ulcer, hernia, hypercholesterolemia.  Stable     Persistent urinary retention  RN stated patient's urine is cloudy during the straight cath.  Has chronic urinary tract infection for which she is on Keflex 250 mg p.o. daily for prophylaxis. She has no fever, no leukocytosis.    -Repeat UA concerning for UTI cultures positive for E. coli.  Started on levofloxacin      Significant Results and Procedures   As noted above    Pending Results   Unresulted Labs Ordered in the Past 30 Days of this Admission     No orders found from 7/30/2021 to 8/30/2021.          Code Status   DNR       Primary Care Physician   Nancy Salgado    Physical Exam   Temp: 98.3  F (36.8  C) Temp src: Oral BP: (!) 143/53 Pulse: 64   Resp: 19 SpO2: 94 % O2 Device: None (Room air)    Vitals:    08/29/21 1601 08/29/21 2150   Weight: 68 kg (150 lb) 64.7 kg (142 lb 11.2 oz)     Vital Signs with Ranges  Temp:  [97.8  F (36.6  C)-98.3  F (36.8  C)] 98.3  F (36.8  C)  Pulse:  [58-64] 64  Resp:  [16-19] 19  BP: (131-155)/(51-68) 143/53  SpO2:  [94 %-97 %] 94 %  I/O last 3 completed shifts:  In: 640 [P.O.:640]  Out: 2750 [Urine:2750]    Constitutional: Awake, alert, cooperative, no apparent distress,  Respiratory: No increased work of breathing, good air exchange, clear to auscultation bilaterally, no crackles or wheezing.  Cardiovascular: Normal apical impulse,normal S1 and S2,   GI: normal bowel sounds, soft, non-distended, non-tender.      Discharge Disposition   Discharged to home    Consultations This Hospital Stay   PHARMACY TO DOSE WARFARIN  CARDIOLOGY IP CONSULT  PHYSICAL THERAPY ADULT IP CONSULT  CARE MANAGEMENT / SOCIAL WORK IP CONSULT  SOCIAL WORK IP CONSULT  CARDIOLOGY IP CONSULT    Time Spent on this Encounter   Ksenia ANN MD, personally saw the patient today and  spent greater than 30 minutes discharging this patient.    Discharge Orders      Discharge Order: F/U with Cardiac  OLIVER      Home care nursing referral      Home Care PT Referral for Hospital Discharge      Home Care OT Referral for Hospital Discharge      Reason for your hospital stay    Refer to discharge summary.  Briefly admitted for COVID-19 infection chest pain with elevated troponin     Follow-up and recommended labs and tests     Follow up with primary care provider, Nancy Salgado, within 7 days for hospital follow- up.  The following labs/tests are recommended: INR.  Started on antibiotics would affect Coumadin levels  Warfarin check in 3 to 5 days.  Primary care clinic or warfarin clinic to adjust dose as necessary  On antibiotics for UTI  We will need help with self-catheterization  Please call or come if there are new or worsening symptoms    Follow-up with cardiology for further work-up.     Activity    Your activity upon discharge: activity as tolerated     Monitor and record    blood pressure daily  weight every other day     MD face to face encounter    Documentation of Face to Face and Certification for Home Health Services    I certify that patient: Maia Miranda is under my care and that I, or a nurse practitioner or physician's assistant working with me, had a face-to-face encounter that meets the physician face-to-face encounter requirements with this patient on: 9/2/2021.    This encounter with the patient was in whole, or in part, for the following medical condition, which is the primary reason for home health care: deconditioning, weakness, cognitive impairment.    I certify that, based on my findings, the following services are medically necessary home health services: Nursing, Physical Therapy, and Social Work.    My clinical findings support the need for the above services because: Nurse is needed: To assess safety, cares after changes in medications or other medical  regimen..    Further, I certify that my clinical findings support that this patient is homebound (i.e. absences from home require considerable and taxing effort and are for medical reasons or Religion services or infrequently or of short duration when for other reasons) because: Requires assistance of another person or specialized equipment to access medical services because patient: Requires supervision of another for safe transfer...    Based on the above findings. I certify that this patient is confined to the home and needs intermittent skilled nursing care, physical therapy and/or speech therapy.  The patient is under my care, and I have initiated the establishment of the plan of care.  This patient will be followed by a physician who will periodically review the plan of care.  Physician/Provider to provide follow up care: Nancy Salgado    Attending hospital physician (the Medicare certified [place] provider): Ksenia Mendoza MD  Physician Signature: See electronic signature associated with these discharge orders.  Date: 9/2/2021     Diet    Follow this diet upon discharge: Orders Placed This Encounter      Combination Diet Low Saturated Fat Na <2400mg Diet, No Caffeine Diet     Discharge Medications   Current Discharge Medication List      START taking these medications    Details   amLODIPine (NORVASC) 5 MG tablet Take 1 tablet (5 mg) by mouth daily  Qty: 30 tablet, Refills: 0    Associated Diagnoses: Type 2 MI (myocardial infarction) (H)      levofloxacin (LEVAQUIN) 250 MG tablet Take 1 tablet (250 mg) by mouth daily for 7 days  Qty: 7 tablet, Refills: 0    Associated Diagnoses: History of recurrent UTIs      rosuvastatin (CRESTOR) 5 MG tablet Take 1 tablet (5 mg) by mouth At Bedtime  Qty: 30 tablet, Refills: 0    Associated Diagnoses: Type 2 MI (myocardial infarction) (H)         CONTINUE these medications which have CHANGED    Details   cephALEXin (KEFLEX) 250 MG capsule Take 1 capsule (250  mg) by mouth daily  Qty: 90 capsule, Refills: 3    Comments: Hold daily prophylactic antibiotic while on treatment for UTI  Associated Diagnoses: History of recurrent UTIs         CONTINUE these medications which have NOT CHANGED    Details   acetaminophen (TYLENOL) 500 MG tablet Take 500-1,000 mg by mouth every 6 hours as needed      ascorbic acid (VITAMIN C) 1000 MG TABS Take 1 tablet (1,000 mg) by mouth 2 times daily  Qty: 180 tablet, Refills: 3    Associated Diagnoses: Recurrent UTI; Neurogenic bladder      calcium citrate-vitamin D (CITRACAL) 315-250 MG-UNIT TABS per tablet Take 1 tablet by mouth daily      carboxymethylcellulose (CELLUVISC/REFRESH LIQUIGEL) 1 % ophthalmic solution Place 1 drop into both eyes every morning As needed      Cholecalciferol (VITAMIN D-3) 1000 UNITS CAPS Take 1 capsule by mouth daily      conjugated estrogens (PREMARIN) 0.625 MG/GM vaginal cream Place 0.5 g vaginally twice a week On Tuesday and Friday. Uses a pea sized amount  Qty: 5 g, Refills: 2    Associated Diagnoses: Recurrent UTI      lisinopril (ZESTRIL) 40 MG tablet Take 1 tablet (40 mg) by mouth daily  Qty: 90 tablet, Refills: 3    Associated Diagnoses: Hypertension goal BP (blood pressure) < 150/90      meclizine (ANTIVERT) 25 MG tablet Take 1 tablet (25 mg) by mouth every 6 hours as needed for dizziness  Qty: 30 tablet, Refills: 3    Comments: Profile Rx: patient will contact pharmacy when needed  Associated Diagnoses: Dizziness      metoprolol succinate ER (TOPROL-XL) 100 MG 24 hr tablet Take 1 tablet (100 mg) by mouth daily  Qty: 90 tablet, Refills: 3    Associated Diagnoses: Palpitations; Atrial fibrillation, unspecified type (H)      Omega-3 Fatty Acids (OMEGA-3 FISH OIL PO) Take 1 g by mouth 2 times daily (with meals)      sodium chloride (PETER 128) 5 % ophthalmic ointment Place 1 Application into both eyes At Bedtime      warfarin ANTICOAGULANT (COUMADIN) 2.5 MG tablet Take a half tablet (1.25 mg) on Mon & Fri; take  1 tablet (2.5 mg) all other days or as directed by INR clinic  Qty: 90 tablet, Refills: 3    Comments: Profile Rx: patient will contact pharmacy when needed  Associated Diagnoses: Long term current use of anticoagulant therapy      Catheters (GENTLECATH URINARY CATHETER) MISC 1 catheter 4 times daily  Qty: 120 each, Refills: 12    Associated Diagnoses: Urinary retention      estradiol (ESTRING) 2 MG vaginal ring Place 1 each vaginally every 3 months  Qty: 1 each, Refills: 1    Associated Diagnoses: Recurrent UTI; Atrophic vaginitis           Allergies   Allergies   Allergen Reactions     Codeine Nausea and Vomiting     Other reaction(s): GI Intolerance  Other reaction(s): GI Intolerance, Vomiting     Meperidine Nausea and Vomiting     Other reaction(s): GI Intolerance  Other reaction(s): GI Intolerance, Vomiting     Morphine Nausea and Vomiting     Other reaction(s): GI Intolerance  vomiting  Other reaction(s): GI Intolerance, Vomiting     Penicillins Hives     hives     Sulfa Drugs      Other reaction(s): GI Intolerance  Vomiting    Other reaction(s): GI Intolerance     Epinephrine Palpitations     Other reaction(s): Other (See Comments)  Makes heart race  Other reaction(s): Other (See Comments), Tachycardia  Makes heart race       Data   Most Recent 3 CBC's:Recent Labs   Lab Test 08/30/21  0753 08/29/21  1523 12/22/19  1120   WBC 2.1* 3.4* 7.8   HGB 12.5 13.2 13.1    101* 102*   * 123* 138*      Most Recent 3 BMP's:  Recent Labs   Lab Test 09/02/21  0740 09/01/21  0805 08/31/21  0655 08/30/21  0753 08/29/21  1640 08/29/21  1523 11/19/20  1122   NA  --   --   --  140  --  137 139   POTASSIUM 4.2 3.8 3.8 3.7  --  4.0 4.1   CHLORIDE  --   --   --  107  --  105 107   CO2  --   --   --  27  --  25 30   BUN  --   --   --  24  --  30 27   CR  --   --   --  1.17* 1.4* 1.40* 1.13*   ANIONGAP  --   --   --  6  --  7 2*   CLARISSE  --   --   --  8.5  --  9.0 9.6   GLC  --   --   --  98  --  98 77     Most Recent 2  LFT's:  Recent Labs   Lab Test 08/30/21  0753 07/01/19  1000   AST 49* 25   ALT 28 26   ALKPHOS 71 86   BILITOTAL 0.4 0.5     Most Recent INR's and Anticoagulation Dosing History:  Anticoagulation Dose History     Recent Dosing and Labs Latest Ref Rng & Units 8/3/2021 8/24/2021 8/29/2021 8/30/2021 8/31/2021 9/1/2021 9/2/2021    Warfarin 1.25 mg - - - - 1.25 mg - - -    Warfarin 1.5 mg - - - - - 1.5 mg 1.5 mg -    Warfarin 2.5 mg - - - - 2.5 mg - - -    INR 0.85 - 1.15 2.8(H) 3.0(H) 2.01(H) 2.15(H) 2.87(H) 2.87(H) 3.06(H)        Most Recent 3 Troponin's:  Recent Labs   Lab Test 08/30/21  1005 08/30/21  0753 08/30/21  0141 06/07/14  0205 06/06/14  1724 06/06/14  1423 06/06/14  1415   TROPI  --   --   --  <0.012 <0.012  --  <0.012   TROPONIN 0.098* 0.116* 0.152*  --   --    < >  --     < > = values in this interval not displayed.     Most Recent Cholesterol Panel:  Recent Labs   Lab Test 11/19/20  1122   CHOL 215*   *   HDL 50   TRIG 135     Most Recent 6 Bacteria Isolates From Any Culture (See EPIC Reports for Culture Details):  Recent Labs   Lab Test 03/15/21  1129 03/05/21  1108 09/22/20  1114 08/15/20  0954 08/04/20  0930 07/20/20  1408   CULT >100,000 colonies/mL  Escherichia coli  * 50,000 to 100,000 colonies/mL  Escherichia coli  *  50,000 to 100,000 colonies/mL  Strain 2  Escherichia coli  * No growth >100,000 colonies/mL  Escherichia coli  * >100,000 colonies/mL  Escherichia coli  * 10,000 to 50,000 colonies/mL  mixed urogenital georgette       Most Recent TSH, T4 and A1c Labs:  Recent Labs   Lab Test 02/26/18  1504   TSH 1.82     Results for orders placed or performed during the hospital encounter of 08/29/21   Soft tissue neck CT w contrast    Narrative    EXAM: CT SOFT TISSUE NECK W CONTRAST  LOCATION: [facility]  DATE/TIME: 8/29/2021 5:22 PM    INDICATION: Dysphagia, pain, voice changes.  COMPARISON: None.  CONTRAST: 80 mL Isovue-370.  TECHNIQUE: Routine CT Soft Tissue Neck with  IV contrast. Multiplanar reformats. Dose reduction techniques were used.    FINDINGS:   MUCOSAL SPACES/SOFT TISSUES: Normal mucosal spaces of the upper aerodigestive tract. No mucosal mass or inflammation identified. Normal vocal cords and infraglottic trachea. Normal parapharyngeal space and subcutaneous soft tissues. Normal oral cavity,    spaces, and floor of mouth structures.    LYMPH NODES: No pathologic lymph nodes by size or morphology criteria.     SALIVARY GLANDS: Normal parotid and submandibular glands.    THYROID: Normal.     VESSELS: Vascular structures of the neck are patent.    VISUALIZED INTRACRANIAL/ORBITS/SINUSES: No abnormality of the visualized intracranial compartment or orbits. Visualized paranasal sinuses and mastoid air cells are clear.    OTHER: No destructive osseous lesion. Biapical subpleural fibrosis. Multilevel cervical spondylosis.      Impression    IMPRESSION:   1.  No cervical mass, lymphadenopathy or localized inflammatory process.   XR Chest 2 Views    Narrative    EXAM: XR CHEST 2 VW  LOCATION: [facility]  DATE/TIME: 2021 8:03 PM    INDICATION: Chest pain.  COMPARISON: 2017.      Impression    IMPRESSION: Large lung volumes. Lungs are grossly clear. No pleural effusion or pneumothorax. Mild cardiac silhouette enlargement. No definitive pulmonary edema. Aortic atherosclerosis. Degenerative changes spine. Bony demineralization.       Echocardiogram Complete     Value    LVEF  55-60%    Narrative    [id number]  [id number]  [id number]  [id number]^JOVAN^ERIN^JOJO     [facility]  Echocardiography Laboratory  [address] [place] [address]     Name: JOSE DANIEL RAWLS  MRN: [id number]  : 10/26/1932  Study Date: 2021 11:05 AM  Age: 88 yrs  Gender: Female  Patient Location: [facility]  Reason For Study: Chest Pain  Ordering Physician: ERIN ALDANA  Performed By: Bernadine Wallace     BSA: 1.7 m2  Height: 65  in  Weight: 142 lb  HR: 62  BP: 164/64 mmHg  ______________________________________________________________________________  Procedure  Complete Portable Echo Adult.  ______________________________________________________________________________  Interpretation Summary     The visual ejection fraction is 55-60%.  Left ventricular systolic function is normal.  Contrast would enhance wall motion analysis. The anterolateral wall was poorly  seen though likely to be jackie normally.  Grade III or advanced diastolic dysfunction.  Trivial pericardial effusion  The study was technically difficult.  ______________________________________________________________________________  Left Ventricle  The left ventricle is normal in size. There is mild concentric left  ventricular hypertrophy. The visual ejection fraction is 55-60%. Left  ventricular systolic function is normal. Grade III or advanced diastolic  dysfunction. Diastolic Doppler findings (E/E' ratio and/or other parameters)  suggest left ventricular filling pressures are increased.     Right Ventricle  The right ventricle is normal in size and function.     Atria  Normal left atrial size. Right atrial size is normal. There is no color  Doppler evidence of an atrial shunt.     Mitral Valve  The mitral valve leaflets are mildly thickened. There is mild (1+) mitral  regurgitation.     Tricuspid Valve  There is mild (1+) tricuspid regurgitation.     Aortic Valve  The aortic valve is trileaflet. There is moderate trileaflet aortic sclerosis.  No aortic regurgitation is present. No hemodynamically significant valvular  aortic stenosis.     Pulmonic Valve  There is no pulmonic valvular regurgitation. Normal pulmonic valve velocity.     Vessels  The aortic root is normal size. IVC diameter <2.1 cm collapsing >50% with  sniff suggests a normal RA pressure of 3 mmHg.     Pericardium  Trivial pericardial effusion.     Rhythm  Sinus rhythm was  noted.  ______________________________________________________________________________  MMode/2D Measurements & Calculations     IVSd: 1.1 cm  LVIDd: 3.9 cm  LVIDs: 2.5 cm  LVPWd: 1.3 cm  FS: 36.7 %  LV mass(C)d: 166.1 grams  LV mass(C)dI: 97.1 grams/m2  Ao root diam: 3.2 cm  LA dimension: 3.2 cm  LA/Ao: 1.0  LVOT diam: 1.8 cm  LVOT area: 2.5 cm2  LA Volume (BP): 53.5 ml  LA Volume Index (BP): 31.3 ml/m2  RWT: 0.68     Doppler Measurements & Calculations  MV E max love: 99.4 cm/sec  MV A max love: 51.4 cm/sec  MV E/A: 1.9  MV dec time: 0.22 sec  LV V1 max P.2 mmHg  LV V1 max: 102.0 cm/sec  LV V1 VTI: 23.2 cm  SV(LVOT): 59.0 ml  SI(LVOT): 34.5 ml/m2  PA acc time: 0.13 sec  TR max love: 268.5 cm/sec  TR max P.0 mmHg  E/E' av.3  Lateral E/e': 17.0  Medial E/e': 23.7     ______________________________________________________________________________  Report approved by: Urban Marquez 2021 12:35 PM           *Note: Due to a large number of results and/or encounters for the requested time period, some results have not been displayed. A complete set of results can be found in Results Review.           Disclaimer: This note consists of symbols derived from keyboarding, dictation and/or voice recognition software. As a result, there may be errors in the script that have gone undetected. Please consider this when interpreting information found in this chart.

## 2021-09-02 NOTE — TELEPHONE ENCOUNTER
ANTICOAGULATION  MANAGEMENT: Discharge Review    Maia Miranda chart reviewed for anticoagulation continuity of care    Hospital Admission on 8/29 to 9/2/21 for symptoms of Covid-19 (tested positive) and UTI.    Discharge disposition: Home with Home Care, PT only    Results:    Recent labs: (last 7 days)     08/29/21 2026 08/30/21  0753 08/31/21  0655 09/01/21  0717 09/02/21  0740   INR 2.01* 2.15* 2.87* 2.87* 3.06*     Anticoagulation inpatient management:     less warfarin administered than maintenance regimen    Anticoagulation discharge instructions:     Warfarin dosing: home regimen continued   Bridging: No   INR goal change: No      Medication changes affecting anticoagulation: Yes:  Concurrent use of LEVOFLOXACIN and WARFARIN may result in an increased risk of bleeding.  Concurrent use of ROSUVASTATIN and WARFARIN may result in increase in INR and increased risk of bleeding.    Additional factors affecting anticoagulation: No    Plan     Recommend to check INR on Tuesday, 9/7/21  Recommend to adjust dose to hold today's 1.25 mg    Spoke with daughter, Bhargavi.. She did not give Maia her 2.5 mg dose last night (was confused with discharge instructions). After more discussion between [facility] RN and [facility] Pharmacist, Cathie Griffin. They were advised to still continue with the hold of 1.25 mg warfarin tonight, then continue with maintenance dosing until next INR. Maia will come in to check INR again on Tuesday and they will continue to monitor for any unusual bruising/bleeding.     Anticoagulation Calendar updated    Dian Astudillo RN

## 2021-09-02 NOTE — CONSULTS
Cardiology Consultation:    Maia Miranda MRN#: [id number]   YOB: 1932 Age: 88 year old     Date of Admission: 8/29/2021  Consult indication: elevated troponin          Assessment and Plan / Recommendations:      # Elevated troponin, peak 0.152.  Overall clinical presentation seems most consistent with type II MI secondary to COVID-19 pneumonia/illness.  Patient denies symptoms concerning for angina.  Overall clinical presentation does not seem characteristic of an acute coronary syndrome or decompensated heart failure.  # Paroxysmal atrial fibrillation, on warfarin  # Hypertension  # COVID-19 pneumonia/illness  # HL  # Prior VTE  # Cognitive impairment     Discussed options for further evaluation and management with the patient, including a conservative approach, versus more invasive approach.  Patient does have baseline cognitive impairment, however was A& I called Rikki O x3.  She deferred decision-making to her son, Rkiki.  I called Rikki and spoke to him about the overall hospital course, as well as the significance of the elevated troponin.  Discussed options including medical management and close follow-up, versus outpatient stress testing.  Patient is currently asymptomatic of chest discomfort/chest pain, and overall clinical presentation does not seem consistent with acute coronary syndrome, and so an ischemic evaluation is currently not appropriate in the setting of active COVID-19 illness.  Patient's son would like to hold off on scheduling testing at this time, and will plan to follow-up in clinic in about 1 month, for reassessment.  At that time, nuclear stress testing versus continued medical management can be considered.  Recommend continuing current cardiac regimen including warfarin, amlodipine, lisinopril, metoprolol.  Will start on low-dose statin therapy.  Follow-up with cardiology OLIVER in 1 month, or sooner as needed.    Thank you for allowing our team to participate in the  care of Maia Miranda. Please do not hesitate to page me with any questions or concerns.    Juan Diego Andino MD, [facility]  Cardiology  Text Page   September 2, 2021    Voice recognition software utilized. Although reviewed after completion, some word and grammatical errors may be present.           History of Present Illness:     I had the opportunity to see patient Maia Miranda at [facility] for a cardiology consultation.     As you know, patient is a pleasant 88-year-old female with a past medical history significant for paroxysmal atrial fibrillation (on warfarin, hypertension, hyperlipidemia, CKD, VTE, cognitive impairment, prior GI bleed, ischemic colitis, admitted on 8/29/2021 with COVID-19 pneumonia.    Patient is a poor historian and so history is obtained from chart review.  Apparently, patient had been experiencing several days of sore throat, cough,  chest discomfort.  Chest discomfort was described as nonexertional, substernal, no radiation.  In the ED initial /60 2 mmHg, heart rate 64 bpm, ECG demonstrated normal sinus rhythm with first-degree AV block, right bundle branch block, LVH.  Findings were similar when compared to an ECG from 10/20/2018.  As part of her evaluation, a troponin was obtained which is elevated at 0.08, which is since peaked at 0.152.  Since hospitalization, patient has improved.  Denies any chest pain or chest pressure.  A TTE was done on 9/1/2021 that demonstrated normal biventricular function, no significant wall motion abnormalities, though the study was technically difficult.  There was a trivial pericardial effusion present.         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 Warner, [facility] [place] yearly     Esophageal reflux 2001    Abstracted 06/10/02     Essential and other specified forms of tremor 2004    resting tremor     Generalized osteoarthrosis, unspecified site     Hands     Hiatal hernia     diagnosed late 1990s [place], [place]     History of pulmonary embolism 1971    no source found     LACTOSE INTOLERANCE      Lumbago     sees Kodi Guidry     Mitral valve regurgitation      Occlusion and stenosis of carotid artery without mention of cerebral infarction 2003    CAROTID US NORMAL, bruit in neck     Osteoporosis, unspecified 2003    T = -2.66     Paroxysmal atrial fibrillation (H) 11/15/2013     Spider veins      Unspecified essential hypertension      Unspecified tinnitus 2005    mild hearing loss, saw ENT             Past Surgical History:   I have reviewed this patient's past surgical history   Past Surgical History:   Procedure Laterality Date     CATARACT IOL, RT/LT       corneal scraping       CYSTOSCOPY       CYSTOSCOPY, BIOPSY BLADDER, COMBINED N/A 7/25/2016    Procedure: COMBINED CYSTOSCOPY, BIOPSY BLADDER;  Surgeon: Bernadine Maria MD;  Location: [facility] OR     ESOPHAGOSCOPY, GASTROSCOPY, DUODENOSCOPY (EGD), COMBINED  7/8/2013    Procedure: COMBINED ESOPHAGOSCOPY, GASTROSCOPY, DUODENOSCOPY (EGD);   ESOPHAGOSCOPY, GASTROSCOPY, DUODENOSCOPY (EGD)   ;  Surgeon: Shawn Soto MD;  Location: [facility] GI     IMPLANT STIMULATOR SACRAL NERVE STAGE ONE N/A 4/4/2017    Procedure: IMPLANT STIMULATOR SACRAL NERVE STAGE ONE;  Surgeon: Kb Tracy MD;  Location: [facility] OR     IMPLANT STIMULATOR SACRAL NERVE STAGE TWO N/A 4/18/2017    Procedure: IMPLANT STIMULATOR SACRAL NERVE STAGE TWO;  Stage Two Interstim  ;  Surgeon:  Kb Tracy MD;  Location: [facility] OR     interstim placement       [facility] NONSPECIFIC PROCEDURE      D&C x 2 in  &  -- Abstracted 06/10/02     Z NONSPECIFIC PROCEDURE      Left breast biopsy x 2 in  &  -- Abstracted 06/10/02     [facility] NONSPECIFIC PROCEDURE      Influenza resulting in hospitalization -- Abstracted 06/10/02     [facility] NONSPECIFIC PROCEDURE  1971    10 day hospitalization from bilateral pulmonary enboli -- Abstracted 06/10/02     [facility] NONSPECIFIC PROCEDURE      colonoscopy  negative             Social History:   I have reviewed this patient's social history  Social History     Tobacco Use     Smoking status: Never Smoker     Smokeless tobacco: Never Used   Substance Use Topics     Alcohol use: No             Family History:   I have reviewed this patient's family history  Family History   Problem Relation Age of Onset     Cerebrovascular Disease Father          at 92     Psychotic Disorder Mother         Suicide 62, depression     Alzheimer Disease Maternal Grandmother      Cardiovascular Sister         pacemaker     Glaucoma No family hx of              Allergies:   I have reviewed this patient's allergy history  Allergies   Allergen Reactions     Codeine Nausea and Vomiting     Other reaction(s): GI Intolerance  Other reaction(s): GI Intolerance, Vomiting     Meperidine Nausea and Vomiting     Other reaction(s): GI Intolerance  Other reaction(s): GI Intolerance, Vomiting     Morphine Nausea and Vomiting     Other reaction(s): GI Intolerance  vomiting  Other reaction(s): GI Intolerance, Vomiting     Penicillins Hives     hives     Sulfa Drugs      Other reaction(s): GI Intolerance  Vomiting    Other reaction(s): GI Intolerance     Epinephrine Palpitations     Other reaction(s): Other (See Comments)  Makes heart race  Other reaction(s): Other (See Comments), Tachycardia  Makes heart race               Medications reviewed:   Prior to admission medications:  Prior to Admission  medications    Medication Sig Start Date End Date Taking? Authorizing Provider   acetaminophen (TYLENOL) 500 MG tablet Take 500-1,000 mg by mouth every 6 hours as needed   Yes Reported, Patient   ascorbic acid (VITAMIN C) 1000 MG TABS Take 1 tablet (1,000 mg) by mouth 2 times daily 11/19/13  Yes Katja Brunson PA-C   calcium citrate-vitamin D (CITRACAL) 315-250 MG-UNIT TABS per tablet Take 1 tablet by mouth daily   Yes Reported, Patient   carboxymethylcellulose (CELLUVISC/REFRESH LIQUIGEL) 1 % ophthalmic solution Place 1 drop into both eyes every morning As needed   Yes Unknown, Entered By History   cephALEXin (KEFLEX) 250 MG capsule Take 1 capsule (250 mg) by mouth daily 4/6/21  Yes Bernadine Maria MD   Cholecalciferol (VITAMIN D-3) 1000 UNITS CAPS Take 1 capsule by mouth daily   Yes Reported, Patient   conjugated estrogens (PREMARIN) 0.625 MG/GM vaginal cream Place 0.5 g vaginally twice a week On Tuesday and Friday. Uses a pea sized amount 8/22/19  Yes Bernadine Maria MD   lisinopril (ZESTRIL) 40 MG tablet Take 1 tablet (40 mg) by mouth daily 11/19/20  Yes Nancy Salgado APRN CNP   meclizine (ANTIVERT) 25 MG tablet Take 1 tablet (25 mg) by mouth every 6 hours as needed for dizziness 12/20/19  Yes Li Flores Mai, MD   metoprolol succinate ER (TOPROL-XL) 100 MG 24 hr tablet Take 1 tablet (100 mg) by mouth daily 11/19/20  Yes Nancy Salgado APRN CNP   Omega-3 Fatty Acids (OMEGA-3 FISH OIL PO) Take 1 g by mouth 2 times daily (with meals)   Yes Unknown, Entered By History   sodium chloride (PETER 128) 5 % ophthalmic ointment Place 1 Application into both eyes At Bedtime   Yes Unknown, Entered By History   warfarin ANTICOAGULANT (COUMADIN) 2.5 MG tablet Take a half tablet (1.25 mg) on Mon & Fri; take 1 tablet (2.5 mg) all other days or as directed by INR clinic 11/19/20  Yes Nancy Salgado APRN CNP   Catheters (GENTLECATH URINARY CATHETER) MISC 1 catheter 4 times  daily 4/3/14   Linda Stearns MD   estradiol (ESTRING) 2 MG vaginal ring Place 1 each vaginally every 3 months 6/17/20   Bernadine Maria MD      Current medications:  Current Facility-Administered Medications Ordered in Epic   Medication Dose Route Frequency Last Rate Last Admin     acetaminophen (TYLENOL) tablet 975 mg  975 mg Oral Q8H   975 mg at 09/02/21 0606     amLODIPine (NORVASC) tablet 5 mg  5 mg Oral Daily   5 mg at 09/02/21 1033     benzocaine-menthol (CHLORASEPTIC) 6-10 MG lozenge 1 lozenge  1 lozenge Buccal Q1H PRN   1 lozenge at 09/02/21 1115     benzonatate (TESSALON) capsule 100 mg  100 mg Oral TID   100 mg at 09/02/21 1033     bisacodyl (DULCOLAX) Suppository 10 mg  10 mg Rectal Daily PRN         calcium carbonate (TUMS) chewable tablet 1,000 mg  1,000 mg Oral 4x Daily PRN         carboxymethylcellulose PF (REFRESH PLUS) 0.5 % ophthalmic solution 1 drop  1 drop Both Eyes QAM   1 drop at 09/02/21 1033     cephALEXin (KEFLEX) capsule 250 mg  250 mg Oral Daily   250 mg at 09/02/21 1033     guaiFENesin (ROBITUSSIN) 20 mg/mL solution 10 mL  10 mL Oral Q4H PRN         lidocaine (LMX4) cream   Topical Q1H PRN         lidocaine 1 % 0.1-1 mL  0.1-1 mL Other Q1H PRN         lisinopril (ZESTRIL) tablet 40 mg  40 mg Oral Daily   40 mg at 09/02/21 1032     magnesium hydroxide (MILK OF MAGNESIA) suspension 30 mL  30 mL Oral Daily PRN         meclizine (ANTIVERT) tablet 25 mg  25 mg Oral Q6H PRN         melatonin tablet 1 mg  1 mg Oral At Bedtime PRN         metoprolol succinate ER (TOPROL-XL) 24 hr tablet 100 mg  100 mg Oral Daily   100 mg at 09/02/21 1032     naloxone (NARCAN) injection 0.2 mg  0.2 mg Intravenous Q2 Min PRN        Or     naloxone (NARCAN) injection 0.4 mg  0.4 mg Intravenous Q2 Min PRN        Or     naloxone (NARCAN) injection 0.2 mg  0.2 mg Intramuscular Q2 Min PRN        Or     naloxone (NARCAN) injection 0.4 mg  0.4 mg Intramuscular Q2 Min PRN         ondansetron (ZOFRAN-ODT)  ODT tab 4 mg  4 mg Oral Q6H PRN        Or     ondansetron (ZOFRAN) injection 4 mg  4 mg Intravenous Q6H PRN         oxyCODONE IR (ROXICODONE) half-tab 2.5 mg  2.5 mg Oral Q6H PRN         Patient is already receiving anticoagulation with heparin, enoxaparin (LOVENOX), warfarin (COUMADIN)  or other anticoagulant medication   Does not apply Continuous PRN         phenol (CHLORASEPTIC) 1.4 % spray 1 mL  1 spray Mouth/Throat Q1H PRN         polyethylene glycol (MIRALAX) Packet 17 g  17 g Oral Daily PRN         prochlorperazine (COMPAZINE) injection 5 mg  5 mg Intravenous Q6H PRN        Or     prochlorperazine (COMPAZINE) tablet 5 mg  5 mg Oral Q6H PRN        Or     prochlorperazine (COMPAZINE) suppository 12.5 mg  12.5 mg Rectal Q12H PRN         senna-docusate (SENOKOT-S/PERICOLACE) 8.6-50 MG per tablet 1 tablet  1 tablet Oral BID PRN        Or     senna-docusate (SENOKOT-S/PERICOLACE) 8.6-50 MG per tablet 2 tablet  2 tablet Oral BID PRN         sodium chloride (PF) 0.9% PF flush 10 mL  10 mL Intravenous Q10 Min PRN   10 mL at 14 1139     sodium chloride (PF) 0.9% PF flush 3 mL  3 mL Intracatheter Q8H   3 mL at 21 0607     sodium chloride (PF) 0.9% PF flush 3 mL  3 mL Intracatheter q1 min prn         sodium chloride (PF) 0.9% PF flush 5-10 mL  5-10 mL Intravenous q1 min prn         sodium chloride bacteriostatic 0.9 % flush 0-1 mL  0-1 mL Intradermal Once PRN         warfarin ANTICOAGULANT (COUMADIN) half-tab 1.25 mg  1.25 mg Oral ONCE at 18:00         Warfarin Therapy Reminder (Check START DATE - warfarin may be starting in the FUTURE)  1 each Does not apply Continuous PRN         No current Epic-ordered outpatient medications on file.             Review of Systems:   A complete review of systems was performed and was negative except as mentioned in the HPI.          Physical Exam:   Vital signs were personally reviewed:  Temperatures:  Current - Temp: 98.3  F (36.8  C); Max - Temp  Av.1  F (36.7  C)   Min: 97.8  F (36.6  C)  Max: 98.3  F (36.8  C)  Respiration range: Resp  Av  Min: 16  Max: 19  Pulse range: Pulse  Av.8  Min: 58  Max: 64  Blood pressure range: Systolic (24hrs), Av , Min:131 , Max:155   ; Diastolic (24hrs), Av, Min:51, Max:68    Pulse oximetry range: SpO2  Av.5 %  Min: 94 %  Max: 97 %    Intake/Output Summary (Last 24 hours) at 2021 1235  Last data filed at 2021 1057  Gross per 24 hour   Intake 240 ml   Output 1975 ml   Net -1735 ml     142 lbs 11.2 oz  Body mass index is 23.75 kg/m .   Body surface area is 1.72 meters squared.    Constitutional: appears stated age, in no apparent distress, appears to be well nourished  Eyes: sclera anicteric, conjunctiva normal, no lesions on eyelids or lashes  ENT: normocephalic, without obvious abnormality, atraumatic, external ears without lesions,   Pulmonary: mildly diminished bilaterally   Cardiovascular: regular rate, regular rhythm, 1/6 NAEL at the RUSB, no lower extremity edema  Gastrointestinal: abdominal exam benign  Neurologic: moves all extremities  Skin: no abnormal rashes or lesions on limited exam, nails normal without discoloration or clubbing, no jaundice  Psychiatric: affect is normal, answers questions appropriately, oriented to self and place         Laboratory tests:   Laboratory tests personally reviewed:   [facility]  Recent Labs   Lab 21  0740 21  0805 21  0655 21  0753 21  1640 21  1523   NA  --   --   --  140  --  137   POTASSIUM 4.2 3.8 3.8 3.7  --  4.0   CHLORIDE  --   --   --  107  --  105   CO2  --   --   --  27  --  25   ANIONGAP  --   --   --  6  --  7   GLC  --   --   --  98  --  98   BUN  --   --   --  24  --  30   CR  --   --   --  1.17* 1.4* 1.40*   GFRESTIMATED  --   --   --  42* 34* 34*   CLARISSE  --   --   --  8.5  --  9.0   PROTTOTAL  --   --   --  6.5*  --   --    ALBUMIN  --   --   --  3.2*  --   --    BILITOTAL  --   --   --  0.4  --   --    ALKPHOS  --   --   --   71  --   --    AST  --   --   --  49*  --   --    ALT  --   --   --  28  --   --      CBC  Recent Labs   Lab 21  0753 21  1523   WBC 2.1* 3.4*   RBC 3.81 4.05   HGB 12.5 13.2   HCT 38.2 40.8    101*   MCH 32.8 32.6   MCHC 32.7 32.4   RDW 12.9 12.9   * 123*     INR  Recent Labs   Lab 21  0740 21  0717 21  0655 21  0753   INR 3.06* 2.87* 2.87* 2.15*     Lab Results   Component Value Date    TROPI <0.012 2014    TROPI <0.012 2014    TROPI <0.012 2014    TROPONIN 0.098 (H) 2021    TROPONIN 0.116 (H) 2021    TROPONIN 0.152 (HH) 2021     Recent Labs   Lab Test 20  1122 19  1000 07/03/15  0959 14  0857   CHOL 215* 187 208* 210*   HDL 50 46* 55 54   * 119* 134* 135*   TRIG 135 111 96 106   CHOLHDLRATIO  --   --  3.8 3.9     No results found for: A1C  TSH   Date Value Ref Range Status   2018 1.82 0.40 - 4.00 mU/L Final            Imaging and Additional Data:   Additional data personally reviewed:  Recent Results (from the past 4320 hour(s))   Echocardiogram Complete   Result Value    LVEF  55-60%    Narrative    [id number]  [id number]  [id number]  [id number]^JOVAN^ERIN^JOJO     [facility]  Echocardiography Laboratory  [address] [place] [address]     Name: JOSE DANIEL RAWLS  MRN: [id number]  : 10/26/1932  Study Date: 2021 11:05 AM  Age: 88 yrs  Gender: Female  Patient Location: [facility]  Reason For Study: Chest Pain  Ordering Physician: ERIN ALDANA  Performed By: Bernadine Wallace     BSA: 1.7 m2  Height: 65 in  Weight: 142 lb  HR: 62  BP: 164/64 mmHg  ______________________________________________________________________________  Procedure  Complete Portable Echo Adult.  ______________________________________________________________________________  Interpretation Summary     The visual ejection fraction is 55-60%.  Left ventricular systolic function is normal.  Contrast  would enhance wall motion analysis. The anterolateral wall was poorly  seen though likely to be jackie normally.  Grade III or advanced diastolic dysfunction.  Trivial pericardial effusion  The study was technically difficult.  ______________________________________________________________________________  Left Ventricle  The left ventricle is normal in size. There is mild concentric left  ventricular hypertrophy. The visual ejection fraction is 55-60%. Left  ventricular systolic function is normal. Grade III or advanced diastolic  dysfunction. Diastolic Doppler findings (E/E' ratio and/or other parameters)  suggest left ventricular filling pressures are increased.     Right Ventricle  The right ventricle is normal in size and function.     Atria  Normal left atrial size. Right atrial size is normal. There is no color  Doppler evidence of an atrial shunt.     Mitral Valve  The mitral valve leaflets are mildly thickened. There is mild (1+) mitral  regurgitation.     Tricuspid Valve  There is mild (1+) tricuspid regurgitation.     Aortic Valve  The aortic valve is trileaflet. There is moderate trileaflet aortic sclerosis.  No aortic regurgitation is present. No hemodynamically significant valvular  aortic stenosis.     Pulmonic Valve  There is no pulmonic valvular regurgitation. Normal pulmonic valve velocity.     Vessels  The aortic root is normal size. IVC diameter <2.1 cm collapsing >50% with  sniff suggests a normal RA pressure of 3 mmHg.     Pericardium  Trivial pericardial effusion.     Rhythm  Sinus rhythm was noted.  ______________________________________________________________________________  MMode/2D Measurements & Calculations     IVSd: 1.1 cm  LVIDd: 3.9 cm  LVIDs: 2.5 cm  LVPWd: 1.3 cm  FS: 36.7 %  LV mass(C)d: 166.1 grams  LV mass(C)dI: 97.1 grams/m2  Ao root diam: 3.2 cm  LA dimension: 3.2 cm  LA/Ao: 1.0  LVOT diam: 1.8 cm  LVOT area: 2.5 cm2  LA Volume (BP): 53.5 ml  LA Volume Index (BP): 31.3  ml/m2  RWT: 0.68     Doppler Measurements & Calculations  MV E max love: 99.4 cm/sec  MV A max love: 51.4 cm/sec  MV E/A: 1.9  MV dec time: 0.22 sec  LV V1 max P.2 mmHg  LV V1 max: 102.0 cm/sec  LV V1 VTI: 23.2 cm  SV(LVOT): 59.0 ml  SI(LVOT): 34.5 ml/m2  PA acc time: 0.13 sec  TR max love: 268.5 cm/sec  TR max P.0 mmHg  E/E' av.3  Lateral E/e': 17.0  Medial E/e': 23.7     ______________________________________________________________________________  Report approved by: Urban Marquez 2021 12:35 PM            Clinically Significant Risk Factors Present on Admission              Cardiovascular:    first degree AV block, RBBB      Nephrology: Acute kidney failure, unspecified    Neurology: Dementia: Unspecified dementia without behavioral disturbance    Pulmonology: Pneumonia    Systemic: Chronic Fatigue and Other Debilities: Age-related physical debility

## 2021-09-02 NOTE — PROGRESS NOTES
Care Management Discharge Note    Discharge Date: 09/03/2021       Discharge Disposition: Home Care    Discharge Services: None    Discharge DME: None    Discharge Transportation: family or friend will provide    Private pay costs discussed: Not applicable    PAS Confirmation Code:  (N/A)  Patient/family educated on Medicare website which has current facility and service quality ratings: no (Pt wante to stay within the [facility] system.)    Education Provided on the Discharge Plan:  yes  Persons Notified of Discharge Plans: Pt, pt's family, [facility].  Patient/Family in Agreement with the Plan: yes    Handoff Referral Completed: Yes    Additional Information:  The pt will discharge home with her son Rikki today with [facility] HC services RN/PT/OT.  [facility] is aware of the pt's discharge today.    Sw will continue to be available as needed until discharge.    MERE Weinberg, [facility]  Inpatient Care Coordination  [facility]  [phone]

## 2021-09-02 NOTE — PROGRESS NOTES
[facility]  Spoke with son, Rikki to discuss plans for HC.  Pt to be discharged home today and has agreed to have [facility] follow with services of SN, PT and OT. Patient care support center processing referral. Pt has 24 hour phone number for [facility] for any questions or concerns.  Per son, pt will be returning to her own home. Daughter, Bhargavi lives with pt and is also COVID+. Rikki states Bhargavi has minimal symptoms and will be able to assist.  After pt discharged, learned that pt needs an INR 9/3/2021. Unable to staff a visit for tomorrow. Was able to transfer the referral to [facility], [phone]. Update to , pt notified.

## 2021-09-03 ENCOUNTER — TELEPHONE (OUTPATIENT)
Dept: PEDIATRICS | Facility: CLINIC | Age: 86
End: 2021-09-03

## 2021-09-03 ENCOUNTER — ANTICOAGULATION THERAPY VISIT (OUTPATIENT)
Dept: PEDIATRICS | Facility: CLINIC | Age: 86
End: 2021-09-03

## 2021-09-03 ENCOUNTER — PATIENT OUTREACH (OUTPATIENT)
Dept: NURSING | Facility: CLINIC | Age: 86
End: 2021-09-03
Attending: INTERNAL MEDICINE
Payer: COMMERCIAL

## 2021-09-03 DIAGNOSIS — I48.91 ATRIAL FIBRILLATION (H): Primary | ICD-10-CM

## 2021-09-03 DIAGNOSIS — I48.11 LONGSTANDING PERSISTENT ATRIAL FIBRILLATION (H): ICD-10-CM

## 2021-09-03 DIAGNOSIS — Z79.01 LONG TERM CURRENT USE OF ANTICOAGULANTS WITH INR GOAL OF 2.0-3.0: ICD-10-CM

## 2021-09-03 DIAGNOSIS — Z79.01 LONG TERM CURRENT USE OF ANTICOAGULANT THERAPY: ICD-10-CM

## 2021-09-03 DIAGNOSIS — U07.1 2019 NOVEL CORONAVIRUS DISEASE (COVID-19): Primary | ICD-10-CM

## 2021-09-03 DIAGNOSIS — I48.91 ATRIAL FIBRILLATION, UNSPECIFIED TYPE (H): ICD-10-CM

## 2021-09-03 DIAGNOSIS — I21.A1 TYPE 2 MI (MYOCARDIAL INFARCTION) (H): ICD-10-CM

## 2021-09-03 LAB — INR (EXTERNAL): 3.4 (ref 2–3)

## 2021-09-03 SDOH — ECONOMIC STABILITY: TRANSPORTATION INSECURITY
IN THE PAST 12 MONTHS, HAS LACK OF TRANSPORTATION KEPT YOU FROM MEETINGS, WORK, OR FROM GETTING THINGS NEEDED FOR DAILY LIVING?: NO

## 2021-09-03 SDOH — ECONOMIC STABILITY: FOOD INSECURITY: WITHIN THE PAST 12 MONTHS, THE FOOD YOU BOUGHT JUST DIDN'T LAST AND YOU DIDN'T HAVE MONEY TO GET MORE.: NEVER TRUE

## 2021-09-03 SDOH — ECONOMIC STABILITY: TRANSPORTATION INSECURITY
IN THE PAST 12 MONTHS, HAS THE LACK OF TRANSPORTATION KEPT YOU FROM MEDICAL APPOINTMENTS OR FROM GETTING MEDICATIONS?: NO

## 2021-09-03 SDOH — ECONOMIC STABILITY: FOOD INSECURITY: WITHIN THE PAST 12 MONTHS, YOU WORRIED THAT YOUR FOOD WOULD RUN OUT BEFORE YOU GOT MONEY TO BUY MORE.: NEVER TRUE

## 2021-09-03 ASSESSMENT — SOCIAL DETERMINANTS OF HEALTH (SDOH): HOW HARD IS IT FOR YOU TO PAY FOR THE VERY BASICS LIKE FOOD, HOUSING, MEDICAL CARE, AND HEATING?: NOT HARD AT ALL

## 2021-09-03 ASSESSMENT — ACTIVITIES OF DAILY LIVING (ADL): DEPENDENT_IADLS:: MEDICATION MANAGEMENT;TRANSPORTATION;CLEANING;COOKING;LAUNDRY;SHOPPING

## 2021-09-03 NOTE — TELEPHONE ENCOUNTER
Chart reviewed at the time of the encounter. Indication for warfarin is afib; given that she has risen considerably in the past few days while receiving less than her maintenance, will hold today. Agree with plan as outlined.     Cathie Griffin, PanD, BCPS

## 2021-09-03 NOTE — TELEPHONE ENCOUNTER
General Call:     Who is calling:  patient    Reason for Call: Needs hospital f/u     What are your questions or concerns:  Patient requesting appointment 9/13.  Patient informed Nancy is not in clinic Monday's.  Patient requesting to be seen this date with someone from Nancy's team.    Date of last appointment with provider: 11/19/2020    Okay to leave a detailed message:Yes at Home number on file [phone] (home)

## 2021-09-03 NOTE — PROGRESS NOTES
Spoke with Alejandrina KUO - per RN patients daughter states they already spoke with someone on dosing instructions. See encounter from 9/2/21 for dosing with [facility] consult.       Lulú Fitch, RN, BSN, PHN  Anticoagulation Nurse  [phone]

## 2021-09-03 NOTE — PROGRESS NOTES
Clinic Care Coordination Contact  OUTREACH    Referral Information:  Referral Source: IP Handoff    Primary Diagnosis: Acute MI (heart attack)    Chief Complaint   Patient presents with     Clinic Care Coordination - Post Hospital     MI     Clinic Care Coordination - Initial     IP Handoff     Clinic Care Coordination - Homecare/TCU     [facility]        Universal Utilization: Patient was recently admitted to [facility] from 08/29/2021 to 09/02/2021 with a diagnosis of COVID-19 Infection, Type 2 MI, amongst others.   Clinic Utilization  Difficulty keeping appointments:: No  Compliance Concerns: No  No-Show Concerns: No  No PCP office visit in Past Year: No  Utilization    Hospital Admissions  1             ED Visits  1             No Show Count (past year)  0                Current as of: 9/3/2021 10:45 AM            Clinical Concerns:  Current Medical Concerns:    Patient Active Problem List   Diagnosis     Hyperlipidemia LDL goal <130     Osteopenia     Primary osteoarthritis involving multiple joints     Essential and other specified forms of tremor     Esophageal reflux     Varicose veins of lower extremities with complications     Allergic rhinitis     Degeneration of lumbar or lumbosacral intervertebral disc     Internal bleeding hemorrhoids     Osteoarthritis of thumb     Diverticulosis     CKD (chronic kidney disease) stage 3, GFR 30-59 ml/min     Chronic constipation     Urinary retention     Health Care Home     Advanced directives, counseling/discussion     Long term current use of anticoagulant therapy     Atrial fibrillation (H)     Hypertension goal BP (blood pressure) < 150/90     Post-menopausal bleeding     History of recurrent UTIs     Mild cognitive impairment     Fibroid uterus     Rectal bleeding     Personal history of urinary tract infection     Urinary retention with incomplete bladder emptying     Longstanding persistent atrial fibrillation (H)     Long term  current use of anticoagulants with INR goal of 2.0-3.0     Recurrent UTI     Ischemic colitis (H)     Atrial fibrillation, unspecified type (H)     Elevated troponin     Chest pain, unspecified type      [facility] RN out this morning to complete start of home care visit. Patient and daughter in quarantine per COVID precautions until September 8th. Daughter will be returning back to work on the 8th.   Patient/daughter denies any questions, concerns, need for any additional/ongoing  resources/support from clinic care coordination team at this time.       Current Behavioral Concerns: none noted.     Education Provided to patient: CC role, clinic after hours, appointments, medications, AVS, provider contact information.    Pain  Pain (GOAL):: No  Health Maintenance Reviewed: Due/Overdue   Health Maintenance Topics with due status: Overdue       Topic Date Due    ZOSTER IMMUNIZATION 04/03/2012    COLORECTAL CANCER SCREENING 06/14/2021    INFLUENZA VACCINE 09/01/2021     Clinical Pathway: Clinic Care Coordination Acute MI Assessment    Discharge:  What recommendations were made for follow up after your recent hospitalization? Follow up with Primary Care Provider within 7 days, recheck INR 3-5 days, follow up with cardiac OLIVER.   Have the follow up appointments been scheduled? No  If not, can I help you set up these appointments? RNCC provided patient's daughter with telephone number for cardiology, RNCC sent message to  who will return call back to patient to provide assistance with scheduling post hospital discharge follow up appointment with PCP.   Interventions during hospitalization:  NA  Is there a referral for Cardiac Rehab?  No - home care.     Symptom Review:  How have you been feeling now that you are home?  Fatigued, tired.   Are you having Angina symptoms (ie. Chest pain or pressure not relieved by medication, tingling, numbness, dizziness, fainting, heartburn, cold sweats, fatigue,  anxiety, feeling of doom, irregular heartbeat, shortness of breath, nausea/vomiting and back or jaw pain.)?  No  Have you had to use SL NTG? No  *If answer yes notify MD    Medications:  Were you started on any new medications?  Yes, Amlodipine, Levaquin, Crestor  Do you have Nitroglycerin and understand how to use it?  No  For patients with DM:  Are you monitoring your blood sugars, if so how are your blood sugars?  NA  Did you start on insulin in the hospital or has your Insulin dose changed?  NA    Medication Management:  Medication review status: Medications reviewed and no changes reported per patient.      Patient's daughter assists with medication management. No questions, concerns, side effects related to medications reported during time of outreach.     Functional Status:  Dependent ADLs:: Independent  Dependent IADLs:: Medication Management, Transportation, Cleaning, Cooking, Laundry, Shopping  Bed or wheelchair confined:: No  Mobility Status: Independent  Fallen 2 or more times in the past year?: No  Any fall with injury in the past year?: No    Living Situation:  Current living arrangement:: I live in a private home with family (Daughter)  Type of residence:: Town home    Lifestyle & Psychosocial Needs:    Social Determinants of Health     Tobacco Use: Low Risk      Smoking Tobacco Use: Never Smoker     Smokeless Tobacco Use: Never Used   Alcohol Use:      Frequency of Alcohol Consumption:      Average Number of Drinks:      Frequency of Binge Drinking:    Financial Resource Strain: Low Risk      Difficulty of Paying Living Expenses: Not hard at all   Food Insecurity: No Food Insecurity     Worried About Running Out of Food in the Last Year: Never true     Ran Out of Food in the Last Year: Never true   Transportation Needs: No Transportation Needs     Lack of Transportation (Medical): No     Lack of Transportation (Non-Medical): No   Physical Activity:      Days of Exercise per Week:      Minutes of  Exercise per Session:    Stress:      Feeling of Stress :    Social Connections:      Frequency of Communication with Friends and Family:      Frequency of Social Gatherings with Friends and Family:      Attends Restorationism Services:      Active Member of Clubs or Organizations:      Attends Club or Organization Meetings:      Marital Status:    Intimate Partner Violence:      Fear of Current or Ex-Partner:      Emotionally Abused:      Physically Abused:      Sexually Abused:    Depression: Not at risk     PHQ-2 Score: 0   Housing Stability:      Unable to Pay for Housing in the Last Year:      Number of Places Lived in the Last Year:      Unstable Housing in the Last Year:      Diet:: No added salt  Inadequate nutrition (GOAL):: No  Tube Feeding: No  Inadequate activity/exercise (GOAL):: No  Significant changes in sleep pattern (GOAL): No  Transportation means:: Regular car, Family  Restorationism or spiritual beliefs that impact treatment:: No  Mental health DX:: No  Mental health management concern (GOAL):: No  Chemical Dependency Status: No Current Concerns  Chemical Dependency Management:  (NA)  Informal Support system:: Children      Resources and Interventions:  Current Resources:   Skilled Home Care Services: Skilled Nursing, Physicial Therapy, Occupational Therapy  Community Resources: Home Care, DME  Supplies used at home:: None  Equipment Currently Used at Home: grab bar, tub/shower, shower chair (Cath supplies)  Employment Status: retired  Advance Care Plan/Directive  Advanced Care Plans/Directives on file:: Yes  Type Advanced Care Plans/Directives: Advanced Directive - On File, POLST  Advanced Care Plan/Directive Status: Not Applicable    Referrals Placed: Other (Get well loop)     Goals: No clinic care coordination goals/concerns identified during time of outreach.     Patient/Caregiver understanding: Patient verbalized understanding and denies any additional questions or concerns at this time. RNCC engaged  in AIDET communications during encounter.     Future Appointments              In 4 days  [facility]    In 2 weeks [facility]        Plan: At this time, patient denies outstanding need for connection or referral to resources or assistance navigating recommended follow up care. No further Care Coordination outreaches scheduled at this time, patient provided with this writer's number and encouraged to call with future needs or questions.     Hillary Renee RN Care Coordinator  [facility]  Email: [email or web address]  Phone: [phone]

## 2021-09-05 ENCOUNTER — HEALTH MAINTENANCE LETTER (OUTPATIENT)
Age: 86
End: 2021-09-05

## 2021-09-07 ENCOUNTER — LAB (OUTPATIENT)
Dept: LAB | Facility: CLINIC | Age: 86
End: 2021-09-07
Payer: COMMERCIAL

## 2021-09-07 ENCOUNTER — TELEPHONE (OUTPATIENT)
Dept: PEDIATRICS | Facility: CLINIC | Age: 86
End: 2021-09-07

## 2021-09-07 ENCOUNTER — PATIENT OUTREACH (OUTPATIENT)
Dept: PEDIATRICS | Facility: CLINIC | Age: 86
End: 2021-09-07

## 2021-09-07 ENCOUNTER — ANTICOAGULATION THERAPY VISIT (OUTPATIENT)
Dept: ANTICOAGULATION | Facility: CLINIC | Age: 86
End: 2021-09-07

## 2021-09-07 DIAGNOSIS — I48.91 ATRIAL FIBRILLATION (H): Primary | ICD-10-CM

## 2021-09-07 DIAGNOSIS — I48.91 ATRIAL FIBRILLATION, UNSPECIFIED TYPE (H): ICD-10-CM

## 2021-09-07 DIAGNOSIS — I48.11 LONGSTANDING PERSISTENT ATRIAL FIBRILLATION (H): ICD-10-CM

## 2021-09-07 DIAGNOSIS — Z79.01 LONG TERM CURRENT USE OF ANTICOAGULANT THERAPY: ICD-10-CM

## 2021-09-07 DIAGNOSIS — Z79.01 LONG TERM CURRENT USE OF ANTICOAGULANTS WITH INR GOAL OF 2.0-3.0: ICD-10-CM

## 2021-09-07 LAB — INR BLD: 3.8 (ref 0.9–1.1)

## 2021-09-07 PROCEDURE — 36416 COLLJ CAPILLARY BLOOD SPEC: CPT

## 2021-09-07 PROCEDURE — 85610 PROTHROMBIN TIME: CPT

## 2021-09-07 NOTE — TELEPHONE ENCOUNTER
Nancy from [facility] called, phone number [phone].     Needing occupational therapy 2 times a week for 3 weeks and 1 time a week for 1 week.     Approved requested occupational therapy orders. They will fax orders as well.    Amparo Van RN on 9/7/2021 at 4:07 PM

## 2021-09-07 NOTE — TELEPHONE ENCOUNTER
Pt already contacted by care coordination for follow up TCM call.     Vani Welch, RN   [facility]  -- Triage Nurse

## 2021-09-07 NOTE — PROGRESS NOTES
ANTICOAGULATION MANAGEMENT     Maia Miranda 88 year old female is on warfarin with supratherapeutic INR result. (Goal INR 2.0-3.0)    Recent labs: (last 7 days)     09/07/21  1601   INR 3.8*       ASSESSMENT     Source(s): Chart Review and Patient/Caregiver Call       Warfarin doses taken: While hospitalized on 8/29-9/2: less warfarin administered than maintenance regimen.  Discharged dosing: hold 9/2, then resume maintenance dose    Diet: Decreased greens/vitamin K in diet; plans to resume previous intake (as tolerated). Appetite is lower.    New illness, injury, or hospitalization: Hospital Admission for symptoms of Covid-19 (tested positive) and UTI. Continued cough, sore throat, & fatigue. Increase urination. Denies diarrhea, but notes chronic constipation. Patient feels she is turning the corner.     Medication/supplement changes: Norvasc 5 mg QD started on 9/2, No interaction anticipated. Crestor 5 mg QD started on 9/2 which has potential for interaction; increasing INR. Levaquin 250 mg QD 7 day course (dates: 9/2-9/9) which has potential for interaction; increasing INR. Daily keflex on hold until Levaquin therapy completed.    Signs or symptoms of bleeding or clotting: No    Previous INR: Supratherapeutic    Additional findings: None      PLAN     Recommended plan for temporary change(s) and ongoing change(s) affecting INR     Dosing Instructions: Hold dose then Decrease your warfarin dose (8.3% change) with next INR in 3-5 days       Summary  As of 9/7/2021    Full warfarin instructions:  9/7: Hold; Otherwise 1.25 mg every Mon, Wed, Fri; 2.5 mg all other days   Next INR check:  9/10/2021             Telephone call with  daughterBhargavi who verbalizes understanding and agrees to plan and who agrees to plan and repeated back plan correctly    [facility] for [facility] team ([phone]) requesting a callback and INR recheck by 9/10     Education provided: Please call back if any changes to your diet,  medications or how you've been taking warfarin, Potential interaction between warfarin and Levaquin and Crestor, Monitoring for bleeding signs and symptoms, Monitoring for clotting signs and symptoms and Importance of notifying clinic for changes in medications; a sooner lab recheck maybe needed.    Plan made per [facility] anticoagulation protocol    Elida Moreira RN  Anticoagulation Clinic  9/7/2021    _______________________________________________________________________     Anticoagulation Episode Summary     Current INR goal:  2.0-3.0   TTR:  74.8 % (1 y)   Target end date:  Indefinite   Send INR reminders to:  MICHAEL ABURTO    Indications    Atrial fibrillation (H) [I48.91]  Long term current use of anticoagulant therapy [Z79.01]  Longstanding persistent atrial fibrillation (H) [I48.11]  Long term current use of anticoagulants with INR goal of 2.0-3.0 [Z79.01]  Atrial fibrillation  unspecified type (H) [I48.91]           Comments:           Anticoagulation Care Providers     Provider Role Specialty Phone number    Nancy Salgado APRN CNP Referring Internal Medicine [phone]    Li Flores Mai, MD Responsible Internal Medicine - Pediatrics [phone]

## 2021-09-08 NOTE — PROGRESS NOTES
Grzegorz with home care, nurse, reports she will be rechecking INR on 9/10/21.    Glenda Duran RN

## 2021-09-09 DIAGNOSIS — Z53.9 DIAGNOSIS NOT YET DEFINED: Primary | ICD-10-CM

## 2021-09-09 PROCEDURE — G0180 MD CERTIFICATION HHA PATIENT: HCPCS | Performed by: NURSE PRACTITIONER

## 2021-09-10 ENCOUNTER — ANTICOAGULATION THERAPY VISIT (OUTPATIENT)
Dept: PEDIATRICS | Facility: CLINIC | Age: 86
End: 2021-09-10

## 2021-09-10 DIAGNOSIS — I48.11 LONGSTANDING PERSISTENT ATRIAL FIBRILLATION (H): ICD-10-CM

## 2021-09-10 DIAGNOSIS — I48.91 ATRIAL FIBRILLATION, UNSPECIFIED TYPE (H): ICD-10-CM

## 2021-09-10 DIAGNOSIS — I48.91 ATRIAL FIBRILLATION (H): Primary | ICD-10-CM

## 2021-09-10 DIAGNOSIS — Z79.01 LONG TERM CURRENT USE OF ANTICOAGULANTS WITH INR GOAL OF 2.0-3.0: ICD-10-CM

## 2021-09-10 DIAGNOSIS — Z79.01 LONG TERM CURRENT USE OF ANTICOAGULANT THERAPY: ICD-10-CM

## 2021-09-10 LAB — INR (EXTERNAL): 1.7 (ref 0.9–1.1)

## 2021-09-10 NOTE — PROGRESS NOTES
ANTICOAGULATION MANAGEMENT     Maia Miranda 88 year old female is on warfarin with subtherapeutic INR result. (Goal INR 2.0-3.0)    Recent labs: (last 7 days)     09/10/21  1127   INR 1.7*       ASSESSMENT     Source(s): Chart Review and Home Care/Facility Nurse       Warfarin doses taken: Warfarin taken as instructed    Diet: Appetite has improved     New illness, injury, or hospitalization: Yes: Hospital Admission for symptoms of Covid-19 (tested positive) and UTI. Continued cough, sore throat, & fatigue. Increase urination. Denies diarrhea, but notes chronic constipation. Patient feels she is turning the corner.  Medication/supplement changes: Norvasc 5 mg QD started on 9/2, No interaction anticipated. Crestor 5 mg QD started on 9/2 which has potential for interaction; increasing INR. Levaquin 250 mg QD 7 day course (dates: 9/2-9/9) which has potential for interaction; increasing INR. Daily keflex on hold until Levaquin therapy completed.    Signs or symptoms of bleeding or clotting: No    Previous INR: Supratherapeutic    Additional findings: None     PLAN     Recommended plan for temporary change(s) affecting INR     Dosing Instructions: Continue your current warfarin dose with next INR in 1 week       Summary  As of 9/10/2021    Full warfarin instructions:  1.25 mg every Mon, Wed, Fri; 2.5 mg all other days   Next INR check:               Telephone call with home care nurse Yamile who verbalizes understanding and agrees to plan    Orders given to  Homecare nurse/facility to recheck    Education provided: None required    Plan made per [facility] anticoagulation protocol    Glenda Duran, RN  Anticoagulation Clinic  9/10/2021    _______________________________________________________________________     Anticoagulation Episode Summary     Current INR goal:  2.0-3.0   TTR:  74.4 % (1 y)   Target end date:  Indefinite   Send INR reminders to:  MICHAEL ABURTO    Indications    Atrial fibrillation (H)  [I48.91]  Long term current use of anticoagulant therapy [Z79.01]  Longstanding persistent atrial fibrillation (H) [I48.11]  Long term current use of anticoagulants with INR goal of 2.0-3.0 [Z79.01]  Atrial fibrillation  unspecified type (H) [I48.91]           Comments:           Anticoagulation Care Providers     Provider Role Specialty Phone number    Nancy Salgado APRN CNP Referring Internal Medicine [phone]    Li Flores Mai, MD Responsible Internal Medicine - Pediatrics [phone]

## 2021-09-13 ENCOUNTER — TELEPHONE (OUTPATIENT)
Dept: UROLOGY | Facility: CLINIC | Age: 86
End: 2021-09-13

## 2021-09-13 ENCOUNTER — OFFICE VISIT (OUTPATIENT)
Dept: PEDIATRICS | Facility: CLINIC | Age: 86
End: 2021-09-13
Payer: COMMERCIAL

## 2021-09-13 VITALS
SYSTOLIC BLOOD PRESSURE: 96 MMHG | WEIGHT: 144.2 LBS | HEIGHT: 64 IN | TEMPERATURE: 97.5 F | HEART RATE: 70 BPM | OXYGEN SATURATION: 95 % | DIASTOLIC BLOOD PRESSURE: 56 MMHG | BODY MASS INDEX: 24.62 KG/M2 | RESPIRATION RATE: 16 BRPM

## 2021-09-13 DIAGNOSIS — H61.23 BILATERAL IMPACTED CERUMEN: ICD-10-CM

## 2021-09-13 DIAGNOSIS — I48.91 ATRIAL FIBRILLATION, UNSPECIFIED TYPE (H): ICD-10-CM

## 2021-09-13 DIAGNOSIS — K55.9 ISCHEMIC COLITIS (H): ICD-10-CM

## 2021-09-13 DIAGNOSIS — Z09 HOSPITAL DISCHARGE FOLLOW-UP: Primary | ICD-10-CM

## 2021-09-13 DIAGNOSIS — I10 HYPERTENSION GOAL BP (BLOOD PRESSURE) < 150/90: ICD-10-CM

## 2021-09-13 DIAGNOSIS — N18.32 STAGE 3B CHRONIC KIDNEY DISEASE (H): ICD-10-CM

## 2021-09-13 DIAGNOSIS — U07.1 COVID-19: ICD-10-CM

## 2021-09-13 LAB
BASOPHILS # BLD AUTO: 0 10E3/UL (ref 0–0.2)
BASOPHILS NFR BLD AUTO: 0 %
EOSINOPHIL # BLD AUTO: 0 10E3/UL (ref 0–0.7)
EOSINOPHIL NFR BLD AUTO: 1 %
ERYTHROCYTE [DISTWIDTH] IN BLOOD BY AUTOMATED COUNT: 12.7 % (ref 10–15)
HCT VFR BLD AUTO: 36.5 % (ref 35–47)
HGB BLD-MCNC: 11.8 G/DL (ref 11.7–15.7)
LYMPHOCYTES # BLD AUTO: 0.8 10E3/UL (ref 0.8–5.3)
LYMPHOCYTES NFR BLD AUTO: 11 %
MCH RBC QN AUTO: 32.2 PG (ref 26.5–33)
MCHC RBC AUTO-ENTMCNC: 32.3 G/DL (ref 31.5–36.5)
MCV RBC AUTO: 100 FL (ref 78–100)
MONOCYTES # BLD AUTO: 0.7 10E3/UL (ref 0–1.3)
MONOCYTES NFR BLD AUTO: 10 %
NEUTROPHILS # BLD AUTO: 5.6 10E3/UL (ref 1.6–8.3)
NEUTROPHILS NFR BLD AUTO: 79 %
PLATELET # BLD AUTO: 165 10E3/UL (ref 150–450)
RBC # BLD AUTO: 3.67 10E6/UL (ref 3.8–5.2)
WBC # BLD AUTO: 7 10E3/UL (ref 4–11)

## 2021-09-13 PROCEDURE — 69209 REMOVE IMPACTED EAR WAX UNI: CPT | Performed by: NURSE PRACTITIONER

## 2021-09-13 PROCEDURE — 85025 COMPLETE CBC W/AUTO DIFF WBC: CPT | Performed by: NURSE PRACTITIONER

## 2021-09-13 PROCEDURE — 36415 COLL VENOUS BLD VENIPUNCTURE: CPT | Performed by: NURSE PRACTITIONER

## 2021-09-13 PROCEDURE — 99495 TRANSJ CARE MGMT MOD F2F 14D: CPT | Mod: 25 | Performed by: NURSE PRACTITIONER

## 2021-09-13 ASSESSMENT — MIFFLIN-ST. JEOR: SCORE: 1065.12

## 2021-09-13 NOTE — TELEPHONE ENCOUNTER
Health Call Center    Phone Message    May a detailed message be left on voicemail: yes     Reason for Call: Other: Bhargavi (Pt's daughter) calling on behalf of Jolanta to request that catheter supply order be sent to [facility].  Their phone number is [phone] and they are open from 8:30-5 Mon- Fri.  She is needing the cath Int 14 Fr hydrofemale 3.5 inch compact plus.  Please call Bhargavi with any questions or concerns    Action Taken: Message routed to:  Other:  Urology    Travel Screening: Not Applicable

## 2021-09-13 NOTE — PATIENT INSTRUCTIONS
Schedule with cardiology here at our clinic. STOP the norvasc (amlodipine), and continue the lisinopril and metoprolol.     Keep taking tylenol and drink lots of water for your headache.     Warfarin 1.25 mg every Mon, Wed, Fri; 2.5 mg all other days    Your next INR appointment is scheduled, Friday 9/17 at 1:30.    Cardiology scheduling number: [phone]

## 2021-09-13 NOTE — NURSING NOTE
bilateral ear wash completed. Small amount of cerumen removed from both ears. Patient tolerated procedure well.  Ariela Johnson, CMA

## 2021-09-13 NOTE — PROGRESS NOTES
Assessment & Plan     Hospital discharge follow-up  We reviewed her hospital discharge note in depth today, and reviewed her labs. She had started norvasc in hospital due to elevated readings, however her reading today is lower, and she does express some symptoms that could be orthostatic. She does have some weakness and she is at falls risk. She did have considerable amount of deconditionoing at the hospital for which she is having home PT for and feeling better. Will stop norvasc for now and follow up with cardiology within a month for BP check and f/u appointment. She will continue lisinopril and metoprolol.   - Basic metabolic panel  (Ca, Cl, CO2, Creat, Gluc, K, Na, BUN); Future  - CBC with platelets and differential; Future  - Basic metabolic panel  (Ca, Cl, CO2, Creat, Gluc, K, Na, BUN)  - CBC with platelets and differential    COVID-19  She has had no new symptoms since being home, no fever, SOB or cough. Her lung sounds are clear and O2Sats wnl. No further concerns, discussed precautions.   - Adult Cardiology Eval Referral; Future  - Basic metabolic panel  (Ca, Cl, CO2, Creat, Gluc, K, Na, BUN); Future  - CBC with platelets and differential; Future  - Basic metabolic panel  (Ca, Cl, CO2, Creat, Gluc, K, Na, BUN)  - CBC with platelets and differential    Atrial fibrillation, unspecified type (H)  Will continue bb and f/u with cardiology, scheduled today.   - Adult Cardiology Eval Referral; Future  - Basic metabolic panel  (Ca, Cl, CO2, Creat, Gluc, K, Na, BUN); Future  - CBC with platelets and differential; Future  - Basic metabolic panel  (Ca, Cl, CO2, Creat, Gluc, K, Na, BUN)  - CBC with platelets and differential    Hypertension goal BP (blood pressure) < 150/90  Per above  - Adult Cardiology Eval Referral; Future  - Basic metabolic panel  (Ca, Cl, CO2, Creat, Gluc, K, Na, BUN); Future  - CBC with platelets and differential; Future  - Basic metabolic panel  (Ca, Cl, CO2, Creat, Gluc, K, Na, BUN)  -  CBC with platelets and differential    Bilateral impacted cerumen  Ear wash completed    Ischemic colitis (H)  We reviewed her notes, due to follow-up if she should have symptoms of concern. She is tolerating PO and stooling    Stage 3b chronic kidney disease  stable             Return in about 1 day (around 9/14/2021).    RAY Smith CNP  [facility] LUDA Carvalho is a 88 year old who presents for the following health issues  accompanied by her daughter:    History of Present Illness       She eats 2-3 servings of fruits and vegetables daily.She consumes 1 sweetened beverage(s) daily.She exercises with enough effort to increase her heart rate 9 or less minutes per day.  She exercises with enough effort to increase her heart rate 3 or less days per week.   She is taking medications regularly.     Positive covid 8/29/21  Hospital Follow-up Visit:    Hospital/Nursing Home/IP Rehab Facility: [facility]  Date of Admission: 8/29/21  Date of Discharge: 9/2/21  Reason(s) for Admission: chest pain, a fib, Type 2 MI, COVID      Was your hospitalization related to COVID-19? YES   How are you feeling today? Much better  In the past 24 hours have you had shortness of breath when speaking, walking, or climbing stairs? I don't have breathing problems  Do you have a cough? I don't have a cough  When is the last time you had a fever greater than 100? Was not having fever  Are you having any other symptoms? None   Do you have any other stressors you would like to discuss with your provider? OTHER: cardiac               Was the patient in the ICU or did the patient experience delirium during hospitalization?  No          Problems taking medications regularly:  None  Medication changes since discharge: None  Problems adhering to non-medication therapy:  None    Summary of hospitalization:  [facility] discharge summary reviewed  Diagnostic  Tests/Treatments reviewed.  Follow up needed: none  Other Healthcare Providers Involved in Patient s Care:         Physical Therapy  Update since discharge: improved.       Post Discharge Medication Reconciliation: discharge medications reconciled, continue medications without change.  Plan of care communicated with patient              Was hospitalized with COVID and atypical chest pain. She did have elevated troponin. She was started on norvasc and rosuvastatin at hospital. PT is coming twice per wk in the home.     She has been eating regularly and drinking, lkely not as uch. She self caths, about 3-4 times per day. She is back on her baseline antibiotics to prevent UTIs.     She has history of a fib, has been doing INRs through INR clinic. She had been on antibiotics and paying special attention tot he INR because of this. She had a visit 3 days ago and waas told to do 1.25 M/w/f and 2.5 every other day. follow up in 1 wk.     She denies orthostatic hypotention, does have some confusion, but could be due to recent hospitalization. No falls, doesn't use cane or walker.     BP Readings from Last 3 Encounters:   09/13/21 96/56   09/02/21 (!) 143/53   08/29/21 133/62       Wt Readings from Last 4 Encounters:   09/13/21 65.4 kg (144 lb 3.2 oz)   08/29/21 64.7 kg (142 lb 11.2 oz)   04/06/21 64.4 kg (142 lb)   11/19/20 64.5 kg (142 lb 3.2 oz)     INR   Date Value Ref Range Status   07/09/2021 1.70 (H) 0.86 - 1.14 Final     Comment:     This test is intended for monitoring Coumadin therapy.  Results are not   accurate in patients with prolonged INR due to factor deficiency.       INR (External)   Date Value Ref Range Status   09/10/2021 1.7 (A) 0.9 - 1.1 Final          Review of Systems   Constitutional, HEENT, cardiovascular, pulmonary, GI, , musculoskeletal, neuro, skin, endocrine and psych systems are negative, except as otherwise noted.      Objective    BP 96/56 (BP Location: Right arm, Cuff Size: Adult Regular)   " Pulse 70   Temp 97.5  F (36.4  C) (Tympanic)   Resp 16   Ht 1.619 m (5' 3.75\")   Wt 65.4 kg (144 lb 3.2 oz)   SpO2 95%   BMI 24.95 kg/m    Body mass index is 24.95 kg/m .  Physical Exam   GENERAL: healthy, alert and no distress  EYES: Eyes grossly normal to inspection, PERRL and conjunctivae and sclerae normal  HENT: ear canals and TM's normal, nose and mouth without ulcers or lesions  NECK: no adenopathy, no asymmetry, masses, or scars and thyroid normal to palpation  RESP: lungs clear to auscultation - no rales, rhonchi or wheezes  CV: regular rate and rhythm, normal S1 S2, no S3 or S4, no murmur, click or rub, no peripheral edema and peripheral pulses strong  ABDOMEN: soft, nontender, no hepatosplenomegaly, no masses and bowel sounds normal  MS: no gross musculoskeletal defects noted, no edema  SKIN: no suspicious lesions or rashes            "

## 2021-09-13 NOTE — TELEPHONE ENCOUNTER
Called patient's supplier and order new supplies for 180 as previously ordered 6 boxes. They will ship these to patient as soon as possible.     Called patient and informed her, since she picked up phone. She will call back if she doesn't receive supplies.     Marianela Johnson LPN

## 2021-09-17 ENCOUNTER — MEDICAL CORRESPONDENCE (OUTPATIENT)
Dept: HEALTH INFORMATION MANAGEMENT | Facility: CLINIC | Age: 86
End: 2021-09-17

## 2021-09-17 ENCOUNTER — ANTICOAGULATION THERAPY VISIT (OUTPATIENT)
Dept: PEDIATRICS | Facility: CLINIC | Age: 86
End: 2021-09-17

## 2021-09-17 DIAGNOSIS — Z79.01 LONG TERM CURRENT USE OF ANTICOAGULANTS WITH INR GOAL OF 2.0-3.0: ICD-10-CM

## 2021-09-17 DIAGNOSIS — Z79.01 LONG TERM CURRENT USE OF ANTICOAGULANT THERAPY: Primary | ICD-10-CM

## 2021-09-17 DIAGNOSIS — I48.11 LONGSTANDING PERSISTENT ATRIAL FIBRILLATION (H): ICD-10-CM

## 2021-09-17 DIAGNOSIS — I48.91 ATRIAL FIBRILLATION, UNSPECIFIED TYPE (H): ICD-10-CM

## 2021-09-17 LAB — INR (EXTERNAL): 4.1 (ref 2–3)

## 2021-09-17 NOTE — PROGRESS NOTES
ANTICOAGULATION MANAGEMENT     Maia Miranda 88 year old female is on warfarin with supratherapeutic INR result. (Goal INR 2.0-3.0)    Recent labs: (last 7 days)     09/17/21  1120   INR 4.1*       ASSESSMENT     Source(s): Chart Review, Patient/Caregiver Call and Home Care/Facility Nurse       Warfarin doses taken: Warfarin taken as instructed    Diet: No new diet changes identified    New illness, injury, or hospitalization: Yes: patient     Medication/supplement changes: Levofloxacin completed Norvasc stopped--no anticipated impact in INR    Signs or symptoms of bleeding or clotting: No    Previous INR: Subtherapeutic    Additional findings: unclear why INR spiked--could have to do with patient deconditioning in hospital and or post-covid changes. Physical therapy and Occupational therapy continues--skilled nursing last visit today and no skilled need     PLAN     Recommended plan for ongoing change(s) affecting INR     Dosing Instructions: Hold dose then Decrease your warfarin dose (9.1% change) with next INR in 4 days       Summary  As of 9/17/2021    Full warfarin instructions:  1.25 mg every Mon, Wed, Fri; 2.5 mg all other days   Next INR check:               Telephone call with home care nurse [facility] [phone] who verbalizes understanding and agrees to plan and who agrees to plan and repeated back plan correctly    Patient using outside facility for labs already scheduled for 9/21    Education provided: Please call back if any changes to your diet, medications or how you've been taking warfarin    Plan made per [facility] anticoagulation protocol    Sherron Green, RN  Anticoagulation Clinic  9/17/2021    _______________________________________________________________________     Anticoagulation Episode Summary     Current INR goal:  2.0-3.0   TTR:  73.3 % (1 y)   Target end date:  Indefinite   Send INR reminders to:  MICHAEL GUTIÉRREZ    Indications    Atrial fibrillation (H) [I48.91]  Long term  current use of anticoagulant therapy [Z79.01]  Longstanding persistent atrial fibrillation (H) [I48.11]  Long term current use of anticoagulants with INR goal of 2.0-3.0 [Z79.01]  Atrial fibrillation  unspecified type (H) [I48.91]           Comments:           Anticoagulation Care Providers     Provider Role Specialty Phone number    Nancy Salgado APRN CNP Referring Internal Medicine [phone]    Li Flores Mai, MD Responsible Internal Medicine - Pediatrics [phone]

## 2021-09-20 ENCOUNTER — TELEPHONE (OUTPATIENT)
Dept: UROLOGY | Facility: CLINIC | Age: 86
End: 2021-09-20

## 2021-09-20 NOTE — TELEPHONE ENCOUNTER
Bhargavi came into clinic and gave her some samples of the compact plus which patient uses to get her through for a couple weeks if necessary or to have as back-up.       Marianela Johnson LPN

## 2021-09-20 NOTE — TELEPHONE ENCOUNTER
Health Call Center    Phone Message    May a detailed message be left on voicemail: yes     Reason for Call: Other: Bhargavi calling to report that there was a delay in getting Maia's catheter supplies from the supply company.  She only has 5 catheters left and the order will not be delivered for 2-3 business days.  Bhargavi is wondering if she can come to the [facility] and  a couple of boxes to get Maia through until her shipments arrives.  Please call Bhargavi back to discuss     Action Taken: Message routed to:  [facility] ([facility]  Urology    Travel Screening: Not Applicable

## 2021-09-21 ENCOUNTER — ANTICOAGULATION THERAPY VISIT (OUTPATIENT)
Dept: ANTICOAGULATION | Facility: CLINIC | Age: 86
End: 2021-09-21

## 2021-09-21 ENCOUNTER — LAB (OUTPATIENT)
Dept: LAB | Facility: CLINIC | Age: 86
End: 2021-09-21
Payer: COMMERCIAL

## 2021-09-21 DIAGNOSIS — Z79.01 LONG TERM CURRENT USE OF ANTICOAGULANTS WITH INR GOAL OF 2.0-3.0: ICD-10-CM

## 2021-09-21 DIAGNOSIS — Z79.01 LONG TERM CURRENT USE OF ANTICOAGULANT THERAPY: ICD-10-CM

## 2021-09-21 DIAGNOSIS — I48.91 ATRIAL FIBRILLATION, UNSPECIFIED TYPE (H): ICD-10-CM

## 2021-09-21 DIAGNOSIS — I48.91 ATRIAL FIBRILLATION (H): Primary | ICD-10-CM

## 2021-09-21 DIAGNOSIS — Z09 HOSPITAL DISCHARGE FOLLOW-UP: ICD-10-CM

## 2021-09-21 DIAGNOSIS — I48.11 LONGSTANDING PERSISTENT ATRIAL FIBRILLATION (H): ICD-10-CM

## 2021-09-21 LAB
ANION GAP SERPL CALCULATED.3IONS-SCNC: 2 MMOL/L (ref 3–14)
BUN SERPL-MCNC: 27 MG/DL (ref 7–30)
CALCIUM SERPL-MCNC: 8.8 MG/DL (ref 8.5–10.1)
CHLORIDE BLD-SCNC: 111 MMOL/L (ref 94–109)
CO2 SERPL-SCNC: 26 MMOL/L (ref 20–32)
CREAT SERPL-MCNC: 1.35 MG/DL (ref 0.52–1.04)
GFR SERPL CREATININE-BSD FRML MDRD: 35 ML/MIN/1.73M2
GLUCOSE BLD-MCNC: 105 MG/DL (ref 70–99)
INR BLD: 2.7 (ref 0.9–1.1)
POTASSIUM BLD-SCNC: 4.5 MMOL/L (ref 3.4–5.3)
SODIUM SERPL-SCNC: 139 MMOL/L (ref 133–144)

## 2021-09-21 PROCEDURE — 80048 BASIC METABOLIC PNL TOTAL CA: CPT

## 2021-09-21 PROCEDURE — 85610 PROTHROMBIN TIME: CPT

## 2021-09-21 PROCEDURE — 36415 COLL VENOUS BLD VENIPUNCTURE: CPT

## 2021-09-21 NOTE — PROGRESS NOTES
ANTICOAGULATION MANAGEMENT     Maia Miranda 88 year old female is on warfarin with therapeutic INR result. (Goal INR 2.0-3.0)    Recent labs: (last 7 days)     09/21/21  1043   INR 2.7*       ASSESSMENT     Source(s): Chart Review and Patient/Caregiver Call       Warfarin doses taken: Warfarin taken as instructed    Diet: No new diet changes identified    New illness, injury, or hospitalization: No    Medication/supplement changes: None noted    Signs or symptoms of bleeding or clotting: No    Previous INR: Supratherapeutic    Additional findings: None     PLAN     Recommended plan for no diet, medication or health factor changes affecting INR     Dosing Instructions: Continue your current warfarin dose with next INR in 6 days       Summary  As of 9/21/2021    Full warfarin instructions:  2.5 mg every Sun, Tue, Thu; 1.25 mg all other days   Next INR check:  9/27/2021             Telephone call with Maia who verbalizes understanding and agrees to plan  Sent [employer] message with dosing and follow up instructions    Lab visit scheduled    Education provided: Please call back if any changes to your diet, medications or how you've been taking warfarin and Contact [phone]  with any changes, questions or concerns.     Plan made per [facility] anticoagulation protocol    Krystyna Keys RN  Anticoagulation Clinic  9/21/2021    _______________________________________________________________________     Anticoagulation Episode Summary     Current INR goal:  2.0-3.0   TTR:  72.4 % (1 y)   Target end date:  Indefinite   Send INR reminders to:  [facility]    Indications    Atrial fibrillation (H) [I48.91]  Long term current use of anticoagulant therapy [Z79.01]  Longstanding persistent atrial fibrillation (H) [I48.11]  Long term current use of anticoagulants with INR goal of 2.0-3.0 [Z79.01]  Atrial fibrillation  unspecified type (H) [I48.91]           Comments:           Anticoagulation Care Providers     Provider  Role Specialty Phone number    Nancy Salgado APRN CNP Referring Internal Medicine [phone]    Li Flores Mai, MD Responsible Internal Medicine - Pediatrics [phone]

## 2021-09-27 ENCOUNTER — LAB (OUTPATIENT)
Dept: LAB | Facility: CLINIC | Age: 86
End: 2021-09-27
Payer: COMMERCIAL

## 2021-09-27 ENCOUNTER — ANTICOAGULATION THERAPY VISIT (OUTPATIENT)
Dept: ANTICOAGULATION | Facility: CLINIC | Age: 86
End: 2021-09-27

## 2021-09-27 DIAGNOSIS — Z79.01 LONG TERM CURRENT USE OF ANTICOAGULANT THERAPY: ICD-10-CM

## 2021-09-27 DIAGNOSIS — Z79.01 LONG TERM CURRENT USE OF ANTICOAGULANTS WITH INR GOAL OF 2.0-3.0: ICD-10-CM

## 2021-09-27 DIAGNOSIS — I48.91 ATRIAL FIBRILLATION (H): Primary | ICD-10-CM

## 2021-09-27 DIAGNOSIS — I48.11 LONGSTANDING PERSISTENT ATRIAL FIBRILLATION (H): ICD-10-CM

## 2021-09-27 DIAGNOSIS — I48.91 ATRIAL FIBRILLATION, UNSPECIFIED TYPE (H): ICD-10-CM

## 2021-09-27 LAB — INR BLD: 3.3 (ref 0.9–1.1)

## 2021-09-27 PROCEDURE — 85610 PROTHROMBIN TIME: CPT

## 2021-09-27 PROCEDURE — 36416 COLLJ CAPILLARY BLOOD SPEC: CPT

## 2021-09-27 NOTE — PROGRESS NOTES
ANTICOAGULATION MANAGEMENT     Maia Miranda 88 year old female is on warfarin with supratherapeutic INR result. (Goal INR 2.0-3.0)    Recent labs: (last 7 days)     09/27/21  1353   INR 3.3*       ASSESSMENT     Source(s): Patient/Caregiver Call       Warfarin doses taken: More warfarin taken than planned which may be affecting INR. They did not decrease Saturday's dosing as advised on 9/17/21 when she was 4.1.    Diet: Decreased greens/vitamin K in diet; plans to resume previous intake    New illness, injury, or hospitalization: No    Medication/supplement changes: None noted    Signs or symptoms of bleeding or clotting: No    Previous INR: Therapeutic last visit; previously outside of goal range    Additional findings: None     PLAN     Recommended plan for temporary change(s) affecting INR     Dosing Instructions: Hold dose then continue your current warfarin dose with next INR in 2 weeks. After tonight, Maia will continue with dosing as advised two weeks ago with Saturday at 1.25 mg.    Summary  As of 9/27/2021    Full warfarin instructions:  9/27: Hold; Otherwise 2.5 mg every Sun, Tue, Thu; 1.25 mg all other days   Next INR check:  10/11/2021             Telephone call with Maia and daughter, Bhargavi, who verbalizes understanding and agrees to plan    Lab visit scheduled    Education provided: Importance of consistent vitamin K intake, Impact of vitamin K foods on INR, Goal range and significance of current result, Importance of taking warfarin as instructed, Monitoring for bleeding signs and symptoms, Monitoring for clotting signs and symptoms and Contact [phone]  with any changes, questions or concerns.     Plan made per [facility] anticoagulation protocol    Dian Astudillo, RN  Anticoagulation Clinic  9/27/2021    _______________________________________________________________________     Anticoagulation Episode Summary     Current INR goal:  2.0-3.0   TTR:  71.6 % (1 y)   Target end date:  Indefinite    Send INR reminders to:  [facility]    Indications    Atrial fibrillation (H) [I48.91]  Long term current use of anticoagulant therapy [Z79.01]  Longstanding persistent atrial fibrillation (H) [I48.11]  Long term current use of anticoagulants with INR goal of 2.0-3.0 [Z79.01]  Atrial fibrillation  unspecified type (H) [I48.91]           Comments:           Anticoagulation Care Providers     Provider Role Specialty Phone number    Nancy Salgado APRN CNP Referring Internal Medicine [phone]    Li Flores Mai, MD Responsible Internal Medicine - Pediatrics [phone]

## 2021-09-30 ENCOUNTER — MEDICAL CORRESPONDENCE (OUTPATIENT)
Dept: HEALTH INFORMATION MANAGEMENT | Facility: CLINIC | Age: 86
End: 2021-09-30
Payer: COMMERCIAL

## 2021-10-11 ENCOUNTER — ANTICOAGULATION THERAPY VISIT (OUTPATIENT)
Dept: ANTICOAGULATION | Facility: CLINIC | Age: 86
End: 2021-10-11

## 2021-10-11 ENCOUNTER — LAB (OUTPATIENT)
Dept: LAB | Facility: CLINIC | Age: 86
End: 2021-10-11
Payer: COMMERCIAL

## 2021-10-11 DIAGNOSIS — Z79.01 LONG TERM CURRENT USE OF ANTICOAGULANT THERAPY: ICD-10-CM

## 2021-10-11 DIAGNOSIS — I48.91 ATRIAL FIBRILLATION, UNSPECIFIED TYPE (H): ICD-10-CM

## 2021-10-11 DIAGNOSIS — Z79.01 LONG TERM CURRENT USE OF ANTICOAGULANTS WITH INR GOAL OF 2.0-3.0: ICD-10-CM

## 2021-10-11 DIAGNOSIS — I48.11 LONGSTANDING PERSISTENT ATRIAL FIBRILLATION (H): ICD-10-CM

## 2021-10-11 DIAGNOSIS — I48.91 ATRIAL FIBRILLATION (H): Primary | ICD-10-CM

## 2021-10-11 LAB — INR BLD: 2.1 (ref 0.9–1.1)

## 2021-10-11 PROCEDURE — 36415 COLL VENOUS BLD VENIPUNCTURE: CPT

## 2021-10-11 PROCEDURE — 85610 PROTHROMBIN TIME: CPT

## 2021-10-11 RX ORDER — WARFARIN SODIUM 2.5 MG/1
TABLET ORAL
Qty: 90 TABLET | Refills: 3
Start: 2021-10-11 | End: 2021-11-17

## 2021-10-11 NOTE — PROGRESS NOTES
ANTICOAGULATION MANAGEMENT     Maia Miranda 88 year old female is on warfarin with therapeutic INR result. (Goal INR 2.0-3.0)    Recent labs: (last 7 days)     10/11/21  1109   INR 2.1*       ASSESSMENT     Source(s): Chart Review and Patient/Caregiver Call       Warfarin doses taken: Warfarin taken as instructed    Diet: No new diet changes identified    New illness, injury, or hospitalization: No    Medication/supplement changes: None noted    Signs or symptoms of bleeding or clotting: No    Previous INR: Supratherapeutic    Additional findings: None     PLAN     Recommended plan for no diet, medication or health factor changes affecting INR     Dosing Instructions: Continue your current warfarin dose with next INR in 3 weeks       Summary  As of 10/11/2021    Full warfarin instructions:  2.5 mg every Sun, Tue, Thu; 1.25 mg all other days   Next INR check:  11/1/2021             Telephone call with Maia who verbalizes understanding and agrees to plan. Sent [employer] message for daughter, Bhargavi, to check since she helps with med set up.    Lab visit scheduled    Education provided: Please call back if any changes to your diet, medications or how you've been taking warfarin, Monitoring for bleeding signs and symptoms, Monitoring for clotting signs and symptoms and Contact [phone]  with any changes, questions or concerns.     Plan made per [facility] anticoagulation protocol    Dian Atsudillo RN  Anticoagulation Clinic  10/11/2021    _______________________________________________________________________     Anticoagulation Episode Summary     Current INR goal:  2.0-3.0   TTR:  70.6 % (1 y)   Target end date:  Indefinite   Send INR reminders to:  [facility]    Indications    Atrial fibrillation (H) [I48.91]  Long term current use of anticoagulant therapy [Z79.01]  Longstanding persistent atrial fibrillation (H) [I48.11]  Long term current use of anticoagulants with INR goal of 2.0-3.0 [Z79.01]  Atrial  fibrillation  unspecified type (H) [I48.91]           Comments:           Anticoagulation Care Providers     Provider Role Specialty Phone number    Nancy Salgado APRN CNP Referring Internal Medicine [phone]    Li Flores Mai, MD Responsible Internal Medicine - Pediatrics [phone]

## 2021-10-19 ENCOUNTER — OFFICE VISIT (OUTPATIENT)
Dept: PEDIATRICS | Facility: CLINIC | Age: 86
End: 2021-10-19
Payer: COMMERCIAL

## 2021-10-19 VITALS
DIASTOLIC BLOOD PRESSURE: 74 MMHG | OXYGEN SATURATION: 96 % | SYSTOLIC BLOOD PRESSURE: 124 MMHG | HEART RATE: 61 BPM | HEIGHT: 64 IN | RESPIRATION RATE: 16 BRPM | TEMPERATURE: 97.5 F | BODY MASS INDEX: 24.72 KG/M2 | WEIGHT: 144.8 LBS

## 2021-10-19 DIAGNOSIS — I48.91 ATRIAL FIBRILLATION, UNSPECIFIED TYPE (H): ICD-10-CM

## 2021-10-19 DIAGNOSIS — R00.2 PALPITATIONS: ICD-10-CM

## 2021-10-19 DIAGNOSIS — Z23 HIGH PRIORITY FOR 2019-NCOV VACCINE: ICD-10-CM

## 2021-10-19 DIAGNOSIS — I10 HYPERTENSION GOAL BP (BLOOD PRESSURE) < 150/90: ICD-10-CM

## 2021-10-19 DIAGNOSIS — I21.A1 TYPE 2 MI (MYOCARDIAL INFARCTION) (H): Primary | ICD-10-CM

## 2021-10-19 PROCEDURE — 91300 COVID-19,PF,PFIZER (12+ YRS): CPT | Performed by: NURSE PRACTITIONER

## 2021-10-19 PROCEDURE — 0004A COVID-19,PF,PFIZER (12+ YRS): CPT | Performed by: NURSE PRACTITIONER

## 2021-10-19 PROCEDURE — 90662 IIV NO PRSV INCREASED AG IM: CPT | Performed by: NURSE PRACTITIONER

## 2021-10-19 PROCEDURE — G0008 ADMIN INFLUENZA VIRUS VAC: HCPCS | Performed by: NURSE PRACTITIONER

## 2021-10-19 PROCEDURE — 99214 OFFICE O/P EST MOD 30 MIN: CPT | Mod: 25 | Performed by: NURSE PRACTITIONER

## 2021-10-19 RX ORDER — ROSUVASTATIN CALCIUM 5 MG/1
5 TABLET, COATED ORAL AT BEDTIME
Qty: 90 TABLET | Refills: 3 | Status: ON HOLD | OUTPATIENT
Start: 2021-10-19 | End: 2021-12-29

## 2021-10-19 RX ORDER — LISINOPRIL 40 MG/1
40 TABLET ORAL DAILY
Qty: 90 TABLET | Refills: 3 | Status: ON HOLD | OUTPATIENT
Start: 2021-10-19 | End: 2021-12-29

## 2021-10-19 RX ORDER — METOPROLOL SUCCINATE 100 MG/1
100 TABLET, EXTENDED RELEASE ORAL DAILY
Qty: 90 TABLET | Refills: 3 | Status: ON HOLD | OUTPATIENT
Start: 2021-10-19 | End: 2021-12-29

## 2021-10-19 ASSESSMENT — MIFFLIN-ST. JEOR: SCORE: 1067.84

## 2021-10-19 NOTE — PROGRESS NOTES
Assessment & Plan     Type 2 MI (myocardial infarction) (H)  - Stopped taking rosuvastatin after she ran out. Will resume. Has f/u with cardiology next month  - rosuvastatin (CRESTOR) 5 MG tablet; Take 1 tablet (5 mg) by mouth At Bedtime    Palpitations  - Refill. Doing well  - metoprolol succinate ER (TOPROL-XL) 100 MG 24 hr tablet; Take 1 tablet (100 mg) by mouth daily    Atrial fibrillation, unspecified type (H)  - Med refilled. Follows with cardiology  - metoprolol succinate ER (TOPROL-XL) 100 MG 24 hr tablet; Take 1 tablet (100 mg) by mouth daily    Hypertension goal BP (blood pressure) < 150/90  - Improved upon recheck. Want to avoid risk of hypotension at home. Risk for falls  - lisinopril (ZESTRIL) 40 MG tablet; Take 1 tablet (40 mg) by mouth daily    High priority for 2019-nCoV vaccine  - COVID-19,PF,PFIZER    No falls in past year. Daughter is living with her. She is able to do her own ADLs such as dressing, bathing and eating. They cook together. She uses walker in the home    Due for physical next month though could do in the spring time. Will do fasting labs with cardiology next month  Return in about 6 months (around 4/19/2022) for Physical Exam.    RAY Patel [facility] LUDA Carvalho is a 88 year old who presents for the following health issues  accompanied by her daughter:    History of Present Illness       She eats 2-3 servings of fruits and vegetables daily.She consumes 1 sweetened beverage(s) daily.She exercises with enough effort to increase her heart rate 10 to 19 minutes per day.  She exercises with enough effort to increase her heart rate 3 or less days per week.   She is taking medications regularly.     Follow up for COVID    How are you feeling today? Better  In the past 24 hours have you had shortness of breath when speaking, walking, or climbing stairs? I don't have breathing problems  Do you have a cough? I don't have a cough  When is the  "last time you had a fever greater than 100? More than 2 weeks ago  Are you having any other symptoms? None   Do you have any other stressors you would like to discuss with your provider? No     Wants to discuss:  Flu shot  When to get covid booster  Bump on head from fall last year  Seeing cardiologist in November    She is here today with her daughter Bhargavi who moved in with her in September of last year     Hospitalized for Covid and atypical chest pain (Type 2 MI) in September  Discharged on norvasc and rosuvastatin. BP 96/56 when she for hospital discontinue f/u and norvasc was discontinued  Has visit with Dr. Sina Domínguez 17    She had a normal colonoscopy in 2011. Showed diverticulosis, otherwise normal            Objective    /74   Pulse 61   Temp 97.5  F (36.4  C) (Tympanic)   Resp 16   Ht 1.619 m (5' 3.75\")   Wt 65.7 kg (144 lb 12.8 oz)   SpO2 96%   BMI 25.05 kg/m    Body mass index is 25.05 kg/m .  Physical Exam   GENERAL: alert, no distress, frail and elderly  EYES: Eyes grossly normal to inspection, PERRL and conjunctivae and sclerae normal  HENT: ear canals and TM's normal, nose and mouth without ulcers or lesions  NECK: no adenopathy, no asymmetry, masses, or scars and thyroid normal to palpation  RESP: lungs clear to auscultation - no rales, rhonchi or wheezes  CV: regular rate and rhythm, normal S1 S2, no S3 or S4, no murmur, click or rub, no peripheral edema and peripheral pulses strong  ABDOMEN: soft, nontender, no hepatosplenomegaly, no masses and bowel sounds normal  SKIN: no suspicious lesions or rashes  PSYCH: mentation appears normal, affect normal/bright    Lab on 10/11/2021   Component Date Value Ref Range Status     INR 10/11/2021 2.1* 0.9 - 1.1 Final           "

## 2021-10-19 NOTE — PATIENT INSTRUCTIONS
I refilled all of your medications for the year  I sent the Crestor so you can restart that    Try to go fasting to your cardiology appointment so they can check your cholesterol

## 2021-11-01 ENCOUNTER — ANTICOAGULATION THERAPY VISIT (OUTPATIENT)
Dept: ANTICOAGULATION | Facility: CLINIC | Age: 86
End: 2021-11-01

## 2021-11-01 ENCOUNTER — LAB (OUTPATIENT)
Dept: LAB | Facility: CLINIC | Age: 86
End: 2021-11-01
Payer: COMMERCIAL

## 2021-11-01 DIAGNOSIS — I48.91 ATRIAL FIBRILLATION, UNSPECIFIED TYPE (H): ICD-10-CM

## 2021-11-01 DIAGNOSIS — Z79.01 LONG TERM CURRENT USE OF ANTICOAGULANTS WITH INR GOAL OF 2.0-3.0: ICD-10-CM

## 2021-11-01 DIAGNOSIS — I48.11 LONGSTANDING PERSISTENT ATRIAL FIBRILLATION (H): ICD-10-CM

## 2021-11-01 DIAGNOSIS — I48.91 ATRIAL FIBRILLATION (H): Primary | ICD-10-CM

## 2021-11-01 DIAGNOSIS — Z79.01 LONG TERM CURRENT USE OF ANTICOAGULANT THERAPY: ICD-10-CM

## 2021-11-01 LAB — INR BLD: 1.6 (ref 0.9–1.1)

## 2021-11-01 PROCEDURE — 36415 COLL VENOUS BLD VENIPUNCTURE: CPT

## 2021-11-01 PROCEDURE — 85610 PROTHROMBIN TIME: CPT

## 2021-11-01 NOTE — PROGRESS NOTES
ANTICOAGULATION MANAGEMENT     Maia Miranda 89 year old female is on warfarin with subtherapeutic INR result. (Goal INR 2.0-3.0)    Recent labs: (last 7 days)     11/01/21  1128   INR 1.6*       ASSESSMENT     Source(s): Chart Review and Patient/Caregiver Call       Warfarin doses taken: Warfarin taken as instructed    Diet: No new diet changes identified    New illness, injury, or hospitalization: No    Medication/supplement changes: Last OV note states patient had stopped rosuvastatin for a period of time which could be influencing INR. Concurrent use of ROSUVASTATIN and WARFARIN may result in increase in INR and increased risk of bleeding. However, patient has been back on it since that time, around 10/19/21.    Signs or symptoms of bleeding or clotting: No    Previous INR: Therapeutic last visit; previously outside of goal range    Additional findings: None     PLAN     Recommended plan for temporary change(s) affecting INR     Dosing Instructions: Booster dose then continue your current warfarin dose with next INR in 2 weeks       Summary  As of 11/1/2021    Full warfarin instructions:  11/1: 2.5 mg; Otherwise 2.5 mg every Sun, Tue, Thu; 1.25 mg all other days   Next INR check:  11/15/2021             Telephone call with Maia who verbalizes understanding and agrees to plan    Lab visit scheduled . MyC message sent for daughter to read since she helps with Maia's medications.    Education provided: Please call back if any changes to your diet, medications or how you've been taking warfarin, Goal range and significance of current result, Potential interaction between warfarin and rosuvastatin and Contact [phone]  with any changes, questions or concerns.     Plan made per [facility] anticoagulation protocol    Dian Astudillo, RN  Anticoagulation Clinic  11/1/2021    _______________________________________________________________________     Anticoagulation Episode Summary     Current INR goal:  2.0-3.0    TTR:  65.9 % (1 y)   Target end date:  Indefinite   Send INR reminders to:  [facility]    Indications    Atrial fibrillation (H) [I48.91]  Long term current use of anticoagulant therapy [Z79.01]  Longstanding persistent atrial fibrillation (H) [I48.11]  Long term current use of anticoagulants with INR goal of 2.0-3.0 [Z79.01]  Atrial fibrillation  unspecified type (H) [I48.91]           Comments:           Anticoagulation Care Providers     Provider Role Specialty Phone number    Nancy Salgado APRN CNP Referring Internal Medicine [phone]    Li Flores Mai, MD Responsible Internal Medicine - Pediatrics [phone]

## 2021-11-17 DIAGNOSIS — Z79.01 LONG TERM CURRENT USE OF ANTICOAGULANT THERAPY: ICD-10-CM

## 2021-11-17 RX ORDER — WARFARIN SODIUM 2.5 MG/1
TABLET ORAL
Qty: 65 TABLET | Refills: 0 | Status: SHIPPED | OUTPATIENT
Start: 2021-11-17 | End: 2022-02-10 | Stop reason: DRUGHIGH

## 2021-11-17 NOTE — TELEPHONE ENCOUNTER
"Patients daughter in law called and left  asking for a refill of warfarin 2.5mg to be sent to the [facility] in [place].     Noted last script was a \"no print out\"     Lulú Fitch RN, BSN, PHN  Anticoagulation Nurse  [phone]     "

## 2021-11-17 NOTE — TELEPHONE ENCOUNTER
Prescription approved per [facility] Refill Protocol.  Azeb Johnson RN, BSN  Anticoagulation Clinic

## 2021-11-24 ENCOUNTER — OFFICE VISIT (OUTPATIENT)
Dept: UROLOGY | Facility: CLINIC | Age: 86
End: 2021-11-24
Payer: COMMERCIAL

## 2021-11-24 VITALS
WEIGHT: 142 LBS | DIASTOLIC BLOOD PRESSURE: 62 MMHG | HEIGHT: 65 IN | SYSTOLIC BLOOD PRESSURE: 122 MMHG | BODY MASS INDEX: 23.66 KG/M2

## 2021-11-24 DIAGNOSIS — R33.9 URINARY RETENTION WITH INCOMPLETE BLADDER EMPTYING: ICD-10-CM

## 2021-11-24 DIAGNOSIS — Z87.440 HISTORY OF RECURRENT UTIS: Primary | ICD-10-CM

## 2021-11-24 PROCEDURE — 99214 OFFICE O/P EST MOD 30 MIN: CPT | Performed by: UROLOGY

## 2021-11-24 ASSESSMENT — MIFFLIN-ST. JEOR: SCORE: 1069.99

## 2021-11-24 ASSESSMENT — PAIN SCALES - GENERAL: PAINLEVEL: NO PAIN (0)

## 2021-11-24 NOTE — LETTER
"11/24/2021       RE: Maia Miranda  [address] [address] [address] [facility] [id number]     Dear Colleague,    Thank you for referring your patient, Maia Miranda, to the [facility] at [facility]. Please see a copy of my visit note below.    Assessment & Plan     History of recurrent UTIs  Stable continue the daily abx  - cephALEXin (KEFLEX) 250 MG capsule; Take 1 capsule (250 mg) by mouth daily    Urinary retention with incomplete bladder emptying  Stable continue to catheterized 4-6x a day    Return in about 1 year (around 11/24/2022) for using a video visit.     8 minutes were spent today in reviewing the EMR including recent hospital notes, direct patient care, coordination of care including refilling the prescription medications and documentation    Bernadine Maria MD MPH  (she/her/hers)   of Urology  [facility]      Subjective   Maia is a 89 year old who presents for the following health issues.  She is with her daughter     HPI     She is here today for follow up of Tino. She catheterizes at least 4 x a day.  We have tried many things to keep her infection free and the daily antibiotic if the only thing that has worked    Since last visit she was hospitalized for a breakthrough COVID infection at [facility] in September        Objective    /62   Ht 1.651 m (5' 5\")   Wt 64.4 kg (142 lb)   BMI 23.63 kg/m    Body mass index is 23.63 kg/m .  Physical Exam   GENERAL: healthy, alert and no distress  EYES: Eyes grossly normal to inspection, conjunctivae and sclerae normal  HENT: normal cephalic/atraumatic.  External ears, nose and mouth without ulcers or lesions.  RESP: no audible wheeze, cough, or visible cyanosis.  No visible retractions or increased work of breathing.  Able to speak fully in complete sentences.  NEURO: Cranial nerves grossly intact, mentation intact and speech normal  PSYCH: mentation " appears normal, affect normal/bright, judgement and insight intact, normal speech and appearance well-groomed    CC  Patient Care Team:  Nancy Salgado APRN CNP as PCP - General (Internal Medicine)  Bernadine Maria MD as PCP - Urology (Urology)  Bernadine Maria MD as MD (Urology)  Rosa Cruz, RN as Registered Nurse (Urology)  Kb Tracy MD as MD (Urology)  Bernadine Maria MD as Assigned Surgical Provider  Nancy Salgado APRN CNP as Assigned PCP  SELF, REFERRED

## 2021-11-24 NOTE — PATIENT INSTRUCTIONS
Continue to catheterize 6x a day    Websites with free information:    [employer] patient website: www.voicesforpfd.org    Total Control Program: www.totalcontrolprogram.com    Supplements to prevent UTI to consider  -Probiotics  -Cranberry (for these products let them know a doctor is recommending them)   Ellura: www.myellura.HeadCount   Theracran HP by [employer] [facility] [address]  -d-mannose 2gm daily  -Vitamin C 500-1000mg twice a day    It was a pleasure meeting with you today.  Thank you for allowing me and my team the privilege of caring for you today.  YOU are the reason we are here, and I truly hope we provided you with the excellent service you deserve.  Please let us know if there is anything else we can do for you so that we can be sure you are leaving completely satisfied with your care experience.

## 2021-11-24 NOTE — PROGRESS NOTES
"Assessment & Plan     History of recurrent UTIs  Stable continue the daily abx  - cephALEXin (KEFLEX) 250 MG capsule; Take 1 capsule (250 mg) by mouth daily    Urinary retention with incomplete bladder emptying  Stable continue to catheterized 4-6x a day    Return in about 1 year (around 11/24/2022) for using a video visit.     8 minutes were spent today in reviewing the EMR including recent hospital notes, direct patient care, coordination of care including refilling the prescription medications and documentation    Bernadine Maria MD MPH  (she/her/hers)   of Urology  [facility]      Sheri Carvalho is a 89 year old who presents for the following health issues.  She is with her daughter     HPI     She is here today for follow up of Tino. She catheterizes at least 4 x a day.  We have tried many things to keep her infection free and the daily antibiotic if the only thing that has worked    Since last visit she was hospitalized for a breakthrough COVID infection at [facility] in September        Objective    /62   Ht 1.651 m (5' 5\")   Wt 64.4 kg (142 lb)   BMI 23.63 kg/m    Body mass index is 23.63 kg/m .  Physical Exam   GENERAL: healthy, alert and no distress  EYES: Eyes grossly normal to inspection, conjunctivae and sclerae normal  HENT: normal cephalic/atraumatic.  External ears, nose and mouth without ulcers or lesions.  RESP: no audible wheeze, cough, or visible cyanosis.  No visible retractions or increased work of breathing.  Able to speak fully in complete sentences.  NEURO: Cranial nerves grossly intact, mentation intact and speech normal  PSYCH: mentation appears normal, affect normal/bright, judgement and insight intact, normal speech and appearance well-groomed    CC  Patient Care Team:  Nancy Salgado APRN CNP as PCP - General (Internal Medicine)  Bernadine Maria MD as PCP - Urology (Urology)  Bernadine Maria MD as MD (Urology)  Nancy, " Rosa, RN as Registered Nurse (Urology)  Kb Tracy MD as MD (Urology)  Bernadine Maria MD as Assigned Surgical Provider  Nancy Salgado APRN CNP as Assigned PCP  SELF, REFERRED

## 2021-12-21 ENCOUNTER — TELEPHONE (OUTPATIENT)
Dept: ANTICOAGULATION | Facility: CLINIC | Age: 86
End: 2021-12-21
Payer: COMMERCIAL

## 2021-12-21 NOTE — TELEPHONE ENCOUNTER
ANTICOAGULATION     Maia Miranda is overdue for INR check.      Spoke with Maia and scheduled lab appointment on 12/31/21    Vitaliy Tang RN

## 2021-12-25 ENCOUNTER — APPOINTMENT (OUTPATIENT)
Dept: CT IMAGING | Facility: CLINIC | Age: 86
End: 2021-12-25
Attending: EMERGENCY MEDICINE
Payer: COMMERCIAL

## 2021-12-25 ENCOUNTER — HOSPITAL ENCOUNTER (EMERGENCY)
Facility: CLINIC | Age: 86
Discharge: ANOTHER HOSPITAL | End: 2021-12-25
Attending: EMERGENCY MEDICINE | Admitting: EMERGENCY MEDICINE
Payer: COMMERCIAL

## 2021-12-25 ENCOUNTER — APPOINTMENT (OUTPATIENT)
Dept: GENERAL RADIOLOGY | Facility: CLINIC | Age: 86
End: 2021-12-25
Attending: EMERGENCY MEDICINE
Payer: COMMERCIAL

## 2021-12-25 ENCOUNTER — HOSPITAL ENCOUNTER (INPATIENT)
Facility: CLINIC | Age: 86
LOS: 4 days | Discharge: NURSING, REHAB OR LONG-TERM CARE | DRG: 551 | End: 2021-12-29
Attending: EMERGENCY MEDICINE | Admitting: SURGERY
Payer: COMMERCIAL

## 2021-12-25 VITALS
SYSTOLIC BLOOD PRESSURE: 119 MMHG | RESPIRATION RATE: 19 BRPM | DIASTOLIC BLOOD PRESSURE: 66 MMHG | BODY MASS INDEX: 23.63 KG/M2 | TEMPERATURE: 97.6 F | WEIGHT: 142 LBS | HEART RATE: 64 BPM | OXYGEN SATURATION: 95 %

## 2021-12-25 DIAGNOSIS — Z79.01 CHRONIC ANTICOAGULATION: ICD-10-CM

## 2021-12-25 DIAGNOSIS — M85.811 OSTEOPENIA OF RIGHT SHOULDER: ICD-10-CM

## 2021-12-25 DIAGNOSIS — I21.A1 TYPE 2 MI (MYOCARDIAL INFARCTION) (H): ICD-10-CM

## 2021-12-25 DIAGNOSIS — S06.0X0A CONCUSSION WITHOUT LOSS OF CONSCIOUSNESS, INITIAL ENCOUNTER: ICD-10-CM

## 2021-12-25 DIAGNOSIS — Z87.440 HISTORY OF RECURRENT UTIS: ICD-10-CM

## 2021-12-25 DIAGNOSIS — H04.123 DRY EYES: ICD-10-CM

## 2021-12-25 DIAGNOSIS — R55 SYNCOPE, UNSPECIFIED SYNCOPE TYPE: ICD-10-CM
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
## 2021-12-25 DIAGNOSIS — R19.7 DIARRHEA, UNSPECIFIED TYPE: ICD-10-CM

## 2021-12-25 DIAGNOSIS — R79.89 ELEVATED TROPONIN: ICD-10-CM

## 2021-12-25 DIAGNOSIS — Z79.01 LONG TERM CURRENT USE OF ANTICOAGULANT THERAPY: ICD-10-CM

## 2021-12-25 DIAGNOSIS — I10 HYPERTENSION GOAL BP (BLOOD PRESSURE) < 150/90: ICD-10-CM

## 2021-12-25 DIAGNOSIS — Z00.00 HEALTH CARE MAINTENANCE: ICD-10-CM

## 2021-12-25 DIAGNOSIS — R00.2 PALPITATIONS: ICD-10-CM

## 2021-12-25 DIAGNOSIS — S09.90XA CLOSED HEAD INJURY, INITIAL ENCOUNTER: ICD-10-CM

## 2021-12-25 DIAGNOSIS — S12.101A CLOSED NONDISPLACED FRACTURE OF SECOND CERVICAL VERTEBRA, UNSPECIFIED FRACTURE MORPHOLOGY, INITIAL ENCOUNTER (H): ICD-10-CM

## 2021-12-25 DIAGNOSIS — R55 SYNCOPE AND COLLAPSE: ICD-10-CM

## 2021-12-25 DIAGNOSIS — I48.91 ATRIAL FIBRILLATION, UNSPECIFIED TYPE (H): Primary | ICD-10-CM

## 2021-12-25 DIAGNOSIS — M51.379 DEGENERATION OF LUMBAR OR LUMBOSACRAL INTERVERTEBRAL DISC: ICD-10-CM

## 2021-12-25 DIAGNOSIS — E86.0 DEHYDRATION: ICD-10-CM

## 2021-12-25 LAB
ALBUMIN UR-MCNC: NEGATIVE MG/DL
ANION GAP SERPL CALCULATED.3IONS-SCNC: 6 MMOL/L (ref 3–14)
ANION GAP SERPL CALCULATED.3IONS-SCNC: 6 MMOL/L (ref 3–14)
APPEARANCE UR: CLEAR
APTT PPP: 47 SECONDS (ref 22–38)
APTT PPP: 50 SECONDS (ref 22–38)
BACTERIA #/AREA URNS HPF: ABNORMAL /HPF
BASOPHILS # BLD AUTO: 0 10E3/UL (ref 0–0.2)
BASOPHILS # BLD AUTO: 0 10E3/UL (ref 0–0.2)
BASOPHILS NFR BLD AUTO: 0 %
BASOPHILS NFR BLD AUTO: 0 %
BILIRUB UR QL STRIP: NEGATIVE
BUN SERPL-MCNC: 21 MG/DL (ref 7–30)
BUN SERPL-MCNC: 26 MG/DL (ref 7–30)
CALCIUM SERPL-MCNC: 7.3 MG/DL (ref 8.5–10.1)
CALCIUM SERPL-MCNC: 9.3 MG/DL (ref 8.5–10.1)
CHLORIDE BLD-SCNC: 105 MMOL/L (ref 94–109)
CHLORIDE BLD-SCNC: 112 MMOL/L (ref 94–109)
CO2 SERPL-SCNC: 23 MMOL/L (ref 20–32)
CO2 SERPL-SCNC: 26 MMOL/L (ref 20–32)
COLOR UR AUTO: ABNORMAL
CREAT SERPL-MCNC: 0.96 MG/DL (ref 0.52–1.04)
CREAT SERPL-MCNC: 1.2 MG/DL (ref 0.52–1.04)
EOSINOPHIL # BLD AUTO: 0 10E3/UL (ref 0–0.7)
EOSINOPHIL # BLD AUTO: 0 10E3/UL (ref 0–0.7)
EOSINOPHIL NFR BLD AUTO: 0 %
EOSINOPHIL NFR BLD AUTO: 0 %
ERYTHROCYTE [DISTWIDTH] IN BLOOD BY AUTOMATED COUNT: 13.1 % (ref 10–15)
ERYTHROCYTE [DISTWIDTH] IN BLOOD BY AUTOMATED COUNT: 13.3 % (ref 10–15)
GFR SERPL CREATININE-BSD FRML MDRD: 43 ML/MIN/1.73M2
GFR SERPL CREATININE-BSD FRML MDRD: 56 ML/MIN/1.73M2
GLUCOSE BLD-MCNC: 116 MG/DL (ref 70–99)
GLUCOSE BLD-MCNC: 150 MG/DL (ref 70–99)
GLUCOSE BLDC GLUCOMTR-MCNC: 142 MG/DL (ref 70–99)
GLUCOSE UR STRIP-MCNC: NEGATIVE MG/DL
HCT VFR BLD AUTO: 30.8 % (ref 35–47)
HCT VFR BLD AUTO: 38.5 % (ref 35–47)
HGB BLD-MCNC: 12.1 G/DL (ref 11.7–15.7)
HGB BLD-MCNC: 9.8 G/DL (ref 11.7–15.7)
HGB UR QL STRIP: NEGATIVE
HOLD SPECIMEN: NORMAL
IMM GRANULOCYTES # BLD: 0.1 10E3/UL
IMM GRANULOCYTES # BLD: 0.2 10E3/UL
IMM GRANULOCYTES NFR BLD: 1 %
IMM GRANULOCYTES NFR BLD: 1 %
INR PPP: 2.4 (ref 0.85–1.15)
INR PPP: 3.02 (ref 0.85–1.15)
KETONES UR STRIP-MCNC: NEGATIVE MG/DL
LACTATE SERPL-SCNC: 2 MMOL/L (ref 0.7–2)
LEUKOCYTE ESTERASE UR QL STRIP: NEGATIVE
LYMPHOCYTES # BLD AUTO: 0.6 10E3/UL (ref 0.8–5.3)
LYMPHOCYTES # BLD AUTO: 0.6 10E3/UL (ref 0.8–5.3)
LYMPHOCYTES NFR BLD AUTO: 4 %
LYMPHOCYTES NFR BLD AUTO: 4 %
MCH RBC QN AUTO: 32 PG (ref 26.5–33)
MCH RBC QN AUTO: 32.2 PG (ref 26.5–33)
MCHC RBC AUTO-ENTMCNC: 31.4 G/DL (ref 31.5–36.5)
MCHC RBC AUTO-ENTMCNC: 31.8 G/DL (ref 31.5–36.5)
MCV RBC AUTO: 101 FL (ref 78–100)
MCV RBC AUTO: 102 FL (ref 78–100)
MONOCYTES # BLD AUTO: 1 10E3/UL (ref 0–1.3)
MONOCYTES # BLD AUTO: 1.3 10E3/UL (ref 0–1.3)
MONOCYTES NFR BLD AUTO: 7 %
MONOCYTES NFR BLD AUTO: 8 %
MUCOUS THREADS #/AREA URNS LPF: PRESENT /LPF
NEUTROPHILS # BLD AUTO: 12.6 10E3/UL (ref 1.6–8.3)
NEUTROPHILS # BLD AUTO: 14.8 10E3/UL (ref 1.6–8.3)
NEUTROPHILS NFR BLD AUTO: 87 %
NEUTROPHILS NFR BLD AUTO: 88 %
NITRATE UR QL: NEGATIVE
NRBC # BLD AUTO: 0 10E3/UL
NRBC # BLD AUTO: 0 10E3/UL
NRBC BLD AUTO-RTO: 0 /100
NRBC BLD AUTO-RTO: 0 /100
PH UR STRIP: 5 [PH] (ref 5–7)
PLATELET # BLD AUTO: 112 10E3/UL (ref 150–450)
PLATELET # BLD AUTO: 135 10E3/UL (ref 150–450)
POTASSIUM BLD-SCNC: 3.6 MMOL/L (ref 3.4–5.3)
POTASSIUM BLD-SCNC: 4.3 MMOL/L (ref 3.4–5.3)
RBC # BLD AUTO: 3.06 10E6/UL (ref 3.8–5.2)
RBC # BLD AUTO: 3.76 10E6/UL (ref 3.8–5.2)
RBC URINE: 1 /HPF
SARS-COV-2 RNA RESP QL NAA+PROBE: NEGATIVE
SODIUM SERPL-SCNC: 137 MMOL/L (ref 133–144)
SODIUM SERPL-SCNC: 141 MMOL/L (ref 133–144)
SP GR UR STRIP: 1.02 (ref 1–1.03)
SQUAMOUS EPITHELIAL: <1 /HPF
TROPONIN I SERPL HS-MCNC: 159 NG/L
TROPONIN I SERPL HS-MCNC: 181 NG/L
TROPONIN I SERPL HS-MCNC: 181 NG/L
UROBILINOGEN UR STRIP-MCNC: NORMAL MG/DL
WBC # BLD AUTO: 14.4 10E3/UL (ref 4–11)
WBC # BLD AUTO: 17 10E3/UL (ref 4–11)
WBC URINE: <1 /HPF

## 2021-12-25 PROCEDURE — 85025 COMPLETE CBC W/AUTO DIFF WBC: CPT | Performed by: EMERGENCY MEDICINE

## 2021-12-25 PROCEDURE — 93005 ELECTROCARDIOGRAM TRACING: CPT | Performed by: EMERGENCY MEDICINE

## 2021-12-25 PROCEDURE — 81001 URINALYSIS AUTO W/SCOPE: CPT | Performed by: EMERGENCY MEDICINE

## 2021-12-25 PROCEDURE — 250N000009 HC RX 250: Performed by: EMERGENCY MEDICINE

## 2021-12-25 PROCEDURE — 70498 CT ANGIOGRAPHY NECK: CPT

## 2021-12-25 PROCEDURE — 80048 BASIC METABOLIC PNL TOTAL CA: CPT | Performed by: EMERGENCY MEDICINE

## 2021-12-25 PROCEDURE — 93005 ELECTROCARDIOGRAM TRACING: CPT | Mod: 59

## 2021-12-25 PROCEDURE — 84484 ASSAY OF TROPONIN QUANT: CPT | Performed by: EMERGENCY MEDICINE

## 2021-12-25 PROCEDURE — 96360 HYDRATION IV INFUSION INIT: CPT | Mod: 59

## 2021-12-25 PROCEDURE — C9803 HOPD COVID-19 SPEC COLLECT: HCPCS

## 2021-12-25 PROCEDURE — 71045 X-RAY EXAM CHEST 1 VIEW: CPT

## 2021-12-25 PROCEDURE — 99285 EMERGENCY DEPT VISIT HI MDM: CPT | Mod: 25 | Performed by: EMERGENCY MEDICINE

## 2021-12-25 PROCEDURE — 72125 CT NECK SPINE W/O DYE: CPT

## 2021-12-25 PROCEDURE — 36415 COLL VENOUS BLD VENIPUNCTURE: CPT | Performed by: EMERGENCY MEDICINE

## 2021-12-25 PROCEDURE — 72170 X-RAY EXAM OF PELVIS: CPT

## 2021-12-25 PROCEDURE — C9803 HOPD COVID-19 SPEC COLLECT: HCPCS | Performed by: EMERGENCY MEDICINE

## 2021-12-25 PROCEDURE — 96368 THER/DIAG CONCURRENT INF: CPT | Performed by: EMERGENCY MEDICINE

## 2021-12-25 PROCEDURE — 250N000013 HC RX MED GY IP 250 OP 250 PS 637: Performed by: EMERGENCY MEDICINE

## 2021-12-25 PROCEDURE — 120N000003 HC R&B IMCU UMMC

## 2021-12-25 PROCEDURE — 96365 THER/PROPH/DIAG IV INF INIT: CPT | Performed by: EMERGENCY MEDICINE

## 2021-12-25 PROCEDURE — 85610 PROTHROMBIN TIME: CPT | Performed by: EMERGENCY MEDICINE

## 2021-12-25 PROCEDURE — 85730 THROMBOPLASTIN TIME PARTIAL: CPT | Performed by: EMERGENCY MEDICINE

## 2021-12-25 PROCEDURE — 99222 1ST HOSP IP/OBS MODERATE 55: CPT | Mod: GC | Performed by: NEUROLOGICAL SURGERY

## 2021-12-25 PROCEDURE — 84484 ASSAY OF TROPONIN QUANT: CPT | Mod: 91 | Performed by: EMERGENCY MEDICINE

## 2021-12-25 PROCEDURE — 250N000011 HC RX IP 250 OP 636: Performed by: EMERGENCY MEDICINE

## 2021-12-25 PROCEDURE — 99285 EMERGENCY DEPT VISIT HI MDM: CPT | Mod: 25

## 2021-12-25 PROCEDURE — 36415 COLL VENOUS BLD VENIPUNCTURE: CPT | Performed by: SURGERY

## 2021-12-25 PROCEDURE — 70450 CT HEAD/BRAIN W/O DYE: CPT | Mod: XS

## 2021-12-25 PROCEDURE — 87635 SARS-COV-2 COVID-19 AMP PRB: CPT | Performed by: EMERGENCY MEDICINE

## 2021-12-25 PROCEDURE — 51702 INSERT TEMP BLADDER CATH: CPT

## 2021-12-25 PROCEDURE — 258N000003 HC RX IP 258 OP 636: Performed by: EMERGENCY MEDICINE

## 2021-12-25 PROCEDURE — 85004 AUTOMATED DIFF WBC COUNT: CPT | Performed by: EMERGENCY MEDICINE

## 2021-12-25 PROCEDURE — 96366 THER/PROPH/DIAG IV INF ADDON: CPT | Performed by: EMERGENCY MEDICINE

## 2021-12-25 PROCEDURE — 83605 ASSAY OF LACTIC ACID: CPT | Performed by: SURGERY

## 2021-12-25 PROCEDURE — 87493 C DIFF AMPLIFIED PROBE: CPT | Performed by: EMERGENCY MEDICINE

## 2021-12-25 RX ORDER — ONDANSETRON 2 MG/ML
4 INJECTION INTRAMUSCULAR; INTRAVENOUS EVERY 6 HOURS PRN
Status: DISCONTINUED | OUTPATIENT
Start: 2021-12-25 | End: 2021-12-29 | Stop reason: HOSPADM

## 2021-12-25 RX ORDER — ASPIRIN 81 MG/1
81 TABLET, CHEWABLE ORAL ONCE
Status: COMPLETED | OUTPATIENT
Start: 2021-12-25 | End: 2021-12-25

## 2021-12-25 RX ORDER — IOPAMIDOL 755 MG/ML
500 INJECTION, SOLUTION INTRAVASCULAR ONCE
Status: COMPLETED | OUTPATIENT
Start: 2021-12-25 | End: 2021-12-25

## 2021-12-25 RX ORDER — SODIUM CHLORIDE 9 MG/ML
1000 INJECTION, SOLUTION INTRAVENOUS CONTINUOUS
Status: DISCONTINUED | OUTPATIENT
Start: 2021-12-25 | End: 2021-12-25 | Stop reason: HOSPADM

## 2021-12-25 RX ORDER — ACETAMINOPHEN 325 MG/1
650 TABLET ORAL EVERY 4 HOURS PRN
Status: DISCONTINUED | OUTPATIENT
Start: 2021-12-25 | End: 2021-12-29 | Stop reason: HOSPADM

## 2021-12-25 RX ORDER — ONDANSETRON 4 MG/1
4 TABLET, ORALLY DISINTEGRATING ORAL EVERY 6 HOURS PRN
Status: DISCONTINUED | OUTPATIENT
Start: 2021-12-25 | End: 2021-12-29 | Stop reason: HOSPADM

## 2021-12-25 RX ADMIN — IOPAMIDOL 70 ML: 755 INJECTION, SOLUTION INTRAVENOUS at 11:25

## 2021-12-25 RX ADMIN — SODIUM CHLORIDE 1000 ML: 9 INJECTION, SOLUTION INTRAVENOUS at 13:30

## 2021-12-25 RX ADMIN — ASPIRIN 81 MG CHEWABLE TABLET 81 MG: 81 TABLET CHEWABLE at 13:06

## 2021-12-25 RX ADMIN — SODIUM CHLORIDE 500 ML: 9 INJECTION, SOLUTION INTRAVENOUS at 12:44

## 2021-12-25 RX ADMIN — SODIUM CHLORIDE 80 ML: 9 INJECTION, SOLUTION INTRAVENOUS at 11:25

## 2021-12-25 ASSESSMENT — ACTIVITIES OF DAILY LIVING (ADL)
ADLS_ACUITY_SCORE: 9

## 2021-12-25 ASSESSMENT — ENCOUNTER SYMPTOMS
ABDOMINAL PAIN: 0
COUGH: 0
SHORTNESS OF BREATH: 0
FEVER: 0

## 2021-12-25 ASSESSMENT — MIFFLIN-ST. JEOR
SCORE: 1105.75
SCORE: 1099.47

## 2021-12-25 NOTE — ED NOTES
MRI tech informed daughter of pt that the neuro stimulator remote is required for MRI. Daughter drove to [facility] to obtain remote and has returned. MRI was informed by [facility] that daughter has returned with remote and pt is ready for MRI.

## 2021-12-25 NOTE — ED TRIAGE NOTES
Pt comes from [facility] following a fall, Per report patient had negative head CT. Unclear if C2 is fractured, in aspen collar. H/o of deleon and bladder stimulator. Covid negative.

## 2021-12-25 NOTE — ED TRIAGE NOTES
A&O x4.  ABC's intact.      Pt arrives with c/o diarrhea that started yesterday and fell yesterday & dizzy.

## 2021-12-25 NOTE — ED PROVIDER NOTES
History   Chief Complaint:  Dizziness and Diarrhea     History is limited due to patient's poor memory after the event  HPI   Maia Miranda is a 89 year old female with history of A. fib with chronic anticoagulation who presents with diarrhea, syncopal event and confusion.  The patient lives with another daughter the daughter who does not live with her presented today and is the historian.  The patient is normally conversant per the daughter.  She was brought in due to diarrhea and a syncopal episode in the bathroom yesterday around 3 PM.  We do not know how long the patient was on the floor the daughter says it could have been a couple of hours the patient however did not spend the night on the floor.  The patient cannot recall anything about the event of than she fell and hit her head.  She complains of only pain in her neck.  The diarrhea per the daughter is not bloody but is copious the patient is incontinent of stool and this is unusual.  Apparently the patient normally self caths and they think that she was in the bathroom trying to self cath.  The patient normally would know the date what room things occurred.  There is been no vomiting per the daughter.    Review of Systems  Unobtainable due to patient condition.  However questions that I asked are all negative except as in HPI    Allergies:  Codeine  Meperidine  Morphine  Penicillins  Sulfa Drugs     Epinephrine    Medications:  Ascorbic acid   Cephalexin    Estradiol vaginal ring  Lisinopril   Metoprolol succinate   Omega-3 Fatty Acids   Rosuvastatin   Warfarin     Past Medical History:     Amnestic MCI (mild cognitive impairment with memory loss)   Chronic duodenal ulcer without mention of hemorrhage, perforation, or obstruction   Depressive disorder   Erosive eye disorder   Esophageal reflux   Essential and other specified forms of tremor   Generalized osteoarthrosis   Hiatal hernia   Pulmonary embolism   Lactose intolerance  Lumbago   Mitral valve  regurgitation   Occlusion and stenosis of carotid artery without mention of cerebral infarction   Osteoporosis   Paroxysmal atrial fibrillation    Spider veins   Hypertension   Osteopenia   Arthritis   Varicose veins of lower extremities   Allergic rhinitis   Chronic kidney disease stage 3   Diverticulosis   Atrial fibrillation   UTI (urinary tract infection)   Fibroid uterus   Ischemic colitis     Past Surgical History:    Cataract IOL, RT/LT  Corneal scraping   Biopsy bladder   Implant stimulator sacral nerve stage one   Implant stimulator sacral nerve stage two   Dilation and curettage   Left breast biopsy x2  Abstracted pulmonary emboli bilateral      Family History:    Cerebrovascular disease  Depression, suicide  Alzheimer's disease  Pacemaker    Social History:  PCP: Nancy Salgado  Escorted to ER by family member.     Physical Exam     Patient Vitals for the past 24 hrs:   BP Temp Temp src Pulse Resp SpO2 Weight   12/25/21 1700 119/66 -- -- 64 19 95 % --   12/25/21 1630 125/55 -- -- 64 21 95 % --   12/25/21 1600 129/55 -- -- 63 21 95 % --   12/25/21 1545 121/55 -- -- 64 11 95 % --   12/25/21 1500 114/49 -- -- 62 20 -- --   12/25/21 1445 129/54 -- -- 63 18 -- --   12/25/21 1330 (!) 146/67 -- -- 70 20 98 % --   12/25/21 1300 128/63 -- -- 64 21 99 % --   12/25/21 1245 130/66 -- -- 64 22 97 % --   12/25/21 1230 133/61 -- -- 63 22 97 % --   12/25/21 1215 125/56 -- -- 62 21 98 % --   12/25/21 1200 130/59 -- -- 63 25 98 % --   12/25/21 1145 124/52 -- -- 63 22 99 % --   12/25/21 1103 -- -- -- -- -- -- 64.4 kg (142 lb)   12/25/21 1042 126/56 97.6  F (36.4  C) Oral 80 16 96 % --       Physical Exam  General: The patient is alert, in no respiratory distress.    HENT: Mucous membranes moist.  No palpable scalp hematomas    Cardiovascular: Regular rate and rhythm. Good pulses.  Slightly delayed cap refill    Respiratory: Lungs are clear. No nasal flaring. No retractions.     Gastrointestinal: Abdomen soft. No  guarding, no rebound.    Musculoskeletal: No gross deformity.  There is tenderness over the C-spine    Skin: No rashes or petechiae.     Neurologic: The patient is alert and oriented to her name only.  She cannot name what city she is in but knows she is in a hospital cannot name the year.  The patient has extract motions intact can smile.  The patient can move all extremities with adequate strength gross sensation intact    Lymphatic: No cervical adenopathy. No lower extremity swelling.    Psychiatric: The patient is non-tearful.    Emergency Department Course   ECG  ECG obtained at 1229, ECG read at 1242  Sinus rhythm with 1st degree AV block   Right bundle branch block   Left anterior fascicular block    Bifascicular block   Abnormal ECG   Rate 64 bpm. DE interval 306 ms. QRS duration 130 ms. QT/QTc 466/480 ms. P-R-T axes 86 -75 40.     Imaging:  XR Chest 1 View   Final Result   IMPRESSION: Hyperinflation both lungs. The lungs are otherwise clear. No pneumothorax. Stable cardiac enlargement. Pulmonary vascularity is within normal limits.      XR Pelvis 1/2 Views   Final Result   IMPRESSION: No fracture. Mild degenerative changes in the right hip. Right-sided presacral lead in place.      CTA Head Neck with Contrast   Final Result   IMPRESSION:    HEAD CTA:    1.  Normal CTA [place].      NECK CTA:   1.  Normal neck CTA.         CT Cervical Spine w/o Contrast   Final Result   IMPRESSION:   1.  Questionable nondisplaced fracture involving the left lateral mass of C2. Cervical spine MRI would be helpful to evaluate for bone marrow edema that would confirm this as a recent fracture.   2.  No other evidence for fracture of the cervical spine.   3.  Degenerative changes of the cervical spine as detailed above.      CT Head w/o Contrast   Final Result   IMPRESSION:   1.  No CT evidence for acute intracranial process.   2.  Brain atrophy and presumed chronic microvascular ischemic changes as above.            Report per radiology    Laboratory:  Labs Ordered and Resulted from Time of ED Arrival to Time of ED Departure   BASIC METABOLIC PANEL - Abnormal       Result Value    Sodium 137      Potassium 4.3      Chloride 105      Carbon Dioxide (CO2) 26      Anion Gap 6      Urea Nitrogen 26      Creatinine 1.20 (*)     Calcium 9.3      Glucose 150 (*)     GFR Estimate 43 (*)    INR - Abnormal    INR 2.40 (*)    PARTIAL THROMBOPLASTIN TIME - Abnormal    aPTT 50 (*)    TROPONIN I - Abnormal    Troponin I High Sensitivity 159 (*)    ROUTINE UA WITH MICROSCOPIC - Abnormal    Color Urine Light Yellow      Appearance Urine Clear      Glucose Urine Negative      Bilirubin Urine Negative      Ketones Urine Negative      Specific Gravity Urine 1.022      Blood Urine Negative      pH Urine 5.0      Protein Albumin Urine Negative      Urobilinogen Urine Normal      Nitrite Urine Negative      Leukocyte Esterase Urine Negative      Bacteria Urine None Seen      Mucus Urine Present (*)     RBC Urine 1      WBC Urine <1      Squamous Epithelials Urine <1     CBC WITH PLATELETS AND DIFFERENTIAL - Abnormal    WBC Count 17.0 (*)     RBC Count 3.76 (*)     Hemoglobin 12.1      Hematocrit 38.5       (*)     MCH 32.2      MCHC 31.4 (*)     RDW 13.1      Platelet Count 135 (*)     % Neutrophils 87      % Lymphocytes 4      % Monocytes 8      % Eosinophils 0      % Basophils 0      % Immature Granulocytes 1      NRBCs per 100 WBC 0      Absolute Neutrophils 14.8 (*)     Absolute Lymphocytes 0.6 (*)     Absolute Monocytes 1.3      Absolute Eosinophils 0.0      Absolute Basophils 0.0      Absolute Immature Granulocytes 0.1      Absolute NRBCs 0.0     GLUCOSE BY METER - Abnormal    GLUCOSE BY METER POCT 142 (*)    TROPONIN I - Abnormal    Troponin I High Sensitivity 181 (*)    COVID-19 VIRUS (CORONAVIRUS) BY PCR - Normal    SARS CoV2 PCR Negative     GLUCOSE MONITOR NURSING POCT        Procedures  The patient was placed in a  c-collar    Emergency Department Course:             Reviewed:  I reviewed nursing notes, vitals, past medical history, Care Everywhere and [facility]    Assessments:  1045 I obtained history and examined the patient as noted above.   1056 Tier 1 Code Stroke called.    1145 I rechecked the patient and explained findings.   1207 I discussed the neurostimulator with [employer] they said it is MRI brain only eligible.      Consults:  1103 : I spoke with Dr. Juliocesar Peña of the Stroke Neurology service from [facility] regarding patient's presentation, findings, and plan of care.  I discussed the results of the CT scan with Dr. Givens of radiology  On recheck with Dr. Lachman the code stroke was downgraded    1325 : I spoke with Dr. Hernández of the Orthopedic service from [facility] regarding patient's presentation, findings, and plan of care.    1340 : I spoke with SALINAS Aguirre of the Neurosurgery service from [facility] regarding patient's presentation, findings, and plan of care.Recommended C collar or MRI.    1505 : I discussed that case with Dr Ledesma. Recommends transfer to a trauma center.    I called [facility] who did not have beds to accept the patient.  I called the [facility] ER    Interventions:  Medications   sodium chloride 0.9% infusion (1,000 mLs Intravenous New Bag 12/25/21 1330)   0.9% sodium chloride BOLUS (0 mLs Intravenous Stopped 12/25/21 1328)   iopamidol (ISOVUE-370) solution 500 mL (70 mLs Intravenous Given 12/25/21 1125)   sodium chloride 0.9 % CT Flush (80 mLs Intravenous Given 12/25/21 1125)   0.9% sodium chloride BOLUS (0 mLs Intravenous Stopped 12/25/21 1328)   aspirin (ASA) chewable tablet 81 mg (81 mg Oral Given 12/25/21 1306)        Disposition:  The patient was admitted to the hospital under the care of Dr. Chapman    Impression & Plan     CMS Diagnoses: The patient has stroke symptoms:         ED Stroke specific documentation           NIHSS  PDF     Patient last known well time: yesterday  ED Provider first to bedside at: on arrival  CT Results received at: 1113    Thrombolytics:   Not given due to stroke mimic: concussion.  And patient outside the window  If treating with thrombolytics: Ensure SBP<180 and DBP<105 prior to treatment with thrombolytics.  Administering thrombolytics after treatment with IV labetalol, hydralazine, or nicardipine is reasonable once BP control is established.    Endovascular Retrieval:  Not initiated due to absence of proximal vessel occlusion    National Institutes of Health Stroke Scale (Baseline)  Time Performed: on arrival     Score    Level of consciousness: (0)   Alert, keenly responsive    LOC questions: (2)   Answers neither question correctly    LOC commands: (0)   Performs both tasks correctly    Best gaze: (0)   Normal    Visual: (0)   No visual loss    Facial palsy: (0)   Normal symmetrical movements    Motor arm (left): (0)   No drift    Motor arm (right): (0)   No drift    Motor leg (left): (0)   No drift    Motor leg (right): (0)   No drift    Limb ataxia: (0)   Absent    Sensory: (0)   Normal- no sensory loss    Best language: (0)   Normal- no aphasia    Dysarthria: (0)   Normal    Extinction and inattention: (0)   No abnormality        Total Score:  2        Stroke Mimics were considered (including migraine headache, seizure disorder, hypoglycemia (or hyperglycemia), head or spinal trauma, CNS infection, Toxin ingestion and shock state (e.g. sepsis) .      Medical Decision Making:  The patient is chronically anticoagulated and had a fall yesterday.  This fall was associated with the onset of diarrhea the patient clinically appears dehydrated I think that likely contributed to this.  However it may have also occurred with the patient self cathing.  I was able to interview the daughter who lives with her and she said that that she found the patient on the floor having pulled a hand towel on plantar head.  We  "do not know if she passed out.  The patient cannot say anything about the event that she fell and hit her head.  She complained morning of pain in her neck this was imaged was placed in a c-collar.  A code stroke was called because of the chronic\" confusion which is worse today per the daughter and the potential for bleed.  The patient is outside the window.  Discussed the case both with stroke neurology and the radiologist and there does not appear to be bleed.  An MRI was ordered I did discuss the case with [employer] is that she has brain only eligible for MRI.  She appears dehydrated started IV fluids on her her white count is elevated kidney function is poor at baseline.  The patient was maintained in her C-spine collar and the CT scan of the C-spine demonstrated a possible L2 lateral mass fracture.  I did discuss this with the radiologist who felt that only an MRI would be helpful at this point.  [employer] had said she had a brain only MRI on her neurostimulator.  I first called orthopedics who said that neurosurgery would be the appropriate consult.  Neurosurgery felt that with the tenderness that the patient should either have an MRI or a collar placed such as an Dexter.  Unfortunately despite my multiple discussions with radiology and [employer] we were unable to perform the MRI here.  General surgery did not feel that the patient could be admitted here.  I personally placed an Dexter collar on the patient attempted to transfer him to [facility] however a soft no had no beds.  Therefore the [facility] ER accepted.  Interviewing the patient's chart she has had elevated troponins in the past I did treat her with aspirin in case that was a new issue of cardiac disease however I felt this is less likely to be ACS.  More likely she had a fall secondary either to diarrhea or self cath fell and hit her head.  She is in an Dexter collar.  We cannot perform an MRI due to her stimulator but there is concern for an C2 " lateral mass fracture without neurologic deficit.  The patient is currently anticoagulated and is a trauma patient and was transferred to the [facility] via EMS.    Critical Care Time: was 75 minutes for this patient excluding procedures    Diagnosis:    ICD-10-CM    1. Closed head injury, initial encounter  S09.90XA    2. Elevated troponin  R77.8    3. Chronic anticoagulation  Z79.01    4. Concussion without loss of consciousness, initial encounter  S06.0X0A        Scribe Disclosure:  I, Rekha Olsen, am serving as a scribe at 10:45 AM on 12/25/2021 to document services personally performed by Maxwell Camacho MD based on my observations and the provider's statements to me.     Scribe Disclosure:  I, Janey Granado, am serving as a scribe at 10:49 AM on 12/25/2021 to document services personally performed by Maxwell Camacho MD based on my observations and the provider's statements to me.            Maxwell Camacho MD  12/25/21 1903

## 2021-12-25 NOTE — ED NOTES
Bed: ED02  Expected date: 12/25/21  Expected time: 5:43 PM  Means of arrival: Ambulance  Comments:  [facility]     88 y/o Female    [facility]

## 2021-12-25 NOTE — ED NOTES
Bed: IN02  Expected date: 12/25/21  Expected time: 5:00 PM  Means of arrival:   Comments:  Jolanta Ruben [facility]. 89F fall yesterday. Anticoagulated. Mildly confused. No head bleed. Questionable left C2 lateral body FX. In [place] collar.     Robin Lerma MD  12/25/21 5020

## 2021-12-25 NOTE — ED PROVIDER NOTES
[place] EMERGENCY DEPARTMENT ([facility])  12/25/21    History     Chief Complaint   Patient presents with     Fall     HPI  Maia Miranda is a 89 year old female with PMH significant for A. fib with chronic anticoagulation and neurostimulator for neurogenic bladder for neurogenic bladder who presents to the Emergency Department from [facility] ED after presenting with diarrhea, syncopal event, and confusion.  History is limited due to patient's poor memory after fall.  Currently she reports no pain.  She states she was on the floor for some time and her daughter found her.    Per review of the chart, it was unclear how long patient was on the floor and daughter said it could have been a couple of hours.  The patient could not recall anything about the fall at that time except for stating that she hit her head.  She stated the only pain was in her neck. It was believed the fall occurred during the patient attempting to self cath.  Patient's daughter reported a large volume of nonbloody diarrhea which is unusual for the patient.      It was the impression that patient was likely dehydrated.  At [facility] a tier 1 stroke code was called due to the worsening confusion today and potential for bleed.  The case was discussed with stroke neurology and radiologist and there did not appear to be a bleed.  The CT of the C-spine demonstrated a possible C2 lateral mass fracture and the radiologist did feel that patient needed an MRI.  Patient does have a neurostimulator with [employer] and they said it was MRI brain only eligible.  MRI unable to be performed at [facility] and patient was transferred here in [place].   There was concern for C2 lateral mass fracture without neurologic deficit.  Patient has had elevated troponins in the past and was treated with aspirin in case this was a new issue of cardiac disease.  Patient did have CTA head neck with contrast, CT cervical spine without contrast, and CT head without  contrast as resulted below.    CTA Head Neck with Contrast  Final Result  IMPRESSION:   HEAD CTA:   1.  Normal CTA [place].     NECK CTA:  1.  Normal neck CTA.        CT Cervical Spine w/o Contrast  Final Result  IMPRESSION:  1.  Questionable nondisplaced fracture involving the left lateral mass of C2. Cervical spine MRI would be helpful to evaluate for bone marrow edema that would confirm this as a recent fracture.  2.  No other evidence for fracture of the cervical spine.  3.  Degenerative changes of the cervical spine as detailed above.     CT Head w/o Contrast  Final Result  IMPRESSION:  1.  No CT evidence for acute intracranial process.  2.  Brain atrophy and presumed chronic microvascular ischemic changes as above.        Past Medical History  Past Medical History:   Diagnosis Date     Amnestic MCI (mild cognitive impairment with memory loss) 2014     Chronic duodenal ulcer without mention of hemorrhage, perforation, or obstruction 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 Warner, [facility] yearly     Esophageal reflux 2001    Abstracted 06/10/02     Essential and other specified forms of tremor 2004    resting tremor     Generalized osteoarthrosis, unspecified site     Hands     Hiatal hernia     diagnosed late 1990s [place], [place]     History of pulmonary embolism 1971    no source found     LACTOSE INTOLERANCE      Lumbago     sees Kodi Guidry     Mitral valve regurgitation      Occlusion and stenosis of carotid artery without mention of cerebral infarction 2003    CAROTID US NORMAL, bruit in neck     Osteoporosis, unspecified 2003    T = -2.66     Paroxysmal atrial fibrillation (H) 11/15/2013     Spider veins      Unspecified essential hypertension      Unspecified tinnitus 2005    mild hearing loss, saw ENT     Past Surgical History:   Procedure Laterality Date     CATARACT IOL, RT/LT       corneal scraping       CYSTOSCOPY        CYSTOSCOPY, BIOPSY BLADDER, COMBINED N/A 7/25/2016    Procedure: COMBINED CYSTOSCOPY, BIOPSY BLADDER;  Surgeon: Bernadine Maria MD;  Location: [facility] OR     ESOPHAGOSCOPY, GASTROSCOPY, DUODENOSCOPY (EGD), COMBINED  7/8/2013    Procedure: COMBINED ESOPHAGOSCOPY, GASTROSCOPY, DUODENOSCOPY (EGD);   ESOPHAGOSCOPY, GASTROSCOPY, DUODENOSCOPY (EGD)   ;  Surgeon: Shawn Soto MD;  Location: [facility] GI     IMPLANT STIMULATOR SACRAL NERVE STAGE ONE N/A 4/4/2017    Procedure: IMPLANT STIMULATOR SACRAL NERVE STAGE ONE;  Surgeon: Kb Tracy MD;  Location: [facility] OR     IMPLANT STIMULATOR SACRAL NERVE STAGE TWO N/A 4/18/2017    Procedure: IMPLANT STIMULATOR SACRAL NERVE STAGE TWO;  Stage Two Interstim  ;  Surgeon: Kb Tracy MD;  Location: [facility] OR     interstim placement       [facility] NONSPECIFIC PROCEDURE      D&C x 2 in 1957 & 1962 -- Abstracted 06/10/02     [facility] NONSPECIFIC PROCEDURE      Left breast biopsy x 2 in 1988 & 1989 -- Abstracted 06/10/02     [facility] NONSPECIFIC PROCEDURE  1958    Influenza resulting in hospitalization -- Abstracted 06/10/02     [facility] NONSPECIFIC PROCEDURE  1971    10 day hospitalization from bilateral pulmonary enboli -- Abstracted 06/10/02     [facility] NONSPECIFIC PROCEDURE  1/03    colonoscopy  negative     acetaminophen (TYLENOL) 500 MG tablet  ascorbic acid (VITAMIN C) 1000 MG TABS  carboxymethylcellulose (CELLUVISC/REFRESH LIQUIGEL) 1 % ophthalmic solution  Catheters (GENTLECATH URINARY CATHETER) MISC  cephALEXin (KEFLEX) 250 MG capsule  Cholecalciferol (VITAMIN D-3) 1000 UNITS CAPS  conjugated estrogens (PREMARIN) 0.625 MG/GM vaginal cream  estradiol (ESTRING) 2 MG vaginal ring  lisinopril (ZESTRIL) 40 MG tablet  metoprolol succinate ER (TOPROL-XL) 100 MG 24 hr tablet  Omega-3 Fatty Acids (OMEGA-3 FISH OIL PO)  rosuvastatin (CRESTOR) 5 MG tablet  sodium chloride (PETER 128) 5 % ophthalmic ointment  warfarin ANTICOAGULANT (COUMADIN) 2.5 MG tablet      Allergies   Allergen Reactions      "Codeine Nausea and Vomiting     Other reaction(s): GI Intolerance  Other reaction(s): GI Intolerance, Vomiting     Meperidine Nausea and Vomiting     Other reaction(s): GI Intolerance  Other reaction(s): GI Intolerance, Vomiting     Morphine Nausea and Vomiting     Other reaction(s): GI Intolerance  vomiting  Other reaction(s): GI Intolerance, Vomiting     Penicillins Hives     hives     Sulfa Drugs      Other reaction(s): GI Intolerance  Vomiting    Other reaction(s): GI Intolerance     Epinephrine Palpitations     Other reaction(s): Other (See Comments)  Makes heart race  Other reaction(s): Other (See Comments), Tachycardia  Makes heart race       Family History  Family History   Problem Relation Age of Onset     Cerebrovascular Disease Father          at 92     Psychotic Disorder Mother         Suicide 62, depression     Alzheimer Disease Maternal Grandmother      Cardiovascular Sister         pacemaker     Glaucoma No family hx of      Social History   Social History     Tobacco Use     Smoking status: Never Smoker     Smokeless tobacco: Never Used   Vaping Use     Vaping Use: Never used   Substance Use Topics     Alcohol use: No     Drug use: No      Past medical history, past surgical history, medications, allergies, family history, and social history were reviewed with the patient. No additional pertinent items.       Review of Systems   Constitutional: Negative for fever.   Respiratory: Negative for cough and shortness of breath.    Cardiovascular: Negative for chest pain.   Gastrointestinal: Negative for abdominal pain.   Neurological: Positive for syncope.   All other systems reviewed and are negative.      Physical Exam   BP: 129/58  Pulse: 68  Temp: 98  F (36.7  C)  Resp: 16  Height: 167.6 cm (5' 6\")  Weight: 65.8 kg (145 lb) (bed scale)  SpO2: 93 %  Physical Exam  Constitutional:       General: She is not in acute distress.     Appearance: She is not diaphoretic.   HENT:      Head: Normocephalic.    "   Mouth/Throat:      Pharynx: No oropharyngeal exudate.   Eyes:      Extraocular Movements: Extraocular movements intact.   Neck:      Comments: Aspen collar in place  Cardiovascular:      Rate and Rhythm: Normal rate and regular rhythm.      Heart sounds: Normal heart sounds.   Pulmonary:      Effort: No respiratory distress.      Breath sounds: Normal breath sounds.   Abdominal:      General: There is no distension.      Palpations: Abdomen is soft.      Tenderness: There is no abdominal tenderness.   Musculoskeletal:         General: No deformity.      Cervical back: Neck supple.      Comments: Generalized weakness, no unilateral discrepancy   Skin:     General: Skin is warm and dry.   Neurological:      General: No focal deficit present.      Mental Status: She is alert.      Comments: Alert, mild confusion, disoriented to time   Psychiatric:         Behavior: Behavior normal.       ED Course      Procedures   5:57 PM  The patient was seen and examined by Tima Abad MD in Room ED02.             EKG Interpretation:      Interpreted by Tima Abad DO  Time reviewed: 1856  Symptoms at time of EKG: Syncope  Rhythm: normal sinus   Rate: normal  Axis: Left  Ectopy: none  Conduction: First-degree AV block, right bundle branch block ST Segments/ T Waves: No ST-T wave changes  Q Waves: none  Comparison to prior: No old EKG available    Clinical Impression: normal EKG      Admission on 12/25/2021, Discharged on 12/25/2021   Component Date Value Ref Range Status     Sodium 12/25/2021 137  133 - 144 mmol/L Final     Potassium 12/25/2021 4.3  3.4 - 5.3 mmol/L Final     Chloride 12/25/2021 105  94 - 109 mmol/L Final     Carbon Dioxide (CO2) 12/25/2021 26  20 - 32 mmol/L Final     Anion Gap 12/25/2021 6  3 - 14 mmol/L Final     Urea Nitrogen 12/25/2021 26  7 - 30 mg/dL Final     Creatinine 12/25/2021 1.20* 0.52 - 1.04 mg/dL Final     Calcium 12/25/2021 9.3  8.5 - 10.1 mg/dL Final     Glucose 12/25/2021 150*  70 - 99 mg/dL Final     GFR Estimate 12/25/2021 43* >60 mL/min/1.73m2 Final    Effective December 21, 2021 eGFRcr in adults is calculated using the 2021 CKD-EPI creatinine equation which includes age and gender (Heather et al., [place], DOI: 10.1056/ZYTSwb4096024)     INR 12/25/2021 2.40* 0.85 - 1.15 Final     aPTT 12/25/2021 50* 22 - 38 Seconds Final     Troponin I High Sensitivity 12/25/2021 159* <54 ng/L Final    This Troponin-I result was obtained using a Siemens Dimension Vista High Sensitivity Troponin-I assay (TNIH). Effective 11/23/21, nine labs/sites in the [facility] switched from a Siemens [place] Contemporary Troponin I assay (CTNI) to a Siemens [employer] High-Sensitivity Troponin I assay (TNIH).     Ventricular Rate 12/25/2021 64  BPM Incomplete     Atrial Rate 12/25/2021 64  BPM Incomplete     LA Interval 12/25/2021 306  ms Incomplete     QRS Duration 12/25/2021 130  ms Incomplete     QT 12/25/2021 466  ms Incomplete     QTc 12/25/2021 480  ms Incomplete     P Axis 12/25/2021 86  degrees Incomplete     R AXIS 12/25/2021 -75  degrees Incomplete     T Axis 12/25/2021 40  degrees Incomplete     Interpretation ECG 12/25/2021    Incomplete                    Value:Sinus rhythm with 1st degree A-V block  Right bundle branch block  Left anterior fascicular block   Bifascicular block   Abnormal ECG  When compared with ECG of 29-AUG-2021 21:04,  Nonspecific T wave abnormality has replaced inverted T waves in Inferior leads       Color Urine 12/25/2021 Light Yellow  Colorless, Straw, Light Yellow, Yellow Final     Appearance Urine 12/25/2021 Clear  Clear Final     Glucose Urine 12/25/2021 Negative  Negative mg/dL Final     Bilirubin Urine 12/25/2021 Negative  Negative Final     Ketones Urine 12/25/2021 Negative  Negative mg/dL Final     Specific Gravity Urine 12/25/2021 1.022  1.003 - 1.035 Final     Blood Urine 12/25/2021 Negative  Negative Final     pH Urine 12/25/2021 5.0  5.0 - 7.0 Final     Protein Albumin  Urine 12/25/2021 Negative  Negative mg/dL Final     Urobilinogen Urine 12/25/2021 Normal  Normal, 2.0 mg/dL Final     Nitrite Urine 12/25/2021 Negative  Negative Final     Leukocyte Esterase Urine 12/25/2021 Negative  Negative Final     Bacteria Urine 12/25/2021 None Seen  None Seen /HPF Final     Mucus Urine 12/25/2021 Present* None Seen /LPF Final     RBC Urine 12/25/2021 1  <=2 /HPF Final     WBC Urine 12/25/2021 <1  <=5 /HPF Final     Squamous Epithelials Urine 12/25/2021 <1  <=1 /HPF Final     SARS CoV2 PCR 12/25/2021 Negative  Negative Final    NEGATIVE: SARS-CoV-2 (COVID-19) RNA not detected, presumed negative.     WBC Count 12/25/2021 17.0* 4.0 - 11.0 10e3/uL Final     RBC Count 12/25/2021 3.76* 3.80 - 5.20 10e6/uL Final     Hemoglobin 12/25/2021 12.1  11.7 - 15.7 g/dL Final     Hematocrit 12/25/2021 38.5  35.0 - 47.0 % Final     MCV 12/25/2021 102* 78 - 100 fL Final     MCH 12/25/2021 32.2  26.5 - 33.0 pg Final     MCHC 12/25/2021 31.4* 31.5 - 36.5 g/dL Final     RDW 12/25/2021 13.1  10.0 - 15.0 % Final     Platelet Count 12/25/2021 135* 150 - 450 10e3/uL Final     % Neutrophils 12/25/2021 87  % Final     % Lymphocytes 12/25/2021 4  % Final     % Monocytes 12/25/2021 8  % Final     % Eosinophils 12/25/2021 0  % Final     % Basophils 12/25/2021 0  % Final     % Immature Granulocytes 12/25/2021 1  % Final     NRBCs per 100 WBC 12/25/2021 0  <1 /100 Final     Absolute Neutrophils 12/25/2021 14.8* 1.6 - 8.3 10e3/uL Final     Absolute Lymphocytes 12/25/2021 0.6* 0.8 - 5.3 10e3/uL Final     Absolute Monocytes 12/25/2021 1.3  0.0 - 1.3 10e3/uL Final     Absolute Eosinophils 12/25/2021 0.0  0.0 - 0.7 10e3/uL Final     Absolute Basophils 12/25/2021 0.0  0.0 - 0.2 10e3/uL Final     Absolute Immature Granulocytes 12/25/2021 0.1  <=0.4 10e3/uL Final     Absolute NRBCs 12/25/2021 0.0  10e3/uL Final     Hold Specimen 12/25/2021 [facility]   Final     GLUCOSE BY METER POCT 12/25/2021 142* 70 - 99 mg/dL Final    Dr/RN Notified      Troponin I High Sensitivity 12/25/2021 181* <54 ng/L Final    This Troponin-I result was obtained using a Siemens Dimension Vista High Sensitivity Troponin-I assay (TNIH). Effective 11/23/21, nine labs/sites in the [facility] switched from a Siemens [employer] Contemporary Troponin I assay (CTNI) to a Siemens [employer]ines High-Sensitivity Troponin I assay (TNIH).                     Medications - No data to display     Assessments & Plan (with Medical Decision Making)   89-year-old female presents to us with a chief complaint of syncope and possible C2 fracture.  She was seen at previous emergency department where a questionable C2 fracture was identified via CT scan.  Recommendation was for MRI however due to the patient's bladder stimulator she cannot receive one.  She will be admitted to the trauma service.  Neurosurgery was consulted.  Patient had initial elevation of troponin of 159 followed by 181 4 hours later.  Repeat upon arrival here approximately 8 hours after initial is also 181.  I suspect this is secondary to dehydration and demand ischemia as the patient did land the floor for approximately a day before being discovered.  Cardiology was consulted and will evaluate the patient shortly.  Creatinine today 0.96 somewhat improved from the 1.2 at the previous facility.  Labs are otherwise unremarkable.   I have reviewed the nursing notes. I have reviewed the findings, diagnosis, plan and need for follow up with the patient.    New Prescriptions    No medications on file       Final diagnoses:   Fall, initial encounter   Elevated troponin   Dehydration   Syncope, unspecified syncope type     I, Sharan Roblero, am serving as a trained medical scribe to document services personally performed by Tima Abad DO, based on the provider's statements to me.     I, Tima Abad DO, was physically present and have reviewed and verified the accuracy of this note documented by Sharan  Annika.  --  Tima Abad DO  [facility] EMERGENCY DEPARTMENT  12/25/2021     Tima Abad,   12/25/21 2150       Tima Abad,   12/25/21 2151

## 2021-12-25 NOTE — PROGRESS NOTES
Contacted by [facility] ED regarding 89F on warfarin and ASA who had unwitnessed fall with complaints of neck pain. Per ED MD, she complains of neck and other generalized pain. She has a history of confusion which is worse today per the daughter. She is otherwise moving all extremities equally. Of note, she has a neurostimulator in place. ED contacted [email or web address] who state the neurostimulator is MRI brain only compatible. ED plans to admit to hospital service.     Head CT  IMPRESSION:  1.  No CT evidence for acute intracranial process.  2.  Brain atrophy and presumed chronic microvascular ischemic changes as above.    Cervical CT    IMPRESSION:  1.  Questionable nondisplaced fracture involving the left lateral mass of C2. Cervical spine MRI would be helpful to evaluate for bone marrow edema that would confirm this as a recent fracture.  2.  No other evidence for fracture of the cervical spine.  3.  Degenerative changes of the cervical spine as detailed above.    CTA Head and Neck  IMPRESSION:   HEAD CTA:   1.  Normal CTA [place].     NECK CTA:  1.  Normal neck CTA.    Discussed with Dr. Myers    Recommendations:  -Continue cervical collar  -Obtain cervical MRI if neurostimulator is MRI compatible. If unable to obtain cervical MRI, will likely treat as acute fracture with cervical collar.  -Final consult to follow    Rafaela Torres CNP  [facility] Neurosurgery  [address] [place] [address]  [address] [place] [id number]  Tel [phone]  Fax [phone]

## 2021-12-26 ENCOUNTER — APPOINTMENT (OUTPATIENT)
Dept: GENERAL RADIOLOGY | Facility: CLINIC | Age: 86
DRG: 551 | End: 2021-12-26
Attending: STUDENT IN AN ORGANIZED HEALTH CARE EDUCATION/TRAINING PROGRAM
Payer: COMMERCIAL

## 2021-12-26 ENCOUNTER — APPOINTMENT (OUTPATIENT)
Dept: OCCUPATIONAL THERAPY | Facility: CLINIC | Age: 86
DRG: 551 | End: 2021-12-26
Attending: STUDENT IN AN ORGANIZED HEALTH CARE EDUCATION/TRAINING PROGRAM
Payer: COMMERCIAL

## 2021-12-26 ENCOUNTER — HEALTH MAINTENANCE LETTER (OUTPATIENT)
Age: 86
End: 2021-12-26

## 2021-12-26 LAB
ALBUMIN SERPL-MCNC: 2.6 G/DL (ref 3.4–5)
ALP SERPL-CCNC: 57 U/L (ref 40–150)
ALT SERPL W P-5'-P-CCNC: 20 U/L (ref 0–50)
ANION GAP SERPL CALCULATED.3IONS-SCNC: 4 MMOL/L (ref 3–14)
AST SERPL W P-5'-P-CCNC: 27 U/L (ref 0–45)
ATRIAL RATE - MUSE: 69 BPM
BILIRUB SERPL-MCNC: 0.8 MG/DL (ref 0.2–1.3)
BUN SERPL-MCNC: 23 MG/DL (ref 7–30)
C DIFF TOX B STL QL: NEGATIVE
CALCIUM SERPL-MCNC: 8.3 MG/DL (ref 8.5–10.1)
CHLORIDE BLD-SCNC: 110 MMOL/L (ref 94–109)
CO2 SERPL-SCNC: 25 MMOL/L (ref 20–32)
CREAT SERPL-MCNC: 1.07 MG/DL (ref 0.52–1.04)
DIASTOLIC BLOOD PRESSURE - MUSE: NORMAL MMHG
ERYTHROCYTE [DISTWIDTH] IN BLOOD BY AUTOMATED COUNT: 13.2 % (ref 10–15)
GFR SERPL CREATININE-BSD FRML MDRD: 49 ML/MIN/1.73M2
GLUCOSE BLD-MCNC: 132 MG/DL (ref 70–99)
GLUCOSE BLDC GLUCOMTR-MCNC: 120 MG/DL (ref 70–99)
GLUCOSE BLDC GLUCOMTR-MCNC: 123 MG/DL (ref 70–99)
GLUCOSE BLDC GLUCOMTR-MCNC: 124 MG/DL (ref 70–99)
HCT VFR BLD AUTO: 32.4 % (ref 35–47)
HGB BLD-MCNC: 10.5 G/DL (ref 11.7–15.7)
HOLD SPECIMEN: NORMAL
INTERPRETATION ECG - MUSE: NORMAL
LACTATE SERPL-SCNC: 0.6 MMOL/L (ref 0.7–2)
MAGNESIUM SERPL-MCNC: 2.2 MG/DL (ref 1.6–2.3)
MCH RBC QN AUTO: 32.4 PG (ref 26.5–33)
MCHC RBC AUTO-ENTMCNC: 32.4 G/DL (ref 31.5–36.5)
MCV RBC AUTO: 100 FL (ref 78–100)
P AXIS - MUSE: 90 DEGREES
PHOSPHATE SERPL-MCNC: 2.8 MG/DL (ref 2.5–4.5)
PLATELET # BLD AUTO: 103 10E3/UL (ref 150–450)
POTASSIUM BLD-SCNC: 3.8 MMOL/L (ref 3.4–5.3)
PR INTERVAL - MUSE: 292 MS
PROT SERPL-MCNC: 5.8 G/DL (ref 6.8–8.8)
QRS DURATION - MUSE: 126 MS
QT - MUSE: 438 MS
QTC - MUSE: 469 MS
R AXIS - MUSE: -77 DEGREES
RBC # BLD AUTO: 3.24 10E6/UL (ref 3.8–5.2)
SODIUM SERPL-SCNC: 139 MMOL/L (ref 133–144)
SYSTOLIC BLOOD PRESSURE - MUSE: NORMAL MMHG
T AXIS - MUSE: 48 DEGREES
TROPONIN I SERPL HS-MCNC: 148 NG/L
TROPONIN I SERPL HS-MCNC: 161 NG/L
TROPONIN I SERPL HS-MCNC: 193 NG/L
VENTRICULAR RATE- MUSE: 69 BPM
WBC # BLD AUTO: 14.5 10E3/UL (ref 4–11)

## 2021-12-26 PROCEDURE — 72040 X-RAY EXAM NECK SPINE 2-3 VW: CPT | Mod: 26 | Performed by: RADIOLOGY

## 2021-12-26 PROCEDURE — 83605 ASSAY OF LACTIC ACID: CPT | Performed by: SURGERY

## 2021-12-26 PROCEDURE — 84484 ASSAY OF TROPONIN QUANT: CPT | Performed by: STUDENT IN AN ORGANIZED HEALTH CARE EDUCATION/TRAINING PROGRAM

## 2021-12-26 PROCEDURE — 84484 ASSAY OF TROPONIN QUANT: CPT | Performed by: PHYSICIAN ASSISTANT

## 2021-12-26 PROCEDURE — 999N000128 HC STATISTIC PERIPHERAL IV START W/O US GUIDANCE

## 2021-12-26 PROCEDURE — 80053 COMPREHEN METABOLIC PANEL: CPT | Performed by: STUDENT IN AN ORGANIZED HEALTH CARE EDUCATION/TRAINING PROGRAM

## 2021-12-26 PROCEDURE — 85027 COMPLETE CBC AUTOMATED: CPT | Performed by: STUDENT IN AN ORGANIZED HEALTH CARE EDUCATION/TRAINING PROGRAM

## 2021-12-26 PROCEDURE — 36415 COLL VENOUS BLD VENIPUNCTURE: CPT | Performed by: SURGERY

## 2021-12-26 PROCEDURE — 72040 X-RAY EXAM NECK SPINE 2-3 VW: CPT

## 2021-12-26 PROCEDURE — 250N000013 HC RX MED GY IP 250 OP 250 PS 637: Performed by: SURGERY

## 2021-12-26 PROCEDURE — 258N000003 HC RX IP 258 OP 636: Performed by: PHYSICIAN ASSISTANT

## 2021-12-26 PROCEDURE — 83735 ASSAY OF MAGNESIUM: CPT | Performed by: PHYSICIAN ASSISTANT

## 2021-12-26 PROCEDURE — 97535 SELF CARE MNGMENT TRAINING: CPT | Mod: GO

## 2021-12-26 PROCEDURE — 120N000003 HC R&B IMCU UMMC

## 2021-12-26 PROCEDURE — 36415 COLL VENOUS BLD VENIPUNCTURE: CPT | Performed by: PHYSICIAN ASSISTANT

## 2021-12-26 PROCEDURE — 99222 1ST HOSP IP/OBS MODERATE 55: CPT | Mod: GC | Performed by: STUDENT IN AN ORGANIZED HEALTH CARE EDUCATION/TRAINING PROGRAM

## 2021-12-26 PROCEDURE — 97530 THERAPEUTIC ACTIVITIES: CPT | Mod: GO

## 2021-12-26 PROCEDURE — 87506 IADNA-DNA/RNA PROBE TQ 6-11: CPT | Performed by: PHYSICIAN ASSISTANT

## 2021-12-26 PROCEDURE — 250N000013 HC RX MED GY IP 250 OP 250 PS 637: Performed by: PHYSICIAN ASSISTANT

## 2021-12-26 PROCEDURE — 250N000011 HC RX IP 250 OP 636: Performed by: PHYSICIAN ASSISTANT

## 2021-12-26 PROCEDURE — 84100 ASSAY OF PHOSPHORUS: CPT | Performed by: PHYSICIAN ASSISTANT

## 2021-12-26 PROCEDURE — 97165 OT EVAL LOW COMPLEX 30 MIN: CPT | Mod: GO

## 2021-12-26 PROCEDURE — 99232 SBSQ HOSP IP/OBS MODERATE 35: CPT | Performed by: PHYSICIAN ASSISTANT

## 2021-12-26 PROCEDURE — 36415 COLL VENOUS BLD VENIPUNCTURE: CPT | Performed by: STUDENT IN AN ORGANIZED HEALTH CARE EDUCATION/TRAINING PROGRAM

## 2021-12-26 RX ORDER — MAGNESIUM SULFATE HEPTAHYDRATE 40 MG/ML
2 INJECTION, SOLUTION INTRAVENOUS ONCE
Status: COMPLETED | OUTPATIENT
Start: 2021-12-26 | End: 2021-12-26

## 2021-12-26 RX ORDER — ROSUVASTATIN CALCIUM 5 MG/1
5 TABLET, COATED ORAL AT BEDTIME
Status: DISCONTINUED | OUTPATIENT
Start: 2021-12-26 | End: 2021-12-29 | Stop reason: HOSPADM

## 2021-12-26 RX ORDER — ASCORBIC ACID 500 MG
1000 TABLET ORAL 2 TIMES DAILY
Status: DISCONTINUED | OUTPATIENT
Start: 2021-12-26 | End: 2021-12-29 | Stop reason: HOSPADM

## 2021-12-26 RX ORDER — LISINOPRIL 20 MG/1
40 TABLET ORAL DAILY
Status: DISCONTINUED | OUTPATIENT
Start: 2021-12-26 | End: 2021-12-29 | Stop reason: HOSPADM

## 2021-12-26 RX ORDER — METOPROLOL SUCCINATE 100 MG/1
100 TABLET, EXTENDED RELEASE ORAL DAILY
Status: DISCONTINUED | OUTPATIENT
Start: 2021-12-26 | End: 2021-12-29 | Stop reason: HOSPADM

## 2021-12-26 RX ORDER — VITAMIN B COMPLEX
25 TABLET ORAL DAILY
Status: DISCONTINUED | OUTPATIENT
Start: 2021-12-26 | End: 2021-12-29 | Stop reason: HOSPADM

## 2021-12-26 RX ORDER — DEXTROSE MONOHYDRATE, SODIUM CHLORIDE, AND POTASSIUM CHLORIDE 50; 1.49; 4.5 G/1000ML; G/1000ML; G/1000ML
INJECTION, SOLUTION INTRAVENOUS CONTINUOUS
Status: DISCONTINUED | OUTPATIENT
Start: 2021-12-26 | End: 2021-12-27

## 2021-12-26 RX ADMIN — CEPHALEXIN 250 MG: 250 CAPSULE ORAL at 13:27

## 2021-12-26 RX ADMIN — LISINOPRIL 40 MG: 20 TABLET ORAL at 09:58

## 2021-12-26 RX ADMIN — OXYCODONE HYDROCHLORIDE AND ACETAMINOPHEN 1000 MG: 500 TABLET ORAL at 20:05

## 2021-12-26 RX ADMIN — ROSUVASTATIN CALCIUM 5 MG: 5 TABLET, FILM COATED ORAL at 23:51

## 2021-12-26 RX ADMIN — POTASSIUM CHLORIDE, DEXTROSE MONOHYDRATE AND SODIUM CHLORIDE: 150; 5; 450 INJECTION, SOLUTION INTRAVENOUS at 11:11

## 2021-12-26 RX ADMIN — MAGNESIUM SULFATE IN WATER 2 G: 40 INJECTION, SOLUTION INTRAVENOUS at 17:33

## 2021-12-26 RX ADMIN — Medication 25 MCG: at 09:58

## 2021-12-26 RX ADMIN — Medication 0.5 MG: at 18:01

## 2021-12-26 RX ADMIN — POTASSIUM CHLORIDE, DEXTROSE MONOHYDRATE AND SODIUM CHLORIDE: 150; 5; 450 INJECTION, SOLUTION INTRAVENOUS at 23:51

## 2021-12-26 RX ADMIN — METOPROLOL SUCCINATE 100 MG: 100 TABLET, EXTENDED RELEASE ORAL at 09:58

## 2021-12-26 RX ADMIN — OXYCODONE HYDROCHLORIDE AND ACETAMINOPHEN 1000 MG: 500 TABLET ORAL at 09:58

## 2021-12-26 ASSESSMENT — ACTIVITIES OF DAILY LIVING (ADL)
ADLS_ACUITY_SCORE: 24
ADLS_ACUITY_SCORE: 22
ADLS_ACUITY_SCORE: 22
ADLS_ACUITY_SCORE: 15
ADLS_ACUITY_SCORE: 22
ADLS_ACUITY_SCORE: 24
ADLS_ACUITY_SCORE: 22
ADLS_ACUITY_SCORE: 24
ADLS_ACUITY_SCORE: 22
ADLS_ACUITY_SCORE: 22
ADLS_ACUITY_SCORE: 24
ADLS_ACUITY_SCORE: 22
ADLS_ACUITY_SCORE: 24

## 2021-12-26 ASSESSMENT — MIFFLIN-ST. JEOR: SCORE: 1130.75

## 2021-12-26 NOTE — DISCHARGE SUMMARY
[facility]    Discharge Summary  Trauma Surgery Service    Date of Admission:  12/25/2021  Date of Discharge:  12/29/2021  Attending Physician: Dr. MERYL Cooper  Discharging Provider: Stefan Quiñonez CNP  Date of Service (when I saw the patient): 12/29/21    Primary Provider: Nancy Salgado  Primary Care clinic: [address] [place] [id number]  Phone: [phone]  Fax number: [phone]     Discharge Diagnoses   Fall, initial encounter  Cervical fracture: lateral mass of C2    Hospital Course    Maia Miranda is a 89 year old female with history of Paroxysmal Atrial Fibrillation on chronic anticoagulation who presents with diarrhea, possible syncopal event, fall, and confusion.  The patient is normally conversant per the daughter.  She was brought in due to diarrhea and a syncopal episode in the bathroom a day prior. She lives with her daughter. Unknown how long she was down. She complains of only pain in her neck.  Imaging int he ED included a CT head, Cervical spine, XR's of the chest and pelvic bones significant for a possible C2 lateral mass fracture. She was placed in a cervical collar and admitted to trauma for her injuries. The patient underwent tertiary examination to evaluate for additional injuries.  The systematic review did not find any other injuries. Her hospital course and discharge plan below:    # Left C2 lateral mass fracture  Maia Miranda was evaluated by Neurosurgery and was managed non-operatively.  The following images were were obtained:   C-spine CT: Questionable nondisplaced fracture involving the left lateral mass of C2. Cervical spine MRI would be helpful to evaluate for bone marrow edema that would confirm this as a recent fracture  An MRI of the cervical spine was NOT obtained given questionable MRI compatibility with the patient's implanted sacral nerve stimulator.  Neck CTA:  Normal neck CTA without high grade  stenosis or dissection  Neurosurgery recommends follow up in Neurosurgery clinic in 6 weeks with repeat C spine x rays. Rigid cervical collar at all times until follow up    # Paroxysmal Afib, on Warfarin  History of paroxsymal atrial fibrillation on Betablocker therapy and Warfarin given high CHADsVasc score of 5. Her home regimen was continued.    # Troponin elevations from demand ischemia   # Acute myocardial injury without ischemia  On admission the patient's Troponin level elevated at 159, trended to peak as below.  Trop: 159?181?181?193?161  EKG obtained without ischemia  Patient did not report any symptoms suggestive of ischemia  A transthoracic Echocardiogram was obtained without acute structural changes to explain the fall or elevated troponin as below:  Global and regional left ventricular function is normal with an EF of 55-60%.  Global right ventricular function is normal. The right ventricle is normal  size.  No significant valvular abnormalities.  IVC diameter >2.1 cm collapsing <50% with sniff suggests a high RA pressure  estimated at 15 mmHg or greater. The left atrial pressure is also increased.  This study was compared with the study from 09/01/2021. No significant changes Noted.    She was seen and evaluated by Cardiology. She was also monitored on telemetry with occasional episodes of Afib with HR's in the low 100's. No further work up recommended given down trending troponin without signs of ischemia.  -  Cardiology recommends 2 week biotel cardiac monitor for additional monitoring. If found to have any concerning findings, follow up with cardiology outpatient.     # IFEOMA on CKD III, improved  # Hx of urinary retention, with chronic UTI, self caths at home  # S/P S3 sacral nerve implantation 2017 for chronic urinary retention  - Creatinine: 1.20 improved to 1.03 with IVF   - UA unremarkable.  - Unable to self cath at this time due to cervical rigid brace. Attempt removal in a week or so once  mobility is improved at TCU  - Follows outpatient with Urology for chronic urinary retention issues    # Diarrhea    Patient had been experiencing prior to admission and was admitted volume depleted.   - 12/25: C diff neg  - 12/26: Enteric Bacteria and Virus panel, negative  - Added PRN Imodium as needed and encourage oral hydration    # Acute blood loss anemia   Patient was admitted with a hemoglobin of 12.1, drifted to 10.5g.dL and remained stable prior to discharge. She received IVF on admission which explains a possible dilutional component. There were no signs of active bleeding during her stay and at discharge.    #Coagulopathy, on Warfarin for pAtrial Fibrillation, continue at discharge  INR prior to discharge 1.89, prior Warfarin dose 2.5mg.  Recommend take one tablet (2.5 mg) by mouth on Sun, Tue and Thurs; take a half tablet (1.25 mg) all other days on Mon, Wed, Fri and Sat.  Recommend INR check on 12/31 or next facility lab draw day. Provider to dose accordingly  INR Goal 2 to 3  Indication: Atrial Fibrillation  Therapy Recommendations:   Current status of occupational therapies on discharge: Transitional Care Facility;Home with assist;home with home care occupational therapy. Pt is well below baseline level of function and would benefit from further therapy to progress functional independence with basic mobility and ADLs prior to return home. If returning home pt would need 24/7 assist and Ax1 for all mobility and ADLs. Liya with fww for transfers     Significant Results and Procedures     Pending Results   These results will be followed up by PCP  Unresulted Labs Ordered in the Past 30 Days of this Admission     No orders found from 11/25/2021 to 12/26/2021.        Code Status   Full Code    SUBJECTIVE: Prior to discharge Mrs Mondragon was able to tolerate a diet, reported good pain control and is able to participate in therapies.    Physical Exam   Temp: 96.8  F (36  C) Temp src: Oral BP: (!) 155/83  "Pulse: 80   Resp: 16 SpO2: 97 % O2 Device: None (Room air)    Vitals:    12/25/21 2325 12/26/21 1900 12/27/21 0600   Weight: 66.4 kg (146 lb 6.2 oz) 68.9 kg (151 lb 14.4 oz) 66.1 kg (145 lb 11.6 oz)     Vital Signs with Ranges  Temp:  [95.7  F (35.4  C)-98  F (36.7  C)] 96.8  F (36  C)  Pulse:  [72-80] 80  Resp:  [16-18] 16  BP: (113-155)/(54-83) 155/83  SpO2:  [95 %-98 %] 97 %  I/O last 3 completed shifts:  In: 385 [P.O.:385]  Out: 2400 [Urine:2400]    Constitutional: Awake, alert, cooperative, no apparent distress.  Eyes:PERRL  HENT: Normocephalic, atraumatic, [place] J cervical collar ON  Respiratory: No increased work of breathing, good air exchange, clear to auscultation bilaterally, no crackles or wheezing.  Cardiovascular:  regular rate and rhythm, normal S1 and S2, no S3 or S4, and no murmur.   GI: Normal bowel sounds, abdomen soft, non-distended, non-tender, no guarding  Genitourinary:  Clear yellow via deleon  Skin:  Warm and dry  Musculoskeletal: There is no redness, warmth, or swelling of the joints.  Pedal pulse palpated.  Neurologic: Awake, alert, oriented.  Strength and sensory is intact. No focal deficits.  Neuropsychiatric: Calm, normal eye contact, alert, affect appropriate to situation, oriented, thought process normal.    Discharge Disposition   Discharged to short-term care facility  Condition at discharge: Stable  Discharge VS: Blood pressure (!) 155/83, pulse 80, temperature 96.8  F (36  C), temperature source Oral, resp. rate 16, height 1.676 m (5' 6\"), weight 66.1 kg (145 lb 11.6 oz), SpO2 97 %, not currently breastfeeding.    Consultations This Hospital Stay   CARDIOLOGY GENERAL ADULT IP CONSULT  OCCUPATIONAL THERAPY ADULT IP CONSULT  PHYSICAL THERAPY ADULT IP CONSULT  PHARMACY TO DOSE WARFARIN  SPEECH LANGUAGE PATH ADULT IP CONSULT  ORTHOSIS CERVICAL COLLAR IP CONSULT    Discharge Orders      X-ray Cervical spine 2-3 vws     INR     Adult Cardiology Eval Referral      Care Coordination " Referral      Reason for your hospital stay    Fall, C2 fracture     Additional Discharge Instructions    Patient is being discharged with a Cardiac Event Monitor to be worn for 2 weeks. Care instructions were provided.  Patient will follow up with Cardiology after the 2 week time frame.     Deleon catheter    To straight gravity drainage. Attempt deleon removal in 3-5 days once mobility and ability to self care improves with therapies.  At baseline patient self caths at home due to a history of chronic urinary retention. Has own catheters.     Activity - Up with nursing assistance    Cervical Collar at all times  Weight bearing as tolerated     Adult [facility] Follow-up and recommended labs and tests    Appointments on [facility] and/or [facility] (with [facility] or [facility] provider or service). Call [phone] if you haven't heard regarding these appointments within 7 days of discharge.    Follow up with your primary care provider for continued medical care, for results on labs, and hospital follow up in 5-10 days.    Follow-up in 6 weeks with repeat xrays   Neurosurgery Clinic   [facility]  [address]   [address] [place] [address]  Appointments: [phone]    You have been provided with a referral to follow up with Cardiology 2 weeks after wearing the Cardiac Monitor      You have been involved in a recent trauma incident resulting in an injury.  Studies show us that people affected by trauma have higher levels of post-traumatic stress disorder (PTSD) and/or depressive symptoms during the year following an injury.     Please consider the following.  Have you:  Had migraines about the event(s) or thought about the event(s) when you didn't want to?  Tried hard not to think about the event(s) or went out of your way to avoid situations that reminded you of the event(s)?  Been constantly on guard, watchful, or easily startled?  Felt numb or detached from people, activities, or your  "surroundings?   Felt guilty or unable to stop blaming yourself or others for the event(s) or any problems the event (s) may have caused?    If you answered \"yes\" to 3 or more of these questions, or if you simply want to discuss any of your feelings further, we recommend that you talk with your Primary Care Provider or a mental health professional.     Follow Up and recommended labs and tests    Follow up with halfway physician. The following labs/tests are recommended: INR on 12/31 or with the next routine facility lab draw.  Warfarin to be dosed by Provider per INR results  INR goal 2-3  Indication: Atrial Fibrillation     Monitor and record    Cardiac Event Monitor: 14 day duration  Ensure leads are ON  OK to remove for shower or other skin care  Please refer to the patient education sheet for other educational information     General info for SNF    Length of Stay Estimate: Short Term Care: Estimated # of Days <30  Condition at Discharge: Stable  Level of care:skilled   Rehabilitation Potential: Good  Admission H&P remains valid and up-to-date: Yes  Recent Chemotherapy: N/A  Use Nursing Home Standing Orders: Yes     Mantoux instructions    Give two-step Mantoux (PPD) Per Facility Policy Yes     No CPR- Do NOT Intubate     Physical Therapy Adult Consult    Evaluate and treat as clinically indicated.    Reason:  Falls, deconditioned, cervical fracture     Occupational Therapy Adult Consult    Evaluate and treat as clinically indicated.    Reason:  Fall,deconditioned, cervical fracture     Diet    Follow this diet upon discharge: Orders Placed This Encounter      Regular Diet Adult     Discharge Medications   Current Discharge Medication List      START taking these medications    Details   Lidocaine (LIDOCARE) 4 % Patch Place 1 patch onto the skin every 24 hours To prevent lidocaine toxicity, patient should be patch free for 12 hrs daily. To right shoulder    Associated Diagnoses: Osteopenia of right shoulder "      loperamide (IMODIUM) 2 MG capsule Take 1 capsule (2 mg) by mouth 4 times daily as needed for diarrhea    Associated Diagnoses: Diarrhea, unspecified type         CONTINUE these medications which have CHANGED    Details   acetaminophen (TYLENOL) 325 MG tablet Take 2 tablets (650 mg) by mouth every 4 hours as needed for mild pain    Associated Diagnoses: Degeneration of lumbar or lumbosacral intervertebral disc; Closed nondisplaced fracture of second cervical vertebra, unspecified fracture morphology, initial encounter (H)      carboxymethylcellulose (REFRESH LIQUIGEL) 1 % ophthalmic solution Place 1 drop into both eyes every morning As needed    Associated Diagnoses: Dry eyes      cephALEXin (KEFLEX) 250 MG capsule Take 1 capsule (250 mg) by mouth daily  Qty: 90 capsule, Refills: 3    Associated Diagnoses: History of recurrent UTIs      Cholecalciferol (VITAMIN D) 50 MCG (2000 UT) CAPS Take 1 capsule by mouth every evening    Associated Diagnoses: Health care maintenance      lisinopril (ZESTRIL) 40 MG tablet Take 1 tablet (40 mg) by mouth daily  Qty: 90 tablet, Refills: 3    Comments: For hypertension  Associated Diagnoses: Hypertension goal BP (blood pressure) < 150/90      metoprolol succinate ER (TOPROL-XL) 100 MG 24 hr tablet Take 1 tablet (100 mg) by mouth daily  Qty: 90 tablet, Refills: 3    Comments: For hypertension and pAfib  Associated Diagnoses: Palpitations; Atrial fibrillation, unspecified type (H)      rosuvastatin (CRESTOR) 5 MG tablet Take 1 tablet (5 mg) by mouth At Bedtime  Qty: 90 tablet, Refills: 3    Comments: For hyperlipidemia  Associated Diagnoses: Type 2 MI (myocardial infarction) (H)      vitamin C (ASCORBIC ACID) 500 MG tablet Take 1 tablet (500 mg) by mouth 2 times daily    Comments: Health maintainance  Associated Diagnoses: Health care maintenance         CONTINUE these medications which have NOT CHANGED    Details   Catheters (GENTLECATH URINARY CATHETER) MISC 1 catheter 4 times  daily  Qty: 120 each, Refills: 12    Associated Diagnoses: Urinary retention      fish oil-omega-3 fatty acids 500 MG capsule Take 500 mg by mouth every morning      warfarin ANTICOAGULANT (COUMADIN) 2.5 MG tablet Take one tablet (2.5 mg) by mouth on Sun, Tue and Thurs; take a half tablet (1.25 mg) all other days; or as directed by INR clinic  Qty: 65 tablet, Refills: 0    Associated Diagnoses: Long term current use of anticoagulant therapy         STOP taking these medications       conjugated estrogens (PREMARIN) 0.625 MG/GM vaginal cream Comments:   Reason for Stopping:         estradiol (ESTRING) 2 MG vaginal ring Comments:   Reason for Stopping:         sodium chloride (PETER 128) 5 % ophthalmic ointment Comments:   Reason for Stopping:             Allergies   Allergies   Allergen Reactions     Codeine Nausea and Vomiting     Other reaction(s): GI Intolerance  Other reaction(s): GI Intolerance, Vomiting     Meperidine Nausea and Vomiting     Other reaction(s): GI Intolerance  Other reaction(s): GI Intolerance, Vomiting     Morphine Nausea and Vomiting     Other reaction(s): GI Intolerance  vomiting  Other reaction(s): GI Intolerance, Vomiting     Penicillins Hives     hives     Sulfa Drugs      Other reaction(s): GI Intolerance  Vomiting    Other reaction(s): GI Intolerance     Epinephrine Palpitations     Other reaction(s): Other (See Comments)  Makes heart race  Other reaction(s): Other (See Comments), Tachycardia  Makes heart race       Data   Most Recent 3 CBC's:  Recent Labs   Lab Test 12/27/21  0523 12/26/21  0236 12/25/21  1856   WBC 8.6 14.5* 14.4*   HGB 10.1* 10.5* 9.8*    100 101*   * 103* 112*      Most Recent 3 BMP's:  Recent Labs   Lab Test 12/29/21  0817 12/28/21  2022 12/28/21  1543 12/28/21  0003 12/27/21  0523 12/26/21  1015 12/26/21  0236 12/25/21  1856   NA  --   --   --   --  141  --  139 141   POTASSIUM  --   --   --   --  3.7  --  3.8 3.6   CHLORIDE  --   --   --   --  113*   --  110* 112*   CO2  --   --   --   --  24  --  25 23   BUN  --   --   --   --  18  --  23 21   CR  --   --   --   --  1.03  --  1.07* 0.96   ANIONGAP  --   --   --   --  4  --  4 6   CLARISSE  --   --   --   --  8.4*  --  8.3* 7.3*   * 141* 137*   < > 136*   < > 132* 116*    < > = values in this interval not displayed.     Most Recent 2 LFT's:  Recent Labs   Lab Test 12/26/21  0236 08/30/21  0753   AST 27 49*   ALT 20 28   ALKPHOS 57 71   BILITOTAL 0.8 0.4     Most Recent INR's and Anticoagulation Dosing History:  Anticoagulation Dose History     Recent Dosing and Labs Latest Ref Rng & Units 9/17/2021 9/21/2021 9/27/2021 10/11/2021 11/1/2021 12/25/2021 12/25/2021    INR 0.85 - 1.15 4.1(A) 2.7(H) 3.3(H) 2.1(H) 1.6(H) 2.40(H) 3.02(H)        Most Recent 3 Troponin's:  Recent Labs   Lab Test 08/30/21  1005 08/30/21  0753 08/30/21  0141 08/29/21  1523 06/07/14  0205 06/06/14  1724 06/06/14  1423 06/06/14  1415   TROPI  --   --   --   --  <0.012 <0.012  --  <0.012   TROPONIN 0.098* 0.116* 0.152*   < >  --   --    < >  --     < > = values in this interval not displayed.     Most Recent 6 Bacteria Isolates From Any Culture (See EPIC Reports for Culture Details):  Recent Labs   Lab Test 03/15/21  1129 03/05/21  1108 09/22/20  1114 08/15/20  0954 08/04/20  0930 07/20/20  1408   CULT >100,000 colonies/mL  Escherichia coli  * 50,000 to 100,000 colonies/mL  Escherichia coli  *  50,000 to 100,000 colonies/mL  Strain 2  Escherichia coli  * No growth >100,000 colonies/mL  Escherichia coli  * >100,000 colonies/mL  Escherichia coli  * 10,000 to 50,000 colonies/mL  mixed urogenital georgette       Most Recent TSH, T4 and A1c Labs:  Recent Labs   Lab Test 02/26/18  1504   TSH 1.82     Results for orders placed or performed during the hospital encounter of 12/25/21   XR Cervical Spine 2/3 Views    Impression    RESIDENT PRELIMINARY INTERPRETATION  Impression:   1. Normal alignment of the cervical spine. No fracture is visualized.  2.  Degenerative changes, most significant at C4-5.     *Note: Due to a large number of results and/or encounters for the requested time period, some results have not been displayed. A complete set of results can be found in Results Review.       Time Spent on this Encounter   I, RAY Gramajo CNP, personally saw the patient today and spent greater than 30 minutes discharging this patient.    We appreciate the opportunity to care for your patient while in the hospital.  Should you have any questions about their injuries or this discharge summary our contact information is below.    Trauma Services  [facility]   Department of Critical Care and Acute Care Surgery  [address] [place] [id number]  Office: [phone]

## 2021-12-26 NOTE — PLAN OF CARE
"/59 (BP Location: Left arm)   Pulse 80   Temp 98.5  F (36.9  C) (Oral)   Resp 20   Ht 1.676 m (5' 6\")   Wt 66.4 kg (146 lb 6.2 oz)   SpO2 97%   BMI 23.63 kg/m      SR with 1st degree AV block. All other VSS. Afebrile. Room air. Alert and oriented x 3. Unsure of date. Denies pain, n/v. Regular diet. Neuro's, GCS, and CMS checks q 4 hours and intact/unchanged. Aspen collar in place. Large hematoma/contusion on amena-rectal area. Pt. Reports falling on this area at home. Mo other skin issues. Incontinent of 2 loose BM's. C-diff negative. Guillen in place with adequate UOP. Repositioning q 2 hours. Trop this . MD notified. Continue to monitor.  "

## 2021-12-26 NOTE — CONSULTS
General acute hospital       NEUROSURGERY CONSULTATION NOTE    Time Paged/Notified: 18:55  Trauma Activation: No  Arrival time in ED: 10 minutes  GCS:   Motor 6=Obeys commands   Verbal 5=Oriented   Eye Opening 4=Spontaneous   Total: 15         This consultation was requested by Dr. Abad from the ED service.    Reason for Consultation: C2 lateral mass fracture    HPI: Maia Miranda is a 89 year old female who had an unwitnessed fall in the bathroom.  The fall happened last night, though Maia seemed to be at her baseline.  This morning she appeared to be a little more confused per family so they brought her to the ED.  Imaging showed a lateral mass fracture at C2 on the left side and therefore she was transferred here for further evaluation.  She denies any symptoms at this time.      PAST MEDICAL HISTORY:   Past Medical History:   Diagnosis Date     Amnestic MCI (mild cognitive impairment with memory loss) 2014     Chronic duodenal ulcer without mention of hemorrhage, perforation, or obstruction 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 Warner, [facility] yearly     Esophageal reflux 2001    Abstracted 06/10/02     Essential and other specified forms of tremor 2004    resting tremor     Generalized osteoarthrosis, unspecified site     Hands     Hiatal hernia     diagnosed late 1990s [place], [place]     History of pulmonary embolism 1971    no source found     LACTOSE INTOLERANCE      Lumbago     sees Kodi Guidry     Mitral valve regurgitation      Occlusion and stenosis of carotid artery without mention of cerebral infarction 2003    CAROTID US NORMAL, bruit in neck     Osteoporosis, unspecified 2003    T = -2.66     Paroxysmal atrial fibrillation (H) 11/15/2013     Spider veins      Unspecified essential hypertension      Unspecified tinnitus 2005    mild hearing loss, saw ENT       PAST SURGICAL  HISTORY:   Past Surgical History:   Procedure Laterality Date     CATARACT IOL, RT/LT       corneal scraping       CYSTOSCOPY       CYSTOSCOPY, BIOPSY BLADDER, COMBINED N/A 2016    Procedure: COMBINED CYSTOSCOPY, BIOPSY BLADDER;  Surgeon: Bernadine Maria MD;  Location: [facility]     ESOPHAGOSCOPY, GASTROSCOPY, DUODENOSCOPY (EGD), COMBINED  2013    Procedure: COMBINED ESOPHAGOSCOPY, GASTROSCOPY, DUODENOSCOPY (EGD);   ESOPHAGOSCOPY, GASTROSCOPY, DUODENOSCOPY (EGD)   ;  Surgeon: Shawn Soto MD;  Location: [facility]     IMPLANT STIMULATOR SACRAL NERVE STAGE ONE N/A 2017    Procedure: IMPLANT STIMULATOR SACRAL NERVE STAGE ONE;  Surgeon: Kb Tracy MD;  Location: [facility]     IMPLANT STIMULATOR SACRAL NERVE STAGE TWO N/A 2017    Procedure: IMPLANT STIMULATOR SACRAL NERVE STAGE TWO;  Stage Two Interstim  ;  Surgeon: Kb Tracy MD;  Location: [facility] OR     interstim placement       Z NONSPECIFIC PROCEDURE      D&C x 2 in  &  -- Abstracted 06/10/02     ZZC NONSPECIFIC PROCEDURE      Left breast biopsy x 2 in  &  -- Abstracted 06/10/02     ZZC NONSPECIFIC PROCEDURE      Influenza resulting in hospitalization -- Abstracted 06/10/02     ZZC NONSPECIFIC PROCEDURE  1971    10 day hospitalization from bilateral pulmonary enboli -- Abstracted 06/10/02     ZZC NONSPECIFIC PROCEDURE      colonoscopy  negative       FAMILY HISTORY:   Family History   Problem Relation Age of Onset     Cerebrovascular Disease Father          at 92     Psychotic Disorder Mother         Suicide 62, depression     Alzheimer Disease Maternal Grandmother      Cardiovascular Sister         pacemaker     Glaucoma No family hx of        SOCIAL HISTORY:   Social History     Tobacco Use     Smoking status: Never Smoker     Smokeless tobacco: Never Used   Substance Use Topics     Alcohol use: No       MEDICATIONS:  (Not in a hospital admission)      Allergies:  Allergies   Allergen Reactions      "Codeine Nausea and Vomiting     Other reaction(s): GI Intolerance  Other reaction(s): GI Intolerance, Vomiting     Meperidine Nausea and Vomiting     Other reaction(s): GI Intolerance  Other reaction(s): GI Intolerance, Vomiting     Morphine Nausea and Vomiting     Other reaction(s): GI Intolerance  vomiting  Other reaction(s): GI Intolerance, Vomiting     Penicillins Hives     hives     Sulfa Drugs      Other reaction(s): GI Intolerance  Vomiting    Other reaction(s): GI Intolerance     Epinephrine Palpitations     Other reaction(s): Other (See Comments)  Makes heart race  Other reaction(s): Other (See Comments), Tachycardia  Makes heart race         ROS: 10 point ROS were all negative except for pertinent positives noted in my HPI.    Physical exam:   Blood pressure 119/51, pulse 64, temperature 98  F (36.7  C), temperature source Oral, resp. rate 15, height 1.676 m (5' 6\"), weight 65.8 kg (145 lb), SpO2 94 %, not currently breastfeeding.  CV: HR and BP as noted above  PULM: breathing comfortably  ABD: soft, non-distended  NEUROLOGIC:  -- Awake; Alert; oriented x 2, unable to state year though knows today is sveta, and knows she is in the hospital  -- Follows commands briskly  -- Speech fluent, spontaneous. No aphasia or dysarthria.  -- no gaze preference. No apparent hemineglect.  Cranial Nerves:  -- PERRL 3-2mm bilat and brisk, extraocular movements intact  -- face symmetrical, tongue midline  -- hearing grossly intact bilat  -- Tremor present, baseline    Motor:  Normal bulk / tone; no tremor, rigidity, or bradykinesia.  No muscle wasting or fasciculations     Delt Bi Tri Hand Flexion/  Extension Iliopsoas Quadriceps Hamstrings Tibialis Anterior Gastroc    C5 C6 C7 C8/T1 L2 L3 L4-S1 L4 S1   R 5 5 5 5 5 5 5 5 5   L 5 5 5 5 5 5 5 5 5   Sensory:  intact to LT x 4 extremities     Reflexes:  2+ throughout    Gait: Deferred      LABS:  Recent Labs   Lab 12/25/21  1104 12/25/21  1101     --    POTASSIUM 4.3  " --    CHLORIDE 105  --    CO2 26  --    ANIONGAP 6  --    * 142*   BUN 26  --    CR 1.20*  --    CLARISSE 9.3  --        Recent Labs   Lab 12/25/21  1104   WBC 17.0*   RBC 3.76*   HGB 12.1   HCT 38.5   *   MCH 32.2   MCHC 31.4*   RDW 13.1   *       IMAGING:  Recent Results (from the past 24 hour(s))   CT Head w/o Contrast    Narrative    EXAM: CT HEAD WITHOUT CONTRAST  LOCATION: [facility]  DATE/TIME: 12/25/2021, 11:07 AM    INDICATION: Dizziness, nonspecific.  COMPARISON: Head CT 12/22/2019.  TECHNIQUE: Routine CT Head without IV contrast. Multiplanar reformats. Dose reduction techniques were used.    FINDINGS:  INTRACRANIAL CONTENTS: No intracranial hemorrhage, extra-axial collection, or mass effect.  No CT evidence of acute infarct. Mild presumed chronic small vessel ischemic changes. Moderate generalized volume loss. No hydrocephalus.     VISUALIZED ORBITS/SINUSES/MASTOIDS: No intraorbital abnormality. No paranasal sinus mucosal disease. No middle ear or mastoid effusion.    BONES/SOFT TISSUES: No acute abnormality.      Impression    IMPRESSION:  1.  No CT evidence for acute intracranial process.  2.  Brain atrophy and presumed chronic microvascular ischemic changes as above.     CT Cervical Spine w/o Contrast    Narrative    EXAM: CT CERVICAL SPINE W/O CONTRAST  LOCATION: [facility]  DATE/TIME: 12/25/2021 11:14 AM    INDICATION: Neck trauma (age >= 65y).  COMPARISON: None.  TECHNIQUE: Routine CT Cervical Spine without IV contrast. Multiplanar reformats. Dose reduction techniques were used.    FINDINGS:  VERTEBRAE: There is a questionable nondisplaced fracture involving the left lateral mass of C2, best seen on the coronal images. Cervical spine MRI would be helpful to evaluate for bone marrow edema that would confirm this as a recent fracture. No other   recent fracture is identified. There is normal alignment of the cervical vertebrae. Vertebral  body heights of the cervical spine are normal. Craniocervical alignment is normal. Loss of disc space height and degenerative endplate spurring at C4-C5 and   C5-C6. Mild facet arthropathy throughout cervical spine.     CANAL/FORAMINA: Mild diffuse degenerative spinal canal narrowing from C3-C4 down to C5-C6. No significant degenerative neural foraminal stenosis of the cervical spine.    PARASPINAL: No extraspinal abnormality.      Impression    IMPRESSION:  1.  Questionable nondisplaced fracture involving the left lateral mass of C2. Cervical spine MRI would be helpful to evaluate for bone marrow edema that would confirm this as a recent fracture.  2.  No other evidence for fracture of the cervical spine.  3.  Degenerative changes of the cervical spine as detailed above.   CTA Head Neck with Contrast    Narrative    EXAM: CTA  HEAD NECK WITH CONTRAST  LOCATION: [facility]  DATE/TIME: 12/25/2021, 11:13 AM    INDICATION: Altered mental status. Dizziness.  COMPARISON: None.  CONTRAST: 70 mL [id number].  TECHNIQUE: Head and neck CT angiogram without and with IV contrast. Axial helical CT images of the head and neck vessels obtained during the arterial phase of intravenous contrast administration. Axial 2D reconstructed images and multiplanar 3D MIP   reconstructed images of the head and neck vessels were performed by the technologist. Dose reduction techniques were used. All stenosis measurements made according to NASCET criteria unless otherwise specified.    FINDINGS:   HEAD CTA:  ANTERIOR CIRCULATION: No stenosis/occlusion, aneurysm, or high-flow vascular malformation. Standard [place] anatomy.    POSTERIOR CIRCULATION: No stenosis/occlusion, aneurysm, or high-flow vascular malformation. Dominant right and smaller left vertebral artery contribute to a normal basilar artery.     DURAL VENOUS SINUSES: Expected enhancement of the major dural venous sinuses.    NECK CTA:  RIGHT CAROTID:  No measurable stenosis or dissection.    LEFT CAROTID: No measurable stenosis or dissection.    VERTEBRAL ARTERIES: No focal stenosis or dissection. Dominant right and smaller left vertebral arteries.    AORTIC ARCH: Vascular variant aortic arch origin left vertebral artery.  No significant stenosis at the origin of the great vessels.    NONVASCULAR STRUCTURES: Unremarkable.      Impression    IMPRESSION:   HEAD CTA:   1.  Normal CTA [place].    NECK CTA:  1.  Normal neck CTA.     XR Pelvis 1/2 Views    Narrative    EXAM: XR PELVIS 1/2 VW  LOCATION: [facility]  DATE/TIME: 12/25/2021 3:30 PM    INDICATION: Pain, recent fall.  COMPARISON: None.      Impression    IMPRESSION: No fracture. Mild degenerative changes in the right hip. Right-sided presacral lead in place.   XR Chest 1 View    Narrative    EXAM: XR CHEST 1 VIEW  LOCATION: [facility]  DATE/TIME: 12/25/2021 3:30 PM    INDICATION: Fall.  COMPARISON: 8/29/2021.      Impression    IMPRESSION: Hyperinflation both lungs. The lungs are otherwise clear. No pneumothorax. Stable cardiac enlargement. Pulmonary vascularity is within normal limits.       ASSESSMENT:  89 year old female with left C2 lateral mass fracture.    RECOMMENDATIONS:  No neurosurgical intervention indicated at this time   Continue to wear c-collar  Upright x-rays in c-collar  Okay for diet from neurosurgery perspective  No activity restrictions in cervical collar    The patient was discussed with Dr. Mejia, neurosurgery chief resident, and they agree with the above.    Destiny Cadet MD  Neurosurgery Resident, PGY-3    Please contact neurosurgery resident on call with questions.    Dial * * [phone], enter [phone] when prompted.

## 2021-12-26 NOTE — PHARMACY-ANTICOAGULATION SERVICE
Clinical Pharmacy - Warfarin Dosing Consult     Pharmacy has been consulted to manage this patient s warfarin therapy.  Indication: Atrial Fibrillation  Therapy Goal: INR 2-3  Warfarin Prior to Admission: Yes  Warfarin PTA Regimen: 2.5 mg Su,Tu,Th; 1.25 mg ROW  Significant drug interactions: none    INR   Date Value Ref Range Status   12/25/2021 3.02 (H) 0.85 - 1.15 Final   12/25/2021 2.40 (H) 0.85 - 1.15 Final       Recommend warfarin 0.5 mg today as INR yesterday was 3.02.  Pharmacy will monitor Maia E Ruben daily and order warfarin doses to achieve specified goal.      Please contact pharmacy as soon as possible if the warfarin needs to be held for a procedure or if the warfarin goals change.

## 2021-12-26 NOTE — PROVIDER NOTIFICATION
Pt. Arrived from Ed at this time. Confused to time otherwise Alert and oriented x 3. Room air. Valuables placed in closet. 2 RN skin assessment done with Cyndee RN. Large amena-rectal hematoma/contusion noted. Pt. Reports falling on her bottom at home. Continue to monitor.

## 2021-12-26 NOTE — PROGRESS NOTES
Brief Neurosurgery Progress Note:    Maia Miranda is a 89 year old female with left C2 lateral mass fracture. X ray C spine:    1. Normal alignment of the cervical spine. No fracture is visualized.  2. Degenerative changes, most significant at C4-5.    Discussed with Trauma team: appears that sacral nerve stimulator is not MRI compatible (surgery in 2017)- model info is not available from the charts and unfortunately, son was not available to answer call.     Examination remains stable:     Recommendations:  - No neurosurgery intervention indicated at this time  - Follow in clinic in 6 weeks with repeat C spine x rays  - Neurosurgery will follow peripherally    Claudy Camacho  Neurosurgery Resident

## 2021-12-26 NOTE — PROVIDER NOTIFICATION
Trauma cross cover notified of trop of 193. Pt, denies CP and all VSS. No further orders at this time. Continue to monitor.

## 2021-12-26 NOTE — CONSULTS
"Cardiology Consult  Date of Service: 12/26/21    ASSESSMENT:   Maia Miranda is a 89 year old female with PMH of HFpEF, paroxysmal AF on warfarin, HTN, h/o PE in 1971, mild cognitive impairment, an dh/o COVID PNA who presents after a fall in the bathroom and was found to have a C2 fracture. Patient's trops checked during this admission were elevated, cards consulted for additional evaluation.    #Acute myocardial injury without myocardial infarction   Trops flat. ECG w/o ischemic changes. No sxs suggestive of ischemia. No prior coronary events. Mild coronary calcifications noted on prior noncontrast CT chest.  #Fall - no evidence of syncope, patient is a poor historian in the setting of her known cognitive impairment. Will recommend biotel monitor to evaluate for advanced conduction disease.  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 If found to have any concerning findings, follow up with cardiology.    Cardiology will sign off at this time.  Discussed with Dr. Kalra Mohsan Chaudhry, MD  Division of Cardiology, PGY-4    REASON FOR CONSULT: h/o HFpEF    History of Present Illness   Maia Miranda is a 89 year old female with PMH of HFpEF, paroxysmal AF on warfarin, HTN, h/o PE in 1971, mild cognitive impairment, an dh/o COVID PNA who presents after a fall in the bathroom and was found to have a C2 fracture.    Pt denies any prodromal symptoms, states she recalls hitting the ground. She herself wasn't sure if she was in the bathroom or the kitchen. Otherwise she has a poor recollection of the events around the time of her fall. States during the day time she was feeling in her usual state of health. No prior h/o " syncope per the patient. No chest pain, sob, palpitations, dizziness. Denies h/o orthostatic lightheadedness/dizziness.    No prior h/o smoking, etoh abuse or illicit drug use.  Lives at home with a daughter, unfortunately the daughter wasn't at home when she fell. The fall was unwitnessed.    PAST MEDICAL HISTORY:  Past Medical History:   Diagnosis Date    Amnestic MCI (mild cognitive impairment with memory loss) 2014    Chronic duodenal ulcer without mention of hemorrhage, perforation, or obstruction 1974    Ulcer, Duod    Depressive disorder, not elsewhere classified 2003    EROSIVE EYE DISORDER 1994    Dr. Warner, [facility] yearly    Esophageal reflux 2001    Abstracted 06/10/02    Essential and other specified forms of tremor 2004    resting tremor    Generalized osteoarthrosis, unspecified site     Hands    Hiatal hernia     diagnosed late 1990s [place], [place]    History of pulmonary embolism 1971    no source found    LACTOSE INTOLERANCE     Lumbago     sees Kodi Guidry    Mitral valve regurgitation     Occlusion and stenosis of carotid artery without mention of cerebral infarction 2003    CAROTID US NORMAL, bruit in neck    Osteoporosis, unspecified 2003    T = -2.66    Paroxysmal atrial fibrillation (H) 11/15/2013    Spider veins     Unspecified essential hypertension     Unspecified tinnitus 2005    mild hearing loss, saw ENT       CURRENT MEDICATIONS:  No current outpatient medications on file.       PAST SURGICAL HISTORY:  Past Surgical History:   Procedure Laterality Date    CATARACT IOL, RT/LT      corneal scraping      CYSTOSCOPY      CYSTOSCOPY, BIOPSY BLADDER, COMBINED N/A 7/25/2016    Procedure: COMBINED CYSTOSCOPY, BIOPSY BLADDER;  Surgeon: Bernadine Maria MD;  Location: [facility] OR    ESOPHAGOSCOPY, GASTROSCOPY, DUODENOSCOPY (EGD), COMBINED  7/8/2013    Procedure: COMBINED ESOPHAGOSCOPY, GASTROSCOPY, DUODENOSCOPY (EGD);   ESOPHAGOSCOPY, GASTROSCOPY, DUODENOSCOPY (EGD)   ;  Surgeon: Brittany  Shawn FREEDMAN MD;  Location: [facility]    IMPLANT STIMULATOR SACRAL NERVE STAGE ONE N/A 2017    Procedure: IMPLANT STIMULATOR SACRAL NERVE STAGE ONE;  Surgeon: Kb Tracy MD;  Location: [facility] OR    IMPLANT STIMULATOR SACRAL NERVE STAGE TWO N/A 2017    Procedure: IMPLANT STIMULATOR SACRAL NERVE STAGE TWO;  Stage Two Interstim  ;  Surgeon: Kb Tracy MD;  Location: [facility] OR    interstim placement      [facility] NONSPECIFIC PROCEDURE      D&C x 2 in  &  -- Abstracted 06/10/02    [facility] NONSPECIFIC PROCEDURE      Left breast biopsy x 2 in  &  -- Abstracted 06/10/02    [facility] NONSPECIFIC PROCEDURE      Influenza resulting in hospitalization -- Abstracted 06/10/02    [facility] NONSPECIFIC PROCEDURE  1971    10 day hospitalization from bilateral pulmonary enboli -- Abstracted 06/10/02    [facility] NONSPECIFIC PROCEDURE      colonoscopy  negative       ALLERGIES     Allergies   Allergen Reactions    Codeine Nausea and Vomiting     Other reaction(s): GI Intolerance  Other reaction(s): GI Intolerance, Vomiting    Meperidine Nausea and Vomiting     Other reaction(s): GI Intolerance  Other reaction(s): GI Intolerance, Vomiting    Morphine Nausea and Vomiting     Other reaction(s): GI Intolerance  vomiting  Other reaction(s): GI Intolerance, Vomiting    Penicillins Hives     hives    Sulfa Drugs      Other reaction(s): GI Intolerance  Vomiting    Other reaction(s): GI Intolerance    Epinephrine Palpitations     Other reaction(s): Other (See Comments)  Makes heart race  Other reaction(s): Other (See Comments), Tachycardia  Makes heart race         FAMILY HISTORY:  Family History   Problem Relation Age of Onset    Cerebrovascular Disease Father          at 92    Psychotic Disorder Mother         Suicide 62, depression    Alzheimer Disease Maternal Grandmother     Cardiovascular Sister         pacemaker    Glaucoma No family hx of        SOCIAL HISTORY:  Social History     Socioeconomic History    Marital status:       Spouse name: None    Number of children: 4    Years of education: None    Highest education level: None   Occupational History     Employer: RETIRED     Comment:  at [employer]   Tobacco Use    Smoking status: Never Smoker    Smokeless tobacco: Never Used   Vaping Use    Vaping Use: Never used   Substance and Sexual Activity    Alcohol use: No    Drug use: No    Sexual activity: Never     Partners: Male   Other Topics Concern    Parent/sibling w/ CABG, MI or angioplasty before 65F 55M? No   Social History Narrative    .Living situation (location, living with others?): Lives alone in a condo. Lives 2 miles from daughter and son.     Activities of daily living (e.g., dressing, eating, walking): Independent        Instrumental activities of daily living (e.g., finance management, housekeeping, meal planning/ prep): Independent        Advance Care plan: has a living will and POLST        Driving: Yes    Medication: manages by self    Meaningful Activities (e.g., hobbies, work): Reads, has coffee out with girlfriends every week, has a [employer] feeder, likes to watch baseball        Support: Help with cleaning every few weeks    Community Resources Used/ Interested in: None at this time     Social Determinants of Health     Financial Resource Strain: Low Risk     Difficulty of Paying Living Expenses: Not hard at all   Food Insecurity: No Food Insecurity    Worried About Running Out of Food in the Last Year: Never true    Ran Out of Food in the Last Year: Never true   Transportation Needs: No Transportation Needs    Lack of Transportation (Medical): No    Lack of Transportation (Non-Medical): No   Physical Activity: Not on file   Stress: Not on file   Social Connections: Not on file   Intimate Partner Violence: Not on file   Housing Stability: Not on file     Review of Systems   The 10 point Review of Systems is negative other than noted in the HPI or here.     Physical Exam   Temp: 97.6  F (36.4  C)  Temp src: Oral BP: 116/52 Pulse: 66   Resp: 24 SpO2: 97 % O2 Device: None (Room air)    Vital Signs with Ranges  Temp:  [97.6  F (36.4  C)-98.7  F (37.1  C)] 97.6  F (36.4  C)  Pulse:  [62-83] 66  Resp:  [11-25] 24  BP: (114-146)/(49-67) 116/52  SpO2:  [93 %-99 %] 97 %  146 lbs 6.17 oz    GEN: NAD, pleasant  ENT: Wearing aspen neck collar  Eyes: no icterus  CV: RRR, normal s1/s2, no murmurs/rubs/s3/s4, no heave.   CHEST: CTAB  ABD: soft, NT/ND, NABS  : no flank/suprapubic tenderness  Ext: warm and well perfused. Trace edema around the ankles.  NEURO: AA&Ox3, fluent/appropriate, motor grossly nonfocal  PSYCH: cooperative, affect appropriate    Data   Data reviewed today:  I personally reviewed the EKG tracing showing NSR with 1st degree heart block, RBBB and LAFB. no ischemic changes. .    Recent Labs   Lab 12/26/21  1015 12/26/21  0236 12/25/21  1856 12/25/21  1855 12/25/21  1104   WBC  --  14.5* 14.4*  --  17.0*   HGB  --  10.5* 9.8*  --  12.1   MCV  --  100 101*  --  102*   PLT  --  103* 112*  --  135*   INR  --   --   --  3.02* 2.40*   NA  --  139 141  --  137   POTASSIUM  --  3.8 3.6  --  4.3   CHLORIDE  --  110* 112*  --  105   CO2  --  25 23  --  26   BUN  --  23 21  --  26   CR  --  1.07* 0.96  --  1.20*   ANIONGAP  --  4 6  --  6   CLARISSE  --  8.3* 7.3*  --  9.3   * 132* 116*  --  150*   ALBUMIN  --  2.6*  --   --   --    PROTTOTAL  --  5.8*  --   --   --    BILITOTAL  --  0.8  --   --   --    ALKPHOS  --  57  --   --   --    ALT  --  20  --   --   --    AST  --  27  --   --   --        Recent Results (from the past 24 hour(s))   CT Head w/o Contrast    Narrative    EXAM: CT HEAD WITHOUT CONTRAST  LOCATION: [facility]  DATE/TIME: 12/25/2021, 11:07 AM    INDICATION: Dizziness, nonspecific.  COMPARISON: Head CT 12/22/2019.  TECHNIQUE: Routine CT Head without IV contrast. Multiplanar reformats. Dose reduction techniques were used.    FINDINGS:  INTRACRANIAL CONTENTS: No intracranial  hemorrhage, extra-axial collection, or mass effect.  No CT evidence of acute infarct. Mild presumed chronic small vessel ischemic changes. Moderate generalized volume loss. No hydrocephalus.     VISUALIZED ORBITS/SINUSES/MASTOIDS: No intraorbital abnormality. No paranasal sinus mucosal disease. No middle ear or mastoid effusion.    BONES/SOFT TISSUES: No acute abnormality.      Impression    IMPRESSION:  1.  No CT evidence for acute intracranial process.  2.  Brain atrophy and presumed chronic microvascular ischemic changes as above.     CT Cervical Spine w/o Contrast    Narrative    EXAM: CT CERVICAL SPINE W/O CONTRAST  LOCATION: [facility]  DATE/TIME: 12/25/2021 11:14 AM    INDICATION: Neck trauma (age >= 65y).  COMPARISON: None.  TECHNIQUE: Routine CT Cervical Spine without IV contrast. Multiplanar reformats. Dose reduction techniques were used.    FINDINGS:  VERTEBRAE: There is a questionable nondisplaced fracture involving the left lateral mass of C2, best seen on the coronal images. Cervical spine MRI would be helpful to evaluate for bone marrow edema that would confirm this as a recent fracture. No other   recent fracture is identified. There is normal alignment of the cervical vertebrae. Vertebral body heights of the cervical spine are normal. Craniocervical alignment is normal. Loss of disc space height and degenerative endplate spurring at C4-C5 and   C5-C6. Mild facet arthropathy throughout cervical spine.     CANAL/FORAMINA: Mild diffuse degenerative spinal canal narrowing from C3-C4 down to C5-C6. No significant degenerative neural foraminal stenosis of the cervical spine.    PARASPINAL: No extraspinal abnormality.      Impression    IMPRESSION:  1.  Questionable nondisplaced fracture involving the left lateral mass of C2. Cervical spine MRI would be helpful to evaluate for bone marrow edema that would confirm this as a recent fracture.  2.  No other evidence for fracture of the  cervical spine.  3.  Degenerative changes of the cervical spine as detailed above.   CTA Head Neck with Contrast    Narrative    EXAM: CTA  HEAD NECK WITH CONTRAST  LOCATION: [facility]  DATE/TIME: 12/25/2021, 11:13 AM    INDICATION: Altered mental status. Dizziness.  COMPARISON: None.  CONTRAST: 70 mL Isovue-[id number].  TECHNIQUE: Head and neck CT angiogram without and with IV contrast. Axial helical CT images of the head and neck vessels obtained during the arterial phase of intravenous contrast administration. Axial 2D reconstructed images and multiplanar 3D MIP   reconstructed images of the head and neck vessels were performed by the technologist. Dose reduction techniques were used. All stenosis measurements made according to NASCET criteria unless otherwise specified.    FINDINGS:   HEAD CTA:  ANTERIOR CIRCULATION: No stenosis/occlusion, aneurysm, or high-flow vascular malformation. Standard [place] anatomy.    POSTERIOR CIRCULATION: No stenosis/occlusion, aneurysm, or high-flow vascular malformation. Dominant right and smaller left vertebral artery contribute to a normal basilar artery.     DURAL VENOUS SINUSES: Expected enhancement of the major dural venous sinuses.    NECK CTA:  RIGHT CAROTID: No measurable stenosis or dissection.    LEFT CAROTID: No measurable stenosis or dissection.    VERTEBRAL ARTERIES: No focal stenosis or dissection. Dominant right and smaller left vertebral arteries.    AORTIC ARCH: Vascular variant aortic arch origin left vertebral artery.  No significant stenosis at the origin of the great vessels.    NONVASCULAR STRUCTURES: Unremarkable.      Impression    IMPRESSION:   HEAD CTA:   1.  Normal CTA [place].    NECK CTA:  1.  Normal neck CTA.     XR Pelvis 1/2 Views    Narrative    EXAM: XR PELVIS 1/2 VW  LOCATION: [facility]  DATE/TIME: 12/25/2021 3:30 PM    INDICATION: Pain, recent fall.  COMPARISON: None.      Impression     IMPRESSION: No fracture. Mild degenerative changes in the right hip. Right-sided presacral lead in place.   XR Chest 1 View    Narrative    EXAM: XR CHEST 1 VIEW  LOCATION: [facility]  DATE/TIME: 12/25/2021 3:30 PM    INDICATION: Fall.  COMPARISON: 8/29/2021.      Impression    IMPRESSION: Hyperinflation both lungs. The lungs are otherwise clear. No pneumothorax. Stable cardiac enlargement. Pulmonary vascularity is within normal limits.

## 2021-12-26 NOTE — PROGRESS NOTES
12/26/21 1300   Quick Adds   Type of Visit Initial Occupational Therapy Evaluation   Living Environment   People in home child(mackenzie), adult   Current Living Arrangements condominium   Home Accessibility no concerns   Transportation Anticipated family or friend will provide   Living Environment Comments Pt lives in a condo with her daughter, no access concerns reported and all needs can be met on the main level of the home.    Self-Care   Usual Activity Tolerance good   Current Activity Tolerance fair   Regular Exercise No   Equipment Currently Used at Home walker, rolling   Activity/Exercise/Self-Care Comment 4ww used for ambulation, no other AE reported.    Instrumental Activities of Daily Living (IADL)   IADL Comments Pts daughter assists with the majority of IADLs at home.    Disability/Function   Hearing Difficulty or Deaf yes   Wear Glasses or Blind yes   Vision Management glasses   Concentrating, Remembering or Making Decisions Difficulty no   Difficulty Communicating no   Difficulty Eating/Swallowing no   Walking or Climbing Stairs Difficulty yes   Walking or Climbing Stairs ambulation difficulty, requires equipment   Mobility Management 4ww   Dressing/Bathing Difficulty no   Toileting issues no   Doing Errands Independently Difficulty (such as shopping) yes   Errands Management daughter assists    Fall history within last six months yes   Number of times patient has fallen within last six months 1   Change in Functional Status Since Onset of Current Illness/Injury yes   General Information   Onset of Illness/Injury or Date of Surgery 12/25/21   Referring Physician Wilmar Ledesma MD   Patient/Family Therapy Goal Statement (OT) return home    Additional Occupational Profile Info/Pertinent History of Current Problem Per chart: Maia Miranda is a 89 year old female anticoagulated on warfarin for Afib. She had an unwitnessed fall and was found by daughter on floor a few hours after 3PM on 12/24.  Patient says she remembers the fall and did pass out on impact. She does endorse neck pain. She denies headache/SOB/CP. She has some baseline dementia.   Existing Precautions/Restrictions fall;brace worn when out of bed;sternal  (cervical)   Limitations/Impairments safety/cognitive   Left Upper Extremity (Weight-bearing Status) full weight-bearing (FWB)   Right Upper Extremity (Weight-bearing Status) full weight-bearing (FWB)   Left Lower Extremity (Weight-bearing Status) full weight-bearing (FWB)   Right Lower Extremity (Weight-bearing Status) full weight-bearing (FWB)   General Observations and Info Activity: up with assist   Cognitive Status Examination   Orientation Status person;place   Affect/Mental Status (Cognitive) low arousal/lethargic   Follows Commands WNL   Safety Deficit minimal deficit   Memory Deficit moderate deficit   Attention Deficit moderate deficit   Cognitive Status Comments Increased processing time needed for all quesitons and commands, oriented to person and place, disoriented to time. Followed all commands, will continue to monitor cognition closely, per daughter cognitive deficits present at baseline.    Visual Perception   Visual Impairment/Limitations corrective lenses full-time   Sensory   Sensory Quick Adds No deficits were identified   Pain Assessment   Patient Currently in Pain No   Integumentary/Edema   Integumentary/Edema no deficits were identifed   Posture   Posture forward head position;protracted shoulders   Range of Motion Comprehensive   Comment, General Range of Motion BUE ROM WFL    Strength Comprehensive (MMT)   Comment, General Manual Muscle Testing (MMT) Assessment BUE strength WFL, 3/5    Coordination   Upper Extremity Coordination No deficits were identified   Gross Motor Coordination No deficits identified   Fine Motor Coordination Not tested    Bed Mobility   Comment (Bed Mobility) modA    Transfers   Transfer Comments Liya    Balance   Balance Comments Unsteady on  feet and high falls risk.    Bathing Assessment   Comment (Bathing) Ax1    Lower Body Dressing Assessment   Comment (Lower Body Dressing) Ax1    Grooming Assessment   Comment (Grooming) Ax1   Toileting   Comment (Toileting) Ax1    Clinical Impression   Criteria for Skilled Therapeutic Interventions Met (OT) yes;meets criteria;skilled treatment is necessary   OT Diagnosis Decreased ADL-I    OT Problem List-Impairments impacting ADL problems related to;activity tolerance impaired;balance;cognition;mobility;strength;post-surgical precautions   Assessment of Occupational Performance 5 or more Performance Deficits   Identified Performance Deficits ambulation, transferring, bathing, toileting, dressing   Planned Therapy Interventions (OT) ADL retraining;strengthening;progressive activity/exercise;transfer training;cognition   Clinical Decision Making Complexity (OT) moderate complexity   Therapy Frequency (OT) 6x/week   Predicted Duration of Therapy 1 week    Risk & Benefits of therapy have been explained evaluation/treatment results reviewed;care plan/treatment goals reviewed;risks/benefits reviewed;current/potential barriers reviewed;participants voiced agreement with care plan;participants included;patient;daughter   OT Discharge Planning    OT Discharge Recommendation (DC Rec) Transitional Care Facility;Home with assist;home with home care occupational therapy   OT Rationale for DC Rec Pt is well below baseline level of function and would benefit from further therapy to progress functional independence with basic mobility and ADLs prior to return home. If returning home pt would need 24/7 assist and Ax1 for all mobility and ADLs.    OT Brief overview of current status  Liya with [facility] for transfers    Total Evaluation Time (Minutes)   Total Evaluation Time (Minutes) 5

## 2021-12-26 NOTE — PROGRESS NOTES
[facility]   Tertiary Survey Progress Note     Date of Service: 12/26/2021    Trauma Mechanism: unwitnessed fall  Date of Injury: 12/25/21  Known Injuries:  1. Left C2 lateral mass fracture     Assessment & Plan   Neuro/Pain/Psych:  # Fall  # Cognitive Impairment   # Tremor   - Head CT: No CT evidence for acute intracranial process.  - Head CTA: HEAD CTA: Normal CTA [place].    # Acute pain   - prn: Tylenol     # Depressive Disorder   - Maintain circadian rhythm.  Lights on during the day.  Off at night, minimize cares at night.  OOB during the day.    Pulmonary:  # Hx of PE  - Supplemental oxygen to keep saturation above 92 %.  - Incentive spirometer while awake     Cardiovascular:    # Hypertension   # Paroxysmal Afib, on Warfarin  # Mitral Valve Regurgitation   # Type II MI, admitted 8/29/21  # Trop elevations   - Monitor hemodynamic status.   - continue PTA: lisinopril, metoprolol, rosuvastatin  - Trop: [id number]  - Cardiology consulted:    GI/Nutrition:    # GERD  # Hiatal hernia  # Hx duodenal ulcer  # Hx of ischemic colitis   - Regular diet     Renal/ Fluids/Electrolytes:  # CKD III  - Creatinine: 1.07  - Dex5 & 0.45% NaCl + 20KCL for IV fluid hydration.   - electrolyte replacement protocol in place.     Endocrine:  # Stress hyperglycemia   - No management indication. Goal to keep BG < 180 for optimal wound healing     Infectious disease:   # UTI prophylaxis   - Pt has recurrent UTIs from straight catheterization  - continue Keflex 250mg daily    # Diarrhea    - Pt had prior to admission.  - 12/25: C diff neg  - 12/26: Ordered Enteric Bacteria and Virus panel, concern for other infectious sources since patient does not take any stool softeners or constipation medications.   - If test return negative then plan on adding Imodium     Hematology:    # Acute blood loss anemia   - Hgb 10.5. Monitor and trend.   - Threshold for transfusion if hgb <7.0  or signs/symptoms of hypoperfusion.       Musculoskeletal:  # s/p Sacral nerve stimulator April 2017, Kb Pta;   OR  # OA  # Weakness and deconditioning of chronic illness   - Physical and occupational therapy consults.    # Left C2 lateral mass fracture  - C-spine CT: Questionable nondisplaced fracture involving the left lateral mass of C2. Cervical spine MRI would be helpful to evaluate for bone marrow edema that would confirm this as a recent fracture  - Neck CTA:  Normal neck CTA.  - Neurosurgery following: No neurosurgical intervention indicated at this time. Continue to wear c-collar. Upright x-rays in c-collar (ordered), Okay for diet from neurosurgery perspective. No activity restrictions in cervical collar    Skin:  - dilgent cares to prevent skin breakdown and wound formation.      Lines/ tubes/ drains:  - PIV     General Cares:    PPI/H2 blocker:  NA   DVT prophylaxis: Continue on PTA Warfarin   Bowel Regimen/Date of last stool: today, pt has continue diarrhea    Pulmonary toilet: IS    Code status:  Full     Discharge goals:     Adequate pain management: yes    VSS x24 hours: yes    Hemoglobin stable x 48 hours: yes    Ambulating safely and/or therapy evals complete: in process    Drains/lines removed or plan in place to manage: NA    Teaching done:     Other:  Expected D/C date: 2-3 days    Thania Hernandez PA-C  To contact the trauma service use job code pager [phone],   Numeric texts or alpha text through [facility]      Interval History   Review of Systems   Skin: positive for bruising, lumps or bumps  Eyes: positive for glasses  Ears/Nose/Throat: positive for hearing loss  Respiratory: No shortness of breath, dyspnea on exertion, cough, or hemoptysis  Cardiovascular: negative  Gastrointestinal: positive for loose stools   Genitourinary: positive for retention  Musculoskeletal: positive for fracture  Neurologic: positive for memory problems and tremor  Psychiatric:  negative  Hematologic/Lymphatic/Immunologic: positive for anemia  Endocrine: negative     Physical Exam   [place] Coma Scale - Total 15/15  Eye Response (E): 4   4= spontaneous, 3= to verbal/voice, 2= to pain, 1= No response   Verbal Response (V): 5   5= Orientated, converses, 4= Confused, converses, 3= Inappropriate words, 2= Incomprehensible sounds, 1=No response   Motor Response (M): 6   6= Obeys commands, 5= Localizes to pain, 4= Withdrawal to pain, 3=Fexion to pain, 2= Extension to pain, 1= No response     Frailty Questionnaire: To be done for all patients age 60+  F (Fatigue): Is the patient easily fatigued? YES = 1  R (Resistance): Is the patient unable to walk one flight of stairs? YES = 1  A (Ambulation): Is the patient unable to walk one block? YES = 1  I  (Illness): Does the patient have more than five illnesses? NO = 0  L (Loss of weight): Has the patient lost more than 5% of weight in the past 6 months. NO = 0  Lost five pounds or more in the last 3 months without trying? AND/OR Unintended weight loss?  Does the patient have difficulty performing housework such as washing windows or scrubbing floors? AND Activity in a typical 24-hour day- No moderate or vigorous activity    Score: 3    Score:3-5: PLAN: Palliative Care Consult, PT/OT Consult, consider PM&R, Delirium Prevention Strategies                           Physical Exam  Constitutional: Awake, alert, cooperative, no apparent distress.  Eyes: Lids and lashes normal, PERRLS, EOMI, sclera clear, conjunctiva normal.  HENT: Normocephalic, atraumatic  Respiratory: No increased work of breathing, good air exchange, clear to auscultation bilaterally, no crackles or wheezing.  Cardiovascular:  regular rate and rhythm,    GI: Abdomen soft, non-distended, non-tender, no guarding  Genitourinary:  Guillen in place  Skin:  Warm and dry  Musculoskeletal: There is no redness, warmth, or swelling of the joints.  Pedal pulse palpated.  Neurologic: Awake, alert,  oriented. Strength and sensory is intact. No focal deficits.  Neuropsychiatric: Calm, normal eye contact, alert, affect appropriate to situation, oriented, thought process normal.    Temp: 98.5  F (36.9  C) Temp src: Oral BP: 117/59 Pulse: 80   Resp: 20 SpO2: 97 % O2 Device: None (Room air)    Vitals:    12/25/21 1753 12/25/21 2325   Weight: 65.8 kg (145 lb) 66.4 kg (146 lb 6.2 oz)     Vital Signs with Ranges  Temp:  [97.6  F (36.4  C)-98.7  F (37.1  C)] 98.5  F (36.9  C)  Pulse:  [62-83] 80  Resp:  [11-25] 20  BP: (114-146)/(49-67) 117/59  SpO2:  [93 %-99 %] 97 %  No intake/output data recorded.

## 2021-12-26 NOTE — H&P
[facility]    History and Physical: Trauma Service     Date of Admission:  12/25/2021    Time of Admission/Consult Request (page/call): 6:20 PM    Time of my evaluation: 7:00 PM  Consulting services:  Neurosurgery - Emergent consult (within 30 mins): Called by ED  Neurology  Cardiology    Assessment & Plan   Trauma mechanism:unwitnessed fall  Time/date of injury: 3PM 12/25/21  Known Injuries:  1. C2 fracture    Other diagnoses:   1. CHI  2. Concussion  3. Anticoagulated on Warfarin  4. Elevated trop  5. Dehydration     Plan:  1. Admit to Trauma  2. Follow-up Neurosurgery recommendations  3. Follow-up Neurology recommendations  4. Follow-up Cardiology recommendations  5. Q2 h neuro checks  6. Maintain C collar   7. PT/OT  8. Tertiary in AM  9. Pulm toilet  10. Syncope work up  11. MIVFs  12. Follow-up Cdif    CXR - hyperinflation - no acute injury  CTA head/neck WNL  Pelvis Xray - no acute injury  CT neck - C2 fracture  CT head neg for bleed    Code status: DNR/DNI confirmed with patient and daughter.     ETOH: This patient was asked if in the last 3-6 months there has been a time when she had 4 or more drinks in a single day/outing.. Patient answer to the screening question was in the negative. No intervention needed.  Primary Care Physician   Nancy Salgado    Chief Complaint   Unwitnessed fall    History is obtained from the patient    History of Present Illness   Maia Miranda is a 89 year old female anticoagulated on warfarin for Afib. She had an unwitnessed fall and was found by daughter on floor a few hours after 3PM on 12/24. Patient says she remembers the fall and did pass out on impact. She does endorse neck pain. She denies headache/SOB/CP. She has some baseline dementia. At the outside hospital she was found to have a questionable C2 fracture but was unable to have neck MRI due to implanted stimulator. She also had a stroke called at the OSH but no  intracranial bleed was found, nor was she found to have any focal deficits. She waas then transferred to the [facility] for further care.    Past Medical History    I have reviewed this patient's medical history and updated it with pertinent information if needed.   Past Medical History:   Diagnosis Date     Amnestic MCI (mild cognitive impairment with memory loss) 2014     Chronic duodenal ulcer without mention of hemorrhage, perforation, or obstruction 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 Warner, [facility] yearly     Esophageal reflux 2001    Abstracted 06/10/02     Essential and other specified forms of tremor 2004    resting tremor     Generalized osteoarthrosis, unspecified site     Hands     Hiatal hernia     diagnosed late 1990s [place], [place]     History of pulmonary embolism 1971    no source found     LACTOSE INTOLERANCE      Lumbago     sees Kodi Guidry     Mitral valve regurgitation      Occlusion and stenosis of carotid artery without mention of cerebral infarction 2003    CAROTID US NORMAL, bruit in neck     Osteoporosis, unspecified 2003    T = -2.66     Paroxysmal atrial fibrillation (H) 11/15/2013     Spider veins      Unspecified essential hypertension      Unspecified tinnitus 2005    mild hearing loss, saw ENT   Amnestic MCI (mild cognitive impairment with memory loss)             Chronic duodenal ulcer without mention of hemorrhage, perforation, or obstruction             Depressive disorder      Erosive eye disorder   Esophageal reflux         Essential and other specified forms of tremor              Generalized osteoarthrosis       Hiatal hernia      Pulmonary embolism    Lactose intolerance  Lumbago           Mitral valve regurgitation          Occlusion and stenosis of carotid artery without mention of cerebral infarction         Osteoporosis     Paroxysmal atrial fibrillation      Spider veins      Hypertension    Osteopenia   Arthritis   Varicose veins of lower extremities   Allergic rhinitis   Chronic kidney disease stage 3   Diverticulosis   Atrial fibrillation   UTI (urinary tract infection)   Fibroid uterus   Ischemic colitis     Past Surgical History   I have reviewed this patient's surgical history and updated it with pertinent information if needed.  Past Surgical History:   Procedure Laterality Date     CATARACT IOL, RT/LT       corneal scraping       CYSTOSCOPY       CYSTOSCOPY, BIOPSY BLADDER, COMBINED N/A 7/25/2016    Procedure: COMBINED CYSTOSCOPY, BIOPSY BLADDER;  Surgeon: Bernadine Maria MD;  Location: [facility] OR     ESOPHAGOSCOPY, GASTROSCOPY, DUODENOSCOPY (EGD), COMBINED  7/8/2013    Procedure: COMBINED ESOPHAGOSCOPY, GASTROSCOPY, DUODENOSCOPY (EGD);   ESOPHAGOSCOPY, GASTROSCOPY, DUODENOSCOPY (EGD)   ;  Surgeon: Shawn Soto MD;  Location: [facility] GI     IMPLANT STIMULATOR SACRAL NERVE STAGE ONE N/A 4/4/2017    Procedure: IMPLANT STIMULATOR SACRAL NERVE STAGE ONE;  Surgeon: Kb Tracy MD;  Location: [facility] OR     IMPLANT STIMULATOR SACRAL NERVE STAGE TWO N/A 4/18/2017    Procedure: IMPLANT STIMULATOR SACRAL NERVE STAGE TWO;  Stage Two Interstim  ;  Surgeon: Kb Tracy MD;  Location: [facility] OR     interstim placement       [facility] NONSPECIFIC PROCEDURE      D&C x 2 in 1957 & 1962 -- Abstracted 06/10/02     [facility] NONSPECIFIC PROCEDURE      Left breast biopsy x 2 in 1988 & 1989 -- Abstracted 06/10/02     [facility] NONSPECIFIC PROCEDURE  1958    Influenza resulting in hospitalization -- Abstracted 06/10/02     [facility] NONSPECIFIC PROCEDURE  1971    10 day hospitalization from bilateral pulmonary enboli -- Abstracted 06/10/02     [facility] NONSPECIFIC PROCEDURE  1/03    colonoscopy  negative   Cataract IOL, RT/LT  Corneal scraping   Biopsy bladder   Implant stimulator sacral nerve stage one   Implant stimulator sacral nerve stage two   Dilation and curettage   Left breast biopsy x2  Abstracted pulmonary emboli bilateral         Prior to Admission Medications    Ascorbic acid   Cephalexin    Estradiol vaginal ring  Lisinopril   Metoprolol succinate   Omega-3 Fatty Acids   Rosuvastatin   Warfarin     Prior to Admission Medications   Prescriptions Last Dose Informant Patient Reported? Taking?   Catheters (GENTLECATH URINARY CATHETER) MISC   No No   Si catheter 4 times daily   Cholecalciferol (VITAMIN D-3) 1000 UNITS CAPS   Yes No   Sig: Take 1 capsule by mouth daily   Omega-3 Fatty Acids (OMEGA-3 FISH OIL PO)   Yes No   Sig: Take 1 g by mouth 2 times daily (with meals)   acetaminophen (TYLENOL) 500 MG tablet   Yes No   Sig: Take 500-1,000 mg by mouth every 6 hours as needed   ascorbic acid (VITAMIN C) 1000 MG TABS   No No   Sig: Take 1 tablet (1,000 mg) by mouth 2 times daily   carboxymethylcellulose (CELLUVISC/REFRESH LIQUIGEL) 1 % ophthalmic solution   Yes No   Sig: Place 1 drop into both eyes every morning As needed   cephALEXin (KEFLEX) 250 MG capsule   No No   Sig: Take 1 capsule (250 mg) by mouth daily   conjugated estrogens (PREMARIN) 0.625 MG/GM vaginal cream   No No   Sig: Place 0.5 g vaginally twice a week On Tuesday and Friday. Uses a pea sized amount   estradiol (ESTRING) 2 MG vaginal ring   No No   Sig: Place 1 each vaginally every 3 months   lisinopril (ZESTRIL) 40 MG tablet   No No   Sig: Take 1 tablet (40 mg) by mouth daily   metoprolol succinate ER (TOPROL-XL) 100 MG 24 hr tablet   No No   Sig: Take 1 tablet (100 mg) by mouth daily   rosuvastatin (CRESTOR) 5 MG tablet   No No   Sig: Take 1 tablet (5 mg) by mouth At Bedtime   sodium chloride (PETER 128) 5 % ophthalmic ointment   Yes No   Sig: Place 1 Application into both eyes At Bedtime   warfarin ANTICOAGULANT (COUMADIN) 2.5 MG tablet   No No   Sig: Take one tablet (2.5 mg) by mouth on Sun, Tue and Thurs; take a half tablet (1.25 mg) all other days; or as directed by INR clinic      Facility-Administered Medications: None     Allergies     Codeine  Meperidine  Morphine  Penicillins  Sulfa Drugs     Epinephrine    Allergies   Allergen Reactions     Codeine Nausea and Vomiting     Other reaction(s): GI Intolerance  Other reaction(s): GI Intolerance, Vomiting     Meperidine Nausea and Vomiting     Other reaction(s): GI Intolerance  Other reaction(s): GI Intolerance, Vomiting     Morphine Nausea and Vomiting     Other reaction(s): GI Intolerance  vomiting  Other reaction(s): GI Intolerance, Vomiting     Penicillins Hives     hives     Sulfa Drugs      Other reaction(s): GI Intolerance  Vomiting    Other reaction(s): GI Intolerance     Epinephrine Palpitations     Other reaction(s): Other (See Comments)  Makes heart race  Other reaction(s): Other (See Comments), Tachycardia  Makes heart race         Social History   Social History     Socioeconomic History     Marital status:      Spouse name: Not on file     Number of children: 4     Years of education: Not on file     Highest education level: Not on file   Occupational History     Employer: RETIRED     Comment:  at [employer]   Tobacco Use     Smoking status: Never Smoker     Smokeless tobacco: Never Used   Vaping Use     Vaping Use: Never used   Substance and Sexual Activity     Alcohol use: No     Drug use: No     Sexual activity: Never     Partners: Male   Other Topics Concern     Parent/sibling w/ CABG, MI or angioplasty before 65F 55M? No   Social History Narrative    .Living situation (location, living with others?): Lives alone in a condo. Lives 2 miles from daughter and son.     Activities of daily living (e.g., dressing, eating, walking): Independent        Instrumental activities of daily living (e.g., finance management, housekeeping, meal planning/ prep): Independent        Advance Care plan: has a living will and POLST        Driving: Yes    Medication: manages by self    Meaningful Activities (e.g., hobbies, work): Reads, has coffee out with girlfriends every week, has a  brianna feeder, likes to watch baseball        Support: Help with cleaning every few weeks    Community Resources Used/ Interested in: None at this time     Social Determinants of Health     Financial Resource Strain: Low Risk      Difficulty of Paying Living Expenses: Not hard at all   Food Insecurity: No Food Insecurity     Worried About Running Out of Food in the Last Year: Never true     Ran Out of Food in the Last Year: Never true   Transportation Needs: No Transportation Needs     Lack of Transportation (Medical): No     Lack of Transportation (Non-Medical): No   Physical Activity: Not on file   Stress: Not on file   Social Connections: Not on file   Intimate Partner Violence: Not on file   Housing Stability: Not on file       Family History   Family history reviewed with patient and is noncontributory.    Review of Systems   CONSTITUTIONAL: No fever, chills, sweats, fatigue   EYES: no visual blurring, no double vision or visual loss  ENT: no decrease in hearing, no tinnitus, no vertigo, no hoarseness  RESPIRATORY: no shortness of breath, no cough, no sputum   CARDIOVASCULAR: no palpitations, no chest  pain, no exertional chest pain or pressure  GASTROINTESTINAL: + recent diarrhea   GENITOURINARY: self caths  MUSCULOSKELETAL: no weakness, no redness, no swelling, no joint pain,   SKIN: no rashes, ecchymoses, abrasions or lacerations  NEUROLOGIC: no numbness or tingling of hands, no numbness or tingling  of feet, no syncope, no tremors or weakness  PSYCHIATRIC: no sleep disturbances, no anxiety or depression    Physical Exam   Temp: 98  F (36.7  C) Temp src: Oral BP: 119/51 Pulse: 64   Resp: 23 SpO2: 94 % O2 Device: None (Room air)    Vital Signs with Ranges  Temp:  [97.6  F (36.4  C)-98  F (36.7  C)] 98  F (36.7  C)  Pulse:  [62-80] 64  Resp:  [11-25] 23  BP: (114-146)/(49-67) 119/51  SpO2:  [93 %-99 %] 94 % 145 lbs 0 oz    Primary Survey:  Airway: patient talking  Breathing: symmetric respiratory effort  bilaterally  Circulation: central pulses present and peripheral pulses present  Disability: Pupils - left 4 mm and brisk, right 4 mm and brisk     [place] Coma Scale - Total 15/15  Eye Response (E): 4  4= spontaneous,  3= to verbal/voice, 2=  to pain, 1= No response   Verbal Response (V): 5   5= Orientated, converses,  4= Confused, converses, 3= Inappropriate words,  2= Incomprehensible sounds,  1=No response   Motor Response (M): 6   6= Obeys commands, 5= Localizes to pain, 4= Withdrawal to pain, 3=Fexion to pain, 2= Extension to pain, 1= No response    Secondary Survey:  General: alert, oriented to person, place not time  Head: atraumatic, normocephalic, trachea midline  Eyes: PERRLA, pupils 3 mm, EOMI, corneas and conjunctivae clear  Ears:  non-inflamed external ear canals  Nose: nares patent, no drainage, nasal septum non-tender  Mouth/Throat: no exudates or erythema,  no dental tenderness or malocclusions, no tongue lacerations  Neck: cervical collar present.  Chest/Pulmonary: normal respiratory rate and rhythm,  bilateral clear breath sounds, no wheezes, rales or rhonchi, no chest wall tenderness or deformities,   Cardiovascular: S1, S2,  normal and regular rate and rhythm, no murmurs  Abdomen: soft, non-tender, no guarding, no rebound tenderness and no tenderness to palpation  : pelvis stable to lateral compression, no urine assess/urine yellow and clear  Back/Spine: no deformity, no midline tenderness, no sacral tenderness,  no step-offs and no abrasions or contusions  Musculoskel/Extremities: normal extremities, full AROM of major joints without tenderness, edema, erythema, ecchymosis, or abrasions  Hand: no gross deformities of hands or fingers. Full AROM of hand and fingers in flexion and extension.  strength equal and symmetric.   Skin: no rashes, laceration, ecchymosis, skin warm and dry.   Neuro: PERRLA, alert, oriented x2. CN II-XII grossly intact. No focal deficits. Strength 5/5 x 4  extremities.  Sensation intact.  Psychiatric: affect/mood normal, cooperative, normal judgement/insight and memory intact  # Pain Assessment:  Current Pain Score 12/25/2021   Patient currently in pain? denies   Pain score (0-10) -   Pain location -   Pain descriptors -   Maia goldstein pain level was assessed and she currently denies pain.      Data   UA RESULTS:  Recent Labs   Lab Test 12/25/21  1328 08/31/21  1913 03/15/21  1134   COLOR Light Yellow   < > Yellow   APPEARANCE Clear   < > Cloudy   URINEGLC Negative   < > Negative   URINEBILI Negative   < > Negative   URINEKETONE Negative   < > Negative   SG 1.022   < > 1.025   UBLD Negative   < > Large*   URINEPH 5.0   < > 5.5   PROTEIN Negative   < > 100*   UROBILINOGEN  --   --  0.2   NITRITE Negative   < > Positive*   LEUKEST Negative   < > Moderate*   RBCU 1   < > 5-10*   WBCU <1   < > >100*    < > = values in this interval not displayed.      Results for orders placed or performed during the hospital encounter of 12/25/21 (from the past 24 hour(s))   INR   Result Value Ref Range    INR 3.02 (H) 0.85 - 1.15   Partial thromboplastin time   Result Value Ref Range    aPTT 47 (H) 22 - 38 Seconds   Basic metabolic panel   Result Value Ref Range    Sodium 141 133 - 144 mmol/L    Potassium 3.6 3.4 - 5.3 mmol/L    Chloride 112 (H) 94 - 109 mmol/L    Carbon Dioxide (CO2) 23 20 - 32 mmol/L    Anion Gap 6 3 - 14 mmol/L    Urea Nitrogen 21 7 - 30 mg/dL    Creatinine 0.96 0.52 - 1.04 mg/dL    Calcium 7.3 (L) 8.5 - 10.1 mg/dL    Glucose 116 (H) 70 - 99 mg/dL    GFR Estimate 56 (L) >60 mL/min/1.73m2   CBC with platelets differential    Narrative    The following orders were created for panel order CBC with platelets differential.  Procedure                               Abnormality         Status                     ---------                               -----------         ------                     CBC with platelets and d...[[id number]]  Abnormal            Final result                  Please view results for these tests on the individual orders.   CBC with platelets and differential   Result Value Ref Range    WBC Count 14.4 (H) 4.0 - 11.0 10e3/uL    RBC Count 3.06 (L) 3.80 - 5.20 10e6/uL    Hemoglobin 9.8 (L) 11.7 - 15.7 g/dL    Hematocrit 30.8 (L) 35.0 - 47.0 %     (H) 78 - 100 fL    MCH 32.0 26.5 - 33.0 pg    MCHC 31.8 31.5 - 36.5 g/dL    RDW 13.3 10.0 - 15.0 %    Platelet Count 112 (L) 150 - 450 10e3/uL    % Neutrophils 88 %    % Lymphocytes 4 %    % Monocytes 7 %    % Eosinophils 0 %    % Basophils 0 %    % Immature Granulocytes 1 %    NRBCs per 100 WBC 0 <1 /100    Absolute Neutrophils 12.6 (H) 1.6 - 8.3 10e3/uL    Absolute Lymphocytes 0.6 (L) 0.8 - 5.3 10e3/uL    Absolute Monocytes 1.0 0.0 - 1.3 10e3/uL    Absolute Eosinophils 0.0 0.0 - 0.7 10e3/uL    Absolute Basophils 0.0 0.0 - 0.2 10e3/uL    Absolute Immature Granulocytes 0.2 <=0.4 10e3/uL    Absolute NRBCs 0.0 10e3/uL   Troponin I   Result Value Ref Range    Troponin I High Sensitivity 181 (HH) <54 ng/L   EKG 12-lead, tracing only   Result Value Ref Range    Systolic Blood Pressure  mmHg    Diastolic Blood Pressure  mmHg    Ventricular Rate 69 BPM    Atrial Rate 69 BPM    MO Interval 292 ms    QRS Duration 126 ms     ms    QTc 469 ms    P Axis 90 degrees    R AXIS -77 degrees    T Axis 48 degrees    Interpretation ECG       Sinus rhythm with sinus arrhythmia with 1st degree A-V block  Left axis deviation  Right bundle branch block  Abnormal ECG     Clostridium difficile toxin B PCR    Specimen: Per Rectum; Stool   Result Value Ref Range    C Difficile Toxin B by PCR Negative Negative    Narrative    The [employer] Xpert C. difficile Assay, performed on the [employer], is a qualitative in vitro diagnostic test for rapid detection of toxin B gene sequences from unformed (liquid or soft) stool specimens collected from patients suspected of having Clostridioides difficile infection (CDI). The test  utilizes automated real-time polymerase chain reaction (PCR) to detect toxin gene sequences associated with toxin producing C. difficile. The Xpert C. difficile Assay is intended as an aid in the diagnosis of CDI.   Lactic Acid STAT   Result Value Ref Range    Lactic Acid 2.0 0.7 - 2.0 mmol/L   Comprehensive metabolic panel   Result Value Ref Range    Sodium 139 133 - 144 mmol/L    Potassium 3.8 3.4 - 5.3 mmol/L    Chloride 110 (H) 94 - 109 mmol/L    Carbon Dioxide (CO2) 25 20 - 32 mmol/L    Anion Gap 4 3 - 14 mmol/L    Urea Nitrogen 23 7 - 30 mg/dL    Creatinine 1.07 (H) 0.52 - 1.04 mg/dL    Calcium 8.3 (L) 8.5 - 10.1 mg/dL    Glucose 132 (H) 70 - 99 mg/dL    Alkaline Phosphatase 57 40 - 150 U/L    AST 27 0 - 45 U/L    ALT 20 0 - 50 U/L    Protein Total 5.8 (L) 6.8 - 8.8 g/dL    Albumin 2.6 (L) 3.4 - 5.0 g/dL    Bilirubin Total 0.8 0.2 - 1.3 mg/dL    GFR Estimate 49 (L) >60 mL/min/1.73m2   CBC with platelets   Result Value Ref Range    WBC Count 14.5 (H) 4.0 - 11.0 10e3/uL    RBC Count 3.24 (L) 3.80 - 5.20 10e6/uL    Hemoglobin 10.5 (L) 11.7 - 15.7 g/dL    Hematocrit 32.4 (L) 35.0 - 47.0 %     78 - 100 fL    MCH 32.4 26.5 - 33.0 pg    MCHC 32.4 31.5 - 36.5 g/dL    RDW 13.2 10.0 - 15.0 %    Platelet Count 103 (L) 150 - 450 10e3/uL   Troponin I   Result Value Ref Range    Troponin I High Sensitivity 193 (HH) <54 ng/L     *Note: Due to a large number of results and/or encounters for the requested time period, some results have not been displayed. A complete set of results can be found in Results Review.       Studies:  No orders to display     Discussed with trauma staff on call, Dr. Mayer.    Wilmar Ledesma MD  Surgical moonlighter

## 2021-12-27 ENCOUNTER — APPOINTMENT (OUTPATIENT)
Dept: PHYSICAL THERAPY | Facility: CLINIC | Age: 86
DRG: 551 | End: 2021-12-27
Attending: STUDENT IN AN ORGANIZED HEALTH CARE EDUCATION/TRAINING PROGRAM
Payer: COMMERCIAL

## 2021-12-27 ENCOUNTER — APPOINTMENT (OUTPATIENT)
Dept: SPEECH THERAPY | Facility: CLINIC | Age: 86
DRG: 551 | End: 2021-12-27
Attending: PHYSICIAN ASSISTANT
Payer: COMMERCIAL

## 2021-12-27 ENCOUNTER — APPOINTMENT (OUTPATIENT)
Dept: CARDIOLOGY | Facility: CLINIC | Age: 86
DRG: 551 | End: 2021-12-27
Attending: NURSE PRACTITIONER
Payer: COMMERCIAL

## 2021-12-27 ENCOUNTER — TELEPHONE (OUTPATIENT)
Dept: PEDIATRICS | Facility: CLINIC | Age: 86
End: 2021-12-27

## 2021-12-27 DIAGNOSIS — I48.11 LONGSTANDING PERSISTENT ATRIAL FIBRILLATION (H): ICD-10-CM

## 2021-12-27 DIAGNOSIS — Z79.01 LONG TERM CURRENT USE OF ANTICOAGULANTS WITH INR GOAL OF 2.0-3.0: ICD-10-CM

## 2021-12-27 DIAGNOSIS — I48.91 ATRIAL FIBRILLATION, UNSPECIFIED TYPE (H): ICD-10-CM

## 2021-12-27 DIAGNOSIS — Z79.01 LONG TERM CURRENT USE OF ANTICOAGULANT THERAPY: ICD-10-CM

## 2021-12-27 DIAGNOSIS — I48.91 ATRIAL FIBRILLATION (H): Primary | ICD-10-CM

## 2021-12-27 LAB
ANION GAP SERPL CALCULATED.3IONS-SCNC: 4 MMOL/L (ref 3–14)
BUN SERPL-MCNC: 18 MG/DL (ref 7–30)
C COLI+JEJUNI+LARI FUSA STL QL NAA+PROBE: NOT DETECTED
CALCIUM SERPL-MCNC: 8.4 MG/DL (ref 8.5–10.1)
CHLORIDE BLD-SCNC: 113 MMOL/L (ref 94–109)
CO2 SERPL-SCNC: 24 MMOL/L (ref 20–32)
CREAT SERPL-MCNC: 1.03 MG/DL (ref 0.52–1.04)
EC STX1 GENE STL QL NAA+PROBE: NOT DETECTED
EC STX2 GENE STL QL NAA+PROBE: NOT DETECTED
ERYTHROCYTE [DISTWIDTH] IN BLOOD BY AUTOMATED COUNT: 13.2 % (ref 10–15)
GFR SERPL CREATININE-BSD FRML MDRD: 52 ML/MIN/1.73M2
GLUCOSE BLD-MCNC: 136 MG/DL (ref 70–99)
HCT VFR BLD AUTO: 31.5 % (ref 35–47)
HGB BLD-MCNC: 10.1 G/DL (ref 11.7–15.7)
INR PPP: 2.03 (ref 0.85–1.15)
LVEF ECHO: NORMAL
MAGNESIUM SERPL-MCNC: 2.4 MG/DL (ref 1.6–2.3)
MCH RBC QN AUTO: 32.2 PG (ref 26.5–33)
MCHC RBC AUTO-ENTMCNC: 32.1 G/DL (ref 31.5–36.5)
MCV RBC AUTO: 100 FL (ref 78–100)
NOROV GI+II ORF1-ORF2 JNC STL QL NAA+PR: NOT DETECTED
PHOSPHATE SERPL-MCNC: 2.2 MG/DL (ref 2.5–4.5)
PLATELET # BLD AUTO: 103 10E3/UL (ref 150–450)
POTASSIUM BLD-SCNC: 3.7 MMOL/L (ref 3.4–5.3)
RBC # BLD AUTO: 3.14 10E6/UL (ref 3.8–5.2)
RVA NSP5 STL QL NAA+PROBE: NOT DETECTED
SALMONELLA SP RPOD STL QL NAA+PROBE: NOT DETECTED
SHIGELLA SP+EIEC IPAH STL QL NAA+PROBE: NOT DETECTED
SODIUM SERPL-SCNC: 141 MMOL/L (ref 133–144)
V CHOL+PARA RFBL+TRKH+TNAA STL QL NAA+PR: NOT DETECTED
WBC # BLD AUTO: 8.6 10E3/UL (ref 4–11)
Y ENTERO RECN STL QL NAA+PROBE: NOT DETECTED

## 2021-12-27 PROCEDURE — 97116 GAIT TRAINING THERAPY: CPT | Mod: GP | Performed by: PHYSICAL THERAPIST

## 2021-12-27 PROCEDURE — 250N000013 HC RX MED GY IP 250 OP 250 PS 637: Performed by: STUDENT IN AN ORGANIZED HEALTH CARE EDUCATION/TRAINING PROGRAM

## 2021-12-27 PROCEDURE — 97530 THERAPEUTIC ACTIVITIES: CPT | Mod: GP | Performed by: PHYSICAL THERAPIST

## 2021-12-27 PROCEDURE — 84100 ASSAY OF PHOSPHORUS: CPT | Performed by: NURSE PRACTITIONER

## 2021-12-27 PROCEDURE — 82310 ASSAY OF CALCIUM: CPT | Performed by: PHYSICIAN ASSISTANT

## 2021-12-27 PROCEDURE — 97161 PT EVAL LOW COMPLEX 20 MIN: CPT | Mod: GP | Performed by: PHYSICAL THERAPIST

## 2021-12-27 PROCEDURE — 92526 ORAL FUNCTION THERAPY: CPT | Mod: GN

## 2021-12-27 PROCEDURE — 85610 PROTHROMBIN TIME: CPT | Performed by: PHYSICIAN ASSISTANT

## 2021-12-27 PROCEDURE — L0174 CERV SR 2PC THOR EXT PRE OTS: HCPCS

## 2021-12-27 PROCEDURE — 120N000002 HC R&B MED SURG/OB UMMC

## 2021-12-27 PROCEDURE — 85027 COMPLETE CBC AUTOMATED: CPT | Performed by: PHYSICIAN ASSISTANT

## 2021-12-27 PROCEDURE — 250N000013 HC RX MED GY IP 250 OP 250 PS 637: Performed by: NURSE PRACTITIONER

## 2021-12-27 PROCEDURE — 92610 EVALUATE SWALLOWING FUNCTION: CPT | Mod: GN

## 2021-12-27 PROCEDURE — 250N000013 HC RX MED GY IP 250 OP 250 PS 637: Performed by: SURGERY

## 2021-12-27 PROCEDURE — 93306 TTE W/DOPPLER COMPLETE: CPT | Mod: 26 | Performed by: STUDENT IN AN ORGANIZED HEALTH CARE EDUCATION/TRAINING PROGRAM

## 2021-12-27 PROCEDURE — 250N000011 HC RX IP 250 OP 636: Performed by: STUDENT IN AN ORGANIZED HEALTH CARE EDUCATION/TRAINING PROGRAM

## 2021-12-27 PROCEDURE — 93306 TTE W/DOPPLER COMPLETE: CPT

## 2021-12-27 PROCEDURE — 36415 COLL VENOUS BLD VENIPUNCTURE: CPT | Performed by: PHYSICIAN ASSISTANT

## 2021-12-27 PROCEDURE — 250N000013 HC RX MED GY IP 250 OP 250 PS 637: Performed by: PHYSICIAN ASSISTANT

## 2021-12-27 PROCEDURE — L1499 SPINAL ORTHOSIS NOS: HCPCS

## 2021-12-27 PROCEDURE — 99232 SBSQ HOSP IP/OBS MODERATE 35: CPT | Performed by: NURSE PRACTITIONER

## 2021-12-27 PROCEDURE — 83735 ASSAY OF MAGNESIUM: CPT | Performed by: NURSE PRACTITIONER

## 2021-12-27 RX ORDER — LOPERAMIDE HCL 2 MG
2 CAPSULE ORAL 4 TIMES DAILY PRN
Status: DISCONTINUED | OUTPATIENT
Start: 2021-12-27 | End: 2021-12-29 | Stop reason: HOSPADM

## 2021-12-27 RX ADMIN — POTASSIUM & SODIUM PHOSPHATES POWDER PACK 280-160-250 MG 1 PACKET: 280-160-250 PACK at 21:11

## 2021-12-27 RX ADMIN — CEPHALEXIN 250 MG: 250 CAPSULE ORAL at 10:10

## 2021-12-27 RX ADMIN — Medication 1.25 MG: at 18:48

## 2021-12-27 RX ADMIN — ACETAMINOPHEN 650 MG: 325 TABLET, FILM COATED ORAL at 12:38

## 2021-12-27 RX ADMIN — ACETAMINOPHEN 650 MG: 325 TABLET, FILM COATED ORAL at 08:03

## 2021-12-27 RX ADMIN — ACETAMINOPHEN 650 MG: 325 TABLET, FILM COATED ORAL at 21:11

## 2021-12-27 RX ADMIN — OXYCODONE HYDROCHLORIDE AND ACETAMINOPHEN 1000 MG: 500 TABLET ORAL at 21:11

## 2021-12-27 RX ADMIN — LISINOPRIL 40 MG: 20 TABLET ORAL at 08:03

## 2021-12-27 RX ADMIN — Medication 25 MCG: at 08:03

## 2021-12-27 RX ADMIN — ONDANSETRON 4 MG: 4 TABLET, ORALLY DISINTEGRATING ORAL at 08:02

## 2021-12-27 RX ADMIN — ROSUVASTATIN CALCIUM 5 MG: 5 TABLET, FILM COATED ORAL at 21:11

## 2021-12-27 RX ADMIN — OXYCODONE HYDROCHLORIDE AND ACETAMINOPHEN 1000 MG: 500 TABLET ORAL at 08:03

## 2021-12-27 RX ADMIN — METOPROLOL SUCCINATE 100 MG: 100 TABLET, EXTENDED RELEASE ORAL at 08:03

## 2021-12-27 ASSESSMENT — ACTIVITIES OF DAILY LIVING (ADL)
ADLS_ACUITY_SCORE: 20
ADLS_ACUITY_SCORE: 20
ADLS_ACUITY_SCORE: 17
ADLS_ACUITY_SCORE: 19
ADLS_ACUITY_SCORE: 17
ADLS_ACUITY_SCORE: 20
ADLS_ACUITY_SCORE: 22
ADLS_ACUITY_SCORE: 20
ADLS_ACUITY_SCORE: 17
ADLS_ACUITY_SCORE: 19
ADLS_ACUITY_SCORE: 22
ADLS_ACUITY_SCORE: 20
DEPENDENT_IADLS:: MEDICATION MANAGEMENT;TRANSPORTATION;CLEANING;COOKING;LAUNDRY;SHOPPING
ADLS_ACUITY_SCORE: 20
ADLS_ACUITY_SCORE: 22
ADLS_ACUITY_SCORE: 22
ADLS_ACUITY_SCORE: 17
ADLS_ACUITY_SCORE: 20
ADLS_ACUITY_SCORE: 20
ADLS_ACUITY_SCORE: 19
ADLS_ACUITY_SCORE: 17

## 2021-12-27 ASSESSMENT — VISUAL ACUITY: OU: GLASSES

## 2021-12-27 ASSESSMENT — MIFFLIN-ST. JEOR: SCORE: 1102.75

## 2021-12-27 NOTE — CONSULTS
Care Management Initial Consult    General Information  Assessment completed with: Patient,    Type of CM/SW Visit: Initial Assessment    Primary Care Provider verified and updated as needed: Yes   Readmission within the last 30 days: no previous admission in last 30 days   Reason for Consult: discharge planning  Advance Care Planning: Advance Care Planning Reviewed: present on chart       Communication Assessment  Patient's communication style: spoken language (English or Bilingual)    Hearing Difficulty or Deaf: yes   Wear Glasses or Blind: yes    Cognitive  Cognitive/Neuro/Behavioral: WDL  Level of Consciousness: alert,confused  Arousal Level: opens eyes spontaneously  Orientation: disoriented to,time  Mood/Behavior: calm,cooperative  Best Language: 0 - No aphasia  Speech: clear,spontaneous,logical    Living Environment:   People in home: child(mackenzie), adult     Current living Arrangements: condominium      Able to return to prior arrangements: yes    Family/Social Support:  Care provided by: self  Provides care for: no one  Marital Status:   Children          Description of Support System: Supportive,Involved    Support Assessment: Adequate family and caregiver support    Current Resources:   Patient receiving home care services: Yes  Skilled Home Care Services: Skilled Nursing,Physicial Therapy,Occupational Therapy  Community Resources: Home Care,DME  Equipment currently used at home: walker, rolling  Supplies currently used at home: None    Employment/Financial:  Employment Status: retired     Financial Concerns: No concerns identified     Lifestyle & Psychosocial Needs:  Social Determinants of Health     Tobacco Use: Low Risk      Smoking Tobacco Use: Never Smoker     Smokeless Tobacco Use: Never Used   Alcohol Use: Not on file   Financial Resource Strain: Low Risk      Difficulty of Paying Living Expenses: Not hard at all   Food Insecurity: No Food Insecurity     Worried About Running Out of Food in the  "Last Year: Never true     Ran Out of Food in the Last Year: Never true   Transportation Needs: No Transportation Needs     Lack of Transportation (Medical): No     Lack of Transportation (Non-Medical): No   Physical Activity: Not on file   Stress: Not on file   Social Connections: Not on file   Intimate Partner Violence: Not on file   Depression: Not at risk     PHQ-2 Score: 0   Housing Stability: Not on file     Functional Status:  Prior to admission patient needed assistance:   Dependent ADLs:: Independent  Dependent IADLs:: Medication Management,Transportation,Cleaning,Cooking,Laundry,Shopping  Assesssment of Functional Status: Not at baseline with ADL Functioning    Mental Health Status:  Mental Health Status: No Current Concerns       Chemical Dependency Status:  Chemical Dependency Status: No Current Concerns          Values/Beliefs:  Spiritual, Cultural Beliefs, [employer] Practices, Values that affect care: yes            Additional Information:  Per H&P, \"Maia Miranda is a 89 year old female anticoagulated on warfarin for Afib. She had an unwitnessed fall and was found by daughter on floor a few hours after 3PM on 12/24. Patient says she remembers the fall and did pass out on impact. She does endorse neck pain. She denies headache/SOB/CP. She has some baseline dementia. At the outside hospital she was found to have a questionable C2 fracture but was unable to have neck MRI due to implanted stimulator. She also had a stroke called at the OSH but no intracranial bleed was found, nor was she found to have any focal deficits. She was then transferred to the [facility] for further care\".    DEE met with pt in room to introduce self, role, and to complete assessment. Pt confirmed that she resides with her daughter, Bhargavi, and they live in a condo with no stairs. DEE discussed dispo recs with pt and she is agreeable to TCU. Pt requested that SW contact her daughter, Bhargavi, to discuss discharge planning. DEE spoke with " Bhargavi via phone as Bhargavi shared that she is on her way to the hospital. SW to meet with Bhargavi in the next few hours to discuss discharge planning.     Care Management Follow Up    Length of Stay (days): 2    Expected Discharge Date: 12/28/2021     Concerns to be Addressed: discharge planning       Patient plan of care discussed at interdisciplinary rounds: Yes    Anticipated Discharge Disposition: TCU recommended- TBD  Anticipated Discharge Services: TBD   Anticipated Discharge DME: TBD     Patient/family educated on Medicare website which has current facility and service quality ratings: yes   Education Provided on the Discharge Plan: yes  Patient/Family in Agreement with the Plan: yes    Referrals Placed by CM/SW:    [facility]  [id number] [place] [id number] [phone]    [address] [address] [phone]    Private pay costs discussed: Not applicable    Additional Information:  ADDENDUM: 2233  DEE met with pt and her dtr, Bhargavi, in room to discuss discharge planning. Bhargavi provided SW with two TCU choices. SW requested Bhargavi review list and gather additional choices. SW to initiate referrals and will continue to follow for dispo needs.     MERE Caldwell, LGSW  6B   [facility]  Phone: [phone]  Pager: [phone]

## 2021-12-27 NOTE — PROGRESS NOTES
12/27/21 0840   General Information   Onset of Illness/Injury or Date of Surgery 12/25/21   Referring Physician Thania Hernandez PA-C   Patient/Family Therapy Goal Statement (SLP) Pt wants to eat without difficulty   Pertinent History of Current Problem Pt  with PMH of HFpEF, paroxysmal AF on warfarin, HTN, h/o PE in 1971, mild cognitive impairment, an dh/o COVID PNA who presents after a fall in the bathroom and was found to have a C2 fracture   General Observations Pt is pleasant and cooperative   Past History of Dysphagia none reported/per chart review       Present no   Pain Assessment   Patient Currently in Pain Yes, see Vital Sign flowsheet   Type of Evaluation   Type of Evaluation Swallow Evaluation   Oral Motor   Oral Musculature generally intact   Structural Abnormalities none present   Mucosal Quality good   Dentition (Oral Motor)   Dentition (Oral Motor) adequate dentition   General Swallowing Observations   Current Diet/Method of Nutritional Intake (General Swallowing Observations, NIS) thin liquids (level 0);regular diet   Respiratory Support (General Swallowing Observations) none   Swallowing Evaluation Clinical swallow evaluation   Clinical Swallow Evaluation   Feeding Assistance set up only required   Clinical Swallow Evaluation Textures Trialed thin liquids;solid foods   Clinical Swallow Eval: Thin Liquid Texture Trial   Mode of Presentation, Thin Liquids straw;self-fed   Volume of Liquid or Food Presented ~6oz   Oral Phase of Swallow WFL   Pharyngeal Phase of Swallow intact  (no s/sx of aspiration observed)   Diagnostic Statement swallow functional for thin liquids on exam   Clinical Swallow Evaluation: Solid Food Texture Trial   Mode of Presentation self-fed   Volume Presented 1 caitlin cracker   Oral Phase WFL  (mastication/bolus manipulation adequate given increased time)   Oral Residue   (none noted following liquid wash)   Pharyngeal Phase intact  (no s/sx of aspiration  observed on exam)   Diagnostic Statement swallow appears functional for solids with use of strategies. Pt reports occasional coughing with solid foods since c-collar placed-- suspect due to reduced jaw ROM, impacting adequate bolus manipulation with some advanced textures   Esophageal Phase of Swallow   Patient reports or presents with symptoms of esophageal dysphagia No   Swallowing Recommendations   Diet Consistency Recommendations regular diet;thin liquids (level 0)   Supervision Level for Intake distant supervision needed   Mode of Delivery Recommendations bolus size, small   Swallowing Maneuver Recommendations alternate food and liquid intake   Recommended Feeding/Eating Techniques (Swallow Eval) maintain upright sitting position for eating;minimize distractions during oral intake   Medication Administration Recommendations, Swallowing (SLP) recommend meds 1-2 at a time with sips of water as tolerated   General Therapy Interventions   Planned Therapy Interventions Dysphagia Treatment   Dysphagia treatment Modified diet education;Instruction of safe swallow strategies   SLP Therapy Assessment/Plan   Criteria for Skilled Therapeutic Interventions Met (SLP Eval) yes   SLP Diagnosis mild dysphagia   Rehab Potential (SLP Eval) good, to achieve stated therapy goals   Therapy Frequency (SLP Eval) one time eval and treatment only   Comment, Therapy Assessment/Plan (SLP) Pt presents with mild dysphagia in the setting of c-collar placement. Suspect mild difficulties swallowing are related to positioning and reduced jaw ROM related to c-collar. Pt demonstrates appropriate tolerance of thin liquids and solid textures with use of strategies to increase ease/safety of PO intake.   Therapy Plan Review/Discharge Plan (SLP)   Therapy Plan Review (SLP) evaluation/treatment results reviewed;current/potential barriers reviewed;patient;participants voiced agreement with care plan   SLP Discharge Planning    SLP Discharge  Recommendation (DC Rec) home with assist  (return to prior level of care)   SLP Rationale for DC Rec no ongoing ST needs indicated at this time   SLP Brief overview of current status  Recommend regular diet with thin liquids as tolerated. Pt to sit upright for all PO intake, take small bites/sips and alternate consistencies. Pt may benefit from selecting softer foods due to limited jaw ROM in setting of c-collar.    Total Evaluation Time   Total Evaluation Time (Minutes) 9

## 2021-12-27 NOTE — PROGRESS NOTES
S: [facility] at [phone] for fitting/delivery. O: I see the EPIC order for the [place] J collar due to injury/fall. A: She was fit with size Tall ( blue label) with extra pad set put in her [place] collar with the instructions on the window sill. Instructions were given and patient reported that she understood. P: FU PRN. G: The goal is to restrict motion of the head/neck during healing.  Electronically signed Grzegorz Benjamin CPO, LPO.

## 2021-12-27 NOTE — TELEPHONE ENCOUNTER
ANTICOAGULATION  MANAGEMENT: Discharge Review    Maia Miranda chart reviewed for anticoagulation continuity of care. Discharge alert received early - patient has not been discharged yet.    Hospital Admission (12/25/21- current) for diarrhea, syncopal event, and confusion. Found to have a left C2 lateral mass fracture post fall.    Discharge disposition: plan for TCU stay    Results:    Recent labs: (last 7 days)     12/25/21  1104 12/25/21  1855 12/27/21  0523   INR 2.40* 3.02* 2.03*     Anticoagulation inpatient management:     less warfarin administered than maintenance regimen - 12/25 0 mg and 12/26 0.5 mg administered    Anticoagulation discharge instructions:     Warfarin dosing: tbd   Bridging: No   INR goal change: No      Medication changes affecting anticoagulation: tbd    Additional factors affecting anticoagulation: tbd    Plan     No adjustment to anticoagulation plan needed    ACC will resume monitoring upon discharge from TCU    Anticoagulation Calendar updated    Elida Moreiar RN

## 2021-12-27 NOTE — PROGRESS NOTES
12/27/21 0815   Quick Adds   Type of Visit Initial PT Evaluation   Living Environment   People in home child(mackenzie), adult   Current Living Arrangements condominium   Home Accessibility no concerns   Transportation Anticipated family or friend will provide   Self-Care   Usual Activity Tolerance good   Current Activity Tolerance fair   Regular Exercise No   Equipment Currently Used at Home walker, rolling   Activity/Exercise/Self-Care Comment Patient reports she does not use a walker or device at home. Previously told OT she uses a 4WW.    Disability/Function   Concentrating, Remembering or Making Decisions Difficulty no   Difficulty Communicating no   Difficulty Eating/Swallowing no   Walking or Climbing Stairs Difficulty yes   Walking or Climbing Stairs ambulation difficulty, requires equipment   Mobility Management 4ww   Dressing/Bathing Difficulty no   Dressing/Bathing bathing difficulty, assistance 1 person   Toileting issues no   Doing Errands Independently Difficulty (such as shopping) no   Errands Management daughter assists    Fall history within last six months yes   Number of times patient has fallen within last six months 1  (does not remember the fall. )   Change in Functional Status Since Onset of Current Illness/Injury yes   General Information   Onset of Illness/Injury or Date of Surgery 12/25/21   Referring Physician Wilmar Ledesma MD   Patient/Family Therapy Goals Statement (PT) Improve strength/mobility   Pertinent History of Current Problem (include personal factors and/or comorbidities that impact the POC) Maia Miranda is a 89 year old female anticoagulated on warfarin for Afib. She had an unwitnessed fall and was found by daughter on floor a few hours after 3PM on 12/24. Patient says she remembers the fall and did pass out on impact. She does endorse neck pain. She denies headache/SOB/CP. She has some baseline dementia. At the outside hospital she was found to have a questionable C2  fracture but was unable to have neck MRI due to implanted stimulator. She also had a stroke called at the OSH but no intracranial bleed was found, nor was she found to have any focal deficits. She waas then transferred to the [facility] for further care.   Existing Precautions/Restrictions fall  (CERVICAL COLLAR AT ALL TIMES)   General Observations Patient on RA, VSS, activity as tolerated with cervical collar.    Cognition   Orientation Status (Cognition) oriented to;person;place   Affect/Mental Status (Cognition) WFL   Follows Commands (Cognition) WFL   Strength   Strength Comments LE strength grossly 4-/5. Generalized weakness demonstrated by increased difficulty with transfers and ambulation.    Bed Mobility   Comment (Bed Mobility) CGA, increased time/effort.    Transfers   Transfer Safety Comments Sit to stand with Jarod x1, increased time/difficulty.    Gait/Stairs (Locomotion)   Comment (Gait/Stairs) Patient ambulates 12 ft with FWW, very forward flexed, shuffled, minimal toe clearance, very fatigued. Attempted ambulation wtih no AD and patient unable to take any steps without LOB.    Balance   Balance Comments Requires UE support for static/dynamic balance.    Clinical Impression   Criteria for Skilled Therapeutic Intervention yes, treatment indicated   PT Diagnosis (PT) Impaired functional mobility   Influenced by the following impairments weakness, deconditioning, impaired balance   Functional limitations due to impairments transfers, gait, home mobility   Clinical Presentation Stable/Uncomplicated   Clinical Presentation Rationale clinical judgement.    Clinical Decision Making (Complexity) low complexity   Therapy Frequency (PT) 5x/week   Predicted Duration of Therapy Intervention (days/wks) 1-2 weeks   Planned Therapy Interventions (PT) balance training;bed mobility training;gait training;home exercise program;strengthening;neuromuscular re-education   Risk & Benefits of therapy have been explained  evaluation/treatment results reviewed;care plan/treatment goals reviewed;risks/benefits reviewed;current/potential barriers reviewed;participants voiced agreement with care plan;participants included;patient   PT Discharge Planning    PT Discharge Recommendation (DC Rec) Transitional Care Facility;home with home care physical therapy   PT Rationale for DC Rec Patient below baseline independent mobility, is weak, is a fall risk and requiring assist for transfers and self cares. Will require TCU for continued cares and therapy. If discharging home will require 1A for all mobility and self cares.    PT Brief overview of current status  1A FWW   Total Evaluation Time   Total Evaluation Time (Minutes) 5

## 2021-12-27 NOTE — PROGRESS NOTES
SPIRITUAL HEALTH SERVICES Progress Note  [facility]    Visited patient, Maia, per [facility] triage.    Maia is hospitalized for procuring a neck injury from a fall at home on Christmas Eve night.    I engaged Maia in a spiritual needs assessment and offered reflective conversation. She appears in good spirits, names a robust support network and maintains a positive outlook on her recovery.  I directed Maia to Spiritual Health Services for further support upon her request.    Will continue to follow this patient as she remains on this unit.    David Richardson MDiv  Chaplain Resident  Pager [phone]    * [facility] remains available 24/7 for emergent requests/referrals, either by having the switchboard page the on-call  or by entering an ASAP/STAT consult in Epic (this will also page the on-call ). Routine Epic consults receive an initial response within 24 hours.*

## 2021-12-27 NOTE — PLAN OF CARE
"Neuro: A&Ox4. Neuro's intact.   Cardiac: 1 degree AV block with a BBB, Intermittently went into M-bah-ilzniay converted back.   Respiratory: Sating >98% on RA. Clear/diminished.   GI/: Adequate urine output via deleon. Multiple BM's-loose/formed. Stool sample sent.   Diet/appetite: Tolerating regular diet. Eating well.  Activity:  Assist of 1. C-collar in place.    Pain: Denied.   Skin: Large hematoma in amena-rectal area.   LDA's: L PIV, Maintenance running at 75 ml/hr.     Plan: Possible plan to discharge tomorrow after new Cervical collar is placed. Continue with POC. Notify primary team with changes.  /61 (BP Location: Left arm)   Pulse 59   Temp 97.8  F (36.6  C) (Oral)   Resp 17   Ht 1.676 m (5' 6\")   Wt 66.4 kg (146 lb 6.2 oz)   SpO2 96%   BMI 23.63 kg/m        "

## 2021-12-27 NOTE — PROVIDER NOTIFICATION
Notified trauma of pt having a run of paroxymal afib on and off around 0630 with HR 90-100s, pt asymptomatic. No change to plan of care.

## 2021-12-27 NOTE — PROGRESS NOTES
[facility]  Trauma Service Progress Note    Date of Service (when I saw the patient): 12/27/2021     Assessment & Plan   Trauma Mechanism: unwitnessed fall  Date of Injury: 12/25/21  Known Injuries:  1. Left C2 lateral mass fracture    Neuro/Pain/Psych:  # Hx Mild Cognitive Impairment   # Tremor   - Head CT: No CT evidence for acute intracranial process.  - Head CTA: HEAD CTA: Normal CTA [place], no high grade stenosis or dissection  - Routine delirium prevention protocol     # Acute pain   - prn: Tylenol      Pulmonary:  # Hx of PE 1970's  - Supplemental oxygen to keep saturation above 92 %.  - Incentive spirometer while awake      Cardiovascular:    # Hypertension   # Paroxysmal Afib, on Warfarin  # Mitral Valve Regurgitation   # Type II MI, admitted 8/29/21 with COVID 19 infection  # Trop elevations   - Troponin elevation felt due to trauma/ denies chest pain or shortness of breath. Cardiology was consulted, recommend a TTE which has been ordered and 2 week Holter monitor at discharge.  - Continues to have intermittent runs of Afib, ;s, occasional palpitations, denies shortness of breath.  - Add on Mag and Phos levels  - continue PTA: lisinopril, Metoprolol, Rosuvastatin  - Trop: 159?181?181?193?161     GI/Nutrition:    # GERD  # Hiatal hernia  # Hx duodenal ulcer  # Hx of ischemic colitis   - Regular diet   - Diarrhea work up negative for Cdiff and Viral panel  - Add imodium PRN  - Encourage oral intake     Renal/ Fluids/Electrolytes:  # IFEOMA on CKD III  # Hx of urinary retention, with chronic UTI, self caths at home  # S/P S3 sacral nerve   - Creatinine: 1.20 improved with IVF   - SL MIVF  - UA unremarkable.  - Remove deleon today, follows outpatient at the urology clinic  - electrolyte replacement protocol in place.      Endocrine:  - No management indication. Goal to keep BG < 180 for optimal wound healing      Infectious disease:   # UTI prophylaxis    - Pt has recurrent UTIs from straight catheterization  - continue Keflex 250mg daily     # Diarrhea    - Pt had prior to admission.  - 12/25: C diff neg  - 12/26: Enteric Bacteria and Virus panel- negative  - If test return negative then plan on adding Imodium      Hematology:    # Acute blood loss anemia   # Coagulopathy 2/2 Warfarin   - Hgb 12.1 on admission -> 10.1, suspect mostly dilutional with decrease in all cell lines, no overt signs of bleeding  - Warfarin dosing per pharmacy  - Threshold for transfusion if hgb <7.0 or signs/symptoms of hypoperfusion.        Musculoskeletal:  # s/p Sacral nerve stimulator April 2017, Dr. Tracy, Kb;  [facility] OR  # OA  # Weakness and deconditioning of chronic illness   - Physical and occupational therapy consults.     # Left C2 lateral mass fracture  - C-spine CT: Questionable nondisplaced fracture involving the left lateral mass of C2. Cervical spine MRI would be helpful to evaluate for bone marrow edema that would confirm this as a recent fracture  - Neck CTA:  Normal neck CTA.  - Neurosurgery following: No neurosurgical intervention indicated at this time. Continue to wear c-collar, orthotics consult for  collar  - Upright Xrays in cervical collar completed  - Follow in clinic in 6 weeks with repeat C spine x rays     Skin:  - dilgent cares to prevent skin breakdown and wound formation.       Lines/ tubes/ drains:  - PIV      General Cares:                 PPI/H2 blocker:  NA                DVT prophylaxis: Continue on PTA Warfarin                Bowel Regimen/Date of last stool: Diarrhea                Pulmonary toilet: IS     Code status:  Full               Discharge goals:     Adequate pain management: yes    VSS x24 hours: yes    Hemoglobin stable x 48 hours: yes    Ambulating safely and/or therapy evals complete: PT eval pending    Drains/lines removed or plan in place to manage: NA    Teaching done:     Other:  Expected D/C date: pending Trans thoracic ECHO,   collar change, PT eval    Interval History   States she slept well, still having diarrhea, neck pian is tolerable. Wants to go to a TCU  ROS x 8 negative with exception of those things listed in interval hx    Physical Exam   Temp: 98.6  F (37  C) Temp src: Oral BP: (!) 144/93 Pulse: 97   Resp: 18 SpO2: 95 % O2 Device: None (Room air)    Vitals:    12/25/21 2325 12/26/21 1900 12/27/21 0600   Weight: 66.4 kg (146 lb 6.2 oz) 68.9 kg (151 lb 14.4 oz) 66.1 kg (145 lb 11.6 oz)     Vital Signs with Ranges  Temp:  [97.6  F (36.4  C)-98.6  F (37  C)] 98.6  F (37  C)  Pulse:  [55-97] 97  Resp:  [16-22] 18  BP: (106-144)/(50-93) 144/93  SpO2:  [94 %-98 %] 95 %  I/O last 3 completed shifts:  In: 1921.25 [P.O.:660; I.V.:1261.25]  Out: 2150 [Urine:2150]    Vijay Coma Scale - Total 14/15  Eye Response (E): 5  4= spontaneous, 3= to verbal/voice, 2= to pain, 1= No response   Verbal Response (V): 4   5= Orientated, converses, 4= Confused, converses, 3= Inappropriate words, 2= Incomprehensible sounds, 1=No response   Motor Response (M): 6   6= Obeys commands, 5= Localizes to pain, 4= Withdrawal to pain, 3=Fexion to pain, 2= Extension to pain, 1= No response   Constitutional: Awake, alert, cooperative, no apparent distress.  ENT: Normocephalic, atraumatic, [place] cervical collar ON  Respiratory: No increased work of breathing, good air exchange, clear to auscultation bilaterally, no crackles or wheezing.  Cardiovascular:  regular rate and rhythm, normal S1 and S2, no S3 or S4, and no murmur.   GI: Normal bowel sounds, abdomen soft, non-distended, non-tender, no guarding  Genitourinary:  Clear yellow urine in deleon bag  Skin:  Normal skin color, no redness, warmth, or swelling, no ecchymosis, no abrasions, and no jaundice.  Musculoskeletal: There is no redness, warmth, or swelling of the joints.  Pedal pulse palpated.  Neurologic: Awake, alert, oriented. Strength and sensory is intact. No focal deficits.  Neuropsychiatric: Calm,  normal eye contact, alert, affect appropriate to situation    RAY Gramajo CNP  To contact the trauma service use job code pager [phone],   Numeric texts or alpha text through [facility]

## 2021-12-27 NOTE — PLAN OF CARE
Neuro: Disoriented to time and occasionally place. Trauma notified this evening of change from previous RN's assessment, assessed pt, no new orders. Neuros/CMS intact and otherwise unchanged.   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 VSS.   Respiratory: Sating > 95% on RA.  GI/: Adequate urine output via deleon. No stool this shift.   Diet/appetite: Tolerating regular diet. Eating okay.  Activity:  Assist of 1-2, turn/repositioning in bed. C-collar in place. Mild tremor in B/L upper extremities.   Pain: Denies pain.    Skin: No new deficits noted. Large hematoma in maena-rectal area.   LDA's: PIV x1 infusing MIVF @ 75 mL/hr.    Plan: Possible discharge today after new cervical collar is placed. Continue with POC. Notify primary team with changes.

## 2021-12-28 ENCOUNTER — APPOINTMENT (OUTPATIENT)
Dept: OCCUPATIONAL THERAPY | Facility: CLINIC | Age: 86
DRG: 551 | End: 2021-12-28
Payer: COMMERCIAL

## 2021-12-28 ENCOUNTER — APPOINTMENT (OUTPATIENT)
Dept: GENERAL RADIOLOGY | Facility: CLINIC | Age: 86
DRG: 551 | End: 2021-12-28
Attending: NURSE PRACTITIONER
Payer: COMMERCIAL

## 2021-12-28 LAB
ATRIAL RATE - MUSE: 64 BPM
DIASTOLIC BLOOD PRESSURE - MUSE: NORMAL MMHG
GLUCOSE BLDC GLUCOMTR-MCNC: 103 MG/DL (ref 70–99)
GLUCOSE BLDC GLUCOMTR-MCNC: 113 MG/DL (ref 70–99)
GLUCOSE BLDC GLUCOMTR-MCNC: 128 MG/DL (ref 70–99)
GLUCOSE BLDC GLUCOMTR-MCNC: 137 MG/DL (ref 70–99)
GLUCOSE BLDC GLUCOMTR-MCNC: 141 MG/DL (ref 70–99)
INR PPP: 1.78 (ref 0.85–1.15)
INTERPRETATION ECG - MUSE: NORMAL
P AXIS - MUSE: 86 DEGREES
PR INTERVAL - MUSE: 306 MS
QRS DURATION - MUSE: 130 MS
QT - MUSE: 466 MS
QTC - MUSE: 480 MS
R AXIS - MUSE: -75 DEGREES
SYSTOLIC BLOOD PRESSURE - MUSE: NORMAL MMHG
T AXIS - MUSE: 40 DEGREES
VENTRICULAR RATE- MUSE: 64 BPM

## 2021-12-28 PROCEDURE — 73030 X-RAY EXAM OF SHOULDER: CPT | Mod: RT

## 2021-12-28 PROCEDURE — 85610 PROTHROMBIN TIME: CPT | Performed by: PHYSICIAN ASSISTANT

## 2021-12-28 PROCEDURE — 120N000002 HC R&B MED SURG/OB UMMC

## 2021-12-28 PROCEDURE — 250N000013 HC RX MED GY IP 250 OP 250 PS 637: Performed by: NURSE PRACTITIONER

## 2021-12-28 PROCEDURE — 73030 X-RAY EXAM OF SHOULDER: CPT | Mod: 26 | Performed by: RADIOLOGY

## 2021-12-28 PROCEDURE — 250N000013 HC RX MED GY IP 250 OP 250 PS 637: Performed by: PHYSICIAN ASSISTANT

## 2021-12-28 PROCEDURE — 250N000013 HC RX MED GY IP 250 OP 250 PS 637: Performed by: SURGERY

## 2021-12-28 PROCEDURE — 99232 SBSQ HOSP IP/OBS MODERATE 35: CPT | Performed by: NURSE PRACTITIONER

## 2021-12-28 PROCEDURE — 36415 COLL VENOUS BLD VENIPUNCTURE: CPT | Performed by: PHYSICIAN ASSISTANT

## 2021-12-28 PROCEDURE — 97535 SELF CARE MNGMENT TRAINING: CPT | Mod: GO

## 2021-12-28 PROCEDURE — 250N000013 HC RX MED GY IP 250 OP 250 PS 637: Performed by: STUDENT IN AN ORGANIZED HEALTH CARE EDUCATION/TRAINING PROGRAM

## 2021-12-28 RX ORDER — LIDOCAINE 4 G/G
1 PATCH TOPICAL
Status: DISCONTINUED | OUTPATIENT
Start: 2021-12-28 | End: 2021-12-29 | Stop reason: HOSPADM

## 2021-12-28 RX ORDER — ACETAMINOPHEN 325 MG/1
650 TABLET ORAL EVERY 4 HOURS PRN
DISCHARGE
Start: 2021-12-28 | End: 2021-12-30

## 2021-12-28 RX ORDER — LIDOCAINE 4 G/G
1 PATCH TOPICAL EVERY 24 HOURS
DISCHARGE
Start: 2021-12-28 | End: 2022-03-11

## 2021-12-28 RX ORDER — WARFARIN SODIUM 2.5 MG/1
2.5 TABLET ORAL
Status: COMPLETED | OUTPATIENT
Start: 2021-12-28 | End: 2021-12-28

## 2021-12-28 RX ADMIN — ACETAMINOPHEN 650 MG: 325 TABLET, FILM COATED ORAL at 15:41

## 2021-12-28 RX ADMIN — ACETAMINOPHEN 650 MG: 325 TABLET, FILM COATED ORAL at 01:53

## 2021-12-28 RX ADMIN — WARFARIN SODIUM 2.5 MG: 2.5 TABLET ORAL at 18:04

## 2021-12-28 RX ADMIN — METOPROLOL SUCCINATE 100 MG: 100 TABLET, EXTENDED RELEASE ORAL at 07:48

## 2021-12-28 RX ADMIN — ROSUVASTATIN CALCIUM 5 MG: 5 TABLET, FILM COATED ORAL at 21:17

## 2021-12-28 RX ADMIN — OXYCODONE HYDROCHLORIDE AND ACETAMINOPHEN 1000 MG: 500 TABLET ORAL at 21:18

## 2021-12-28 RX ADMIN — POTASSIUM & SODIUM PHOSPHATES POWDER PACK 280-160-250 MG 1 PACKET: 280-160-250 PACK at 07:48

## 2021-12-28 RX ADMIN — LIDOCAINE 1 PATCH: 560 PATCH PERCUTANEOUS; TOPICAL; TRANSDERMAL at 10:34

## 2021-12-28 RX ADMIN — LISINOPRIL 40 MG: 20 TABLET ORAL at 07:48

## 2021-12-28 RX ADMIN — Medication 25 MCG: at 07:48

## 2021-12-28 RX ADMIN — CEPHALEXIN 250 MG: 250 CAPSULE ORAL at 07:48

## 2021-12-28 RX ADMIN — OXYCODONE HYDROCHLORIDE AND ACETAMINOPHEN 1000 MG: 500 TABLET ORAL at 07:48

## 2021-12-28 ASSESSMENT — ACTIVITIES OF DAILY LIVING (ADL)
ADLS_ACUITY_SCORE: 16
ADLS_ACUITY_SCORE: 17
ADLS_ACUITY_SCORE: 16
ADLS_ACUITY_SCORE: 17
ADLS_ACUITY_SCORE: 16
ADLS_ACUITY_SCORE: 17
ADLS_ACUITY_SCORE: 16
ADLS_ACUITY_SCORE: 16
ADLS_ACUITY_SCORE: 17
ADLS_ACUITY_SCORE: 16

## 2021-12-28 NOTE — PROGRESS NOTES
Care Management Follow Up    Length of Stay (days): 3    Expected Discharge Date: 12/29/2021     Concerns to be Addressed: Discharge planning      Patient plan of care discussed at interdisciplinary rounds: Yes    Anticipated Discharge Disposition: Short-term TCU placement       Anticipated Discharge Services: Short-term TCU placement  Anticipated Discharge DME: Guillen and [place] J; Holtor monitor x 2weeks    Patient/family educated on Medicare website which has current facility and service quality ratings: Yes    Education Provided on the Discharge Plan: Yes  Patient/Family in Agreement with the Plan: Yes     Referrals Placed by CM/SW: Post-acute care facilities     Private pay costs discussed: Not applicable    Additional Information:   is following for discharge planning; TCU placement is recommended. Per Stefan Quiñonez NP Trauma, patient is medically ready for discharge. Follow up calls placed to [facility] and [facility], referrals previously sent not received. Referrals resent today and will await decision.     MERE Basurto, [facility]  Email: [email or web address]  Pronouns: she/her/hers

## 2021-12-28 NOTE — PLAN OF CARE
Arrived from:  6B  Belongings/meds:  Clothings, suitcase, glasses w/ pt at bedside  2 RN Skin Assessment Completed by:  Brittney STODDARD RN & Radha RN  Non-intact findings documented (yes/no/NA): blanchable redness to buttocks and back, [place] j in place, bruising L arm, deleon in place

## 2021-12-28 NOTE — PLAN OF CARE
Status: L C2 lateral mass fracture d/t fall. Hx afib, mild cognitive impairment, tremors  Vitals: VSS, on RA.   Neuros: D/o time. Forgetful. Diminished hearing. Mild tremors RUE. Strengths BLE 4/5. 5/5 BUE.  IV: PIV SL  Labs/Electrolytes: Phos replaced  Resp/trach: denies SOB, WNL  Diet: Regular diet, good intake  Bowel status: Small BM today. Passing gas.  : Deleon in place. Hx of home straight cath. Pt to be discharged w/ deleon   Skin: [place] J on at all times. Blanchable redness to back and buttocks.  Pain: Mild pain, tylenol given x1, effective.  Activity: Ax1 GB and walker  Social: Daughter at bedside, suppportive  Plan: TCU recommended

## 2021-12-28 NOTE — PLAN OF CARE
Vitals: VSS on RA  Neuros: Alert and oriented x4, forgetful. 4/5 throughout. [place], BUE tremors  IV: PIV SL'd  Labs/Electrolytes: Phos replacement given this am.  Resp/trach: WNL on RA  Diet: Regular diet, takes pills whole in applesauce 1 at a time.   Bowel status: Bs+x4, + flatus.   : Guillen patent and draining.  Skin: Blanchable redness to buttocks-repositioning Q2h. Skin under c-collar intact.  Pain: C/o right shoulder pain. Heat pack applied, declined tylenol. MD notified. Lidocaine patch placed and Xray ordered.   Activity: Up with assist of 1, GB and walker. Sat up in chair today. [place] J on at all times.  Social: Son at bedside.  Plan: Continue to monitor and follow POC. TCU placement pending. Will need 2 week cardiac monitor at discharge per trauma note.

## 2021-12-28 NOTE — PROGRESS NOTES
[facility]  Trauma Service Progress Note    Date of Service (when I saw the patient): 12/28/2021     Assessment & Plan      Trauma Mechanism: unwitnessed fall  Date of Injury: 12/25/21  Known Injuries:  1. Left C2 lateral mass fracture     Neuro/Pain/Psych:  # Hx Mild Cognitive Impairment   # Tremor   - Head CT: No CT evidence for acute intracranial process.  - Head CTA: HEAD CTA: Normal CTA [place].     # Acute pain   - prn: Tylenol   Added Lidoderm patches to the right shoulder for discomfort     Pulmonary:  # Hx of PE 1970's  - Supplemental oxygen to keep saturation above 92 %.  - Incentive spirometer while awake      Cardiovascular:    # Hypertension   # Paroxysmal Afib, on Warfarin  # Mitral Valve Regurgitation   # Type II MI, admitted 8/29/21 with COVID 19 infection  # Trop elevations  - Troponin elevation felt due to trauma/ denies chest pain or shortness of breath. Cardiology was consulted, recommend a TTE which has been ordered and 2 week Holter monitor at discharge.  - Continues to have intermittent runs of Afib, ;s, occasional palpitations, denies shortness of breath.  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  - Unable to self cath at this time due to cervical rigid brace. Attempt removal in a week or so once mobility is improved at TCU  - Follows outpatient with Urology for chronic urinary retention issues  - electrolyte replacement protocol in place.      Endocrine:  - No management indication. Goal to  keep BG < 180 for optimal wound healing      Infectious disease:   # UTI prophylaxis, chronic    - Pt has recurrent UTIs from straight catheterization.   - Continue Keflex 250mg daily     # Diarrhea    - Pt had prior to admission.  - 12/25: C diff neg  - 12/26: Enteric Bacteria and Virus panel- negative  - PRN imodium available     Hematology:    # Acute blood loss anemia   # Coagulopathy, on Warfarin for PAF  - Hgb 12.1 on admission -> 10.1, suspect mostly dilutional with decrease in all cell lines, no overt signs of bleeding  - Pharmacy consult: Warfarin dosing  - Threshold for transfusion if hgb <7.0 or signs/symptoms of hypoperfusion.        Musculoskeletal:  # s/p Sacral nerve stimulator April 2017, Kb Pat;  [facility] OR  # Osteoarthritis  # Weakness and deconditioning of chronic illness   - Physical and occupational therapy consults.     # Left C2 lateral mass fracture  - C-spine CT: Questionable nondisplaced fracture involving the left lateral mass of C2. Cervical spine MRI would be helpful to evaluate for bone marrow edema that would confirm this as a recent fracture  - Neck CTA:  Normal neck CTA.  - Neurosurgery following: No neurosurgical intervention indicated at this time. Continue to wear c-collar, orthotics consult for MJ collar  - Upright Xrays in cervical collar completed  - Follow in clinic in 6 weeks with repeat C spine x rays     Skin:  - dilgent cares to prevent skin breakdown and wound formation.       Lines/ tubes/ drains:  - PIV      General Cares:                 PPI/H2 blocker:  NA                DVT prophylaxis: Continue on PTA Warfarin                Bowel Regimen/Date of last stool: 12/27                Pulmonary toilet: IS     Code status:  Full               Discharge goals:     Adequate pain management: yes    VSS x24 hours: yes    Hemoglobin stable x 48 hours: yes    Ambulating safely and/or therapy evals complete: PT eval pending    Drains/lines removed or plan in place to  manage: NA    Teaching done:     Other:  Expected D/C date: No barriers to discharge today, TCU placement pending. Will order 2 week cardiac monitor at discharge     Interval History   Reports right shoulder discomfort, some relief with Tylenol and heat. Also had some difficulty swallowing pills, did better crushed. No other acute issues overnight.  ROS x 8 negative with exception of those things listed in interval hx    Physical Exam   Temp: (!) 96.2  F (35.7  C) Temp src: Axillary BP: 127/64 Pulse: 75   Resp: 18 SpO2: 94 % O2 Device: None (Room air)    Vitals:    12/25/21 2325 12/26/21 1900 12/27/21 0600   Weight: 66.4 kg (146 lb 6.2 oz) 68.9 kg (151 lb 14.4 oz) 66.1 kg (145 lb 11.6 oz)     Vital Signs with Ranges  Temp:  [95.6  F (35.3  C)-98.4  F (36.9  C)] 96.2  F (35.7  C)  Pulse:  [64-88] 75  Resp:  [16-18] 18  BP: (117-141)/(57-70) 127/64  Cuff Mean (mmHg):  [88] 88  SpO2:  [94 %-98 %] 94 %  I/O last 3 completed shifts:  In: 1265 [P.O.:965; I.V.:300]  Out: 2725 [Urine:2725]    Vijay Coma Scale - Total 15/15  Eye Response (E): 4   4= spontaneous, 3= to verbal/voice, 2= to pain, 1= No response   Verbal Response (V): 5   5= Orientated, converses, 4= Confused, converses, 3= Inappropriate words, 2= Incomprehensible sounds, 1=No response   Motor Response (M): 6   6= Obeys commands, 5= Localizes to pain, 4= Withdrawal to pain, 3=Fexion to pain, 2= Extension to pain, 1= No response   Constitutional: Awake, alert, cooperative, no apparent distress.  ENT: Normocephalic, atraumatic, [place] cervical collar ON  Respiratory: No increased work of breathing, good air exchange, clear to auscultation bilaterally, no crackles or wheezing.  Cardiovascular:  regular rate and rhythm, normal S1 and S2   GI: Normal bowel sounds, abdomen soft, non-distended, non-tender, no guarding  Genitourinary:  Clear yellow urine in deleon bag  Skin:  Normal skin color, no redness, warmth, or swelling, no ecchymosis, no abrasions, and no  jaundice.  Musculoskeletal: There is no redness, warmth, or swelling of the joints.  Pedal pulse palpated.  Neurologic: Awake, alert, oriented. Strength and sensory is intact. No focal deficits.  Neuropsychiatric: Calm, normal eye contact, alert, affect appropriate to situation    RAY Gramajo CNP  To contact the trauma service use job code pager [phone],   Numeric texts or alpha text through [facility]

## 2021-12-28 NOTE — PROGRESS NOTES
Care Management Discharge Note    Discharge Date: 12/29/2021       Discharge Disposition:   [facility] ([phone])    Discharge Services:  Short term rehab placement at [facility]    Discharge DME:  pancho [facility] Holtor Monitor    Discharge Transportation:  DEE spoke with pt's floor nurse (Batsheva) who indicates that pt can transport by w/c.  DEE arranged for  [facility] (Max [phone]) to provide private pay w/c transport at 1pm.      Private pay costs discussed:   Pt and son Annie) have been informed that the cost of transport from [facility] to [facility] is private pay    PAS Confirmation Code:  [id number]  Patient/family educated on Medicare website which has current facility and service quality ratings:  yes    Education Provided on the Discharge Plan:  yes  Persons Notified of Discharge Plans:  Pt, son (Philip), 6A nursing and Stefan Quiñonez NP  Patient/Family in Agreement with the Plan:  yes    Handoff Referral Completed: Yes    Additional Information:  - Stefan Quiñonez NP, has confirmed readiness for discharge  - Admissions (Janel) at [facility] has confirmed that they will accept pt for admit on 12/29/2021.    - DEE will fax completed discharge orders and summary to [facility] when they become available.    TRISTIAN Schwartz  Social Work, 6A  Phone:  [phone]  Pager:  [phone]  12/28/2021            SHAJI Read

## 2021-12-28 NOTE — PLAN OF CARE
VSS on RA.  Pt c/o R shoulder/upper arm pain; resolved with PRN Tylenol x 1 and a warm pack.  D/o time, forgetful, [place], LUE tremors, strengths 4/5 t/o, and moderate hand grasps.  [place] J on at all times.  2 person skin check completed under collar; skin intact. L PIV SL.  On a regular diet with tray setup.  Pt had difficult time swallowing Tylenol, did better when crushed in pudding.  No difficulty drinking water t/o night.  Guillen in place with good UOP.  Pt straight caths at home baseline but will discharge to TCU with Guillen.  Loose BM's 12/27.  Up with assist of 1, GB, walker.  Blanchable redness to coccyx; repo'ed q2-3h.  PT/OT/SLP following; recommending TCU.  Continue with POC.

## 2021-12-28 NOTE — PLAN OF CARE
"BP (!) 141/64 (BP Location: Left arm, Patient Position: Semi-Longo's)   Pulse 79   Temp (!) 95.6  F (35.3  C) (Oral)   Resp 18   Ht 1.676 m (5' 6\")   Wt 66.1 kg (145 lb 11.6 oz)   SpO2 97%   BMI 23.52 kg/m      VSS. SR/Afib. RA. A&O x3-4. Tylenol given for neck pain. C collar in place. Tolerating diet. Guillen in place with good output. Small loose BMs. Up with 1 assist and walker. Transferred to  with all belongings, daughter at bedside. Continue to monitor.   "

## 2021-12-29 ENCOUNTER — LAB REQUISITION (OUTPATIENT)
Dept: LAB | Facility: CLINIC | Age: 86
End: 2021-12-29
Payer: COMMERCIAL

## 2021-12-29 ENCOUNTER — APPOINTMENT (OUTPATIENT)
Dept: CARDIOLOGY | Facility: CLINIC | Age: 86
DRG: 551 | End: 2021-12-29
Attending: NURSE PRACTITIONER
Payer: COMMERCIAL

## 2021-12-29 VITALS
SYSTOLIC BLOOD PRESSURE: 107 MMHG | BODY MASS INDEX: 23.42 KG/M2 | RESPIRATION RATE: 16 BRPM | OXYGEN SATURATION: 97 % | WEIGHT: 145.72 LBS | DIASTOLIC BLOOD PRESSURE: 53 MMHG | TEMPERATURE: 97.3 F | HEIGHT: 66 IN | HEART RATE: 67 BPM

## 2021-12-29 DIAGNOSIS — Z11.1 ENCOUNTER FOR SCREENING FOR RESPIRATORY TUBERCULOSIS: ICD-10-CM

## 2021-12-29 LAB
GLUCOSE BLDC GLUCOMTR-MCNC: 115 MG/DL (ref 70–99)
HOLD SPECIMEN: NORMAL
INR PPP: 1.89 (ref 0.85–1.15)
PHOSPHATE SERPL-MCNC: 3.6 MG/DL (ref 2.5–4.5)

## 2021-12-29 PROCEDURE — 93272 ECG/REVIEW INTERPRET ONLY: CPT | Performed by: INTERNAL MEDICINE

## 2021-12-29 PROCEDURE — 85610 PROTHROMBIN TIME: CPT | Performed by: PHYSICIAN ASSISTANT

## 2021-12-29 PROCEDURE — 99239 HOSP IP/OBS DSCHRG MGMT >30: CPT | Performed by: NURSE PRACTITIONER

## 2021-12-29 PROCEDURE — 250N000013 HC RX MED GY IP 250 OP 250 PS 637: Performed by: STUDENT IN AN ORGANIZED HEALTH CARE EDUCATION/TRAINING PROGRAM

## 2021-12-29 PROCEDURE — 93270 REMOTE 30 DAY ECG REV/REPORT: CPT

## 2021-12-29 PROCEDURE — 36415 COLL VENOUS BLD VENIPUNCTURE: CPT | Performed by: PHYSICIAN ASSISTANT

## 2021-12-29 PROCEDURE — 250N000013 HC RX MED GY IP 250 OP 250 PS 637: Performed by: PHYSICIAN ASSISTANT

## 2021-12-29 PROCEDURE — 84100 ASSAY OF PHOSPHORUS: CPT | Performed by: SURGERY

## 2021-12-29 PROCEDURE — 250N000013 HC RX MED GY IP 250 OP 250 PS 637: Performed by: NURSE PRACTITIONER

## 2021-12-29 RX ORDER — MULTIVIT-MIN/IRON/FOLIC ACID/K 18-600-40
1 CAPSULE ORAL EVERY EVENING
Status: ON HOLD | COMMUNITY
End: 2021-12-29

## 2021-12-29 RX ORDER — ASCORBIC ACID 500 MG
500 TABLET ORAL 2 TIMES DAILY
Status: ON HOLD | COMMUNITY
End: 2021-12-29

## 2021-12-29 RX ORDER — MULTIVIT-MIN/IRON/FOLIC ACID/K 18-600-40
1 CAPSULE ORAL EVERY EVENING
DISCHARGE
Start: 2021-12-29 | End: 2022-03-15

## 2021-12-29 RX ORDER — LOPERAMIDE HCL 2 MG
2 CAPSULE ORAL 4 TIMES DAILY PRN
DISCHARGE
Start: 2021-12-29 | End: 2022-05-02

## 2021-12-29 RX ORDER — LISINOPRIL 40 MG/1
40 TABLET ORAL DAILY
Qty: 90 TABLET | Refills: 3 | DISCHARGE
Start: 2021-12-29 | End: 2021-12-29

## 2021-12-29 RX ORDER — LISINOPRIL 40 MG/1
40 TABLET ORAL DAILY
Qty: 90 TABLET | Refills: 3 | DISCHARGE
Start: 2021-12-29 | End: 2022-01-18

## 2021-12-29 RX ORDER — METOPROLOL SUCCINATE 100 MG/1
100 TABLET, EXTENDED RELEASE ORAL DAILY
Qty: 90 TABLET | Refills: 3 | DISCHARGE
Start: 2021-12-29 | End: 2022-03-01

## 2021-12-29 RX ORDER — ASCORBIC ACID 500 MG
500 TABLET ORAL 2 TIMES DAILY
DISCHARGE
Start: 2021-12-29 | End: 2022-03-15

## 2021-12-29 RX ORDER — ROSUVASTATIN CALCIUM 5 MG/1
5 TABLET, COATED ORAL AT BEDTIME
Qty: 90 TABLET | Refills: 3 | DISCHARGE
Start: 2021-12-29 | End: 2022-03-15

## 2021-12-29 RX ORDER — ROSUVASTATIN CALCIUM 5 MG/1
5 TABLET, COATED ORAL AT BEDTIME
Qty: 90 TABLET | Refills: 3 | DISCHARGE
Start: 2021-12-29 | End: 2021-12-29

## 2021-12-29 RX ORDER — OMEGA-3/DHA/EPA/FISH OIL 60 MG-90MG
500 CAPSULE ORAL EVERY MORNING
COMMUNITY
End: 2022-03-15

## 2021-12-29 RX ORDER — ASCORBIC ACID 500 MG
500 TABLET ORAL 2 TIMES DAILY
DISCHARGE
Start: 2021-12-29 | End: 2021-12-29

## 2021-12-29 RX ADMIN — METOPROLOL SUCCINATE 100 MG: 100 TABLET, EXTENDED RELEASE ORAL at 09:04

## 2021-12-29 RX ADMIN — CEPHALEXIN 250 MG: 250 CAPSULE ORAL at 09:04

## 2021-12-29 RX ADMIN — ACETAMINOPHEN 650 MG: 325 TABLET, FILM COATED ORAL at 04:30

## 2021-12-29 RX ADMIN — OXYCODONE HYDROCHLORIDE AND ACETAMINOPHEN 1000 MG: 500 TABLET ORAL at 09:04

## 2021-12-29 RX ADMIN — LIDOCAINE 1 PATCH: 560 PATCH PERCUTANEOUS; TOPICAL; TRANSDERMAL at 09:03

## 2021-12-29 RX ADMIN — LISINOPRIL 40 MG: 20 TABLET ORAL at 09:04

## 2021-12-29 RX ADMIN — Medication 25 MCG: at 09:04

## 2021-12-29 ASSESSMENT — ACTIVITIES OF DAILY LIVING (ADL)
ADLS_ACUITY_SCORE: 16

## 2021-12-29 NOTE — PLAN OF CARE
Occupational Therapy Discharge Summary    Reason for therapy discharge:    Discharged to acute rehabilitation facility.    Progress towards therapy goal(s). See goals on Care Plan in [facility] electronic health record for goal details.  Goals partially met.  Barriers to achieving goals:   discharge from facility.    Therapy recommendation(s):    Continued therapy is recommended.  Rationale/Recommendations:  Requiring maximized ADL/IADL/mobility IND prior to return to natural living environment.  Requiring additional skilled ADL training, improved ax tolerance, and strengthening.

## 2021-12-29 NOTE — TELEPHONE ENCOUNTER
Patient discharged to TCU - [facility] ([phone]).    Sonia Weller RN   [facility]  [facility]

## 2021-12-29 NOTE — PHARMACY-ADMISSION MEDICATION HISTORY
Admission Medication History Completed by Pharmacy    See [facility] Admission Navigator for allergy information, preferred outpatient pharmacy, prior to admission medications and immunization status.     Medication History Sources:     Bhargavi (daughter)     Surescripts fill history    Changes made to PTA medication list (reason):    Added: None    Deleted: None    Changed:   o Updated dose for vitamin C  o Updated dose and directions for fish oil  o Updated dose for vitamin D    Additional Information:    Patient's daughter (Bhargavi) was a good historian of pt's medications. She helps pt with her medications.     Prior to Admission medications    Medication Sig Last Dose Taking? Auth Provider          carboxymethylcellulose (CELLUVISC/REFRESH LIQUIGEL) 1 % ophthalmic solution Place 1 drop into both eyes every morning As needed Unknown at Unknown time Yes Unknown, Entered By History   Catheters (GENTLECATH URINARY CATHETER) MISC 1 catheter 4 times daily Unknown at Unknown time Yes Linda Stearns MD   cephALEXin (KEFLEX) 250 MG capsule Take 1 capsule (250 mg) by mouth daily 12/24/2021 at AM Yes Bernadine Maria MD   Cholecalciferol (VITAMIN D) 50 MCG (2000 UT) CAPS Take 1 capsule by mouth every evening 12/24/2021 at PM Yes Unknown, Entered By History   fish oil-omega-3 fatty acids 500 MG capsule Take 500 mg by mouth every morning 12/24/2021 at AM Yes Unknown, Entered By History          lisinopril (ZESTRIL) 40 MG tablet Take 1 tablet (40 mg) by mouth daily 12/24/2021 at AM Yes Nancy Salgado APRN CNP   rosuvastatin (CRESTOR) 5 MG tablet Take 1 tablet (5 mg) by mouth At Bedtime 12/24/2021 at PM Yes Nancy Salgado APRN CNP   sodium chloride (PETER 128) 5 % ophthalmic ointment Place 1 Application into both eyes At Bedtime Unknown at Unknown time Yes Unknown, Entered By History   vitamin C (ASCORBIC ACID) 500 MG tablet Take 500 mg by mouth 2 times daily 12/24/2021 at PM Yes Unknown, Entered By  History   warfarin ANTICOAGULANT (COUMADIN) 2.5 MG tablet Take one tablet (2.5 mg) by mouth on Sun, Tue and Thurs; take a half tablet (1.25 mg) all other days; or as directed by INR clinic 12/24/2021 at PM Yes Nancy Salgado APRN CNP   metoprolol succinate ER (TOPROL-XL) 100 MG 24 hr tablet Take 1 tablet (100 mg) by mouth daily  Patient taking differently: Take 100 mg by mouth every evening  12/24/2021 at PM  Nancy Salgado APRN CNP       Date completed: 12/29/21    Medication history completed by: Jennifer Willoughby [facility]

## 2021-12-29 NOTE — PLAN OF CARE
Status:  s/p L C2 lateral mass fracture d/t fall.  VS: VSS on RA ex HTN w/in parameters  Neuros: A&Ox3, d/o to time, mod grasps, BUE tremors, 4/5 TO, forgetful  GI: Tolerating regular diet, small BM, passing a lot of gas, still feels constipated, wanting to manage w/diet  : Deleon; st caths at baseline; good output, will d/c to TCU w/deleon; switched to leg bag prior to d/c  IV: PIV removed  Activity: Ax1 w/gb and walker; [place] J at all times  Pain: R shoulder discomfort managed w/lidocaine patch  Skin: Skin intact underneath [place] J  Labs/Tests: WNL  Social: Daughter at bedside, supportive  Plan of care: Pt discharged to [facility] TCU at 1300 w/[facility] transport. Packet sent w/pt. Daughter took all of pts belongings. Report given to facility RN

## 2021-12-29 NOTE — PLAN OF CARE
VSS on RA.  Pt having intermittent R shoulder pain with movement; received PRN Tylenol x 1.  D/o time, forgetful, [place], BUE tremors, strengths 4/5 t/o, and moderate hand grasps.  [place] J on at all times.  Skin check completed under collar.  Blanchable redness noted to upper back; c-collar pads adjusted. L PIV SL.  On a regular diet with tray setup. Guillen in place with good UOP.  Pt straight caths at home baseline but will discharge to TCU with Guillen.  Had small BM 12/28; pt reports constipation.  Up with assist of 1, GB, walker.  Repo'ed q2-3h.  Pt discharging to [facility] today at 1300.  Pt will need cardiac event monitor prior to discharge.  Continue with POC.

## 2021-12-29 NOTE — PLAN OF CARE
Vitals: VSS on RA  Neuros: D/O to time this shift, forgetful. 4/5 throughout. [place], BUE tremors  IV: PIV SL'd  Labs/Electrolytes: Redraws in morning   Resp/trach: WNL on RA  Diet: Regular diet, took pills whole W/ water this shift   Bowel status: Bs+x4, + flatus. No MB this shift   : Guillen in place   Skin: Blanchable redness to buttocks-repositioning Q2h. Skin under c-collar intact.  Pain: C/o right shoulder pain.tylenol given.  Activity: Up with assist of 1, GB and walker. Sat up in chair today. [place] J on at all times.  Social: Son at bedside.  Plan: Continue to monitor and follow POC. Discharging tomorrow at 1PM. Pt will be discharging with collarpancho, and holtor monitor.

## 2021-12-29 NOTE — PLAN OF CARE
Physical Therapy Discharge Summary    Reason for therapy discharge:    Discharged to transitional care facility.    Progress towards therapy goal(s). See goals on Care Plan in [facility] electronic health record for goal details.  Goals partially met.  Barriers to achieving goals:   discharge from facility.    Therapy recommendation(s):    Continued therapy is recommended.  Rationale/Recommendations:  TCU.

## 2021-12-30 ENCOUNTER — PATIENT OUTREACH (OUTPATIENT)
Dept: CARE COORDINATION | Facility: CLINIC | Age: 86
End: 2021-12-30

## 2021-12-30 ENCOUNTER — TELEPHONE (OUTPATIENT)
Dept: NEUROSURGERY | Facility: CLINIC | Age: 86
End: 2021-12-30

## 2021-12-30 ENCOUNTER — TRANSITIONAL CARE UNIT VISIT (OUTPATIENT)
Dept: GERIATRICS | Facility: CLINIC | Age: 86
End: 2021-12-30
Payer: COMMERCIAL

## 2021-12-30 VITALS
RESPIRATION RATE: 16 BRPM | DIASTOLIC BLOOD PRESSURE: 85 MMHG | HEIGHT: 66 IN | SYSTOLIC BLOOD PRESSURE: 144 MMHG | BODY MASS INDEX: 23.3 KG/M2 | HEART RATE: 93 BPM | WEIGHT: 145 LBS | TEMPERATURE: 98.9 F | OXYGEN SATURATION: 96 %

## 2021-12-30 DIAGNOSIS — H04.123 DRY EYES: ICD-10-CM

## 2021-12-30 DIAGNOSIS — N18.30 STAGE 3 CHRONIC KIDNEY DISEASE, UNSPECIFIED WHETHER STAGE 3A OR 3B CKD (H): ICD-10-CM

## 2021-12-30 DIAGNOSIS — R19.5 LOOSE STOOLS: ICD-10-CM

## 2021-12-30 DIAGNOSIS — N17.9 ACUTE KIDNEY INJURY (H): ICD-10-CM

## 2021-12-30 DIAGNOSIS — R79.89 ELEVATED TROPONIN: ICD-10-CM
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
## 2021-12-30 DIAGNOSIS — R33.9 CHRONIC RETENTION OF URINE: ICD-10-CM

## 2021-12-30 DIAGNOSIS — S12.191G OTHER CLOSED NONDISPLACED FRACTURE OF SECOND CERVICAL VERTEBRA WITH DELAYED HEALING, SUBSEQUENT ENCOUNTER: ICD-10-CM

## 2021-12-30 DIAGNOSIS — I48.0 PAROXYSMAL ATRIAL FIBRILLATION (H): ICD-10-CM

## 2021-12-30 DIAGNOSIS — R55 PRE-SYNCOPE: ICD-10-CM

## 2021-12-30 DIAGNOSIS — G89.11 ACUTE PAIN DUE TO INJURY: ICD-10-CM

## 2021-12-30 DIAGNOSIS — M51.379 DEGENERATION OF LUMBAR OR LUMBOSACRAL INTERVERTEBRAL DISC: ICD-10-CM

## 2021-12-30 DIAGNOSIS — R53.81 DEBILITY: ICD-10-CM

## 2021-12-30 DIAGNOSIS — S12.101A CLOSED NONDISPLACED FRACTURE OF SECOND CERVICAL VERTEBRA, UNSPECIFIED FRACTURE MORPHOLOGY, INITIAL ENCOUNTER (H): ICD-10-CM

## 2021-12-30 DIAGNOSIS — Z87.440 HISTORY OF RECURRENT UTIS: ICD-10-CM

## 2021-12-30 PROCEDURE — 86481 TB AG RESPONSE T-CELL SUSP: CPT | Performed by: INTERNAL MEDICINE

## 2021-12-30 PROCEDURE — P9603 ONE-WAY ALLOW PRORATED MILES: HCPCS | Performed by: INTERNAL MEDICINE

## 2021-12-30 PROCEDURE — 36415 COLL VENOUS BLD VENIPUNCTURE: CPT | Performed by: INTERNAL MEDICINE

## 2021-12-30 PROCEDURE — 99309 SBSQ NF CARE MODERATE MDM 30: CPT | Performed by: NURSE PRACTITIONER

## 2021-12-30 RX ORDER — ACETAMINOPHEN 500 MG
1000 TABLET ORAL 3 TIMES DAILY
Start: 2021-12-30 | End: 2022-03-15

## 2021-12-30 RX ORDER — SILICONE ADHESIVE 1.5" X 3"
1-2 SHEET (EA) TOPICAL AT BEDTIME
Start: 2021-12-30 | End: 2022-01-08

## 2021-12-30 ASSESSMENT — MIFFLIN-ST. JEOR: SCORE: 1099.47

## 2021-12-30 ASSESSMENT — ACTIVITIES OF DAILY LIVING (ADL): DEPENDENT_IADLS:: MEDICATION MANAGEMENT;TRANSPORTATION;CLEANING;COOKING;LAUNDRY;SHOPPING

## 2021-12-30 NOTE — LETTER
[facility]   To:   [facility]          Please give to facility    From:   Zafar Connor  [facility]  Care Coordinator   [facility]   P: [phone]  [email or web address]   Patient Name:  Maia Miranda YOB: 1932   Admit date: 12/29/2021      *Information Needed:  Please contact me when the patient will discharge (or if they will move to long term care)- include the discharge date, disposition, and main diagnosis   - If the patient is discharged with home care services, please provide the name of the agency    Also- Please inform me if a care conference is being held.   Phone, Fax or Email with information                              Thank you

## 2021-12-30 NOTE — PROGRESS NOTES
[facility] GERIATRIC SERVICES    PRIMARY CARE PROVIDER AND CLINIC:  RAY Patel CNP, [address] [facility] [address]  Chief Complaint   Patient presents with     Hospital F/U      [place] Medical Record Number:  [id number]  Place of Service where encounter took place:  [facility] ([facility]) [[id number]]    Maia Miranda  is a 89 year old  (10/26/1932), admitted to the above facility from  [facility]. Hospital stay 12/25/21 through 12/29/21..     HPI:    Mrs. Miranda is a 88 y/o with a PMH of PE some years ago, P-A-fib on OAC, CKD III, and chronic urine retention and self-cath's and reports last few weeks loose stools.  She does not quite remember but per reports was pre-syncope, fell and encephalopathic, thus presented to the ER.  Found to have a C2 lateral mass fracture.  Also noted to have IFEOMA and elevated troponins.  Seen by NeuroSurg whom noted conservative measures, Rigid C-collar for at least 6 weeks.  Due to her not being able to look down, a deleon was placed as she is no longer able to self cath right now.  Troponins were elevated and they are trending down.  Seen by Cardiology whom noted either non ischemic MI vs. Demand ischemia, unclear; she discharged with a cardiac monitor for 2 weeks.  Due to loose stools, C-diff/enteric pathogen done and negative.  Due to improvement, she now has transitioned to the TCU here for ongoing management and PT/OT.     In meeting Mrs. Miranda she admits she does not remember much of the day she fell.  She does not think she was light headed/dizzy, but not sure.  Does not think she had that issue prior.  Does endorse ongoing loose stools for several weeks now, no N/V, just loose stools.  Reports her self-cath's 6/day were going well prior to fall.  Currently she does endorse 5/10 cervical pain, reports the Acetaminophen helps.  She has no other complaints.      CODE STATUS/ADVANCE DIRECTIVES DISCUSSION:  No CPR- Do  NOT Intubate  DNR / DNI    ALLERGIES:   Allergies   Allergen Reactions     Codeine Nausea and Vomiting     Other reaction(s): GI Intolerance  Other reaction(s): GI Intolerance, Vomiting     Meperidine Nausea and Vomiting     Other reaction(s): GI Intolerance  Other reaction(s): GI Intolerance, Vomiting     Morphine Nausea and Vomiting     Other reaction(s): GI Intolerance  vomiting  Other reaction(s): GI Intolerance, Vomiting     Penicillins Hives     hives     Sulfa Drugs      Other reaction(s): GI Intolerance  Vomiting    Other reaction(s): GI Intolerance     Epinephrine Palpitations     Other reaction(s): Other (See Comments)  Makes heart race  Other reaction(s): Other (See Comments), Tachycardia  Makes heart race        PAST MEDICAL HISTORY:   Past Medical History:   Diagnosis Date     Amnestic MCI (mild cognitive impairment with memory loss) 2014     Chronic duodenal ulcer without mention of hemorrhage, perforation, or obstruction 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 Warner, [facility] yearly     Esophageal reflux 2001    Abstracted 06/10/02     Essential and other specified forms of tremor 2004    resting tremor     Generalized osteoarthrosis, unspecified site     Hands     Hiatal hernia     diagnosed late 1990s [place], [place]     History of pulmonary embolism 1971    no source found     LACTOSE INTOLERANCE      Lumbago     sees Kodi Guidry     Mitral valve regurgitation      Occlusion and stenosis of carotid artery without mention of cerebral infarction 2003    CAROTID US NORMAL, bruit in neck     Osteoporosis, unspecified 2003    T = -2.66     Paroxysmal atrial fibrillation (H) 11/15/2013     Spider veins      Unspecified essential hypertension      Unspecified tinnitus 2005    mild hearing loss, saw ENT      PAST SURGICAL HISTORY:   has a past surgical history that includes NONSPECIFIC PROCEDURE; NONSPECIFIC PROCEDURE; NONSPECIFIC  PROCEDURE (1958); NONSPECIFIC PROCEDURE (1971); NONSPECIFIC PROCEDURE (1/03); Esophagoscopy, gastroscopy, duodenoscopy (EGD), combined (7/8/2013); corneal scraping; cataract iol, rt/lt; Cystoscopy, biopsy bladder, combined (N/A, 7/25/2016); Implant stimulator sacral nerve stage one (N/A, 4/4/2017); interstim placement; Implant stimulator sacral nerve stage two (N/A, 4/18/2017); and Cystoscopy.  FAMILY HISTORY: family history includes Alzheimer Disease in her maternal grandmother; Cardiovascular in her sister; Cerebrovascular Disease in her father; Psychotic Disorder in her mother.  SOCIAL HISTORY:   reports that she has never smoked. She has never used smokeless tobacco. She reports that she does not drink alcohol and does not use drugs.  Patient's living condition: lives with family, adult child(mackenzie)     Post Discharge Medication Reconciliation Status: discharge medications reconciled and changed, per note/orders     Current Outpatient Medications   Medication Sig     acetaminophen (TYLENOL) 500 MG tablet Take 2 tablets (1,000 mg) by mouth 3 times daily     sodium chloride (PETER 128) 5 % ophthalmic solution Place 1-2 drops into both eyes At Bedtime     carboxymethylcellulose (REFRESH LIQUIGEL) 1 % ophthalmic solution Place 1 drop into both eyes every morning As needed     Catheters (GENTLECATH URINARY CATHETER) MISC 1 catheter 4 times daily     cephALEXin (KEFLEX) 250 MG capsule Take 1 capsule (250 mg) by mouth daily     Cholecalciferol (VITAMIN D) 50 MCG (2000 UT) CAPS Take 1 capsule by mouth every evening     fish oil-omega-3 fatty acids 500 MG capsule Take 500 mg by mouth every morning     Lidocaine (LIDOCARE) 4 % Patch Place 1 patch onto the skin every 24 hours To prevent lidocaine toxicity, patient should be patch free for 12 hrs daily. To right shoulder     lisinopril (ZESTRIL) 40 MG tablet Take 1 tablet (40 mg) by mouth daily     loperamide (IMODIUM) 2 MG capsule Take 1 capsule (2 mg) by mouth 4 times daily  "as needed for diarrhea     metoprolol succinate ER (TOPROL-XL) 100 MG 24 hr tablet Take 1 tablet (100 mg) by mouth daily     rosuvastatin (CRESTOR) 5 MG tablet Take 1 tablet (5 mg) by mouth At Bedtime     vitamin C (ASCORBIC ACID) 500 MG tablet Take 1 tablet (500 mg) by mouth 2 times daily     warfarin ANTICOAGULANT (COUMADIN) 2.5 MG tablet Take one tablet (2.5 mg) by mouth on Sun, Tue and Thurs; take a half tablet (1.25 mg) all other days; or as directed by INR clinic     No current facility-administered medications for this visit.     Facility-Administered Medications Ordered in Other Visits   Medication     sodium chloride (PF) 0.9% PF flush 10 mL     sodium chloride (PF) 0.9% PF flush 5-10 mL     sodium chloride bacteriostatic 0.9 % flush 0-1 mL     ROS:  7 point ROS done including, light headedness/dizziness, fever/chills, pain, Resp, CV, GI, and  and is negative other than noted in HPI.      Vitals:  BP (!) 144/85   Pulse 93   Temp 98.9  F (37.2  C)   Resp 16   Ht 1.676 m (5' 6\")   Wt 65.8 kg (145 lb)   SpO2 96%   BMI 23.40 kg/m       Exam:  GENERAL APPEARANCE:  Elderly female resting in bed, NAD, non-toxic.  ENT:  Mouth and oropharynx normal, moist mucous membranes.   RESP:  Lungs CTA.  Regular relaxed breathing effort.  No cough.   CV: S1/S2 no murmur or rubs.  Regular rhythm and rate.  No generalized edema.  Cardiac monitor in place.   ABDOMEN:   Flat, soft, non-tender with active bowel sounds.  No guarding, rigidity, or rebound tenderness.  EXTREMITIES:  No lower extremity edema, no calf tenderness.   PSYCH: Alert and orientated, pleasant and cooperative.   NEURO: C collar in place.   LINES: Guillen cath in place draining y/c urine.     Lab/Diagnostic data:  I have personally reviewed labs, which are in facility or EMR chart.     ASSESSMENT/PLAN:  (W19.XXXD) Fall, subsequent encounter   (S12.191G) Other closed nondisplaced fracture of second cervical vertebra with delayed healing, subsequent " encounter  (G89.11) Acute pain due to injury  Notes indicate C-collar continuous for 6 weeks.  She does endorse 5/10 cervical pain, but reports Acetaminophen helps.    NeuroSurg did want a MRI but due to a sacral nerve stimulator (unclear if safe for MRI) this it was not pursued.   -Continue C-collar continuous for 6 weeks.   -NeuroSurg follow up in 6 weeks.   -Changed Acetaminophen to 1000 mg's TID.     (R19.5) Loose stools  (R55) Pre-syncope  She did not have light headedness prior, does not remember if dizzy that day of the fall.  Currently denies any light headedness.  Hemodynamically stable.  She reports the stools are still soft, but slightly improved, no N/V or cramps.  C-diff/enteric pathogen's negative in hospital.   -No changes, continue to clinically monitor.   -Continues PRN Loperamide.     (R77.8) Elevated troponin  (I48.0) Paroxysmal atrial fibrillation (H)  Has known P-A-Fib on OAC.  Troponins elevated but trending down.  Seen by Cards, unclear if demand ischemia or non-ischemic MI.   -Has Cardiac monitor in place for two weeks.   -Continues on Warfarin.  -Continues on Statin.   --Cardiology f/u in two weeks.   --Will get lab's/troponin on Yuri 3 and verify still trending down.     (N17.9) Acute kidney injury (H)  (N18.30) Stage 3 chronic kidney disease, unspecified whether stage 3a or 3b CKD (H)  (R33.9) Chronic retention of urine  (Z87.440) History of recurrent UTIs  Creatine baseline around 0.9-1.1, was 1.03 when last checked 12/27.    Reports she was straight/self cath ing 6x/day at home prior with no issues.   Currently has a deleon in place, not able to straight cath due to C-collar.   Is on chronic UTI suppression/prophylatix.   -Continue deleon cath for now, will need to see if daughter is comfortable learning straight cathing, if not, likely continue deleon until C Collar removed.   -Continues chronic daily Cephalexin.   -Repeat Creatine 1/3.     (R53.81) Debility  -PT/OT to eval and treat.      Orders:  -As noted above.    Electronically signed by:  RAY Dacosta CNP

## 2021-12-30 NOTE — TELEPHONE ENCOUNTER
[facility] Call Center    Phone Message    May a detailed message be left on voicemail: yes     Reason for Call: Other: Please call [facility] at [phone] to assist with scheduling a 6 week follow up per Dr. Quiñonez.     Action Taken: Message routed to:  [facility] ([facility] Neurosurgery    Travel Screening: Not Applicable

## 2021-12-30 NOTE — LETTER
12/30/2021        RE: Maia Miranda  [address] [place] [address]        [facility] GERIATRIC SERVICES    PRIMARY CARE PROVIDER AND CLINIC:  RAY Patel CNP, [address] [facility] [address] [facility] [address]  Chief Complaint   Patient presents with     Hospital F/U      [place] Medical Record Number:  [id number]  Place of Service where encounter took place:  [facility] ([facility]) [[id number]]    Maia Miranda  is a 89 year old  (10/26/1932), admitted to the above facility from  [facility]. Hospital stay 12/25/21 through 12/29/21..     HPI:    Mrs. Miranda is a 88 y/o with a PMH of PE some years ago, P-A-fib on OAC, CKD III, and chronic urine retention and self-cath's and reports last few weeks loose stools.  She does not quite remember but per reports was pre-syncope, fell and encephalopathic, thus presented to the ER.  Found to have a C2 lateral mass fracture.  Also noted to have IFEOMA and elevated troponins.  Seen by NeuroSurg whom noted conservative measures, Rigid C-collar for at least 6 weeks.  Due to her not being able to look down, a deleon was placed as she is no longer able to self cath right now.  Troponins were elevated and they are trending down.  Seen by Cardiology whom noted either non ischemic MI vs. Demand ischemia, unclear; she discharged with a cardiac monitor for 2 weeks.  Due to loose stools, C-diff/enteric pathogen done and negative.  Due to improvement, she now has transitioned to the TCU here for ongoing management and PT/OT.     In meeting Mrs. Miranda she admits she does not remember much of the day she fell.  She does not think she was light headed/dizzy, but not sure.  Does not think she had that issue prior.  Does endorse ongoing loose stools for several weeks now, no N/V, just loose stools.  Reports her self-cath's 6/day were going well prior to fall.  Currently she does endorse 5/10 cervical pain, reports the Acetaminophen  helps.  She has no other complaints.      CODE STATUS/ADVANCE DIRECTIVES DISCUSSION:  No CPR- Do NOT Intubate  DNR / DNI    ALLERGIES:   Allergies   Allergen Reactions     Codeine Nausea and Vomiting     Other reaction(s): GI Intolerance  Other reaction(s): GI Intolerance, Vomiting     Meperidine Nausea and Vomiting     Other reaction(s): GI Intolerance  Other reaction(s): GI Intolerance, Vomiting     Morphine Nausea and Vomiting     Other reaction(s): GI Intolerance  vomiting  Other reaction(s): GI Intolerance, Vomiting     Penicillins Hives     hives     Sulfa Drugs      Other reaction(s): GI Intolerance  Vomiting    Other reaction(s): GI Intolerance     Epinephrine Palpitations     Other reaction(s): Other (See Comments)  Makes heart race  Other reaction(s): Other (See Comments), Tachycardia  Makes heart race        PAST MEDICAL HISTORY:   Past Medical History:   Diagnosis Date     Amnestic MCI (mild cognitive impairment with memory loss) 2014     Chronic duodenal ulcer without mention of hemorrhage, perforation, or obstruction 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 Warner, [facility] yearly     Esophageal reflux 2001    Abstracted 06/10/02     Essential and other specified forms of tremor 2004    resting tremor     Generalized osteoarthrosis, unspecified site     Hands     Hiatal hernia     diagnosed late 1990s [place], [place]     History of pulmonary embolism 1971    no source found     LACTOSE INTOLERANCE      Lumbago     sees Kodi Guidry     Mitral valve regurgitation      Occlusion and stenosis of carotid artery without mention of cerebral infarction 2003    CAROTID US NORMAL, bruit in neck     Osteoporosis, unspecified 2003    T = -2.66     Paroxysmal atrial fibrillation (H) 11/15/2013     Spider veins      Unspecified essential hypertension      Unspecified tinnitus 2005    mild hearing loss, saw ENT      PAST SURGICAL HISTORY:    has a past surgical history that includes NONSPECIFIC PROCEDURE; NONSPECIFIC PROCEDURE; NONSPECIFIC PROCEDURE (1958); NONSPECIFIC PROCEDURE (1971); NONSPECIFIC PROCEDURE (1/03); Esophagoscopy, gastroscopy, duodenoscopy (EGD), combined (7/8/2013); corneal scraping; cataract iol, rt/lt; Cystoscopy, biopsy bladder, combined (N/A, 7/25/2016); Implant stimulator sacral nerve stage one (N/A, 4/4/2017); interstim placement; Implant stimulator sacral nerve stage two (N/A, 4/18/2017); and Cystoscopy.  FAMILY HISTORY: family history includes Alzheimer Disease in her maternal grandmother; Cardiovascular in her sister; Cerebrovascular Disease in her father; Psychotic Disorder in her mother.  SOCIAL HISTORY:   reports that she has never smoked. She has never used smokeless tobacco. She reports that she does not drink alcohol and does not use drugs.  Patient's living condition: lives with family, adult child(mackenzie)     Post Discharge Medication Reconciliation Status: discharge medications reconciled and changed, per note/orders     Current Outpatient Medications   Medication Sig     acetaminophen (TYLENOL) 500 MG tablet Take 2 tablets (1,000 mg) by mouth 3 times daily     sodium chloride (PETER 128) 5 % ophthalmic solution Place 1-2 drops into both eyes At Bedtime     carboxymethylcellulose (REFRESH LIQUIGEL) 1 % ophthalmic solution Place 1 drop into both eyes every morning As needed     Catheters (GENTLECATH URINARY CATHETER) MISC 1 catheter 4 times daily     cephALEXin (KEFLEX) 250 MG capsule Take 1 capsule (250 mg) by mouth daily     Cholecalciferol (VITAMIN D) 50 MCG (2000 UT) CAPS Take 1 capsule by mouth every evening     fish oil-omega-3 fatty acids 500 MG capsule Take 500 mg by mouth every morning     Lidocaine (LIDOCARE) 4 % Patch Place 1 patch onto the skin every 24 hours To prevent lidocaine toxicity, patient should be patch free for 12 hrs daily. To right shoulder     lisinopril (ZESTRIL) 40 MG tablet Take 1 tablet (40  "mg) by mouth daily     loperamide (IMODIUM) 2 MG capsule Take 1 capsule (2 mg) by mouth 4 times daily as needed for diarrhea     metoprolol succinate ER (TOPROL-XL) 100 MG 24 hr tablet Take 1 tablet (100 mg) by mouth daily     rosuvastatin (CRESTOR) 5 MG tablet Take 1 tablet (5 mg) by mouth At Bedtime     vitamin C (ASCORBIC ACID) 500 MG tablet Take 1 tablet (500 mg) by mouth 2 times daily     warfarin ANTICOAGULANT (COUMADIN) 2.5 MG tablet Take one tablet (2.5 mg) by mouth on Sun, Tue and Thurs; take a half tablet (1.25 mg) all other days; or as directed by INR clinic     No current facility-administered medications for this visit.     Facility-Administered Medications Ordered in Other Visits   Medication     sodium chloride (PF) 0.9% PF flush 10 mL     sodium chloride (PF) 0.9% PF flush 5-10 mL     sodium chloride bacteriostatic 0.9 % flush 0-1 mL     ROS:  7 point ROS done including, light headedness/dizziness, fever/chills, pain, Resp, CV, GI, and  and is negative other than noted in HPI.      Vitals:  BP (!) 144/85   Pulse 93   Temp 98.9  F (37.2  C)   Resp 16   Ht 1.676 m (5' 6\")   Wt 65.8 kg (145 lb)   SpO2 96%   BMI 23.40 kg/m       Exam:  GENERAL APPEARANCE:  Elderly female resting in bed, NAD, non-toxic.  ENT:  Mouth and oropharynx normal, moist mucous membranes.   RESP:  Lungs CTA.  Regular relaxed breathing effort.  No cough.   CV: S1/S2 no murmur or rubs.  Regular rhythm and rate.  No generalized edema.  Cardiac monitor in place.   ABDOMEN:   Flat, soft, non-tender with active bowel sounds.  No guarding, rigidity, or rebound tenderness.  EXTREMITIES:  No lower extremity edema, no calf tenderness.   PSYCH: Alert and orientated, pleasant and cooperative.   NEURO: C collar in place.   LINES: Guillen cath in place draining y/c urine.     Lab/Diagnostic data:  I have personally reviewed labs, which are in facility or EMR chart.     ASSESSMENT/PLAN:  (W19.XXXD) Fall, subsequent encounter   (S12.191G) " Other closed nondisplaced fracture of second cervical vertebra with delayed healing, subsequent encounter  (G89.11) Acute pain due to injury  Notes indicate C-collar continuous for 6 weeks.  She does endorse 5/10 cervical pain, but reports Acetaminophen helps.    NeuroSurg did want a MRI but due to a sacral nerve stimulator (unclear if safe for MRI) this it was not pursued.   -Continue C-collar continuous for 6 weeks.   -NeuroSurg follow up in 6 weeks.   -Changed Acetaminophen to 1000 mg's TID.     (R19.5) Loose stools  (R55) Pre-syncope  She did not have light headedness prior, does not remember if dizzy that day of the fall.  Currently denies any light headedness.  Hemodynamically stable.  She reports the stools are still soft, but slightly improved, no N/V or cramps.  C-diff/enteric pathogen's negative in hospital.   -No changes, continue to clinically monitor.   -Continues PRN Loperamide.     (R77.8) Elevated troponin  (I48.0) Paroxysmal atrial fibrillation (H)  Has known P-A-Fib on OAC.  Troponins elevated but trending down.  Seen by Cards, unclear if demand ischemia or non-ischemic MI.   -Has Cardiac monitor in place for two weeks.   -Continues on Warfarin.  -Continues on Statin.   --Cardiology f/u in two weeks.   --Will get lab's/troponin on Yuri 3 and verify still trending down.     (N17.9) Acute kidney injury (H)  (N18.30) Stage 3 chronic kidney disease, unspecified whether stage 3a or 3b CKD (H)  (R33.9) Chronic retention of urine  (Z87.440) History of recurrent UTIs  Creatine baseline around 0.9-1.1, was 1.03 when last checked 12/27.    Reports she was straight/self cath ing 6x/day at home prior with no issues.   Currently has a deleon in place, not able to straight cath due to C-collar.   Is on chronic UTI suppression/prophylatix.   -Continue deleon cath for now, will need to see if daughter is comfortable learning straight cathing, if not, likely continue deleon until C Collar removed.   -Continues chronic  daily Cephalexin.   -Repeat Creatine 1/3.     (R53.81) Debility  -PT/OT to eval and treat.     Orders:  -As noted above.    Electronically signed by:  RAY Dacosta CNP             Sincerely,        RAY Dacosta CNP

## 2021-12-30 NOTE — PROGRESS NOTES
Clinic Care Coordination Contact  Care Coordination Transition Communication         Clinical Data: Patient was hospitalized at [facility] from 12/25/2021 to 12/29/2021 with diagnosis of fall.     Transition to Facility:              Fac[facility]              Contact name and phone number/fax: [phone]      Plan: RN/SW Care Coordinator will await notification from facility staff informing RN/SW Care Coordinator of patient's discharge plans/needs. RN/SW Care Coordinator will review chart and outreach to facility staff every 4 weeks and as needed.     TRISTIAN Marti  Clinic Care Coordinator  [facility]  [phone]  [email or web address]

## 2021-12-31 ENCOUNTER — LAB REQUISITION (OUTPATIENT)
Dept: LAB | Facility: CLINIC | Age: 86
End: 2021-12-31
Payer: COMMERCIAL

## 2021-12-31 DIAGNOSIS — I24.89 OTHER FORMS OF ACUTE ISCHEMIC HEART DISEASE (H): ICD-10-CM

## 2021-12-31 LAB
GAMMA INTERFERON BACKGROUND BLD IA-ACNC: 0.02 IU/ML
M TB IFN-G BLD-IMP: NEGATIVE
M TB IFN-G CD4+ BCKGRND COR BLD-ACNC: 4.29 IU/ML
MITOGEN IGNF BCKGRD COR BLD-ACNC: 0 IU/ML
MITOGEN IGNF BCKGRD COR BLD-ACNC: 0.01 IU/ML
QUANTIFERON MITOGEN: 4.31 IU/ML
QUANTIFERON NIL TUBE: 0.02 IU/ML
QUANTIFERON TB1 TUBE: 0.03 IU/ML
QUANTIFERON TB2 TUBE: 0.02

## 2022-01-01 ENCOUNTER — ASSISTED LIVING VISIT (OUTPATIENT)
Dept: GERIATRICS | Facility: CLINIC | Age: 87
End: 2022-01-01
Payer: COMMERCIAL

## 2022-01-01 ENCOUNTER — ASSISTED LIVING VISIT (OUTPATIENT)
Dept: GERIATRICS | Facility: CLINIC | Age: 87
End: 2022-01-01
Payer: OTHER MISCELLANEOUS

## 2022-01-01 ENCOUNTER — TRANSFERRED RECORDS (OUTPATIENT)
Dept: HEALTH INFORMATION MANAGEMENT | Facility: CLINIC | Age: 87
End: 2022-01-01

## 2022-01-01 ENCOUNTER — MEDICAL CORRESPONDENCE (OUTPATIENT)
Dept: HEALTH INFORMATION MANAGEMENT | Facility: CLINIC | Age: 87
End: 2022-01-01

## 2022-01-01 ENCOUNTER — TELEPHONE (OUTPATIENT)
Dept: GERIATRICS | Facility: CLINIC | Age: 87
End: 2022-01-01

## 2022-01-01 VITALS
WEIGHT: 143.6 LBS | HEART RATE: 90 BPM | OXYGEN SATURATION: 93 % | TEMPERATURE: 96.4 F | DIASTOLIC BLOOD PRESSURE: 77 MMHG | RESPIRATION RATE: 19 BRPM | HEIGHT: 65 IN | SYSTOLIC BLOOD PRESSURE: 131 MMHG | BODY MASS INDEX: 23.93 KG/M2

## 2022-01-01 VITALS
HEIGHT: 65 IN | DIASTOLIC BLOOD PRESSURE: 86 MMHG | WEIGHT: 149 LBS | OXYGEN SATURATION: 95 % | TEMPERATURE: 97.7 F | HEART RATE: 76 BPM | BODY MASS INDEX: 24.83 KG/M2 | RESPIRATION RATE: 21 BRPM | SYSTOLIC BLOOD PRESSURE: 143 MMHG

## 2022-01-01 VITALS
OXYGEN SATURATION: 94 % | HEIGHT: 65 IN | RESPIRATION RATE: 24 BRPM | DIASTOLIC BLOOD PRESSURE: 73 MMHG | BODY MASS INDEX: 23.63 KG/M2 | HEART RATE: 46 BPM | TEMPERATURE: 96.8 F | WEIGHT: 141.8 LBS | SYSTOLIC BLOOD PRESSURE: 97 MMHG

## 2022-01-01 VITALS
HEART RATE: 57 BPM | HEIGHT: 65 IN | DIASTOLIC BLOOD PRESSURE: 74 MMHG | RESPIRATION RATE: 16 BRPM | BODY MASS INDEX: 23.69 KG/M2 | SYSTOLIC BLOOD PRESSURE: 132 MMHG | OXYGEN SATURATION: 97 % | TEMPERATURE: 97 F | WEIGHT: 142.2 LBS

## 2022-01-01 DIAGNOSIS — I48.91 ATRIAL FIBRILLATION, UNSPECIFIED TYPE (H): Primary | ICD-10-CM

## 2022-01-01 DIAGNOSIS — S82.852S CLOSED TRIMALLEOLAR FRACTURE OF LEFT ANKLE, SEQUELA: ICD-10-CM

## 2022-01-01 DIAGNOSIS — U07.1 COVID-19: Primary | ICD-10-CM

## 2022-01-01 DIAGNOSIS — I48.91 ATRIAL FIBRILLATION, UNSPECIFIED TYPE (H): ICD-10-CM

## 2022-01-01 DIAGNOSIS — Z86.711 HISTORY OF PULMONARY EMBOLISM: ICD-10-CM

## 2022-01-01 DIAGNOSIS — R09.81 NASAL CONGESTION: ICD-10-CM

## 2022-01-01 DIAGNOSIS — Z79.01 LONG TERM (CURRENT) USE OF ANTICOAGULANTS: ICD-10-CM

## 2022-01-01 DIAGNOSIS — Z71.89 ENCOUNTER FOR HOSPICE CARE DISCUSSION: ICD-10-CM

## 2022-01-01 DIAGNOSIS — R29.6 FREQUENT FALLS: ICD-10-CM

## 2022-01-01 DIAGNOSIS — J06.9 URI WITH COUGH AND CONGESTION: ICD-10-CM

## 2022-01-01 RX ORDER — NIRMATRELVIR AND RITONAVIR 150-100 MG
2 KIT ORAL 2 TIMES DAILY
Qty: 20 EACH | Refills: 0 | Status: SHIPPED | OUTPATIENT
Start: 2022-01-01 | End: 2022-01-01

## 2022-01-01 RX ORDER — ECHINACEA PURPUREA EXTRACT 125 MG
TABLET ORAL
Qty: 50 ML | Refills: 11 | Status: SHIPPED | OUTPATIENT
Start: 2022-01-01

## 2022-01-01 RX ORDER — GUAIFENESIN AND DEXTROMETHORPHAN HYDROBROMIDE 600; 30 MG/1; MG/1
1 TABLET, EXTENDED RELEASE ORAL EVERY 12 HOURS
Qty: 14 TABLET | Refills: 0 | Status: SHIPPED | OUTPATIENT
Start: 2022-01-01 | End: 2022-01-01

## 2022-01-03 LAB
ANION GAP SERPL CALCULATED.3IONS-SCNC: 12 MMOL/L (ref 5–18)
BUN SERPL-MCNC: 22 MG/DL (ref 8–28)
CALCIUM SERPL-MCNC: 10.4 MG/DL (ref 8.5–10.5)
CHLORIDE BLD-SCNC: 103 MMOL/L (ref 98–107)
CO2 SERPL-SCNC: 26 MMOL/L (ref 22–31)
CREAT SERPL-MCNC: 1.2 MG/DL (ref 0.6–1.1)
GFR SERPL CREATININE-BSD FRML MDRD: 43 ML/MIN/1.73M2
GLUCOSE BLD-MCNC: 104 MG/DL (ref 70–125)
POTASSIUM BLD-SCNC: 3.9 MMOL/L (ref 3.5–5)
SODIUM SERPL-SCNC: 141 MMOL/L (ref 136–145)

## 2022-01-03 PROCEDURE — 80048 BASIC METABOLIC PNL TOTAL CA: CPT | Performed by: INTERNAL MEDICINE

## 2022-01-03 PROCEDURE — P9604 ONE-WAY ALLOW PRORATED TRIP: HCPCS | Performed by: INTERNAL MEDICINE

## 2022-01-03 PROCEDURE — 36415 COLL VENOUS BLD VENIPUNCTURE: CPT | Performed by: INTERNAL MEDICINE

## 2022-01-04 ENCOUNTER — LAB REQUISITION (OUTPATIENT)
Dept: LAB | Facility: CLINIC | Age: 87
End: 2022-01-04
Payer: COMMERCIAL

## 2022-01-04 ENCOUNTER — TRANSITIONAL CARE UNIT VISIT (OUTPATIENT)
Dept: GERIATRICS | Facility: CLINIC | Age: 87
End: 2022-01-04
Payer: COMMERCIAL

## 2022-01-04 VITALS
SYSTOLIC BLOOD PRESSURE: 90 MMHG | TEMPERATURE: 98.7 F | HEART RATE: 65 BPM | OXYGEN SATURATION: 97 % | RESPIRATION RATE: 17 BRPM | HEIGHT: 66 IN | DIASTOLIC BLOOD PRESSURE: 52 MMHG | WEIGHT: 145 LBS | BODY MASS INDEX: 23.3 KG/M2
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
## 2022-01-04 DIAGNOSIS — R19.5 LOOSE STOOLS: ICD-10-CM

## 2022-01-04 DIAGNOSIS — R33.9 CHRONIC RETENTION OF URINE: ICD-10-CM

## 2022-01-04 DIAGNOSIS — I24.89 OTHER FORMS OF ACUTE ISCHEMIC HEART DISEASE (H): ICD-10-CM

## 2022-01-04 DIAGNOSIS — R79.89 ELEVATED TROPONIN: ICD-10-CM

## 2022-01-04 DIAGNOSIS — S12.191G OTHER CLOSED NONDISPLACED FRACTURE OF SECOND CERVICAL VERTEBRA WITH DELAYED HEALING, SUBSEQUENT ENCOUNTER: ICD-10-CM

## 2022-01-04 DIAGNOSIS — N18.30 STAGE 3 CHRONIC KIDNEY DISEASE, UNSPECIFIED WHETHER STAGE 3A OR 3B CKD (H): ICD-10-CM

## 2022-01-04 DIAGNOSIS — Z97.8 FOLEY CATHETER IN PLACE: ICD-10-CM

## 2022-01-04 DIAGNOSIS — Z87.440 HISTORY OF RECURRENT UTIS: ICD-10-CM

## 2022-01-04 DIAGNOSIS — G89.11 ACUTE PAIN DUE TO INJURY: ICD-10-CM

## 2022-01-04 DIAGNOSIS — R53.81 DEBILITY: ICD-10-CM

## 2022-01-04 DIAGNOSIS — I48.0 PAROXYSMAL ATRIAL FIBRILLATION (H): ICD-10-CM

## 2022-01-04 DIAGNOSIS — N17.9 ACUTE KIDNEY INJURY (H): ICD-10-CM

## 2022-01-04 DIAGNOSIS — F32.A DEPRESSION, UNSPECIFIED DEPRESSION TYPE: ICD-10-CM

## 2022-01-04 DIAGNOSIS — H04.123 DRY EYES: ICD-10-CM

## 2022-01-04 PROCEDURE — 99309 SBSQ NF CARE MODERATE MDM 30: CPT | Performed by: NURSE PRACTITIONER

## 2022-01-04 RX ORDER — SODIUM CHLORIDE 5 %
2 OINTMENT (GRAM) OPHTHALMIC (EYE) AT BEDTIME
Start: 2022-01-04 | End: 2022-10-14

## 2022-01-04 ASSESSMENT — MIFFLIN-ST. JEOR: SCORE: 1099.47

## 2022-01-04 NOTE — LETTER
"    1/4/2022        RE: Maia Miranda  [address] [facility] [address]        [facility]    Chief Complaint   Patient presents with     Nursing Home Acute     HPI:  Maia Miranda is a 89 year old  (10/26/1932), who is being seen today for an episodic care visit at: [facility] ([facility]) [[id number]].     Met with Mrs. Miranda today for follow up and reports of depression from the .  In meeting with Mrs. Miranda she reports she is doing well.  Reports the pain is much less than last week in her neck, no other pain.  She reports her stools are no longer overly soft, but \"Just right\".  She reports she is getting more used to her C-collar.  She denies being depressed for me, reports just tired, but is amendable to meeting with counselors.      Allergies, and PMH/PSH reviewed in EPIC today.  REVIEW OF SYSTEMS:  Limited secondary to cognitive impairment but today pt reports 7 point ROS done including, light headedness/dizziness, fever/chills, pain, Resp, CV, GI, and  and is negative other than noted in HPI.      Objective:   BP 90/52   Pulse 65   Temp 98.7  F (37.1  C)   Resp 17   Ht 1.676 m (5' 6\")   Wt 65.8 kg (145 lb)   SpO2 97%   BMI 23.40 kg/m       Exam:  GENERAL APPEARANCE:  Elderly female resting in bed, NAD, non-toxic.  ENT:  Mouth and oropharynx normal, slightly dry mucous membranes.   RESP:  Lungs CTA.  Regular relaxed breathing effort.  No cough.   CV: S1/S2 no murmur or rubs.  Regular rhythm and rate.  No generalized edema.  Cardiac monitor in place.   ABDOMEN:   Flat, soft, non-tender with active bowel sounds.  No guarding, rigidity, or rebound tenderness.  EXTREMITIES:  No lower extremity edema, no calf tenderness.   PSYCH: Alert and orientated, pleasant and cooperative.   NEURO: C collar in place.   LINES: Guillen cath in place draining y/c urine.     LABS:  I have personally reviewed labs, which are in facility or EMR chart. " "    Assessment/Plan:  (W19.XXXD) Fall, subsequent encounter    (S12.191G) Other closed nondisplaced fracture of second cervical vertebra with delayed healing, subsequent encounter  (G89.11) Acute pain due to injury  Mrs. Miranda reports she is getting more used to her C-collar.  Reports the pain is much less than last week, but stills some pain.    -Continues with C-Collar continuously.   -Neuro Surg f/u 2/7.   -Continues TID Acetaminophen.     (N17.9) Acute kidney injury (H)  (N18.30) Stage 3 chronic kidney disease, unspecified whether stage 3a or 3b CKD (H)  Creatine baseline around 0.9-1.1, creatine 1.2 yesterday 1/3 and dryer oral membranes.  Suspect decreased PO intake due to C-collar.   -Encourage PO fluids, goal 1800 ml/day or 600 ml/meal.   -Recheck lab 1/10.     (I48.0) Paroxysmal atrial fibrillation (H)  (R77.8) Elevated troponin  Review of VS's show VSS and within acceptable range.   No current s/s of clinical CHF.   Still has two week cardiac monitor in place.    Was to have repeat Troponin on 1/3 to verify trending down, but was not drawn.   -Lab to get Troponin 1/5.   -Has Cardiology f/u 2/2.   --No changes, continue to clinically monitor.     (R33.9) Chronic retention of urine  (Z87.440) History of recurrent UTIs  (Z97.8) Deleon catheter in place  Has h/o chronic urine retention and did self cath's at home, but not able to do self cath's currently due to C-collar.   -Continues with deleon cath in place for now.   -Continues with chronic Cephalexin for suppression.     (R19.5) Loose stools  Reports stools are no long loose, but \"Just right\".   -No changes, continue to clinically monitor.     (F32.A) Depression, unspecified depression type  Per , she reported Mrs. Miranda endorsed feeling down and depressed.  In speaking with Mrs. Miranda she denied that, but did report she is tired and tired of the situation.  Is amendable to talking to someone.   -ACP to eval and treat, adjustment / coping. "     (R53.81) Debility  -Continues with PT/OT.     Orders:  -As noted above.    Electronically signed by: RAY Dacosta CNP             Sincerely,        RAY Dacosta CNP

## 2022-01-04 NOTE — PROGRESS NOTES
"[facility] GERIATRIC SERVICES    Chief Complaint   Patient presents with     Nursing Home Acute     HPI:  Maia Miranda is a 89 year old  (10/26/1932), who is being seen today for an episodic care visit at: [facility] ([facility]) [[id number]].     Met with Mrs. Miranda today for follow up and reports of depression from the .  In meeting with Mrs. Miranda she reports she is doing well.  Reports the pain is much less than last week in her neck, no other pain.  She reports her stools are no longer overly soft, but \"Just right\".  She reports she is getting more used to her C-collar.  She denies being depressed for me, reports just tired, but is amendable to meeting with counselors.      Allergies, and PMH/PSH reviewed in [facility] today.  REVIEW OF SYSTEMS:  Limited secondary to cognitive impairment but today pt reports 7 point ROS done including, light headedness/dizziness, fever/chills, pain, Resp, CV, GI, and  and is negative other than noted in HPI.      Objective:   BP 90/52   Pulse 65   Temp 98.7  F (37.1  C)   Resp 17   Ht 1.676 m (5' 6\")   Wt 65.8 kg (145 lb)   SpO2 97%   BMI 23.40 kg/m       Exam:  GENERAL APPEARANCE:  Elderly female resting in bed, NAD, non-toxic.  ENT:  Mouth and oropharynx normal, slightly dry mucous membranes.   RESP:  Lungs CTA.  Regular relaxed breathing effort.  No cough.   CV: S1/S2 no murmur or rubs.  Regular rhythm and rate.  No generalized edema.  Cardiac monitor in place.   ABDOMEN:   Flat, soft, non-tender with active bowel sounds.  No guarding, rigidity, or rebound tenderness.  EXTREMITIES:  No lower extremity edema, no calf tenderness.   PSYCH: Alert and orientated, pleasant and cooperative.   NEURO: C collar in place.   LINES: Guillen cath in place draining y/c urine.     LABS:  I have personally reviewed labs, which are in facility or EMR chart.     Assessment/Plan:  (W19.XXXD) Fall, subsequent encounter    (S12.191G) Other closed nondisplaced fracture of second " "cervical vertebra with delayed healing, subsequent encounter  (G89.11) Acute pain due to injury  Mrs. Miranda reports she is getting more used to her C-collar.  Reports the pain is much less than last week, but stills some pain.    -Continues with C-Collar continuously.   -Neuro Surg f/u 2/7.   -Continues TID Acetaminophen.     (N17.9) Acute kidney injury (H)  (N18.30) Stage 3 chronic kidney disease, unspecified whether stage 3a or 3b CKD (H)  Creatine baseline around 0.9-1.1, creatine 1.2 yesterday 1/3 and dryer oral membranes.  Suspect decreased PO intake due to C-collar.   -Encourage PO fluids, goal 1800 ml/day or 600 ml/meal.   -Recheck lab 1/10.     (I48.0) Paroxysmal atrial fibrillation (H)  (R77.8) Elevated troponin  Review of VS's show VSS and within acceptable range.   No current s/s of clinical CHF.   Still has two week cardiac monitor in place.    Was to have repeat Troponin on 1/3 to verify trending down, but was not drawn.   -Lab to get Troponin 1/5.   -Has Cardiology f/u 2/2.   --No changes, continue to clinically monitor.     (R33.9) Chronic retention of urine  (Z87.440) History of recurrent UTIs  (Z97.8) Deleon catheter in place  Has h/o chronic urine retention and did self cath's at home, but not able to do self cath's currently due to C-collar.   -Continues with deleon cath in place for now.   -Continues with chronic Cephalexin for suppression.     (R19.5) Loose stools  Reports stools are no long loose, but \"Just right\".   -No changes, continue to clinically monitor.     (F32.A) Depression, unspecified depression type  Per , she reported Mrs. Miranda endorsed feeling down and depressed.  In speaking with Mrs. Miranda she denied that, but did report she is tired and tired of the situation.  Is amendable to talking to someone.   -ACP to eval and treat, adjustment / coping.     (R53.81) Debility  -Continues with PT/OT.     Orders:  -As noted above.    Electronically signed by: Stan BRIZUELA " RAY Kiran CNP

## 2022-01-05 ENCOUNTER — TRANSITIONAL CARE UNIT VISIT (OUTPATIENT)
Dept: GERIATRICS | Facility: CLINIC | Age: 87
End: 2022-01-05
Payer: COMMERCIAL

## 2022-01-05 VITALS
BODY MASS INDEX: 22.98 KG/M2 | RESPIRATION RATE: 18 BRPM | OXYGEN SATURATION: 95 % | HEART RATE: 74 BPM | HEIGHT: 66 IN | WEIGHT: 143 LBS | TEMPERATURE: 97.8 F | DIASTOLIC BLOOD PRESSURE: 68 MMHG | SYSTOLIC BLOOD PRESSURE: 117 MMHG

## 2022-01-05 DIAGNOSIS — Z97.8 FOLEY CATHETER IN PLACE: ICD-10-CM

## 2022-01-05 DIAGNOSIS — R41.89 COGNITIVE IMPAIRMENT: ICD-10-CM

## 2022-01-05 DIAGNOSIS — N18.30 STAGE 3 CHRONIC KIDNEY DISEASE, UNSPECIFIED WHETHER STAGE 3A OR 3B CKD (H): ICD-10-CM

## 2022-01-05 DIAGNOSIS — D64.9 ANEMIA, UNSPECIFIED TYPE: ICD-10-CM
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
## 2022-01-05 DIAGNOSIS — F43.21 ADJUSTMENT DISORDER WITH DEPRESSED MOOD: ICD-10-CM

## 2022-01-05 DIAGNOSIS — R79.89 ELEVATED TROPONIN: ICD-10-CM

## 2022-01-05 DIAGNOSIS — S12.191D OTHER CLOSED NONDISPLACED FRACTURE OF SECOND CERVICAL VERTEBRA WITH ROUTINE HEALING, SUBSEQUENT ENCOUNTER: ICD-10-CM

## 2022-01-05 DIAGNOSIS — Z87.440 HISTORY OF RECURRENT UTIS: ICD-10-CM

## 2022-01-05 DIAGNOSIS — R33.9 CHRONIC RETENTION OF URINE: ICD-10-CM

## 2022-01-05 DIAGNOSIS — R13.10 DYSPHAGIA, UNSPECIFIED TYPE: ICD-10-CM

## 2022-01-05 DIAGNOSIS — I48.0 PAROXYSMAL ATRIAL FIBRILLATION (H): ICD-10-CM

## 2022-01-05 DIAGNOSIS — I10 BENIGN ESSENTIAL HYPERTENSION: ICD-10-CM

## 2022-01-05 PROCEDURE — 99306 1ST NF CARE HIGH MDM 50: CPT | Performed by: INTERNAL MEDICINE

## 2022-01-05 ASSESSMENT — MIFFLIN-ST. JEOR: SCORE: 1090.39

## 2022-01-05 NOTE — LETTER
"    1/5/2022        RE: Maia Miranda  [address] [facility] [address]        [place] [facility]IATRIC [facility]  PHYSICIAN NOTE    PRIMARY CARE PROVIDER AND CLINIC:  RAY Patel CNP, [address] [facility] [address]    Chief Complaint   Patient presents with     Hospital F/U     [facility] Record Number:  [id number]  Place of Service where encounter took place:  [facility] ([facility]) [[id number]]    Maia Miranda is a 89 year old (10/26/1932), admitted to the above facility from  [facility]. Hospital stay 12/25/21 through 12/29/21. Admitted to this facility for  rehab, medical management and nursing care.     HPI:    HPI information obtained from: facility chart records, facility staff, patient report and [facility] chart review.     Brief summary of hospital course: Maia Miranda is an 89yoF who was admitted after she was found down in the bathroom and was confused. Per report she'd been having some diarrhea which possibly could have contributed but its not entirely clear. Several imaging studies completed ultimately finding a \"questionable non-displaced fracture involving the left lateral mass of C2\" in her neck. MRI was recommended for more detail but not able to be completed d/t questionable compatibility with her implanted sacral nerve stimulator for h/o urinary incontinence/retention. Ultimately neurosurg recommended 6 weeks in rigid C-collar and f/u imaging at that time. D/t this collar she was unable to perform straight cath as she does at home so deleon placed for the time being. Diarrhea seemed self limited and resolved. She was hydrated d/t mild dehydration/IFEOMA. Continued on Coumadin for her h/o afib. Of note, had an elevated troponin level but likely demand ischemia as asymptomatic and EKG not showing acute findings nor did echo. D/t her fall, cardiology did recommend an additional 2 weeks of cardiac monitoring upon discharge. " D/t care needs, discharged to TCU.    Updates on status since skilled nursing admission: Maia is seen sitting up in bed today along with speech therapist d/t mild pharyngeal dysphagia likely complicated by presence of [place] J collar. She actually did quite well swallowing cooking after some extra chews and also water with a straw. They'll be working on techniques to continue aiding in proper swallow while she has this collar the next several weeks. Maia doesn't have a lot to report; no concerns really. Pain not an issue though C-collar can be uncomfortable at times. Is aware is wearing heart monitor and points it out to me. Denies dyspnea or chest pain. Guillen in place draining well. No recurrent diarrhea. She had reported some depression symptoms earlier on in her stay, now not so much, but ACP is to consult. No acute nursing concerns.    CODE STATUS/ADVANCE DIRECTIVES DISCUSSION:   DNR / DNI  Patient's living condition: lives with daughter Bhargavi    ALLERGIES: Codeine, Meperidine, Morphine, Penicillins, Sulfa drugs, and Epinephrine    Past Medical History:   Diagnosis Date     Amnestic MCI (mild cognitive impairment with memory loss) 2014     Chronic duodenal ulcer without mention of hemorrhage, perforation, or obstruction 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 Warner, [facility] yearly     Esophageal reflux 2001    Abstracted 06/10/02     Essential and other specified forms of tremor 2004    resting tremor     Generalized osteoarthrosis, unspecified site     Hands     Hiatal hernia     diagnosed late 1990s [place], [place]     History of pulmonary embolism 1971    no source found     LACTOSE INTOLERANCE      Lumbago     sees Kodi Guidry     Mitral valve regurgitation      Occlusion and stenosis of carotid artery without mention of cerebral infarction 2003    CAROTID US NORMAL, bruit in neck     Osteoporosis, unspecified 2003    T = -2.66      Paroxysmal atrial fibrillation (H) 11/15/2013     Spider veins      Unspecified essential hypertension      Unspecified tinnitus     mild hearing loss, saw ENT      Past Surgical History:   Procedure Laterality Date     CATARACT IOL, RT/LT       corneal scraping       CYSTOSCOPY       CYSTOSCOPY, BIOPSY BLADDER, COMBINED N/A 2016    Procedure: COMBINED CYSTOSCOPY, BIOPSY BLADDER;  Surgeon: Bernadine Maria MD;  Location: [facility] OR     ESOPHAGOSCOPY, GASTROSCOPY, DUODENOSCOPY (EGD), COMBINED  2013    Procedure: COMBINED ESOPHAGOSCOPY, GASTROSCOPY, DUODENOSCOPY (EGD);   ESOPHAGOSCOPY, GASTROSCOPY, DUODENOSCOPY (EGD)   ;  Surgeon: Shawn Soto MD;  Location: [facility] GI     IMPLANT STIMULATOR SACRAL NERVE STAGE ONE N/A 2017    Procedure: IMPLANT STIMULATOR SACRAL NERVE STAGE ONE;  Surgeon: Kb Tracy MD;  Location: [facility] OR     IMPLANT STIMULATOR SACRAL NERVE STAGE TWO N/A 2017    Procedure: IMPLANT STIMULATOR SACRAL NERVE STAGE TWO;  Stage Two Interstim  ;  Surgeon: Kb Tracy MD;  Location: [facility] OR     interstim placement       Z NONSPECIFIC PROCEDURE      D&C x 2 in  &  -- Abstracted 06/10/02     ZZC NONSPECIFIC PROCEDURE      Left breast biopsy x 2 in  &  -- Abstracted 06/10/02     ZZC NONSPECIFIC PROCEDURE      Influenza resulting in hospitalization -- Abstracted 06/10/02     ZZC NONSPECIFIC PROCEDURE  1971    10 day hospitalization from bilateral pulmonary enboli -- Abstracted 06/10/02     ZZC NONSPECIFIC PROCEDURE      colonoscopy  negative     Family History   Problem Relation Age of Onset     Cerebrovascular Disease Father          at 92     Psychotic Disorder Mother         Suicide 62, depression     Alzheimer Disease Maternal Grandmother      Cardiovascular Sister         pacemaker     Glaucoma No family hx of      Social History     Tobacco Use     Smoking status: Never Smoker     Smokeless tobacco: Never Used   Vaping Use     Vaping Use:  Never used   Substance Use Topics     Alcohol use: No     Drug use: No        Post-discharge medication reconciliation status: Reviewed and updated in [facility] according to facility MAR    Current Outpatient Medications   Medication Sig Dispense Refill     acetaminophen (TYLENOL) 500 MG tablet Take 2 tablets (1,000 mg) by mouth 3 times daily       carboxymethylcellulose (REFRESH LIQUIGEL) 1 % ophthalmic solution Place 1 drop into both eyes every morning As needed       Catheters (GENTLECATH URINARY CATHETER) MISC 1 catheter 4 times daily 120 each 12     cephALEXin (KEFLEX) 250 MG capsule Take 1 capsule (250 mg) by mouth daily 90 capsule 3     Cholecalciferol (VITAMIN D) 50 MCG (2000 UT) CAPS Take 1 capsule by mouth every evening       fish oil-omega-3 fatty acids 500 MG capsule Take 500 mg by mouth every morning       Lidocaine (LIDOCARE) 4 % Patch Place 1 patch onto the skin every 24 hours To prevent lidocaine toxicity, patient should be patch free for 12 hrs daily. To right shoulder       lisinopril (ZESTRIL) 40 MG tablet Take 1 tablet (40 mg) by mouth daily 90 tablet 3     loperamide (IMODIUM) 2 MG capsule Take 1 capsule (2 mg) by mouth 4 times daily as needed for diarrhea       metoprolol succinate ER (TOPROL-XL) 100 MG 24 hr tablet Take 1 tablet (100 mg) by mouth daily 90 tablet 3     rosuvastatin (CRESTOR) 5 MG tablet Take 1 tablet (5 mg) by mouth At Bedtime 90 tablet 3     sodium chloride (PETER 128) 5 % ophthalmic ointment Place 2 Application (1 g) into both eyes At Bedtime       vitamin C (ASCORBIC ACID) 500 MG tablet Take 1 tablet (500 mg) by mouth 2 times daily       warfarin ANTICOAGULANT (COUMADIN) 2.5 MG tablet Take one tablet (2.5 mg) by mouth on Sun, Tue and Thurs; take a half tablet (1.25 mg) all other days; or as directed by INR clinic 65 tablet 0       ROS:  Limited secondary to cognitive impairment but today pt reports as above in HPI    Exam:  /68   Pulse 74   Temp 97.8  F (36.6  C)   Resp  "18   Ht 1.676 m (5' 6\")   Wt 64.9 kg (143 lb)   SpO2 95%   BMI 23.08 kg/m    Alert, pleasant, casually dressed, sitting up in bed  Normal pupils, no scleral icterus  Moist oral mucosa  [place] J collar in place ; removed briefly to reposition and skin intact  Wearing heart monitor securely on chest  Heart irregularly irregular rate controlled  Lungs clear, breathing non-labored, no cough  Swallowed quite well as described above in HPI  Abdomen soft, NT  Guillen in place, draining yellow urine without significant odor/sediment  No edema  Normal speech, possible mild fine resting tremor RUE  Mood euthymic, pleasant  Skin warm and dry without diaphoresis    Lab/Diagnostic data:  1/3/22: Normal electrolytes with Cr 1.2 (slightly up from hospital discharge)  Hgb at discharge 10.1    12/25/21 CT C-spine:  IMPRESSION:  1.  Questionable nondisplaced fracture involving the left lateral mass of C2. Cervical spine MRI would be helpful to evaluate for bone marrow edema that would confirm this as a recent fracture.  2.  No other evidence for fracture of the cervical spine.  3.  Degenerative changes of the cervical spine as detailed above.    12/25/21 CT Head:  IMPRESSION:  1.  No CT evidence for acute intracranial process.  2.  Brain atrophy and presumed chronic microvascular ischemic changes as above.    12/25/21 CTA Head/Neck:  IMPRESSION:   HEAD CTA:   1.  Normal CTA [place].  NECK CTA:  1.  Normal neck CTA.     ASSESSMENT/PLAN:  (W19.XXXD) Fall, subsequent encounter  (primary encounter diagnosis)  (S12.191D) Other closed nondisplaced fracture of second cervical vertebra with routine healing, subsequent encounter - note as per imaging this is a \"questionable\" fracture  Questionable fracture C2 after a fall at home but per notes had reported pain there too in her neck in the hospital  MRI unable to be completed in the hospital per notes d/t questionable compatibility of her sacral nerve stimulator  No significant pain " reported today; is on scheduled Tylenol with a Lidoderm patch  Wearing [place] J collar x 6 weeks until neurosurgery f/u with imaging for further direction  Physical therapy and occupational therapy on site    (R13.10) Dysphagia, unspecified type  Seems most in pharyngeal area d/t inability to mobilize chin/neck during typical swallowing activity with her [place] J collar  Working with speech therapy on this as above in HPI    (F43.21) Adjustment disorder with depressed mood  Will be seeing ACP as adjusts to TCU stay though no overt depression symptoms reported today and she was happy to visit with me    (R41.89) Cognitive impairment  Of note, I had seen her in memory clinic back in July 2015 noting MCI diagnosis  At that time, MoCA was 19/30 and her kids had noted no functional impairment  Now lives with her daughter Bhargavi  Hopefully can have some repeat cognitive testing here on site and collaborate with family on dispo    (Z97.8) Guillen catheter in place  (Z87.440) History of recurrent UTIs  (R33.9) Chronic retention of urine  Guillen in place since she normally self caths but is unable to do so with rigid collar in place  I'm not sure she'll be able to self cath at all for as long as [place] J is in place but surely could work with her on this and/or having nursing help her as needed and attempt trial of void  She is on chronic daily suppressive Keflex 250 mg daily for h/o recurrent UTIs    (N18.30) Stage 3 chronic kidney disease, unspecified whether stage 3a or 3b CKD (H)  Had mild IFEOMA on admission, creatinine improved with hydration and up again earlier this week  Encouraged fluids  No longer having diarrhea thankfully  Repeat BMP is ordered for next week    (I48.0) Paroxysmal atrial fibrillation (H)  Is anticoagulated with Coumadin and INR recheck tomorrow followed by my colleague  Rate controlled on Toprol XL    (D64.9) Anemia, unspecified type  Mild; no signs/sx of blood loss but continue to trend clinically  Has  CBC ordered next week    (R77.8) Elevated troponin  Asymptomatic troponin elevation in the hospital without EKG or echo changes  Could have been d/t demand ischemia and troponin trended downwards thereafter   Has 2 week cardiac monitor in place per cardiology d/t her fall    (I10) Benign essential hypertension  BP overall great but did have one low; monitor for recurrence and consider dose reduction of Lisinopril if has significant symptomatic lows      Electronically signed by:  Miley Kiran DO        Sincerely,        Miley Kiran DO

## 2022-01-05 NOTE — PROGRESS NOTES
"[place] GER[facility]ATRIC SERVICES  PHYSICIAN NOTE    PRIMARY CARE PROVIDER AND CLINIC:  RAY Patel CNP, [address] [facility] / [facility] [id number]    Chief Complaint   Patient presents with     Hospital F/U     [place] Medical Record Number:  [id number]  Place of Service where encounter took place:  [facility] ([facility]) [[id number]]    Maia Miranda is a 89 year old (10/26/1932), admitted to the above facility from  [facility]. Hospital stay 12/25/21 through 12/29/21. Admitted to this facility for  rehab, medical management and nursing care.     HPI:    HPI information obtained from: facility chart records, facility staff, patient report and [facility] chart review.     Brief summary of hospital course: Maia Miranda is an 89yoF who was admitted after she was found down in the bathroom and was confused. Per report she'd been having some diarrhea which possibly could have contributed but its not entirely clear. Several imaging studies completed ultimately finding a \"questionable non-displaced fracture involving the left lateral mass of C2\" in her neck. MRI was recommended for more detail but not able to be completed d/t questionable compatibility with her implanted sacral nerve stimulator for h/o urinary incontinence/retention. Ultimately neurosurg recommended 6 weeks in rigid C-collar and f/u imaging at that time. D/t this collar she was unable to perform straight cath as she does at home so deleon placed for the time being. Diarrhea seemed self limited and resolved. She was hydrated d/t mild dehydration/IFEOMA. Continued on Coumadin for her h/o afib. Of note, had an elevated troponin level but likely demand ischemia as asymptomatic and EKG not showing acute findings nor did echo. D/t her fall, cardiology did recommend an additional 2 weeks of cardiac monitoring upon discharge. D/t care needs, discharged to TCU.    Updates on status since skilled nursing admission: " Maia is seen sitting up in bed today along with speech therapist d/t mild pharyngeal dysphagia likely complicated by presence of [place] J collar. She actually did quite well swallowing cooking after some extra chews and also water with a straw. They'll be working on techniques to continue aiding in proper swallow while she has this collar the next several weeks. Maia doesn't have a lot to report; no concerns really. Pain not an issue though C-collar can be uncomfortable at times. Is aware is wearing heart monitor and points it out to me. Denies dyspnea or chest pain. Guillen in place draining well. No recurrent diarrhea. She had reported some depression symptoms earlier on in her stay, now not so much, but ACP is to consult. No acute nursing concerns.    CODE STATUS/ADVANCE DIRECTIVES DISCUSSION:   DNR / DNI  Patient's living condition: lives with daughter Bhargavi    ALLERGIES: Codeine, Meperidine, Morphine, Penicillins, Sulfa drugs, and Epinephrine    Past Medical History:   Diagnosis Date     Amnestic MCI (mild cognitive impairment with memory loss) 2014     Chronic duodenal ulcer without mention of hemorrhage, perforation, or obstruction 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 Warner, [facility] yearly     Esophageal reflux 2001    Abstracted 06/10/02     Essential and other specified forms of tremor 2004    resting tremor     Generalized osteoarthrosis, unspecified site     Hands     Hiatal hernia     diagnosed late 1990s [place], [place]     History of pulmonary embolism 1971    no source found     LACTOSE INTOLERANCE      Lumbago     sees Kodi Guidry     Mitral valve regurgitation      Occlusion and stenosis of carotid artery without mention of cerebral infarction 2003    CAROTID US NORMAL, bruit in neck     Osteoporosis, unspecified 2003    T = -2.66     Paroxysmal atrial fibrillation (H) 11/15/2013     Spider veins      Unspecified  essential hypertension      Unspecified tinnitus     mild hearing loss, saw ENT      Past Surgical History:   Procedure Laterality Date     CATARACT IOL, RT/LT       corneal scraping       CYSTOSCOPY       CYSTOSCOPY, BIOPSY BLADDER, COMBINED N/A 2016    Procedure: COMBINED CYSTOSCOPY, BIOPSY BLADDER;  Surgeon: Bernadine Maria MD;  Location: [facility] OR     ESOPHAGOSCOPY, GASTROSCOPY, DUODENOSCOPY (EGD), COMBINED  2013    Procedure: COMBINED ESOPHAGOSCOPY, GASTROSCOPY, DUODENOSCOPY (EGD);   ESOPHAGOSCOPY, GASTROSCOPY, DUODENOSCOPY (EGD)   ;  Surgeon: Shawn Soto MD;  Location: [facility] GI     IMPLANT STIMULATOR SACRAL NERVE STAGE ONE N/A 2017    Procedure: IMPLANT STIMULATOR SACRAL NERVE STAGE ONE;  Surgeon: Kb Tracy MD;  Location: [facility] OR     IMPLANT STIMULATOR SACRAL NERVE STAGE TWO N/A 2017    Procedure: IMPLANT STIMULATOR SACRAL NERVE STAGE TWO;  Stage Two Interstim  ;  Surgeon: Kb Tracy MD;  Location: [facility] OR     interstim placement       [facility] NONSPECIFIC PROCEDURE      D&C x 2 in 7 &  -- Abstracted 06/10/02     Z NONSPECIFIC PROCEDURE      Left breast biopsy x 2 in  &  -- Abstracted 06/10/02     Z NONSPECIFIC PROCEDURE  1958    Influenza resulting in hospitalization -- Abstracted 06/10/02     Z NONSPECIFIC PROCEDURE  1971    10 day hospitalization from bilateral pulmonary enboli -- Abstracted 06/10/02     [facility] NONSPECIFIC PROCEDURE      colonoscopy  negative     Family History   Problem Relation Age of Onset     Cerebrovascular Disease Father          at 92     Psychotic Disorder Mother         Suicide 62, depression     Alzheimer Disease Maternal Grandmother      Cardiovascular Sister         pacemaker     Glaucoma No family hx of      Social History     Tobacco Use     Smoking status: Never Smoker     Smokeless tobacco: Never Used   Vaping Use     Vaping Use: Never used   Substance Use Topics     Alcohol use: No     Drug use: No     "    Post-discharge medication reconciliation status: Reviewed and updated in [facility] according to facility MAR    Current Outpatient Medications   Medication Sig Dispense Refill     acetaminophen (TYLENOL) 500 MG tablet Take 2 tablets (1,000 mg) by mouth 3 times daily       carboxymethylcellulose (REFRESH LIQUIGEL) 1 % ophthalmic solution Place 1 drop into both eyes every morning As needed       Catheters (GENTLECATH URINARY CATHETER) MISC 1 catheter 4 times daily 120 each 12     cephALEXin (KEFLEX) 250 MG capsule Take 1 capsule (250 mg) by mouth daily 90 capsule 3     Cholecalciferol (VITAMIN D) 50 MCG (2000 UT) CAPS Take 1 capsule by mouth every evening       fish oil-omega-3 fatty acids 500 MG capsule Take 500 mg by mouth every morning       Lidocaine (LIDOCARE) 4 % Patch Place 1 patch onto the skin every 24 hours To prevent lidocaine toxicity, patient should be patch free for 12 hrs daily. To right shoulder       lisinopril (ZESTRIL) 40 MG tablet Take 1 tablet (40 mg) by mouth daily 90 tablet 3     loperamide (IMODIUM) 2 MG capsule Take 1 capsule (2 mg) by mouth 4 times daily as needed for diarrhea       metoprolol succinate ER (TOPROL-XL) 100 MG 24 hr tablet Take 1 tablet (100 mg) by mouth daily 90 tablet 3     rosuvastatin (CRESTOR) 5 MG tablet Take 1 tablet (5 mg) by mouth At Bedtime 90 tablet 3     sodium chloride (PETER 128) 5 % ophthalmic ointment Place 2 Application (1 g) into both eyes At Bedtime       vitamin C (ASCORBIC ACID) 500 MG tablet Take 1 tablet (500 mg) by mouth 2 times daily       warfarin ANTICOAGULANT (COUMADIN) 2.5 MG tablet Take one tablet (2.5 mg) by mouth on Sun, Tue and Thurs; take a half tablet (1.25 mg) all other days; or as directed by INR clinic 65 tablet 0       ROS:  Limited secondary to cognitive impairment but today pt reports as above in HPI    Exam:  /68   Pulse 74   Temp 97.8  F (36.6  C)   Resp 18   Ht 1.676 m (5' 6\")   Wt 64.9 kg (143 lb)   SpO2 95%   BMI 23.08 " "kg/m    Alert, pleasant, casually dressed, sitting up in bed  Normal pupils, no scleral icterus  Moist oral mucosa  Smith J collar in place ; removed briefly to reposition and skin intact  Wearing heart monitor securely on chest  Heart irregularly irregular rate controlled  Lungs clear, breathing non-labored, no cough  Swallowed quite well as described above in HPI  Abdomen soft, NT  Guillen in place, draining yellow urine without significant odor/sediment  No edema  Normal speech, possible mild fine resting tremor RUE  Mood euthymic, pleasant  Skin warm and dry without diaphoresis    Lab/Diagnostic data:  1/3/22: Normal electrolytes with Cr 1.2 (slightly up from hospital discharge)  Hgb at discharge 10.1    12/25/21 CT C-spine:  IMPRESSION:  1.  Questionable nondisplaced fracture involving the left lateral mass of C2. Cervical spine MRI would be helpful to evaluate for bone marrow edema that would confirm this as a recent fracture.  2.  No other evidence for fracture of the cervical spine.  3.  Degenerative changes of the cervical spine as detailed above.    12/25/21 CT Head:  IMPRESSION:  1.  No CT evidence for acute intracranial process.  2.  Brain atrophy and presumed chronic microvascular ischemic changes as above.    12/25/21 CTA Head/Neck:  IMPRESSION:   HEAD CTA:   1.  Normal CTA [place].  NECK CTA:  1.  Normal neck CTA.     ASSESSMENT/PLAN:  (W19.XXXD) Fall, subsequent encounter  (primary encounter diagnosis)  (S12.191D) Other closed nondisplaced fracture of second cervical vertebra with routine healing, subsequent encounter - note as per imaging this is a \"questionable\" fracture  Questionable fracture C2 after a fall at home but per notes had reported pain there too in her neck in the hospital  MRI unable to be completed in the hospital per notes d/t questionable compatibility of her sacral nerve stimulator  No significant pain reported today; is on scheduled Tylenol with a Lidoderm patch  Wearing " [place] J collar x 6 weeks until neurosurgery f/u with imaging for further direction  Physical therapy and occupational therapy on site    (R13.10) Dysphagia, unspecified type  Seems most in pharyngeal area d/t inability to mobilize chin/neck during typical swallowing activity with her [place] J collar  Working with speech therapy on this as above in HPI    (F43.21) Adjustment disorder with depressed mood  Will be seeing ACP as adjusts to TCU stay though no overt depression symptoms reported today and she was happy to visit with me    (R41.89) Cognitive impairment  Of note, I had seen her in memory clinic back in July 2015 noting MCI diagnosis  At that time, MoCA was 19/30 and her kids had noted no functional impairment  Now lives with her daughter Bhargavi  Hopefully can have some repeat cognitive testing here on site and collaborate with family on dispo    (Z97.8) Guillen catheter in place  (Z87.440) History of recurrent UTIs  (R33.9) Chronic retention of urine  Guillen in place since she normally self caths but is unable to do so with rigid collar in place  I'm not sure she'll be able to self cath at all for as long as [place] J is in place but surely could work with her on this and/or having nursing help her as needed and attempt trial of void  She is on chronic daily suppressive Keflex 250 mg daily for h/o recurrent UTIs    (N18.30) Stage 3 chronic kidney disease, unspecified whether stage 3a or 3b CKD (H)  Had mild IFEOMA on admission, creatinine improved with hydration and up again earlier this week  Encouraged fluids  No longer having diarrhea thankfully  Repeat BMP is ordered for next week    (I48.0) Paroxysmal atrial fibrillation (H)  Is anticoagulated with Coumadin and INR recheck tomorrow followed by my colleague  Rate controlled on Toprol XL    (D64.9) Anemia, unspecified type  Mild; no signs/sx of blood loss but continue to trend clinically  Has CBC ordered next week    (R77.8) Elevated troponin  Asymptomatic troponin  elevation in the hospital without EKG or echo changes  Could have been d/t demand ischemia and troponin trended downwards thereafter   Has 2 week cardiac monitor in place per cardiology d/t her fall    (I10) Benign essential hypertension  BP overall great but did have one low; monitor for recurrence and consider dose reduction of Lisinopril if has significant symptomatic lows      Electronically signed by:  Miley Kiran DO

## 2022-01-06 ENCOUNTER — LAB REQUISITION (OUTPATIENT)
Dept: LAB | Facility: CLINIC | Age: 87
End: 2022-01-06
Payer: COMMERCIAL

## 2022-01-06 DIAGNOSIS — N18.9 CHRONIC KIDNEY DISEASE, UNSPECIFIED: ICD-10-CM

## 2022-01-06 DIAGNOSIS — S12.100D UNSPECIFIED DISPLACED FRACTURE OF SECOND CERVICAL VERTEBRA, SUBSEQUENT ENCOUNTER FOR FRACTURE WITH ROUTINE HEALING: ICD-10-CM

## 2022-01-06 DIAGNOSIS — R19.7 DIARRHEA, UNSPECIFIED: ICD-10-CM

## 2022-01-06 DIAGNOSIS — I24.89 OTHER FORMS OF ACUTE ISCHEMIC HEART DISEASE (H): ICD-10-CM

## 2022-01-08 ENCOUNTER — LAB REQUISITION (OUTPATIENT)
Dept: LAB | Facility: CLINIC | Age: 87
End: 2022-01-08
Payer: COMMERCIAL

## 2022-01-08 DIAGNOSIS — I21.4 NON-ST ELEVATION (NSTEMI) MYOCARDIAL INFARCTION (H): ICD-10-CM

## 2022-01-08 DIAGNOSIS — N18.9 CHRONIC KIDNEY DISEASE, UNSPECIFIED: ICD-10-CM

## 2022-01-08 DIAGNOSIS — G93.40 ENCEPHALOPATHY, UNSPECIFIED: ICD-10-CM

## 2022-01-10 LAB
ANION GAP SERPL CALCULATED.3IONS-SCNC: 9 MMOL/L (ref 5–18)
BUN SERPL-MCNC: 29 MG/DL (ref 8–28)
CALCIUM SERPL-MCNC: 10 MG/DL (ref 8.5–10.5)
CHLORIDE BLD-SCNC: 107 MMOL/L (ref 98–107)
CO2 SERPL-SCNC: 23 MMOL/L (ref 22–31)
CREAT SERPL-MCNC: 1.1 MG/DL (ref 0.6–1.1)
ERYTHROCYTE [DISTWIDTH] IN BLOOD BY AUTOMATED COUNT: 12.9 % (ref 10–15)
GFR SERPL CREATININE-BSD FRML MDRD: 48 ML/MIN/1.73M2
GLUCOSE BLD-MCNC: 95 MG/DL (ref 70–125)
HCT VFR BLD AUTO: 35.6 % (ref 35–47)
HGB BLD-MCNC: 11.4 G/DL (ref 11.7–15.7)
MCH RBC QN AUTO: 32 PG (ref 26.5–33)
MCHC RBC AUTO-ENTMCNC: 32 G/DL (ref 31.5–36.5)
MCV RBC AUTO: 100 FL (ref 78–100)
PLATELET # BLD AUTO: 201 10E3/UL (ref 150–450)
POTASSIUM BLD-SCNC: 4.4 MMOL/L (ref 3.5–5)
RBC # BLD AUTO: 3.56 10E6/UL (ref 3.8–5.2)
SODIUM SERPL-SCNC: 139 MMOL/L (ref 136–145)
TROPONIN I SERPL-MCNC: 0.1 NG/ML (ref 0–0.29)
WBC # BLD AUTO: 4.8 10E3/UL (ref 4–11)

## 2022-01-10 PROCEDURE — 36415 COLL VENOUS BLD VENIPUNCTURE: CPT | Performed by: NURSE PRACTITIONER

## 2022-01-10 PROCEDURE — P9603 ONE-WAY ALLOW PRORATED MILES: HCPCS | Performed by: NURSE PRACTITIONER

## 2022-01-10 PROCEDURE — 80048 BASIC METABOLIC PNL TOTAL CA: CPT | Performed by: NURSE PRACTITIONER

## 2022-01-10 PROCEDURE — 85027 COMPLETE CBC AUTOMATED: CPT | Performed by: NURSE PRACTITIONER

## 2022-01-10 PROCEDURE — 84484 ASSAY OF TROPONIN QUANT: CPT | Performed by: NURSE PRACTITIONER

## 2022-01-13 ENCOUNTER — TRANSITIONAL CARE UNIT VISIT (OUTPATIENT)
Dept: GERIATRICS | Facility: CLINIC | Age: 87
End: 2022-01-13
Payer: COMMERCIAL

## 2022-01-13 VITALS
HEIGHT: 66 IN | OXYGEN SATURATION: 98 % | SYSTOLIC BLOOD PRESSURE: 118 MMHG | BODY MASS INDEX: 22.11 KG/M2 | HEART RATE: 67 BPM | TEMPERATURE: 98.4 F | DIASTOLIC BLOOD PRESSURE: 65 MMHG | RESPIRATION RATE: 20 BRPM | WEIGHT: 137.6 LBS

## 2022-01-13 DIAGNOSIS — K59.00 CONSTIPATION, UNSPECIFIED CONSTIPATION TYPE: ICD-10-CM

## 2022-01-13 DIAGNOSIS — N18.30 STAGE 3 CHRONIC KIDNEY DISEASE, UNSPECIFIED WHETHER STAGE 3A OR 3B CKD (H): ICD-10-CM

## 2022-01-13 DIAGNOSIS — R79.89 ELEVATED TROPONIN: ICD-10-CM

## 2022-01-13 DIAGNOSIS — Z97.8 FOLEY CATHETER IN PLACE: ICD-10-CM

## 2022-01-13 DIAGNOSIS — I48.0 PAROXYSMAL ATRIAL FIBRILLATION (H): ICD-10-CM

## 2022-01-13 DIAGNOSIS — R53.81 DEBILITY: ICD-10-CM
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
## 2022-01-13 DIAGNOSIS — S12.191D OTHER CLOSED NONDISPLACED FRACTURE OF SECOND CERVICAL VERTEBRA WITH ROUTINE HEALING, SUBSEQUENT ENCOUNTER: ICD-10-CM

## 2022-01-13 DIAGNOSIS — Z87.440 HISTORY OF RECURRENT UTIS: ICD-10-CM

## 2022-01-13 DIAGNOSIS — R33.9 CHRONIC RETENTION OF URINE: ICD-10-CM

## 2022-01-13 PROCEDURE — 99309 SBSQ NF CARE MODERATE MDM 30: CPT | Performed by: NURSE PRACTITIONER

## 2022-01-13 RX ORDER — POLYETHYLENE GLYCOL 3350 17 G/17G
17 POWDER, FOR SOLUTION ORAL DAILY
Start: 2022-01-13 | End: 2022-03-28

## 2022-01-13 ASSESSMENT — MIFFLIN-ST. JEOR: SCORE: 1065.9

## 2022-01-13 NOTE — PROGRESS NOTES
"[facility] GERIATRIC SERVICES    Chief Complaint   Patient presents with     Nursing Home Acute     HPI:  Maia Miranda is a 89 year old  (10/26/1932), who is being seen today for an episodic care visit at: [facility] ([facility]) [[id number]].    Met with Mrs. Miranda today for follow up.  Mrs. Miranda reports she just got back from PT/OT, is more tired than usual, but has no complaints.  Denies any light headedness/SEGOVIA with PT/OT.  Denies any pain.  Does endorse the last few stools a big \"Harder\" but no N/V or cramps.  No other complaints.     Allergies, and PMH/PSH reviewed in [facility] today.  REVIEW OF SYSTEMS:  Limited secondary to cognitive impairment but today pt reports 7 point ROS done including, light headedness/dizziness, fever/chills, pain, Resp, CV, GI, and  and is negative other than noted in HPI.      Objective:   /65   Pulse 67   Temp 98.4  F (36.9  C)   Resp 20   Ht 1.676 m (5' 6\")   Wt 62.4 kg (137 lb 9.6 oz)   SpO2 98%   BMI 22.21 kg/m       Exam:  GENERAL APPEARANCE:  Elderly female resting in bed, NAD, non-toxic.  ENT:  Mouth and oropharynx normal, slightly dry mucous membranes.   RESP:  Lungs CTA.  Regular relaxed breathing effort.  No cough.   CV: S1/S2 no murmur or rubs.  Regular rhythm and rate.  No generalized edema.    ABDOMEN:   Flat, soft, non-tender with active bowel sounds.  No guarding, rigidity, or rebound tenderness.  EXTREMITIES:  No lower extremity edema, no calf tenderness.   PSYCH: Alert and orientated, pleasant and cooperative.   NEURO: C collar in place.   LINES: Guillen cath in place draining y/c urine.     LABS:  I have personally reviewed labs, which are in facility or EMR chart.     Assessment/Plan:  (W19.XXXD) Fall, subsequent encounter    (S12.191D) Other closed nondisplaced fracture of second cervical vertebra with routine healing, subsequent encounter  Continues with C-collar continuously.  Denies any pain.    -No changes, continue to clinically " monitor.   -Has NeuroSurg follow up 2/7.     (N18.30) Stage 3 chronic kidney disease, unspecified whether stage 3a or 3b CKD (H)  Creatine stable/baseline at 1.1 on 1/10.   -No changes, continue to clinically monitor.     (I48.0) Paroxysmal atrial fibrillation (H)  (R77.8) Elevated troponin  Lab's show troponin normalized, unclear if this was demand ischemia vs. NSTEMI.  Cardiac monitor has been sent in to be evaluated.   Review of VS's show VSS and within acceptable range.   No current s/s of clinical CHF.   -No changes, continue to clinically monitor.   -Cardiology follow up 2/2.     (R33.9) Chronic retention of urine  (Z87.440) History of recurrent UTIs  (Z97.8) Deleon catheter in place  Was self cathing at home prior, family did not help per the reports.  Her cognitive scores are low, surprised she was able to do this sterile.  Now with C-collar she is not able to self cath; thus deleon in place.   -Continues with Deleon, suspect it will be chronic for some time.   -Continues chronic suppression Cephalexin.     (R53.81) Debility  -Continues with PT/OT.     Orders:  -Added Miralax for constipation.     Electronically signed by: RAY Dacosta CNP

## 2022-01-13 NOTE — LETTER
"    1/13/2022        RE: Maia Miranda  [address] [id number]        [facility]    Chief Complaint   Patient presents with     Nursing Home Acute     HPI:  Maia Miranda is a 89 year old  (10/26/1932), who is being seen today for an episodic care visit at: [facility] ([facility]) [[id number]].    Met with Mrs. Miranda today for follow up.  Mrs. Miranda reports she just got back from PT/OT, is more tired than usual, but has no complaints.  Denies any light headedness/SEGOVIA with PT/OT.  Denies any pain.  Does endorse the last few stools a big \"Harder\" but no N/V or cramps.  No other complaints.     Allergies, and PMH/PSH reviewed in [facility] today.  REVIEW OF SYSTEMS:  Limited secondary to cognitive impairment but today pt reports 7 point ROS done including, light headedness/dizziness, fever/chills, pain, Resp, CV, GI, and  and is negative other than noted in HPI.      Objective:   /65   Pulse 67   Temp 98.4  F (36.9  C)   Resp 20   Ht 1.676 m (5' 6\")   Wt 62.4 kg (137 lb 9.6 oz)   SpO2 98%   BMI 22.21 kg/m       Exam:  GENERAL APPEARANCE:  Elderly female resting in bed, NAD, non-toxic.  ENT:  Mouth and oropharynx normal, slightly dry mucous membranes.   RESP:  Lungs CTA.  Regular relaxed breathing effort.  No cough.   CV: S1/S2 no murmur or rubs.  Regular rhythm and rate.  No generalized edema.    ABDOMEN:   Flat, soft, non-tender with active bowel sounds.  No guarding, rigidity, or rebound tenderness.  EXTREMITIES:  No lower extremity edema, no calf tenderness.   PSYCH: Alert and orientated, pleasant and cooperative.   NEURO: C collar in place.   LINES: Guillen cath in place draining y/c urine.     LABS:  I have personally reviewed labs, which are in facility or EMR chart.     Assessment/Plan:  (W19.XXXD) Fall, subsequent encounter    (S12.191D) Other closed nondisplaced fracture of second cervical vertebra with routine healing, subsequent " encounter  Continues with C-collar continuously.  Denies any pain.    -No changes, continue to clinically monitor.   -Has NeuroSurg follow up 2/7.     (N18.30) Stage 3 chronic kidney disease, unspecified whether stage 3a or 3b CKD (H)  Creatine stable/baseline at 1.1 on 1/10.   -No changes, continue to clinically monitor.     (I48.0) Paroxysmal atrial fibrillation (H)  (R77.8) Elevated troponin  Lab's show troponin normalized, unclear if this was demand ischemia vs. NSTEMI.  Cardiac monitor has been sent in to be evaluated.   Review of VS's show VSS and within acceptable range.   No current s/s of clinical CHF.   -No changes, continue to clinically monitor.   -Cardiology follow up 2/2.     (R33.9) Chronic retention of urine  (Z87.440) History of recurrent UTIs  (Z97.8) Deleon catheter in place  Was self cathing at home prior, family did not help per the reports.  Her cognitive scores are low, surprised she was able to do this sterile.  Now with C-collar she is not able to self cath; thus deleon in place.   -Continues with Deleon, suspect it will be chronic for some time.   -Continues chronic suppression Cephalexin.     (R53.81) Debility  -Continues with PT/OT.     Orders:  -Added Miralax for constipation.     Electronically signed by: RAY Dacosta CNP             Sincerely,        RAY Dacosta CNP

## 2022-01-17 ASSESSMENT — MIFFLIN-ST. JEOR: SCORE: 1063.18

## 2022-01-17 NOTE — PROGRESS NOTES
"[facility] GERIATRIC SERVICES    Chief Complaint   Patient presents with     RECHECK     HPI:  Maia Miranda is a 89 year old  (10/26/1932), who is being seen today for an episodic care visit at: [facility] ([facility]) [[id number]].     Brief Summary: Maia Miranda is an 89yoF who was admitted after she was found down in the bathroom and was confused. Per report she'd been having some diarrhea which possibly could have contributed but its not entirely clear. Several imaging studies completed ultimately finding a \"questionable non-displaced fracture involving the left lateral mass of C2\" in her neck. MRI was recommended for more detail but not able to be completed d/t questionable compatibility with her implanted sacral nerve stimulator for h/o urinary incontinence/retention. Ultimately neurosurg recommended 6 weeks in rigid C-collar and f/u imaging at that time. D/t this collar she was unable to perform straight cath as she does at home so deleon placed for the time being. Diarrhea seemed self limited and resolved. She was hydrated d/t mild dehydration/IFEOMA. Continued on Coumadin for her h/o afib. Of note, had an elevated troponin level but likely demand ischemia as asymptomatic and EKG not showing acute findings nor did echo. D/t her fall, cardiology did recommend an additional 2 weeks of cardiac monitoring upon discharge. D/t care needs, discharged to [facility].    Today's concern is: Today Maia is seen for a follow up visit.  She notes she is overall doing well.  In review of her nursing home record her blood pressures have frequently been in the 90s-110s.  She is continue to receive her home dosing of lisinopril and metoprolol.  She acknowledges that her blood pressure has been on the lower side but denies any dizziness or lightheadedness.  Denies any pain related to her cervical fracture.  Tolerating the chronic Deleon well.  Notes some issues with constipation.  Going every couple of days but feels as " "though she has to strain to have a bowel movement.  Currently on daily MiraLAX.  Agrees with senna but would like it as needed    Allergies, and PMH/PSH reviewed in [facility] today.  REVIEW OF SYSTEMS:  4 point ROS including Respiratory, CV, GI and , other than that noted in the HPI,  is negative    Objective:   BP 98/51   Pulse 71   Temp 98.3  F (36.8  C)   Resp 18   Ht 1.676 m (5' 6\")   Wt 62.1 kg (137 lb)   SpO2 94%   BMI 22.11 kg/m    Physical Exam  Vitals and nursing note reviewed.   Constitutional:       General: She is not in acute distress.     Appearance: Normal appearance. She is not toxic-appearing.   HENT:      Head: Normocephalic and atraumatic.   Eyes:      General: No scleral icterus.  Neck:      Comments: Cervical collar in place  Cardiovascular:      Rate and Rhythm: Normal rate. Rhythm irregular.      Heart sounds: Murmur heard.       Pulmonary:      Effort: Pulmonary effort is normal.      Breath sounds: Normal breath sounds. No wheezing.   Abdominal:      General: Abdomen is flat. Bowel sounds are normal.      Tenderness: There is no abdominal tenderness.   Genitourinary:     Comments: Clear urine in Guillen bag  Musculoskeletal:         General: Normal range of motion.      Right lower leg: No edema.      Left lower leg: No edema.   Skin:     General: Skin is warm and dry.      Findings: No rash.   Neurological:      General: No focal deficit present.      Mental Status: She is alert.      Cranial Nerves: No cranial nerve deficit.   Psychiatric:         Mood and Affect: Mood normal.         Behavior: Behavior normal.             Recent labs in [facility] reviewed by me today.  and   Most Recent 3 CBC's:  Recent Labs   Lab Test 01/10/22  0542 12/27/21  0523 12/26/21  0236   WBC 4.8 8.6 14.5*   HGB 11.4* 10.1* 10.5*    100 100    103* 103*     Most Recent 3 BMP's:  Recent Labs   Lab Test 01/10/22  0542 01/03/22  0552 12/29/21  0817 12/28/21  0003 12/27/21  0523    141  --   --  141 "   POTASSIUM 4.4 3.9  --   --  3.7   CHLORIDE 107 103  --   --  113*   CO2 23 26  --   --  24   BUN 29* 22  --   --  18   CR 1.10 1.20*  --   --  1.03   ANIONGAP 9 12  --   --  4   CLARISSE 10.0 10.4  --   --  8.4*   GLC 95 104 115*   < > 136*    < > = values in this interval not displayed.       Assessment/Plan:  (I10) Hypertension goal BP (blood pressure) < 150/90  (primary encounter diagnosis) Currently on home regimen of lisinopril 40 mg daily and metoprolol  mg daily.  Review of vital signs reveals that patient has frequently had blood pressures in the 90s.  She denies dizziness.  - Continue Toprol-XL at current dose  - Reduce lisinopril to 20 mg daily, hold for SBP less than 110      (K59.00) Constipation, unspecified constipation type: Notes stools are hard and difficult to pass.  Going every other day.  No associated nausea or vomiting.  Continues on daily MiraLAX  - Add senna S twice daily as needed      (W19.XXXD) Fall, subsequent encounter  (S12.191D) Other closed nondisplaced fracture of second cervical vertebra with routine healing, subsequent encounter  Continues with C-collar continuously.  Denies any pain.    -No changes, continue to clinically monitor.   -Has NeuroSurg follow up 2/7.   - Ambulating 250 feet with FWW and SBA    (I48.0) Paroxysmal atrial fibrillation (H): She was discharged from the hospital with a cardiac monitor.  This was sent back but in review not yet formally read.  -Continue rate control with PTA metoprolol  - Continue anticoagulation with warfarin.  INRs have been fairly stable  - Cardiology follow-up 2/2/2022    (N18.30) Stage 3 chronic kidney disease, unspecified whether stage 3a or 3b CKD (H): Baseline creatinine 1.1-1.3  - Creatinine currently at baseline  - Avoid nephrotoxins  - Monitor periodically.  Most recent BMP 1/10 within baseline    (R33.9) Urinary retention  (Z87.440) History of recurrent UTIs  (Z97.8) Guillen catheter in place  Was self cathing at home prior,  family did not help per the reports.  Her cognitive scores are low, surprised she was able to do this sterile.  Now with C-collar she is not able to self cath; thus deleon in place.   -Continues with Deleon, suspect it will be chronic for some time.   -Continues chronic suppression Cephalexin.     (G31.84) Mild cognitive impairment: CPT 3.875, slums 9/30  - Continue therapies  - Social work following    Orders:  reduce lisinopril to 20 mg daily, hold for SBP less than 110  Senokot-S 1 tablet twice daily as needed      Electronically signed by: Yaquelin Humphries PA-C

## 2022-01-18 ENCOUNTER — TRANSITIONAL CARE UNIT VISIT (OUTPATIENT)
Dept: GERIATRICS | Facility: CLINIC | Age: 87
End: 2022-01-18
Payer: COMMERCIAL

## 2022-01-18 VITALS
WEIGHT: 137 LBS | DIASTOLIC BLOOD PRESSURE: 51 MMHG | BODY MASS INDEX: 22.02 KG/M2 | RESPIRATION RATE: 18 BRPM | OXYGEN SATURATION: 94 % | HEIGHT: 66 IN | HEART RATE: 71 BPM | TEMPERATURE: 98.3 F | SYSTOLIC BLOOD PRESSURE: 98 MMHG

## 2022-01-18 DIAGNOSIS — I10 HYPERTENSION GOAL BP (BLOOD PRESSURE) < 150/90: Primary | ICD-10-CM

## 2022-01-18 DIAGNOSIS — Z87.440 HISTORY OF RECURRENT UTIS: ICD-10-CM

## 2022-01-18 DIAGNOSIS — S12.191D OTHER CLOSED NONDISPLACED FRACTURE OF SECOND CERVICAL VERTEBRA WITH ROUTINE HEALING, SUBSEQUENT ENCOUNTER: ICD-10-CM

## 2022-01-18 DIAGNOSIS — R33.9 URINARY RETENTION: ICD-10-CM
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
## 2022-01-18 DIAGNOSIS — Z97.8 FOLEY CATHETER IN PLACE: ICD-10-CM

## 2022-01-18 DIAGNOSIS — K59.00 CONSTIPATION, UNSPECIFIED CONSTIPATION TYPE: ICD-10-CM

## 2022-01-18 DIAGNOSIS — N18.30 STAGE 3 CHRONIC KIDNEY DISEASE, UNSPECIFIED WHETHER STAGE 3A OR 3B CKD (H): ICD-10-CM

## 2022-01-18 DIAGNOSIS — I48.0 PAROXYSMAL ATRIAL FIBRILLATION (H): ICD-10-CM

## 2022-01-18 DIAGNOSIS — G31.84 MILD COGNITIVE IMPAIRMENT: ICD-10-CM

## 2022-01-18 PROCEDURE — 99309 SBSQ NF CARE MODERATE MDM 30: CPT | Performed by: PHYSICIAN ASSISTANT

## 2022-01-18 RX ORDER — LISINOPRIL 20 MG/1
10 TABLET ORAL DAILY
COMMUNITY
Start: 2022-01-18 | End: 2022-03-15

## 2022-01-18 RX ORDER — SENNA AND DOCUSATE SODIUM 50; 8.6 MG/1; MG/1
1 TABLET, FILM COATED ORAL 2 TIMES DAILY PRN
COMMUNITY
Start: 2022-01-18 | End: 2022-05-26

## 2022-01-24 ENCOUNTER — TRANSITIONAL CARE UNIT VISIT (OUTPATIENT)
Dept: GERIATRICS | Facility: CLINIC | Age: 87
End: 2022-01-24
Payer: COMMERCIAL

## 2022-01-24 VITALS
RESPIRATION RATE: 18 BRPM | TEMPERATURE: 98.7 F | DIASTOLIC BLOOD PRESSURE: 58 MMHG | OXYGEN SATURATION: 98 % | HEIGHT: 66 IN | BODY MASS INDEX: 22.02 KG/M2 | WEIGHT: 137 LBS | HEART RATE: 68 BPM | SYSTOLIC BLOOD PRESSURE: 98 MMHG

## 2022-01-24 DIAGNOSIS — S12.191D OTHER CLOSED NONDISPLACED FRACTURE OF SECOND CERVICAL VERTEBRA WITH ROUTINE HEALING, SUBSEQUENT ENCOUNTER: ICD-10-CM

## 2022-01-24 DIAGNOSIS — I48.0 PAROXYSMAL ATRIAL FIBRILLATION (H): ICD-10-CM

## 2022-01-24 DIAGNOSIS — I10 HYPERTENSION GOAL BP (BLOOD PRESSURE) < 150/90: Primary | ICD-10-CM

## 2022-01-24 DIAGNOSIS — D64.9 ANEMIA, UNSPECIFIED TYPE: ICD-10-CM

## 2022-01-24 DIAGNOSIS — Z97.8 FOLEY CATHETER IN PLACE: ICD-10-CM

## 2022-01-24 DIAGNOSIS — R33.9 URINARY RETENTION: ICD-10-CM

## 2022-01-24 DIAGNOSIS — K59.00 CONSTIPATION, UNSPECIFIED CONSTIPATION TYPE: ICD-10-CM

## 2022-01-24 DIAGNOSIS — N18.30 STAGE 3 CHRONIC KIDNEY DISEASE, UNSPECIFIED WHETHER STAGE 3A OR 3B CKD (H): ICD-10-CM

## 2022-01-24 DIAGNOSIS — G31.84 MILD COGNITIVE IMPAIRMENT: ICD-10-CM

## 2022-01-24 DIAGNOSIS — E46 PROTEIN-CALORIE MALNUTRITION, UNSPECIFIED SEVERITY (H): ICD-10-CM

## 2022-01-24 PROCEDURE — 99309 SBSQ NF CARE MODERATE MDM 30: CPT | Performed by: PHYSICIAN ASSISTANT

## 2022-01-24 ASSESSMENT — MIFFLIN-ST. JEOR: SCORE: 1063.18

## 2022-01-24 NOTE — PROGRESS NOTES
"[facility] GERIATRIC SERVICES    Chief Complaint   Patient presents with     RECHECK       HPI:  Maia Miranda is a 89 year old  (10/26/1932), who is being seen today for an episodic care visit at: [facility] ([facility]) [[id number]].     Brief summary: Maia Miranda is an 89yoF who was admitted after she was found down in the bathroom and was confused. Per report she'd been having some diarrhea which possibly could have contributed but its not entirely clear. Several imaging studies completed ultimately finding a \"questionable non-displaced fracture involving the left lateral mass of C2\" in her neck. MRI was recommended for more detail but not able to be completed d/t questionable compatibility with her implanted sacral nerve stimulator for h/o urinary incontinence/retention. Ultimately neurosurg recommended 6 weeks in rigid C-collar and f/u imaging at that time. D/t this collar she was unable to perform straight cath as she does at home so deleon placed for the time being. Diarrhea seemed self limited and resolved. She was hydrated d/t mild dehydration/IFEOMA. Continued on Coumadin for her h/o afib. Of note, had an elevated troponin level but likely demand ischemia as asymptomatic and EKG not showing acute findings nor did echo. D/t her fall, cardiology did recommend an additional 2 weeks of cardiac monitoring upon discharge. D/t care needs, discharged to [facility].    Today's concern is: Today Maia is seen laying in bed. She feels tired. Denies chest pain or SOB. Lisinopril was reduced last week and her SBPs continue to run low . Sydneymichaela denies dizziness. Denies issues with constipation. No chest pain or SOB. No N/V. Nutrition saw last week for weight loss and added ensure.    Allergies, and PMH/PSH reviewed in [place] today.    REVIEW OF SYSTEMS:  10 point ROS of systems including Constitutional, Eyes, Respiratory, Cardiovascular, Gastroenterology, Genitourinary, Integumentary, Musculoskeletal, Psychiatric " "were all negative except for pertinent positives noted in my HPI.    Objective:   BP 98/58   Pulse 68   Temp 98.7  F (37.1  C)   Resp 18   Ht 1.676 m (5' 6\")   Wt 62.1 kg (137 lb)   SpO2 98%   BMI 22.11 kg/m      Physical Exam  Vitals and nursing note reviewed.   Constitutional:       General: She is not in acute distress.     Appearance: She is not toxic-appearing.      Comments: Frail appearing   HENT:      Head: Normocephalic and atraumatic.   Eyes:      General: No scleral icterus.  Neck:      Comments: Cervical collar in place  Cardiovascular:      Rate and Rhythm: Normal rate and regular rhythm.      Heart sounds: No murmur heard.      Pulmonary:      Effort: Pulmonary effort is normal.      Breath sounds: Normal breath sounds. No wheezing.   Abdominal:      General: Abdomen is flat.      Tenderness: There is no abdominal tenderness.   Musculoskeletal:         General: Normal range of motion.      Right lower leg: No edema.      Left lower leg: No edema.   Skin:     General: Skin is warm and dry.      Findings: No rash.   Neurological:      General: No focal deficit present.      Mental Status: She is alert.      Cranial Nerves: No cranial nerve deficit.   Psychiatric:         Mood and Affect: Mood normal.         Behavior: Behavior normal.          Recent labs in [facility] reviewed by me today.  and   Most Recent 3 CBC's:Recent Labs   Lab Test 01/10/22  0542 12/27/21  0523 12/26/21  0236   WBC 4.8 8.6 14.5*   HGB 11.4* 10.1* 10.5*    100 100    103* 103*     Most Recent 3 BMP's:Recent Labs   Lab Test 01/10/22  0542 01/03/22  0552 12/29/21  0817 12/28/21  0003 12/27/21  0523    141  --   --  141   POTASSIUM 4.4 3.9  --   --  3.7   CHLORIDE 107 103  --   --  113*   CO2 23 26  --   --  24   BUN 29* 22  --   --  18   CR 1.10 1.20*  --   --  1.03   ANIONGAP 9 12  --   --  4   CLARISSE 10.0 10.4  --   --  8.4*   GLC 95 104 115*   < > 136*    < > = values in this interval not displayed. "       Assessment/Plan:  (I10) Hypertension goal BP (blood pressure) < 150/90  (primary encounter diagnosis) Currently on home regimen of lisinopril 40 mg daily and metoprolol  mg daily.  Reduced Lisinopril to 20 mg last week and held 50% for SBP < 110. Patient continues to deny dizziness  - Continue Toprol-XL at current dose  - Reduce lisinopril to 10 mg daily, hold for SBP less than 110 if BP continues to run low will d/c      (K59.00) Constipation, unspecified constipation type: Notes symptoms have improved.  Going every other day.  No associated nausea or vomiting.  Continues   - Continues on daily Miralax and as needed Senna      (W19.XXXD) Fall, subsequent encounter  (S12.191D) Other closed nondisplaced fracture of second cervical vertebra with routine healing, subsequent encounter  Continues with C-collar continuously.  Denies any pain.    -No changes, continue to clinically monitor.   -Has NeuroSurg follow up 2/7.   - Ambulating 250 feet with FWW and SBA    (I48.0) Paroxysmal atrial fibrillation (H): She was discharged from the hospital with a cardiac monitor.  This was sent back but in review not yet formally read.  -Continue rate control with PTA metoprolol  - Continue anticoagulation with warfarin.  INRs have been fairly stable  - Cardiology follow-up 2/2/2022    (N18.30) Stage 3 chronic kidney disease, unspecified whether stage 3a or 3b CKD (H): Baseline creatinine 1.1-1.3  - Creatinine currently at baseline  - Avoid nephrotoxins  - Monitor periodically.  Most recent BMP 1/10 within baseline    (R33.9) Urinary retention  (Z87.440) History of recurrent UTIs  (Z97.8) Deleon catheter in place  Was self cathing at home prior, family did not help per the reports.  Her cognitive scores are low, surprised she was able to do this sterile.  Now with C-collar she is not able to self cath; thus deleon in place.   -Continues with Deleon, suspect it will be chronic for some time.   -Continues chronic suppression  Cephalexin.     (G31.84) Mild cognitive impairment: CPT 3.875, slums 9/30  - Continue therapies  - Social work following    (E46) Protein-calorie malnutrition, unspecified severity: Weight down 8% since admission. Nutrition following  - Continue Ensure Plus TID    Orders:  1. Reduce Lisinopril to 10 mg/d  2. Warfarin 2 mg MWF, 1.5 mg AOD    Electronically signed by: Yaquelin Humphries PA-C

## 2022-01-24 NOTE — PATIENT INSTRUCTIONS
January 24, 2022    Maia Miranda  10/26/1932      ORDERS:    1. Reduce Lisinopril to 10 mg po daily, hold for SBP < 110. Dx HTN            aYquelin Humphries PA-C

## 2022-01-28 ENCOUNTER — DISCHARGE SUMMARY NURSING HOME (OUTPATIENT)
Dept: GERIATRICS | Facility: CLINIC | Age: 87
End: 2022-01-28
Payer: COMMERCIAL

## 2022-01-28 VITALS
OXYGEN SATURATION: 96 % | SYSTOLIC BLOOD PRESSURE: 129 MMHG | HEART RATE: 91 BPM | WEIGHT: 137 LBS | TEMPERATURE: 97.5 F | BODY MASS INDEX: 22.02 KG/M2 | RESPIRATION RATE: 17 BRPM | DIASTOLIC BLOOD PRESSURE: 74 MMHG | HEIGHT: 66 IN

## 2022-01-28 DIAGNOSIS — G31.84 MILD COGNITIVE IMPAIRMENT: ICD-10-CM

## 2022-01-28 DIAGNOSIS — I10 HYPERTENSION GOAL BP (BLOOD PRESSURE) < 150/90: ICD-10-CM

## 2022-01-28 DIAGNOSIS — E46 PROTEIN-CALORIE MALNUTRITION, UNSPECIFIED SEVERITY (H): ICD-10-CM

## 2022-01-28 DIAGNOSIS — N18.30 STAGE 3 CHRONIC KIDNEY DISEASE, UNSPECIFIED WHETHER STAGE 3A OR 3B CKD (H): ICD-10-CM

## 2022-01-28 DIAGNOSIS — I48.0 PAROXYSMAL ATRIAL FIBRILLATION (H): ICD-10-CM

## 2022-01-28 DIAGNOSIS — N39.0 RECURRENT UTI: ICD-10-CM

## 2022-01-28 DIAGNOSIS — K59.09 CHRONIC CONSTIPATION: Primary | ICD-10-CM
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
## 2022-01-28 DIAGNOSIS — Z97.8 FOLEY CATHETER IN PLACE: ICD-10-CM

## 2022-01-28 DIAGNOSIS — E78.5 HYPERLIPIDEMIA LDL GOAL <130: ICD-10-CM

## 2022-01-28 DIAGNOSIS — S12.191G OTHER CLOSED NONDISPLACED FRACTURE OF SECOND CERVICAL VERTEBRA WITH DELAYED HEALING, SUBSEQUENT ENCOUNTER: ICD-10-CM

## 2022-01-28 DIAGNOSIS — R33.9 URINARY RETENTION: ICD-10-CM

## 2022-01-28 PROCEDURE — 9046: Performed by: PHYSICIAN ASSISTANT

## 2022-01-28 PROCEDURE — 99316 NF DSCHRG MGMT 30 MIN+: CPT | Performed by: PHYSICIAN ASSISTANT

## 2022-01-28 ASSESSMENT — MIFFLIN-ST. JEOR: SCORE: 1063.18

## 2022-01-28 NOTE — LETTER
"    1/28/2022        RE: Maia Miranda  [address] [facility] [place] [address]        [facility] GERIATRICS DISCHARGE SUMMARY  PATIENT'S NAME: Maia Miranda  YOB: 1932  MEDICAL RECORD NUMBER:  [id number]  Place of Service where encounter took place:  [facility] (TC) [[id number]]    PRIMARY CARE PROVIDER AND CLINIC RESPONSIBLE AFTER TRANSFER:   RAY Patel CNP, [address] [facility] [id number]    Assisted Living: New intermediate     Transferring providers: Yaquelin Humphries PA-C, Miley Kiran MD  Recent Hospitalization/ED:  [facility] stay 12/25/21 to 12/29/21.  Date of SNF Admission: December 29, 2021  Date of SNF (anticipated) Discharge: February 02, 2022  Discharged to: new assisted living for patient  Cognitive Scores: SLUMS: 9/30 and CPT: 3.875/5.6  Physical Function: Ambulating 225 ft with FWW and SBA  DME: No new DME needed    CODE STATUS/ADVANCE DIRECTIVES DISCUSSION:  No CPR- Do NOT Intubate   ALLERGIES: Codeine, Meperidine, Morphine, Penicillins, Sulfa drugs, and Epinephrine    NURSING FACILITY COURSE   Medication Changes/Rationale:     Lisinopril reduced from 40 mg/d--10 mg/d    Summary of nursing facility stay:    Maia Miranda is an 89yoF who was admitted after she was found down in the bathroom and was confused. Per report she'd been having some diarrhea which possibly could have contributed but its not entirely clear. Several imaging studies completed ultimately finding a \"questionable non-displaced fracture involving the left lateral mass of C2\" in her neck. MRI was recommended for more detail but not able to be completed d/t questionable compatibility with her implanted sacral nerve stimulator for h/o urinary incontinence/retention. Ultimately neurosurg recommended 6 weeks in rigid C-collar and f/u imaging at that time. D/t this collar she was unable to perform straight cath as she does at home so " deleon placed for the time being. Diarrhea seemed self limited and resolved. She was hydrated d/t mild dehydration/IFEOMA. Continued on Coumadin for her h/o afib. Of note, had an elevated troponin level but likely demand ischemia as asymptomatic and EKG not showing acute findings nor did echo. D/t her fall, cardiology did recommend an additional 2 weeks of cardiac monitoring upon discharge. D/t care needs, discharged to TCU.    Maia had an uncomplicated TCU stay. She progressed with physical therapy. Cognitive scores are low and she was previously living independently. Family elected to d/c to [facility] which was still being decided by family at the time of this note    (I10) Hypertension goal BP (blood pressure) < 150/90  (primary encounter diagnosis) Currently on home regimen of lisinopril 40 mg daily and metoprolol  mg daily.  Reduced Lisinopril to 20 mg last week and held 50% for SBP < 110. Patient continues to deny dizziness  - Continue Toprol-XL at current dose  - Lisinopril further reduced to 10 mg/d with adequate BP control and will continue at this current dose      (K59.00) Constipation, unspecified constipation type: Notes symptoms have improved.  Going every other day.  No associated nausea or vomiting.  Continues   - Continues on daily Miralax and as needed Senna      (W19.XXXD) Fall, subsequent encounter  (S12.191D) Other closed nondisplaced fracture of second cervical vertebra with routine healing, subsequent encounter  Continues with C-collar continuously.  Denies any pain.    -No changes, continue to clinically monitor.   -Has NeuroSurg follow up 2/7.   - Ambulating 250 feet with FWW and SBA    (I48.0) Paroxysmal atrial fibrillation (H): She was discharged from the hospital with a cardiac monitor.  This was sent back but in review not yet formally read.  -Continue rate control with PTA metoprolol  - Continue anticoagulation with warfarin.  INRs have been fairly stable. Will recheck 1/31 prior to  dsicharge and make final warfarin dose recomendations  - Cardiology follow-up 2/2/2022    (N18.30) Stage 3 chronic kidney disease, unspecified whether stage 3a or 3b CKD (H): Baseline creatinine 1.1-1.3  - Creatinine currently at baseline  - Avoid nephrotoxins  - Monitor periodically.  Most recent BMP 1/10 within baseline    (R33.9) Urinary retention  (Z87.440) History of recurrent UTIs  (Z97.8) Deleon catheter in place  Was self cathing at home prior, family did not help per the reports.  Her cognitive scores are low, surprised she was able to do this sterile.  Now with C-collar she is not able to self cath; thus deleon in place.   -Continues with Deleon, suspect it will be chronic for some time.   -Continues chronic suppression Cephalexin.     (G31.84) Mild cognitive impairment: CPT 3.875, slums 9/30  - Continue therapies  - Social work following    (E46) Protein-calorie malnutrition, unspecified severity: Weight down 8% since admission. Nutrition following  - Continue Ensure Plus TID      Discharge Medications:  Current Outpatient Medications   Medication Sig Dispense Refill     acetaminophen (TYLENOL) 500 MG tablet Take 2 tablets (1,000 mg) by mouth 3 times daily       carboxymethylcellulose (REFRESH LIQUIGEL) 1 % ophthalmic solution Place 1 drop into both eyes every morning As needed       cephALEXin (KEFLEX) 250 MG capsule Take 1 capsule (250 mg) by mouth daily 90 capsule 3     Cholecalciferol (VITAMIN D) 50 MCG (2000 UT) CAPS Take 1 capsule by mouth every evening       fish oil-omega-3 fatty acids 500 MG capsule Take 500 mg by mouth every morning       Lidocaine (LIDOCARE) 4 % Patch Place 1 patch onto the skin every 24 hours To prevent lidocaine toxicity, patient should be patch free for 12 hrs daily. To right shoulder       lisinopril (ZESTRIL) 20 MG tablet Take 10 mg by mouth daily       loperamide (IMODIUM) 2 MG capsule Take 1 capsule (2 mg) by mouth 4 times daily as needed for diarrhea       metoprolol  succinate ER (TOPROL-XL) 100 MG 24 hr tablet Take 1 tablet (100 mg) by mouth daily 90 tablet 3     polyethylene glycol (MIRALAX) 17 GM/Dose powder Take 17 g by mouth daily       rosuvastatin (CRESTOR) 5 MG tablet Take 1 tablet (5 mg) by mouth At Bedtime 90 tablet 3     SENNA-docusate sodium (SENNA S) 8.6-50 MG tablet Take 1 tablet by mouth 2 times daily as needed (constipation)       sodium chloride (PETER 128) 5 % ophthalmic ointment Place 2 Application (1 g) into both eyes At Bedtime       vitamin C (ASCORBIC ACID) 500 MG tablet Take 1 tablet (500 mg) by mouth 2 times daily       warfarin ANTICOAGULANT (COUMADIN) 2.5 MG tablet Take one tablet (2.5 mg) by mouth on Sun, Tue and Thurs; take a half tablet (1.25 mg) all other days; or as directed by INR clinic 65 tablet 0          Controlled medications:   not applicable/none     Past Medical History:   Past Medical History:   Diagnosis Date     Amnestic MCI (mild cognitive impairment with memory loss) 2014     Chronic duodenal ulcer without mention of hemorrhage, perforation, or obstruction 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 Warner, [facility] yearly     Esophageal reflux 2001    Abstracted 06/10/02     Essential and other specified forms of tremor 2004    resting tremor     Generalized osteoarthrosis, unspecified site     Hands     Hiatal hernia     diagnosed late 1990s [place], [place]     History of pulmonary embolism 1971    no source found     LACTOSE INTOLERANCE      Lumbago     sees [facility]     Mitral valve regurgitation      Occlusion and stenosis of carotid artery without mention of cerebral infarction 2003    CAROTID US NORMAL, bruit in neck     Osteoporosis, unspecified 2003    T = -2.66     Paroxysmal atrial fibrillation (H) 11/15/2013     Spider veins      Unspecified essential hypertension      Unspecified tinnitus 2005    mild hearing loss, saw ENT     Physical Exam:  "  Vitals: /74   Pulse 91   Temp 97.5  F (36.4  C)   Resp 17   Ht 1.676 m (5' 6\")   Wt 62.1 kg (137 lb)   SpO2 96%   BMI 22.11 kg/m    BMI: Body mass index is 22.11 kg/m .  Physical Exam  Vitals and nursing note reviewed.   Constitutional:       General: She is not in acute distress.     Appearance: Normal appearance. She is not toxic-appearing.      Comments: thin   HENT:      Head: Normocephalic and atraumatic.   Eyes:      General: No scleral icterus.  Neck:      Comments: Cervical collar in place  Cardiovascular:      Rate and Rhythm: Normal rate and regular rhythm.      Heart sounds: No murmur heard.      Pulmonary:      Effort: Pulmonary effort is normal.      Breath sounds: Normal breath sounds. No wheezing.   Musculoskeletal:         General: Normal range of motion.      Right lower leg: No edema.      Left lower leg: No edema.   Skin:     General: Skin is warm and dry.      Findings: No rash.   Neurological:      General: No focal deficit present.      Mental Status: She is alert.      Cranial Nerves: No cranial nerve deficit.   Psychiatric:         Mood and Affect: Mood normal.         Behavior: Behavior normal.             SNF labs: [facility] labs [facility] reviewed by me today.  and   Most Recent 3 CBC's:Recent Labs   Lab Test 01/10/22  0542 12/27/21  0523 12/26/21  0236   WBC 4.8 8.6 14.5*   HGB 11.4* 10.1* 10.5*    100 100    103* 103*     Most Recent 3 BMP's:Recent Labs   Lab Test 01/10/22  0542 01/03/22  0552 12/29/21  0817 12/28/21  0003 12/27/21  0523    141  --   --  141   POTASSIUM 4.4 3.9  --   --  3.7   CHLORIDE 107 103  --   --  113*   CO2 23 26  --   --  24   BUN 29* 22  --   --  18   CR 1.10 1.20*  --   --  1.03   ANIONGAP 9 12  --   --  4   CLARISSE 10.0 10.4  --   --  8.4*   GLC 95 104 115*   < > 136*    < > = values in this interval not displayed.     Most Recent 6 Bacteria Isolates From Any Culture (See EPIC Reports for Culture Details):Recent Labs   Lab Test " 03/15/21  1129 03/05/21  1108 09/22/20  1114 08/15/20  0954 08/04/20  0930 07/20/20  1408   CULT >100,000 colonies/mL  Escherichia coli  * 50,000 to 100,000 colonies/mL  Escherichia coli  *  50,000 to 100,000 colonies/mL  Strain 2  Escherichia coli  * No growth >100,000 colonies/mL  Escherichia coli  * >100,000 colonies/mL  Escherichia coli  * 10,000 to 50,000 colonies/mL  mixed urogenital georgette       Most Recent Urinalysis:Recent Labs   Lab Test 12/25/21  1328 08/31/21  1913 03/15/21  1134   COLOR Light Yellow   < > Yellow   APPEARANCE Clear   < > Cloudy   URINEGLC Negative   < > Negative   URINEBILI Negative   < > Negative   URINEKETONE Negative   < > Negative   SG 1.022   < > 1.025   UBLD Negative   < > Large*   URINEPH 5.0   < > 5.5   PROTEIN Negative   < > 100*   UROBILINOGEN  --   --  0.2   NITRITE Negative   < > Positive*   LEUKEST Negative   < > Moderate*   RBCU 1   < > 5-10*   WBCU <1   < > >100*    < > = values in this interval not displayed.       DISCHARGE PLAN:    Follow up labs: No labs orders/due    Medical Follow Up:      Follow up with primary care provider in 1-2 weeks   Follow up with Cardiology, Dr Andino as scheduled 2/2/22   Follow up with Neurosurgery as scheduled 2/7/22      Discharge Services: Home Care:  Occupational Therapy, Physical Therapy, Registered Nurse and Home Health Aide    Discharge Instructions Verbalized to Patient at Discharge:     Weight bearing restrictions: Continue C-Collar at all time    Ensure Plus nutritional supplement recommended 240 ml tid times a day.     Guillen catheter: size 16 French; last changed 1/28; change every 4 weeks and as needed.     TOTAL DISCHARGE TIME:   Greater than 30 minutes  Electronically signed by:  Yaquelin Humphries PA-C     RN, PT, HHA to begin 24 - 48 hours after discharge.    PLEASE EVALUATE AND TREAT (Evaluation timeline is 24 - 48 hrs. Please call if there is need for a variance to this timeline).    Current Outpatient  Medications:  acetaminophen (TYLENOL) 500 MG tablet, Take 2 tablets (1,000 mg) by mouth 3 times daily, Disp: , Rfl:   carboxymethylcellulose (REFRESH LIQUIGEL) 1 % ophthalmic solution, Place 1 drop into both eyes every morning As needed, Disp: , Rfl:   cephALEXin (KEFLEX) 250 MG capsule, Take 1 capsule (250 mg) by mouth daily, Disp: 90 capsule, Rfl: 3  Cholecalciferol (VITAMIN D) 50 MCG (2000 UT) CAPS, Take 1 capsule by mouth every evening, Disp: , Rfl:   fish oil-omega-3 fatty acids 500 MG capsule, Take 500 mg by mouth every morning, Disp: , Rfl:   Lidocaine (LIDOCARE) 4 % Patch, Place 1 patch onto the skin every 24 hours To prevent lidocaine toxicity, patient should be patch free for 12 hrs daily. To right shoulder, Disp: , Rfl:   lisinopril (ZESTRIL) 20 MG tablet, Take 10 mg by mouth daily, Disp: , Rfl:   loperamide (IMODIUM) 2 MG capsule, Take 1 capsule (2 mg) by mouth 4 times daily as needed for diarrhea, Disp: , Rfl:   metoprolol succinate ER (TOPROL-XL) 100 MG 24 hr tablet, Take 1 tablet (100 mg) by mouth daily, Disp: 90 tablet, Rfl: 3  polyethylene glycol (MIRALAX) 17 GM/Dose powder, Take 17 g by mouth daily, Disp: , Rfl:   rosuvastatin (CRESTOR) 5 MG tablet, Take 1 tablet (5 mg) by mouth At Bedtime, Disp: 90 tablet, Rfl: 3  SENNA-docusate sodium (SENNA S) 8.6-50 MG tablet, Take 1 tablet by mouth 2 times daily as needed (constipation), Disp: , Rfl:   sodium chloride (PETER 128) 5 % ophthalmic ointment, Place 2 Application (1 g) into both eyes At Bedtime, Disp: , Rfl:   vitamin C (ASCORBIC ACID) 500 MG tablet, Take 1 tablet (500 mg) by mouth 2 times daily, Disp: , Rfl:   warfarin ANTICOAGULANT (COUMADIN) 2.5 MG tablet, Take one tablet (2.5 mg) by mouth on Sun, Tue and Thurs; take a half tablet (1.25 mg) all other days; or as directed by INR clinic (Patient taking differently: Take 2 mg MWF and 1.5 mg T, Thur, Sat, Sun), Disp: 65 tablet, Rfl: 0      Patient Active Problem List:     Hyperlipidemia LDL goal  <130     Osteopenia     Primary osteoarthritis involving multiple joints     Essential and other specified forms of tremor     Esophageal reflux     Varicose veins of lower extremities with complications     Allergic rhinitis     Degeneration of lumbar or lumbosacral intervertebral disc     Internal bleeding hemorrhoids     Osteoarthritis of thumb     Diverticulosis     CKD (chronic kidney disease) stage 3, GFR 30-59 ml/min (H)     Chronic constipation     Urinary retention     Health Care Home     Advanced directives, counseling/discussion     Long term current use of anticoagulant therapy     Atrial fibrillation (H)     Hypertension goal BP (blood pressure) < 150/90     Post-menopausal bleeding     History of recurrent UTIs     Mild cognitive impairment     Fibroid uterus     Rectal bleeding     Personal history of urinary tract infection     Urinary retention with incomplete bladder emptying     Longstanding persistent atrial fibrillation (H)     Long term current use of anticoagulants with INR goal of 2.0-3.0     Recurrent UTI     Ischemic colitis (H)     Atrial fibrillation, unspecified type (H)     Elevated troponin     Chest pain, unspecified type     Fall, initial encounter     Fall, subsequent encounter     Other closed nondisplaced fracture of second cervical vertebra with delayed healing, subsequent encounter     Acute pain due to injury     Loose stools     Pre-syncope     Acute kidney injury (H)     Chronic retention of urine     Debility     Stage 3 chronic kidney disease, unspecified whether stage 3a or 3b CKD (H)     Guillen catheter in place     Depression, unspecified depression type     Other closed nondisplaced fracture of second cervical vertebra with routine healing, subsequent encounter      Documentation of Face to Face and Certification for Home Health Services    I certify that patientMaia is under my care and that I, or a Nurse Practitioner or Physician's Assistant working with  me, had a face-to-face encounter that meets the physician face-to-face encounter requirements with this patient on: 1/31/2022.    This encounter with the patient was in whole, or in part, for the following medical condition, which is the primary reason for Home Health Care: Chronic constipation  (primary encounter diagnosis)  Hyperlipidemia LDL goal <130  Paroxysmal atrial fibrillation (H)  Hypertension goal BP (blood pressure) < 150/90  Other closed nondisplaced fracture of second cervical vertebra with delayed healing, subsequent encounter  Stage 3 chronic kidney disease, unspecified whether stage 3a or 3b CKD (H)  Urinary retention  Recurrent UTI  Guillen catheter in place  Mild cognitive impairment  Protein-calorie malnutrition, unspecified severity (H)  Fall, subsequent encounter  .    I certify that, based on my findings, the following services are medically necessary Home Health Services: Nursing, Occupational Therapy and Physical Therapy    My clinical findings support the need for the above services because: Nurse is needed: med management, INR 2/7/22., Occupational Therapy Services are needed to assess and treat cognitive ability and address ADL safety due to impairment in Cervical fracture. and Physical Therapy Services are needed to assess and treat the following functional impairments: cervical fracture.    Further, I certify that my clinical findings support that this patient is homebound (i.e. absences from home require considerable and taxing effort and are for medical reasons or Gnostic[employer] services or infrequently or of short duration when for other reasons) because: Requires assistance of another person or specialized equipment to access medical services because patient: Requires supervision of another for safe transfer..    Based on the above findings, I certify that this patient is confined to the home and needs intermittent skilled nursing care, physical therapy and/or speech therapy.  The patient  is under my care, and I have initiated the establishment of the plan of care.  This patient will be followed by a physician who will periodically review the plan of care.    Physician/Provider to provide follow up care: Nancy Salgado certified Physician at time of discharge: Miley Kiran MD    Please be aware that coverage of these services is subject to the terms and limitations of your health insurance plan.  Call member services at your health plan with any benefit or coverage questions.                      Sincerely,        Yaquelin Humphries PA-C

## 2022-01-28 NOTE — PROGRESS NOTES
"[facility] GERIATRICS DISCHARGE SUMMARY  PATIENT'S NAME: Maia Miranda  YOB: 1932  MEDICAL RECORD NUMBER:  [id number]  Place of Service where encounter took place:  [facility] ([facility]) [[id number]]    PRIMARY CARE PROVIDER AND CLINIC RESPONSIBLE AFTER TRANSFER:   RAY Patel CNP, [address] [facility] / [facility] [id number]    Assisted Living: [facility]     Transferring providers: Yaquelin Humphries PA-C, Miley Kiran MD  Recent Hospitalization/ED:  [facility] stay 12/25/21 to 12/29/21.  Date of SNF Admission: December 29, 2021  Date of SNF (anticipated) Discharge: February 02, 2022  Discharged to: new assisted living for patient  Cognitive Scores: SLUMS: 9/30 and CPT: 3.875/5.6  Physical Function: Ambulating 225 ft with FWW and SBA  DME: No new DME needed    CODE STATUS/ADVANCE DIRECTIVES DISCUSSION:  No CPR- Do NOT Intubate   ALLERGIES: Codeine, Meperidine, Morphine, Penicillins, Sulfa drugs, and Epinephrine    NURSING FACILITY COURSE   Medication Changes/Rationale:     Lisinopril reduced from 40 mg/d--10 mg/d    Summary of nursing facility stay:    Maia Miranda is an 89yoF who was admitted after she was found down in the bathroom and was confused. Per report she'd been having some diarrhea which possibly could have contributed but its not entirely clear. Several imaging studies completed ultimately finding a \"questionable non-displaced fracture involving the left lateral mass of C2\" in her neck. MRI was recommended for more detail but not able to be completed d/t questionable compatibility with her implanted sacral nerve stimulator for h/o urinary incontinence/retention. Ultimately neurosurg recommended 6 weeks in rigid C-collar and f/u imaging at that time. D/t this collar she was unable to perform straight cath as she does at home so deleon placed for the time being. Diarrhea seemed self limited and resolved. She was hydrated d/t " mild dehydration/IFEOMA. Continued on Coumadin for her h/o afib. Of note, had an elevated troponin level but likely demand ischemia as asymptomatic and EKG not showing acute findings nor did echo. D/t her fall, cardiology did recommend an additional 2 weeks of cardiac monitoring upon discharge. D/t care needs, discharged to TCU.    Maia had an uncomplicated TCU stay. She progressed with physical therapy. Cognitive scores are low and she was previously living independently. Family elected to d/c to [facility] which was still being decided by family at the time of this note    (I10) Hypertension goal BP (blood pressure) < 150/90  (primary encounter diagnosis) Currently on home regimen of lisinopril 40 mg daily and metoprolol  mg daily.  Reduced Lisinopril to 20 mg last week and held 50% for SBP < 110. Patient continues to deny dizziness  - Continue Toprol-XL at current dose  - Lisinopril further reduced to 10 mg/d with adequate BP control and will continue at this current dose      (K59.00) Constipation, unspecified constipation type: Notes symptoms have improved.  Going every other day.  No associated nausea or vomiting.  Continues   - Continues on daily Miralax and as needed Senna      (W19.XXXD) Fall, subsequent encounter  (S12.191D) Other closed nondisplaced fracture of second cervical vertebra with routine healing, subsequent encounter  Continues with C-collar continuously.  Denies any pain.    -No changes, continue to clinically monitor.   -Has NeuroSurg follow up 2/7.   - Ambulating 250 feet with FWW and SBA    (I48.0) Paroxysmal atrial fibrillation (H): She was discharged from the hospital with a cardiac monitor.  This was sent back but in review not yet formally read.  -Continue rate control with PTA metoprolol  - Continue anticoagulation with warfarin.  INRs have been fairly stable. Will recheck 1/31 prior to dsicharge and make final warfarin dose recomendations  - Cardiology follow-up 2/2/2022    (N18.30)  Stage 3 chronic kidney disease, unspecified whether stage 3a or 3b CKD (H): Baseline creatinine 1.1-1.3  - Creatinine currently at baseline  - Avoid nephrotoxins  - Monitor periodically.  Most recent BMP 1/10 within baseline    (R33.9) Urinary retention  (Z87.440) History of recurrent UTIs  (Z97.8) Deleon catheter in place  Was self cathing at home prior, family did not help per the reports.  Her cognitive scores are low, surprised she was able to do this sterile.  Now with C-collar she is not able to self cath; thus deleon in place.   -Continues with Deleon, suspect it will be chronic for some time.   -Continues chronic suppression Cephalexin.     (G31.84) Mild cognitive impairment: CPT 3.875, slums 9/30  - Continue therapies  - Social work following    (E46) Protein-calorie malnutrition, unspecified severity: Weight down 8% since admission. Nutrition following  - Continue Ensure Plus TID      Discharge Medications:  Current Outpatient Medications   Medication Sig Dispense Refill     acetaminophen (TYLENOL) 500 MG tablet Take 2 tablets (1,000 mg) by mouth 3 times daily       carboxymethylcellulose (REFRESH LIQUIGEL) 1 % ophthalmic solution Place 1 drop into both eyes every morning As needed       cephALEXin (KEFLEX) 250 MG capsule Take 1 capsule (250 mg) by mouth daily 90 capsule 3     Cholecalciferol (VITAMIN D) 50 MCG (2000 UT) CAPS Take 1 capsule by mouth every evening       fish oil-omega-3 fatty acids 500 MG capsule Take 500 mg by mouth every morning       Lidocaine (LIDOCARE) 4 % Patch Place 1 patch onto the skin every 24 hours To prevent lidocaine toxicity, patient should be patch free for 12 hrs daily. To right shoulder       lisinopril (ZESTRIL) 20 MG tablet Take 10 mg by mouth daily       loperamide (IMODIUM) 2 MG capsule Take 1 capsule (2 mg) by mouth 4 times daily as needed for diarrhea       metoprolol succinate ER (TOPROL-XL) 100 MG 24 hr tablet Take 1 tablet (100 mg) by mouth daily 90 tablet 3      "polyethylene glycol (MIRALAX) 17 GM/Dose powder Take 17 g by mouth daily       rosuvastatin (CRESTOR) 5 MG tablet Take 1 tablet (5 mg) by mouth At Bedtime 90 tablet 3     SENNA-docusate sodium (SENNA S) 8.6-50 MG tablet Take 1 tablet by mouth 2 times daily as needed (constipation)       sodium chloride (PETER 128) 5 % ophthalmic ointment Place 2 Application (1 g) into both eyes At Bedtime       vitamin C (ASCORBIC ACID) 500 MG tablet Take 1 tablet (500 mg) by mouth 2 times daily       warfarin ANTICOAGULANT (COUMADIN) 2.5 MG tablet Take one tablet (2.5 mg) by mouth on Sun, Tue and Thurs; take a half tablet (1.25 mg) all other days; or as directed by INR clinic 65 tablet 0          Controlled medications:   not applicable/none     Past Medical History:   Past Medical History:   Diagnosis Date     Amnestic MCI (mild cognitive impairment with memory loss) 2014     Chronic duodenal ulcer without mention of hemorrhage, perforation, or obstruction 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 Warner, [facility] yearly     Esophageal reflux 2001    Abstracted 06/10/02     Essential and other specified forms of tremor 2004    resting tremor     Generalized osteoarthrosis, unspecified site     Hands     Hiatal hernia     diagnosed late 1990s [place], [place]     History of pulmonary embolism 1971    no source found     LACTOSE INTOLERANCE      Lumbago     sees Kodi Guidry     Mitral valve regurgitation      Occlusion and stenosis of carotid artery without mention of cerebral infarction 2003    CAROTID US NORMAL, bruit in neck     Osteoporosis, unspecified 2003    T = -2.66     Paroxysmal atrial fibrillation (H) 11/15/2013     Spider veins      Unspecified essential hypertension      Unspecified tinnitus 2005    mild hearing loss, saw ENT     Physical Exam:   Vitals: /74   Pulse 91   Temp 97.5  F (36.4  C)   Resp 17   Ht 1.676 m (5' 6\")   Wt 62.1 kg " (137 lb)   SpO2 96%   BMI 22.11 kg/m    BMI: Body mass index is 22.11 kg/m .  Physical Exam  Vitals and nursing note reviewed.   Constitutional:       General: She is not in acute distress.     Appearance: Normal appearance. She is not toxic-appearing.      Comments: thin   HENT:      Head: Normocephalic and atraumatic.   Eyes:      General: No scleral icterus.  Neck:      Comments: Cervical collar in place  Cardiovascular:      Rate and Rhythm: Normal rate and regular rhythm.      Heart sounds: No murmur heard.      Pulmonary:      Effort: Pulmonary effort is normal.      Breath sounds: Normal breath sounds. No wheezing.   Musculoskeletal:         General: Normal range of motion.      Right lower leg: No edema.      Left lower leg: No edema.   Skin:     General: Skin is warm and dry.      Findings: No rash.   Neurological:      General: No focal deficit present.      Mental Status: She is alert.      Cranial Nerves: No cranial nerve deficit.   Psychiatric:         Mood and Affect: Mood normal.         Behavior: Behavior normal.             SNF labs: Recent labs in [facility] reviewed by me today.  and   Most Recent 3 CBC's:Recent Labs   Lab Test 01/10/22  0542 12/27/21  0523 12/26/21  0236   WBC 4.8 8.6 14.5*   HGB 11.4* 10.1* 10.5*    100 100    103* 103*     Most Recent 3 BMP's:Recent Labs   Lab Test 01/10/22  0542 01/03/22  0552 12/29/21  0817 12/28/21  0003 12/27/21  0523    141  --   --  141   POTASSIUM 4.4 3.9  --   --  3.7   CHLORIDE 107 103  --   --  113*   CO2 23 26  --   --  24   BUN 29* 22  --   --  18   CR 1.10 1.20*  --   --  1.03   ANIONGAP 9 12  --   --  4   CLARISSE 10.0 10.4  --   --  8.4*   GLC 95 104 115*   < > 136*    < > = values in this interval not displayed.     Most Recent 6 Bacteria Isolates From Any Culture (See EPIC Reports for Culture Details):Recent Labs   Lab Test 03/15/21  1129 03/05/21  1108 09/22/20  1114 08/15/20  0954 08/04/20  0930 07/20/20  1408   CULT >100,000  colonies/mL  Escherichia coli  * 50,000 to 100,000 colonies/mL  Escherichia coli  *  50,000 to 100,000 colonies/mL  Strain 2  Escherichia coli  * No growth >100,000 colonies/mL  Escherichia coli  * >100,000 colonies/mL  Escherichia coli  * 10,000 to 50,000 colonies/mL  mixed urogenital georgette       Most Recent Urinalysis:Recent Labs   Lab Test 12/25/21  1328 08/31/21  1913 03/15/21  1134   COLOR Light Yellow   < > Yellow   APPEARANCE Clear   < > Cloudy   URINEGLC Negative   < > Negative   URINEBILI Negative   < > Negative   URINEKETONE Negative   < > Negative   SG 1.022   < > 1.025   UBLD Negative   < > Large*   URINEPH 5.0   < > 5.5   PROTEIN Negative   < > 100*   UROBILINOGEN  --   --  0.2   NITRITE Negative   < > Positive*   LEUKEST Negative   < > Moderate*   RBCU 1   < > 5-10*   WBCU <1   < > >100*    < > = values in this interval not displayed.       DISCHARGE PLAN:    Follow up labs: No labs orders/due    Medical Follow Up:      Follow up with primary care provider in 1-2 weeks   Follow up with Cardiology, Dr Andino as scheduled 2/2/22   Follow up with Neurosurgery as scheduled 2/7/22      Discharge Services: Home Care:  Occupational Therapy, Physical Therapy, Registered Nurse and Home Health Aide    Discharge Instructions Verbalized to Patient at Discharge:     Weight bearing restrictions: Continue C-Collar at all time    Ensure Plus nutritional supplement recommended 240 ml tid times a day.     Guillen catheter: size 16 French; last changed 1/28; change every 4 weeks and as needed.     TOTAL DISCHARGE TIME:   Greater than 30 minutes  Electronically signed by:  Yaquelin Humphries PA-C     RN, PT, HHA to begin 24 - 48 hours after discharge.    PLEASE EVALUATE AND TREAT (Evaluation timeline is 24 - 48 hrs. Please call if there is need for a variance to this timeline).    Current Outpatient Medications:  acetaminophen (TYLENOL) 500 MG tablet, Take 2 tablets (1,000 mg) by mouth 3 times daily, Disp: , Rfl:    carboxymethylcellulose (REFRESH LIQUIGEL) 1 % ophthalmic solution, Place 1 drop into both eyes every morning As needed, Disp: , Rfl:   cephALEXin (KEFLEX) 250 MG capsule, Take 1 capsule (250 mg) by mouth daily, Disp: 90 capsule, Rfl: 3  Cholecalciferol (VITAMIN D) 50 MCG (2000 UT) CAPS, Take 1 capsule by mouth every evening, Disp: , Rfl:   fish oil-omega-3 fatty acids 500 MG capsule, Take 500 mg by mouth every morning, Disp: , Rfl:   Lidocaine (LIDOCARE) 4 % Patch, Place 1 patch onto the skin every 24 hours To prevent lidocaine toxicity, patient should be patch free for 12 hrs daily. To right shoulder, Disp: , Rfl:   lisinopril (ZESTRIL) 20 MG tablet, Take 10 mg by mouth daily, Disp: , Rfl:   loperamide (IMODIUM) 2 MG capsule, Take 1 capsule (2 mg) by mouth 4 times daily as needed for diarrhea, Disp: , Rfl:   metoprolol succinate ER (TOPROL-XL) 100 MG 24 hr tablet, Take 1 tablet (100 mg) by mouth daily, Disp: 90 tablet, Rfl: 3  polyethylene glycol (MIRALAX) 17 GM/Dose powder, Take 17 g by mouth daily, Disp: , Rfl:   rosuvastatin (CRESTOR) 5 MG tablet, Take 1 tablet (5 mg) by mouth At Bedtime, Disp: 90 tablet, Rfl: 3  SENNA-docusate sodium (SENNA S) 8.6-50 MG tablet, Take 1 tablet by mouth 2 times daily as needed (constipation), Disp: , Rfl:   sodium chloride (PETER 128) 5 % ophthalmic ointment, Place 2 Application (1 g) into both eyes At Bedtime, Disp: , Rfl:   vitamin C (ASCORBIC ACID) 500 MG tablet, Take 1 tablet (500 mg) by mouth 2 times daily, Disp: , Rfl:   warfarin ANTICOAGULANT (COUMADIN) 2.5 MG tablet, Take one tablet (2.5 mg) by mouth on Sun, Tue and Thurs; take a half tablet (1.25 mg) all other days; or as directed by INR clinic (Patient taking differently: Take 2 mg MWF and 1.5 mg T, Thur, Sat, Sun), Disp: 65 tablet, Rfl: 0      Patient Active Problem List:     Hyperlipidemia LDL goal <130     Osteopenia     Primary osteoarthritis involving multiple joints     Essential and other specified forms of  tremor     Esophageal reflux     Varicose veins of lower extremities with complications     Allergic rhinitis     Degeneration of lumbar or lumbosacral intervertebral disc     Internal bleeding hemorrhoids     Osteoarthritis of thumb     Diverticulosis     CKD (chronic kidney disease) stage 3, GFR 30-59 ml/min (H)     Chronic constipation     Urinary retention     Health Care Home     Advanced directives, counseling/discussion     Long term current use of anticoagulant therapy     Atrial fibrillation (H)     Hypertension goal BP (blood pressure) < 150/90     Post-menopausal bleeding     History of recurrent UTIs     Mild cognitive impairment     Fibroid uterus     Rectal bleeding     Personal history of urinary tract infection     Urinary retention with incomplete bladder emptying     Longstanding persistent atrial fibrillation (H)     Long term current use of anticoagulants with INR goal of 2.0-3.0     Recurrent UTI     Ischemic colitis (H)     Atrial fibrillation, unspecified type (H)     Elevated troponin     Chest pain, unspecified type     Fall, initial encounter     Fall, subsequent encounter     Other closed nondisplaced fracture of second cervical vertebra with delayed healing, subsequent encounter     Acute pain due to injury     Loose stools     Pre-syncope     Acute kidney injury (H)     Chronic retention of urine     Debility     Stage 3 chronic kidney disease, unspecified whether stage 3a or 3b CKD (H)     Guillen catheter in place     Depression, unspecified depression type     Other closed nondisplaced fracture of second cervical vertebra with routine healing, subsequent encounter      Documentation of Face to Face and Certification for Home Health Services    I certify that patient, Maia Miranda is under my care and that I, or a Nurse Practitioner or Physician's Assistant working with me, had a face-to-face encounter that meets the physician face-to-face encounter requirements with this patient on:  1/31/2022.    This encounter with the patient was in whole, or in part, for the following medical condition, which is the primary reason for Home Health Care: Chronic constipation  (primary encounter diagnosis)  Hyperlipidemia LDL goal <130  Paroxysmal atrial fibrillation (H)  Hypertension goal BP (blood pressure) < 150/90  Other closed nondisplaced fracture of second cervical vertebra with delayed healing, subsequent encounter  Stage 3 chronic kidney disease, unspecified whether stage 3a or 3b CKD (H)  Urinary retention  Recurrent UTI  Guillen catheter in place  Mild cognitive impairment  Protein-calorie malnutrition, unspecified severity (H)  Fall, subsequent encounter  .    I certify that, based on my findings, the following services are medically necessary Home Health Services: Nursing, Occupational Therapy and Physical Therapy    My clinical findings support the need for the above services because: Nurse is needed: med management, INR 2/7/22., Occupational Therapy Services are needed to assess and treat cognitive ability and address ADL safety due to impairment in Cervical fracture. and Physical Therapy Services are needed to assess and treat the following functional impairments: cervical fracture.    Further, I certify that my clinical findings support that this patient is homebound (i.e. absences from home require considerable and taxing effort and are for medical reasons or Sh[employer] services or infrequently or of short duration when for other reasons) because: Requires assistance of another person or specialized equipment to access medical services because patient: Requires supervision of another for safe transfer..    Based on the above findings, I certify that this patient is confined to the home and needs intermittent skilled nursing care, physical therapy and/or speech therapy.  The patient is under my care, and I have initiated the establishment of the plan of care.  This patient will be followed by a  physician who will periodically review the plan of care.    Physician/Provider to provide follow up care: Nancy Salgado    Responsible PECOS certified Physician at time of discharge: Miley Kiran MD    Please be aware that coverage of these services is subject to the terms and limitations of your health insurance plan.  Call member services at your health plan with any benefit or coverage questions.

## 2022-01-31 NOTE — PATIENT INSTRUCTIONS
Maia Miranda  YOB: 1932                                   Discharge Date: 2/3/22      PHYSICIAN's DISCHARGE SUMMARY / ORDER SHEET  1. Discharge to: [facility]  2. Medications:      Current Outpatient Medications   Medication Sig Dispense Refill     acetaminophen (TYLENOL) 500 MG tablet Take 2 tablets (1,000 mg) by mouth 3 times daily       carboxymethylcellulose (REFRESH LIQUIGEL) 1 % ophthalmic solution Place 1 drop into both eyes every morning As needed       cephALEXin (KEFLEX) 250 MG capsule Take 1 capsule (250 mg) by mouth daily 90 capsule 3     Cholecalciferol (VITAMIN D) 50 MCG (2000 UT) CAPS Take 1 capsule by mouth every evening       fish oil-omega-3 fatty acids 500 MG capsule Take 500 mg by mouth every morning       Lidocaine (LIDOCARE) 4 % Patch Place 1 patch onto the skin every 24 hours To prevent lidocaine toxicity, patient should be patch free for 12 hrs daily. To right shoulder       lisinopril (ZESTRIL) 20 MG tablet Take 10 mg by mouth daily       loperamide (IMODIUM) 2 MG capsule Take 1 capsule (2 mg) by mouth 4 times daily as needed for diarrhea       metoprolol succinate ER (TOPROL-XL) 100 MG 24 hr tablet Take 1 tablet (100 mg) by mouth daily 90 tablet 3     polyethylene glycol (MIRALAX) 17 GM/Dose powder Take 17 g by mouth daily       rosuvastatin (CRESTOR) 5 MG tablet Take 1 tablet (5 mg) by mouth At Bedtime 90 tablet 3     SENNA-docusate sodium (SENNA S) 8.6-50 MG tablet Take 1 tablet by mouth 2 times daily as needed (constipation)       sodium chloride (PETER 128) 5 % ophthalmic ointment Place 2 Application (1 g) into both eyes At Bedtime       vitamin C (ASCORBIC ACID) 500 MG tablet Take 1 tablet (500 mg) by mouth 2 times daily       warfarin ANTICOAGULANT (COUMADIN) 2.5 MG tablet Take one tablet (2.5 mg) by mouth on Sun, Tue and Thurs; take a half tablet (1.25 mg) all other days; or as directed by INR clinic (Patient taking differently: Take 2 mg MWF and 1.5 mg T, Thur, Sat, Sun) 65  tablet 0          DISCHARGE PLAN:    Follow up labs: No labs orders/due    Medical Follow Up:      Follow up with primary care provider in 1-2 weeks   Follow up with Cardiology, Dr Andino as scheduled 2/2/22   Follow up with Neurosurgery as scheduled 2/7/22      Discharge Services: Home Care:  Occupational Therapy, Physical Therapy, Registered Nurse and Home Health Aide    Discharge Instructions Verbalized to Patient at Discharge:     Weight bearing restrictions: Continue C-Collar at all time    Ensure Plus nutritional supplement recommended 240 ml tid times a day.     Guillen catheter: size 16 French; last changed 1/28; change every 4 weeks and as needed.         Discharge patient to home with current medications and treatments  Discharge Home with Home care as above  - Nursing call in 30 days supply of needed meds to pharmacy of choice upon discharge  - please send home with original of these discharge instructions and copy for chart.       ______________________________  Yaquelin Humphries PA-C   [facility]                   1/31/2022

## 2022-02-02 ENCOUNTER — OFFICE VISIT (OUTPATIENT)
Dept: CARDIOLOGY | Facility: CLINIC | Age: 87
End: 2022-02-02
Payer: COMMERCIAL

## 2022-02-02 VITALS
HEART RATE: 62 BPM | DIASTOLIC BLOOD PRESSURE: 76 MMHG | WEIGHT: 143.1 LBS | SYSTOLIC BLOOD PRESSURE: 106 MMHG | BODY MASS INDEX: 23 KG/M2 | OXYGEN SATURATION: 96 % | HEIGHT: 66 IN

## 2022-02-02 DIAGNOSIS — I48.91 ATRIAL FIBRILLATION, UNSPECIFIED TYPE (H): ICD-10-CM

## 2022-02-02 PROCEDURE — 99214 OFFICE O/P EST MOD 30 MIN: CPT | Performed by: INTERNAL MEDICINE

## 2022-02-02 ASSESSMENT — MIFFLIN-ST. JEOR: SCORE: 1090.85

## 2022-02-02 NOTE — LETTER
2/2/2022    RAY Patel CNP  [address] [facility] [address]    RE: Maia Miranda       Dear Colleague,     I had the pleasure of seeing Maia Miranda in the [facility].      Cardiology Clinic Progress Note:    February 2, 2022   Patient Name: Maia Miranda  Patient MRN: [id number]     Consult indication: elevated troponin    HPI:    I had the opportunity to see patient Maia Miranda in cardiology clinic for a follow up visit. Patient is followed by our colleague RAY Patel CNP with Primary Care.     As you know, patient is a pleasant 89-year-old female with a past medical history significant for paroxysmal atrial fibrillation (on warfarin), trifascicular block, hypertension, hyperlipidemia, CKD, VTE, cognitive impairment, prior GI bleed, ischemic colitis, recent hospitalizations for Covid 19 pneumonia and fall, who presents for follow-up.    I had initially met patient in hospital for a cardiology consultation on 9/2/2021.  At that time, she presented with COVID-19 pneumonia/illness.  As part of her evaluation, a troponin was obtained and was mildly elevated at 0.152.  We discussed options for further evaluation management, involving her son on the phone, at that time, the decision was made to continue conservative medical management, and for reassessment in the outpatient setting.  Since then, unfortunately, patient had a GI illness, resulting in dehydration and a subsequent fall, for which she was hospitalized 12/2021.  Troponin was also mildly elevated at that time.  She was seen in consultation by my colleague Dr. Guajardo at [facility], TTE was obtained which demonstrated normal biventricular function, decision was made to hold off on ischemic evaluation at that time.    Since then, patient reports that she has been doing well.  She is accompanied today by her daughter.  She denies any chest pain, chest pressure, abnormal shortness of breath.  She did suffer a  "spine fracture (C2) for which she has a neck brace in place.    Assessment and Plan/Recommendations:    # Elevated troponin, mild, noted during hospitalization 12/2021 after fall, as well as during hospitalization 9/2021 for COVID-19 pneumonia.  Denies symptoms concerning for angina.  TTE 12/2021 demonstrates normal biventricular function.  # Paroxysmal atrial fibrillation, trifascicular block.  Cardiac event monitor demonstrated paroxysmal atrial fibrillation, no significant bradycardic episodes.  On warfarin.  # HTN  # HL  # Prior VTE  # Dementia    Reviewed hospital course with patient and her daughter.  Discussed options for further evaluation and management of troponin elevation noted during recent hospitalizations.  Discussed a more conservative strategy of continued medical management and symptom monitoring, versus pursuing ischemic evaluation.  Discussed advantages/limitations of each option at length.  After an in-depth discussion, the patient, and her daughter, would like to hold off on further cardiac diagnostic testing.  Patient states that \"I am 89 years old, and have had a good life\".  Fortunately, she has been asymptomatic of symptoms concerning for angina, and recent TTE demonstrates normal biventricular function.  Given overall clinical presentation I feel that this approach is reasonable.  Considered initiation of statin therapy, however a note by a pharmacist in 2017 mentions myalgias related to statins, discussed initiation of a statin alternative such as ezetimibe or PCSK9 inhibitor therapy, this was declined.  Offered follow-up in cardiology clinic, which was declined, we remain available as needed in the future.    Thank you for allowing our team to participate in the care of Maia Miranda.  Please do not hesitate to call or page me with any questions or concerns.    Sincerely,     Juan Diego Andino MD, [facility]  Cardiology  Text Page   February 2, 2022    cc  Stefan Quiñonez, " APRN [facility]  [address] [place] [address]    Voice recognition software utilized.     Total time spent on this encounter: 35 minutes, providing care in this encounter including, but not limited to, reviewing prior medical records, laboratory data, imaging studies, diagnostic studies, procedure notes, formulating an assessment and plan, recommendations, discussion and counseling with patient face to face, dictation.    Past Medical History:     Past Medical History:   Diagnosis Date     Amnestic MCI (mild cognitive impairment with memory loss) 2014     Chronic duodenal ulcer without mention of hemorrhage, perforation, or obstruction 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 Warner, [facility] yearly     Esophageal reflux 2001    Abstracted 06/10/02     Essential and other specified forms of tremor 2004    resting tremor     Generalized osteoarthrosis, unspecified site     Hands     Hiatal hernia     diagnosed late 1990s [place], [place]     History of pulmonary embolism 1971    no source found     LACTOSE INTOLERANCE      Lumbago     sees Kodi Guidry     Mitral valve regurgitation      Occlusion and stenosis of carotid artery without mention of cerebral infarction 2003    CAROTID US NORMAL, bruit in neck     Osteoporosis, unspecified 2003    T = -2.66     Paroxysmal atrial fibrillation (H) 11/15/2013     Spider veins      Unspecified essential hypertension      Unspecified tinnitus 2005    mild hearing loss, saw ENT        Past Surgical History:   Past Surgical History:   Procedure Laterality Date     CATARACT IOL, RT/LT       corneal scraping       CYSTOSCOPY       CYSTOSCOPY, BIOPSY BLADDER, COMBINED N/A 7/25/2016    Procedure: COMBINED CYSTOSCOPY, BIOPSY BLADDER;  Surgeon: Bernadine Maria MD;  Location: [facility] OR     ESOPHAGOSCOPY, GASTROSCOPY, DUODENOSCOPY (EGD), COMBINED  7/8/2013    Procedure: COMBINED ESOPHAGOSCOPY, GASTROSCOPY,  DUODENOSCOPY (EGD);   ESOPHAGOSCOPY, GASTROSCOPY, DUODENOSCOPY (EGD)   ;  Surgeon: Shawn Soto MD;  Location: [facility] GI     IMPLANT STIMULATOR SACRAL NERVE STAGE ONE N/A 4/4/2017    Procedure: IMPLANT STIMULATOR SACRAL NERVE STAGE ONE;  Surgeon: Kb Tracy MD;  Location: [facility] OR     IMPLANT STIMULATOR SACRAL NERVE STAGE TWO N/A 4/18/2017    Procedure: IMPLANT STIMULATOR SACRAL NERVE STAGE TWO;  Stage Two Interstim  ;  Surgeon: Kb Tracy MD;  Location: [facility] OR     interstim placement       [facility] NONSPECIFIC PROCEDURE      D&C x 2 in 1957 & 1962 -- Abstracted 06/10/02     [facility] NONSPECIFIC PROCEDURE      Left breast biopsy x 2 in 1988 & 1989 -- Abstracted 06/10/02     [facility] NONSPECIFIC PROCEDURE  1958    Influenza resulting in hospitalization -- Abstracted 06/10/02     [facility] NONSPECIFIC PROCEDURE  1971    10 day hospitalization from bilateral pulmonary enboli -- Abstracted 06/10/02     [facility] NONSPECIFIC PROCEDURE  1/03    colonoscopy  negative       Medications (outpatient):  Current Outpatient Medications   Medication Sig Dispense Refill     acetaminophen (TYLENOL) 500 MG tablet Take 2 tablets (1,000 mg) by mouth 3 times daily       carboxymethylcellulose (REFRESH LIQUIGEL) 1 % ophthalmic solution Place 1 drop into both eyes every morning As needed       cephALEXin (KEFLEX) 250 MG capsule Take 1 capsule (250 mg) by mouth daily 90 capsule 3     Cholecalciferol (VITAMIN D) 50 MCG (2000 UT) CAPS Take 1 capsule by mouth every evening       fish oil-omega-3 fatty acids 500 MG capsule Take 500 mg by mouth every morning       Lidocaine (LIDOCARE) 4 % Patch Place 1 patch onto the skin every 24 hours To prevent lidocaine toxicity, patient should be patch free for 12 hrs daily. To right shoulder       lisinopril (ZESTRIL) 20 MG tablet Take 10 mg by mouth daily       loperamide (IMODIUM) 2 MG capsule Take 1 capsule (2 mg) by mouth 4 times daily as needed for diarrhea       metoprolol succinate ER (TOPROL-XL) 100 MG 24 hr  tablet Take 1 tablet (100 mg) by mouth daily 90 tablet 3     polyethylene glycol (MIRALAX) 17 GM/Dose powder Take 17 g by mouth daily       rosuvastatin (CRESTOR) 5 MG tablet Take 1 tablet (5 mg) by mouth At Bedtime 90 tablet 3     SENNA-docusate sodium (SENNA S) 8.6-50 MG tablet Take 1 tablet by mouth 2 times daily as needed (constipation)       sodium chloride (PETER 128) 5 % ophthalmic ointment Place 2 Application (1 g) into both eyes At Bedtime       vitamin C (ASCORBIC ACID) 500 MG tablet Take 1 tablet (500 mg) by mouth 2 times daily       warfarin ANTICOAGULANT (COUMADIN) 2.5 MG tablet Take one tablet (2.5 mg) by mouth on Sun, Tue and Thurs; take a half tablet (1.25 mg) all other days; or as directed by INR clinic (Patient taking differently: Take 2 mg MWF and 1.5 mg T, Thur, Sat, Sun) 65 tablet 0       Allergies:  Allergies   Allergen Reactions     Codeine Nausea and Vomiting     Other reaction(s): GI Intolerance  Other reaction(s): GI Intolerance, Vomiting     Meperidine Nausea and Vomiting     Other reaction(s): GI Intolerance  Other reaction(s): GI Intolerance, Vomiting     Morphine Nausea and Vomiting     Other reaction(s): GI Intolerance  vomiting  Other reaction(s): GI Intolerance, Vomiting     Penicillins Hives     hives     Sulfa Drugs      Other reaction(s): GI Intolerance  Vomiting    Other reaction(s): GI Intolerance     Epinephrine Palpitations     Other reaction(s): Other (See Comments)  Makes heart race  Other reaction(s): Other (See Comments), Tachycardia  Makes heart race         Social History:   History   Drug Use No      History   Smoking Status     Never Smoker   Smokeless Tobacco     Never Used     Social History    Substance and Sexual Activity      Alcohol use: No       Family History:  Family History   Problem Relation Age of Onset     Cerebrovascular Disease Father          at 92     Psychotic Disorder Mother         Suicide 62, depression     Alzheimer Disease Maternal Grandmother       Cardiovascular Sister         pacemaker     Glaucoma No family hx of        Review of Systems:   A complete review of systems was negative except as mentioned in the History of Present Illness.     Objective & Physical Exam:  There were no vitals taken for this visit.  Wt Readings from Last 2 Encounters:   01/28/22 62.1 kg (137 lb)   01/24/22 62.1 kg (137 lb)     There is no height or weight on file to calculate BMI.   There is no height or weight on file to calculate BSA.    Constitutional: appears stated age, in no apparent distress, A&O to person and place not time, chronically ill appearing, catheter bag on Right lower leg  Head: normocephalic, atraumatic  Neck: supple, trachea midline, no bruit bilaterally   Pulmonary: clear to auscultation bilaterally  Cardiovascular: regular rate, irregularly irregular rhythm, 1/6 NAEL at the RUSB, no lower extremity edema  Gastrointestinal: no guarding, non-rigid   Neurologic: awake, alert, moves all extremities  Skin: no jaundice, warm on limited exam  Psychiatric: affect is normal, answers questions appropriately, oriented to self and place    Data reviewed:  Lab Results   Component Value Date    WBC 4.8 01/10/2022    WBC 7.8 12/22/2019    RBC 3.56 (L) 01/10/2022    RBC 4.09 12/22/2019    HGB 11.4 (L) 01/10/2022    HGB 13.1 12/22/2019    HCT 35.6 01/10/2022    HCT 41.5 12/22/2019     01/10/2022     (H) 12/22/2019    MCH 32.0 01/10/2022    MCH 32.0 12/22/2019    MCHC 32.0 01/10/2022    MCHC 31.6 12/22/2019    RDW 12.9 01/10/2022    RDW 12.7 12/22/2019     01/10/2022     (L) 12/22/2019     Sodium   Date Value Ref Range Status   01/10/2022 139 136 - 145 mmol/L Final   11/19/2020 139 133 - 144 mmol/L Final     Potassium   Date Value Ref Range Status   01/10/2022 4.4 3.5 - 5.0 mmol/L Final   11/19/2020 4.1 3.4 - 5.3 mmol/L Final     Chloride   Date Value Ref Range Status   01/10/2022 107 98 - 107 mmol/L Final   11/19/2020 107 94 - 109 mmol/L  Final     Carbon Dioxide   Date Value Ref Range Status   11/19/2020 30 20 - 32 mmol/L Final     Carbon Dioxide (CO2)   Date Value Ref Range Status   01/10/2022 23 22 - 31 mmol/L Final     Anion Gap   Date Value Ref Range Status   01/10/2022 9 5 - 18 mmol/L Final   11/19/2020 2 (L) 3 - 14 mmol/L Final     Glucose   Date Value Ref Range Status   01/10/2022 95 70 - 125 mg/dL Final   11/19/2020 77 70 - 99 mg/dL Final     Urea Nitrogen   Date Value Ref Range Status   01/10/2022 29 (H) 8 - 28 mg/dL Final   11/19/2020 27 7 - 30 mg/dL Final     Creatinine   Date Value Ref Range Status   01/10/2022 1.10 0.60 - 1.10 mg/dL Final   11/19/2020 1.13 (H) 0.52 - 1.04 mg/dL Final     GFR Estimate   Date Value Ref Range Status   01/10/2022 48 (L) >60 mL/min/1.73m2 Final     Comment:     Effective December 21, 2021 eGFRcr in adults is calculated using the 2021 CKD-EPI creatinine equation which includes age and gender (Heather et al., NEJM, DOI: 10.1056/OAOBco1400648)   11/19/2020 43 (L) >60 mL/min/[1.73_m2] Final     Comment:     Non  GFR Calc  Starting 12/18/2018, serum creatinine based estimated GFR (eGFR) will be   calculated using the Chronic Kidney Disease Epidemiology Collaboration   (CKD-EPI) equation.       GFR, ESTIMATED POCT   Date Value Ref Range Status   08/29/2021 34 (L) >60 mL/min/1.73m2 Final     Calcium   Date Value Ref Range Status   01/10/2022 10.0 8.5 - 10.5 mg/dL Final   11/19/2020 9.6 8.5 - 10.1 mg/dL Final     Bilirubin Total   Date Value Ref Range Status   12/26/2021 0.8 0.2 - 1.3 mg/dL Final   07/01/2019 0.5 0.2 - 1.3 mg/dL Final     Alkaline Phosphatase   Date Value Ref Range Status   12/26/2021 57 40 - 150 U/L Final   07/01/2019 86 40 - 150 U/L Final     ALT   Date Value Ref Range Status   12/26/2021 20 0 - 50 U/L Final   07/01/2019 26 0 - 50 U/L Final     AST   Date Value Ref Range Status   12/26/2021 27 0 - 45 U/L Final   07/01/2019 25 0 - 45 U/L Final     Recent Labs   Lab Test 11/19/20  1122  19  1000 16  0945 07/03/15  0959 14  0857   CHOL 215* 187   < > 208* 210*   HDL 50 46*   < > 55 54   * 119*   < > 134* 135*   TRIG 135 111   < > 96 106   CHOLHDLRATIO  --   --   --  3.8 3.9    < > = values in this interval not displayed.      No results found for: A1C     Recent Results (from the past 4320 hour(s))   Echo Complete   Result Value    LVEF  55-60%    [facility]    [id number]  [id number]  [id number]  [id number]^QUETA^BLANKA^HIPOLITO     [facility]  Echocardiography Laboratory  [address] [place] [address]     Name: JOSE DANIEL RAWLS  MRN: [id number]  : 10/26/1932  Study Date: 2021 11:25 AM  Age: 89 yrs  Gender: Female  Patient Location: [facility]  Reason For Study: Syncope  Ordering Physician: BLANKA BIRCH  Referring Physician: MACK BELLE  Performed By: Maeve Kaufman RDCS     BSA: 1.7 m2  Height: 66 in  Weight: 145 lb  HR: 83  BP: 144/93 mmHg  ______________________________________________________________________________  Procedure  Complete Portable Echo Adult.  ______________________________________________________________________________  Interpretation Summary  Global and regional left ventricular function is normal with an EF of 55-60%.  Global right ventricular function is normal. The right ventricle is normal  size.  No significant valvular abnormalities.  IVC diameter >2.1 cm collapsing <50% with sniff suggests a high RA pressure  estimated at 15 mmHg or greater. The left atrial pressure is also increased.  This study was compared with the study from 2021. No significant changes  noted.  ______________________________________________________________________________  Left Ventricle  Global and regional left ventricular function is normal with an EF of 55-60%.  Left ventricular size is normal. Mild concentric wall thickening consistent  with left ventricular hypertrophy is present. Grade III or advanced  diastolic  dysfunction. Diastolic Doppler findings (E/E' ratio and/or other parameters)  suggest left ventricular filling pressures are increased.     Right Ventricle  Global right ventricular function is normal. The right ventricle is normal  size.     Atria  The right atria appears normal. Mild to moderate left atrial enlargement is  present.     Mitral Valve  Mild mitral insufficiency is present.     Aortic Valve  The aortic valve is tricuspid. Mild aortic valve calcification is present.     Tricuspid Valve  Mild tricuspid insufficiency is present. The right ventricular systolic  pressure is approximated at 30.9 mmHg plus the right atrial pressure.     Pulmonic Valve  The valve leaflets are not well visualized. Trace pulmonic insufficiency is  present.     Vessels  Sinuses of Valsalva 3.2 cm. Ascending aorta 3.0 cm. IVC diameter >2.1 cm  collapsing <50% with sniff suggests a high RA pressure estimated at 15 mmHg or  greater.     Pericardium  No pericardial effusion is present.     Compared to Previous Study  This study was compared with the study from 2021 . No significant  changes noted.  ______________________________________________________________________________  MMode/2D Measurements & Calculations  IVSd: 1.0 cm     LVIDd: 3.4 cm  LVIDs: 2.3 cm  LVPWd: 1.5 cm  FS: 32.3 %  LV mass(C)d: 142.9 grams  LV mass(C)dI: 81.9 grams/m2  Ao root diam: 3.2 cm  asc Aorta Diam: 3.0 cm  LVOT diam: 1.8 cm  LVOT area: 2.5 cm2  LA Volume (BP): 64.9 ml  LA Volume Index (BP): 37.3 ml/m2  RWT: 0.87     Doppler Measurements & Calculations  MV E max love: 128.0 cm/sec  MV A max love: 37.9 cm/sec  MV E/A: 3.4  MV dec slope: 769.0 cm/sec2  PA acc time: 0.11 sec  TR max love: 278.0 cm/sec  TR max P.9 mmHg  E/E' av.5  Lateral E/e': 16.3  Medial E/e': 22.6     ______________________________________________________________________________  Report approved by: Urban Alatorre 2021 12:27 PM         Echocardiogram  Complete   Result Value    LVEF  55-60%    [facility]    [id number]  [id number]  [id number]  [id number]^JOVAN^ERIN^JOJO     [facility]  Echocardiography Laboratory  [address] [place] [address]     Name: JOSE DANIEL RAWLS  MRN: [id number]  : 10/26/1932  Study Date: 2021 11:05 AM  Age: 88 yrs  Gender: Female  Patient Location: [facility]  Reason For Study: Chest Pain  Ordering Physician: EIRN ALDANA  Performed By: Bernadine Wallace     BSA: 1.7 m2  Height: 65 in  Weight: 142 lb  HR: 62  BP: 164/64 mmHg  ______________________________________________________________________________  Procedure  Complete Portable Echo Adult.  ______________________________________________________________________________  Interpretation Summary     The visual ejection fraction is 55-60%.  Left ventricular systolic function is normal.  Contrast would enhance wall motion analysis. The anterolateral wall was poorly  seen though likely to be jackie normally.  Grade III or advanced diastolic dysfunction.  Trivial pericardial effusion  The study was technically difficult.  ______________________________________________________________________________  Left Ventricle  The left ventricle is normal in size. There is mild concentric left  ventricular hypertrophy. The visual ejection fraction is 55-60%. Left  ventricular systolic function is normal. Grade III or advanced diastolic  dysfunction. Diastolic Doppler findings (E/E' ratio and/or other parameters)  suggest left ventricular filling pressures are increased.     Right Ventricle  The right ventricle is normal in size and function.     Atria  Normal left atrial size. Right atrial size is normal. There is no color  Doppler evidence of an atrial shunt.     Mitral Valve  The mitral valve leaflets are mildly thickened. There is mild (1+) mitral  regurgitation.     Tricuspid Valve  There is mild (1+) tricuspid regurgitation.     Aortic Valve  The aortic valve  is trileaflet. There is moderate trileaflet aortic sclerosis.  No aortic regurgitation is present. No hemodynamically significant valvular  aortic stenosis.     Pulmonic Valve  There is no pulmonic valvular regurgitation. Normal pulmonic valve velocity.     Vessels  The aortic root is normal size. IVC diameter <2.1 cm collapsing >50% with  sniff suggests a normal RA pressure of 3 mmHg.     Pericardium  Trivial pericardial effusion.     Rhythm  Sinus rhythm was noted.  ______________________________________________________________________________  MMode/2D Measurements & Calculations     IVSd: 1.1 cm  LVIDd: 3.9 cm  LVIDs: 2.5 cm  LVPWd: 1.3 cm  FS: 36.7 %  LV mass(C)d: 166.1 grams  LV mass(C)dI: 97.1 grams/m2  Ao root diam: 3.2 cm  LA dimension: 3.2 cm  LA/Ao: 1.0  LVOT diam: 1.8 cm  LVOT area: 2.5 cm2  LA Volume (BP): 53.5 ml  LA Volume Index (BP): 31.3 ml/m2  RWT: 0.68     Doppler Measurements & Calculations  MV E max love: 99.4 cm/sec  MV A max love: 51.4 cm/sec  MV E/A: 1.9  MV dec time: 0.22 sec  LV V1 max P.2 mmHg  LV V1 max: 102.0 cm/sec  LV V1 VTI: 23.2 cm  SV(LVOT): 59.0 ml  SI(LVOT): 34.5 ml/m2  PA acc time: 0.13 sec  TR max love: 268.5 cm/sec  TR max P.0 mmHg  E/E' av.3  Lateral E/e': 17.0  Medial E/e': 23.7     ______________________________________________________________________________  Report approved by: Urban Marquez 2021 12:35 PM                Juan Diego Andino MD   [facility]      cc:   RAY Youssef CNP  [address] [place] [address]

## 2022-02-02 NOTE — PATIENT INSTRUCTIONS
February 2, 2022    Thank you for allowing our Cardiology team to participate in your care.     Please note the following changes to your heart treatment plan:     Medication changes:   - none    Tests to be done:  - none    Follow up:  - Follow up as needed      Please contact our team at [phone] or [phone] for any questions or concerns.   For scheduling, please call [phone].  If you are having a medical emergency, please call [phone].     Sincerely,    Juan Diego Andino MD, FACC  Cardiology    [facility]

## 2022-02-02 NOTE — PROGRESS NOTES
Cardiology Clinic Progress Note:    February 2, 2022   Patient Name: Maia Miranda  Patient MRN: [id number]     Consult indication: elevated troponin    HPI:    I had the opportunity to see patient Maia Miranda in cardiology clinic for a follow up visit. Patient is followed by our colleague RAY Patel CNP with Primary Care.     As you know, patient is a pleasant 89-year-old female with a past medical history significant for paroxysmal atrial fibrillation (on warfarin), trifascicular block, hypertension, hyperlipidemia, CKD, VTE, cognitive impairment, prior GI bleed, ischemic colitis, recent hospitalizations for Covid 19 pneumonia and fall, who presents for follow-up.    I had initially met patient in hospital for a cardiology consultation on 9/2/2021.  At that time, she presented with COVID-19 pneumonia/illness.  As part of her evaluation, a troponin was obtained and was mildly elevated at 0.152.  We discussed options for further evaluation management, involving her son on the phone, at that time, the decision was made to continue conservative medical management, and for reassessment in the outpatient setting.  Since then, unfortunately, patient had a GI illness, resulting in dehydration and a subsequent fall, for which she was hospitalized 12/2021.  Troponin was also mildly elevated at that time.  She was seen in consultation by my colleague Dr. Guajardo at [facility], TTE was obtained which demonstrated normal biventricular function, decision was made to hold off on ischemic evaluation at that time.    Since then, patient reports that she has been doing well.  She is accompanied today by her daughter.  She denies any chest pain, chest pressure, abnormal shortness of breath.  She did suffer a spine fracture (C2) for which she has a neck brace in place.    Assessment and Plan/Recommendations:    # Elevated troponin, mild, noted during hospitalization 12/2021 after fall, as well as during hospitalization  "9/2021 for COVID-19 pneumonia.  Denies symptoms concerning for angina.  TTE 12/2021 demonstrates normal biventricular function.  # Paroxysmal atrial fibrillation, trifascicular block.  Cardiac event monitor demonstrated paroxysmal atrial fibrillation, no significant bradycardic episodes.  On warfarin.  # HTN  # HL  # Prior VTE  # Dementia    Reviewed hospital course with patient and her daughter.  Discussed options for further evaluation and management of troponin elevation noted during recent hospitalizations.  Discussed a more conservative strategy of continued medical management and symptom monitoring, versus pursuing ischemic evaluation.  Discussed advantages/limitations of each option at length.  After an in-depth discussion, the patient, and her daughter, would like to hold off on further cardiac diagnostic testing.  Patient states that \"I am 89 years old, and have had a good life\".  Fortunately, she has been asymptomatic of symptoms concerning for angina, and recent TTE demonstrates normal biventricular function.  Given overall clinical presentation I feel that this approach is reasonable.  Considered initiation of statin therapy, however a note by a pharmacist in 2017 mentions myalgias related to statins, discussed initiation of a statin alternative such as ezetimibe or PCSK9 inhibitor therapy, this was declined.  Offered follow-up in cardiology clinic, which was declined, we remain available as needed in the future.    Thank you for allowing our team to participate in the care of Maia Miranda.  Please do not hesitate to call or page me with any questions or concerns.    Sincerely,     Juan Diego Andino MD, [facility]  Cardiology  Text Page   February 2, 2022    cc  Stefan Quiñonez, RAY CNP  [address] [place] [address]    Voice recognition software utilized.     Total time spent on this encounter: 35 minutes, providing care in this encounter including, but not limited to, " reviewing prior medical records, laboratory data, imaging studies, diagnostic studies, procedure notes, formulating an assessment and plan, recommendations, discussion and counseling with patient face to face, dictation.    Past Medical History:     Past Medical History:   Diagnosis Date     Amnestic MCI (mild cognitive impairment with memory loss) 2014     Chronic duodenal ulcer without mention of hemorrhage, perforation, or obstruction 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 Warner, [facility] yearly     Esophageal reflux 2001    Abstracted 06/10/02     Essential and other specified forms of tremor 2004    resting tremor     Generalized osteoarthrosis, unspecified site     Hands     Hiatal hernia     diagnosed late 1990s [place], [place]     History of pulmonary embolism 1971    no source found     LACTOSE INTOLERANCE      Lumbago     sees Kodi Guidry     Mitral valve regurgitation      Occlusion and stenosis of carotid artery without mention of cerebral infarction 2003    CAROTID US NORMAL, bruit in neck     Osteoporosis, unspecified 2003    T = -2.66     Paroxysmal atrial fibrillation (H) 11/15/2013     Spider veins      Unspecified essential hypertension      Unspecified tinnitus 2005    mild hearing loss, saw ENT        Past Surgical History:   Past Surgical History:   Procedure Laterality Date     CATARACT IOL, RT/LT       corneal scraping       CYSTOSCOPY       CYSTOSCOPY, BIOPSY BLADDER, COMBINED N/A 7/25/2016    Procedure: COMBINED CYSTOSCOPY, BIOPSY BLADDER;  Surgeon: Bernadine Maria MD;  Location: [facility] OR     ESOPHAGOSCOPY, GASTROSCOPY, DUODENOSCOPY (EGD), COMBINED  7/8/2013    Procedure: COMBINED ESOPHAGOSCOPY, GASTROSCOPY, DUODENOSCOPY (EGD);   ESOPHAGOSCOPY, GASTROSCOPY, DUODENOSCOPY (EGD)   ;  Surgeon: Shawn Soto MD;  Location:  GI     IMPLANT STIMULATOR SACRAL NERVE STAGE ONE N/A 4/4/2017    Procedure: IMPLANT  STIMULATOR SACRAL NERVE STAGE ONE;  Surgeon: Kb Tracy MD;  Location: [facility] OR     IMPLANT STIMULATOR SACRAL NERVE STAGE TWO N/A 4/18/2017    Procedure: IMPLANT STIMULATOR SACRAL NERVE STAGE TWO;  Stage Two Interstim  ;  Surgeon: Kb Tracy MD;  Location: [facility] OR     interstim placement       [facility] NONSPECIFIC PROCEDURE      D&C x 2 in 1957 & 1962 -- Abstracted 06/10/02     [facility] NONSPECIFIC PROCEDURE      Left breast biopsy x 2 in 1988 & 1989 -- Abstracted 06/10/02     [facility] NONSPECIFIC PROCEDURE  1958    Influenza resulting in hospitalization -- Abstracted 06/10/02     [facility] NONSPECIFIC PROCEDURE  1971    10 day hospitalization from bilateral pulmonary enboli -- Abstracted 06/10/02     [facility] NONSPECIFIC PROCEDURE  1/03    colonoscopy  negative       Medications (outpatient):  Current Outpatient Medications   Medication Sig Dispense Refill     acetaminophen (TYLENOL) 500 MG tablet Take 2 tablets (1,000 mg) by mouth 3 times daily       carboxymethylcellulose (REFRESH LIQUIGEL) 1 % ophthalmic solution Place 1 drop into both eyes every morning As needed       cephALEXin (KEFLEX) 250 MG capsule Take 1 capsule (250 mg) by mouth daily 90 capsule 3     Cholecalciferol (VITAMIN D) 50 MCG (2000 UT) CAPS Take 1 capsule by mouth every evening       fish oil-omega-3 fatty acids 500 MG capsule Take 500 mg by mouth every morning       Lidocaine (LIDOCARE) 4 % Patch Place 1 patch onto the skin every 24 hours To prevent lidocaine toxicity, patient should be patch free for 12 hrs daily. To right shoulder       lisinopril (ZESTRIL) 20 MG tablet Take 10 mg by mouth daily       loperamide (IMODIUM) 2 MG capsule Take 1 capsule (2 mg) by mouth 4 times daily as needed for diarrhea       metoprolol succinate ER (TOPROL-XL) 100 MG 24 hr tablet Take 1 tablet (100 mg) by mouth daily 90 tablet 3     polyethylene glycol (MIRALAX) 17 GM/Dose powder Take 17 g by mouth daily       rosuvastatin (CRESTOR) 5 MG tablet Take 1 tablet (5 mg) by mouth  At Bedtime 90 tablet 3     SENNA-docusate sodium (SENNA S) 8.6-50 MG tablet Take 1 tablet by mouth 2 times daily as needed (constipation)       sodium chloride (PETER 128) 5 % ophthalmic ointment Place 2 Application (1 g) into both eyes At Bedtime       vitamin C (ASCORBIC ACID) 500 MG tablet Take 1 tablet (500 mg) by mouth 2 times daily       warfarin ANTICOAGULANT (COUMADIN) 2.5 MG tablet Take one tablet (2.5 mg) by mouth on Sun, e and Thurs; take a half tablet (1.25 mg) all other days; or as directed by INR clinic (Patient taking differently: Take 2 mg MWF and 1.5 mg T, Thur, Sat, Sun) 65 tablet 0       Allergies:  Allergies   Allergen Reactions     Codeine Nausea and Vomiting     Other reaction(s): GI Intolerance  Other reaction(s): GI Intolerance, Vomiting     Meperidine Nausea and Vomiting     Other reaction(s): GI Intolerance  Other reaction(s): GI Intolerance, Vomiting     Morphine Nausea and Vomiting     Other reaction(s): GI Intolerance  vomiting  Other reaction(s): GI Intolerance, Vomiting     Penicillins Hives     hives     Sulfa Drugs      Other reaction(s): GI Intolerance  Vomiting    Other reaction(s): GI Intolerance     Epinephrine Palpitations     Other reaction(s): Other (See Comments)  Makes heart race  Other reaction(s): Other (See Comments), Tachycardia  Makes heart race         Social History:   History   Drug Use No      History   Smoking Status     Never Smoker   Smokeless Tobacco     Never Used     Social History    Substance and Sexual Activity      Alcohol use: No       Family History:  Family History   Problem Relation Age of Onset     Cerebrovascular Disease Father          at 92     Psychotic Disorder Mother         Suicide 62, depression     Alzheimer Disease Maternal Grandmother      Cardiovascular Sister         pacemaker     Glaucoma No family hx of        Review of Systems:   A complete review of systems was negative except as mentioned in the History of Present Illness.      Objective & Physical Exam:  There were no vitals taken for this visit.  Wt Readings from Last 2 Encounters:   01/28/22 62.1 kg (137 lb)   01/24/22 62.1 kg (137 lb)     There is no height or weight on file to calculate BMI.   There is no height or weight on file to calculate BSA.    Constitutional: appears stated age, in no apparent distress, A&O to person and place not time, chronically ill appearing, catheter bag on Right lower leg  Head: normocephalic, atraumatic  Neck: supple, trachea midline, no bruit bilaterally   Pulmonary: clear to auscultation bilaterally  Cardiovascular: regular rate, irregularly irregular rhythm, 1/6 NAEL at the RUSB, no lower extremity edema  Gastrointestinal: no guarding, non-rigid   Neurologic: awake, alert, moves all extremities  Skin: no jaundice, warm on limited exam  Psychiatric: affect is normal, answers questions appropriately, oriented to self and place    Data reviewed:  Lab Results   Component Value Date    WBC 4.8 01/10/2022    WBC 7.8 12/22/2019    RBC 3.56 (L) 01/10/2022    RBC 4.09 12/22/2019    HGB 11.4 (L) 01/10/2022    HGB 13.1 12/22/2019    HCT 35.6 01/10/2022    HCT 41.5 12/22/2019     01/10/2022     (H) 12/22/2019    MCH 32.0 01/10/2022    MCH 32.0 12/22/2019    MCHC 32.0 01/10/2022    MCHC 31.6 12/22/2019    RDW 12.9 01/10/2022    RDW 12.7 12/22/2019     01/10/2022     (L) 12/22/2019     Sodium   Date Value Ref Range Status   01/10/2022 139 136 - 145 mmol/L Final   11/19/2020 139 133 - 144 mmol/L Final     Potassium   Date Value Ref Range Status   01/10/2022 4.4 3.5 - 5.0 mmol/L Final   11/19/2020 4.1 3.4 - 5.3 mmol/L Final     Chloride   Date Value Ref Range Status   01/10/2022 107 98 - 107 mmol/L Final   11/19/2020 107 94 - 109 mmol/L Final     Carbon Dioxide   Date Value Ref Range Status   11/19/2020 30 20 - 32 mmol/L Final     Carbon Dioxide (CO2)   Date Value Ref Range Status   01/10/2022 23 22 - 31 mmol/L Final     Anion Gap    Date Value Ref Range Status   01/10/2022 9 5 - 18 mmol/L Final   11/19/2020 2 (L) 3 - 14 mmol/L Final     Glucose   Date Value Ref Range Status   01/10/2022 95 70 - 125 mg/dL Final   11/19/2020 77 70 - 99 mg/dL Final     Urea Nitrogen   Date Value Ref Range Status   01/10/2022 29 (H) 8 - 28 mg/dL Final   11/19/2020 27 7 - 30 mg/dL Final     Creatinine   Date Value Ref Range Status   01/10/2022 1.10 0.60 - 1.10 mg/dL Final   11/19/2020 1.13 (H) 0.52 - 1.04 mg/dL Final     GFR Estimate   Date Value Ref Range Status   01/10/2022 48 (L) >60 mL/min/1.73m2 Final     Comment:     Effective December 21, 2021 eGFRcr in adults is calculated using the 2021 CKD-EPI creatinine equation which includes age and gender (Heather et al., [place], DOI: 10.1056/DGSGhw6775012)   11/19/2020 43 (L) >60 mL/min/[1.73_m2] Final     Comment:     Non  GFR Calc  Starting 12/18/2018, serum creatinine based estimated GFR (eGFR) will be   calculated using the Chronic Kidney Disease Epidemiology Collaboration   (CKD-EPI) equation.       GFR, ESTIMATED POCT   Date Value Ref Range Status   08/29/2021 34 (L) >60 mL/min/1.73m2 Final     Calcium   Date Value Ref Range Status   01/10/2022 10.0 8.5 - 10.5 mg/dL Final   11/19/2020 9.6 8.5 - 10.1 mg/dL Final     Bilirubin Total   Date Value Ref Range Status   12/26/2021 0.8 0.2 - 1.3 mg/dL Final   07/01/2019 0.5 0.2 - 1.3 mg/dL Final     Alkaline Phosphatase   Date Value Ref Range Status   12/26/2021 57 40 - 150 U/L Final   07/01/2019 86 40 - 150 U/L Final     ALT   Date Value Ref Range Status   12/26/2021 20 0 - 50 U/L Final   07/01/2019 26 0 - 50 U/L Final     AST   Date Value Ref Range Status   12/26/2021 27 0 - 45 U/L Final   07/01/2019 25 0 - 45 U/L Final     Recent Labs   Lab Test 11/19/20  1122 07/01/19  1000 05/16/16  0945 07/03/15  0959 11/03/14  0857   CHOL 215* 187   < > 208* 210*   HDL 50 46*   < > 55 54   * 119*   < > 134* 135*   TRIG 135 111   < > 96 106   CHOLHDLRATIO  --    --   --  3.8 3.9    < > = values in this interval not displayed.      No results found for: A1C     Recent Results (from the past 4320 hour(s))   Echo Complete   Result Value    LVEF  55-60%    [facility]    [id number]  [id number]  [id number]  [id number]^QUETA^BLANKA^HIPOLITO     [facility],[place]  Echocardiography Laboratory  [address] [place] [address]     Name: JOSE DANIEL RAWLS  MRN: [id number]  : 10/26/1932  Study Date: 2021 11:25 AM  Age: 89 yrs  Gender: Female  Patient Location: [facility]  Reason For Study: Syncope  Ordering Physician: BLANKA BIRCH  Referring Physician: MACK BELLE  Performed By: Maeve Kaufman RDCS     BSA: 1.7 m2  Height: 66 in  Weight: 145 lb  HR: 83  BP: 144/93 mmHg  ______________________________________________________________________________  Procedure  Complete Portable Echo Adult.  ______________________________________________________________________________  Interpretation Summary  Global and regional left ventricular function is normal with an EF of 55-60%.  Global right ventricular function is normal. The right ventricle is normal  size.  No significant valvular abnormalities.  IVC diameter >2.1 cm collapsing <50% with sniff suggests a high RA pressure  estimated at 15 mmHg or greater. The left atrial pressure is also increased.  This study was compared with the study from 2021. No significant changes  noted.  ______________________________________________________________________________  Left Ventricle  Global and regional left ventricular function is normal with an EF of 55-60%.  Left ventricular size is normal. Mild concentric wall thickening consistent  with left ventricular hypertrophy is present. Grade III or advanced diastolic  dysfunction. Diastolic Doppler findings (E/E' ratio and/or other parameters)  suggest left ventricular filling pressures are increased.     Right Ventricle  Global right ventricular  function is normal. The right ventricle is normal  size.     Atria  The right atria appears normal. Mild to moderate left atrial enlargement is  present.     Mitral Valve  Mild mitral insufficiency is present.     Aortic Valve  The aortic valve is tricuspid. Mild aortic valve calcification is present.     Tricuspid Valve  Mild tricuspid insufficiency is present. The right ventricular systolic  pressure is approximated at 30.9 mmHg plus the right atrial pressure.     Pulmonic Valve  The valve leaflets are not well visualized. Trace pulmonic insufficiency is  present.     Vessels  Sinuses of Valsalva 3.2 cm. Ascending aorta 3.0 cm. IVC diameter >2.1 cm  collapsing <50% with sniff suggests a high RA pressure estimated at 15 mmHg or  greater.     Pericardium  No pericardial effusion is present.     Compared to Previous Study  This study was compared with the study from 2021 . No significant  changes noted.  ______________________________________________________________________________  MMode/2D Measurements & Calculations  IVSd: 1.0 cm     LVIDd: 3.4 cm  LVIDs: 2.3 cm  LVPWd: 1.5 cm  FS: 32.3 %  LV mass(C)d: 142.9 grams  LV mass(C)dI: 81.9 grams/m2  Ao root diam: 3.2 cm  asc Aorta Diam: 3.0 cm  LVOT diam: 1.8 cm  LVOT area: 2.5 cm2  LA Volume (BP): 64.9 ml  LA Volume Index (BP): 37.3 ml/m2  RWT: 0.87     Doppler Measurements & Calculations  MV E max love: 128.0 cm/sec  MV A max love: 37.9 cm/sec  MV E/A: 3.4  MV dec slope: 769.0 cm/sec2  PA acc time: 0.11 sec  TR max love: 278.0 cm/sec  TR max P.9 mmHg  E/E' av.5  Lateral E/e': 16.3  Medial E/e': 22.6     ______________________________________________________________________________  Report approved by: Urban Alatorre 2021 12:27 PM         Echocardiogram Complete   Result Value    LVEF  55-60%    Narrative    [id number]  [id number]  [id number]  [id number]^JOVAN^ERIN^JOJO     [facility]  Echocardiography Laboratory  [address]  [address]  [place] [address]     Name: JOSE DANIEL RAWLS  MRN: [id number]  : 10/26/1932  Study Date: 2021 11:05 AM  Age: 88 yrs  Gender: Female  Patient [facility]: [facility]  Reason For Study: Chest Pain  Ordering Physician: ERIN ALDANA  Performed By: Bernadine Wallace     BSA: 1.7 m2  Height: 65 in  Weight: 142 lb  HR: 62  BP: 164/64 mmHg  ______________________________________________________________________________  Procedure  Complete Portable Echo Adult.  ______________________________________________________________________________  Interpretation Summary     The visual ejection fraction is 55-60%.  Left ventricular systolic function is normal.  Contrast would enhance wall motion analysis. The anterolateral wall was poorly  seen though likely to be jackie normally.  Grade III or advanced diastolic dysfunction.  Trivial pericardial effusion  The study was technically difficult.  ______________________________________________________________________________  Left Ventricle  The left ventricle is normal in size. There is mild concentric left  ventricular hypertrophy. The visual ejection fraction is 55-60%. Left  ventricular systolic function is normal. Grade III or advanced diastolic  dysfunction. Diastolic Doppler findings (E/E' ratio and/or other parameters)  suggest left ventricular filling pressures are increased.     Right Ventricle  The right ventricle is normal in size and function.     Atria  Normal left atrial size. Right atrial size is normal. There is no color  Doppler evidence of an atrial shunt.     Mitral Valve  The mitral valve leaflets are mildly thickened. There is mild (1+) mitral  regurgitation.     Tricuspid Valve  There is mild (1+) tricuspid regurgitation.     Aortic Valve  The aortic valve is trileaflet. There is moderate trileaflet aortic sclerosis.  No aortic regurgitation is present. No hemodynamically significant valvular  aortic stenosis.     Pulmonic Valve  There is no  pulmonic valvular regurgitation. Normal pulmonic valve velocity.     Vessels  The aortic root is normal size. IVC diameter <2.1 cm collapsing >50% with  sniff suggests a normal RA pressure of 3 mmHg.     Pericardium  Trivial pericardial effusion.     Rhythm  Sinus rhythm was noted.  ______________________________________________________________________________  MMode/2D Measurements & Calculations     IVSd: 1.1 cm  LVIDd: 3.9 cm  LVIDs: 2.5 cm  LVPWd: 1.3 cm  FS: 36.7 %  LV mass(C)d: 166.1 grams  LV mass(C)dI: 97.1 grams/m2  Ao root diam: 3.2 cm  LA dimension: 3.2 cm  LA/Ao: 1.0  LVOT diam: 1.8 cm  LVOT area: 2.5 cm2  LA Volume (BP): 53.5 ml  LA Volume Index (BP): 31.3 ml/m2  RWT: 0.68     Doppler Measurements & Calculations  MV E max love: 99.4 cm/sec  MV A max love: 51.4 cm/sec  MV E/A: 1.9  MV dec time: 0.22 sec  LV V1 max P.2 mmHg  LV V1 max: 102.0 cm/sec  LV V1 VTI: 23.2 cm  SV(LVOT): 59.0 ml  SI(LVOT): 34.5 ml/m2  PA acc time: 0.13 sec  TR max love: 268.5 cm/sec  TR max P.0 mmHg  E/E' av.3  Lateral E/e': 17.0  Medial E/e': 23.7     ______________________________________________________________________________  Report approved by: Urban Marquez 2021 12:35 PM

## 2022-02-07 ENCOUNTER — HOSPITAL ENCOUNTER (OUTPATIENT)
Dept: GENERAL RADIOLOGY | Facility: CLINIC | Age: 87
Discharge: HOME | End: 2022-02-07
Attending: STUDENT IN AN ORGANIZED HEALTH CARE EDUCATION/TRAINING PROGRAM | Admitting: STUDENT IN AN ORGANIZED HEALTH CARE EDUCATION/TRAINING PROGRAM
Payer: COMMERCIAL

## 2022-02-07 ENCOUNTER — MEDICAL CORRESPONDENCE (OUTPATIENT)
Dept: HEALTH INFORMATION MANAGEMENT | Facility: CLINIC | Age: 87
End: 2022-02-07

## 2022-02-07 ENCOUNTER — TRANSFERRED RECORDS (OUTPATIENT)
Dept: HEALTH INFORMATION MANAGEMENT | Facility: CLINIC | Age: 87
End: 2022-02-07
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
## 2022-02-07 PROCEDURE — 72040 X-RAY EXAM NECK SPINE 2-3 VW: CPT

## 2022-02-08 ENCOUNTER — TELEPHONE (OUTPATIENT)
Dept: GERIATRICS | Facility: CLINIC | Age: 87
End: 2022-02-08
Payer: COMMERCIAL

## 2022-02-08 NOTE — PROGRESS NOTES
NEUROSURGERY CLINIC NOTE       Reason for Visit:          Hospital Follow Up for questionable C2 fracture         Discharge Diagnoses     Fall, initial encounter  Cervical fracture: lateral mass of C2    History of Presenting Illness:   Maia Miranda is a 89 year old female with history of Paroxysmal Atrial Fibrillation on chronic anticoagulation who presented to the ED on 12/25/2021 with diarrhea, possible syncopal event,   fall, and confusion.   She was brought in due to diarrhea and a syncopal episode in the bathroom a day prior.   She lives with her daughter. Unknown how long she was down.   She complained of only pain in her neck.  Imaging int he ED included a CT head, Cervical spine, XR's of the chest and pelvic bones significant for a possible C2 lateral mass fracture. She was placed in a cervical collar and admitted to trauma for her injuries. The patient underwent tertiary examination to evaluate for additional injuries.  The systematic review did not find any other injuries. Her hospital course and discharge plan below:     # Left C2 lateral mass fracture  Evaluated by Neurosurgery and was managed non-operatively.  The following images were were obtained:   C-spine CT: Cervical spine MRI would be helpful to evaluate for bone marrow edema that would confirm this as a recent fracture  An MRI of the cervical spine was NOT obtained given questionable MRI compatibility with the patient's implanted sacral nerve stimulator.  Neck CTA:  Normal neck CTA without high grade stenosis or dissection  Neurosurgery recommends follow up in Neurosurgery clinic in 6 weeks with repeat C spine x rays. Rigid cervical collar at all times until follow up.    Today,   She is doing very well.   She does not have any neck pain.   No radiating pain, numbness, or tingling in upper extremities.    No upper extremity weakness.  Ambulates with a wheeled walker w/bench seat   She has been compliant with her cervical collar.           Current Outpatient Medications   Medication     acetaminophen (TYLENOL) 500 MG tablet     carboxymethylcellulose (REFRESH LIQUIGEL) 1 % ophthalmic solution     cephALEXin (KEFLEX) 250 MG capsule     Cholecalciferol (VITAMIN D) 50 MCG (2000 UT) CAPS     fish oil-omega-3 fatty acids 500 MG capsule     Lidocaine (LIDOCARE) 4 % Patch     lisinopril (ZESTRIL) 20 MG tablet     loperamide (IMODIUM) 2 MG capsule     metoprolol succinate ER (TOPROL-XL) 100 MG 24 hr tablet     polyethylene glycol (MIRALAX) 17 GM/Dose powder     rosuvastatin (CRESTOR) 5 MG tablet     SENNA-docusate sodium (SENNA S) 8.6-50 MG tablet     sodium chloride (PETER 128) 5 % ophthalmic ointment     vitamin C (ASCORBIC ACID) 500 MG tablet     warfarin ANTICOAGULANT (COUMADIN) 2.5 MG tablet     No current facility-administered medications for this visit.         Examination:   There were no vitals taken for this visit.          Neurological Assessment:      General    Alert, cooperative.  No acute distress  Pulmonary:   Breathing comfortably on room air. No cough, or shortness of breath  Skin:    Visible skin without lesions or obvious rash  Speech is fluent  Maintains eye contact  Musculoskeletal:    Moving extremities freely with good strength  Collar is removed, and she has adequate range range of motion   Bilateral upper extremity is 5/5 including deltoid, bicep, tricep,   Wrist flexion/extension,  and intrinsic musculature.     IMAGING:  XR CERVICAL SPINE FLEX/EXT 2/3 VW 2/9/2022 12:32 PM    Comparison: Cervical spine radiographs 2/7/2022.     Findings: AP, lateral, flexion, and extension views of the cervical  spine were obtained. Normal cervical spine alignment in the neutral  position. Although alignment is maintained with flexion and extension,  there is notably limited range of motion with flexion and extension.  Moderate loss of intervertebral disc height at C4-5 and C5-6. Mild  loss of disc height at C3-4. Additional multilevel  "degenerative  changes including endplate osteophytosis, uncinate hypertrophy, and  facet hypertrophy. No prevertebral edema. No mass in the visualized  lung apices. Atherosclerotic calcification of the aortic arch.                                                                      Impression:  1. Multilevel cervical degenerative changes, most pronounced at C4-5  and C5-6.  2. No mechanical instability; however, there is limited range of  motion with flexion and extension.     FUAD LINDSAY MD       Assessment:   ~Possible syncopal event w/confusion  ~Fall  ~Questionable non-displaced fracture involving the left lateral mass of C2    Plan:   ~Okay to taper/wean out of cervical collar. (Reviewed with patient and daughter)   ~We reviewed her activity going forward and gradual lifting of restrictions.  ~Increase lifting, as tolerated, \"low and slow\"   ~Patient told to clinic if develops new neck pain, that is constant and not relieved by Tylenol/Ibuprofen or rest, or develops new radiating pain, numbness/tingling, or weakness in arm(s)   ~Unless the patient develops new pain or symptoms, no further imaging is required, and at this time, can be discharged from the Neurosurgery Clinic, and can return on an as-needed basis      At the end of the visit, all the patient's questions and concerns had been addressed and the patient was agreeable with the plan of care as outlined above. The patient has our office contact information at [phone], and knows to call with any questions, concerns, or changes in condition.       Charity Mendez DNP  Neurosurgery Nurse Practitioner  [facility]  [phone]    "

## 2022-02-08 NOTE — TELEPHONE ENCOUNTER
[facility] now requests orders and shares plan of care/discharge summaries for some patients through [employer].  Please REPLY TO THIS MESSAGE OR ROUTE BACK TO THE AUTHOR in order to give authorization for orders when needed.  This is considered a verbal order, you will still receive a faxed copy of orders for signature.  Thank you for your assistance in improving collaboration for our patients.    ORDER  Ok to delay home care admission until 2/11/22 per pt request.     Teresa Sierra RN Clinical Supervisor  [facility]  Email: [email or web address]  Office: [phone] ext [phone]  Work Cell: [phone]

## 2022-02-08 NOTE — TELEPHONE ENCOUNTER
I approve of requested home care orders.    Ok to delay home care admission until 2/11/22 per pt request.     Remy Hernandez, RAY CNP

## 2022-02-09 ENCOUNTER — OFFICE VISIT (OUTPATIENT)
Dept: NEUROSURGERY | Facility: CLINIC | Age: 87
End: 2022-02-09
Payer: COMMERCIAL

## 2022-02-09 ENCOUNTER — ANCILLARY PROCEDURE (OUTPATIENT)
Dept: GENERAL RADIOLOGY | Facility: CLINIC | Age: 87
End: 2022-02-09
Attending: NURSE PRACTITIONER
Payer: COMMERCIAL

## 2022-02-09 VITALS
HEIGHT: 66 IN | HEART RATE: 88 BPM | DIASTOLIC BLOOD PRESSURE: 82 MMHG | SYSTOLIC BLOOD PRESSURE: 128 MMHG | BODY MASS INDEX: 22.53 KG/M2 | OXYGEN SATURATION: 97 % | TEMPERATURE: 97.6 F | RESPIRATION RATE: 18 BRPM | WEIGHT: 140.2 LBS

## 2022-02-09 VITALS
OXYGEN SATURATION: 99 % | RESPIRATION RATE: 16 BRPM | DIASTOLIC BLOOD PRESSURE: 63 MMHG | SYSTOLIC BLOOD PRESSURE: 127 MMHG | HEART RATE: 93 BPM

## 2022-02-09 DIAGNOSIS — S12.191D OTHER CLOSED NONDISPLACED FRACTURE OF SECOND CERVICAL VERTEBRA WITH ROUTINE HEALING, SUBSEQUENT ENCOUNTER: Primary | ICD-10-CM

## 2022-02-09 PROCEDURE — 72040 X-RAY EXAM NECK SPINE 2-3 VW: CPT | Performed by: RADIOLOGY

## 2022-02-09 PROCEDURE — 99213 OFFICE O/P EST LOW 20 MIN: CPT | Performed by: NURSE PRACTITIONER

## 2022-02-09 RX ORDER — GENTAMICIN 40 MG/ML
INJECTION, SOLUTION INTRAMUSCULAR; INTRAVENOUS PRN
COMMUNITY
Start: 2021-03-29 | End: 2022-02-10

## 2022-02-09 ASSESSMENT — MIFFLIN-ST. JEOR: SCORE: 1077.69

## 2022-02-09 ASSESSMENT — PAIN SCALES - GENERAL: PAINLEVEL: NO PAIN (0)

## 2022-02-09 NOTE — LETTER
2/9/2022       RE: Maia Miranda  [facility]  [address] [place] [address]     Dear Colleague,    Thank you for referring your patient, Maia Miranda, to the [facility] NEUROSURGERY CLINIC [place] [facility]. Please see a copy of my visit note below.    NEUROSURGERY CLINIC NOTE       Reason for Visit:          Hospital Follow Up for questionable C2 fracture         Discharge Diagnoses     Fall, initial encounter  Cervical fracture: lateral mass of C2    History of Presenting Illness:   Maia Miranda is a 89 year old female with history of Paroxysmal Atrial Fibrillation on chronic anticoagulation who presented to the ED on 12/25/2021 with diarrhea, possible syncopal event,   fall, and confusion.   She was brought in due to diarrhea and a syncopal episode in the bathroom a day prior.   She lives with her daughter. Unknown how long she was down.   She complained of only pain in her neck.  Imaging int he ED included a CT head, Cervical spine, XR's of the chest and pelvic bones significant for a possible C2 lateral mass fracture. She was placed in a cervical collar and admitted to trauma for her injuries. The patient underwent tertiary examination to evaluate for additional injuries.  The systematic review did not find any other injuries. Her hospital course and discharge plan below:     # Left C2 lateral mass fracture  Evaluated by Neurosurgery and was managed non-operatively.  The following images were were obtained:   C-spine CT: Cervical spine MRI would be helpful to evaluate for bone marrow edema that would confirm this as a recent fracture  An MRI of the cervical spine was NOT obtained given questionable MRI compatibility with the patient's implanted sacral nerve stimulator.  Neck CTA:  Normal neck CTA without high grade stenosis or dissection  Neurosurgery recommends follow up in Neurosurgery clinic in 6 weeks with repeat C spine x  rays. Rigid cervical collar at all times until follow up.    Today,   She is doing very well.   She does not have any neck pain.   No radiating pain, numbness, or tingling in upper extremities.    No upper extremity weakness.  Ambulates with a wheeled walker w/bench seat   She has been compliant with her cervical collar.          Current Outpatient Medications   Medication     acetaminophen (TYLENOL) 500 MG tablet     carboxymethylcellulose (REFRESH LIQUIGEL) 1 % ophthalmic solution     cephALEXin (KEFLEX) 250 MG capsule     Cholecalciferol (VITAMIN D) 50 MCG (2000 UT) CAPS     fish oil-omega-3 fatty acids 500 MG capsule     Lidocaine (LIDOCARE) 4 % Patch     lisinopril (ZESTRIL) 20 MG tablet     loperamide (IMODIUM) 2 MG capsule     metoprolol succinate ER (TOPROL-XL) 100 MG 24 hr tablet     polyethylene glycol (MIRALAX) 17 GM/Dose powder     rosuvastatin (CRESTOR) 5 MG tablet     SENNA-docusate sodium (SENNA S) 8.6-50 MG tablet     sodium chloride (PETER 128) 5 % ophthalmic ointment     vitamin C (ASCORBIC ACID) 500 MG tablet     warfarin ANTICOAGULANT (COUMADIN) 2.5 MG tablet     No current facility-administered medications for this visit.         Examination:   There were no vitals taken for this visit.          Neurological Assessment:      General    Alert, cooperative.  No acute distress  Pulmonary:   Breathing comfortably on room air. No cough, or shortness of breath  Skin:    Visible skin without lesions or obvious rash  Speech is fluent  Maintains eye contact  Musculoskeletal:    Moving extremities freely with good strength  Collar is removed, and she has adequate range range of motion   Bilateral upper extremity is 5/5 including deltoid, bicep, tricep,   Wrist flexion/extension,  and intrinsic musculature.     IMAGING:  XR CERVICAL SPINE FLEX/EXT 2/3 VW 2/9/2022 12:32 PM    Comparison: Cervical spine radiographs 2/7/2022.     Findings: AP, lateral, flexion, and extension views of the cervical  spine  "were obtained. Normal cervical spine alignment in the neutral  position. Although alignment is maintained with flexion and extension,  there is notably limited range of motion with flexion and extension.  Moderate loss of intervertebral disc height at C4-5 and C5-6. Mild  loss of disc height at C3-4. Additional multilevel degenerative  changes including endplate osteophytosis, uncinate hypertrophy, and  facet hypertrophy. No prevertebral edema. No mass in the visualized  lung apices. Atherosclerotic calcification of the aortic arch.                                                                      Impression:  1. Multilevel cervical degenerative changes, most pronounced at C4-5  and C5-6.  2. No mechanical instability; however, there is limited range of  motion with flexion and extension.     FUAD LINDSAY MD       Assessment:   ~Possible syncopal event w/confusion  ~Fall  ~Questionable non-displaced fracture involving the left lateral mass of C2    Plan:   ~Okay to taper/wean out of cervical collar. (Reviewed with patient and daughter)   ~We reviewed her activity going forward and gradual lifting of restrictions.  ~Increase lifting, as tolerated, \"low and slow\"   ~Patient told to clinic if develops new neck pain, that is constant and not relieved by Tylenol/Ibuprofen or rest, or develops new radiating pain, numbness/tingling, or weakness in arm(s)   ~Unless the patient develops new pain or symptoms, no further imaging is required, and at this time, can be discharged from the Neurosurgery Clinic, and can return on an as-needed basis      At the end of the visit, all the patient's questions and concerns had been addressed and the patient was agreeable with the plan of care as outlined above. The patient has our office contact information at [phone], and knows to call with any questions, concerns, or changes in condition.       Charity Mendez DNP  Neurosurgery Nurse Practitioner  [facility] " [place]  [phone]

## 2022-02-09 NOTE — PROGRESS NOTES
"[facility]  PRIMARY CARE PROVIDER AND CLINIC:  Remy Hernandez, APRN CNP, [address]  / [place] [address]  Chief Complaint   Patient presents with     Establish Care     [facility] Medical Record Number:  [id number]  Place of Service where encounter took place:  [facility] [[id number]]    Maia Miranda  is a 89 year old  (10/26/1932), admitted to the above facility from [facility] where she resided from 12/29/21 through 2/2/22 following a hospital stay at [facility] from 12/25/21 through 12/29/21..  Admitted to this facility for  rehab, medical management and nursing care.    HPI:    HPI information obtained from: facility chart records, facility staff, patient report and [facility] chart review.   Brief Summary of Hospital Course:   Mrs. Miranda is a 89 y/o with a PMH of PE some years ago, P-A-fib on OAC, CKD III, and chronic urine retention and self-cath's and reports last few weeks loose stools s/p fall and ED found to have a C2 lateral mass fracture.  Also noted to have IFEOMA and elevated troponins.  Seen by NeuroSurg whom noted conservative measures, Rigid C-collar for at least 6 weeks.  Due to her not being able to look down, a deleon was placed as she is no longer able to self cath right now. Cardiology seen for elevated troponin 2/2 non ischemic MI vs. Demand ischemia, unclear; she discharged with a cardiac monitor. Also with some diarrhea, dehydration.     Updates on Skilled nursing Admission:   Following with neurology and Several imaging studies completed ultimately finding a \"questionable non-displaced fracture involving the left lateral mass of C2\" in her neck. MRI was recommended for more detail but not able to be completed d/t questionable compatibility with her implanted sacral nerve stimulator for h/o urinary incontinence/retention. Ultimately neurosurg recommended 6 weeks in rigid C-collar (out of collar " now) post follow up. Diarrhea mostly resolved per notes, c-diff negative. Mostly uncomplicated TCU stay but determined in need of higher level of care so moved to [facility].    Updates at [facility]:  Loosely oriented, calm and pleasant. Says has some nausea this morning but resolved with a glass of ginger ale. Denies pain, neck pain, numbness or tingling in arms, SOB, vomiting. Seems a bit confused about her deleon versus hx of straight cathing. Up and ambulating with 4WW and SBA.     CODE STATUS/ADVANCE DIRECTIVES DISCUSSION:   DNR / DNI  Patient's living condition: lives in an assisted living facility-previously at home. Could need memory care in near future.   ALLERGIES: Codeine, Meperidine, Morphine, Penicillins, Sulfa drugs, and Epinephrine  PAST MEDICAL HISTORY:  has a past medical history of Amnestic MCI (mild cognitive impairment with memory loss) (2014), Chronic duodenal ulcer without mention of hemorrhage, perforation, or obstruction (1974), Depressive disorder, not elsewhere classified (2003), EROSIVE EYE DISORDER (1994), Esophageal reflux (2001), Essential and other specified forms of tremor (2004), Generalized osteoarthrosis, unspecified site, Hiatal hernia, History of pulmonary embolism (1971), LACTOSE INTOLERANCE, Lumbago, Mitral valve regurgitation, Occlusion and stenosis of carotid artery without mention of cerebral infarction (2003), Osteoporosis, unspecified (2003), Paroxysmal atrial fibrillation (H) (11/15/2013), Spider veins, Unspecified essential hypertension, and Unspecified tinnitus (2005).    She has no past medical history of Asymptomatic human immunodeficiency virus (HIV) infection status (H), Cerebral palsy (H), Complication of anesthesia, Congenital renal agenesis and dysgenesis, Goiter, Gout, Hernia, abdominal, History of spinal cord injury, History of thrombophlebitis, Horseshoe kidney, Hydrocephalus (H), Malignant hyperthermia, Mumps, Palpitations, Parkinsons disease (H), PONV (postoperative  nausea and vomiting), Spina bifida (H), STD (sexually transmitted disease), Tethered cord (H), or Tuberculosis.  PAST SURGICAL HISTORY:   has a past surgical history that includes NONSPECIFIC PROCEDURE; NONSPECIFIC PROCEDURE; NONSPECIFIC PROCEDURE (1958); NONSPECIFIC PROCEDURE (1971); NONSPECIFIC PROCEDURE (1/03); Esophagoscopy, gastroscopy, duodenoscopy (EGD), combined (7/8/2013); corneal scraping; cataract iol, rt/lt; Cystoscopy, biopsy bladder, combined (N/A, 7/25/2016); Implant stimulator sacral nerve stage one (N/A, 4/4/2017); interstim placement; Implant stimulator sacral nerve stage two (N/A, 4/18/2017); and Cystoscopy.  FAMILY HISTORY: family history includes Alzheimer Disease in her maternal grandmother; Cardiovascular in her sister; Cerebrovascular Disease in her father; Psychotic Disorder in her mother.  SOCIAL HISTORY:   reports that she has never smoked. She has never used smokeless tobacco. She reports that she does not drink alcohol and does not use drugs.    Post Discharge Medication Reconciliation Status: discharge medications reconciled, continue medications without change    Current Outpatient Medications   Medication Sig Dispense Refill     acetaminophen (TYLENOL) 500 MG tablet Take 2 tablets (1,000 mg) by mouth 3 times daily       carboxymethylcellulose (REFRESH LIQUIGEL) 1 % ophthalmic solution Place 1 drop into both eyes every morning As needed       cephALEXin (KEFLEX) 250 MG capsule Take 1 capsule (250 mg) by mouth daily 90 capsule 3     Cholecalciferol (VITAMIN D) 50 MCG (2000 UT) CAPS Take 1 capsule by mouth every evening       fish oil-omega-3 fatty acids 500 MG capsule Take 500 mg by mouth every morning       Lidocaine (LIDOCARE) 4 % Patch Place 1 patch onto the skin every 24 hours To prevent lidocaine toxicity, patient should be patch free for 12 hrs daily. To right shoulder       lisinopril (ZESTRIL) 20 MG tablet Take 10 mg by mouth daily       metoprolol succinate ER (TOPROL-XL) 100  "MG 24 hr tablet Take 1 tablet (100 mg) by mouth daily 90 tablet 3     rosuvastatin (CRESTOR) 5 MG tablet Take 1 tablet (5 mg) by mouth At Bedtime 90 tablet 3     sodium chloride (PETER 128) 5 % ophthalmic ointment Place 2 Application (1 g) into both eyes At Bedtime       vitamin C (ASCORBIC ACID) 500 MG tablet Take 1 tablet (500 mg) by mouth 2 times daily       Warfarin Sodium (COUMADIN PO) Per INR orders       loperamide (IMODIUM) 2 MG capsule Take 1 capsule (2 mg) by mouth 4 times daily as needed for diarrhea       polyethylene glycol (MIRALAX) 17 GM/Dose powder Take 17 g by mouth daily       SENNA-docusate sodium (SENNA S) 8.6-50 MG tablet Take 1 tablet by mouth 2 times daily as needed (constipation)       ROS:  Limited secondary to cognitive impairment but today pt reports ok    Vitals:  /82   Pulse 88   Temp 97.6  F (36.4  C)   Resp 18   Ht 1.676 m (5' 6\")   Wt 63.6 kg (140 lb 3.2 oz)   SpO2 97%   BMI 22.63 kg/m    Exam:  GENERAL APPEARANCE:  Alert, in no distress  ENT:  Mouth and posterior oropharynx normal, moist mucous membranes  EYES:  EOM, conjunctivae, lids, pupils and irises normal, wears glasses  NECK:  No adenopathy,masses or thyromegaly, c-collar off per neurology   RESP:  lungs clear to auscultation   CV:  regular rate and rhythm, no murmur, rub, or gallop, trace edema  ABDOMEN:  no guarding or rebound  :    deleon catheter in place with leg bag  M/S:   generalized weakness   SKIN:  intact to visualized areas  NEURO:   Cranial nerves 2-12 are normal tested and grossly at patient's baseline  PSYCH:  insight and judgement impaired, memory impaired SLUMS 9/30 and CPT 3.8/5.6    Lab/Diagnostic data:  CBC RESULTS: Recent Labs   Lab Test 01/10/22  0542 12/27/21  0523   WBC 4.8 8.6   RBC 3.56* 3.14*   HGB 11.4* 10.1*   HCT 35.6 31.5*    100   MCH 32.0 32.2   MCHC 32.0 32.1   RDW 12.9 13.2    103*       Last Basic Metabolic Panel:  Recent Labs   Lab Test 01/10/22  0542 " "01/03/22  0552    141   POTASSIUM 4.4 3.9   CHLORIDE 107 103   CLARISSE 10.0 10.4   CO2 23 26   BUN 29* 22   CR 1.10 1.20*   GLC 95 104       Liver Function Studies -   Recent Labs   Lab Test 12/26/21  0236 08/30/21  0753   PROTTOTAL 5.8* 6.5*   ALBUMIN 2.6* 3.2*   BILITOTAL 0.8 0.4   ALKPHOS 57 71   AST 27 49*   ALT 20 28       TSH   Date Value Ref Range Status   02/26/2018 1.82 0.40 - 4.00 mU/L Final   10/05/2017 2.44 0.40 - 4.00 mU/L Final     No results found for: A1C    ASSESSMENT/PLAN:  (S12.191G) Other closed nondisplaced fracture of second cervical vertebra with delayed healing, subsequent encounter  (primary encounter diagnosis)  Comment: stable- neurology now prn. Collar off  Plan: below per neurology   ~Okay to taper/wean out of cervical collar. (Reviewed with patient and daughter)   ~We reviewed her activity going forward and gradual lifting of restrictions.  ~Increase lifting, as tolerated, \"low and slow\"   ~Patient told to clinic if develops new neck pain, that is constant and not relieved by Tylenol/Ibuprofen or rest, or develops new radiating pain, numbness/tingling, or weakness in arm(s)   ~Unless the patient develops new pain or symptoms, no further imaging is required, and at this time, can be discharged from the Neurosurgery Clinic, and can return on an as-needed basis  -vitamin D daily     (M25.511,  G89.29) Chronic right shoulder pain  Comment: stable   Plan:  -lidocaine patches daily   -tylenol TID    (K55.9) Ischemic colitis (H)  (R19.5) Loose stools  Comment: Hx of prior GI bleed. Alternating constipation/diarrhea  Plan:   -stable for now. Monitor for s/s  -prn loperamide   -daily Miralax (consider reducing to 3x weekly if loose stools continue)  -Senna prn     (Z87.440) History of recurrent UTIs  (R33.9) Urinary retention  Comment: previously was straight cathing 6x daily in community. Unclear if she could resume this safely. Staff could assist? Current deleon cath due for exchange if " continuing. Follows with Dr. Maria in clinic  Plan:   -on cephalexin 250 mg po daily for UTI prophylaxis per urology    -will discuss with family as she is unable to participate in plan of care today  -vitamin C    (G31.84) Mild cognitive impairment  Comment: Low cognitive scores   Plan:   -AL placement   -OT to eval and treat    (I48.0) Paroxysmal atrial fibrillation (H)  (I10) Hypertension goal BP (blood pressure) < 150/90  (E78.5) Hyperlipidemia LDL goal <130      Comment: SAI-VaSc score of 5. Cardiology f/u 2/22 Cardiac event monitor demonstrated paroxysmal atrial fibrillation, no significant bradycardic episodes.    Plan:   -Lisinopril reduced in TCU with lower SBP to 10 mg po daily   -Toprol- mg po daily   -BMP periodically   -BP/HR 3 times weekly various times x 3 weeks for medication adjustment   -Coumadin for INR goal range of 2-3. INR have been stable. Current dose is 2 mg po MWF and 1.5 mg po AOD.   -fish oil/statin     (N18.30) Stage 3 chronic kidney disease, unspecified whether stage 3a or 3b CKD (H)  Comment: baseline creatinine 1.1-1.3  Plan:   -Renal dose medications and avoid nephrotoxins  -BMP/CBC periodically       Electronically signed by:  RAY Lopez CNP

## 2022-02-10 ENCOUNTER — ASSISTED LIVING VISIT (OUTPATIENT)
Dept: GERIATRICS | Facility: CLINIC | Age: 87
End: 2022-02-10
Payer: COMMERCIAL

## 2022-02-10 DIAGNOSIS — K55.9 ISCHEMIC COLITIS (H): ICD-10-CM

## 2022-02-10 DIAGNOSIS — M25.511 CHRONIC RIGHT SHOULDER PAIN: ICD-10-CM

## 2022-02-10 DIAGNOSIS — I10 HYPERTENSION GOAL BP (BLOOD PRESSURE) < 150/90: ICD-10-CM

## 2022-02-10 DIAGNOSIS — R19.5 LOOSE STOOLS: ICD-10-CM

## 2022-02-10 DIAGNOSIS — G31.84 MILD COGNITIVE IMPAIRMENT: ICD-10-CM

## 2022-02-10 DIAGNOSIS — E78.5 HYPERLIPIDEMIA LDL GOAL <130: ICD-10-CM

## 2022-02-10 DIAGNOSIS — Z87.440 HISTORY OF RECURRENT UTIS: ICD-10-CM

## 2022-02-10 DIAGNOSIS — S12.191G OTHER CLOSED NONDISPLACED FRACTURE OF SECOND CERVICAL VERTEBRA WITH DELAYED HEALING, SUBSEQUENT ENCOUNTER: Primary | ICD-10-CM

## 2022-02-10 DIAGNOSIS — R33.9 URINARY RETENTION: ICD-10-CM

## 2022-02-10 DIAGNOSIS — I48.0 PAROXYSMAL ATRIAL FIBRILLATION (H): ICD-10-CM

## 2022-02-10 DIAGNOSIS — N18.30 STAGE 3 CHRONIC KIDNEY DISEASE, UNSPECIFIED WHETHER STAGE 3A OR 3B CKD (H): ICD-10-CM

## 2022-02-10 DIAGNOSIS — G89.29 CHRONIC RIGHT SHOULDER PAIN: ICD-10-CM

## 2022-02-10 NOTE — PATIENT INSTRUCTIONS
"~Okay to taper/wean out of cervical collar. (Reviewed with patient and daughter)   ~We reviewed her activity going forward and gradual lifting of restrictions.  ~Increase lifting, as tolerated, \"low and slow\"   ~Patient told to clinic if develops new neck pain, that is constant and not relieved by Tylenol/Ibuprofen or rest, or develops new radiating pain, numbness/tingling, or weakness in arm(s)   ~Unless the patient develops new pain or symptoms, no further imaging is required, and at this time, can be discharged from the Neurosurgery Clinic, and can return on an as-needed basis      At the end of the visit, all the patient's questions and concerns had been addressed and the patient was agreeable with the plan of care as outlined above. The patient has our office contact information at [phone], and knows to call with any questions, concerns, or changes in condition.   "

## 2022-02-10 NOTE — LETTER
"    2/10/2022        RE: Maia Miranda  [facility]  [address] [place] [address]        [place] [facility]  PRIMARY CARE PROVIDER AND CLINIC:  RAY Lopez CNP, [address] [facility] [address] / [place] [address]  Chief Complaint   Patient presents with     Establish Care     [place] Medical Record Number:  [id number]  Place of Service where encounter took place:  [facility]erged with [facility] [[id number]]    Maia Miranda  is a 89 year old  (10/26/1932), admitted to the above facility from [facility] where she resided from 12/29/21 through 2/2/22 following a hospital stay at [facility] from 12/25/21 through 12/29/21..  Admitted to this facility for  rehab, medical management and nursing care.    HPI:    HPI information obtained from: facility chart records, facility staff, patient report and [facility] chart review.   Brief Summary of Hospital Course:   Mrs. Miranda is a 88 y/o with a PMH of PE some years ago, P-A-fib on OAC, CKD III, and chronic urine retention and self-cath's and reports last few weeks loose stools s/p fall and ED found to have a C2 lateral mass fracture.  Also noted to have IFEOMA and elevated troponins.  Seen by NeuroSurg whom noted conservative measures, Rigid C-collar for at least 6 weeks.  Due to her not being able to look down, a deleon was placed as she is no longer able to self cath right now. Cardiology seen for elevated troponin 2/2 non ischemic MI vs. Demand ischemia, unclear; she discharged with a cardiac monitor. Also with some diarrhea, dehydration.     Updates on Skilled nursing Admission:   Following with neurology and Several imaging studies completed ultimately finding a \"questionable non-displaced fracture involving the left lateral mass of C2\" in her neck. MRI was recommended for more detail but not able to be completed d/t questionable compatibility with her implanted sacral nerve stimulator for h/o " urinary incontinence/retention. Ultimately neurosurg recommended 6 weeks in rigid C-collar (out of collar now) post follow up. Diarrhea mostly resolved per notes, c-diff negative. Mostly uncomplicated TCU stay but determined in need of higher level of care so moved to [facility].    Updates at [facility]:  Loosely oriented, calm and pleasant. Says has some nausea this morning but resolved with a glass of ginger ale. Denies pain, neck pain, numbness or tingling in arms, SOB, vomiting. Seems a bit confused about her deleon versus hx of straight cathing. Up and ambulating with 4WW and SBA.     CODE STATUS/ADVANCE DIRECTIVES DISCUSSION:   DNR / DNI  Patient's living condition: lives in an assisted living facility-previously at home. Could need memory care in near future.   ALLERGIES: Codeine, Meperidine, Morphine, Penicillins, Sulfa drugs, and Epinephrine  PAST MEDICAL HISTORY:  has a past medical history of Amnestic MCI (mild cognitive impairment with memory loss) (2014), Chronic duodenal ulcer without mention of hemorrhage, perforation, or obstruction (1974), Depressive disorder, not elsewhere classified (2003), EROSIVE EYE DISORDER (1994), Esophageal reflux (2001), Essential and other specified forms of tremor (2004), Generalized osteoarthrosis, unspecified site, Hiatal hernia, History of pulmonary embolism (1971), LACTOSE INTOLERANCE, Lumbago, Mitral valve regurgitation, Occlusion and stenosis of carotid artery without mention of cerebral infarction (2003), Osteoporosis, unspecified (2003), Paroxysmal atrial fibrillation (H) (11/15/2013), Spider veins, Unspecified essential hypertension, and Unspecified tinnitus (2005).    She has no past medical history of Asymptomatic human immunodeficiency virus (HIV) infection status (H), Cerebral palsy (H), Complication of anesthesia, Congenital renal agenesis and dysgenesis, Goiter, Gout, Hernia, abdominal, History of spinal cord injury, History of thrombophlebitis, Horseshoe kidney,  Hydrocephalus (H), Malignant hyperthermia, Mumps, Palpitations, Parkinsons disease (H), PONV (postoperative nausea and vomiting), Spina bifida (H), STD (sexually transmitted disease), Tethered cord (H), or Tuberculosis.  PAST SURGICAL HISTORY:   has a past surgical history that includes NONSPECIFIC PROCEDURE; NONSPECIFIC PROCEDURE; NONSPECIFIC PROCEDURE (1958); NONSPECIFIC PROCEDURE (1971); NONSPECIFIC PROCEDURE (1/03); Esophagoscopy, gastroscopy, duodenoscopy (EGD), combined (7/8/2013); corneal scraping; cataract iol, rt/lt; Cystoscopy, biopsy bladder, combined (N/A, 7/25/2016); Implant stimulator sacral nerve stage one (N/A, 4/4/2017); interstim placement; Implant stimulator sacral nerve stage two (N/A, 4/18/2017); and Cystoscopy.  FAMILY HISTORY: family history includes Alzheimer Disease in her maternal grandmother; Cardiovascular in her sister; Cerebrovascular Disease in her father; Psychotic Disorder in her mother.  SOCIAL HISTORY:   reports that she has never smoked. She has never used smokeless tobacco. She reports that she does not drink alcohol and does not use drugs.    Post Discharge Medication Reconciliation Status: discharge medications reconciled, continue medications without change    Current Outpatient Medications   Medication Sig Dispense Refill     acetaminophen (TYLENOL) 500 MG tablet Take 2 tablets (1,000 mg) by mouth 3 times daily       carboxymethylcellulose (REFRESH LIQUIGEL) 1 % ophthalmic solution Place 1 drop into both eyes every morning As needed       cephALEXin (KEFLEX) 250 MG capsule Take 1 capsule (250 mg) by mouth daily 90 capsule 3     Cholecalciferol (VITAMIN D) 50 MCG (2000 UT) CAPS Take 1 capsule by mouth every evening       fish oil-omega-3 fatty acids 500 MG capsule Take 500 mg by mouth every morning       Lidocaine (LIDOCARE) 4 % Patch Place 1 patch onto the skin every 24 hours To prevent lidocaine toxicity, patient should be patch free for 12 hrs daily. To right shoulder        "lisinopril (ZESTRIL) 20 MG tablet Take 10 mg by mouth daily       metoprolol succinate ER (TOPROL-XL) 100 MG 24 hr tablet Take 1 tablet (100 mg) by mouth daily 90 tablet 3     rosuvastatin (CRESTOR) 5 MG tablet Take 1 tablet (5 mg) by mouth At Bedtime 90 tablet 3     sodium chloride (PETER 128) 5 % ophthalmic ointment Place 2 Application (1 g) into both eyes At Bedtime       vitamin C (ASCORBIC ACID) 500 MG tablet Take 1 tablet (500 mg) by mouth 2 times daily       Warfarin Sodium (COUMADIN PO) Per INR orders       loperamide (IMODIUM) 2 MG capsule Take 1 capsule (2 mg) by mouth 4 times daily as needed for diarrhea       polyethylene glycol (MIRALAX) 17 GM/Dose powder Take 17 g by mouth daily       SENNA-docusate sodium (SENNA S) 8.6-50 MG tablet Take 1 tablet by mouth 2 times daily as needed (constipation)       ROS:  Limited secondary to cognitive impairment but today pt reports ok    Vitals:  /82   Pulse 88   Temp 97.6  F (36.4  C)   Resp 18   Ht 1.676 m (5' 6\")   Wt 63.6 kg (140 lb 3.2 oz)   SpO2 97%   BMI 22.63 kg/m    Exam:  GENERAL APPEARANCE:  Alert, in no distress  ENT:  Mouth and posterior oropharynx normal, moist mucous membranes  EYES:  EOM, conjunctivae, lids, pupils and irises normal, wears glasses  NECK:  No adenopathy,masses or thyromegaly, c-collar off per neurology   RESP:  lungs clear to auscultation   CV:  regular rate and rhythm, no murmur, rub, or gallop, trace edema  ABDOMEN:  no guarding or rebound  :    deleon catheter in place with leg bag  M/S:   generalized weakness   SKIN:  intact to visualized areas  NEURO:   Cranial nerves 2-12 are normal tested and grossly at patient's baseline  PSYCH:  insight and judgement impaired, memory impaired SLUMS 9/30 and CPT 3.8/5.6    Lab/Diagnostic data:  CBC RESULTS: Recent Labs   Lab Test 01/10/22  0542 12/27/21  0523   WBC 4.8 8.6   RBC 3.56* 3.14*   HGB 11.4* 10.1*   HCT 35.6 31.5*    100   MCH 32.0 32.2   MCHC 32.0 32.1   RDW 12.9 " "13.2    103*       Last Basic Metabolic Panel:  Recent Labs   Lab Test 01/10/22  0542 01/03/22  0552    141   POTASSIUM 4.4 3.9   CHLORIDE 107 103   CLARISSE 10.0 10.4   CO2 23 26   BUN 29* 22   CR 1.10 1.20*   GLC 95 104       Liver Function Studies -   Recent Labs   Lab Test 12/26/21  0236 08/30/21  0753   PROTTOTAL 5.8* 6.5*   ALBUMIN 2.6* 3.2*   BILITOTAL 0.8 0.4   ALKPHOS 57 71   AST 27 49*   ALT 20 28       TSH   Date Value Ref Range Status   02/26/2018 1.82 0.40 - 4.00 mU/L Final   10/05/2017 2.44 0.40 - 4.00 mU/L Final     No results found for: A1C    ASSESSMENT/PLAN:  (S12.191G) Other closed nondisplaced fracture of second cervical vertebra with delayed healing, subsequent encounter  (primary encounter diagnosis)  Comment: stable- neurology now prn. Collar off  Plan: below per neurology   ~Okay to taper/wean out of cervical collar. (Reviewed with patient and daughter)   ~We reviewed her activity going forward and gradual lifting of restrictions.  ~Increase lifting, as tolerated, \"low and slow\"   ~Patient told to clinic if develops new neck pain, that is constant and not relieved by Tylenol/Ibuprofen or rest, or develops new radiating pain, numbness/tingling, or weakness in arm(s)   ~Unless the patient develops new pain or symptoms, no further imaging is required, and at this time, can be discharged from the Neurosurgery Clinic, and can return on an as-needed basis  -vitamin D daily     (M25.511,  G89.29) Chronic right shoulder pain  Comment: stable   Plan:  -lidocaine patches daily   -tylenol TID    (K55.9) Ischemic colitis (H)  (R19.5) Loose stools  Comment: Hx of prior GI bleed. Alternating constipation/diarrhea  Plan:   -stable for now. Monitor for s/s  -prn loperamide   -daily Miralax (consider reducing to 3x weekly if loose stools continue)  -Senna prn     (Z87.440) History of recurrent UTIs  (R33.9) Urinary retention  Comment: previously was straight cathing 6x daily in community. Unclear if " she could resume this safely. Staff could assist? Current deleon cath due for exchange if continuing. Follows with Dr. Maria in clinic  Plan:   -on cephalexin 250 mg po daily for UTI prophylaxis per urology    -will discuss with family as she is unable to participate in plan of care today  -vitamin C    (G31.84) Mild cognitive impairment  Comment: Low cognitive scores   Plan:   -AL placement   -OT to eval and treat    (I48.0) Paroxysmal atrial fibrillation (H)  (I10) Hypertension goal BP (blood pressure) < 150/90  (E78.5) Hyperlipidemia LDL goal <130      Comment: SAI-VaSc score of 5. Cardiology f/u 2/22 Cardiac event monitor demonstrated paroxysmal atrial fibrillation, no significant bradycardic episodes.    Plan:   -Lisinopril reduced in TCU with lower SBP to 10 mg po daily   -Toprol- mg po daily   -BMP periodically   -BP/HR 3 times weekly various times x 3 weeks for medication adjustment   -Coumadin for INR goal range of 2-3. INR have been stable. Current dose is 2 mg po MWF and 1.5 mg po AOD.   -fish oil/statin     (N18.30) Stage 3 chronic kidney disease, unspecified whether stage 3a or 3b CKD (H)  Comment: baseline creatinine 1.1-1.3  Plan:   -Renal dose medications and avoid nephrotoxins  -BMP/CBC periodically       Electronically signed by:  RAY Lopez CNP                         Sincerely,        RAY Lopez CNP

## 2022-02-10 NOTE — LETTER
Orders for Maia Miranda    1. -BP/HR 3 times weekly various times x 3 weeks for medication adjustment   2. Unclear when last INR check and Raffaele says due for check 2/7/22. Could we get an INR 2/11/22.  3. Are we monitoring deleon output and does she have a night bag? If continuing catheter she is due for exchange. I will review with family.       RAY Lopez CNP on 2/10/2022 at 7:25 PM

## 2022-02-14 NOTE — PROGRESS NOTES
Clinic Care Coordination Contact  Chart Review    [facility] reviewed chart. Pt established care with [facility] at [facility].     No further outreaches will be made at this time unless a new referral is made or a change in the pt's status occurs.     Zafar Connor [facility]  Clinic Care Coordinator  [facility]  [facility]  [phone]  [email or web address]

## 2022-02-17 ENCOUNTER — TELEPHONE (OUTPATIENT)
Dept: GERIATRICS | Facility: CLINIC | Age: 87
End: 2022-02-17

## 2022-02-17 ENCOUNTER — ASSISTED LIVING VISIT (OUTPATIENT)
Dept: GERIATRICS | Facility: CLINIC | Age: 87
End: 2022-02-17
Payer: COMMERCIAL

## 2022-02-17 VITALS
HEIGHT: 66 IN | WEIGHT: 140.2 LBS | BODY MASS INDEX: 22.53 KG/M2 | HEART RATE: 95 BPM | TEMPERATURE: 97.1 F | DIASTOLIC BLOOD PRESSURE: 55 MMHG | SYSTOLIC BLOOD PRESSURE: 119 MMHG | OXYGEN SATURATION: 93 %

## 2022-02-17 DIAGNOSIS — I48.0 PAROXYSMAL ATRIAL FIBRILLATION (H): Primary | ICD-10-CM

## 2022-02-17 DIAGNOSIS — Z51.81 ENCOUNTER FOR MONITORING COUMADIN THERAPY: ICD-10-CM

## 2022-02-17 DIAGNOSIS — Z79.01 ENCOUNTER FOR MONITORING COUMADIN THERAPY: ICD-10-CM

## 2022-02-17 NOTE — PROGRESS NOTES
"[place] [facility]  [place] Medical Record Number:  [id number]  Place of Service where encounter took place: [facility] [[id number]]    HPI:    Maia Miranda is a 89 year old  (10/26/1932), who is being seen today for an episodic care visit at the above location.   HPI information obtained from: facility chart records. Today's concern is INR/Coumadin management for DONNA. Fib    ROS/Subjective:  Bleeding Signs/Symptoms:  None  Thromboembolic Signs/Symptoms:  None  Medication Changes:  No  Dietary Changes:  No  Activity Changes: No  Bacterial/Viral Infection:  No  Missed Coumadin Doses:  None  On ASA: No  Other Concerns:  No    OBJECTIVE:  /55   Pulse 95   Temp 97.1  F (36.2  C)   Ht 1.676 m (5' 6\")   Wt 63.6 kg (140 lb 3.2 oz)   SpO2 93%   BMI 22.63 kg/m    Last INR: 2.8 on 2/7/22  INR Today:  3.0  Current Dose: Current dose is 2 mg po MWF and 1.5 mg po AOD.       ASSESSMENT:  (I48.0) Paroxysmal atrial fibrillation (H)  (primary encounter diagnosis)  (Z51.81,  Z79.01) Encounter for monitoring Coumadin therapy    Therapeutic INR for goal of 2-3    PLAN:   New Dose: Coumadin 1.5 mg po daily  Next INR: 2/24/22      Electronically signed by:  RAY Lopez CNP       "

## 2022-02-17 NOTE — LETTER
"    2/17/2022        RE: Maia Miranda  [facility]  [address]   [place] [address]        [place] [facility]  [place] Medical Record Number:  [id number]  Place of Service where encounter took place: [facility] [[id number]]    HPI:    Maia Miranda is a 89 year old  (10/26/1932), who is being seen today for an episodic care visit at the above location.   HPI information obtained from: facility chart records. Today's concern is INR/Coumadin management for A. Fib    ROS/Subjective:  Bleeding Signs/Symptoms:  None  Thromboembolic Signs/Symptoms:  None  Medication Changes:  No  Dietary Changes:  No  Activity Changes: No  Bacterial/Viral Infection:  No  Missed Coumadin Doses:  None  On ASA: No  Other Concerns:  No    OBJECTIVE:  /55   Pulse 95   Temp 97.1  F (36.2  C)   Ht 1.676 m (5' 6\")   Wt 63.6 kg (140 lb 3.2 oz)   SpO2 93%   BMI 22.63 kg/m    Last INR: 2.8 on 2/7/22  INR Today:  3.0  Current Dose: Current dose is 2 mg po MWF and 1.5 mg po AOD.       ASSESSMENT:  (I48.0) Paroxysmal atrial fibrillation (H)  (primary encounter diagnosis)  (Z51.81,  Z79.01) Encounter for monitoring Coumadin therapy    Therapeutic INR for goal of 2-3    PLAN:   New Dose: Coumadin 1.5 mg po daily  Next INR: 2/24/22      Electronically signed by:  RAY Lopez CNP             Sincerely,        RAY Lopez CNP    "

## 2022-02-17 NOTE — TELEPHONE ENCOUNTER
[facility] called to request home care orders     Noted PCP is an [facility] provider at [facility]     Notified home care of this     Gina MEIER Triage RN  [facility]

## 2022-02-18 ENCOUNTER — DOCUMENTATION ONLY (OUTPATIENT)
Dept: OTHER | Facility: CLINIC | Age: 87
End: 2022-02-18
Payer: COMMERCIAL

## 2022-02-18 PROBLEM — Z79.01 ENCOUNTER FOR MONITORING COUMADIN THERAPY: Status: ACTIVE | Noted: 2022-02-18

## 2022-02-18 PROBLEM — Z51.81 ENCOUNTER FOR MONITORING COUMADIN THERAPY: Status: ACTIVE | Noted: 2022-02-18

## 2022-02-21 ENCOUNTER — DOCUMENTATION ONLY (OUTPATIENT)
Dept: ANTICOAGULATION | Facility: CLINIC | Age: 87
End: 2022-02-21
Payer: COMMERCIAL

## 2022-02-21 ENCOUNTER — TELEPHONE (OUTPATIENT)
Dept: GERIATRICS | Facility: CLINIC | Age: 87
End: 2022-02-21
Payer: COMMERCIAL

## 2022-02-21 NOTE — TELEPHONE ENCOUNTER
[facility] Triage Nurse INR     Provider: RAY Cordon CNP  Facility: [facility]  Facility Type:  AL    Caller: Sherron  Call Back Number: [phone]  Reason for call: INR  Diagnosis/Goal: A. Fib    Todays INR: 1.9  Last INR 1.7 (2/20), give Coumadin 3 mg po tonight.     Heparin/Lovenox:  No  Currently on ABX?: Yes; please explain: prophylactic Keflex  Other interacting medication:  None  Missed or refused doses: Yes; please explain: Missed doses on Thursday and Friday    Verbal Order/Direction given by Provider: Coumadin 1.5 mg po daily and recheck INR on Thursday (2/24).    Provider Giving Order:  Thania Vasquez MD    Verbal Order given to: Sherron Ball RN

## 2022-02-21 NOTE — PROGRESS NOTES
ANTICOAGULATION  MANAGEMENT    Maia Miranda is being discharged from the [facility]ticoagulation Management [facility] ([facility]).    Reason for discharge: care has been transferred to [facility] from TCU    Anticoagulation episode resolved, ACC referral closed and INR Standing order discontinued    If patient needs warfarin management in the future, please send a new referral    Sahara Matos RN

## 2022-02-23 VITALS
OXYGEN SATURATION: 96 % | TEMPERATURE: 98.2 F | SYSTOLIC BLOOD PRESSURE: 126 MMHG | WEIGHT: 140.2 LBS | DIASTOLIC BLOOD PRESSURE: 46 MMHG | BODY MASS INDEX: 22.53 KG/M2 | HEART RATE: 66 BPM | HEIGHT: 66 IN

## 2022-02-23 NOTE — PROGRESS NOTES
[place] [facility]  [place] Medical Record Number:  [id number]  Place of Service where encounter took place:  [facility] [[id number]]  Chief Complaint   Patient presents with     RECHECK       HPI:    Maia Miranda  is a 89 year old (10/26/1932), who is being seen today for an episodic care visit.  HPI information obtained from: facility staff.     Today's concern is:  Seen today for dosing of coumadin. Varying outputs of deleon, nausea. Says she continues to have intermittent nausea but no vomiting or diarrhea. Denies any other acute concerns. She is not an accurate historian and scoring in dementia range. VSS including weight.     Past Medical and Surgical History reviewed in Epic today.    MEDICATIONS:    Current Outpatient Medications   Medication Sig Dispense Refill     ondansetron (ZOFRAN-ODT) 4 MG ODT tab Take 1 tablet (4 mg) by mouth every 6 hours as needed for nausea 30 tablet 11     acetaminophen (TYLENOL) 500 MG tablet Take 2 tablets (1,000 mg) by mouth 3 times daily       carboxymethylcellulose (REFRESH LIQUIGEL) 1 % ophthalmic solution Place 1 drop into both eyes every morning As needed       cephALEXin (KEFLEX) 250 MG capsule Take 1 capsule (250 mg) by mouth daily 90 capsule 3     Cholecalciferol (VITAMIN D) 50 MCG (2000 UT) CAPS Take 1 capsule by mouth every evening       fish oil-omega-3 fatty acids 500 MG capsule Take 500 mg by mouth every morning       Lidocaine (LIDOCARE) 4 % Patch Place 1 patch onto the skin every 24 hours To prevent lidocaine toxicity, patient should be patch free for 12 hrs daily. To right shoulder       lisinopril (ZESTRIL) 20 MG tablet Take 10 mg by mouth daily       loperamide (IMODIUM) 2 MG capsule Take 1 capsule (2 mg) by mouth 4 times daily as needed for diarrhea       metoprolol succinate ER (TOPROL-XL) 100 MG 24 hr tablet Take 1 tablet (100 mg) by mouth daily 90 tablet 3     polyethylene glycol (MIRALAX) 17 GM/Dose powder Take 17 g  "by mouth daily       rosuvastatin (CRESTOR) 5 MG tablet Take 1 tablet (5 mg) by mouth At Bedtime 90 tablet 3     SENNA-docusate sodium (SENNA S) 8.6-50 MG tablet Take 1 tablet by mouth 2 times daily as needed (constipation)       sodium chloride (PETER 128) 5 % ophthalmic ointment Place 2 Application (1 g) into both eyes At Bedtime       vitamin C (ASCORBIC ACID) 500 MG tablet Take 1 tablet (500 mg) by mouth 2 times daily       Warfarin Sodium (COUMADIN PO) Per INR orders       REVIEW OF SYSTEMS:  Limited secondary to aphasia impairment but today pt reports ok    Objective:  /46   Pulse 66   Temp 98.2  F (36.8  C)   Ht 1.676 m (5' 6\")   Wt 63.6 kg (140 lb 3.2 oz)   SpO2 96%   BMI 22.63 kg/m    Exam:  GENERAL APPEARANCE:  Alert, in no distress  ENT:  Mouth and posterior oropharynx normal, moist mucous membranes  EYES:  EOM, conjunctivae, lids, pupils and irises normal, wears glasses  RESP:  lungs clear to auscultation   CV:  regular rate and irregular rhythm, no murmur, rub, or gallop, trace edema  ABDOMEN:  no guarding or rebound  :    deleon catheter in place with leg bag, minimal output in bag of straw color urine   M/S:   generalized weakness   SKIN:  intact to visualized areas  NEURO:   Cranial nerves 2-12 are normal tested and grossly at patient's baseline  PSYCH:  insight and judgement impaired, memory impaired SLUMS 9/30 and CPT 3.8/5.6    Labs:   CBC RESULTS: Recent Labs   Lab Test 01/10/22  0542 12/27/21  0523   WBC 4.8 8.6   RBC 3.56* 3.14*   HGB 11.4* 10.1*   HCT 35.6 31.5*    100   MCH 32.0 32.2   MCHC 32.0 32.1   RDW 12.9 13.2    103*       Last Basic Metabolic Panel:  Recent Labs   Lab Test 01/10/22  0542 01/03/22  0552    141   POTASSIUM 4.4 3.9   CHLORIDE 107 103   CLARISSE 10.0 10.4   CO2 23 26   BUN 29* 22   CR 1.10 1.20*   GLC 95 104       Liver Function Studies -   Recent Labs   Lab Test 12/26/21  0236 08/30/21  0753   PROTTOTAL 5.8* 6.5*   ALBUMIN 2.6* 3.2*   BILITOTAL " 0.8 0.4   ALKPHOS 57 71   AST 27 49*   ALT 20 28       TSH   Date Value Ref Range Status   02/26/2018 1.82 0.40 - 4.00 mU/L Final   10/05/2017 2.44 0.40 - 4.00 mU/L Final       No results found for: A1C    ASSESSMENT/PLAN:  (R11.0) Nausea  (primary encounter diagnosis)  Comment: unclear etiology- recent gastroenteritis/norovirus at facility   Plan:   -ondansetron (ZOFRAN-ODT) 4 MG ODT tab q6H prn   -give all medications with food          (Z87.440) History of recurrent UTIs  (R33.9) Urinary retention  (Z97.8) Deleon catheter in place  Comment: previously was straight cathing 6x daily in community. Most likely could not resume this safely. Staff could assist but at significant cost and not available 24/7. Also having most output in deleon overnights and low output during the day? Questioning positioning when OOB?  Follows with Dr. Maria (urology)   Plan:   -prn orders to irrigate   -staff monitoring positioning to ensure she is not sitting on tubing causing occlusion  -continue to monitor outputs (listed below)   --on cephalexin 250 mg po daily for UTI prophylaxis per urology    -vitamin C per urology?          (I48.0) Paroxysmal atrial fibrillation (H)  Comment: INR 2.1 (missed 2 doses last week)  Plan:   -continue current dose of coumadin 1.5 mg po daily and recheck INR 3/3/22      Electronically signed by:  RAY Lopez CNP

## 2022-02-24 ENCOUNTER — ASSISTED LIVING VISIT (OUTPATIENT)
Dept: GERIATRICS | Facility: CLINIC | Age: 87
End: 2022-02-24
Payer: COMMERCIAL

## 2022-02-24 DIAGNOSIS — Z97.8 FOLEY CATHETER IN PLACE: ICD-10-CM

## 2022-02-24 DIAGNOSIS — I48.0 PAROXYSMAL ATRIAL FIBRILLATION (H): ICD-10-CM

## 2022-02-24 DIAGNOSIS — R11.0 NAUSEA: Primary | ICD-10-CM

## 2022-02-24 DIAGNOSIS — Z87.440 HISTORY OF RECURRENT UTIS: ICD-10-CM

## 2022-02-24 DIAGNOSIS — R33.9 URINARY RETENTION: ICD-10-CM

## 2022-02-24 RX ORDER — ONDANSETRON 4 MG/1
4 TABLET, ORALLY DISINTEGRATING ORAL EVERY 6 HOURS PRN
Qty: 30 TABLET | Refills: 11 | Status: SHIPPED | OUTPATIENT
Start: 2022-02-24 | End: 2022-09-01

## 2022-02-24 NOTE — LETTER
2/24/2022        RE: Maia Miranda  [facility]  [address] [place] [address]        [place] [facility]  [place] Medical Record Number:  [id number]  Place of Service where encounter took place:  [facility] [[id number]]  Chief Complaint   Patient presents with     RECHECK       HPI:    Maia Miranda  is a 89 year old (10/26/1932), who is being seen today for an episodic care visit.  HPI information obtained from: facility staff.     Today's concern is:  Seen today for dosing of coumadin. Varying outputs of deleon, nausea. Says she continues to have intermittent nausea but no vomiting or diarrhea. Denies any other acute concerns. She is not an accurate historian and scoring in dementia range. VSS including weight.     Past Medical and Surgical History reviewed in Epic today.    MEDICATIONS:    Current Outpatient Medications   Medication Sig Dispense Refill     ondansetron (ZOFRAN-ODT) 4 MG ODT tab Take 1 tablet (4 mg) by mouth every 6 hours as needed for nausea 30 tablet 11     acetaminophen (TYLENOL) 500 MG tablet Take 2 tablets (1,000 mg) by mouth 3 times daily       carboxymethylcellulose (REFRESH LIQUIGEL) 1 % ophthalmic solution Place 1 drop into both eyes every morning As needed       cephALEXin (KEFLEX) 250 MG capsule Take 1 capsule (250 mg) by mouth daily 90 capsule 3     Cholecalciferol (VITAMIN D) 50 MCG (2000 UT) CAPS Take 1 capsule by mouth every evening       fish oil-omega-3 fatty acids 500 MG capsule Take 500 mg by mouth every morning       Lidocaine (LIDOCARE) 4 % Patch Place 1 patch onto the skin every 24 hours To prevent lidocaine toxicity, patient should be patch free for 12 hrs daily. To right shoulder       lisinopril (ZESTRIL) 20 MG tablet Take 10 mg by mouth daily       loperamide (IMODIUM) 2 MG capsule Take 1 capsule (2 mg) by mouth 4 times daily as needed for diarrhea       metoprolol succinate ER (TOPROL-XL) 100 MG 24 hr tablet Take 1  "tablet (100 mg) by mouth daily 90 tablet 3     polyethylene glycol (MIRALAX) 17 GM/Dose powder Take 17 g by mouth daily       rosuvastatin (CRESTOR) 5 MG tablet Take 1 tablet (5 mg) by mouth At Bedtime 90 tablet 3     SENNA-docusate sodium (SENNA S) 8.6-50 MG tablet Take 1 tablet by mouth 2 times daily as needed (constipation)       sodium chloride (PETER 128) 5 % ophthalmic ointment Place 2 Application (1 g) into both eyes At Bedtime       vitamin C (ASCORBIC ACID) 500 MG tablet Take 1 tablet (500 mg) by mouth 2 times daily       Warfarin Sodium (COUMADIN PO) Per INR orders       REVIEW OF SYSTEMS:  Limited secondary to aphasia impairment but today pt reports ok    Objective:  /46   Pulse 66   Temp 98.2  F (36.8  C)   Ht 1.676 m (5' 6\")   Wt 63.6 kg (140 lb 3.2 oz)   SpO2 96%   BMI 22.63 kg/m    Exam:  GENERAL APPEARANCE:  Alert, in no distress  ENT:  Mouth and posterior oropharynx normal, moist mucous membranes  EYES:  EOM, conjunctivae, lids, pupils and irises normal, wears glasses  RESP:  lungs clear to auscultation   CV:  regular rate and irregular rhythm, no murmur, rub, or gallop, trace edema  ABDOMEN:  no guarding or rebound  :    deleon catheter in place with leg bag, minimal output in bag of straw color urine   M/S:   generalized weakness   SKIN:  intact to visualized areas  NEURO:   Cranial nerves 2-12 are normal tested and grossly at patient's baseline  PSYCH:  insight and judgement impaired, memory impaired SLUMS 9/30 and CPT 3.8/5.6    Labs:   CBC RESULTS: Recent Labs   Lab Test 01/10/22  0542 12/27/21  0523   WBC 4.8 8.6   RBC 3.56* 3.14*   HGB 11.4* 10.1*   HCT 35.6 31.5*    100   MCH 32.0 32.2   MCHC 32.0 32.1   RDW 12.9 13.2    103*       Last Basic Metabolic Panel:  Recent Labs   Lab Test 01/10/22  0542 01/03/22  0552    141   POTASSIUM 4.4 3.9   CHLORIDE 107 103   CLARISSE 10.0 10.4   CO2 23 26   BUN 29* 22   CR 1.10 1.20*   GLC 95 104       Liver Function Studies - "   Recent Labs   Lab Test 12/26/21  0236 08/30/21  0753   PROTTOTAL 5.8* 6.5*   ALBUMIN 2.6* 3.2*   BILITOTAL 0.8 0.4   ALKPHOS 57 71   AST 27 49*   ALT 20 28       TSH   Date Value Ref Range Status   02/26/2018 1.82 0.40 - 4.00 mU/L Final   10/05/2017 2.44 0.40 - 4.00 mU/L Final       No results found for: A1C    ASSESSMENT/PLAN:  (R11.0) Nausea  (primary encounter diagnosis)  Comment: unclear etiology- recent gastroenteritis/norovirus at facility   Plan:   -ondansetron (ZOFRAN-ODT) 4 MG ODT tab q6H prn   -give all medications with food          (Z87.440) History of recurrent UTIs  (R33.9) Urinary retention  (Z97.8) Deleon catheter in place  Comment: previously was straight cathing 6x daily in community. Most likely could not resume this safely. Staff could assist but at significant cost and not available 24/7. Also having most output in deleon overnights and low output during the day? Questioning positioning when OOB?  Follows with Dr. Maria (urology)   Plan:   -prn orders to irrigate   -staff monitoring positioning to ensure she is not sitting on tubing causing occlusion  -continue to monitor outputs (listed below)   --on cephalexin 250 mg po daily for UTI prophylaxis per urology    -vitamin C per urology?          (I48.0) Paroxysmal atrial fibrillation (H)  Comment: INR 2.1 (missed 2 doses last week)  Plan:   -continue current dose of coumadin 1.5 mg po daily and recheck INR 3/3/22      Electronically signed by:  RAY Lopez CNP                 Sincerely,        RAY Lopez CNP

## 2022-02-24 NOTE — LETTER
New orders for Maia Miranda    1.   ondansetron (ZOFRAN-ODT) 4 MG ODT tab Take 1 tablet (4 mg) by mouth every 6 hours as needed for nausea     2. All medications with food to prevent nausea             Electronically signed by:  RAY Lopez CNP

## 2022-03-01 ENCOUNTER — TELEPHONE (OUTPATIENT)
Dept: GERIATRICS | Facility: CLINIC | Age: 87
End: 2022-03-01
Payer: COMMERCIAL

## 2022-03-01 ENCOUNTER — PATIENT OUTREACH (OUTPATIENT)
Dept: GERIATRIC MEDICINE | Facility: CLINIC | Age: 87
End: 2022-03-01

## 2022-03-01 RX ORDER — METOPROLOL TARTRATE 25 MG/1
25 TABLET, FILM COATED ORAL 2 TIMES DAILY
COMMUNITY
Start: 2022-03-01 | End: 2022-03-15

## 2022-03-01 NOTE — TELEPHONE ENCOUNTER
[facility] Triage Nurse Telephone Encounter    Provider: RAY Cordon CNP  Facility: [facility] Facility Type:  AL    Caller: Lorri  Call Back Number: [phone] or [phone](nurse cell)    Allergies:    Allergies   Allergen Reactions     Codeine Nausea and Vomiting     Meperidine Nausea and Vomiting     Morphine Nausea and Vomiting     Penicillins Hives     Sulfa Drugs      Other reaction(s): GI Intolerance, Vomiting     Epinephrine Palpitations        Reason for call:     Writer spoke with the nurse who stated that patient receives Lisinopril 10mg daily(no parameters to hold) and Metoprolol ER 100mg daily(no parameters to hold).  Recent BP's:  110/60, 110/65, 128/44, 104/55.  Recent HR's:  70's-80's.        Verbal Order/Direction given by Provider: Discontinue Metoprolol ER.  Start Metoprolol tartrate 25mg BID(hold for SBP <90 or HR <60).      Provider giving Order:  Thania Vasquez MD    Verbal Order given to: Lorri Cummings RN

## 2022-03-03 ENCOUNTER — ASSISTED LIVING VISIT (OUTPATIENT)
Dept: GERIATRICS | Facility: CLINIC | Age: 87
End: 2022-03-03
Payer: COMMERCIAL

## 2022-03-03 VITALS
RESPIRATION RATE: 16 BRPM | OXYGEN SATURATION: 98 % | SYSTOLIC BLOOD PRESSURE: 130 MMHG | BODY MASS INDEX: 22.53 KG/M2 | HEART RATE: 71 BPM | HEIGHT: 66 IN | DIASTOLIC BLOOD PRESSURE: 70 MMHG | WEIGHT: 140.2 LBS | TEMPERATURE: 97 F

## 2022-03-03 DIAGNOSIS — I48.0 PAROXYSMAL ATRIAL FIBRILLATION (H): ICD-10-CM

## 2022-03-03 DIAGNOSIS — Z51.81 ENCOUNTER FOR MONITORING COUMADIN THERAPY: ICD-10-CM

## 2022-03-03 DIAGNOSIS — Z79.01 ENCOUNTER FOR MONITORING COUMADIN THERAPY: ICD-10-CM

## 2022-03-03 DIAGNOSIS — I95.9 HYPOTENSION, UNSPECIFIED HYPOTENSION TYPE: ICD-10-CM

## 2022-03-03 DIAGNOSIS — Z97.8 FOLEY CATHETER IN PLACE: Primary | ICD-10-CM

## 2022-03-03 DIAGNOSIS — Z87.440 HISTORY OF RECURRENT UTIS: ICD-10-CM

## 2022-03-03 NOTE — PROGRESS NOTES
[place] [facility]  [place] Medical Record Number:  [id number]  Place of Service where encounter took place:  [facility] [[id number]]  Chief Complaint   Patient presents with     RECHECK     INR RESULTS     Anticoagulation       The health plan new enrollment has happened. I have reviewed the  MDS, the preventative needs,  and facility care plan. The level of care is appropriate. I have reviewed the code status/advanced directives.       HPI:    Maia Miranda  is a 89 year old (10/26/1932), who is being seen today for an episodic care visit.  HPI information obtained from: facility chart records.     Today's concern is:  Seen today for coumadin dosing, chronic deleon (prior was straight cathing 6x daily), BP monitoring.  She is calm and pleasant. Denies any acute concerns-no nausea. Is happy that her daughter will bring over crackers to take with her medications. Staff monitoring BP/HR. Denies dizziness today. VSS.     Past Medical and Surgical History reviewed in Epic today.    MEDICATIONS:    Current Outpatient Medications   Medication Sig Dispense Refill     acetaminophen (TYLENOL) 500 MG tablet Take 2 tablets (1,000 mg) by mouth 3 times daily       carboxymethylcellulose (REFRESH LIQUIGEL) 1 % ophthalmic solution Place 1 drop into both eyes every morning As needed       cephALEXin (KEFLEX) 250 MG capsule Take 1 capsule (250 mg) by mouth daily 90 capsule 3     Cholecalciferol (VITAMIN D) 50 MCG (2000 UT) CAPS Take 1 capsule by mouth every evening       fish oil-omega-3 fatty acids 500 MG capsule Take 500 mg by mouth every morning       Lidocaine (LIDOCARE) 4 % Patch Place 1 patch onto the skin every 24 hours To prevent lidocaine toxicity, patient should be patch free for 12 hrs daily. To right shoulder       lisinopril (ZESTRIL) 20 MG tablet Take 10 mg by mouth daily       loperamide (IMODIUM) 2 MG capsule Take 1 capsule (2 mg) by mouth 4 times daily as needed for diarrhea    "    metoprolol tartrate (LOPRESSOR) 25 MG tablet Take 25 mg by mouth 2 times daily (hold for SBP <90 or HR <60)       ondansetron (ZOFRAN-ODT) 4 MG ODT tab Take 1 tablet (4 mg) by mouth every 6 hours as needed for nausea 30 tablet 11     polyethylene glycol (MIRALAX) 17 GM/Dose powder Take 17 g by mouth daily       rosuvastatin (CRESTOR) 5 MG tablet Take 1 tablet (5 mg) by mouth At Bedtime 90 tablet 3     SENNA-docusate sodium (SENNA S) 8.6-50 MG tablet Take 1 tablet by mouth 2 times daily as needed (constipation)       sodium chloride (PETER 128) 5 % ophthalmic ointment Place 2 Application (1 g) into both eyes At Bedtime       vitamin C (ASCORBIC ACID) 500 MG tablet Take 1 tablet (500 mg) by mouth 2 times daily       Warfarin Sodium (COUMADIN PO) Per INR orders       REVIEW OF SYSTEMS:  Limited secondary to cognitive impairment but today pt reports ok    Objective:  /70   Pulse 71   Temp 97  F (36.1  C)   Resp 16   Ht 1.676 m (5' 6\")   Wt 63.6 kg (140 lb 3.2 oz)   SpO2 98%   BMI 22.63 kg/m    Exam:  GENERAL APPEARANCE:  Alert, in no distress  ENT:  Mouth and posterior oropharynx normal, moist mucous membranes  EYES:  EOM, conjunctivae, lids, pupils and irises normal, wears glasses  RESP:  lungs clear to auscultation   CV:  regular rate and irregular rhythm, no murmur, rub, or gallop, trace edema-no compression   ABDOMEN:  no guarding or rebound  :    deleon catheter in place with leg bag, minimal no output in leg bag  M/S:   generalized weakness- slow with sit to stand, 2WW   SKIN:  intact to visualized areas  NEURO:   Cranial nerves 2-12 are normal tested and grossly at patient's baseline  PSYCH:  insight and judgement impaired, memory impaired SLUMS 9/30 and CPT 3.8/5.6    Labs:   CBC RESULTS: Recent Labs   Lab Test 01/10/22  0542 12/27/21  0523   WBC 4.8 8.6   RBC 3.56* 3.14*   HGB 11.4* 10.1*   HCT 35.6 31.5*    100   MCH 32.0 32.2   MCHC 32.0 32.1   RDW 12.9 13.2    103*       Last " Basic Metabolic Panel:  Recent Labs   Lab Test 01/10/22  0542 01/03/22  0552    141   POTASSIUM 4.4 3.9   CHLORIDE 107 103   CLARISSE 10.0 10.4   CO2 23 26   BUN 29* 22   CR 1.10 1.20*   GLC 95 104       Liver Function Studies -   Recent Labs   Lab Test 12/26/21  0236 08/30/21  0753   PROTTOTAL 5.8* 6.5*   ALBUMIN 2.6* 3.2*   BILITOTAL 0.8 0.4   ALKPHOS 57 71   AST 27 49*   ALT 20 28       TSH   Date Value Ref Range Status   02/26/2018 1.82 0.40 - 4.00 mU/L Final   10/05/2017 2.44 0.40 - 4.00 mU/L Final       No results found for: A1C    ASSESSMENT/PLAN:  (Z97.8) Deleon catheter in place  (primary encounter diagnosis)  (Z87.440) History of recurrent UTIs  Comment: previously was straight cathing 6x daily in community. Most likely could not resume this safely. Staff could assist but at significant cost and not available 24/7. Also having most output in deleon overnights and low output during the day? Questioning positioning when OOB?  Follows with Dr. Maria (urology)   Plan:   -family will follow up with Dr. Maria   -catheter changed monthly from homecare  -prn orders to irrigate   -staff monitoring positioning to ensure she is not sitting on tubing causing occlusion  -continue to monitor outputs (listed below)   --on cephalexin 250 mg po daily for UTI prophylaxis per urology    -vitamin C per urology?     (I48.0) Paroxysmal atrial fibrillation (H)  (Z51.81,  Z79.01) Encounter for monitoring Coumadin therapy  Comment: INR 2.1 on 2/24 and today is 2.0  Plan:   -continue coumadin 1.5 mg po daily and recheck INR in 1 week prior dose was Current dose is 2 mg po MWF and 1.5 mg po AOD.     (I95.9) Hypotension, unspecified hypotension type  Comment: Metoprolol ER discontinued 3/1/22.  Metoprolol tartrate 25mg BID(hold for SBP <90 or HR <60) previously. Will monitor for 14 days for dose adjustments   Plan:   -lisinopril 10 mg po daily  -metoprolol tartrate 25 mg po BID   -facility to monitor BP/HR, monthly weights  -BMP  periodically       Electronically signed by:  RAY Lopez CNP

## 2022-03-03 NOTE — LETTER
New Orders for Maia Miranda    1. continue coumadin 1.5 mg po daily and recheck INR in 1 week  2. HR/BP monitoring BID with metoprolol x 14 days for medication adjustment         Electronically signed by:  RAY Lopez CNP

## 2022-03-03 NOTE — LETTER
3/3/2022        RE: Maia Miranda  [facility]  [address] [place] [address]        [place] [facility]  [place] Medical Record Number:  [id number]  Place of Service where encounter took place:  [facility] [[id number]]  Chief Complaint   Patient presents with     RECHECK     INR RESULTS     Anticoagulation       HPI:    Maia Miranda  is a 89 year old (10/26/1932), who is being seen today for an episodic care visit.  HPI information obtained from: facility chart records.     Today's concern is:  Seen today for coumadin dosing, chronic deleon (prior was straight cathing 6x daily), BP monitoring.  She is calm and pleasant. Denies any acute concerns-no nausea. Is happy that her daughter will bring over crackers to take with her medications. Staff monitoring BP/HR. Denies dizziness today. VSS.     Past Medical and Surgical History reviewed in Epic today.    MEDICATIONS:    Current Outpatient Medications   Medication Sig Dispense Refill     acetaminophen (TYLENOL) 500 MG tablet Take 2 tablets (1,000 mg) by mouth 3 times daily       carboxymethylcellulose (REFRESH LIQUIGEL) 1 % ophthalmic solution Place 1 drop into both eyes every morning As needed       cephALEXin (KEFLEX) 250 MG capsule Take 1 capsule (250 mg) by mouth daily 90 capsule 3     Cholecalciferol (VITAMIN D) 50 MCG (2000 UT) CAPS Take 1 capsule by mouth every evening       fish oil-omega-3 fatty acids 500 MG capsule Take 500 mg by mouth every morning       Lidocaine (LIDOCARE) 4 % Patch Place 1 patch onto the skin every 24 hours To prevent lidocaine toxicity, patient should be patch free for 12 hrs daily. To right shoulder       lisinopril (ZESTRIL) 20 MG tablet Take 10 mg by mouth daily       loperamide (IMODIUM) 2 MG capsule Take 1 capsule (2 mg) by mouth 4 times daily as needed for diarrhea       metoprolol tartrate (LOPRESSOR) 25 MG tablet Take 25 mg by mouth 2 times daily (hold for SBP <90 or HR  "<60)       ondansetron (ZOFRAN-ODT) 4 MG ODT tab Take 1 tablet (4 mg) by mouth every 6 hours as needed for nausea 30 tablet 11     polyethylene glycol (MIRALAX) 17 GM/Dose powder Take 17 g by mouth daily       rosuvastatin (CRESTOR) 5 MG tablet Take 1 tablet (5 mg) by mouth At Bedtime 90 tablet 3     SENNA-docusate sodium (SENNA S) 8.6-50 MG tablet Take 1 tablet by mouth 2 times daily as needed (constipation)       sodium chloride (PETER 128) 5 % ophthalmic ointment Place 2 Application (1 g) into both eyes At Bedtime       vitamin C (ASCORBIC ACID) 500 MG tablet Take 1 tablet (500 mg) by mouth 2 times daily       Warfarin Sodium (COUMADIN PO) Per INR orders       REVIEW OF SYSTEMS:  Limited secondary to cognitive impairment but today pt reports ok    Objective:  /70   Pulse 71   Temp 97  F (36.1  C)   Resp 16   Ht 1.676 m (5' 6\")   Wt 63.6 kg (140 lb 3.2 oz)   SpO2 98%   BMI 22.63 kg/m    Exam:  GENERAL APPEARANCE:  Alert, in no distress  ENT:  Mouth and posterior oropharynx normal, moist mucous membranes  EYES:  EOM, conjunctivae, lids, pupils and irises normal, wears glasses  RESP:  lungs clear to auscultation   CV:  regular rate and irregular rhythm, no murmur, rub, or gallop, trace edema-no compression   ABDOMEN:  no guarding or rebound  :    deleon catheter in place with leg bag, minimal no output in leg bag  M/S:   generalized weakness- slow with sit to stand, 2WW   SKIN:  intact to visualized areas  NEURO:   Cranial nerves 2-12 are normal tested and grossly at patient's baseline  PSYCH:  insight and judgement impaired, memory impaired SLUMS 9/30 and CPT 3.8/5.6    Labs:   CBC RESULTS: Recent Labs   Lab Test 01/10/22  0542 12/27/21  0523   WBC 4.8 8.6   RBC 3.56* 3.14*   HGB 11.4* 10.1*   HCT 35.6 31.5*    100   MCH 32.0 32.2   MCHC 32.0 32.1   RDW 12.9 13.2    103*       Last Basic Metabolic Panel:  Recent Labs   Lab Test 01/10/22  0542 01/03/22  0552    141   POTASSIUM 4.4 " 3.9   CHLORIDE 107 103   CLARISSE 10.0 10.4   CO2 23 26   BUN 29* 22   CR 1.10 1.20*   GLC 95 104       Liver Function Studies -   Recent Labs   Lab Test 12/26/21  0236 08/30/21  0753   PROTTOTAL 5.8* 6.5*   ALBUMIN 2.6* 3.2*   BILITOTAL 0.8 0.4   ALKPHOS 57 71   AST 27 49*   ALT 20 28       TSH   Date Value Ref Range Status   02/26/2018 1.82 0.40 - 4.00 mU/L Final   10/05/2017 2.44 0.40 - 4.00 mU/L Final       No results found for: A1C    ASSESSMENT/PLAN:  (Z97.8) Deleon catheter in place  (primary encounter diagnosis)  (Z87.440) History of recurrent UTIs  Comment: previously was straight cathing 6x daily in community. Most likely could not resume this safely. Staff could assist but at significant cost and not available 24/7. Also having most output in deleon overnights and low output during the day? Questioning positioning when OOB?  Follows with Dr. Maria (urology)   Plan:   -family will follow up with Dr. Maria   -catheter changed monthly from homecare  -prn orders to irrigate   -staff monitoring positioning to ensure she is not sitting on tubing causing occlusion  -continue to monitor outputs (listed below)   --on cephalexin 250 mg po daily for UTI prophylaxis per urology    -vitamin C per urology?     (I48.0) Paroxysmal atrial fibrillation (H)  (Z51.81,  Z79.01) Encounter for monitoring Coumadin therapy  Comment: INR 2.1 on 2/24 and today is 2.0  Plan:   -continue coumadin 1.5 mg po daily and recheck INR in 1 week prior dose was Current dose is 2 mg po MWF and 1.5 mg po AOD.     (I95.9) Hypotension, unspecified hypotension type  Comment: Metoprolol ER discontinued 3/1/22.  Metoprolol tartrate 25mg BID(hold for SBP <90 or HR <60) previously. Will monitor for 14 days for dose adjustments   Plan:   -lisinopril 10 mg po daily  -metoprolol tartrate 25 mg po BID   -facility to monitor BP/HR, monthly weights  -BMP periodically       Electronically signed by:  RAY Lopez CNP                 Sincerely,        Remy MARIEE  RAY Hernandez CNP

## 2022-03-10 ENCOUNTER — ASSISTED LIVING VISIT (OUTPATIENT)
Dept: GERIATRICS | Facility: CLINIC | Age: 87
End: 2022-03-10
Payer: COMMERCIAL

## 2022-03-10 VITALS
RESPIRATION RATE: 16 BRPM | HEIGHT: 66 IN | DIASTOLIC BLOOD PRESSURE: 52 MMHG | TEMPERATURE: 98.3 F | WEIGHT: 142.8 LBS | SYSTOLIC BLOOD PRESSURE: 126 MMHG | BODY MASS INDEX: 22.95 KG/M2 | HEART RATE: 57 BPM | OXYGEN SATURATION: 96 %

## 2022-03-10 DIAGNOSIS — Z79.01 ENCOUNTER FOR MONITORING COUMADIN THERAPY: ICD-10-CM

## 2022-03-10 DIAGNOSIS — Z51.81 ENCOUNTER FOR MONITORING COUMADIN THERAPY: ICD-10-CM

## 2022-03-10 DIAGNOSIS — I48.0 PAROXYSMAL ATRIAL FIBRILLATION (H): Primary | ICD-10-CM

## 2022-03-10 NOTE — PROGRESS NOTES
"[facility]  [place] Medical Record Number:  [id number]  Place of Service where encounter took place: [facility]  [facility] ([facility]) [[id number]]    HPI:    Maia Miranda is a 89 year old  (10/26/1932), who is being seen today for an episodic care visit at the above location. Today's concern is INR/Coumadin management for DONNA. Fib    ROS/Subjective:  Bleeding Signs/Symptoms:  None  Thromboembolic Signs/Symptoms:  None  Medication Changes:  No  Dietary Changes:  No  Activity Changes: No  Bacterial/Viral Infection:  No  Missed Coumadin Doses:  None  On ASA: No  Other Concerns:  No    OBJECTIVE:  /52   Pulse 57   Temp 98.3  F (36.8  C)   Resp 16   Ht 1.676 m (5' 6\")   Wt 64.8 kg (142 lb 12.8 oz)   SpO2 96%   BMI 23.05 kg/m    INR: 2.1 on 2/24/22  Last INR: 2.0 on 3/3/22  INR Today: 1.7  Current Dose: Coumadin 1.5 mg po daily       ASSESSMENT:  (I48.0) Paroxysmal atrial fibrillation (H)  (primary encounter diagnosis)  (Z51.81,  Z79.01) Encounter for monitoring Coumadin therapy    Subtherapeutic INR for goal of 2-3    PLAN/ORDERS:     New Dose: Coumadin 2mg po Monday/Wed/Friday and Coumadin 1.5 po all other days. Recheck INR in 1 week        Electronically signed by:  RAY Lopez CNP     "

## 2022-03-10 NOTE — LETTER
"    3/10/2022        RE: Maia Miranda  [facility]  [address]   [place] [address]        [facility]  [place] Medical Record Number:  [id number]  Place of Service where encounter took place: [facility]  [facility] [place] [facility] ([facility]) [[id number]]    HPI:    Maia Miranda is a 89 year old  (10/26/1932), who is being seen today for an episodic care visit at the above location. Today's concern is INR/Coumadin management for A. Fib    ROS/Subjective:  Bleeding Signs/Symptoms:  None  Thromboembolic Signs/Symptoms:  None  Medication Changes:  No  Dietary Changes:  No  Activity Changes: No  Bacterial/Viral Infection:  No  Missed Coumadin Doses:  None  On ASA: No  Other Concerns:  No    OBJECTIVE:  /52   Pulse 57   Temp 98.3  F (36.8  C)   Resp 16   Ht 1.676 m (5' 6\")   Wt 64.8 kg (142 lb 12.8 oz)   SpO2 96%   BMI 23.05 kg/m    INR: 2.1 on 2/24/22  Last INR: 2.0 on 3/3/22  INR Today: 1.7  Current Dose: Coumadin 1.5 mg po daily       ASSESSMENT:  (I48.0) Paroxysmal atrial fibrillation (H)  (primary encounter diagnosis)  (Z51.81,  Z79.01) Encounter for monitoring Coumadin therapy    Subtherapeutic INR for goal of 2-3    PLAN/ORDERS:     New Dose: Coumadin 2mg po Monday/Wed/Friday and Coumadin 1.5 po all other days. Recheck INR in 1 week        Electronically signed by:  RAY Lopez CNP           Sincerely,        RAY Lopez CNP      "

## 2022-03-11 DIAGNOSIS — M85.811 OSTEOPENIA OF RIGHT SHOULDER: ICD-10-CM

## 2022-03-11 RX ORDER — LIDOCAINE 4 G/G
1 PATCH TOPICAL EVERY 24 HOURS
Qty: 5 PATCH | Refills: 3 | Status: SHIPPED | OUTPATIENT
Start: 2022-03-11 | End: 2022-06-16

## 2022-03-15 DIAGNOSIS — S12.101A CLOSED NONDISPLACED FRACTURE OF SECOND CERVICAL VERTEBRA, UNSPECIFIED FRACTURE MORPHOLOGY, INITIAL ENCOUNTER (H): ICD-10-CM

## 2022-03-15 DIAGNOSIS — E78.5 HYPERLIPIDEMIA LDL GOAL <130: Primary | ICD-10-CM

## 2022-03-15 DIAGNOSIS — I10 HYPERTENSION GOAL BP (BLOOD PRESSURE) < 150/90: ICD-10-CM

## 2022-03-15 DIAGNOSIS — Z87.440 HISTORY OF RECURRENT UTIS: ICD-10-CM

## 2022-03-15 DIAGNOSIS — Z00.00 HEALTH CARE MAINTENANCE: ICD-10-CM

## 2022-03-15 DIAGNOSIS — I21.A1 TYPE 2 MI (MYOCARDIAL INFARCTION) (H): ICD-10-CM

## 2022-03-15 DIAGNOSIS — M51.379 DEGENERATION OF LUMBAR OR LUMBOSACRAL INTERVERTEBRAL DISC: ICD-10-CM

## 2022-03-15 RX ORDER — OMEGA-3/DHA/EPA/FISH OIL 60 MG-90MG
500 CAPSULE ORAL EVERY MORNING
Qty: 90 CAPSULE | Refills: 3 | Status: SHIPPED | OUTPATIENT
Start: 2022-03-15 | End: 2022-05-02

## 2022-03-15 RX ORDER — LISINOPRIL 20 MG/1
10 TABLET ORAL DAILY
Qty: 45 TABLET | Refills: 3 | Status: SHIPPED | OUTPATIENT
Start: 2022-03-15 | End: 2022-05-02

## 2022-03-15 RX ORDER — ASCORBIC ACID 500 MG
500 TABLET ORAL 2 TIMES DAILY
Qty: 180 TABLET | Refills: 3 | Status: SHIPPED | OUTPATIENT
Start: 2022-03-15 | End: 2022-05-02

## 2022-03-15 RX ORDER — ROSUVASTATIN CALCIUM 5 MG/1
5 TABLET, COATED ORAL AT BEDTIME
Qty: 90 TABLET | Refills: 3 | Status: SHIPPED | OUTPATIENT
Start: 2022-03-15 | End: 2022-05-31

## 2022-03-15 RX ORDER — ACETAMINOPHEN 500 MG
1000 TABLET ORAL 3 TIMES DAILY
Qty: 540 TABLET | Refills: 3 | Status: SHIPPED | OUTPATIENT
Start: 2022-03-15 | End: 2022-05-31

## 2022-03-15 RX ORDER — MULTIVIT-MIN/IRON/FOLIC ACID/K 18-600-40
1 CAPSULE ORAL EVERY EVENING
Qty: 90 CAPSULE | Refills: 3 | Status: SHIPPED | OUTPATIENT
Start: 2022-03-15 | End: 2022-05-26

## 2022-03-15 RX ORDER — METOPROLOL TARTRATE 25 MG/1
25 TABLET, FILM COATED ORAL 2 TIMES DAILY
Qty: 180 TABLET | Refills: 3 | Status: SHIPPED | OUTPATIENT
Start: 2022-03-15 | End: 2022-05-31

## 2022-03-17 ENCOUNTER — ASSISTED LIVING VISIT (OUTPATIENT)
Dept: GERIATRICS | Facility: CLINIC | Age: 87
End: 2022-03-17
Payer: COMMERCIAL

## 2022-03-17 VITALS
SYSTOLIC BLOOD PRESSURE: 144 MMHG | BODY MASS INDEX: 22.95 KG/M2 | HEIGHT: 66 IN | TEMPERATURE: 97.9 F | DIASTOLIC BLOOD PRESSURE: 97 MMHG | OXYGEN SATURATION: 96 % | WEIGHT: 142.8 LBS | HEART RATE: 97 BPM

## 2022-03-17 DIAGNOSIS — Z79.01 ENCOUNTER FOR MONITORING COUMADIN THERAPY: ICD-10-CM

## 2022-03-17 DIAGNOSIS — Z51.81 ENCOUNTER FOR MONITORING COUMADIN THERAPY: ICD-10-CM

## 2022-03-17 DIAGNOSIS — I48.0 PAROXYSMAL ATRIAL FIBRILLATION (H): Primary | ICD-10-CM

## 2022-03-17 NOTE — LETTER
"    3/17/2022        RE: Maia Miranda  [facility]  [address]   [place] [address]        [facility]  [place] Medical Record Number:  [id number]  Place of Service where encounter took place: [facility]  [facility] [place] ([facility]) [[id number]]    HPI:    Maia Miranda is a 89 year old  (10/26/1932), who is being seen today for an episodic care visit at the above location. Today's concern is INR/Coumadin management for A. Fib    ROS/Subjective:  Bleeding Signs/Symptoms:  None  Thromboembolic Signs/Symptoms:  None  Medication Changes:  No  Dietary Changes:  No  Activity Changes: No  Bacterial/Viral Infection:  No  Missed Coumadin Doses:  None  On ASA: No  Other Concerns:  No    OBJECTIVE:  BP (!) 144/97   Pulse 97   Temp 97.9  F (36.6  C)   Ht 1.676 m (5' 6\")   Wt 64.8 kg (142 lb 12.8 oz)   SpO2 96%   BMI 23.05 kg/m     INR: 2.1 on 2/24/22  INR: 2.0 on 3/3/22  Last INR: 1.7 on 3/10/22  INR Today:  1.9  Current Dose: Coumadin 2mg po Monday/Wed/Friday and Coumadin 1.5 po all other days     ASSESSMENT:  (I48.0) Paroxysmal atrial fibrillation (H)  (primary encounter diagnosis)  (Z51.81,  Z79.01) Encounter for monitoring Coumadin therapy    Subtherapeutic INR for goal of 2-3    PLAN/ORDERS:   New Dose: Coumadin 2 mg po daily    Next INR: 1 week      Electronically signed by:  RAY Lopez CNP           Sincerely,        RAY Lopez CNP      "

## 2022-03-17 NOTE — PROGRESS NOTES
"[facility] GERIATRICS  [place] Medical Record Number:  [id number]  Place of Service where encounter took place: [facility]  [facility]IV[facility] ([facility]) [[id number]]    HPI:    Maia Miranda is a 89 year old  (10/26/1932), who is being seen today for an episodic care visit at the above location. Today's concern is INR/Coumadin management for DONNA. Fib    ROS/Subjective:  Bleeding Signs/Symptoms:  None  Thromboembolic Signs/Symptoms:  None  Medication Changes:  No  Dietary Changes:  No  Activity Changes: No  Bacterial/Viral Infection:  No  Missed Coumadin Doses:  None  On ASA: No  Other Concerns:  No    OBJECTIVE:  BP (!) 144/97   Pulse 97   Temp 97.9  F (36.6  C)   Ht 1.676 m (5' 6\")   Wt 64.8 kg (142 lb 12.8 oz)   SpO2 96%   BMI 23.05 kg/m     INR: 2.1 on 2/24/22  INR: 2.0 on 3/3/22  Last INR: 1.7 on 3/10/22  INR Today:  1.9  Current Dose: Coumadin 2mg po Monday/Wed/Friday and Coumadin 1.5 po all other days     ASSESSMENT:  (I48.0) Paroxysmal atrial fibrillation (H)  (primary encounter diagnosis)  (Z51.81,  Z79.01) Encounter for monitoring Coumadin therapy    Subtherapeutic INR for goal of 2-3    PLAN/ORDERS:   New Dose: Coumadin 2 mg po daily    Next INR: 1 week      Electronically signed by:  RAY Lopez CNP     "

## 2022-03-28 DIAGNOSIS — K59.00 CONSTIPATION, UNSPECIFIED CONSTIPATION TYPE: ICD-10-CM

## 2022-03-28 RX ORDER — POLYETHYLENE GLYCOL 3350 17 G/17G
17 POWDER, FOR SOLUTION ORAL DAILY
Qty: 578 G | Refills: 3 | Status: SHIPPED | OUTPATIENT
Start: 2022-03-28 | End: 2022-04-06

## 2022-04-04 ENCOUNTER — TELEPHONE (OUTPATIENT)
Dept: GERIATRICS | Facility: CLINIC | Age: 87
End: 2022-04-04
Payer: COMMERCIAL

## 2022-04-04 NOTE — TELEPHONE ENCOUNTER
----- Message from Rebecca Ball RN sent at 4/4/2022  1:03 PM CDT -----  Regarding: RE: Skin Concern-Redness on bottom  Apply Calmoseptine after toileting and PRN.  Continue to monitor for skin breakdown and if area becomes open please update NP.    bren Fernandez  ----- Message -----  From: Leyla Garcia RN  Sent: 4/4/2022  12:58 PM CDT  To: Re Ricketts  Subject: Skin Concern-Redness on bottom                   S: Skin Concern-Bottom  B: [place] Resident  A: Resident with noticeable redness on bottom; blanchable; no open areas present. Per Staff, resident is unable to complete hygiene care after having a bowel movement; which may be cause for redness on bottom. Requesting Calmoseptine to be applied to buttock with toileting and as needed for skin protection.   R: Np updated; Await Calmoseptine order

## 2022-04-06 ENCOUNTER — ASSISTED LIVING VISIT (OUTPATIENT)
Dept: GERIATRICS | Facility: CLINIC | Age: 87
End: 2022-04-06
Payer: COMMERCIAL

## 2022-04-06 ENCOUNTER — LAB REQUISITION (OUTPATIENT)
Dept: LAB | Facility: CLINIC | Age: 87
End: 2022-04-06
Payer: COMMERCIAL

## 2022-04-06 ENCOUNTER — TRANSFERRED RECORDS (OUTPATIENT)
Dept: HEALTH INFORMATION MANAGEMENT | Facility: CLINIC | Age: 87
End: 2022-04-06

## 2022-04-06 VITALS
TEMPERATURE: 98.5 F | RESPIRATION RATE: 18 BRPM | HEART RATE: 68 BPM | DIASTOLIC BLOOD PRESSURE: 85 MMHG | HEIGHT: 66 IN | SYSTOLIC BLOOD PRESSURE: 136 MMHG | WEIGHT: 137.6 LBS | BODY MASS INDEX: 22.11 KG/M2 | OXYGEN SATURATION: 96 %

## 2022-04-06 DIAGNOSIS — Z87.440 HISTORY OF RECURRENT UTIS: ICD-10-CM

## 2022-04-06 DIAGNOSIS — Z51.81 ENCOUNTER FOR MONITORING COUMADIN THERAPY: ICD-10-CM

## 2022-04-06 DIAGNOSIS — K59.00 CONSTIPATION, UNSPECIFIED CONSTIPATION TYPE: ICD-10-CM

## 2022-04-06 DIAGNOSIS — R19.5 LOOSE STOOLS: ICD-10-CM

## 2022-04-06 DIAGNOSIS — Z97.8 FOLEY CATHETER IN PLACE: Primary | ICD-10-CM

## 2022-04-06 DIAGNOSIS — I48.0 PAROXYSMAL ATRIAL FIBRILLATION (H): ICD-10-CM

## 2022-04-06 DIAGNOSIS — Z79.01 ENCOUNTER FOR MONITORING COUMADIN THERAPY: ICD-10-CM

## 2022-04-06 DIAGNOSIS — N39.0 URINARY TRACT INFECTION, SITE NOT SPECIFIED: ICD-10-CM

## 2022-04-06 LAB
ALBUMIN UR-MCNC: NEGATIVE MG/DL
APPEARANCE UR: CLEAR
BACTERIA #/AREA URNS HPF: ABNORMAL /HPF
BILIRUB UR QL STRIP: NEGATIVE
COLOR UR AUTO: ABNORMAL
GLUCOSE UR STRIP-MCNC: NEGATIVE MG/DL
HGB UR QL STRIP: NEGATIVE
KETONES UR STRIP-MCNC: NEGATIVE MG/DL
LEUKOCYTE ESTERASE UR QL STRIP: ABNORMAL
MUCOUS THREADS #/AREA URNS LPF: PRESENT /LPF
NITRATE UR QL: NEGATIVE
PH UR STRIP: 5.5 [PH] (ref 5–7)
RBC URINE: 2 /HPF
SP GR UR STRIP: 1.01 (ref 1–1.03)
SQUAMOUS EPITHELIAL: <1 /HPF
UROBILINOGEN UR STRIP-MCNC: NORMAL MG/DL
WBC URINE: 27 /HPF

## 2022-04-06 PROCEDURE — 81001 URINALYSIS AUTO W/SCOPE: CPT | Mod: ORL | Performed by: NURSE PRACTITIONER

## 2022-04-06 PROCEDURE — 87088 URINE BACTERIA CULTURE: CPT | Mod: ORL | Performed by: NURSE PRACTITIONER

## 2022-04-06 RX ORDER — POLYETHYLENE GLYCOL 3350 17 G/17G
17 POWDER, FOR SOLUTION ORAL DAILY PRN
Qty: 578 G | Refills: 3 | COMMUNITY
Start: 2022-04-06 | End: 2022-05-02

## 2022-04-06 NOTE — LETTER
4/6/2022        RE: Maia Miranda  [facility]  [address]   [place] [address]        [place] GERIATR[facility]  [place] Medical Record Number:  [id number]  Place of Service where encounter took place:  [facility] ([facility]) [[id number]]  Chief Complaint   Patient presents with     RECHECK       HPI:    Maia Miranda  is a 89 year old (10/26/1932), who is being seen today for an episodic care visit.  HPI information obtained from: facility chart records and facility staff. Today's concern is:    Seen today for increased confusion and weakness. Staff reporting she attempted to pull her deleon out not remembering why in use. Also with weakness above baseline.  Today sleeping in recliner. She does appear more frail from previous visit. She denies any acute concerns and unable to give much hpi with advanced dementia.     Past Medical and Surgical History reviewed in Epic today.    MEDICATIONS:    Current Outpatient Medications   Medication Sig Dispense Refill     polyethylene glycol (MIRALAX) 17 GM/Dose powder Take 17 g by mouth daily as needed for constipation 578 g 3     acetaminophen (TYLENOL) 500 MG tablet Take 2 tablets (1,000 mg) by mouth 3 times daily 540 tablet 3     carboxymethylcellulose (REFRESH LIQUIGEL) 1 % ophthalmic solution Place 1 drop into both eyes every morning As needed       cephALEXin (KEFLEX) 250 MG capsule Take 1 capsule (250 mg) by mouth daily 90 capsule 3     Cholecalciferol (VITAMIN D) 50 MCG (2000 UT) CAPS Take 1 capsule by mouth every evening 90 capsule 3     fish oil-omega-3 fatty acids 500 MG capsule Take 1 capsule (500 mg) by mouth every morning 90 capsule 3     Lidocaine (LIDOCARE) 4 % Patch Place 1 patch onto the skin every 24 hours To prevent lidocaine toxicity, patient should be patch free for 12 hrs daily. To right shoulder 5 patch 3     lisinopril (ZESTRIL) 20 MG tablet Take 0.5 tablets (10 mg) by mouth daily 45 tablet 3     loperamide  "(IMODIUM) 2 MG capsule Take 1 capsule (2 mg) by mouth 4 times daily as needed for diarrhea       metoprolol tartrate (LOPRESSOR) 25 MG tablet Take 1 tablet (25 mg) by mouth 2 times daily (hold for SBP <90 or HR <60) 180 tablet 3     ondansetron (ZOFRAN-ODT) 4 MG ODT tab Take 1 tablet (4 mg) by mouth every 6 hours as needed for nausea 30 tablet 11     rosuvastatin (CRESTOR) 5 MG tablet Take 1 tablet (5 mg) by mouth At Bedtime 90 tablet 3     SENNA-docusate sodium (SENNA S) 8.6-50 MG tablet Take 1 tablet by mouth 2 times daily as needed (constipation)       sodium chloride (PETER 128) 5 % ophthalmic ointment Place 2 Application (1 g) into both eyes At Bedtime       vitamin C (ASCORBIC ACID) 500 MG tablet Take 1 tablet (500 mg) by mouth 2 times daily 180 tablet 3     Warfarin Sodium (COUMADIN PO) Per INR orders       REVIEW OF SYSTEMS:  Limited secondary to cognitive impairment but today pt reports ok    Objective:  /85   Pulse 68   Temp 98.5  F (36.9  C)   Resp 18   Ht 1.676 m (5' 6\")   Wt 62.4 kg (137 lb 9.6 oz)   SpO2 96%   BMI 22.21 kg/m    Exam:  GENERAL APPEARANCE:  sleepy, in no distress  ENT:  Mouth and posterior oropharynx normal, moist mucous membranes  EYES:  EOM, conjunctivae, lids, pupils and irises normal, wears glasses  RESP:  lungs clear to auscultation but diminished throughout   CV:  regular rate and irregular rhythm, no murmur, rub, or gallop, trace edema-no compression   ABDOMEN:  no guarding or rebound  :    deleon catheter in place with leg bag, minimal straw color output in leg bag  M/S:   generalized weakness- slow with sit to stand, 2WW   SKIN:  intact to visualized areas  NEURO:   Cranial nerves 2-12 are normal tested and grossly at patient's baseline  PSYCH:  insight and judgement impaired, memory impaired SLUMS 9/30 and CPT 3.8/5.6     Labs:   CBC RESULTS: Recent Labs   Lab Test 01/10/22  0542 12/27/21  0523   WBC 4.8 8.6   RBC 3.56* 3.14*   HGB 11.4* 10.1*   HCT 35.6 31.5* "    100   MCH 32.0 32.2   MCHC 32.0 32.1   RDW 12.9 13.2    103*       Last Basic Metabolic Panel:  Recent Labs   Lab Test 01/10/22  0542 01/03/22  0552    141   POTASSIUM 4.4 3.9   CHLORIDE 107 103   CLARISSE 10.0 10.4   CO2 23 26   BUN 29* 22   CR 1.10 1.20*   GLC 95 104       Liver Function Studies -   Recent Labs   Lab Test 12/26/21  0236 08/30/21  0753   PROTTOTAL 5.8* 6.5*   ALBUMIN 2.6* 3.2*   BILITOTAL 0.8 0.4   ALKPHOS 57 71   AST 27 49*   ALT 20 28       TSH   Date Value Ref Range Status   02/26/2018 1.82 0.40 - 4.00 mU/L Final   10/05/2017 2.44 0.40 - 4.00 mU/L Final       No results found for: A1C    ASSESSMENT/PLAN:  (Z97.8) Deleon catheter in place  (primary encounter diagnosis)  (Z87.440) History of recurrent UTIs  Comment: previously was straight cathing 6x daily in community. Most likely could not resume this safely. Staff could assist but at significant cost and not available 24/7. Also having most output in deleon overnights and low output during the day? Questioning positioning when OOB?  Follows with Dr. Maria (urology)   Plan:   -family will follow up with Dr. Maria   -catheter changed monthly from homecare  -prn orders to irrigate   -staff monitoring positioning to ensure she is not sitting on tubing causing occlusion  -continue to monitor outputs (listed below)   --on cephalexin 250 mg po daily for UTI prophylaxis per urology    -vitamin C per urology?   -UA/UC pending with changes noted in hpi    (I48.0) Paroxysmal atrial fibrillation (H)  (Z51.81,  Z79.01) Encounter for monitoring Coumadin therapy  Comment: INR 2.5 today from 2.1 on 3/24  Plan:   -continue coumadin 2 mg po daily and recheck INR in 2 weeks. Prior dose was  2 mg po MWF and 1.5 mg po AOD.     (K59.00) Constipation, unspecified constipation type  (R19.5) Loose stools  Comment: recently with diarrhea and redness on coccyx  Plan:   -polyethylene glycol (MIRALAX) 17 GM/Dose powder to prn   -Senna S 2 tabs po daily prn    -Calmoseptine with toileting assist           Electronically signed by:  RAY Lopez CNP                 Sincerely,        RAY Lopez CNP

## 2022-04-06 NOTE — PROGRESS NOTES
[place] GERIATR[facility] SERVICES  [place] Medical Record Number:  [id number]  Place of Service where encounter took place:  [facility]  LIV[facility] ([facility]) [[id number]]  Chief Complaint   Patient presents with     RECHECK       HPI:    Maia Miranda  is a 89 year old (10/26/1932), who is being seen today for an episodic care visit.  HPI information obtained from: facility chart records and facility staff. Today's concern is:    Seen today for increased confusion and weakness. Staff reporting she attempted to pull her deleon out not remembering why in use. Also with weakness above baseline.  Today sleeping in recliner. She does appear more frail from previous visit. She denies any acute concerns and unable to give much hpi with advanced dementia.     Past Medical and Surgical History reviewed in Epic today.    MEDICATIONS:    Current Outpatient Medications   Medication Sig Dispense Refill     polyethylene glycol (MIRALAX) 17 GM/Dose powder Take 17 g by mouth daily as needed for constipation 578 g 3     acetaminophen (TYLENOL) 500 MG tablet Take 2 tablets (1,000 mg) by mouth 3 times daily 540 tablet 3     carboxymethylcellulose (REFRESH LIQUIGEL) 1 % ophthalmic solution Place 1 drop into both eyes every morning As needed       cephALEXin (KEFLEX) 250 MG capsule Take 1 capsule (250 mg) by mouth daily 90 capsule 3     Cholecalciferol (VITAMIN D) 50 MCG (2000 UT) CAPS Take 1 capsule by mouth every evening 90 capsule 3     fish oil-omega-3 fatty acids 500 MG capsule Take 1 capsule (500 mg) by mouth every morning 90 capsule 3     Lidocaine (LIDOCARE) 4 % Patch Place 1 patch onto the skin every 24 hours To prevent lidocaine toxicity, patient should be patch free for 12 hrs daily. To right shoulder 5 patch 3     lisinopril (ZESTRIL) 20 MG tablet Take 0.5 tablets (10 mg) by mouth daily 45 tablet 3     loperamide (IMODIUM) 2 MG capsule Take 1 capsule (2 mg) by mouth 4 times daily as needed for diarrhea       metoprolol  "tartrate (LOPRESSOR) 25 MG tablet Take 1 tablet (25 mg) by mouth 2 times daily (hold for SBP <90 or HR <60) 180 tablet 3     ondansetron (ZOFRAN-ODT) 4 MG ODT tab Take 1 tablet (4 mg) by mouth every 6 hours as needed for nausea 30 tablet 11     rosuvastatin (CRESTOR) 5 MG tablet Take 1 tablet (5 mg) by mouth At Bedtime 90 tablet 3     SENNA-docusate sodium (SENNA S) 8.6-50 MG tablet Take 1 tablet by mouth 2 times daily as needed (constipation)       sodium chloride (PETER 128) 5 % ophthalmic ointment Place 2 Application (1 g) into both eyes At Bedtime       vitamin C (ASCORBIC ACID) 500 MG tablet Take 1 tablet (500 mg) by mouth 2 times daily 180 tablet 3     Warfarin Sodium (COUMADIN PO) Per INR orders       REVIEW OF SYSTEMS:  Limited secondary to cognitive impairment but today pt reports ok    Objective:  /85   Pulse 68   Temp 98.5  F (36.9  C)   Resp 18   Ht 1.676 m (5' 6\")   Wt 62.4 kg (137 lb 9.6 oz)   SpO2 96%   BMI 22.21 kg/m    Exam:  GENERAL APPEARANCE:  sleepy, in no distress  ENT:  Mouth and posterior oropharynx normal, moist mucous membranes  EYES:  EOM, conjunctivae, lids, pupils and irises normal, wears glasses  RESP:  lungs clear to auscultation but diminished throughout   CV:  regular rate and irregular rhythm, no murmur, rub, or gallop, trace edema-no compression   ABDOMEN:  no guarding or rebound  :    deleon catheter in place with leg bag, minimal straw color output in leg bag  M/S:   generalized weakness- slow with sit to stand, 2WW   SKIN:  intact to visualized areas  NEURO:   Cranial nerves 2-12 are normal tested and grossly at patient's baseline  PSYCH:  insight and judgement impaired, memory impaired SLUMS 9/30 and CPT 3.8/5.6     Labs:   CBC RESULTS: Recent Labs   Lab Test 01/10/22  0542 12/27/21  0523   WBC 4.8 8.6   RBC 3.56* 3.14*   HGB 11.4* 10.1*   HCT 35.6 31.5*    100   MCH 32.0 32.2   MCHC 32.0 32.1   RDW 12.9 13.2    103*       Last Basic Metabolic " Panel:  Recent Labs   Lab Test 01/10/22  0542 01/03/22  0552    141   POTASSIUM 4.4 3.9   CHLORIDE 107 103   CLARISSE 10.0 10.4   CO2 23 26   BUN 29* 22   CR 1.10 1.20*   GLC 95 104       Liver Function Studies -   Recent Labs   Lab Test 12/26/21  0236 08/30/21  0753   PROTTOTAL 5.8* 6.5*   ALBUMIN 2.6* 3.2*   BILITOTAL 0.8 0.4   ALKPHOS 57 71   AST 27 49*   ALT 20 28       TSH   Date Value Ref Range Status   02/26/2018 1.82 0.40 - 4.00 mU/L Final   10/05/2017 2.44 0.40 - 4.00 mU/L Final       No results found for: A1C    ASSESSMENT/PLAN:  (Z97.8) Deleon catheter in place  (primary encounter diagnosis)  (Z87.440) History of recurrent UTIs  Comment: previously was straight cathing 6x daily in community. Most likely could not resume this safely. Staff could assist but at significant cost and not available 24/7. Also having most output in deleon overnights and low output during the day? Questioning positioning when OOB?  Follows with Dr. Maria (urology)   Plan:   -family will follow up with Dr. Maria   -catheter changed monthly from homecare  -prn orders to irrigate   -staff monitoring positioning to ensure she is not sitting on tubing causing occlusion  -continue to monitor outputs (listed below)   --on cephalexin 250 mg po daily for UTI prophylaxis per urology    -vitamin C per urology?   -UA/UC pending with changes noted in hpi    (I48.0) Paroxysmal atrial fibrillation (H)  (Z51.81,  Z79.01) Encounter for monitoring Coumadin therapy  Comment: INR 2.5 today from 2.1 on 3/24  Plan:   -continue coumadin 2 mg po daily and recheck INR in 2 weeks. Prior dose was  2 mg po MWF and 1.5 mg po AOD.     (K59.00) Constipation, unspecified constipation type  (R19.5) Loose stools  Comment: recently with diarrhea and redness on coccyx  Plan:   -polyethylene glycol (MIRALAX) 17 GM/Dose powder to prn   -Senna S 2 tabs po daily prn   -Calmoseptine with toileting assist           Electronically signed by:  RAY Lopez CNP

## 2022-04-06 NOTE — LETTER
New Orders for Maia Miranda    1. Change Miralax to   polyethylene glycol (MIRALAX) 17 GM/Dose powder Take 17 g by mouth daily as needed for constipation     2. UA/UC for increased confusion and weakness. Nursing ok to contact homecare to assist as specimen to be obtained after insertion of new catheter    RAY Lopez CNP on 4/6/2022 at 2:02 PM

## 2022-04-08 LAB
BACTERIA UR CULT: ABNORMAL
BACTERIA UR CULT: ABNORMAL

## 2022-04-08 RX ORDER — ANORECTAL OINTMENT 15.7; .44; 24; 20.6 G/100G; G/100G; G/100G; G/100G
OINTMENT TOPICAL PRN
COMMUNITY
Start: 2022-04-08 | End: 2023-01-01

## 2022-04-09 ENCOUNTER — APPOINTMENT (OUTPATIENT)
Dept: CT IMAGING | Facility: CLINIC | Age: 87
DRG: 493 | End: 2022-04-09
Attending: EMERGENCY MEDICINE
Payer: COMMERCIAL

## 2022-04-09 ENCOUNTER — APPOINTMENT (OUTPATIENT)
Dept: GENERAL RADIOLOGY | Facility: CLINIC | Age: 87
DRG: 493 | End: 2022-04-09
Attending: EMERGENCY MEDICINE
Payer: COMMERCIAL

## 2022-04-09 ENCOUNTER — HOSPITAL ENCOUNTER (INPATIENT)
Facility: CLINIC | Age: 87
LOS: 5 days | Discharge: NURSING, REHAB OR LONG-TERM CARE | DRG: 493 | End: 2022-04-14
Attending: EMERGENCY MEDICINE | Admitting: INTERNAL MEDICINE
Payer: COMMERCIAL

## 2022-04-09 DIAGNOSIS — S82.852A CLOSED TRIMALLEOLAR FRACTURE OF LEFT ANKLE, INITIAL ENCOUNTER: Primary | ICD-10-CM
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
## 2022-04-09 LAB
ANION GAP SERPL CALCULATED.3IONS-SCNC: 2 MMOL/L (ref 3–14)
BASOPHILS # BLD AUTO: 0 10E3/UL (ref 0–0.2)
BASOPHILS NFR BLD AUTO: 1 %
BUN SERPL-MCNC: 28 MG/DL (ref 7–30)
CALCIUM SERPL-MCNC: 10 MG/DL (ref 8.5–10.1)
CHLORIDE BLD-SCNC: 105 MMOL/L (ref 94–109)
CO2 SERPL-SCNC: 30 MMOL/L (ref 20–32)
CREAT SERPL-MCNC: 1.35 MG/DL (ref 0.52–1.04)
EOSINOPHIL # BLD AUTO: 0.2 10E3/UL (ref 0–0.7)
EOSINOPHIL NFR BLD AUTO: 3 %
ERYTHROCYTE [DISTWIDTH] IN BLOOD BY AUTOMATED COUNT: 13.2 % (ref 10–15)
GFR SERPL CREATININE-BSD FRML MDRD: 37 ML/MIN/1.73M2
GLUCOSE BLD-MCNC: 124 MG/DL (ref 70–99)
HCT VFR BLD AUTO: 43.5 % (ref 35–47)
HGB BLD-MCNC: 13.8 G/DL (ref 11.7–15.7)
HOLD SPECIMEN: NORMAL
IMM GRANULOCYTES # BLD: 0 10E3/UL
IMM GRANULOCYTES NFR BLD: 0 %
INR PPP: 1.99 (ref 0.85–1.15)
INR PPP: 2.44 (ref 0.85–1.15)
LYMPHOCYTES # BLD AUTO: 0.6 10E3/UL (ref 0.8–5.3)
LYMPHOCYTES NFR BLD AUTO: 9 %
MAGNESIUM SERPL-MCNC: 2.4 MG/DL (ref 1.6–2.3)
MCH RBC QN AUTO: 32.5 PG (ref 26.5–33)
MCHC RBC AUTO-ENTMCNC: 31.7 G/DL (ref 31.5–36.5)
MCV RBC AUTO: 102 FL (ref 78–100)
MONOCYTES # BLD AUTO: 0.5 10E3/UL (ref 0–1.3)
MONOCYTES NFR BLD AUTO: 8 %
NEUTROPHILS # BLD AUTO: 5.1 10E3/UL (ref 1.6–8.3)
NEUTROPHILS NFR BLD AUTO: 79 %
NRBC # BLD AUTO: 0 10E3/UL
NRBC BLD AUTO-RTO: 0 /100
PLATELET # BLD AUTO: 159 10E3/UL (ref 150–450)
POTASSIUM BLD-SCNC: 4.1 MMOL/L (ref 3.4–5.3)
POTASSIUM BLD-SCNC: 4.9 MMOL/L (ref 3.4–5.3)
RBC # BLD AUTO: 4.25 10E6/UL (ref 3.8–5.2)
SARS-COV-2 RNA RESP QL NAA+PROBE: NEGATIVE
SODIUM SERPL-SCNC: 137 MMOL/L (ref 133–144)
WBC # BLD AUTO: 6.4 10E3/UL (ref 4–11)

## 2022-04-09 PROCEDURE — 70450 CT HEAD/BRAIN W/O DYE: CPT

## 2022-04-09 PROCEDURE — 120N000001 HC R&B MED SURG/OB

## 2022-04-09 PROCEDURE — 72125 CT NECK SPINE W/O DYE: CPT

## 2022-04-09 PROCEDURE — 99223 1ST HOSP IP/OBS HIGH 75: CPT | Mod: AI | Performed by: INTERNAL MEDICINE

## 2022-04-09 PROCEDURE — 36415 COLL VENOUS BLD VENIPUNCTURE: CPT | Performed by: INTERNAL MEDICINE

## 2022-04-09 PROCEDURE — 83735 ASSAY OF MAGNESIUM: CPT | Performed by: INTERNAL MEDICINE

## 2022-04-09 PROCEDURE — 96367 TX/PROPH/DG ADDL SEQ IV INF: CPT

## 2022-04-09 PROCEDURE — 36415 COLL VENOUS BLD VENIPUNCTURE: CPT | Performed by: EMERGENCY MEDICINE

## 2022-04-09 PROCEDURE — 96365 THER/PROPH/DIAG IV INF INIT: CPT

## 2022-04-09 PROCEDURE — 250N000011 HC RX IP 250 OP 636: Performed by: INTERNAL MEDICINE

## 2022-04-09 PROCEDURE — 250N000013 HC RX MED GY IP 250 OP 250 PS 637: Performed by: HOSPITALIST

## 2022-04-09 PROCEDURE — 250N000013 HC RX MED GY IP 250 OP 250 PS 637: Performed by: EMERGENCY MEDICINE

## 2022-04-09 PROCEDURE — 85610 PROTHROMBIN TIME: CPT | Performed by: INTERNAL MEDICINE

## 2022-04-09 PROCEDURE — 80048 BASIC METABOLIC PNL TOTAL CA: CPT | Performed by: EMERGENCY MEDICINE

## 2022-04-09 PROCEDURE — 96375 TX/PRO/DX INJ NEW DRUG ADDON: CPT

## 2022-04-09 PROCEDURE — 84132 ASSAY OF SERUM POTASSIUM: CPT | Performed by: INTERNAL MEDICINE

## 2022-04-09 PROCEDURE — 85610 PROTHROMBIN TIME: CPT | Performed by: EMERGENCY MEDICINE

## 2022-04-09 PROCEDURE — 258N000003 HC RX IP 258 OP 636: Performed by: INTERNAL MEDICINE

## 2022-04-09 PROCEDURE — 250N000011 HC RX IP 250 OP 636: Performed by: EMERGENCY MEDICINE

## 2022-04-09 PROCEDURE — 85025 COMPLETE CBC W/AUTO DIFF WBC: CPT | Performed by: EMERGENCY MEDICINE

## 2022-04-09 PROCEDURE — 999N000065 XR ANKLE LT 2 VW: Mod: LT

## 2022-04-09 PROCEDURE — 250N000013 HC RX MED GY IP 250 OP 250 PS 637: Performed by: INTERNAL MEDICINE

## 2022-04-09 PROCEDURE — 99285 EMERGENCY DEPT VISIT HI MDM: CPT | Mod: 25

## 2022-04-09 PROCEDURE — U0003 INFECTIOUS AGENT DETECTION BY NUCLEIC ACID (DNA OR RNA); SEVERE ACUTE RESPIRATORY SYNDROME CORONAVIRUS 2 (SARS-COV-2) (CORONAVIRUS DISEASE [COVID-19]), AMPLIFIED PROBE TECHNIQUE, MAKING USE OF HIGH THROUGHPUT TECHNOLOGIES AS DESCRIBED BY CMS-2020-01-R: HCPCS | Performed by: EMERGENCY MEDICINE

## 2022-04-09 PROCEDURE — 73600 X-RAY EXAM OF ANKLE: CPT | Mod: LT

## 2022-04-09 PROCEDURE — 93005 ELECTROCARDIOGRAM TRACING: CPT

## 2022-04-09 PROCEDURE — C9803 HOPD COVID-19 SPEC COLLECT: HCPCS

## 2022-04-09 PROCEDURE — 27818 TREATMENT OF ANKLE FRACTURE: CPT | Mod: LT

## 2022-04-09 RX ORDER — NALOXONE HYDROCHLORIDE 0.4 MG/ML
0.2 INJECTION, SOLUTION INTRAMUSCULAR; INTRAVENOUS; SUBCUTANEOUS
Status: DISCONTINUED | OUTPATIENT
Start: 2022-04-09 | End: 2022-04-14 | Stop reason: HOSPADM

## 2022-04-09 RX ORDER — LOPERAMIDE HCL 2 MG
2 CAPSULE ORAL 4 TIMES DAILY PRN
COMMUNITY
End: 2022-05-02

## 2022-04-09 RX ORDER — METOPROLOL TARTRATE 25 MG/1
25 TABLET, FILM COATED ORAL 2 TIMES DAILY
COMMUNITY
End: 2022-05-02

## 2022-04-09 RX ORDER — CHOLECALCIFEROL (VITAMIN D3) 50 MCG
1 TABLET ORAL DAILY
COMMUNITY
End: 2022-05-02

## 2022-04-09 RX ORDER — CARBOXYMETHYLCELLULOSE SODIUM 10 MG/ML
1 GEL OPHTHALMIC DAILY PRN
COMMUNITY
End: 2022-05-02

## 2022-04-09 RX ORDER — LISINOPRIL 20 MG/1
10 TABLET ORAL DAILY
COMMUNITY
End: 2022-04-28

## 2022-04-09 RX ORDER — VITAMIN B COMPLEX
50 TABLET ORAL DAILY
Status: DISCONTINUED | OUTPATIENT
Start: 2022-04-10 | End: 2022-04-14 | Stop reason: HOSPADM

## 2022-04-09 RX ORDER — ASCORBIC ACID 500 MG
500 TABLET ORAL 2 TIMES DAILY
Status: DISCONTINUED | OUTPATIENT
Start: 2022-04-09 | End: 2022-04-14 | Stop reason: HOSPADM

## 2022-04-09 RX ORDER — AMPICILLIN 2 G/1
2 INJECTION, POWDER, FOR SOLUTION INTRAVENOUS ONCE
Status: COMPLETED | OUTPATIENT
Start: 2022-04-09 | End: 2022-04-09

## 2022-04-09 RX ORDER — NALOXONE HYDROCHLORIDE 0.4 MG/ML
0.4 INJECTION, SOLUTION INTRAMUSCULAR; INTRAVENOUS; SUBCUTANEOUS
Status: DISCONTINUED | OUTPATIENT
Start: 2022-04-09 | End: 2022-04-14 | Stop reason: HOSPADM

## 2022-04-09 RX ORDER — ACETAMINOPHEN 500 MG
1000 TABLET ORAL EVERY 6 HOURS PRN
Status: DISCONTINUED | OUTPATIENT
Start: 2022-04-09 | End: 2022-04-10

## 2022-04-09 RX ORDER — METOPROLOL TARTRATE 25 MG/1
25 TABLET, FILM COATED ORAL 2 TIMES DAILY
Status: DISCONTINUED | OUTPATIENT
Start: 2022-04-09 | End: 2022-04-14 | Stop reason: HOSPADM

## 2022-04-09 RX ORDER — LIDOCAINE 40 MG/G
CREAM TOPICAL
Status: DISCONTINUED | OUTPATIENT
Start: 2022-04-09 | End: 2022-04-14 | Stop reason: HOSPADM

## 2022-04-09 RX ORDER — ACETAMINOPHEN 500 MG
1000 TABLET ORAL ONCE
Status: COMPLETED | OUTPATIENT
Start: 2022-04-09 | End: 2022-04-09

## 2022-04-09 RX ORDER — SILICONE ADHESIVE 1.5" X 3"
1 SHEET (EA) TOPICAL AT BEDTIME
COMMUNITY
End: 2022-05-02

## 2022-04-09 RX ORDER — ROSUVASTATIN CALCIUM 5 MG/1
5 TABLET, COATED ORAL AT BEDTIME
Status: DISCONTINUED | OUTPATIENT
Start: 2022-04-09 | End: 2022-04-14 | Stop reason: HOSPADM

## 2022-04-09 RX ORDER — SENNA AND DOCUSATE SODIUM 50; 8.6 MG/1; MG/1
1 TABLET, FILM COATED ORAL 2 TIMES DAILY PRN
COMMUNITY
End: 2022-05-02

## 2022-04-09 RX ORDER — MULTIVIT-MIN/IRON/FOLIC ACID/K 18-600-40
500 CAPSULE ORAL 2 TIMES DAILY
COMMUNITY
End: 2022-05-26

## 2022-04-09 RX ORDER — POLYETHYLENE GLYCOL 3350 17 G/17G
17 POWDER, FOR SOLUTION ORAL DAILY PRN
Status: DISCONTINUED | OUTPATIENT
Start: 2022-04-09 | End: 2022-04-14 | Stop reason: HOSPADM

## 2022-04-09 RX ORDER — ACETAMINOPHEN 500 MG
1000 TABLET ORAL 3 TIMES DAILY
Status: ON HOLD | COMMUNITY
End: 2022-04-11

## 2022-04-09 RX ORDER — ONDANSETRON 4 MG/1
4 TABLET, ORALLY DISINTEGRATING ORAL EVERY 6 HOURS PRN
COMMUNITY
End: 2022-05-02

## 2022-04-09 RX ORDER — POLYETHYLENE GLYCOL 3350 17 G/17G
1 POWDER, FOR SOLUTION ORAL DAILY PRN
COMMUNITY

## 2022-04-09 RX ORDER — HYDROMORPHONE HYDROCHLORIDE 2 MG/1
2 TABLET ORAL EVERY 4 HOURS PRN
Status: COMPLETED | OUTPATIENT
Start: 2022-04-09 | End: 2022-04-10

## 2022-04-09 RX ORDER — ROSUVASTATIN CALCIUM 5 MG/1
5 TABLET, COATED ORAL AT BEDTIME
COMMUNITY
End: 2022-05-02

## 2022-04-09 RX ORDER — WARFARIN SODIUM 2 MG/1
2 TABLET ORAL DAILY
COMMUNITY
End: 2022-07-28

## 2022-04-09 RX ORDER — FENTANYL CITRATE 50 UG/ML
25 INJECTION, SOLUTION INTRAMUSCULAR; INTRAVENOUS ONCE
Status: COMPLETED | OUTPATIENT
Start: 2022-04-09 | End: 2022-04-09

## 2022-04-09 RX ORDER — LIDOCAINE 4 G/G
1 PATCH TOPICAL EVERY 24 HOURS
COMMUNITY
End: 2022-05-02

## 2022-04-09 RX ORDER — SODIUM CHLORIDE 9 MG/ML
INJECTION, SOLUTION INTRAVENOUS CONTINUOUS
Status: DISCONTINUED | OUTPATIENT
Start: 2022-04-09 | End: 2022-04-12

## 2022-04-09 RX ADMIN — SODIUM CHLORIDE: 9 INJECTION, SOLUTION INTRAVENOUS at 19:20

## 2022-04-09 RX ADMIN — ACETAMINOPHEN 1000 MG: 500 TABLET, FILM COATED ORAL at 21:48

## 2022-04-09 RX ADMIN — METOPROLOL TARTRATE 25 MG: 25 TABLET, FILM COATED ORAL at 19:28

## 2022-04-09 RX ADMIN — HYDROMORPHONE HYDROCHLORIDE 2 MG: 2 TABLET ORAL at 19:25

## 2022-04-09 RX ADMIN — ROSUVASTATIN CALCIUM 5 MG: 5 TABLET, FILM COATED ORAL at 21:48

## 2022-04-09 RX ADMIN — OXYCODONE HYDROCHLORIDE AND ACETAMINOPHEN 500 MG: 500 TABLET ORAL at 19:28

## 2022-04-09 RX ADMIN — PHYTONADIONE 5 MG: 10 INJECTION, EMULSION INTRAMUSCULAR; INTRAVENOUS; SUBCUTANEOUS at 16:11

## 2022-04-09 RX ADMIN — ACETAMINOPHEN 1000 MG: 500 TABLET, FILM COATED ORAL at 12:47

## 2022-04-09 RX ADMIN — FENTANYL CITRATE 25 MCG: 50 INJECTION, SOLUTION INTRAMUSCULAR; INTRAVENOUS at 16:10

## 2022-04-09 RX ADMIN — AMPICILLIN SODIUM 2 G: 2 INJECTION, POWDER, FOR SOLUTION INTRAMUSCULAR; INTRAVENOUS at 16:52

## 2022-04-09 ASSESSMENT — ACTIVITIES OF DAILY LIVING (ADL)
ADLS_ACUITY_SCORE: 12
ADLS_ACUITY_SCORE: 16
ADLS_ACUITY_SCORE: 12
ADLS_ACUITY_SCORE: 16
ADLS_ACUITY_SCORE: 16
ADLS_ACUITY_SCORE: 12
ADLS_ACUITY_SCORE: 16
ADLS_ACUITY_SCORE: 16
ADLS_ACUITY_SCORE: 12

## 2022-04-09 ASSESSMENT — ENCOUNTER SYMPTOMS
NECK PAIN: 0
ARTHRALGIAS: 1
JOINT SWELLING: 1
HEADACHES: 1

## 2022-04-09 NOTE — H&P
[facility] [place]    History and Physical - Hospitalist Service       Date of Admission:  4/9/2022    Assessment & Plan      Maia Miranda is a 89 year old female admitted on 4/9/2022. She presents with Trimalleolar left ankle fracture after a fall at assisted living  #Trimalleolar left ankle fracture    -will admit to inpatient    -pain control    -orthopedics consult (likely will require ORIF)    -hold NPO pending the above    -patient maximized for surgery medically (does have normal LVF and no     evidence of CAD  -will initiate Vitamin K to reverse Coumadin  -follow up INR after above  -can give FFP if not corrected with the above  -get preop EKG  #PAF  #Hx of PE  #chronic anticoagulation therapy  -reverse anticoagulation as above  -will need lovenox post op until INR back out again  #HTN  Hold lisinopril pending potential surgery  Reestablish post op  continue beta blocker periop  #CRI  Creatinine up some from baseline  Will hydrate with 0.9% NS  #Dementia/HLP    Stable  #UTI    Had treatment with kelfex for UTI but Cx positive for Enterococcus will give 2 gram dose of ampicillin now given potential upcoming surgery       Diet:  NPO for potential surgery  DVT Prophylaxis: Warfarin  Guillen Catheter: Not present  Central Lines: None  Cardiac Monitoring: None  Code Status:  DNR/DNI    Clinically Significant Risk Factors Present on Admission               # Coagulation Defect: home medication list includes an anticoagulant medication        Disposition Plan   Expected Discharge: 4/12/2022  Anticipated discharge location:  Awaiting care coordination huddle         The patient's care was discussed with the Patient and ED provider.    Bear Pryor MD  Hospitalist Service  [facility] [place]  Securely message with the Vocera Web Console (learn more here)  Text page via [employer] Paging/Directory         ______________________________________________________________________    Chief  "Complaint   Left ankle pain    History is obtained from the patient. Patient a poor historian, stated her only medical problem was \"INR\" and patient will need charts merged and Past history gleaned form other record.    History of Present Illness   Maia Miranda is a 89 year old female with PMHx as described below  Who is on Coumadin for PAF and Hx of VTE (PE) who presented to the ED this afternoon after a fall at her [facility], Patient has underlying dementia and can't remember falling but reported that she tripped and fell forward. She states she twisted her ankle and hit her forehead. Notified her son via life alert.  Brought to ED by EMS subsequent evaluation revealed a negative head CTand C-Spine CT for acute findings. But a trimalleolar of her left ankle. It was reduced and splinted in ED. Ortho was notified but was at the time in surgery at another system hospital.  I am asked to admit her with orthopedic consultation.    Review of Systems    Patient gave a generally negative 12 point ROS otherwise but limited by dementia    Past Medical History    # Paroxysmal atrial fibrillation, trifascicular block.  Cardiac event monitor demonstrated paroxysmal atrial fibrillation, no significant bradycardic episodes.  On warfarin.  # HTN  # HLP  #CRI 3A  # Prior VTE  # Dementia/Cognitive impairment  #Prior Hx of GI bleed/Ischemic colitis  #elevated troponin ith Covid-19 illness  Echo 12/27/2021:  Interpretation Summary  Global and regional left ventricular function is normal with an EF of 55-60%.  Global right ventricular function is normal. The right ventricle is normal  size.  No significant valvular abnormalities.  IVC diameter >2.1 cm collapsing <50% with sniff suggests a high RA pressure  estimated at 15 mmHg or greater. The left atrial pressure is also increased.  This study was compared with the study from 09/01/2021. No significant changes  noted  Past Surgical History    CATARACT IOL, RT/LT         corneal scraping   "       CYSTOSCOPY         CYSTOSCOPY, BIOPSY BLADDER, COMBINED N/A 7/25/2016     Procedure: COMBINED CYSTOSCOPY, BIOPSY BLADDER;  Surgeon: Bernadine Maria MD;  Location: [facility] OR     ESOPHAGOSCOPY, GASTROSCOPY, DUODENOSCOPY (EGD), COMBINED   7/8/2013     Procedure: COMBINED ESOPHAGOSCOPY, GASTROSCOPY, DUODENOSCOPY (EGD);   ESOPHAGOSCOPY, GASTROSCOPY, DUODENOSCOPY (EGD)   ;  Surgeon: Shawn Soto MD;  Location: [facility] GI     IMPLANT STIMULATOR SACRAL NERVE STAGE ONE N/A 4/4/2017     Procedure: IMPLANT STIMULATOR SACRAL NERVE STAGE ONE;  Surgeon: Kb Tracy MD;  Location: [facility] OR     IMPLANT STIMULATOR SACRAL NERVE STAGE TWO N/A 4/18/2017     Procedure: IMPLANT STIMULATOR SACRAL NERVE STAGE TWO;  Stage Two Interstim  ;  Surgeon: Kb Tracy MD;  Location: [facility] OR     interstim placement         [facility] NONSPECIFIC PROCEDURE         D&C x 2 in 1957 & 1962 -- Abstracted 06/10/02     Z NONSPECIFIC PROCEDURE         Left breast biopsy x 2 in 1988 & 1989 -- Abstracted 06/10/02     Z NONSPECIFIC PROCEDURE   1958     Influenza resulting in hospitalization -- Abstracted 06/10/02     Z NONSPECIFIC PROCEDURE   1971     10 day hospitalization from bilateral pulmonary enboli -- Abstracted 06/10/02     Z NONSPECIFIC PROCEDURE   1/03     colonoscopy  negative     Allergies:        Allergies   Allergen Reactions     Codeine Nausea and Vomiting       Other reaction(s): GI Intolerance  Other reaction(s): GI Intolerance, Vomiting     Meperidine Nausea and Vomiting       Other reaction(s): GI Intolerance  Other reaction(s): GI Intolerance, Vomiting     Morphine Nausea and Vomiting       Other reaction(s): GI Intolerance  vomiting  Other reaction(s): GI Intolerance, Vomiting     Penicillins Hives       hives     Sulfa Drugs         Other reaction(s): GI Intolerance  Vomiting     Other reaction(s): GI Intolerance     Epinephrine Palpitations       Other reaction(s): Other (See Comments)  Makes heart race  Other  reaction(s): Other (See Comments), Tachycardia  Makes heart race            Social History:       History   Drug Use No          History   Smoking Status     Never Smoker   Smokeless Tobacco     Never Used      Social History    Substance and Sexual Activity      Alcohol use: No        Family History     Problem Relation Age of Onset     Cerebrovascular Disease Father            at 92     Psychotic Disorder Mother           Suicide 62, depression     Alzheimer Disease Maternal Grandmother       Cardiovascular Sister           pacemaker     Glaucoma No family hx of        Prior to Admission Medications   Prior to Admission Medications   Prescriptions Last Dose Informant Patient Reported? Taking?   Ascorbic Acid (VITAMIN C) 500 MG CAPS 2022 at 0900  Yes Yes   Sig: Take 500 mg by mouth 2 times daily   Carboxymethylcellulose Sodium (REFRESH LIQUIGEL) 1 % GEL   Yes Yes   Sig: Apply 1 drop to eye daily as needed   Lidocaine (LIDOCARE) 4 % Patch 2022 at 0900  Yes Yes   Sig: Place 1 patch onto the skin every 24 hours To prevent lidocaine toxicity, patient should be patch free for 12 hrs daily.   SENNA-docusate sodium (SENNA S) 8.6-50 MG tablet   Yes Yes   Sig: Take 1 tablet by mouth 2 times daily as needed   acetaminophen (TYLENOL) 500 MG tablet 2022 at 0900  Yes Yes   Sig: Take 1,000 mg by mouth 3 times daily At 0900, 1300, 1800.   cephALEXin (KEFLEX) 250 MG capsule 2022  Yes Yes   Sig: Take 250 mg by mouth every morning   lisinopril (ZESTRIL) 20 MG tablet 2022  Yes Yes   Sig: Take 10 mg by mouth daily   loperamide (IMODIUM) 2 MG capsule   Yes Yes   Sig: Take 2 mg by mouth 4 times daily as needed for diarrhea   menthol-zinc oxide (CALMOSEPTINE) 0.44-20.6 % OINT ointment   Yes Yes   Sig: Apply topically as needed   metoprolol tartrate (LOPRESSOR) 25 MG tablet 2022 at 0900  Yes Yes   Sig: Take 25 mg by mouth 2 times daily   ondansetron (ZOFRAN-ODT) 4 MG ODT tab   Yes Yes   Sig: Take 4 mg by mouth  every 6 hours as needed for nausea   polyethylene glycol (MIRALAX) 17 g packet   Yes Yes   Sig: Take 1 packet by mouth daily as needed for constipation   rosuvastatin (CRESTOR) 5 MG tablet 4/8/2022  Yes Yes   Sig: Take 5 mg by mouth At Bedtime   sodium chloride (PETER 128) 5 % ophthalmic solution 4/8/2022 at 1800  Yes Yes   Sig: Place 1 drop into both eyes At Bedtime   vitamin D3 (CHOLECALCIFEROL) 50 mcg (2000 units) tablet   Yes Yes   Sig: Take 1 tablet by mouth daily   warfarin ANTICOAGULANT (COUMADIN) 2 MG tablet 4/9/2022 at 0900  Yes Yes   Sig: Take 2 mg by mouth daily      Facility-Administered Medications: None     Allergies   Codeine CodeineNausea and VomitingNot SpecifiedSide Dptmik22/29/2010Deletion Reason: Meperidine MeperidineNausea and VomitingNot SpecifiedSide Lfmehz06/29/2010Deletion Reason: Morphine MorphineNausea and VomitingNot SpecifiedSide Nvvlqj2511/20/2003Deletion Reason: Penicillins PenicillinsHivesNot Ybsaqpuhy50/20/2003Deletion Reason: Sulfa Drugs Sulfa DrugsNot SpecifiedSide Effect6/30/2012Other reaction(s): GI Intolerance, VomitingDeletion Reason: Epinephrine EpinephrinePalpitationsLow    Physical Exam   Vital Signs: Temp: 97.8  F (36.6  C) Temp src: Oral BP: 126/72 Pulse: 94   Resp: 16 SpO2: 94 % O2 Device: None (Room air)    Weight: 0 lbs 0 oz    Constitutional: awake, alert, cooperative, no apparent distress, and appears stated age  Eyes: Lids and lashes normal, pupils equal, round and reactive to light, extra ocular muscles intact, sclera clear, conjunctiva normal  ENT: Has Left frontal hematoma, Oral Mucosa moist, posterior pharynx clear  Hematologic / Lymphatic: no cervical lymphadenopathy and no supraclavicular lymphadenopathy  Respiratory: No increased work of breathing, good air exchange, clear to auscultation bilaterally, no crackles or wheezing  Cardiovascular: Normal apical impulse, regular rate and rhythm, normal S1 and S2, no S3 or S4, and no murmur noted  GI: , normal bowel  sounds, soft, non-distended, non-tender, no masses palpated, no hepatosplenomegally  Skin: no bruising or bleeding and no rashes  Musculoskeletal: Left distal lower extremity in splint, good distal sensation and capillary refill normal  Neurologic: Alert, confused to date and situation, DTRS non focal as is motor exam, LLE not tested  Neuropsychiatric: Level of consciousness: alert / normal  Affect: normal    Data   Data reviewed today: I reviewed all medications, new labs and imaging results over the last 24 hours. I personally reviewed the CT head/neck and left ankle xrays image(s) showing left ankle fracture and findings as below.    Recent Labs   Lab 04/09/22  1200   WBC 6.4   HGB 13.8   *      INR 2.44*      POTASSIUM 4.9   CHLORIDE 105   CO2 30   BUN 28   CR 1.35*   ANIONGAP 2*   CLARISSE 10.0   *     Recent Results (from the past 24 hour(s))   Head CT w/o contrast    Narrative    EXAM: CT HEAD WITHOUT CONTRAST  LOCATION: [facility]  DATE/TIME: 04/09/2022, 12:50 PM    INDICATION: Fall, head contusion, on Coumadin.  COMPARISON: Head CT 12/25/2021.  TECHNIQUE: Routine CT Head without IV contrast. Multiplanar reformats. Dose reduction techniques were used.    FINDINGS:  INTRACRANIAL CONTENTS: No intracranial hemorrhage, extra-axial collection, or mass effect.  No CT evidence of acute infarct. Mild presumed chronic small vessel ischemic changes. Mild generalized volume loss. No hydrocephalus.     VISUALIZED ORBITS/SINUSES/MASTOIDS: Prior bilateral cataract surgery. Visualized portions of the orbits are otherwise unremarkable. No paranasal sinus mucosal disease. No middle ear or mastoid effusion.    BONES/SOFT TISSUES: Right frontal scalp contusion.      Impression    IMPRESSION:  1.  No acute intracranial abnormality.     Cervical spine CT w/o contrast    Narrative    EXAM: CT CERVICAL SPINE WITHOUT CONTRAST  LOCATION: [facility]  DATE/TIME:  04/09/2022, 12:51 PM    INDICATION: Trauma, neck injury.  COMPARISON: Cervical spine CT 12/25/2021.  TECHNIQUE: Routine CT Cervical Spine without IV contrast. Multiplanar reformats. Dose reduction techniques were used.    FINDINGS:  No evidence of acute fracture or posttraumatic subluxation. Moderate to severe degenerative disc disease at C4-C5 and C5-C6. Background of mild degenerative change. Moderate spinal canal stenoses at C3-C4, C4-C5, and C5-C6 with flattening of the anterior   cord. Foraminal stenoses at multiple levels. Severe foraminal stenosis on the left at C5-C6 and the right at C4-C5.    Presumed chronic scarring and atelectasis at the lung apices. Carotid vascular calcifications.      Impression    IMPRESSION:  1.  No evidence of acute fracture or posttraumatic subluxation.  2.  Degenerative change with multiple stenoses.     XR Ankle Left 2 Views    Narrative    EXAM: XR ANKLE LT 2 VW  LOCATION: [facility]  DATE/TIME: 4/9/2022 1:02 PM    INDICATION: left ankle pain deformity  COMPARISON: None.      Impression    IMPRESSION: Trimalleolar fracture/dislocation of the tibiotalar joint.    Acute, displaced and angulated fracture of the distal fibula/lateral malleolus with oblique orientation. Displaced transverse fracture through the base of the medial malleolus. Minimally displaced posterior malleolus fracture.    There is lateral and slightly anterior tibiotalar dislocation.    Soft tissue swelling. Bones are demineralized.   XR Ankle Left 2 Views    Narrative    EXAM: XR ANKLE LT 2 VW  LOCATION: [facility]  DATE/TIME: 4/9/2022 2:18 PM    INDICATION: Post reduction left ankle fracture-dislocation, left ankle pain.  COMPARISON: 04/09/2022 at 1315 hours.      Impression    IMPRESSION: Acute trimalleolar fracture dislocation of the left ankle with lateral displacement and angulation of the medial and lateral malleolar fracture fragments, the degree of  displacement and angulation has improved since prior study. Acute mildly   displaced posterior malleolar fracture. Lateral and slight posterior displacement of the talus.

## 2022-04-09 NOTE — CONSULTS
Ortho full consult to follow.  Imaging reviewed demonstrating a left trimal ankle fracture/dislocation.  This should be addressed with formal ORIF when patient felt to be medically optimized.  Ideally, will have INR around 1.5 prior to proceeding.  NPO after midnight and recheck INR this evening and in early am.  Hospitalist to please assist with INR reversal.      Kulwant Cooper MD  [phone]

## 2022-04-09 NOTE — ED NOTES
[facility]  ED Nurse Handoff Report    Maia Miranda is a 89 year old female   ED Chief complaint: Fall  . ED Diagnosis:   Final diagnoses:   Fall, initial encounter   Closed trimalleolar fracture of left ankle, initial encounter     Allergies: No Known Allergies    Code Status: Full Code  Activity level - Baseline/Home:  Independent. Activity Level - Current:   Assist X 1. Lift room needed: No. Bariatric: No   Needed: No   Isolation: No. Infection: Not Applicable.     Vital Signs:   Vitals:    04/09/22 1345 04/09/22 1400 04/09/22 1445 04/09/22 1500   BP: (!) 153/87 (!) 146/84 131/80 126/72   Pulse:  95  94   Resp:       Temp:       TempSrc:       SpO2: 98% 97%  94%       Cardiac Rhythm:  ,      Pain level:    Patient confused: No. Patient Falls Risk: Yes.   Elimination Status: Has voided   Patient Report - Initial Complaint: Fall.   Focused Assessment: Maia Miranda is a 89 year old female anticoagulated on Coumadin for atrial fibrillation who with a history of pulmonary embolism presents via EMS with a fall. The patient does not fully remember her fall, but reports that she tripped and fell forwards while using the bathroom at her assisted living facility and hit her forehead. No loss of consciousness. She used her life alert to notify her son. She has a hematoma on her forehead that is causing pain and left ankle pain and swelling. Staff attempted to lift her up, but she was unable to stand unassisted. Denies neck pain.  Tests Performed:   Labs Ordered and Resulted from Time of ED Arrival to Time of ED Departure   BASIC METABOLIC PANEL - Abnormal       Result Value    Sodium 137      Potassium 4.9      Chloride 105      Carbon Dioxide (CO2) 30      Anion Gap 2 (*)     Urea Nitrogen 28      Creatinine 1.35 (*)     Calcium 10.0      Glucose 124 (*)     GFR Estimate 37 (*)    CBC WITH PLATELETS AND DIFFERENTIAL - Abnormal    WBC Count 6.4      RBC Count 4.25      Hemoglobin 13.8       Hematocrit 43.5       (*)     MCH 32.5      MCHC 31.7      RDW 13.2      Platelet Count 159      % Neutrophils 79      % Lymphocytes 9      % Monocytes 8      % Eosinophils 3      % Basophils 1      % Immature Granulocytes 0      NRBCs per 100 WBC 0      Absolute Neutrophils 5.1      Absolute Lymphocytes 0.6 (*)     Absolute Monocytes 0.5      Absolute Eosinophils 0.2      Absolute Basophils 0.0      Absolute Immature Granulocytes 0.0      Absolute NRBCs 0.0     INR - Abnormal    INR 2.44 (*)    COVID-19 VIRUS (CORONAVIRUS) BY PCR - Normal    SARS CoV2 PCR Negative       XR Ankle Left 2 Views   Final Result   IMPRESSION: Acute trimalleolar fracture dislocation of the left ankle with lateral displacement and angulation of the medial and lateral malleolar fracture fragments, the degree of displacement and angulation has improved since prior study. Acute mildly    displaced posterior malleolar fracture. Lateral and slight posterior displacement of the talus.      XR Ankle Left 2 Views   Final Result   IMPRESSION: Trimalleolar fracture/dislocation of the tibiotalar joint.      Acute, displaced and angulated fracture of the distal fibula/lateral malleolus with oblique orientation. Displaced transverse fracture through the base of the medial malleolus. Minimally displaced posterior malleolus fracture.      There is lateral and slightly anterior tibiotalar dislocation.      Soft tissue swelling. Bones are demineralized.      Cervical spine CT w/o contrast   Final Result   IMPRESSION:   1.  No evidence of acute fracture or posttraumatic subluxation.   2.  Degenerative change with multiple stenoses.         Head CT w/o contrast   Final Result   IMPRESSION:   1.  No acute intracranial abnormality.               Treatments provided: see epic  Family Comments: NA  OBS brochure/video discussed/provided to patient:  N/A  ED Medications:   Medications   phytonadione 5 mg in sodium chloride 0.9 % 50 mL intermittent infusion  (has no administration in time range)   acetaminophen (TYLENOL) tablet 1,000 mg (1,000 mg Oral Given 4/9/22 1247)     Drips infusing:  Yes  For the majority of the shift, the patient's behavior Green. Interventions performed were NA.    Sepsis treatment initiated: No     Patient tested for COVID 19 prior to admission: YES    ED Nurse Name/Phone Number: Gertrude Mcdermott RN, RECEIVING UNIT ED HANDOFF REVIEW    Above ED Nurse Handoff Report was reviewed: Yes  Reviewed by: Lucille Worthington RN on April 9, 2022 at 5:31 PM     3:14 PM.

## 2022-04-09 NOTE — ED PROVIDER NOTES
History   Chief Complaint:  Fall     HPI   Maia Miranda is a 89 year old female anticoagulated on Coumadin for atrial fibrillation who with a history of pulmonary embolism presents via EMS with a fall. The patient does not fully remember her fall, but reports that she tripped and fell forwards while using the bathroom at her assisted living facility and hit her forehead. No loss of consciousness. She used her life alert to notify her son. She has a hematoma on her forehead that is causing pain and left ankle pain and swelling. Staff attempted to lift her up, but she was unable to stand unassisted. Denies neck pain.     Review of Systems   Musculoskeletal: Positive for arthralgias and joint swelling. Negative for neck pain.   Neurological: Positive for headaches.   Hematological:        Hematoma   All other systems reviewed and are negative.    Allergies:  Penicillins    Medications:  Coumadin  Senna  Ascorbic Acid  Crestor  Lopressor  Zestril  Vitamin D  Keflex    Past Medical History:     Atrial fibrillation  Pulmonary embolism   Amnestic mild cognitive impairment  Duodenal ulcer  Gastroesophageal reflux disease  Tinnitus  Hypertension  Lumbago  Hiatal hernia  Mitral valve regurgitation  Occlusion and stenosis of carotid artery  Osteoporosis   Tremors  Depressive disorder  Erosive eye disorder    Social History:  Presents via EMS  Covid vaccinated    Physical Exam     Patient Vitals for the past 24 hrs:   BP Temp Temp src Pulse Resp SpO2   04/09/22 1630 -- -- -- -- -- 94 %   04/09/22 1615 126/68 -- -- -- -- 94 %   04/09/22 1600 133/82 -- -- 86 -- 95 %   04/09/22 1545 130/78 -- -- -- -- 95 %   04/09/22 1530 -- -- -- -- -- 94 %   04/09/22 1515 122/76 -- -- -- -- 95 %   04/09/22 1500 126/72 -- -- 94 -- 94 %   04/09/22 1445 131/80 -- -- -- -- --   04/09/22 1400 (!) 146/84 -- -- 95 -- 97 %   04/09/22 1345 (!) 153/87 -- -- -- -- 98 %   04/09/22 1330 -- -- -- -- -- 97 %   04/09/22 1245 (!) 160/95 -- -- -- -- 99 %    04/09/22 1155 (!) 158/106 97.8  F (36.6  C) Oral 93 16 98 %     Physical Exam    Nursing note and vitals reviewed.  HENT:   Large hematoma right forehead without laceration.   Mouth/Throat: Moist mucous membranes.   Eyes: EOMI, nonicteric sclera  Cardiovascular: Normal rate, regular rhythm, no murmurs, rubs, or gallops  Pulmonary/Chest: Effort normal and breath sounds normal. No respiratory distress. No wheezes. No rales.   Abdominal: Soft. Nontender, nondistended, no guarding or rigidity.   Musculoskeletal: Left ankle deformed and painful. Normal DP pulse. Able to wiggle all toes.   Neurological: Alert. Moves all extremities spontaneously.   Skin: Skin is warm and dry. No rash noted.   Psychiatric: Normal mood and affect.     Emergency Department Course     ECG  ECG taken at 1640, ECG read at 1646  Atrial fibrillation; left axis deviation; right bundle branch block; possible lateral infarct, age undetermined   No prior ECG for comparison  Rate 79 bpm. ID interval * ms. QRS duration 124 ms. QT/QTc 426/488 ms. P-R-T axes * -79 23.      Imaging:  XR Ankle Left 2 Views   Final Result   IMPRESSION: Acute trimalleolar fracture dislocation of the left ankle with lateral displacement and angulation of the medial and lateral malleolar fracture fragments, the degree of displacement and angulation has improved since prior study. Acute mildly    displaced posterior malleolar fracture. Lateral and slight posterior displacement of the talus.      XR Ankle Left 2 Views   Final Result   IMPRESSION: Trimalleolar fracture/dislocation of the tibiotalar joint.      Acute, displaced and angulated fracture of the distal fibula/lateral malleolus with oblique orientation. Displaced transverse fracture through the base of the medial malleolus. Minimally displaced posterior malleolus fracture.      There is lateral and slightly anterior tibiotalar dislocation.      Soft tissue swelling. Bones are demineralized.      Cervical spine CT w/o  contrast   Final Result   IMPRESSION:   1.  No evidence of acute fracture or posttraumatic subluxation.   2.  Degenerative change with multiple stenoses.         Head CT w/o contrast   Final Result   IMPRESSION:   1.  No acute intracranial abnormality.           Report per radiology     Imaging independently reviewed and agree with radiologist interpretation.     Laboratory:  Labs Ordered and Resulted from Time of ED Arrival to Time of ED Departure   BASIC METABOLIC PANEL - Abnormal       Result Value    Sodium 137      Potassium 4.9      Chloride 105      Carbon Dioxide (CO2) 30      Anion Gap 2 (*)     Urea Nitrogen 28      Creatinine 1.35 (*)     Calcium 10.0      Glucose 124 (*)     GFR Estimate 37 (*)    CBC WITH PLATELETS AND DIFFERENTIAL - Abnormal    WBC Count 6.4      RBC Count 4.25      Hemoglobin 13.8      Hematocrit 43.5       (*)     MCH 32.5      MCHC 31.7      RDW 13.2      Platelet Count 159      % Neutrophils 79      % Lymphocytes 9      % Monocytes 8      % Eosinophils 3      % Basophils 1      % Immature Granulocytes 0      NRBCs per 100 WBC 0      Absolute Neutrophils 5.1      Absolute Lymphocytes 0.6 (*)     Absolute Monocytes 0.5      Absolute Eosinophils 0.2      Absolute Basophils 0.0      Absolute Immature Granulocytes 0.0      Absolute NRBCs 0.0     INR - Abnormal    INR 2.44 (*)    COVID-19 VIRUS (CORONAVIRUS) BY PCR - Normal    SARS CoV2 PCR Negative        Procedures       Reduction   Procedure Note: Reduction of fracture/dislocation left ankle  Physician: Amor Gibbons MD    Diagnosis: Left ankle fracture/dislocation    Consent: Informed verbal.     Description of Procedure: Consent as above.  Patient supine. The toes were grasped and reoriented anteriorly while traction was applied. The neurovascular exam was normal before and after reduction attempt.  The patient tolerated the procedure well, there were no complications.       Splint Procedure Note:    Splint type: Short leg  posterior   Material: Orthoglass  Location: Left lower extremity.   Indication: Fracture/Dislocation    Performed by: Amor Gibbons MD    Procedure: Cast padding applied to skin with particular attention applied to bony prominences, splint applied in usual fashion.  Post splint sensation, motor, cap refill intact.      Post Reductions radiographs indicate an incomplete reduction.     Emergency Department Course:    Reviewed:  I reviewed nursing notes, vitals, past medical history    Assessments:  1228 I obtained history and examined the patient as noted above.   1337 I rechecked the patient and explained findings.     Consults:  1409 I consulted with Dr. Pryor from hospitalist service.    Interventions:  Medications   ampicillin (OMNIPEN) 2 g vial to attach to  mL bag (has no administration in time range)   acetaminophen (TYLENOL) tablet 1,000 mg (1,000 mg Oral Given 4/9/22 1247)   phytonadione 5 mg in sodium chloride 0.9 % 50 mL intermittent infusion (5 mg Intravenous New Bag 4/9/22 1611)   fentaNYL (PF) (SUBLIMAZE) injection 25 mcg (25 mcg Intravenous Given 4/9/22 1610)     Disposition:  The patient was admitted to the hospital under the care of Dr. Pryor.     Impression & Plan       Medical Decision Making:  Pt presents following fall at home.  She is anticoagulated with head trauma, therefore imaging of the head and neck is indicated given her age.  Fortunately, no traumatic injury noted.  Patient also has deformity of her left ankle.  X-rays show a trimall fracture.  This is closed.  This was successfully reduced at bedside and splinted.  I contacted orthopedic surgery, Dr. Cooper, who placed a brief consult note.  I also discussed with hospitalist service, Dr. Pryor, who accepts patient for admission.  All of patient and family's questions were answered and they are in agreement with the plan.    Diagnosis:    ICD-10-CM    1. Fall, initial encounter  W19.XXXA    2. Closed trimalleolar fracture of left  ankle, initial encounter  S82.029C        Scribe Disclosure:  I, Stacie Moran, am serving as a scribe at 11:58 AM on 4/9/2022 to document services personally performed by Amor Gibbons MD based on my observations and the provider's statements to me.            Amor Gibbons MD  04/09/22 6035

## 2022-04-09 NOTE — ED TRIAGE NOTES
Presents to ED via EMS following mechanical fall at assisted living. Pt tripped and fell forward. She denies losing consciousness. Pt takes coumadin for atrial fibrillation. Pt has goose egg on right forehead and swelling to left ankle. GCS 14 - unsure of year and daughter states that is normal for her. ABCs intact.

## 2022-04-10 ENCOUNTER — APPOINTMENT (OUTPATIENT)
Dept: GENERAL RADIOLOGY | Facility: CLINIC | Age: 87
DRG: 493 | End: 2022-04-10
Attending: ORTHOPAEDIC SURGERY
Payer: COMMERCIAL

## 2022-04-10 ENCOUNTER — ANESTHESIA (OUTPATIENT)
Dept: SURGERY | Facility: CLINIC | Age: 87
DRG: 493 | End: 2022-04-10
Payer: COMMERCIAL

## 2022-04-10 ENCOUNTER — ANESTHESIA EVENT (OUTPATIENT)
Dept: SURGERY | Facility: CLINIC | Age: 87
DRG: 493 | End: 2022-04-10
Payer: COMMERCIAL

## 2022-04-10 LAB
ALBUMIN SERPL-MCNC: 2.9 G/DL (ref 3.4–5)
ALP SERPL-CCNC: 74 U/L (ref 40–150)
ALT SERPL W P-5'-P-CCNC: 21 U/L (ref 0–50)
ANION GAP SERPL CALCULATED.3IONS-SCNC: 5 MMOL/L (ref 3–14)
AST SERPL W P-5'-P-CCNC: 23 U/L (ref 0–45)
ATRIAL RATE - MUSE: 105 BPM
BILIRUB SERPL-MCNC: 0.7 MG/DL (ref 0.2–1.3)
BUN SERPL-MCNC: 21 MG/DL (ref 7–30)
CALCIUM SERPL-MCNC: 8.9 MG/DL (ref 8.5–10.1)
CHLORIDE BLD-SCNC: 111 MMOL/L (ref 94–109)
CO2 SERPL-SCNC: 23 MMOL/L (ref 20–32)
CREAT SERPL-MCNC: 0.93 MG/DL (ref 0.52–1.04)
DIASTOLIC BLOOD PRESSURE - MUSE: NORMAL MMHG
ERYTHROCYTE [DISTWIDTH] IN BLOOD BY AUTOMATED COUNT: 13.2 % (ref 10–15)
GFR SERPL CREATININE-BSD FRML MDRD: 58 ML/MIN/1.73M2
GLUCOSE BLD-MCNC: 96 MG/DL (ref 70–99)
HCT VFR BLD AUTO: 37.9 % (ref 35–47)
HGB BLD-MCNC: 11.9 G/DL (ref 11.7–15.7)
INR PPP: 1.4 (ref 0.85–1.15)
INTERPRETATION ECG - MUSE: NORMAL
MAGNESIUM SERPL-MCNC: 2 MG/DL (ref 1.6–2.3)
MCH RBC QN AUTO: 32.2 PG (ref 26.5–33)
MCHC RBC AUTO-ENTMCNC: 31.4 G/DL (ref 31.5–36.5)
MCV RBC AUTO: 103 FL (ref 78–100)
P AXIS - MUSE: NORMAL DEGREES
PLATELET # BLD AUTO: 140 10E3/UL (ref 150–450)
POTASSIUM BLD-SCNC: 4 MMOL/L (ref 3.4–5.3)
PR INTERVAL - MUSE: NORMAL MS
PROT SERPL-MCNC: 6 G/DL (ref 6.8–8.8)
QRS DURATION - MUSE: 124 MS
QT - MUSE: 426 MS
QTC - MUSE: 488 MS
R AXIS - MUSE: -79 DEGREES
RBC # BLD AUTO: 3.69 10E6/UL (ref 3.8–5.2)
SODIUM SERPL-SCNC: 139 MMOL/L (ref 133–144)
SYSTOLIC BLOOD PRESSURE - MUSE: NORMAL MMHG
T AXIS - MUSE: 23 DEGREES
VENTRICULAR RATE- MUSE: 79 BPM
WBC # BLD AUTO: 5.7 10E3/UL (ref 4–11)

## 2022-04-10 PROCEDURE — 85027 COMPLETE CBC AUTOMATED: CPT | Performed by: INTERNAL MEDICINE

## 2022-04-10 PROCEDURE — 250N000011 HC RX IP 250 OP 636: Performed by: NURSE ANESTHETIST, CERTIFIED REGISTERED

## 2022-04-10 PROCEDURE — 250N000009 HC RX 250: Performed by: NURSE ANESTHETIST, CERTIFIED REGISTERED

## 2022-04-10 PROCEDURE — 120N000001 HC R&B MED SURG/OB

## 2022-04-10 PROCEDURE — 99232 SBSQ HOSP IP/OBS MODERATE 35: CPT | Performed by: INTERNAL MEDICINE

## 2022-04-10 PROCEDURE — 250N000011 HC RX IP 250 OP 636: Performed by: INTERNAL MEDICINE

## 2022-04-10 PROCEDURE — C1713 ANCHOR/SCREW BN/BN,TIS/BN: HCPCS | Performed by: ORTHOPAEDIC SURGERY

## 2022-04-10 PROCEDURE — 85610 PROTHROMBIN TIME: CPT | Performed by: INTERNAL MEDICINE

## 2022-04-10 PROCEDURE — 258N000003 HC RX IP 258 OP 636: Performed by: INTERNAL MEDICINE

## 2022-04-10 PROCEDURE — 258N000003 HC RX IP 258 OP 636: Performed by: NURSE ANESTHETIST, CERTIFIED REGISTERED

## 2022-04-10 PROCEDURE — 258N000003 HC RX IP 258 OP 636: Performed by: PHYSICIAN ASSISTANT

## 2022-04-10 PROCEDURE — 83735 ASSAY OF MAGNESIUM: CPT | Performed by: INTERNAL MEDICINE

## 2022-04-10 PROCEDURE — 250N000011 HC RX IP 250 OP 636: Performed by: PHYSICIAN ASSISTANT

## 2022-04-10 PROCEDURE — 250N000013 HC RX MED GY IP 250 OP 250 PS 637: Performed by: INTERNAL MEDICINE

## 2022-04-10 PROCEDURE — 250N000013 HC RX MED GY IP 250 OP 250 PS 637: Performed by: HOSPITALIST

## 2022-04-10 PROCEDURE — 370N000017 HC ANESTHESIA TECHNICAL FEE, PER MIN: Performed by: ORTHOPAEDIC SURGERY

## 2022-04-10 PROCEDURE — 710N000009 HC RECOVERY PHASE 1, LEVEL 1, PER MIN: Performed by: ORTHOPAEDIC SURGERY

## 2022-04-10 PROCEDURE — 36415 COLL VENOUS BLD VENIPUNCTURE: CPT | Performed by: INTERNAL MEDICINE

## 2022-04-10 PROCEDURE — 0QSH04Z REPOSITION LEFT TIBIA WITH INTERNAL FIXATION DEVICE, OPEN APPROACH: ICD-10-PCS | Performed by: ORTHOPAEDIC SURGERY

## 2022-04-10 PROCEDURE — 999N000179 XR SURGERY CARM FLUORO LESS THAN 5 MIN W STILLS: Mod: TC

## 2022-04-10 PROCEDURE — 80053 COMPREHEN METABOLIC PANEL: CPT | Performed by: INTERNAL MEDICINE

## 2022-04-10 PROCEDURE — 250N000009 HC RX 250: Performed by: ORTHOPAEDIC SURGERY

## 2022-04-10 PROCEDURE — 272N000001 HC OR GENERAL SUPPLY STERILE: Performed by: ORTHOPAEDIC SURGERY

## 2022-04-10 PROCEDURE — 360N000083 HC SURGERY LEVEL 3 W/ FLUORO, PER MIN: Performed by: ORTHOPAEDIC SURGERY

## 2022-04-10 PROCEDURE — 999N000141 HC STATISTIC PRE-PROCEDURE NURSING ASSESSMENT: Performed by: ORTHOPAEDIC SURGERY

## 2022-04-10 PROCEDURE — 0QSK04Z REPOSITION LEFT FIBULA WITH INTERNAL FIXATION DEVICE, OPEN APPROACH: ICD-10-PCS | Performed by: ORTHOPAEDIC SURGERY

## 2022-04-10 PROCEDURE — 250N000013 HC RX MED GY IP 250 OP 250 PS 637: Performed by: PHYSICIAN ASSISTANT

## 2022-04-10 DEVICE — IMP SCR ARTHREX LP CAN 4.0X50MM LONG THRD SS AR-8840CL-50: Type: IMPLANTABLE DEVICE | Site: ANKLE | Status: FUNCTIONAL

## 2022-04-10 DEVICE — IMP SCR ARTHREX LP LOCKING 2.7X12MM SS AR-8827L-12: Type: IMPLANTABLE DEVICE | Site: ANKLE | Status: FUNCTIONAL

## 2022-04-10 DEVICE — IMPLANTABLE DEVICE: Type: IMPLANTABLE DEVICE | Site: ANKLE | Status: FUNCTIONAL

## 2022-04-10 DEVICE — IMP SCR ARTHREX LP LOCKING 2.7X14MM SS AR-8827L-14: Type: IMPLANTABLE DEVICE | Site: ANKLE | Status: FUNCTIONAL

## 2022-04-10 DEVICE — IMP SCR ARTHREX LP LOCKING 2.7X10MM SS AR-8827L-10: Type: IMPLANTABLE DEVICE | Site: ANKLE | Status: FUNCTIONAL

## 2022-04-10 RX ORDER — CEFAZOLIN SODIUM/WATER 2 G/20 ML
2 SYRINGE (ML) INTRAVENOUS SEE ADMIN INSTRUCTIONS
Status: DISCONTINUED | OUTPATIENT
Start: 2022-04-10 | End: 2022-04-10 | Stop reason: HOSPADM

## 2022-04-10 RX ORDER — LIDOCAINE HYDROCHLORIDE 10 MG/ML
INJECTION, SOLUTION INFILTRATION; PERINEURAL PRN
Status: DISCONTINUED | OUTPATIENT
Start: 2022-04-10 | End: 2022-04-10

## 2022-04-10 RX ORDER — SODIUM CHLORIDE, SODIUM LACTATE, POTASSIUM CHLORIDE, CALCIUM CHLORIDE 600; 310; 30; 20 MG/100ML; MG/100ML; MG/100ML; MG/100ML
INJECTION, SOLUTION INTRAVENOUS CONTINUOUS
Status: DISCONTINUED | OUTPATIENT
Start: 2022-04-10 | End: 2022-04-11

## 2022-04-10 RX ORDER — EPHEDRINE SULFATE 50 MG/ML
INJECTION, SOLUTION INTRAMUSCULAR; INTRAVENOUS; SUBCUTANEOUS PRN
Status: DISCONTINUED | OUTPATIENT
Start: 2022-04-10 | End: 2022-04-10

## 2022-04-10 RX ORDER — AMOXICILLIN 250 MG
1 CAPSULE ORAL 2 TIMES DAILY
Status: DISCONTINUED | OUTPATIENT
Start: 2022-04-10 | End: 2022-04-14 | Stop reason: HOSPADM

## 2022-04-10 RX ORDER — HYDROMORPHONE HYDROCHLORIDE 1 MG/ML
0.5 INJECTION, SOLUTION INTRAMUSCULAR; INTRAVENOUS; SUBCUTANEOUS
Status: DISCONTINUED | OUTPATIENT
Start: 2022-04-10 | End: 2022-04-10

## 2022-04-10 RX ORDER — PROCHLORPERAZINE MALEATE 5 MG
5 TABLET ORAL EVERY 6 HOURS PRN
Status: DISCONTINUED | OUTPATIENT
Start: 2022-04-10 | End: 2022-04-14 | Stop reason: HOSPADM

## 2022-04-10 RX ORDER — DEXAMETHASONE SODIUM PHOSPHATE 4 MG/ML
INJECTION, SOLUTION INTRA-ARTICULAR; INTRALESIONAL; INTRAMUSCULAR; INTRAVENOUS; SOFT TISSUE PRN
Status: DISCONTINUED | OUTPATIENT
Start: 2022-04-10 | End: 2022-04-10

## 2022-04-10 RX ORDER — GLYCOPYRROLATE 0.2 MG/ML
INJECTION, SOLUTION INTRAMUSCULAR; INTRAVENOUS PRN
Status: DISCONTINUED | OUTPATIENT
Start: 2022-04-10 | End: 2022-04-10

## 2022-04-10 RX ORDER — CLINDAMYCIN PHOSPHATE 900 MG/50ML
INJECTION, SOLUTION INTRAVENOUS PRN
Status: DISCONTINUED | OUTPATIENT
Start: 2022-04-10 | End: 2022-04-10

## 2022-04-10 RX ORDER — ATROPINE SULFATE 0.4 MG/ML
AMPUL (ML) INJECTION PRN
Status: DISCONTINUED | OUTPATIENT
Start: 2022-04-10 | End: 2022-04-10

## 2022-04-10 RX ORDER — BISACODYL 10 MG
10 SUPPOSITORY, RECTAL RECTAL DAILY PRN
Status: DISCONTINUED | OUTPATIENT
Start: 2022-04-10 | End: 2022-04-14 | Stop reason: HOSPADM

## 2022-04-10 RX ORDER — CEFAZOLIN SODIUM 2 G/100ML
2 INJECTION, SOLUTION INTRAVENOUS SEE ADMIN INSTRUCTIONS
Status: DISCONTINUED | OUTPATIENT
Start: 2022-04-10 | End: 2022-04-10 | Stop reason: RX

## 2022-04-10 RX ORDER — HYDROMORPHONE HCL IN WATER/PF 6 MG/30 ML
0.2 PATIENT CONTROLLED ANALGESIA SYRINGE INTRAVENOUS
Status: DISCONTINUED | OUTPATIENT
Start: 2022-04-10 | End: 2022-04-14 | Stop reason: HOSPADM

## 2022-04-10 RX ORDER — LIDOCAINE 40 MG/G
CREAM TOPICAL
Status: DISCONTINUED | OUTPATIENT
Start: 2022-04-10 | End: 2022-04-10 | Stop reason: HOSPADM

## 2022-04-10 RX ORDER — ONDANSETRON 2 MG/ML
4 INJECTION INTRAMUSCULAR; INTRAVENOUS EVERY 6 HOURS PRN
Status: DISCONTINUED | OUTPATIENT
Start: 2022-04-10 | End: 2022-04-14 | Stop reason: HOSPADM

## 2022-04-10 RX ORDER — CEFAZOLIN SODIUM/WATER 2 G/20 ML
2 SYRINGE (ML) INTRAVENOUS
Status: DISCONTINUED | OUTPATIENT
Start: 2022-04-10 | End: 2022-04-10 | Stop reason: HOSPADM

## 2022-04-10 RX ORDER — ONDANSETRON 4 MG/1
4 TABLET, ORALLY DISINTEGRATING ORAL EVERY 6 HOURS PRN
Status: DISCONTINUED | OUTPATIENT
Start: 2022-04-10 | End: 2022-04-14 | Stop reason: HOSPADM

## 2022-04-10 RX ORDER — FENTANYL CITRATE 50 UG/ML
INJECTION, SOLUTION INTRAMUSCULAR; INTRAVENOUS PRN
Status: DISCONTINUED | OUTPATIENT
Start: 2022-04-10 | End: 2022-04-10

## 2022-04-10 RX ORDER — OXYCODONE HYDROCHLORIDE 5 MG/1
5 TABLET ORAL EVERY 4 HOURS PRN
Status: DISCONTINUED | OUTPATIENT
Start: 2022-04-10 | End: 2022-04-14 | Stop reason: HOSPADM

## 2022-04-10 RX ORDER — BUPIVACAINE HYDROCHLORIDE AND EPINEPHRINE 5; 5 MG/ML; UG/ML
INJECTION, SOLUTION PERINEURAL PRN
Status: DISCONTINUED | OUTPATIENT
Start: 2022-04-10 | End: 2022-04-10 | Stop reason: HOSPADM

## 2022-04-10 RX ORDER — ONDANSETRON 2 MG/ML
INJECTION INTRAMUSCULAR; INTRAVENOUS PRN
Status: DISCONTINUED | OUTPATIENT
Start: 2022-04-10 | End: 2022-04-10

## 2022-04-10 RX ORDER — SODIUM CHLORIDE, SODIUM LACTATE, POTASSIUM CHLORIDE, CALCIUM CHLORIDE 600; 310; 30; 20 MG/100ML; MG/100ML; MG/100ML; MG/100ML
INJECTION, SOLUTION INTRAVENOUS CONTINUOUS
Status: DISCONTINUED | OUTPATIENT
Start: 2022-04-10 | End: 2022-04-10 | Stop reason: HOSPADM

## 2022-04-10 RX ORDER — PROPOFOL 10 MG/ML
INJECTION, EMULSION INTRAVENOUS PRN
Status: DISCONTINUED | OUTPATIENT
Start: 2022-04-10 | End: 2022-04-10

## 2022-04-10 RX ORDER — WARFARIN SODIUM 2 MG/1
2 TABLET ORAL
Status: COMPLETED | OUTPATIENT
Start: 2022-04-10 | End: 2022-04-10

## 2022-04-10 RX ORDER — POLYETHYLENE GLYCOL 3350 17 G/17G
17 POWDER, FOR SOLUTION ORAL DAILY
Status: DISCONTINUED | OUTPATIENT
Start: 2022-04-11 | End: 2022-04-14 | Stop reason: HOSPADM

## 2022-04-10 RX ORDER — CLINDAMYCIN PHOSPHATE 600 MG/50ML
600 INJECTION, SOLUTION INTRAVENOUS EVERY 8 HOURS
Status: COMPLETED | OUTPATIENT
Start: 2022-04-10 | End: 2022-04-11

## 2022-04-10 RX ORDER — SODIUM CHLORIDE, SODIUM LACTATE, POTASSIUM CHLORIDE, CALCIUM CHLORIDE 600; 310; 30; 20 MG/100ML; MG/100ML; MG/100ML; MG/100ML
INJECTION, SOLUTION INTRAVENOUS CONTINUOUS PRN
Status: DISCONTINUED | OUTPATIENT
Start: 2022-04-10 | End: 2022-04-10

## 2022-04-10 RX ORDER — ACETAMINOPHEN 325 MG/1
975 TABLET ORAL EVERY 8 HOURS
Status: COMPLETED | OUTPATIENT
Start: 2022-04-10 | End: 2022-04-13

## 2022-04-10 RX ORDER — CEFAZOLIN SODIUM 2 G/100ML
2 INJECTION, SOLUTION INTRAVENOUS
Status: DISCONTINUED | OUTPATIENT
Start: 2022-04-10 | End: 2022-04-10 | Stop reason: RX

## 2022-04-10 RX ORDER — ACETAMINOPHEN 325 MG/1
650 TABLET ORAL EVERY 4 HOURS PRN
Status: DISCONTINUED | OUTPATIENT
Start: 2022-04-13 | End: 2022-04-14 | Stop reason: HOSPADM

## 2022-04-10 RX ORDER — HYDROXYZINE HYDROCHLORIDE 10 MG/1
10 TABLET, FILM COATED ORAL EVERY 6 HOURS PRN
Status: DISCONTINUED | OUTPATIENT
Start: 2022-04-10 | End: 2022-04-14 | Stop reason: HOSPADM

## 2022-04-10 RX ORDER — LIDOCAINE 40 MG/G
CREAM TOPICAL
Status: DISCONTINUED | OUTPATIENT
Start: 2022-04-10 | End: 2022-04-10

## 2022-04-10 RX ADMIN — Medication 10 MG: at 13:22

## 2022-04-10 RX ADMIN — FENTANYL CITRATE 100 MCG: 50 INJECTION, SOLUTION INTRAMUSCULAR; INTRAVENOUS at 13:04

## 2022-04-10 RX ADMIN — CLINDAMYCIN PHOSPHATE 900 MG: 900 INJECTION, SOLUTION INTRAVENOUS at 13:01

## 2022-04-10 RX ADMIN — ACETAMINOPHEN 975 MG: 325 TABLET, FILM COATED ORAL at 18:16

## 2022-04-10 RX ADMIN — WARFARIN SODIUM 2 MG: 2 TABLET ORAL at 18:43

## 2022-04-10 RX ADMIN — LIDOCAINE HYDROCHLORIDE 20 MG: 10 INJECTION, SOLUTION INFILTRATION; PERINEURAL at 13:04

## 2022-04-10 RX ADMIN — ATROPINE SULFATE 0.1 MG: 0.4 INJECTION, SOLUTION INTRAMUSCULAR; INTRAVENOUS; SUBCUTANEOUS at 13:25

## 2022-04-10 RX ADMIN — SODIUM CHLORIDE: 9 INJECTION, SOLUTION INTRAVENOUS at 04:22

## 2022-04-10 RX ADMIN — ONDANSETRON HYDROCHLORIDE 4 MG: 2 INJECTION, SOLUTION INTRAVENOUS at 13:46

## 2022-04-10 RX ADMIN — DEXAMETHASONE SODIUM PHOSPHATE 4 MG: 4 INJECTION, SOLUTION INTRA-ARTICULAR; INTRALESIONAL; INTRAMUSCULAR; INTRAVENOUS; SOFT TISSUE at 13:04

## 2022-04-10 RX ADMIN — SENNOSIDES AND DOCUSATE SODIUM 1 TABLET: 50; 8.6 TABLET ORAL at 21:23

## 2022-04-10 RX ADMIN — Medication 10 MG: at 13:40

## 2022-04-10 RX ADMIN — HYDROMORPHONE HYDROCHLORIDE 0.5 MG: 1 INJECTION, SOLUTION INTRAMUSCULAR; INTRAVENOUS; SUBCUTANEOUS at 11:07

## 2022-04-10 RX ADMIN — HYDROMORPHONE HYDROCHLORIDE 2 MG: 2 TABLET ORAL at 00:56

## 2022-04-10 RX ADMIN — OXYCODONE HYDROCHLORIDE AND ACETAMINOPHEN 500 MG: 500 TABLET ORAL at 21:23

## 2022-04-10 RX ADMIN — FENTANYL CITRATE 100 MCG: 50 INJECTION, SOLUTION INTRAMUSCULAR; INTRAVENOUS at 13:17

## 2022-04-10 RX ADMIN — SODIUM CHLORIDE, POTASSIUM CHLORIDE, SODIUM LACTATE AND CALCIUM CHLORIDE: 600; 310; 30; 20 INJECTION, SOLUTION INTRAVENOUS at 12:45

## 2022-04-10 RX ADMIN — METOPROLOL TARTRATE 25 MG: 25 TABLET, FILM COATED ORAL at 11:43

## 2022-04-10 RX ADMIN — PROPOFOL 100 MG: 10 INJECTION, EMULSION INTRAVENOUS at 13:04

## 2022-04-10 RX ADMIN — Medication 10 MG: at 13:25

## 2022-04-10 RX ADMIN — PHENYLEPHRINE HYDROCHLORIDE 300 MCG: 10 INJECTION INTRAVENOUS at 13:11

## 2022-04-10 RX ADMIN — HYDROMORPHONE HYDROCHLORIDE 2 MG: 2 TABLET ORAL at 06:18

## 2022-04-10 RX ADMIN — GLYCOPYRROLATE 0.1 MG: 0.2 INJECTION, SOLUTION INTRAMUSCULAR; INTRAVENOUS at 13:04

## 2022-04-10 RX ADMIN — ROSUVASTATIN CALCIUM 5 MG: 5 TABLET, FILM COATED ORAL at 21:23

## 2022-04-10 RX ADMIN — CLINDAMYCIN PHOSPHATE 600 MG: 600 INJECTION, SOLUTION INTRAVENOUS at 21:45

## 2022-04-10 RX ADMIN — METOPROLOL TARTRATE 25 MG: 25 TABLET, FILM COATED ORAL at 21:23

## 2022-04-10 RX ADMIN — SODIUM CHLORIDE, POTASSIUM CHLORIDE, SODIUM LACTATE AND CALCIUM CHLORIDE: 600; 310; 30; 20 INJECTION, SOLUTION INTRAVENOUS at 15:57

## 2022-04-10 ASSESSMENT — ACTIVITIES OF DAILY LIVING (ADL)
ADLS_ACUITY_SCORE: 20
ADLS_ACUITY_SCORE: 16
ADLS_ACUITY_SCORE: 20
ADLS_ACUITY_SCORE: 20
ADLS_ACUITY_SCORE: 16
ADLS_ACUITY_SCORE: 20
ADLS_ACUITY_SCORE: 20
ADLS_ACUITY_SCORE: 16
ADLS_ACUITY_SCORE: 20

## 2022-04-10 ASSESSMENT — ENCOUNTER SYMPTOMS: DYSRHYTHMIAS: 1

## 2022-04-10 NOTE — ANESTHESIA CARE TRANSFER NOTE
Patient: Maia Miranda    Procedure: Procedure(s):  OPEN REDUCTION INTERNAL FIXATION, FRACTURE, ANKLE       Diagnosis: Ankle fracture [S82.847L]  Diagnosis Additional Information: No value filed.    Anesthesia Type:   General     Note:      Level of Consciousness: drowsy  Oxygen Supplementation: face mask    Independent Airway: airway patency satisfactory and stable  Dentition: dentition unchanged  Vital Signs Stable: post-procedure vital signs reviewed and stable  Report to RN Given: handoff report given  Patient transferred to: PACU    Handoff Report: Identifed the Patient, Identified the Reponsible Provider, Reviewed the pertinent medical history, Discussed the surgical course, Reviewed Intra-OP anesthesia mangement and issues during anesthesia, Set expectations for post-procedure period and Allowed opportunity for questions and acknowledgement of understanding      Vitals:  Vitals Value Taken Time   BP 95/53 04/10/22 1410   Temp     Pulse 78 04/10/22 1411   Resp 9 04/10/22 1411   SpO2 100 % 04/10/22 1411   Vitals shown include unvalidated device data.    Electronically Signed By: RAY Cruz CRNA  April 10, 2022  2:13 PM

## 2022-04-10 NOTE — ANESTHESIA POSTPROCEDURE EVALUATION
Patient: Maia Miranda    Procedure: Procedure(s):  OPEN REDUCTION INTERNAL FIXATION, FRACTURE, ANKLE       Anesthesia Type:  General    Note:  Disposition: Inpatient   Postop Pain Control: Uneventful            Sign Out: Well controlled pain   PONV: No   Neuro/Psych: Uneventful            Sign Out: Acceptable/Baseline neuro status   Airway/Respiratory: Uneventful            Sign Out: Acceptable/Baseline resp. status   CV/Hemodynamics: Uneventful            Sign Out: Acceptable CV status; No obvious hypovolemia; No obvious fluid overload   Other NRE: NONE   DID A NON-ROUTINE EVENT OCCUR? No           Last vitals:  Vitals Value Taken Time   /57 04/10/22 1445   Temp 97.5  F (36.4  C) 04/10/22 1442   Pulse 95 04/10/22 1449   Resp 12 04/10/22 1449   SpO2 94 % 04/10/22 1449   Vitals shown include unvalidated device data.    Electronically Signed By: Geo Aguilar MD  April 10, 2022  5:15 PM

## 2022-04-10 NOTE — BRIEF OP NOTE
[facility]    Brief Operative Note    Pre-operative diagnosis: Ankle fracture [S82.899A]  Post-operative diagnosis same    Procedure: Procedure(s):  OPEN REDUCTION INTERNAL FIXATION, FRACTURE, ANKLE  Surgeon: Surgeon(s) and Role:     * Kulwant Cooper MD - Primary     * Juan Diego Schwartz PA-C - Assisting  Anesthesia: General   Estimated Blood Loss: Less than 50 ml    Drains: None  Specimens: * No specimens in log *  Findings:   None.  Complications: None.  Implants:   Implant Name Type Inv. Item Serial No.  Lot No. LRB No. Used Action   IMP PLATE ARTHREX LOCKING DIST FIBULA LT 8H SS [id number] - [id number] Metallic Hardware/[place] IMP PLATE ARTHREX LOCKING DIST FIBULA LT 8H SS [id number]  ARTHREX 8004  [id number] Left 1 Implanted   IMP SCR ARTHREX LP LOCKING 2.7X10MM SS [id number]-10 - [id number] Metallic Hardware/[place] IMP SCR ARTHREX LP LOCKING 2.7X10MM SS AR-8827L-10  ARTHREX 8004  [id number] Left 1 Implanted   IMP SCR ARTHREX LP LOCKING 2.7X12MM SS AR-8827L-12 - [id number] Metallic Hardware/[place] IMP SCR ARTHREX LP LOCKING 2.7X12MM SS AR-8827L-12  ARTHREX 8004  [id number] Left 1 Implanted   IMP SCR ARTHREX LP LOCKING 2.7X14MM SS AR-8827L-14 - [id number] Metallic Hardware/[place] IMP SCR ARTHREX LP LOCKING 2.7X14MM SS AR-8827L-14  ARTHREX 8004  [id number] Left 2 Implanted   IMP SCR ARTHREX LP LOCKING 2.7X20MM SS AR-8827L-20 - [id number] Metallic Hardware/[place] IMP SCR ARTHREX LP LOCKING 2.7X20MM SS AR-8827L-20  ARTHREX 8004  [id number] Left 1 Implanted   IMP SCR ARTHREX LP KIRBY TM 3.5X14MM SS AR-8835-14 - [id number] Metallic Hardware/[place] IMP SCR ARTHREX LP KIRBY TM 3.5X14MM SS AR-8835-14  ARTHREX 8004  [id number] Left 3 Implanted   IMP SCR ARTHREX LP KIRBY TM 3.5X14MM SS AR-8835-14 - [id number] Metallic Hardware/[place] IMP SCR ARTHREX LP KIRBY TM 3.5X14MM  AR-8835-14  ARTHREX 8004  [id number] Left 1 Wasted   IMP SCR ARTHREX LP IKRBY TM 3.5X16MM  AR-8835-16 - [id number]  Metallic Hardware/[place] IMP SCR ARTHREX LP KIRBY TM 3.5X16MM SS AR-8835-16  ARTHREX 8004  [id number] Left 1 Implanted   IMP SCR ARTHREX LP KIRBY TM 3.5X22MM SS AR-8835-22 - [id number] Metallic Hardware/[place] IMP SCR ARTHREX LP KIRBY TM 3.5X22MM SS AR-8835-22  ARTHREX 8004  [id number] Left 1 Implanted   IMP SCR ARTHREX LP CAN 4.0X50MM LONG THRD  [id number] - [id number] Metallic Hardware/[place] IMP SCR ARTHREX LP CAN 4.0X50MM LONG THRD SS [id number]  ARTHREX 8004  [id number] Left 2 Implanted     -NWB  -Splint x 2 weeks

## 2022-04-10 NOTE — PROGRESS NOTES
[facility]    Medicine Progress Note - Hospitalist Service    Date of Admission:  4/9/2022    Assessment & Plan               Maia Miranda is a 89 year old female admitted on 4/9/2022. She presents with Trimalleolar left ankle fracture after a fall at assisted living  #Trimalleolar left ankle fracture    - admitted to inpatient    -pain control    -orthopedics consulted. ORIF scheduled for this afternoon    -hold NPO pending the above    -patient maximized for surgery medically (does have normal LVF and no evidence of CAD  - initiated Vitamin K to reverse Coumadin  - INR 1.4 this am  -preop EKG without changes  #PAF  #Hx of PE  #chronic anticoagulation therapy  -reversed anticoagulation as above  - lovenox post op until INR back out again  -rate controlled  #HTN  Hold lisinopril pending  surgery  Reestablish post op  continue beta blocker periop  #CRI  Creatinine up some from baseline on admiision  hydrated with 0.9% NS overnight  Creatinine at baseline today  #Dementia/HLP    StableUTI with chronic Guillen     Had treatment with kelfex for UTI but Cx positive for Enterococcus, gave 2 gram dose of ampicillin  Yesterday preop for surgery but does not appear symptomatic       Diet: NPO per Anesthesia Guidelines for Procedure/Surgery Except for: Meds    DVT Prophylaxis: Warfarin  Guillen Catheter: PRESENT, indication: Retention  Central Lines: None  Cardiac Monitoring: None  Code Status: No CPR- Do NOT Intubate      Disposition Plan   Expected Discharge: 04/11/2022     Anticipated discharge location:  Awaiting care coordination huddle         The patient's care was discussed with the Bedside Nurse, Patient and Patient's Family.    Bear Pryor MD  Hospitalist Service  [facility]  Securely message with the Vocera Web Console (learn more here)  Text page via [employer] Paging/Directory         Clinically Significant Risk Factors Present on Admission             # Hypoalbuminemia:  Albumin = 2.9 g/dL (Ref range: 3.4 - 5.0 g/dL) on admission, will monitor as appropriate   # Coagulation Defect: home medication list includes an anticoagulant medication        ______________________________________________________________________    Interval History   Had some pain overnight, otherwise without specific complaints, denies SOB or Chest pain    Data reviewed today: I reviewed all medications, new labs and imaging results over the last 24 hours. I personally reviewed the EKG tracing showing afib with CVR, RBB, LAHB without changes compared to prior EKGs.    Physical Exam   Vital Signs: Temp: 99  F (37.2  C) Temp src: Temporal BP: 122/61 Pulse: 80   Resp: 16 SpO2: 94 % O2 Device: None (Room air)    Weight: 140 lbs 0 oz  Constitutional: awake, alert, cooperative, no apparent distress, and appears stated age  Respiratory: No increased work of breathing, good air exchange, clear to auscultation bilaterally, no crackles or wheezing  Cardiovascular: IRIR variable S1, without M/G/R  GI: No scars, normal bowel sounds, soft, non-distended, non-tender, no masses palpated, no hepatosplenomegally  Musculoskeletal: left distal extremity in splint, normal cap refill    Data   Recent Labs   Lab 04/10/22  0729 04/09/22  1858 04/09/22  1817 04/09/22  1200   WBC 5.7  --   --  6.4   HGB 11.9  --   --  13.8   *  --   --  102*   *  --   --  159   INR 1.40*  --  1.99* 2.44*     --   --  137   POTASSIUM 4.0 4.1  --  4.9   CHLORIDE 111*  --   --  105   CO2 23  --   --  30   BUN 21  --   --  28   CR 0.93  --   --  1.35*   ANIONGAP 5  --   --  2*   CLARISSE 8.9  --   --  10.0   GLC 96  --   --  124*   ALBUMIN 2.9*  --   --   --    PROTTOTAL 6.0*  --   --   --    BILITOTAL 0.7  --   --   --    ALKPHOS 74  --   --   --    ALT 21  --   --   --    AST 23  --   --   --      Recent Results (from the past 24 hour(s))   Head CT w/o contrast    Narrative    EXAM: CT HEAD WITHOUT CONTRAST  LOCATION: [facility]  DATE/TIME: 04/09/2022, 12:50 PM    INDICATION: Fall, head contusion, on Coumadin.  COMPARISON: Head CT 12/25/2021.  TECHNIQUE: Routine CT Head without IV contrast. Multiplanar reformats. Dose reduction techniques were used.    FINDINGS:  INTRACRANIAL CONTENTS: No intracranial hemorrhage, extra-axial collection, or mass effect.  No CT evidence of acute infarct. Mild presumed chronic small vessel ischemic changes. Mild generalized volume loss. No hydrocephalus.     VISUALIZED ORBITS/SINUSES/MASTOIDS: Prior bilateral cataract surgery. Visualized portions of the orbits are otherwise unremarkable. No paranasal sinus mucosal disease. No middle ear or mastoid effusion.    BONES/SOFT TISSUES: Right frontal scalp contusion.      Impression    IMPRESSION:  1.  No acute intracranial abnormality.     Cervical spine CT w/o contrast    Narrative    EXAM: CT CERVICAL SPINE WITHOUT CONTRAST  LOCATION: [facility]  DATE/TIME: 04/09/2022, 12:51 PM    INDICATION: Trauma, neck injury.  COMPARISON: Cervical spine CT 12/25/2021.  TECHNIQUE: Routine CT Cervical Spine without IV contrast. Multiplanar reformats. Dose reduction techniques were used.    FINDINGS:  No evidence of acute fracture or posttraumatic subluxation. Moderate to severe degenerative disc disease at C4-C5 and C5-C6. Background of mild degenerative change. Moderate spinal canal stenoses at C3-C4, C4-C5, and C5-C6 with flattening of the anterior   cord. Foraminal stenoses at multiple levels. Severe foraminal stenosis on the left at C5-C6 and the right at C4-C5.    Presumed chronic scarring and atelectasis at the lung apices. Carotid vascular calcifications.      Impression    IMPRESSION:  1.  No evidence of acute fracture or posttraumatic subluxation.  2.  Degenerative change with multiple stenoses.     XR Ankle Left 2 Views    Narrative    EXAM: XR ANKLE LT 2 VW  LOCATION: [facility]  DATE/TIME: 4/9/2022 1:02  PM    INDICATION: left ankle pain deformity  COMPARISON: None.      Impression    IMPRESSION: Trimalleolar fracture/dislocation of the tibiotalar joint.    Acute, displaced and angulated fracture of the distal fibula/lateral malleolus with oblique orientation. Displaced transverse fracture through the base of the medial malleolus. Minimally displaced posterior malleolus fracture.    There is lateral and slightly anterior tibiotalar dislocation.    Soft tissue swelling. Bones are demineralized.   XR Ankle Left 2 Views    Narrative    EXAM: XR ANKLE LT 2 VW  LOCATION: [facility]  DATE/TIME: 4/9/2022 2:18 PM    INDICATION: Post reduction left ankle fracture-dislocation, left ankle pain.  COMPARISON: 04/09/2022 at 1315 hours.      Impression    IMPRESSION: Acute trimalleolar fracture dislocation of the left ankle with lateral displacement and angulation of the medial and lateral malleolar fracture fragments, the degree of displacement and angulation has improved since prior study. Acute mildly   displaced posterior malleolar fracture. Lateral and slight posterior displacement of the talus.     Medications     sodium chloride 100 mL/hr at 04/10/22 0422       metoprolol tartrate  25 mg Oral BID     rosuvastatin  5 mg Oral At Bedtime     sodium chloride (PF)  3 mL Intracatheter Q8H     vitamin C  500 mg Oral BID     vitamin D3  50 mcg Oral Daily

## 2022-04-10 NOTE — PLAN OF CARE
Returned to room from PACU at 1510, lethargic.  Pleasantly confused, oriented self only.  Denies pain.  No nausea.  LS clear bilaterally, RA, encouraged CDB hourly.  Chr. pancho.  Drsg CDI.  Reoriented to room and call system, need reinforcements.

## 2022-04-10 NOTE — PLAN OF CARE
Vitals are Temp: 98.3  F (36.8  C) Temp src: Temporal BP: 112/57 Pulse: 63   Resp: 16 SpO2: 94 %.    Patient is alert, but confused at times. Room air. On bedrest.  Pt is a NPO diet.  C/o L ankle pain. Dilaudid PO given with relief.  Patient has Normal Saline 0.9% running at 100 mL per hour. Takes medications one at a time slowly. Has a chronic deleon. Ortho consulted for L ankle fx. Surgery today depending on INR level. Surgical bath done. Continue to monitor.

## 2022-04-10 NOTE — PHARMACY-ANTICOAGULATION SERVICE
Clinical Pharmacy - Warfarin Dosing Consult     Pharmacy has been consulted to manage this patient s warfarin therapy.  Indication: Atrial Fibrillation  Therapy Goal: INR 2-3  Warfarin Prior to Admission: Yes  Warfarin PTA Regimen: 2mg daily  Significant drug interactions: Vit K yesterday    INR   Date Value Ref Range Status   04/10/2022 1.40 (H) 0.85 - 1.15 Final   04/09/2022 1.99 (H) 0.85 - 1.15 Final       Recommend warfarin 2 mg today.  Pharmacy will monitor Maia Miranda daily and order warfarin doses to achieve specified goal.      Please contact pharmacy as soon as possible if the warfarin needs to be held for a procedure or if the warfarin goals change.        Renato Dee, Pharm.D., BCPS

## 2022-04-10 NOTE — PROGRESS NOTES
Family educated on visitor policy. Patient has already had 2 visitors today. Per policy she cannot have 2 different visitors until tomorrow. Writer provided options including setting up an ipad- family was not interested.

## 2022-04-10 NOTE — PHARMACY-ADMISSION MEDICATION HISTORY
Admission medication history interview status for this patient is complete. See [facility] admission navigator for allergy information, prior to admission medications and immunization status.     Medication history interview done, indicate source(s): -  Medication history resources (including written lists, pill bottles, clinic record): med list  Pharmacy: -    Changes made to PTA medication list:  Added: all meds  Changed: none  Reported as Not Taking: -  Removed: none    Actions taken by pharmacist (provider contacted, etc): called [facility]     Additional medication history information:None    Medication reconciliation/reorder completed by provider prior to medication history?  N   (Y/N)     Prior to Admission medications    Medication Sig Last Dose Taking? Auth Provider   acetaminophen (TYLENOL) 500 MG tablet Take 1,000 mg by mouth 3 times daily At 0900, 1300, 1800. 4/9/2022 at 0900 Yes Unknown, Entered By History   Ascorbic Acid (VITAMIN C) 500 MG CAPS Take 500 mg by mouth 2 times daily 4/9/2022 at 0900 Yes Unknown, Entered By History   Carboxymethylcellulose Sodium (REFRESH LIQUIGEL) 1 % GEL Apply 1 drop to eye daily as needed  Yes Unknown, Entered By History   cephALEXin (KEFLEX) 250 MG capsule Take 250 mg by mouth every morning 4/9/2022 Yes Unknown, Entered By History   Lidocaine (LIDOCARE) 4 % Patch Place 1 patch onto the skin every 24 hours To prevent lidocaine toxicity, patient should be patch free for 12 hrs daily. 4/9/2022 at 0900 Yes Unknown, Entered By History   lisinopril (ZESTRIL) 20 MG tablet Take 10 mg by mouth daily 4/9/2022 Yes Unknown, Entered By History   loperamide (IMODIUM) 2 MG capsule Take 2 mg by mouth 4 times daily as needed for diarrhea  Yes Unknown, Entered By History   menthol-zinc oxide (CALMOSEPTINE) 0.44-20.6 % OINT ointment Apply topically as needed  Yes Unknown, Entered By History   metoprolol tartrate (LOPRESSOR) 25 MG tablet Take 25 mg by mouth 2 times daily 4/9/2022  at 0900 Yes Unknown, Entered By History   ondansetron (ZOFRAN-ODT) 4 MG ODT tab Take 4 mg by mouth every 6 hours as needed for nausea  Yes Unknown, Entered By History   polyethylene glycol (MIRALAX) 17 g packet Take 1 packet by mouth daily as needed for constipation  Yes Unknown, Entered By History   rosuvastatin (CRESTOR) 5 MG tablet Take 5 mg by mouth At Bedtime 4/8/2022 Yes Unknown, Entered By History   SENNA-docusate sodium (SENNA S) 8.6-50 MG tablet Take 1 tablet by mouth 2 times daily as needed  Yes Unknown, Entered By History   sodium chloride (PETER 128) 5 % ophthalmic solution Place 1 drop into both eyes At Bedtime 4/8/2022 at 1800 Yes Unknown, Entered By History   vitamin D3 (CHOLECALCIFEROL) 50 mcg (2000 units) tablet Take 1 tablet by mouth daily 4/9/2022 Yes Unknown, Entered By History   warfarin ANTICOAGULANT (COUMADIN) 2 MG tablet Take 2 mg by mouth daily 4/8/2022 Yes Unknown, Entered By History

## 2022-04-10 NOTE — ANESTHESIA PREPROCEDURE EVALUATION
Anesthesia Pre-Procedure Evaluation    Patient: Maia Miranda   MRN: [id number] : 10/26/1932        Procedure : Procedure(s):  OPEN REDUCTION INTERNAL FIXATION, FRACTURE, ANKLE          History reviewed. No pertinent past medical history.   History reviewed. No pertinent surgical history.   Allergies   Allergen Reactions     Penicillins       Social History     Tobacco Use     Smoking status: Not on file     Smokeless tobacco: Not on file   Substance Use Topics     Alcohol use: Not on file      Wt Readings from Last 1 Encounters:   22 63.5 kg (140 lb)        Anesthesia Evaluation   Pt has had prior anesthetic.     No history of anesthetic complications       ROS/MED HX  ENT/Pulmonary:  - neg pulmonary ROS     Neurologic:  - neg neurologic ROS     Cardiovascular:     (+) -----dysrhythmias, a-fib,     METS/Exercise Tolerance:     Hematologic:       Musculoskeletal:   (+) arthritis, fracture,     GI/Hepatic:  - neg GI/hepatic ROS     Renal/Genitourinary:  - neg Renal ROS     Endo:  - neg endo ROS     Psychiatric/Substance Use:  - neg psychiatric ROS     Infectious Disease:  - neg infectious disease ROS     Malignancy:       Other:            Physical Exam    Airway        Mallampati: II   TM distance: > 3 FB   Neck ROM: full   Mouth opening: > 3 cm    Respiratory Devices and Support         Dental       (+) missing      Cardiovascular   cardiovascular exam normal          Pulmonary   pulmonary exam normal                OUTSIDE LABS:  CBC:   Lab Results   Component Value Date    WBC 5.7 04/10/2022    WBC 6.4 2022    HGB 11.9 04/10/2022    HGB 13.8 2022    HCT 37.9 04/10/2022    HCT 43.5 2022     (L) 04/10/2022     2022     BMP:   Lab Results   Component Value Date     04/10/2022     2022    POTASSIUM 4.0 04/10/2022    POTASSIUM 4.1 2022    CHLORIDE 111 (H) 04/10/2022    CHLORIDE 105 2022    CO2 23 04/10/2022    CO2 30 2022    BUN 21  04/10/2022    BUN 28 04/09/2022    CR 0.93 04/10/2022    CR 1.35 (H) 04/09/2022    GLC 96 04/10/2022     (H) 04/09/2022     COAGS:   Lab Results   Component Value Date    INR 1.40 (H) 04/10/2022     POC: No results found for: BGM, HCG, HCGS  HEPATIC:   Lab Results   Component Value Date    ALBUMIN 2.9 (L) 04/10/2022    PROTTOTAL 6.0 (L) 04/10/2022    ALT 21 04/10/2022    AST 23 04/10/2022    ALKPHOS 74 04/10/2022    BILITOTAL 0.7 04/10/2022     OTHER:   Lab Results   Component Value Date    CLARISSE 8.9 04/10/2022    MAG 2.0 04/10/2022       Anesthesia Plan    ASA Status:  3      Anesthesia Type: General.   Induction: Intravenous.   Maintenance: Balanced.        Consents    Anesthesia Plan(s) and associated risks, benefits, and realistic alternatives discussed. Questions answered and patient/representative(s) expressed understanding.    - Discussed:     - Discussed with:  Patient      - Extended Intubation/Ventilatory Support Discussed: No.      - Patient is DNR/DNI Status: No    Use of blood products discussed: No .     Postoperative Care    Pain management: IV analgesics, Oral pain medications, Multi-modal analgesia.   PONV prophylaxis: Ondansetron (or other 5HT-3), Dexamethasone or Solumedrol     Comments:                Geo Aguilar MD

## 2022-04-10 NOTE — PLAN OF CARE
Patient arrived on the unit from the ED at approximately 1815 accompanied by daughter. Transferred into bed using a sliding board. Patient is disoriented to time is confused sometimes. Dilaudid and tylenol given for pain management. Patient has a chronic deleon. Patient is NPO, surgical bath given. Plan is for surgery tomorrow depending on INR level. Will continue to monitor.

## 2022-04-11 ENCOUNTER — APPOINTMENT (OUTPATIENT)
Dept: PHYSICAL THERAPY | Facility: CLINIC | Age: 87
DRG: 493 | End: 2022-04-11
Attending: PHYSICIAN ASSISTANT
Payer: COMMERCIAL

## 2022-04-11 LAB
ANION GAP SERPL CALCULATED.3IONS-SCNC: 3 MMOL/L (ref 3–14)
BASOPHILS # BLD AUTO: 0 10E3/UL (ref 0–0.2)
BASOPHILS NFR BLD AUTO: 0 %
BUN SERPL-MCNC: 23 MG/DL (ref 7–30)
CALCIUM SERPL-MCNC: 9.5 MG/DL (ref 8.5–10.1)
CHLORIDE BLD-SCNC: 108 MMOL/L (ref 94–109)
CO2 SERPL-SCNC: 25 MMOL/L (ref 20–32)
CREAT SERPL-MCNC: 0.94 MG/DL (ref 0.52–1.04)
EOSINOPHIL # BLD AUTO: 0 10E3/UL (ref 0–0.7)
EOSINOPHIL NFR BLD AUTO: 0 %
ERYTHROCYTE [DISTWIDTH] IN BLOOD BY AUTOMATED COUNT: 12.5 % (ref 10–15)
GFR SERPL CREATININE-BSD FRML MDRD: 58 ML/MIN/1.73M2
GLUCOSE BLD-MCNC: 142 MG/DL (ref 70–99)
HCT VFR BLD AUTO: 35.4 % (ref 35–47)
HGB BLD-MCNC: 11.6 G/DL (ref 11.7–15.7)
IMM GRANULOCYTES # BLD: 0 10E3/UL
IMM GRANULOCYTES NFR BLD: 0 %
INR PPP: 1.49 (ref 0.85–1.15)
LYMPHOCYTES # BLD AUTO: 0.5 10E3/UL (ref 0.8–5.3)
LYMPHOCYTES NFR BLD AUTO: 6 %
MAGNESIUM SERPL-MCNC: 2.1 MG/DL (ref 1.6–2.3)
MCH RBC QN AUTO: 32.6 PG (ref 26.5–33)
MCHC RBC AUTO-ENTMCNC: 32.8 G/DL (ref 31.5–36.5)
MCV RBC AUTO: 99 FL (ref 78–100)
MONOCYTES # BLD AUTO: 0.5 10E3/UL (ref 0–1.3)
MONOCYTES NFR BLD AUTO: 7 %
NEUTROPHILS # BLD AUTO: 7 10E3/UL (ref 1.6–8.3)
NEUTROPHILS NFR BLD AUTO: 87 %
NRBC # BLD AUTO: 0 10E3/UL
NRBC BLD AUTO-RTO: 0 /100
PLATELET # BLD AUTO: 140 10E3/UL (ref 150–450)
POTASSIUM BLD-SCNC: 5 MMOL/L (ref 3.4–5.3)
RBC # BLD AUTO: 3.56 10E6/UL (ref 3.8–5.2)
SODIUM SERPL-SCNC: 136 MMOL/L (ref 133–144)
WBC # BLD AUTO: 8.1 10E3/UL (ref 4–11)

## 2022-04-11 PROCEDURE — 99232 SBSQ HOSP IP/OBS MODERATE 35: CPT | Performed by: INTERNAL MEDICINE

## 2022-04-11 PROCEDURE — 97161 PT EVAL LOW COMPLEX 20 MIN: CPT | Mod: GP | Performed by: PHYSICAL THERAPIST

## 2022-04-11 PROCEDURE — 250N000011 HC RX IP 250 OP 636: Performed by: PHYSICIAN ASSISTANT

## 2022-04-11 PROCEDURE — 80048 BASIC METABOLIC PNL TOTAL CA: CPT | Performed by: INTERNAL MEDICINE

## 2022-04-11 PROCEDURE — 85610 PROTHROMBIN TIME: CPT | Performed by: PHYSICIAN ASSISTANT

## 2022-04-11 PROCEDURE — 36415 COLL VENOUS BLD VENIPUNCTURE: CPT | Performed by: INTERNAL MEDICINE

## 2022-04-11 PROCEDURE — 120N000001 HC R&B MED SURG/OB

## 2022-04-11 PROCEDURE — 250N000013 HC RX MED GY IP 250 OP 250 PS 637: Performed by: PHYSICIAN ASSISTANT

## 2022-04-11 PROCEDURE — 250N000013 HC RX MED GY IP 250 OP 250 PS 637: Performed by: INTERNAL MEDICINE

## 2022-04-11 PROCEDURE — 83735 ASSAY OF MAGNESIUM: CPT | Performed by: INTERNAL MEDICINE

## 2022-04-11 PROCEDURE — 97530 THERAPEUTIC ACTIVITIES: CPT | Mod: GP | Performed by: PHYSICAL THERAPIST

## 2022-04-11 PROCEDURE — 85004 AUTOMATED DIFF WBC COUNT: CPT | Performed by: INTERNAL MEDICINE

## 2022-04-11 PROCEDURE — 258N000003 HC RX IP 258 OP 636: Performed by: PHYSICIAN ASSISTANT

## 2022-04-11 RX ORDER — ACETAMINOPHEN 325 MG/1
650 TABLET ORAL EVERY 4 HOURS PRN
Qty: 30 TABLET | Refills: 0 | DISCHARGE
Start: 2022-04-13 | End: 2022-04-15

## 2022-04-11 RX ORDER — OXYCODONE HYDROCHLORIDE 5 MG/1
2.5 TABLET ORAL EVERY 4 HOURS PRN
Qty: 20 TABLET | Refills: 0 | Status: SHIPPED | OUTPATIENT
Start: 2022-04-11 | End: 2022-05-13

## 2022-04-11 RX ORDER — WARFARIN SODIUM 2 MG/1
2 TABLET ORAL
Status: COMPLETED | OUTPATIENT
Start: 2022-04-11 | End: 2022-04-11

## 2022-04-11 RX ORDER — LISINOPRIL 10 MG/1
10 TABLET ORAL DAILY
Status: DISCONTINUED | OUTPATIENT
Start: 2022-04-11 | End: 2022-04-13

## 2022-04-11 RX ADMIN — ACETAMINOPHEN 975 MG: 325 TABLET, FILM COATED ORAL at 08:17

## 2022-04-11 RX ADMIN — POLYETHYLENE GLYCOL 3350 17 G: 17 POWDER, FOR SOLUTION ORAL at 08:16

## 2022-04-11 RX ADMIN — ACETAMINOPHEN 975 MG: 325 TABLET, FILM COATED ORAL at 17:08

## 2022-04-11 RX ADMIN — ACETAMINOPHEN 975 MG: 325 TABLET, FILM COATED ORAL at 01:15

## 2022-04-11 RX ADMIN — Medication 50 MCG: at 08:18

## 2022-04-11 RX ADMIN — SENNOSIDES AND DOCUSATE SODIUM 1 TABLET: 50; 8.6 TABLET ORAL at 21:13

## 2022-04-11 RX ADMIN — METOPROLOL TARTRATE 25 MG: 25 TABLET, FILM COATED ORAL at 08:18

## 2022-04-11 RX ADMIN — OXYCODONE HYDROCHLORIDE AND ACETAMINOPHEN 500 MG: 500 TABLET ORAL at 21:12

## 2022-04-11 RX ADMIN — ACETAMINOPHEN 975 MG: 325 TABLET, FILM COATED ORAL at 23:54

## 2022-04-11 RX ADMIN — SENNOSIDES AND DOCUSATE SODIUM 1 TABLET: 50; 8.6 TABLET ORAL at 08:18

## 2022-04-11 RX ADMIN — ROSUVASTATIN CALCIUM 5 MG: 5 TABLET, FILM COATED ORAL at 21:12

## 2022-04-11 RX ADMIN — CLINDAMYCIN PHOSPHATE 600 MG: 600 INJECTION, SOLUTION INTRAVENOUS at 05:11

## 2022-04-11 RX ADMIN — SODIUM CHLORIDE, POTASSIUM CHLORIDE, SODIUM LACTATE AND CALCIUM CHLORIDE: 600; 310; 30; 20 INJECTION, SOLUTION INTRAVENOUS at 08:33

## 2022-04-11 RX ADMIN — OXYCODONE HYDROCHLORIDE AND ACETAMINOPHEN 500 MG: 500 TABLET ORAL at 08:18

## 2022-04-11 RX ADMIN — WARFARIN SODIUM 2 MG: 2 TABLET ORAL at 18:43

## 2022-04-11 ASSESSMENT — ACTIVITIES OF DAILY LIVING (ADL)
ADLS_ACUITY_SCORE: 13
ADLS_ACUITY_SCORE: 20
ADLS_ACUITY_SCORE: 20
ADLS_ACUITY_SCORE: 13
ADLS_ACUITY_SCORE: 13
ADLS_ACUITY_SCORE: 20
NUMBER_OF_TIMES_PATIENT_HAS_FALLEN_WITHIN_LAST_SIX_MONTHS: 2
ADLS_ACUITY_SCORE: 13
ADLS_ACUITY_SCORE: 20
DIFFICULTY_COMMUNICATING: NO
WEAR_GLASSES_OR_BLIND: NO
DOING_ERRANDS_INDEPENDENTLY_DIFFICULTY: NO
ADLS_ACUITY_SCORE: 20
TOILETING_ISSUES: NO
ADLS_ACUITY_SCORE: 20
DRESSING/BATHING_DIFFICULTY: NO
ADLS_ACUITY_SCORE: 13
ADLS_ACUITY_SCORE: 20
FALL_HISTORY_WITHIN_LAST_SIX_MONTHS: YES
CONCENTRATING,_REMEMBERING_OR_MAKING_DECISIONS_DIFFICULTY: NO
ADLS_ACUITY_SCORE: 13
ADLS_ACUITY_SCORE: 20
ADLS_ACUITY_SCORE: 13
WALKING_OR_CLIMBING_STAIRS_DIFFICULTY: NO
DIFFICULTY_EATING/SWALLOWING: NO
ADLS_ACUITY_SCORE: 13

## 2022-04-11 NOTE — PROGRESS NOTES
Care Management Follow Up    Length of Stay (days): 2    Expected Discharge Date: 04/13/2022    Patient plan of care discussed at interdisciplinary rounds: Yes    Anticipated Discharge Disposition:  TCU     Patient/family educated on Medicare website which has current facility and service quality ratings:  no  Education Provided on the Discharge Plan:  no  Patient/Family in Agreement with the Plan:  Not applicable    Referrals Placed by CM/SW:  no  Private pay costs discussed: Not applicable    Additional Information:  Saw PT recommendations for TCU placement. Met with patient at bedside, and attempted to call patient's daughter Bhargavi. Left a voicemail for the daughter, care management waiting for call back to determine TCU location preferences and if [facility] wheelchair is needed for transport.      Yaneth Tompkins

## 2022-04-11 NOTE — PLAN OF CARE
3pm-11pm RN    Patient VS are at baseline: Yes  Patient able to ambulate as they were prior to admission or with assist devices provided by therapy during their stay: No only sat at edge of bed  Patient must void prior to discharge: No has a chronic deleon  Patient able to tolerate oral intake: Yes  Patient has adequate pain control using oral analgesics: Yes    Patient sleeping between cares all shift, denied pain scheduled tylenol given. CMS intact, dressing on left leg intact. Deleon intact and is draining. Was dangled and sat at the edge of the bed. Will continue to monitor.

## 2022-04-11 NOTE — OP NOTE
Procedure Date: 04/10/2022    PREOPERATIVE DIAGNOSIS:  Left trimalleolar ankle fracture.    POSTOPERATIVE DIAGNOSIS:  Left trimalleolar ankle fracture.    PROCEDURE:  Open reduction and internal fixation of left trimalleolar ankle fracture.    SURGEON:  Kulwant Cooper MD    ASSISTANT:  Juan Diego Schwartz PA-C.    ANESTHESIA:  General.    ESTIMATED BLOOD LOSS:  20 mL    INTRAOPERATIVE COMPLICATIONS:  None apparent.    OPERATIVE INDICATIONS:  The patient sustained a left ankle fracture dislocation after a mechanical fall.  She underwent reduction in the Emergency Department, but continued to have significant widening.  Risks, benefits and, alternatives to formal open reduction and internal fixation were discussed at length with the patient.  She and her son expressed a good understanding of the risks associated with surgery and elected to proceed.    DESCRIPTION OF PROCEDURE:  The patient was identified in the preoperative holding area and the operative site was marked.  The consent was again reviewed and all questions were answered.  She was taken to the operating room and placed under general anesthesia and positioned supine on the operating table with the left lower extremity prepped and draped in the usual sterile fashion.  Preoperative antibiotics administered.  A timeout called to ensure the correct operative site, and procedure and the tourniquet inflated to 250 mmHg.  A longitudinal incision was made over the lateral border of the distal fibula with sharp dissection carried down through skin and subcutaneous tissues.  Further dissection was carried down to the fracture where the fracture hematoma was debrided.  The fracture was brought out to length and held and reduced with a pointed reduction clamp.  An anterior to posterior 3.5 mm bicortical screw was placed in lag fashion perpendicular to the fracture. The Arthrex 8-hole periarticular locking distal fibular plate was then positioned and secured proximally with a  bicortical screw.  Fluoroscopy confirmed appropriate position of the plate.  The distal locking screws were placed unicortical filling all of the holes.  Additional bicortical screws were placed proximal with good purchase.  Attention was turned to the medial malleolus where a longitudinal incision was made through the skin and subcutaneous tissues.  Further blunt dissection was carried down to the fracture site.  A large fracture hematoma was debrided.  The fracture was brought out to length and reduced anatomically with a pointed reduction clamp.  Two K-wires were placed perpendicular in a retrograde fashion across the fracture.  These were confirmed to be in appropriate position under fluoroscopy.  Two 50 mm partially threaded 4.0 cannulated screws were then placed over the wires with good purchase in the bone and in anatomic, held reduction.  Fluoroscopy confirmed a congruent ankle mortise at that time.  This did not open to stress external rotation.  The fractures were felt to be well aligned and the plate and screws were in the correct position.  The wounds were copiously irrigated and incision was closed in layers.  Sterile dressings and a bulky Isiah Rios splint were applied.  The patient was then awoken from general anesthesia and transferred to the postanesthesia care unit in stable condition.    Kulwant Cooper MD        D: 04/10/2022   T: 04/10/2022   MT: CALDERON    Name:     JOSE DANIEL RAWLS  MRN:      [id number]        Account:        [id number]   :      10/26/1932           Procedure Date: 04/10/2022     Document: [id number]

## 2022-04-11 NOTE — PROGRESS NOTES
Pt intermittently confused, 2L of O2,capno in place,Regular diet, +ve bowel sounds,doing tylenol for pain, dressing is clean dry and intact-Ace wrap. Chronic deleon patent.Will continue to monitor.

## 2022-04-11 NOTE — PLAN OF CARE
Goal Outcome Evaluation:    A&OX4. Confused and forgetful. Up with assisted of 2 and oswaldo steady. Capno discontinued. Regular diet, tolerated well.  IVF discontinued. Bowel sounds active, passing gas per patient. Pain control with schedule Tylenol.  Splint is clean, dry and intact. Able to flex and extend toes.  CMS intact. Will continue to monitor.

## 2022-04-11 NOTE — PROGRESS NOTES
[facility]    Medicine Progress Note - Hospitalist Service    Date of Admission:  4/9/2022    Assessment & Plan        Maia Miranda is a 89 year old female admitted on 4/9/2022. She presents with Trimalleolar left ankle fracture after a fall at assisted living  #Trimalleolar left ankle fracture    - admitted to inpatient    -pain control as needed    -orthopedics consulted. ORIF yesterday    -resumed diet    -patient maximized for surgery medically (does have normal LVF and no evidence of CAD)  - initiated Vitamin K to reverse Coumadin  -reestablished post op  - INR 1.49 this am  -preop EKG without changes  #PAF  #Hx of PE  #chronic anticoagulation therapy  -reversed anticoagulation as above  -reestablished now that post op  -rate controlled  #HTN  Held lisinopril pending  surgery  Reestablish post op  continues beta blocker periop  #CRI  Creatinine up some from baseline on admiision  hydrated with 0.9% NS initially, will d/c  Creatinine at baseline today  #Dementia/HLP    Stable  #UTI with chronic Guillen     Had treatment with kelfex for UTI but Cx positive for Enterococcus, gave 2 gram dose of ampicillin  preop for surgery but does not appear symptomatic           Diet: Advance Diet as Tolerated: Regular Diet Adult    DVT Prophylaxis: Warfarin  Guillen Catheter: PRESENT, indication: Retention  Central Lines: None  Cardiac Monitoring: None  Code Status: No CPR- Do NOT Intubate      Disposition Plan   Expected Discharge: 04/13/2022     Anticipated discharge location: TCU  Delays: awaiting TCU         The patient's care was discussed with the Bedside Nurse, Patient and Patient's Family.    Bear Pryor MD  Hospitalist Service  [facility]  Securely message with the Vocera Web Console (learn more here)  Text page via [employer] Paging/Directory         Clinically Significant Risk Factors Present on Admission                  ______________________________________________________________________    Interval History   1 day S/P ORIF of left trimalleolar ankle fracture. Doing well without much pain  Denies n/v, or abdominal pian. Seen and evaluated by therapies, likely will need TCU    Data reviewed today: I reviewed all medications, new labs and imaging results over the last 24 hours. I personally reviewed no images or EKG's today.    Physical Exam   Vital Signs: Temp: 98.9  F (37.2  C) Temp src: Temporal BP: 100/44 Pulse: 60   Resp: 16 SpO2: 94 % O2 Device: None (Room air) Oxygen Delivery: 1.5 LPM  Weight: 140 lbs 0 oz  Constitutional: awake, alert, cooperative, no apparent distress, and appears stated age  Respiratory: No increased work of breathing, good air exchange, clear to auscultation bilaterally, no crackles or wheezing  Cardiovascular: IRIR variable S1, without M/G/R  GI: No scars, normal bowel sounds, soft, non-distended, non-tender, no masses palpated, no hepatosplenomegally  Musculoskeletal: left distal extremity in bandage, normal cap refill     Data   Recent Labs   Lab 04/11/22  0746 04/10/22  0729 04/09/22  1858 04/09/22  1817 04/09/22  1200   WBC 8.1 5.7  --   --  6.4   HGB 11.6* 11.9  --   --  13.8   MCV 99 103*  --   --  102*   * 140*  --   --  159   INR 1.49* 1.40*  --  1.99* 2.44*    139  --   --  137   POTASSIUM 5.0 4.0 4.1  --  4.9   CHLORIDE 108 111*  --   --  105   CO2 25 23  --   --  30   BUN 23 21  --   --  28   CR 0.94 0.93  --   --  1.35*   ANIONGAP 3 5  --   --  2*   CLARISSE 9.5 8.9  --   --  10.0   * 96  --   --  124*   ALBUMIN  --  2.9*  --   --   --    PROTTOTAL  --  6.0*  --   --   --    BILITOTAL  --  0.7  --   --   --    ALKPHOS  --  74  --   --   --    ALT  --  21  --   --   --    AST  --  23  --   --   --      No results found for this or any previous visit (from the past 24 hour(s)).  Medications     sodium chloride 100 mL/hr at 04/10/22 0422     Warfarin Therapy Reminder          acetaminophen  975 mg Oral Q8H     metoprolol tartrate  25 mg Oral BID     polyethylene glycol  17 g Oral Daily     rosuvastatin  5 mg Oral At Bedtime     senna-docusate  1 tablet Oral BID     sodium chloride (PF)  3 mL Intracatheter Q8H     sodium chloride (PF)  3 mL Intracatheter Q8H     vitamin C  500 mg Oral BID     vitamin D3  50 mcg Oral Daily     warfarin ANTICOAGULANT  2 mg Oral ONCE at 18:00

## 2022-04-11 NOTE — PROGRESS NOTES
[facility]  Pt has been a current pt with [facility] receiving RN services. Her current home care episode has just ended, therefore she will be a new admission. Please provide new orders and face to face at time of discharge, if appropriate.

## 2022-04-11 NOTE — PROGRESS NOTES
04/11/22 1059   Quick Adds   Type of Visit Initial PT Evaluation   Living Environment   People in Home alone   Current Living Arrangements assisted living   Home Accessibility no concerns   Transportation Anticipated agency   Living Environment Comments Pt lives at [facility] and gets asssitance with all meals, ADLS, cleaning, empty catheter, etc..Pt uses walker 4WW at [facility]. Daughter present for eval to help with subjective history.   Self-Care   Usual Activity Tolerance fair   Current Activity Tolerance poor   Regular Exercise No   Equipment Currently Used at Home walker, rolling;raised toilet seat;grab bar, tub/shower;grab bar, toilet;shower chair   Fall history within last six months yes   Number of times patient has fallen within last six months 2   General Information   Onset of Illness/Injury or Date of Surgery 04/09/22   Referring Physician Juan Diego Schwartz PA-C   Patient/Family Therapy Goals Statement (PT) Improve functional mobility and return home   Pertinent History of Current Problem (include personal factors and/or comorbidities that impact the POC) Pt is a 88 y/o female s/p open reduction and internal fixation of left trimalleolar ankle fracture   Existing Precautions/Restrictions weight bearing   Weight-Bearing Status - LLE nonweight-bearing   Cognition   Affect/Mental Status (Cognition) WFL   Pain Assessment   Patient Currently in Pain Yes, see Vital Sign flowsheet   Posture    Posture Forward head position;Protracted shoulders;Kyphosis   Range of Motion (ROM)   Range of Motion ROM deficits secondary to pain;ROM is WFL   Strength (Manual Muscle Testing)   Strength (Manual Muscle Testing) Able to perform L SLR;Able to perform R SLR;Deficits observed during functional mobility   Strength Comments general weakness   Bed Mobility   Bed Mobility supine-sit   Supine-Sit [place] (Bed Mobility) minimum assist (75% patient effort)   Transfers   Transfers sit-stand transfer   Sit-Stand  Transfer   Sit-Stand [place] (Transfers) moderate assist (50% patient effort)   Assistive Device (Sit-Stand Transfers) walker, front-wheeled   Gait/Stairs (Locomotion)   Comment, (Gait/Stairs) Not appropriate at this time   Balance   Balance Comments Good seated balance with UE support, Poor standing balance with FWW   Clinical Impression   Criteria for Skilled Therapeutic Intervention Yes, treatment indicated   PT Diagnosis (PT) Impaired functional mobility   Influenced by the following impairments Decreased strength, ROM, increased pain, and impaired balance.   Functional limitations due to impairments Impaired bed mobility, transfers and gait.   Clinical Presentation (PT Evaluation Complexity) Stable/Uncomplicated   Clinical Presentation Rationale PMHx, social support, PLOF   Clinical Decision Making (Complexity) low complexity   Planned Therapy Interventions (PT) balance training;bed mobility training;cryotherapy;gait training;patient/family education;ROM (range of motion);strengthening;stretching;transfer training   Anticipated Equipment Needs at Discharge (PT) walker, standard   Risk & Benefits of therapy have been explained evaluation/treatment results reviewed;risks/benefits reviewed;care plan/treatment goals reviewed;current/potential barriers reviewed;participants voiced agreement with care plan;participants included;patient;daughter   PT Discharge Planning   PT Discharge Recommendation (DC Rec) Transitional Care Facility   PT Rationale for DC Rec Pt mobilizing significantly below baseline. Pt required modA sit <>stand with FWW while NWB on L LE. Anticipating pt will require A x 1 with all mobility prior to d/c. Recommending TCU to improve strength and functional independence. Pt and daughter on board.   PT Brief overview of current status SS, NWB L LE   Plan of Care Review   Plan of Care Reviewed With patient;daughter   Physical Therapy Goals   PT Frequency 5x/week   PT Predicated Duration/Target  Date for Goal Attainment 04/14/22   PT Goals Bed Mobility;Transfers;Gait   PT: Bed Mobility Supervision/stand-by assist;Supine to/from sit   PT: Transfers Minimal assist;Sit to/from stand;Assistive device   PT: Gait Minimal assist;Assistive device;50 feet

## 2022-04-11 NOTE — PROGRESS NOTES
Orthopedic Surgery  Maia Miranda  2022  Admit Date:  2022  POD: 1 Day Post-Op   Procedure(s):  Open reduction and internal fixation of left trimalleolar ankle fracture    Alert and oriented.   Patient resting comfortably in bed.    Pain continues but improving.   Tolerating oral intake.    Denies nausea or vomiting  Denies chest pain or shortness of breath    Vital Sign Ranges  Temperature Temp  Av.7  F (36.5  C)  Min: 97.1  F (36.2  C)  Max: 99.2  F (37.3  C)   Blood pressure Systolic (24hrs), Av , Min:95 , Max:145        Diastolic (24hrs), Av, Min:51, Max:82      Pulse Pulse  Av.7  Min: 72  Max: 103   Respirations Resp  Av  Min: 9  Max: 18   Pulse oximetry SpO2  Av.3 %  Min: 91 %  Max: 100 %       Splint is clean, dry and intact  Able to flex/extend toes  Sensation intact  Pulses present    Labs:  Recent Labs   Lab Test 22  0746 04/10/22  0729 22  1200   WBC 8.1 5.7 6.4     Recent Labs   Lab Test 22  0746 04/10/22  0729 22  1200   HGB 11.6* 11.9 13.8     Recent Labs   Lab Test 22  0746 04/10/22  0729 22  1817   INR 1.49* 1.40* 1.99*     Recent Labs   Lab Test 22  0746 04/10/22  0729 22  1200   * 140* 159       1. PLAN:   Continue Coumadin for DVT prophylaxis.     Mobilize with PT/OT    Non-WB Left LE with walker.     Continue current pain regiment.   Dressings: Keep intact.  Change if >60% saturated or peeling off.    Follow-up: 2 weeks post-op with Dr Cooper team.     2. Disposition   Anticipate d/c to TCU likely when medically cleared and progressing in PT.    Elinor Seals PA-C

## 2022-04-12 ENCOUNTER — APPOINTMENT (OUTPATIENT)
Dept: PHYSICAL THERAPY | Facility: CLINIC | Age: 87
DRG: 493 | End: 2022-04-12
Payer: COMMERCIAL

## 2022-04-12 ENCOUNTER — PATIENT OUTREACH (OUTPATIENT)
Dept: GERIATRIC MEDICINE | Facility: CLINIC | Age: 87
End: 2022-04-12

## 2022-04-12 LAB
GLUCOSE BLD-MCNC: 113 MG/DL (ref 70–99)
GLUCOSE BLD-MCNC: 124 MG/DL (ref 70–99)
HGB BLD-MCNC: 10.3 G/DL (ref 11.7–15.7)
INR PPP: 2.15 (ref 0.85–1.15)

## 2022-04-12 PROCEDURE — 97110 THERAPEUTIC EXERCISES: CPT | Mod: GP

## 2022-04-12 PROCEDURE — 85610 PROTHROMBIN TIME: CPT | Performed by: PHYSICIAN ASSISTANT

## 2022-04-12 PROCEDURE — 85018 HEMOGLOBIN: CPT | Performed by: PHYSICIAN ASSISTANT

## 2022-04-12 PROCEDURE — 258N000003 HC RX IP 258 OP 636: Performed by: INTERNAL MEDICINE

## 2022-04-12 PROCEDURE — 36415 COLL VENOUS BLD VENIPUNCTURE: CPT | Performed by: PHYSICIAN ASSISTANT

## 2022-04-12 PROCEDURE — 82947 ASSAY GLUCOSE BLOOD QUANT: CPT | Performed by: INTERNAL MEDICINE

## 2022-04-12 PROCEDURE — 120N000001 HC R&B MED SURG/OB

## 2022-04-12 PROCEDURE — 99232 SBSQ HOSP IP/OBS MODERATE 35: CPT | Performed by: INTERNAL MEDICINE

## 2022-04-12 PROCEDURE — 250N000013 HC RX MED GY IP 250 OP 250 PS 637: Performed by: INTERNAL MEDICINE

## 2022-04-12 PROCEDURE — 250N000013 HC RX MED GY IP 250 OP 250 PS 637

## 2022-04-12 PROCEDURE — 250N000011 HC RX IP 250 OP 636: Performed by: PHYSICIAN ASSISTANT

## 2022-04-12 PROCEDURE — 250N000013 HC RX MED GY IP 250 OP 250 PS 637: Performed by: PHYSICIAN ASSISTANT

## 2022-04-12 PROCEDURE — 97530 THERAPEUTIC ACTIVITIES: CPT | Mod: GP

## 2022-04-12 PROCEDURE — 36415 COLL VENOUS BLD VENIPUNCTURE: CPT | Performed by: INTERNAL MEDICINE

## 2022-04-12 RX ORDER — WARFARIN SODIUM 2 MG/1
2 TABLET ORAL
Status: COMPLETED | OUTPATIENT
Start: 2022-04-12 | End: 2022-04-12

## 2022-04-12 RX ADMIN — OXYCODONE HYDROCHLORIDE 5 MG: 5 TABLET ORAL at 18:21

## 2022-04-12 RX ADMIN — OXYCODONE HYDROCHLORIDE 5 MG: 5 TABLET ORAL at 03:13

## 2022-04-12 RX ADMIN — HYDROMORPHONE HYDROCHLORIDE 0.2 MG: 0.2 INJECTION, SOLUTION INTRAMUSCULAR; INTRAVENOUS; SUBCUTANEOUS at 03:13

## 2022-04-12 RX ADMIN — Medication 50 MCG: at 08:32

## 2022-04-12 RX ADMIN — OXYCODONE HYDROCHLORIDE AND ACETAMINOPHEN 500 MG: 500 TABLET ORAL at 08:32

## 2022-04-12 RX ADMIN — LISINOPRIL 10 MG: 10 TABLET ORAL at 08:32

## 2022-04-12 RX ADMIN — WARFARIN SODIUM 2 MG: 2 TABLET ORAL at 19:47

## 2022-04-12 RX ADMIN — METOPROLOL TARTRATE 25 MG: 25 TABLET, FILM COATED ORAL at 19:47

## 2022-04-12 RX ADMIN — OXYCODONE HYDROCHLORIDE 5 MG: 5 TABLET ORAL at 09:27

## 2022-04-12 RX ADMIN — METOPROLOL TARTRATE 25 MG: 25 TABLET, FILM COATED ORAL at 08:32

## 2022-04-12 RX ADMIN — SENNOSIDES AND DOCUSATE SODIUM 1 TABLET: 50; 8.6 TABLET ORAL at 08:32

## 2022-04-12 RX ADMIN — ACETAMINOPHEN 975 MG: 325 TABLET, FILM COATED ORAL at 08:31

## 2022-04-12 RX ADMIN — ACETAMINOPHEN 975 MG: 325 TABLET, FILM COATED ORAL at 18:22

## 2022-04-12 RX ADMIN — POLYETHYLENE GLYCOL 3350 17 G: 17 POWDER, FOR SOLUTION ORAL at 08:32

## 2022-04-12 RX ADMIN — SODIUM CHLORIDE 500 ML: 900 INJECTION, SOLUTION INTRAVENOUS at 13:11

## 2022-04-12 RX ADMIN — OXYCODONE HYDROCHLORIDE AND ACETAMINOPHEN 500 MG: 500 TABLET ORAL at 19:47

## 2022-04-12 ASSESSMENT — ACTIVITIES OF DAILY LIVING (ADL)
ADLS_ACUITY_SCORE: 11
ADLS_ACUITY_SCORE: 9
ADLS_ACUITY_SCORE: 9
ADLS_ACUITY_SCORE: 13
ADLS_ACUITY_SCORE: 11
ADLS_ACUITY_SCORE: 9
ADLS_ACUITY_SCORE: 11
ADLS_ACUITY_SCORE: 9
ADLS_ACUITY_SCORE: 11
ADLS_ACUITY_SCORE: 9
ADLS_ACUITY_SCORE: 11
ADLS_ACUITY_SCORE: 9
ADLS_ACUITY_SCORE: 9
ADLS_ACUITY_SCORE: 11
ADLS_ACUITY_SCORE: 13
ADLS_ACUITY_SCORE: 11
ADLS_ACUITY_SCORE: 13
ADLS_ACUITY_SCORE: 9
ADLS_ACUITY_SCORE: 9
ADLS_ACUITY_SCORE: 11
ADLS_ACUITY_SCORE: 9
ADLS_ACUITY_SCORE: 9
ADLS_ACUITY_SCORE: 11
ADLS_ACUITY_SCORE: 11

## 2022-04-12 NOTE — PLAN OF CARE
3pm-11pm RN    Patient VS are at baseline: Yes but BP soft   Patient able to ambulate as they were prior to admission or with assist devices provided by therapy during their stay: No is using a oswaldo steady and is non weight bearing  Patient must void prior to discharge: No has a chronic deleon  Patient able to tolerate oral intake: Yes  Patient has adequate pain control using oral analgesics: Yes    Patient A&Ox4 but is confused and forgetful sometimes. Tylenol used for pain management. CMS intact, dressing to left leg CDI. Plans are to discharge to TCU.

## 2022-04-12 NOTE — PROGRESS NOTES
"    [facility]  Hospitalist Progress Note  David Bolanos MD 04/12/2022    Reason for Stay (Diagnosis): ankle fx, s/p operative repair         Assessment and Plan:      Summary of Stay: Maia Miranda is a 89 year old female admitted on 4/9/2022. She presents with Trimalleolar left ankle fracture after a fall at assisted living    #Trimalleolar left ankle fracture    - admitted to inpatient    -pain control as needed    -orthopedics consulted. S/p ORIF this admission    - TCU anticipated on discharge    #PAF  - on warfarin    #Hx of PE  - on warfarin.  INR corrected for surgery and now resumed; INR therapeutic today    #HTN    #CRI    #Dementia/HLP    Stable    #UTI    Had treatment with kelfex for UTI but Cx positive for Enterococcus, gave 2 gram dose of ampicillin  preop    #hypotension  - isolated low BP today associated with dizzy sx.  Treated with NS bolus     I updated dtr at bedside today     Diet: Advance Diet as Tolerated: Regular Diet Adult    DVT Prophylaxis: Warfarin  Guillen Catheter: PRESENT, indication: Retention  Central Lines: None  Cardiac Monitoring: None  Code Status: No CPR- Do NOT Intubate    DISPO:  Anticipate TCU when bed is found           Interval History (Subjective):      No complaints, though hx somewhat limited by dementia.  Feels well.  Pain controlled but worse when she moves LE.                    Physical Exam:      Last Vital Signs:  BP (!) 90/37 (BP Location: Right arm, Patient Position: Sitting)   Pulse 69   Temp 97.4  F (36.3  C) (Temporal)   Resp 16   Ht 1.651 m (5' 5\")   Wt 63.5 kg (140 lb)   SpO2 95%   BMI 23.30 kg/m        Intake/Output Summary (Last 24 hours) at 4/12/2022 1357  Last data filed at 4/12/2022 0825  Gross per 24 hour   Intake 750 ml   Output 2500 ml   Net -1750 ml       Constitutional: Awake, alert, cooperative, no apparent distress.  dtr present   Respiratory: Clear to auscultation bilaterally, no crackles or wheezing   Cardiovascular: " Regular rate and rhythm, normal S1 and S2, and no murmur noted   Abdomen: Normal bowel sounds, soft, non-distended, non-tender   Skin: No rashes, no cyanosis, dry to touch   Neuro: Alert and oriented x3, no weakness, numbness, memory loss   Extremities: No edema, normal range of motion   Other(s): LLE wrapped/splinted.  Able to move L toes, warm to touch, sensation intact       All other systems: Negative          Medications:      All current medications were reviewed with changes reflected in problem list.         Data:      All new lab and imaging data was reviewed.   Labs:  Recent Labs   Lab 04/12/22  0645 04/12/22  0312 04/11/22  0746 04/10/22  0729 04/09/22  1858 04/09/22  1200   NA  --   --  136 139  --  137   POTASSIUM  --   --  5.0 4.0 4.1 4.9   CHLORIDE  --   --  108 111*  --  105   CO2  --   --  25 23  --  30   ANIONGAP  --   --  3 5  --  2*   * 124* 142* 96  --  124*   BUN  --   --  23 21  --  28   CR  --   --  0.94 0.93  --  1.35*   GFRESTIMATED  --   --  58* 58*  --  37*   CLARISSE  --   --  9.5 8.9  --  10.0     Recent Labs   Lab 04/12/22  0645 04/11/22  0746   WBC  --  8.1   HGB 10.3* 11.6*   HCT  --  35.4   MCV  --  99   PLT  --  140*      Imaging:   No results found for this or any previous visit (from the past 24 hour(s)).

## 2022-04-12 NOTE — PLAN OF CARE
"Goal Outcome Evaluation:   Assumed cares from 3265-3011. Pt is A&Ox4, with confusion per baseline dementia. Pt is non weight bearing on LLE. Moves with EZ stand for transfer. Managing pain with schedule Tylenol, PRN oxycodone and IV dilaudid. Tolerating regular diet, PIV x1 left hand. Bed alarm is on for safety and call light within reach.    /48 (BP Location: Left arm)   Pulse 63   Temp 97.2  F (36.2  C) (Temporal)   Resp 16   Ht 1.651 m (5' 5\")   Wt 63.5 kg (140 lb)   SpO2 93%   BMI 23.30 kg/m                          "

## 2022-04-12 NOTE — PLAN OF CARE
Goal Outcome Evaluation:      Pt up A2 with oswaldo steady, RHONDA BACK. Forgetful but oriented. Oxy 5 prn for pain. Became hypotensive and dizzy in afternoon, 500cc bolus given and effective. Chronic deleon in place and patent. Reg diet. Waiting for TCU bed to be available.

## 2022-04-12 NOTE — PROGRESS NOTES
Orthopedic Surgery  Maia Miranda  2022  Admit Date:  2022  POD: 2 Days Post-Op   Procedure(s):  Open reduction and internal fixation of left trimalleolar ankle fracture    Alert and oriented.   Patient resting comfortably in chair.    Pain controlled.  Tolerating oral intake.    Denies nausea or vomiting  Denies chest pain or shortness of breath    Vital Sign Ranges  Temperature Temp  Av.5  F (36.4  C)  Min: 97.2  F (36.2  C)  Max: 98  F (36.7  C)   Blood pressure Systolic (24hrs), Av , Min:89 , Max:142        Diastolic (24hrs), Av, Min:39, Max:50      Pulse Pulse  Av.7  Min: 63  Max: 70   Respirations Resp  Av.6  Min: 16  Max: 18   Pulse oximetry SpO2  Av %  Min: 92 %  Max: 96 %       Splint is clean, dry and intact  Able to flex/extend toes  Sensation intact  Pulses present    Labs:  Recent Labs   Lab Test 22  0746 04/10/22  0729 22  1200   WBC 8.1 5.7 6.4     Recent Labs   Lab Test 22  0645 22  0746 04/10/22  0729   HGB 10.3* 11.6* 11.9     Recent Labs   Lab Test 22  0645 22  0746 04/10/22  0729   INR 2.15* 1.49* 1.40*     Recent Labs   Lab Test 22  0746 04/10/22  0729 22  1200   * 140* 159     1. Plan:              Continue Coumadin for DVT prophylaxis.                Mobilize with PT/OT               Non-WB Left LE with walker.                Continue current pain regiment.              Dressings: Keep intact.  Change if >60% saturated or peeling off.               Follow-up: 2 weeks post-op with Dr Cooper team.      2. Disposition              Anticipate d/c to TCU likely when medically cleared and progressing in PT.       Elinor Seals PA-C

## 2022-04-12 NOTE — PROGRESS NOTES
Care Management Follow Up    Length of Stay (days): 3    Expected Discharge Date: 04/14/2022     Concerns to be Addressed: discharge planning  Patient plan of care discussed at interdisciplinary rounds: Yes    Anticipated Discharge Disposition:  TCU     Anticipated Discharge Services:   Anticipated Discharge DME:      Patient/family educated on Medicare website which has current facility and service quality ratings:  yes  Education Provided on the Discharge Plan:  yes  Patient/Family in Agreement with the Plan:  yes    Referrals Placed by CM/SW:  Skilled nursing facility  Private pay costs discussed: Not applicable    Additional Information:  Reviewed pt's status and discussed in rounds. Noted [facility] has accepted pt through epic.     DEE placed phone call to [facility] and spoke with Tamara in admissions. Per Tamara she will have a bed available for pt on Thursday.     Met with pt and pt's dtr Minnie to provide update. DEE explained need to send out additional referrals and if no other facility is found by Thursday then we can pursue [facility]. Per Minnie, she is only okay with her mom going to [facility] or back to her [facility] with increased services. Minnie is not willing to look at list of other facilities and/or not willing to allow SW to send out alternative referrals.     DEE placed phone call to [facility] and left  for the nursing station requesting return call to inquire about pt's current services.     Social work will continue to follow and assist with discharge planning as needed.    SHAJI Morrell, LSW  Inpatient Care Coordination  [facility]  [phone]    SHAJI Molina

## 2022-04-13 ENCOUNTER — APPOINTMENT (OUTPATIENT)
Dept: PHYSICAL THERAPY | Facility: CLINIC | Age: 87
DRG: 493 | End: 2022-04-13
Payer: COMMERCIAL

## 2022-04-13 LAB
INR PPP: 2.27 (ref 0.85–1.15)
MAGNESIUM SERPL-MCNC: 2 MG/DL (ref 1.6–2.3)
POTASSIUM BLD-SCNC: 4.4 MMOL/L (ref 3.4–5.3)

## 2022-04-13 PROCEDURE — 99232 SBSQ HOSP IP/OBS MODERATE 35: CPT | Performed by: INTERNAL MEDICINE

## 2022-04-13 PROCEDURE — 250N000013 HC RX MED GY IP 250 OP 250 PS 637: Performed by: INTERNAL MEDICINE

## 2022-04-13 PROCEDURE — 36415 COLL VENOUS BLD VENIPUNCTURE: CPT | Performed by: PHYSICIAN ASSISTANT

## 2022-04-13 PROCEDURE — 85610 PROTHROMBIN TIME: CPT | Performed by: PHYSICIAN ASSISTANT

## 2022-04-13 PROCEDURE — 83735 ASSAY OF MAGNESIUM: CPT | Performed by: ORTHOPAEDIC SURGERY

## 2022-04-13 PROCEDURE — 250N000013 HC RX MED GY IP 250 OP 250 PS 637: Performed by: PHYSICIAN ASSISTANT

## 2022-04-13 PROCEDURE — 97530 THERAPEUTIC ACTIVITIES: CPT | Mod: GP | Performed by: PHYSICAL THERAPIST

## 2022-04-13 PROCEDURE — 84132 ASSAY OF SERUM POTASSIUM: CPT | Performed by: ORTHOPAEDIC SURGERY

## 2022-04-13 PROCEDURE — 120N000001 HC R&B MED SURG/OB

## 2022-04-13 PROCEDURE — 250N000011 HC RX IP 250 OP 636: Performed by: PHYSICIAN ASSISTANT

## 2022-04-13 RX ORDER — WARFARIN SODIUM 2 MG/1
2 TABLET ORAL
Status: COMPLETED | OUTPATIENT
Start: 2022-04-13 | End: 2022-04-13

## 2022-04-13 RX ORDER — LISINOPRIL 5 MG/1
5 TABLET ORAL DAILY
Status: DISCONTINUED | OUTPATIENT
Start: 2022-04-13 | End: 2022-04-14 | Stop reason: HOSPADM

## 2022-04-13 RX ADMIN — OXYCODONE HYDROCHLORIDE AND ACETAMINOPHEN 500 MG: 500 TABLET ORAL at 20:05

## 2022-04-13 RX ADMIN — METOPROLOL TARTRATE 25 MG: 25 TABLET, FILM COATED ORAL at 08:35

## 2022-04-13 RX ADMIN — ACETAMINOPHEN 975 MG: 325 TABLET, FILM COATED ORAL at 00:20

## 2022-04-13 RX ADMIN — ROSUVASTATIN CALCIUM 5 MG: 5 TABLET, FILM COATED ORAL at 21:04

## 2022-04-13 RX ADMIN — Medication 50 MCG: at 08:34

## 2022-04-13 RX ADMIN — OXYCODONE HYDROCHLORIDE 5 MG: 5 TABLET ORAL at 12:07

## 2022-04-13 RX ADMIN — WARFARIN SODIUM 2 MG: 2 TABLET ORAL at 17:58

## 2022-04-13 RX ADMIN — ACETAMINOPHEN 975 MG: 325 TABLET, FILM COATED ORAL at 08:34

## 2022-04-13 RX ADMIN — ONDANSETRON 4 MG: 4 TABLET, ORALLY DISINTEGRATING ORAL at 11:11

## 2022-04-13 RX ADMIN — METOPROLOL TARTRATE 25 MG: 25 TABLET, FILM COATED ORAL at 20:05

## 2022-04-13 RX ADMIN — ROSUVASTATIN CALCIUM 5 MG: 5 TABLET, FILM COATED ORAL at 00:19

## 2022-04-13 RX ADMIN — LISINOPRIL 5 MG: 5 TABLET ORAL at 09:29

## 2022-04-13 RX ADMIN — SENNOSIDES AND DOCUSATE SODIUM 1 TABLET: 50; 8.6 TABLET ORAL at 20:05

## 2022-04-13 RX ADMIN — OXYCODONE HYDROCHLORIDE AND ACETAMINOPHEN 500 MG: 500 TABLET ORAL at 08:35

## 2022-04-13 ASSESSMENT — ACTIVITIES OF DAILY LIVING (ADL)
ADLS_ACUITY_SCORE: 9

## 2022-04-13 NOTE — PROGRESS NOTES
Care Management Follow Up    Length of Stay (days): 4    Expected Discharge Date: 04/14/2022     Concerns to be Addressed: discharge planning  Patient plan of care discussed at interdisciplinary rounds: Yes    Anticipated Discharge Disposition: TCU - [facility]    Patient/family educated on Medicare website which has current facility and service quality ratings:  yes  Education Provided on the Discharge Plan:  yes  Patient/Family in Agreement with the Plan:  yes    Referrals Placed by CM/SW:  Skilled nursing facility  Private pay costs discussed: transportation costs    Additional Information:  Reviewed pt's status and discussed in rounds. Pt has been accepted to [facility] for tomorrow (4/14) into a private room. Per Tamara in admissions, bed will be available after 1300 tomorrow.     Met with pt and pt's son in law to provide update. Discussed transportation and both requested medical transport be arranged. Reviewed out of pocket cost for [facility] transport, $81.80 for base rate and $5.26 per mile to the destination. They verbalized understanding and are in agreement.     SW arranged [employer] w/c ride for tomorrow (4/14) @ 1330.     Updated nursing.     SHAJI Morrell, LSW  Inpatient Care Coordination  Ortho Unit, [facility]  [phone]    SHAJI Molina

## 2022-04-13 NOTE — PROGRESS NOTES
Orthopedic Surgery  Maia Miranda  2022  Admit Date:  2022  POD # 3 s/p ORIF left trimal ankle fx    Maia Miranda is a 88 y/o female whom was rounded on 3 days s/p ORIF left trimal ankle fx.  Pain controlled.  Tolerating oral intake.  No events overnight. The patient denies chest pain, SOB, calf pain.    Alert and orient to person, place, and time.  Vital Sign Ranges  Temperature Temp  Av.4  F (36.3  C)  Min: 97.2  F (36.2  C)  Max: 97.6  F (36.4  C)   Blood pressure Systolic (24hrs), Av , Min:90 , Max:124        Diastolic (24hrs), Av, Min:37, Max:68      Pulse Pulse  Av.7  Min: 56  Max: 64   Respirations Resp  Av  Min: 16  Max: 16   Pulse oximetry SpO2  Av.3 %  Min: 90 %  Max: 98 %       Left LE splint dressing is clean, dry, and intact. Minimal erythema of the surrounding skin and no signs of infection  Bilateral calves are soft, non-tender.  Sensation intact distally  Able to flex/extend at digits  + pulses     Labs:  Recent Labs   Lab Test 22  0553 22  0746 04/10/22  0729   POTASSIUM 4.4 5.0 4.0     Recent Labs   Lab Test 22  0645 22  0746 04/10/22  0729   HGB 10.3* 11.6* 11.9     Recent Labs   Lab Test 22  0553 22  0645 22  0746   INR 2.27* 2.15* 1.49*     Recent Labs   Lab Test 22  0746 04/10/22  0729 22  1200   * 140* 159       A/P  1. POD #3 s/p ORIF left ankle   Continue COumadin for DVT prophylaxis.     Mobilize with PT/OT NWB.     Continue current pain regiment.    2. Disposition   Anticipate d/c to TCU pending continual strengthening and ambulation with PT and placement.    Juan Diego Schwartz PA-C  [phone]

## 2022-04-13 NOTE — PLAN OF CARE
Goal Outcome Evaluation:    Pt up A2 with oswaldo vogt. Guillen patent and draining. NWB to LLE. Oxy 5 prn for pain management. Did seem more confused then usual around 1800 today but still alert and oriented. Reg diet .incontinent of stool. Plan is to discharge to [facility] at 1330 tomorrow (Thursday 4/14/22) via wheel chair transportation. Family is aware.

## 2022-04-13 NOTE — PLAN OF CARE
Patient vital signs are at baseline: Yes  Patient able to ambulate as they were prior to admission or with assist devices provided by therapies during their stay:  Yes Bri steady assist x2  Patient MUST void prior to discharge:  No,  Reason:  Pt has a chronic deleon  Patient able to tolerate oral intake:  Yes  Pain has adequate pain control using Oral analgesics:  Yes with scheduled Tylenol  Does patient have an identified :  Yes  Has goal D/C date and time been discussed with patient:  Yes     Pt A&O to self only. VS stable. Denies pain. CMS intact. IV SL. Pt on potassium and magnesium protocols. On the regular diet. Right forehead hematoma. Discharge plan plan to TCU on 4/14.

## 2022-04-13 NOTE — PROGRESS NOTES
"    [facility]  Hospitalist Progress Note  David Bolanos MD 04/13/2022    Reason for Stay (Diagnosis): ankle fx         Assessment and Plan:      Summary of Stay: Maia Miranda is a 89 year old female admitted on 4/9/2022. She presents with Trimalleolar left ankle fracture after a fall at assisted living     #Trimalleolar left ankle fracture    -orthopedics consulted. S/p ORIF this admission  - doing well post-surgery with adequate pain control at rest; does have pain with any movement of ankle (as expected)    - TCU anticipated on discharge     #PAF  - on warfarin     #Hx of PE  - on warfarin.  INR corrected for surgery and now resumed; INR therapeutic today     #HTN     #CRI     #Dementia/HLP    Stable     #UTI    Had treatment with kelfex for UTI but Cx positive for Enterococcus, gave 2 gram dose of ampicillin  preop        I updated son at bedside today     Diet: Advance Diet as Tolerated: Regular Diet Adult    DVT Prophylaxis: Warfarin  Guillen Catheter: PRESENT, indication: Retention  Central Lines: None  Cardiac Monitoring: None  Code Status: No CPR- Do NOT Intubate    DISPO:  Anticipate TCU when bed is found           Interval History (Subjective):      No complaints today.  Son at bedside and updated today.  Isolated low BP yesterday; improved with IVF bolus.  Await TCU placement                  Physical Exam:      Last Vital Signs:  /54 (BP Location: Right arm)   Pulse 64   Temp 97.2  F (36.2  C) (Temporal)   Resp 16   Ht 1.651 m (5' 5\")   Wt 63.5 kg (140 lb)   SpO2 98%   BMI 23.30 kg/m        Intake/Output Summary (Last 24 hours) at 4/13/2022 1315  Last data filed at 4/13/2022 1312  Gross per 24 hour   Intake 480 ml   Output 4000 ml   Net -3520 ml       Constitutional: Awake, alert, cooperative, no apparent distress   Respiratory: Clear to auscultation bilaterally, no crackles or wheezing   Cardiovascular: Regular rate and rhythm, normal S1 and S2, and no murmur noted "   Abdomen: Normal bowel sounds, soft, non-distended, non-tender   Skin: No rashes, no cyanosis, dry to touch   Neuro: Alert and oriented x3, no weakness, numbness, memory loss   Extremities: No edema, normal range of motion   Other(s): LLE in splint.  L toes warm, mobile, and sensitive to touch       All other systems: Negative          Medications:      All current medications were reviewed with changes reflected in problem list.         Data:      All new lab and imaging data was reviewed.   Labs:  Recent Labs   Lab 04/13/22  0553 04/12/22  0645 04/12/22  0312 04/11/22  0746 04/10/22  0729 04/09/22  1858 04/09/22  1200   NA  --   --   --  136 139  --  137   POTASSIUM 4.4  --   --  5.0 4.0   < > 4.9   CHLORIDE  --   --   --  108 111*  --  105   CO2  --   --   --  25 23  --  30   ANIONGAP  --   --   --  3 5  --  2*   GLC  --  113* 124* 142* 96  --  124*   BUN  --   --   --  23 21  --  28   CR  --   --   --  0.94 0.93  --  1.35*   GFRESTIMATED  --   --   --  58* 58*  --  37*   CLARISSE  --   --   --  9.5 8.9  --  10.0    < > = values in this interval not displayed.     Recent Labs   Lab 04/12/22  0645 04/11/22  0746   WBC  --  8.1   HGB 10.3* 11.6*   HCT  --  35.4   MCV  --  99   PLT  --  140*      Imaging:   No results found for this or any previous visit (from the past 24 hour(s)).

## 2022-04-14 ENCOUNTER — PATIENT OUTREACH (OUTPATIENT)
Dept: CARE COORDINATION | Facility: CLINIC | Age: 87
End: 2022-04-14
Payer: COMMERCIAL

## 2022-04-14 ENCOUNTER — LAB REQUISITION (OUTPATIENT)
Dept: LAB | Facility: CLINIC | Age: 87
End: 2022-04-14
Payer: COMMERCIAL

## 2022-04-14 VITALS
OXYGEN SATURATION: 95 % | BODY MASS INDEX: 23.32 KG/M2 | SYSTOLIC BLOOD PRESSURE: 142 MMHG | WEIGHT: 140 LBS | RESPIRATION RATE: 16 BRPM | DIASTOLIC BLOOD PRESSURE: 59 MMHG | HEART RATE: 64 BPM | HEIGHT: 65 IN | TEMPERATURE: 98 F

## 2022-04-14 DIAGNOSIS — U07.1 COVID-19: ICD-10-CM

## 2022-04-14 LAB
INR PPP: 2.28 (ref 0.85–1.15)
MAGNESIUM SERPL-MCNC: 2 MG/DL (ref 1.6–2.3)
POTASSIUM BLD-SCNC: 4 MMOL/L (ref 3.4–5.3)

## 2022-04-14 PROCEDURE — 84132 ASSAY OF SERUM POTASSIUM: CPT | Performed by: ORTHOPAEDIC SURGERY

## 2022-04-14 PROCEDURE — U0005 INFEC AGEN DETEC AMPLI PROBE: HCPCS | Mod: ORL | Performed by: NURSE PRACTITIONER

## 2022-04-14 PROCEDURE — 36415 COLL VENOUS BLD VENIPUNCTURE: CPT | Performed by: ORTHOPAEDIC SURGERY

## 2022-04-14 PROCEDURE — 85610 PROTHROMBIN TIME: CPT | Performed by: PHYSICIAN ASSISTANT

## 2022-04-14 PROCEDURE — 83735 ASSAY OF MAGNESIUM: CPT | Performed by: ORTHOPAEDIC SURGERY

## 2022-04-14 PROCEDURE — 99232 SBSQ HOSP IP/OBS MODERATE 35: CPT | Performed by: INTERNAL MEDICINE

## 2022-04-14 PROCEDURE — 250N000013 HC RX MED GY IP 250 OP 250 PS 637: Performed by: PHYSICIAN ASSISTANT

## 2022-04-14 PROCEDURE — 250N000013 HC RX MED GY IP 250 OP 250 PS 637: Performed by: INTERNAL MEDICINE

## 2022-04-14 RX ADMIN — METOPROLOL TARTRATE 25 MG: 25 TABLET, FILM COATED ORAL at 09:48

## 2022-04-14 RX ADMIN — ACETAMINOPHEN 650 MG: 325 TABLET, FILM COATED ORAL at 13:05

## 2022-04-14 RX ADMIN — Medication 50 MCG: at 09:48

## 2022-04-14 RX ADMIN — ACETAMINOPHEN 650 MG: 325 TABLET, FILM COATED ORAL at 09:48

## 2022-04-14 RX ADMIN — LISINOPRIL 5 MG: 5 TABLET ORAL at 09:48

## 2022-04-14 RX ADMIN — OXYCODONE HYDROCHLORIDE AND ACETAMINOPHEN 500 MG: 500 TABLET ORAL at 09:48

## 2022-04-14 ASSESSMENT — ACTIVITIES OF DAILY LIVING (ADL)
ADLS_ACUITY_SCORE: 9

## 2022-04-14 NOTE — PLAN OF CARE
Pt A&O x1 only to self. VS stable. Denies pain. Hypertensive.CMS numbness in lower extremities bilaterally.Moving assist x2 with oswaldo steady. IV SL. Pt is on potassium and magnesium protocols. Pt has a chronic deleon catheter. Regular diet. Hematoma on the R forehead. Discharge plan on 1/14 an 1330 to [facility].

## 2022-04-14 NOTE — PROGRESS NOTES
Orthopedic Surgery  Maia Miranda  2022  Admit Date:  2022  POD: 4 Days Post-Op   Procedure(s):  Open reduction and internal fixation of left trimalleolar ankle fracture    Alert and oriented.   Patient resting comfortably in bed.    Pain controlled.  Tolerating oral intake.    Denies nausea or vomiting  Denies chest pain or shortness of breath    Vital Sign Ranges  Temperature Temp  Av.1  F (36.7  C)  Min: 97.9  F (36.6  C)  Max: 98.5  F (36.9  C)   Blood pressure Systolic (24hrs), Av , Min:121 , Max:151        Diastolic (24hrs), Av, Min:47, Max:64      Pulse Pulse  Av.3  Min: 60  Max: 68   Respirations Resp  Av.5  Min: 16  Max: 18   Pulse oximetry SpO2  Av.7 %  Min: 94 %  Max: 99 %       Splint is clean, dry and intact  Able to flex/extend toes  Sensation intact  Pulses present    Labs:  Recent Labs   Lab Test 22  0746 04/10/22  0729 22  1200   WBC 8.1 5.7 6.4     Recent Labs   Lab Test 22  0645 22  0746 04/10/22  0729   HGB 10.3* 11.6* 11.9     Recent Labs   Lab Test 22  0600 22  0553 22  0645   INR 2.28* 2.27* 2.15*     Recent Labs   Lab Test 22  0746 04/10/22  0729 22  1200   * 140* 159         1. Plan:              Continue Coumadin for DVT prophylaxis.                Mobilize with PT/OT               Non-WB Left LE with walker.                Continue current pain regiment.              Dressings: Keep intact.               Follow-up: 2 weeks post-op with Dr Cooper team.      2. Disposition              Anticipate d/c to TCU today.  Ortho stable.       Elinor Seals PA-C

## 2022-04-14 NOTE — PHARMACY-ANTICOAGULATION SERVICE
Clinical Pharmacy- Warfarin Discharge Note    Indication: Atrial Fibrillation  Therapy Goal: INR 2-3  Warfarin Prior to Admission: Yes  Warfarin PTA Regimen: 2mg daily    Anticoagulation Dose History     Recent Dosing and Labs Latest Ref Rng & Units 4/9/2022 4/9/2022 4/10/2022 4/11/2022 4/12/2022 4/13/2022 4/14/2022    Warfarin 2 mg - - - 2 mg 2 mg 2 mg 2 mg -    INR 0.85 - 1.15 2.44(H) 1.99(H) 1.40(H) 1.49(H) 2.15(H) 2.27(H) 2.28(H)        CONTINUE these medicines which have NOT CHANGED   warfarin ANTICOAGULANT 2 MG tablet  Commonly known as: COUMADIN    Dose: 2 mg  Take 2 mg by mouth daily     Follow-up Appointments      Follow Up and recommended labs and tests      Follow up with Dr. Cooper/Juan Diego PRESTON at [facility] 2 weeks after surgery.  If still in TCU, can be seen by the orthopedic provider at the care facility (if this is an option).     Call the care coordinator at [phone] to arrange the appt.   [facility]: [phone]   [facility]: [phone]     Check INR every Monday and Thursday while at U.  Goal INR 2-3.  Please communicate lab results to primary provider so warfarin dose can be adjusted if needed.  INR is 2.3 on discharge from the hospital         The discharge plan above seems reasonable.

## 2022-04-14 NOTE — PROGRESS NOTES
Clinic Care Coordination Contact  Care Team Conversations    Situation: RN Clinical Product Navigator following patient through TCU care progression.     Background: Patient was inpatient at [facility] 4/9/22-4/14/22 due to fall, left ankle fracture, s/p ORIF left trimalleolar ankle fracture.  [facility]    Assessment: Prior to hospitalization patient was living in assisted living at [facility] and was getting assistance with meals, ADLs, cleaning, catheter cares.  Patient uses a 4WW at baseline and has a raised toilet set, grab bar and shower chair.      Discharge recommendations:  Follow up with Dr. Cooper/Juan Diego PRESTON at [facility] 2 weeks after surgery.  If still in TCU, can be seen by the orthopedic provider at the care facility (if this is an option).     Call the care coordinator at [phone] to arrange the appt.   [facility]: [phone]   [facility]: [phone]     Check INR every Monday and Thursday while at TCU.  Goal INR 2-3.  Please communicate lab results to primary provider so warfarin dose can be adjusted if needed.  INR is 2.3 on discharge from the hospital    Plan/Recommendations: RN Clinical Product Navigator will send TCU Care Team Care Management hand in communication and request involvement in discharge planning and coordination of care.      Melissa Behl BSN, RN, PHN, CCM  RN Clinical Product Navigator  [phone]

## 2022-04-14 NOTE — PROGRESS NOTES
Care Management Discharge Note    Discharge Date: 04/14/2022 @ 1330    Discharge Disposition: TCU - [facility]    Discharge Transportation: [facility] w/c    Private pay costs discussed: transportation costs    PAS Confirmation Code: [id number]  Patient/family educated on Medicare website which has current facility and service quality ratings:  yes    Education Provided on the Discharge Plan: yes  Persons Notified of Discharge Plans: pt, pt's family, nursing, Tamara [facility] in admissions at [facility]  Patient/Family in Agreement with the Plan:  yes    Handoff Referral Completed: Yes    Additional Information:  Reviewed pt's status and discussed in rounds. Pt discharging today to [facility] @ 1330 via [facility] w/c.     [id number] completed.     Orders reviewed and faxed.     SHAJI Morrell, LSW  Inpatient Care Coordination  [facility]  [phone]    SHAJI Molina

## 2022-04-14 NOTE — DISCHARGE SUMMARY
Service Date: 04/14/2022  Discharge Date: 04/14/2022    PRINCIPAL FINAL DIAGNOSES:    1.  Trimalleolar left ankle fracture, status post open reduction and internal fixation this admission.  Fracture secondary to mechanical fall.  2.  Paroxysmal atrial fibrillation, maintained on warfarin for anticoagulation.  3.  History of pulmonary embolism.  Maintained on warfarin.  4.  Hypertension.  5.  Chronic kidney disease.  Creatinine this admission was near 1.  6.  History of dementia, mild.  7.  Hyperlipidemia.  8.  Chronic indwelling Guillen catheter.    PRINCIPAL PROCEDURES THIS ADMISSION:    1.  Orthopedics consultation.  2.  Operative fixation of trimalleolar ankle fracture.  3.  Physical therapy and occupational therapy consultation.  4.  Social work assistance with disposition.  5.  Ankle x-ray.  6.  CT C-spine CT scan showing no acute findings.  7.  Head CT scan showing no acute findings.    REASON FOR ADMISSION:  Please see dictated history and physical.  In brief, Ms. Hernandez is an 89-year-old female with the above medical history, who presented to the hospital after a mechanical fall resulting in left ankle fracture.  Please see dictated history and physical for details.    HOSPITAL COURSE:  left trimalleolar ankle fracture:  Patient was admitted to the hospitalist service.  She was seen by the orthopedic surgery.  The patient was taken to the operating room for fixation of her ankle fracture.  The patient did well postoperatively.  Pain was controlled with oral pain medication.  She worked with physical therapy and occupational therapy while hospitalized.  She is nonweightbearing of left lower extremity.  She will discharge to TCU for further rehabilitation therapy.    DISCHARGE MEDICATIONS:    1.  Oxycodone 2.5 mg every 4 hours as needed for pain.  2.  Acetaminophen as needed for pain.  3.  Lisinopril 10 mg daily.  4.  Imodium as needed.  5.  Metoprolol 25 mg twice a day.  6.  Zofran as needed for nausea.  7.   MiraLax 17 grams daily as needed for constipation.  8.  Crestor 5 mg at bedtime.  9.  Senna as needed for constipation.  10.  Vitamin C.  11.  Vitamin D3.  12.  Warfarin 2 mg daily.    FOLLOWUP INSTRUCTIONS:    1.  Follow up with Dr. Garcia of orthopedic surgery in 2 weeks.  The patient provided phone number to make the appointment.  2.  Check INR every Monday and Thursday while at TCU.  Goal INR 2-3.  Please communicate lab results to primary provider so warfarin dose can be adjusted if needed.  INR is 2.3 on discharge from the hospital.    DISCHARGE INSTRUCTIONS:  1.  The patient is nonweightbearing of left lower extremity.  2.  The patient is being discharged with Guillen catheter in place as she requires chronic Guillen catheterization.    I examined the patient on day of discharge.    I spent 32 minutes caring for this patient today, including physical exam, answering questions, reviewing lab work from this admission, reviewing imaging from this admission, performing discharge orders for rehab center, and dictating discharge summary.    David Bolanos MD        D: 2022   T: 2022   MT: [id number]    Name:     JOSE DANIEL RAWLS  MRN:      [id number]        Account:      [id number]   :      10/26/1932           Service Date: 2022                                  Discharge Date: 2022     Document: [id number]

## 2022-04-14 NOTE — PLAN OF CARE
Physical Therapy Discharge Summary    Reason for therapy discharge:    Discharged to transitional care facility.    Progress towards therapy goal(s). See goals on Care Plan in [facility] electronic health record for goal details.  Goals not met.  Barriers to achieving goals:   discharge from facility.    Therapy recommendation(s):    Continued therapy is recommended.  Rationale/Recommendations:  TCU.

## 2022-04-14 NOTE — PLAN OF CARE
Pleasant confused, easily reoriented, cooperative.  Pain managed with PRN tylenol, declined cold pack.  No nausea, fair appetite.  LS clear bilat., RA, encouraged hourly CDB/IS.  Denies N/T.  Drsg CDI, LLE elevated & NWB.  Up A2 belt + SS, sat up recliner.  Chr. pancho, LBM overnight soft stool.  DC to [facility] with personal belongings, transported via  w/c.

## 2022-04-15 ENCOUNTER — TRANSITIONAL CARE UNIT VISIT (OUTPATIENT)
Dept: GERIATRICS | Facility: CLINIC | Age: 87
End: 2022-04-15
Payer: COMMERCIAL

## 2022-04-15 VITALS
HEIGHT: 65 IN | HEART RATE: 64 BPM | OXYGEN SATURATION: 94 % | BODY MASS INDEX: 24.76 KG/M2 | DIASTOLIC BLOOD PRESSURE: 71 MMHG | WEIGHT: 148.6 LBS | TEMPERATURE: 98.4 F | RESPIRATION RATE: 17 BRPM | SYSTOLIC BLOOD PRESSURE: 126 MMHG

## 2022-04-15 DIAGNOSIS — N39.0 ENTEROCOCCUS UTI: ICD-10-CM

## 2022-04-15 DIAGNOSIS — E78.5 DYSLIPIDEMIA: ICD-10-CM

## 2022-04-15 DIAGNOSIS — I10 ESSENTIAL HYPERTENSION: ICD-10-CM

## 2022-04-15 DIAGNOSIS — B95.2 ENTEROCOCCUS UTI: ICD-10-CM

## 2022-04-15 DIAGNOSIS — I48.91 ATRIAL FIBRILLATION, UNSPECIFIED TYPE (H): ICD-10-CM

## 2022-04-15 DIAGNOSIS — S82.852D CLOSED TRIMALLEOLAR FRACTURE OF LEFT ANKLE WITH ROUTINE HEALING, SUBSEQUENT ENCOUNTER: Primary | ICD-10-CM

## 2022-04-15 DIAGNOSIS — N18.31 STAGE 3A CHRONIC KIDNEY DISEASE (H): ICD-10-CM

## 2022-04-15 DIAGNOSIS — F03.90 DEMENTIA WITHOUT BEHAVIORAL DISTURBANCE, UNSPECIFIED DEMENTIA TYPE: ICD-10-CM

## 2022-04-15 DIAGNOSIS — D64.9 POSTOPERATIVE ANEMIA: ICD-10-CM

## 2022-04-15 DIAGNOSIS — Z87.81 S/P ORIF (OPEN REDUCTION INTERNAL FIXATION) FRACTURE: ICD-10-CM

## 2022-04-15 DIAGNOSIS — Z98.890 S/P ORIF (OPEN REDUCTION INTERNAL FIXATION) FRACTURE: ICD-10-CM

## 2022-04-15 DIAGNOSIS — Z97.8 CHRONIC INDWELLING FOLEY CATHETER: ICD-10-CM

## 2022-04-15 DIAGNOSIS — S82.852A CLOSED TRIMALLEOLAR FRACTURE OF LEFT ANKLE, INITIAL ENCOUNTER: ICD-10-CM

## 2022-04-15 DIAGNOSIS — Z86.711 HISTORY OF PULMONARY EMBOLISM: ICD-10-CM

## 2022-04-15 DIAGNOSIS — S09.90XD INJURY OF HEAD, SUBSEQUENT ENCOUNTER: ICD-10-CM

## 2022-04-15 DIAGNOSIS — R53.81 PHYSICAL DECONDITIONING: ICD-10-CM

## 2022-04-15 LAB — SARS-COV-2 RNA RESP QL NAA+PROBE: NEGATIVE

## 2022-04-15 PROCEDURE — 99310 SBSQ NF CARE HIGH MDM 45: CPT | Performed by: NURSE PRACTITIONER

## 2022-04-15 RX ORDER — ACETAMINOPHEN 500 MG
1000 TABLET ORAL 3 TIMES DAILY
Start: 2022-04-15 | End: 2022-05-02

## 2022-04-15 NOTE — LETTER
4/15/2022        RE: Maia Miranda  [address]  [address]  [place] [address]        [facility] GERIATRICS    PRIMARY CARE PROVIDER AND CLINIC:  RAY Lopez CNP, [address] [address] / [place] [address]  Chief Complaint   Patient presents with     Hospital F/U      [facility] Medical Record Number:  [id number]  Place of Service where encounter took place:  [facility]  (U) [[id number]]    Maia Miranda  is a 89 year old  (10/26/1932), admitted to the above facility from  [facility]. Hospital stay 4/9/22 through 4/14/22..     HPI:    PMH significant for atrial fibrillation on coumadin, history of pulmonary embolism, hypertension, chronic kidney disease stage 3, dementia,chronic deleon, hyperlipidemia.    Summary of recent hospitalization:  Maia Miranda was hospitalized at [facility] from 4/9-4/14/22 for left malleolar ankle fracture s/p ORIF after fall at [facility] [place] and UTI. Upon presentation to ED creatinine was 1.35, mg 2.4, CBC unremarkable, INR 2.44. UA abnormal and culture eventually grew 2 strains of enterococcus faecalis. CT of head showed no acute cranial pathology. CT of cervical spine showed no evidence of acute fracture or posttraumatic subluxation and some degenerative changes with multiple stenoses. Orthopedics was consulted and she underwent ORIF on 4/10/22. Post operative she developed post op anemia (hgb preop was 11.9-->10.3 on 4/12. She received ampicillin for UTI. Discharged to TCU for physical rehabilitation and medical management.       Maia seen today for admission visit at TCU. She is limited historian- thinks she is still in hospital, is not sure of month or year. Patient reports she recently moved to [facility] from [place], but not able to provide any additional details about this. Reports pain to left ankle. Denies SOB, CP, dizziness. Has deleon catheter in place- she is not sure why this was placed, but my understanding is  this is chronic. Reports loose stools. She has loperamide on medication list, but no mention of loose stools in hospital notes. Discussed what to expect at TCU.       CODE STATUS/ADVANCE DIRECTIVES DISCUSSION:  DNR-DNI  ALLERGIES:   Allergies   Allergen Reactions     Penicillins Anaphylaxis and Hives      PAST MEDICAL HISTORY: History reviewed. No pertinent past medical history.   PAST SURGICAL HISTORY:   has a past surgical history that includes Open reduction internal fixation ankle (Left, 4/10/2022).  FAMILY HISTORY: family history is not on file.  SOCIAL HISTORY:     Patient's living condition: lives in an assisted living facility    Post Discharge Medication Reconciliation Status: discharge medications reconciled and changed, per note/orders  Current Outpatient Medications   Medication Sig     acetaminophen (TYLENOL) 500 MG tablet Take 2 tablets (1,000 mg) by mouth 3 times daily     Ascorbic Acid (VITAMIN C) 500 MG CAPS Take 500 mg by mouth 2 times daily     Carboxymethylcellulose Sodium (REFRESH LIQUIGEL) 1 % GEL Apply 1 drop to eye daily as needed     Lidocaine (LIDOCARE) 4 % Patch Place 1 patch onto the skin every 24 hours To prevent lidocaine toxicity, patient should be patch free for 12 hrs daily.     lisinopril (ZESTRIL) 20 MG tablet Take 10 mg by mouth daily     loperamide (IMODIUM) 2 MG capsule Take 2 mg by mouth 4 times daily as needed for diarrhea     menthol-zinc oxide (CALMOSEPTINE) 0.44-20.6 % OINT ointment Apply topically as needed     metoprolol tartrate (LOPRESSOR) 25 MG tablet Take 25 mg by mouth 2 times daily     ondansetron (ZOFRAN-ODT) 4 MG ODT tab Take 4 mg by mouth every 6 hours as needed for nausea     oxyCODONE (ROXICODONE) 5 MG tablet Take 0.5 tablets (2.5 mg) by mouth every 4 hours as needed for moderate pain     polyethylene glycol (MIRALAX) 17 g packet Take 1 packet by mouth daily as needed for constipation     rosuvastatin (CRESTOR) 5 MG tablet Take 5 mg by mouth At Bedtime      "SENNA-docusate sodium (SENNA S) 8.6-50 MG tablet Take 1 tablet by mouth 2 times daily as needed     sodium chloride (PETER 128) 5 % ophthalmic solution Place 1 drop into both eyes At Bedtime     vitamin D3 (CHOLECALCIFEROL) 50 mcg (2000 units) tablet Take 1 tablet by mouth daily     warfarin ANTICOAGULANT (COUMADIN) 2 MG tablet Take 2 mg by mouth daily     No current facility-administered medications for this visit.       ROS:  10 point ROS of systems including Constitutional, Eyes, Respiratory, Cardiovascular, Gastroenterology, Genitourinary, Integumentary, Musculoskeletal, Psychiatric were all negative except for pertinent positives noted in my HPI.    Vitals:  /71   Pulse 64   Temp 98.4  F (36.9  C)   Resp 17   Ht 1.651 m (5' 5\")   Wt 67.4 kg (148 lb 9.6 oz)   SpO2 94%   BMI 24.73 kg/m    Exam:  GENERAL APPEARANCE:  Alert, frail, in NAD  HEENT: normocephalic, moist mucous membranes, soft spoken, nose without drainage or crusting  RESP:  respiratory effort normal, no respiratory distress, Lung sounds clear, patient is on RA  CV: auscultation of heart done, rate and rhythm regular.   ABDOMEN: + bowel sounds, soft, nontender, no grimacing or guarding with palpation.  : deleon catheter in place draining clear, yellow urine  M/S: tremor to bilateral arms; no lower extremity edema; splint to left ankle and lower extremity- good sensation to feet  SKIN:  Inspection and palpation of skin and subcutaneous tissue: skin warm, dry without rashes; bruising to right frontal head  NEURO: cranial nerves 2-12 grossly intact and at patient's baseline; moves extremities freely  PSYCH: oriented x person, insight and judgement impaired, memory impaired, affect flat       Lab/Diagnostic data:  Labs done in SNF are in [facility]. Please refer to them using [employer] Everywhere. and Recent labs in EPIC reviewed by me today.     ASSESSMENT/PLAN:  (U71.628S) Closed trimalleolar fracture of left ankle with routine healing, " subsequent encounter  (primary encounter diagnosis)  (Z98.890,  Z87.81) S/P ORIF (open reduction internal fixation) fracture  Comment: secondary to fall  -s/p ORIF on 4/10/22  -reports pain to left ankle today  Plan: Continue pain management with tylenol but change to 1000 mg TID, oxycodone PRN- nursing will need to offer this to her due to her cognition. Coumadin for DVT prophy. Continue bowel regimen PRN. Non weightbearing to left lower extremity. Therapy. Follow up with Dr. Cooper in 2 weeks.     (S09.90XD) Injury of head  Comment: secondary to fall- bruising to right frontal head  -Imaging inpatient: CT of head showed no acute cranial pathology. CT of cervical spine showed no evidence of acute fracture or posttraumatic subluxation and some degenerative changes with multiple stenoses.  Plan: Monitor for improvement    (N39.0,  B95.2) Enterococcus UTI-resolved  Comment: UA on admission to hospital abnormal and urine culture grew 2 strains of enterococcus faecalis. Received ampicillin for treatment  Plan: Monitor for signs of recurrence.     (D64.9) Postoperative anemia  Comment: secondary to surgery, hgb trending down at discharge from hospital  -hgb preop was 11.9-->10.3 on 4/12  Plan: CBC 4/18.    (I48.91) Atrial fibrillation, unspecified type (H)  (Z86.711) History of pulmonary embolism  Comment: chronic, HR at TCU controlled 64, 61, 58  -INR today 2.3, last INR 4/14 was 2.28  -INR goal 2-3  -PTA coumadin was 2 mg daily  Plan: Continue metoprolol 25 mg BID. Coumadin 2 mg daily. INR 4/18.    (I10) Essential hypertension  Comment: chronic- BP controlled 128/71, 118/72, 122/70; HR 64, 61, 58  Plan: Continue lisinopril 10 mg daily, metoprolol 25 mg BID. VS per policy. Adjust medications as needed.    (F03.90) Dementia without behavioral disturbance, unspecified dementia type (H)  Comment: chronic, recently moved to [facility] historian- oriented x person only  Plan: OCCUPATIONAL THERAPY to complete cognitive  testing for safe discharge planning. Nursing to assist with cares, meals, medication assistance, activities.    (N18.31) Stage 3a chronic kidney disease (H)  Comment: acute on chronic, with acute kidney injury present on admission to ER, creatinine 1.35. She receieved IV hydration and improved back to baseline prior to hospital discharge. No previous lab data prior to hospitalization for comparison.  -creatinine around 0.9 throughout hospitalization  -creatinine 0.94 on 4/11  Plan: BMP 4/18. Avoid nephrotoxins. Renally dose medications as indicated.    (Z97.8) Chronic indwelling Deleon catheter  Comment: chronic, patient not sure why deleon was initially placed   Plan: Nursing to complete deleon catheter cares. Monitor urine    Loose stools  Comment: not sure on chronicity of this, but had loperamide on PTA med list- so it may be chronic  -no associated stomach pain or cramping, she is afebrile and hemodynamically stable  -she did have recently antibiotics  Plan: Collect stool sample to rule out c.diff if 3 or more loose stools in 24 hours due to recent antibiotic use. If negative ok to administer loperamide.     (E78.5) Dyslipidemia  Comment: chronic  Plan: Continue rosuvastatin 5 mg at bedtime.    (R53.81) Physical deconditioning  Comment: Acute, secondary to recent hospitalization, medical conditions as above  Plan: Encourage participation in physical therapy/occupational therapy for strengthening and deconditioning. Discharge planning per their recommendation. Social work to assist with d/c planning.        Total time spent with patient visit at the skilled nursing facility was 36 minutes including patient visit and review of past records. Greater than 50% of total time spent with counseling and coordinating care with facility staff including admission and medication orders, plan of care as described above. Counseling patient about current medications, plan of care and what to expect at TCU, potential discharge  plan  .     Electronically signed by:  RAY Gonzalez CNP                     Sincerely,        RAY Gonzalez CNP

## 2022-04-15 NOTE — PROGRESS NOTES
[facility] GERIATRICS    PRIMARY CARE PROVIDER AND CLINIC:  Remy Hernandez, RAY CNP, [address] [facility] [address] / [place] [address]  Chief Complaint   Patient presents with     Hospital F/U      [place] Medical Record Number:  [id number]  Place of Service where encounter took place:  [facility]  ([facility]) [[id number]]    Maia Miranda  is a 89 year old  (10/26/1932), admitted to the above facility from  [facility]. Hospital stay 4/9/22 through 4/14/22..     HPI:    PMH significant for atrial fibrillation on coumadin, history of pulmonary embolism, hypertension, chronic kidney disease stage 3, dementia,chronic deleon, hyperlipidemia.    Summary of recent hospitalization:  Maia Miranda was hospitalized at [facility] from 4/9-4/14/22 for left malleolar ankle fracture s/p ORIF after fall at [facility] and UTI. Upon presentation to ED creatinine was 1.35, mg 2.4, CBC unremarkable, INR 2.44. UA abnormal and culture eventually grew 2 strains of enterococcus faecalis. CT of head showed no acute cranial pathology. CT of cervical spine showed no evidence of acute fracture or posttraumatic subluxation and some degenerative changes with multiple stenoses. Orthopedics was consulted and she underwent ORIF on 4/10/22. Post operative she developed post op anemia (hgb preop was 11.9-->10.3 on 4/12. She received ampicillin for UTI. Discharged to TCU for physical rehabilitation and medical management.       Maia seen today for admission visit at TCU. She is limited historian- thinks she is still in hospital, is not sure of month or year. Patient reports she recently moved to [facility] from [place], but not able to provide any additional details about this. Reports pain to left ankle. Denies SOB, CP, dizziness. Has deleon catheter in place- she is not sure why this was placed, but my understanding is this is chronic. Reports loose stools. She has loperamide on medication list, but no mention of loose  stools in hospital notes. Discussed what to expect at TCU.       CODE STATUS/ADVANCE DIRECTIVES DISCUSSION:  DNR-DNI  ALLERGIES:   Allergies   Allergen Reactions     Penicillins Anaphylaxis and Hives      PAST MEDICAL HISTORY: History reviewed. No pertinent past medical history.   PAST SURGICAL HISTORY:   has a past surgical history that includes Open reduction internal fixation ankle (Left, 4/10/2022).  FAMILY HISTORY: family history is not on file.  SOCIAL HISTORY:     Patient's living condition: lives in an assisted living facility    Post Discharge Medication Reconciliation Status: discharge medications reconciled and changed, per note/orders  Current Outpatient Medications   Medication Sig     acetaminophen (TYLENOL) 500 MG tablet Take 2 tablets (1,000 mg) by mouth 3 times daily     Ascorbic Acid (VITAMIN C) 500 MG CAPS Take 500 mg by mouth 2 times daily     Carboxymethylcellulose Sodium (REFRESH LIQUIGEL) 1 % GEL Apply 1 drop to eye daily as needed     Lidocaine (LIDOCARE) 4 % Patch Place 1 patch onto the skin every 24 hours To prevent lidocaine toxicity, patient should be patch free for 12 hrs daily.     lisinopril (ZESTRIL) 20 MG tablet Take 10 mg by mouth daily     loperamide (IMODIUM) 2 MG capsule Take 2 mg by mouth 4 times daily as needed for diarrhea     menthol-zinc oxide (CALMOSEPTINE) 0.44-20.6 % OINT ointment Apply topically as needed     metoprolol tartrate (LOPRESSOR) 25 MG tablet Take 25 mg by mouth 2 times daily     ondansetron (ZOFRAN-ODT) 4 MG ODT tab Take 4 mg by mouth every 6 hours as needed for nausea     oxyCODONE (ROXICODONE) 5 MG tablet Take 0.5 tablets (2.5 mg) by mouth every 4 hours as needed for moderate pain     polyethylene glycol (MIRALAX) 17 g packet Take 1 packet by mouth daily as needed for constipation     rosuvastatin (CRESTOR) 5 MG tablet Take 5 mg by mouth At Bedtime     SENNA-docusate sodium (SENNA S) 8.6-50 MG tablet Take 1 tablet by mouth 2 times daily as needed     sodium  "chloride (PETER 128) 5 % ophthalmic solution Place 1 drop into both eyes At Bedtime     vitamin D3 (CHOLECALCIFEROL) 50 mcg (2000 units) tablet Take 1 tablet by mouth daily     warfarin ANTICOAGULANT (COUMADIN) 2 MG tablet Take 2 mg by mouth daily     No current facility-administered medications for this visit.       ROS:  10 point ROS of systems including Constitutional, Eyes, Respiratory, Cardiovascular, Gastroenterology, Genitourinary, Integumentary, Musculoskeletal, Psychiatric were all negative except for pertinent positives noted in my HPI.    Vitals:  /71   Pulse 64   Temp 98.4  F (36.9  C)   Resp 17   Ht 1.651 m (5' 5\")   Wt 67.4 kg (148 lb 9.6 oz)   SpO2 94%   BMI 24.73 kg/m    Exam:  GENERAL APPEARANCE:  Alert, frail, in NAD  HEENT: normocephalic, moist mucous membranes, soft spoken, nose without drainage or crusting  RESP:  respiratory effort normal, no respiratory distress, Lung sounds clear, patient is on RA  CV: auscultation of heart done, rate and rhythm regular.   ABDOMEN: + bowel sounds, soft, nontender, no grimacing or guarding with palpation.  : deleon catheter in place draining clear, yellow urine  M/S: tremor to bilateral arms; no lower extremity edema; splint to left ankle and lower extremity- good sensation to feet  SKIN:  Inspection and palpation of skin and subcutaneous tissue: skin warm, dry without rashes; bruising to right frontal head  NEURO: cranial nerves 2-12 grossly intact and at patient's baseline; moves extremities freely  PSYCH: oriented x person, insight and judgement impaired, memory impaired, affect flat       Lab/Diagnostic data:  Labs done in SNF are in [facility]. Please refer to them using [employer] Everywhere. and Recent labs in EPIC reviewed by me today.     ASSESSMENT/PLAN:  (Q28.368E) Closed trimalleolar fracture of left ankle with routine healing, subsequent encounter  (primary encounter diagnosis)  (Z98.890,  Z87.81) S/P ORIF (open reduction internal " fixation) fracture  Comment: secondary to fall  -s/p ORIF on 4/10/22  -reports pain to left ankle today  Plan: Continue pain management with tylenol but change to 1000 mg TID, oxycodone PRN- nursing will need to offer this to her due to her cognition. Coumadin for DVT prophy. Continue bowel regimen PRN. Non weightbearing to left lower extremity. Therapy. Follow up with Dr. Cooper in 2 weeks.     (S09.90XD) Injury of head  Comment: secondary to fall- bruising to right frontal head  -Imaging inpatient: CT of head showed no acute cranial pathology. CT of cervical spine showed no evidence of acute fracture or posttraumatic subluxation and some degenerative changes with multiple stenoses.  Plan: Monitor for improvement    (N39.0,  B95.2) Enterococcus UTI-resolved  Comment: UA on admission to hospital abnormal and urine culture grew 2 strains of enterococcus faecalis. Received ampicillin for treatment  Plan: Monitor for signs of recurrence.     (D64.9) Postoperative anemia  Comment: secondary to surgery, hgb trending down at discharge from hospital  -hgb preop was 11.9-->10.3 on 4/12  Plan: CBC 4/18.    (I48.91) Atrial fibrillation, unspecified type (H)  (Z86.711) History of pulmonary embolism  Comment: chronic, HR at TCU controlled 64, 61, 58  -INR today 2.3, last INR 4/14 was 2.28  -INR goal 2-3  -PTA coumadin was 2 mg daily  Plan: Continue metoprolol 25 mg BID. Coumadin 2 mg daily. INR 4/18.    (I10) Essential hypertension  Comment: chronic- BP controlled 128/71, 118/72, 122/70; HR 64, 61, 58  Plan: Continue lisinopril 10 mg daily, metoprolol 25 mg BID. VS per policy. Adjust medications as needed.    (F03.90) Dementia without behavioral disturbance, unspecified dementia type (H)  Comment: chronic, recently moved to [facility] - limited historian- oriented x person only  Plan: OCCUPATIONAL THERAPY to complete cognitive testing for safe discharge planning. Nursing to assist with cares, meals, medication assistance,  activities.    (N18.31) Stage 3a chronic kidney disease (H)  Comment: acute on chronic, with acute kidney injury present on admission to ER, creatinine 1.35. She receieved IV hydration and improved back to baseline prior to hospital discharge. No previous lab data prior to hospitalization for comparison.  -creatinine around 0.9 throughout hospitalization  -creatinine 0.94 on 4/11  Plan: BMP 4/18. Avoid nephrotoxins. Renally dose medications as indicated.    (Z97.8) Chronic indwelling Deleon catheter  Comment: chronic, patient not sure why deleon was initially placed   Plan: Nursing to complete deleon catheter cares. Monitor urine    Loose stools  Comment: not sure on chronicity of this, but had loperamide on PTA med list- so it may be chronic  -no associated stomach pain or cramping, she is afebrile and hemodynamically stable  -she did have recently antibiotics  Plan: Collect stool sample to rule out c.diff if 3 or more loose stools in 24 hours due to recent antibiotic use. If negative ok to administer loperamide.     (E78.5) Dyslipidemia  Comment: chronic  Plan: Continue rosuvastatin 5 mg at bedtime.    (R53.81) Physical deconditioning  Comment: Acute, secondary to recent hospitalization, medical conditions as above  Plan: Encourage participation in physical therapy/occupational therapy for strengthening and deconditioning. Discharge planning per their recommendation. Social work to assist with d/c planning.        Total time spent with patient visit at the skilled nursing facility was 36 minutes including patient visit and review of past records. Greater than 50% of total time spent with counseling and coordinating care with facility staff including admission and medication orders, plan of care as described above. Counseling patient about current medications, plan of care and what to expect at TCU, potential discharge plan  .     Electronically signed by:  RAY Gonzalez CNP

## 2022-04-15 NOTE — PROGRESS NOTES
[facility]  INITIAL VISIT NOTE  April 18, 2022    PRIMARY CARE PROVIDER AND CLINIC:  David, Remymireille Saldaña [address]  / [place] [address]    CHIEF COMPLAINT:  Hospital follow-up/Initial visit    HPI:    Maia Miranda is a 89 year old  (10/26/1932) female who was seen at [facility] on April 18, 2022 for an initial visit.     Medical history is notable for dementia, PAF, pulmonary embolism, hypertension, HFpEF, dyslipidemia, CKD stage IIIa, recurrent UTIs, chronic urinary retention, s/p chronic indwelling catheter, chronic anemia, GERD, allergic rhinitis, osteoporosis, osteoarthritis, and low back pain.    Summary of hospital course:  Patient was hospitalized at [facility] from April 9 through April 14, 2022 for left ankle fracture sustained after mechanical fall.  EKG showed atrial fibrillation with a heart rate of 79 bpm.  CT head and CT cervical spine were negative for acute findings.  X-ray of the left ankle showed trimalleolar fracture/dislocation of the tibiotalar joint.  BMP was significant for creatinine of 1.35.  CBC was unremarkable.  Screening for COVID-19 was negative.  UA was abnormal and urine culture grew 2 strains of Enterococcus faecalis.  She was evaluated by orthopedic service and underwent ORIF of left ankle fracture on April 10, 2022 after reversal of anticoagulation.  EBL was reportedly 20 mL.  Postop hemoglobin dropped to 10.3.  She was treated with ampicillin for possible UTI.  TCU was recommended per therapies.    Patient is admitted to this facility for medical management, nursing care, and rehab.     Of note, history was obtained from patient, facility RN, and extensive review of the chart.    Today's visit:  Patient was seen in her room, while lying in bed.  She appears frail and weak but in no acute distress.  She reports no pain in her left ankle this morning.  She has difficulty to raise her left leg.  She reports that she had a bowel  movement yesterday which was loose.  She has chronic Guillen catheter.  She denies fever, chills, chest pain, palpitation, dyspnea, nausea, vomiting, abdominal pain, or urinary symptoms.      CODE STATUS:   DNR / DNI    PAST MEDICAL HISTORY:   Dementia  Fall resulting in left ankle trimalleolar fracture, s/p ORIF on April 10, 2022  Recurrent UTIs  Chronic urinary retention (patient has chronic indwelling Guillen catheter)  PAF  Pulmonary embolism (per chart, details are unknown)  Hypertension  Chronic HFpEF (grade III LV diastolic dysfunction and LVEF of 55-60% per echo in 2021)  Mild mitral regurgitation  Dyslipidemia  CKD stage IIIa, baseline creatinine 1-1.3  Chronic anemia, baseline Hgb around 11  GERD  Allergic rhinitis  Lactose intolerance  Osteoporosis  Osteoarthritis  Low back pain    Note: No carotid artery stenosis, per CTA in 2021    PAST SURGICAL HISTORY:   Past Surgical History:   Procedure Laterality Date     OPEN REDUCTION INTERNAL FIXATION ANKLE Left 4/10/2022    Procedure: Open reduction and internal fixation of left trimalleolar ankle fracture;  Surgeon: Kulwant Cooper MD;  Location:  OR       FAMILY HISTORY:   Father  of CVA at age 92.  Mother had depression and  at age 62.  Maternal grandmother had Alzheimer's dementia.  One sister had pacemaker.    SOCIAL HISTORY:  Patient is a never smoker.  She does not drink alcohol.    MEDICATIONS:  Current Outpatient Medications   Medication Sig Dispense Refill     acetaminophen (TYLENOL) 500 MG tablet Take 2 tablets (1,000 mg) by mouth 3 times daily       Ascorbic Acid (VITAMIN C) 500 MG CAPS Take 500 mg by mouth 2 times daily       Carboxymethylcellulose Sodium (REFRESH LIQUIGEL) 1 % GEL Apply 1 drop to eye daily as needed       Lidocaine (LIDOCARE) 4 % Patch Place 1 patch onto the skin every 24 hours To prevent lidocaine toxicity, patient should be patch free for 12 hrs daily.       lisinopril (ZESTRIL) 20 MG tablet Take 10 mg  "by mouth daily       loperamide (IMODIUM) 2 MG capsule Take 2 mg by mouth 4 times daily as needed for diarrhea       menthol-zinc oxide (CALMOSEPTINE) 0.44-20.6 % OINT ointment Apply topically as needed       metoprolol tartrate (LOPRESSOR) 25 MG tablet Take 25 mg by mouth 2 times daily       ondansetron (ZOFRAN-ODT) 4 MG ODT tab Take 4 mg by mouth every 6 hours as needed for nausea       oxyCODONE (ROXICODONE) 5 MG tablet Take 0.5 tablets (2.5 mg) by mouth every 4 hours as needed for moderate pain 20 tablet 0     polyethylene glycol (MIRALAX) 17 g packet Take 1 packet by mouth daily as needed for constipation       rosuvastatin (CRESTOR) 5 MG tablet Take 5 mg by mouth At Bedtime       SENNA-docusate sodium (SENNA S) 8.6-50 MG tablet Take 1 tablet by mouth 2 times daily as needed       sodium chloride (PETER 128) 5 % ophthalmic solution Place 1 drop into both eyes At Bedtime       vitamin D3 (CHOLECALCIFEROL) 50 mcg (2000 units) tablet Take 1 tablet by mouth daily       warfarin ANTICOAGULANT (COUMADIN) 2 MG tablet Take 2 mg by mouth daily         Post Discharge Medication Reconciliation Status: discharge medications reconciled, continue medications without change.      ALLERGIES:  Allergies   Allergen Reactions     Penicillins Anaphylaxis and Hives       ROS:  10 point ROS were negative other than the symptoms noted above in the HPI.    PHYSICAL EXAM:  Vital signs were reviewed in the chart.  Vital Signs: /60   Pulse 71   Temp 98.2  F (36.8  C)   Resp 18   Ht 1.651 m (5' 5\")   Wt 67.4 kg (148 lb 9.6 oz)   SpO2 92%   BMI 24.73 kg/m    General: Weak and frail appearing but comfortable and in no acute distress  HEENT: Conjunctival pallor  Cardiovascular: Normal S1, S2, RRR  Respiratory: Lungs clear to auscultation bilaterally  GI: Abdomen soft, non-tender, non-distended, +BS  Extremities: Left ankle is splinted.  There is no significant lower extremity edema.  Neuro: CX II-XII grossly intact; ROM in all " four extremities grossly intact  Psych: Alert and oriented x1-2; normal affect  Skin: No acute rash    LABORATORY/IMAGING DATA:  All relevant labs and imaging data were reviewed personally today.      Most Recent 3 CBC's:Recent Labs   Lab Test 04/12/22  0645 04/11/22  0746 04/10/22  0729 04/09/22  1200   WBC  --  8.1 5.7 6.4   HGB 10.3* 11.6* 11.9 13.8   MCV  --  99 103* 102*   PLT  --  140* 140* 159     Most Recent 3 BMP's:Recent Labs   Lab Test 04/14/22  0600 04/13/22  0553 04/12/22  0645 04/12/22  0312 04/11/22  0746 04/10/22  0729 04/09/22  1858 04/09/22  1200   NA  --   --   --   --  136 139  --  137   POTASSIUM 4.0 4.4  --   --  5.0 4.0   < > 4.9   CHLORIDE  --   --   --   --  108 111*  --  105   CO2  --   --   --   --  25 23  --  30   BUN  --   --   --   --  23 21  --  28   CR  --   --   --   --  0.94 0.93  --  1.35*   ANIONGAP  --   --   --   --  3 5  --  2*   CLARISSE  --   --   --   --  9.5 8.9  --  10.0   GLC  --   --  113* 124* 142* 96  --  124*    < > = values in this interval not displayed.     Most Recent Cholesterol Panel:No lab results found.      ASSESSMENT/PLAN:  Mechanical fall, subsequent encounter,  Left ankle trimalleolar fracture, s/p ORIF on April 10, 2022,  Physical deconditioning.  Patient is hemodynamically stable.  Surgical pain is fairly controlled.  Plan:  Fall precautions  Continue pain management with lidocaine patch, scheduled acetaminophen, and PRN oxycodone as ordered  DVT prophylaxis with chronic warfarin  NWB to LLE   Mobilize with PT/OT  Follow-up with Ortho (Dr. Cooper) in 2 weeks as directed    Acute blood loss anemia,  Chronic anemia.  ABLA due to orthopedic surgery.  Baseline hemoglobin around 11.  Postop hemoglobin dropped to 10.3 on April 12.  Plan:  Monitor hemoglobin periodically    Dementia without behavioral disturbance.  On today's examination, patient is oriented x1-2.  Plan:  Standard delirium precautions  Staff to assist with daily care and mobility  Formal cognitive  evaluation per OT    Chronic urinary retention,   Chronic indwelling Guillen catheter,  Enterococcus faecalis UTI versus colonization.  She was treated with ampicillin in the hospital.  Plan:  Continue routine care of Guillen catheter    PAF,  History of pulmonary embolism (per chart, details unknown).  EKG in the hospital revealed atrial fibrillation with controlled rate.  On today's examination, she has regular rhythm.  Today's INR is 2.2.  She is currently on warfarin at 2 mg p.o. daily.  Plan:  Continue metoprolol 25 mg p.o. twice daily  Continue anticoagulation with warfarin 2 mg p.o. daily  Recheck INR on April 21  Adjust warfarin dose for INR target of 2-3    Essential hypertension,  Chronic HFpEF (grade III LV diastolic dysfunction and LVEF of 55-60% per echo in December 2021).  Blood pressure is fairly controlled.  Patient weighs 148.6 LBS.  Plan:  Continue metoprolol 25 mg p.o. twice daily and lisinopril 10 mg p.o. daily  Monitor weights, volume status, and blood pressure    Dyslipidemia.  Plan:  Continue PTA rosuvastatin 5 mg p.o. at bedtime    CKD stage IIIa.  Baseline creatinine 1-1.3.  Last creatinine was 0.94 on April 11.  Plan:  Avoid NSAIDs and nephrotoxic  Monitor renal function periodically    GERD.  Patient is not on PPI or H2 blocker.  Plan:  Monitor symptoms    Loose stools.  Plan:  Order has been placed to collect stool sample to rule out C. difficile if 3 or more loose stools in 24 hours  Avoid stool softeners until resolution of loose stools        Orders written by provider at facility:  Warfarin 2 mg p.o. daily, hold for INR more than 3, DX: Atrial fibrillation  Recheck INR on April 21, DX: Atrial fibrillation      Recommendation by provider at facility:  Follow-up on CBC and BMP today, April 18          Electronically signed by:  Odalys Walton MD

## 2022-04-17 ENCOUNTER — LAB REQUISITION (OUTPATIENT)
Dept: LAB | Facility: CLINIC | Age: 87
End: 2022-04-17
Payer: COMMERCIAL

## 2022-04-17 DIAGNOSIS — Z11.1 ENCOUNTER FOR SCREENING FOR RESPIRATORY TUBERCULOSIS: ICD-10-CM

## 2022-04-17 DIAGNOSIS — D64.9 ANEMIA, UNSPECIFIED: ICD-10-CM

## 2022-04-18 ENCOUNTER — LAB REQUISITION (OUTPATIENT)
Dept: LAB | Facility: CLINIC | Age: 87
End: 2022-04-18
Payer: COMMERCIAL

## 2022-04-18 ENCOUNTER — OFFICE VISIT (OUTPATIENT)
Dept: GERIATRICS | Facility: CLINIC | Age: 87
End: 2022-04-18
Payer: COMMERCIAL

## 2022-04-18 VITALS
BODY MASS INDEX: 24.76 KG/M2 | OXYGEN SATURATION: 92 % | HEART RATE: 71 BPM | TEMPERATURE: 98.2 F | WEIGHT: 148.6 LBS | SYSTOLIC BLOOD PRESSURE: 104 MMHG | RESPIRATION RATE: 18 BRPM | DIASTOLIC BLOOD PRESSURE: 60 MMHG | HEIGHT: 65 IN

## 2022-04-18 DIAGNOSIS — N39.0 ENTEROCOCCUS UTI: ICD-10-CM

## 2022-04-18 DIAGNOSIS — D64.9 CHRONIC ANEMIA: ICD-10-CM

## 2022-04-18 DIAGNOSIS — I50.32 CHRONIC HEART FAILURE WITH PRESERVED EJECTION FRACTION (H): ICD-10-CM

## 2022-04-18 DIAGNOSIS — U07.1 COVID-19: ICD-10-CM

## 2022-04-18 DIAGNOSIS — R33.9 URINARY RETENTION: ICD-10-CM

## 2022-04-18 DIAGNOSIS — N18.31 STAGE 3A CHRONIC KIDNEY DISEASE (H): ICD-10-CM

## 2022-04-18 DIAGNOSIS — R53.81 PHYSICAL DECONDITIONING: ICD-10-CM

## 2022-04-18 DIAGNOSIS — I10 ESSENTIAL HYPERTENSION: ICD-10-CM

## 2022-04-18 DIAGNOSIS — S82.852D CLOSED TRIMALLEOLAR FRACTURE OF LEFT ANKLE WITH ROUTINE HEALING, SUBSEQUENT ENCOUNTER: ICD-10-CM

## 2022-04-18 DIAGNOSIS — B95.2 ENTEROCOCCUS UTI: ICD-10-CM
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
## 2022-04-18 DIAGNOSIS — E78.5 DYSLIPIDEMIA: ICD-10-CM

## 2022-04-18 DIAGNOSIS — D62 ACUTE BLOOD LOSS ANEMIA: ICD-10-CM

## 2022-04-18 DIAGNOSIS — Z97.8 CHRONIC INDWELLING FOLEY CATHETER: ICD-10-CM

## 2022-04-18 DIAGNOSIS — Z87.81 S/P ORIF (OPEN REDUCTION INTERNAL FIXATION) FRACTURE: ICD-10-CM

## 2022-04-18 DIAGNOSIS — I48.0 PAROXYSMAL ATRIAL FIBRILLATION (H): ICD-10-CM

## 2022-04-18 DIAGNOSIS — K21.9 GASTROESOPHAGEAL REFLUX DISEASE, UNSPECIFIED WHETHER ESOPHAGITIS PRESENT: ICD-10-CM

## 2022-04-18 DIAGNOSIS — F03.90 DEMENTIA WITHOUT BEHAVIORAL DISTURBANCE, UNSPECIFIED DEMENTIA TYPE: ICD-10-CM

## 2022-04-18 DIAGNOSIS — Z98.890 S/P ORIF (OPEN REDUCTION INTERNAL FIXATION) FRACTURE: ICD-10-CM

## 2022-04-18 DIAGNOSIS — Z86.711 HISTORY OF PULMONARY EMBOLISM: ICD-10-CM

## 2022-04-18 LAB
ANION GAP SERPL CALCULATED.3IONS-SCNC: 4 MMOL/L (ref 3–14)
BUN SERPL-MCNC: 26 MG/DL (ref 7–30)
CALCIUM SERPL-MCNC: 9.5 MG/DL (ref 8.5–10.1)
CHLORIDE BLD-SCNC: 106 MMOL/L (ref 94–109)
CO2 SERPL-SCNC: 27 MMOL/L (ref 20–32)
CREAT SERPL-MCNC: 1.01 MG/DL (ref 0.52–1.04)
ERYTHROCYTE [DISTWIDTH] IN BLOOD BY AUTOMATED COUNT: 13 % (ref 10–15)
GFR SERPL CREATININE-BSD FRML MDRD: 53 ML/MIN/1.73M2
GLUCOSE BLD-MCNC: 114 MG/DL (ref 70–99)
HCT VFR BLD AUTO: 36.2 % (ref 35–47)
HGB BLD-MCNC: 11.8 G/DL (ref 11.7–15.7)
MCH RBC QN AUTO: 32.5 PG (ref 26.5–33)
MCHC RBC AUTO-ENTMCNC: 32.6 G/DL (ref 31.5–36.5)
MCV RBC AUTO: 100 FL (ref 78–100)
PLATELET # BLD AUTO: 222 10E3/UL (ref 150–450)
POTASSIUM BLD-SCNC: 4.1 MMOL/L (ref 3.4–5.3)
RBC # BLD AUTO: 3.63 10E6/UL (ref 3.8–5.2)
SODIUM SERPL-SCNC: 137 MMOL/L (ref 133–144)
WBC # BLD AUTO: 9.4 10E3/UL (ref 4–11)

## 2022-04-18 PROCEDURE — U0005 INFEC AGEN DETEC AMPLI PROBE: HCPCS | Performed by: NURSE PRACTITIONER

## 2022-04-18 PROCEDURE — 85027 COMPLETE CBC AUTOMATED: CPT | Performed by: NURSE PRACTITIONER

## 2022-04-18 PROCEDURE — 99305 1ST NF CARE MODERATE MDM 35: CPT | Performed by: INTERNAL MEDICINE

## 2022-04-18 PROCEDURE — P9604 ONE-WAY ALLOW PRORATED TRIP: HCPCS | Performed by: NURSE PRACTITIONER

## 2022-04-18 PROCEDURE — 80048 BASIC METABOLIC PNL TOTAL CA: CPT | Performed by: NURSE PRACTITIONER

## 2022-04-18 PROCEDURE — 36415 COLL VENOUS BLD VENIPUNCTURE: CPT | Performed by: NURSE PRACTITIONER

## 2022-04-18 PROCEDURE — 86481 TB AG RESPONSE T-CELL SUSP: CPT | Performed by: NURSE PRACTITIONER

## 2022-04-18 NOTE — LETTER
4/18/2022        RE: Maia Miranda  [facility]  [address]  [facility] [address]        [facility]  INITIAL VISIT NOTE  April 18, 2022    PRIMARY CARE PROVIDER AND CLINIC:  Remy Hernandez [address] / [place] [address]    CHIEF COMPLAINT:  Hospital follow-up/Initial visit    HPI:    Maia Miranda is a 89 year old  (10/26/1932) female who was seen at [facility] on April 18, 2022 for an initial visit.     Medical history is notable for dementia, PAF, pulmonary embolism, hypertension, HFpEF, dyslipidemia, CKD stage IIIa, recurrent UTIs, chronic urinary retention, s/p chronic indwelling catheter, chronic anemia, GERD, allergic rhinitis, osteoporosis, osteoarthritis, and low back pain.    Summary of hospital course:  Patient was hospitalized at [facility] from April 9 through April 14, 2022 for left ankle fracture sustained after mechanical fall.  EKG showed atrial fibrillation with a heart rate of 79 bpm.  CT head and CT cervical spine were negative for acute findings.  X-ray of the left ankle showed trimalleolar fracture/dislocation of the tibiotalar joint.  BMP was significant for creatinine of 1.35.  CBC was unremarkable.  Screening for COVID-19 was negative.  UA was abnormal and urine culture grew 2 strains of Enterococcus faecalis.  She was evaluated by orthopedic service and underwent ORIF of left ankle fracture on April 10, 2022 after reversal of anticoagulation.  EBL was reportedly 20 mL.  Postop hemoglobin dropped to 10.3.  She was treated with ampicillin for possible UTI.  TCU was recommended per therapies.    Patient is admitted to this facility for medical management, nursing care, and rehab.     Of note, history was obtained from patient, facility RN, and extensive review of the chart.    Today's visit:  Patient was seen in her room, while lying in bed.  She appears frail and weak but in no acute distress.  She reports no pain in her  left ankle this morning.  She has difficulty to raise her left leg.  She reports that she had a bowel movement yesterday which was loose.  She has chronic Guillen catheter.  She denies fever, chills, chest pain, palpitation, dyspnea, nausea, vomiting, abdominal pain, or urinary symptoms.      CODE STATUS:   DNR / DNI    PAST MEDICAL HISTORY:   Dementia  Fall resulting in left ankle trimalleolar fracture, s/p ORIF on April 10, 2022  Recurrent UTIs  Chronic urinary retention (patient has chronic indwelling Guillen catheter)  PAF  Pulmonary embolism (per chart, details are unknown)  Hypertension  Chronic HFpEF (grade III LV diastolic dysfunction and LVEF of 55-60% per echo in 2021)  Mild mitral regurgitation  Dyslipidemia  CKD stage IIIa, baseline creatinine 1-1.3  GERD  Allergic rhinitis  Lactose intolerance  Osteoporosis  Osteoarthritis  Low back pain    Note: No carotid artery stenosis, per CTA in 2021    PAST SURGICAL HISTORY:   Past Surgical History:   Procedure Laterality Date     OPEN REDUCTION INTERNAL FIXATION ANKLE Left 4/10/2022    Procedure: Open reduction and internal fixation of left trimalleolar ankle fracture;  Surgeon: Kulwant Cooper MD;  Location:  OR       FAMILY HISTORY:   Father  of CVA at age 92.  Mother had depression and  at age 62.  Maternal grandmother had Alzheimer's dementia.  One sister had pacemaker.    SOCIAL HISTORY:  Patient is a never smoker.  She does not drink alcohol.    MEDICATIONS:  Current Outpatient Medications   Medication Sig Dispense Refill     acetaminophen (TYLENOL) 500 MG tablet Take 2 tablets (1,000 mg) by mouth 3 times daily       Ascorbic Acid (VITAMIN C) 500 MG CAPS Take 500 mg by mouth 2 times daily       Carboxymethylcellulose Sodium (REFRESH LIQUIGEL) 1 % GEL Apply 1 drop to eye daily as needed       Lidocaine (LIDOCARE) 4 % Patch Place 1 patch onto the skin every 24 hours To prevent lidocaine toxicity, patient should be patch free for 12  "hrs daily.       lisinopril (ZESTRIL) 20 MG tablet Take 10 mg by mouth daily       loperamide (IMODIUM) 2 MG capsule Take 2 mg by mouth 4 times daily as needed for diarrhea       menthol-zinc oxide (CALMOSEPTINE) 0.44-20.6 % OINT ointment Apply topically as needed       metoprolol tartrate (LOPRESSOR) 25 MG tablet Take 25 mg by mouth 2 times daily       ondansetron (ZOFRAN-ODT) 4 MG ODT tab Take 4 mg by mouth every 6 hours as needed for nausea       oxyCODONE (ROXICODONE) 5 MG tablet Take 0.5 tablets (2.5 mg) by mouth every 4 hours as needed for moderate pain 20 tablet 0     polyethylene glycol (MIRALAX) 17 g packet Take 1 packet by mouth daily as needed for constipation       rosuvastatin (CRESTOR) 5 MG tablet Take 5 mg by mouth At Bedtime       SENNA-docusate sodium (SENNA S) 8.6-50 MG tablet Take 1 tablet by mouth 2 times daily as needed       sodium chloride (PETER 128) 5 % ophthalmic solution Place 1 drop into both eyes At Bedtime       vitamin D3 (CHOLECALCIFEROL) 50 mcg (2000 units) tablet Take 1 tablet by mouth daily       warfarin ANTICOAGULANT (COUMADIN) 2 MG tablet Take 2 mg by mouth daily         Post Discharge Medication Reconciliation Status: discharge medications reconciled, continue medications without change.      ALLERGIES:  Allergies   Allergen Reactions     Penicillins Anaphylaxis and Hives       ROS:  10 point ROS were negative other than the symptoms noted above in the HPI.    PHYSICAL EXAM:  Vital signs were reviewed in the chart.  Vital Signs: /60   Pulse 71   Temp 98.2  F (36.8  C)   Resp 18   Ht 1.651 m (5' 5\")   Wt 67.4 kg (148 lb 9.6 oz)   SpO2 92%   BMI 24.73 kg/m    General: Weak and frail appearing but comfortable and in no acute distress  HEENT: Conjunctival pallor  Cardiovascular: Normal S1, S2, RRR  Respiratory: Lungs clear to auscultation bilaterally  GI: Abdomen soft, non-tender, non-distended, +BS  Extremities: Left ankle is splinted.  There is no significant lower " extremity edema.  Neuro: CX II-XII grossly intact; ROM in all four extremities grossly intact  Psych: Alert and oriented x1-2; normal affect  Skin: No acute rash    LABORATORY/IMAGING DATA:  All relevant labs and imaging data were reviewed personally today.      Most Recent 3 CBC's:Recent Labs   Lab Test 04/12/22  0645 04/11/22  0746 04/10/22  0729 04/09/22  1200   WBC  --  8.1 5.7 6.4   HGB 10.3* 11.6* 11.9 13.8   MCV  --  99 103* 102*   PLT  --  140* 140* 159     Most Recent 3 BMP's:Recent Labs   Lab Test 04/14/22  0600 04/13/22  0553 04/12/22  0645 04/12/22  0312 04/11/22 0746 04/10/22  0729 04/09/22  1858 04/09/22  1200   NA  --   --   --   --  136 139  --  137   POTASSIUM 4.0 4.4  --   --  5.0 4.0   < > 4.9   CHLORIDE  --   --   --   --  108 111*  --  105   CO2  --   --   --   --  25 23  --  30   BUN  --   --   --   --  23 21  --  28   CR  --   --   --   --  0.94 0.93  --  1.35*   ANIONGAP  --   --   --   --  3 5  --  2*   CLARISSE  --   --   --   --  9.5 8.9  --  10.0   GLC  --   --  113* 124* 142* 96  --  124*    < > = values in this interval not displayed.     Most Recent Cholesterol Panel:No lab results found.      ASSESSMENT/PLAN:  Mechanical fall, subsequent encounter,  Left ankle trimalleolar fracture, s/p ORIF on April 10, 2022,  Physical deconditioning.  Patient is hemodynamically stable.  Surgical pain is fairly controlled.  Plan:  Fall precautions  Continue pain management with lidocaine patch, scheduled acetaminophen, and PRN oxycodone as ordered  DVT prophylaxis with chronic warfarin  NWB to LLE   Mobilize with PT/OT  Follow-up with Ortho (Dr. Cooper) in 2 weeks as directed    Acute blood loss anemia,  Chronic anemia.  ABLA due to orthopedic surgery.  Baseline hemoglobin around 11.  Postop hemoglobin dropped to 10.3 on April 12.  Plan:  Monitor hemoglobin periodically    Dementia without behavioral disturbance.  On today's examination, patient is oriented x1-2.  Plan:  Standard delirium  precautions  Staff to assist with daily care and mobility  Formal cognitive evaluation per OT    Chronic urinary retention,   Chronic indwelling Guillen catheter,  Enterococcus faecalis UTI versus colonization.  She was treated with ampicillin in the hospital.  Plan:  Continue routine care of Guillen catheter    PAF,  History of pulmonary embolism (per chart, details unknown).  EKG in the hospital revealed atrial fibrillation with controlled rate.  Heart rate is stable.  Last INR was 2.28 on April 14.  She is currently on warfarin at 2 mg p.o. daily.  Plan:  Continue metoprolol 25 mg p.o. twice daily  Continue anticoagulation with warfarin  Adjust warfarin dose for INR target of 2-3    Essential hypertension,  Chronic HFpEF (grade III LV diastolic dysfunction and LVEF of 55-60% per echo in December 2021).  Blood pressure is fairly controlled.  Patient weighs 148.6 LBS.  Plan:  Continue metoprolol 25 mg p.o. twice daily and lisinopril 10 mg p.o. daily  Monitor weights, volume status, and blood pressure    Dyslipidemia.  Plan:  Continue PTA rosuvastatin 5 mg p.o. at bedtime    CKD stage IIIa.  Baseline creatinine 1-1.3.  Last creatinine was 0.94 on April 11.  Plan:  Avoid NSAIDs and nephrotoxic  Monitor renal function periodically    GERD.  Patient is not on PPI or H2 blocker.  Plan:  Monitor symptoms    Loose stools.  Plan:  Order has been placed to collect stool sample to rule out C. difficile if 3 or more loose stools in 24 hours  Avoid stool softeners until resolution of loose stools        Orders written by provider at facility:  None.        Recommendation by provider at facility:  Follow-up on CBC, BMP, and INR from today, April 18          Electronically signed by:  Odalys Walton MD                          Sincerely,        Odalys Walton MD

## 2022-04-18 NOTE — PATIENT INSTRUCTIONS
Orders for Maia Miranda (10/26/1932), MR# [id number]:    Warfarin 2 mg p.o. daily, hold for INR more than 3, DX: Atrial fibrillation  2.  Recheck INR on April 21, DX: Atrial fibrillation      Odalys Walton MD  [facility]

## 2022-04-19 ENCOUNTER — LAB REQUISITION (OUTPATIENT)
Dept: LAB | Facility: CLINIC | Age: 87
End: 2022-04-19
Payer: COMMERCIAL

## 2022-04-19 DIAGNOSIS — N39.0 URINARY TRACT INFECTION, SITE NOT SPECIFIED: ICD-10-CM

## 2022-04-19 LAB
ALBUMIN UR-MCNC: NEGATIVE MG/DL
APPEARANCE UR: ABNORMAL
BACTERIA #/AREA URNS HPF: ABNORMAL /HPF
BILIRUB UR QL STRIP: NEGATIVE
COLOR UR AUTO: ABNORMAL
GLUCOSE UR STRIP-MCNC: NEGATIVE MG/DL
HGB UR QL STRIP: NEGATIVE
KETONES UR STRIP-MCNC: NEGATIVE MG/DL
LEUKOCYTE ESTERASE UR QL STRIP: ABNORMAL
MUCOUS THREADS #/AREA URNS LPF: PRESENT /LPF
NITRATE UR QL: POSITIVE
PH UR STRIP: 6.5 [PH] (ref 5–7)
QUANTIFERON MITOGEN: 1.13 IU/ML
QUANTIFERON NIL TUBE: 0.06 IU/ML
QUANTIFERON TB1 TUBE: 0.06 IU/ML
QUANTIFERON TB2 TUBE: 0.07
RBC URINE: 3 /HPF
SARS-COV-2 RNA RESP QL NAA+PROBE: NEGATIVE
SP GR UR STRIP: 1.01 (ref 1–1.03)
UROBILINOGEN UR STRIP-MCNC: NORMAL MG/DL
WBC CLUMPS #/AREA URNS HPF: PRESENT /HPF
WBC URINE: >182 /HPF

## 2022-04-19 PROCEDURE — 81001 URINALYSIS AUTO W/SCOPE: CPT | Performed by: NURSE PRACTITIONER

## 2022-04-19 PROCEDURE — 87186 SC STD MICRODIL/AGAR DIL: CPT | Performed by: NURSE PRACTITIONER

## 2022-04-20 ENCOUNTER — OFFICE VISIT (OUTPATIENT)
Dept: GERIATRICS | Facility: CLINIC | Age: 87
End: 2022-04-20
Payer: COMMERCIAL

## 2022-04-20 VITALS
BODY MASS INDEX: 24.49 KG/M2 | WEIGHT: 147 LBS | RESPIRATION RATE: 18 BRPM | HEIGHT: 65 IN | DIASTOLIC BLOOD PRESSURE: 82 MMHG | SYSTOLIC BLOOD PRESSURE: 151 MMHG | OXYGEN SATURATION: 95 % | HEART RATE: 84 BPM | TEMPERATURE: 98.1 F

## 2022-04-20 VITALS
WEIGHT: 147 LBS | OXYGEN SATURATION: 95 % | HEIGHT: 65 IN | BODY MASS INDEX: 24.49 KG/M2 | TEMPERATURE: 97.5 F | RESPIRATION RATE: 20 BRPM | HEART RATE: 84 BPM | SYSTOLIC BLOOD PRESSURE: 151 MMHG | DIASTOLIC BLOOD PRESSURE: 82 MMHG

## 2022-04-20 DIAGNOSIS — Z98.890 S/P ORIF (OPEN REDUCTION INTERNAL FIXATION) FRACTURE: ICD-10-CM

## 2022-04-20 DIAGNOSIS — R53.81 PHYSICAL DECONDITIONING: ICD-10-CM

## 2022-04-20 DIAGNOSIS — N39.0 URINARY TRACT INFECTION ASSOCIATED WITH INDWELLING URETHRAL CATHETER, INITIAL ENCOUNTER (H): ICD-10-CM

## 2022-04-20 DIAGNOSIS — R33.9 URINARY RETENTION: ICD-10-CM

## 2022-04-20 DIAGNOSIS — I48.91 ATRIAL FIBRILLATION, UNSPECIFIED TYPE (H): ICD-10-CM

## 2022-04-20 DIAGNOSIS — Z86.711 HISTORY OF PULMONARY EMBOLISM: ICD-10-CM

## 2022-04-20 DIAGNOSIS — T83.511A URINARY TRACT INFECTION ASSOCIATED WITH INDWELLING URETHRAL CATHETER, INITIAL ENCOUNTER (H): ICD-10-CM

## 2022-04-20 DIAGNOSIS — D64.9 POSTOPERATIVE ANEMIA: ICD-10-CM

## 2022-04-20 DIAGNOSIS — E78.5 DYSLIPIDEMIA: ICD-10-CM

## 2022-04-20 DIAGNOSIS — I50.32 CHRONIC HEART FAILURE WITH PRESERVED EJECTION FRACTION (H): ICD-10-CM

## 2022-04-20 DIAGNOSIS — N18.31 STAGE 3A CHRONIC KIDNEY DISEASE (H): ICD-10-CM

## 2022-04-20 DIAGNOSIS — S09.90XD INJURY OF HEAD, SUBSEQUENT ENCOUNTER: ICD-10-CM

## 2022-04-20 DIAGNOSIS — Z87.81 S/P ORIF (OPEN REDUCTION INTERNAL FIXATION) FRACTURE: ICD-10-CM

## 2022-04-20 DIAGNOSIS — S82.852D CLOSED TRIMALLEOLAR FRACTURE OF LEFT ANKLE WITH ROUTINE HEALING, SUBSEQUENT ENCOUNTER: Primary | ICD-10-CM

## 2022-04-20 DIAGNOSIS — I10 ESSENTIAL HYPERTENSION: ICD-10-CM

## 2022-04-20 DIAGNOSIS — F03.90 DEMENTIA WITHOUT BEHAVIORAL DISTURBANCE, UNSPECIFIED DEMENTIA TYPE: ICD-10-CM

## 2022-04-20 LAB
GAMMA INTERFERON BACKGROUND BLD IA-ACNC: 0.06 IU/ML
M TB IFN-G BLD-IMP: NEGATIVE
M TB IFN-G CD4+ BCKGRND COR BLD-ACNC: 1.07 IU/ML
MITOGEN IGNF BCKGRD COR BLD-ACNC: 0 IU/ML
MITOGEN IGNF BCKGRD COR BLD-ACNC: 0.01 IU/ML

## 2022-04-20 PROCEDURE — 99309 SBSQ NF CARE MODERATE MDM 30: CPT | Performed by: NURSE PRACTITIONER

## 2022-04-20 NOTE — PROGRESS NOTES
[facility] GERIATRICS    Chief Complaint   Patient presents with     RECHECK     HPI:  Maia Miranda is a 89 year old  (10/26/1932), who is being seen today for an episodic care visit at: [facility]  ([facility]) [[id number]].     PMH significant for atrial fibrillation on coumadin, history of pulmonary embolism, hypertension, chronic kidney disease stage 3, dementia, HFpEF, urinary retention s/p chronic deleon, hyperlipidemia, chronic anemia, GERD     Summary of recent hospitalization:  Maia Miranda was hospitalized at [facility] from 4/9-4/14/22 for left malleolar ankle fracture s/p ORIF after fall at [facility] and UTI. Upon presentation to ED creatinine was 1.35, mg 2.4, CBC unremarkable, INR 2.44. UA abnormal and culture eventually grew 2 strains of enterococcus faecalis. CT of head showed no acute cranial pathology. CT of cervical spine showed no evidence of acute fracture or posttraumatic subluxation and some degenerative changes with multiple stenoses. Orthopedics was consulted and she underwent ORIF on 4/10/22. Post operative she developed post op anemia (hgb preop was 11.9-->10.3 on 4/12. She received ampicillin for UTI. Discharged to [facility] for physical rehabilitation and medical management.      Today's concern is: Maia was seen today for routine follow up at [facility]. She limited historian. She denies pain at time of visit. Denies SOB, CP, dizziness. Reports bowels moving well now- previously was having some loose stools- stool sample was ordered to rule out c.diff, but then this was cancelled after not having loose stools. Has deleon catheter draining fine. UA/ UC obtained several days ago after nursing staff reported urine looked very cloudy and patient had increased confusion. She remains afebrile and hemodynamically stable    Allergies, and PMH/PSH reviewed in EPIC today.  REVIEW OF SYSTEMS:  Limited secondary to cognitive impairment but today pt reports as above in HPI    Objective:   BP (!)  "151/82   Pulse 84   Temp 98.1  F (36.7  C)   Resp 18   Ht 1.651 m (5' 5\")   Wt 66.7 kg (147 lb)   SpO2 95%   BMI 24.46 kg/m    GENERAL APPEARANCE:  Alert, frail, in NAD  HEENT: normocephalic, moist mucous membranes, soft spoken, nose without drainage or crusting  RESP:  respiratory effort normal, no respiratory distress, Lung sounds clear, patient is on RA  CV: auscultation of heart done, rate and rhythm regular.   ABDOMEN: + bowel sounds, soft, nontender, no grimacing or guarding with palpation.  : deleon catheter draining clear, yellow urine  M/S: no lower extremity edema; splint to left ankle and lower extremity- good sensation to feet  SKIN:  Inspection and palpation of skin and subcutaneous tissue: skin warm, dry without rashes; bruising to right frontal head that is fading since last visit  NEURO: cranial nerves 2-12 grossly intact and at patient's baseline; moves extremities freely  PSYCH: oriented x person, insight and judgement impaired, memory impaired, affect flat     [facility]. Please refer to them using [employer]/Care Everywhere. and Recent labs in EPIC reviewed by me today.     Assessment/Plan:  (Q57.831Q) Closed trimalleolar fracture of left ankle with routine healing, subsequent encounter  (primary encounter diagnosis)  (Z98.890,  Z87.81) S/P ORIF (open reduction internal fixation) fracture  Comment: secondary to fall  -s/p ORIF on 4/10/22  -reports pain is managed, is using oxycodone about once a day  Plan: Continue pain management with tylenol but change to 1000 mg TID, oxycodone PRN- nursing will need to offer this to her due to her cognition. Coumadin for DVT prophy. Continue bowel regimen PRN. Non weightbearing to left lower extremity. Therapy. Follow up with Dr. Cooper in 2 weeks.      (S09.90XD) Injury of head  Comment: secondary to fall- bruising to right frontal head  -Imaging inpatient: CT of head showed no acute cranial pathology. CT of cervical spine showed no " evidence of acute fracture or posttraumatic subluxation and some degenerative changes with multiple stenoses.  Plan: Monitor for improvement     (D64.9) Postoperative anemia  Comment: secondary to surgery, hgb trending down at discharge from hospital  -hgb preop was 11.9  -hgb 11.8 on 4/18  Plan: CBC PRN     (T83.511A,  N39.0) Urinary tract infection associated with indwelling urethral catheter, initial encounter (H)  (R33.9) Chronic urinary retention  (Z97.8) Chronic indwelling Deleon catheter  Comment: chronic, UA shows UTI, UC pending   -she is afebrile and hemodynamically stable  Plan: Await urine culture before starting antibiotics, unless change in condition. Nursing to complete deleon catheter cares.     (I48.91) Atrial fibrillation, unspecified type (H)  (Z86.711) History of pulmonary embolism  Comment: chronic, HR at TCU controlled 84, 84, 71  -INR goal 2-3  -PTA coumadin was 2 mg daily  Plan: Continue metoprolol 25 mg BID. Coumadin as ordered. INR tomorrow     (I10) Essential hypertension  (I50.32) Chronic HFpEF  Comment: chronic- BP controlled 151/82, 150/68, 115/66  -ECHO 12/21 with LVEF 55-60%, grade III LV diastolic dysfunction)  -appears euvolemic today  Plan: Continue lisinopril 10 mg daily, metoprolol 25 mg BID. VS per policy. Adjust medications as needed. Daily weights. Notify provider with weight gain >2 lbs in a day, >5 lbs in on week.      (F03.90) Dementia without behavioral disturbance, unspecified dementia type (H)  Comment: chronic, recently moved to [facility] - limited historian- oriented x person only  SLUMS 6/30 at TCU which is consistent with dementia level impairment  Plan: OCCUPATIONAL THERAPY to continue cognitive testing for safe discharge planning. Nursing to assist with cares, meals, medication assistance, activities.     (N18.31) Stage 3a chronic kidney disease (H)  Comment: acute on chronic, with acute kidney injury present on admission to ER, creatinine 1.35. She receieved IV hydration  and improved back to baseline prior to hospital discharge. No previous lab data prior to hospitalization for comparison.  -creatinine around 0.9 throughout hospitalization  -creatinine 1.01 on 4/18  Plan: BMP PRN. Avoid nephrotoxins. Renally dose medications as indicated.     Loose stools-resolved  Comment: not sure on chronicity of this, but had loperamide on PTA med list- so it may be chronic  -no associated stomach pain or cramping, she is afebrile and hemodynamically stable  -she did have recently antibiotics  -loose stools resolved and stool sample never collected  Plan: Monitor.     (E78.5) Dyslipidemia  Comment: chronic  Plan: Continue rosuvastatin 5 mg at bedtime.     (R53.81) Physical deconditioning  Comment: Acute, secondary to recent hospitalization, medical conditions as above  -continue to work with therapy  Plan: Encourage participation in physical therapy/occupational therapy for strengthening and deconditioning. Discharge planning per their recommendation. Social work to assist with d/c planning.      (N39.0,  B95.2) Enterococcus UTI-resolved  Comment: UA on admission to hospital abnormal and urine culture grew 2 strains of enterococcus faecalis. Received ampicillin for treatment  Plan: Monitor for signs of recurrence.         Electronically signed by: RAY Gonzalez CNP

## 2022-04-20 NOTE — PROGRESS NOTES
[facility] GERIATRICS    Chief Complaint   Patient presents with     Nursing Home Acute     INR, UTI     HPI:  Maia Miranda is a 89 year old  (10/26/1932), who is being seen today for an episodic care visit at: [facility]  ([facility]) [[id number]].     PMH significant for atrial fibrillation on coumadin, history of pulmonary embolism, hypertension, chronic kidney disease stage 3, dementia, HFpEF, urinary retention s/p chronic deleon, hyperlipidemia, chronic anemia, GERD     Summary of recent hospitalization:  Maia Miranda was hospitalized at [facility] from 4/9-4/14/22 for left malleolar ankle fracture s/p ORIF after fall at [facility] and UTI. Upon presentation to ED creatinine was 1.35, mg 2.4, CBC unremarkable, INR 2.44. UA abnormal and culture eventually grew 2 strains of enterococcus faecalis. CT of head showed no acute cranial pathology. CT of cervical spine showed no evidence of acute fracture or posttraumatic subluxation and some degenerative changes with multiple stenoses. Orthopedics was consulted and she underwent ORIF on 4/10/22. Post operative she developed post op anemia (hgb preop was 11.9-->10.3 on 4/12. She received ampicillin for UTI. Discharged to U for physical rehabilitation and medical management.       Today's concern is:   1. Encounter for therapeutic drug monitoring    2. Long term (current) use of anticoagulants    3. Atrial fibrillation, unspecified type (H)    4. Urinary tract infection associated with indwelling urethral catheter, subsequent encounter    5. Urinary retention      INR today 3.0. Patient is limited historian. Denies any bleeding, SOB. Patient with e.coli UTI- sensitivities back today- denies any pain at catheter site. Deloen is draining well- urine does look cloudy today. She is afebrile and vital signs are stable. She denies fevers/ chills    Allergies, and PMH/PSH reviewed in EPIC today.  REVIEW OF SYSTEMS:  Limited secondary to cognitive impairment but  "today pt reports as above in HPI    Objective:   BP (!) 151/82   Pulse 84   Temp 97.5  F (36.4  C)   Resp 20   Ht 1.651 m (5' 5\")   Wt 66.7 kg (147 lb)   SpO2 95%   BMI 24.46 kg/m    GENERAL APPEARANCE:  Alert, frail, in NAD  HEENT: normocephalic, moist mucous membranes, nose without drainage or crusting  RESP:  respiratory effort normal, no respiratory distress, patient is on RA  : deleon catheter is draining cloudy yellow urine  PSYCH: oriented x self, insight and judgement impaired, memory impaired    Labs done in SNF are in [facility]. Please refer to them using EPIC/Care Everywhere. and Recent labs in EPIC reviewed by me today.     Assessment/Plan:  (Z51.81) Encounter for therapeutic drug monitoring  (primary encounter diagnosis)  (Z79.01) Long term (current) use of anticoagulants  (I48.91) Atrial fibrillation, unspecified type (H)  Comment: HR controlled 69, 69, 68  -INR today 3.0  -last INR 2.2  Plan: Continue metoprolol 25 mg BID. Coumadin hold today. INR tomorrow    (T83.511D,  N39.0) Urinary tract infection associated with indwelling urethral catheter, subsequent encounter  (R33.9) Urinary retention  Comment: acute, urine culture shows e.coli  -she is afebrile and hemodynamically stable  Plan: Start macrobid 100 mg BID x7 days. Nursing to complete deleon catheter cares.         Electronically signed by: RAY Gonzalez CNP         "

## 2022-04-20 NOTE — LETTER
4/20/2022        RE: Maia Miranda  [facility]  [address]  [facility] [address]        [facility]    Chief Complaint   Patient presents with     RECHECK     HPI:  Maia Miranda is a 89 year old  (10/26/1932), who is being seen today for an episodic care visit at: [facility]  ([facility]) [[id number]].     PMH significant for atrial fibrillation on coumadin, history of pulmonary embolism, hypertension, chronic kidney disease stage 3, dementia, HFpEF, urinary retention s/p chronic deleon, hyperlipidemia, chronic anemia, GERD     Summary of recent hospitalization:  Maia Miranda was hospitalized at [facility] from 4/9-4/14/22 for left malleolar ankle fracture s/p ORIF after fall at [facility] and UTI. Upon presentation to ED creatinine was 1.35, mg 2.4, CBC unremarkable, INR 2.44. UA abnormal and culture eventually grew 2 strains of enterococcus faecalis. CT of head showed no acute cranial pathology. CT of cervical spine showed no evidence of acute fracture or posttraumatic subluxation and some degenerative changes with multiple stenoses. Orthopedics was consulted and she underwent ORIF on 4/10/22. Post operative she developed post op anemia (hgb preop was 11.9-->10.3 on 4/12. She received ampicillin for UTI. Discharged to [facility] for physical rehabilitation and medical management.      Today's concern is: Maia was seen today for routine follow up at U. She limited historian. She denies pain at time of visit. Denies SOB, CP, dizziness. Reports bowels moving well now- previously was having some loose stools- stool sample was ordered to rule out c.diff, but then this was cancelled after not having loose stools. Has deleon catheter draining fine. UA/ UC obtained several days ago after nursing staff reported urine looked very cloudy and patient had increased confusion. She remains afebrile and hemodynamically stable    Allergies, and PMH/PSH reviewed in [facility] today.  REVIEW OF  "SYSTEMS:  Limited secondary to cognitive impairment but today pt reports as above in HPI    Objective:   BP (!) 151/82   Pulse 84   Temp 98.1  F (36.7  C)   Resp 18   Ht 1.651 m (5' 5\")   Wt 66.7 kg (147 lb)   SpO2 95%   BMI 24.46 kg/m    GENERAL APPEARANCE:  Alert, frail, in NAD  HEENT: normocephalic, moist mucous membranes, soft spoken, nose without drainage or crusting  RESP:  respiratory effort normal, no respiratory distress, Lung sounds clear, patient is on RA  CV: auscultation of heart done, rate and rhythm regular.   ABDOMEN: + bowel sounds, soft, nontender, no grimacing or guarding with palpation.  : deleon catheter draining clear, yellow urine  M/S: no lower extremity edema; splint to left ankle and lower extremity- good sensation to feet  SKIN:  Inspection and palpation of skin and subcutaneous tissue: skin warm, dry without rashes; bruising to right frontal head that is fading since last visit  NEURO: cranial nerves 2-12 grossly intact and at patient's baseline; moves extremities freely  PSYCH: oriented x person, insight and judgement impaired, memory impaired, affect flat     Labs done in SNF are in [facility]. Please refer to them using [employer]/Care Everywhere. and Recent labs in EPIC reviewed by me today.     Assessment/Plan:  (U16.072M) Closed trimalleolar fracture of left ankle with routine healing, subsequent encounter  (primary encounter diagnosis)  (Z98.890,  Z87.81) S/P ORIF (open reduction internal fixation) fracture  Comment: secondary to fall  -s/p ORIF on 4/10/22  -reports pain is managed, is using oxycodone about once a day  Plan: Continue pain management with tylenol but change to 1000 mg TID, oxycodone PRN- nursing will need to offer this to her due to her cognition. Coumadin for DVT prophy. Continue bowel regimen PRN. Non weightbearing to left lower extremity. Therapy. Follow up with Dr. Cooper in 2 weeks.      (S09.90XD) Injury of head  Comment: secondary to fall- bruising to " right frontal head  -Imaging inpatient: CT of head showed no acute cranial pathology. CT of cervical spine showed no evidence of acute fracture or posttraumatic subluxation and some degenerative changes with multiple stenoses.  Plan: Monitor for improvement     (D64.9) Postoperative anemia  Comment: secondary to surgery, hgb trending down at discharge from hospital  -hgb preop was 11.9  -hgb 11.8 on 4/18  Plan: CBC PRN     (T83.511A,  N39.0) Urinary tract infection associated with indwelling urethral catheter, initial encounter (H)  (R33.9) Chronic urinary retention  (Z97.8) Chronic indwelling Deleon catheter  Comment: chronic, UA shows UTI, UC pending   -she is afebrile and hemodynamically stable  Plan: Await urine culture before starting antibiotics, unless change in condition. Nursing to complete deleon catheter cares.     (I48.91) Atrial fibrillation, unspecified type (H)  (Z86.711) History of pulmonary embolism  Comment: chronic, HR at TCU controlled 84, 84, 71  -INR goal 2-3  -PTA coumadin was 2 mg daily  Plan: Continue metoprolol 25 mg BID. Coumadin as ordered. INR tomorrow     (I10) Essential hypertension  (I50.32) Chronic HFpEF  Comment: chronic- BP controlled 151/82, 150/68, 115/66  -ECHO 12/21 with LVEF 55-60%, grade III LV diastolic dysfunction)  -appears euvolemic today  Plan: Continue lisinopril 10 mg daily, metoprolol 25 mg BID. VS per policy. Adjust medications as needed. Daily weights. Notify provider with weight gain >2 lbs in a day, >5 lbs in on week.      (F03.90) Dementia without behavioral disturbance, unspecified dementia type (H)  Comment: chronic, recently moved to [facility] - limited historian- oriented x person only  SLUMS 6/30 at U which is consistent with dementia level impairment  Plan: OCCUPATIONAL THERAPY to continue cognitive testing for safe discharge planning. Nursing to assist with cares, meals, medication assistance, activities.     (N18.31) Stage 3a chronic kidney disease  (H)  Comment: acute on chronic, with acute kidney injury present on admission to ER, creatinine 1.35. She receieved IV hydration and improved back to baseline prior to hospital discharge. No previous lab data prior to hospitalization for comparison.  -creatinine around 0.9 throughout hospitalization  -creatinine 1.01 on 4/18  Plan: BMP PRN. Avoid nephrotoxins. Renally dose medications as indicated.     Loose stools-resolved  Comment: not sure on chronicity of this, but had loperamide on PTA med list- so it may be chronic  -no associated stomach pain or cramping, she is afebrile and hemodynamically stable  -she did have recently antibiotics  -loose stools resolved and stool sample never collected  Plan: Monitor.     (E78.5) Dyslipidemia  Comment: chronic  Plan: Continue rosuvastatin 5 mg at bedtime.     (R53.81) Physical deconditioning  Comment: Acute, secondary to recent hospitalization, medical conditions as above  -continue to work with therapy  Plan: Encourage participation in physical therapy/occupational therapy for strengthening and deconditioning. Discharge planning per their recommendation. Social work to assist with d/c planning.      (N39.0,  B95.2) Enterococcus UTI-resolved  Comment: UA on admission to hospital abnormal and urine culture grew 2 strains of enterococcus faecalis. Received ampicillin for treatment  Plan: Monitor for signs of recurrence.         Electronically signed by: RAY Gonzalez CNP             Sincerely,        RAY Gonzalez CNP

## 2022-04-21 ENCOUNTER — LAB REQUISITION (OUTPATIENT)
Dept: LAB | Facility: CLINIC | Age: 87
End: 2022-04-21
Payer: COMMERCIAL

## 2022-04-21 ENCOUNTER — OFFICE VISIT (OUTPATIENT)
Dept: GERIATRICS | Facility: CLINIC | Age: 87
End: 2022-04-21
Payer: COMMERCIAL

## 2022-04-21 DIAGNOSIS — Z51.81 ENCOUNTER FOR THERAPEUTIC DRUG MONITORING: Primary | ICD-10-CM

## 2022-04-21 DIAGNOSIS — Z79.01 LONG TERM (CURRENT) USE OF ANTICOAGULANTS: ICD-10-CM

## 2022-04-21 DIAGNOSIS — U07.1 COVID-19: ICD-10-CM

## 2022-04-21 DIAGNOSIS — N39.0 URINARY TRACT INFECTION ASSOCIATED WITH INDWELLING URETHRAL CATHETER, SUBSEQUENT ENCOUNTER: ICD-10-CM

## 2022-04-21 DIAGNOSIS — I48.91 ATRIAL FIBRILLATION, UNSPECIFIED TYPE (H): ICD-10-CM

## 2022-04-21 DIAGNOSIS — R33.9 URINARY RETENTION: ICD-10-CM

## 2022-04-21 DIAGNOSIS — T83.511D URINARY TRACT INFECTION ASSOCIATED WITH INDWELLING URETHRAL CATHETER, SUBSEQUENT ENCOUNTER: ICD-10-CM

## 2022-04-21 LAB — BACTERIA UR CULT: ABNORMAL

## 2022-04-21 PROCEDURE — 99309 SBSQ NF CARE MODERATE MDM 30: CPT | Performed by: NURSE PRACTITIONER

## 2022-04-21 PROCEDURE — U0005 INFEC AGEN DETEC AMPLI PROBE: HCPCS | Performed by: NURSE PRACTITIONER

## 2022-04-21 RX ORDER — NITROFURANTOIN 25; 75 MG/1; MG/1
100 CAPSULE ORAL 2 TIMES DAILY
Qty: 14 CAPSULE | Refills: 0
Start: 2022-04-21 | End: 2022-05-02

## 2022-04-21 NOTE — LETTER
4/21/2022        RE: Maia Miranda  [facility]  [address]  [facility] [address]        [facility]    Chief Complaint   Patient presents with     Nursing Home Acute     INR, UTI     HPI:  Maia Miranda is a 89 year old  (10/26/1932), who is being seen today for an episodic care visit at: [facility]  ([facility]) [[id number]].     PMH significant for atrial fibrillation on coumadin, history of pulmonary embolism, hypertension, chronic kidney disease stage 3, dementia, HFpEF, urinary retention s/p chronic deleon, hyperlipidemia, chronic anemia, GERD     Summary of recent hospitalization:  Maia Miranda was hospitalized at [facility] from 4/9-4/14/22 for left malleolar ankle fracture s/p ORIF after fall at [facility] and UTI. Upon presentation to ED creatinine was 1.35, mg 2.4, CBC unremarkable, INR 2.44. UA abnormal and culture eventually grew 2 strains of enterococcus faecalis. CT of head showed no acute cranial pathology. CT of cervical spine showed no evidence of acute fracture or posttraumatic subluxation and some degenerative changes with multiple stenoses. Orthopedics was consulted and she underwent ORIF on 4/10/22. Post operative she developed post op anemia (hgb preop was 11.9-->10.3 on 4/12. She received ampicillin for UTI. Discharged to U for physical rehabilitation and medical management.       Today's concern is:   1. Encounter for therapeutic drug monitoring    2. Long term (current) use of anticoagulants    3. Atrial fibrillation, unspecified type (H)    4. Urinary tract infection associated with indwelling urethral catheter, subsequent encounter    5. Urinary retention      INR today 3.0. Patient is limited historian. Denies any bleeding, SOB. Patient with e.coli UTI- sensitivities back today- denies any pain at catheter site. Deleon is draining well- urine does look cloudy today. She is afebrile and vital signs are stable. She denies fevers/  "chills    Allergies, and PMH/PSH reviewed in EPIC today.  REVIEW OF SYSTEMS:  Limited secondary to cognitive impairment but today pt reports as above in HPI    Objective:   BP (!) 151/82   Pulse 84   Temp 97.5  F (36.4  C)   Resp 20   Ht 1.651 m (5' 5\")   Wt 66.7 kg (147 lb)   SpO2 95%   BMI 24.46 kg/m    GENERAL APPEARANCE:  Alert, frail, in NAD  HEENT: normocephalic, moist mucous membranes, nose without drainage or crusting  RESP:  respiratory effort normal, no respiratory distress, patient is on RA  : deleon catheter is draining cloudy yellow urine  PSYCH: oriented x self, insight and judgement impaired, memory impaired    Labs done in SNF are in [facility]. Please refer to them using EPIC/Care Everywhere. and Recent labs in [facility] reviewed by me today.     Assessment/Plan:  (Z51.81) Encounter for therapeutic drug monitoring  (primary encounter diagnosis)  (Z79.01) Long term (current) use of anticoagulants  (I48.91) Atrial fibrillation, unspecified type (H)  Comment: HR controlled 69, 69, 68  -INR today 3.0  -last INR 2.2  Plan: Continue metoprolol 25 mg BID. Coumadin hold today. INR tomorrow    (T83.511D,  N39.0) Urinary tract infection associated with indwelling urethral catheter, subsequent encounter  (R33.9) Urinary retention  Comment: acute, urine culture shows e.coli  -she is afebrile and hemodynamically stable  Plan: Start macrobid 100 mg BID x7 days. Nursing to complete deleon catheter cares.         Electronically signed by: RAY Gonzalez CNP               Sincerely,        RAY Gonzalez CNP    "

## 2022-04-22 ENCOUNTER — TRANSITIONAL CARE UNIT VISIT (OUTPATIENT)
Dept: GERIATRICS | Facility: CLINIC | Age: 87
End: 2022-04-22
Payer: COMMERCIAL

## 2022-04-22 VITALS
BODY MASS INDEX: 24.46 KG/M2 | TEMPERATURE: 97.7 F | DIASTOLIC BLOOD PRESSURE: 59 MMHG | HEART RATE: 76 BPM | SYSTOLIC BLOOD PRESSURE: 110 MMHG | WEIGHT: 146.8 LBS | RESPIRATION RATE: 17 BRPM | OXYGEN SATURATION: 94 % | HEIGHT: 65 IN

## 2022-04-22 DIAGNOSIS — Z79.01 LONG TERM (CURRENT) USE OF ANTICOAGULANTS: ICD-10-CM

## 2022-04-22 DIAGNOSIS — Z51.81 ENCOUNTER FOR THERAPEUTIC DRUG MONITORING: Primary | ICD-10-CM

## 2022-04-22 DIAGNOSIS — I48.91 ATRIAL FIBRILLATION, UNSPECIFIED TYPE (H): ICD-10-CM

## 2022-04-22 LAB — SARS-COV-2 RNA RESP QL NAA+PROBE: NEGATIVE

## 2022-04-22 PROCEDURE — 99308 SBSQ NF CARE LOW MDM 20: CPT | Performed by: NURSE PRACTITIONER

## 2022-04-22 NOTE — PROGRESS NOTES
"[facility] GERIATRICS  [place] Medical Record Number:  [id number]  Place of Service where encounter took place: [facility]  ([facility]) [[id number]]    HPI:    Maia Miranda is a 89 year old  (10/26/1932), who is being seen today for an episodic care visit at the above location. Today's concern is INR/Coumadin management for DONNA. Fib    ROS/Subjective:  Bleeding Signs/Symptoms:  None  Thromboembolic Signs/Symptoms:  None  Medication Changes:  Yes: on macrobid for UTI  Dietary Changes:  Yes: at TCU  Activity Changes: Yes: at TCU  Bacterial/Viral Infection:  Yes: UTI  Missed Coumadin Doses:  Held yesterday  On ASA: No  Other Concerns:  No    OBJECTIVE:  /59   Pulse 76   Temp 97.7  F (36.5  C)   Resp 17   Ht 1.651 m (5' 5\")   Wt 66.6 kg (146 lb 12.8 oz)   SpO2 94%   BMI 24.43 kg/m    Last INR: 3.0 on 4/21  INR Today:  2.9  Current Dose: Coumadin held yesterday  INR Flow sheet at [facility]:    ASSESSMENT:  1. Encounter for therapeutic drug monitoring    2. Long term (current) use of anticoagulants    3. Atrial fibrillation, unspecified type (H)      Therapeutic INR for goal of 2-3    PLAN/ORDERS:   New Dose: Coumadin 1 mg today, 2 mg Saturday and sunday  Next INR: 4/25      Electronically signed by:  RAY Gonzalez CNP     "

## 2022-04-25 ENCOUNTER — LAB REQUISITION (OUTPATIENT)
Dept: LAB | Facility: CLINIC | Age: 87
End: 2022-04-25
Payer: COMMERCIAL

## 2022-04-25 ENCOUNTER — PATIENT OUTREACH (OUTPATIENT)
Dept: CARE COORDINATION | Facility: CLINIC | Age: 87
End: 2022-04-25

## 2022-04-25 ENCOUNTER — DOCUMENTATION ONLY (OUTPATIENT)
Dept: OTHER | Facility: CLINIC | Age: 87
End: 2022-04-25

## 2022-04-25 ENCOUNTER — TRANSITIONAL CARE UNIT VISIT (OUTPATIENT)
Dept: GERIATRICS | Facility: CLINIC | Age: 87
End: 2022-04-25
Payer: COMMERCIAL

## 2022-04-25 VITALS
RESPIRATION RATE: 18 BRPM | DIASTOLIC BLOOD PRESSURE: 72 MMHG | HEART RATE: 70 BPM | BODY MASS INDEX: 22.49 KG/M2 | SYSTOLIC BLOOD PRESSURE: 112 MMHG | OXYGEN SATURATION: 96 % | WEIGHT: 135 LBS | TEMPERATURE: 98 F | HEIGHT: 65 IN

## 2022-04-25 DIAGNOSIS — Z98.890 S/P ORIF (OPEN REDUCTION INTERNAL FIXATION) FRACTURE: ICD-10-CM

## 2022-04-25 DIAGNOSIS — I10 ESSENTIAL HYPERTENSION: ICD-10-CM

## 2022-04-25 DIAGNOSIS — Z79.01 LONG TERM (CURRENT) USE OF ANTICOAGULANTS: ICD-10-CM

## 2022-04-25 DIAGNOSIS — N39.0 URINARY TRACT INFECTION ASSOCIATED WITH INDWELLING URETHRAL CATHETER, SUBSEQUENT ENCOUNTER: ICD-10-CM

## 2022-04-25 DIAGNOSIS — U07.1 COVID-19: ICD-10-CM

## 2022-04-25 DIAGNOSIS — I50.32 CHRONIC HEART FAILURE WITH PRESERVED EJECTION FRACTION (H): ICD-10-CM

## 2022-04-25 DIAGNOSIS — I48.91 ATRIAL FIBRILLATION, UNSPECIFIED TYPE (H): ICD-10-CM

## 2022-04-25 DIAGNOSIS — Z97.8 CHRONIC INDWELLING FOLEY CATHETER: ICD-10-CM

## 2022-04-25 DIAGNOSIS — R33.9 URINARY RETENTION: ICD-10-CM

## 2022-04-25 DIAGNOSIS — R53.81 PHYSICAL DECONDITIONING: ICD-10-CM

## 2022-04-25 DIAGNOSIS — S82.852D CLOSED TRIMALLEOLAR FRACTURE OF LEFT ANKLE WITH ROUTINE HEALING, SUBSEQUENT ENCOUNTER: Primary | ICD-10-CM

## 2022-04-25 DIAGNOSIS — T83.511D URINARY TRACT INFECTION ASSOCIATED WITH INDWELLING URETHRAL CATHETER, SUBSEQUENT ENCOUNTER: ICD-10-CM

## 2022-04-25 DIAGNOSIS — Z87.81 S/P ORIF (OPEN REDUCTION INTERNAL FIXATION) FRACTURE: ICD-10-CM

## 2022-04-25 DIAGNOSIS — S09.90XD INJURY OF HEAD, SUBSEQUENT ENCOUNTER: ICD-10-CM

## 2022-04-25 PROCEDURE — 99309 SBSQ NF CARE MODERATE MDM 30: CPT | Performed by: NURSE PRACTITIONER

## 2022-04-25 PROCEDURE — U0005 INFEC AGEN DETEC AMPLI PROBE: HCPCS | Performed by: NURSE PRACTITIONER

## 2022-04-25 NOTE — PROGRESS NOTES
"[facility] GERIATRICS    Chief Complaint   Patient presents with     RECHECK     HPI:  Maia Miranda is a 89 year old  (10/26/1932), who is being seen today for an episodic care visit at: [facility]  ([facility]) [[id number]].     PMH significant for atrial fibrillation on coumadin, history of pulmonary embolism, hypertension, chronic kidney disease stage 3, dementia, HFpEF, urinary retention s/p chronic deleon, hyperlipidemia, chronic anemia, GERD     Summary of recent hospitalization:  Maia Miranda was hospitalized at [facility] from 4/9-4/14/22 for left malleolar ankle fracture s/p ORIF after fall at [facility] and UTI. Upon presentation to ED creatinine was 1.35, mg 2.4, CBC unremarkable, INR 2.44. UA abnormal and culture eventually grew 2 strains of enterococcus faecalis. CT of head showed no acute cranial pathology. CT of cervical spine showed no evidence of acute fracture or posttraumatic subluxation and some degenerative changes with multiple stenoses. Orthopedics was consulted and she underwent ORIF on 4/10/22. Post operative she developed post op anemia (hgb preop was 11.9-->10.3 on 4/12. She received ampicillin for UTI. Discharged to U for physical rehabilitation and medical management.        Today's concern is: Maia seen today for routine follow up. Denies SOB, CP, dizziness. Reports last BM yesterday. No discomfort with catheter site. Pain to left ankle is present on and off. Continues to work with therapy.     Allergies, and PMH/PSH reviewed in EPIC today.  REVIEW OF SYSTEMS:  Limited secondary to cognitive impairment but today pt reports as above in HPI    Objective:   /72   Pulse 70   Temp 98  F (36.7  C)   Resp 18   Ht 1.651 m (5' 5\")   Wt 61.2 kg (135 lb)   SpO2 96%   BMI 22.47 kg/m    GENERAL APPEARANCE:  Alert, frail, in NAD  HEENT: normocephalic, moist mucous membranes, nose without drainage or crusting  RESP:  respiratory effort normal, no respiratory distress, " Lung sounds clear, patient is on RA  CV: auscultation of heart done, rate and rhythm regular.   ABDOMEN: + bowel sounds, soft, nontender, no grimacing or guarding with palpation.  : deleon catheter draining clear, yellow urine  M/S: no lower extremity edema; splint to left ankle and lower extremity- good sensation to feet  NEURO: cranial nerves 2-12 grossly intact and at patient's baseline; moves extremities freely  PSYCH: oriented x person, insight and judgement impaired, memory impaired, affect flat     Labs done in SNF are in [place] EPIC. Please refer to them using EPIC/Care Everywhere. and Recent labs in EPIC reviewed by me today.     Assessment/Plan:  (S82.592K) Closed trimalleolar fracture of left ankle with routine healing, subsequent encounter  (primary encounter diagnosis)  (Z98.890,  Z87.81) S/P ORIF (open reduction internal fixation) fracture  Comment: secondary to fall  -s/p ORIF on 4/10/22  -reports pain overall is managed  Plan: Continue pain management with tylenol 1000 mg TID, oxycodone PRN- nursing will need to offer this to her due to her cognition. Coumadin for DVT prophy. Continue bowel regimen PRN. Non weightbearing to left lower extremity. Therapy. Follow up with Dr. Cooper in 2 weeks- asked facility to ensure this sis scheduled.      (S09.90XD) Injury of head  Comment: secondary to fall- bruising to right frontal head is almost resolved  -Imaging inpatient: CT of head showed no acute cranial pathology. CT of cervical spine showed no evidence of acute fracture or posttraumatic subluxation and some degenerative changes with multiple stenoses.  Plan: Monitor for improvement     (D64.9) Postoperative anemia  Comment: secondary to surgery, hgb trending down at discharge from hospital  -hgb preop was 11.9  -hgb 11.8 on 4/18  Plan: CBC PRN     (T83.511D,  N39.0) Urinary tract infection associated with indwelling urethral catheter, subsequent encounter  (R33.9) Chronic urinary retention  (Z97.8)  Chronic indwelling Deleon catheter  Comment: acute, urine culture grew e.coli  -she is afebrile and hemodynamically stable  Plan: Continue macrobid 100 mg BID x7 days through 4/28. Nursing to complete deleon catheter cares.    (Z79.01) Long term (current) use of anticoagulants  (I48.91) Atrial fibrillation, unspecified type (H)  Comment: HR controlled 79, 70, 76  -INR today 1.9  -last INR 2.9 on 4/22  Plan: Continue metoprolol 25 mg BID. Coumadin 2 mg daily. INR 4/28    (I10) Essential hypertension  (I50.32) Chronic HFpEF  Comment: chronic- BP controlled 114/79, 112/72, 113/65; HR 79, 70, 76  -ECHO 12/21 with LVEF 55-60%, grade III LV diastolic dysfunction)  -appears euvolemic today- no swelling to legs or SOB  Plan: Continue lisinopril 10 mg daily, metoprolol 25 mg BID. VS per policy. Adjust medications as needed. Daily weights. Notify provider with weight gain >2 lbs in a day, >5 lbs in on week.      (F03.90) Dementia without behavioral disturbance, unspecified dementia type (H)  Comment: chronic, recently moved to [facility] - limited historian- oriented x person only  SLUMS 6/30 at U which is consistent with dementia level impairment  Plan: OCCUPATIONAL THERAPY to continue cognitive testing for safe discharge planning. Nursing to assist with cares, meals, medication assistance, activities.     (N18.31) Stage 3a chronic kidney disease (H)  Comment: acute on chronic, with acute kidney injury present on admission to ER, creatinine 1.35. She receieved IV hydration and improved back to baseline prior to hospital discharge. No previous lab data prior to hospitalization for comparison.  -creatinine around 0.9 throughout hospitalization  -creatinine 1.01 on 4/18  Plan: BMP PRN. Avoid nephrotoxins. Renally dose medications as indicated.     Loose stools-resolved  Comment: not sure on chronicity of this, but had loperamide on PTA med list- so it may be chronic  -she did not have associated stomach pain or cramping, was afebrile  and hemodynamically stable  -she did have recently antibiotics  -loose stools resolved and stool sample never collected  Plan: Monitor.     (E78.5) Dyslipidemia  Comment: chronic  Plan: Continue rosuvastatin 5 mg at bedtime.     (R53.81) Physical deconditioning  Comment: Acute, secondary to recent hospitalization, medical conditions as above  -continues to work with therapy  Plan: Encourage participation in physical therapy/occupational therapy for strengthening and deconditioning. Discharge planning per their recommendation. Social work to assist with d/c planning.      (N39.0,  B95.2) Enterococcus UTI-resolved  Comment: UA on admission to hospital abnormal and urine culture grew 2 strains of enterococcus faecalis. Received ampicillin for treatment  Plan: Monitor for signs of recurrence.     Electronically signed by: RAY Gonzalez CNP

## 2022-04-25 NOTE — PROGRESS NOTES
Clinic Care Coordination Contact  Care Conference    S-(situation): Patient currently at [facility] TCU for rehabilitation.    B-(background): Patient was inpatient at [facility] 4/9/22-4/14/22 due to fall, left ankle fracture, s/p ORIF left trimalleolar ankle fracture.     A-(assessment): Clinical Product Navigator attended TCU Care Conference via phone with patient, TCU interdisciplinary team, Norma from [facility] and family.    Therapies: Working on strength and endurance.  Due to NWB precautions, needing maximum assistance to maintain safety, also needing cuing for sequencing.  ADLs needing moderate to maximum assistance, feeding with set-up.    SLUMS 6/30.  Not seeing significant progress at this time.    Plan to follow-up with a CPT.    Assisted Living at [facility] is only able to provide Assist of 1; Memory Care would be able to provide Assist of 2.    Nursing: Follow-up at TCU with [facility] on 4/25/22. Staff are hopeful that patient is able to make progress once weight bearing status is lifted.  Patient is being treated for a UTI.    R-(recommendations/plan): Noted patient enrolled in [facility] 4/15/22.  Will forward to  and continue to be available for in-network navigation.    Melissa Behl BSN, RN, PHN, CCM  RN Clinical Product Navigator  [phone]

## 2022-04-26 LAB — SARS-COV-2 RNA RESP QL NAA+PROBE: NEGATIVE

## 2022-04-28 ENCOUNTER — TRANSITIONAL CARE UNIT VISIT (OUTPATIENT)
Dept: GERIATRICS | Facility: CLINIC | Age: 87
End: 2022-04-28
Payer: COMMERCIAL

## 2022-04-28 ENCOUNTER — LAB REQUISITION (OUTPATIENT)
Dept: LAB | Facility: CLINIC | Age: 87
End: 2022-04-28
Payer: COMMERCIAL

## 2022-04-28 VITALS — HEIGHT: 65 IN | WEIGHT: 135 LBS | BODY MASS INDEX: 22.49 KG/M2

## 2022-04-28 DIAGNOSIS — I10 ESSENTIAL HYPERTENSION: ICD-10-CM

## 2022-04-28 DIAGNOSIS — N39.0 URINARY TRACT INFECTION, SITE NOT SPECIFIED: ICD-10-CM

## 2022-04-28 DIAGNOSIS — R33.9 URINARY RETENTION: ICD-10-CM

## 2022-04-28 DIAGNOSIS — Z79.01 LONG TERM (CURRENT) USE OF ANTICOAGULANTS: ICD-10-CM

## 2022-04-28 DIAGNOSIS — T83.511D URINARY TRACT INFECTION ASSOCIATED WITH INDWELLING URETHRAL CATHETER, SUBSEQUENT ENCOUNTER: ICD-10-CM

## 2022-04-28 DIAGNOSIS — N39.0 URINARY TRACT INFECTION ASSOCIATED WITH INDWELLING URETHRAL CATHETER, SUBSEQUENT ENCOUNTER: ICD-10-CM

## 2022-04-28 DIAGNOSIS — Z51.81 ENCOUNTER FOR THERAPEUTIC DRUG MONITORING: Primary | ICD-10-CM

## 2022-04-28 DIAGNOSIS — Z97.8 CHRONIC INDWELLING FOLEY CATHETER: ICD-10-CM

## 2022-04-28 DIAGNOSIS — I48.91 ATRIAL FIBRILLATION, UNSPECIFIED TYPE (H): ICD-10-CM

## 2022-04-28 DIAGNOSIS — U07.1 COVID-19: ICD-10-CM

## 2022-04-28 PROCEDURE — U0005 INFEC AGEN DETEC AMPLI PROBE: HCPCS | Performed by: NURSE PRACTITIONER

## 2022-04-28 PROCEDURE — 99309 SBSQ NF CARE MODERATE MDM 30: CPT | Performed by: NURSE PRACTITIONER

## 2022-04-28 RX ORDER — LISINOPRIL 5 MG/1
5 TABLET ORAL DAILY
Start: 2022-04-28 | End: 2022-05-02

## 2022-04-28 NOTE — LETTER
4/28/2022        RE: Maia Miranda  [facility]  [address]  [place] [id number]        [place]    Chief Complaint   Patient presents with     Nursing Home Acute     INR, HTN     HPI:  Maia Miranda is a 89 year old  (10/26/1932), who is being seen today for an episodic care visit at: [facility]  ([facility]) [[id number]].    PMH significant for atrial fibrillation on coumadin, history of pulmonary embolism, hypertension, chronic kidney disease stage 3, dementia, HFpEF, urinary retention s/p chronic deleon, hyperlipidemia, chronic anemia, GERD     Summary of recent hospitalization:  Maia Miranda was hospitalized at [facility] from 4/9-4/14/22 for left malleolar ankle fracture s/p ORIF after fall at [facility] and UTI. Upon presentation to ED creatinine was 1.35, mg 2.4, CBC unremarkable, INR 2.44. UA abnormal and culture eventually grew 2 strains of enterococcus faecalis. CT of head showed no acute cranial pathology. CT of cervical spine showed no evidence of acute fracture or posttraumatic subluxation and some degenerative changes with multiple stenoses. Orthopedics was consulted and she underwent ORIF on 4/10/22. Post operative she developed post op anemia (hgb preop was 11.9-->10.3 on 4/12. She received ampicillin for UTI. Discharged to U for physical rehabilitation and medical management.     Today's concern is:   1. Encounter for therapeutic drug monitoring    2. Long term (current) use of anticoagulants    3. Atrial fibrillation, unspecified type (H)    4. Essential hypertension    5. Urinary tract infection associated with indwelling urethral catheter, subsequent encounter    6. Chronic indwelling Deleon catheter    7. Urinary retention      Episodic follow up today. INR 2.7. Denies bleeding, SOB. Is currently being treated for UTI and macrobid course completes today. Has had soft BPs recently 106/67, 96/60, 122/65, 98/54; HR 65, 83, 77, 65. Is asymptomatic- no  "lightheadedness, no swelling to legs    Allergies, and PMH/PSH reviewed in EPIC today.  REVIEW OF SYSTEMS:  Limited secondary to cognitive impairment but today pt reports as above in HPI    Objective:   Ht 1.651 m (5' 5\")   Wt 61.2 kg (135 lb)   BMI 22.47 kg/m    GENERAL APPEARANCE:  Alert, in NAD  HEENT: normocephalic, moist  mucous membranes, nose without drainage or crusting  RESP:  respiratory effort normal, no respiratory distress, patient is on RA  : deleon draining clear, yellow urine  M/S:no lower extremity edema  PSYCH: oriented x self , insight and judgement impaired, memory impaired    Labs done in SNF are in [facility]. Please refer to them using EPIC/Care Everywhere. and Recent labs in [facility] reviewed by me today.     Assessment/Plan:  (Z51.81) Encounter for therapeutic drug monitoring  (primary encounter diagnosis)  (Z79.01) Long term (current) use of anticoagulants  (I48.91) Atrial fibrillation, unspecified type (H)  Comment: HR controlled   -INR today 2.7  -last INR 1.9 on 4/25  -completing macrobid for UTI today  Plan: Continue metoprolol 25 mg BID. Coumadin 1 mg Monday and Friday and 2 mg rest of days. INR 5/2    (I10) Essential hypertension  Comment: chronic, BP soft at times  -asymptomatic  Plan: Reduce lisinopril to 5 mg daily and hold if SBP<110, metoprolol 25 mg BID. VS per policy. Adjust medications as needed. Ensure she is taking in enough fluids.    (T83.511D,  N39.0) Urinary tract infection associated with indwelling urethral catheter, subsequent encounter  (R33.9) Chronic urinary retention  (Z97.8) Chronic indwelling Deleon catheter  Comment: acute, urine culture grew e.coli  -she is afebrile, has some soft BPs- but no other signs of sepsis  -no recent WBC  Plan: Continue macrobid 100 mg BID x7 days through 4/28. CBC tomorrow to ensure WNL. Nursing to complete deleon catheter cares.    Electronically signed by: RAY Gonzalez CNP               Sincerely,        Naina Montgomery, " APRN CNP

## 2022-04-28 NOTE — PROGRESS NOTES
[facility] GERIATRICS    Chief Complaint   Patient presents with     Nursing Home Acute     INR, HTN     HPI:  Maia Miranda is a 89 year old  (10/26/1932), who is being seen today for an episodic care visit at: [facility]  ([facility]) [[id number]].    PMH significant for atrial fibrillation on coumadin, history of pulmonary embolism, hypertension, chronic kidney disease stage 3, dementia, HFpEF, urinary retention s/p chronic deleon, hyperlipidemia, chronic anemia, GERD     Summary of recent hospitalization:  Maia Miranda was hospitalized at [facility] from 4/9-4/14/22 for left malleolar ankle fracture s/p ORIF after fall at [facility] and UTI. Upon presentation to ED creatinine was 1.35, mg 2.4, CBC unremarkable, INR 2.44. UA abnormal and culture eventually grew 2 strains of enterococcus faecalis. CT of head showed no acute cranial pathology. CT of cervical spine showed no evidence of acute fracture or posttraumatic subluxation and some degenerative changes with multiple stenoses. Orthopedics was consulted and she underwent ORIF on 4/10/22. Post operative she developed post op anemia (hgb preop was 11.9-->10.3 on 4/12. She received ampicillin for UTI. Discharged to U for physical rehabilitation and medical management.     Today's concern is:   1. Encounter for therapeutic drug monitoring    2. Long term (current) use of anticoagulants    3. Atrial fibrillation, unspecified type (H)    4. Essential hypertension    5. Urinary tract infection associated with indwelling urethral catheter, subsequent encounter    6. Chronic indwelling Deleon catheter    7. Urinary retention      Episodic follow up today. INR 2.7. Denies bleeding, SOB. Is currently being treated for UTI and macrobid course completes today. Has had soft BPs recently 106/67, 96/60, 122/65, 98/54; HR 65, 83, 77, 65. Is asymptomatic- no lightheadedness, no swelling to legs    Allergies, and PMH/PSH reviewed in EPIC today.  REVIEW OF  "SYSTEMS:  Limited secondary to cognitive impairment but today pt reports as above in HPI    Objective:   Ht 1.651 m (5' 5\")   Wt 61.2 kg (135 lb)   BMI 22.47 kg/m    GENERAL APPEARANCE:  Alert, in NAD  HEENT: normocephalic, moist  mucous membranes, nose without drainage or crusting  RESP:  respiratory effort normal, no respiratory distress, patient is on RA  : deleon draining clear, yellow urine  M/S:no lower extremity edema  PSYCH: oriented x self , insight and judgement impaired, memory impaired    Labs done in SNF are in [facility]. Please refer to them using EPIC/Care Everywhere. and Recent labs in EPIC reviewed by me today.     Assessment/Plan:  (Z51.81) Encounter for therapeutic drug monitoring  (primary encounter diagnosis)  (Z79.01) Long term (current) use of anticoagulants  (I48.91) Atrial fibrillation, unspecified type (H)  Comment: HR controlled   -INR today 2.7  -last INR 1.9 on 4/25  -completing macrobid for UTI today  Plan: Continue metoprolol 25 mg BID. Coumadin 1 mg Monday and Friday and 2 mg rest of days. INR 5/2    (I10) Essential hypertension  Comment: chronic, BP soft at times  -asymptomatic  Plan: Reduce lisinopril to 5 mg daily and hold if SBP<110, metoprolol 25 mg BID. VS per policy. Adjust medications as needed. Ensure she is taking in enough fluids.    (T83.511D,  N39.0) Urinary tract infection associated with indwelling urethral catheter, subsequent encounter  (R33.9) Chronic urinary retention  (Z97.8) Chronic indwelling Deleon catheter  Comment: acute, urine culture grew e.coli  -she is afebrile, has some soft BPs- but no other signs of sepsis  -no recent WBC  Plan: Continue macrobid 100 mg BID x7 days through 4/28. CBC tomorrow to ensure WNL. Nursing to complete deleon catheter cares.    Electronically signed by: RAY Gonzalez CNP         "

## 2022-04-29 LAB
ERYTHROCYTE [DISTWIDTH] IN BLOOD BY AUTOMATED COUNT: 12.9 % (ref 10–15)
HCT VFR BLD AUTO: 36 % (ref 35–47)
HGB BLD-MCNC: 11.1 G/DL (ref 11.7–15.7)
MCH RBC QN AUTO: 32.2 PG (ref 26.5–33)
MCHC RBC AUTO-ENTMCNC: 30.8 G/DL (ref 31.5–36.5)
MCV RBC AUTO: 104 FL (ref 78–100)
PLATELET # BLD AUTO: 203 10E3/UL (ref 150–450)
RBC # BLD AUTO: 3.45 10E6/UL (ref 3.8–5.2)
SARS-COV-2 RNA RESP QL NAA+PROBE: NEGATIVE
WBC # BLD AUTO: 5.6 10E3/UL (ref 4–11)

## 2022-04-29 PROCEDURE — 85014 HEMATOCRIT: CPT | Performed by: NURSE PRACTITIONER

## 2022-04-29 PROCEDURE — 36415 COLL VENOUS BLD VENIPUNCTURE: CPT | Performed by: NURSE PRACTITIONER

## 2022-05-01 ENCOUNTER — LAB REQUISITION (OUTPATIENT)
Dept: LAB | Facility: CLINIC | Age: 87
End: 2022-05-01
Payer: COMMERCIAL

## 2022-05-01 DIAGNOSIS — D64.9 ANEMIA, UNSPECIFIED: ICD-10-CM

## 2022-05-02 ENCOUNTER — DISCHARGE SUMMARY NURSING HOME (OUTPATIENT)
Dept: GERIATRICS | Facility: CLINIC | Age: 87
End: 2022-05-02
Payer: COMMERCIAL

## 2022-05-02 ENCOUNTER — LAB REQUISITION (OUTPATIENT)
Dept: LAB | Facility: CLINIC | Age: 87
End: 2022-05-02
Payer: COMMERCIAL

## 2022-05-02 VITALS
TEMPERATURE: 97.8 F | WEIGHT: 136 LBS | HEIGHT: 65 IN | BODY MASS INDEX: 22.66 KG/M2 | DIASTOLIC BLOOD PRESSURE: 65 MMHG | HEART RATE: 83 BPM | OXYGEN SATURATION: 94 % | RESPIRATION RATE: 18 BRPM | SYSTOLIC BLOOD PRESSURE: 115 MMHG

## 2022-05-02 DIAGNOSIS — I10 ESSENTIAL HYPERTENSION: ICD-10-CM

## 2022-05-02 DIAGNOSIS — Z97.8 CHRONIC INDWELLING FOLEY CATHETER: ICD-10-CM

## 2022-05-02 DIAGNOSIS — F03.90 DEMENTIA WITHOUT BEHAVIORAL DISTURBANCE, UNSPECIFIED DEMENTIA TYPE: ICD-10-CM

## 2022-05-02 DIAGNOSIS — R53.81 PHYSICAL DECONDITIONING: ICD-10-CM

## 2022-05-02 DIAGNOSIS — Z98.890 S/P ORIF (OPEN REDUCTION INTERNAL FIXATION) FRACTURE: ICD-10-CM

## 2022-05-02 DIAGNOSIS — D64.9 POSTOPERATIVE ANEMIA: ICD-10-CM

## 2022-05-02 DIAGNOSIS — R33.9 URINARY RETENTION: ICD-10-CM

## 2022-05-02 DIAGNOSIS — Z87.81 S/P ORIF (OPEN REDUCTION INTERNAL FIXATION) FRACTURE: ICD-10-CM

## 2022-05-02 DIAGNOSIS — I50.32 CHRONIC HEART FAILURE WITH PRESERVED EJECTION FRACTION (H): ICD-10-CM

## 2022-05-02 DIAGNOSIS — U07.1 COVID-19: ICD-10-CM

## 2022-05-02 DIAGNOSIS — N18.31 STAGE 3A CHRONIC KIDNEY DISEASE (H): ICD-10-CM

## 2022-05-02 DIAGNOSIS — S82.852D CLOSED TRIMALLEOLAR FRACTURE OF LEFT ANKLE WITH ROUTINE HEALING, SUBSEQUENT ENCOUNTER: Primary | ICD-10-CM

## 2022-05-02 DIAGNOSIS — I48.91 ATRIAL FIBRILLATION, UNSPECIFIED TYPE (H): ICD-10-CM

## 2022-05-02 LAB
ERYTHROCYTE [DISTWIDTH] IN BLOOD BY AUTOMATED COUNT: 13.2 % (ref 10–15)
HCT VFR BLD AUTO: 37.6 % (ref 35–47)
HGB BLD-MCNC: 11.5 G/DL (ref 11.7–15.7)
MCH RBC QN AUTO: 32.2 PG (ref 26.5–33)
MCHC RBC AUTO-ENTMCNC: 30.6 G/DL (ref 31.5–36.5)
MCV RBC AUTO: 105 FL (ref 78–100)
PLATELET # BLD AUTO: 192 10E3/UL (ref 150–450)
RBC # BLD AUTO: 3.57 10E6/UL (ref 3.8–5.2)
WBC # BLD AUTO: 6.1 10E3/UL (ref 4–11)

## 2022-05-02 PROCEDURE — 36415 COLL VENOUS BLD VENIPUNCTURE: CPT | Performed by: NURSE PRACTITIONER

## 2022-05-02 PROCEDURE — 85014 HEMATOCRIT: CPT | Performed by: NURSE PRACTITIONER

## 2022-05-02 PROCEDURE — P9604 ONE-WAY ALLOW PRORATED TRIP: HCPCS | Performed by: NURSE PRACTITIONER

## 2022-05-02 PROCEDURE — 99316 NF DSCHRG MGMT 30 MIN+: CPT | Performed by: NURSE PRACTITIONER

## 2022-05-02 RX ORDER — LISINOPRIL 2.5 MG/1
2.5 TABLET ORAL DAILY
Start: 2022-05-02 | End: 2022-05-31

## 2022-05-02 NOTE — PATIENT INSTRUCTIONS
[facility] Discharge Orders    Name: Maia Miranda  : 10/26/1932  Planned Discharge Date: 22  Discharged to: [facility]    MEDICAL FOLLOW UP  Follow up with primary care provider in 1 week  Follow up with specialist Orthopedics Dr. Cooper  at 11AM at [facility] in [place]    FUTURE LABS: Home care to check INR  with results to PCP    ORDER CHANGES:  Lisinopril dose reduced in TCU    DISCHARGE MEDICATIONS:  The patient s pharmacy is authorized to dispense a 30-day supply of medications. Refill requests should be directed to the primary provider, Remy Hernandez   Medication: oxycodone  , 10 tabs given to patient at the time of discharge to take home    Current Outpatient Medications   Medication Sig Dispense Refill    acetaminophen (TYLENOL) 500 MG tablet Take 2 tablets (1,000 mg) by mouth 3 times daily 540 tablet 3    Ascorbic Acid (VITAMIN C) 500 MG CAPS Take 500 mg by mouth 2 times daily      carboxymethylcellulose (REFRESH LIQUIGEL) 1 % ophthalmic solution Place 1 drop into both eyes every morning As needed      Cholecalciferol (VITAMIN D) 50 MCG (2000 UT) CAPS Take 1 capsule by mouth every evening 90 capsule 3    Lidocaine (LIDOCARE) 4 % Patch Place 1 patch onto the skin every 24 hours To prevent lidocaine toxicity, patient should be patch free for 12 hrs daily. To right shoulder 5 patch 3    lisinopril (ZESTRIL) 2.5 MG tablet Take 1 tablet (2.5 mg) by mouth daily      menthol-zinc oxide (CALMOSEPTINE) 0.44-20.6 % OINT ointment Apply topically as needed for skin protection      metoprolol tartrate (LOPRESSOR) 25 MG tablet Take 1 tablet (25 mg) by mouth 2 times daily (hold for SBP <90 or HR <60) 180 tablet 3    ondansetron (ZOFRAN-ODT) 4 MG ODT tab Take 1 tablet (4 mg) by mouth every 6 hours as needed for nausea 30 tablet 11    oxyCODONE (ROXICODONE) 5 MG tablet Take 0.5 tablets (2.5 mg) by mouth every 4 hours as needed for moderate pain 20 tablet 0    polyethylene glycol (MIRALAX)  17 g packet Take 1 packet by mouth daily as needed for constipation      rosuvastatin (CRESTOR) 5 MG tablet Take 1 tablet (5 mg) by mouth At Bedtime 90 tablet 3    SENNA-docusate sodium (SENNA S) 8.6-50 MG tablet Take 1 tablet by mouth 2 times daily as needed (constipation)      sodium chloride (PETER 128) 5 % ophthalmic ointment Place 2 Application (1 g) into both eyes At Bedtime      warfarin ANTICOAGULANT (COUMADIN) 2 MG tablet Take 2 mg by mouth daily INR 1 mg Monday and Friday and 2 mg rest of days         SERVICES:  Home Care:  Occupational Therapy, Physical Therapy, Registered Nurse, Home Health Aide and From:  [facility]    ADDITIONAL INSTRUCTIONS:  Weight bearing restrictions:  Non-weight bearing to left lower extremity.   ACE wrap under CAM BOOT to LLE on at all times, OK to remove for hygiene and skin check daily dressing changes to medial ankle incision with ABD pad and paper tape. Discontinue when drainage resolves. OK to leave lateral incision open to air and get wet. OK to use elevation foam to LLE for swelling management   Weigh yourself daily in the morning and keep a record. Call your primary clinic: a) if you are more short of breath, or b) if your weight changes more than 3 pounds in one day or more than 5 pounds in one week.   Routine deleon catheter care with routine changes every 30 days.    RAY Gonzalez CNP  This document was electronically signed on May 2, 2022

## 2022-05-02 NOTE — PROGRESS NOTES
[facility] GERIATRICS DISCHARGE SUMMARY  PATIENT'S NAME: Maia Miranda  YOB: 1932  MEDICAL RECORD NUMBER:  [id number]  Place of Service where encounter took place:  [facility]  ([facility]) [[id number]]    PRIMARY CARE PROVIDER AND CLINIC RESPONSIBLE AFTER TRANSFER:   RAY Lopez CNP, [address]  / [place] [address]    Assisted Living: [facility] ([facility])     Transferring providers: RAY Gonzalez CNP, Odalys Walton MD  Recent Hospitalization/ED:  [facility] stay 4/9/22 to 4/14/22.  Date of SNF Admission: April 14, 2022  Date of SNF (anticipated) Discharge: May 05, 2022  Discharged to: previous assisted living  Cognitive Scores: SLUMS: 6/30 and CPT: 3.5/5.6  Physical Function: minimal assist for transfers, max assist for toileting and lower body dressing  DME: wheelchair     CODE STATUS/ADVANCE DIRECTIVES DISCUSSION:  NO CPR-DNI  ALLERGIES: Penicillins, Codeine, Meperidine, Morphine, Penicillins, Sulfa drugs, and Epinephrine    NURSING FACILITY COURSE   Medication Changes/Rationale:     Lisinopril dose reduced in TCU    PMH significant for atrial fibrillation on coumadin, history of pulmonary embolism, hypertension, chronic kidney disease stage 3, dementia, HFpEF, urinary retention s/p chronic deleon, hyperlipidemia, chronic anemia, GERD     Summary of recent hospitalization:  Maia Miranda was hospitalized at [facility] from 4/9-4/14/22 for left malleolar ankle fracture s/p ORIF after fall at [facility] and UTI. Upon presentation to ED creatinine was 1.35, mg 2.4, CBC unremarkable, INR 2.44. UA abnormal and culture eventually grew 2 strains of enterococcus faecalis. CT of head showed no acute cranial pathology. CT of cervical spine showed no evidence of acute fracture or posttraumatic subluxation and some degenerative changes with multiple stenoses. Orthopedics was consulted and she underwent ORIF on  4/10/22. Post operative she developed post op anemia (hgb preop was 11.9-->10.3 on 4/12. She received ampicillin for UTI. Discharged to TCU for physical rehabilitation and medical management.     Summary of nursing facility stay:   While in TCU she worked with therapy and made progress until reaching a plateau. She will discharge to [facility] and will continue therapy at home. Medically she was treated for UTI in the TCU- with 7 day course of Macrobid. She also has had some softer BPs, so lisinopril dose reduced. She should follow up with PCP in 1 week. She should follow up with ortho 5/25 as scheduled.    Specific problems addressed in TCU:   (N04.462J) Closed trimalleolar fracture of left ankle with routine healing, subsequent encounter  (primary encounter diagnosis)  (Z98.890,  Z87.81) S/P ORIF (open reduction internal fixation) fracture  Comment: secondary to fall  -s/p ORIF on 4/10/22  -had follow up with ortho 4/27 and CAM was placed  -reports pain overall is managed  -using oxycodone about once a day  Plan: Continue pain management with tylenol 1000 mg TID, oxycodone PRN. Coumadin for DVT prophy. Continue bowel regimen PRN. Non weightbearing to left lower extremity. CAM boot in place at all times except for hygeine and skin checks. Dressing changes per ortho. Therapy. Follow up with Dr. Cooper 5/25     (S09.90XD) Injury of head-resolved  Comment: secondary to fall  -Imaging inpatient: CT of head showed no acute cranial pathology. CT of cervical spine showed no evidence of acute fracture or posttraumatic subluxation and some degenerative changes with multiple stenoses.  Plan: monitor     (D64.9) Postoperative anemia  Comment: secondary to surgery, hgb trending down at discharge from hospital  -hgb preop was 11.9  -hgb 11.5 on 5/2  Plan: CBC PRN     (I48.91) Atrial fibrillation, unspecified type (H)  Comment: HR controlled -82, 72, 84  -INR today 2.9  -last INR 2.7 on 4/28  Plan: Continue metoprolol 25 mg  BID. Coumadin 1 mg Monday and Friday and 2 mg rest of days. INR 5/9 through home care.     (I10) Essential hypertension  (I50.32) Chronic HFpEF  Comment: chronic, BP soft at times 115/65, 100/64, 114/75; HR 83, 72, 84  -ECHO 12/21 with LVEF 55-60%, grade III LV diastolic dysfunction)  -appears euvolemic today- no swelling to legs or SOB  -asymptomatic  -lisinopril reduced in TCU  Plan: Reduce lisinopril to 2.5 mg daily and hold if SBP<110, metoprolol 25 mg BID. VS per policy. Adjust medications as needed. Ensure she is taking in enough fluids.     (T83.511D,  N39.0) Urinary tract infection associated with indwelling urethral catheter, subsequent encounter-resolved   (R33.9) Chronic urinary retention  (Z97.8) Chronic indwelling Deleon catheter  Comment: acute, urine culture grew e.coli  -she is afebrile, has some soft BPs- but no other signs of sepsis  -completed course of 7 days Macrobid on 4/28. WBC WNL on 5/2  Plan: Nursing to complete deleon catheter cares.     (F03.90) Dementia without behavioral disturbance, unspecified dementia type (H)  Comment: chronic, recently moved to [facility] - limited historian- oriented x person only  [facility] 6/30, CPT 3.5/5.6 at TCU which is consistent with dementia level impairment  Plan:  Nursing to assist with cares, meals, medication assistance, activities.     (N18.31) Stage 3a chronic kidney disease (H)  Comment: acute on chronic, with acute kidney injury present on admission to ER, creatinine 1.35. She receieved IV hydration and improved back to baseline prior to hospital discharge. No previous lab data prior to hospitalization for comparison.  -creatinine around 0.9 throughout hospitalization  -creatinine 1.01 on 4/18  Plan: BMP PRN. Avoid nephrotoxins. Renally dose medications as indicated.     Loose stools-resolved  Comment: not sure on chronicity of this, but had loperamide on PTA med list- so it may be chronic  -she did not have associated stomach pain or cramping, was afebrile and  hemodynamically stable  -she did have recently antibiotics so c,diff sample was ordered  -loose stools resolved and stool sample never collected  Plan: Monitor.     (E78.5) Dyslipidemia  Comment: chronic  Plan: Continue rosuvastatin 5 mg at bedtime.     (R53.81) Physical deconditioning  Comment: Acute, secondary to recent hospitalization, medical conditions as above  -completed course of therapy at TCU  Plan: Continue therapy through home care.      (N39.0,  B95.2) Enterococcus UTI-resolved  Comment: UA on admission to hospital abnormal and urine culture grew 2 strains of enterococcus faecalis. Received ampicillin for treatment  Plan: Monitor for signs of recurrence.        Discharge Medications:  Current Outpatient Medications   Medication Sig Dispense Refill     acetaminophen (TYLENOL) 500 MG tablet Take 2 tablets (1,000 mg) by mouth 3 times daily 540 tablet 3     Ascorbic Acid (VITAMIN C) 500 MG CAPS Take 500 mg by mouth 2 times daily       carboxymethylcellulose (REFRESH LIQUIGEL) 1 % ophthalmic solution Place 1 drop into both eyes every morning As needed       Cholecalciferol (VITAMIN D) 50 MCG (2000 UT) CAPS Take 1 capsule by mouth every evening 90 capsule 3     Lidocaine (LIDOCARE) 4 % Patch Place 1 patch onto the skin every 24 hours To prevent lidocaine toxicity, patient should be patch free for 12 hrs daily. To right shoulder 5 patch 3     lisinopril (ZESTRIL) 2.5 MG tablet Take 1 tablet (2.5 mg) by mouth daily       menthol-zinc oxide (CALMOSEPTINE) 0.44-20.6 % OINT ointment Apply topically as needed for skin protection       metoprolol tartrate (LOPRESSOR) 25 MG tablet Take 1 tablet (25 mg) by mouth 2 times daily (hold for SBP <90 or HR <60) 180 tablet 3     ondansetron (ZOFRAN-ODT) 4 MG ODT tab Take 1 tablet (4 mg) by mouth every 6 hours as needed for nausea 30 tablet 11     oxyCODONE (ROXICODONE) 5 MG tablet Take 0.5 tablets (2.5 mg) by mouth every 4 hours as needed for moderate pain 20 tablet 0      "polyethylene glycol (MIRALAX) 17 g packet Take 1 packet by mouth daily as needed for constipation       rosuvastatin (CRESTOR) 5 MG tablet Take 1 tablet (5 mg) by mouth At Bedtime 90 tablet 3     SENNA-docusate sodium (SENNA S) 8.6-50 MG tablet Take 1 tablet by mouth 2 times daily as needed (constipation)       sodium chloride (PETER 128) 5 % ophthalmic ointment Place 2 Application (1 g) into both eyes At Bedtime       warfarin ANTICOAGULANT (COUMADIN) 2 MG tablet Take 2 mg by mouth daily INR 1 mg Monday and Friday and 2 mg rest of days         Controlled medications:   Medication: oxycodone  , 10 tabs given to patient at the time of discharge to take home     Past Medical History:   Past Medical History:   Diagnosis Date     Amnestic MCI (mild cognitive impairment with memory loss) 2014     Chronic duodenal ulcer without mention of hemorrhage, perforation, or obstruction 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 Warner, [facility] yearly     Esophageal reflux 2001    Abstracted 06/10/02     Essential and other specified forms of tremor 2004    resting tremor     Generalized osteoarthrosis, unspecified site     Hands     Hiatal hernia     diagnosed late 1990s [place], [place]     History of pulmonary embolism 1971    no source found     LACTOSE INTOLERANCE      Lumbago     sees Kodi Guidry     Mitral valve regurgitation      Occlusion and stenosis of carotid artery without mention of cerebral infarction 2003    CAROTID US NORMAL, bruit in neck     Osteoporosis, unspecified 2003    T = -2.66     Paroxysmal atrial fibrillation (H) 11/15/2013     Spider veins      Unspecified essential hypertension      Unspecified tinnitus 2005    mild hearing loss, saw ENT     Physical Exam:   Vitals: /65   Pulse 83   Temp 97.8  F (36.6  C)   Resp 18   Ht 1.651 m (5' 5\")   Wt 61.7 kg (136 lb)   SpO2 94%   BMI 22.63 kg/m    BMI: Body mass index is 22.63 " kg/m .  GENERAL APPEARANCE:  Alert, frail, in NAD  HEENT: normocephalic, moist mucous membranes, nose without drainage or crusting  RESP:  respiratory effort normal, no respiratory distress, Lung sounds clear, patient is on RA  CV: auscultation of heart done, rate and rhythm regular.   ABDOMEN: + bowel sounds, soft, nontender, no grimacing or guarding with palpation.  : deleon catheter draining clear, yellow urine  M/S: generalized lower extremity edema; CAM boot in place to LLE  PSYCH: oriented x person, insight and judgement impaired, memory impaired, affect flat     SNF labs: Labs done in SNF are in [facility]. Please refer to them using [employer]/Care Everywhere. and Recent labs in [facility] reviewed by me today.     DISCHARGE PLAN:  Labs: Home care to check INR 5/9 with results to PCP    Medical Follow Up:     Follow up with primary care provider in 1 week  Follow up with specialist Orthopedics Dr. Cooper 5/25 at 11AM at [facility] in [place]      Discharge Services: Home Care:  Occupational Therapy, Physical Therapy, Registered Nurse, Home Health Aide and From:  [facility] [employer] [facility]    Discharge Instructions:     Weight bearing restrictions:  Non-weight bearing to left lower extremity.     ACE wrap under CAM BOOT   to LLE on at all times, OK to remove for hygiene and skin check daily dressing changes to medial ankle incision with ABD pad and paper tape. Discontinue when drainage resolves. OK to leave lateral incision open to air and get wet. OK to use elevation foam to LLE for swelling management     Weigh yourself daily in the morning and keep a record. Call your primary clinic: a) if you are more short of breath, or b) if your weight changes more than 3 pounds in one day or more than 5 pounds in one week.     Routine deleon catheter care with routine changes every 30 days.    TOTAL DISCHARGE TIME:   Greater than 30 minutes  Electronically signed by:  RAY Gonzalez CNP     Home care Face to Face documentation done  "in EPIC attached to Home care orders for [facility]  Face to Face and Medical Necessity Statement for DME Provider visit    Patient: Maia Miranda  Gender: female  YOB: 1932  [place] Medical Record Number: [id number]  Demographics:  [id number]  [facility] [address]  [phone] (home)   Social Security Number: [id number]  Primary Care Provider: Remy Hernandez  Insurance: Payor: [employer] / Plan: [employer] MEDICARE / Product Type: HMO /     HPI: Maia Miranda is a 89 year old (10/26/1932), who is being seen today for a face to face provider visit at [facility]  ([facility]) [[id number]]. Medical necessity statement for DME included.     This patient requires the following: DME Ordered and Medical Necessity Statement     Wheelchair Documentation  Size: 18\"x18\" wheelchair  Corresponding cushion: Yes: for comfort and skin integrity  Standard foot rests: No  Elevating leg rests: Yes   Arm rests: Yes: standard rests for comfort  Lap tray: No  Dose the patient use oxygen? No   Is the patient able to propel wheelchair? Yes   1. The patient has mobility limitations that impairs their ability to participate in one or more mobility related activities: Toileting, Grooming and Bathing.  The wheelchair is suitable and necessary for use in the patient's home.  2. The patient's mobility limitations cannot be safely resolved by using a cane/walker:Yes  Reason why a cane or walker will not meet the patient's needs. (ie: balance, tolerance, level of assistance): Patient requires extensive assist for all walker mobility and currently not safe to use walker/cane for ADLs  3. The patients home has adequate access to use a manual wheelchair:Yes  4. The use of a manual wheelchair on a regular basis will improve the patients ability to participate in mobility related ADL's at home:Yes  5. The patient is willing to use a manual wheelchair at home:Yes  6. The " patient has adequate upper body strength and the mental capability to safely use a manual wheelchair and/or has a caregiver that is able to assist: Yes  7. Does the patient have a lower extremity injury or edema? Yes, with lower extremity edema and left lower extremity fracture        Pt needing above DME with expected length of need of 99 due to medical necessity associated with following diagnosis:     Closed trimalleolar fracture of left ankle with routine healing, subsequent encounter  S/P ORIF (open reduction internal fixation) fracture  Postoperative anemia  Atrial fibrillation, unspecified type (H)  Essential hypertension  Chronic heart failure with preserved ejection fraction (H)  Chronic indwelling Guillen catheter  Urinary retention  Dementia without behavioral disturbance, unspecified dementia type (H)  Stage 3a chronic kidney disease (H)  Physical deconditioning      This patient requires the following: DME Ordered and Medical Necessity Statement     HOSPITAL BED- semi electric with 1 half set of rails  The patient does  require positioning of the body in ways not feasible with an ordinary bed due to a medical condition that is expected to last at least 1 month due to trimalleolar fracture of left ankle and CHF.   The patient does  require, for the alleviation of pain, postioning of the body in ways not feasible with an ordinary bed.   The patient does  require the head of bed elevated more than 30* most of the time due to CHF, chronic pulmonary disease or aspiration.  The patient does not require traction that can only be attached to a hospital bed.  The patient does  require a bed height different than a fixed height hospital bed to permit tranfers to wheelchair or standing position.   The patient does  require frequent or immediate changes in body position due to pain from fracture.       Pt needing above DME with expected length of need of 99 due to medical necessity associated with following  diagnosis:    Past Medical History:   has a past medical history of Amnestic MCI (mild cognitive impairment with memory loss) (2014), Chronic duodenal ulcer without mention of hemorrhage, perforation, or obstruction (1974), Depressive disorder, not elsewhere classified (2003), EROSIVE EYE DISORDER (1994), Esophageal reflux (2001), Essential and other specified forms of tremor (2004), Generalized osteoarthrosis, unspecified site, Hiatal hernia, History of pulmonary embolism (1971), LACTOSE INTOLERANCE, Lumbago, Mitral valve regurgitation, Occlusion and stenosis of carotid artery without mention of cerebral infarction (2003), Osteoporosis, unspecified (2003), Paroxysmal atrial fibrillation (H) (11/15/2013), Spider veins, Unspecified essential hypertension, and Unspecified tinnitus (2005).    She has no past medical history of Asymptomatic human immunodeficiency virus (HIV) infection status (H), Cerebral palsy (H), Complication of anesthesia, Congenital renal agenesis and dysgenesis, Goiter, Gout, Hernia, abdominal, History of spinal cord injury, History of thrombophlebitis, Horseshoe kidney, Hydrocephalus (H), Malignant hyperthermia, Mumps, Palpitations, Parkinsons disease (H), PONV (postoperative nausea and vomiting), Spina bifida (H), STD (sexually transmitted disease), Tethered cord (H), or Tuberculosis.    Assessment/Plan:  1. Closed trimalleolar fracture of left ankle with routine healing, subsequent encounter    2. S/P ORIF (open reduction internal fixation) fracture    3. Postoperative anemia    4. Atrial fibrillation, unspecified type (H)    5. Essential hypertension    6. Chronic heart failure with preserved ejection fraction (H)    7. Chronic indwelling Guillen catheter    8. Urinary retention    9. Dementia without behavioral disturbance, unspecified dementia type (H)    10. Stage 3a chronic kidney disease (H)    11. Physical deconditioning        ELECTRONICALLY SIGNED BY NEDA CERTIFIED PROVIDER: Naina RODRIGUEZ  RAY Montgomery CNP   NPI: [id number]  [facility]  [address] [place] [address]

## 2022-05-02 NOTE — LETTER
5/2/2022        RE: Maia Miranda  [id number]  [place] [address]        [facility] GERIATRICS DISCHARGE SUMMARY  PATIENT'S NAME: Maia Miranda  YOB: 1932  MEDICAL RECORD NUMBER:  [id number]  Place of Service where encounter took place:  [facility]  ([facility]) [[id number]]    PRIMARY CARE PROVIDER AND CLINIC RESPONSIBLE AFTER TRANSFER:   RAY Lopez CNP, [address]  / [place] [address]    Assisted Living: [facility] ([place])     Transferring providers: RAY Gonzalez CNP, Odalys Walton MD  Recent Hospitalization/ED:  [facility] stay 4/9/22 to 4/14/22.  Date of SNF Admission: April 14, 2022  Date of SNF (anticipated) Discharge: May 05, 2022  Discharged to: previous assisted living  Cognitive Scores: SLUMS: 6/30 and CPT: 3.5/5.6  Physical Function: minimal assist for transfers, max assist for toileting and lower body dressing  DME: wheelchair     CODE STATUS/ADVANCE DIRECTIVES DISCUSSION:  NO CPR-DNI  ALLERGIES: Penicillins, Codeine, Meperidine, Morphine, Penicillins, Sulfa drugs, and Epinephrine    NURSING FACILITY COURSE   Medication Changes/Rationale:     Lisinopril dose reduced in TCU    PMH significant for atrial fibrillation on coumadin, history of pulmonary embolism, hypertension, chronic kidney disease stage 3, dementia, HFpEF, urinary retention s/p chronic deleon, hyperlipidemia, chronic anemia, GERD     Summary of recent hospitalization:  Maia Miranda was hospitalized at [facility] from 4/9-4/14/22 for left malleolar ankle fracture s/p ORIF after fall at [facility] and UTI. Upon presentation to ED creatinine was 1.35, mg 2.4, CBC unremarkable, INR 2.44. UA abnormal and culture eventually grew 2 strains of enterococcus faecalis. CT of head showed no acute cranial pathology. CT of cervical spine showed no evidence of acute fracture or posttraumatic subluxation and some  degenerative changes with multiple stenoses. Orthopedics was consulted and she underwent ORIF on 4/10/22. Post operative she developed post op anemia (hgb preop was 11.9-->10.3 on 4/12. She received ampicillin for UTI. Discharged to TCU for physical rehabilitation and medical management.     Summary of nursing facility stay:   While in TCU she worked with therapy and made progress until reaching a plateau. She will discharge to [facility] and will continue therapy at home. Medically she was treated for UTI in the TCU- with 7 day course of Macrobid. She also has had some softer BPs, so lisinopril dose reduced. She should follow up with PCP in 1 week. She should follow up with ortho 5/25 as scheduled.    Specific problems addressed in TCU:   (T39.593G) Closed trimalleolar fracture of left ankle with routine healing, subsequent encounter  (primary encounter diagnosis)  (Z98.890,  Z87.81) S/P ORIF (open reduction internal fixation) fracture  Comment: secondary to fall  -s/p ORIF on 4/10/22  -had follow up with ortho 4/27 and CAM was placed  -reports pain overall is managed  -using oxycodone about once a day  Plan: Continue pain management with tylenol 1000 mg TID, oxycodone PRN. Coumadin for DVT prophy. Continue bowel regimen PRN. Non weightbearing to left lower extremity. CAM boot in place at all times except for hygeine and skin checks. Dressing changes per ortho. Therapy. Follow up with Dr. Cooper 5/25     (S09.90XD) Injury of head-resolved  Comment: secondary to fall  -Imaging inpatient: CT of head showed no acute cranial pathology. CT of cervical spine showed no evidence of acute fracture or posttraumatic subluxation and some degenerative changes with multiple stenoses.  Plan: monitor     (D64.9) Postoperative anemia  Comment: secondary to surgery, hgb trending down at discharge from hospital  -hgb preop was 11.9  -hgb 11.5 on 5/2  Plan: CBC PRN     (I48.91) Atrial fibrillation, unspecified type (H)  Comment: HR  controlled -82, 72, 84  -INR today 2.9  -last INR 2.7 on 4/28  Plan: Continue metoprolol 25 mg BID. Coumadin 1 mg Monday and Friday and 2 mg rest of days. INR 5/9 through home care.     (I10) Essential hypertension  (I50.32) Chronic HFpEF  Comment: chronic, BP soft at times 115/65, 100/64, 114/75; HR 83, 72, 84  -ECHO 12/21 with LVEF 55-60%, grade III LV diastolic dysfunction)  -appears euvolemic today- no swelling to legs or SOB  -asymptomatic  -lisinopril reduced in TCU  Plan: Reduce lisinopril to 2.5 mg daily and hold if SBP<110, metoprolol 25 mg BID. VS per policy. Adjust medications as needed. Ensure she is taking in enough fluids.     (T83.511D,  N39.0) Urinary tract infection associated with indwelling urethral catheter, subsequent encounter-resolved   (R33.9) Chronic urinary retention  (Z97.8) Chronic indwelling Deleon catheter  Comment: acute, urine culture grew e.coli  -she is afebrile, has some soft BPs- but no other signs of sepsis  -completed course of 7 days Macrobid on 4/28. WBC WNL on 5/2  Plan: Nursing to complete deleon catheter cares.     (F03.90) Dementia without behavioral disturbance, unspecified dementia type (H)  Comment: chronic, recently moved to [facility] - limited historian- oriented x person only  SLUMS 6/30, CPT 3.5/5.6 at TCU which is consistent with dementia level impairment  Plan:  Nursing to assist with cares, meals, medication assistance, activities.     (N18.31) Stage 3a chronic kidney disease (H)  Comment: acute on chronic, with acute kidney injury present on admission to ER, creatinine 1.35. She receieved IV hydration and improved back to baseline prior to hospital discharge. No previous lab data prior to hospitalization for comparison.  -creatinine around 0.9 throughout hospitalization  -creatinine 1.01 on 4/18  Plan: BMP PRN. Avoid nephrotoxins. Renally dose medications as indicated.     Loose stools-resolved  Comment: not sure on chronicity of this, but had loperamide on PTA med  list- so it may be chronic  -she did not have associated stomach pain or cramping, was afebrile and hemodynamically stable  -she did have recently antibiotics so c,diff sample was ordered  -loose stools resolved and stool sample never collected  Plan: Monitor.     (E78.5) Dyslipidemia  Comment: chronic  Plan: Continue rosuvastatin 5 mg at bedtime.     (R53.81) Physical deconditioning  Comment: Acute, secondary to recent hospitalization, medical conditions as above  -completed course of therapy at TCU  Plan: Continue therapy through home care.      (N39.0,  B95.2) Enterococcus UTI-resolved  Comment: UA on admission to hospital abnormal and urine culture grew 2 strains of enterococcus faecalis. Received ampicillin for treatment  Plan: Monitor for signs of recurrence.        Discharge Medications:  Current Outpatient Medications   Medication Sig Dispense Refill     acetaminophen (TYLENOL) 500 MG tablet Take 2 tablets (1,000 mg) by mouth 3 times daily 540 tablet 3     Ascorbic Acid (VITAMIN C) 500 MG CAPS Take 500 mg by mouth 2 times daily       carboxymethylcellulose (REFRESH LIQUIGEL) 1 % ophthalmic solution Place 1 drop into both eyes every morning As needed       Cholecalciferol (VITAMIN D) 50 MCG (2000 UT) CAPS Take 1 capsule by mouth every evening 90 capsule 3     Lidocaine (LIDOCARE) 4 % Patch Place 1 patch onto the skin every 24 hours To prevent lidocaine toxicity, patient should be patch free for 12 hrs daily. To right shoulder 5 patch 3     lisinopril (ZESTRIL) 2.5 MG tablet Take 1 tablet (2.5 mg) by mouth daily       menthol-zinc oxide (CALMOSEPTINE) 0.44-20.6 % OINT ointment Apply topically as needed for skin protection       metoprolol tartrate (LOPRESSOR) 25 MG tablet Take 1 tablet (25 mg) by mouth 2 times daily (hold for SBP <90 or HR <60) 180 tablet 3     ondansetron (ZOFRAN-ODT) 4 MG ODT tab Take 1 tablet (4 mg) by mouth every 6 hours as needed for nausea 30 tablet 11     oxyCODONE (ROXICODONE) 5 MG  tablet Take 0.5 tablets (2.5 mg) by mouth every 4 hours as needed for moderate pain 20 tablet 0     polyethylene glycol (MIRALAX) 17 g packet Take 1 packet by mouth daily as needed for constipation       rosuvastatin (CRESTOR) 5 MG tablet Take 1 tablet (5 mg) by mouth At Bedtime 90 tablet 3     SENNA-docusate sodium (SENNA S) 8.6-50 MG tablet Take 1 tablet by mouth 2 times daily as needed (constipation)       sodium chloride (PETER 128) 5 % ophthalmic ointment Place 2 Application (1 g) into both eyes At Bedtime       warfarin ANTICOAGULANT (COUMADIN) 2 MG tablet Take 2 mg by mouth daily INR 1 mg Monday and Friday and 2 mg rest of days         Controlled medications:   Medication: oxycodone  , 10 tabs given to patient at the time of discharge to take home     Past Medical History:   Past Medical History:   Diagnosis Date     Amnestic MCI (mild cognitive impairment with memory loss) 2014     Chronic duodenal ulcer without mention of hemorrhage, perforation, or obstruction 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 Warner, [facility] yearly     Esophageal reflux 2001    Abstracted 06/10/02     Essential and other specified forms of tremor 2004    resting tremor     Generalized osteoarthrosis, unspecified site     Hands     Hiatal hernia     diagnosed late 1990s [place], [place]     History of pulmonary embolism 1971    no source found     LACTOSE INTOLERANCE      Lumbago     sees Kodi Guidry     Mitral valve regurgitation      Occlusion and stenosis of carotid artery without mention of cerebral infarction 2003    CAROTID US NORMAL, bruit in neck     Osteoporosis, unspecified 2003    T = -2.66     Paroxysmal atrial fibrillation (H) 11/15/2013     Spider veins      Unspecified essential hypertension      Unspecified tinnitus 2005    mild hearing loss, saw ENT     Physical Exam:   Vitals: /65   Pulse 83   Temp 97.8  F (36.6  C)   Resp 18   Ht  "1.651 m (5' 5\")   Wt 61.7 kg (136 lb)   SpO2 94%   BMI 22.63 kg/m    BMI: Body mass index is 22.63 kg/m .  GENERAL APPEARANCE:  Alert, frail, in NAD  HEENT: normocephalic, moist mucous membranes, nose without drainage or crusting  RESP:  respiratory effort normal, no respiratory distress, Lung sounds clear, patient is on RA  CV: auscultation of heart done, rate and rhythm regular.   ABDOMEN: + bowel sounds, soft, nontender, no grimacing or guarding with palpation.  : deleon catheter draining clear, yellow urine  M/S: generalized lower extremity edema; CAM boot in place to LLE  PSYCH: oriented x person, insight and judgement impaired, memory impaired, affect flat     SNF labs: Labs done in SNF are in [place] [facility]. Please refer to them using [facility]Care Everywhere. and Recent labs in [facility] reviewed by me today.     DISCHARGE PLAN:  Labs: Home care to check INR 5/9 with results to PCP    Medical Follow Up:     Follow up with primary care provider in 1 week  Follow up with specialist Orthopedics Dr. Cooper 5/25 at 11AM at [facility] in [place]      Discharge Services: Home Care:  Occupational Therapy, Physical Therapy, Registered Nurse, Home Health Aide and From:  [facility]    Discharge Instructions:     Weight bearing restrictions:  Non-weight bearing to left lower extremity.     ACE wrap under CAM BOOT   to LLE on at all times, OK to remove for hygiene and skin check daily dressing changes to medial ankle incision with ABD pad and paper tape. Discontinue when drainage resolves. OK to leave lateral incision open to air and get wet. OK to use elevation foam to LLE for swelling management     Weigh yourself daily in the morning and keep a record. Call your primary clinic: a) if you are more short of breath, or b) if your weight changes more than 3 pounds in one day or more than 5 pounds in one week.     Routine deleon catheter care with routine changes every 30 days.    TOTAL DISCHARGE TIME:   Greater than 30 " "minutes  Electronically signed by:  RAY Gonzalez CNP     Home care Face to Face documentation done in [facility] attached to Home care orders for [facility]  Face to Face and Medical Necessity Statement for DME Provider visit    Patient: Maia Miranda  Gender: female  YOB: 1932  [place] Medical Record Number: [id number]  Demographics:  [id number]  [facility] [address]  [phone] (home)   Social Security Number: [id number]  Primary Care Provider: Remy Hernandez  Insurance: Payor: [facility] / Plan: [facility] MEDICARE / Product Type: HMO /     HPI: Maia Miranda is a 89 year old (10/26/1932), who is being seen today for a face to face provider visit at [facility]  ([facility]) [[id number]]. Medical necessity statement for DME included.     This patient requires the following: DME Ordered and Medical Necessity Statement     Wheelchair Documentation  Size: 18\"x18\" wheelchair  Corresponding cushion: Yes: for comfort and skin integrity  Standard foot rests: No  Elevating leg rests: Yes   Arm rests: Yes: standard rests for comfort  Lap tray: No  Dose the patient use oxygen? No   Is the patient able to propel wheelchair? Yes   1. The patient has mobility limitations that impairs their ability to participate in one or more mobility related activities: Toileting, Grooming and Bathing.  The wheelchair is suitable and necessary for use in the patient's home.  2. The patient's mobility limitations cannot be safely resolved by using a cane/walker:Yes  Reason why a cane or walker will not meet the patient's needs. (ie: balance, tolerance, level of assistance): Patient requires extensive assist for all walker mobility and currently not safe to use walker/cane for ADLs  3. The patients home has adequate access to use a manual wheelchair:Yes  4. The use of a manual wheelchair on a regular basis will improve the patients ability to participate in " mobility related ADL's at home:Yes  5. The patient is willing to use a manual wheelchair at home:Yes  6. The patient has adequate upper body strength and the mental capability to safely use a manual wheelchair and/or has a caregiver that is able to assist: Yes  7. Does the patient have a lower extremity injury or edema? Yes, with lower extremity edema and left lower extremity fracture        Pt needing above DME with expected length of need of 99 due to medical necessity associated with following diagnosis:     Closed trimalleolar fracture of left ankle with routine healing, subsequent encounter  S/P ORIF (open reduction internal fixation) fracture  Postoperative anemia  Atrial fibrillation, unspecified type (H)  Essential hypertension  Chronic heart failure with preserved ejection fraction (H)  Chronic indwelling Guillen catheter  Urinary retention  Dementia without behavioral disturbance, unspecified dementia type (H)  Stage 3a chronic kidney disease (H)  Physical deconditioning      Past Medical History:   has a past medical history of Amnestic MCI (mild cognitive impairment with memory loss) (2014), Chronic duodenal ulcer without mention of hemorrhage, perforation, or obstruction (1974), Depressive disorder, not elsewhere classified (2003), EROSIVE EYE DISORDER (1994), Esophageal reflux (2001), Essential and other specified forms of tremor (2004), Generalized osteoarthrosis, unspecified site, Hiatal hernia, History of pulmonary embolism (1971), LACTOSE INTOLERANCE, Lumbago, Mitral valve regurgitation, Occlusion and stenosis of carotid artery without mention of cerebral infarction (2003), Osteoporosis, unspecified (2003), Paroxysmal atrial fibrillation (H) (11/15/2013), Spider veins, Unspecified essential hypertension, and Unspecified tinnitus (2005).    She has no past medical history of Asymptomatic human immunodeficiency virus (HIV) infection status (H), Cerebral palsy (H), Complication of anesthesia, Congenital  renal agenesis and dysgenesis, Goiter, Gout, Hernia, abdominal, History of spinal cord injury, History of thrombophlebitis, Horseshoe kidney, Hydrocephalus (H), Malignant hyperthermia, Mumps, Palpitations, Parkinsons disease (H), PONV (postoperative nausea and vomiting), Spina bifida (H), STD (sexually transmitted disease), Tethered cord (H), or Tuberculosis.    Assessment/Plan:  1. Closed trimalleolar fracture of left ankle with routine healing, subsequent encounter    2. S/P ORIF (open reduction internal fixation) fracture    3. Postoperative anemia    4. Atrial fibrillation, unspecified type (H)    5. Essential hypertension    6. Chronic heart failure with preserved ejection fraction (H)    7. Chronic indwelling Guillen catheter    8. Urinary retention    9. Dementia without behavioral disturbance, unspecified dementia type (H)    10. Stage 3a chronic kidney disease (H)    11. Physical deconditioning        ELECTRONICALLY SIGNED BY N[facility] CERTIFIED PROVIDER: RAY Gonzalez CNP   NPI: [id number]  [facility]  [address] [place] [address]                  Sincerely,        RAY Gonzalez CNP

## 2022-05-05 ENCOUNTER — PATIENT OUTREACH (OUTPATIENT)
Dept: GERIATRIC MEDICINE | Facility: CLINIC | Age: 87
End: 2022-05-05

## 2022-05-05 ENCOUNTER — ASSISTED LIVING VISIT (OUTPATIENT)
Dept: GERIATRICS | Facility: CLINIC | Age: 87
End: 2022-05-05
Payer: COMMERCIAL

## 2022-05-05 ENCOUNTER — PATIENT OUTREACH (OUTPATIENT)
Dept: CARE COORDINATION | Facility: CLINIC | Age: 87
End: 2022-05-05
Payer: COMMERCIAL

## 2022-05-05 VITALS
OXYGEN SATURATION: 96 % | TEMPERATURE: 98.4 F | HEART RATE: 98 BPM | SYSTOLIC BLOOD PRESSURE: 138 MMHG | BODY MASS INDEX: 22.92 KG/M2 | WEIGHT: 137.6 LBS | HEIGHT: 65 IN | RESPIRATION RATE: 13 BRPM | DIASTOLIC BLOOD PRESSURE: 82 MMHG

## 2022-05-05 DIAGNOSIS — R33.9 URINARY RETENTION: ICD-10-CM

## 2022-05-05 DIAGNOSIS — R19.5 LOOSE STOOLS: ICD-10-CM

## 2022-05-05 DIAGNOSIS — D64.9 POSTOPERATIVE ANEMIA: ICD-10-CM

## 2022-05-05 DIAGNOSIS — Z97.8 CHRONIC INDWELLING FOLEY CATHETER: ICD-10-CM

## 2022-05-05 DIAGNOSIS — I48.91 ATRIAL FIBRILLATION, UNSPECIFIED TYPE (H): ICD-10-CM

## 2022-05-05 DIAGNOSIS — Z98.890 S/P ORIF (OPEN REDUCTION INTERNAL FIXATION) FRACTURE: ICD-10-CM

## 2022-05-05 DIAGNOSIS — Z87.81 S/P ORIF (OPEN REDUCTION INTERNAL FIXATION) FRACTURE: ICD-10-CM

## 2022-05-05 DIAGNOSIS — S82.852D CLOSED TRIMALLEOLAR FRACTURE OF LEFT ANKLE WITH ROUTINE HEALING, SUBSEQUENT ENCOUNTER: Primary | ICD-10-CM

## 2022-05-05 DIAGNOSIS — R52 PAIN: ICD-10-CM

## 2022-05-05 DIAGNOSIS — K59.00 CONSTIPATION, UNSPECIFIED CONSTIPATION TYPE: ICD-10-CM

## 2022-05-05 DIAGNOSIS — I50.32 CHRONIC HEART FAILURE WITH PRESERVED EJECTION FRACTION (H): ICD-10-CM

## 2022-05-05 DIAGNOSIS — I10 ESSENTIAL HYPERTENSION: ICD-10-CM

## 2022-05-05 DIAGNOSIS — F03.90 DEMENTIA WITHOUT BEHAVIORAL DISTURBANCE, UNSPECIFIED DEMENTIA TYPE: ICD-10-CM

## 2022-05-05 NOTE — PROGRESS NOTES
[facility]  PRIMARY CARE PROVIDER AND CLINIC:  Remy Hernandez, RAY CNP, [address]  / [place] [address]  Chief Complaint   Patient presents with     RECHECK     TCU f/u     [facility] Record Number:  [id number]  Place of Service where encounter took place:  [facility] [[id number]]    Maia Miranda  is a 89 year old  (10/26/1932), returned to the above facility from [facility] TCU where she stayed from 4/14/22 through 5/5/22 following a hospital stay at [facility] from 4/9/22 through 4/14/22.  Admitted to this facility for  rehab, medical management and nursing care.    HPI:    HPI information obtained from: facility chart records, facility staff, patient report and [facility] chart review.   Brief Summary of Hospital Course:   Maia Miranda was hospitalized at [facility] from 4/9-4/14/22 for left malleolar ankle fracture s/p ORIF after fall at [facility] and UTI.  CT of head showed no acute cranial pathology. CT of cervical spine showed no evidence of acute fracture or posttraumatic subluxation and some degenerative changes with multiple stenoses. Orthopedics was consulted and she underwent ORIF on 4/10/22. Post operative she developed post op anemia (hgb preop was 11.9-->10.3 on 4/12. She received ampicillin for UTI. Discharged to TCU for physical rehabilitation and medical management.     Updates on Status at Skilled nursing Admission:  While in TCU she worked with therapy and made progress until reaching a plateau. She will discharge to [facility] and will continue therapy at home. Medically she was treated for UTI in the TCU- with 7 day course of Macrobid. She also has had some softer BPs, so lisinopril dose reduced. She should follow up with ortho 5/25 as scheduled.    Assisted Living Memory Care:   Moved into memory care from AL. Denies pain, chest pain, SOB. Reports CAM boot is difficult to wear. Resting in  recliner with feet elevated. Thinks she may be constipated so will ask nursing to monitor. Reporting to using oxycodone once daily as needed so may be contributing but hx of loose stool and possible contributor to UTI as unable to follow instructions for good amena hygiene.      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, perforation, or obstruction (1974), Depressive disorder, not elsewhere classified (2003), EROSIVE EYE DISORDER (1994), Esophageal reflux (2001), Essential and other specified forms of tremor (2004), Generalized osteoarthrosis, unspecified site, Hiatal hernia, History of pulmonary embolism (1971), LACTOSE INTOLERANCE, Lumbago, Mitral valve regurgitation, Occlusion and stenosis of carotid artery without mention of cerebral infarction (2003), Osteoporosis, unspecified (2003), Paroxysmal atrial fibrillation (H) (11/15/2013), Spider veins, Unspecified essential hypertension, and Unspecified tinnitus (2005).    She has no past medical history of Asymptomatic human immunodeficiency virus (HIV) infection status (H), Cerebral palsy (H), Complication of anesthesia, Congenital renal agenesis and dysgenesis, Goiter, Gout, Hernia, abdominal, History of spinal cord injury, History of thrombophlebitis, Horseshoe kidney, Hydrocephalus (H), Malignant hyperthermia, Mumps, Palpitations, Parkinsons disease (H), PONV (postoperative nausea and vomiting), Spina bifida (H), STD (sexually transmitted disease), Tethered cord (H), or Tuberculosis.  PAST SURGICAL HISTORY:   has a past surgical history that includes NONSPECIFIC PROCEDURE; NONSPECIFIC PROCEDURE; NONSPECIFIC  PROCEDURE (1958); NONSPECIFIC PROCEDURE (1971); NONSPECIFIC PROCEDURE (1/03); Esophagoscopy, gastroscopy, duodenoscopy (EGD), combined (7/8/2013); corneal scraping; cataract iol, rt/lt; Cystoscopy, biopsy bladder, combined (N/A, 7/25/2016); Implant stimulator sacral nerve stage one (N/A, 4/4/2017); interstim placement; Implant stimulator sacral nerve stage two (N/A, 4/18/2017); Cystoscopy; and Open reduction internal fixation ankle (Left, 4/10/2022).  FAMILY HISTORY: family history includes Alzheimer Disease in her maternal grandmother; Cardiovascular in her sister; Cerebrovascular Disease in her father; Psychotic Disorder in her mother.  SOCIAL HISTORY:   reports that she has never smoked. She has never used smokeless tobacco. She reports that she does not drink alcohol and does not use drugs.    Post Discharge Medication Reconciliation Status: discharge medications reconciled, continue medications without change    Current Outpatient Medications   Medication Sig Dispense Refill     acetaminophen (TYLENOL) 500 MG tablet Take 2 tablets (1,000 mg) by mouth 3 times daily 540 tablet 3     Ascorbic Acid (VITAMIN C) 500 MG CAPS Take 500 mg by mouth 2 times daily       carboxymethylcellulose (REFRESH LIQUIGEL) 1 % ophthalmic solution Place 1 drop into both eyes every morning As needed       Cholecalciferol (VITAMIN D) 50 MCG (2000 UT) CAPS Take 1 capsule by mouth every evening 90 capsule 3     Lidocaine (LIDOCARE) 4 % Patch Place 1 patch onto the skin every 24 hours To prevent lidocaine toxicity, patient should be patch free for 12 hrs daily. To right shoulder 5 patch 3     lisinopril (ZESTRIL) 2.5 MG tablet Take 1 tablet (2.5 mg) by mouth daily       menthol-zinc oxide (CALMOSEPTINE) 0.44-20.6 % OINT ointment Apply topically as needed for skin protection       metoprolol tartrate (LOPRESSOR) 25 MG tablet Take 1 tablet (25 mg) by mouth 2 times daily (hold for SBP <90 or HR <60) 180 tablet 3     ondansetron (ZOFRAN-ODT) 4  "MG ODT tab Take 1 tablet (4 mg) by mouth every 6 hours as needed for nausea 30 tablet 11     oxyCODONE (ROXICODONE) 5 MG tablet Take 0.5 tablets (2.5 mg) by mouth every 4 hours as needed for moderate pain 20 tablet 0     polyethylene glycol (MIRALAX) 17 g packet Take 1 packet by mouth daily as needed for constipation       rosuvastatin (CRESTOR) 5 MG tablet Take 1 tablet (5 mg) by mouth At Bedtime 90 tablet 3     SENNA-docusate sodium (SENNA S) 8.6-50 MG tablet Take 1 tablet by mouth 2 times daily as needed (constipation)       sodium chloride (PETER 128) 5 % ophthalmic ointment Place 2 Application (1 g) into both eyes At Bedtime       warfarin ANTICOAGULANT (COUMADIN) 2 MG tablet Take 2 mg by mouth daily INR 1 mg Monday and Friday and 2 mg rest of days       ROS:  Limited secondary to cognitive impairment but today pt reports ok    Vitals:  /82   Pulse 98   Temp 98.4  F (36.9  C)   Resp 13   Ht 1.651 m (5' 5\")   Wt 62.4 kg (137 lb 9.6 oz)   SpO2 96%   BMI 22.90 kg/m    Exam:  GENERAL APPEARANCE: alert, resting in recliner  ENT:  Mouth and posterior oropharynx normal, moist mucous membranes  EYES:  EOM, conjunctivae, lids, pupils and irises normal, wears glasses  RESP:  lungs clear to auscultation but diminished throughout   CV:  regular rate and irregular rhythm, no murmur, rub, or gallop, trace edema-no compression   ABDOMEN:  no guarding or rebound, BS +  :    deleon catheter in place with leg bag, minimal straw color output in leg bag (not seen today)  M/S:   generalized weakness- slow with sit to stand, 2WW   SKIN:  intact to visualized areas-incision x 2 to left ankle CDI-no redness, no s/s of infection   NEURO:   Cranial nerves 2-12 are normal tested and grossly at patient's baseline  PSYCH:  insight and judgement impaired, memory impaired SLUMS 9/30 and CPT 3.8/5.6    Lab/Diagnostic data:  CBC RESULTS: Recent Labs   Lab Test 05/02/22  0903 04/29/22  0740   WBC 6.1 5.6   RBC 3.57* 3.45*   HGB " 11.5* 11.1*   HCT 37.6 36.0   * 104*   MCH 32.2 32.2   MCHC 30.6* 30.8*   RDW 13.2 12.9    203       Last Basic Metabolic Panel:  Recent Labs   Lab Test 04/18/22  0843 04/14/22  0600 04/13/22  0553 04/12/22  0645 04/12/22  0312 04/11/22  0746     --   --   --   --  136   POTASSIUM 4.1 4.0   < >  --   --  5.0   CHLORIDE 106  --   --   --   --  108   CLARISSE 9.5  --   --   --   --  9.5   CO2 27  --   --   --   --  25   BUN 26  --   --   --   --  23   CR 1.01  --   --   --   --  0.94   *  --   --  113*   < > 142*    < > = values in this interval not displayed.       Liver Function Studies -   Recent Labs   Lab Test 04/10/22  0729 12/26/21  0236   PROTTOTAL 6.0* 5.8*   ALBUMIN 2.9* 2.6*   BILITOTAL 0.7 0.8   ALKPHOS 74 57   AST 23 27   ALT 21 20       TSH   Date Value Ref Range Status   02/26/2018 1.82 0.40 - 4.00 mU/L Final   10/05/2017 2.44 0.40 - 4.00 mU/L Final       No results found for: A1C      ASSESSMENT/PLAN:  (S85.982G) Closed trimalleolar fracture of left ankle with routine healing, subsequent encounter  (primary encounter diagnosis)  (Z98.890,  Z87.81) S/P ORIF (open reduction internal fixation) fracture  (R52) Pain  Comment: s/p ORIF on 4/10/22  Plan:   -CAM boot 24/7 and off for daily cares  -wean oxycodone as tolerated   -Tylenol 1,000 mg po TID  -DVT prophylaxis with coumadin  -f/u ortho Dr. Cooper 5/25/22      (D64.9) Postoperative anemia  Comment: pre-op hgb at 11.9 and 11.5 on 5/2  Plan:   -CBC periodically     (I48.91) Atrial fibrillation, unspecified type (H)  (I10) Essential hypertension  (I50.32) Chronic heart failure with preserved ejection fraction (H)  Comment: ECHO 12/21 with LVEF 55-60%, grade III LV diastolic dysfunction)  Plan:    -next INR is 5/9/22  -1 mg Monday and Friday and 2 mg rest of days  -metoprolol 25 mg po BID  -lisinopril reduced to 2.5 mg daily and hold if SBP<110   -BMP periodically     (Z97.8) Chronic indwelling Guillen catheter  (R33.9) Urinary  retention  Comment: completed course of 7 days Macrobid on 4/28    Plan:   -continue current plan of care for cath cares  -monitor for s/s of infection     (F03.90) Dementia without behavioral disturbance, unspecified dementia type (H)  Comment: returned back to higher level of care memory care versus AL  Plan:   -staff assist for most ADLs    (K59.00) Constipation, unspecified constipation type  (R19.5) Loose stools  Comment: hx of loose stools but now states constipation  Plan:   -monitor bowel movements   -Senna S, Miralax prn  -prior use of prn loperimide for loose stool so will add back      Electronically signed by:  RAY Lopez CNP

## 2022-05-05 NOTE — LETTER
5/5/2022        RE: Maia Miranda  [address]  [place] [address]        [facility]  PRIMARY CARE PROVIDER AND CLINIC:  Remy Hernandez, RAY CNP, [address] [facility] [address] / [place] [address]  Chief Complaint   Patient presents with     RECHECK     TCU f/u     [facility] Medical Record Number:  [id number]  Place of Service where encounter took place:  [facility] [[id number]]    Maia Miranda  is a 89 year old  (10/26/1932), returned to the above facility from [facility] TCU where she stayed from 4/14/22 through 5/5/22 following a hospital stay at [facility] from 4/9/22 through 4/14/22.  Admitted to this facility for  rehab, medical management and nursing care.    HPI:    HPI information obtained from: facility chart records, facility staff, patient report and [facility] chart review.   Brief Summary of Hospital Course:   Maia Miranda was hospitalized at [facility] from 4/9-4/14/22 for left malleolar ankle fracture s/p ORIF after fall at [facility] and UTI.  CT of head showed no acute cranial pathology. CT of cervical spine showed no evidence of acute fracture or posttraumatic subluxation and some degenerative changes with multiple stenoses. Orthopedics was consulted and she underwent ORIF on 4/10/22. Post operative she developed post op anemia (hgb preop was 11.9-->10.3 on 4/12. She received ampicillin for UTI. Discharged to TCU for physical rehabilitation and medical management.     Updates on Status at Skilled nursing Admission:  While in TCU she worked with therapy and made progress until reaching a plateau. She will discharge to [facility] and will continue therapy at home. Medically she was treated for UTI in the TCU- with 7 day course of Macrobid. She also has had some softer BPs, so lisinopril dose reduced. She should follow up with ortho 5/25 as scheduled.    Assisted Living Memory Care:   Moved into  memory care from AL. Denies pain, chest pain, SOB. Reports CAM boot is difficult to wear. Resting in recliner with feet elevated. Thinks she may be constipated so will ask nursing to monitor. Reporting to using oxycodone once daily as needed so may be contributing but hx of loose stool and possible contributor to UTI as unable to follow instructions for good amena hygiene.      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, perforation, or obstruction (1974), Depressive disorder, not elsewhere classified (2003), EROSIVE EYE DISORDER (1994), Esophageal reflux (2001), Essential and other specified forms of tremor (2004), Generalized osteoarthrosis, unspecified site, Hiatal hernia, History of pulmonary embolism (1971), LACTOSE INTOLERANCE, Lumbago, Mitral valve regurgitation, Occlusion and stenosis of carotid artery without mention of cerebral infarction (2003), Osteoporosis, unspecified (2003), Paroxysmal atrial fibrillation (H) (11/15/2013), Spider veins, Unspecified essential hypertension, and Unspecified tinnitus (2005).    She has no past medical history of Asymptomatic human immunodeficiency virus (HIV) infection status (H), Cerebral palsy (H), Complication of anesthesia, Congenital renal agenesis and dysgenesis, Goiter, Gout, Hernia, abdominal, History of spinal cord injury, History of thrombophlebitis, Horseshoe kidney, Hydrocephalus (H), Malignant hyperthermia, Mumps, Palpitations, Parkinsons disease (H), PONV (postoperative nausea and vomiting), Spina bifida (H), STD (sexually transmitted disease), Tethered cord (H), or Tuberculosis.  PAST SURGICAL HISTORY:    has a past surgical history that includes NONSPECIFIC PROCEDURE; NONSPECIFIC PROCEDURE; NONSPECIFIC PROCEDURE (1958); NONSPECIFIC PROCEDURE (1971); NONSPECIFIC PROCEDURE (1/03); Esophagoscopy, gastroscopy, duodenoscopy (EGD), combined (7/8/2013); corneal scraping; cataract iol, rt/lt; Cystoscopy, biopsy bladder, combined (N/A, 7/25/2016); Implant stimulator sacral nerve stage one (N/A, 4/4/2017); interstim placement; Implant stimulator sacral nerve stage two (N/A, 4/18/2017); Cystoscopy; and Open reduction internal fixation ankle (Left, 4/10/2022).  FAMILY HISTORY: family history includes Alzheimer Disease in her maternal grandmother; Cardiovascular in her sister; Cerebrovascular Disease in her father; Psychotic Disorder in her mother.  SOCIAL HISTORY:   reports that she has never smoked. She has never used smokeless tobacco. She reports that she does not drink alcohol and does not use drugs.    Post Discharge Medication Reconciliation Status: discharge medications reconciled, continue medications without change    Current Outpatient Medications   Medication Sig Dispense Refill     acetaminophen (TYLENOL) 500 MG tablet Take 2 tablets (1,000 mg) by mouth 3 times daily 540 tablet 3     Ascorbic Acid (VITAMIN C) 500 MG CAPS Take 500 mg by mouth 2 times daily       carboxymethylcellulose (REFRESH LIQUIGEL) 1 % ophthalmic solution Place 1 drop into both eyes every morning As needed       Cholecalciferol (VITAMIN D) 50 MCG (2000 UT) CAPS Take 1 capsule by mouth every evening 90 capsule 3     Lidocaine (LIDOCARE) 4 % Patch Place 1 patch onto the skin every 24 hours To prevent lidocaine toxicity, patient should be patch free for 12 hrs daily. To right shoulder 5 patch 3     lisinopril (ZESTRIL) 2.5 MG tablet Take 1 tablet (2.5 mg) by mouth daily       menthol-zinc oxide (CALMOSEPTINE) 0.44-20.6 % OINT ointment Apply topically as needed for skin protection       metoprolol tartrate (LOPRESSOR) 25 MG tablet Take 1 tablet (25  "mg) by mouth 2 times daily (hold for SBP <90 or HR <60) 180 tablet 3     ondansetron (ZOFRAN-ODT) 4 MG ODT tab Take 1 tablet (4 mg) by mouth every 6 hours as needed for nausea 30 tablet 11     oxyCODONE (ROXICODONE) 5 MG tablet Take 0.5 tablets (2.5 mg) by mouth every 4 hours as needed for moderate pain 20 tablet 0     polyethylene glycol (MIRALAX) 17 g packet Take 1 packet by mouth daily as needed for constipation       rosuvastatin (CRESTOR) 5 MG tablet Take 1 tablet (5 mg) by mouth At Bedtime 90 tablet 3     SENNA-docusate sodium (SENNA S) 8.6-50 MG tablet Take 1 tablet by mouth 2 times daily as needed (constipation)       sodium chloride (PETER 128) 5 % ophthalmic ointment Place 2 Application (1 g) into both eyes At Bedtime       warfarin ANTICOAGULANT (COUMADIN) 2 MG tablet Take 2 mg by mouth daily INR 1 mg Monday and Friday and 2 mg rest of days       ROS:  Limited secondary to cognitive impairment but today pt reports ok    Vitals:  /82   Pulse 98   Temp 98.4  F (36.9  C)   Resp 13   Ht 1.651 m (5' 5\")   Wt 62.4 kg (137 lb 9.6 oz)   SpO2 96%   BMI 22.90 kg/m    Exam:  GENERAL APPEARANCE: alert, resting in recliner  ENT:  Mouth and posterior oropharynx normal, moist mucous membranes  EYES:  EOM, conjunctivae, lids, pupils and irises normal, wears glasses  RESP:  lungs clear to auscultation but diminished throughout   CV:  regular rate and irregular rhythm, no murmur, rub, or gallop, trace edema-no compression   ABDOMEN:  no guarding or rebound, BS +  :    deleon catheter in place with leg bag, minimal straw color output in leg bag (not seen today)  M/S:   generalized weakness- slow with sit to stand, 2WW   SKIN:  intact to visualized areas-incision x 2 to left ankle CDI-no redness, no s/s of infection   NEURO:   Cranial nerves 2-12 are normal tested and grossly at patient's baseline  PSYCH:  insight and judgement impaired, memory impaired SLUMS 9/30 and CPT 3.8/5.6    Lab/Diagnostic data:  CBC " RESULTS: Recent Labs   Lab Test 05/02/22  0903 04/29/22  0740   WBC 6.1 5.6   RBC 3.57* 3.45*   HGB 11.5* 11.1*   HCT 37.6 36.0   * 104*   MCH 32.2 32.2   MCHC 30.6* 30.8*   RDW 13.2 12.9    203       Last Basic Metabolic Panel:  Recent Labs   Lab Test 04/18/22  0843 04/14/22  0600 04/13/22  0553 04/12/22  0645 04/12/22  0312 04/11/22  0746     --   --   --   --  136   POTASSIUM 4.1 4.0   < >  --   --  5.0   CHLORIDE 106  --   --   --   --  108   CLARISSE 9.5  --   --   --   --  9.5   CO2 27  --   --   --   --  25   BUN 26  --   --   --   --  23   CR 1.01  --   --   --   --  0.94   *  --   --  113*   < > 142*    < > = values in this interval not displayed.       Liver Function Studies -   Recent Labs   Lab Test 04/10/22  0729 12/26/21  0236   PROTTOTAL 6.0* 5.8*   ALBUMIN 2.9* 2.6*   BILITOTAL 0.7 0.8   ALKPHOS 74 57   AST 23 27   ALT 21 20       TSH   Date Value Ref Range Status   02/26/2018 1.82 0.40 - 4.00 mU/L Final   10/05/2017 2.44 0.40 - 4.00 mU/L Final       No results found for: A1C      ASSESSMENT/PLAN:  (S88.132K) Closed trimalleolar fracture of left ankle with routine healing, subsequent encounter  (primary encounter diagnosis)  (Z98.890,  Z87.81) S/P ORIF (open reduction internal fixation) fracture  (R52) Pain  Comment: s/p ORIF on 4/10/22  Plan:   -CAM boot 24/7 and off for daily cares  -wean oxycodone as tolerated   -Tylenol 1,000 mg po TID  -DVT prophylaxis with coumadin  -f/u ortho Dr. Cooper 5/25/22      (D64.9) Postoperative anemia  Comment: pre-op hgb at 11.9 and 11.5 on 5/2  Plan:   -CBC periodically     (I48.91) Atrial fibrillation, unspecified type (H)  (I10) Essential hypertension  (I50.32) Chronic heart failure with preserved ejection fraction (H)  Comment: ECHO 12/21 with LVEF 55-60%, grade III LV diastolic dysfunction)  Plan:    -next INR is 5/9/22  -1 mg Monday and Friday and 2 mg rest of days  -metoprolol 25 mg po BID  -lisinopril reduced to 2.5 mg daily and hold if  SBP<110   -BMP periodically     (Z97.8) Chronic indwelling Guillen catheter  (R33.9) Urinary retention  Comment: completed course of 7 days Macrobid on 4/28    Plan:   -continue current plan of care for cath cares  -monitor for s/s of infection     (F03.90) Dementia without behavioral disturbance, unspecified dementia type (H)  Comment: returned back to higher level of care memory care versus AL  Plan:   -staff assist for most ADLs    (K59.00) Constipation, unspecified constipation type  (R19.5) Loose stools  Comment: hx of loose stools but now states constipation  Plan:   -monitor bowel movements   -Senna S, Miralax prn  -prior use of prn loperimide for loose stool so will add back      Electronically signed by:  RAY Lopez CNP                           Sincerely,        RAY Lopez CNP

## 2022-05-05 NOTE — PROGRESS NOTES
S-(situation): TCU Discharge    B-(background): PPatient was inpatient at [facility] 4/9/22-4/14/22 due to fall, left ankle fracture, s/p ORIF left trimalleolar ankle fracture.  Patient was discharged to [facility] TCU for rehabilitation.    A-(assessment): Patient was discharged to [facility] with [facility] care services on 5/5/2022.    R-(recommendations/plan): Patient is enrolled in [facility].  CPN will monitor for any newly identified care navigation needs in 1 month.    Melissa Behl BSN, RN, PHN, CCM  RN Clinical Product Navigator  [phone]

## 2022-05-06 RX ORDER — LOPERAMIDE HYDROCHLORIDE 2 MG/1
2 TABLET ORAL 4 TIMES DAILY PRN
COMMUNITY
Start: 2022-05-06

## 2022-05-09 ENCOUNTER — TELEPHONE (OUTPATIENT)
Dept: GERIATRICS | Facility: CLINIC | Age: 87
End: 2022-05-09
Payer: COMMERCIAL

## 2022-05-09 NOTE — TELEPHONE ENCOUNTER
----- Message from Rebecca Ball RN sent at 5/9/2022  8:54 AM CDT -----  Regarding: RE: Incision Monitor  Please keep surgeon up to date on wound status as well.    bren Fernandez  ----- Message -----  From: Leyla Garcia RN  Sent: 5/8/2022   1:01 PM CDT  To: Re Ricketts  Subject: Incision Monitor                                 S: Wound Monitoring-Right Ankle  B:  Resident with Closed trimalleolar fracture of left ankle s/p ORIF 4/10/22  A: Resident with incisions on medial and lateral left ankle. Medial incision with steri strips, intact, approximated, healing well. No redness, warmth, or complaints of discomfort.   Lateral Incision with steri strips in place, distal end of incision with area of dried drainage and scab starting to form area approx 1.4 x 1.0 cm; area surrounding drainage/scab is red, slight warmth to touch noted, scant amount of swelling-nonpitting, resident with mild discomfort with touch; area approx 2.9 x 2.3cm. Writer placed new ABD pad on incision.  R: NP updated with wound assessment; Cont POC

## 2022-05-12 ENCOUNTER — ASSISTED LIVING VISIT (OUTPATIENT)
Dept: GERIATRICS | Facility: CLINIC | Age: 87
End: 2022-05-12
Payer: COMMERCIAL

## 2022-05-12 VITALS
TEMPERATURE: 96.8 F | DIASTOLIC BLOOD PRESSURE: 61 MMHG | HEIGHT: 65 IN | BODY MASS INDEX: 23.32 KG/M2 | SYSTOLIC BLOOD PRESSURE: 133 MMHG | WEIGHT: 140 LBS | HEART RATE: 63 BPM | OXYGEN SATURATION: 100 % | RESPIRATION RATE: 22 BRPM

## 2022-05-12 DIAGNOSIS — T81.31XS POSTOPERATIVE WOUND DEHISCENCE, SEQUELA: ICD-10-CM

## 2022-05-12 DIAGNOSIS — S90.522A BLISTER OF LEFT ANKLE, INITIAL ENCOUNTER: ICD-10-CM

## 2022-05-12 DIAGNOSIS — L08.9 LOCAL INFECTION OF SKIN AND SUBCUTANEOUS TISSUE: Primary | ICD-10-CM

## 2022-05-12 DIAGNOSIS — K59.01 SLOW TRANSIT CONSTIPATION: ICD-10-CM

## 2022-05-12 DIAGNOSIS — Z98.890 S/P ORIF (OPEN REDUCTION INTERNAL FIXATION) FRACTURE: ICD-10-CM

## 2022-05-12 DIAGNOSIS — Z87.81 S/P ORIF (OPEN REDUCTION INTERNAL FIXATION) FRACTURE: ICD-10-CM

## 2022-05-12 DIAGNOSIS — S82.852D CLOSED TRIMALLEOLAR FRACTURE OF LEFT ANKLE WITH ROUTINE HEALING, SUBSEQUENT ENCOUNTER: ICD-10-CM

## 2022-05-12 DIAGNOSIS — Z97.8 CHRONIC INDWELLING FOLEY CATHETER: ICD-10-CM

## 2022-05-12 NOTE — LETTER
New orders for Maia Miranda    1. Discontinue oxycodone  2. Wound care to left heel. Wash/pat dry, swab with betadine, cover with thin adaptic to prevent sticking to dressing then with dressing daily and prn until healed.  3. Wound care to top of left foot. Wash/pat dry, apply band-aid for protection daily and prn. Monitor for improving or worsening condition  4. Wound care to lateral left ankle at incision line. Wash/pat dry, swab with betadine, cover with ABD pad for protection from rubbing on CAM boot. Update provider if worsening   5. Increase    cephALEXin (KEFLEX) 500 MG capsule Take 1 capsule (500 mg) by mouth 3 times daily for 7 days Then return to 250 mg po daily for UTI prophylaxis       RAY Lopez CNP on 5/13/2022 at 9:29 AM

## 2022-05-12 NOTE — PROGRESS NOTES
"[facility]  [place] Medical Record Number:  [id number]  Place of Service where encounter took place:  [facility] ([facility]) [[id number]]  Chief Complaint   Patient presents with     RECHECK     Wound check       HPI:    Maia Miranda  is a 89 year old (10/26/1932), who is being seen today for an episodic care visit.  HPI information obtained from: facility chart records, facility staff and [facility] chart review. Today's concern is:    Worsening presentation of incision on lateral side of left ankle, larger blister back of left ankle open and draining, skin abrasion top of foot. She is s/p ORIF on 4/10/22 for left malleolar ankle fracture. She is to wear a CAM boot 24/7 and has follow up with ortho 5/25/22. The boot appears to be rubbing on various places to this foot. She tells me that tries not to put weight on the foot but staff acknowledging that she will transfer independently to use bathroom in her apartment. She denies pain in the foot, confused at baseline with advanced dementia.      Has complaints of constipation but does show daily bowel movements in chart review. Her aid today say \"poop looks like pellets\".    Past Medical and Surgical History reviewed in Epic today.    MEDICATIONS:    Current Outpatient Medications   Medication Sig Dispense Refill     cephALEXin (KEFLEX) 500 MG capsule Take 1 capsule (500 mg) by mouth 3 times daily for 7 days Then return to 250 mg po daily for UTI prophylaxis 21 capsule 0     acetaminophen (TYLENOL) 500 MG tablet Take 2 tablets (1,000 mg) by mouth 3 times daily 540 tablet 3     Ascorbic Acid (VITAMIN C) 500 MG CAPS Take 500 mg by mouth 2 times daily       carboxymethylcellulose (REFRESH LIQUIGEL) 1 % ophthalmic solution Place 1 drop into both eyes every morning As needed       Cholecalciferol (VITAMIN D) 50 MCG (2000 UT) CAPS Take 1 capsule by mouth every evening 90 capsule 3     Lidocaine (LIDOCARE) 4 % Patch Place 1 patch onto " "the skin every 24 hours To prevent lidocaine toxicity, patient should be patch free for 12 hrs daily. To right shoulder 5 patch 3     lisinopril (ZESTRIL) 2.5 MG tablet Take 1 tablet (2.5 mg) by mouth daily       loperamide (IMODIUM A-D) 2 MG tablet Take 1 tablet (2 mg) by mouth 4 times daily as needed for diarrhea       menthol-zinc oxide (CALMOSEPTINE) 0.44-20.6 % OINT ointment Apply topically as needed for skin protection       metoprolol tartrate (LOPRESSOR) 25 MG tablet Take 1 tablet (25 mg) by mouth 2 times daily (hold for SBP <90 or HR <60) 180 tablet 3     ondansetron (ZOFRAN-ODT) 4 MG ODT tab Take 1 tablet (4 mg) by mouth every 6 hours as needed for nausea 30 tablet 11     polyethylene glycol (MIRALAX) 17 g packet Take 1 packet by mouth daily as needed for constipation       rosuvastatin (CRESTOR) 5 MG tablet Take 1 tablet (5 mg) by mouth At Bedtime 90 tablet 3     SENNA-docusate sodium (SENNA S) 8.6-50 MG tablet Take 1 tablet by mouth 2 times daily as needed (constipation)       sodium chloride (PETER 128) 5 % ophthalmic ointment Place 2 Application (1 g) into both eyes At Bedtime       warfarin ANTICOAGULANT (COUMADIN) 2 MG tablet Take 2 mg by mouth daily INR 1 mg Monday and Friday and 2 mg rest of days       REVIEW OF SYSTEMS:  Limited secondary to cognitive impairment but today pt reports ok    Objective:  /61   Pulse 63   Temp 96.8  F (36  C)   Resp 22   Ht 1.651 m (5' 5\")   Wt 63.5 kg (140 lb)   SpO2 100%   BMI 23.30 kg/m    Exam:  GENERAL APPEARANCE: alert, confused   ENT:  Mouth and posterior oropharynx normal, dry mucous membranes  EYES:  EOM, conjunctivae, lids, pupils and irises normal, wears glasses  RESP:  lungs clear to auscultation but diminished throughout   CV:  regular rate and irregular rhythm, no murmur, rub, or gallop, trace edema-no compression   ABDOMEN:  no guarding or rebound, BS +  :    deleon catheter in place with overnight bag, minimal straw color output in leg bag "   M/S:   generalized weakness- slow with sit to stand, 2WW, CAM boot    SKIN: incision x 2 to left ankle, lateral with scab over area, raised and red, no warmth, not spreading over markings for monitor, skin abrasion to top of foot and large draining heel blister  NEURO:   Cranial nerves 2-12 are normal tested and grossly at patient's baseline  PSYCH:  insight and judgement impaired, memory impaired SLUMS 9/30 and CPT 3.8/5.6    Labs:   CBC RESULTS: Recent Labs   Lab Test 05/02/22  0903 04/29/22  0740   WBC 6.1 5.6   RBC 3.57* 3.45*   HGB 11.5* 11.1*   HCT 37.6 36.0   * 104*   MCH 32.2 32.2   MCHC 30.6* 30.8*   RDW 13.2 12.9    203       Last Basic Metabolic Panel:  Recent Labs   Lab Test 04/18/22  0843 04/14/22  0600 04/13/22  0553 04/12/22  0645 04/12/22  0312 04/11/22  0746     --   --   --   --  136   POTASSIUM 4.1 4.0   < >  --   --  5.0   CHLORIDE 106  --   --   --   --  108   CLARISSE 9.5  --   --   --   --  9.5   CO2 27  --   --   --   --  25   BUN 26  --   --   --   --  23   CR 1.01  --   --   --   --  0.94   *  --   --  113*   < > 142*    < > = values in this interval not displayed.       Liver Function Studies -   Recent Labs   Lab Test 04/10/22  0729 12/26/21  0236   PROTTOTAL 6.0* 5.8*   ALBUMIN 2.9* 2.6*   BILITOTAL 0.7 0.8   ALKPHOS 74 57   AST 23 27   ALT 21 20       TSH   Date Value Ref Range Status   02/26/2018 1.82 0.40 - 4.00 mU/L Final   10/05/2017 2.44 0.40 - 4.00 mU/L Final       No results found for: A1C      ASSESSMENT/PLAN:  (L08.9) Local infection of skin and subcutaneous tissue  (primary encounter diagnosis)  (E33.278F) Closed trimalleolar fracture of left ankle with routine healing, subsequent encounter  (Z98.890,  Z87.81) S/P ORIF (open reduction internal fixation) fracture  (T81.31XS) Postoperative wound dehiscence, sequela  (S90.522A) Blister of left ankle, initial encounter  Comment: s/p ORIF on 4/10/22  Plan:   -CAM boot 24/7 and off for daily cares  -NWB to  LLE and is not compliant most likely secondary to dementia  -oxycodone discontinuing today as not used and contributes to confusion   -Tylenol 1,000 mg po TID  -DVT prophylaxis with coumadin  -f/u ortho Dr. Cooper 5/25/22  but may need to see sooner or onsite ortho next week   -keflex (was on daily 250 mg for UTI prophylaxis as PTA) increased today to 500 mg po TID x 7 days  -wound care orders x 3 areas of left ankle/foot     (K59.01) Slow transit constipation  Comment: looks resolved in chart review. Hx of diarrhea and problematic with chronic deleon  Plan:  -holding on scheduling bowel medications for now and continue with prns    (Z97.8) Chronic indwelling Deleon catheter  Comment: was dislodged with balloon in place. PTA was straight cathing several times daily (6x)  but with advancing dementia unable to do  Plan:  -staff reinserted with larger return of urine  -Keflex 250 mg po daily from urology-Dr. Maria   -vitamin C           Electronically signed by:  RAY Lopez CNP

## 2022-05-12 NOTE — LETTER
"    5/12/2022        RE: Maia Miranda  [id number]  [place] [address]        [facility]  [facility] Medical Record Number:  [id number]  Place of Service where encounter took place:  [facility] ([facility] [[id number]]  Chief Complaint   Patient presents with     RECHECK     Wound check       HPI:    Maia Miranda  is a 89 year old (10/26/1932), who is being seen today for an episodic care visit.  HPI information obtained from: facility chart records, facility staff and [facility] chart review. Today's concern is:    Worsening presentation of incision on lateral side of left ankle, larger blister back of left ankle open and draining, skin abrasion top of foot. She is s/p ORIF on 4/10/22 for left malleolar ankle fracture. She is to wear a CAM boot 24/7 and has follow up with ortho 5/25/22. The boot appears to be rubbing on various places to this foot. She tells me that tries not to put weight on the foot but staff acknowledging that she will transfer independently to use bathroom in her apartment. She denies pain in the foot, confused at baseline with advanced dementia.      Has complaints of constipation but does show daily bowel movements in chart review. Her aid today say \"poop looks like pellets\".    Past Medical and Surgical History reviewed in Epic today.    MEDICATIONS:    Current Outpatient Medications   Medication Sig Dispense Refill     cephALEXin (KEFLEX) 500 MG capsule Take 1 capsule (500 mg) by mouth 3 times daily for 7 days Then return to 250 mg po daily for UTI prophylaxis 21 capsule 0     acetaminophen (TYLENOL) 500 MG tablet Take 2 tablets (1,000 mg) by mouth 3 times daily 540 tablet 3     Ascorbic Acid (VITAMIN C) 500 MG CAPS Take 500 mg by mouth 2 times daily       carboxymethylcellulose (REFRESH LIQUIGEL) 1 % ophthalmic solution Place 1 drop into both eyes every morning As needed       Cholecalciferol (VITAMIN D) 50 MCG (2000 UT) CAPS Take 1 " "capsule by mouth every evening 90 capsule 3     Lidocaine (LIDOCARE) 4 % Patch Place 1 patch onto the skin every 24 hours To prevent lidocaine toxicity, patient should be patch free for 12 hrs daily. To right shoulder 5 patch 3     lisinopril (ZESTRIL) 2.5 MG tablet Take 1 tablet (2.5 mg) by mouth daily       loperamide (IMODIUM A-D) 2 MG tablet Take 1 tablet (2 mg) by mouth 4 times daily as needed for diarrhea       menthol-zinc oxide (CALMOSEPTINE) 0.44-20.6 % OINT ointment Apply topically as needed for skin protection       metoprolol tartrate (LOPRESSOR) 25 MG tablet Take 1 tablet (25 mg) by mouth 2 times daily (hold for SBP <90 or HR <60) 180 tablet 3     ondansetron (ZOFRAN-ODT) 4 MG ODT tab Take 1 tablet (4 mg) by mouth every 6 hours as needed for nausea 30 tablet 11     polyethylene glycol (MIRALAX) 17 g packet Take 1 packet by mouth daily as needed for constipation       rosuvastatin (CRESTOR) 5 MG tablet Take 1 tablet (5 mg) by mouth At Bedtime 90 tablet 3     SENNA-docusate sodium (SENNA S) 8.6-50 MG tablet Take 1 tablet by mouth 2 times daily as needed (constipation)       sodium chloride (PETER 128) 5 % ophthalmic ointment Place 2 Application (1 g) into both eyes At Bedtime       warfarin ANTICOAGULANT (COUMADIN) 2 MG tablet Take 2 mg by mouth daily INR 1 mg Monday and Friday and 2 mg rest of days       REVIEW OF SYSTEMS:  Limited secondary to cognitive impairment but today pt reports ok    Objective:  /61   Pulse 63   Temp 96.8  F (36  C)   Resp 22   Ht 1.651 m (5' 5\")   Wt 63.5 kg (140 lb)   SpO2 100%   BMI 23.30 kg/m    Exam:  GENERAL APPEARANCE: alert, confused   ENT:  Mouth and posterior oropharynx normal, dry mucous membranes  EYES:  EOM, conjunctivae, lids, pupils and irises normal, wears glasses  RESP:  lungs clear to auscultation but diminished throughout   CV:  regular rate and irregular rhythm, no murmur, rub, or gallop, trace edema-no compression   ABDOMEN:  no guarding or " rebound, BS +  :    deleon catheter in place with overnight bag, minimal straw color output in leg bag   M/S:   generalized weakness- slow with sit to stand, 2WW, CAM boot    SKIN: incision x 2 to left ankle, lateral with scab over area, raised and red, no warmth, not spreading over markings for monitor, skin abrasion to top of foot and large draining heel blister  NEURO:   Cranial nerves 2-12 are normal tested and grossly at patient's baseline  PSYCH:  insight and judgement impaired, memory impaired SLUMS 9/30 and CPT 3.8/5.6    Labs:   CBC RESULTS: Recent Labs   Lab Test 05/02/22  0903 04/29/22  0740   WBC 6.1 5.6   RBC 3.57* 3.45*   HGB 11.5* 11.1*   HCT 37.6 36.0   * 104*   MCH 32.2 32.2   MCHC 30.6* 30.8*   RDW 13.2 12.9    203       Last Basic Metabolic Panel:  Recent Labs   Lab Test 04/18/22  0843 04/14/22  0600 04/13/22  0553 04/12/22  0645 04/12/22  0312 04/11/22  0746     --   --   --   --  136   POTASSIUM 4.1 4.0   < >  --   --  5.0   CHLORIDE 106  --   --   --   --  108   CLARISSE 9.5  --   --   --   --  9.5   CO2 27  --   --   --   --  25   BUN 26  --   --   --   --  23   CR 1.01  --   --   --   --  0.94   *  --   --  113*   < > 142*    < > = values in this interval not displayed.       Liver Function Studies -   Recent Labs   Lab Test 04/10/22  0729 12/26/21  0236   PROTTOTAL 6.0* 5.8*   ALBUMIN 2.9* 2.6*   BILITOTAL 0.7 0.8   ALKPHOS 74 57   AST 23 27   ALT 21 20       TSH   Date Value Ref Range Status   02/26/2018 1.82 0.40 - 4.00 mU/L Final   10/05/2017 2.44 0.40 - 4.00 mU/L Final       No results found for: A1C      ASSESSMENT/PLAN:  (L08.9) Local infection of skin and subcutaneous tissue  (primary encounter diagnosis)  (S82.852D) Closed trimalleolar fracture of left ankle with routine healing, subsequent encounter  (Z98.890,  Z87.81) S/P ORIF (open reduction internal fixation) fracture  (T81.31XS) Postoperative wound dehiscence, sequela  (S90.522A) Blister of left ankle,  initial encounter  Comment: s/p ORIF on 4/10/22  Plan:   -CAM boot 24/7 and off for daily cares  -NWB to LLE and is not compliant most likely secondary to dementia  -oxycodone discontinuing today as not used and contributes to confusion   -Tylenol 1,000 mg po TID  -DVT prophylaxis with coumadin  -f/u ortho Dr. Cooper 5/25/22  but may need to see sooner or onsite ortho next week   -keflex (was on daily 250 mg for UTI prophylaxis as PTA) increased today to 500 mg po TID x 7 days  -wound care orders x 3 areas of left ankle/foot     (K59.01) Slow transit constipation  Comment: looks resolved in chart review. Hx of diarrhea and problematic with chronic deleon  Plan:  -holding on scheduling bowel medications for now and continue with prns    (Z97.8) Chronic indwelling Deleon catheter  Comment: was dislodged with balloon in place. PTA was straight cathing several times daily (6x)  but with advancing dementia unable to do  Plan:  -staff reinserted with larger return of urine  -Keflex 250 mg po daily from urology-Dr. Maria   -vitamin C           Electronically signed by:  RAY Lopez CNP                 Sincerely,        RAY Lopez CNP

## 2022-05-13 ENCOUNTER — TELEPHONE (OUTPATIENT)
Dept: GERIATRICS | Facility: CLINIC | Age: 87
End: 2022-05-13
Payer: COMMERCIAL

## 2022-05-13 RX ORDER — CEPHALEXIN 500 MG/1
500 CAPSULE ORAL 3 TIMES DAILY
Qty: 21 CAPSULE | Refills: 0 | Status: SHIPPED | OUTPATIENT
Start: 2022-05-13 | End: 2022-05-20

## 2022-05-13 NOTE — TELEPHONE ENCOUNTER
Ortho Nursing home visit    Maia Miranda is a 89 year old female who resides at [facility].    Patient is seen today for wound issues in Cam Boot, now 4 weeks, S/P ORIF for Tri- Mal fracture.    Patient was placed in splint post-op was seen on-site by Trauma Nurse, instructions for NWB.      Past Medical History:   Diagnosis Date     Amnestic MCI (mild cognitive impairment with memory loss) 2014     Chronic duodenal ulcer without mention of hemorrhage, perforation, or obstruction 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 Warner, [facility] yearly     Esophageal reflux 2001    Abstracted 06/10/02     Essential and other specified forms of tremor 2004    resting tremor     Generalized osteoarthrosis, unspecified site     Hands     Hiatal hernia     diagnosed late 1990s [place], [place]     History of pulmonary embolism 1971    no source found     LACTOSE INTOLERANCE      Lumbago     sees Kodi Guidry     Mitral valve regurgitation      Occlusion and stenosis of carotid artery without mention of cerebral infarction 2003    CAROTID US NORMAL, bruit in neck     Osteoporosis, unspecified 2003    T = -2.66     Paroxysmal atrial fibrillation (H) 11/15/2013     Spider veins      Unspecified essential hypertension      Unspecified tinnitus 2005    mild hearing loss, saw ENT      Past Surgical History:   Procedure Laterality Date     CATARACT IOL, RT/LT       corneal scraping       CYSTOSCOPY       CYSTOSCOPY, BIOPSY BLADDER, COMBINED N/A 7/25/2016    Procedure: COMBINED CYSTOSCOPY, BIOPSY BLADDER;  Surgeon: Bernadine Maria MD;  Location:  OR     ESOPHAGOSCOPY, GASTROSCOPY, DUODENOSCOPY (EGD), COMBINED  7/8/2013    Procedure: COMBINED ESOPHAGOSCOPY, GASTROSCOPY, DUODENOSCOPY (EGD);   ESOPHAGOSCOPY, GASTROSCOPY, DUODENOSCOPY (EGD)   ;  Surgeon: Shawn Soto MD;  Location:  GI     IMPLANT STIMULATOR SACRAL NERVE STAGE ONE  N/A 4/4/2017    Procedure: IMPLANT STIMULATOR SACRAL NERVE STAGE ONE;  Surgeon: Kb Tracy MD;  Location: [facility] OR     IMPLANT STIMULATOR SACRAL NERVE STAGE TWO N/A 4/18/2017    Procedure: IMPLANT STIMULATOR SACRAL NERVE STAGE TWO;  Stage Two Interstim  ;  Surgeon: Kb Tracy MD;  Location: [facility] OR     interstim placement       OPEN REDUCTION INTERNAL FIXATION ANKLE Left 4/10/2022    Procedure: Open reduction and internal fixation of left trimalleolar ankle fracture;  Surgeon: Kulwant Cooper MD;  Location:  OR     [facility] NONSPECIFIC PROCEDURE      D&C x 2 in 1957 & 1962 -- Abstracted 06/10/02     [facility] NONSPECIFIC PROCEDURE      Left breast biopsy x 2 in 1988 & 1989 -- Abstracted 06/10/02     [facility] NONSPECIFIC PROCEDURE  1958    Influenza resulting in hospitalization -- Abstracted 06/10/02     [facility] NONSPECIFIC PROCEDURE  1971    10 day hospitalization from bilateral pulmonary enboli -- Abstracted 06/10/02     [facility] NONSPECIFIC PROCEDURE  1/03    colonoscopy  negative        Allergies   Allergen Reactions     Penicillins Anaphylaxis and Hives     Codeine Nausea and Vomiting     Meperidine Nausea and Vomiting     Morphine Nausea and Vomiting     Penicillins Hives     Sulfa Drugs      Other reaction(s): GI Intolerance, Vomiting     Epinephrine Palpitations      There were no vitals taken for this visit.     Patient is impulsive and has been seen getting OOB during the night, now has some noted skin redness around incision and heel.      X-rays show : ORIF tri mal fracture    ASSESSMENT / PLAN: Discussion with DR. Cooper, plan is to be NWB. Out of Boot, instructions to be in bed with leg elevated, padding to wounds, I have left a message with the staff; and son,     Patient may need more nursing care if wounds become an issue, and memory care unit is not staffed for the behavior,    Patient is on Keflex, and will be monitored over the weekend;    Staff and son have my contact #    [phone]               Freddy  [facility]  [phone] Cell

## 2022-05-17 DIAGNOSIS — Z78.9 TAKES DIETARY SUPPLEMENTS: Primary | ICD-10-CM

## 2022-05-17 RX ORDER — CHOLECALCIFEROL (VITAMIN D3) 50 MCG
TABLET ORAL
Qty: 90 TABLET | Refills: 97 | Status: SHIPPED | OUTPATIENT
Start: 2022-05-17 | End: 2022-01-01

## 2022-05-24 DIAGNOSIS — Z78.9 TAKES DIETARY SUPPLEMENTS: Primary | ICD-10-CM

## 2022-05-25 RX ORDER — ASCORBIC ACID 500 MG
TABLET ORAL
Qty: 180 TABLET | Refills: 3 | Status: SHIPPED | OUTPATIENT
Start: 2022-05-25 | End: 2022-01-01

## 2022-05-26 ENCOUNTER — ASSISTED LIVING VISIT (OUTPATIENT)
Dept: GERIATRICS | Facility: CLINIC | Age: 87
End: 2022-05-26
Payer: COMMERCIAL

## 2022-05-26 VITALS
SYSTOLIC BLOOD PRESSURE: 137 MMHG | DIASTOLIC BLOOD PRESSURE: 68 MMHG | OXYGEN SATURATION: 96 % | RESPIRATION RATE: 17 BRPM | BODY MASS INDEX: 29.59 KG/M2 | HEIGHT: 65 IN | TEMPERATURE: 97.7 F | WEIGHT: 177.6 LBS | HEART RATE: 58 BPM

## 2022-05-26 DIAGNOSIS — S90.822S: ICD-10-CM

## 2022-05-26 DIAGNOSIS — I48.91 ATRIAL FIBRILLATION, UNSPECIFIED TYPE (H): ICD-10-CM

## 2022-05-26 DIAGNOSIS — Z79.01 LONG TERM (CURRENT) USE OF ANTICOAGULANTS: ICD-10-CM

## 2022-05-26 DIAGNOSIS — I10 ESSENTIAL HYPERTENSION: ICD-10-CM

## 2022-05-26 DIAGNOSIS — T81.31XS POSTOPERATIVE WOUND DEHISCENCE, SEQUELA: ICD-10-CM

## 2022-05-26 DIAGNOSIS — K59.01 SLOW TRANSIT CONSTIPATION: ICD-10-CM

## 2022-05-26 DIAGNOSIS — Z87.81 S/P ORIF (OPEN REDUCTION INTERNAL FIXATION) FRACTURE: Primary | ICD-10-CM

## 2022-05-26 DIAGNOSIS — R52 PAIN: ICD-10-CM

## 2022-05-26 DIAGNOSIS — Z98.890 S/P ORIF (OPEN REDUCTION INTERNAL FIXATION) FRACTURE: Primary | ICD-10-CM

## 2022-05-26 RX ORDER — OXYCODONE HYDROCHLORIDE 5 MG/1
2.5 TABLET ORAL EVERY 4 HOURS PRN
COMMUNITY
End: 2022-07-21

## 2022-05-26 RX ORDER — SENNA AND DOCUSATE SODIUM 50; 8.6 MG/1; MG/1
TABLET, FILM COATED ORAL
Qty: 60 TABLET | Refills: 3 | Status: SHIPPED | OUTPATIENT
Start: 2022-05-26 | End: 2022-09-22

## 2022-05-26 NOTE — PROGRESS NOTES
[facility]  [place] Medical Record Number:  [id number]  Place of Service where encounter took place:  [facility] ([facility]) [[id number]]  Chief Complaint   Patient presents with     RECHECK     Increased BPs, pain      INR RESULTS     Anticoagulation       HPI:    Maia Miranda  is a 89 year old (10/26/1932), who is being seen today for an episodic care visit.  HPI information obtained from: facility chart records and facility staff. Today's concern is:    Seen today for follow up to wound on lateral side of left ankle, larger blister of left heel, elevated BPs, constipation and increased pain. Facility notes reviewed and saw ortho 5/25 and now full weight bearing in CAM boot as tolerated with transiting out of the boot in 3 weeks. She is s/p ORIF on 4/10/22 for left malleolar ankle fracture. The boot appears to be rubbing on various places to this foot.  Next ortho visit with Dr. Cooper is 7/6/22. Left lateral ankle with eschar 0.7 x 1.0cm, surrounding redness and slightly warm to touch per notes. Heel and top of foot improving per notes.  Keflex increased to 500 mg po TID ended 5/20.   BPs followed by facility and listed below.         Pain managed by scheduled Tylenol and prn oxycodone. No use of narcotic. History of loose stool but now with more constipation. Says I am ok because they give me something.     Past Medical and Surgical History reviewed in Epic today.    MEDICATIONS:    Current Outpatient Medications   Medication Sig Dispense Refill     acetaminophen (TYLENOL) 500 MG tablet Take 2 tablets (1,000 mg) by mouth 3 times daily 540 tablet 3     carboxymethylcellulose (REFRESH LIQUIGEL) 1 % ophthalmic solution Place 1 drop into both eyes every morning As needed       cephALEXin (KEFLEX) 250 MG capsule Take 250 mg by mouth daily       Lidocaine (LIDOCARE) 4 % Patch Place 1 patch onto the skin every 24 hours To prevent lidocaine toxicity, patient should be patch free for 12  "hrs daily. To right shoulder 5 patch 3     lisinopril (ZESTRIL) 2.5 MG tablet Take 1 tablet (2.5 mg) by mouth daily       loperamide (IMODIUM A-D) 2 MG tablet Take 1 tablet (2 mg) by mouth 4 times daily as needed for diarrhea       menthol-zinc oxide (CALMOSEPTINE) 0.44-20.6 % OINT ointment Apply topically as needed for skin protection       metoprolol tartrate (LOPRESSOR) 25 MG tablet Take 1 tablet (25 mg) by mouth 2 times daily (hold for SBP <90 or HR <60) 180 tablet 3     ondansetron (ZOFRAN-ODT) 4 MG ODT tab Take 1 tablet (4 mg) by mouth every 6 hours as needed for nausea 30 tablet 11     oxyCODONE (ROXICODONE) 5 MG tablet Take 2.5 mg by mouth every 4 hours as needed for severe pain       polyethylene glycol (MIRALAX) 17 g packet Take 1 packet by mouth daily as needed for constipation       rosuvastatin (CRESTOR) 5 MG tablet Take 1 tablet (5 mg) by mouth At Bedtime 90 tablet 3     SENNA-docusate sodium (SENNA S) 8.6-50 MG tablet Take 1 tablet by mouth At Bedtime. May also take 1 tablet 2 times daily as needed (constipation). 60 tablet 3     sodium chloride (PETER 128) 5 % ophthalmic ointment Place 2 Application (1 g) into both eyes At Bedtime       vitamin C (ASCORBIC ACID) 500 MG tablet TAKE 1 TABLET BY MOUTH TWICE DAILY 180 tablet 3     VITAMIN D3 50 MCG (2000 UT) tablet TAKE 1 TABLET BY MOUTH EVERY EVENING 90 tablet 97     warfarin ANTICOAGULANT (COUMADIN) 2 MG tablet Take 2 mg by mouth daily INR 1 mg Monday and Friday and 2 mg rest of days       REVIEW OF SYSTEMS:  Limited secondary to cognitive impairment but today pt reports fine     Objective:  /68   Pulse 58   Temp 97.7  F (36.5  C)   Resp 17   Ht 1.651 m (5' 5\")   Wt 80.6 kg (177 lb 9.6 oz)   SpO2 96%   BMI 29.55 kg/m    Exam:  GENERAL APPEARANCE: alert, confused   ENT:  Mouth and posterior oropharynx normal, dry mucous membranes  EYES:  EOM, conjunctivae, lids, pupils and irises normal, wears glasses  RESP:  lungs clear to auscultation but " diminished throughout   CV:  regular rate and irregular rhythm, no murmur, rub, or gallop, trace edema-no compression, pedal edema to left foot   ABDOMEN:  no guarding or rebound, BS +  :    deleon catheter in place with overnight bag, minimal straw color output in leg bag   M/S:   generalized weakness- slow with sit to stand, 2WW, CAM boot    SKIN: lateral left ankle with scab over dehisce area of incision line, slight redness to surrounding tissue, no warmth or drainage, skin abrasion to top of foot healed and large heel blister with newer skin, no redness or drainage   NEURO:   Cranial nerves 2-12 are normal tested and grossly at patient's baseline  PSYCH:  insight and judgement impaired, memory impaired SLUMS 9/30 and CPT 3.8/5.6    Labs:   CBC RESULTS: Recent Labs   Lab Test 05/02/22  0903 04/29/22  0740   WBC 6.1 5.6   RBC 3.57* 3.45*   HGB 11.5* 11.1*   HCT 37.6 36.0   * 104*   MCH 32.2 32.2   MCHC 30.6* 30.8*   RDW 13.2 12.9    203       Last Basic Metabolic Panel:  Recent Labs   Lab Test 04/18/22  0843 04/14/22  0600 04/13/22  0553 04/12/22  0645 04/12/22  0312 04/11/22  0746     --   --   --   --  136   POTASSIUM 4.1 4.0   < >  --   --  5.0   CHLORIDE 106  --   --   --   --  108   CLARISSE 9.5  --   --   --   --  9.5   CO2 27  --   --   --   --  25   BUN 26  --   --   --   --  23   CR 1.01  --   --   --   --  0.94   *  --   --  113*   < > 142*    < > = values in this interval not displayed.       Liver Function Studies -   Recent Labs   Lab Test 04/10/22  0729 12/26/21  0236   PROTTOTAL 6.0* 5.8*   ALBUMIN 2.9* 2.6*   BILITOTAL 0.7 0.8   ALKPHOS 74 57   AST 23 27   ALT 21 20       TSH   Date Value Ref Range Status   02/26/2018 1.82 0.40 - 4.00 mU/L Final   10/05/2017 2.44 0.40 - 4.00 mU/L Final       No results found for: A1C    ASSESSMENT/PLAN:  (Z98.890,  Z87.81) S/P ORIF (open reduction internal fixation) fracture  (primary encounter diagnosis)  (T81.31XS) Postoperative wound  dehiscence, sequela  (S90.822S) Heel blister, left, sequela  (R52) Pain  Comment: ongoing   Plan:   -follow with ortho  -CAM boot x 3 weeks  -Discontinue wound care to top of left foot.  -Updated Wound care to left heel and left lateral ankle. Cover with ABD and secure with ACE for protection against skin rubbing in CAM boot while in use.   -Float left heel while resting in her room  -Tylenol 1,000 mg po TID  -oxycodone 2.5 mg po q4H prn   -vitamin D, calcium from dietary intake     (K59.01) Slow transit constipation  Comment: hx of loose stool  Plan:   -SENNA-docusate sodium (SENNA S) 8.6-50 MG increased to   Take 1 tablet by mouth At Bedtime. May also take 1 tablet 2 times daily as needed (constipation). Hold for loose stool   -Miralax daily prn     (I48.91) Atrial fibrillation, unspecified type (H)  (Z79.01) Long term (current) use of anticoagulants  (I10) Essential Hypertension   Comment: INR today of 2.5 from 2.2 on 5/9/22. Few days in chart review of elevated BPs now resolved  Plan:   -Coumadin 1 mg po M/F and Coumadin 2 mg po AOD  -recheck INR 6/16/22  -monitor for elevated BP and dose adjust medications as needed           Electronically signed by:  RAY Lopez CNP

## 2022-05-26 NOTE — LETTER
5/26/2022        RE: Maia Miranda  [address]  [place] [address]        [facility]  [place] Medical Record Number:  [id number]  Place of Service where encounter took place:  [facility] ([facility]) [[id number]]  Chief Complaint   Patient presents with     RECHECK     Increased BPs, pain      INR RESULTS     Anticoagulation       HPI:    Maia Miranda  is a 89 year old (10/26/1932), who is being seen today for an episodic care visit.  HPI information obtained from: facility chart records and facility staff. Today's concern is:    Seen today for follow up to wound on lateral side of left ankle, larger blister of left heel, elevated BPs, constipation and increased pain. Facility notes reviewed and saw ortho 5/25 and now full weight bearing in CAM boot as tolerated with transiting out of the boot in 3 weeks. She is s/p ORIF on 4/10/22 for left malleolar ankle fracture. The boot appears to be rubbing on various places to this foot.  Next ortho visit with Dr. Cooper is 7/6/22. Left lateral ankle with eschar 0.7 x 1.0cm, surrounding redness and slightly warm to touch per notes. Heel and top of foot improving per notes.  Keflex increased to 500 mg po TID ended 5/20.   BPs followed by facility and listed below.         Pain managed by scheduled Tylenol and prn oxycodone. No use of narcotic. History of loose stool but now with more constipation. Says I am ok because they give me something.     Past Medical and Surgical History reviewed in Epic today.    MEDICATIONS:    Current Outpatient Medications   Medication Sig Dispense Refill     acetaminophen (TYLENOL) 500 MG tablet Take 2 tablets (1,000 mg) by mouth 3 times daily 540 tablet 3     carboxymethylcellulose (REFRESH LIQUIGEL) 1 % ophthalmic solution Place 1 drop into both eyes every morning As needed       cephALEXin (KEFLEX) 250 MG capsule Take 250 mg by mouth daily       Lidocaine (LIDOCARE) 4 % Patch Place 1 patch  "onto the skin every 24 hours To prevent lidocaine toxicity, patient should be patch free for 12 hrs daily. To right shoulder 5 patch 3     lisinopril (ZESTRIL) 2.5 MG tablet Take 1 tablet (2.5 mg) by mouth daily       loperamide (IMODIUM A-D) 2 MG tablet Take 1 tablet (2 mg) by mouth 4 times daily as needed for diarrhea       menthol-zinc oxide (CALMOSEPTINE) 0.44-20.6 % OINT ointment Apply topically as needed for skin protection       metoprolol tartrate (LOPRESSOR) 25 MG tablet Take 1 tablet (25 mg) by mouth 2 times daily (hold for SBP <90 or HR <60) 180 tablet 3     ondansetron (ZOFRAN-ODT) 4 MG ODT tab Take 1 tablet (4 mg) by mouth every 6 hours as needed for nausea 30 tablet 11     oxyCODONE (ROXICODONE) 5 MG tablet Take 2.5 mg by mouth every 4 hours as needed for severe pain       polyethylene glycol (MIRALAX) 17 g packet Take 1 packet by mouth daily as needed for constipation       rosuvastatin (CRESTOR) 5 MG tablet Take 1 tablet (5 mg) by mouth At Bedtime 90 tablet 3     SENNA-docusate sodium (SENNA S) 8.6-50 MG tablet Take 1 tablet by mouth At Bedtime. May also take 1 tablet 2 times daily as needed (constipation). 60 tablet 3     sodium chloride (PETER 128) 5 % ophthalmic ointment Place 2 Application (1 g) into both eyes At Bedtime       vitamin C (ASCORBIC ACID) 500 MG tablet TAKE 1 TABLET BY MOUTH TWICE DAILY 180 tablet 3     VITAMIN D3 50 MCG (2000 UT) tablet TAKE 1 TABLET BY MOUTH EVERY EVENING 90 tablet 97     warfarin ANTICOAGULANT (COUMADIN) 2 MG tablet Take 2 mg by mouth daily INR 1 mg Monday and Friday and 2 mg rest of days       REVIEW OF SYSTEMS:  Limited secondary to cognitive impairment but today pt reports fine     Objective:  /68   Pulse 58   Temp 97.7  F (36.5  C)   Resp 17   Ht 1.651 m (5' 5\")   Wt 80.6 kg (177 lb 9.6 oz)   SpO2 96%   BMI 29.55 kg/m    Exam:  GENERAL APPEARANCE: alert, confused   ENT:  Mouth and posterior oropharynx normal, dry mucous membranes  EYES:  EOM, " conjunctivae, lids, pupils and irises normal, wears glasses  RESP:  lungs clear to auscultation but diminished throughout   CV:  regular rate and irregular rhythm, no murmur, rub, or gallop, trace edema-no compression, pedal edema to left foot   ABDOMEN:  no guarding or rebound, BS +  :    deleon catheter in place with overnight bag, minimal straw color output in leg bag   M/S:   generalized weakness- slow with sit to stand, 2WW, CAM boot    SKIN: lateral left ankle with scab over dehisce area of incision line, slight redness to surrounding tissue, no warmth or drainage, skin abrasion to top of foot healed and large heel blister with newer skin, no redness or drainage   NEURO:   Cranial nerves 2-12 are normal tested and grossly at patient's baseline  PSYCH:  insight and judgement impaired, memory impaired SLUMS 9/30 and CPT 3.8/5.6    Labs:   CBC RESULTS: Recent Labs   Lab Test 05/02/22  0903 04/29/22  0740   WBC 6.1 5.6   RBC 3.57* 3.45*   HGB 11.5* 11.1*   HCT 37.6 36.0   * 104*   MCH 32.2 32.2   MCHC 30.6* 30.8*   RDW 13.2 12.9    203       Last Basic Metabolic Panel:  Recent Labs   Lab Test 04/18/22  0843 04/14/22  0600 04/13/22  0553 04/12/22  0645 04/12/22  0312 04/11/22  0746     --   --   --   --  136   POTASSIUM 4.1 4.0   < >  --   --  5.0   CHLORIDE 106  --   --   --   --  108   CLARISSE 9.5  --   --   --   --  9.5   CO2 27  --   --   --   --  25   BUN 26  --   --   --   --  23   CR 1.01  --   --   --   --  0.94   *  --   --  113*   < > 142*    < > = values in this interval not displayed.       Liver Function Studies -   Recent Labs   Lab Test 04/10/22  0729 12/26/21  0236   PROTTOTAL 6.0* 5.8*   ALBUMIN 2.9* 2.6*   BILITOTAL 0.7 0.8   ALKPHOS 74 57   AST 23 27   ALT 21 20       TSH   Date Value Ref Range Status   02/26/2018 1.82 0.40 - 4.00 mU/L Final   10/05/2017 2.44 0.40 - 4.00 mU/L Final       No results found for: A1C    ASSESSMENT/PLAN:  (Z98.890,  Z87.81) S/P ORIF (open  reduction internal fixation) fracture  (primary encounter diagnosis)  (T81.31XS) Postoperative wound dehiscence, sequela  (S90.822S) Heel blister, left, sequela  (R52) Pain  Comment: ongoing   Plan:   -follow with ortho  -CAM boot x 3 weeks  -Discontinue wound care to top of left foot.  -Updated Wound care to left heel and left lateral ankle. Cover with ABD and secure with ACE for protection against skin rubbing in CAM boot while in use.   -Float left heel while resting in her room  -Tylenol 1,000 mg po TID  -oxycodone 2.5 mg po q4H prn   -vitamin D, calcium from dietary intake     (K59.01) Slow transit constipation  Comment: hx of loose stool  Plan:   -SENNA-docusate sodium (SENNA S) 8.6-50 MG increased to   Take 1 tablet by mouth At Bedtime. May also take 1 tablet 2 times daily as needed (constipation). Hold for loose stool   -Miralax daily prn     (I48.91) Atrial fibrillation, unspecified type (H)  (Z79.01) Long term (current) use of anticoagulants  (I10) Essential Hypertension   Comment: INR today of 2.5 from 2.2 on 5/9/22. Few days in chart review of elevated BPs now resolved  Plan:   -Coumadin 1 mg po M/F and Coumadin 2 mg po AOD  -recheck INR 6/16/22  -monitor for elevated BP and dose adjust medications as needed           Electronically signed by:  RAY Lopez CNP                 Sincerely,        RAY Lopez CNP

## 2022-05-26 NOTE — LETTER
New orders for Maia Miranda    1. Increase Senna S to   SENNA-docusate sodium (SENNA S) 8.6-50 MG tablet Take 1 tablet by mouth At Bedtime. May also take 1 tablet 2 times daily as needed (constipation). Hold for loose stool     2. Discontinue wound care to top of left foot.    3. Updated Wound care to left heel and left lateral ankle. Cover with ABD and secure with ACE for protection against skin rubbing in CAM boot while in use.     4. Float left heel while resting in her room    RAY Lopez CNP on 5/26/2022 at 8:04 PM

## 2022-05-31 DIAGNOSIS — N39.0 RECURRENT UTI: Primary | ICD-10-CM

## 2022-05-31 DIAGNOSIS — I10 HYPERTENSION GOAL BP (BLOOD PRESSURE) < 150/90: ICD-10-CM

## 2022-05-31 DIAGNOSIS — S12.101A CLOSED NONDISPLACED FRACTURE OF SECOND CERVICAL VERTEBRA, UNSPECIFIED FRACTURE MORPHOLOGY, INITIAL ENCOUNTER (H): ICD-10-CM

## 2022-05-31 DIAGNOSIS — M51.379 DEGENERATION OF LUMBAR OR LUMBOSACRAL INTERVERTEBRAL DISC: ICD-10-CM

## 2022-05-31 DIAGNOSIS — I10 ESSENTIAL HYPERTENSION: ICD-10-CM

## 2022-05-31 DIAGNOSIS — I21.A1 TYPE 2 MI (MYOCARDIAL INFARCTION) (H): ICD-10-CM

## 2022-05-31 RX ORDER — METOPROLOL TARTRATE 25 MG/1
TABLET, FILM COATED ORAL
Qty: 180 TABLET | Refills: 97 | Status: SHIPPED | OUTPATIENT
Start: 2022-05-31 | End: 2023-01-01

## 2022-05-31 RX ORDER — PSEUDOEPHED/ACETAMINOPH/DIPHEN 30MG-500MG
TABLET ORAL
Qty: 540 TABLET | Refills: 97 | Status: SHIPPED | OUTPATIENT
Start: 2022-05-31 | End: 2023-01-01

## 2022-05-31 RX ORDER — LISINOPRIL 2.5 MG/1
TABLET ORAL
Qty: 90 TABLET | Refills: 97 | Status: SHIPPED | OUTPATIENT
Start: 2022-05-31 | End: 2022-01-01

## 2022-05-31 RX ORDER — ROSUVASTATIN CALCIUM 5 MG/1
TABLET, COATED ORAL
Qty: 90 TABLET | Refills: 97 | Status: SHIPPED | OUTPATIENT
Start: 2022-05-31 | End: 2022-01-01

## 2022-06-03 NOTE — PROGRESS NOTES
Addendum: seen today at [facility] clinic with family transport. Plan is to monitor for now and follow up with Dr. Cooper on Monday. No new orders.    S/P ORIF on 4/10/22 for left malleolar ankle fracture. She developed a wound/deheisced area on the lateral incision around 5/12/22. Keflex 500 mg po TID x 7 days was started from primary care NP with dressing orders. Appears the CAM boot was rubbing on various places to the foot. Ortho follow up visit on 5/25 and updated orders for full weight bearing in CAM boot as tolerated with transitioning  out of the boot in 3 weeks. Homecare visit of 6/1 and scab to area loosened and purulent drainage from open area now with visible screw. NP notified and ordered non-weight bearing, boot off and follow up with ortho. Updated orders from ortho of 6/2 to remove CAM boot and resident can weight bear without the boot,continue dressing orders. With ongoing drainage, pain and redness reported from nursing could need washout and additional antibiotics. Nursing reports open area is approximately 1cm round with some depth and 3cm of surrounding redness. Newer drainage has formed another scab over screw and open area. VSS.

## 2022-06-06 ENCOUNTER — PATIENT OUTREACH (OUTPATIENT)
Dept: CARE COORDINATION | Facility: CLINIC | Age: 87
End: 2022-06-06
Payer: COMMERCIAL

## 2022-06-06 ENCOUNTER — TELEPHONE (OUTPATIENT)
Dept: GERIATRICS | Facility: CLINIC | Age: 87
End: 2022-06-06
Payer: COMMERCIAL

## 2022-06-06 ENCOUNTER — TRANSFERRED RECORDS (OUTPATIENT)
Dept: HEALTH INFORMATION MANAGEMENT | Facility: CLINIC | Age: 87
End: 2022-06-06
Payer: COMMERCIAL

## 2022-06-06 NOTE — TELEPHONE ENCOUNTER
----- Message from Rebecca Ball RN sent at 6/6/2022  2:26 PM CDT -----  Regarding: RE: Catheter  Thanks for the update.  Continue to monitor for any trauma to the amena-area and any s/s of infection for UTI.  Update as needed.         bren Fernandez    ----- Message -----  From: Leyla Garcia RN  Sent: 6/6/2022   1:57 PM CDT  To: Re Ricketts  Subject: Catheter                                         S: Deleon Catheter  B:  Resident  A: Resident with chronic deleon catheter d/t no longer able to straight Cath self. Resident removed deleon catheter from bladder during overnight. Resident unaware that she removed deleon catheter during night. Writer placed new deleon catheter-16Fr without incident. 300cc urine drained into bag post placement. Balloon filled with 10cc NS. Resident tolerated placement. No complaints of pain/discomfort.  R: NP updated; Staff to resume switching leg bag/night bag; Cont POC

## 2022-06-06 NOTE — PROGRESS NOTES
S-(situation): TCU Discharge Progression    B-(background): Patient was discharged on 5/5/22 from [facility] TCU to home.    A-(assessment): Patient is followed by [facility] care coordination services.  Patient had follow-up with PCP as directed.  No new network navigation needs identified.    R-(recommendations/plan): RN Clinical Product Navigator will perform no further monitoring/outreaches at this time.    Melissa Behl BSN, RN, PHN, [facility]  RN Clinical Product Navigator  [phone]

## 2022-06-16 ENCOUNTER — ASSISTED LIVING VISIT (OUTPATIENT)
Dept: GERIATRICS | Facility: CLINIC | Age: 87
End: 2022-06-16
Payer: COMMERCIAL

## 2022-06-16 ENCOUNTER — TELEPHONE (OUTPATIENT)
Dept: GERIATRICS | Facility: CLINIC | Age: 87
End: 2022-06-16
Payer: COMMERCIAL

## 2022-06-16 VITALS
OXYGEN SATURATION: 96 % | DIASTOLIC BLOOD PRESSURE: 56 MMHG | WEIGHT: 146.6 LBS | HEIGHT: 65 IN | BODY MASS INDEX: 24.43 KG/M2 | SYSTOLIC BLOOD PRESSURE: 143 MMHG

## 2022-06-16 DIAGNOSIS — I48.91 ATRIAL FIBRILLATION, UNSPECIFIED TYPE (H): ICD-10-CM

## 2022-06-16 DIAGNOSIS — R52 PAIN: ICD-10-CM

## 2022-06-16 DIAGNOSIS — Z98.890 S/P ORIF (OPEN REDUCTION INTERNAL FIXATION) FRACTURE: ICD-10-CM

## 2022-06-16 DIAGNOSIS — T81.31XS POSTOPERATIVE WOUND DEHISCENCE, SEQUELA: ICD-10-CM

## 2022-06-16 DIAGNOSIS — Z87.81 S/P ORIF (OPEN REDUCTION INTERNAL FIXATION) FRACTURE: ICD-10-CM

## 2022-06-16 DIAGNOSIS — M85.811 OSTEOPENIA OF RIGHT SHOULDER: ICD-10-CM

## 2022-06-16 DIAGNOSIS — R60.0 PEDAL EDEMA: Primary | ICD-10-CM

## 2022-06-16 DIAGNOSIS — Z79.01 LONG TERM (CURRENT) USE OF ANTICOAGULANTS: ICD-10-CM

## 2022-06-16 DIAGNOSIS — I10 ESSENTIAL HYPERTENSION: ICD-10-CM

## 2022-06-16 RX ORDER — LIDOCAINE PAIN RELIEF 40 MG/1000MG
PATCH TOPICAL
Qty: 30 PATCH | Refills: 97 | Status: SHIPPED | OUTPATIENT
Start: 2022-06-16 | End: 2023-01-01

## 2022-06-16 NOTE — PROGRESS NOTES
[facility]  [place] Medical Record Number:  [id number]  Place of Service where encounter took place:  [facility] ([facility]) [[id number]]  Chief Complaint   Patient presents with     RECHECK     wound     INR RESULTS     Anticoagulation       HPI:    Maia Miranda  is a 89 year old (10/26/1932), who is being seen today for an episodic care visit.  HPI information obtained from: facility chart records, patient report and [facility] chart review. Today's concern is:    Seen today for new onset 2+ pitting pedal edema, increased pain, ongoing ankle wound and INR dosing. Sleeping on couch with feet elevated. Says she has some pain in her left ankle. Continues on scheduled Tylenol TID but has not used prn oxycodone. Unclear reason for new onset pedal edema-ortho updated.    Past Medical and Surgical History reviewed in Epic today.    MEDICATIONS:    Current Outpatient Medications   Medication Sig Dispense Refill     ACETAMINOPHEN EXTRA STRENGTH 500 MG tablet TAKE TWO TABLETS (1000MG) BY MOUTH THREE TIMES DAILY 540 tablet 97     carboxymethylcellulose (REFRESH LIQUIGEL) 1 % ophthalmic solution Place 1 drop into both eyes every morning As needed       cephALEXin (KEFLEX) 250 MG capsule TAKE 1 CAPSULE BY MOUTH ONCE DAILY 90 capsule 97     LIDOCAINE PAIN RELIEF 4 % Patch APPLY 1 PATCH TOPICALLY TO RIGHT SHOULDER ONCE DAILY (ON FOR 12 HOURS, OFF FOR 12 HOURS) 30 patch 97     lisinopril (ZESTRIL) 2.5 MG tablet TAKE 1 TABLET BY MOUTH ONCE DAILY 90 tablet 97     loperamide (IMODIUM A-D) 2 MG tablet Take 1 tablet (2 mg) by mouth 4 times daily as needed for diarrhea       menthol-zinc oxide (CALMOSEPTINE) 0.44-20.6 % OINT ointment Apply topically as needed for skin protection       metoprolol tartrate (LOPRESSOR) 25 MG tablet TAKE 1 TABLET BY MOUTH TWICE DAILY 180 tablet 97     ondansetron (ZOFRAN-ODT) 4 MG ODT tab Take 1 tablet (4 mg) by mouth every 6 hours as needed for nausea 30 tablet 11      "oxyCODONE (ROXICODONE) 5 MG tablet Take 2.5 mg by mouth every 4 hours as needed for severe pain       polyethylene glycol (MIRALAX) 17 g packet Take 1 packet by mouth daily as needed for constipation       rosuvastatin (CRESTOR) 5 MG tablet TAKE 1 TABLET BY MOUTH AT BEDTIME 90 tablet 97     SENNA-docusate sodium (SENNA S) 8.6-50 MG tablet Take 1 tablet by mouth At Bedtime. May also take 1 tablet 2 times daily as needed (constipation). 60 tablet 3     sodium chloride (PETER 128) 5 % ophthalmic ointment Place 2 Application (1 g) into both eyes At Bedtime       vitamin C (ASCORBIC ACID) 500 MG tablet TAKE 1 TABLET BY MOUTH TWICE DAILY 180 tablet 3     VITAMIN D3 50 MCG (2000 UT) tablet TAKE 1 TABLET BY MOUTH EVERY EVENING 90 tablet 97     warfarin ANTICOAGULANT (COUMADIN) 2 MG tablet Take 2 mg by mouth daily INR 1 mg Monday and Friday and 2 mg rest of days       REVIEW OF SYSTEMS:  Limited secondary to cognitive impairment but today pt reports ok    Objective:  BP (!) 143/56   Ht 1.651 m (5' 5\")   Wt 66.5 kg (146 lb 9.6 oz)   SpO2 96%   BMI 24.40 kg/m    Exam:  GENERAL APPEARANCE: alert, confused, pleasant   ENT:  Mouth and posterior oropharynx normal, dry mucous membranes  EYES:  EOM, conjunctivae, lids, pupils and irises normal, wears glasses  RESP:  lungs clear to auscultation but diminished throughout   CV:  regular rate and irregular rhythm, no murmur, rub, or gallop, trace edema--> 2+ LE pitting no compression, pedal edema to left foot now 2+   ABDOMEN:  no guarding or rebound, BS +  :    deleon catheter in place with leg bag, minimal straw color output i  M/S:   generalized weakness- 2WW, CAM boot  off  SKIN: lateral left ankle with scab over dehisce area of incision line, slight redness to surrounding tissue, no warmth, slight drainage  NEURO:   Cranial nerves 2-12 are normal tested and grossly at patient's baseline  PSYCH:  insight and judgement impaired, memory impaired SLUMS 9/30 and CPT 3.8/5.6    Labs: "   CBC RESULTS: Recent Labs   Lab Test 05/02/22  0903 04/29/22  0740   WBC 6.1 5.6   RBC 3.57* 3.45*   HGB 11.5* 11.1*   HCT 37.6 36.0   * 104*   MCH 32.2 32.2   MCHC 30.6* 30.8*   RDW 13.2 12.9    203       Last Basic Metabolic Panel:  Recent Labs   Lab Test 04/18/22  0843 04/14/22  0600 04/13/22  0553 04/12/22  0645 04/12/22  0312 04/11/22  0746     --   --   --   --  136   POTASSIUM 4.1 4.0   < >  --   --  5.0   CHLORIDE 106  --   --   --   --  108   CLARISSE 9.5  --   --   --   --  9.5   CO2 27  --   --   --   --  25   BUN 26  --   --   --   --  23   CR 1.01  --   --   --   --  0.94   *  --   --  113*   < > 142*    < > = values in this interval not displayed.       Liver Function Studies -   Recent Labs   Lab Test 04/10/22  0729 12/26/21  0236   PROTTOTAL 6.0* 5.8*   ALBUMIN 2.9* 2.6*   BILITOTAL 0.7 0.8   ALKPHOS 74 57   AST 23 27   ALT 21 20       TSH   Date Value Ref Range Status   02/26/2018 1.82 0.40 - 4.00 mU/L Final   10/05/2017 2.44 0.40 - 4.00 mU/L Final       No results found for: A1C    ASSESSMENT/PLAN:  (R60.0) Pedal edema   (Z98.890,  Z87.81) S/P ORIF (open reduction internal fixation) fracture    (T81.31XS) Postoperative wound dehiscence, sequela  (R52) Pain  Comment: new onset pedal edema-unknown etiology   Plan:   -following with ortho  -Updated Wound care to left lateral ankle from ortho. Cover with ABD and secure with ACE for protection against skin rubbing in CAM boot while in use but they want CAM boot off now  -Float left heel while resting in her room  -Tylenol 1,000 mg po TID  -oxycodone 2.5 mg po q4H prn (not used)  -vitamin D, calcium from dietary intake     (I48.91) Atrial fibrillation, unspecified type (H)  (Z79.01) Long term (current) use of anticoagulants  (I10) Essential Hypertension   Comment: INR today of 2.2 from 2.5. Few days in chart review of elevated BPs now resolved  Plan:   -Coumadin 1 mg po M/F and Coumadin 2 mg po AOD  -recheck INR 6/30/22  -ok to  discontinue 3x weekly BPs  -metoprolol 25 mg po daily   -lisinopril 2.5 mg po daily   -BMP periodically         Electronically signed by:  RAY Lopez CNP

## 2022-06-16 NOTE — LETTER
6/16/2022        RE: Maia Miranda  [facility]  [address]  [facility] [address]        [facility]  [place] Medical Record Number:  [id number]  Place of Service where encounter took place:  [facility] ([facility] [[id number]]  Chief Complaint   Patient presents with     RECHECK     wound     INR RESULTS     Anticoagulation       HPI:    Maia Miranda  is a 89 year old (10/26/1932), who is being seen today for an episodic care visit.  HPI information obtained from: facility chart records, patient report and [facility] chart review. Today's concern is:    Seen today for new onset 2+ pitting pedal edema, increased pain, ongoing ankle wound and INR dosing. Sleeping on couch with feet elevated. Says she has some pain in her left ankle. Continues on scheduled Tylenol TID but has not used prn oxycodone. Unclear reason for new onset pedal edema-ortho updated.    Past Medical and Surgical History reviewed in Epic today.    MEDICATIONS:    Current Outpatient Medications   Medication Sig Dispense Refill     ACETAMINOPHEN EXTRA STRENGTH 500 MG tablet TAKE TWO TABLETS (1000MG) BY MOUTH THREE TIMES DAILY 540 tablet 97     carboxymethylcellulose (REFRESH LIQUIGEL) 1 % ophthalmic solution Place 1 drop into both eyes every morning As needed       cephALEXin (KEFLEX) 250 MG capsule TAKE 1 CAPSULE BY MOUTH ONCE DAILY 90 capsule 97     LIDOCAINE PAIN RELIEF 4 % Patch APPLY 1 PATCH TOPICALLY TO RIGHT SHOULDER ONCE DAILY (ON FOR 12 HOURS, OFF FOR 12 HOURS) 30 patch 97     lisinopril (ZESTRIL) 2.5 MG tablet TAKE 1 TABLET BY MOUTH ONCE DAILY 90 tablet 97     loperamide (IMODIUM A-D) 2 MG tablet Take 1 tablet (2 mg) by mouth 4 times daily as needed for diarrhea       menthol-zinc oxide (CALMOSEPTINE) 0.44-20.6 % OINT ointment Apply topically as needed for skin protection       metoprolol tartrate (LOPRESSOR) 25 MG tablet TAKE 1 TABLET BY MOUTH TWICE DAILY 180 tablet 97      "ondansetron (ZOFRAN-ODT) 4 MG ODT tab Take 1 tablet (4 mg) by mouth every 6 hours as needed for nausea 30 tablet 11     oxyCODONE (ROXICODONE) 5 MG tablet Take 2.5 mg by mouth every 4 hours as needed for severe pain       polyethylene glycol (MIRALAX) 17 g packet Take 1 packet by mouth daily as needed for constipation       rosuvastatin (CRESTOR) 5 MG tablet TAKE 1 TABLET BY MOUTH AT BEDTIME 90 tablet 97     SENNA-docusate sodium (SENNA S) 8.6-50 MG tablet Take 1 tablet by mouth At Bedtime. May also take 1 tablet 2 times daily as needed (constipation). 60 tablet 3     sodium chloride (PETER 128) 5 % ophthalmic ointment Place 2 Application (1 g) into both eyes At Bedtime       vitamin C (ASCORBIC ACID) 500 MG tablet TAKE 1 TABLET BY MOUTH TWICE DAILY 180 tablet 3     VITAMIN D3 50 MCG (2000 UT) tablet TAKE 1 TABLET BY MOUTH EVERY EVENING 90 tablet 97     warfarin ANTICOAGULANT (COUMADIN) 2 MG tablet Take 2 mg by mouth daily INR 1 mg Monday and Friday and 2 mg rest of days       REVIEW OF SYSTEMS:  Limited secondary to cognitive impairment but today pt reports ok    Objective:  BP (!) 143/56   Ht 1.651 m (5' 5\")   Wt 66.5 kg (146 lb 9.6 oz)   SpO2 96%   BMI 24.40 kg/m    Exam:  GENERAL APPEARANCE: alert, confused, pleasant   ENT:  Mouth and posterior oropharynx normal, dry mucous membranes  EYES:  EOM, conjunctivae, lids, pupils and irises normal, wears glasses  RESP:  lungs clear to auscultation but diminished throughout   CV:  regular rate and irregular rhythm, no murmur, rub, or gallop, trace edema--> 2+ LE pitting no compression, pedal edema to left foot now 2+   ABDOMEN:  no guarding or rebound, BS +  :    deleon catheter in place with leg bag, minimal straw color output i  M/S:   generalized weakness- 2WW, CAM boot  off  SKIN: lateral left ankle with scab over dehisce area of incision line, slight redness to surrounding tissue, no warmth, slight drainage  NEURO:   Cranial nerves 2-12 are normal tested and " grossly at patient's baseline  PSYCH:  insight and judgement impaired, memory impaired SLUMS 9/30 and CPT 3.8/5.6    Labs:   CBC RESULTS: Recent Labs   Lab Test 05/02/22  0903 04/29/22  0740   WBC 6.1 5.6   RBC 3.57* 3.45*   HGB 11.5* 11.1*   HCT 37.6 36.0   * 104*   MCH 32.2 32.2   MCHC 30.6* 30.8*   RDW 13.2 12.9    203       Last Basic Metabolic Panel:  Recent Labs   Lab Test 04/18/22  0843 04/14/22  0600 04/13/22  0553 04/12/22  0645 04/12/22  0312 04/11/22  0746     --   --   --   --  136   POTASSIUM 4.1 4.0   < >  --   --  5.0   CHLORIDE 106  --   --   --   --  108   CLARISSE 9.5  --   --   --   --  9.5   CO2 27  --   --   --   --  25   BUN 26  --   --   --   --  23   CR 1.01  --   --   --   --  0.94   *  --   --  113*   < > 142*    < > = values in this interval not displayed.       Liver Function Studies -   Recent Labs   Lab Test 04/10/22  0729 12/26/21  0236   PROTTOTAL 6.0* 5.8*   ALBUMIN 2.9* 2.6*   BILITOTAL 0.7 0.8   ALKPHOS 74 57   AST 23 27   ALT 21 20       TSH   Date Value Ref Range Status   02/26/2018 1.82 0.40 - 4.00 mU/L Final   10/05/2017 2.44 0.40 - 4.00 mU/L Final       No results found for: A1C    ASSESSMENT/PLAN:  (R60.0) Pedal edema   (Z98.890,  Z87.81) S/P ORIF (open reduction internal fixation) fracture    (T81.31XS) Postoperative wound dehiscence, sequela  (R52) Pain  Comment: new onset pedal edema-unknown etiology   Plan:   -following with ortho  -Updated Wound care to left lateral ankle from ortho. Cover with ABD and secure with ACE for protection against skin rubbing in CAM boot while in use but they want CAM boot off now  -Float left heel while resting in her room  -Tylenol 1,000 mg po TID  -oxycodone 2.5 mg po q4H prn (not used)  -vitamin D, calcium from dietary intake     (I48.91) Atrial fibrillation, unspecified type (H)  (Z79.01) Long term (current) use of anticoagulants  (I10) Essential Hypertension   Comment: INR today of 2.2 from 2.5. Few days in chart  review of elevated BPs now resolved  Plan:   -Coumadin 1 mg po M/F and Coumadin 2 mg po AOD  -recheck INR 6/30/22  -ok to discontinue 3x weekly BPs  -metoprolol 25 mg po daily   -lisinopril 2.5 mg po daily   -BMP periodically         Electronically signed by:  RAY Lopez CNP                 Sincerely,        RAY Lopez CNP

## 2022-06-21 ENCOUNTER — LAB REQUISITION (OUTPATIENT)
Dept: LAB | Facility: CLINIC | Age: 87
End: 2022-06-21
Payer: COMMERCIAL

## 2022-06-21 DIAGNOSIS — D64.9 ANEMIA, UNSPECIFIED: ICD-10-CM

## 2022-06-22 ENCOUNTER — LAB REQUISITION (OUTPATIENT)
Dept: LAB | Facility: CLINIC | Age: 87
End: 2022-06-22
Payer: COMMERCIAL

## 2022-06-22 ENCOUNTER — TELEPHONE (OUTPATIENT)
Dept: GERIATRICS | Facility: CLINIC | Age: 87
End: 2022-06-22

## 2022-06-22 DIAGNOSIS — N39.0 URINARY TRACT INFECTION, SITE NOT SPECIFIED: ICD-10-CM

## 2022-06-22 DIAGNOSIS — D64.9 ANEMIA, UNSPECIFIED: ICD-10-CM

## 2022-06-22 LAB
ALBUMIN UR-MCNC: 10 MG/DL
AMORPH CRY #/AREA URNS HPF: ABNORMAL /HPF
ANION GAP SERPL CALCULATED.3IONS-SCNC: 4 MMOL/L (ref 3–14)
APPEARANCE UR: CLEAR
BACTERIA #/AREA URNS HPF: ABNORMAL /HPF
BASOPHILS # BLD AUTO: 0 10E3/UL (ref 0–0.2)
BASOPHILS NFR BLD AUTO: 1 %
BILIRUB UR QL STRIP: NEGATIVE
BUN SERPL-MCNC: 22 MG/DL (ref 7–30)
CALCIUM SERPL-MCNC: 9.1 MG/DL (ref 8.5–10.1)
CHLORIDE BLD-SCNC: 110 MMOL/L (ref 94–109)
CO2 SERPL-SCNC: 27 MMOL/L (ref 20–32)
COLOR UR AUTO: ABNORMAL
CREAT SERPL-MCNC: 1.08 MG/DL (ref 0.52–1.04)
EOSINOPHIL # BLD AUTO: 0.1 10E3/UL (ref 0–0.7)
EOSINOPHIL NFR BLD AUTO: 1 %
ERYTHROCYTE [DISTWIDTH] IN BLOOD BY AUTOMATED COUNT: 13.4 % (ref 10–15)
GFR SERPL CREATININE-BSD FRML MDRD: 49 ML/MIN/1.73M2
GLUCOSE BLD-MCNC: 132 MG/DL (ref 70–99)
GLUCOSE UR STRIP-MCNC: NEGATIVE MG/DL
HCT VFR BLD AUTO: 36.2 % (ref 35–47)
HGB BLD-MCNC: 11.2 G/DL (ref 11.7–15.7)
HGB UR QL STRIP: ABNORMAL
IMM GRANULOCYTES # BLD: 0 10E3/UL
IMM GRANULOCYTES NFR BLD: 1 %
KETONES UR STRIP-MCNC: NEGATIVE MG/DL
LEUKOCYTE ESTERASE UR QL STRIP: ABNORMAL
LYMPHOCYTES # BLD AUTO: 0.6 10E3/UL (ref 0.8–5.3)
LYMPHOCYTES NFR BLD AUTO: 11 %
MCH RBC QN AUTO: 32.7 PG (ref 26.5–33)
MCHC RBC AUTO-ENTMCNC: 30.9 G/DL (ref 31.5–36.5)
MCV RBC AUTO: 106 FL (ref 78–100)
MONOCYTES # BLD AUTO: 0.4 10E3/UL (ref 0–1.3)
MONOCYTES NFR BLD AUTO: 8 %
MUCOUS THREADS #/AREA URNS LPF: PRESENT /LPF
NEUTROPHILS # BLD AUTO: 4.1 10E3/UL (ref 1.6–8.3)
NEUTROPHILS NFR BLD AUTO: 78 %
NITRATE UR QL: NEGATIVE
NRBC # BLD AUTO: 0 10E3/UL
NRBC BLD AUTO-RTO: 0 /100
PH UR STRIP: 6 [PH] (ref 5–7)
PLATELET # BLD AUTO: 138 10E3/UL (ref 150–450)
POTASSIUM BLD-SCNC: 4.2 MMOL/L (ref 3.4–5.3)
RBC # BLD AUTO: 3.42 10E6/UL (ref 3.8–5.2)
RBC URINE: 4 /HPF
SODIUM SERPL-SCNC: 141 MMOL/L (ref 133–144)
SP GR UR STRIP: 1.01 (ref 1–1.03)
UROBILINOGEN UR STRIP-MCNC: NORMAL MG/DL
WBC # BLD AUTO: 5.2 10E3/UL (ref 4–11)
WBC URINE: >100 /HPF

## 2022-06-22 PROCEDURE — 85025 COMPLETE CBC W/AUTO DIFF WBC: CPT | Mod: ORL | Performed by: NURSE PRACTITIONER

## 2022-06-22 PROCEDURE — 87086 URINE CULTURE/COLONY COUNT: CPT | Mod: ORL | Performed by: NURSE PRACTITIONER

## 2022-06-22 PROCEDURE — 80048 BASIC METABOLIC PNL TOTAL CA: CPT | Mod: ORL | Performed by: NURSE PRACTITIONER

## 2022-06-22 PROCEDURE — 81001 URINALYSIS AUTO W/SCOPE: CPT | Mod: ORL | Performed by: NURSE PRACTITIONER

## 2022-06-22 NOTE — TELEPHONE ENCOUNTER
S-(situation): INR    B-(background):  Resident    A-(assessment): INR Result-1.9    R-(recommendations): Await order

## 2022-06-23 ENCOUNTER — ASSISTED LIVING VISIT (OUTPATIENT)
Dept: GERIATRICS | Facility: CLINIC | Age: 87
End: 2022-06-23
Payer: COMMERCIAL

## 2022-06-23 VITALS
DIASTOLIC BLOOD PRESSURE: 79 MMHG | OXYGEN SATURATION: 95 % | HEART RATE: 66 BPM | RESPIRATION RATE: 17 BRPM | BODY MASS INDEX: 23.99 KG/M2 | TEMPERATURE: 96.6 F | WEIGHT: 144 LBS | HEIGHT: 65 IN | SYSTOLIC BLOOD PRESSURE: 133 MMHG

## 2022-06-23 DIAGNOSIS — N93.9 VAGINAL BLEEDING: Primary | ICD-10-CM

## 2022-06-23 NOTE — LETTER
6/23/2022        RE: Maia [facility]  [address]  [facility] [address]        [facility]  [place] Medical Record Number:  [id number]  Place of Service where encounter took place:  [facility] ([facility] [[id number]]  Chief Complaint   Patient presents with     RECHECK       HPI:    Maia Miranda  is a 89 year old (10/26/1932), who is being seen today for an episodic care visit.  HPI information obtained from: facility chart records, facility staff and [facility] chart review. Today's concern is:    Resident with newer onset blood in brief, some noted clots and possible vaginal skin tear. Is on anticoagulation for a-fib. Does have hx of scratching, UTIs, chronic deleon, dementia that could be contributor. UA/UC with culture in progress and 10-50K gram negative bacilli. She denies dysuria. VSS, INR stable. Facility notes indicate she self removed catheter with balloon in place on 6/6 and also was exchanged 6/13 for brown color urine that resolved with exchange.    Past Medical and Surgical History reviewed in Epic today.    MEDICATIONS:    Current Outpatient Medications   Medication Sig Dispense Refill     ACETAMINOPHEN EXTRA STRENGTH 500 MG tablet TAKE TWO TABLETS (1000MG) BY MOUTH THREE TIMES DAILY 540 tablet 97     carboxymethylcellulose (REFRESH LIQUIGEL) 1 % ophthalmic solution Place 1 drop into both eyes every morning As needed       cephALEXin (KEFLEX) 250 MG capsule TAKE 1 CAPSULE BY MOUTH ONCE DAILY 90 capsule 97     LIDOCAINE PAIN RELIEF 4 % Patch APPLY 1 PATCH TOPICALLY TO RIGHT SHOULDER ONCE DAILY (ON FOR 12 HOURS, OFF FOR 12 HOURS) 30 patch 97     lisinopril (ZESTRIL) 2.5 MG tablet TAKE 1 TABLET BY MOUTH ONCE DAILY 90 tablet 97     loperamide (IMODIUM A-D) 2 MG tablet Take 1 tablet (2 mg) by mouth 4 times daily as needed for diarrhea       menthol-zinc oxide (CALMOSEPTINE) 0.44-20.6 % OINT ointment Apply topically as needed for skin  "protection       metoprolol tartrate (LOPRESSOR) 25 MG tablet TAKE 1 TABLET BY MOUTH TWICE DAILY 180 tablet 97     ondansetron (ZOFRAN-ODT) 4 MG ODT tab Take 1 tablet (4 mg) by mouth every 6 hours as needed for nausea 30 tablet 11     oxyCODONE (ROXICODONE) 5 MG tablet Take 2.5 mg by mouth every 4 hours as needed for severe pain       polyethylene glycol (MIRALAX) 17 g packet Take 1 packet by mouth daily as needed for constipation       rosuvastatin (CRESTOR) 5 MG tablet TAKE 1 TABLET BY MOUTH AT BEDTIME 90 tablet 97     SENNA-docusate sodium (SENNA S) 8.6-50 MG tablet Take 1 tablet by mouth At Bedtime. May also take 1 tablet 2 times daily as needed (constipation). 60 tablet 3     sodium chloride (PETER 128) 5 % ophthalmic ointment Place 2 Application (1 g) into both eyes At Bedtime       vitamin C (ASCORBIC ACID) 500 MG tablet TAKE 1 TABLET BY MOUTH TWICE DAILY 180 tablet 3     VITAMIN D3 50 MCG (2000 UT) tablet TAKE 1 TABLET BY MOUTH EVERY EVENING 90 tablet 97     warfarin ANTICOAGULANT (COUMADIN) 2 MG tablet Take 2 mg by mouth daily INR 1 mg Monday and Friday and 2 mg rest of days       REVIEW OF SYSTEMS:  Limited secondary to cognitive impairment but today pt reports ok    Objective:  /79   Pulse 66   Temp (!) 96.6  F (35.9  C)   Resp 17   Ht 1.651 m (5' 5\")   Wt 65.3 kg (144 lb)   SpO2 95%   BMI 23.96 kg/m    Exam:  GENERAL APPEARANCE: alert, confused, pleasant   ENT:  Mouth and posterior oropharynx normal, dry mucous membranes  EYES:  EOM, conjunctivae, lids, pupils and irises normal, wears glasses  RESP:  lungs clear to auscultation but diminished throughout   CV:  regular rate and irregular rhythm, no murmur, rub, or gallop, trace edema--> 1+ to LE, ankles, feet  ABDOMEN:  no guarding or rebound, BS +  :    deleon catheter in place with leg bag, minimal straw color output, scant darker blood on brief (small ), some on cath tubing and vaginal opening  M/S:   generalized weakness- " 2WW  SKIN: lateral left ankle with scab over dehisce area of incision line, no redness to surrounding tissue, no warmth, skin excoriation on right labia minora  NEURO:   Cranial nerves 2-12 are normal tested and grossly at patient's baseline  PSYCH:  insight and judgement impaired, memory impaired SLUMS 9/30 and CPT 3.8/5.6    Labs:   CBC RESULTS: Recent Labs   Lab Test 06/22/22  0900 05/02/22  0903   WBC 5.2 6.1   RBC 3.42* 3.57*   HGB 11.2* 11.5*   HCT 36.2 37.6   * 105*   MCH 32.7 32.2   MCHC 30.9* 30.6*   RDW 13.4 13.2   * 192       Last Basic Metabolic Panel:  Recent Labs   Lab Test 06/22/22  0900 04/18/22  0843    137   POTASSIUM 4.2 4.1   CHLORIDE 110* 106   CLARISSE 9.1 9.5   CO2 27 27   BUN 22 26   CR 1.08* 1.01   * 114*       Liver Function Studies -   Recent Labs   Lab Test 04/10/22  0729 12/26/21  0236   PROTTOTAL 6.0* 5.8*   ALBUMIN 2.9* 2.6*   BILITOTAL 0.7 0.8   ALKPHOS 74 57   AST 23 27   ALT 21 20       TSH   Date Value Ref Range Status   02/26/2018 1.82 0.40 - 4.00 mU/L Final   10/05/2017 2.44 0.40 - 4.00 mU/L Final     INR 6/22: 1.9    No results found for: A1C    ASSESSMENT/PLAN:  (N93.9) Vaginal bleeding  (primary encounter diagnosis)  Comment: skin excoriation, blood clots  Plan:   -UC in progress most  -suppressive cephalexin daily   -amena cares daily and prn   -tylenol for pain        Electronically signed by:  RAY Lopez CNP                 Sincerely,        RAY Lopez CNP

## 2022-06-25 ENCOUNTER — TELEPHONE (OUTPATIENT)
Dept: GERIATRICS | Facility: CLINIC | Age: 87
End: 2022-06-25

## 2022-06-25 LAB
BACTERIA UR CULT: ABNORMAL
BACTERIA UR CULT: ABNORMAL

## 2022-06-25 NOTE — TELEPHONE ENCOUNTER
UTI with catheter     0 Result Notes    Culture 10,000-50,000 CFU/mL Pseudomonas aeruginosa Abnormal        10,000-50,000 CFU/mL Proteus penneri Abnormal             Resulting Agency: [facility]       Susceptibility     Pseudomonas aeruginosa Proteus penneri     MAGDIEL MAGDIEL     Amikacin <=2 ug/mL Susceptible       Ampicillin   >=32 ug/mL Resistant     Ampicillin/ Sulbactam   8 ug/mL Susceptible     Cefazolin   >=64 ug/mL Resistant 1     Cefepime <=1 ug/mL Susceptible <=1 ug/mL Susceptible     Cefoxitin   <=4 ug/mL Susceptible     Ceftazidime 4 ug/mL Susceptible <=1 ug/mL Susceptible     Ceftriaxone   <=1 ug/mL Susceptible     Ciprofloxacin <=0.25 ug/mL Susceptible <=0.25 ug/mL Susceptible     Gentamicin <=1 ug/mL Susceptible <=1 ug/mL Susceptible     Levofloxacin 0.5 ug/mL Susceptible <=0.12 ug/mL Susceptible     Meropenem <=0.25 ug/mL Susceptible       Nitrofurantoin   128 ug/mL Resistant 2     Piperacillin/Tazobactam 8 ug/mL Susceptible <=4 ug/mL Susceptible     Tobramycin <=1 ug/mL Susceptible <=1 ug/mL Susceptible     Trimethoprim/Sulfamethoxazole   <=1/19 ug/mL Susceptible                   Orders:  Cipro 500mg po BID for 5 days  Hold the prophylactic ABX  INR on Monday due to coumadin use    Electronically signed by Bernadine Rust RN, CNP

## 2022-06-27 ENCOUNTER — TELEPHONE (OUTPATIENT)
Dept: GERIATRICS | Facility: CLINIC | Age: 87
End: 2022-06-27

## 2022-07-07 ENCOUNTER — ASSISTED LIVING VISIT (OUTPATIENT)
Dept: GERIATRICS | Facility: CLINIC | Age: 87
End: 2022-07-07
Payer: COMMERCIAL

## 2022-07-07 VITALS
OXYGEN SATURATION: 98 % | DIASTOLIC BLOOD PRESSURE: 69 MMHG | RESPIRATION RATE: 18 BRPM | TEMPERATURE: 97.3 F | WEIGHT: 144.8 LBS | HEART RATE: 81 BPM | HEIGHT: 65 IN | SYSTOLIC BLOOD PRESSURE: 121 MMHG | BODY MASS INDEX: 24.12 KG/M2

## 2022-07-07 DIAGNOSIS — Z51.81 ENCOUNTER FOR THERAPEUTIC DRUG MONITORING: ICD-10-CM

## 2022-07-07 DIAGNOSIS — I48.91 ATRIAL FIBRILLATION, UNSPECIFIED TYPE (H): Primary | ICD-10-CM

## 2022-07-07 DIAGNOSIS — Z79.01 LONG TERM (CURRENT) USE OF ANTICOAGULANTS: ICD-10-CM

## 2022-07-07 NOTE — PROGRESS NOTES
"[facility]  [place] Medical Record Number:  [id number]  Place of Service where encounter took place: [facility]  [facility] ([facility]) [[id number]]    HPI:    Maia Miranda is a 89 year old  (10/26/1932), who is being seen today for an episodic care visit at the above location. Today's concern is INR/Coumadin management for A. Fib/Hx of PE    ROS/Subjective:  Bleeding Signs/Symptoms:  None  Thromboembolic Signs/Symptoms:  None  Medication Changes:  No  Dietary Changes:  No  Activity Changes: No  Bacterial/Viral Infection:  No  Missed Coumadin Doses:  None  On ASA: No  Other Concerns:  No    OBJECTIVE:  /69   Pulse 81   Temp 97.3  F (36.3  C)   Resp 18   Ht 1.651 m (5' 5\")   Wt 65.7 kg (144 lb 12.8 oz)   SpO2 98%   BMI 24.10 kg/m    Last INR: 1.9 on 6/27/22  INR Today:  1.8  Current Dose:  Coumadin 1mg M/F and 2mg AOD      ASSESSMENT:  (I48.91) Atrial fibrillation, unspecified type (H)  (primary encounter diagnosis)  (Z79.01) Long term (current) use of anticoagulants  (Z51.81) Encounter for therapeutic drug monitoring    Subtherapeutic INR for goal of 2-3    PLAN/ORDERS:   New Dose: Coumadin 2 mg po daily   Next INR: 7/21/22      Electronically signed by:  RAY Lopez CNP     "

## 2022-07-07 NOTE — LETTER
"    7/7/2022        RE: Maia Miranda  [facility]  [address]  [facility] [address]        [facility] Medical Record Number:  [id number]  Place of Service where encounter took place: [facility]  [facility] ([facility]) [[id number]]    HPI:    Maia Miranda is a 89 year old  (10/26/1932), who is being seen today for an episodic care visit at the above location. Today's concern is INR/Coumadin management for A. Fib/Hx of PE    ROS/Subjective:  Bleeding Signs/Symptoms:  None  Thromboembolic Signs/Symptoms:  None  Medication Changes:  No  Dietary Changes:  No  Activity Changes: No  Bacterial/Viral Infection:  No  Missed Coumadin Doses:  None  On ASA: No  Other Concerns:  No    OBJECTIVE:  /69   Pulse 81   Temp 97.3  F (36.3  C)   Resp 18   Ht 1.651 m (5' 5\")   Wt 65.7 kg (144 lb 12.8 oz)   SpO2 98%   BMI 24.10 kg/m    Last INR: 1.9 on 6/27/22  INR Today:  1.8  Current Dose:  Coumadin 1mg M/F and 2mg AOD      ASSESSMENT:  (I48.91) Atrial fibrillation, unspecified type (H)  (primary encounter diagnosis)  (Z79.01) Long term (current) use of anticoagulants  (Z51.81) Encounter for therapeutic drug monitoring    Subtherapeutic INR for goal of 2-3    PLAN/ORDERS:   New Dose: Coumadin 2 mg po daily   Next INR: 7/21/22      Electronically signed by:  RAY Lopez CNP           Sincerely,        RAY Lopez CNP      "

## 2022-07-12 ENCOUNTER — PATIENT OUTREACH (OUTPATIENT)
Dept: GERIATRIC MEDICINE | Facility: CLINIC | Age: 87
End: 2022-07-12

## 2022-07-12 NOTE — PROGRESS NOTES
[facility] [employer] Medicare Initial enrollment    Member was assigned to [facility] for [employer] Medicare Case Management on: 3-1-22    Sahara LUBIN   [facility] Care Coordinator   [phone] - work cell phone   [phone] - work fax

## 2022-07-12 NOTE — PROGRESS NOTES
4-9-22    [facility] Open to Case Management Assessment - [facility] Medicare (Assisted Living)    Member identified and opened to case management per [facility] protocol.    PCP: Remy Hernandez  PCC: [facility]  Living arrangement: Assisted Living: 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 Warner, [facility] yearly     Esophageal reflux 2001    Abstracted 06/10/02     Essential and other specified forms of tremor 2004    resting tremor     Generalized osteoarthrosis, unspecified site     Hands     Hiatal hernia     diagnosed late 1990s [place], [place]     History of pulmonary embolism 1971    no source found     LACTOSE INTOLERANCE      Lumbago     sees Kodi Guidry     Mitral valve regurgitation      Occlusion and stenosis of carotid artery without mention of cerebral infarction 2003    CAROTID US NORMAL, bruit in neck     Osteoporosis, unspecified 2003    T = -2.66     Paroxysmal atrial fibrillation (H) 11/15/2013     Spider veins      Unspecified essential hypertension      Unspecified tinnitus 2005    mild hearing loss, saw ENT       Processes completed: Completed transitions of care log. Followed up with facilities as needed/requested. Care Plan initiated and will be open to case management for 3 to 6 months for follow up.     Follow Up: EPIC notes reviewed, call placed to AL facility for updates as needed.  UFS POC  initiated, goals reviewed and updated.    Care Coordination Initial Assessment    Identity verified    Utilization:   ED visits in  last year: 1  Hospital Admits in last year: 1- fall resulting in ankle fracture        Current Medical Health Concerns:    Closed trimalleolar fracture of left ankle, initial encounter     Fall, initial encounter        Plan:  CC will open to CM and f/u as needed.   Sahara Robledo MA [facility] Care Coordinator   [phone] - work cell phone   [phone] - vpkj fax

## 2022-07-12 NOTE — PROGRESS NOTES
[facility] Medicare Close to Case Management Assessment- [facility] Medicare (Assisted Living)    Member has returned back to the AL facility but is now in the memory care unit for a higher level of care and staffing that she needs.  She continues to see the on site care team and CC will be available to assist NP, family and member as needed.   Sahara Robledo MA [facility] Care Coordinator   [phone] - work cell phone   [phone] - gjsa fax

## 2022-07-21 ENCOUNTER — ASSISTED LIVING VISIT (OUTPATIENT)
Dept: GERIATRICS | Facility: CLINIC | Age: 87
End: 2022-07-21
Payer: COMMERCIAL

## 2022-07-21 VITALS
BODY MASS INDEX: 23.53 KG/M2 | SYSTOLIC BLOOD PRESSURE: 141 MMHG | TEMPERATURE: 97.9 F | HEART RATE: 72 BPM | DIASTOLIC BLOOD PRESSURE: 73 MMHG | HEIGHT: 65 IN | OXYGEN SATURATION: 98 % | RESPIRATION RATE: 18 BRPM | WEIGHT: 141.2 LBS

## 2022-07-21 DIAGNOSIS — S82.852S CLOSED TRIMALLEOLAR FRACTURE OF LEFT ANKLE, SEQUELA: Primary | ICD-10-CM

## 2022-07-21 DIAGNOSIS — Z97.8 CHRONIC INDWELLING FOLEY CATHETER: ICD-10-CM

## 2022-07-21 DIAGNOSIS — R52 PAIN: ICD-10-CM

## 2022-07-21 DIAGNOSIS — Z79.01 LONG TERM (CURRENT) USE OF ANTICOAGULANTS: ICD-10-CM

## 2022-07-21 DIAGNOSIS — I48.91 ATRIAL FIBRILLATION, UNSPECIFIED TYPE (H): ICD-10-CM

## 2022-07-21 NOTE — LETTER
7/21/2022        RE: Maia Miranda  [facility]  [address]  [facility] [address]        [facility]  [place] Medical Record Number:  [id number]  Place of Service where encounter took place:  [facility] [[id number]]  Chief Complaint   Patient presents with     RECHECK     INR RESULTS     Anticoagulation       HPI:    Maia Miranda  is a 89 year old (10/26/1932), who is being seen today for an episodic care visit.  HPI information obtained from: facility chart records, facility staff, patient report and [facility] chart review. Today's concern is:    Seen today for coumadin dosing, recheck of left ankle, chronic deleon. She is elevating legs, resting. Denies pain, constipation. Wondering about her cat and the mouse it brought into the house hoping her kids took care of it?     Past Medical and Surgical History reviewed in Epic today.    MEDICATIONS:    Current Outpatient Medications   Medication Sig Dispense Refill     ACETAMINOPHEN EXTRA STRENGTH 500 MG tablet TAKE TWO TABLETS (1000MG) BY MOUTH THREE TIMES DAILY 540 tablet 97     carboxymethylcellulose (REFRESH LIQUIGEL) 1 % ophthalmic solution Place 1 drop into both eyes every morning As needed       cephALEXin (KEFLEX) 250 MG capsule TAKE 1 CAPSULE BY MOUTH ONCE DAILY 90 capsule 97     LIDOCAINE PAIN RELIEF 4 % Patch APPLY 1 PATCH TOPICALLY TO RIGHT SHOULDER ONCE DAILY (ON FOR 12 HOURS, OFF FOR 12 HOURS) 30 patch 97     lisinopril (ZESTRIL) 2.5 MG tablet TAKE 1 TABLET BY MOUTH ONCE DAILY 90 tablet 97     loperamide (IMODIUM A-D) 2 MG tablet Take 1 tablet (2 mg) by mouth 4 times daily as needed for diarrhea       menthol-zinc oxide (CALMOSEPTINE) 0.44-20.6 % OINT ointment Apply topically as needed for skin protection       metoprolol tartrate (LOPRESSOR) 25 MG tablet TAKE 1 TABLET BY MOUTH TWICE DAILY 180 tablet 97     ondansetron (ZOFRAN-ODT) 4 MG ODT tab Take 1 tablet (4 mg) by mouth every  "6 hours as needed for nausea 30 tablet 11     polyethylene glycol (MIRALAX) 17 g packet Take 1 packet by mouth daily as needed for constipation       rosuvastatin (CRESTOR) 5 MG tablet TAKE 1 TABLET BY MOUTH AT BEDTIME 90 tablet 97     SENNA-docusate sodium (SENNA S) 8.6-50 MG tablet Take 1 tablet by mouth At Bedtime. May also take 1 tablet 2 times daily as needed (constipation). 60 tablet 3     sodium chloride (PEETR 128) 5 % ophthalmic ointment Place 2 Application (1 g) into both eyes At Bedtime       vitamin C (ASCORBIC ACID) 500 MG tablet TAKE 1 TABLET BY MOUTH TWICE DAILY 180 tablet 3     VITAMIN D3 50 MCG (2000 UT) tablet TAKE 1 TABLET BY MOUTH EVERY EVENING 90 tablet 97     warfarin ANTICOAGULANT (COUMADIN) 2 MG tablet Take 2 mg by mouth daily INR 1 mg Monday and Friday and 2 mg rest of days       REVIEW OF SYSTEMS:  Limited secondary to cognitive impairment but today pt reports ok    Objective:  BP (!) 141/73   Pulse 72   Temp 97.9  F (36.6  C)   Resp 18   Ht 1.651 m (5' 5\")   Wt 64 kg (141 lb 3.2 oz)   SpO2 98%   BMI 23.50 kg/m    Exam:  GENERAL APPEARANCE: alert, confused, pleasant   ENT:  Mouth and posterior oropharynx normal, dry mucous membranes  EYES:  EOM, conjunctivae, lids, pupils and irises normal, wears glasses  RESP:  lungs clear to auscultation but diminished throughout   CV:  regular rate and irregular rhythm, no murmur, rub, or gallop, trace edema  ABDOMEN:  no guarding or rebound, BS +  :    deleon catheter in place with leg bag, minimal straw color output   M/S:   generalized weakness- 2WW  SKIN: lateral left ankle with scab over dehisce area of incision line, no redness to surrounding tissue, no warmth, slight drainage under scab  NEURO:   Cranial nerves 2-12 are normal tested and grossly at patient's baseline  PSYCH:  insight and judgement impaired, memory impaired SLUMS 9/30 and CPT 3.8/5.6    Labs:   CBC RESULTS: Recent Labs   Lab Test 06/22/22  0900 05/02/22  0903   WBC 5.2 6.1 "   RBC 3.42* 3.57*   HGB 11.2* 11.5*   HCT 36.2 37.6   * 105*   MCH 32.7 32.2   MCHC 30.9* 30.6*   RDW 13.4 13.2   * 192       Last Basic Metabolic Panel:  Recent Labs   Lab Test 06/22/22  0900 04/18/22  0843    137   POTASSIUM 4.2 4.1   CHLORIDE 110* 106   CLARISSE 9.1 9.5   CO2 27 27   BUN 22 26   CR 1.08* 1.01   * 114*       Liver Function Studies -   Recent Labs   Lab Test 04/10/22  0729 12/26/21  0236   PROTTOTAL 6.0* 5.8*   ALBUMIN 2.9* 2.6*   BILITOTAL 0.7 0.8   ALKPHOS 74 57   AST 23 27   ALT 21 20       TSH   Date Value Ref Range Status   02/26/2018 1.82 0.40 - 4.00 mU/L Final   10/05/2017 2.44 0.40 - 4.00 mU/L Final       No results found for: A1C    ASSESSMENT/PLAN:  (S82.852S) Closed trimalleolar fracture of left ankle, sequela  (primary encounter diagnosis)  (R52) Pain  Comment: stable  Plan:   -oxycodone discontinued with no use  -tylenol 1000 mg po TID  -lidocaine for shoulder pain     (I48.91) Atrial fibrillation, unspecified type (H)  (Z79.01) Long term (current) use of anticoagulants  Comment: INR was 1.8 on 7/7 and today 2.5  Plan:   -Coumadin 2 mg po daily with previous dose of Coumadin 1mg M/F and 2mg AOD.   -INR recheck next week.     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

## 2022-07-21 NOTE — PROGRESS NOTES
[facility]  [place] [facility] Record Number:  [id number]  Place of Service where encounter took place:  [facility] [[id number]]  Chief Complaint   Patient presents with     RECHECK     INR RESULTS     Anticoagulation       HPI:    Maia Miranda  is a 89 year old (10/26/1932), who is being seen today for an episodic care visit.  HPI information obtained from: facility chart records, facility staff, patient report and [facility] chart review. Today's concern is:    Seen today for coumadin dosing, recheck of left ankle, chronic deleon. She is elevating legs, resting. Denies pain, constipation. Wondering about her cat and the mouse it brought into the house hoping her kids took care of it?     Past Medical and Surgical History reviewed in Epic today.    MEDICATIONS:    Current Outpatient Medications   Medication Sig Dispense Refill     ACETAMINOPHEN EXTRA STRENGTH 500 MG tablet TAKE TWO TABLETS (1000MG) BY MOUTH THREE TIMES DAILY 540 tablet 97     carboxymethylcellulose (REFRESH LIQUIGEL) 1 % ophthalmic solution Place 1 drop into both eyes every morning As needed       cephALEXin (KEFLEX) 250 MG capsule TAKE 1 CAPSULE BY MOUTH ONCE DAILY 90 capsule 97     LIDOCAINE PAIN RELIEF 4 % Patch APPLY 1 PATCH TOPICALLY TO RIGHT SHOULDER ONCE DAILY (ON FOR 12 HOURS, OFF FOR 12 HOURS) 30 patch 97     lisinopril (ZESTRIL) 2.5 MG tablet TAKE 1 TABLET BY MOUTH ONCE DAILY 90 tablet 97     loperamide (IMODIUM A-D) 2 MG tablet Take 1 tablet (2 mg) by mouth 4 times daily as needed for diarrhea       menthol-zinc oxide (CALMOSEPTINE) 0.44-20.6 % OINT ointment Apply topically as needed for skin protection       metoprolol tartrate (LOPRESSOR) 25 MG tablet TAKE 1 TABLET BY MOUTH TWICE DAILY 180 tablet 97     ondansetron (ZOFRAN-ODT) 4 MG ODT tab Take 1 tablet (4 mg) by mouth every 6 hours as needed for nausea 30 tablet 11     polyethylene glycol (MIRALAX) 17 g packet Take 1 packet by mouth daily  "as needed for constipation       rosuvastatin (CRESTOR) 5 MG tablet TAKE 1 TABLET BY MOUTH AT BEDTIME 90 tablet 97     SENNA-docusate sodium (SENNA S) 8.6-50 MG tablet Take 1 tablet by mouth At Bedtime. May also take 1 tablet 2 times daily as needed (constipation). 60 tablet 3     sodium chloride (PETER 128) 5 % ophthalmic ointment Place 2 Application (1 g) into both eyes At Bedtime       vitamin C (ASCORBIC ACID) 500 MG tablet TAKE 1 TABLET BY MOUTH TWICE DAILY 180 tablet 3     VITAMIN D3 50 MCG (2000 UT) tablet TAKE 1 TABLET BY MOUTH EVERY EVENING 90 tablet 97     warfarin ANTICOAGULANT (COUMADIN) 2 MG tablet Take 2 mg by mouth daily INR 1 mg Monday and Friday and 2 mg rest of days       REVIEW OF SYSTEMS:  Limited secondary to cognitive impairment but today pt reports ok    Objective:  BP (!) 141/73   Pulse 72   Temp 97.9  F (36.6  C)   Resp 18   Ht 1.651 m (5' 5\")   Wt 64 kg (141 lb 3.2 oz)   SpO2 98%   BMI 23.50 kg/m    Exam:  GENERAL APPEARANCE: alert, confused, pleasant   ENT:  Mouth and posterior oropharynx normal, dry mucous membranes  EYES:  EOM, conjunctivae, lids, pupils and irises normal, wears glasses  RESP:  lungs clear to auscultation but diminished throughout   CV:  regular rate and irregular rhythm, no murmur, rub, or gallop, trace edema  ABDOMEN:  no guarding or rebound, BS +  :    deleon catheter in place with leg bag, minimal straw color output   M/S:   generalized weakness- 2WW  SKIN: lateral left ankle with scab over dehisce area of incision line, no redness to surrounding tissue, no warmth, slight drainage under scab  NEURO:   Cranial nerves 2-12 are normal tested and grossly at patient's baseline  PSYCH:  insight and judgement impaired, memory impaired SLUMS 9/30 and CPT 3.8/5.6    Labs:   CBC RESULTS: Recent Labs   Lab Test 06/22/22  0900 05/02/22  0903   WBC 5.2 6.1   RBC 3.42* 3.57*   HGB 11.2* 11.5*   HCT 36.2 37.6   * 105*   MCH 32.7 32.2   MCHC 30.9* 30.6*   RDW 13.4 13.2 "   * 192       Last Basic Metabolic Panel:  Recent Labs   Lab Test 06/22/22  0900 04/18/22  0843    137   POTASSIUM 4.2 4.1   CHLORIDE 110* 106   CLARISSE 9.1 9.5   CO2 27 27   BUN 22 26   CR 1.08* 1.01   * 114*       Liver Function Studies -   Recent Labs   Lab Test 04/10/22  0729 12/26/21  0236   PROTTOTAL 6.0* 5.8*   ALBUMIN 2.9* 2.6*   BILITOTAL 0.7 0.8   ALKPHOS 74 57   AST 23 27   ALT 21 20       TSH   Date Value Ref Range Status   02/26/2018 1.82 0.40 - 4.00 mU/L Final   10/05/2017 2.44 0.40 - 4.00 mU/L Final       No results found for: A1C    ASSESSMENT/PLAN:  (S82.852F) Closed trimalleolar fracture of left ankle, sequela  (primary encounter diagnosis)  (R52) Pain  Comment: stable  Plan:   -oxycodone discontinued with no use  -tylenol 1000 mg po TID  -lidocaine for shoulder pain     (I48.91) Atrial fibrillation, unspecified type (H)  (Z79.01) Long term (current) use of anticoagulants  Comment: INR was 1.8 on 7/7 and today 2.5  Plan:   -Coumadin 2 mg po daily with previous dose of Coumadin 1mg M/F and 2mg AOD.   -INR recheck next week.     (Z97.8) Chronic indwelling Guillen catheter  Comment: treated for growth 6/25/22  Plan:   -continue current plan of care  -on keflex/vitamin C per urology       Electronically signed by:  RAY Lopez CNP

## 2022-07-21 NOTE — LETTER
Maia Miranda new orders    1. Discontinue INR recheck of  8/11/22  2. Recheck INR on  7/28/22    RAY Lopez CNP on 7/22/2022 at 9:16 AM

## 2022-07-21 NOTE — LETTER
Maia Miranda orders     1. Discontinue oxycodone     Remy Hernandez, RAY CNP on 7/21/2022 at 2:16 PM

## 2022-07-26 ENCOUNTER — TELEPHONE (OUTPATIENT)
Dept: GERIATRICS | Facility: CLINIC | Age: 87
End: 2022-07-26

## 2022-07-26 NOTE — PROGRESS NOTES
CC was informed that member moved back to [facility] from her stay at the [facility].  CC will keep open to CM to see if there are any concerns or questions as Maia transitions back to the Assisted living.    CC will have Dr. Hernandez reach out to CC if there are any concerns.   Sahara Robledo MA [facility] Care Coordinator   [phone] - work cell phone   [phone] - kqhv fax

## 2022-07-26 NOTE — TELEPHONE ENCOUNTER
----- Message from Rebecca Ball RN sent at 7/26/2022  1:34 PM CDT -----  Regarding: RE: skin fold redness  Please start Nystatin powder 100,000 units and apply to affected areas BID until healed.  Once healed change order to BID PRN for future yeast issues.      bren Fernandez  ----- Message -----  From: Leyla Garcia RN  Sent: 7/26/2022   1:28 PM CDT  To: Remy Hernandez, APRN [facility], [facility]  Subject: skin fold redness                                S: Redness in breast fold  B:  Resident  A: Resident with new area of redness under left breast fold. No drainage, weeping, or bleeding present. Area is irritated in appearance-bright red in color; resident c/o itching and discomfort. No odor present. Right breast fold check-no redness present. Abdominal fold assessed-no conerns. Resident with no current orders for nystatin powder or treatment for fold. NP updated with request for nystatin powder until healed then as needed.   R: Await order.

## 2022-07-28 ENCOUNTER — ASSISTED LIVING VISIT (OUTPATIENT)
Dept: GERIATRICS | Facility: CLINIC | Age: 87
End: 2022-07-28
Payer: COMMERCIAL

## 2022-07-28 VITALS
RESPIRATION RATE: 16 BRPM | HEART RATE: 90 BPM | BODY MASS INDEX: 24.89 KG/M2 | OXYGEN SATURATION: 98 % | HEIGHT: 65 IN | WEIGHT: 149.4 LBS | SYSTOLIC BLOOD PRESSURE: 120 MMHG | TEMPERATURE: 96.8 F | DIASTOLIC BLOOD PRESSURE: 60 MMHG

## 2022-07-28 DIAGNOSIS — I48.91 ATRIAL FIBRILLATION, UNSPECIFIED TYPE (H): Primary | ICD-10-CM

## 2022-07-28 DIAGNOSIS — I10 ESSENTIAL HYPERTENSION: ICD-10-CM

## 2022-07-28 DIAGNOSIS — S82.852S CLOSED TRIMALLEOLAR FRACTURE OF LEFT ANKLE, SEQUELA: ICD-10-CM

## 2022-07-28 DIAGNOSIS — Z79.01 LONG TERM (CURRENT) USE OF ANTICOAGULANTS: ICD-10-CM

## 2022-07-28 DIAGNOSIS — T84.498S INTERNAL FIXATION DEVICE (PIN, ROD, OR SCREW) MECHANICAL COMPLICATION, SEQUELA: ICD-10-CM

## 2022-07-28 DIAGNOSIS — Z97.8 CHRONIC INDWELLING FOLEY CATHETER: ICD-10-CM

## 2022-07-28 NOTE — LETTER
New orders for Maia Miranda    1. [facility] (Dr. Cooper and his Physician Assistant [facility]) clinic in [place] is [phone]. Per ortho (PA) they would like to avoid bringing her back to the OR. Will continue to monitor for now in hopes of new scab formation in 3-5 days with ongoing healing. If scab does not form or with s/s of infection should follow up in clinic with Dr. Cooper.  2. Follow previous orders for wound care on this area from ortho until healed.    Remy Hernandez, RAY CNP on 7/28/2022 at 9:49 PM

## 2022-07-28 NOTE — PROGRESS NOTES
[facility]  Chief Complaint   Patient presents with     Annual Comprehensive Exam Assisted Living     INR Followup     [place] Medical Record Number:  [id number]  Place of Service where encounter took place:  [facility] [[id number]]    HPI:    Maia Miranda  is a 89 year old  (10/26/1932), who is being seen today for an annual comprehensive visit. HPI information obtained from: facility chart records, facility staff, patient report and [facility] Epic chart review.  Today's concerns are:    Seen today for follow up to INR dosing and chronic disease management. Facility chart reviewed. Started on nystatin for reddened yeast in skin folds under left breast. Spends much time in her room. Appears content. Nonsensical at times with conversation but not agitated or upset. Chronic deleon managed by homecare. Coumadin for atrial fibrillation/hx of PE. Chronic scabbed  area of lateral left ankle from ORIF left ankle 4/10/22- drainage at times when scab is disturbed. Ortho managing. Weight stable, VSS.     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, perforation, or obstruction (1974), Depressive disorder, not elsewhere classified (2003), EROSIVE EYE DISORDER (1994), Esophageal reflux (2001), Essential and other specified forms of tremor (2004), Generalized osteoarthrosis, unspecified site, Hiatal hernia, History of pulmonary embolism (1971), LACTOSE INTOLERANCE, Lumbago, Mitral valve regurgitation, Occlusion and stenosis of carotid artery without mention of cerebral infarction (2003), Osteoporosis, unspecified (2003), Paroxysmal atrial fibrillation (H) (11/15/2013), Spider veins, Unspecified essential hypertension, and Unspecified tinnitus (2005).    She has no  past medical history of Asymptomatic human immunodeficiency virus (HIV) infection status (H), Cerebral palsy (H), Complication of anesthesia, Congenital renal agenesis and dysgenesis, Goiter, Gout, Hernia, abdominal, History of spinal cord injury, History of thrombophlebitis, Horseshoe kidney, Hydrocephalus (H), Malignant hyperthermia, Mumps, Palpitations, Parkinsons disease (H), PONV (postoperative nausea and vomiting), Spina bifida (H), STD (sexually transmitted disease), Tethered cord (H), or Tuberculosis.  PAST SURGICAL HISTORY:  has a past surgical history that includes NONSPECIFIC PROCEDURE; NONSPECIFIC PROCEDURE; NONSPECIFIC PROCEDURE (1958); NONSPECIFIC PROCEDURE (1971); NONSPECIFIC PROCEDURE (1/03); Esophagoscopy, gastroscopy, duodenoscopy (EGD), combined (7/8/2013); corneal scraping; cataract iol, rt/lt; Cystoscopy, biopsy bladder, combined (N/A, 7/25/2016); Implant stimulator sacral nerve stage one (N/A, 4/4/2017); interstim placement; Implant stimulator sacral nerve stage two (N/A, 4/18/2017); Cystoscopy; and Open reduction internal fixation ankle (Left, 4/10/2022).  IMMUNIZATIONS:  Immunization History   Administered Date(s) Administered     COVID-19,PF,Pfizer (12+ Yrs) 02/17/2021, 03/10/2021, 10/19/2021     Influenza (H1N1) 02/05/2010     Influenza (High Dose) 3 valent vaccine 10/27/2010, 10/21/2013, 10/14/2015, 09/24/2016, 11/03/2017, 10/15/2018, 10/28/2019, 10/19/2021     Influenza (IIV3) PF 11/16/2000, 10/01/2005, 10/15/2008, 10/05/2009, 10/06/2011, 09/27/2012, 11/01/2012     Influenza Vaccine IM > 6 months Valent IIV4 (Alfuria,Fluzone) 10/27/2014     Influenza, Quad, High Dose, Pf, 65yr+ (Fluzone HD) 10/06/2020, 10/19/2021     Pneumo Conj 13-V (2010&after) 05/15/2015     Pneumococcal 23 valent 02/11/1999     TD (ADULT, 7+) 01/14/2008, 12/22/2019     TDAP Vaccine (Adacel) 11/05/2013     Tetanus 01/01/1992     Zoster vaccine, live 02/07/2012     Above immunizations pulled from [facility]. SUNIL and  facility records also reconciled. Outstanding information sent to  to update [facility].  Future immunizations are not needed at this point as all recommended immunizations are up to date.     Current Outpatient Medications   Medication Sig Dispense Refill     ACETAMINOPHEN EXTRA STRENGTH 500 MG tablet TAKE TWO TABLETS (1000MG) BY MOUTH THREE TIMES DAILY 540 tablet 97     carboxymethylcellulose (REFRESH LIQUIGEL) 1 % ophthalmic solution Place 1 drop into both eyes every morning As needed       cephALEXin (KEFLEX) 250 MG capsule TAKE 1 CAPSULE BY MOUTH ONCE DAILY 90 capsule 97     LIDOCAINE PAIN RELIEF 4 % Patch APPLY 1 PATCH TOPICALLY TO RIGHT SHOULDER ONCE DAILY (ON FOR 12 HOURS, OFF FOR 12 HOURS) 30 patch 97     lisinopril (ZESTRIL) 2.5 MG tablet TAKE 1 TABLET BY MOUTH ONCE DAILY 90 tablet 97     loperamide (IMODIUM A-D) 2 MG tablet Take 1 tablet (2 mg) by mouth 4 times daily as needed for diarrhea       menthol-zinc oxide (CALMOSEPTINE) 0.44-20.6 % OINT ointment Apply topically as needed for skin protection       metoprolol tartrate (LOPRESSOR) 25 MG tablet TAKE 1 TABLET BY MOUTH TWICE DAILY 180 tablet 97     ondansetron (ZOFRAN-ODT) 4 MG ODT tab Take 1 tablet (4 mg) by mouth every 6 hours as needed for nausea 30 tablet 11     polyethylene glycol (MIRALAX) 17 g packet Take 1 packet by mouth daily as needed for constipation       rosuvastatin (CRESTOR) 5 MG tablet TAKE 1 TABLET BY MOUTH AT BEDTIME 90 tablet 97     SENNA-docusate sodium (SENNA S) 8.6-50 MG tablet Take 1 tablet by mouth At Bedtime. May also take 1 tablet 2 times daily as needed (constipation). 60 tablet 3     sodium chloride (PETER 128) 5 % ophthalmic ointment Place 2 Application (1 g) into both eyes At Bedtime       vitamin C (ASCORBIC ACID) 500 MG tablet TAKE 1 TABLET BY MOUTH TWICE DAILY 180 tablet 3     VITAMIN D3 50 MCG (2000 UT) tablet TAKE 1 TABLET BY MOUTH EVERY EVENING 90 tablet 97     warfarin ANTICOAGULANT (COUMADIN) 2  "MG tablet Take 2 mg by mouth daily       Case Management:  I have reviewed the Assisted Living care plan, current immunizations and preventive care/cancer screening. .Future cancer screening is not clinically indicated secondary to age/goals of care Patient's desire to return to the community is present, but is not able due to care needs . Current Level of Care is appropriate.    Advance Directive Discussion:    I reviewed the current advanced directives as reflected in EPIC, the POLST and the facility chart, and verified the congruency of orders. I contacted the first party son and left a message regarding the plan of Care.  I did not due to cognitive impairment review the advance directives with the resident.     Team Discussion:  I communicated with the appropriate disciplines involved with the Plan of Care:   Nursing    Patient's goal is pain control and comfort. Treat reversible conditions   Information reviewed:  Medications, vital signs, orders, and nursing notes.    ROS:  Limited secondary to cognitive impairment but today pt reports ok    Vitals:  /60   Pulse 90   Temp 96.8  F (36  C)   Resp 16   Ht 1.651 m (5' 5\")   Wt 67.8 kg (149 lb 6.4 oz)   SpO2 98%   BMI 24.86 kg/m   Body mass index is 24.86 kg/m .  Exam:  GENERAL APPEARANCE: alert, confused, pleasant   ENT:  Mouth and posterior oropharynx normal, dry mucous membranes  EYES:  EOM, conjunctivae, lids, pupils and irises normal, wears glasses  RESP:  lungs clear to auscultation but diminished throughout   CV:  regular rate and irregular rhythm, no murmur, rub, or gallop, trace edema  ABDOMEN:  no guarding or rebound, BS +  :    deleon catheter in place with leg bag, minimal straw color output   M/S:   generalized weakness- 2WW  SKIN: lateral left ankle with chronic scab not seen today  NEURO:   Cranial nerves 2-12 are normal tested and grossly at patient's baseline  PSYCH:  insight and judgement impaired, memory impaired SLUMS 9/30 and CPT " 3.8/5.6    Lab/Diagnostic data:   CBC RESULTS: Recent Labs   Lab Test 06/22/22  0900 05/02/22  0903   WBC 5.2 6.1   RBC 3.42* 3.57*   HGB 11.2* 11.5*   HCT 36.2 37.6   * 105*   MCH 32.7 32.2   MCHC 30.9* 30.6*   RDW 13.4 13.2   * 192       Last Basic Metabolic Panel:  Recent Labs   Lab Test 06/22/22  0900 04/18/22  0843    137   POTASSIUM 4.2 4.1   CHLORIDE 110* 106   CLARISSE 9.1 9.5   CO2 27 27   BUN 22 26   CR 1.08* 1.01   * 114*       Liver Function Studies -   Recent Labs   Lab Test 04/10/22  0729 12/26/21  0236   PROTTOTAL 6.0* 5.8*   ALBUMIN 2.9* 2.6*   BILITOTAL 0.7 0.8   ALKPHOS 74 57   AST 23 27   ALT 21 20       TSH   Date Value Ref Range Status   02/26/2018 1.82 0.40 - 4.00 mU/L Final   10/05/2017 2.44 0.40 - 4.00 mU/L Final       No results found for: A1C    ASSESSMENT/PLAN  (I48.91) Atrial fibrillation, unspecified type (H)  (primary encounter diagnosis)  (Z79.01) Long term (current) use of anticoagulants  Comment: INR was 2.5 on 7/21 now 2.6  Plan:   -Coumadin 1 mg po daily every Monday and 2 mg po all other days  -recheck INR 8/11/22    (S82.852S) Closed trimalleolar fracture of left ankle, sequela  (T84.198S) Internal fixation device (pin, linda, or screw) mechanical complication, sequela  Comment: notified later from nursing that ongoing scab at lateral left ankle off revealing hardware. Similar incident 6/3/22.   -Discussion and reviewed with Dr. Cooper's PA (Juan Diego). Clinic visit with hardware exposure 6/3 but this is a hardship for family since she resides in memory care. Per ortho they would like to avoid bringing her back to the OR. Will continue to monitor for now in hopes of new scab formation in 3-5 days with ongoing healing. If scab does not form or with s/s of infection should follow up in clinic with Dr. Cooper.     -daily dressing for protection   -NWB x 24 hours    (Z97.8) Chronic indwelling Guillen catheter  Comment: stable   Plan:   -routine changes from  homecare    (I10) Essential hypertension  Comment: stable    Plan:   -metoprolol 25 mg po BID  -lisinopril 2.5 mg po daily   -BMP periodically       Electronically signed by:  RAY Lopez CNP

## 2022-07-28 NOTE — LETTER
7/28/2022        RE: Maia Grimaldo [facility]  [address]   [address]  [facility] [address]        [facility]  Chief Complaint   Patient presents with     Annual Comprehensive Exam Assisted Living     INR Followup     [place] Medical Record Number:  [id number]  Place of Service where encounter took place:  [facility] [[id number]]    HPI:    Maia Miranda  is a 89 year old  (10/26/1932), who is being seen today for an annual comprehensive visit. HPI information obtained from: facility chart records, facility staff, patient report and [facility] chart review.  Today's concerns are:    Seen today for follow up to INR dosing and chronic disease management. Facility chart reviewed. Started on nystatin for reddened yeast in skin folds under left breast. Spends much time in her room. Appears content. Nonsensical at times with conversation but not agitated or upset. Chronic deleon managed by homecare. Coumadin for atrial fibrillation/hx of PE. Chronic scabbed  area of lateral left ankle from ORIF left ankle 4/10/22- drainage at times when scab is disturbed. Ortho managing. Weight stable, VSS.     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, perforation, or obstruction (1974), Depressive disorder, not elsewhere classified (2003), EROSIVE EYE DISORDER (1994), Esophageal reflux (2001), Essential and other specified forms of tremor (2004), Generalized osteoarthrosis, unspecified site, Hiatal hernia, History of pulmonary embolism (1971), LACTOSE INTOLERANCE, Lumbago, Mitral valve regurgitation, Occlusion and stenosis of carotid artery without mention of cerebral infarction (2003), Osteoporosis, unspecified (2003), Paroxysmal atrial  fibrillation (H) (11/15/2013), Spider veins, Unspecified essential hypertension, and Unspecified tinnitus (2005).    She has no past medical history of Asymptomatic human immunodeficiency virus (HIV) infection status (H), Cerebral palsy (H), Complication of anesthesia, Congenital renal agenesis and dysgenesis, Goiter, Gout, Hernia, abdominal, History of spinal cord injury, History of thrombophlebitis, Horseshoe kidney, Hydrocephalus (H), Malignant hyperthermia, Mumps, Palpitations, Parkinsons disease (H), PONV (postoperative nausea and vomiting), Spina bifida (H), STD (sexually transmitted disease), Tethered cord (H), or Tuberculosis.  PAST SURGICAL HISTORY:  has a past surgical history that includes NONSPECIFIC PROCEDURE; NONSPECIFIC PROCEDURE; NONSPECIFIC PROCEDURE (1958); NONSPECIFIC PROCEDURE (1971); NONSPECIFIC PROCEDURE (1/03); Esophagoscopy, gastroscopy, duodenoscopy (EGD), combined (7/8/2013); corneal scraping; cataract iol, rt/lt; Cystoscopy, biopsy bladder, combined (N/A, 7/25/2016); Implant stimulator sacral nerve stage one (N/A, 4/4/2017); interstim placement; Implant stimulator sacral nerve stage two (N/A, 4/18/2017); Cystoscopy; and Open reduction internal fixation ankle (Left, 4/10/2022).  IMMUNIZATIONS:  Immunization History   Administered Date(s) Administered     COVID-19,PF,Pfizer (12+ Yrs) 02/17/2021, 03/10/2021, 10/19/2021     Influenza (H1N1) 02/05/2010     Influenza (High Dose) 3 valent vaccine 10/27/2010, 10/21/2013, 10/14/2015, 09/24/2016, 11/03/2017, 10/15/2018, 10/28/2019, 10/19/2021     Influenza (IIV3) PF 11/16/2000, 10/01/2005, 10/15/2008, 10/05/2009, 10/06/2011, 09/27/2012, 11/01/2012     Influenza Vaccine IM > 6 months Valent IIV4 (Alfuria,Fluzone) 10/27/2014     Influenza, Quad, High Dose, Pf, 65yr+ (Fluzone HD) 10/06/2020, 10/19/2021     Pneumo Conj 13-V (2010&after) 05/15/2015     Pneumococcal 23 valent 02/11/1999     TD (ADULT, 7+) 01/14/2008, 12/22/2019     TDAP Vaccine (Adacel)  11/05/2013     Tetanus 01/01/1992     Zoster vaccine, live 02/07/2012     Above immunizations pulled from [facility]. [facility] and facility records also reconciled. Outstanding information sent to  to update [facility].  Future immunizations are not needed at this point as all recommended immunizations are up to date.     Current Outpatient Medications   Medication Sig Dispense Refill     ACETAMINOPHEN EXTRA STRENGTH 500 MG tablet TAKE TWO TABLETS (1000MG) BY MOUTH THREE TIMES DAILY 540 tablet 97     carboxymethylcellulose (REFRESH LIQUIGEL) 1 % ophthalmic solution Place 1 drop into both eyes every morning As needed       cephALEXin (KEFLEX) 250 MG capsule TAKE 1 CAPSULE BY MOUTH ONCE DAILY 90 capsule 97     LIDOCAINE PAIN RELIEF 4 % Patch APPLY 1 PATCH TOPICALLY TO RIGHT SHOULDER ONCE DAILY (ON FOR 12 HOURS, OFF FOR 12 HOURS) 30 patch 97     lisinopril (ZESTRIL) 2.5 MG tablet TAKE 1 TABLET BY MOUTH ONCE DAILY 90 tablet 97     loperamide (IMODIUM A-D) 2 MG tablet Take 1 tablet (2 mg) by mouth 4 times daily as needed for diarrhea       menthol-zinc oxide (CALMOSEPTINE) 0.44-20.6 % OINT ointment Apply topically as needed for skin protection       metoprolol tartrate (LOPRESSOR) 25 MG tablet TAKE 1 TABLET BY MOUTH TWICE DAILY 180 tablet 97     ondansetron (ZOFRAN-ODT) 4 MG ODT tab Take 1 tablet (4 mg) by mouth every 6 hours as needed for nausea 30 tablet 11     polyethylene glycol (MIRALAX) 17 g packet Take 1 packet by mouth daily as needed for constipation       rosuvastatin (CRESTOR) 5 MG tablet TAKE 1 TABLET BY MOUTH AT BEDTIME 90 tablet 97     SENNA-docusate sodium (SENNA S) 8.6-50 MG tablet Take 1 tablet by mouth At Bedtime. May also take 1 tablet 2 times daily as needed (constipation). 60 tablet 3     sodium chloride (PETER 128) 5 % ophthalmic ointment Place 2 Application (1 g) into both eyes At Bedtime       vitamin C (ASCORBIC ACID) 500 MG tablet TAKE 1 TABLET BY MOUTH TWICE DAILY 180 tablet 3  "    VITAMIN D3 50 MCG (2000 UT) tablet TAKE 1 TABLET BY MOUTH EVERY EVENING 90 tablet 97     warfarin ANTICOAGULANT (COUMADIN) 2 MG tablet Take 2 mg by mouth daily       Case Management:  I have reviewed the Assisted Living care plan, current immunizations and preventive care/cancer screening. .Future cancer screening is not clinically indicated secondary to age/goals of care Patient's desire to return to the community is present, but is not able due to care needs . Current Level of Care is appropriate.    Advance Directive Discussion:    I reviewed the current advanced directives as reflected in EPIC, the POLST and the facility chart, and verified the congruency of orders. I contacted the first party son and left a message regarding the plan of Care.  I did not due to cognitive impairment review the advance directives with the resident.     Team Discussion:  I communicated with the appropriate disciplines involved with the Plan of Care:   Nursing    Patient's goal is pain control and comfort. Treat reversible conditions   Information reviewed:  Medications, vital signs, orders, and nursing notes.    ROS:  Limited secondary to cognitive impairment but today pt reports ok    Vitals:  /60   Pulse 90   Temp 96.8  F (36  C)   Resp 16   Ht 1.651 m (5' 5\")   Wt 67.8 kg (149 lb 6.4 oz)   SpO2 98%   BMI 24.86 kg/m   Body mass index is 24.86 kg/m .  Exam:  GENERAL APPEARANCE: alert, confused, pleasant   ENT:  Mouth and posterior oropharynx normal, dry mucous membranes  EYES:  EOM, conjunctivae, lids, pupils and irises normal, wears glasses  RESP:  lungs clear to auscultation but diminished throughout   CV:  regular rate and irregular rhythm, no murmur, rub, or gallop, trace edema  ABDOMEN:  no guarding or rebound, BS +  :    deleon catheter in place with leg bag, minimal straw color output   M/S:   generalized weakness- 2WW  SKIN: lateral left ankle with chronic scab not seen today  NEURO:   Cranial nerves 2-12 " are normal tested and grossly at patient's baseline  PSYCH:  insight and judgement impaired, memory impaired SLUMS 9/30 and CPT 3.8/5.6    Lab/Diagnostic data:   CBC RESULTS: Recent Labs   Lab Test 06/22/22  0900 05/02/22  0903   WBC 5.2 6.1   RBC 3.42* 3.57*   HGB 11.2* 11.5*   HCT 36.2 37.6   * 105*   MCH 32.7 32.2   MCHC 30.9* 30.6*   RDW 13.4 13.2   * 192       Last Basic Metabolic Panel:  Recent Labs   Lab Test 06/22/22  0900 04/18/22  0843    137   POTASSIUM 4.2 4.1   CHLORIDE 110* 106   CLARISSE 9.1 9.5   CO2 27 27   BUN 22 26   CR 1.08* 1.01   * 114*       Liver Function Studies -   Recent Labs   Lab Test 04/10/22  0729 12/26/21  0236   PROTTOTAL 6.0* 5.8*   ALBUMIN 2.9* 2.6*   BILITOTAL 0.7 0.8   ALKPHOS 74 57   AST 23 27   ALT 21 20       TSH   Date Value Ref Range Status   02/26/2018 1.82 0.40 - 4.00 mU/L Final   10/05/2017 2.44 0.40 - 4.00 mU/L Final       No results found for: A1C    ASSESSMENT/PLAN  (I48.91) Atrial fibrillation, unspecified type (H)  (primary encounter diagnosis)  (Z79.01) Long term (current) use of anticoagulants  Comment: INR was 2.5 on 7/21 now 2.6  Plan:   -Coumadin 1 mg po daily every Monday and 2 mg po all other days  -recheck INR 8/11/22    (S82.852S) Closed trimalleolar fracture of left ankle, sequela  (T84.609S) Internal fixation device (pin, linda, or screw) mechanical complication, sequela  Comment: notified later from nursing that ongoing scab at lateral left ankle off revealing hardware. Similar incident 6/3/22.   -Discussion and reviewed with Dr. Cooper's PA (Juan Diego). Clinic visit with hardware exposure 6/3 but this is a hardship for family since she resides in memory care. Per ortho they would like to avoid bringing her back to the OR. Will continue to monitor for now in hopes of new scab formation in 3-5 days with ongoing healing. If scab does not form or with s/s of infection should follow up in clinic with Dr. Cooper.     -daily dressing for protection    -NWB x 24 hours    (Z97.8) Chronic indwelling Guillen catheter  Comment: stable   Plan:   -routine changes from homecare    (I10) Essential hypertension  Comment: stable    Plan:   -metoprolol 25 mg po BID  -lisinopril 2.5 mg po daily   -BMP periodically       Electronically signed by:  RAY Lopez CNP             Sincerely,        RAY Lopez CNP

## 2022-08-11 ENCOUNTER — ASSISTED LIVING VISIT (OUTPATIENT)
Dept: GERIATRICS | Facility: CLINIC | Age: 87
End: 2022-08-11
Payer: COMMERCIAL

## 2022-08-11 VITALS
DIASTOLIC BLOOD PRESSURE: 65 MMHG | SYSTOLIC BLOOD PRESSURE: 116 MMHG | HEART RATE: 65 BPM | TEMPERATURE: 97.5 F | HEIGHT: 65 IN | OXYGEN SATURATION: 96 % | RESPIRATION RATE: 16 BRPM | WEIGHT: 142.2 LBS | BODY MASS INDEX: 23.69 KG/M2

## 2022-08-11 DIAGNOSIS — I48.91 ATRIAL FIBRILLATION, UNSPECIFIED TYPE (H): Primary | ICD-10-CM

## 2022-08-11 DIAGNOSIS — T84.498S INTERNAL FIXATION DEVICE (PIN, ROD, OR SCREW) MECHANICAL COMPLICATION, SEQUELA: ICD-10-CM

## 2022-08-11 DIAGNOSIS — S82.852S CLOSED TRIMALLEOLAR FRACTURE OF LEFT ANKLE, SEQUELA: ICD-10-CM

## 2022-08-11 NOTE — PROGRESS NOTES
[facility]  [facility] [facility] Record Number:  [id number]  Place of Service where encounter took place:  [facility] [[id number]]  Chief Complaint   Patient presents with     RECHECK     INR RESULTS     Anticoagulation       HPI:    Maia Miranda  is a 89 year old (10/26/1932), who is being seen today for an episodic care visit.  HPI information obtained from: facility chart records, facility staff, patient report and [facility] chart review. Today's concern is:    Seen today for INR dosing and ongoing management of ankle hardware migration. She is in community room about to have lunch. Calm, pleasant. Denies any pain but says her ankle is sore at times. Chronic scabbed area of lateral left ankle from ORIF left ankle 4/10/22- drainage at times when scab is disturbed, with increased redness but not today. Homecare has been following for wound care weekly. Ortho managing and has been updated that hardware from surgery appears to be migrating out. They would like to defer surgery as first measure. I have reviewed this with her family (daughter) as well.      Past Medical and Surgical History reviewed in Epic today.    MEDICATIONS:    Current Outpatient Medications   Medication Sig Dispense Refill     ACETAMINOPHEN EXTRA STRENGTH 500 MG tablet TAKE TWO TABLETS (1000MG) BY MOUTH THREE TIMES DAILY 540 tablet 97     carboxymethylcellulose (REFRESH LIQUIGEL) 1 % ophthalmic solution Place 1 drop into both eyes every morning As needed       cephALEXin (KEFLEX) 250 MG capsule TAKE 1 CAPSULE BY MOUTH ONCE DAILY 90 capsule 97     LIDOCAINE PAIN RELIEF 4 % Patch APPLY 1 PATCH TOPICALLY TO RIGHT SHOULDER ONCE DAILY (ON FOR 12 HOURS, OFF FOR 12 HOURS) 30 patch 97     lisinopril (ZESTRIL) 2.5 MG tablet TAKE 1 TABLET BY MOUTH ONCE DAILY 90 tablet 97     loperamide (IMODIUM A-D) 2 MG tablet Take 1 tablet (2 mg) by mouth 4 times daily as needed for diarrhea       menthol-zinc oxide  "(CALMOSEPTINE) 0.44-20.6 % OINT ointment Apply topically as needed for skin protection       metoprolol tartrate (LOPRESSOR) 25 MG tablet TAKE 1 TABLET BY MOUTH TWICE DAILY 180 tablet 97     ondansetron (ZOFRAN-ODT) 4 MG ODT tab Take 1 tablet (4 mg) by mouth every 6 hours as needed for nausea 30 tablet 11     polyethylene glycol (MIRALAX) 17 g packet Take 1 packet by mouth daily as needed for constipation       rosuvastatin (CRESTOR) 5 MG tablet TAKE 1 TABLET BY MOUTH AT BEDTIME 90 tablet 97     SENNA-docusate sodium (SENNA S) 8.6-50 MG tablet Take 1 tablet by mouth At Bedtime. May also take 1 tablet 2 times daily as needed (constipation). 60 tablet 3     sodium chloride (PETER 128) 5 % ophthalmic ointment Place 2 Application (1 g) into both eyes At Bedtime       vitamin C (ASCORBIC ACID) 500 MG tablet TAKE 1 TABLET BY MOUTH TWICE DAILY 180 tablet 3     VITAMIN D3 50 MCG (2000 UT) tablet TAKE 1 TABLET BY MOUTH EVERY EVENING 90 tablet 97     Warfarin Therapy Reminder Take 1 each by mouth See Admin Instructions       REVIEW OF SYSTEMS:  Limited secondary to cognitive impairment but today pt reports ok    Objective:  /65   Pulse 65   Temp 97.5  F (36.4  C)   Resp 16   Ht 1.651 m (5' 5\")   Wt 64.5 kg (142 lb 3.2 oz)   SpO2 96%   BMI 23.66 kg/m    Exam:  GENERAL APPEARANCE: alert, confused, pleasant   ENT:  Mouth and posterior oropharynx normal, dry mucous membranes  EYES:  EOM, conjunctivae, lids, pupils and irises normal, wears glasses  RESP:  lungs clear to auscultation but diminished throughout   CV:  regular rate and irregular rhythm, no murmur, rub, or gallop, trace edema  ABDOMEN:  no guarding or rebound, BS +  :    deleon catheter in place with leg bag, minimal straw color output   M/S:   generalized weakness- 2WW  SKIN: lateral left ankle with chronic scab, minimal redness, slight swelling  NEURO:   Cranial nerves 2-12 are normal tested and grossly at patient's baseline  PSYCH:  insight and " judgement impaired, memory impaired SLUMS 9/30 and CPT 3.8/5.6    Labs:   CBC RESULTS: Recent Labs   Lab Test 06/22/22  0900 05/02/22  0903   WBC 5.2 6.1   RBC 3.42* 3.57*   HGB 11.2* 11.5*   HCT 36.2 37.6   * 105*   MCH 32.7 32.2   MCHC 30.9* 30.6*   RDW 13.4 13.2   * 192       Last Basic Metabolic Panel:  Recent Labs   Lab Test 06/22/22  0900 04/18/22  0843    137   POTASSIUM 4.2 4.1   CHLORIDE 110* 106   CLARISSE 9.1 9.5   CO2 27 27   BUN 22 26   CR 1.08* 1.01   * 114*       Liver Function Studies -   Recent Labs   Lab Test 04/10/22  0729 12/26/21  0236   PROTTOTAL 6.0* 5.8*   ALBUMIN 2.9* 2.6*   BILITOTAL 0.7 0.8   ALKPHOS 74 57   AST 23 27   ALT 21 20       TSH   Date Value Ref Range Status   02/26/2018 1.82 0.40 - 4.00 mU/L Final   10/05/2017 2.44 0.40 - 4.00 mU/L Final       No results found for: A1C     ASSESSMENT/PLAN:  (I48.91) Atrial fibrillation, unspecified type (H)  (primary encounter diagnosis)  (Z79.01) Long term (current) use of anticoagulants  Comment: INR was 2.5 on 7/21, 2.6 7/28 now 2.3  Plan:   -Coumadin 1 mg po daily every Monday and 2 mg po all other days  -recheck INR 9/1/22     (S82.852S) Closed trimalleolar fracture of left ankle, sequela  (T88.428S) Internal fixation device (pin, linda, or screw) mechanical complication, sequela  Comment: notified later from nursing that ongoing scab at lateral left ankle off revealing hardware on 7/28/22. Similar incident 6/3/22.   -Discussion and reviewed with Dr. Cooper's PA (Juan Diego). Clinic visit with hardware exposure 6/3. Per ortho they would like to avoid bringing her back to the OR. Will continue to monitor for now in hopes of new scab formation in 3-5 days with ongoing healing. If scab does not form or with s/s of infection should follow up in clinic with Dr. Cooper.     -daily dressing for protection   -reviewed with homecare they no longer need to see weekly for wound care/monitoring. Facility will monitor for s/s of infection or  complications prn                  Electronically signed by:  RAY Lopez CNP

## 2022-08-11 NOTE — LETTER
8/11/2022        RE: Maia Miranda  [facility]  [address]  [place] [address]        [facility]  [place] Medical Record Number:  [id number]  Place of Service where encounter took place:  [facility]  [facility] ([facility]) [[id number]]  Chief Complaint   Patient presents with     RECHECK     INR RESULTS     Anticoagulation       HPI:    Maia Miranda  is a 89 year old (10/26/1932), who is being seen today for an episodic care visit.  HPI information obtained from: facility chart records, facility staff, patient report and [facility] chart review. Today's concern is:    Seen today for INR dosing and ongoing management of ankle hardware migration. She is in community room about to have lunch. Calm, pleasant. Denies any pain but says her ankle is sore at times. Chronic scabbed area of lateral left ankle from ORIF left ankle 4/10/22- drainage at times when scab is disturbed, with increased redness but not today. Homecare has been following for wound care weekly. Ortho managing and has been updated that hardware from surgery appears to be migrating out. They would like to defer surgery as first measure. I have reviewed this with her family (daughter) as well.      Past Medical and Surgical History reviewed in Epic today.    MEDICATIONS:    Current Outpatient Medications   Medication Sig Dispense Refill     ACETAMINOPHEN EXTRA STRENGTH 500 MG tablet TAKE TWO TABLETS (1000MG) BY MOUTH THREE TIMES DAILY 540 tablet 97     carboxymethylcellulose (REFRESH LIQUIGEL) 1 % ophthalmic solution Place 1 drop into both eyes every morning As needed       cephALEXin (KEFLEX) 250 MG capsule TAKE 1 CAPSULE BY MOUTH ONCE DAILY 90 capsule 97     LIDOCAINE PAIN RELIEF 4 % Patch APPLY 1 PATCH TOPICALLY TO RIGHT SHOULDER ONCE DAILY (ON FOR 12 HOURS, OFF FOR 12 HOURS) 30 patch 97     lisinopril (ZESTRIL) 2.5 MG tablet TAKE 1 TABLET BY MOUTH ONCE DAILY 90 tablet 97     loperamide (IMODIUM  "A-D) 2 MG tablet Take 1 tablet (2 mg) by mouth 4 times daily as needed for diarrhea       menthol-zinc oxide (CALMOSEPTINE) 0.44-20.6 % OINT ointment Apply topically as needed for skin protection       metoprolol tartrate (LOPRESSOR) 25 MG tablet TAKE 1 TABLET BY MOUTH TWICE DAILY 180 tablet 97     ondansetron (ZOFRAN-ODT) 4 MG ODT tab Take 1 tablet (4 mg) by mouth every 6 hours as needed for nausea 30 tablet 11     polyethylene glycol (MIRALAX) 17 g packet Take 1 packet by mouth daily as needed for constipation       rosuvastatin (CRESTOR) 5 MG tablet TAKE 1 TABLET BY MOUTH AT BEDTIME 90 tablet 97     SENNA-docusate sodium (SENNA S) 8.6-50 MG tablet Take 1 tablet by mouth At Bedtime. May also take 1 tablet 2 times daily as needed (constipation). 60 tablet 3     sodium chloride (PETER 128) 5 % ophthalmic ointment Place 2 Application (1 g) into both eyes At Bedtime       vitamin C (ASCORBIC ACID) 500 MG tablet TAKE 1 TABLET BY MOUTH TWICE DAILY 180 tablet 3     VITAMIN D3 50 MCG (2000 UT) tablet TAKE 1 TABLET BY MOUTH EVERY EVENING 90 tablet 97     Warfarin Therapy Reminder Take 1 each by mouth See Admin Instructions       REVIEW OF SYSTEMS:  Limited secondary to cognitive impairment but today pt reports ok    Objective:  /65   Pulse 65   Temp 97.5  F (36.4  C)   Resp 16   Ht 1.651 m (5' 5\")   Wt 64.5 kg (142 lb 3.2 oz)   SpO2 96%   BMI 23.66 kg/m    Exam:  GENERAL APPEARANCE: alert, confused, pleasant   ENT:  Mouth and posterior oropharynx normal, dry mucous membranes  EYES:  EOM, conjunctivae, lids, pupils and irises normal, wears glasses  RESP:  lungs clear to auscultation but diminished throughout   CV:  regular rate and irregular rhythm, no murmur, rub, or gallop, trace edema  ABDOMEN:  no guarding or rebound, BS +  :    deleon catheter in place with leg bag, minimal straw color output   M/S:   generalized weakness- 2WW  SKIN: lateral left ankle with chronic scab, minimal redness, slight " swelling  NEURO:   Cranial nerves 2-12 are normal tested and grossly at patient's baseline  PSYCH:  insight and judgement impaired, memory impaired SLUMS 9/30 and CPT 3.8/5.6    Labs:   CBC RESULTS: Recent Labs   Lab Test 06/22/22  0900 05/02/22  0903   WBC 5.2 6.1   RBC 3.42* 3.57*   HGB 11.2* 11.5*   HCT 36.2 37.6   * 105*   MCH 32.7 32.2   MCHC 30.9* 30.6*   RDW 13.4 13.2   * 192       Last Basic Metabolic Panel:  Recent Labs   Lab Test 06/22/22  0900 04/18/22  0843    137   POTASSIUM 4.2 4.1   CHLORIDE 110* 106   CLARISSE 9.1 9.5   CO2 27 27   BUN 22 26   CR 1.08* 1.01   * 114*       Liver Function Studies -   Recent Labs   Lab Test 04/10/22  0729 12/26/21  0236   PROTTOTAL 6.0* 5.8*   ALBUMIN 2.9* 2.6*   BILITOTAL 0.7 0.8   ALKPHOS 74 57   AST 23 27   ALT 21 20       TSH   Date Value Ref Range Status   02/26/2018 1.82 0.40 - 4.00 mU/L Final   10/05/2017 2.44 0.40 - 4.00 mU/L Final       No results found for: A1C     ASSESSMENT/PLAN:  (I48.91) Atrial fibrillation, unspecified type (H)  (primary encounter diagnosis)  (Z79.01) Long term (current) use of anticoagulants  Comment: INR was 2.5 on 7/21, 2.6 7/28 now 2.3  Plan:   -Coumadin 1 mg po daily every Monday and 2 mg po all other days  -recheck INR 9/1/22     (S82.772S) Closed trimalleolar fracture of left ankle, sequela  (T87.932S) Internal fixation device (pin, linda, or screw) mechanical complication, sequela  Comment: notified later from nursing that ongoing scab at lateral left ankle off revealing hardware on 7/28/22. Similar incident 6/3/22.   -Discussion and reviewed with Dr. Cooper's PA (Juan Diego). Clinic visit with hardware exposure 6/3. Per ortho they would like to avoid bringing her back to the OR. Will continue to monitor for now in hopes of new scab formation in 3-5 days with ongoing healing. If scab does not form or with s/s of infection should follow up in clinic with Dr. Cooper.     -daily dressing for protection   -reviewed with  homecare they no longer need to see weekly for wound care/monitoring. Facility will monitor for s/s of infection or complications prn                  Electronically signed by:  RAY Lopez CNP               Sincerely,        RAY Lopez CNP

## 2022-09-01 ENCOUNTER — ASSISTED LIVING VISIT (OUTPATIENT)
Dept: GERIATRICS | Facility: CLINIC | Age: 87
End: 2022-09-01
Payer: COMMERCIAL

## 2022-09-01 VITALS
OXYGEN SATURATION: 97 % | SYSTOLIC BLOOD PRESSURE: 132 MMHG | BODY MASS INDEX: 24.56 KG/M2 | TEMPERATURE: 97 F | DIASTOLIC BLOOD PRESSURE: 79 MMHG | HEART RATE: 77 BPM | WEIGHT: 147.4 LBS | RESPIRATION RATE: 16 BRPM | HEIGHT: 65 IN

## 2022-09-01 DIAGNOSIS — Z97.8 CHRONIC INDWELLING FOLEY CATHETER: ICD-10-CM

## 2022-09-01 DIAGNOSIS — Z79.01 LONG TERM (CURRENT) USE OF ANTICOAGULANTS: ICD-10-CM

## 2022-09-01 DIAGNOSIS — T84.498S INTERNAL FIXATION DEVICE (PIN, ROD, OR SCREW) MECHANICAL COMPLICATION, SEQUELA: ICD-10-CM

## 2022-09-01 DIAGNOSIS — K55.9 ISCHEMIC COLITIS (H): ICD-10-CM

## 2022-09-01 DIAGNOSIS — K59.01 SLOW TRANSIT CONSTIPATION: ICD-10-CM

## 2022-09-01 DIAGNOSIS — I48.91 ATRIAL FIBRILLATION, UNSPECIFIED TYPE (H): Primary | ICD-10-CM

## 2022-09-01 DIAGNOSIS — S82.852S CLOSED TRIMALLEOLAR FRACTURE OF LEFT ANKLE, SEQUELA: ICD-10-CM

## 2022-09-01 NOTE — LETTER
9/1/2022        RE: Maia Miranda  [facility]  [address]  [facility] [address]        [facility]  [place] Medical Record Number:  [id number]  Place of Service where encounter took place:  [facility] [[id number]]  Chief Complaint   Patient presents with     RECHECK     INR RESULTS     Anticoagulation       HPI:    Maia Miranda  is a 89 year old (10/26/1932), who is being seen today for an episodic care visit.  HPI information obtained from: facility chart records, facility staff and patient report. Today's concern is:    Seen today for chronic disease management. Facility notes/charting reviewed and wound of lateral left ankle noted with small scab, no redness, warmth or s/s of infection. Discomfort at times with ambulating small distance. Nursing would like to discontinue daily wound care. Chronic scabbed area of lateral left ankle from ORIF left ankle 4/10/22- drainage at times when scab is disturbed, with increased redness but not today. Ortho managing and has been updated that hardware from surgery appears to be migrating out. They would like to defer surgery as first measure. Chronic deleon in place for urinary retention. Prior to admission was able to self cath but with progressing dementia no longer able to do this. History of nausea but no recent use of prn Zofran. VSS. Calm and pleasant with today's visit. Says she is doing fine.    Past Medical and Surgical History reviewed in Epic today.    MEDICATIONS:    Current Outpatient Medications   Medication Sig Dispense Refill     ACETAMINOPHEN EXTRA STRENGTH 500 MG tablet TAKE TWO TABLETS (1000MG) BY MOUTH THREE TIMES DAILY 540 tablet 97     carboxymethylcellulose (REFRESH LIQUIGEL) 1 % ophthalmic solution Place 1 drop into both eyes every morning As needed       cephALEXin (KEFLEX) 250 MG capsule TAKE 1 CAPSULE BY MOUTH ONCE DAILY 90 capsule 97     LIDOCAINE PAIN RELIEF 4 % Patch APPLY 1  "PATCH TOPICALLY TO RIGHT SHOULDER ONCE DAILY (ON FOR 12 HOURS, OFF FOR 12 HOURS) 30 patch 97     lisinopril (ZESTRIL) 2.5 MG tablet TAKE 1 TABLET BY MOUTH ONCE DAILY 90 tablet 97     loperamide (IMODIUM A-D) 2 MG tablet Take 1 tablet (2 mg) by mouth 4 times daily as needed for diarrhea       menthol-zinc oxide (CALMOSEPTINE) 0.44-20.6 % OINT ointment Apply topically as needed for skin protection       metoprolol tartrate (LOPRESSOR) 25 MG tablet TAKE 1 TABLET BY MOUTH TWICE DAILY 180 tablet 97     polyethylene glycol (MIRALAX) 17 g packet Take 1 packet by mouth daily as needed for constipation       rosuvastatin (CRESTOR) 5 MG tablet TAKE 1 TABLET BY MOUTH AT BEDTIME 90 tablet 97     SENNA-docusate sodium (SENNA S) 8.6-50 MG tablet Take 1 tablet by mouth At Bedtime. May also take 1 tablet 2 times daily as needed (constipation). 60 tablet 3     sodium chloride (PETER 128) 5 % ophthalmic ointment Place 2 Application (1 g) into both eyes At Bedtime       vitamin C (ASCORBIC ACID) 500 MG tablet TAKE 1 TABLET BY MOUTH TWICE DAILY 180 tablet 3     VITAMIN D3 50 MCG (2000 UT) tablet TAKE 1 TABLET BY MOUTH EVERY EVENING 90 tablet 97     Warfarin Therapy Reminder Take 1 each by mouth See Admin Instructions         REVIEW OF SYSTEMS:  Limited secondary to cognitive impairment but today pt reports fine    Objective:  /79   Pulse 77   Temp 97  F (36.1  C)   Resp 16   Ht 1.651 m (5' 5\")   Wt 66.9 kg (147 lb 6.4 oz)   SpO2 97%   BMI 24.53 kg/m    Exam:  GENERAL APPEARANCE: alert, confused, pleasant   ENT:  Mouth and posterior oropharynx normal, dry mucous membranes  EYES:  EOM, conjunctivae, lids, pupils and irises normal, wears glasses  RESP:  lungs clear to auscultation but diminished throughout   CV:  regular rate and irregular rhythm, no murmur, rub, or gallop, trace edema bilateral legs, 1+ edema on left ankle   ABDOMEN:  no guarding or rebound, BS +  :    deleon catheter in place with leg bag, minimal straw " color output   M/S:   generalized weakness- 2WW or WC  SKIN: lateral left ankle with chronic scab, minimal redness, slight swelling- no redness under breasts   NEURO:   Cranial nerves 2-12 are normal tested and grossly at patient's baseline  PSYCH:  insight and judgement impaired, memory impaired SLUMS 9/30 and CPT 3.8/5.6    Labs:   CBC RESULTS: Recent Labs   Lab Test 06/22/22  0900 05/02/22  0903   WBC 5.2 6.1   RBC 3.42* 3.57*   HGB 11.2* 11.5*   HCT 36.2 37.6   * 105*   MCH 32.7 32.2   MCHC 30.9* 30.6*   RDW 13.4 13.2   * 192       Last Basic Metabolic Panel:  Recent Labs   Lab Test 06/22/22  0900 04/18/22  0843    137   POTASSIUM 4.2 4.1   CHLORIDE 110* 106   CLARISSE 9.1 9.5   CO2 27 27   BUN 22 26   CR 1.08* 1.01   * 114*       Liver Function Studies -   Recent Labs   Lab Test 04/10/22  0729 12/26/21  0236   PROTTOTAL 6.0* 5.8*   ALBUMIN 2.9* 2.6*   BILITOTAL 0.7 0.8   ALKPHOS 74 57   AST 23 27   ALT 21 20       TSH   Date Value Ref Range Status   02/26/2018 1.82 0.40 - 4.00 mU/L Final   10/05/2017 2.44 0.40 - 4.00 mU/L Final       No results found for: A1C    ASSESSMENT/PLAN:  (I48.91) Atrial fibrillation, unspecified type (H)    (Z79.01) Long term (current) use of anticoagulants  Comment: INR was 2.5 on 7/21, 2.6 7/28 then 2.3 on 8/11 and 2.7 today on Coumadin 1 mg po daily every Monday and 2 mg po all other days   Plan:   -Coumadin 1 mg po daily every Monday and 2 mg po all other days  -recheck INR in 3 weeks     (V60.298L) Closed trimalleolar fracture of left ankle, sequela  (Q64.878S) Internal fixation device (pin, linda, or screw) mechanical complication, sequela  Comment: ongoing scab at lateral left ankle off revealing hardware on 7/28/22. Similar incident 6/3/22.   -Discussion and reviewed with Dr. Cooper's PA (Juan Diego). Clinic visit with hardware exposure 6/3. Per ortho they would like to avoid bringing her back to the OR. Will continue to monitor for now in hopes of new scab  formation with ongoing healing. With s/s of infection should follow up in clinic with Dr. Cooper.     -discontinue daily dressing for now      (Z97.8) Chronic indwelling Deleon catheter  Comment: stable   Plan:   -routine changes from homecare  -cephalexin 250 mg po daily was started by urology  -irrigate deleon prn     (K59.01) Slow transit constipation  Comment: infrequent recording of bowel movements  Plan:   -currently on Senna S tablet at HS and twice daily prn     (K55.9) Ischemic colitis (H)  Comment: hx of GI bleed/Alternating constipation and diarrhea  Plan:   -no use of prn Zofran so will discontinue      Electronically signed by:  RAY Lopez CNP                 Sincerely,        RAY Lopez CNP

## 2022-09-01 NOTE — LETTER
Maia Miranda    1. Discontinue daily wound care  2. Discontinue Zofran  3. Coumadin  1 mg po daily every Monday and 2 mg po all other days  4. recheck INR in 3 weeks 9/22/22    RAY Lopez CNP on 9/1/2022 at 11:32 AM

## 2022-09-01 NOTE — PROGRESS NOTES
[facility]  [place] Medical Record Number:  [id number]  Place of Service where encounter took place:  [facility] [[id number]]  Chief Complaint   Patient presents with     RECHECK     INR RESULTS     Anticoagulation       HPI:    Maia Miranda  is a 89 year old (10/26/1932), who is being seen today for an episodic care visit.  HPI information obtained from: facility chart records, facility staff and patient report. Today's concern is:    Seen today for chronic disease management. Facility notes/charting reviewed and wound of lateral left ankle noted with small scab, no redness, warmth or s/s of infection. Discomfort at times with ambulating small distance. Nursing would like to discontinue daily wound care. Chronic scabbed area of lateral left ankle from ORIF left ankle 4/10/22- drainage at times when scab is disturbed, with increased redness but not today. Ortho managing and has been updated that hardware from surgery appears to be migrating out. They would like to defer surgery as first measure. Chronic deleon in place for urinary retention. Prior to admission was able to self cath but with progressing dementia no longer able to do this. History of nausea but no recent use of prn Zofran. VSS. Calm and pleasant with today's visit. Says she is doing fine.    Past Medical and Surgical History reviewed in Epic today.    MEDICATIONS:    Current Outpatient Medications   Medication Sig Dispense Refill     ACETAMINOPHEN EXTRA STRENGTH 500 MG tablet TAKE TWO TABLETS (1000MG) BY MOUTH THREE TIMES DAILY 540 tablet 97     carboxymethylcellulose (REFRESH LIQUIGEL) 1 % ophthalmic solution Place 1 drop into both eyes every morning As needed       cephALEXin (KEFLEX) 250 MG capsule TAKE 1 CAPSULE BY MOUTH ONCE DAILY 90 capsule 97     LIDOCAINE PAIN RELIEF 4 % Patch APPLY 1 PATCH TOPICALLY TO RIGHT SHOULDER ONCE DAILY (ON FOR 12 HOURS, OFF FOR 12 HOURS) 30 patch 97     lisinopril  "(ZESTRIL) 2.5 MG tablet TAKE 1 TABLET BY MOUTH ONCE DAILY 90 tablet 97     loperamide (IMODIUM A-D) 2 MG tablet Take 1 tablet (2 mg) by mouth 4 times daily as needed for diarrhea       menthol-zinc oxide (CALMOSEPTINE) 0.44-20.6 % OINT ointment Apply topically as needed for skin protection       metoprolol tartrate (LOPRESSOR) 25 MG tablet TAKE 1 TABLET BY MOUTH TWICE DAILY 180 tablet 97     polyethylene glycol (MIRALAX) 17 g packet Take 1 packet by mouth daily as needed for constipation       rosuvastatin (CRESTOR) 5 MG tablet TAKE 1 TABLET BY MOUTH AT BEDTIME 90 tablet 97     SENNA-docusate sodium (SENNA S) 8.6-50 MG tablet Take 1 tablet by mouth At Bedtime. May also take 1 tablet 2 times daily as needed (constipation). 60 tablet 3     sodium chloride (PETER 128) 5 % ophthalmic ointment Place 2 Application (1 g) into both eyes At Bedtime       vitamin C (ASCORBIC ACID) 500 MG tablet TAKE 1 TABLET BY MOUTH TWICE DAILY 180 tablet 3     VITAMIN D3 50 MCG (2000 UT) tablet TAKE 1 TABLET BY MOUTH EVERY EVENING 90 tablet 97     Warfarin Therapy Reminder Take 1 each by mouth See Admin Instructions         REVIEW OF SYSTEMS:  Limited secondary to cognitive impairment but today pt reports fine    Objective:  /79   Pulse 77   Temp 97  F (36.1  C)   Resp 16   Ht 1.651 m (5' 5\")   Wt 66.9 kg (147 lb 6.4 oz)   SpO2 97%   BMI 24.53 kg/m    Exam:  GENERAL APPEARANCE: alert, confused, pleasant   ENT:  Mouth and posterior oropharynx normal, dry mucous membranes  EYES:  EOM, conjunctivae, lids, pupils and irises normal, wears glasses  RESP:  lungs clear to auscultation but diminished throughout   CV:  regular rate and irregular rhythm, no murmur, rub, or gallop, trace edema bilateral legs, 1+ edema on left ankle   ABDOMEN:  no guarding or rebound, BS +  :    deleon catheter in place with leg bag, minimal straw color output   M/S:   generalized weakness- 2WW or WC  SKIN: lateral left ankle with chronic scab, minimal " redness, slight swelling- no redness under breasts   NEURO:   Cranial nerves 2-12 are normal tested and grossly at patient's baseline  PSYCH:  insight and judgement impaired, memory impaired SLUMS 9/30 and CPT 3.8/5.6    Labs:   CBC RESULTS: Recent Labs   Lab Test 06/22/22  0900 05/02/22  0903   WBC 5.2 6.1   RBC 3.42* 3.57*   HGB 11.2* 11.5*   HCT 36.2 37.6   * 105*   MCH 32.7 32.2   MCHC 30.9* 30.6*   RDW 13.4 13.2   * 192       Last Basic Metabolic Panel:  Recent Labs   Lab Test 06/22/22  0900 04/18/22  0843    137   POTASSIUM 4.2 4.1   CHLORIDE 110* 106   CLARISSE 9.1 9.5   CO2 27 27   BUN 22 26   CR 1.08* 1.01   * 114*       Liver Function Studies -   Recent Labs   Lab Test 04/10/22  0729 12/26/21  0236   PROTTOTAL 6.0* 5.8*   ALBUMIN 2.9* 2.6*   BILITOTAL 0.7 0.8   ALKPHOS 74 57   AST 23 27   ALT 21 20       TSH   Date Value Ref Range Status   02/26/2018 1.82 0.40 - 4.00 mU/L Final   10/05/2017 2.44 0.40 - 4.00 mU/L Final       No results found for: A1C    ASSESSMENT/PLAN:  (I48.91) Atrial fibrillation, unspecified type (H)    (Z79.01) Long term (current) use of anticoagulants  Comment: INR was 2.5 on 7/21, 2.6 7/28 then 2.3 on 8/11 and 2.7 today on Coumadin 1 mg po daily every Monday and 2 mg po all other days   Plan:   -Coumadin 1 mg po daily every Monday and 2 mg po all other days  -recheck INR in 3 weeks     (O50.674P) Closed trimalleolar fracture of left ankle, sequela  (L50.662S) Internal fixation device (pin, linda, or screw) mechanical complication, sequela  Comment: ongoing scab at lateral left ankle off revealing hardware on 7/28/22. Similar incident 6/3/22.   -Discussion and reviewed with Dr. Cooper's PA (Juan Diego). Clinic visit with hardware exposure 6/3. Per ortho they would like to avoid bringing her back to the OR. Will continue to monitor for now in hopes of new scab formation with ongoing healing. With s/s of infection should follow up in clinic with Dr. Cooper.     -discontinue  daily dressing for now      (Z97.8) Chronic indwelling Deleon catheter  Comment: stable   Plan:   -routine changes from homecare  -cephalexin 250 mg po daily was started by urology  -irrigate deleon prn     (K59.01) Slow transit constipation  Comment: infrequent recording of bowel movements  Plan:   -currently on Senna S tablet at HS and twice daily prn     (K55.9) Ischemic colitis (H)  Comment: hx of GI bleed/Alternating constipation and diarrhea  Plan:   -no use of prn Zofran so will discontinue      Electronically signed by:  RAY Lopez CNP

## 2022-09-16 ENCOUNTER — ASSISTED LIVING VISIT (OUTPATIENT)
Dept: GERIATRICS | Facility: CLINIC | Age: 87
End: 2022-09-16
Payer: COMMERCIAL

## 2022-09-16 VITALS
WEIGHT: 146.8 LBS | RESPIRATION RATE: 18 BRPM | TEMPERATURE: 97.3 F | OXYGEN SATURATION: 96 % | DIASTOLIC BLOOD PRESSURE: 65 MMHG | SYSTOLIC BLOOD PRESSURE: 155 MMHG | BODY MASS INDEX: 24.46 KG/M2 | HEIGHT: 65 IN | HEART RATE: 88 BPM

## 2022-09-16 DIAGNOSIS — T84.498S INTERNAL FIXATION DEVICE (PIN, ROD, OR SCREW) MECHANICAL COMPLICATION, SEQUELA: ICD-10-CM

## 2022-09-16 DIAGNOSIS — I48.0 PAF (PAROXYSMAL ATRIAL FIBRILLATION) (H): ICD-10-CM

## 2022-09-16 DIAGNOSIS — N18.31 STAGE 3A CHRONIC KIDNEY DISEASE (H): ICD-10-CM

## 2022-09-16 DIAGNOSIS — S82.852S CLOSED TRIMALLEOLAR FRACTURE OF LEFT ANKLE, SEQUELA: ICD-10-CM

## 2022-09-16 DIAGNOSIS — I10 ESSENTIAL HYPERTENSION: Primary | ICD-10-CM

## 2022-09-16 DIAGNOSIS — G30.1 LATE ONSET ALZHEIMER'S DEMENTIA WITHOUT BEHAVIORAL DISTURBANCE (H): ICD-10-CM

## 2022-09-16 DIAGNOSIS — F02.80 LATE ONSET ALZHEIMER'S DEMENTIA WITHOUT BEHAVIORAL DISTURBANCE (H): ICD-10-CM

## 2022-09-16 DIAGNOSIS — R33.9 URINARY RETENTION: ICD-10-CM

## 2022-09-16 NOTE — PROGRESS NOTES
Maia Miranda is a 89 year old female seen September 16, 2022 at [facility] unit where she has resided for 7 months (admit 2/2022)   She is seen in her apartment up to recliner with a blanket.   Reports feeling well, denies any current pain or other symptoms. Not able to give much recent history, talks about her past a bit.      By chart review, pt was seen by me in [facility] in 2014.     Pt has a h/o PAF anticoagulated with warfarin, HTN, CKD, dementia, h/o PE ischemic colitis and h/o GI bleed.   She was hospitalized in Sept 2021 for COVID19 pneumonia.  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   Pt saw Cardiology in Feb 2022 because of elevated troponins on hospitalization stay in Sept and Dec 2021   She declined further diagnostic cardiac intervention    Pt was at [facility], then discharged to [facility].     She was hospitalized again in April 2022 following a fall in which she suffered a trimalleolar left ankle fracture, s/p ORIF.  She discharged to [facility] prior to returning to [place]    Ankle incision has been slow to heal, and hardware migrating to the surface.  Followed by Orthopedics     Pt has chronic urinary retention; prior to [place] admission she was able to do self I/O catheterization, but no longer able to perform that task and now has an indwelling Guillen catheter.   Previously followed by Urology and she has been maintained on prophylactic cephalexin for some time.        Past Medical History:   Diagnosis Date     Amnestic MCI (mild cognitive impairment with memory loss) 2014     Chronic duodenal ulcer without mention of hemorrhage, perforation, or obstruction 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 Warner, [facility] yearly     Esophageal reflux 2001    Abstracted 06/10/02     Essential and other specified forms of tremor 2004     resting tremor     Generalized osteoarthrosis, unspecified site     Hands     Hiatal hernia     diagnosed late 1990s [place], [place]     History of pulmonary embolism 1971    no source found     LACTOSE INTOLERANCE      Lumbago     sees Kodi Guidry     Mitral valve regurgitation      Occlusion and stenosis of carotid artery without mention of cerebral infarction 2003    CAROTID US NORMAL, bruit in neck     Osteoporosis, unspecified 2003    T = -2.66     Paroxysmal atrial fibrillation (H) 11/15/2013     Spider veins      Unspecified essential hypertension      Unspecified tinnitus 2005    mild hearing loss, saw ENT       Past Surgical History:   Procedure Laterality Date     CATARACT IOL, RT/LT       corneal scraping       CYSTOSCOPY       CYSTOSCOPY, BIOPSY BLADDER, COMBINED N/A 7/25/2016    Procedure: COMBINED CYSTOSCOPY, BIOPSY BLADDER;  Surgeon: Bernadine Maria MD;  Location: [facility] OR     ESOPHAGOSCOPY, GASTROSCOPY, DUODENOSCOPY (EGD), COMBINED  7/8/2013    Procedure: COMBINED ESOPHAGOSCOPY, GASTROSCOPY, DUODENOSCOPY (EGD);   ESOPHAGOSCOPY, GASTROSCOPY, DUODENOSCOPY (EGD)   ;  Surgeon: Shawn Soto MD;  Location:  GI     IMPLANT STIMULATOR SACRAL NERVE STAGE ONE N/A 4/4/2017    Procedure: IMPLANT STIMULATOR SACRAL NERVE STAGE ONE;  Surgeon: Kb Tracy MD;  Location: [facility] OR     IMPLANT STIMULATOR SACRAL NERVE STAGE TWO N/A 4/18/2017    Procedure: IMPLANT STIMULATOR SACRAL NERVE STAGE TWO;  Stage Two Interstim  ;  Surgeon: Kb Tracy MD;  Location: [facility] OR     interstim placement       OPEN REDUCTION INTERNAL FIXATION ANKLE Left 4/10/2022    Procedure: Open reduction and internal fixation of left trimalleolar ankle fracture;  Surgeon: Kulwant Cooper MD;  Location:  OR     [facility] NONSPECIFIC PROCEDURE      D&C x 2 in 1957 & 1962 -- Abstracted 06/10/02     Z NONSPECIFIC PROCEDURE      Left breast biopsy x 2 in 1988 & 1989 -- Abstracted 06/10/02     Z NONSPECIFIC PROCEDURE  1958     "Influenza resulting in hospitalization -- Abstracted 06/10/02     [facility] NONSPECIFIC PROCEDURE  1971    10 day hospitalization from bilateral pulmonary enboli -- Abstracted 06/10/02     [facility] NONSPECIFIC PROCEDURE  1/03    colonoscopy  negative     SH:   2006, lives in [place]   Moved to [facility] from her home in [place]  She has 2 daughters and 2 sons; states her 2 sons are pilots   Pt is a native of [place], worked as a medical technologist.        Non smoker     ROS: SLUMS 9/30    CPT 3.8 1/2022>>>SLUMS 6/30 and CPT 3.5 in April 2022      Wt Readings from Last 5 Encounters:   09/21/22 66.4 kg (146 lb 6.4 oz)   09/16/22 66.6 kg (146 lb 12.8 oz)   08/31/22 66.9 kg (147 lb 6.4 oz)   08/11/22 64.5 kg (142 lb 3.2 oz)   07/28/22 67.8 kg (149 lb 6.4 oz)      EXAM: NAD  BP (!) 155/65   Pulse 88   Temp 97.3  F (36.3  C)   Resp 18   Ht 1.651 m (5' 5\")   Wt 66.6 kg (146 lb 12.8 oz)   SpO2 96%   BMI 24.43 kg/m     Neck supple without adenopathy  Lungs clear bilaterally with fair air movement  Heart irreg irreg s1s2  Abd soft, NT, no distention or guarding, +BS  Clear yellow urine in leg bag  Ext without edema     Neuro: right hand tremor  Psych: affect okay, pleasant         Last Comprehensive Metabolic Panel:  Sodium   Date Value Ref Range Status   06/22/2022 141 133 - 144 mmol/L Final     Potassium   Date Value Ref Range Status   06/22/2022 4.2 3.4 - 5.3 mmol/L Final     Carbon Dioxide (CO2)   Date Value Ref Range Status   06/22/2022 27 20 - 32 mmol/L Final     Glucose   Date Value Ref Range Status   06/22/2022 132 (H) 70 - 99 mg/dL Final     Urea Nitrogen   Date Value Ref Range Status   06/22/2022 22 7 - 30 mg/dL Final     Creatinine   Date Value Ref Range Status   06/22/2022 1.08 (H) 0.52 - 1.04 mg/dL Final   11/19/2020 1.13 (H) 0.52 - 1.04 mg/dL Final     GFR Estimate   Date Value Ref Range Status   06/22/2022 49 (L) >60 mL/min/1.73m2 Final     Calcium   Date Value Ref Range Status   06/22/2022 9.1 8.5 - 10.1 " mg/dL Final     Lab Results   Component Value Date    AST 23 04/10/2022      ALBUMIN 2.9 04/10/2022      ALKPHOS 74 04/10/2022     Lab Results   Component Value Date    WBC 5.2 06/22/2022      HGB 11.2 06/22/2022       06/22/2022       06/22/2022      CT HEAD WITHOUT CONTRAST  04/09/2022, 12:50 PM  INDICATION: Fall, head contusion, on Coumadin.  INTRACRANIAL CONTENTS: No intracranial hemorrhage, extra-axial collection, or mass effect.  No CT evidence of acute infarct. Mild presumed chronic small vessel ischemic changes. Mild generalized volume loss. No hydrocephalus.       IMP/PLAN:   (I10) Essential hypertension    (N18.31) Stage 3a chronic kidney disease (H)  Comment: BP log reviewed: SBPs 124-156    Plan: lisinopril 2.5 mg/day and metoprolol 25 mg bid        (I48.0) PAF (paroxysmal atrial fibrillation) (H)  Comment: HRs stable    Over past few months, INRs have been 2.3-2.7     Plan: warfarin per INR for stroke prophylaxis      Metoprolol 25 mg bid for VR control     (S82.852S) Closed trimalleolar fracture of left ankle, sequela  (T84.498S) Internal fixation device (pin, linda, or screw) mechanical complication, sequela  Comment: migrating of hardware to the surface    Plan: Continue Orthopedic follow up; avoiding any surgery as possible      Daily dressing changes and monitor.   Acetaminophen 1000mg tid for pain   Vit D50 mcg/day and dietary calcium for bone health       (R33.9) Urinary retention  Comment: with indwelling Guillen catheter  Has been followed by Urology, but not recently seen     Plan: routine catheter cares and changes    Remains on PTA cephalexin for prophylaxis.        (G30.1,  F02.80) Late onset Alzheimer's dementia without behavioral disturbance (H)  Comment: gradual progression over many years, but recently more rapid decline   Plan: [facility] unit for assist with med admin, meals, activity and cares         Thania Vasquez MD

## 2022-09-16 NOTE — LETTER
9/16/2022        RE: Maia Miranda  [facility]  [address]  [facility] [address]        Maia Miranda is a 89 year old female seen September 16, 2022 at [facility] unit where she has resided for 7 months (admit 2/2022)   She is seen in her apartment up to recliner with a blanket.   Reports feeling well, denies any current pain or other symptoms. Not able to give much recent history, talks about her past a bit.      By chart review, pt was seen by me in [facility] in 2014.     Pt has a h/o PAF anticoagulated with warfarin, HTN, CKD, dementia, h/o PE ischemic colitis and h/o GI bleed.   She was hospitalized in Sept 2021 for COVID19 pneumonia.  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   Pt saw Cardiology in Feb 2022 because of elevated troponins on hospitalization stay in Sept and Dec 2021   She declined further diagnostic cardiac intervention    Pt was at [facility], then discharged to [place].     She was hospitalized again in April 2022 following a fall in which she suffered a trimalleolar left ankle fracture, s/p ORIF.  She discharged to [facility] prior to returning to [place]    Ankle incision has been slow to heal, and hardware migrating to the surface.  Followed by Orthopedics     Pt has chronic urinary retention; prior to AL admission she was able to do self I/O catheterization, but no longer able to perform that task and now has an indwelling Guillen catheter.   Previously followed by Urology and she has been maintained on prophylactic cephalexin for some time.        Past Medical History:   Diagnosis Date     Amnestic MCI (mild cognitive impairment with memory loss) 2014     Chronic duodenal ulcer without mention of hemorrhage, perforation, or obstruction 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 Warner, [facility]  yearly     Esophageal reflux 2001    Abstracted 06/10/02     Essential and other specified forms of tremor 2004    resting tremor     Generalized osteoarthrosis, unspecified site     Hands     Hiatal hernia     diagnosed late 1990s [place], [place]     History of pulmonary embolism 1971    no source found     LACTOSE INTOLERANCE      Lumbago     sees Kodi Guidry     Mitral valve regurgitation      Occlusion and stenosis of carotid artery without mention of cerebral infarction 2003    CAROTID US NORMAL, bruit in neck     Osteoporosis, unspecified 2003    T = -2.66     Paroxysmal atrial fibrillation (H) 11/15/2013     Spider veins      Unspecified essential hypertension      Unspecified tinnitus 2005    mild hearing loss, saw ENT       Past Surgical History:   Procedure Laterality Date     CATARACT IOL, RT/LT       corneal scraping       CYSTOSCOPY       CYSTOSCOPY, BIOPSY BLADDER, COMBINED N/A 7/25/2016    Procedure: COMBINED CYSTOSCOPY, BIOPSY BLADDER;  Surgeon: Bernadine Maria MD;  Location: [facility] OR     ESOPHAGOSCOPY, GASTROSCOPY, DUODENOSCOPY (EGD), COMBINED  7/8/2013    Procedure: COMBINED ESOPHAGOSCOPY, GASTROSCOPY, DUODENOSCOPY (EGD);   ESOPHAGOSCOPY, GASTROSCOPY, DUODENOSCOPY (EGD)   ;  Surgeon: Shawn Soto MD;  Location:  GI     IMPLANT STIMULATOR SACRAL NERVE STAGE ONE N/A 4/4/2017    Procedure: IMPLANT STIMULATOR SACRAL NERVE STAGE ONE;  Surgeon: Kb Tracy MD;  Location: [facility] OR     IMPLANT STIMULATOR SACRAL NERVE STAGE TWO N/A 4/18/2017    Procedure: IMPLANT STIMULATOR SACRAL NERVE STAGE TWO;  Stage Two Interstim  ;  Surgeon: Kb Tracy MD;  Location: [facility] OR     interstim placement       OPEN REDUCTION INTERNAL FIXATION ANKLE Left 4/10/2022    Procedure: Open reduction and internal fixation of left trimalleolar ankle fracture;  Surgeon: Kulwant Cooper MD;  Location:  OR     [facility] [place] NONSPECIFIC PROCEDURE      D&C x 2 in 1957 & 1962 -- Abstracted 06/10/02     [facility] [place] NONSPECIFIC  "PROCEDURE      Left breast biopsy x 2 in 1988 & 1989 -- Abstracted 06/10/02     [facility] NONSPECIFIC PROCEDURE  1958    Influenza resulting in hospitalization -- Abstracted 06/10/02     [facility] NONSPECIFIC PROCEDURE  1971    10 day hospitalization from bilateral pulmonary enboli -- Abstracted 06/10/02     [facility] NONSPECIFIC PROCEDURE  1/03    colonoscopy  negative     SH:   2006, lives in [place]   Moved to [facility] from her home in [place] [employer]  She has 2 daughters and 2 sons; states her 2 sons are pilots   Pt is a native of [place], worked as a medical technologist.        Non smoker     ROS: SLUMS 9/30    CPT 3.8 1/2022>>>SLUMS 6/30 and CPT 3.5 in April 2022      Wt Readings from Last 5 Encounters:   09/21/22 66.4 kg (146 lb 6.4 oz)   09/16/22 66.6 kg (146 lb 12.8 oz)   08/31/22 66.9 kg (147 lb 6.4 oz)   08/11/22 64.5 kg (142 lb 3.2 oz)   07/28/22 67.8 kg (149 lb 6.4 oz)      EXAM: NAD  BP (!) 155/65   Pulse 88   Temp 97.3  F (36.3  C)   Resp 18   Ht 1.651 m (5' 5\")   Wt 66.6 kg (146 lb 12.8 oz)   SpO2 96%   BMI 24.43 kg/m     Neck supple without adenopathy  Lungs clear bilaterally with fair air movement  Heart irreg irreg s1s2  Abd soft, NT, no distention or guarding, +BS  Clear yellow urine in leg bag  Ext without edema     Neuro: right hand tremor  Psych: affect okay, pleasant         Last Comprehensive Metabolic Panel:  Sodium   Date Value Ref Range Status   06/22/2022 141 133 - 144 mmol/L Final     Potassium   Date Value Ref Range Status   06/22/2022 4.2 3.4 - 5.3 mmol/L Final     Carbon Dioxide (CO2)   Date Value Ref Range Status   06/22/2022 27 20 - 32 mmol/L Final     Glucose   Date Value Ref Range Status   06/22/2022 132 (H) 70 - 99 mg/dL Final     Urea Nitrogen   Date Value Ref Range Status   06/22/2022 22 7 - 30 mg/dL Final     Creatinine   Date Value Ref Range Status   06/22/2022 1.08 (H) 0.52 - 1.04 mg/dL Final   11/19/2020 1.13 (H) 0.52 - 1.04 mg/dL Final     GFR Estimate   Date Value Ref Range " Status   06/22/2022 49 (L) >60 mL/min/1.73m2 Final     Calcium   Date Value Ref Range Status   06/22/2022 9.1 8.5 - 10.1 mg/dL Final     Lab Results   Component Value Date    AST 23 04/10/2022      ALBUMIN 2.9 04/10/2022      ALKPHOS 74 04/10/2022     Lab Results   Component Value Date    WBC 5.2 06/22/2022      HGB 11.2 06/22/2022       06/22/2022       06/22/2022      CT HEAD WITHOUT CONTRAST  04/09/2022, 12:50 PM  INDICATION: Fall, head contusion, on Coumadin.  INTRACRANIAL CONTENTS: No intracranial hemorrhage, extra-axial collection, or mass effect.  No CT evidence of acute infarct. Mild presumed chronic small vessel ischemic changes. Mild generalized volume loss. No hydrocephalus.       IMP/PLAN:   (I10) Essential hypertension    (N18.31) Stage 3a chronic kidney disease (H)  Comment: BP log reviewed: SBPs 124-156    Plan: lisinopril 2.5 mg/day and metoprolol 25 mg bid        (I48.0) PAF (paroxysmal atrial fibrillation) (H)  Comment: HRs stable    Over past few months, INRs have been 2.3-2.7     Plan: warfarin per INR for stroke prophylaxis      Metoprolol 25 mg bid for VR control     (S82.852S) Closed trimalleolar fracture of left ankle, sequela  (T84.498S) Internal fixation device (pin, linda, or screw) mechanical complication, sequela  Comment: migrating of hardware to the surface    Plan: Continue Orthopedic follow up; avoiding any surgery as possible      Daily dressing changes and monitor.   Acetaminophen 1000mg tid for pain   Vit D50 mcg/day and dietary calcium for bone health       (R33.9) Urinary retention  Comment: with indwelling Guillen catheter  Has been followed by Urology, but not recently seen     Plan: routine catheter cares and changes    Remains on PTA cephalexin for prophylaxis.        (G30.1,  F02.80) Late onset Alzheimer's dementia without behavioral disturbance (H)  Comment: gradual progression over many years, but recently more rapid decline   Plan: [facility] Memory Care unit for  assist with med admin, meals, activity and cares         Thania Vasquez MD             Sincerely,        Thania Vasquez MD

## 2022-09-21 VITALS
HEART RATE: 63 BPM | OXYGEN SATURATION: 97 % | RESPIRATION RATE: 18 BRPM | DIASTOLIC BLOOD PRESSURE: 72 MMHG | WEIGHT: 146.4 LBS | SYSTOLIC BLOOD PRESSURE: 144 MMHG | BODY MASS INDEX: 24.39 KG/M2 | TEMPERATURE: 97.5 F | HEIGHT: 65 IN

## 2022-09-21 NOTE — PROGRESS NOTES
"[facility]  [place] Medical Record Number:  [id number]  Place of Service where encounter took place:  [facility] ([facility] [[id number]]  Chief Complaint   Patient presents with     RECHECK     INR RESULTS     Anticoagulation       HPI:    Maia Miranda  is a 89 year old (10/26/1932), who is being seen today for an episodic care visit.  HPI information obtained from: facility chart records, patient report and [facility] chart review. Today's concern is:    Seen today for chronic disease management. Facility notes/charting reviewed and wound of lateral left ankle noted with small scab, no redness, warmth or s/s of infection. Discomfort at times with ambulating small distance. Continues on Tylenol TID. Discontinue daily wound care. Chronic scabbed area of lateral left ankle from ORIF left ankle 4/10/22- drainage at times when scab is disturbed, with increased redness but not today. Ortho managing and has been updated that hardware from surgery appears to be migrating out. They would like to defer surgery as first measure. Chronic deleon in place for urinary retention. Prior to admission was able to self cath but with progressing dementia no longer able to do this. Previously followed with urology who placed resident on keflex for prophylaxis. VSS, weight stable in chart review. Infrequent recording of bowel movements and staff reporting stools appear \"pebble like\". Calm and pleasant with today's visit. Says she is doing fine. Follows with onsite podiatry every 3 months for nail care.     Past Medical and Surgical History reviewed in Epic today.    MEDICATIONS:    Current Outpatient Medications   Medication Sig Dispense Refill     ACETAMINOPHEN EXTRA STRENGTH 500 MG tablet TAKE TWO TABLETS (1000MG) BY MOUTH THREE TIMES DAILY 540 tablet 97     carboxymethylcellulose (REFRESH LIQUIGEL) 1 % ophthalmic solution Place 1 drop into both eyes every morning As needed       cephALEXin " "(KEFLEX) 250 MG capsule TAKE 1 CAPSULE BY MOUTH ONCE DAILY 90 capsule 97     LIDOCAINE PAIN RELIEF 4 % Patch APPLY 1 PATCH TOPICALLY TO RIGHT SHOULDER ONCE DAILY (ON FOR 12 HOURS, OFF FOR 12 HOURS) 30 patch 97     lisinopril (ZESTRIL) 2.5 MG tablet TAKE 1 TABLET BY MOUTH ONCE DAILY 90 tablet 97     loperamide (IMODIUM A-D) 2 MG tablet Take 1 tablet (2 mg) by mouth 4 times daily as needed for diarrhea       menthol-zinc oxide (CALMOSEPTINE) 0.44-20.6 % OINT ointment Apply topically as needed for skin protection       metoprolol tartrate (LOPRESSOR) 25 MG tablet TAKE 1 TABLET BY MOUTH TWICE DAILY 180 tablet 97     polyethylene glycol (MIRALAX) 17 g packet Take 1 packet by mouth daily as needed for constipation       rosuvastatin (CRESTOR) 5 MG tablet TAKE 1 TABLET BY MOUTH AT BEDTIME 90 tablet 97     SENNA-docusate sodium (SENNA S) 8.6-50 MG tablet Take 1 tablet by mouth At Bedtime. May also take 1 tablet 2 times daily as needed (constipation). 60 tablet 3     sodium chloride (PETER 128) 5 % ophthalmic ointment Place 2 Application (1 g) into both eyes At Bedtime       vitamin C (ASCORBIC ACID) 500 MG tablet TAKE 1 TABLET BY MOUTH TWICE DAILY 180 tablet 3     VITAMIN D3 50 MCG (2000 UT) tablet TAKE 1 TABLET BY MOUTH EVERY EVENING 90 tablet 97     Warfarin Therapy Reminder Take 1 each by mouth See Admin Instructions       REVIEW OF SYSTEMS:  Limited secondary to cognitive impairment but today pt reports ok    Objective:  BP (!) 144/72   Pulse 63   Temp 97.5  F (36.4  C)   Resp 18   Ht 1.651 m (5' 5\")   Wt 66.4 kg (146 lb 6.4 oz)   SpO2 97%   BMI 24.36 kg/m    Exam:  GENERAL APPEARANCE: alert, confused, pleasant   ENT:  Mouth and posterior oropharynx normal, dry mucous membranes  EYES:  EOM, conjunctivae, lids, pupils and irises normal, wears glasses  RESP:  lungs clear to auscultation but diminished throughout   CV:  regular rate and irregular rhythm, no murmur, rub, or gallop, trace edema bilateral legs and " ankles  ABDOMEN:  no guarding or rebound, BS +  : deleon catheter in place with leg bag, minimal straw color output   M/S: generalized weakness- 2WW or WC  SKIN: lateral left ankle with chronic scab, minimal redness, slight swelling  NEURO: Cranial nerves 2-12 are normal tested and grossly at patient's baseline  PSYCH: insight and judgement impaired, memory impaired SLUMS 9/30 and CPT 3.8/5.6    Labs:   CBC RESULTS: Recent Labs   Lab Test 06/22/22  0900 05/02/22  0903   WBC 5.2 6.1   RBC 3.42* 3.57*   HGB 11.2* 11.5*   HCT 36.2 37.6   * 105*   MCH 32.7 32.2   MCHC 30.9* 30.6*   RDW 13.4 13.2   * 192       Last Basic Metabolic Panel:  Recent Labs   Lab Test 06/22/22  0900 04/18/22  0843    137   POTASSIUM 4.2 4.1   CHLORIDE 110* 106   CLARISSE 9.1 9.5   CO2 27 27   BUN 22 26   CR 1.08* 1.01   * 114*       Liver Function Studies -   Recent Labs   Lab Test 04/10/22  0729 12/26/21  0236   PROTTOTAL 6.0* 5.8*   ALBUMIN 2.9* 2.6*   BILITOTAL 0.7 0.8   ALKPHOS 74 57   AST 23 27   ALT 21 20       TSH   Date Value Ref Range Status   02/26/2018 1.82 0.40 - 4.00 mU/L Final   10/05/2017 2.44 0.40 - 4.00 mU/L Final       No results found for: A1C    ASSESSMENT/PLAN:  (I48.91) Atrial fibrillation, unspecified type (H)    (Z79.01) Long term (current) use of anticoagulants  Comment: INR was 2.5 on 7/21, 2.6 7/28 then 2.3 on 8/11 and 2.7 on 9/1 and 3.1 today Currently on   Coumadin 1 mg po daily every Monday and 2 mg po all other days   Plan:   -Coumadin 1 mg po daily every Monday and 2 mg po all other days  -recheck INR in 1 week    (P62.142S) Closed trimalleolar fracture of left ankle, sequela  (T84.598S) Internal fixation device (pin, linda, or screw) mechanical complication, sequela  Comment: ongoing scab at lateral left ankle off revealing hardware on 7/28/22. Similar incident 6/3/22.   -Discussion and reviewed with Dr. Cooper's PA (Juan Diego). Clinic visit with hardware exposure 6/3. Per ortho they would like to  avoid bringing her back to the OR. Will continue to monitor for now in hopes of new scab formation with ongoing healing. With s/s of infection should follow up in clinic with Dr. Cooper.     -discontinue daily dressing for now and open to air   -reviewed plan of care with her daughter    (K59.01) Slow transit constipation  Comment: infrequent recording of bowel movements-currently on Senna S tablet at HS and twice daily prn   Plan:   -currently on Senna S tablet at HS and twice daily prn increased to 2 tabs at HS and 1 tab po BID        Electronically signed by:  RAY Lopez CNP

## 2022-09-22 ENCOUNTER — ASSISTED LIVING VISIT (OUTPATIENT)
Dept: GERIATRICS | Facility: CLINIC | Age: 87
End: 2022-09-22
Payer: COMMERCIAL

## 2022-09-22 DIAGNOSIS — T84.498S INTERNAL FIXATION DEVICE (PIN, ROD, OR SCREW) MECHANICAL COMPLICATION, SEQUELA: ICD-10-CM

## 2022-09-22 DIAGNOSIS — K59.01 SLOW TRANSIT CONSTIPATION: ICD-10-CM

## 2022-09-22 DIAGNOSIS — Z79.01 LONG TERM (CURRENT) USE OF ANTICOAGULANTS: ICD-10-CM

## 2022-09-22 DIAGNOSIS — S82.852S CLOSED TRIMALLEOLAR FRACTURE OF LEFT ANKLE, SEQUELA: ICD-10-CM

## 2022-09-22 DIAGNOSIS — I48.91 ATRIAL FIBRILLATION, UNSPECIFIED TYPE (H): Primary | ICD-10-CM

## 2022-09-22 RX ORDER — SENNA AND DOCUSATE SODIUM 50; 8.6 MG/1; MG/1
TABLET, FILM COATED ORAL
Qty: 60 TABLET | Refills: 3 | Status: SHIPPED | OUTPATIENT
Start: 2022-09-22 | End: 2023-01-01

## 2022-09-22 NOTE — LETTER
"    9/22/2022        RE: Maia Miranda  [facility]  [address]  [facility] [address]        [facility]  [place] Medical Record Number:  [id number]  Place of Service where encounter took place:  [facility] ([facility] [[id number]]  Chief Complaint   Patient presents with     RECHECK     INR RESULTS     Anticoagulation       HPI:    Maia Miranda  is a 89 year old (10/26/1932), who is being seen today for an episodic care visit.  HPI information obtained from: facility chart records, patient report and [facility] chart review. Today's concern is:    Seen today for chronic disease management. Facility notes/charting reviewed and wound of lateral left ankle noted with small scab, no redness, warmth or s/s of infection. Discomfort at times with ambulating small distance. Continues on Tylenol TID. Discontinue daily wound care. Chronic scabbed area of lateral left ankle from ORIF left ankle 4/10/22- drainage at times when scab is disturbed, with increased redness but not today. Ortho managing and has been updated that hardware from surgery appears to be migrating out. They would like to defer surgery as first measure. Chronic deleon in place for urinary retention. Prior to admission was able to self cath but with progressing dementia no longer able to do this. Previously followed with urology who placed resident on keflex for prophylaxis. VSS, weight stable in chart review. Infrequent recording of bowel movements and staff reporting stools appear \"pebble like\". Calm and pleasant with today's visit. Says she is doing fine. Follows with onsite podiatry every 3 months for nail care.     Past Medical and Surgical History reviewed in Epic today.    MEDICATIONS:    Current Outpatient Medications   Medication Sig Dispense Refill     ACETAMINOPHEN EXTRA STRENGTH 500 MG tablet TAKE TWO TABLETS (1000MG) BY MOUTH THREE TIMES DAILY 540 tablet 97     carboxymethylcellulose " "(REFRESH LIQUIGEL) 1 % ophthalmic solution Place 1 drop into both eyes every morning As needed       cephALEXin (KEFLEX) 250 MG capsule TAKE 1 CAPSULE BY MOUTH ONCE DAILY 90 capsule 97     LIDOCAINE PAIN RELIEF 4 % Patch APPLY 1 PATCH TOPICALLY TO RIGHT SHOULDER ONCE DAILY (ON FOR 12 HOURS, OFF FOR 12 HOURS) 30 patch 97     lisinopril (ZESTRIL) 2.5 MG tablet TAKE 1 TABLET BY MOUTH ONCE DAILY 90 tablet 97     loperamide (IMODIUM A-D) 2 MG tablet Take 1 tablet (2 mg) by mouth 4 times daily as needed for diarrhea       menthol-zinc oxide (CALMOSEPTINE) 0.44-20.6 % OINT ointment Apply topically as needed for skin protection       metoprolol tartrate (LOPRESSOR) 25 MG tablet TAKE 1 TABLET BY MOUTH TWICE DAILY 180 tablet 97     polyethylene glycol (MIRALAX) 17 g packet Take 1 packet by mouth daily as needed for constipation       rosuvastatin (CRESTOR) 5 MG tablet TAKE 1 TABLET BY MOUTH AT BEDTIME 90 tablet 97     SENNA-docusate sodium (SENNA S) 8.6-50 MG tablet Take 1 tablet by mouth At Bedtime. May also take 1 tablet 2 times daily as needed (constipation). 60 tablet 3     sodium chloride (PETER 128) 5 % ophthalmic ointment Place 2 Application (1 g) into both eyes At Bedtime       vitamin C (ASCORBIC ACID) 500 MG tablet TAKE 1 TABLET BY MOUTH TWICE DAILY 180 tablet 3     VITAMIN D3 50 MCG (2000 UT) tablet TAKE 1 TABLET BY MOUTH EVERY EVENING 90 tablet 97     Warfarin Therapy Reminder Take 1 each by mouth See Admin Instructions       REVIEW OF SYSTEMS:  Limited secondary to cognitive impairment but today pt reports ok    Objective:  BP (!) 144/72   Pulse 63   Temp 97.5  F (36.4  C)   Resp 18   Ht 1.651 m (5' 5\")   Wt 66.4 kg (146 lb 6.4 oz)   SpO2 97%   BMI 24.36 kg/m    Exam:  GENERAL APPEARANCE: alert, confused, pleasant   ENT:  Mouth and posterior oropharynx normal, dry mucous membranes  EYES:  EOM, conjunctivae, lids, pupils and irises normal, wears glasses  RESP:  lungs clear to auscultation but diminished " throughout   CV:  regular rate and irregular rhythm, no murmur, rub, or gallop, trace edema bilateral legs and ankles  ABDOMEN:  no guarding or rebound, BS +  : deleon catheter in place with leg bag, minimal straw color output   M/S: generalized weakness- 2WW or WC  SKIN: lateral left ankle with chronic scab, minimal redness, slight swelling  NEURO: Cranial nerves 2-12 are normal tested and grossly at patient's baseline  PSYCH: insight and judgement impaired, memory impaired SLUMS 9/30 and CPT 3.8/5.6    Labs:   CBC RESULTS: Recent Labs   Lab Test 06/22/22  0900 05/02/22  0903   WBC 5.2 6.1   RBC 3.42* 3.57*   HGB 11.2* 11.5*   HCT 36.2 37.6   * 105*   MCH 32.7 32.2   MCHC 30.9* 30.6*   RDW 13.4 13.2   * 192       Last Basic Metabolic Panel:  Recent Labs   Lab Test 06/22/22  0900 04/18/22  0843    137   POTASSIUM 4.2 4.1   CHLORIDE 110* 106   CLARISSE 9.1 9.5   CO2 27 27   BUN 22 26   CR 1.08* 1.01   * 114*       Liver Function Studies -   Recent Labs   Lab Test 04/10/22  0729 12/26/21  0236   PROTTOTAL 6.0* 5.8*   ALBUMIN 2.9* 2.6*   BILITOTAL 0.7 0.8   ALKPHOS 74 57   AST 23 27   ALT 21 20       TSH   Date Value Ref Range Status   02/26/2018 1.82 0.40 - 4.00 mU/L Final   10/05/2017 2.44 0.40 - 4.00 mU/L Final       No results found for: A1C    ASSESSMENT/PLAN:  (I48.91) Atrial fibrillation, unspecified type (H)    (Z79.01) Long term (current) use of anticoagulants  Comment: INR was 2.5 on 7/21, 2.6 7/28 then 2.3 on 8/11 and 2.7 on 9/1 and 3.1 today Currently on   Coumadin 1 mg po daily every Monday and 2 mg po all other days   Plan:   -Coumadin 1 mg po daily every Monday and 2 mg po all other days  -recheck INR in 1 week    (S82.852S) Closed trimalleolar fracture of left ankle, sequela  (T84.268S) Internal fixation device (pin, linda, or screw) mechanical complication, sequela  Comment: ongoing scab at lateral left ankle off revealing hardware on 7/28/22. Similar incident 6/3/22.   -Discussion  and reviewed with Dr. Cooper's PA (Juan Diego). Clinic visit with hardware exposure 6/3. Per ortho they would like to avoid bringing her back to the OR. Will continue to monitor for now in hopes of new scab formation with ongoing healing. With s/s of infection should follow up in clinic with Dr. Cooper.     -discontinue daily dressing for now and open to air   -reviewed plan of care with her daughter    (K59.01) Slow transit constipation  Comment: infrequent recording of bowel movements-currently on Senna S tablet at HS and twice daily prn   Plan:   -currently on Senna S tablet at HS and twice daily prn increased to 2 tabs at HS and 1 tab po BID        Electronically signed by:  RAY Lopez CNP               Sincerely,        RAY Lopez CNP

## 2022-09-22 NOTE — LETTER
Maia Miranda orders    1. Increase Senna S to    SENNA-docusate sodium (SENNA S) 8.6-50 MG tablet Take 2 tablets by mouth At Bedtime. May also take 1 tablet 2 times daily as needed (constipation). Ok to hold for loose stools.       RAY Lopez CNP on 9/22/2022 at 1:06 PM

## 2022-09-29 ENCOUNTER — TELEPHONE (OUTPATIENT)
Dept: GERIATRICS | Facility: CLINIC | Age: 87
End: 2022-09-29

## 2022-09-29 ENCOUNTER — ASSISTED LIVING VISIT (OUTPATIENT)
Dept: GERIATRICS | Facility: CLINIC | Age: 87
End: 2022-09-29
Payer: COMMERCIAL

## 2022-09-29 VITALS
BODY MASS INDEX: 23.93 KG/M2 | WEIGHT: 143.6 LBS | SYSTOLIC BLOOD PRESSURE: 159 MMHG | TEMPERATURE: 97.3 F | OXYGEN SATURATION: 95 % | DIASTOLIC BLOOD PRESSURE: 79 MMHG | RESPIRATION RATE: 18 BRPM | HEART RATE: 84 BPM | HEIGHT: 65 IN

## 2022-09-29 DIAGNOSIS — I48.91 ATRIAL FIBRILLATION, UNSPECIFIED TYPE (H): Primary | ICD-10-CM

## 2022-09-29 DIAGNOSIS — I10 ESSENTIAL HYPERTENSION: ICD-10-CM

## 2022-09-29 DIAGNOSIS — Z79.01 LONG TERM (CURRENT) USE OF ANTICOAGULANTS: ICD-10-CM

## 2022-09-29 NOTE — PROGRESS NOTES
"[facility]  [place] Medical Record Number:  [id number]  Place of Service where encounter took place: [facility] [[id number]]    HPI:    Maia Miranda is a 89 year old  (10/26/1932), who is being seen today for an episodic care visit at the above location.   HPI information obtained from: facility chart records. Today's concern is INR/Coumadin management for A. Fib/ Hx of PE    ROS/Subjective:  Bleeding Signs/Symptoms:  None  Thromboembolic Signs/Symptoms:  None  Medication Changes:  No  Dietary Changes:  No  Activity Changes: No  Bacterial/Viral Infection:  No  Missed Coumadin Doses:  None  On ASA: No  Other Concerns:  elevation to BP-lisinopril reduced 5/2022 for softer BPs    OBJECTIVE:  BP (!) 159/79   Pulse 84   Temp 97.3  F (36.3  C)   Resp 18   Ht 1.651 m (5' 5\")   Wt 65.1 kg (143 lb 9.6 oz)   SpO2 95%   BMI 23.90 kg/m    Last INR: 3.1  on 9/22  INR Today:  2.6  Current Dose: Coumadin 1 mg po daily every Monday and 2 mg po all other days     ASSESSMENT/PLAN:   (I48.91) Atrial fibrillation, unspecified type (H)  (primary encounter diagnosis)  (Z79.01) Long term (current) use of anticoagulants  Comment: stable INR  Plan:   -continue current dose and recheck INR in 2 weeks    (I10) Essential hypertension  Comment: BPs started to elevate        Plan:   -lisinopril 2.5 mg po daily. Consider increasing back to 5 mg po daily if elevated BPs continue. Up and ambulatory so do not want hypotension  -metoprolol 25 mg po daily  -labs prn           Electronically signed by:  RAY Lopez CNP       "

## 2022-09-29 NOTE — LETTER
"    9/29/2022        RE: Maia Miranda  [facility]  [address]  [facility] [address]        [place] [facility]  [place] Medical Record Number:  [id number]  Place of Service where encounter took place: [facility]) [[id number]]    HPI:    Maia Miranda is a 89 year old  (10/26/1932), who is being seen today for an episodic care visit at the above location.   HPI information obtained from: facility chart records. Today's concern is INR/Coumadin management for A. Fib/ Hx of PE    ROS/Subjective:  Bleeding Signs/Symptoms:  None  Thromboembolic Signs/Symptoms:  None  Medication Changes:  No  Dietary Changes:  No  Activity Changes: No  Bacterial/Viral Infection:  No  Missed Coumadin Doses:  None  On ASA: No  Other Concerns:  elevation to BP-lisinopril reduced 5/2022 for softer BPs    OBJECTIVE:  BP (!) 159/79   Pulse 84   Temp 97.3  F (36.3  C)   Resp 18   Ht 1.651 m (5' 5\")   Wt 65.1 kg (143 lb 9.6 oz)   SpO2 95%   BMI 23.90 kg/m    Last INR: 3.1  on 9/22  INR Today:  2.6  Current Dose: Coumadin 1 mg po daily every Monday and 2 mg po all other days     ASSESSMENT/PLAN:   (I48.91) Atrial fibrillation, unspecified type (H)  (primary encounter diagnosis)  (Z79.01) Long term (current) use of anticoagulants  Comment: stable INR  Plan:   -continue current dose and recheck INR in 2 weeks    (I10) Essential hypertension  Comment: BPs started to elevate        Plan:   -lisinopril 2.5 mg po daily. Consider increasing back to 5 mg po daily if elevated BPs continue. Up and ambulatory so do not want hypotension  -metoprolol 25 mg po daily  -labs prn           Electronically signed by:  RAY Lopez CNP             Sincerely,        RAY Lopez CNP      "

## 2022-10-07 ENCOUNTER — PATIENT OUTREACH (OUTPATIENT)
Dept: GERIATRIC MEDICINE | Facility: CLINIC | Age: 87
End: 2022-10-07

## 2022-10-13 ENCOUNTER — ASSISTED LIVING VISIT (OUTPATIENT)
Dept: GERIATRICS | Facility: CLINIC | Age: 87
End: 2022-10-13
Payer: COMMERCIAL

## 2022-10-13 VITALS
RESPIRATION RATE: 18 BRPM | OXYGEN SATURATION: 95 % | BODY MASS INDEX: 24.32 KG/M2 | WEIGHT: 146 LBS | HEART RATE: 84 BPM | TEMPERATURE: 97.3 F | HEIGHT: 65 IN | DIASTOLIC BLOOD PRESSURE: 94 MMHG | SYSTOLIC BLOOD PRESSURE: 144 MMHG

## 2022-10-13 DIAGNOSIS — I48.91 ATRIAL FIBRILLATION, UNSPECIFIED TYPE (H): Primary | ICD-10-CM

## 2022-10-13 DIAGNOSIS — Z51.81 ENCOUNTER FOR THERAPEUTIC DRUG MONITORING: ICD-10-CM

## 2022-10-13 DIAGNOSIS — Z79.01 LONG TERM (CURRENT) USE OF ANTICOAGULANTS: ICD-10-CM

## 2022-10-13 DIAGNOSIS — Z86.711 HISTORY OF PULMONARY EMBOLISM: ICD-10-CM

## 2022-10-13 NOTE — PROGRESS NOTES
"[facility]  [place] Medical Record Number:  [id number]  Place of Service where encounter took place: [facility]  [facility] ([facility]) [[id number]]    HPI:    Maia Miranda is a 89 year old  (10/26/1932), who is being seen today for an episodic care visit at the above location. Today's concern is INR/Coumadin management for A. Fib and Hx of PE    ROS/Subjective:  Bleeding Signs/Symptoms:  None  Thromboembolic Signs/Symptoms:  None  Medication Changes:  No  Dietary Changes:  No  Activity Changes: No  Bacterial/Viral Infection:  No  Missed Coumadin Doses:  None  On ASA: No  Other Concerns:  No    OBJECTIVE:  BP (!) 144/94   Pulse 84   Temp 97.3  F (36.3  C)   Resp 18   Ht 1.651 m (5' 5\")   Wt 66.2 kg (146 lb)   SpO2 95%   BMI 24.30 kg/m    Last INR: 2.6 on 9/29/22  INR Today:  2.6  Current Dose:  Coumadin 1 mg po daily every Monday and 2 mg po all other days    ASSESSMENT:  (I48.91) Atrial fibrillation, unspecified type (H)  (primary encounter diagnosis)  (Z86.711) History of pulmonary embolism  (Z79.01) Long term (current) use of anticoagulants  (Z51.81) Encounter for therapeutic drug monitoring    Therapeutic INR for goal of 2-3    PLAN/ORDERS:   New Dose: No Change    Next INR: 3 weeks: 11/3/22      Electronically signed by:  RAY Lopez CNP     "

## 2022-10-13 NOTE — LETTER
"    10/13/2022        RE: Maia Miranda  [facility]  [address]  [place] [facility] [address]        [facility] Medical Record Number:  [id number]  Place of Service where encounter took place: [facility]  [facility] ([facility]) [[id number]]    HPI:    Maia Miranda is a 89 year old  (10/26/1932), who is being seen today for an episodic care visit at the above location. Today's concern is INR/Coumadin management for A. Fib and Hx of PE    ROS/Subjective:  Bleeding Signs/Symptoms:  None  Thromboembolic Signs/Symptoms:  None  Medication Changes:  No  Dietary Changes:  No  Activity Changes: No  Bacterial/Viral Infection:  No  Missed Coumadin Doses:  None  On ASA: No  Other Concerns:  No    OBJECTIVE:  BP (!) 144/94   Pulse 84   Temp 97.3  F (36.3  C)   Resp 18   Ht 1.651 m (5' 5\")   Wt 66.2 kg (146 lb)   SpO2 95%   BMI 24.30 kg/m    Last INR: 2.6 on 9/29/22  INR Today:  2.6  Current Dose:  Coumadin 1 mg po daily every Monday and 2 mg po all other days    ASSESSMENT:  (I48.91) Atrial fibrillation, unspecified type (H)  (primary encounter diagnosis)  (Z86.711) History of pulmonary embolism  (Z79.01) Long term (current) use of anticoagulants  (Z51.81) Encounter for therapeutic drug monitoring    Therapeutic INR for goal of 2-3    PLAN/ORDERS:   New Dose: No Change    Next INR: 3 weeks: 11/3/22      Electronically signed by:  RAY Lopez CNP           Sincerely,        RAY Lopez CNP      "

## 2022-10-14 DIAGNOSIS — H04.123 DRY EYES: ICD-10-CM

## 2022-10-14 RX ORDER — SODIUM CHLORIDE 5 %
OINTMENT (GRAM) OPHTHALMIC (EYE)
Qty: 3.5 G | Refills: 11 | Status: SHIPPED | OUTPATIENT
Start: 2022-10-14

## 2022-10-23 ENCOUNTER — HEALTH MAINTENANCE LETTER (OUTPATIENT)
Age: 87
End: 2022-10-23

## 2022-11-02 NOTE — PROGRESS NOTES
[place] [facility]  [place] Medical Record Number:  [id number]  Place of Service where encounter took place:  [facility] [[id number]]  Chief Complaint   Patient presents with     RECHECK     INR RESULTS     Anticoagulation     COVID +       HPI:    Maia Miranda  is a 89 year old (10/26/1932), who is being seen today for an episodic care visit.  HPI information obtained from: facility chart records, facility staff and [facility] chart review. Today's concern is:    Complaints of not feeling well, mouth breathing, raspy voice, coughing and poor oral intake. Tested positive for COVID-19 11/2/22. BP slightly elevated. Was febrile and on scheduled tylenol but no fever at last check. Many on memory care unit also COVID-19 positive. Seen initially laying in bed. She is pale, appears dry, concentrated urine in her chronic deleon. She denies pain but states SOB. Her VS mostly stable. Oxygen sats at 93%. Later is able to sit in a WC for breakfast and appears better sitting upright with oral intake picking up.      Past Medical and Surgical History reviewed in Epic today.    MEDICATIONS:    Current Outpatient Medications   Medication Sig Dispense Refill     dextromethorphan-guaiFENesin (MUCINEX DM)  MG 12 hr tablet Take 1 tablet by mouth every 12 hours for 7 days 14 tablet 0     Nirmatrelvir&Ritonavir 150/100 (PAXLOVID, 150/100,) 10 x 150 MG & 10 x 100MG TBPK Take 2 tablets by mouth 2 times daily 20 each 0     sodium chloride (OCEAN) 0.65 % nasal spray Use in both nares TID and prn x 14 days for nasal congestion. Then prn. 50 mL 11     ACETAMINOPHEN EXTRA STRENGTH 500 MG tablet TAKE TWO TABLETS (1000MG) BY MOUTH THREE TIMES DAILY 540 tablet 97     carboxymethylcellulose (REFRESH LIQUIGEL) 1 % ophthalmic solution Place 1 drop into both eyes every morning As needed       cephALEXin (KEFLEX) 250 MG capsule TAKE 1 CAPSULE BY MOUTH ONCE DAILY 90 capsule 97     LIDOCAINE PAIN RELIEF 4 %  "Patch APPLY 1 PATCH TOPICALLY TO RIGHT SHOULDER ONCE DAILY (ON FOR 12 HOURS, OFF FOR 12 HOURS) 30 patch 97     lisinopril (ZESTRIL) 2.5 MG tablet TAKE 1 TABLET BY MOUTH ONCE DAILY 90 tablet 97     loperamide (IMODIUM A-D) 2 MG tablet Take 1 tablet (2 mg) by mouth 4 times daily as needed for diarrhea       menthol-zinc oxide (CALMOSEPTINE) 0.44-20.6 % OINT ointment Apply topically as needed for skin protection       metoprolol tartrate (LOPRESSOR) 25 MG tablet TAKE 1 TABLET BY MOUTH TWICE DAILY 180 tablet 97     polyethylene glycol (MIRALAX) 17 g packet Take 1 packet by mouth daily as needed for constipation       rosuvastatin (CRESTOR) 5 MG tablet TAKE 1 TABLET BY MOUTH AT BEDTIME 90 tablet 97     SENNA-docusate sodium (SENNA S) 8.6-50 MG tablet Take 2 tablets by mouth At Bedtime. May also take 1 tablet 2 times daily as needed (constipation). 60 tablet 3     sodium chloride (PETER 128) 5 % ophthalmic ointment APPLY TO BOTH EYES ONCE DAILY 3.5 g 11     vitamin C (ASCORBIC ACID) 500 MG tablet TAKE 1 TABLET BY MOUTH TWICE DAILY 180 tablet 3     VITAMIN D3 50 MCG (2000 UT) tablet TAKE 1 TABLET BY MOUTH EVERY EVENING 90 tablet 97     Warfarin Therapy Reminder Take 1 each by mouth See Admin Instructions       REVIEW OF SYSTEMS:  Limited secondary to cognitive impairment but today pt reports ok    Objective:  /77   Pulse 90   Temp (!) 96.4  F (35.8  C)   Resp 19   Ht 1.651 m (5' 5\")   Wt 65.1 kg (143 lb 9.6 oz)   SpO2 93%   BMI 23.90 kg/m    Exam:  GENERAL APPEARANCE: alert, confused, pleasant   ENT:  Mouth and posterior oropharynx normal, dry mucous membranes  EYES:  EOM, conjunctivae, lids, pupils and irises normal, wears glasses  RESP:  lungs diminished, loose congested productive cough with wheeze   CV:  regular rate and irregular rhythm, no murmur, rub, or gallop, trace edema bilateral legs and ankles  ABDOMEN:  no guarding or rebound, BS +  : deleon catheter in place with leg bag, minimal darker " urine output   M/S: generalized weakness- 2WW or WC  SKIN: lateral left ankle with chronic scab, minimal redness, slight swelling  NEURO: Cranial nerves 2-12 are normal tested and grossly at patient's baseline  PSYCH: insight and judgement impaired, memory impaired SLUMS 9/30 and CPT 3.8/5.6       Labs:   CBC RESULTS: Recent Labs   Lab Test 06/22/22  0900 05/02/22  0903   WBC 5.2 6.1   RBC 3.42* 3.57*   HGB 11.2* 11.5*   HCT 36.2 37.6   * 105*   MCH 32.7 32.2   MCHC 30.9* 30.6*   RDW 13.4 13.2   * 192       Last Basic Metabolic Panel:  Recent Labs   Lab Test 06/22/22  0900 04/18/22  0843    137   POTASSIUM 4.2 4.1   CHLORIDE 110* 106   CLARISSE 9.1 9.5   CO2 27 27   BUN 22 26   CR 1.08* 1.01   * 114*       Liver Function Studies -   Recent Labs   Lab Test 04/10/22  0729 12/26/21  0236   PROTTOTAL 6.0* 5.8*   ALBUMIN 2.9* 2.6*   BILITOTAL 0.7 0.8   ALKPHOS 74 57   AST 23 27   ALT 21 20       TSH   Date Value Ref Range Status   02/26/2018 1.82 0.40 - 4.00 mU/L Final   10/05/2017 2.44 0.40 - 4.00 mU/L Final       No results found for: A1C    ASSESSMENT/PLAN:  (U07.1) COVID-19  (primary encounter diagnosis)  (R09.81) Nasal congestion  (J06.9) URI with cough and congestion  Comment:   Plan:   -Nirmatrelvir&Ritonavir 150/100 (PAXLOVID,   150/100,) 10 x 150 MG & 10 x 100MG TBPK  -sodium chloride (OCEAN) 0.65 % nasal spray  -dextromethorphan-guaiFENesin (MUCINEX DM)   MG 12 hr tablet po BID x 7 days  -consider CXR or antibiotic for concurrent pneumonia        (I48.91) Atrial fibrillation, unspecified type (H)  (Z86.711) History of pulmonary embolism  (Z79.01) Long term (current) use of anticoagulants  Comment: INR of 2.6 on 10/13/22 and on Coumadin 1 mg on Monday and 2 mg po AOD. INR today  3.3  Plan:  -recheck INR on 11/6/22 with results to primary NP since on Paxlovid. Hold coumadin today then Coumadin 1.5 mg po AOD and will recheck 11/6 with results to primary NP      (Z71.89) Encounter for  hospice care discussion   Comment: Discussion with family and they do not want tranfers to hospital with goals of care comfort focused. Current health state is tenuous with acute illness and significant chronic co-morbidities   Plan:  -eval to hospice     Electronically signed by:  RAY Lopez CNP

## 2022-11-03 NOTE — LETTER
Maia Miranda    1. Hold rosuvastatin x 8 days ( hold at start of Paxlovid and 3 days after) May restart after completion of Paxlovid therapy     2. Begin    Nirmatrelvir&Ritonavir 150/100 (PAXLOVID, 150/100,) 10 x 150 MG & 10 x 100MG TBPK Take 2 tablets by mouth 2 times daily for 5 days        3. Begin   sodium chloride (OCEAN) 0.65 % nasal spray Use in both nares TID and prn x 14 days for nasal congestion. Then prn.     4. INR will be checked tomorrow and recheck INR on 11/6/22 with results to primary NP     5. Begin    dextromethorphan-guaiFENesin (MUCINEX DM)  MG 12 hr tablet Take 1 tablet by mouth every 12 hours for 7 days        Remy Hernandez, RAY CNP on 11/2/2022 at 2:08 PM

## 2022-11-03 NOTE — LETTER
Miaa Miranda    1. Hold Coumadin today. Then Coumadin 1.5 mg po daily and recheck INR recheck on 11/3/22  2. Ok to [facility] for hospice     RAY Lopez CNP on 11/3/2022 at 1:24 PM

## 2022-11-03 NOTE — LETTER
11/3/2022        RE: Maia [facility]  [address]  [facility] [address]        [place] [facility]  [place] Medical Record Number:  [id number]  Place of Service where encounter took place:  [facility] [[id number]]  Chief Complaint   Patient presents with     RECHECK     INR RESULTS     Anticoagulation     COVID +       HPI:    Maia Miranda  is a 89 year old (10/26/1932), who is being seen today for an episodic care visit.  HPI information obtained from: facility chart records, facility staff and [facility] chart review. Today's concern is:    Complaints of not feeling well, mouth breathing, raspy voice, coughing and poor oral intake. Tested positive for COVID-19 11/2/22. BP slightly elevated. Was febrile and on scheduled tylenol but no fever at last check. Many on memory care unit also COVID-19 positive. Seen initially laying in bed. She is pale, appears dry, concentrated urine in her chronic deleon. She denies pain but states SOB. Her VS mostly stable. Oxygen sats at 93%. Later is able to sit in a WC for breakfast and appears better sitting upright with oral intake picking up.      Past Medical and Surgical History reviewed in Epic today.    MEDICATIONS:    Current Outpatient Medications   Medication Sig Dispense Refill     dextromethorphan-guaiFENesin (MUCINEX DM)  MG 12 hr tablet Take 1 tablet by mouth every 12 hours for 7 days 14 tablet 0     Nirmatrelvir&Ritonavir 150/100 (PAXLOVID, 150/100,) 10 x 150 MG & 10 x 100MG TBPK Take 2 tablets by mouth 2 times daily 20 each 0     sodium chloride (OCEAN) 0.65 % nasal spray Use in both nares TID and prn x 14 days for nasal congestion. Then prn. 50 mL 11     ACETAMINOPHEN EXTRA STRENGTH 500 MG tablet TAKE TWO TABLETS (1000MG) BY MOUTH THREE TIMES DAILY 540 tablet 97     carboxymethylcellulose (REFRESH LIQUIGEL) 1 % ophthalmic solution Place 1 drop into both eyes every morning As needed    "    cephALEXin (KEFLEX) 250 MG capsule TAKE 1 CAPSULE BY MOUTH ONCE DAILY 90 capsule 97     LIDOCAINE PAIN RELIEF 4 % Patch APPLY 1 PATCH TOPICALLY TO RIGHT SHOULDER ONCE DAILY (ON FOR 12 HOURS, OFF FOR 12 HOURS) 30 patch 97     lisinopril (ZESTRIL) 2.5 MG tablet TAKE 1 TABLET BY MOUTH ONCE DAILY 90 tablet 97     loperamide (IMODIUM A-D) 2 MG tablet Take 1 tablet (2 mg) by mouth 4 times daily as needed for diarrhea       menthol-zinc oxide (CALMOSEPTINE) 0.44-20.6 % OINT ointment Apply topically as needed for skin protection       metoprolol tartrate (LOPRESSOR) 25 MG tablet TAKE 1 TABLET BY MOUTH TWICE DAILY 180 tablet 97     polyethylene glycol (MIRALAX) 17 g packet Take 1 packet by mouth daily as needed for constipation       rosuvastatin (CRESTOR) 5 MG tablet TAKE 1 TABLET BY MOUTH AT BEDTIME 90 tablet 97     SENNA-docusate sodium (SENNA S) 8.6-50 MG tablet Take 2 tablets by mouth At Bedtime. May also take 1 tablet 2 times daily as needed (constipation). 60 tablet 3     sodium chloride (PETER 128) 5 % ophthalmic ointment APPLY TO BOTH EYES ONCE DAILY 3.5 g 11     vitamin C (ASCORBIC ACID) 500 MG tablet TAKE 1 TABLET BY MOUTH TWICE DAILY 180 tablet 3     VITAMIN D3 50 MCG (2000 UT) tablet TAKE 1 TABLET BY MOUTH EVERY EVENING 90 tablet 97     Warfarin Therapy Reminder Take 1 each by mouth See Admin Instructions       REVIEW OF SYSTEMS:  Limited secondary to cognitive impairment but today pt reports ok    Objective:  /77   Pulse 90   Temp (!) 96.4  F (35.8  C)   Resp 19   Ht 1.651 m (5' 5\")   Wt 65.1 kg (143 lb 9.6 oz)   SpO2 93%   BMI 23.90 kg/m    Exam:  GENERAL APPEARANCE: alert, confused, pleasant   ENT:  Mouth and posterior oropharynx normal, dry mucous membranes  EYES:  EOM, conjunctivae, lids, pupils and irises normal, wears glasses  RESP:  lungs diminished, loose congested productive cough with wheeze   CV:  regular rate and irregular rhythm, no murmur, rub, or gallop, trace edema bilateral legs " and ankles  ABDOMEN:  no guarding or rebound, BS +  : deleon catheter in place with leg bag, minimal darker urine output   M/S: generalized weakness- 2WW or WC  SKIN: lateral left ankle with chronic scab, minimal redness, slight swelling  NEURO: Cranial nerves 2-12 are normal tested and grossly at patient's baseline  PSYCH: insight and judgement impaired, memory impaired SLUMS 9/30 and CPT 3.8/5.6       Labs:   CBC RESULTS: Recent Labs   Lab Test 06/22/22  0900 05/02/22  0903   WBC 5.2 6.1   RBC 3.42* 3.57*   HGB 11.2* 11.5*   HCT 36.2 37.6   * 105*   MCH 32.7 32.2   MCHC 30.9* 30.6*   RDW 13.4 13.2   * 192       Last Basic Metabolic Panel:  Recent Labs   Lab Test 06/22/22  0900 04/18/22  0843    137   POTASSIUM 4.2 4.1   CHLORIDE 110* 106   CLARISSE 9.1 9.5   CO2 27 27   BUN 22 26   CR 1.08* 1.01   * 114*       Liver Function Studies -   Recent Labs   Lab Test 04/10/22  0729 12/26/21  0236   PROTTOTAL 6.0* 5.8*   ALBUMIN 2.9* 2.6*   BILITOTAL 0.7 0.8   ALKPHOS 74 57   AST 23 27   ALT 21 20       TSH   Date Value Ref Range Status   02/26/2018 1.82 0.40 - 4.00 mU/L Final   10/05/2017 2.44 0.40 - 4.00 mU/L Final       No results found for: A1C    ASSESSMENT/PLAN:  (U07.1) COVID-19  (primary encounter diagnosis)  (R09.81) Nasal congestion  (J06.9) URI with cough and congestion  Comment:   Plan:   -Nirmatrelvir&Ritonavir 150/100 (PAXLOVID,   150/100,) 10 x 150 MG & 10 x 100MG TBPK  -sodium chloride (OCEAN) 0.65 % nasal spray  -dextromethorphan-guaiFENesin (MUCINEX DM)   MG 12 hr tablet po BID x 7 days  -consider CXR or antibiotic for concurrent pneumonia        (I48.91) Atrial fibrillation, unspecified type (H)  (Z86.711) History of pulmonary embolism  (Z79.01) Long term (current) use of anticoagulants  Comment: INR of 2.6 on 10/13/22 and on Coumadin 1 mg on Monday and 2 mg po AOD. INR today  3.3  Plan:  -recheck INR on 11/6/22 with results to primary NP since on Paxlovid. Hold coumadin  today then Coumadin 1.5 mg po AOD and will recheck 11/6 with results to primary NP      (Z71.89) Encounter for hospice care discussion   Comment: Discussion with family and they do not want tranfers to hospital with goals of care comfort focused. Current health state is tenuous with acute illness and significant chronic co-morbidities   Plan:  -eval to hospice     Electronically signed by:  RAY Lopez CNP                 Sincerely,        RAY Lopez CNP

## 2022-11-10 NOTE — PROGRESS NOTES
[place] [facility]  [place] Medical Record Number:  [id number]  Place of Service where encounter took place:  [facility] [[id number]]  Chief Complaint   Patient presents with     RECHECK     COVID +     [facility]       HPI:    Maia Miranda  is a 89 year old (10/26/1932), who is being seen today for an episodic care visit.  HPI information obtained from: facility chart records, facility staff, patient report and [facility] chart review. Today's concern is:    COVID-19 positive 11/2/22 and now under hospice care. She was started on antibiotics and Paxlovid.  Her CXR was negative for acute findings. Seen in her room, appears pale and with a loose dry cough. Overall improvement in appearance from last week. She is pale but more alert. Says she is ok.     Past Medical and Surgical History reviewed in Epic today.    MEDICATIONS:    Current Outpatient Medications   Medication Sig Dispense Refill     ACETAMINOPHEN EXTRA STRENGTH 500 MG tablet TAKE TWO TABLETS (1000MG) BY MOUTH THREE TIMES DAILY 540 tablet 97     carboxymethylcellulose (REFRESH LIQUIGEL) 1 % ophthalmic solution Place 1 drop into both eyes every morning As needed       cephALEXin (KEFLEX) 250 MG capsule TAKE 1 CAPSULE BY MOUTH ONCE DAILY 90 capsule 97     LIDOCAINE PAIN RELIEF 4 % Patch APPLY 1 PATCH TOPICALLY TO RIGHT SHOULDER ONCE DAILY (ON FOR 12 HOURS, OFF FOR 12 HOURS) 30 patch 97     lisinopril (ZESTRIL) 2.5 MG tablet TAKE 1 TABLET BY MOUTH ONCE DAILY 90 tablet 97     loperamide (IMODIUM A-D) 2 MG tablet Take 1 tablet (2 mg) by mouth 4 times daily as needed for diarrhea       menthol-zinc oxide (CALMOSEPTINE) 0.44-20.6 % OINT ointment Apply topically as needed for skin protection       metoprolol tartrate (LOPRESSOR) 25 MG tablet TAKE 1 TABLET BY MOUTH TWICE DAILY 180 tablet 97     polyethylene glycol (MIRALAX) 17 g packet Take 1 packet by mouth daily as needed for constipation       SENNA-docusate  "sodium (SENNA S) 8.6-50 MG tablet Take 2 tablets by mouth At Bedtime. May also take 1 tablet 2 times daily as needed (constipation). 60 tablet 3     sodium chloride (PETER 128) 5 % ophthalmic ointment APPLY TO BOTH EYES ONCE DAILY 3.5 g 11     sodium chloride (OCEAN) 0.65 % nasal spray Use in both nares TID and prn x 14 days for nasal congestion. Then prn. 50 mL 11     Warfarin Therapy Reminder Take 1 each by mouth See Admin Instructions       REVIEW OF SYSTEMS:  Limited secondary to cognitive impairment but today pt reports ok    Objective:  BP (!) 143/86   Pulse 76   Temp 97.7  F (36.5  C)   Resp 21   Ht 1.651 m (5' 5\")   Wt 67.6 kg (149 lb)   SpO2 95%   BMI 24.79 kg/m    Exam:  GENERAL APPEARANCE: alert, confused, pleasant   ENT:  Mouth and posterior oropharynx normal, dry mucous membranes  EYES:  EOM, conjunctivae, lids, pupils and irises normal, wears glasses  RESP:  lungs diminished, loose dry non-productive cough, less wheeze   CV:  regular rate and irregular rhythm, no murmur, rub, or gallop, trace edema bilateral legs and ankles  ABDOMEN:  no guarding or rebound, BS +  : deleon catheter in place with leg bag, straw urine output   M/S: generalized weakness- 2WW or WC  SKIN: lateral left ankle with chronic scab, minimal redness, slight swelling  NEURO: Cranial nerves 2-12 are normal tested and grossly at patient's baseline  PSYCH: insight and judgement impaired, memory impaired SLUMS 9/30 and CPT 3.8/5.6    Labs:   CBC RESULTS: Recent Labs   Lab Test 06/22/22  0900 05/02/22  0903   WBC 5.2 6.1   RBC 3.42* 3.57*   HGB 11.2* 11.5*   HCT 36.2 37.6   * 105*   MCH 32.7 32.2   MCHC 30.9* 30.6*   RDW 13.4 13.2   * 192       Last Basic Metabolic Panel:  Recent Labs   Lab Test 06/22/22  0900 04/18/22  0843    137   POTASSIUM 4.2 4.1   CHLORIDE 110* 106   CLARISSE 9.1 9.5   CO2 27 27   BUN 22 26   CR 1.08* 1.01   * 114*       Liver Function Studies -   Recent Labs   Lab Test 04/10/22  0729 " 12/26/21  0236   PROTTOTAL 6.0* 5.8*   ALBUMIN 2.9* 2.6*   BILITOTAL 0.7 0.8   ALKPHOS 74 57   AST 23 27   ALT 21 20       TSH   Date Value Ref Range Status   02/26/2018 1.82 0.40 - 4.00 mU/L Final   10/05/2017 2.44 0.40 - 4.00 mU/L Final       No results found for: A1C    ASSESSMENT/PLAN:  (U07.1) COVID-19  (primary encounter diagnosis)  (R09.81) Nasal congestion  (J06.9) URI with cough and congestion  Comment:   Plan:   -Nirmatrelvir&Ritonavir 150/100 (PAXLOVID,   150/100,) 10 x 150 MG & 10 x 100MG TBPK completed   -sodium chloride (OCEAN) 0.65 % nasal spray  -dextromethorphan-guaiFENesin (MUCINEX DM)   MG 12 hr tablet po BID x 7 days completed   -levofloxacin per hospice and continuing for coverage of potential developing pneumonia will complete 11/13        (I48.91) Atrial fibrillation, unspecified type (H)  (Z86.711) History of pulmonary embolism  (Z79.01) Long term (current) use of anticoagulants  Comment: INR of 2.1 today  Plan:  -coumadin 1 mg po MOn/Wed and Coumadin 2 mg po AOD  -recheck INR in 1 week  -discontinue vitamins/supplements, statin to reduce pill load                    Electronically signed by:  RAY Lopez CNP

## 2022-11-10 NOTE — LETTER
11/10/2022        RE: [facility]  [address]  [facility] [address]        [place] [facility]  [place] Medical Record Number:  [id number]  Place of Service where encounter took place:  [facility] [[id number]]  Chief Complaint   Patient presents with     RECHECK     COVID +     [facility]       HPI:    Maia Miranda  is a 89 year old (10/26/1932), who is being seen today for an episodic care visit.  HPI information obtained from: facility chart records, facility staff, patient report and [facility] chart review. Today's concern is:    COVID-19 positive 11/2/22 and now under hospice care. She was started on antibiotics and Paxlovid.  Her CXR was negative for acute findings. Seen in her room, appears pale and with a loose dry cough. Overall improvement in appearance from last week. She is pale but more alert. Says she is ok.     Past Medical and Surgical History reviewed in Epic today.    MEDICATIONS:    Current Outpatient Medications   Medication Sig Dispense Refill     ACETAMINOPHEN EXTRA STRENGTH 500 MG tablet TAKE TWO TABLETS (1000MG) BY MOUTH THREE TIMES DAILY 540 tablet 97     carboxymethylcellulose (REFRESH LIQUIGEL) 1 % ophthalmic solution Place 1 drop into both eyes every morning As needed       cephALEXin (KEFLEX) 250 MG capsule TAKE 1 CAPSULE BY MOUTH ONCE DAILY 90 capsule 97     LIDOCAINE PAIN RELIEF 4 % Patch APPLY 1 PATCH TOPICALLY TO RIGHT SHOULDER ONCE DAILY (ON FOR 12 HOURS, OFF FOR 12 HOURS) 30 patch 97     lisinopril (ZESTRIL) 2.5 MG tablet TAKE 1 TABLET BY MOUTH ONCE DAILY 90 tablet 97     loperamide (IMODIUM A-D) 2 MG tablet Take 1 tablet (2 mg) by mouth 4 times daily as needed for diarrhea       menthol-zinc oxide (CALMOSEPTINE) 0.44-20.6 % OINT ointment Apply topically as needed for skin protection       metoprolol tartrate (LOPRESSOR) 25 MG tablet TAKE 1 TABLET BY MOUTH TWICE DAILY 180 tablet 97      "polyethylene glycol (MIRALAX) 17 g packet Take 1 packet by mouth daily as needed for constipation       SENNA-docusate sodium (SENNA S) 8.6-50 MG tablet Take 2 tablets by mouth At Bedtime. May also take 1 tablet 2 times daily as needed (constipation). 60 tablet 3     sodium chloride (PETER 128) 5 % ophthalmic ointment APPLY TO BOTH EYES ONCE DAILY 3.5 g 11     sodium chloride (OCEAN) 0.65 % nasal spray Use in both nares TID and prn x 14 days for nasal congestion. Then prn. 50 mL 11     Warfarin Therapy Reminder Take 1 each by mouth See Admin Instructions       REVIEW OF SYSTEMS:  Limited secondary to cognitive impairment but today pt reports ok    Objective:  BP (!) 143/86   Pulse 76   Temp 97.7  F (36.5  C)   Resp 21   Ht 1.651 m (5' 5\")   Wt 67.6 kg (149 lb)   SpO2 95%   BMI 24.79 kg/m    Exam:  GENERAL APPEARANCE: alert, confused, pleasant   ENT:  Mouth and posterior oropharynx normal, dry mucous membranes  EYES:  EOM, conjunctivae, lids, pupils and irises normal, wears glasses  RESP:  lungs diminished, loose dry non-productive cough, less wheeze   CV:  regular rate and irregular rhythm, no murmur, rub, or gallop, trace edema bilateral legs and ankles  ABDOMEN:  no guarding or rebound, BS +  : deleon catheter in place with leg bag, straw urine output   M/S: generalized weakness- 2WW or WC  SKIN: lateral left ankle with chronic scab, minimal redness, slight swelling  NEURO: Cranial nerves 2-12 are normal tested and grossly at patient's baseline  PSYCH: insight and judgement impaired, memory impaired SLUMS 9/30 and CPT 3.8/5.6    Labs:   CBC RESULTS: Recent Labs   Lab Test 06/22/22  0900 05/02/22  0903   WBC 5.2 6.1   RBC 3.42* 3.57*   HGB 11.2* 11.5*   HCT 36.2 37.6   * 105*   MCH 32.7 32.2   MCHC 30.9* 30.6*   RDW 13.4 13.2   * 192       Last Basic Metabolic Panel:  Recent Labs   Lab Test 06/22/22  0900 04/18/22  0843    137   POTASSIUM 4.2 4.1   CHLORIDE 110* 106   CLARISSE 9.1 9.5   CO2 27 " 27   BUN 22 26   CR 1.08* 1.01   * 114*       Liver Function Studies -   Recent Labs   Lab Test 04/10/22  0729 12/26/21  0236   PROTTOTAL 6.0* 5.8*   ALBUMIN 2.9* 2.6*   BILITOTAL 0.7 0.8   ALKPHOS 74 57   AST 23 27   ALT 21 20       TSH   Date Value Ref Range Status   02/26/2018 1.82 0.40 - 4.00 mU/L Final   10/05/2017 2.44 0.40 - 4.00 mU/L Final       No results found for: A1C    ASSESSMENT/PLAN:  (U07.1) COVID-19  (primary encounter diagnosis)  (R09.81) Nasal congestion  (J06.9) URI with cough and congestion  Comment:   Plan:   -Nirmatrelvir&Ritonavir 150/100 (PAXLOVID,   150/100,) 10 x 150 MG & 10 x 100MG TBPK completed   -sodium chloride (OCEAN) 0.65 % nasal spray  -dextromethorphan-guaiFENesin (MUCINEX DM)   MG 12 hr tablet po BID x 7 days completed   -levofloxacin per hospice and continuing for coverage of potential developing pneumonia will complete 11/13        (I48.91) Atrial fibrillation, unspecified type (H)  (Z86.711) History of pulmonary embolism  (Z79.01) Long term (current) use of anticoagulants  Comment: INR of 2.1 today  Plan:  -coumadin 1 mg po MOn/Wed and Coumadin 2 mg po AOD  -recheck INR in 1 week  -discontinue vitamins/supplements, statin to reduce pill load                    Electronically signed by:  RAY Lopez CNP                Sincerely,        RAY Lopez CNP

## 2022-11-18 NOTE — PATIENT INSTRUCTIONS
Recommendations from today's MTM visit:                                                        1. See lab following visit for thyroid labs, magnesium, and vitamin B12 check.    2. Take vitamin D3 1000u daily.  May need to supplement your calcium/vit D combo with plain vitamin D.        Next MTM visit: one year, sooner if necessary    To schedule another MTM appointment, please call me directly or you may call the MTM scheduling line at [phone] or toll-free at [phone].     My Clinical Pharmacist's contact information:                                                      It was a pleasure seeing you today!  Please feel free to contact me with any questions or concerns you have.      Ariela Mireles, Pharm.D.,[facility] [place]  Board Certified Geriatric Pharmacist  Medication Therapy Management Pharmacist  [phone]      You may receive a survey about the MTM services you received.  I would appreciate your feedback to help me serve you better in the future. Please fill it out and return it when you can. Your comments will be anonymous.              
general

## 2022-11-30 NOTE — PROGRESS NOTES
[place] [facility]  [place] Medical Record Number:  [id number]  Place of Service where encounter took place:  [facility] [[id number]]  Chief Complaint   Patient presents with     RECHECK     Home Care/Hospice       HPI:    Maia Miranda  is a 89 year old (10/26/1932), who is being seen today for an episodic care visit.  HPI information obtained from: facility chart records, facility staff and [facility] chart review. Today's concern is:    Seen today for coumadin dosing, recheck of chronic ankle hardware migration also appears mostly recovered from debilitating COVID-19 infection. Denies any acute issue. WC ambulates and now able to stand and pivot. Chronic deleon catheter managed from hospice. Weight stable, VSS.    Past Medical and Surgical History reviewed in Epic today.    MEDICATIONS:    Current Outpatient Medications   Medication Sig Dispense Refill     ACETAMINOPHEN EXTRA STRENGTH 500 MG tablet TAKE TWO TABLETS (1000MG) BY MOUTH THREE TIMES DAILY 540 tablet 97     carboxymethylcellulose (REFRESH LIQUIGEL) 1 % ophthalmic solution Place 1 drop into both eyes every morning As needed       cephALEXin (KEFLEX) 250 MG capsule TAKE 1 CAPSULE BY MOUTH ONCE DAILY 90 capsule 97     LIDOCAINE PAIN RELIEF 4 % Patch APPLY 1 PATCH TOPICALLY TO RIGHT SHOULDER ONCE DAILY (ON FOR 12 HOURS, OFF FOR 12 HOURS) 30 patch 97     lisinopril (ZESTRIL) 2.5 MG tablet TAKE 1 TABLET BY MOUTH ONCE DAILY 90 tablet 97     loperamide (IMODIUM A-D) 2 MG tablet Take 1 tablet (2 mg) by mouth 4 times daily as needed for diarrhea       menthol-zinc oxide (CALMOSEPTINE) 0.44-20.6 % OINT ointment Apply topically as needed for skin protection       metoprolol tartrate (LOPRESSOR) 25 MG tablet TAKE 1 TABLET BY MOUTH TWICE DAILY 180 tablet 97     polyethylene glycol (MIRALAX) 17 g packet Take 1 packet by mouth daily as needed for constipation       SENNA-docusate sodium (SENNA S) 8.6-50 MG tablet Take 2 tablets  "by mouth At Bedtime. May also take 1 tablet 2 times daily as needed (constipation). 60 tablet 3     sodium chloride (PETER 128) 5 % ophthalmic ointment APPLY TO BOTH EYES ONCE DAILY 3.5 g 11     sodium chloride (OCEAN) 0.65 % nasal spray Use in both nares TID and prn x 14 days for nasal congestion. Then prn. 50 mL 11     Warfarin Therapy Reminder Take 1 each by mouth See Admin Instructions       REVIEW OF SYSTEMS:  Limited secondary to cognitive impairment but today pt reports ok    Objective:  /74   Pulse 57   Temp 97  F (36.1  C)   Resp 16   Ht 1.651 m (5' 5\")   Wt 64.5 kg (142 lb 3.2 oz)   BMI 23.66 kg/m    GENERAL APPEARANCE: alert, confused, pleasant   ENT:  Mouth and posterior oropharynx normal, dry mucous membranes  EYES:  EOM, conjunctivae, lids, pupils and irises normal, wears glasses  RESP:  lungs diminished, loose dry non-productive cough, less wheeze   CV:  regular rate and irregular rhythm, no murmur, rub, or gallop, trace edema bilateral legs and ankles  ABDOMEN:  no guarding or rebound, BS +  : deleon catheter in place with leg bag, straw urine output   M/S: generalized weakness- 2WW or WC  SKIN: lateral left ankle with chronic scab, some redness, slight swelling, no warmth-scab over hardware  NEURO: Cranial nerves 2-12 are normal tested and grossly at patient's baseline  PSYCH: insight and judgement impaired, memory impaired SLUMS 9/30 and CPT 3.8/5.6xam:      Labs:   CBC RESULTS: Recent Labs   Lab Test 06/22/22  0900 05/02/22  0903   WBC 5.2 6.1   RBC 3.42* 3.57*   HGB 11.2* 11.5*   HCT 36.2 37.6   * 105*   MCH 32.7 32.2   MCHC 30.9* 30.6*   RDW 13.4 13.2   * 192       Last Basic Metabolic Panel:  Recent Labs   Lab Test 06/22/22  0900 04/18/22  0843    137   POTASSIUM 4.2 4.1   CHLORIDE 110* 106   CLARISSE 9.1 9.5   CO2 27 27   BUN 22 26   CR 1.08* 1.01   * 114*       Liver Function Studies -   Recent Labs   Lab Test 04/10/22  0729 12/26/21  0236   PROTTOTAL 6.0* " 5.8*   ALBUMIN 2.9* 2.6*   BILITOTAL 0.7 0.8   ALKPHOS 74 57   AST 23 27   ALT 21 20       TSH   Date Value Ref Range Status   02/26/2018 1.82 0.40 - 4.00 mU/L Final   10/05/2017 2.44 0.40 - 4.00 mU/L Final       No results found for: A1C    ASSESSMENT/PLAN:  (I48.91) Atrial fibrillation, unspecified type (H)  (Z86.711) History of pulmonary embolism  (Z79.01) Long term (current) use of anticoagulants  Comment: INR of 2.3 today  Plan:  -coumadin 1 mg po MOn/Wed and Coumadin 2 mg po AOD  -recheck INR 12/22/22    (S82.601F) Closed trimalleolar fracture of left ankle, sequela  Comment: stable  Plan:  -continue current plan of care-with s/s of infection reach out to ortho PA from [facility]    (R29.6) Frequent falls  Comment: with attempts to self transfer. Fall on 11/20, 11/11          Electronically signed by:  RAY Lopez CNP

## 2022-12-01 NOTE — LETTER
12/1/2022        RE: Maia Ricketts  [address]  [facility] [address]        [place] [facility]  [place] Medical Record Number:  [id number]  Place of Service where encounter took place:  [facility] [[id number]]  Chief Complaint   Patient presents with     RECHECK     Home Care/Hospice       HPI:    Maia Miranda  is a 89 year old (10/26/1932), who is being seen today for an episodic care visit.  HPI information obtained from: facility chart records, facility staff and [facility] chart review. Today's concern is:    Seen today for coumadin dosing, recheck of chronic ankle hardware migration also appears mostly recovered from debilitating COVID-19 infection. Denies any acute issue. WC ambulates and now able to stand and pivot. Chronic deleon catheter managed from hospice. Weight stable, VSS.    Past Medical and Surgical History reviewed in Epic today.    MEDICATIONS:    Current Outpatient Medications   Medication Sig Dispense Refill     ACETAMINOPHEN EXTRA STRENGTH 500 MG tablet TAKE TWO TABLETS (1000MG) BY MOUTH THREE TIMES DAILY 540 tablet 97     carboxymethylcellulose (REFRESH LIQUIGEL) 1 % ophthalmic solution Place 1 drop into both eyes every morning As needed       cephALEXin (KEFLEX) 250 MG capsule TAKE 1 CAPSULE BY MOUTH ONCE DAILY 90 capsule 97     LIDOCAINE PAIN RELIEF 4 % Patch APPLY 1 PATCH TOPICALLY TO RIGHT SHOULDER ONCE DAILY (ON FOR 12 HOURS, OFF FOR 12 HOURS) 30 patch 97     lisinopril (ZESTRIL) 2.5 MG tablet TAKE 1 TABLET BY MOUTH ONCE DAILY 90 tablet 97     loperamide (IMODIUM A-D) 2 MG tablet Take 1 tablet (2 mg) by mouth 4 times daily as needed for diarrhea       menthol-zinc oxide (CALMOSEPTINE) 0.44-20.6 % OINT ointment Apply topically as needed for skin protection       metoprolol tartrate (LOPRESSOR) 25 MG tablet TAKE 1 TABLET BY MOUTH TWICE DAILY 180 tablet 97     polyethylene glycol (MIRALAX) 17 g packet Take 1 packet by  "mouth daily as needed for constipation       SENNA-docusate sodium (SENNA S) 8.6-50 MG tablet Take 2 tablets by mouth At Bedtime. May also take 1 tablet 2 times daily as needed (constipation). 60 tablet 3     sodium chloride (PETER 128) 5 % ophthalmic ointment APPLY TO BOTH EYES ONCE DAILY 3.5 g 11     sodium chloride (OCEAN) 0.65 % nasal spray Use in both nares TID and prn x 14 days for nasal congestion. Then prn. 50 mL 11     Warfarin Therapy Reminder Take 1 each by mouth See Admin Instructions       REVIEW OF SYSTEMS:  Limited secondary to cognitive impairment but today pt reports ok    Objective:  /74   Pulse 57   Temp 97  F (36.1  C)   Resp 16   Ht 1.651 m (5' 5\")   Wt 64.5 kg (142 lb 3.2 oz)   BMI 23.66 kg/m    GENERAL APPEARANCE: alert, confused, pleasant   ENT:  Mouth and posterior oropharynx normal, dry mucous membranes  EYES:  EOM, conjunctivae, lids, pupils and irises normal, wears glasses  RESP:  lungs diminished, loose dry non-productive cough, less wheeze   CV:  regular rate and irregular rhythm, no murmur, rub, or gallop, trace edema bilateral legs and ankles  ABDOMEN:  no guarding or rebound, BS +  : deleon catheter in place with leg bag, straw urine output   M/S: generalized weakness- 2WW or WC  SKIN: lateral left ankle with chronic scab, some redness, slight swelling, no warmth-scab over hardware  NEURO: Cranial nerves 2-12 are normal tested and grossly at patient's baseline  PSYCH: insight and judgement impaired, memory impaired SLUMS 9/30 and CPT 3.8/5.6xam:      Labs:   CBC RESULTS: Recent Labs   Lab Test 06/22/22  0900 05/02/22  0903   WBC 5.2 6.1   RBC 3.42* 3.57*   HGB 11.2* 11.5*   HCT 36.2 37.6   * 105*   MCH 32.7 32.2   MCHC 30.9* 30.6*   RDW 13.4 13.2   * 192       Last Basic Metabolic Panel:  Recent Labs   Lab Test 06/22/22  0900 04/18/22  0843    137   POTASSIUM 4.2 4.1   CHLORIDE 110* 106   CLARISSE 9.1 9.5   CO2 27 27   BUN 22 26   CR 1.08* 1.01   * " 114*       Liver Function Studies -   Recent Labs   Lab Test 04/10/22  0729 12/26/21  0236   PROTTOTAL 6.0* 5.8*   ALBUMIN 2.9* 2.6*   BILITOTAL 0.7 0.8   ALKPHOS 74 57   AST 23 27   ALT 21 20       TSH   Date Value Ref Range Status   02/26/2018 1.82 0.40 - 4.00 mU/L Final   10/05/2017 2.44 0.40 - 4.00 mU/L Final       No results found for: A1C    ASSESSMENT/PLAN:  (I48.91) Atrial fibrillation, unspecified type (H)  (Z86.711) History of pulmonary embolism  (Z79.01) Long term (current) use of anticoagulants  Comment: INR of 2.3 today  Plan:  -coumadin 1 mg po MOn/Wed and Coumadin 2 mg po AOD  -recheck INR 12/22/22    (S82.852S) Closed trimalleolar fracture of left ankle, sequela  Comment: stable  Plan:  -continue current plan of care-with s/s of infection reach out to ortho PA from [facility]    (R29.6) Frequent falls  Comment: with attempts to self transfer. Fall on 11/20, 11/11          Electronically signed by:  RAY Lopez CNP                 Sincerely,        RAY Lopez CNP

## 2022-12-21 NOTE — PROGRESS NOTES
"[facility]  [place] Medical Record Number:  [id number]  Place of Service where encounter took place: [facility]  [facility] ([facility]) [[id number]]    HPI:    Maia Miranda is a 89 year old  (10/26/1932), who is being seen today for an episodic care visit at the above location. Today's concern is INR/Coumadin management for DONNA. Fib    ROS/Subjective:  Bleeding Signs/Symptoms:  None  Thromboembolic Signs/Symptoms:  None  Medication Changes:  No  Dietary Changes:  No  Activity Changes: No  Bacterial/Viral Infection:  No  Missed Coumadin Doses:  None  On ASA: No  Other Concerns:  under hospice care-discontinue Lisinopril with lower BP/HR    OBJECTIVE:  BP 97/73   Pulse (!) 46   Temp 96.8  F (36  C)   Resp 24   Ht 1.651 m (5' 5\")   Wt 64.3 kg (141 lb 12.8 oz)   SpO2 94%   BMI 23.60 kg/m    Last INR: 2.3 on 12/1/22  INR Today:  2.1  Current Dose: Coumadin 1 mg po MOn/Wed and Coumadin 2 mg po AOD      ASSESSMENT:  (I48.91) Atrial fibrillation, unspecified type (H)  (primary encounter diagnosis)  (Z86.711) History of pulmonary embolism  (Z79.01) Long term (current) use of anticoagulants    Therapeutic INR for goal of 2-3    PLAN/ORDERS:   New Dose: No Change    Next INR: 3 weeks  -discontinue Lisinopril       Electronically signed by:  RAY Lopez CNP     "

## 2022-12-22 NOTE — LETTER
"    12/22/2022        RE: Maia Miranda  [facility]  [address]  [place] [facility] [address]        [facility]  [place] Medical Record Number:  [id number]  Place of Service where encounter took place: [facility]  [facility] [place] [facility] ([facility]) [[id number]]    HPI:    Maia Miranda is a 89 year old  (10/26/1932), who is being seen today for an episodic care visit at the above location. Today's concern is INR/Coumadin management for A. Fib    ROS/Subjective:  Bleeding Signs/Symptoms:  None  Thromboembolic Signs/Symptoms:  None  Medication Changes:  No  Dietary Changes:  No  Activity Changes: No  Bacterial/Viral Infection:  No  Missed Coumadin Doses:  None  On ASA: No  Other Concerns:  under hospice care-discontinue Lisinopril with lower BP/HR    OBJECTIVE:  BP 97/73   Pulse (!) 46   Temp 96.8  F (36  C)   Resp 24   Ht 1.651 m (5' 5\")   Wt 64.3 kg (141 lb 12.8 oz)   SpO2 94%   BMI 23.60 kg/m    Last INR: 2.3 on 12/1/22  INR Today:  2.1  Current Dose: Coumadin 1 mg po MOn/Wed and Coumadin 2 mg po AOD      ASSESSMENT:  (I48.91) Atrial fibrillation, unspecified type (H)  (primary encounter diagnosis)  (Z86.711) History of pulmonary embolism  (Z79.01) Long term (current) use of anticoagulants    Therapeutic INR for goal of 2-3    PLAN/ORDERS:   New Dose: No Change    Next INR: 3 weeks  -discontinue Lisinopril       Electronically signed by:  RAY Lopez CNP           Sincerely,        RAY Lopez CNP      "

## 2023-01-01 ENCOUNTER — TELEPHONE (OUTPATIENT)
Dept: GERIATRICS | Facility: CLINIC | Age: 88
End: 2023-01-01

## 2023-01-01 ENCOUNTER — MEDICAL CORRESPONDENCE (OUTPATIENT)
Dept: HEALTH INFORMATION MANAGEMENT | Facility: CLINIC | Age: 88
End: 2023-01-01
Payer: COMMERCIAL

## 2023-01-01 ENCOUNTER — TELEPHONE (OUTPATIENT)
Dept: GERIATRICS | Facility: CLINIC | Age: 88
End: 2023-01-01
Payer: COMMERCIAL

## 2023-01-01 ENCOUNTER — ASSISTED LIVING VISIT (OUTPATIENT)
Dept: GERIATRICS | Facility: CLINIC | Age: 88
End: 2023-01-01
Payer: MEDICARE

## 2023-01-01 ENCOUNTER — HEALTH MAINTENANCE LETTER (OUTPATIENT)
Age: 88
End: 2023-01-01

## 2023-01-01 ENCOUNTER — ASSISTED LIVING VISIT (OUTPATIENT)
Dept: GERIATRICS | Facility: CLINIC | Age: 88
End: 2023-01-01
Payer: COMMERCIAL

## 2023-01-01 ENCOUNTER — DOCUMENTATION ONLY (OUTPATIENT)
Dept: OTHER | Facility: CLINIC | Age: 88
End: 2023-01-01
Payer: COMMERCIAL

## 2023-01-01 ENCOUNTER — MEDICAL CORRESPONDENCE (OUTPATIENT)
Dept: HEALTH INFORMATION MANAGEMENT | Facility: CLINIC | Age: 88
End: 2023-01-01

## 2023-01-01 ENCOUNTER — PATIENT OUTREACH (OUTPATIENT)
Dept: GERIATRIC MEDICINE | Facility: CLINIC | Age: 88
End: 2023-01-01
Payer: COMMERCIAL

## 2023-01-01 VITALS
HEART RATE: 71 BPM | SYSTOLIC BLOOD PRESSURE: 121 MMHG | BODY MASS INDEX: 25.33 KG/M2 | HEIGHT: 65 IN | DIASTOLIC BLOOD PRESSURE: 57 MMHG | RESPIRATION RATE: 18 BRPM | WEIGHT: 152 LBS | TEMPERATURE: 97.5 F | OXYGEN SATURATION: 96 %

## 2023-01-01 VITALS
HEIGHT: 65 IN | SYSTOLIC BLOOD PRESSURE: 137 MMHG | HEART RATE: 59 BPM | TEMPERATURE: 97.7 F | OXYGEN SATURATION: 95 % | WEIGHT: 157.6 LBS | RESPIRATION RATE: 12 BRPM | BODY MASS INDEX: 26.26 KG/M2 | DIASTOLIC BLOOD PRESSURE: 61 MMHG

## 2023-01-01 VITALS
HEART RATE: 60 BPM | TEMPERATURE: 97.3 F | WEIGHT: 156 LBS | DIASTOLIC BLOOD PRESSURE: 59 MMHG | HEIGHT: 65 IN | RESPIRATION RATE: 18 BRPM | BODY MASS INDEX: 25.99 KG/M2 | OXYGEN SATURATION: 95 % | SYSTOLIC BLOOD PRESSURE: 122 MMHG

## 2023-01-01 VITALS
TEMPERATURE: 97.5 F | OXYGEN SATURATION: 94 % | DIASTOLIC BLOOD PRESSURE: 62 MMHG | WEIGHT: 158 LBS | HEIGHT: 65 IN | SYSTOLIC BLOOD PRESSURE: 130 MMHG | BODY MASS INDEX: 26.33 KG/M2 | RESPIRATION RATE: 17 BRPM | HEART RATE: 67 BPM

## 2023-01-01 VITALS
OXYGEN SATURATION: 93 % | DIASTOLIC BLOOD PRESSURE: 52 MMHG | BODY MASS INDEX: 25.61 KG/M2 | SYSTOLIC BLOOD PRESSURE: 106 MMHG | WEIGHT: 153.7 LBS | TEMPERATURE: 95.5 F | HEIGHT: 65 IN | RESPIRATION RATE: 18 BRPM | HEART RATE: 66 BPM

## 2023-01-01 VITALS
BODY MASS INDEX: 25.43 KG/M2 | RESPIRATION RATE: 16 BRPM | HEART RATE: 58 BPM | SYSTOLIC BLOOD PRESSURE: 124 MMHG | DIASTOLIC BLOOD PRESSURE: 56 MMHG | WEIGHT: 152.6 LBS | TEMPERATURE: 97.7 F | HEIGHT: 65 IN | OXYGEN SATURATION: 95 %

## 2023-01-01 VITALS
OXYGEN SATURATION: 96 % | HEART RATE: 62 BPM | BODY MASS INDEX: 26.29 KG/M2 | TEMPERATURE: 95.3 F | RESPIRATION RATE: 16 BRPM | SYSTOLIC BLOOD PRESSURE: 122 MMHG | DIASTOLIC BLOOD PRESSURE: 58 MMHG | HEIGHT: 65 IN | WEIGHT: 157.8 LBS

## 2023-01-01 VITALS
HEART RATE: 61 BPM | RESPIRATION RATE: 14 BRPM | WEIGHT: 159.6 LBS | HEIGHT: 65 IN | BODY MASS INDEX: 26.59 KG/M2 | TEMPERATURE: 97.6 F | OXYGEN SATURATION: 95 % | DIASTOLIC BLOOD PRESSURE: 71 MMHG | SYSTOLIC BLOOD PRESSURE: 148 MMHG

## 2023-01-01 VITALS
TEMPERATURE: 96.7 F | SYSTOLIC BLOOD PRESSURE: 126 MMHG | HEART RATE: 84 BPM | DIASTOLIC BLOOD PRESSURE: 64 MMHG | BODY MASS INDEX: 25.46 KG/M2 | HEIGHT: 65 IN | WEIGHT: 152.8 LBS | OXYGEN SATURATION: 96 % | RESPIRATION RATE: 18 BRPM

## 2023-01-01 VITALS
WEIGHT: 151.4 LBS | TEMPERATURE: 97.7 F | SYSTOLIC BLOOD PRESSURE: 137 MMHG | RESPIRATION RATE: 12 BRPM | HEIGHT: 65 IN | DIASTOLIC BLOOD PRESSURE: 61 MMHG | BODY MASS INDEX: 25.22 KG/M2 | HEART RATE: 59 BPM | OXYGEN SATURATION: 95 %

## 2023-01-01 VITALS
TEMPERATURE: 95.3 F | DIASTOLIC BLOOD PRESSURE: 58 MMHG | WEIGHT: 156.8 LBS | RESPIRATION RATE: 16 BRPM | OXYGEN SATURATION: 96 % | HEART RATE: 62 BPM | SYSTOLIC BLOOD PRESSURE: 122 MMHG | BODY MASS INDEX: 26.12 KG/M2 | HEIGHT: 65 IN

## 2023-01-01 VITALS
OXYGEN SATURATION: 95 % | SYSTOLIC BLOOD PRESSURE: 166 MMHG | DIASTOLIC BLOOD PRESSURE: 70 MMHG | HEIGHT: 65 IN | TEMPERATURE: 96.6 F | HEART RATE: 69 BPM | WEIGHT: 143.6 LBS | BODY MASS INDEX: 23.93 KG/M2 | RESPIRATION RATE: 16 BRPM

## 2023-01-01 VITALS
OXYGEN SATURATION: 93 % | BODY MASS INDEX: 25.72 KG/M2 | WEIGHT: 154.4 LBS | SYSTOLIC BLOOD PRESSURE: 122 MMHG | HEIGHT: 65 IN | HEART RATE: 72 BPM | DIASTOLIC BLOOD PRESSURE: 69 MMHG | TEMPERATURE: 97.5 F | RESPIRATION RATE: 16 BRPM

## 2023-01-01 VITALS
RESPIRATION RATE: 18 BRPM | OXYGEN SATURATION: 85 % | DIASTOLIC BLOOD PRESSURE: 67 MMHG | HEART RATE: 64 BPM | WEIGHT: 153.2 LBS | HEIGHT: 65 IN | TEMPERATURE: 98.1 F | SYSTOLIC BLOOD PRESSURE: 146 MMHG | BODY MASS INDEX: 25.52 KG/M2

## 2023-01-01 VITALS
RESPIRATION RATE: 18 BRPM | WEIGHT: 163.8 LBS | HEART RATE: 75 BPM | HEIGHT: 65 IN | OXYGEN SATURATION: 94 % | DIASTOLIC BLOOD PRESSURE: 55 MMHG | TEMPERATURE: 97.3 F | SYSTOLIC BLOOD PRESSURE: 119 MMHG | BODY MASS INDEX: 27.29 KG/M2

## 2023-01-01 VITALS — WEIGHT: 152.6 LBS | HEIGHT: 65 IN | BODY MASS INDEX: 25.43 KG/M2

## 2023-01-01 DIAGNOSIS — R60.0 LOWER EXTREMITY EDEMA: ICD-10-CM

## 2023-01-01 DIAGNOSIS — I50.32 CHRONIC HEART FAILURE WITH PRESERVED EJECTION FRACTION (H): ICD-10-CM

## 2023-01-01 DIAGNOSIS — I48.91 ATRIAL FIBRILLATION, UNSPECIFIED TYPE (H): ICD-10-CM

## 2023-01-01 DIAGNOSIS — I48.91 ATRIAL FIBRILLATION, UNSPECIFIED TYPE (H): Primary | ICD-10-CM

## 2023-01-01 DIAGNOSIS — N18.31 STAGE 3A CHRONIC KIDNEY DISEASE (H): ICD-10-CM

## 2023-01-01 DIAGNOSIS — Z51.81 ENCOUNTER FOR MONITORING COUMADIN THERAPY: ICD-10-CM

## 2023-01-01 DIAGNOSIS — Z79.01 ENCOUNTER FOR MONITORING COUMADIN THERAPY: ICD-10-CM

## 2023-01-01 DIAGNOSIS — Z79.01 LONG TERM (CURRENT) USE OF ANTICOAGULANTS: ICD-10-CM

## 2023-01-01 DIAGNOSIS — Z86.711 HISTORY OF PULMONARY EMBOLISM: ICD-10-CM

## 2023-01-01 DIAGNOSIS — K55.9 ISCHEMIC COLITIS (H): ICD-10-CM

## 2023-01-01 DIAGNOSIS — R21 RASH AND NONSPECIFIC SKIN ERUPTION: Primary | ICD-10-CM

## 2023-01-01 DIAGNOSIS — T84.498S INTERNAL FIXATION DEVICE (PIN, ROD, OR SCREW) MECHANICAL COMPLICATION, SEQUELA: Primary | ICD-10-CM

## 2023-01-01 DIAGNOSIS — R31.0 GROSS HEMATURIA: Primary | ICD-10-CM

## 2023-01-01 DIAGNOSIS — Z79.01 LONG TERM CURRENT USE OF ANTICOAGULANT THERAPY: ICD-10-CM

## 2023-01-01 DIAGNOSIS — M85.811 OSTEOPENIA OF RIGHT SHOULDER: ICD-10-CM

## 2023-01-01 DIAGNOSIS — F02.80 LATE ONSET ALZHEIMER'S DEMENTIA WITHOUT BEHAVIORAL DISTURBANCE (H): ICD-10-CM

## 2023-01-01 DIAGNOSIS — I50.32 CHRONIC HEART FAILURE WITH PRESERVED EJECTION FRACTION (H): Primary | ICD-10-CM

## 2023-01-01 DIAGNOSIS — I95.89 OTHER SPECIFIED HYPOTENSION: ICD-10-CM

## 2023-01-01 DIAGNOSIS — M51.379 DEGENERATION OF LUMBAR OR LUMBOSACRAL INTERVERTEBRAL DISC: ICD-10-CM

## 2023-01-01 DIAGNOSIS — B37.2 YEAST INFECTION OF THE SKIN: Primary | ICD-10-CM

## 2023-01-01 DIAGNOSIS — Z97.8 CHRONIC INDWELLING FOLEY CATHETER: ICD-10-CM

## 2023-01-01 DIAGNOSIS — Z51.5 HOSPICE CARE PATIENT: ICD-10-CM

## 2023-01-01 DIAGNOSIS — E46 PROTEIN-CALORIE MALNUTRITION, UNSPECIFIED SEVERITY (H): ICD-10-CM

## 2023-01-01 DIAGNOSIS — G30.1 LATE ONSET ALZHEIMER'S DEMENTIA WITHOUT BEHAVIORAL DISTURBANCE (H): ICD-10-CM

## 2023-01-01 DIAGNOSIS — K59.00 CONSTIPATION: ICD-10-CM

## 2023-01-01 DIAGNOSIS — S12.101A CLOSED NONDISPLACED FRACTURE OF SECOND CERVICAL VERTEBRA, UNSPECIFIED FRACTURE MORPHOLOGY, INITIAL ENCOUNTER (H): ICD-10-CM

## 2023-01-01 DIAGNOSIS — R33.9 URINARY RETENTION: ICD-10-CM

## 2023-01-01 DIAGNOSIS — Z87.440 PERSONAL HISTORY OF URINARY TRACT INFECTION: ICD-10-CM

## 2023-01-01 DIAGNOSIS — R21 RASH AND NONSPECIFIC SKIN ERUPTION: ICD-10-CM

## 2023-01-01 DIAGNOSIS — N39.0 RECURRENT UTI: ICD-10-CM

## 2023-01-01 DIAGNOSIS — I10 HYPERTENSION GOAL BP (BLOOD PRESSURE) < 150/90: ICD-10-CM

## 2023-01-01 DIAGNOSIS — T84.498S INTERNAL FIXATION DEVICE (PIN, ROD, OR SCREW) MECHANICAL COMPLICATION, SEQUELA: ICD-10-CM

## 2023-01-01 DIAGNOSIS — K59.01 SLOW TRANSIT CONSTIPATION: ICD-10-CM

## 2023-01-01 PROCEDURE — 99348 HOME/RES VST EST LOW MDM 30: CPT | Performed by: NURSE PRACTITIONER

## 2023-01-01 PROCEDURE — 99348 HOME/RES VST EST LOW MDM 30: CPT | Mod: GV | Performed by: NURSE PRACTITIONER

## 2023-01-01 PROCEDURE — 99349 HOME/RES VST EST MOD MDM 40: CPT | Mod: GW | Performed by: NURSE PRACTITIONER

## 2023-01-01 PROCEDURE — 99349 HOME/RES VST EST MOD MDM 40: CPT | Mod: GV | Performed by: NURSE PRACTITIONER

## 2023-01-01 PROCEDURE — 99349 HOME/RES VST EST MOD MDM 40: CPT | Performed by: NURSE PRACTITIONER

## 2023-01-01 RX ORDER — ANORECTAL OINTMENT 15.7; .44; 24; 20.6 G/100G; G/100G; G/100G; G/100G
OINTMENT TOPICAL
Qty: 113 G | Refills: 3 | Status: SHIPPED | OUTPATIENT
Start: 2023-01-01

## 2023-01-01 RX ORDER — DOCUSATE SODIUM 50MG AND SENNOSIDES 8.6MG 8.6; 5 MG/1; MG/1
TABLET, FILM COATED ORAL
Qty: 86 TABLET | Refills: 97 | Status: SHIPPED | OUTPATIENT
Start: 2023-01-01

## 2023-01-01 RX ORDER — PSEUDOEPHED/ACETAMINOPH/DIPHEN 30MG-500MG
TABLET ORAL
Qty: 180 TABLET | Refills: 97 | Status: SHIPPED | OUTPATIENT
Start: 2023-01-01

## 2023-01-01 RX ORDER — FUROSEMIDE 20 MG
20 TABLET ORAL DAILY
COMMUNITY
Start: 2023-01-01 | End: 2023-01-01

## 2023-01-01 RX ORDER — LIDOCAINE PAIN RELIEF 40 MG/1000MG
PATCH TOPICAL
Qty: 30 PATCH | Refills: 11 | Status: SHIPPED | OUTPATIENT
Start: 2023-01-01

## 2023-01-01 RX ORDER — NYSTATIN 100000 [USP'U]/G
POWDER TOPICAL
Qty: 60 G | Refills: 97 | Status: SHIPPED | OUTPATIENT
Start: 2023-01-01

## 2023-01-01 RX ORDER — HYDROMORPHONE HYDROCHLORIDE 2 MG/1
2 TABLET ORAL EVERY 6 HOURS PRN
Qty: 10 TABLET | Refills: 0 | COMMUNITY
Start: 2023-01-01

## 2023-01-01 RX ORDER — METOPROLOL TARTRATE 25 MG/1
TABLET, FILM COATED ORAL
Qty: 60 TABLET | Refills: 97 | Status: SHIPPED | OUTPATIENT
Start: 2023-01-01

## 2023-01-01 RX ORDER — POLYETHYLENE GLYCOL 3350 17 G/17G
POWDER, FOR SOLUTION ORAL
Qty: 510 G | Refills: 3 | Status: SHIPPED | OUTPATIENT
Start: 2023-01-01

## 2023-01-01 RX ORDER — BENZOCAINE/MENTHOL 6 MG-10 MG
LOZENGE MUCOUS MEMBRANE
Qty: 28 G | Refills: 97 | Status: SHIPPED | OUTPATIENT
Start: 2023-01-01

## 2023-01-01 RX ORDER — HALOPERIDOL 0.5 MG/1
0.5 TABLET ORAL
COMMUNITY
Start: 2023-01-01

## 2023-01-01 RX ORDER — LORAZEPAM 0.5 MG/1
0.5 TABLET ORAL
COMMUNITY
Start: 2023-01-01

## 2023-01-01 RX ORDER — CIPROFLOXACIN 500 MG/1
500 TABLET, FILM COATED ORAL 2 TIMES DAILY
Qty: 10 TABLET | Refills: 0 | Status: SHIPPED | OUTPATIENT
Start: 2023-01-01 | End: 2023-01-01

## 2023-01-11 NOTE — PROGRESS NOTES
[place] [facility]  [place] Medical Record Number:  [id number]  Place of Service where encounter took place:  [facility] ([facility] [[id number]]  Chief Complaint   Patient presents with     RECHECK     Hypotension, wound check     INR RESULTS     Anticoagulation     Home Care/Hospice       HPI:    Maia Miranda  is a 89 year old (10/26/1932), who is being seen today for an episodic care visit.  HPI information obtained from: facility chart records. Today's concern is:    Seen today for chronic conditions. No concerns from staff. Continues under hospice care. Supplemental o2 discontinued as not used and mostly recovered from debilitating COVID-19 infection. WC ambulates and now able to stand and pivot. Seen in her room in recliner with feet up. Appears content.     Past Medical and Surgical History reviewed in Epic today.    MEDICATIONS:    Current Outpatient Medications   Medication Sig Dispense Refill     ACETAMINOPHEN EXTRA STRENGTH 500 MG tablet TAKE TWO TABLETS (1000MG) BY MOUTH THREE TIMES DAILY 540 tablet 97     carboxymethylcellulose (REFRESH LIQUIGEL) 1 % ophthalmic solution Place 1 drop into both eyes every morning As needed       cephALEXin (KEFLEX) 250 MG capsule TAKE 1 CAPSULE BY MOUTH ONCE DAILY 90 capsule 97     LIDOCAINE PAIN RELIEF 4 % Patch APPLY 1 PATCH TOPICALLY TO RIGHT SHOULDER ONCE DAILY (ON FOR 12 HOURS, OFF FOR 12 HOURS) 30 patch 97     loperamide (IMODIUM A-D) 2 MG tablet Take 1 tablet (2 mg) by mouth 4 times daily as needed for diarrhea       menthol-zinc oxide (CALMOSEPTINE) 0.44-20.6 % OINT ointment Apply topically as needed for skin protection       metoprolol tartrate (LOPRESSOR) 25 MG tablet TAKE 1 TABLET BY MOUTH TWICE DAILY 180 tablet 97     polyethylene glycol (MIRALAX) 17 g packet Take 1 packet by mouth daily as needed for constipation       SENEXON-S 8.6-50 MG tablet TAKE TWO TABLETS BY MOUTH AT BEDTIME;& MAY TAKE 1 TABLET BY MOUTH TWICE DAILY AS NEEDED  "FOR CONSTIPATION 86 tablet 97     sodium chloride (PETER 128) 5 % ophthalmic ointment APPLY TO BOTH EYES ONCE DAILY 3.5 g 11     sodium chloride (OCEAN) 0.65 % nasal spray Use in both nares TID and prn x 14 days for nasal congestion. Then prn. 50 mL 11     Warfarin Therapy Reminder Take 1 each by mouth See Admin Instructions       REVIEW OF SYSTEMS:  Limited secondary to cognitive impairment but today pt reports fine    Objective:  BP (!) 166/70   Pulse 69   Temp (!) 96.6  F (35.9  C)   Resp 16   Ht 1.651 m (5' 5\")   Wt 65.1 kg (143 lb 9.6 oz)   SpO2 95%   BMI 23.90 kg/m    Exam:  GENERAL APPEARANCE: alert, confused, pleasant   ENT:  Mouth and posterior oropharynx normal, dry mucous membranes  EYES:  EOM, conjunctivae, lids, pupils and irises normal, wears glasses  RESP:  lungs diminished, no cough or wheeze   CV:  regular rate and irregular rhythm, no murmur, rub, or gallop, trace edema bilateral legs and ankles  ABDOMEN:  no guarding or rebound, BS +  : deleon catheter in place with leg bag, straw urine output   M/S: generalized weakness- 2WW or WC  SKIN: lateral left ankle with chronic scab, no redness, no swelling, no warmth-scab over hardware  NEURO: Cranial nerves 2-12 are normal tested and grossly at patient's baseline  PSYCH: insight and judgement impaired, memory impaired SLUMS 9/30 and CPT 3.8/5.6    Labs:   CBC RESULTS: Recent Labs   Lab Test 06/22/22  0900 05/02/22  0903   WBC 5.2 6.1   RBC 3.42* 3.57*   HGB 11.2* 11.5*   HCT 36.2 37.6   * 105*   MCH 32.7 32.2   MCHC 30.9* 30.6*   RDW 13.4 13.2   * 192       Last Basic Metabolic Panel:  Recent Labs   Lab Test 06/22/22  0900 04/18/22  0843    137   POTASSIUM 4.2 4.1   CHLORIDE 110* 106   CLARISSE 9.1 9.5   CO2 27 27   BUN 22 26   CR 1.08* 1.01   * 114*       Liver Function Studies -   Recent Labs   Lab Test 04/10/22  0729 12/26/21  0236   PROTTOTAL 6.0* 5.8*   ALBUMIN 2.9* 2.6*   BILITOTAL 0.7 0.8   ALKPHOS 74 57   AST 23 27 "   ALT 21 20       TSH   Date Value Ref Range Status   02/26/2018 1.82 0.40 - 4.00 mU/L Final   10/05/2017 2.44 0.40 - 4.00 mU/L Final       No results found for: A1C    ASSESSMENT/PLAN:  (T84.748S) Internal fixation device (pin, linda, or screw) mechanical complication, sequela  (primary encounter diagnosis)  Comment: stable  Plan:   -call [facility] if s/s of infection     (G30.1,  F02.80) Late onset Alzheimer's dementia without behavioral disturbance (H)  Comment: stable   Plan:  -continue current plan of care    (E46) Protein-calorie malnutrition, unspecified severity (H)  Comment: weight stable  Plan:   -weights per facility    (I50.32) Chronic heart failure with preserved ejection fraction (H)  (I48.91) Atrial fibrillation, unspecified type (H)  (Z86.711) History of pulmonary embolism  (Z79.01) Long term (current) use of anticoagulants  (I95.89) Other specified hypotension  Comment: ongoing  Plan:   -metoprolol 25 mg po BID  -coumadin dosed today. Continue current dose and INR check in 3 weeks    (N18.31) Stage 3a chronic kidney disease (H)  Comment: ongoing   Plan:   -renal dose meds    (K55.9) Ischemic colitis (H)  Comment: no recent flares  Plan:   -monitor for s/s    Electronically signed by:  RAY Lopez CNP

## 2023-01-12 NOTE — LETTER
1/12/2023        RE: Maia Ricketts  [address]  [place] [address]        [place] [facility]  [employer] [facility] Medical Record Number:  [id number]  Place of Service where encounter took place:  [facility] ([facility] [[id number]]  Chief Complaint   Patient presents with     RECHECK     Hypotension, wound check     INR RESULTS     Anticoagulation     Home Care/Hospice       HPI:    Maia Miranda  is a 89 year old (10/26/1932), who is being seen today for an episodic care visit.  HPI information obtained from: facility chart records. Today's concern is:    Seen today for chronic conditions. No concerns from staff. Continues under hospice care. Supplemental o2 discontinued as not used and mostly recovered from debilitating COVID-19 infection. WC ambulates and now able to stand and pivot. Seen in her room in recliner with feet up. Appears content.     Past Medical and Surgical History reviewed in Epic today.    MEDICATIONS:    Current Outpatient Medications   Medication Sig Dispense Refill     ACETAMINOPHEN EXTRA STRENGTH 500 MG tablet TAKE TWO TABLETS (1000MG) BY MOUTH THREE TIMES DAILY 540 tablet 97     carboxymethylcellulose (REFRESH LIQUIGEL) 1 % ophthalmic solution Place 1 drop into both eyes every morning As needed       cephALEXin (KEFLEX) 250 MG capsule TAKE 1 CAPSULE BY MOUTH ONCE DAILY 90 capsule 97     LIDOCAINE PAIN RELIEF 4 % Patch APPLY 1 PATCH TOPICALLY TO RIGHT SHOULDER ONCE DAILY (ON FOR 12 HOURS, OFF FOR 12 HOURS) 30 patch 97     loperamide (IMODIUM A-D) 2 MG tablet Take 1 tablet (2 mg) by mouth 4 times daily as needed for diarrhea       menthol-zinc oxide (CALMOSEPTINE) 0.44-20.6 % OINT ointment Apply topically as needed for skin protection       metoprolol tartrate (LOPRESSOR) 25 MG tablet TAKE 1 TABLET BY MOUTH TWICE DAILY 180 tablet 97     polyethylene glycol (MIRALAX) 17 g packet Take 1 packet by mouth daily as needed for constipation        "SENEXON-S 8.6-50 MG tablet TAKE TWO TABLETS BY MOUTH AT BEDTIME;& MAY TAKE 1 TABLET BY MOUTH TWICE DAILY AS NEEDED FOR CONSTIPATION 86 tablet 97     sodium chloride (PETER 128) 5 % ophthalmic ointment APPLY TO BOTH EYES ONCE DAILY 3.5 g 11     sodium chloride (OCEAN) 0.65 % nasal spray Use in both nares TID and prn x 14 days for nasal congestion. Then prn. 50 mL 11     Warfarin Therapy Reminder Take 1 each by mouth See Admin Instructions       REVIEW OF SYSTEMS:  Limited secondary to cognitive impairment but today pt reports fine    Objective:  BP (!) 166/70   Pulse 69   Temp (!) 96.6  F (35.9  C)   Resp 16   Ht 1.651 m (5' 5\")   Wt 65.1 kg (143 lb 9.6 oz)   SpO2 95%   BMI 23.90 kg/m    Exam:  GENERAL APPEARANCE: alert, confused, pleasant   ENT:  Mouth and posterior oropharynx normal, dry mucous membranes  EYES:  EOM, conjunctivae, lids, pupils and irises normal, wears glasses  RESP:  lungs diminished, no cough or wheeze   CV:  regular rate and irregular rhythm, no murmur, rub, or gallop, trace edema bilateral legs and ankles  ABDOMEN:  no guarding or rebound, BS +  : deleon catheter in place with leg bag, straw urine output   M/S: generalized weakness- 2WW or WC  SKIN: lateral left ankle with chronic scab, no redness, no swelling, no warmth-scab over hardware  NEURO: Cranial nerves 2-12 are normal tested and grossly at patient's baseline  PSYCH: insight and judgement impaired, memory impaired SLUMS 9/30 and CPT 3.8/5.6    Labs:   CBC RESULTS: Recent Labs   Lab Test 06/22/22  0900 05/02/22  0903   WBC 5.2 6.1   RBC 3.42* 3.57*   HGB 11.2* 11.5*   HCT 36.2 37.6   * 105*   MCH 32.7 32.2   MCHC 30.9* 30.6*   RDW 13.4 13.2   * 192       Last Basic Metabolic Panel:  Recent Labs   Lab Test 06/22/22  0900 04/18/22  0843    137   POTASSIUM 4.2 4.1   CHLORIDE 110* 106   CLARISSE 9.1 9.5   CO2 27 27   BUN 22 26   CR 1.08* 1.01   * 114*       Liver Function Studies -   Recent Labs   Lab Test " 04/10/22  0729 12/26/21  0236   PROTTOTAL 6.0* 5.8*   ALBUMIN 2.9* 2.6*   BILITOTAL 0.7 0.8   ALKPHOS 74 57   AST 23 27   ALT 21 20       TSH   Date Value Ref Range Status   02/26/2018 1.82 0.40 - 4.00 mU/L Final   10/05/2017 2.44 0.40 - 4.00 mU/L Final       No results found for: A1C    ASSESSMENT/PLAN:  (T84.208S) Internal fixation device (pin, linda, or screw) mechanical complication, sequela  (primary encounter diagnosis)  Comment: stable  Plan:   -call [facility] if s/s of infection     (G30.1,  F02.80) Late onset Alzheimer's dementia without behavioral disturbance (H)  Comment: stable   Plan:  -continue current plan of care    (E46) Protein-calorie malnutrition, unspecified severity (H)  Comment: weight stable  Plan:   -weights per facility    (I50.32) Chronic heart failure with preserved ejection fraction (H)  (I48.91) Atrial fibrillation, unspecified type (H)  (Z86.711) History of pulmonary embolism  (Z79.01) Long term (current) use of anticoagulants  (I95.89) Other specified hypotension  Comment: ongoing  Plan:   -metoprolol 25 mg po BID  -coumadin dosed today. Continue current dose and INR check in 3 weeks    (N18.31) Stage 3a chronic kidney disease (H)  Comment: ongoing   Plan:   -renal dose meds    (K55.9) Ischemic colitis (H)  Comment: no recent flares  Plan:   -monitor for s/s    Electronically signed by:  RAY Lopez CNP                 Sincerely,        RAY Lopez CNP

## 2023-01-13 PROBLEM — E46 PROTEIN-CALORIE MALNUTRITION, UNSPECIFIED SEVERITY (H): Status: ACTIVE | Noted: 2023-01-01

## 2023-01-13 PROBLEM — G30.1 LATE ONSET ALZHEIMER'S DEMENTIA WITHOUT BEHAVIORAL DISTURBANCE (H): Status: ACTIVE | Noted: 2023-01-01

## 2023-01-13 PROBLEM — I50.32 CHRONIC HEART FAILURE WITH PRESERVED EJECTION FRACTION (H): Status: ACTIVE | Noted: 2023-01-01

## 2023-01-13 PROBLEM — F02.80 LATE ONSET ALZHEIMER'S DEMENTIA WITHOUT BEHAVIORAL DISTURBANCE (H): Status: ACTIVE | Noted: 2023-01-01

## 2023-02-02 NOTE — TELEPHONE ENCOUNTER
S - INR draw  B -  resident  A - INR Results:  2.1.  Resident is currently taking Jantoven 1 mg on Mon/Wed, and 2 mg AOD.  R - Will update NP    Plan:  1. Continue current dose of coumadin and recheck INR in 3 weeks (2/23/23)

## 2023-02-11 NOTE — TELEPHONE ENCOUNTER
[facility] TELEPHONE ENCOUNTER    Chief Complaint   Patient presents with     Urinary Problem       Maia Miranda is a 89 year old  (10/26/1932),Nurse called today to report: Increased hematuria noted. Per Registered Nurse, blood dripping out small amounts. On hospice care. Also on coumadin with INR assessments. Per Registered Nurse, hospice Registered Nurse wanted to call us to see if we would want to stop coumadin altogether? Unknown why she should continue this while on hospice anyway..     ASSESSMENT/PLAN      HOLD coumadin until Monday.    Follow up with PCP on Monday for discontinuation needs for this medication or not.     Continue hospice care as directed    Electronically signed by:   RAY Mensah CNP

## 2023-02-22 NOTE — TELEPHONE ENCOUNTER
S - INR draw  B -  hospice resident  A - INR 2.5  R - Will update NP and await new orders     Plan:  1. Coumadin 1 mg po Mon/Wed and coumadin 2 mg po all other days    2. Recheck INR 3/9/23    RAY Lopez CNP on 2/22/2023 at 1:54 PM

## 2023-03-02 NOTE — PROGRESS NOTES
[place] [facility]  [place] [facility] Record Number:  [id number]  Place of Service where encounter took place:  [facility] [[id number]]  Chief Complaint   Patient presents with     RECHECK     Increased leg swelling     Home Care/Hospice       HPI:    Maia Miranda  is a 89 year old (10/26/1932), who is being seen today for an episodic care visit.  HPI information obtained from: facility chart records, facility staff, patient report and [facility] chart review. Today's concern is:    Seen today for chronic conditions. No concerns from staff. Was started on Lasix for increase of LE edema/ fluid status increase on 2/25 and is helping. Edema is above baseline but better. Advanced dementia, chronic deleon, CHF, atrial fibrillation, hx of PE, CKD. Was originally enrolled with hospice with acute COVID-19 illness but has recovered now dx is hypertensive heart disease with heart failure. Had episode of bleeding around catheter site and constipation that has resolved.     Past Medical and Surgical History reviewed in Epic today.    MEDICATIONS:    Current Outpatient Medications   Medication Sig Dispense Refill     ACETAMINOPHEN EXTRA STRENGTH 500 MG tablet TAKE TWO TABLETS (1000MG) BY MOUTH THREE TIMES DAILY 540 tablet 97     atropine 1 % SOLN Place 2 drops under the tongue every hour as needed for secretions       carboxymethylcellulose (REFRESH LIQUIGEL) 1 % ophthalmic solution Place 1 drop into both eyes every morning As needed       cephALEXin (KEFLEX) 250 MG capsule TAKE 1 CAPSULE BY MOUTH ONCE DAILY 90 capsule 97     haloperidol (HALDOL) 0.5 MG tablet Take 1 tablet (0.5 mg) by mouth every hour as needed for agitation       HYDROmorphone (DILAUDID) 2 MG tablet Take 1 tablet (2 mg) by mouth every 6 hours as needed for pain 10 tablet 0     LIDOCAINE PAIN RELIEF 4 % Patch APPLY 1 PATCH TOPICALLY TO RIGHT SHOULDER ONCE DAILY (ON FOR 12 HOURS, OFF FOR 12 HOURS) 30 patch 97     loperamide  "(IMODIUM A-D) 2 MG tablet Take 1 tablet (2 mg) by mouth 4 times daily as needed for diarrhea       LORazepam (ATIVAN) 0.5 MG tablet Take 1 tablet (0.5 mg) by mouth every hour as needed for anxiety       menthol-zinc oxide (CALMOSEPTINE) 0.44-20.6 % OINT ointment Apply topically as needed for skin protection       metoprolol tartrate (LOPRESSOR) 25 MG tablet TAKE 1 TABLET BY MOUTH TWICE DAILY 180 tablet 97     polyethylene glycol (MIRALAX) 17 g packet Take 1 packet by mouth daily as needed for constipation       SENEXON-S 8.6-50 MG tablet TAKE TWO TABLETS BY MOUTH AT BEDTIME;& MAY TAKE 1 TABLET BY MOUTH TWICE DAILY AS NEEDED FOR CONSTIPATION 86 tablet 97     sodium chloride (PETER 128) 5 % ophthalmic ointment APPLY TO BOTH EYES ONCE DAILY 3.5 g 11     sodium chloride (OCEAN) 0.65 % nasal spray Use in both nares TID and prn x 14 days for nasal congestion. Then prn. 50 mL 11     Warfarin Therapy Reminder Take 1 each by mouth See Admin Instructions       REVIEW OF SYSTEMS:  Limited secondary to cognitive impairment but today pt reports ok    Objective:  /64   Pulse 84   Temp (!) 96.7  F (35.9  C)   Resp 18   Ht 1.651 m (5' 5\")   Wt 69.3 kg (152 lb 12.8 oz)   SpO2 96%   BMI 25.43 kg/m    Exam:  GENERAL APPEARANCE: alert, confused, pleasant   ENT:  Mouth and posterior oropharynx normal, dry mucous membranes  EYES:  EOM, conjunctivae, lids, pupils and irises normal, wears glasses  RESP:  lungs diminished, no cough or wheeze   CV:  regular rate and irregular rhythm, no murmur, rub, or gallop, R >L LE edema at 1+, pedal edema pitting 2+ right   ABDOMEN:  no guarding or rebound, BS +  : deleon catheter in place with leg bag, straw urine output   M/S: generalized weakness- 2WW or WC  SKIN: lateral left ankle with chronic scab, no redness, no swelling, no warmth-scab over hardware  NEURO: Cranial nerves 2-12 are normal tested and grossly at patient's baseline  PSYCH: insight and judgement impaired, memory " impaired SLUMS 9/30 and CPT 3.8/5.6    Labs:   CBC RESULTS: Recent Labs   Lab Test 06/22/22  0900 05/02/22  0903   WBC 5.2 6.1   RBC 3.42* 3.57*   HGB 11.2* 11.5*   HCT 36.2 37.6   * 105*   MCH 32.7 32.2   MCHC 30.9* 30.6*   RDW 13.4 13.2   * 192       Last Basic Metabolic Panel:  Recent Labs   Lab Test 06/22/22  0900 04/18/22  0843    137   POTASSIUM 4.2 4.1   CHLORIDE 110* 106   CLARISSE 9.1 9.5   CO2 27 27   BUN 22 26   CR 1.08* 1.01   * 114*       Liver Function Studies -   Recent Labs   Lab Test 04/10/22  0729 12/26/21  0236   PROTTOTAL 6.0* 5.8*   ALBUMIN 2.9* 2.6*   BILITOTAL 0.7 0.8   ALKPHOS 74 57   AST 23 27   ALT 21 20       TSH   Date Value Ref Range Status   02/26/2018 1.82 0.40 - 4.00 mU/L Final   10/05/2017 2.44 0.40 - 4.00 mU/L Final       No results found for: A1C    ASSESSMENT/PLAN:  (I50.32) Chronic heart failure with preserved ejection fraction (H)  (I48.91) Atrial fibrillation, unspecified type (H)  (Z86.711) History of pulmonary embolism  Comment: ongoing  Plan:   -metoprolol 25 mg po BID  -coumadin per INR, limited labs with hospice status   -lasix 20 mg po daily      (N18.31) Stage 3a chronic kidney disease (H)  Comment: ongoing   Plan:   -renal dose meds    (K55.9) Ischemic colitis (H)  Comment: no recent flares-did have constipation  Plan:   -monitor for s/s and continue current plan of care    (Y93.894S) Internal fixation device (pin, linda, or screw) mechanical complication, sequela    Comment: stable  Plan:   -monitor for s/s of infection             Electronically signed by:  Remy Hernandez, APRN CNP

## 2023-03-02 NOTE — LETTER
3/2/2023        RE: Maia Ricketts  [address]  [place] [address]        [place] [facility]  [place] Medical Record Number:  [id number]  Place of Service where encounter took place:  [facility] ([facility] [[id number]]  Chief Complaint   Patient presents with     RECHECK     Increased leg swelling     Home Care/Hospice       HPI:    Maia Miranda  is a 89 year old (10/26/1932), who is being seen today for an episodic care visit.  HPI information obtained from: facility chart records, facility staff, patient report and [facility] chart review. Today's concern is:    Seen today for chronic conditions. No concerns from staff. Was started on Lasix for increase of LE edema/ fluid status increase on 2/25 and is helping. Edema is above baseline but better. Advanced dementia, chronic deleon, CHF, atrial fibrillation, hx of PE, CKD. Was originally enrolled with hospice with acute COVID-19 illness but has recovered now dx is hypertensive heart disease with heart failure. Had episode of bleeding around catheter site and constipation that has resolved.     Past Medical and Surgical History reviewed in Epic today.    MEDICATIONS:    Current Outpatient Medications   Medication Sig Dispense Refill     ACETAMINOPHEN EXTRA STRENGTH 500 MG tablet TAKE TWO TABLETS (1000MG) BY MOUTH THREE TIMES DAILY 540 tablet 97     atropine 1 % SOLN Place 2 drops under the tongue every hour as needed for secretions       carboxymethylcellulose (REFRESH LIQUIGEL) 1 % ophthalmic solution Place 1 drop into both eyes every morning As needed       cephALEXin (KEFLEX) 250 MG capsule TAKE 1 CAPSULE BY MOUTH ONCE DAILY 90 capsule 97     haloperidol (HALDOL) 0.5 MG tablet Take 1 tablet (0.5 mg) by mouth every hour as needed for agitation       HYDROmorphone (DILAUDID) 2 MG tablet Take 1 tablet (2 mg) by mouth every 6 hours as needed for pain 10 tablet 0     LIDOCAINE PAIN RELIEF 4 % Patch APPLY  "1 PATCH TOPICALLY TO RIGHT SHOULDER ONCE DAILY (ON FOR 12 HOURS, OFF FOR 12 HOURS) 30 patch 97     loperamide (IMODIUM A-D) 2 MG tablet Take 1 tablet (2 mg) by mouth 4 times daily as needed for diarrhea       LORazepam (ATIVAN) 0.5 MG tablet Take 1 tablet (0.5 mg) by mouth every hour as needed for anxiety       menthol-zinc oxide (CALMOSEPTINE) 0.44-20.6 % OINT ointment Apply topically as needed for skin protection       metoprolol tartrate (LOPRESSOR) 25 MG tablet TAKE 1 TABLET BY MOUTH TWICE DAILY 180 tablet 97     polyethylene glycol (MIRALAX) 17 g packet Take 1 packet by mouth daily as needed for constipation       SENEXON-S 8.6-50 MG tablet TAKE TWO TABLETS BY MOUTH AT BEDTIME;& MAY TAKE 1 TABLET BY MOUTH TWICE DAILY AS NEEDED FOR CONSTIPATION 86 tablet 97     sodium chloride (PETER 128) 5 % ophthalmic ointment APPLY TO BOTH EYES ONCE DAILY 3.5 g 11     sodium chloride (OCEAN) 0.65 % nasal spray Use in both nares TID and prn x 14 days for nasal congestion. Then prn. 50 mL 11     Warfarin Therapy Reminder Take 1 each by mouth See Admin Instructions       REVIEW OF SYSTEMS:  Limited secondary to cognitive impairment but today pt reports ok    Objective:  /64   Pulse 84   Temp (!) 96.7  F (35.9  C)   Resp 18   Ht 1.651 m (5' 5\")   Wt 69.3 kg (152 lb 12.8 oz)   SpO2 96%   BMI 25.43 kg/m    Exam:  GENERAL APPEARANCE: alert, confused, pleasant   ENT:  Mouth and posterior oropharynx normal, dry mucous membranes  EYES:  EOM, conjunctivae, lids, pupils and irises normal, wears glasses  RESP:  lungs diminished, no cough or wheeze   CV:  regular rate and irregular rhythm, no murmur, rub, or gallop, R >L LE edema at 1+, pedal edema pitting 2+ right   ABDOMEN:  no guarding or rebound, BS +  : deleon catheter in place with leg bag, straw urine output   M/S: generalized weakness- 2WW or WC  SKIN: lateral left ankle with chronic scab, no redness, no swelling, no warmth-scab over hardware  NEURO: Cranial nerves " 2-12 are normal tested and grossly at patient's baseline  PSYCH: insight and judgement impaired, memory impaired SLUMS 9/30 and CPT 3.8/5.6    Labs:   CBC RESULTS: Recent Labs   Lab Test 06/22/22  0900 05/02/22  0903   WBC 5.2 6.1   RBC 3.42* 3.57*   HGB 11.2* 11.5*   HCT 36.2 37.6   * 105*   MCH 32.7 32.2   MCHC 30.9* 30.6*   RDW 13.4 13.2   * 192       Last Basic Metabolic Panel:  Recent Labs   Lab Test 06/22/22  0900 04/18/22  0843    137   POTASSIUM 4.2 4.1   CHLORIDE 110* 106   CLARISSE 9.1 9.5   CO2 27 27   BUN 22 26   CR 1.08* 1.01   * 114*       Liver Function Studies -   Recent Labs   Lab Test 04/10/22  0729 12/26/21  0236   PROTTOTAL 6.0* 5.8*   ALBUMIN 2.9* 2.6*   BILITOTAL 0.7 0.8   ALKPHOS 74 57   AST 23 27   ALT 21 20       TSH   Date Value Ref Range Status   02/26/2018 1.82 0.40 - 4.00 mU/L Final   10/05/2017 2.44 0.40 - 4.00 mU/L Final       No results found for: A1C    ASSESSMENT/PLAN:  (I50.32) Chronic heart failure with preserved ejection fraction (H)  (I48.91) Atrial fibrillation, unspecified type (H)  (Z86.711) History of pulmonary embolism  Comment: ongoing  Plan:   -metoprolol 25 mg po BID  -coumadin per INR, limited labs with hospice status   -lasix 20 mg po daily      (N18.31) Stage 3a chronic kidney disease (H)  Comment: ongoing   Plan:   -renal dose meds    (K55.9) Ischemic colitis (H)  Comment: no recent flares-did have constipation  Plan:   -monitor for s/s and continue current plan of care    (T81.853S) Internal fixation device (pin, linda, or screw) mechanical complication, sequela    Comment: stable  Plan:   -monitor for s/s of infection             Electronically signed by:  RAY Lopez CNP                 Sincerely,        RAY Lopez CNP

## 2023-03-09 NOTE — TELEPHONE ENCOUNTER
S-(situation): INR    B-(background):  Resident with [facility]    A-(assessment): INR: 2.1  Current Coumadin Dose: 1mg Mon/Wed and 2mg AOD  Previous INR: 2/22: 2.5  2/2: 2.1: 1mg M/W and 2mg AOD      R-(recommendations): Await New Orders

## 2023-04-06 NOTE — TELEPHONE ENCOUNTER
S-(situation):  INR    B-(background): Resident-[facility]    A-(assessment):   INR: 2.4  Current Coumadin Dose: 1mg Mon/Wed and 2mg AOD  Previous INR:        3/9: 2.1 & Coumadin 1mg M/W and 2mg AOD       2/22: 2.5 Coumadin 1mg M/W and 2mg AOD    R-(recommendations): Await orders    Plan:  1. Coumadin Dose: 1mg Mon/Wed and 2mg AOD  2. Recheck INR 5/2/23    RAY Lopez CNP on 4/6/2023 at 2:38 PM

## 2023-04-06 NOTE — TELEPHONE ENCOUNTER
S-(situation): INR    B-(background):  Resident with [facility]    A-(assessment):   INR: 2.4    Current Coumadin Dose: 1mg Mon/Wed and 2mg AOD    Previous INR:     3/9: 2.1-Coumadin 1mg M/W and 2mg AOD     2/22: 2.5 Coumadin 1mg M/W and 2mg AOD      R-(recommendations): Await orders

## 2023-05-01 NOTE — PROGRESS NOTES
[place] [facility]  [place] Medical Record Number:  [id number]  Place of Service where encounter took place:  [facility] [[id number]]  Chief Complaint   Patient presents with     RECHECK     Home Care/Hospice       HPI:    Maia Miranda  is a 89 year old (10/26/1932), who is being seen today for an episodic care visit.  HPI information obtained from: facility chart records, facility staff and [facility] chart review. Today's concern is:    Seen today for chronic conditions. No concerns from staff. Lasix for increase of LE edema/ fluid status increase x 7 days now use of compression. Edema is above baseline but better. Advanced dementia, chronic deleon, CHF, atrial fibrillation, hx of PE on coumadin with mostly stable INR,  CKD. Was originally enrolled with hospice with acute COVID-19 illness but has recovered now dx is hypertensive heart disease with heart failure. Had episode of bleeding around catheter site and constipation that has resolved. Unwitnessed fall from recliner (new?) 4/17, rash to back treated with hydrocortisone most likely heat rash. Wt stable, VSS.        Past Medical and Surgical History reviewed in Epic today.    MEDICATIONS:    Current Outpatient Medications   Medication Sig Dispense Refill     ACETAMINOPHEN EXTRA STRENGTH 500 MG tablet TAKE TWO TABLETS (1000MG) BY MOUTH THREE TIMES DAILY 540 tablet 97     atropine 1 % SOLN Place 2 drops under the tongue every hour as needed for secretions       carboxymethylcellulose (REFRESH LIQUIGEL) 1 % ophthalmic solution Place 1 drop into both eyes every morning As needed       cephALEXin (KEFLEX) 250 MG capsule TAKE 1 CAPSULE BY MOUTH ONCE DAILY 90 capsule 97     furosemide (LASIX) 20 MG tablet Take 1 tablet (20 mg) by mouth daily       haloperidol (HALDOL) 0.5 MG tablet Take 1 tablet (0.5 mg) by mouth every hour as needed for agitation       HYDROmorphone (DILAUDID) 2 MG tablet Take 1 tablet (2 mg) by mouth every 6  "hours as needed for pain 10 tablet 0     LIDOCAINE PAIN RELIEF 4 % Patch APPLY 1 PATCH TOPICALLY TO RIGHT SHOULDER ONCE DAILY (ON FOR 12 HOURS, OFF FOR 12 HOURS) 30 patch 97     loperamide (IMODIUM A-D) 2 MG tablet Take 1 tablet (2 mg) by mouth 4 times daily as needed for diarrhea       LORazepam (ATIVAN) 0.5 MG tablet Take 1 tablet (0.5 mg) by mouth every hour as needed for anxiety       menthol-zinc oxide (CALMOSEPTINE) 0.44-20.6 % OINT ointment Apply topically as needed for skin protection       metoprolol tartrate (LOPRESSOR) 25 MG tablet TAKE 1 TABLET BY MOUTH TWICE DAILY 180 tablet 97     polyethylene glycol (MIRALAX) 17 g packet Take 1 packet by mouth daily as needed for constipation       SENEXON-S 8.6-50 MG tablet TAKE TWO TABLETS BY MOUTH AT BEDTIME;& MAY TAKE 1 TABLET BY MOUTH TWICE DAILY AS NEEDED FOR CONSTIPATION 86 tablet 97     sodium chloride (PETER 128) 5 % ophthalmic ointment APPLY TO BOTH EYES ONCE DAILY 3.5 g 11     sodium chloride (OCEAN) 0.65 % nasal spray Use in both nares TID and prn x 14 days for nasal congestion. Then prn. 50 mL 11     Warfarin Therapy Reminder Take 1 each by mouth See Admin Instructions       REVIEW OF SYSTEMS:  Limited secondary to cognitive impairment but today pt reports ok    Objective:  /69   Pulse 72   Temp 97.5  F (36.4  C)   Resp 16   Ht 1.651 m (5' 5\")   Wt 70 kg (154 lb 6.4 oz)   SpO2 93%   BMI 25.69 kg/m    Exam:  GENERAL APPEARANCE: alert, less confused, pleasant   ENT:  Mouth and posterior oropharynx normal, dry mucous membranes  EYES:  EOM, conjunctivae, lids, pupils and irises normal, wears glasses  RESP:  lungs diminished, no cough or wheeze   CV:  regular rate and irregular rhythm, no murmur, rub, or gallop, R >L LE edema trace for left and 2+ for right   ABDOMEN:  no guarding or rebound, BS +  : deleon catheter in place with leg bag, straw urine output   M/S: generalized weakness- 2WW or WC  SKIN: lateral left ankle " with chronic scab, no redness, no swelling, no warmth-scab over hardware  NEURO: Cranial nerves 2-12 are normal tested and grossly at patient's baseline  PSYCH: insight and judgement impaired, memory impaired SLUMS 9/30 and CPT 3.8/5.6    Labs:   CBC RESULTS: Recent Labs   Lab Test 06/22/22  0900 05/02/22  0903   WBC 5.2 6.1   RBC 3.42* 3.57*   HGB 11.2* 11.5*   HCT 36.2 37.6   * 105*   MCH 32.7 32.2   MCHC 30.9* 30.6*   RDW 13.4 13.2   * 192       Last Basic Metabolic Panel:  Recent Labs   Lab Test 06/22/22  0900 04/18/22  0843    137   POTASSIUM 4.2 4.1   CHLORIDE 110* 106   CLARISSE 9.1 9.5   CO2 27 27   BUN 22 26   CR 1.08* 1.01   * 114*       Liver Function Studies -   Recent Labs   Lab Test 04/10/22  0729 12/26/21  0236   PROTTOTAL 6.0* 5.8*   ALBUMIN 2.9* 2.6*   BILITOTAL 0.7 0.8   ALKPHOS 74 57   AST 23 27   ALT 21 20       TSH   Date Value Ref Range Status   02/26/2018 1.82 0.40 - 4.00 mU/L Final   10/05/2017 2.44 0.40 - 4.00 mU/L Final       No results found for: A1C    ASSESSMENT/PLAN:  (I50.32) Chronic heart failure with preserved ejection fraction (H)  (I48.91) Atrial fibrillation, unspecified type (H)  (Z86.711) History of pulmonary embolism  Comment: ongoing  Plan:   -metoprolol 25 mg po BID  -coumadin per INR, limited labs with hospice status. INR today is 2.9 and was 2.4 on 4/6. Continue 1mgMW and 2 mg po AOD     (N18.31) Stage 3a chronic kidney disease (H)  Comment: ongoing   Plan:   -renal dose meds     (K55.9) Ischemic colitis (H)  Comment: no recent flares-did have constipation  Plan:   -monitor for s/s and continue current plan of care     (L31.743S) Internal fixation device (pin, linda, or screw) mechanical complication, sequela    Comment: stable  Plan:   -monitor for s/s of infection     (Z51.5) Hospice care patient   Comment: stable   Plan:  -continue as able with goals of care comfort focus        Electronically signed by:  RAY Lopez CNP

## 2023-05-02 NOTE — TELEPHONE ENCOUNTER
S-(situation):INR    B-(background): Resident enrolled with [facility]    A-(assessment): INR: 2.9   Current Coumadin Dose: 1mg M/W and 2mg AOD  Previous INR: 4/6: 2.4  3/9: 2.1-Coumadin 1mg M/W and 2mg AOD  R-(recommendations): await new orders

## 2023-05-23 NOTE — PROGRESS NOTES
"[place] [facility]  [place] Medical Record Number:  [id number]  Place of Service where encounter took place: [facility] ([facility]) [[id number]]    HPI:    Maia Miranda is a 89 year old  (10/26/1932), who is being seen today for an episodic care visit at the above location.   HPI information obtained from: facility chart records. Today's concern is INR/Coumadin management for A. Fib and PE    ROS/Subjective:  Bleeding Signs/Symptoms:  None  Thromboembolic Signs/Symptoms:  None  Medication Changes:  No  Dietary Changes:  No  Activity Changes: No  Bacterial/Viral Infection:  No  Missed Coumadin Doses:  None  On ASA: No  Other Concerns:  No    OBJECTIVE:  /55   Pulse 75   Temp 97.3  F (36.3  C)   Resp 18   Ht 1.651 m (5' 5\")   Wt 74.3 kg (163 lb 12.8 oz)   SpO2 94%   BMI 27.26 kg/m    Last INR: 2.9 on 5/2  INR Today: 1.6  Current Dose:  Coumadin 1 mg po MW and 2 mg po AOD    ASSESSMENT:  (I48.91) Atrial fibrillation, unspecified type (H)  (primary encounter diagnosis)  (Z86.711) History of pulmonary embolism  (Z79.01) Long term (current) use of anticoagulants  (Z51.81, Z79.01) Encounter for monitoring Coumadin therapy  Comment: ongoing  Plan:  Coumadin 2 mg po daily  Recheck INR in 1 week    Subtherapeutic INR for goal of 2-3        Electronically signed by:  RAY Lopez CNP       "

## 2023-05-23 NOTE — LETTER
"    5/23/2023        RE: Maia Miranda  C/o Tim Miranda  [address] [address] [address]        [place] [facility]  [place] Medical Record Number:  [id number]  Place of Service where encounter took place: [facility] ([facility]) [[id number]]    HPI:    Maia Miranda is a 89 year old  (10/26/1932), who is being seen today for an episodic care visit at the above location.   HPI information obtained from: facility chart records. Today's concern is INR/Coumadin management for A. Fib and PE    ROS/Subjective:  Bleeding Signs/Symptoms:  None  Thromboembolic Signs/Symptoms:  None  Medication Changes:  No  Dietary Changes:  No  Activity Changes: No  Bacterial/Viral Infection:  No  Missed Coumadin Doses:  None  On ASA: No  Other Concerns:  No    OBJECTIVE:  /55   Pulse 75   Temp 97.3  F (36.3  C)   Resp 18   Ht 1.651 m (5' 5\")   Wt 74.3 kg (163 lb 12.8 oz)   SpO2 94%   BMI 27.26 kg/m    Last INR: 2.9 on 5/2  INR Today: 1.6  Current Dose:  Coumadin 1 mg po MW and 2 mg po AOD    ASSESSMENT:  (I48.91) Atrial fibrillation, unspecified type (H)  (primary encounter diagnosis)  (Z86.711) History of pulmonary embolism  (Z79.01) Long term (current) use of anticoagulants  (Z51.81, Z79.01) Encounter for monitoring Coumadin therapy  Comment: ongoing  Plan:  Coumadin 2 mg po daily  Recheck INR in 1 week    Subtherapeutic INR for goal of 2-3        Electronically signed by:  RAY Lopez CNP             Sincerely,        RAY Lopez CNP      "

## 2023-05-30 PROBLEM — Z87.81 PERSONAL HISTORY OF (HEALED) TRAUMATIC FRACTURE: Status: ACTIVE | Noted: 2022-04-14

## 2023-05-30 PROBLEM — K21.9 GASTRO-ESOPHAGEAL REFLUX DISEASE WITHOUT ESOPHAGITIS: Status: ACTIVE | Noted: 2022-04-14

## 2023-05-30 PROBLEM — N18.31 CHRONIC KIDNEY DISEASE, STAGE 3A (H): Status: ACTIVE | Noted: 2022-04-14

## 2023-05-30 PROBLEM — B95.2 ENTEROCOCCUS AS THE CAUSE OF DISEASES CLASSIFIED ELSEWHERE: Status: ACTIVE | Noted: 2022-04-14

## 2023-05-30 PROBLEM — I50.32 CHRONIC DIASTOLIC (CONGESTIVE) HEART FAILURE (H): Status: ACTIVE | Noted: 2022-04-14

## 2023-05-30 PROBLEM — R53.81 OTHER MALAISE: Status: ACTIVE | Noted: 2022-04-14

## 2023-05-30 PROBLEM — M62.81 MUSCLE WEAKNESS (GENERALIZED): Status: ACTIVE | Noted: 2022-04-14

## 2023-05-30 PROBLEM — D64.9 ANEMIA, UNSPECIFIED: Status: ACTIVE | Noted: 2022-04-14

## 2023-05-30 PROBLEM — S09.90XD UNSPECIFIED INJURY OF HEAD, SUBSEQUENT ENCOUNTER: Status: ACTIVE | Noted: 2022-04-14

## 2023-05-30 PROBLEM — R26.89 OTHER ABNORMALITIES OF GAIT AND MOBILITY: Status: ACTIVE | Noted: 2022-04-14

## 2023-05-30 PROBLEM — R19.7 DIARRHEA, UNSPECIFIED: Status: ACTIVE | Noted: 2022-04-14

## 2023-05-30 PROBLEM — S82.852D: Status: ACTIVE | Noted: 2022-04-14

## 2023-05-30 PROBLEM — Z97.8 PRESENCE OF OTHER SPECIFIED DEVICES: Status: ACTIVE | Noted: 2022-04-14

## 2023-05-30 NOTE — PROGRESS NOTES
[facility] [employer] Chart review      chart   No triggering events at this time but CC did find that member is now enrolled with [facility].    CM will follow-up as needed     Sahara Robledo MA [facility] Care Coordinator   [phone] - csmv cell phone   [phone] - qxdn fax

## 2023-05-30 NOTE — PROGRESS NOTES
"[facility]  [place] Medical Record Number:  [id number]  Place of Service where encounter took place: [facility] [[id number]]    HPI:    Maia Miranda is a 89 year old  (10/26/1932), who is being seen today for an episodic care visit at the above location. Today's concern is INR/Coumadin management for A. Fib and PE    ROS/Subjective:  Bleeding Signs/Symptoms:  None  Thromboembolic Signs/Symptoms:  None  Medication Changes:  No  Dietary Changes:  No  Activity Changes: No  Bacterial/Viral Infection:  No  Missed Coumadin Doses:  None  On ASA: No  Other Concerns:  No    OBJECTIVE:  BP (!) 146/67   Pulse 64   Temp 98.1  F (36.7  C)   Resp 18   Ht 1.651 m (5' 5\")   Wt 69.5 kg (153 lb 3.2 oz)   SpO2 (!) 85%   BMI 25.49 kg/m    Last INR: 1.6 on 5/23/23  INR Today:  2.3  Current Dose:  Coumadin 2 mg po daily was on  Coumadin 1 mg po MW and 2 mg po AOD before lower INR      ASSESSMENT:  (I48.91) Atrial fibrillation, unspecified type (H)  (primary encounter diagnosis)  (Z86.711) History of pulmonary embolism  (Z51.81,  Z79.01) Encounter for monitoring Coumadin therapy    Therapeutic INR for goal of 2-3    PLAN/ORDERS:   -continue current dose of coumadin and recheck INR in 2 weeks    Electronically signed by:  RAY Lopez CNP   "

## 2023-06-13 NOTE — PROGRESS NOTES
[place] [facility]  [place] Medical Record Number:  [id number]  Place of Service where encounter took place:  [facility] [[id number]]  Chief Complaint   Patient presents with     Nursing Home Acute     [facility]aring [facility] Hospice       HPI:    Maia Miranda  is a 89 year old (10/26/1932), who is being seen today for an episodic care visit.  HPI information obtained from: facility chart records, patient report and [facility] chart review. Today's concern is:    Seen today for chronic disease management. Continues under hospice cares and has stabilized. Was started on Lasix for LE edema now managed with compression socks. Rash on back, may be secondary to leaning against her back throughout the day. Use of hydrocortisone prn.  Advanced dementia, chronic deleon, CHF, atrial fibrillation, hx of PE on coumadin with mostly stable INR,  CKD. Was originally enrolled with hospice with acute COVID-19 illness but has recovered now dx is hypertensive heart disease with heart failure. No recent falls. She is conversational appropriate today. Denies pain, chest pain or SOB. Has limited ROM to upper extremities.     Past Medical and Surgical History reviewed in Epic today.    MEDICATIONS:    Current Outpatient Medications   Medication Sig Dispense Refill     ACETAMINOPHEN EXTRA STRENGTH 500 MG tablet TAKE 2 TABLETS (1G) BY MOUTH THREE TIMES DAILY 180 tablet 97     atropine 1 % SOLN Place 2 drops under the tongue every hour as needed for secretions       carboxymethylcellulose (REFRESH LIQUIGEL) 1 % ophthalmic solution Place 1 drop into both eyes every morning As needed       cephALEXin (KEFLEX) 250 MG capsule TAKE 1 CAPSULE BY MOUTH ONCE DAILY 28 capsule 97     haloperidol (HALDOL) 0.5 MG tablet Take 1 tablet (0.5 mg) by mouth every hour as needed for agitation       HYDROmorphone (DILAUDID) 2 MG tablet Take 1 tablet (2 mg) by mouth every 6 hours as needed for pain 10 tablet 0     LIDOCAINE PAIN  "RELIEF 4 % Patch APPLY 1 PATCH TOPICALLY TO RIGHT SHOULDER ONCE DAILY (ON FOR 12 HOURS, OFF FOR 12 HOURS) 30 patch 97     loperamide (IMODIUM A-D) 2 MG tablet Take 1 tablet (2 mg) by mouth 4 times daily as needed for diarrhea       LORazepam (ATIVAN) 0.5 MG tablet Take 1 tablet (0.5 mg) by mouth every hour as needed for anxiety       menthol-zinc oxide (CALMOSEPTINE) 0.44-20.6 % OINT ointment Apply topically as needed for skin protection       metoprolol tartrate (LOPRESSOR) 25 MG tablet TAKE 1 TABLET BY MOUTH TWICE DAILY 60 tablet 97     polyethylene glycol (MIRALAX) 17 g packet Take 1 packet by mouth daily as needed for constipation       SENEXON-S 8.6-50 MG tablet TAKE TWO TABLETS BY MOUTH AT BEDTIME;& MAY TAKE 1 TABLET BY MOUTH TWICE DAILY AS NEEDED FOR CONSTIPATION 86 tablet 97     sodium chloride (PETER 128) 5 % ophthalmic ointment APPLY TO BOTH EYES ONCE DAILY 3.5 g 11     sodium chloride (OCEAN) 0.65 % nasal spray Use in both nares TID and prn x 14 days for nasal congestion. Then prn. 50 mL 11     Warfarin Therapy Reminder Take 1 each by mouth See Admin Instructions       REVIEW OF SYSTEMS:  Limited secondary to cognitive impairment but today pt reports ok    Objective:  /57   Pulse 71   Temp 97.5  F (36.4  C)   Resp 18   Ht 1.651 m (5' 5\")   Wt 68.9 kg (152 lb)   SpO2 96%   BMI 25.29 kg/m    Exam:  GENERAL APPEARANCE: alert, less confused, pleasant   ENT:  Mouth and posterior oropharynx normal, dry mucous membranes  EYES:  EOM, conjunctivae, lids, pupils and irises normal, wears glasses  RESP:  lungs diminished, no cough or wheeze   CV:  regular rate and irregular rhythm, trace edema bilateral legs, ankles and feet   ABDOMEN:  no guarding or rebound, BS +  : deleon catheter in place with leg bag, straw urine output-minimal just emptied?   M/S: generalized weakness- 2WW or WC  SKIN: lateral left ankle with chronic scab, no redness, no swelling, no warmth-scab over hardware not seen " today  NEURO: Cranial nerves 2-12 are normal tested and grossly at patient's baseline  PSYCH: insight and judgement impaired, memory impaired SLUMS 9/30 and CPT 3.8/5.6    Labs:   No recent additional labs with hospice status    ASSESSMENT/PLAN:  (T84.398S) Internal fixation device (pin, linda, or screw) mechanical complication, sequela    Comment: stable  Plan:   -monitor for s/s of infection      (Z51.5) Hospice care patient   Comment: stable   Plan:  -continue as able with goals of care comfort focus    (N18.31) Stage 3a chronic kidney disease (H)  Comment: ongoing   Plan:   -renal dose meds     (K55.9) Ischemic colitis (H)  Comment: no recent flares-did have constipation  Plan:   -monitor for s/s and continue current plan of care    (I50.32) Chronic heart failure with preserved ejection fraction (H)  (I48.91) Atrial fibrillation, unspecified type (H)  (Z86.711) History of pulmonary embolism  Comment: ongoing  Plan:   -metoprolol 25 mg po BID  -coumadin per INR, limited labs with hospice status. INR today is 2.8 prior was on 1mgMW and 2 mg po AOD then drop in INR now on 2 mg po daily. Recheck INR in 2 weeks    (Z87.440) Personal History of urinary tract infection  (R33.9) Urinary retention  Comment: stable  Plan:  -continue prophylaxis keflex   -continue with chronic deleon   -cath cares per hospice and facility       Electronically signed by:  RAY Lopez CNP

## 2023-06-13 NOTE — LETTER
6/13/2023        RE: Maia Miranda  C/o Tim Miranda  [address] [address] [address]        [place] [facility]  [place] Medical Record Number:  [id number]  Place of Service where encounter took place:  [facility] [[id number]]  Chief Complaint   Patient presents with     Nursing Home Acute     [facility] Hospice       HPI:    Maia Miranda  is a 89 year old (10/26/1932), who is being seen today for an episodic care visit.  HPI information obtained from: facility chart records, patient report and [facility] chart review. Today's concern is:    Seen today for chronic disease management. Continues under hospice cares and has stabilized. Was started on Lasix for LE edema now managed with compression socks. Rash on back, may be secondary to leaning against her back throughout the day. Use of hydrocortisone prn.  Advanced dementia, chronic deleon, CHF, atrial fibrillation, hx of PE on coumadin with mostly stable INR,  CKD. Was originally enrolled with hospice with acute COVID-19 illness but has recovered now dx is hypertensive heart disease with heart failure. No recent falls. She is conversational appropriate today. Denies pain, chest pain or SOB. Has limited ROM to upper extremities.     Past Medical and Surgical History reviewed in Epic today.    MEDICATIONS:    Current Outpatient Medications   Medication Sig Dispense Refill     ACETAMINOPHEN EXTRA STRENGTH 500 MG tablet TAKE 2 TABLETS (1G) BY MOUTH THREE TIMES DAILY 180 tablet 97     atropine 1 % SOLN Place 2 drops under the tongue every hour as needed for secretions       carboxymethylcellulose (REFRESH LIQUIGEL) 1 % ophthalmic solution Place 1 drop into both eyes every morning As needed       cephALEXin (KEFLEX) 250 MG capsule TAKE 1 CAPSULE BY MOUTH ONCE DAILY 28 capsule 97     haloperidol (HALDOL) 0.5 MG tablet Take 1 tablet (0.5 mg) by mouth every hour as needed for agitation       HYDROmorphone  "(DILAUDID) 2 MG tablet Take 1 tablet (2 mg) by mouth every 6 hours as needed for pain 10 tablet 0     LIDOCAINE PAIN RELIEF 4 % Patch APPLY 1 PATCH TOPICALLY TO RIGHT SHOULDER ONCE DAILY (ON FOR 12 HOURS, OFF FOR 12 HOURS) 30 patch 97     loperamide (IMODIUM A-D) 2 MG tablet Take 1 tablet (2 mg) by mouth 4 times daily as needed for diarrhea       LORazepam (ATIVAN) 0.5 MG tablet Take 1 tablet (0.5 mg) by mouth every hour as needed for anxiety       menthol-zinc oxide (CALMOSEPTINE) 0.44-20.6 % OINT ointment Apply topically as needed for skin protection       metoprolol tartrate (LOPRESSOR) 25 MG tablet TAKE 1 TABLET BY MOUTH TWICE DAILY 60 tablet 97     polyethylene glycol (MIRALAX) 17 g packet Take 1 packet by mouth daily as needed for constipation       SENEXON-S 8.6-50 MG tablet TAKE TWO TABLETS BY MOUTH AT BEDTIME;& MAY TAKE 1 TABLET BY MOUTH TWICE DAILY AS NEEDED FOR CONSTIPATION 86 tablet 97     sodium chloride (PETER 128) 5 % ophthalmic ointment APPLY TO BOTH EYES ONCE DAILY 3.5 g 11     sodium chloride (OCEAN) 0.65 % nasal spray Use in both nares TID and prn x 14 days for nasal congestion. Then prn. 50 mL 11     Warfarin Therapy Reminder Take 1 each by mouth See Admin Instructions       REVIEW OF SYSTEMS:  Limited secondary to cognitive impairment but today pt reports ok    Objective:  /57   Pulse 71   Temp 97.5  F (36.4  C)   Resp 18   Ht 1.651 m (5' 5\")   Wt 68.9 kg (152 lb)   SpO2 96%   BMI 25.29 kg/m    Exam:  GENERAL APPEARANCE: alert, less confused, pleasant   ENT:  Mouth and posterior oropharynx normal, dry mucous membranes  EYES:  EOM, conjunctivae, lids, pupils and irises normal, wears glasses  RESP:  lungs diminished, no cough or wheeze   CV:  regular rate and irregular rhythm, trace edema bilateral legs, ankles and feet   ABDOMEN:  no guarding or rebound, BS +  : deleon catheter in place with leg bag, straw urine output-minimal just emptied?   M/S: generalized weakness- 2WW or " WC  SKIN: lateral left ankle with chronic scab, no redness, no swelling, no warmth-scab over hardware not seen today  NEURO: Cranial nerves 2-12 are normal tested and grossly at patient's baseline  PSYCH: insight and judgement impaired, memory impaired SLUMS 9/30 and CPT 3.8/5.6    Labs:   No recent additional labs with hospice status    ASSESSMENT/PLAN:  (T84.388S) Internal fixation device (pin, linda, or screw) mechanical complication, sequela    Comment: stable  Plan:   -monitor for s/s of infection      (Z51.5) Hospice care patient   Comment: stable   Plan:  -continue as able with goals of care comfort focus    (N18.31) Stage 3a chronic kidney disease (H)  Comment: ongoing   Plan:   -renal dose meds     (K55.9) Ischemic colitis (H)  Comment: no recent flares-did have constipation  Plan:   -monitor for s/s and continue current plan of care    (I50.32) Chronic heart failure with preserved ejection fraction (H)  (I48.91) Atrial fibrillation, unspecified type (H)  (Z86.711) History of pulmonary embolism  Comment: ongoing  Plan:   -metoprolol 25 mg po BID  -coumadin per INR, limited labs with hospice status. INR today is 2.8 prior was on 1mgMW and 2 mg po AOD then drop in INR now on 2 mg po daily. Recheck INR in 2 weeks    (Z87.440) Personal History of urinary tract infection  (R33.9) Urinary retention  Comment: stable  Plan:  -continue prophylaxis keflex   -continue with chronic deleon   -cath cares per hospice and facility       Electronically signed by:  RAY Lopez CNP              Sincerely,        RAY Lopez CNP

## 2023-06-26 NOTE — PROGRESS NOTES
"[place] [facility]  [place] Medical Record Number:  [id number]  Place of Service where encounter took place: [facility] [[id number]]    HPI:    Maia Miranda is a 89 year old  (10/26/1932), who is being seen today for an episodic care visit at the above location.   HPI information obtained from: facility chart records. Today's concern is INR/Coumadin management for A. Fib and PE    ROS/Subjective:  Bleeding Signs/Symptoms:  None  Thromboembolic Signs/Symptoms:  None  Medication Changes:  No  Dietary Changes:  No  Activity Changes: No  Bacterial/Viral Infection:  No  Missed Coumadin Doses:  None  On ASA: No  Other Concerns:  No    OBJECTIVE:  /52   Pulse 66   Temp (!) 95.5  F (35.3  C)   Resp 18   Ht 1.651 m (5' 5\")   Wt 69.7 kg (153 lb 11.2 oz)   SpO2 93%   BMI 25.58 kg/m    Last INR: 2.8 on 6/27/23  INR Today:  3.2  Current Dose: Coumadin 2 mg po daily       ASSESSMENT:  (I48.91) Atrial fibrillation, unspecified type (H)  (primary encounter diagnosis)  (Z86.711) History of pulmonary embolism  (Z51.81,  Z79.01) Encounter for monitoring Coumadin therapy    Supratherapeutic INR for goal of 2-3    PLAN:   New Dose: Coumadin 1.5 Tue/Thurs and 2 mg po all other days  Next INR: 2 weeks      Electronically signed by:  RAY Lopez CNP       "

## 2023-06-27 NOTE — LETTER
"    6/27/2023        RE: Maia Miranda  C/o Tim Miranda  [address] [facility] [address]        [place] [facility]  [place] Medical Record Number:  [id number]  Place of Service where encounter took place: [facility] [[id number]]    HPI:    Maia Miranda is a 89 year old  (10/26/1932), who is being seen today for an episodic care visit at the above location.   HPI information obtained from: facility chart records. Today's concern is INR/Coumadin management for A. Fib and PE    ROS/Subjective:  Bleeding Signs/Symptoms:  None  Thromboembolic Signs/Symptoms:  None  Medication Changes:  No  Dietary Changes:  No  Activity Changes: No  Bacterial/Viral Infection:  No  Missed Coumadin Doses:  None  On ASA: No  Other Concerns:  No    OBJECTIVE:  /52   Pulse 66   Temp (!) 95.5  F (35.3  C)   Resp 18   Ht 1.651 m (5' 5\")   Wt 69.7 kg (153 lb 11.2 oz)   SpO2 93%   BMI 25.58 kg/m    Last INR: 2.8 on 6/27/23  INR Today:  3.2  Current Dose: Coumadin 2 mg po daily       ASSESSMENT:  (I48.91) Atrial fibrillation, unspecified type (H)  (primary encounter diagnosis)  (Z86.711) History of pulmonary embolism  (Z51.81,  Z79.01) Encounter for monitoring Coumadin therapy    Supratherapeutic INR for goal of 2-3    PLAN:   New Dose: Coumadin 1.5 Tue/Thurs and 2 mg po all other days  Next INR: 2 weeks      Electronically signed by:  RAY Lopez CNP             Sincerely,        RAY Lopez CNP      "

## 2023-06-27 NOTE — LETTER
Maia Miranda orders      New Dose: Coumadin 1.5 Tue/Thurs and 2 mg po all other days  Next INR: 2 weeks      RAY Lopez CNP on 6/27/2023 at 10:08 AM

## 2023-07-11 NOTE — PROGRESS NOTES
"[place] [facility]  [place] Medical Record Number:  [id number]  Place of Service where encounter took place: [facility] [[id number]]    HPI:    Maia Miranda is a 89 year old  (10/26/1932), who is being seen today for an episodic care visit at the above location.   HPI information obtained from: facility chart records. Today's concern is INR/Coumadin management for A. Fib and PE    ROS/Subjective:  Bleeding Signs/Symptoms:  None  Thromboembolic Signs/Symptoms:  None  Medication Changes:  No  Dietary Changes:  No  Activity Changes: No  Bacterial/Viral Infection:  No  Missed Coumadin Doses:  None  On ASA: No  Other Concerns:  No    OBJECTIVE:  /59   Pulse 60   Temp 97.3  F (36.3  C)   Resp 18   Ht 1.651 m (5' 5\")   Wt 70.8 kg (156 lb)   SpO2 95%   BMI 25.96 kg/m    Last INR: 3.2 on 6/27/23  INR Today:  3.2  Current Dose: Coumadin 1.5 Tue/Thurs and 2 mg po all other days       ASSESSMENT:  (I48.91) Atrial fibrillation, unspecified type (H)  (primary encounter diagnosis)  (Z86.711) History of pulmonary embolism  (Z51.81,  Z79.01) Encounter for monitoring Coumadin therapy    Supratherapeutic INR for goal of 2-3    PLAN:   New Dose: Coumadin 1.5 mg po Mon/Tue/Thurs/Sat and 2 mg po AOD    Next INR: 2 weeks      Electronically signed by:  RAY Lopez CNP         "

## 2023-07-11 NOTE — LETTER
"    7/11/2023        RE: Maia Miranda  C/o Tim Miranda  [address] [facility] [address]        [place] [facility]  [place] Medical Record Number:  [id number]  Place of Service where encounter took place: [facility] [[id number]]    HPI:    Maia Miranda is a 89 year old  (10/26/1932), who is being seen today for an episodic care visit at the above location.   HPI information obtained from: facility chart records. Today's concern is INR/Coumadin management for A. Fib and PE    ROS/Subjective:  Bleeding Signs/Symptoms:  None  Thromboembolic Signs/Symptoms:  None  Medication Changes:  No  Dietary Changes:  No  Activity Changes: No  Bacterial/Viral Infection:  No  Missed Coumadin Doses:  None  On ASA: No  Other Concerns:  No    OBJECTIVE:  /59   Pulse 60   Temp 97.3  F (36.3  C)   Resp 18   Ht 1.651 m (5' 5\")   Wt 70.8 kg (156 lb)   SpO2 95%   BMI 25.96 kg/m    Last INR: 3.2 on 6/27/23  INR Today:  3.2  Current Dose: Coumadin 1.5 Tue/Thurs and 2 mg po all other days       ASSESSMENT:  (I48.91) Atrial fibrillation, unspecified type (H)  (primary encounter diagnosis)  (Z86.711) History of pulmonary embolism  (Z51.81,  Z79.01) Encounter for monitoring Coumadin therapy    Supratherapeutic INR for goal of 2-3    PLAN:   New Dose: Coumadin 1.5 mg po Mon/Tue/Thurs/Sat and 2 mg po AOD    Next INR: 2 weeks      Electronically signed by:  RAY Lopez CNP               Sincerely,        RAY Lopez CNP      "

## 2023-07-25 NOTE — LETTER
Maia Miranda orders    Ciprofloxacin 500 mg po BID x 5 days  Coumadin 1.5 mg po daily  Recheck INR next week on lab day  Hold Kelfex while on Ciprofloxacin   RAY Lopez CNP on 7/25/2023 at 11:58 AM

## 2023-07-25 NOTE — PROGRESS NOTES
[place] [facility]  [place] Medical Record Number:  [id number]  Place of Service where encounter took place:  [facility] [[id number]]  Chief Complaint   Patient presents with    RECHECK     hematuria       HPI:    Maia Miranda  is a 89 year old (10/26/1932), who is being seen today for an episodic care visit.  HPI information obtained from: facility chart records, facility staff, and [facility] chart review. Today's concern is:    Seen today for follow up to episode of hematinuria. In facility chart review does appear episode was day after chronic deleon exchanged. On chronic coumadin for atrial fibrillation/hx of PE . Coumadin was held for 2 days with acute hematuria. Could be also related to UTI? She has hx of frequent infection and on keflex prophylaxis. Was originally enrolled with hospice with acute COVID-19 illness but has recovered now dx is hypertensive heart disease with heart failure. No recent falls. She is conversational appropriate today. Denies pain, chest pain or SOB. Has limited ROM to upper extremities. Also being treated for flare of heat rash on her back. Rash on back, may be secondary to leaning against her back throughout the day. Slight increase to weight (2lbs) has CHF, LE edema, wears compression.  Has used short course of lasix per hospice previously. Denies any pain, chest pain or SOB today. Does seem more shaky and tremulous from baseline. Right hand tremor but also in lips and head. Staff reporting more at baseline, some concern as episode of diarrhea and while sitting on toilet pulled it forward in attempts to clean herself.     Past Medical and Surgical History reviewed in Epic today.    MEDICATIONS:    Current Outpatient Medications   Medication Sig Dispense Refill    ciprofloxacin (CIPRO) 500 MG tablet Take 1 tablet (500 mg) by mouth 2 times daily for 5 days 10 tablet 0    ACETAMINOPHEN EXTRA STRENGTH 500 MG tablet TAKE 2 TABLETS (1G) BY MOUTH  THREE TIMES DAILY 180 tablet 97    atropine 1 % SOLN Place 2 drops under the tongue every hour as needed for secretions      CALMOSEPTINE 0.44-20.6 % OINT ointment APPLY TOPICALLY TO AFFECTED AREA(S) AFTER TOILETING;AND AS NEEDED 113 g 3    carboxymethylcellulose (REFRESH LIQUIGEL) 1 % ophthalmic solution Place 1 drop into both eyes every morning As needed      cephALEXin (KEFLEX) 250 MG capsule TAKE 1 CAPSULE BY MOUTH ONCE DAILY 28 capsule 97    GAVILAX 17 GM/SCOOP powder MIX 17GM OF POWDER IN 8OZ OF WATER UNTIL COMPLETELY DISSOLVED. DRINK SOLUTION BY MOUTH ONCE DAILY AS NEEDED FOR CONSTIPATION. 510 g 3    haloperidol (HALDOL) 0.5 MG tablet Take 1 tablet (0.5 mg) by mouth every hour as needed for agitation      HYDROmorphone (DILAUDID) 2 MG tablet Take 1 tablet (2 mg) by mouth every 6 hours as needed for pain 10 tablet 0    LIDOCAINE PAIN RELIEF 4 % Patch APPLY 1 PATCH TOPICALLY TO RIGHT SHOULDER ONCE DAILY (ON FOR 12 HOURS, OFF FOR 12 HOURS) 30 patch 11    loperamide (IMODIUM A-D) 2 MG tablet Take 1 tablet (2 mg) by mouth 4 times daily as needed for diarrhea      LORazepam (ATIVAN) 0.5 MG tablet Take 1 tablet (0.5 mg) by mouth every hour as needed for anxiety      metoprolol tartrate (LOPRESSOR) 25 MG tablet TAKE 1 TABLET BY MOUTH TWICE DAILY 60 tablet 97    NYSTOP 096038 UNIT/GM powder APPLY TOPICALLY TO LEFT BREAST FOLD TWICE DAILY UNTIL HEALED;ONCE HEALED APPLY TWICE DAILY AS NEEDED 60 g 97    polyethylene glycol (MIRALAX) 17 g packet Take 1 packet by mouth daily as needed for constipation      SENEXON-S 8.6-50 MG tablet TAKE TWO TABLETS BY MOUTH AT BEDTIME;& MAY TAKE 1 TABLET BY MOUTH TWICE DAILY AS NEEDED FOR CONSTIPATION 86 tablet 97    sodium chloride (PETER 128) 5 % ophthalmic ointment APPLY TO BOTH EYES ONCE DAILY 3.5 g 11    sodium chloride (OCEAN) 0.65 % nasal spray Use in both nares TID and prn x 14 days for nasal congestion. Then prn. 50 mL 11    Warfarin Therapy Reminder Take 1 each by mouth See Admin  "Instructions       REVIEW OF SYSTEMS:  Limited secondary to cognitive impairment but today pt reports fine    Objective:  /62   Pulse 67   Temp 97.5  F (36.4  C)   Resp 17   Ht 1.651 m (5' 5\")   Wt 71.7 kg (158 lb)   SpO2 94%   BMI 26.29 kg/m    Exam:  GENERAL APPEARANCE: alert, less confused, pleasant   ENT:  Mouth and posterior oropharynx normal, dry mucous membranes  EYES:  EOM, conjunctivae, lids, pupils and irises normal, wears glasses  RESP:  lungs diminished, fine crackles posterior, weak cough at times  CV:  regular rate and irregular rhythm, trace edema bilateral legs, ankles and feet, compression on  ABDOMEN:  no guarding or rebound, BS +  : deleon catheter in place with leg bag, straw urine   M/S: generalized weakness- 2WW or WC  SKIN: lateral left ankle with chronic scab, no redness, no swelling, no warmth-scab over hardware not seen today  NEURO: Cranial nerves 2-12 are normal tested and grossly at patient's baseline  PSYCH: insight and judgement impaired, memory impaired SLUMS 9/30 and CPT 3.8/5.6    Labs:   No recent labs with hospice status     ASSESSMENT/PLAN:  (R31.0) Gross hematuria  (primary encounter diagnosis)  (Z87.440) Personal history of urinary tract infection  (Z97.8) Chronic indwelling Deleon catheter  Comment: episode that appears resolving  Plan:   -Hold Kelfex and short course of cipro for comfort     (I48.91) Atrial fibrillation, unspecified type (H)  (Z86.711) History of pulmonary embolism  (I50.32) Chronic heart failure with preserved ejection fraction (H)  (R60.0) Lower extremity edema  (Z51.81,  Z79.01) Encounter for monitoring Coumadin therapy  Comment: INR today of 1.6 but coumadin held x 2 days with gross hematuria  Plan:   -Coumadin 1.5 mg po daily and recheck INR next week on lab day  -compression socks daily  -metoprolol 25 mg po daily    (R21) Rash and nonspecific skin eruption  Comment: ongoing   Plan:  -continue current plan until cleared   -encouraged staff " to assist removal of sling from her back while resting in recliner     (Z51.5) Hospice care patient  Comment: minimal use of prns in chart review  Plan:   -continue comfort medications as needed        Electronically signed by:  RAY Lopez CNP

## 2023-07-25 NOTE — LETTER
7/25/2023        RE: Maia Miranda  C/o Tim Miranda  [address]  [facility] [address]        [place] [facility]  [place] Medical Record Number:  [id number]  Place of Service where encounter took place:  [facility] [[id number]]  Chief Complaint   Patient presents with     RECHECK     hematuria       HPI:    Maia Miranda  is a 89 year old (10/26/1932), who is being seen today for an episodic care visit.  HPI information obtained from: facility chart records, facility staff, and [facility] chart review. Today's concern is:    Seen today for follow up to episode of hematinuria. In facility chart review does appear episode was day after chronic deleon exchanged. On chronic coumadin for atrial fibrillation/hx of PE . Coumadin was held for 2 days with acute hematuria. Could be also related to UTI? She has hx of frequent infection and on keflex prophylaxis. Was originally enrolled with hospice with acute COVID-19 illness but has recovered now dx is hypertensive heart disease with heart failure. No recent falls. She is conversational appropriate today. Denies pain, chest pain or SOB. Has limited ROM to upper extremities. Also being treated for flare of heat rash on her back. Rash on back, may be secondary to leaning against her back throughout the day. Slight increase to weight (2lbs) has CHF, LE edema, wears compression.  Has used short course of lasix per hospice previously. Denies any pain, chest pain or SOB today. Does seem more shaky and tremulous from baseline. Right hand tremor but also in lips and head. Staff reporting more at baseline, some concern as episode of diarrhea and while sitting on toilet pulled it forward in attempts to clean herself.     Past Medical and Surgical History reviewed in Epic today.    MEDICATIONS:    Current Outpatient Medications   Medication Sig Dispense Refill     ciprofloxacin (CIPRO) 500 MG tablet Take 1 tablet (500 mg) by mouth  2 times daily for 5 days 10 tablet 0     ACETAMINOPHEN EXTRA STRENGTH 500 MG tablet TAKE 2 TABLETS (1G) BY MOUTH THREE TIMES DAILY 180 tablet 97     atropine 1 % SOLN Place 2 drops under the tongue every hour as needed for secretions       CALMOSEPTINE 0.44-20.6 % OINT ointment APPLY TOPICALLY TO AFFECTED AREA(S) AFTER TOILETING;AND AS NEEDED 113 g 3     carboxymethylcellulose (REFRESH LIQUIGEL) 1 % ophthalmic solution Place 1 drop into both eyes every morning As needed       cephALEXin (KEFLEX) 250 MG capsule TAKE 1 CAPSULE BY MOUTH ONCE DAILY 28 capsule 97     GAVILAX 17 GM/SCOOP powder MIX 17GM OF POWDER IN 8OZ OF WATER UNTIL COMPLETELY DISSOLVED. DRINK SOLUTION BY MOUTH ONCE DAILY AS NEEDED FOR CONSTIPATION. 510 g 3     haloperidol (HALDOL) 0.5 MG tablet Take 1 tablet (0.5 mg) by mouth every hour as needed for agitation       HYDROmorphone (DILAUDID) 2 MG tablet Take 1 tablet (2 mg) by mouth every 6 hours as needed for pain 10 tablet 0     LIDOCAINE PAIN RELIEF 4 % Patch APPLY 1 PATCH TOPICALLY TO RIGHT SHOULDER ONCE DAILY (ON FOR 12 HOURS, OFF FOR 12 HOURS) 30 patch 11     loperamide (IMODIUM A-D) 2 MG tablet Take 1 tablet (2 mg) by mouth 4 times daily as needed for diarrhea       LORazepam (ATIVAN) 0.5 MG tablet Take 1 tablet (0.5 mg) by mouth every hour as needed for anxiety       metoprolol tartrate (LOPRESSOR) 25 MG tablet TAKE 1 TABLET BY MOUTH TWICE DAILY 60 tablet 97     NYSTOP 926118 UNIT/GM powder APPLY TOPICALLY TO LEFT BREAST FOLD TWICE DAILY UNTIL HEALED;ONCE HEALED APPLY TWICE DAILY AS NEEDED 60 g 97     polyethylene glycol (MIRALAX) 17 g packet Take 1 packet by mouth daily as needed for constipation       SENEXON-S 8.6-50 MG tablet TAKE TWO TABLETS BY MOUTH AT BEDTIME;& MAY TAKE 1 TABLET BY MOUTH TWICE DAILY AS NEEDED FOR CONSTIPATION 86 tablet 97     sodium chloride (PETER 128) 5 % ophthalmic ointment APPLY TO BOTH EYES ONCE DAILY 3.5 g 11     sodium chloride (OCEAN) 0.65 % nasal spray Use in both  "nares TID and prn x 14 days for nasal congestion. Then prn. 50 mL 11     Warfarin Therapy Reminder Take 1 each by mouth See Admin Instructions       REVIEW OF SYSTEMS:  Limited secondary to cognitive impairment but today pt reports fine    Objective:  /62   Pulse 67   Temp 97.5  F (36.4  C)   Resp 17   Ht 1.651 m (5' 5\")   Wt 71.7 kg (158 lb)   SpO2 94%   BMI 26.29 kg/m    Exam:  GENERAL APPEARANCE: alert, less confused, pleasant   ENT:  Mouth and posterior oropharynx normal, dry mucous membranes  EYES:  EOM, conjunctivae, lids, pupils and irises normal, wears glasses  RESP:  lungs diminished, fine crackles posterior, weak cough at times  CV:  regular rate and irregular rhythm, trace edema bilateral legs, ankles and feet, compression on  ABDOMEN:  no guarding or rebound, BS +  : deleon catheter in place with leg bag, straw urine   M/S: generalized weakness- 2WW or WC  SKIN: lateral left ankle with chronic scab, no redness, no swelling, no warmth-scab over hardware not seen today  NEURO: Cranial nerves 2-12 are normal tested and grossly at patient's baseline  PSYCH: insight and judgement impaired, memory impaired SLUMS 9/30 and CPT 3.8/5.6    Labs:   No recent labs with hospice status     ASSESSMENT/PLAN:  (R31.0) Gross hematuria  (primary encounter diagnosis)  (Z87.440) Personal history of urinary tract infection  (Z97.8) Chronic indwelling Deleon catheter  Comment: episode that appears resolving  Plan:   -Hold Kelfex and short course of cipro for comfort     (I48.91) Atrial fibrillation, unspecified type (H)  (Z86.711) History of pulmonary embolism  (I50.32) Chronic heart failure with preserved ejection fraction (H)  (R60.0) Lower extremity edema  (Z51.81,  Z79.01) Encounter for monitoring Coumadin therapy  Comment: INR today of 1.6 but coumadin held x 2 days with gross hematuria  Plan:   -Coumadin 1.5 mg po daily and recheck INR next week on lab day  -compression socks daily  -metoprolol 25 mg po " daily    (R21) Rash and nonspecific skin eruption  Comment: ongoing   Plan:  -continue current plan until cleared   -encouraged staff to assist removal of sling from her back while resting in recliner     (Z51.5) Hospice care patient  Comment: minimal use of prns in chart review  Plan:   -continue comfort medications as needed        Electronically signed by:  RAY Lopez CNP              Sincerely,        RAY Lopez CNP

## 2023-08-01 NOTE — PROGRESS NOTES
"[facility]  [place] Medical Record Number:  [id number]  Place of Service where encounter took place: [facility]  [facility] [[id number]]    HPI:    Maia Miranda is a 89 year old  (10/26/1932), who is being seen today for an episodic care visit at the above location. Today's concern is INR/Coumadin management for A. Fib and PE    ROS/Subjective:  Bleeding Signs/Symptoms:  None  Thromboembolic Signs/Symptoms:  None  Medication Changes:  No  Dietary Changes:  No  Activity Changes: No  Bacterial/Viral Infection:  No  Missed Coumadin Doses:  YES (1 day)  On ASA: No  Other Concerns:  No    OBJECTIVE:  BP (!) 148/71   Pulse 61   Temp 97.6  F (36.4  C)   Resp 14   Ht 1.651 m (5' 5\")   Wt 72.4 kg (159 lb 9.6 oz)   SpO2 95%   BMI 26.56 kg/m    Last INR: 1.6 on 7/25/23  INR Today: 1.6  Current Dose:  Coumadin 1.5mg PO daily      ASSESSMENT:  (I48.91) Atrial fibrillation, unspecified type (H)  (primary encounter diagnosis)  (Z86.711) History of pulmonary embolism  (Z79.01) Long term current use of anticoagulant therapy  (Z51.81,  Z79.01) Encounter for monitoring Coumadin therapy    Subtherapeutic INR for goal of 2-3    PLAN/ORDERS:   New Dose: Coumadin 2 mg po Mon/Wed/Fri and 1.5 mg po AOD    Next INR: 1 week      Electronically signed by:  RAY Lopez CNP     "

## 2023-08-01 NOTE — TELEPHONE ENCOUNTER
May refill non-narcotic medications as needed for patients in Assisted Living or equivalent care setting

## 2023-08-01 NOTE — LETTER
Maia Miranda orders:    New Dose: Coumadin 2 mg po Mon/Wed/Fri and 1.5 mg po AOD    Next INR: 1 week    RAY Lopez CNP on 8/1/2023 at 12:32 PM

## 2023-08-01 NOTE — LETTER
"    8/1/2023        RE: Maia Miranda  C/o Tim Miranda  [address] [facility] [address]        [facility]  [place] [facility] Record Number:  [id number]  Place of Service where encounter took place: [facility]  [facility] ([facility]) [[id number]]    HPI:    Maia Miranda is a 89 year old  (10/26/1932), who is being seen today for an episodic care visit at the above location. Today's concern is INR/Coumadin management for A. Fib and PE    ROS/Subjective:  Bleeding Signs/Symptoms:  None  Thromboembolic Signs/Symptoms:  None  Medication Changes:  No  Dietary Changes:  No  Activity Changes: No  Bacterial/Viral Infection:  No  Missed Coumadin Doses:  YES (1 day)  On ASA: No  Other Concerns:  No    OBJECTIVE:  BP (!) 148/71   Pulse 61   Temp 97.6  F (36.4  C)   Resp 14   Ht 1.651 m (5' 5\")   Wt 72.4 kg (159 lb 9.6 oz)   SpO2 95%   BMI 26.56 kg/m    Last INR: 1.6 on 7/25/23  INR Today: 1.6  Current Dose:  Coumadin 1.5mg PO daily      ASSESSMENT:  (I48.91) Atrial fibrillation, unspecified type (H)  (primary encounter diagnosis)  (Z86.711) History of pulmonary embolism  (Z79.01) Long term current use of anticoagulant therapy  (Z51.81,  Z79.01) Encounter for monitoring Coumadin therapy    Subtherapeutic INR for goal of 2-3    PLAN/ORDERS:   New Dose: Coumadin 2 mg po Mon/Wed/Fri and 1.5 mg po AOD    Next INR: 1 week      Electronically signed by:  RAY Lopez CNP       Sincerely,        RAY Lopez CNP      "

## 2023-08-08 NOTE — LETTER
"    8/8/2023        RE: Maia Miranda  C/o Tim Miranda  [address] [facility] [address]        [facility]  [place] [facility] Record Number:  [id number]  Place of Service where encounter took place: [facility]  [facility] ([facility] [[id number]]    HPI:    Maia Miranda is a 89 year old  (10/26/1932), who is being seen today for an episodic care visit at the above location. Today's concern is INR/Coumadin management for A. Fib and PE    ROS/Subjective:  Bleeding Signs/Symptoms:  None  Thromboembolic Signs/Symptoms:  None  Medication Changes:  No  Dietary Changes:  No  Activity Changes: No  Bacterial/Viral Infection:  No  Missed Coumadin Doses:  None  On ASA: No  Other Concerns:  No    OBJECTIVE:  /58   Pulse 62   Temp (!) 95.3  F (35.2  C)   Resp 16   Ht 1.651 m (5' 5\")   Wt 71.6 kg (157 lb 12.8 oz)   SpO2 96%   BMI 26.26 kg/m    Last INR: 1.6 on 8/1/23  INR Today:  2.1  Current Dose:  Coumadin 2 mg po Mon/Wed/Fri and 1.5 mg po AOD       ASSESSMENT:  (I48.91) Atrial fibrillation, unspecified type (H)  (primary encounter diagnosis)  (Z86.711) History of pulmonary embolism  (Z79.01) Long term current use of anticoagulant therapy  (Z51.81,  Z79.01) Encounter for monitoring Coumadin therapy      Therapeutic INR for goal of 2-3    PLAN/ORDERS:   New Dose: No Change    Next INR: 2 weeks      Electronically signed by:  RAY Lopez CNP       Sincerely,        RAY Lopez CNP      "

## 2023-08-08 NOTE — PROGRESS NOTES
"[facility]  [place] Medical Record Number:  [id number]  Place of Service where encounter took place: [facility]  [facility] ([facility] [[id number]]    HPI:    Maia Miranda is a 89 year old  (10/26/1932), who is being seen today for an episodic care visit at the above location. Today's concern is INR/Coumadin management for A. Fib and PE    ROS/Subjective:  Bleeding Signs/Symptoms:  None  Thromboembolic Signs/Symptoms:  None  Medication Changes:  No  Dietary Changes:  No  Activity Changes: No  Bacterial/Viral Infection:  No  Missed Coumadin Doses:  None  On ASA: No  Other Concerns:  No    OBJECTIVE:  /58   Pulse 62   Temp (!) 95.3  F (35.2  C)   Resp 16   Ht 1.651 m (5' 5\")   Wt 71.6 kg (157 lb 12.8 oz)   SpO2 96%   BMI 26.26 kg/m    Last INR: 1.6 on 8/1/23  INR Today:  2.1  Current Dose:  Coumadin 2 mg po Mon/Wed/Fri and 1.5 mg po AOD       ASSESSMENT:  (I48.91) Atrial fibrillation, unspecified type (H)  (primary encounter diagnosis)  (Z86.711) History of pulmonary embolism  (Z79.01) Long term current use of anticoagulant therapy  (Z51.81,  Z79.01) Encounter for monitoring Coumadin therapy      Therapeutic INR for goal of 2-3    PLAN/ORDERS:   New Dose: No Change    Next INR: 2 weeks      Electronically signed by:  RAY Lopez CNP     "

## 2023-08-09 NOTE — TELEPHONE ENCOUNTER
S - INR draw  B -  resident  A - INR result 2.2  R - Will update NP, Await new coumadin orders    
Detail Level: Zone
Initiate Treatment: 1. 5-Fluorouracil 5%, Calcipotriene 0.005% Cream - Apply to affected areas (face) twice daily for 5 days; d/c if too much irritation. Wash hands thoroughly after application.
Render In Strict Bullet Format?: No

## 2023-08-22 NOTE — LETTER
"    8/22/2023        RE: Maia Miranda  C/o Tim Miranda  [address] [facility] [address]        [facility]  [place] Medical Record Number:  [id number]  Place of Service where encounter took place: [facility]  [facility]ING [place] [facility] (S) [[id number]]    HPI:    Maia Miranda is a 89 year old  (10/26/1932), who is being seen today for an episodic care visit at the above location. Today's concern is INR/Coumadin management for A. Fib and PE    ROS/Subjective:  Bleeding Signs/Symptoms:  None  Thromboembolic Signs/Symptoms:  None  Medication Changes:  No  Dietary Changes:  No  Activity Changes: No  Bacterial/Viral Infection:  No  Missed Coumadin Doses:  None  On ASA: No  Other Concerns:  No    OBJECTIVE:  /58   Pulse 62   Temp (!) 95.3  F (35.2  C)   Resp 16   Ht 1.651 m (5' 5\")   Wt 71.1 kg (156 lb 12.8 oz)   SpO2 96%   BMI 26.09 kg/m    Last INR: 2.1 on 8/8/23  INR Today:  4.2  Current Dose:  Coumadin 2 mg po Mon/Wed/Fri and 1.5 mg po AOD      ASSESSMENT:  (I48.91) Atrial fibrillation, unspecified type (H)  (primary encounter diagnosis)  (Z86.711) History of pulmonary embolism  (Z79.01) Long term current use of anticoagulant therapy  (Z51.81,  Z79.01) Encounter for monitoring Coumadin therapy    Supratherapeutic INR for goal of 2-3    PLAN/ORDERS:   New Dose: Hold coumdan today and tomorrow then 1.5 mg po daily    Next INR: 8/28/23      Electronically signed by:  RAY Lopez CNP       Sincerely,        RAY Lopez CNP      "

## 2023-08-22 NOTE — PROGRESS NOTES
"[facility] GERIATRICS  [place] Medical Record Number:  [id number]  Place of Service where encounter took place: [facility]  LIVING - [place] ([facility]) [[id number]]    HPI:    Maia Miranda is a 89 year old  (10/26/1932), who is being seen today for an episodic care visit at the above location. Today's concern is INR/Coumadin management for A. Fib and PE    ROS/Subjective:  Bleeding Signs/Symptoms:  None  Thromboembolic Signs/Symptoms:  None  Medication Changes:  No  Dietary Changes:  No  Activity Changes: No  Bacterial/Viral Infection:  No  Missed Coumadin Doses:  None  On ASA: No  Other Concerns:  No    OBJECTIVE:  /58   Pulse 62   Temp (!) 95.3  F (35.2  C)   Resp 16   Ht 1.651 m (5' 5\")   Wt 71.1 kg (156 lb 12.8 oz)   SpO2 96%   BMI 26.09 kg/m    Last INR: 2.1 on 8/8/23  INR Today:  4.2  Current Dose:  Coumadin 2 mg po Mon/Wed/Fri and 1.5 mg po AOD      ASSESSMENT:  (I48.91) Atrial fibrillation, unspecified type (H)  (primary encounter diagnosis)  (Z86.711) History of pulmonary embolism  (Z79.01) Long term current use of anticoagulant therapy  (Z51.81,  Z79.01) Encounter for monitoring Coumadin therapy    Supratherapeutic INR for goal of 2-3    PLAN/ORDERS:   New Dose: Hold coumdan today and tomorrow then 1.5 mg po daily    Next INR: 8/28/23      Electronically signed by:  RAY Loepz CNP     "

## 2023-08-29 NOTE — PROGRESS NOTES
"[facility] GERIATRICS  [place] Medical Record Number:  [id number]  Place of Service where encounter took place: [facility]  LIVING - [place] ([facility]) [[id number]]    HPI:    Maia Miranda is a 89 year old  (10/26/1932), who is being seen today for an episodic care visit at the above location. Today's concern is INR/Coumadin management for A. Fib and PE    ROS/Subjective:  Bleeding Signs/Symptoms:  None  Thromboembolic Signs/Symptoms:  None  Medication Changes:  No  Dietary Changes:  No  Activity Changes: No  Bacterial/Viral Infection:  No  Missed Coumadin Doses:  None  On ASA: No  Other Concerns:  No    OBJECTIVE:  /61   Pulse 59   Temp 97.7  F (36.5  C)   Resp 12   Ht 1.651 m (5' 5\")   Wt 68.7 kg (151 lb 6.4 oz)   SpO2 95%   BMI 25.19 kg/m    Last INR: 4.2 on 8/22/23  INR Today:  4.4   Current Dose:  Coumadin 1.5 mg PO daily    ASSESSMENT:  (I48.91) Atrial fibrillation, unspecified type (H)  (primary encounter diagnosis)  (Z86.711) History of pulmonary embolism  (Z79.01) Long term current use of anticoagulant therapy  (Z51.81,  Z79.01) Encounter for monitoring Coumadin therapy    Subtherapeutic INR for goal of 2-3    PLAN/ORDERS:   New Dose: Hold coumadin x 3 days (Tue and Wed/ Thursday then 1 po daily all other days     Next INR: 9/5/23      Electronically signed by:  RAY Lopez CNP     "

## 2023-08-29 NOTE — LETTER
"    8/29/2023        RE: aMia Miranda  C/o Tim Miranda  [address] [facility] [address]        [facility]  [place] Medical Record Number:  [id number]  Place of Service where encounter took place: [facility]  LIVING  [place] (S) [[id number]]    HPI:    Maia Miranda is a 89 year old  (10/26/1932), who is being seen today for an episodic care visit at the above location. Today's concern is INR/Coumadin management for A. Fib and PE    ROS/Subjective:  Bleeding Signs/Symptoms:  None  Thromboembolic Signs/Symptoms:  None  Medication Changes:  No  Dietary Changes:  No  Activity Changes: No  Bacterial/Viral Infection:  No  Missed Coumadin Doses:  None  On ASA: No  Other Concerns:  No    OBJECTIVE:  /61   Pulse 59   Temp 97.7  F (36.5  C)   Resp 12   Ht 1.651 m (5' 5\")   Wt 68.7 kg (151 lb 6.4 oz)   SpO2 95%   BMI 25.19 kg/m    Last INR: 4.2 on 8/22/23  INR Today:  4.4   Current Dose:  Coumadin 1.5 mg PO daily    ASSESSMENT:  (I48.91) Atrial fibrillation, unspecified type (H)  (primary encounter diagnosis)  (Z86.711) History of pulmonary embolism  (Z79.01) Long term current use of anticoagulant therapy  (Z51.81,  Z79.01) Encounter for monitoring Coumadin therapy    Subtherapeutic INR for goal of 2-3    PLAN/ORDERS:   New Dose: Hold coumadin x 3 days (Tue and Wed/ Thursday then 1 po daily all other days     Next INR: 9/5/23      Electronically signed by:  RAY Lopez CNP       Sincerely,        RAY Lopez CNP      "

## 2023-09-05 NOTE — PROGRESS NOTES
"[facility] GERIATRICS  [place] Medical Record Number:  [id number]  Place of Service where encounter took place: [facility]  LIVING - [place] ([facility]) [[id number]]    HPI:    Maia Miranda is a 89 year old  (10/26/1932), who is being seen today for an episodic care visit at the above location. Today's concern is INR/Coumadin management for A. Fib and PE    ROS/Subjective:  Bleeding Signs/Symptoms:  None  Thromboembolic Signs/Symptoms:  None  Medication Changes:  No  Dietary Changes:  No  Activity Changes: No  Bacterial/Viral Infection:  No  Missed Coumadin Doses:  YES- missed 1 dose  On ASA: No  Other Concerns: fluctuating INR 4.0  4.2  2.1     OBJECTIVE:  /61   Pulse 59   Temp 97.7  F (36.5  C)   Resp 12   Ht 1.651 m (5' 5\")   Wt 71.5 kg (157 lb 9.6 oz)   SpO2 95%   BMI 26.23 kg/m    Last INR: 4.4 on 8/29/23  INR Today:  2.1  Current Dose:  Coumadin held Tues, Wed, and Thurs, and then 1 mg daily all other days.    ASSESSMENT:  (I48.91) Atrial fibrillation, unspecified type (H)  (primary encounter diagnosis)  (Z86.711) History of pulmonary embolism  (Z79.01) Long term current use of anticoagulant therapy  (Z51.81,  Z79.01) Encounter for monitoring Coumadin therapy    Therapeutic INR for goal of 2-3    PLAN/ORDERS:   New Dose: Coumadin 1 mg po daily Tue/Thurs/Sat and Sunday and Coumadin 1.5 mg po Mon/Wed/ Friday    Next INR: 9/12/23    Electronically signed by:  RAY Lopez CNP     "

## 2023-09-05 NOTE — LETTER
"    9/5/2023        RE: Maia Miranda  C/o Tim Miranda  [address] [address] [address]        [facility]  [place] Medical Record Number:  [id number]  Place of Service where encounter took place: [facility]  LIVING - [place] (S) [[id number]]    HPI:    Maia Miranda is a 89 year old  (10/26/1932), who is being seen today for an episodic care visit at the above location. Today's concern is INR/Coumadin management for A. Fib and PE    ROS/Subjective:  Bleeding Signs/Symptoms:  None  Thromboembolic Signs/Symptoms:  None  Medication Changes:  No  Dietary Changes:  No  Activity Changes: No  Bacterial/Viral Infection:  No  Missed Coumadin Doses:  YES- missed 1 dose  On ASA: No  Other Concerns: fluctuating INR 4.0  4.2  2.1     OBJECTIVE:  /61   Pulse 59   Temp 97.7  F (36.5  C)   Resp 12   Ht 1.651 m (5' 5\")   Wt 71.5 kg (157 lb 9.6 oz)   SpO2 95%   BMI 26.23 kg/m    Last INR: 4.4 on 8/29/23  INR Today:  2.1  Current Dose:  Coumadin held Tues, Wed, and Thurs, and then 1 mg daily all other days.    ASSESSMENT:  (I48.91) Atrial fibrillation, unspecified type (H)  (primary encounter diagnosis)  (Z86.711) History of pulmonary embolism  (Z79.01) Long term current use of anticoagulant therapy  (Z51.81,  Z79.01) Encounter for monitoring Coumadin therapy    Therapeutic INR for goal of 2-3    PLAN/ORDERS:   New Dose: Coumadin 1 mg po daily Tue/Thurs/Sat and Sunday and Coumadin 1.5 mg po Mon/Wed/ Friday    Next INR: 9/12/23    Electronically signed by:  RAY Lopez CNP       Sincerely,        RAY Lopez CNP      "

## 2023-09-11 NOTE — PROGRESS NOTES
"[facility] GERIATRICS  [place] Medical Record Number:  [id number]  Place of Service where encounter took place: [facility]  LIVING - [place] ([facility]) [[id number]]    HPI:    Maia Miranda is a 89 year old  (10/26/1932), who is being seen today for an episodic care visit at the above location. Today's concern is INR/Coumadin management for A. Fib and PE    ROS/Subjective:  Bleeding Signs/Symptoms:  None  Thromboembolic Signs/Symptoms:  None  Medication Changes:  No  Dietary Changes:  No  Activity Changes: No  Bacterial/Viral Infection:  No  Missed Coumadin Doses:  None  On ASA: No  Other Concerns:  No    OBJECTIVE:  Ht 1.651 m (5' 5\")   Wt 69.2 kg (152 lb 9.6 oz)   BMI 25.39 kg/m    Last INR: 2.1 on 9/5/23  INR Today: 2.6  Current Dose:  Coumadin 1 mg po daily Tue/Thurs/Sat and Sunday and Coumadin 1.5 mg po Mon/Wed/ Friday       ASSESSMENT:  (I48.91) Atrial fibrillation, unspecified type (H)  (primary encounter diagnosis)  (Z86.711) History of pulmonary embolism  (Z79.01) Long term current use of anticoagulant therapy  (Z51.81,  Z79.01) Encounter for monitoring Coumadin therapy  Comment: trending up     Therapeutic INR for goal of 2-3    PLAN/ORDERS:  New Dose: Coumadin 1.5 mg po on Tuesday and 1 mg po all other days  Next INR: 1 week      Electronically signed by:  RAY Lopez CNP     "

## 2023-09-12 NOTE — LETTER
"    9/12/2023        RE: Maia Miranda  C/o Tim Miranda  [address] [address] [address]        [facility]  [place] Medical Record Number:  [id number]  Place of Service where encounter took place: [facility]  LIVING  [place] (S) [[id number]]    HPI:    Maia Miranda is a 89 year old  (10/26/1932), who is being seen today for an episodic care visit at the above location. Today's concern is INR/Coumadin management for A. Fib and PE    ROS/Subjective:  Bleeding Signs/Symptoms:  None  Thromboembolic Signs/Symptoms:  None  Medication Changes:  No  Dietary Changes:  No  Activity Changes: No  Bacterial/Viral Infection:  No  Missed Coumadin Doses:  None  On ASA: No  Other Concerns:  No    OBJECTIVE:  Ht 1.651 m (5' 5\")   Wt 69.2 kg (152 lb 9.6 oz)   BMI 25.39 kg/m    Last INR: 2.1 on 9/5/23  INR Today: 2.6  Current Dose:  Coumadin 1 mg po daily Tue/Thurs/Sat and Sunday and Coumadin 1.5 mg po Mon/Wed/ Friday       ASSESSMENT:  (I48.91) Atrial fibrillation, unspecified type (H)  (primary encounter diagnosis)  (Z86.711) History of pulmonary embolism  (Z79.01) Long term current use of anticoagulant therapy  (Z51.81,  Z79.01) Encounter for monitoring Coumadin therapy  Comment: trending up     Therapeutic INR for goal of 2-3    PLAN/ORDERS:  New Dose: Coumadin 1.5 mg po on Tuesday and 1 mg po all other days  Next INR: 1 week      Electronically signed by:  RAY Lopez CNP       Sincerely,        RAY Lopez CNP      "

## 2023-09-12 NOTE — LETTER
Maia Miranda      New Dose: Coumadin 1.5 mg po on Tuesday and 1 mg po all other days  Next INR: 1 week      RAY Lopez CNP on 9/12/2023 at 11:56 AM

## 2023-09-19 NOTE — LETTER
"    9/19/2023        RE: Maia Miranda  C/o Tim Miranda  [address] [address] [facility] [address]        [facility]  [place] Medical Record Number:  [id number]  Place of Service where encounter took place: [facility]  LIVING - [place] (S) [[id number]]    HPI:    Maia Miranda is a 89 year old  (10/26/1932), who is being seen today for an episodic care visit at the above location. Today's concern is INR/Coumadin management for A. Fib and PE    ROS/Subjective:  Bleeding Signs/Symptoms:  None  Thromboembolic Signs/Symptoms:  None  Medication Changes:  No  Dietary Changes:  No  Activity Changes: No  Bacterial/Viral Infection:  No  Missed Coumadin Doses:  None  On ASA: No  Other Concerns:  No    OBJECTIVE:  /56   Pulse 58   Temp 97.7  F (36.5  C)   Resp 16   Ht 1.651 m (5' 5\")   Wt 69.2 kg (152 lb 9.6 oz)   SpO2 95%   BMI 25.39 kg/m    Last INR: 2.6 on 9/12/23  INR Today:  3.0  Current Dose: Coumadin 1.5 mg po on Tuesday and 1 mg po all other days     ASSESSMENT:  (I48.91) Atrial fibrillation, unspecified type (H)  (primary encounter diagnosis)  (Z86.711) History of pulmonary embolism  (Z79.01) Long term current use of anticoagulant therapy  (Z51.81,  Z79.01) Encounter for monitoring Coumadin therapy    Therapeutic INR for goal of 2-3    PLAN/ORDERS:   New Dose: Coumadin 1 mg po daily with ongoing decline    Next INR: 2 weeks on 10/3/23      Electronically signed by:  RAY Lopez CNP       Sincerely,        RAY Lopez CNP      "

## 2023-09-19 NOTE — PROGRESS NOTES
"[facility] GERIATRICS  [place] Medical Record Number:  [id number]  Place of Service where encounter took place: [facility]  LIVING - [place] ([facility]) [[id number]]    HPI:    Maia Miranda is a 89 year old  (10/26/1932), who is being seen today for an episodic care visit at the above location. Today's concern is INR/Coumadin management for A. Fib and PE    ROS/Subjective:  Bleeding Signs/Symptoms:  None  Thromboembolic Signs/Symptoms:  None  Medication Changes:  No  Dietary Changes:  No  Activity Changes: No  Bacterial/Viral Infection:  No  Missed Coumadin Doses:  None  On ASA: No  Other Concerns:  No    OBJECTIVE:  /56   Pulse 58   Temp 97.7  F (36.5  C)   Resp 16   Ht 1.651 m (5' 5\")   Wt 69.2 kg (152 lb 9.6 oz)   SpO2 95%   BMI 25.39 kg/m    Last INR: 2.6 on 9/12/23  INR Today:  3.0  Current Dose: Coumadin 1.5 mg po on Tuesday and 1 mg po all other days     ASSESSMENT:  (I48.91) Atrial fibrillation, unspecified type (H)  (primary encounter diagnosis)  (Z86.711) History of pulmonary embolism  (Z79.01) Long term current use of anticoagulant therapy  (Z51.81,  Z79.01) Encounter for monitoring Coumadin therapy    Therapeutic INR for goal of 2-3    PLAN/ORDERS:   New Dose: Coumadin 1 mg po daily with ongoing decline    Next INR: 2 weeks on 10/3/23      Electronically signed by:  RAY Lopez CNP     "

## 2023-11-08 ENCOUNTER — PATIENT OUTREACH (OUTPATIENT)
Dept: GERIATRIC MEDICINE | Facility: CLINIC | Age: 88
End: 2023-11-08
Payer: COMMERCIAL

## 2023-11-08 NOTE — PROGRESS NOTES
[facility] Care Coordination Contact    Date of Exit:  11/1/23  Reason for Exit: Member passed away.     Death notification faxed to [facility].  CMS and E&E notified of disenrollment.     Talia Stallings RN  [facility]  Cell: [phone]

## 2024-01-10 ENCOUNTER — TELEPHONE (OUTPATIENT)
Dept: UROLOGY | Facility: CLINIC | Age: 89
End: 2024-01-10
Payer: COMMERCIAL

## 2024-01-10 NOTE — TELEPHONE ENCOUNTER
[facility] Call Center    Phone Message    May a detailed message be left on voicemail: yes     Reason for Call: Other: David calls because they are needing order and charting for cath supplies from April 2023 through 11/1/23 for their records. Fax: [phone]     Action Taken: Message routed to:  Other: Urology    Travel Screening: Not Applicable

## 2024-01-10 NOTE — TELEPHONE ENCOUNTER
Left detailed message on VM for David, that we have not seen patient since 2021, he would need to contact Hospice.  Halle Cosme LPN

## 2025-04-24 NOTE — ED PROVIDER NOTES
History     Chief Complaint:  Fall      HPI   Maia Miranda is a 87 year old female, anticoagulated on Coumadin for paroxysmal Atrial Fibrillation, with a history of amnestic MCI, osteoporosis, occlusion and stenosis of carotid artery, hypertension, and generalized osteoarthrosis who presents after a fall. Last INR was 2.8 on 12/03. Patient states that she fell in the bathroom this morning, but is unsure what caused her fall. Son thinks that this was around [id number]. She does not think that she lost consciousness, but she did hit her head -- she has a laceration to her posterior scalp. She became concerned when she felt the wound given her use of Coumadin. There was a significant amount of bleeding. Since falling, she has felt mildly dizzy, but is not more confused than her baseline. She denies recent worsening of her dizziness, and states that she has not been ill recently -- no cough or cold symptoms. She has no hip pain, headache, abdominal pain, or chest pain.     Tdap: 11/05/2013    Allergies:  Codeine  Meperidine  Morphine  Penicillins  Sulfa Drugs  Epinephrine      Medications:    Premarin cream  Lisinopril   Meclizine   Metoprolol succinate ER   Coumadin      Past Medical History:    Amnestic MCI   Chronic duodenal ulcer without mention of hemorrhage, perforation, or obstruction  Depressive disorder  Diverticulosis   Erosive eye disorder  Esophageal reflux   Essential tremor   Generalized osteoarthrosis   Hiatal hernia  Pulmonary embolism   Lactose intolerance   Lumbago   Mitral valve regurgitation   Mild cognitive impairment   Rectal bleeding  Occlusion and stenosis of carotid artery without mention of cerebral infarction   Osteoporosis   Paroxysmal Atrial Fibrillation   Hypertension   Tinnitus   Recurrent UTI    Past Surgical History:    D&C x2   Left breast bx   Colonoscopy   Cataract IOL   Corneal scraping   Cystoscopy   Cystoscopy, bx bladder combined   EGD Combined  Implant stimulator sacral  nerve stage one  Implant stimulator sacral nerve stage two   Interstim placement     Family History:    Father- cerebrovascular disease   Mother - depression, suicide   Sister - cardiovascular disease with pacemaker in place     Social History:  The patient was accompanied to the ED by her son.  Smoking Status: Never  Smokeless Tobacco: Never  Alcohol Use: No  Marital Status:        Review of Systems   HENT: Negative for congestion.    Respiratory: Negative for cough.    Cardiovascular: Negative for chest pain.   Gastrointestinal: Negative for abdominal pain.   Musculoskeletal: Negative for arthralgias.   Skin: Positive for wound.   Neurological: Positive for dizziness. Negative for syncope and headaches.   Psychiatric/Behavioral: Negative for confusion.   All other systems reviewed and are negative.      Physical Exam     Patient Vitals for the past 24 hrs:   BP Temp Pulse Resp SpO2   12/22/19 1013 (!) 147/101 98  F (36.7  C) 85 18 100 %       Physical Exam  Constitutional: Alert, attentive, GCS 15  HENT:     Nose: Nose normal.   Mouth/Throat: Oropharynx is clear, mucous membranes are moist   Ears: Normal external ears. TMs clear bilaterally, normal external canals    bilaterally.  Eyes: EOM are normal.    CV: Regular rate and rhythm, no murmurs, rubs or gallups.  Chest: Effort normal and breath sounds normal.   GI: No distension. There is no tenderness.  MSK: Normal range of motion.   C-spine cleared by NEXUS   No tenderness or stepoffs to the C/T/L-spine   Normal, atraumatic inspection to the back     Pelvis stable, nontender   No extremity tenderness or deformity   No chest wall tenderness  Neurological: Alert, attentive  Skin: Skin is warm and dry.   6 cm laceration to the right parietooccipital scalp, no exposed galea, bleeding resolved      Emergency Department Course     Imaging:  Radiology findings were communicated with the patient and family who voiced understanding of the findings.    CT Head w/o  Contrast  Final Result  IMPRESSION:   No acute intracranial abnormality.  NILDA BOSTON MD    Laboratory:  Laboratory findings were communicated with the patient and family who voiced understanding of the findings.    CBC:  (L) o/w WNL. (WBC 7.8, HGB 13.1)   BMP: Chloride 112 (H), Glucose 115 (H), BUN 34 (H), Creatinine 1.34 (H), GFR Estimate 35 (L) o/w WNL     INR: 2.24 (H)       Procedures:    Laceration Repair        LACERATION:  A simple minimally Contaminated 6 cm laceration.      LOCATION:  Posterior scalp        FUNCTION:  Distally sensation and circulation are intact.      ANESTHESIA:  Local using 0.5% Marcaine total of 9 mLs      PREPARATION:  Irrigation with Normal Saline and Shur Clens      DEBRIDEMENT:  no debridement      CLOSURE:  Wound was closed with 6 Staples      Interventions:  1143 Acetaminophen 500mg PO  1144 Tdap 0.5mL IM     Emergency Department Course:  Past medical records, nursing notes, and vitals reviewed.    1041 I performed an exam of the patient as documented above.     IV was inserted and blood was drawn for laboratory testing, results above.  The patient was sent for a CT Head wo Contrast while in the emergency department, results above.     1153 I rechecked the patient and discussed the results of her workup thus far. Patient's wound was anesthetized.     1224 Laceration repaired as noted above.     Findings and plan explained to the Patient and son. Patient discharged home with instructions regarding supportive care, medications, and reasons to return. The importance of close follow-up was reviewed.    I personally reviewed the laboratory and imaging results with the Patient and son and answered all related questions prior to discharge.     Impression & Plan     Medical Decision Making:  This is an 87-year-old female with history of Coumadin use for paroxysmal atrial fibrillation who presents for evaluation after apparent mechanical fall with closed head injury and scalp  laceration.  C-spine is cleared by Nexus and CT head shows no acute intracranial injury or skull fracture.  She has a normal neurologic exam.  Tetanus was updated.  Laceration repaired as per above.  Patient is ambulatory without assist, and I believe safe return home.  No preceding symptoms leading up to this suggest underlying medical illness.  Patient and family are comfortable with discharge and I recommended primary care follow-up for recheck in 7 to 10 days for suture removal during the same timeframe.  Strict return precautions for confusion, headache, weakness, or any other concerns.    Diagnosis:    ICD-10-CM    1. Injury of head, initial encounter S09.90XA    2. Laceration of scalp, initial encounter S01.01XA    3. Long term current use of anticoagulant therapy Z79.01        Disposition:  Discharged to home.    Scribe Disclosure:  I, Enriqueta Moss, am serving as a scribe at 10:28 AM on 12/22/2019 to document services personally performed by Mango Rey MD based on my observations and the provider's statements to me.     12/22/2019   [facility] EMERGENCY DEPARTMENT       Mango Rey MD  12/22/19 5161     endoscopy

## (undated) DEVICE — DRSG TELFA ISLAND 4X3" 7643

## (undated) DEVICE — TOURNIQUET SGL  BLADDER 30"X4" BLUE 5921030135

## (undated) DEVICE — ESU GROUND PAD ADULT W/CORD E7507

## (undated) DEVICE — INTERSTIM

## (undated) DEVICE — ADHESIVE SWIFTSET 0.8ML OCTYL SS6

## (undated) DEVICE — GLOVE PROTEXIS POWDER FREE 7.5 ORTHOPEDIC 2D73ET75

## (undated) DEVICE — SU VICRYL 3-0 RB-1 27" UND J215H

## (undated) DEVICE — EXTERNAL NEUROSTIMULATOR

## (undated) DEVICE — DECANTER VIAL 2006S

## (undated) DEVICE — DRAPE LAP W/ARMBOARD 29410

## (undated) DEVICE — SUCTION TIP YANKAUER W/O VENT K86

## (undated) DEVICE — PREP CHLORAPREP 26ML TINTED ORANGE  260815

## (undated) DEVICE — DRSG STERI STRIP 1/2X4" R1547

## (undated) DEVICE — ANTENNA MEDTRONIC RSTR 37092

## (undated) DEVICE — SU VICRYL 3-0 SH 27" UND J416H

## (undated) DEVICE — SOL WATER IRRIG 1000ML BOTTLE 2F7114

## (undated) DEVICE — GLOVE RADIATION RESISTANT SZ 7.5  95-395

## (undated) DEVICE — SUCTION TIP YANKAUER STR K87

## (undated) DEVICE — LINEN TOWEL PACK X5 5464

## (undated) DEVICE — PROGRAMMER INTERSTIM 3037

## (undated) DEVICE — LINEN FULL SHEET 5511

## (undated) DEVICE — DRAPE STERI TOWEL LG 1010

## (undated) DEVICE — DRAPE IOBAN INCISE 23X17" 6650EZ

## (undated) DEVICE — CAST PLASTER SPLINT XTRA FAST 5X30" 7392

## (undated) DEVICE — GLOVE PROTEXIS POWDER FREE 8.0 ORTHOPEDIC 2D73ET80

## (undated) DEVICE — DRILL BIT ARTHREX MTP 2.0MM AR-8944-22

## (undated) DEVICE — BAG CLEAR TRASH 1.3M 39X33" P4040C

## (undated) DEVICE — SUCTION MANIFOLD NEPTUNE 2 SYS 4 PORT 0702-020-000

## (undated) DEVICE — CAST PADDING 4" STERILE 9044S

## (undated) DEVICE — DRSG TEGADERM 4X4 3/4" 1626W

## (undated) DEVICE — SUCTION MANIFOLD NEPTUNE 2 SYS 1 PORT 702-025-000

## (undated) DEVICE — Device

## (undated) DEVICE — GLOVE PROTEXIS MICRO 7.0  2D73PM70

## (undated) DEVICE — SYR EAR BULB 3OZ 0035830

## (undated) DEVICE — DRAPE X-RAY TUBE 00-901169-01-OEC

## (undated) DEVICE — IMPLANTABLE DEVICE: Type: IMPLANTABLE DEVICE | Site: ANKLE | Status: NON-FUNCTIONAL

## (undated) DEVICE — PACK LOWER EXTREMITY RIDGES

## (undated) DEVICE — DRSG PRIMAPORE 02X3" 7133

## (undated) DEVICE — LINEN DRAPE 54X72" 5467

## (undated) DEVICE — BASIN EMESIS STERILE  SSK9005A

## (undated) DEVICE — PACK MINOR CUSTOM ASC

## (undated) DEVICE — LINEN ORTHO ACL PACK 5447

## (undated) DEVICE — BNDG ELASTIC 4" DBL LENGTH UNSTERILE 6611-14

## (undated) DEVICE — SUTURE VICRYL+ 2-0 CT-2 27" UND VCP269H

## (undated) DEVICE — KIT ACCESSORY PERIPHERAL LEAD INTR 18CM 3550-18

## (undated) DEVICE — DRILL BIT ARTHREX LPS CAN 3.5MM AR-4160-35

## (undated) DEVICE — GLOVE PROTEXIS BLUE W/NEU-THERA 7.5  2D73EB75

## (undated) DEVICE — DRILL BIT ARTHREX 2.5MM AR-8943-42

## (undated) DEVICE — SOL NACL 0.9% IRRIG 1000ML BOTTLE 2F7124

## (undated) DEVICE — DRILL BIT ARTHREX BB TAK MTP AR-13226

## (undated) DEVICE — LINEN HALF SHEET 5512

## (undated) DEVICE — GLOVE PROTEXIS BLUE W/NEU-THERA 8.0  2D73EB80

## (undated) DEVICE — CAST BUCKET

## (undated) DEVICE — CAST PADDING 4" UNSTERILE 9044

## (undated) DEVICE — SU ETHILON 3-0 FS-1 18" 669H

## (undated) DEVICE — PREP CHLORAPREP 26ML TINTED HI-LITE ORANGE 930815

## (undated) RX ORDER — EPHEDRINE SULFATE 50 MG/ML
INJECTION, SOLUTION INTRAMUSCULAR; INTRAVENOUS; SUBCUTANEOUS
Status: DISPENSED
Start: 2022-04-10

## (undated) RX ORDER — KETAMINE HYDROCHLORIDE 10 MG/ML
INJECTION, SOLUTION INTRAMUSCULAR; INTRAVENOUS
Status: DISPENSED
Start: 2017-04-18

## (undated) RX ORDER — FENTANYL CITRATE-0.9 % NACL/PF 10 MCG/ML
PLASTIC BAG, INJECTION (ML) INTRAVENOUS
Status: DISPENSED
Start: 2022-04-10

## (undated) RX ORDER — ACETAMINOPHEN 325 MG/1
TABLET ORAL
Status: DISPENSED
Start: 2017-04-18

## (undated) RX ORDER — PROPOFOL 10 MG/ML
INJECTION, EMULSION INTRAVENOUS
Status: DISPENSED
Start: 2017-04-18

## (undated) RX ORDER — FENTANYL CITRATE 50 UG/ML
INJECTION, SOLUTION INTRAMUSCULAR; INTRAVENOUS
Status: DISPENSED
Start: 2022-04-10

## (undated) RX ORDER — PROPOFOL 10 MG/ML
INJECTION, EMULSION INTRAVENOUS
Status: DISPENSED
Start: 2022-04-10

## (undated) RX ORDER — LIDOCAINE HYDROCHLORIDE 10 MG/ML
INJECTION, SOLUTION EPIDURAL; INFILTRATION; INTRACAUDAL; PERINEURAL
Status: DISPENSED
Start: 2022-04-10

## (undated) RX ORDER — CEFAZOLIN SODIUM 1 G/3ML
INJECTION, POWDER, FOR SOLUTION INTRAMUSCULAR; INTRAVENOUS
Status: DISPENSED
Start: 2017-04-18

## (undated) RX ORDER — GLYCOPYRROLATE 0.2 MG/ML
INJECTION INTRAMUSCULAR; INTRAVENOUS
Status: DISPENSED
Start: 2022-04-10

## (undated) RX ORDER — ACETAMINOPHEN 325 MG/1
TABLET ORAL
Status: DISPENSED
Start: 2017-04-04

## (undated) RX ORDER — DEXAMETHASONE SODIUM PHOSPHATE 4 MG/ML
INJECTION, SOLUTION INTRA-ARTICULAR; INTRALESIONAL; INTRAMUSCULAR; INTRAVENOUS; SOFT TISSUE
Status: DISPENSED
Start: 2022-04-10

## (undated) RX ORDER — FENTANYL CITRATE 50 UG/ML
INJECTION, SOLUTION INTRAMUSCULAR; INTRAVENOUS
Status: DISPENSED
Start: 2017-04-04

## (undated) RX ORDER — BUPIVACAINE HYDROCHLORIDE AND EPINEPHRINE 2.5; 5 MG/ML; UG/ML
INJECTION, SOLUTION EPIDURAL; INFILTRATION; INTRACAUDAL; PERINEURAL
Status: DISPENSED
Start: 2017-04-18

## (undated) RX ORDER — ONDANSETRON 2 MG/ML
INJECTION INTRAMUSCULAR; INTRAVENOUS
Status: DISPENSED
Start: 2017-04-04

## (undated) RX ORDER — BUPIVACAINE HYDROCHLORIDE AND EPINEPHRINE 5; 5 MG/ML; UG/ML
INJECTION, SOLUTION EPIDURAL; INTRACAUDAL; PERINEURAL
Status: DISPENSED
Start: 2022-04-10

## (undated) RX ORDER — PROPOFOL 10 MG/ML
INJECTION, EMULSION INTRAVENOUS
Status: DISPENSED
Start: 2017-04-04

## (undated) RX ORDER — ONDANSETRON 2 MG/ML
INJECTION INTRAMUSCULAR; INTRAVENOUS
Status: DISPENSED
Start: 2022-04-10

## (undated) RX ORDER — CLINDAMYCIN PHOSPHATE 900 MG/50ML
INJECTION, SOLUTION INTRAVENOUS
Status: DISPENSED
Start: 2022-04-10

## (undated) RX ORDER — CEFAZOLIN SODIUM 1 G/3ML
INJECTION, POWDER, FOR SOLUTION INTRAMUSCULAR; INTRAVENOUS
Status: DISPENSED
Start: 2017-04-04